# Patient Record
Sex: MALE | Race: OTHER | URBAN - METROPOLITAN AREA
[De-identification: names, ages, dates, MRNs, and addresses within clinical notes are randomized per-mention and may not be internally consistent; named-entity substitution may affect disease eponyms.]

---

## 2017-04-21 VITALS
OXYGEN SATURATION: 97 % | RESPIRATION RATE: 18 BRPM | HEIGHT: 74 IN | TEMPERATURE: 98 F | WEIGHT: 178.35 LBS | HEART RATE: 84 BPM | DIASTOLIC BLOOD PRESSURE: 101 MMHG | SYSTOLIC BLOOD PRESSURE: 165 MMHG

## 2017-04-21 RX ORDER — CHLORHEXIDINE GLUCONATE 213 G/1000ML
1 SOLUTION TOPICAL ONCE
Qty: 0 | Refills: 0 | Status: DISCONTINUED | OUTPATIENT
Start: 2017-04-24 | End: 2017-04-25

## 2017-04-21 NOTE — H&P ADULT - ASSESSMENT
61 yr old M current smoker with PMHx of HTN, hyperlipidemia, Stage III CKD, T cell lymphoma (diagnosed 10 yrs ago, on chemotherapy q Wednesday), chronic Afib (on Coumadin, last dose 4/18/17) initially presented today to [facility] for right and left heart cath with possible intervention in light of pt's risk factors, CCS Angina Class III equivalent symptoms and abnormal results of ECHO.    ASA III and Mallampati III    OF NOTE:    Risks & benefits of procedure and alternative therapy have been explained to the patient including but not limited to: allergic reaction, bleeding w/possible need for blood transfusion, infection, renal and vascular compromise, limb damage, arrhythmia, stroke, vessel dissection/perforation, Myocardial infarction, emergent CABG. Informed consent obtained and in chart. 61 yr old M current smoker with PMHx of HTN, hyperlipidemia, Stage III CKD, T cell lymphoma (diagnosed 10 yrs ago, on chemotherapy q Wednesday), chronic Afib (on Coumadin, last dose 4/18/17) initially presented today to [facility] for right and left heart cath with possible intervention in light of pt's risk factors, CCS Angina Class III equivalent symptoms and abnormal results of ECHO.    ASA III and Mallampati III    OF NOTE:     Risks & benefits of procedure and alternative therapy have been explained to the patient including but not limited to: allergic reaction, bleeding w/possible need for blood transfusion, infection, renal and vascular compromise, limb damage, arrhythmia, stroke, vessel dissection/perforation, Myocardial infarction, emergent CABG. Informed consent obtained and in chart. 61 yr old M current smoker with PMHx of HTN, hyperlipidemia, Stage III CKD, T cell lymphoma (diagnosed 10 yrs ago, on chemotherapy q Wednesday), chronic Afib (on Coumadin, last dose 4/18/17) initially presented today to [facility] for right and left heart cath with possible intervention in light of pt's risk factors, CCS Angina Class III equivalent symptoms and abnormal results of ECHO.    ASA III and Mallampati III    OF NOTE: Pt was admitted to the [facility]    Risks & benefits of procedure and alternative therapy have been explained to the patient including but not limited to: allergic reaction, bleeding w/possible need for blood transfusion, infection, renal and vascular compromise, limb damage, arrhythmia, stroke, vessel dissection/perforation, Myocardial infarction, emergent CABG. Informed consent obtained and in chart. 61 yr old M current smoker with PMHx of HTN, hyperlipidemia, Stage III CKD, T cell lymphoma (diagnosed 10 yrs ago, on chemotherapy q Wednesday), chronic Afib (on Coumadin, last dose 4/18/17) initially presented today to [facility] for elective right and left heart cath with possible intervention now postponed due to acute on chronic systolic CHF exacerbation.    ASA III and Mallampati III         Risks & benefits of procedure and alternative therapy have been explained to the patient including but not limited to: allergic reaction, bleeding w/possible need for blood transfusion, infection, renal and vascular compromise, limb damage, arrhythmia, stroke, vessel dissection/perforation, Myocardial infarction, emergent CABG. Informed consent obtained and in chart.

## 2017-04-21 NOTE — H&P ADULT - PROBLEM SELECTOR PLAN 5
diagnosed 10 y ago, on chemo every Wednesday, will follow-up outpatient chemo regimen with pt's oncologist

## 2017-04-21 NOTE — H&P ADULT - PROBLEM SELECTOR PLAN 4
continue amlodipine, torpol, valsartan for now. Monitor BP closely while diuresing may need additional agents if BP does not improve.

## 2017-04-21 NOTE — H&P ADULT - PROBLEM SELECTOR PLAN 1
-Lasix IV 40 mg x1 given, reassess for further diuresis pending urine output  -ECHO ordered, reassess EF and PA pressure  -Monitor strict I/Os, daily weight, fluid restriction to 1L per day  -Continue telemetry monitor  -continue toprol XL and valsartan  - plan for cath this admit pending clinical improvement

## 2017-04-21 NOTE — H&P ADULT - PROBLEM SELECTOR PLAN 2
Cr 3.1, Na 150, Cr baseline per Dr Rowell   avoid nephrotoxic agents  renal Dr. Guadalupe consulted (pt's nephrologist)-follow-up recs

## 2017-04-21 NOTE — H&P ADULT - HISTORY OF PRESENT ILLNESS
**SKELETON    61 yr old M current smoker with PMHx of HTN, hyperlipidemia, Stage III CKD, T cell lymphoma , chronic Afib (on Coumadin, last dose 4/19/17)    initially presented to cardiologist c/o NIELSON x several months.    Patient states he cannot walk >1 block prior to becoming SOB and fatigued.       Echocardiogram on 3/30/17 revealed moderate global hypokinesis with an estimated EF 40%. Mildly dilated with mild concentric LVH. Moderate MR/TR. Severe pulmonary HTN.     In light of patients risk factors, CCS Angina Class III equivalent symptoms, patient now presents for recommended right and left heart catheterization with possible intervention. 61 yr old M current smoker with PMHx of HTN, hyperlipidemia, Stage III CKD, T cell lymphoma (diagnosed 10 yrs ago, on chemotherapy q Wednesday), chronic Afib (on Coumadin, last dose 4/18/17) initially presented to cardiologist c/o NIELSON x several weeks. Patient states he can barely walk >1 block prior to becoming SOB and developing palpitations. Patient further admits to occasional dizziness and lightheadedness. Patient also reports bilateral LE edema from feet to his waist, 2 pillow orthopnea and PND.  Echocardiogram on 3/30/17 revealed moderate global hypokinesis with an estimated EF 40%. Mildly dilated with mild concentric LVH. Moderate MR/TR. Severe pulmonary HTN.     In light of patients risk factors, CCS Angina Class III equivalent symptoms, patient now presents for recommended right and left heart catheterization with possible intervention. 61 yr old M current smoker with PMHx of HTN, hyperlipidemia, Stage III CKD, T cell lymphoma (diagnosed 10 yrs ago, on chemotherapy q Wednesday), chronic Afib (on Coumadin, last dose 4/18/17) initially presented to cardiologist c/o NIELSON x several weeks. Patient states he can barely walk >1 block prior to becoming SOB and developing palpitations. Patient further admits to occasional dizziness and lightheadedness. Patient also reports bilateral LE edema from feet to his waist, 2 pillow orthopnea and PND.  Echocardiogram on 3/30/17 revealed moderate global hypokinesis with an estimated EF 40%. Mildly dilated with mild concentric LVH. Moderate MR/TR. Severe pulmonary HTN.     In light of patients risk factors, CCS Angina Class III equivalent symptoms, patient now presents for recommended right and left heart catheterization with possible intervention.     On arrival pt was found to be volume overloaded. Cath cancelled due to acute on chronic systolic HF exacerbation per Dr Rowell and Dr. Echeverria. Pt is given lasix 40 IV X 1 and started on Heparin Drip for A-fib. Pt will admitted for further cardiac management to [facility].

## 2017-04-21 NOTE — H&P ADULT - NSHPSOCIALHISTORY_GEN_ALL_CORE
Tobacco: <1/4 ppd x 3 months, previously 1/2 ppd x 40rs  ETOH: social drinker  Illicit drugs: denies

## 2017-04-24 ENCOUNTER — INPATIENT (INPATIENT)
Facility: HOSPITAL | Age: 62
LOS: 3 days | Discharge: HOME | DRG: 291 | End: 2017-04-28
Attending: INTERNAL MEDICINE | Admitting: INTERNAL MEDICINE
Payer: MEDICARE

## 2017-04-24 DIAGNOSIS — Z90.49 ACQUIRED ABSENCE OF OTHER SPECIFIED PARTS OF DIGESTIVE TRACT: Chronic | ICD-10-CM

## 2017-04-24 DIAGNOSIS — C85.90 NON-HODGKIN LYMPHOMA, UNSPECIFIED, UNSPECIFIED SITE: ICD-10-CM

## 2017-04-24 DIAGNOSIS — I50.43 ACUTE ON CHRONIC COMBINED SYSTOLIC (CONGESTIVE) AND DIASTOLIC (CONGESTIVE) HEART FAILURE: ICD-10-CM

## 2017-04-24 DIAGNOSIS — N18.4 CHRONIC KIDNEY DISEASE, STAGE 4 (SEVERE): ICD-10-CM

## 2017-04-24 DIAGNOSIS — N18.9 CHRONIC KIDNEY DISEASE, UNSPECIFIED: ICD-10-CM

## 2017-04-24 DIAGNOSIS — I48.2 CHRONIC ATRIAL FIBRILLATION: ICD-10-CM

## 2017-04-24 DIAGNOSIS — I10 ESSENTIAL (PRIMARY) HYPERTENSION: ICD-10-CM

## 2017-04-24 DIAGNOSIS — Z02.9 ENCOUNTER FOR ADMINISTRATIVE EXAMINATIONS, UNSPECIFIED: ICD-10-CM
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
## 2017-04-24 DIAGNOSIS — I48.91 UNSPECIFIED ATRIAL FIBRILLATION: ICD-10-CM

## 2017-04-24 LAB
ANION GAP SERPL CALC-SCNC: 7 MMOL/L — LOW (ref 9–16)
APPEARANCE UR: CLEAR — SIGNIFICANT CHANGE UP
APTT BLD: 42.1 SEC — HIGH (ref 27.5–37.4)
APTT BLD: >200 SEC — CRITICAL HIGH (ref 27.5–37.4)
BASOPHILS NFR BLD AUTO: 0.6 % — SIGNIFICANT CHANGE UP (ref 0–2)
BILIRUB UR-MCNC: NEGATIVE — SIGNIFICANT CHANGE UP
BUN SERPL-MCNC: 31 MG/DL — HIGH (ref 7–23)
CALCIUM SERPL-MCNC: 8.8 MG/DL — SIGNIFICANT CHANGE UP (ref 8.5–10.5)
CHLORIDE SERPL-SCNC: 118 MMOL/L — HIGH (ref 96–108)
CHLORIDE UR-SCNC: 152 MMOL/L — SIGNIFICANT CHANGE UP
CHOLEST SERPL-MCNC: 107 MG/DL — SIGNIFICANT CHANGE UP
CK MB CFR SERPL CALC: 1.4 NG/ML — SIGNIFICANT CHANGE UP (ref 0.5–3.6)
CK SERPL-CCNC: 48 U/L — SIGNIFICANT CHANGE UP (ref 39–308)
CO2 SERPL-SCNC: 25 MMOL/L — SIGNIFICANT CHANGE UP (ref 22–31)
COLOR SPEC: YELLOW — SIGNIFICANT CHANGE UP
CREAT ?TM UR-MCNC: 17 MG/DL — SIGNIFICANT CHANGE UP
CREAT ?TM UR-MCNC: 17.7 MG/DL — SIGNIFICANT CHANGE UP
CREAT SERPL-MCNC: 3.17 MG/DL — HIGH (ref 0.5–1.3)
CRP SERPL-MCNC: 1.13 MG/DL — HIGH
DIFF PNL FLD: (no result)
EOSINOPHIL NFR BLD AUTO: 2.1 % — SIGNIFICANT CHANGE UP (ref 0–6)
GLUCOSE SERPL-MCNC: 98 MG/DL — SIGNIFICANT CHANGE UP (ref 70–99)
GLUCOSE UR QL: NEGATIVE — SIGNIFICANT CHANGE UP
HBA1C BLD-MCNC: 4.9 % — SIGNIFICANT CHANGE UP (ref 4.8–5.6)
HCT VFR BLD CALC: 31.4 % — LOW (ref 39–50)
HCT VFR BLD CALC: 32.5 % — LOW (ref 39–50)
HDLC SERPL-MCNC: 51 MG/DL — SIGNIFICANT CHANGE UP
HGB BLD-MCNC: 10.5 G/DL — LOW (ref 13–17)
HGB BLD-MCNC: 10.8 G/DL — LOW (ref 13–17)
INR BLD: 1.42 — HIGH (ref 0.88–1.16)
KETONES UR-MCNC: NEGATIVE — SIGNIFICANT CHANGE UP
LEUKOCYTE ESTERASE UR-ACNC: NEGATIVE — SIGNIFICANT CHANGE UP
LIPID PNL WITH DIRECT LDL SERPL: 44 MG/DL — SIGNIFICANT CHANGE UP
LYMPHOCYTES # BLD AUTO: 22.2 % — SIGNIFICANT CHANGE UP (ref 13–44)
MCHC RBC-ENTMCNC: 32.5 PG — SIGNIFICANT CHANGE UP (ref 27–34)
MCHC RBC-ENTMCNC: 32.6 PG — SIGNIFICANT CHANGE UP (ref 27–34)
MCHC RBC-ENTMCNC: 33.2 G/DL — SIGNIFICANT CHANGE UP (ref 32–36)
MCHC RBC-ENTMCNC: 33.4 G/DL — SIGNIFICANT CHANGE UP (ref 32–36)
MCV RBC AUTO: 97.2 FL — SIGNIFICANT CHANGE UP (ref 80–100)
MCV RBC AUTO: 98.2 FL — SIGNIFICANT CHANGE UP (ref 80–100)
MONOCYTES NFR BLD AUTO: 14.9 % — HIGH (ref 2–14)
NEUTROPHILS NFR BLD AUTO: 60.2 % — SIGNIFICANT CHANGE UP (ref 43–77)
NITRITE UR-MCNC: NEGATIVE — SIGNIFICANT CHANGE UP
OSMOLALITY UR: 324 MOSMOL/KG — SIGNIFICANT CHANGE UP (ref 100–650)
PH UR: 5.5 — SIGNIFICANT CHANGE UP (ref 5–8)
PLATELET # BLD AUTO: 199 K/UL — SIGNIFICANT CHANGE UP (ref 150–400)
PLATELET # BLD AUTO: 203 K/UL — SIGNIFICANT CHANGE UP (ref 150–400)
POTASSIUM SERPL-MCNC: 4.1 MMOL/L — SIGNIFICANT CHANGE UP (ref 3.5–5.3)
POTASSIUM SERPL-SCNC: 4.1 MMOL/L — SIGNIFICANT CHANGE UP (ref 3.5–5.3)
PROT ?TM UR-MCNC: 41 MG/DL — HIGH
PROT UR-MCNC: (no result) MG/DL
PROT/CREAT UR-RTO: 2.4 RATIO — SIGNIFICANT CHANGE UP
PROTHROM AB SERPL-ACNC: 15.9 SEC — HIGH (ref 9.8–12.7)
RBC # BLD: 3.23 M/UL — LOW (ref 4.2–5.8)
RBC # BLD: 3.31 M/UL — LOW (ref 4.2–5.8)
RBC # FLD: 17.6 % — HIGH (ref 10.3–16.9)
RBC # FLD: 17.7 % — HIGH (ref 10.3–16.9)
SODIUM SERPL-SCNC: 150 MMOL/L — HIGH (ref 135–145)
SODIUM UR-SCNC: 134 MMOL/L — SIGNIFICANT CHANGE UP
SP GR SPEC: 1.01 — SIGNIFICANT CHANGE UP (ref 1–1.03)
TOTAL CHOLESTEROL/HDL RATIO MEASUREMENT: 2.1 RATIO — SIGNIFICANT CHANGE UP
TRIGL SERPL-MCNC: 62 MG/DL — SIGNIFICANT CHANGE UP
UROBILINOGEN FLD QL: 0.2 E.U./DL — SIGNIFICANT CHANGE UP
WBC # BLD: 3.3 K/UL — LOW (ref 3.8–10.5)
WBC # BLD: 4 K/UL — SIGNIFICANT CHANGE UP (ref 3.8–10.5)
WBC # FLD AUTO: 3.3 K/UL — LOW (ref 3.8–10.5)
WBC # FLD AUTO: 4 K/UL — SIGNIFICANT CHANGE UP (ref 3.8–10.5)

## 2017-04-24 PROCEDURE — 71010: CPT | Mod: 26

## 2017-04-24 PROCEDURE — 93306 TTE W/DOPPLER COMPLETE: CPT | Mod: 26

## 2017-04-24 PROCEDURE — 93010 ELECTROCARDIOGRAM REPORT: CPT

## 2017-04-24 PROCEDURE — 99223 1ST HOSP IP/OBS HIGH 75: CPT | Mod: GC

## 2017-04-24 PROCEDURE — 99223 1ST HOSP IP/OBS HIGH 75: CPT

## 2017-04-24 RX ORDER — AMLODIPINE BESYLATE 2.5 MG/1
5 TABLET ORAL ONCE
Qty: 0 | Refills: 0 | Status: COMPLETED | OUTPATIENT
Start: 2017-04-24 | End: 2017-04-24

## 2017-04-24 RX ORDER — NITROGLYCERIN 6.5 MG
1 CAPSULE, EXTENDED RELEASE ORAL ONCE
Qty: 0 | Refills: 0 | Status: COMPLETED | OUTPATIENT
Start: 2017-04-24 | End: 2017-04-24

## 2017-04-24 RX ORDER — HEPARIN SODIUM 5000 [USP'U]/ML
1300 INJECTION INTRAVENOUS; SUBCUTANEOUS
Qty: 25000 | Refills: 0 | Status: DISCONTINUED | OUTPATIENT
Start: 2017-04-24 | End: 2017-04-28

## 2017-04-24 RX ORDER — HEPARIN SODIUM 5000 [USP'U]/ML
6500 INJECTION INTRAVENOUS; SUBCUTANEOUS ONCE
Qty: 0 | Refills: 0 | Status: DISCONTINUED | OUTPATIENT
Start: 2017-04-24 | End: 2017-04-28

## 2017-04-24 RX ORDER — VALSARTAN 80 MG/1
160 TABLET ORAL DAILY
Qty: 0 | Refills: 0 | Status: DISCONTINUED | OUTPATIENT
Start: 2017-04-24 | End: 2017-04-24

## 2017-04-24 RX ORDER — HEPARIN SODIUM 5000 [USP'U]/ML
6500 INJECTION INTRAVENOUS; SUBCUTANEOUS ONCE
Qty: 0 | Refills: 0 | Status: COMPLETED | OUTPATIENT
Start: 2017-04-24 | End: 2017-04-24

## 2017-04-24 RX ORDER — VALSARTAN 80 MG/1
160 TABLET ORAL DAILY
Qty: 0 | Refills: 0 | Status: DISCONTINUED | OUTPATIENT
Start: 2017-04-24 | End: 2017-04-28

## 2017-04-24 RX ORDER — HEPARIN SODIUM 5000 [USP'U]/ML
INJECTION INTRAVENOUS; SUBCUTANEOUS
Qty: 25000 | Refills: 0 | Status: DISCONTINUED | OUTPATIENT
Start: 2017-04-24 | End: 2017-04-24

## 2017-04-24 RX ORDER — FUROSEMIDE 40 MG
40 TABLET ORAL ONCE
Qty: 0 | Refills: 0 | Status: COMPLETED | OUTPATIENT
Start: 2017-04-24 | End: 2017-04-24

## 2017-04-24 RX ORDER — HEPARIN SODIUM 5000 [USP'U]/ML
INJECTION INTRAVENOUS; SUBCUTANEOUS ONCE
Qty: 25000 | Refills: 0 | Status: COMPLETED | OUTPATIENT
Start: 2017-04-24 | End: 2017-04-24

## 2017-04-24 RX ORDER — HEPARIN SODIUM 5000 [USP'U]/ML
3000 INJECTION INTRAVENOUS; SUBCUTANEOUS ONCE
Qty: 0 | Refills: 0 | Status: DISCONTINUED | OUTPATIENT
Start: 2017-04-24 | End: 2017-04-28

## 2017-04-24 RX ORDER — FUROSEMIDE 40 MG
40 TABLET ORAL
Qty: 0 | Refills: 0 | Status: DISCONTINUED | OUTPATIENT
Start: 2017-04-24 | End: 2017-04-26

## 2017-04-24 RX ADMIN — AMLODIPINE BESYLATE 5 MILLIGRAM(S): 2.5 TABLET ORAL at 21:22

## 2017-04-24 RX ADMIN — HEPARIN SODIUM 1500 UNIT(S)/HR: 5000 INJECTION INTRAVENOUS; SUBCUTANEOUS at 12:05

## 2017-04-24 RX ADMIN — Medication 40 MILLIGRAM(S): at 18:12

## 2017-04-24 RX ADMIN — Medication 40 MILLIGRAM(S): at 11:48

## 2017-04-24 RX ADMIN — HEPARIN SODIUM 1300 UNIT(S)/HR: 5000 INJECTION INTRAVENOUS; SUBCUTANEOUS at 20:58

## 2017-04-24 RX ADMIN — Medication 1 INCH(S): at 22:19

## 2017-04-24 RX ADMIN — HEPARIN SODIUM 6500 UNIT(S): 5000 INJECTION INTRAVENOUS; SUBCUTANEOUS at 12:04

## 2017-04-24 NOTE — CONSULT NOTE ADULT - ATTENDING COMMENTS
Clinical CHF and total body fluid overload with LE edema-   Known underlying CKD-  Needs aggressive diuresis- as above IV lasix BID bolus dose.  Stict I/O  Repeat UA.

## 2017-04-24 NOTE — CONSULT NOTE ADULT - PROBLEM SELECTOR RECOMMENDATION 2
- no urgent indication to resume all anti-hypertensive medications  - can hold amlodipine (edema) and losartan for now  - can resume beta-blocker and doxazosin

## 2017-04-24 NOTE — CONSULT NOTE ADULT - PROBLEM SELECTOR RECOMMENDATION 9
- patient with TBW overload, however, does not appear to have overt pulmonary congestion on exam.  - please check CXR  - can give lasix 60 mg IV BID for now to help with diuresis  - keep strict I/O and check daily standing weights  - check serum albumin level  - check UA, urine electrolytes, and urine protein to creatinine ratio  - low salt diet  - renally dose all medications for GFR of 20 ml/min  - avoid nephrotoxic agents such as NSAIDs and IV contrast

## 2017-04-24 NOTE — CONSULT NOTE ADULT - SUBJECTIVE AND OBJECTIVE BOX
HPI:  61 yr old M current smoker with PMHx of HTN, hyperlipidemia, Stage III CKD, T cell lymphoma (diagnosed 10 yrs ago, on chemotherapy q Wednesday), chronic Afib (on Coumadin, last dose 4/18/17) initially presented to cardiologist c/o NIELSON x several weeks. Patient states he can barely walk >1 block prior to becoming SOB and developing palpitations. Patient further admits to occasional dizziness and lightheadedness. Patient also reports bilateral LE edema from feet to his waist, 2 pillow orthopnea and PND.  Echocardiogram on 3/30/17 revealed moderate global hypokinesis with an estimated EF 40%. Mildly dilated with mild concentric LVH. Moderate MR/TR. Severe pulmonary HTN.     In light of patients risk factors, CCS Angina Class III equivalent symptoms, patient now presents for recommended right and left heart catheterization with possible intervention.     On arrival pt was found to be volume overloaded. Cath cancelled due to acute on chronic systolic HF exacerbation per Dr Rowell and Dr. Echeverria. Pt is given lasix 40 IV X 1 and started on Heparin Drip for A-fib. Pt will admitted for further cardiac management to [facility]. (21 Apr 2017 07:20)    Renal consulted on 4/24/17 for management of presumed CKD.  Patient stated he was notified a few weeks ago that he has some "kidney problem" but prior to that denied knowledge of renal insufficiency.    Patient developed edema 3 weeks ago and states it was associated with SOB.  Denied n/v/d.  No chest pain.  No dysuria, no hematuria.    Reports being treated for approximately 10 years with interferon for T-cell lymphoma.  Changed to romidespin approximately 3-4 months ago.      PAST MEDICAL & SURGICAL HISTORY:  CKD (chronic kidney disease)  Atrial fibrillation  HTN (hypertension)  History of cholecystectomy      MEDICATIONS:  chlorhexidine 4% Liquid 1Application(s) Topical once  heparin  Injectable 6500Unit(s) IV Push Once PRN  heparin  Injectable 3000Unit(s) IV Push Once PRN      No pertinent family history in first degree relatives      SOCIAL HISTORY:    REVIEW OF SYSTEMS:  Constitutional: No fevers.   Card: No chest pain.   Resp: + SOB.  GI: No nausea or vomiting. No abdominal pain.  : No dysuria. Neuro: No focal weakness or sensory loss.  Eyes: No visual symptoms. MS: No joint swelling.  Skin: No rashes. Psych: No psychosis.    PHYSICAL EXAM:    Constitutional: T(C): --  HR: 84 (84 - 84)  BP: 169/99 (169/99 - 169/99)  RR: 15 (15 - 15)  SpO2: 98% (98% - 98%)  Wt(kg): --  General - AAO x 3. NAD  HEENT - MMM. No cyanosis.  CV - s1, s2. IRRR  Lungs - CTA b/l  Abd - soft NT, ND. +BS  Ext - 2+ b/l LE edema      DATA:  150<H>  |  118<H>  |  31<H>  ----------------------------<  98     Ca:8.8   (24 Apr 2017 10:04)  4.1     |  25     |  3.17<H>      eGFR if Non : 20 <L>  eGFR if : 23 <L>                            10.8<L>  3.3<L> )-----------( 199      ( 24 Apr 2017 09:36 )             32.5<L> HPI:  61 yr old M current smoker with PMHx of HTN, hyperlipidemia, Stage III CKD, T cell lymphoma (diagnosed 10 yrs ago, on chemotherapy q Wednesday), chronic Afib (on Coumadin, last dose 4/18/17) initially presented to cardiologist c/o NIELSON x several weeks. Patient states he can barely walk >1 block prior to becoming SOB and developing palpitations. Patient further admits to occasional dizziness and lightheadedness. Patient also reports bilateral LE edema from feet to his waist, 2 pillow orthopnea and PND.  Echocardiogram on 3/30/17 revealed moderate global hypokinesis with an estimated EF 40%. Mildly dilated with mild concentric LVH. Moderate MR/TR. Severe pulmonary HTN.     In light of patients risk factors, CCS Angina Class III equivalent symptoms, patient now presents for recommended right and left heart catheterization with possible intervention.     On arrival pt was found to be volume overloaded. Cath cancelled due to acute on chronic systolic HF exacerbation per Dr Rowell and Dr. Echeverria. Pt is given lasix 40 IV X 1 and started on Heparin Drip for A-fib. Pt will admitted for further cardiac management to [facility]. (21 Apr 2017 07:20)    Renal consulted on 4/24/17 for management of presumed CKD.  Patient stated he was notified a few weeks ago that he has some "kidney problem" but prior to that denied knowledge of renal insufficiency.    Patient developed edema 3 weeks ago and states it was associated with SOB.  Denied n/v/d.  No chest pain.  No dysuria, no hematuria.    Reports being treated for approximately 10 years with interferon for T-cell lymphoma.  Changed to romidespin approximately 3-4 months ago.    Denied any recent NSAID use or change in medications (except above).      PAST MEDICAL & SURGICAL HISTORY:  CKD (chronic kidney disease)  Atrial fibrillation  HTN (hypertension)  History of cholecystectomy      MEDICATIONS:  chlorhexidine 4% Liquid 1Application(s) Topical once  heparin  Injectable 6500Unit(s) IV Push Once PRN  heparin  Injectable 3000Unit(s) IV Push Once PRN      No pertinent family history in first degree relatives      SOCIAL HISTORY:    REVIEW OF SYSTEMS:  Constitutional: No fevers.   Card: No chest pain.   Resp: + SOB.  GI: No nausea or vomiting. No abdominal pain.  : No dysuria. Neuro: No focal weakness or sensory loss.  Eyes: No visual symptoms. MS: No joint swelling.  Skin: No rashes. Psych: No psychosis.    PHYSICAL EXAM:    Constitutional: T(C): --  HR: 84 (84 - 84)  BP: 169/99 (169/99 - 169/99)  RR: 15 (15 - 15)  SpO2: 98% (98% - 98%)  Wt(kg): --  General - AAO x 3. NAD  HEENT - MMM. No cyanosis.  CV - s1, s2. IRRR  Lungs - CTA b/l  Abd - soft NT, ND. +BS  Ext - 2+ b/l LE edema      DATA:  150<H>  |  118<H>  |  31<H>  ----------------------------<  98     Ca:8.8   (24 Apr 2017 10:04)  4.1     |  25     |  3.17<H>      eGFR if Non : 20 <L>  eGFR if : 23 <L>                            10.8<L>  3.3<L> )-----------( 199      ( 24 Apr 2017 09:36 )             32.5<L>

## 2017-04-25 LAB
ANION GAP SERPL CALC-SCNC: 9 MMOL/L — SIGNIFICANT CHANGE UP (ref 9–16)
APTT BLD: 105.1 SEC — HIGH (ref 27.5–37.4)
APTT BLD: 120.8 SEC — CRITICAL HIGH (ref 27.5–37.4)
APTT BLD: 53.7 SEC — HIGH (ref 27.5–37.4)
BUN SERPL-MCNC: 29 MG/DL — HIGH (ref 7–23)
CALCIUM SERPL-MCNC: 8.9 MG/DL — SIGNIFICANT CHANGE UP (ref 8.5–10.5)
CHLORIDE SERPL-SCNC: 114 MMOL/L — HIGH (ref 96–108)
CO2 SERPL-SCNC: 22 MMOL/L — SIGNIFICANT CHANGE UP (ref 22–31)
CREAT SERPL-MCNC: 3.23 MG/DL — HIGH (ref 0.5–1.3)
GLUCOSE SERPL-MCNC: 94 MG/DL — SIGNIFICANT CHANGE UP (ref 70–99)
HCT VFR BLD CALC: 29.5 % — LOW (ref 39–50)
HCT VFR BLD CALC: 30.1 % — LOW (ref 39–50)
HCT VFR BLD CALC: 32.2 % — LOW (ref 39–50)
HGB BLD-MCNC: 10.7 G/DL — LOW (ref 13–17)
HGB BLD-MCNC: 9.8 G/DL — LOW (ref 13–17)
HGB BLD-MCNC: 9.8 G/DL — LOW (ref 13–17)
INR BLD: 1.27 — HIGH (ref 0.88–1.16)
MCHC RBC-ENTMCNC: 31.7 PG — SIGNIFICANT CHANGE UP (ref 27–34)
MCHC RBC-ENTMCNC: 32.4 PG — SIGNIFICANT CHANGE UP (ref 27–34)
MCHC RBC-ENTMCNC: 32.5 PG — SIGNIFICANT CHANGE UP (ref 27–34)
MCHC RBC-ENTMCNC: 32.6 G/DL — SIGNIFICANT CHANGE UP (ref 32–36)
MCHC RBC-ENTMCNC: 33.2 G/DL — SIGNIFICANT CHANGE UP (ref 32–36)
MCHC RBC-ENTMCNC: 33.2 G/DL — SIGNIFICANT CHANGE UP (ref 32–36)
MCV RBC AUTO: 97.4 FL — SIGNIFICANT CHANGE UP (ref 80–100)
MCV RBC AUTO: 97.6 FL — SIGNIFICANT CHANGE UP (ref 80–100)
MCV RBC AUTO: 97.7 FL — SIGNIFICANT CHANGE UP (ref 80–100)
PLATELET # BLD AUTO: 185 K/UL — SIGNIFICANT CHANGE UP (ref 150–400)
PLATELET # BLD AUTO: 187 K/UL — SIGNIFICANT CHANGE UP (ref 150–400)
PLATELET # BLD AUTO: 215 K/UL — SIGNIFICANT CHANGE UP (ref 150–400)
POTASSIUM SERPL-MCNC: 3.5 MMOL/L — SIGNIFICANT CHANGE UP (ref 3.5–5.3)
POTASSIUM SERPL-SCNC: 3.5 MMOL/L — SIGNIFICANT CHANGE UP (ref 3.5–5.3)
PROTHROM AB SERPL-ACNC: 14.2 SEC — HIGH (ref 9.8–12.7)
RBC # BLD: 3.02 M/UL — LOW (ref 4.2–5.8)
RBC # BLD: 3.09 M/UL — LOW (ref 4.2–5.8)
RBC # BLD: 3.3 M/UL — LOW (ref 4.2–5.8)
RBC # FLD: 17.3 % — HIGH (ref 10.3–16.9)
RBC # FLD: 17.4 % — HIGH (ref 10.3–16.9)
RBC # FLD: 17.9 % — HIGH (ref 10.3–16.9)
SODIUM SERPL-SCNC: 145 MMOL/L — SIGNIFICANT CHANGE UP (ref 135–145)
WBC # BLD: 3.2 K/UL — LOW (ref 3.8–10.5)
WBC # BLD: 3.3 K/UL — LOW (ref 3.8–10.5)
WBC # BLD: 3.6 K/UL — LOW (ref 3.8–10.5)
WBC # FLD AUTO: 3.2 K/UL — LOW (ref 3.8–10.5)
WBC # FLD AUTO: 3.3 K/UL — LOW (ref 3.8–10.5)
WBC # FLD AUTO: 3.6 K/UL — LOW (ref 3.8–10.5)

## 2017-04-25 PROCEDURE — 99233 SBSQ HOSP IP/OBS HIGH 50: CPT | Mod: GC

## 2017-04-25 PROCEDURE — 99233 SBSQ HOSP IP/OBS HIGH 50: CPT

## 2017-04-25 RX ORDER — METOPROLOL TARTRATE 50 MG
200 TABLET ORAL DAILY
Qty: 0 | Refills: 0 | Status: DISCONTINUED | OUTPATIENT
Start: 2017-04-25 | End: 2017-04-28

## 2017-04-25 RX ORDER — ACETAMINOPHEN 500 MG
650 TABLET ORAL ONCE
Qty: 0 | Refills: 0 | Status: COMPLETED | OUTPATIENT
Start: 2017-04-25 | End: 2017-04-25

## 2017-04-25 RX ORDER — CALCITRIOL 0.5 UG/1
0.25 CAPSULE ORAL DAILY
Qty: 0 | Refills: 0 | Status: DISCONTINUED | OUTPATIENT
Start: 2017-04-25 | End: 2017-04-28

## 2017-04-25 RX ORDER — AMLODIPINE BESYLATE 2.5 MG/1
10 TABLET ORAL DAILY
Qty: 0 | Refills: 0 | Status: DISCONTINUED | OUTPATIENT
Start: 2017-04-25 | End: 2017-04-28

## 2017-04-25 RX ORDER — POTASSIUM CHLORIDE 20 MEQ
40 PACKET (EA) ORAL ONCE
Qty: 0 | Refills: 0 | Status: COMPLETED | OUTPATIENT
Start: 2017-04-25 | End: 2017-04-25

## 2017-04-25 RX ORDER — DOXAZOSIN MESYLATE 4 MG
2 TABLET ORAL EVERY 12 HOURS
Qty: 0 | Refills: 0 | Status: DISCONTINUED | OUTPATIENT
Start: 2017-04-25 | End: 2017-04-28

## 2017-04-25 RX ADMIN — AMLODIPINE BESYLATE 10 MILLIGRAM(S): 2.5 TABLET ORAL at 09:16

## 2017-04-25 RX ADMIN — HEPARIN SODIUM 900 UNIT(S)/HR: 5000 INJECTION INTRAVENOUS; SUBCUTANEOUS at 10:20

## 2017-04-25 RX ADMIN — CALCITRIOL 0.25 MICROGRAM(S): 0.5 CAPSULE ORAL at 09:15

## 2017-04-25 RX ADMIN — Medication 1 INCH(S): at 10:21

## 2017-04-25 RX ADMIN — Medication 40 MILLIGRAM(S): at 05:45

## 2017-04-25 RX ADMIN — HEPARIN SODIUM 1100 UNIT(S)/HR: 5000 INJECTION INTRAVENOUS; SUBCUTANEOUS at 18:03

## 2017-04-25 RX ADMIN — Medication 650 MILLIGRAM(S): at 01:15

## 2017-04-25 RX ADMIN — Medication 2 MILLIGRAM(S): at 18:03

## 2017-04-25 RX ADMIN — HEPARIN SODIUM 1100 UNIT(S)/HR: 5000 INJECTION INTRAVENOUS; SUBCUTANEOUS at 03:53

## 2017-04-25 RX ADMIN — Medication 40 MILLIGRAM(S): at 18:03

## 2017-04-25 RX ADMIN — VALSARTAN 160 MILLIGRAM(S): 80 TABLET ORAL at 05:45

## 2017-04-25 RX ADMIN — Medication 200 MILLIGRAM(S): at 09:15

## 2017-04-25 RX ADMIN — Medication 650 MILLIGRAM(S): at 00:22

## 2017-04-25 RX ADMIN — Medication 40 MILLIEQUIVALENT(S): at 12:39

## 2017-04-25 NOTE — PROGRESS NOTE ADULT - PROBLEM SELECTOR PLAN 3
Currently in NSR, rate controlled on Toprol   continue to hold Coumadin, INR 1.4 today. Heparin drip started, follow-up PTT in 6 h. Blood pressure elevated, Continue to monitor w/ home meds  - Continue Norvasc 10mg QD, Toprol XL 200mg QD, Vaslartan 160mg PO QD.

## 2017-04-25 NOTE — PROGRESS NOTE ADULT - PROBLEM SELECTOR PLAN 4
continue amlodipine, torpol, valsartan for now. Monitor BP closely while diuresing may need additional agents if BP does not improve. Remains rate controlled in NSR  - Continue holding Coumadin w/ Plan for Cath, Continue Heparin gtt (nomogram)   - Continue Toprol XL 200mg daily.

## 2017-04-25 NOTE — PROGRESS NOTE ADULT - SUBJECTIVE AND OBJECTIVE BOX
Patient seen and examined at bedside.     Resting comfortably this morning  Does not report having any dyspnea right now  Still has leg edema    Diuresis is proceeding well.   Total IN: 307.5 ml  ---------------------------------------------  OUT:    Voided: 1830 ml    Total OUT: 1830 ml  ---------------------------------------------  Total NET: -1522.5 ml    Obtained his outpatient labs from 2016: creatinine was 2.6 at that time      chlorhexidine 4% Liquid 1Application(s) once  heparin  Injectable 6500Unit(s) Once PRN  heparin  Injectable 3000Unit(s) Once PRN  furosemide   Injectable 40milliGRAM(s) two times a day  valsartan 160milliGRAM(s) daily  heparin  Infusion. 1300Unit(s)/Hr <Continuous>  metoprolol succinate ER 200milliGRAM(s) daily  amLODIPine   Tablet 10milliGRAM(s) daily  doxazosin 2milliGRAM(s) every 12 hours  calcitriol   Capsule 0.25MICROGram(s) daily      Allergies    No Known Allergies    Intolerances        T(C): , Max: 37.4 ( @ 01:00)  T(F): , Max: 99.3 ( @ 01:00)  HR: 85  BP: 182/93  BP(mean): --  RR: 16  SpO2: 97%  Wt(kg): --  I & Os for 24h ending  @ 07:00  =============================================  IN:    Oral Fluid: 240 ml    heparin  Infusion.: 67.5 ml    Total IN: 307.5 ml  ---------------------------------------------  OUT:    Voided: 1830 ml    Total OUT: 1830 ml  ---------------------------------------------  Total NET: -1522.5 ml    I & Os for current day (as of  @ 10:10)  =============================================  IN:    Total IN: 0 ml  ---------------------------------------------  OUT:    Voided: 700 ml    Total OUT: 700 ml  ---------------------------------------------  Total NET: -700 ml    Height (cm): 182.9 ( @ 13:32)  Weight (kg): 90 ( @ 13:32)  BMI (kg/m2): 26.9 ( @ 13:32)  BSA (m2): 2.12 ( @ 13:32)      LABS:                        10.7   3.3   )-----------( 215      ( 2017 09:49 )             32.2         150<H>  |  118<H>  |  31<H>  ----------------------------<  98  4.1   |  25  |  3.17<H>    Ca    8.8      2017 10:04        PT/INR - ( 2017 09:43 )   PT: 14.2 sec;   INR: 1.27          PTT - ( 2017 09:43 )  PTT:105.1 sec  Urinalysis Basic - ( 2017 21:30 )    Color: Yellow / Appearance: Clear / S.010 / pH: x  Gluc: x / Ketone: NEGATIVE  / Bili: NEGATIVE / Urobili: 0.2 E.U./dL   Blood: x / Protein: Trace mg/dL / Nitrite: NEGATIVE   Leuk Esterase: NEGATIVE / RBC: < 5 /HPF / WBC < 5 /HPF   Sq Epi: x / Non Sq Epi: x / Bacteria: Present /HPF      Sodium, Random Urine: 134 mmoL/L ( @ 21:30)  Chloride, Random Urine: 152 mmoL/L ( @ 21:30)        RADIOLOGY & ADDITIONAL STUDIES:    : CXR: has bilateral Left greater than right pleural effusion

## 2017-04-25 NOTE — PROGRESS NOTE ADULT - PROBLEM SELECTOR PLAN 5
diagnosed 10 y ago, on chemo every Wednesday, will follow-up outpatient chemo regimen with pt's oncologist Controlled, Receiving Chemo (Isodex) every Wednesday as outpatient @ [facility] (Oncologist Dr. Chacon # [phone])  - As per Dr. chacon patient can miss one session  - For further Questions, please call Dr. Valerio (hemo/onc @ [facility])

## 2017-04-25 NOTE — PROGRESS NOTE ADULT - PROBLEM SELECTOR PLAN 2
Cr 3.1, Na 150, Cr baseline per Dr Rowell   avoid nephrotoxic agents  renal Dr. Guadalupe consulted (pt's nephrologist)-follow-up recs CKD, BUN/Crt stable 29/3.23, (baseline as per Dr. Rowell, Follow up Labs from office)  - Dr. Guadalupe following  - Will continue to monitor w/ diuresis   - Consider holding Arb if Crt worsening

## 2017-04-25 NOTE — PROGRESS NOTE ADULT - ASSESSMENT
60 y/o male with CKD4 (previous Creatinine in 11/2016 is 2.6) who presents with MAIRA on CKD likely due to cardiorenal syndrome 62 y/o male with CKD4 (previous Creatinine in 11/2016 is 2.6) who presents with MAIRA on CKD likely due to cardiorenal syndrome     Addendum: 4/25 325PM: contacted by primary team; updated that his recent Creatinine with Dr. Rowell in 2/2017 was about 3.35. Moreover, the team is tentatively planning for a cardiac catheterization tomorrow morning on 4/26. I have informed the team to hold his AM lasix dose in that event. No need to start IV fluids as patient remains hypervolemic on examination.

## 2017-04-25 NOTE — PROGRESS NOTE ADULT - PROBLEM SELECTOR PLAN 1
improved SOB, 2+ edema and decrease BS  - Echo 4/24: severe concentric LVH, EF 40-45%, LA severely dilated, RA dilated, moderate aortic valve thickening, Moderate to severe eccentric MR, mild to moderate TR, severe pulmonary hypertension (PASP 64 mmHg), mild to moderate IA, no pericardial effusion, Left pleural effusion noted.  - Continue Lasix 40mg IV BID   -ECHO ordered, reassess EF and PA pressure  -Monitor strict I/Os, daily weight, fluid restriction to 1L per day  -Continue telemetry monitor  -continue toprol XL and valsartan  - plan for cath this admit pending clinical improvement improved SOB, 2+ edema and decrease BS  - Echo 4/24: severe concentric LVH, EF 40-45%, LA severely dilated, RA dilated, moderate aortic valve thickening, Moderate to severe eccentric MR, mild to moderate TR, severe pulmonary hypertension (PASP 64 mmHg), mild to moderate [place], no pericardial effusion, Left pleural effusion noted.  - Continue Lasix 40mg IV BID   - Monitor strict I/Os, daily weight, fluid restriction to 1L per day  - Possible plan for Cath Wed/Thurs if stable, Consent in chart, Will need to be added to schedule.

## 2017-04-25 NOTE — PROGRESS NOTE ADULT - PROBLEM SELECTOR PLAN 1
MAIRA on CKD  Agree with diuresis IV Lasix 40mg BID  Thus far, he has reached a net negative of 1.5L since yesterday evening    Continue with current diuresis. Aim is to keep net negative at least 2L    Daily weights  I&Os

## 2017-04-25 NOTE — PROGRESS NOTE ADULT - SUBJECTIVE AND OBJECTIVE BOX
Interventional Cardiology PA Adult Progress Note    Subjective Assessment: Patient seen and examined at bedside, feeling well.  Continues to have LE swelling and some shortness of breath.   	  MEDICATIONS:  furosemide   Injectable 40milliGRAM(s) IV Push two times a day  valsartan 160milliGRAM(s) Oral daily  metoprolol succinate ER 200milliGRAM(s) Oral daily  amLODIPine   Tablet 10milliGRAM(s) Oral daily  doxazosin 2milliGRAM(s) Oral every 12 hours  heparin  Injectable 6500Unit(s) IV Push Once PRN  heparin  Injectable 3000Unit(s) IV Push Once PRN  heparin  Infusion. 1300Unit(s)/Hr IV Continuous <Continuous>  calcitriol   Capsule 0.25MICROGram(s) Oral daily    [PHYSICAL EXAM:  TELEMETRY:  T(C): 37, Max: 37.4 (04-25 @ 01:00)  HR: 86 (84 - 91)  BP: 157/92 (157/92 - 182/93)  RR: 16 (15 - 169)  SpO2: 98% (95% - 100%)    I&O's Summary  I & Os for 24h ending 25 Apr 2017 07:00  =============================================  IN: 307.5 ml / OUT: 1830 ml / NET: -1522.5 ml    I & Os for current day (as of 25 Apr 2017 12:42)  =============================================  IN: 0 ml / OUT: 700 ml / NET: -700 ml    Height (cm): 182.9 (04-24 @ 13:32)  Weight (kg): 90 (04-24 @ 13:32)  BMI (kg/m2): 26.9 (04-24 @ 13:32)  BSA (m2): 2.12 (04-24 @ 13:32)    Ramirez: No  Central/PICC/Mid Line: No                                         Appearance: Normal, NAD		  Neck: Supple, + JVD  Cardiovascular: Normal S1 S2, No JVD, No murmurs,   Respiratory: Decrease breath sounds b/l /No Rales, Rhonchi, Wheezing	  Gastrointestinal:  Soft, Non-tender, + BS	  Skin: No rashes, No ecchymoses, No cyanosis  Extremities: Normal range of motion, No clubbing, cyanosis, 2+ pitting edema b/l  Vascular: Peripheral pulses palpable 2+ bilaterally  Neurologic: Non-focal  Psychiatry: A & O x 3, Mood & affect appropriate  	    LABS:	 	  CARDIAC MARKERS:                        10.7   3.3   )-----------( 215      ( 25 Apr 2017 09:49 )             32.2     04-25    145  |  114<H>  |  29<H>  ----------------------------<  94  3.5   |  22  |  3.23<H>    Ca    8.9      25 Apr 2017 09:48    PT/INR - ( 25 Apr 2017 09:43 )   PT: 14.2 sec;   INR: 1.27     PTT - ( 25 Apr 2017 09:43 )  PTT:105.1 sec    ASSESSMENT/PLAN: 	    DVT ppx: Heparin gtt

## 2017-04-25 NOTE — PROGRESS NOTE ADULT - ASSESSMENT
61 yr old M current smoker with PMHx of HTN, hyperlipidemia, Stage III CKD, T cell lymphoma (diagnosed 10 yrs ago, on chemotherapy q Wednesday), chronic Afib (on Coumadin) who recently underwent Echo revealing reduced EF and recommended for elective right and left heart cath with possible intervention now postponed due to acute on chronic systolic CHF exacerbation.

## 2017-04-25 NOTE — PROGRESS NOTE ADULT - PROBLEM SELECTOR PLAN 2
Currently on  valsartan 160milliGRAM(s) daily  metoprolol succinate ER 200milliGRAM(s) daily  amLODIPine   Tablet 10milliGRAM(s) daily  doxazosin 2milliGRAM(s) every 12 hours    Okay to continue this right now  Diuresis as above  Will watch creatinine trend

## 2017-04-26 LAB
ANION GAP SERPL CALC-SCNC: 10 MMOL/L — SIGNIFICANT CHANGE UP (ref 9–16)
APTT BLD: 72.3 SEC — HIGH (ref 27.5–37.4)
APTT BLD: 79.7 SEC — HIGH (ref 27.5–37.4)
BUN SERPL-MCNC: 28 MG/DL — HIGH (ref 7–23)
CALCIUM SERPL-MCNC: 8.7 MG/DL — SIGNIFICANT CHANGE UP (ref 8.5–10.5)
CHLORIDE SERPL-SCNC: 110 MMOL/L — HIGH (ref 96–108)
CO2 SERPL-SCNC: 25 MMOL/L — SIGNIFICANT CHANGE UP (ref 22–31)
CREAT SERPL-MCNC: 3.28 MG/DL — HIGH (ref 0.5–1.3)
GLUCOSE SERPL-MCNC: 79 MG/DL — SIGNIFICANT CHANGE UP (ref 70–99)
HCT VFR BLD CALC: 32.6 % — LOW (ref 39–50)
HGB BLD-MCNC: 10.5 G/DL — LOW (ref 13–17)
INR BLD: 1.26 — HIGH (ref 0.88–1.16)
MAGNESIUM SERPL-MCNC: 1.8 MG/DL — SIGNIFICANT CHANGE UP (ref 1.6–2.4)
MCHC RBC-ENTMCNC: 31 PG — SIGNIFICANT CHANGE UP (ref 27–34)
MCHC RBC-ENTMCNC: 32.2 G/DL — SIGNIFICANT CHANGE UP (ref 32–36)
MCV RBC AUTO: 96.2 FL — SIGNIFICANT CHANGE UP (ref 80–100)
PLATELET # BLD AUTO: 187 K/UL — SIGNIFICANT CHANGE UP (ref 150–400)
POTASSIUM SERPL-MCNC: 3.3 MMOL/L — LOW (ref 3.5–5.3)
POTASSIUM SERPL-SCNC: 3.3 MMOL/L — LOW (ref 3.5–5.3)
PROTHROM AB SERPL-ACNC: 14.1 SEC — HIGH (ref 9.8–12.7)
RBC # BLD: 3.39 M/UL — LOW (ref 4.2–5.8)
RBC # FLD: 17.6 % — HIGH (ref 10.3–16.9)
SODIUM SERPL-SCNC: 145 MMOL/L — SIGNIFICANT CHANGE UP (ref 135–145)
WBC # BLD: 3.2 K/UL — LOW (ref 3.8–10.5)
WBC # FLD AUTO: 3.2 K/UL — LOW (ref 3.8–10.5)

## 2017-04-26 PROCEDURE — 99232 SBSQ HOSP IP/OBS MODERATE 35: CPT

## 2017-04-26 PROCEDURE — 99233 SBSQ HOSP IP/OBS HIGH 50: CPT

## 2017-04-26 RX ORDER — ACETAMINOPHEN 500 MG
650 TABLET ORAL ONCE
Qty: 0 | Refills: 0 | Status: COMPLETED | OUTPATIENT
Start: 2017-04-26 | End: 2017-04-26

## 2017-04-26 RX ORDER — MAGNESIUM OXIDE 400 MG ORAL TABLET 241.3 MG
400 TABLET ORAL ONCE
Qty: 0 | Refills: 0 | Status: COMPLETED | OUTPATIENT
Start: 2017-04-26 | End: 2017-04-26

## 2017-04-26 RX ORDER — POTASSIUM CHLORIDE 20 MEQ
20 PACKET (EA) ORAL ONCE
Qty: 0 | Refills: 0 | Status: COMPLETED | OUTPATIENT
Start: 2017-04-26 | End: 2017-04-26

## 2017-04-26 RX ORDER — POTASSIUM CHLORIDE 20 MEQ
10 PACKET (EA) ORAL ONCE
Qty: 0 | Refills: 0 | Status: COMPLETED | OUTPATIENT
Start: 2017-04-26 | End: 2017-04-26

## 2017-04-26 RX ORDER — FUROSEMIDE 40 MG
40 TABLET ORAL EVERY 12 HOURS
Qty: 0 | Refills: 0 | Status: DISCONTINUED | OUTPATIENT
Start: 2017-04-27 | End: 2017-04-28

## 2017-04-26 RX ADMIN — VALSARTAN 160 MILLIGRAM(S): 80 TABLET ORAL at 05:53

## 2017-04-26 RX ADMIN — HEPARIN SODIUM 1100 UNIT(S)/HR: 5000 INJECTION INTRAVENOUS; SUBCUTANEOUS at 01:54

## 2017-04-26 RX ADMIN — Medication 2 MILLIGRAM(S): at 18:29

## 2017-04-26 RX ADMIN — Medication 40 MILLIGRAM(S): at 18:29

## 2017-04-26 RX ADMIN — Medication 100 MILLIEQUIVALENT(S): at 11:51

## 2017-04-26 RX ADMIN — Medication 650 MILLIGRAM(S): at 23:02

## 2017-04-26 RX ADMIN — CALCITRIOL 0.25 MICROGRAM(S): 0.5 CAPSULE ORAL at 11:51

## 2017-04-26 RX ADMIN — Medication 20 MILLIEQUIVALENT(S): at 11:50

## 2017-04-26 RX ADMIN — Medication 40 MILLIGRAM(S): at 05:51

## 2017-04-26 RX ADMIN — MAGNESIUM OXIDE 400 MG ORAL TABLET 400 MILLIGRAM(S): 241.3 TABLET ORAL at 11:51

## 2017-04-26 RX ADMIN — Medication 2 MILLIGRAM(S): at 05:52

## 2017-04-26 RX ADMIN — AMLODIPINE BESYLATE 10 MILLIGRAM(S): 2.5 TABLET ORAL at 05:52

## 2017-04-26 RX ADMIN — Medication 200 MILLIGRAM(S): at 05:52

## 2017-04-26 RX ADMIN — HEPARIN SODIUM 1100 UNIT(S)/HR: 5000 INJECTION INTRAVENOUS; SUBCUTANEOUS at 08:31

## 2017-04-26 NOTE — PROGRESS NOTE ADULT - SUBJECTIVE AND OBJECTIVE BOX
Patient seen and examined at bedside.     No dyspnea, orthopnea, new leg swelling  Tolerating diuresis well    WEight noted to have gone from 87.8kg standing to 84.2kg standing   Creatinine not significantly changed today     heparin  Injectable 6500Unit(s) Once PRN  heparin  Injectable 3000Unit(s) Once PRN  furosemide   Injectable 40milliGRAM(s) two times a day  valsartan 160milliGRAM(s) daily  heparin  Infusion. 1300Unit(s)/Hr <Continuous>  metoprolol succinate ER 200milliGRAM(s) daily  amLODIPine   Tablet 10milliGRAM(s) daily  doxazosin 2milliGRAM(s) every 12 hours  calcitriol   Capsule 0.25MICROGram(s) daily  potassium chloride    Tablet ER 20milliEquivalent(s) once  potassium chloride  10 mEq/100 mL IVPB 10milliEquivalent(s) once  magnesium oxide 400milliGRAM(s) once      Allergies    No Known Allergies    Intolerances        T(C): , Max: 37 ( @ 22:08)  T(F): , Max: 98.6 ( @ 22:08)  HR: 84  BP: 170/107  BP(mean): --  RR: 16  SpO2: 97%  Wt(kg): --  I & Os for 24h ending  @ 07:00  =============================================  IN:    Total IN: 0 ml  ---------------------------------------------  OUT:    Voided: 1800 ml    Total OUT: 1800 ml  ---------------------------------------------  Total NET: -1800 ml    I & Os for current day (as of  @ 10:19)  =============================================  IN:    Total IN: 0 ml  ---------------------------------------------  OUT:    Voided: 675 ml    Total OUT: 675 ml  ---------------------------------------------  Total NET: -675 ml          LABS:                        10.5   3.2   )-----------( 187      ( 2017 06:51 )             32.6         145  |  110<H>  |  28<H>  ----------------------------<  79  3.3<L>   |  25  |  3.28<H>    Ca    8.7      2017 06:50  Mg     1.8             PT/INR - ( 2017 06:51 )   PT: 14.1 sec;   INR: 1.26          PTT - ( 2017 06:51 )  PTT:79.7 sec  Urinalysis Basic - ( 2017 21:30 )    Color: Yellow / Appearance: Clear / S.010 / pH: x  Gluc: x / Ketone: NEGATIVE  / Bili: NEGATIVE / Urobili: 0.2 E.U./dL   Blood: x / Protein: Trace mg/dL / Nitrite: NEGATIVE   Leuk Esterase: NEGATIVE / RBC: < 5 /HPF / WBC < 5 /HPF   Sq Epi: x / Non Sq Epi: x / Bacteria: Present /HPF      Sodium, Random Urine: 134 mmoL/L ( @ 21:30)  Chloride, Random Urine: 152 mmoL/L ( @ 21:30)        RADIOLOGY & ADDITIONAL STUDIES:

## 2017-04-26 NOTE — PROGRESS NOTE ADULT - ASSESSMENT
60 y/o male with CKD4 (previous Creatinine in 11/2016 is 2.6) who presents with MAIRA on CKD likely due to cardiorenal syndrome

## 2017-04-26 NOTE — PROGRESS NOTE ADULT - PROBLEM SELECTOR PLAN 3
Blood pressure stable, Continue to monitor w/ home meds  - Continue Norvasc 10mg QD, Toprol XL 200mg QD, Vaslartan 160mg PO QD.

## 2017-04-26 NOTE — PROGRESS NOTE ADULT - PROBLEM SELECTOR PLAN 1
improved SOB, 2+ edema and decrease Breath sounds  - CXR 4/24: B/l pleural effusions with compressive atelectasis L>R, Right basilar density which may represent compressive atelectasis or pneumonia.  - Echo 4/24: severe concentric LVH, EF 40-45%, LA severely dilated, RA dilated, moderate aortic valve thickening, Moderate to severe eccentric MR, mild to moderate TR, severe pulmonary hypertension (PASP 64 mmHg), mild to moderate TN, no pericardial effusion, Left pleural effusion noted.  Will obtain repeat echo prior to D/C   - Continue Lasix 40mg IV BID   - Monitor strict I/Os, daily weight, fluid restriction to 1L per day  - Plan for Cardiac Cath w/ Dr. Echeverria on Thurs if stable, Consent in chart, on schedule, will hold Lasix in AM prior to procedure.

## 2017-04-26 NOTE — PROGRESS NOTE ADULT - SUBJECTIVE AND OBJECTIVE BOX
Interventional Cardiology PA Adult Progress Note    Subjective Assessment: Patient seen and examined this morning, feeling well with improve shortness of breath.    	  MEDICATIONS:  furosemide   Injectable 40milliGRAM(s) IV Push two times a day  valsartan 160milliGRAM(s) Oral daily  metoprolol succinate ER 200milliGRAM(s) Oral daily  amLODIPine   Tablet 10milliGRAM(s) Oral daily  doxazosin 2milliGRAM(s) Oral every 12 hours  heparin  Injectable 6500Unit(s) IV Push Once PRN  heparin  Injectable 3000Unit(s) IV Push Once PRN  heparin  Infusion. 1300Unit(s)/Hr IV Continuous <Continuous>  calcitriol   Capsule 0.25MICROGram(s) Oral daily  potassium chloride    Tablet ER 20milliEquivalent(s) Oral once  potassium chloride  10 mEq/100 mL IVPB 10milliEquivalent(s) IV Intermittent once  magnesium oxide 400milliGRAM(s) Oral once	    [PHYSICAL EXAM:  TELEMETRY:  T(C): 36.4, Max: 37 (04-25 @ 22:08)  HR: 78 (78 - 86)  BP: 146/70 (146/70 - 170/107)  RR: 16 (16 - 16)  SpO2: 99% (95% - 99%)    Wt(kg): -- 84.2kg  I&O's Summary  I & Os for 24h ending 26 Apr 2017 07:00  =============================================  IN: 0 ml / OUT: 1800 ml / NET: -1800 ml    I & Os for current day (as of 26 Apr 2017 11:13)  =============================================  IN: 0 ml / OUT: 675 ml / NET: -675 ml    Ramirez: No  Central/PICC/Mid Line: No                                         Appearance: Normal, NAD  HEENT:   Normal oral mucosa, PERRL, EOMI	  Neck: Supple, + JVD, no Carotid Bruit   Cardiovascular: Normal S1 S2, No JVD, No murmurs,   Respiratory: Decreased Breath Sounds b/l /No Rales, Rhonchi, Wheezing	  Gastrointestinal:  Soft, Non-tender, + BS	  Skin: No rashes, No ecchymoses, No cyanosis  Extremities: Normal range of motion, No clubbing, cyanosis, 2+ pitting edema b/l  Vascular: Peripheral pulses palpable 2+ bilaterally  Neurologic: Non-focal  Psychiatry: A & O x 3, Mood & affect appropriate	    LABS:	 	  CARDIAC MARKERS:                      10.5   3.2   )-----------( 187      ( 26 Apr 2017 06:51 )             32.6     04-26    145  |  110<H>  |  28<H>  ----------------------------<  79  3.3<L>   |  25  |  3.28<H>    Ca    8.7      26 Apr 2017 06:50  Mg     1.8     04-26    PT/INR - ( 26 Apr 2017 06:51 )   PT: 14.1 sec;   INR: 1.26     PTT - ( 26 Apr 2017 06:51 )  PTT:79.7 sec    ASSESSMENT/PLAN: 	  DVT ppx: Heparin gtt

## 2017-04-26 NOTE — PROGRESS NOTE ADULT - PROBLEM SELECTOR PLAN 2
CKD, BUN/Crt stable 28/3.28, (Baseline BUN/Crt 31/3.35 from outpatient labs 2/2017)  - Dr. Guadaulpe following  - Will continue to monitor w/ diuresis   - Consider holding Arb if Crt worsening  - Cleared for Cath, Will hold AM dose of Lasix prior to cath

## 2017-04-26 NOTE — PROGRESS NOTE ADULT - PROBLEM SELECTOR PLAN 1
MAIRA on CKD  Agree with diuresis IV Lasix 40mg BID  Weight is falling appropriately  Continue with current diuresis    If cardiac catheterization is planned again, would hold AM Lasix dose  PeriPCI IV fluid recommendation to be made once date of cardiac cath is determined as IV fluid requirements will depend on his volume status near time of the procedure     Daily weights  I&Os

## 2017-04-26 NOTE — PROGRESS NOTE ADULT - PROBLEM SELECTOR PLAN 5
Controlled, Receiving Chemo (Isodex) every Wednesday as outpatient @ [facility] (Oncologist Dr. Chacon # [phone])  - As per Dr. chacon patient can miss one session  - For further Questions, please call Dr. Valerio (hemo/onc @ [facility])

## 2017-04-26 NOTE — PROGRESS NOTE ADULT - PROBLEM SELECTOR PLAN 4
Remains rate controlled in NSR  - Continue holding Coumadin w/ Plan for Cath, Continue Heparin gtt (nomogram)   - Continue Toprol XL 200mg daily.

## 2017-04-27 LAB
ANION GAP SERPL CALC-SCNC: 10 MMOL/L — SIGNIFICANT CHANGE UP (ref 9–16)
APTT BLD: 57.3 SEC — HIGH (ref 27.5–37.4)
BASOPHILS NFR BLD AUTO: 0.4 % — SIGNIFICANT CHANGE UP (ref 0–2)
BUN SERPL-MCNC: 27 MG/DL — HIGH (ref 7–23)
CALCIUM SERPL-MCNC: 8.5 MG/DL — SIGNIFICANT CHANGE UP (ref 8.5–10.5)
CHLORIDE SERPL-SCNC: 107 MMOL/L — SIGNIFICANT CHANGE UP (ref 96–108)
CO2 SERPL-SCNC: 27 MMOL/L — SIGNIFICANT CHANGE UP (ref 22–31)
CREAT SERPL-MCNC: 3.19 MG/DL — HIGH (ref 0.5–1.3)
EOSINOPHIL NFR BLD AUTO: 1.6 % — SIGNIFICANT CHANGE UP (ref 0–6)
GLUCOSE SERPL-MCNC: 76 MG/DL — SIGNIFICANT CHANGE UP (ref 70–99)
HCT VFR BLD CALC: 31.1 % — LOW (ref 39–50)
HGB BLD-MCNC: 10.4 G/DL — LOW (ref 13–17)
INR BLD: 1.23 — HIGH (ref 0.88–1.16)
LYMPHOCYTES # BLD AUTO: 22.8 % — SIGNIFICANT CHANGE UP (ref 13–44)
MAGNESIUM SERPL-MCNC: 2 MG/DL — SIGNIFICANT CHANGE UP (ref 1.6–2.4)
MCHC RBC-ENTMCNC: 32.4 PG — SIGNIFICANT CHANGE UP (ref 27–34)
MCHC RBC-ENTMCNC: 33.4 G/DL — SIGNIFICANT CHANGE UP (ref 32–36)
MCV RBC AUTO: 96.9 FL — SIGNIFICANT CHANGE UP (ref 80–100)
MONOCYTES NFR BLD AUTO: 13.5 % — SIGNIFICANT CHANGE UP (ref 2–14)
NEUTROPHILS NFR BLD AUTO: 61.7 % — SIGNIFICANT CHANGE UP (ref 43–77)
PLATELET # BLD AUTO: 164 K/UL — SIGNIFICANT CHANGE UP (ref 150–400)
POTASSIUM SERPL-MCNC: 3.3 MMOL/L — LOW (ref 3.5–5.3)
POTASSIUM SERPL-SCNC: 3.3 MMOL/L — LOW (ref 3.5–5.3)
PROTHROM AB SERPL-ACNC: 13.7 SEC — HIGH (ref 9.8–12.7)
RBC # BLD: 3.21 M/UL — LOW (ref 4.2–5.8)
RBC # FLD: 17.2 % — HIGH (ref 10.3–16.9)
SODIUM SERPL-SCNC: 144 MMOL/L — SIGNIFICANT CHANGE UP (ref 135–145)
WBC # BLD: 4.4 K/UL — SIGNIFICANT CHANGE UP (ref 3.8–10.5)
WBC # FLD AUTO: 4.4 K/UL — SIGNIFICANT CHANGE UP (ref 3.8–10.5)

## 2017-04-27 PROCEDURE — 99233 SBSQ HOSP IP/OBS HIGH 50: CPT

## 2017-04-27 RX ORDER — POTASSIUM CHLORIDE 20 MEQ
40 PACKET (EA) ORAL ONCE
Qty: 0 | Refills: 0 | Status: COMPLETED | OUTPATIENT
Start: 2017-04-27 | End: 2017-04-27

## 2017-04-27 RX ORDER — POTASSIUM CHLORIDE 20 MEQ
10 PACKET (EA) ORAL ONCE
Qty: 0 | Refills: 0 | Status: COMPLETED | OUTPATIENT
Start: 2017-04-27 | End: 2017-04-27

## 2017-04-27 RX ADMIN — CALCITRIOL 0.25 MICROGRAM(S): 0.5 CAPSULE ORAL at 14:00

## 2017-04-27 RX ADMIN — HEPARIN SODIUM 1300 UNIT(S)/HR: 5000 INJECTION INTRAVENOUS; SUBCUTANEOUS at 18:22

## 2017-04-27 RX ADMIN — Medication 100 MILLIEQUIVALENT(S): at 12:35

## 2017-04-27 RX ADMIN — Medication 2 MILLIGRAM(S): at 18:12

## 2017-04-27 RX ADMIN — VALSARTAN 160 MILLIGRAM(S): 80 TABLET ORAL at 06:24

## 2017-04-27 RX ADMIN — Medication 650 MILLIGRAM(S): at 00:01

## 2017-04-27 RX ADMIN — Medication 40 MILLIGRAM(S): at 18:13

## 2017-04-27 RX ADMIN — Medication 2 MILLIGRAM(S): at 06:25

## 2017-04-27 RX ADMIN — AMLODIPINE BESYLATE 10 MILLIGRAM(S): 2.5 TABLET ORAL at 06:24

## 2017-04-27 RX ADMIN — Medication 40 MILLIEQUIVALENT(S): at 12:36

## 2017-04-27 RX ADMIN — Medication 200 MILLIGRAM(S): at 06:24

## 2017-04-27 NOTE — DIETITIAN INITIAL EVALUATION ADULT. - NS AS NUTRI INTERV MEALS SNACK
declined  , po is good PTA  , to re - assess on f/u  surgery  scheduled today Po is good PTA  , to re - assess needs on f/u  surgery  scheduled today

## 2017-04-27 NOTE — PROGRESS NOTE ADULT - SUBJECTIVE AND OBJECTIVE BOX
Interventional Cardiology PA Adult Progress Note    Subjective Assessment: Patient seen and examined at bedside, feeling well and breathing improved.  Awaiting cardiac cath today.   	  MEDICATIONS:  valsartan 160milliGRAM(s) Oral daily  metoprolol succinate ER 200milliGRAM(s) Oral daily  amLODIPine   Tablet 10milliGRAM(s) Oral daily  doxazosin 2milliGRAM(s) Oral every 12 hours  furosemide   Injectable 40milliGRAM(s) IV Push every 12 hours  heparin  Injectable 6500Unit(s) IV Push Once PRN  heparin  Injectable 3000Unit(s) IV Push Once PRN  heparin  Infusion. 1300Unit(s)/Hr IV Continuous <Continuous>  calcitriol   Capsule 0.25MICROGram(s) Oral daily  potassium chloride    Tablet ER 40milliEquivalent(s) Oral once  potassium chloride  10 mEq/100 mL IVPB 10milliEquivalent(s) IV Intermittent once    [PHYSICAL EXAM:  TELEMETRY:  T(C): 36.1, Max: 36.8 (04-27 @ 01:00)  HR: 71 (71 - 92)  BP: 157/83 (148/85 - 166/97)  RR: 16 (16 - 17)  SpO2: 97% (96% - 98%)    Wt(kg): -- 81.4kg  I&O's Summary    I & Os for current day (as of 27 Apr 2017 12:18)  =============================================  IN: 252 ml / OUT: 1725 ml / NET: -1473 ml    Ramirez: No  Central/PICC/Mid Line: No                                         Appearance: Normal, NAD	  Neck: Supple, - JVD; no Carotid Bruit   Cardiovascular: Normal S1 S2, No JVD, No murmurs,   Respiratory: Lungs clear to auscultation w/ slightly decreased breath sounds a b/l bases/No Rales, Rhonchi, Wheezing	  Gastrointestinal:  Soft, Non-tender, + BS	  Skin: No rashes, No ecchymoses, No cyanosis  Extremities: Normal range of motion, No clubbing, cyanosis, 2+ pitting edema b/l  Vascular: Peripheral pulses palpable 2+ bilaterally  Neurologic: Non-focal  Psychiatry: A & O x 3, Mood & affect appropriate	    LABS:	 	  CARDIAC MARKERS:                    10.4   4.4   )-----------( 164      ( 27 Apr 2017 08:01 )             31.1     04-27    144  |  107  |  27<H>  ----------------------------<  76  3.3<L>   |  27  |  3.19<H>    Ca    8.5      27 Apr 2017 08:01  Mg     2.0     04-27    PT/INR - ( 27 Apr 2017 08:01 )   PT: 13.7 sec;   INR: 1.23      PTT - ( 27 Apr 2017 08:01 )  PTT:57.3 sec    ASSESSMENT/PLAN: 	  DVT ppx: Heparin gtt.

## 2017-04-27 NOTE — DIETITIAN INITIAL EVALUATION ADULT. - OTHER INFO
risk - high , skin - 3+ edema b/l legs and feet , no food allergys , no pain , no n/v/d/c ,  pta - wife prepares meals low in sodium  , diet recall demonstrates " foods " high in sodium are  being eaten , pt and family  reviewed on high sodium foods to avoid

## 2017-04-27 NOTE — DIETITIAN INITIAL EVALUATION ADULT. - NS AS NUTRI INTERV ED CONTENT2
receptive to nutrition education/Nutrition relationship to health/disease/Survival information/Purpose of the nutrition education

## 2017-04-27 NOTE — PROGRESS NOTE ADULT - PROBLEM SELECTOR PLAN 1
improved SOB, 2+ edema  - CXR 4/24: B/l pleural effusions with compressive atelectasis L>R, Right basilar density which may represent compressive atelectasis or pneumonia.  - Echo 4/24: severe concentric LVH, EF 40-45%, LA severely dilated, RA dilated, moderate aortic valve thickening, Moderate to severe eccentric MR, mild to moderate TR, severe pulmonary hypertension (PASP 64 mmHg), mild to moderate [place], no pericardial effusion, Left pleural effusion noted.  Will obtain repeat echo prior to D/C   - Continue Lasix 40mg IV BID.  (Held AM dose prior to Cath)   - Monitor strict I/Os, daily weight, fluid restriction to 1L per day  - NPO for Cardiac Cath w/ Dr. Echeverria today, Consent in chart

## 2017-04-27 NOTE — DIETITIAN INITIAL EVALUATION ADULT. - ADHERENCE
good/Follows a diet with no added salt as per diet recall Diet recall  demonstrates   salt is avoided , but nutrition education is needed on the dashtlc diet  as well as " high sodium foods "/good

## 2017-04-27 NOTE — PROGRESS NOTE ADULT - PROBLEM SELECTOR PLAN 5
Controlled, Receiving Chemo (Isodex) every Wednesday as outpatient @ [facility] (Oncologist Dr. Chacon # [phone])  - As per Dr. chacon patient can miss one session  - For further Questions, please call Dr. Valerio (hemo/onc @ [facility])

## 2017-04-27 NOTE — PROGRESS NOTE ADULT - ATTENDING COMMENTS
Agree with Progress Note, Subjective and Objective, Assessment and Plan
Agree with Progress Note, Subjective and Objective, Assessment and Plan
responding to diuretics.  Still clinically overloaded.  Agree with ongoing use of IV lasix 40 mg BID.  Creatinine in acceptable range- compared to baseline and present situation  should be stable for cath next 24-48 hours
Agree with Progress Note, Subjective and Objective, Assessment and Plan

## 2017-04-27 NOTE — DIETITIAN INITIAL EVALUATION ADULT. - ENERGY NEEDS
weight 198 90 kg , Usual weight 178 #  80.9 kg , BMI 26.9 .Current weight used for calculations weight 198 # 90 kg , Usual weight 178 #  80.9 kg , BMI 26.9 .Current weight used for calculations

## 2017-04-28 VITALS
DIASTOLIC BLOOD PRESSURE: 78 MMHG | RESPIRATION RATE: 16 BRPM | OXYGEN SATURATION: 99 % | HEART RATE: 81 BPM | SYSTOLIC BLOOD PRESSURE: 130 MMHG

## 2017-04-28 LAB
ANION GAP SERPL CALC-SCNC: 11 MMOL/L — SIGNIFICANT CHANGE UP (ref 9–16)
APTT BLD: 107.9 SEC — HIGH (ref 27.5–37.4)
APTT BLD: 74.7 SEC — HIGH (ref 27.5–37.4)
APTT BLD: 78.1 SEC — HIGH (ref 27.5–37.4)
BUN SERPL-MCNC: 25 MG/DL — HIGH (ref 7–23)
CALCIUM SERPL-MCNC: 8.9 MG/DL — SIGNIFICANT CHANGE UP (ref 8.5–10.5)
CHLORIDE SERPL-SCNC: 108 MMOL/L — SIGNIFICANT CHANGE UP (ref 96–108)
CO2 SERPL-SCNC: 27 MMOL/L — SIGNIFICANT CHANGE UP (ref 22–31)
CREAT SERPL-MCNC: 3.25 MG/DL — HIGH (ref 0.5–1.3)
GLUCOSE SERPL-MCNC: 74 MG/DL — SIGNIFICANT CHANGE UP (ref 70–99)
HCT VFR BLD CALC: 30.4 % — LOW (ref 39–50)
HGB BLD-MCNC: 10.2 G/DL — LOW (ref 13–17)
INR BLD: 1.19 — HIGH (ref 0.88–1.16)
MAGNESIUM SERPL-MCNC: 2.2 MG/DL — SIGNIFICANT CHANGE UP (ref 1.6–2.4)
MCHC RBC-ENTMCNC: 32.3 PG — SIGNIFICANT CHANGE UP (ref 27–34)
MCHC RBC-ENTMCNC: 33.6 G/DL — SIGNIFICANT CHANGE UP (ref 32–36)
MCV RBC AUTO: 96.2 FL — SIGNIFICANT CHANGE UP (ref 80–100)
PLATELET # BLD AUTO: 183 K/UL — SIGNIFICANT CHANGE UP (ref 150–400)
POTASSIUM SERPL-MCNC: 3.5 MMOL/L — SIGNIFICANT CHANGE UP (ref 3.5–5.3)
POTASSIUM SERPL-SCNC: 3.5 MMOL/L — SIGNIFICANT CHANGE UP (ref 3.5–5.3)
PROTHROM AB SERPL-ACNC: 13.2 SEC — HIGH (ref 9.8–12.7)
RBC # BLD: 3.16 M/UL — LOW (ref 4.2–5.8)
RBC # FLD: 16.7 % — SIGNIFICANT CHANGE UP (ref 10.3–16.9)
SODIUM SERPL-SCNC: 146 MMOL/L — HIGH (ref 135–145)
WBC # BLD: 3.3 K/UL — LOW (ref 3.8–10.5)
WBC # FLD AUTO: 3.3 K/UL — LOW (ref 3.8–10.5)

## 2017-04-28 PROCEDURE — 83036 HEMOGLOBIN GLYCOSYLATED A1C: CPT

## 2017-04-28 PROCEDURE — 82570 ASSAY OF URINE CREATININE: CPT

## 2017-04-28 PROCEDURE — 82550 ASSAY OF CK (CPK): CPT

## 2017-04-28 PROCEDURE — 85610 PROTHROMBIN TIME: CPT

## 2017-04-28 PROCEDURE — 36415 COLL VENOUS BLD VENIPUNCTURE: CPT

## 2017-04-28 PROCEDURE — 93306 TTE W/DOPPLER COMPLETE: CPT

## 2017-04-28 PROCEDURE — A9505: CPT

## 2017-04-28 PROCEDURE — 99233 SBSQ HOSP IP/OBS HIGH 50: CPT

## 2017-04-28 PROCEDURE — 78452 HT MUSCLE IMAGE SPECT MULT: CPT

## 2017-04-28 PROCEDURE — 81001 URINALYSIS AUTO W/SCOPE: CPT

## 2017-04-28 PROCEDURE — A9500: CPT

## 2017-04-28 PROCEDURE — 84156 ASSAY OF PROTEIN URINE: CPT

## 2017-04-28 PROCEDURE — 85025 COMPLETE CBC W/AUTO DIFF WBC: CPT

## 2017-04-28 PROCEDURE — 93016 CV STRESS TEST SUPVJ ONLY: CPT

## 2017-04-28 PROCEDURE — 84300 ASSAY OF URINE SODIUM: CPT

## 2017-04-28 PROCEDURE — 71010: CPT | Mod: 26

## 2017-04-28 PROCEDURE — 99232 SBSQ HOSP IP/OBS MODERATE 35: CPT

## 2017-04-28 PROCEDURE — 93018 CV STRESS TEST I&R ONLY: CPT

## 2017-04-28 PROCEDURE — 83935 ASSAY OF URINE OSMOLALITY: CPT

## 2017-04-28 PROCEDURE — 78452 HT MUSCLE IMAGE SPECT MULT: CPT | Mod: 26

## 2017-04-28 PROCEDURE — 93017 CV STRESS TEST TRACING ONLY: CPT

## 2017-04-28 PROCEDURE — 71045 X-RAY EXAM CHEST 1 VIEW: CPT

## 2017-04-28 PROCEDURE — 85027 COMPLETE CBC AUTOMATED: CPT

## 2017-04-28 PROCEDURE — 82553 CREATINE MB FRACTION: CPT

## 2017-04-28 PROCEDURE — 80048 BASIC METABOLIC PNL TOTAL CA: CPT

## 2017-04-28 PROCEDURE — 83735 ASSAY OF MAGNESIUM: CPT

## 2017-04-28 PROCEDURE — 85730 THROMBOPLASTIN TIME PARTIAL: CPT

## 2017-04-28 PROCEDURE — 93005 ELECTROCARDIOGRAM TRACING: CPT

## 2017-04-28 PROCEDURE — 82436 ASSAY OF URINE CHLORIDE: CPT

## 2017-04-28 PROCEDURE — 80061 LIPID PANEL: CPT

## 2017-04-28 PROCEDURE — 86140 C-REACTIVE PROTEIN: CPT

## 2017-04-28 RX ORDER — VALSARTAN 80 MG/1
1 TABLET ORAL
Qty: 30 | Refills: 3
Start: 2017-04-28 | End: 2017-08-25

## 2017-04-28 RX ORDER — POTASSIUM CHLORIDE 20 MEQ
40 PACKET (EA) ORAL ONCE
Qty: 0 | Refills: 0 | Status: COMPLETED | OUTPATIENT
Start: 2017-04-28 | End: 2017-04-28

## 2017-04-28 RX ORDER — VALSARTAN 80 MG/1
1 TABLET ORAL
Qty: 0 | Refills: 0 | COMMUNITY

## 2017-04-28 RX ORDER — FUROSEMIDE 40 MG
1 TABLET ORAL
Qty: 60 | Refills: 3
Start: 2017-04-28 | End: 2017-08-25

## 2017-04-28 RX ADMIN — HEPARIN SODIUM 1100 UNIT(S)/HR: 5000 INJECTION INTRAVENOUS; SUBCUTANEOUS at 07:20

## 2017-04-28 RX ADMIN — AMLODIPINE BESYLATE 10 MILLIGRAM(S): 2.5 TABLET ORAL at 06:46

## 2017-04-28 RX ADMIN — VALSARTAN 160 MILLIGRAM(S): 80 TABLET ORAL at 14:02

## 2017-04-28 RX ADMIN — Medication 40 MILLIGRAM(S): at 17:24

## 2017-04-28 RX ADMIN — HEPARIN SODIUM 1100 UNIT(S)/HR: 5000 INJECTION INTRAVENOUS; SUBCUTANEOUS at 01:03

## 2017-04-28 RX ADMIN — Medication 2 MILLIGRAM(S): at 07:20

## 2017-04-28 RX ADMIN — CALCITRIOL 0.25 MICROGRAM(S): 0.5 CAPSULE ORAL at 14:05

## 2017-04-28 RX ADMIN — Medication 2 MILLIGRAM(S): at 17:24

## 2017-04-28 RX ADMIN — Medication 200 MILLIGRAM(S): at 06:47

## 2017-04-28 RX ADMIN — Medication 40 MILLIEQUIVALENT(S): at 10:19

## 2017-04-28 NOTE — PROGRESS NOTE ADULT - PROBLEM SELECTOR PLAN 1
MAIRA on CKD  Agree with diuresis IV Lasix 40mg BID  Weight is falling appropriately  Continue with current diuresise    Daily weights  I&Os

## 2017-04-28 NOTE — PROGRESS NOTE ADULT - PROBLEM SELECTOR PROBLEM 1
Acute on chronic systolic and diastolic heart failure, NYHA class 3
CKD (chronic kidney disease)

## 2017-04-28 NOTE — DISCHARGE NOTE ADULT - PATIENT PORTAL LINK FT
“You can access the [facility] Patient Portal, offered by [facility], by registering with the following website: [email or web address]”

## 2017-04-28 NOTE — DISCHARGE NOTE ADULT - ADDITIONAL INSTRUCTIONS
- Follow up with dr. Rowell at the [facility] office, please call to make an appointment for next week. - Follow up with dr. Rowell at the [facility] office, please call to make an appointment for next week.  - Follow up with Dr. Guadalupe (kidney doctor)

## 2017-04-28 NOTE — DISCHARGE NOTE ADULT - CARE PROVIDERS DIRECT ADDRESSES
,[email or web address]

## 2017-04-28 NOTE — DISCHARGE NOTE ADULT - HOSPITAL COURSE
61 yr old M current smoker with PMHx of HTN, hyperlipidemia, Stage III CKD, T cell lymphoma (diagnosed 10 yrs ago, on chemotherapy q Wednesday), chronic Afib (on Coumadin) who initially presented to cardiologist c/o NIELSON x several weeks. Patient states he can barely walk >1 block prior to becoming SOB and developing palpitations. Patient further admits to occasional dizziness and lightheadedness. Patient also reports bilateral LE edema from feet to his waist, 2 pillow orthopnea and PND.  Echocardiogram on 3/30/17 revealed moderate global hypokinesis with an estimated EF 40%. Mildly dilated with mild concentric LVH. Moderate MR/TR. Severe pulmonary HTN.  In light of patients risk factors, CCS Angina Class III equivalent symptoms, patient now presented as an ambulatory patient for recommended right and left heart catheterization with possible intervention.  Upon arrival 4/24 Acute systolic CHF exacerbation and cardiac catheterization was cancelled and admitted for diuresis and placed on a heparin gtt for atrial fibrillation while holding Coumadin.  As patient currently receiving chemo for T cell lymphoma, discussed with outpatient oncologist Dr. Mcgrath who states patient can miss a week of chemo.  CXR 4/24: B/l pleural effusions with compressive atelectasis L>R, Right basilar density which may represent compressive atelectasis or pneumonia.  Echo 4/24 revealed severe concentric LVH, EF 40-45%, LA severely dilated, RA dilated, moderate aortic valve thickening, Moderate to severe eccentric MR, mild to moderate TR, severe pulmonary hypertension (PASP 64 mmHg), mild to moderate NC, no pericardial effusion, Left pleural effusion noted.  Patient diuresed well with IV lasix.  Originally planned to proceed with cath but again deferred secondary to patient's chronic CKD stage IV which has remained stable through hospital course w/ diuresis (baseline  BUN/Crt 31/3.35 from outpatient labs 2/2017).  He subsequently underwent a nuclear stress test 4/28 revealing Myocardial perfusion imaging is normal. Overall left ventricular systolic function is abnormal with global hypokinesis. The EKG stress test is normal.  Reduced EF possibly secondary to Chemo.  Patient now feeling well with improved SOB and lower extremity edema.  INR subtherapeutic but Dr. Rowell aware and okay for patient to be discharge home and resuming Coumadin.  He will continue Norvasc, Toprol XL, Valsartan, and PO Lasix.  Labs, Vitals, Meds reviewed with Dr. Rowell and patient deemed stable for discharge home.  New prescriptions E prescribed to pharmacy of choice.  He will follow up with Dr. Rowell in 1 week for further management and INR Check.

## 2017-04-28 NOTE — DISCHARGE NOTE ADULT - CARE PROVIDER_API CALL
Anthony Rowell), Internal Medicine  [address] [place] [address]  Phone: [phone]  Fax: [phone] Anthony Rowell), Internal Medicine  [address] [place] [address]  Phone: [phone]  Fax: [phone]    Ann-Marie Denton), Internal Medicine; Nephrology  [address] [place] [address]  Phone: [phone]  Fax: [phone]

## 2017-04-28 NOTE — DISCHARGE NOTE ADULT - CARE PLAN
Principal Discharge DX:	Acute systolic congestive heart failure  Goal:	Please follow up with Dr. Rowell Next week at the [facility] office.  Instructions for follow-up, activity and diet:	You initially presented for a cardiac catheterization for recent Echo revealing reduced heart function.  On arrival your were found to be in an Acute heart failure Exacerbation and you cardiac catheterization  Secondary Diagnosis:	CKD (chronic kidney disease)  Secondary Diagnosis:	Essential hypertension  Secondary Diagnosis:	Chronic atrial fibrillation  Secondary Diagnosis:	Lymphoma, T-cell Principal Discharge DX:	Acute systolic congestive heart failure  Goal:	Please follow up with Dr. Rowell Next week at the [facility] office.  Instructions for follow-up, activity and diet:	You initially presented for a cardiac catheterization for recent Echo revealing reduced heart function.  On arrival your were found to be in an Acute heart failure Exacerbation w/ swelling in the legs and water in the lugs and your cardiac catheterization was cancelled.  You were treated with intravenous lasix.  - When your heart function is reduced, EF 40-45% (normal 55-60%), it can lead to water build up in your lungs and legs.  Please weight your self everyday and if you notice an increase in your weight by 5 pounds in one day then call your cardiologist. maintain a low salt diet.    - please continue to take Lasix 40mg twice daily (water pill).  A new prescription was sent.  Secondary Diagnosis:	CKD (chronic kidney disease)  Goal:	Your kidney function has remained stable.  Please follow up with your kidney doctor to ensure your labs are stable.  Secondary Diagnosis:	Essential hypertension  Goal:	Your blood pressure is stable. Continue to take Norvasc 10mg daily, Metoprolol XL 200mg daily.  START taking Valsartan 160mg daily (new prescription)  Secondary Diagnosis:	Chronic atrial fibrillation  Secondary Diagnosis:	Lymphoma, T-cell Principal Discharge DX:	Acute systolic congestive heart failure  Goal:	Please follow up with Dr. Rowell Next week at the [facility] office.  Instructions for follow-up, activity and diet:	You initially presented for a cardiac catheterization for recent Echo revealing reduced heart function.  On arrival your were found to be in an Acute heart failure Exacerbation w/ swelling in the legs and water in the lugs and your cardiac catheterization was cancelled.  You were treated with intravenous lasix.  - When your heart function is reduced, EF 40-45% (normal 55-60%), it can lead to water build up in your lungs and legs.  Please weight your self everyday and if you notice an increase in your weight by 5 pounds in one day then call your cardiologist. maintain a low salt diet.    - please continue to take Lasix 40mg twice daily (water pill).  A new prescription was sent.  Secondary Diagnosis:	CKD (chronic kidney disease)  Goal:	Your kidney function has remained stable.  Please follow up with your kidney doctor to ensure your labs are stable.  Secondary Diagnosis:	Essential hypertension  Goal:	Your blood pressure is stable. Continue to take Norvasc 10mg daily, Metoprolol XL 200mg daily.  START taking Valsartan 160mg daily (new prescription)  Secondary Diagnosis:	Chronic atrial fibrillation  Goal:	Your Atrial fibrillation is controlled.  Continue to take Toprol XL 200mg daily and Coumadin 4mg daily at bedtime as usual.  You need to follow up with Dr. Rowell next week to check you INR level.  Secondary Diagnosis:	Lymphoma, T-cell  Goal:	Please follow up with your oncologist Dr. Mcgrath to discuss resuming Chemo sessions. Principal Discharge DX:	Acute systolic congestive heart failure  Goal:	Please follow up with Dr. Rowell Next week at the [facility] office.  Instructions for follow-up, activity and diet:	You initially presented for a cardiac catheterization for recent Echo revealing reduced heart function.  On arrival your were found to be in an Acute heart failure Exacerbation w/ swelling in the legs and water in the lugs and your cardiac catheterization was cancelled.  You were treated with intravenous lasix.  - When your heart function is reduced, EF 40-45% (normal 55-60%), it can lead to water build up in your lungs and legs.  Please weight your self everyday and if you notice an increase in your weight by 5 pounds in one day then call your cardiologist. maintain a low salt diet.    - please continue to take Lasix 40mg twice daily (water pill).  A new prescription was sent.  Secondary Diagnosis:	CKD (chronic kidney disease)  Goal:	Your kidney function has remained stable.  Please follow up with your kidney doctor to ensure your labs are stable.  Secondary Diagnosis:	Essential hypertension  Goal:	Your blood pressure is stable. Continue to take Norvasc 10mg daily, Metoprolol XL 200mg daily.  START taking Valsartan 160mg daily (new prescription)  Secondary Diagnosis:	Chronic atrial fibrillation  Goal:	Your Atrial fibrillation is controlled.  Continue to take Toprol XL 200mg daily and Coumadin 4mg daily at bedtime as usual.  You need to follow up with Dr. Rowell next week to check you INR level.  Secondary Diagnosis:	Lymphoma, T-cell  Goal:	Please follow up with your oncologist Dr. Mcgrath to discuss resuming Chemo sessions. Principal Discharge DX:	Acute systolic congestive heart failure  Goal:	Please follow up with Dr. Rowell Next week at the [facility] office.  Instructions for follow-up, activity and diet:	You initially presented for a cardiac catheterization for recent Echo revealing reduced heart function.  On arrival your were found to be in an Acute heart failure Exacerbation w/ swelling in the legs and water in the lugs and your cardiac catheterization was cancelled.  You were treated with intravenous lasix.  - When your heart function is reduced, EF 40-45% (normal 55-60%), it can lead to water build up in your lungs and legs.  Please weight your self everyday and if you notice an increase in your weight by 5 pounds in one day then call your cardiologist. maintain a low salt diet.    - please continue to take Lasix 40mg twice daily (water pill).  A new prescription was sent.  Secondary Diagnosis:	CKD (chronic kidney disease)  Goal:	Your kidney function has remained stable.  Please follow up with Dr. Guadalupe (kidney) to ensure your labs are stable.  Secondary Diagnosis:	Essential hypertension  Goal:	Your blood pressure is stable. Continue to take Norvasc 10mg daily, Metoprolol XL 200mg daily.  START taking Valsartan 160mg daily (new prescription)  Secondary Diagnosis:	Chronic atrial fibrillation  Goal:	Your Atrial fibrillation is controlled.  Continue to take Toprol XL 200mg daily and Coumadin 4mg daily at bedtime as usual.  You need to follow up with Dr. Rowell next week to check you INR level.  Secondary Diagnosis:	Lymphoma, T-cell  Goal:	Please follow up with your oncologist Dr. Mcgrath to discuss resuming Chemo sessions. Principal Discharge DX:	Acute systolic congestive heart failure  Goal:	Please follow up with Dr. Rowell Next week at the [facility] office.  Instructions for follow-up, activity and diet:	You initially presented for a cardiac catheterization for recent Echo revealing reduced heart function.  On arrival your were found to be in an Acute heart failure Exacerbation w/ swelling in the legs and water in the lugs and your cardiac catheterization was cancelled.  You were treated with intravenous lasix.  - When your heart function is reduced, EF 40-45% (normal 55-60%), it can lead to water build up in your lungs and legs.  Please weight your self everyday and if you notice an increase in your weight by 5 pounds in one day then call your cardiologist. maintain a low salt diet.    - please continue to take Lasix 40mg twice daily (water pill).  A new prescription was sent.  Secondary Diagnosis:	CKD (chronic kidney disease)  Goal:	Your kidney function has remained stable.  Please follow up with Dr. Guadalupe (kidney) to ensure your labs are stable.  Secondary Diagnosis:	Essential hypertension  Goal:	Your blood pressure is stable. Continue to take Norvasc 10mg daily, Metoprolol XL 200mg daily.  START taking Valsartan 160mg daily (new prescription)  Secondary Diagnosis:	Chronic atrial fibrillation  Goal:	Your Atrial fibrillation is controlled.  Continue to take Toprol XL 200mg daily and Coumadin 4mg daily at bedtime as usual.  You need to follow up with Dr. Rowell next week to check you INR level.  Secondary Diagnosis:	Lymphoma, T-cell  Goal:	Please follow up with your oncologist Dr. Mcgrath to discuss resuming Chemo sessions.

## 2017-04-28 NOTE — DISCHARGE NOTE ADULT - PLAN OF CARE
Please follow up with Dr. Rowell Next week at the [facility] office. You initially presented for a cardiac catheterization for recent Echo revealing reduced heart function.  On arrival your were found to be in an Acute heart failure Exacerbation and you cardiac catheterization Your kidney function has remained stable.  Please follow up with your kidney doctor to ensure your labs are stable. Your blood pressure is stable. Continue to take Norvasc 10mg daily, Metoprolol XL 200mg daily.  START taking Valsartan 160mg daily (new prescription) You initially presented for a cardiac catheterization for recent Echo revealing reduced heart function.  On arrival your were found to be in an Acute heart failure Exacerbation w/ swelling in the legs and water in the lugs and your cardiac catheterization was cancelled.  You were treated with intravenous lasix.  - When your heart function is reduced, EF 40-45% (normal 55-60%), it can lead to water build up in your lungs and legs.  Please weight your self everyday and if you notice an increase in your weight by 5 pounds in one day then call your cardiologist. maintain a low salt diet.    - please continue to take Lasix 40mg twice daily (water pill).  A new prescription was sent. Your Atrial fibrillation is controlled.  Continue to take Toprol XL 200mg daily and Coumadin 4mg daily at bedtime as usual.  You need to follow up with Dr. Rowell next week to check you INR level. Please follow up with your oncologist Dr. Mcgrath to discuss resuming Chemo sessions. Your kidney function has remained stable.  Please follow up with Dr. Guadalupe (kidney) to ensure your labs are stable.

## 2017-04-28 NOTE — PROGRESS NOTE ADULT - PROBLEM SELECTOR PROBLEM 2
Stage 4 chronic kidney disease
Hypertension

## 2017-04-28 NOTE — DISCHARGE NOTE ADULT - MEDICATION SUMMARY - MEDICATIONS TO TAKE
I will START or STAY ON the medications listed below when I get home from the hospital:    valsartan 160 mg oral tablet  -- 1 tab(s) by mouth once a day  -- Indication: For High blood pressure and heart failure    doxazosin 2 mg oral tablet  -- 2 tab(s) by mouth 2 times a day  -- Indication: For BPH    Coumadin 4 mg oral tablet  -- 1 tab(s) by mouth once a day  -- Indication: For Atrial fibrillation    Toprol- mg oral tablet, extended release  -- 1 tab(s) by mouth once a day  -- Indication: For Heart rate for atrial fibrillation and blood pressure    amLODIPine 10 mg oral tablet  -- 1 tab(s) by mouth once a day  -- Indication: For High blood pressure    furosemide 40 mg oral tablet  -- 1 tab(s) by mouth 2 times a day  -- Indication: For water pill for congestive heart failure    calcitriol 0.25 mcg oral capsule  -- 1 cap(s) by mouth once a day  -- Indication: For As prescribed

## 2017-05-01 PROBLEM — I48.91 UNSPECIFIED ATRIAL FIBRILLATION: Chronic | Status: ACTIVE | Noted: 2017-04-21

## 2017-05-01 PROBLEM — N18.9 CHRONIC KIDNEY DISEASE, UNSPECIFIED: Chronic | Status: ACTIVE | Noted: 2017-04-21

## 2017-05-01 PROBLEM — I10 ESSENTIAL (PRIMARY) HYPERTENSION: Chronic | Status: ACTIVE | Noted: 2017-04-21

## 2017-05-02 DIAGNOSIS — Z79.01 LONG TERM (CURRENT) USE OF ANTICOAGULANTS: ICD-10-CM

## 2017-05-02 DIAGNOSIS — I20.9 ANGINA PECTORIS, UNSPECIFIED: ICD-10-CM

## 2017-05-02 DIAGNOSIS — I48.2 CHRONIC ATRIAL FIBRILLATION: ICD-10-CM

## 2017-05-02 DIAGNOSIS — Z87.891 PERSONAL HISTORY OF NICOTINE DEPENDENCE: ICD-10-CM

## 2017-05-02 DIAGNOSIS — E78.5 HYPERLIPIDEMIA, UNSPECIFIED: ICD-10-CM

## 2017-05-02 DIAGNOSIS — I13.0 HYPERTENSIVE HEART AND CHRONIC KIDNEY DISEASE WITH HEART FAILURE AND STAGE 1 THROUGH STAGE 4 CHRONIC KIDNEY DISEASE, OR UNSPECIFIED CHRONIC KIDNEY DISEASE: ICD-10-CM

## 2017-05-02 DIAGNOSIS — I50.43 ACUTE ON CHRONIC COMBINED SYSTOLIC (CONGESTIVE) AND DIASTOLIC (CONGESTIVE) HEART FAILURE: ICD-10-CM

## 2017-05-02 DIAGNOSIS — I37.1 NONRHEUMATIC PULMONARY VALVE INSUFFICIENCY: ICD-10-CM

## 2017-05-02 DIAGNOSIS — N18.4 CHRONIC KIDNEY DISEASE, STAGE 4 (SEVERE): ICD-10-CM

## 2017-05-02 DIAGNOSIS — T45.515A ADVERSE EFFECT OF ANTICOAGULANTS, INITIAL ENCOUNTER: ICD-10-CM

## 2017-05-02 DIAGNOSIS — J98.11 ATELECTASIS: ICD-10-CM

## 2017-05-02 DIAGNOSIS — I07.1 RHEUMATIC TRICUSPID INSUFFICIENCY: ICD-10-CM

## 2017-05-02 DIAGNOSIS — I34.0 NONRHEUMATIC MITRAL (VALVE) INSUFFICIENCY: ICD-10-CM

## 2017-05-02 DIAGNOSIS — C85.80 OTHER SPECIFIED TYPES OF NON-HODGKIN LYMPHOMA, UNSPECIFIED SITE: ICD-10-CM

## 2017-05-02 DIAGNOSIS — I27.2 OTHER SECONDARY PULMONARY HYPERTENSION: ICD-10-CM

## 2017-05-08 ENCOUNTER — APPOINTMENT (OUTPATIENT)
Dept: HEART AND VASCULAR | Facility: CLINIC | Age: 62
End: 2017-05-08

## 2017-05-08 ENCOUNTER — APPOINTMENT (OUTPATIENT)
Dept: NEPHROLOGY | Facility: CLINIC | Age: 62
End: 2017-05-08

## 2017-05-08 VITALS — HEART RATE: 86 BPM | SYSTOLIC BLOOD PRESSURE: 150 MMHG | OXYGEN SATURATION: 100 % | DIASTOLIC BLOOD PRESSURE: 96 MMHG

## 2017-05-08 VITALS — HEART RATE: 76 BPM | SYSTOLIC BLOOD PRESSURE: 122 MMHG | DIASTOLIC BLOOD PRESSURE: 72 MMHG

## 2017-05-08 VITALS — WEIGHT: 172 LBS

## 2017-05-08 DIAGNOSIS — N18.9 CHRONIC KIDNEY DISEASE, UNSPECIFIED: ICD-10-CM

## 2017-06-25 ENCOUNTER — FORM ENCOUNTER (OUTPATIENT)
Age: 62
End: 2017-06-25

## 2017-06-26 ENCOUNTER — OUTPATIENT (OUTPATIENT)
Dept: OUTPATIENT SERVICES | Facility: HOSPITAL | Age: 62
LOS: 1 days | End: 2017-06-26
Payer: MEDICARE

## 2017-06-26 ENCOUNTER — APPOINTMENT (OUTPATIENT)
Dept: NEPHROLOGY | Facility: CLINIC | Age: 62
End: 2017-06-26

## 2017-06-26 VITALS — WEIGHT: 168 LBS

## 2017-06-26 VITALS — HEART RATE: 74 BPM | DIASTOLIC BLOOD PRESSURE: 80 MMHG | SYSTOLIC BLOOD PRESSURE: 148 MMHG

## 2017-06-26 DIAGNOSIS — Z90.49 ACQUIRED ABSENCE OF OTHER SPECIFIED PARTS OF DIGESTIVE TRACT: Chronic | ICD-10-CM

## 2017-06-26 PROCEDURE — 71020: CPT | Mod: 26

## 2017-06-26 PROCEDURE — 71046 X-RAY EXAM CHEST 2 VIEWS: CPT

## 2017-06-26 RX ORDER — ZOLPIDEM TARTRATE 10 MG/1
10 TABLET ORAL
Qty: 30 | Refills: 0 | Status: DISCONTINUED | COMMUNITY
Start: 2017-01-25

## 2017-06-26 RX ORDER — HYDRALAZINE HYDROCHLORIDE 10 MG/1
10 TABLET ORAL
Qty: 60 | Refills: 0 | Status: DISCONTINUED | COMMUNITY
Start: 2017-04-03

## 2017-06-26 RX ORDER — DOXAZOSIN 1 MG/1
1 TABLET ORAL
Qty: 180 | Refills: 0 | Status: DISCONTINUED | COMMUNITY
Start: 2016-11-15

## 2017-08-09 ENCOUNTER — APPOINTMENT (OUTPATIENT)
Dept: NEPHROLOGY | Facility: CLINIC | Age: 62
End: 2017-08-09
Payer: MEDICARE

## 2017-08-09 VITALS — DIASTOLIC BLOOD PRESSURE: 78 MMHG | HEART RATE: 80 BPM | SYSTOLIC BLOOD PRESSURE: 130 MMHG

## 2017-08-09 PROCEDURE — 99215 OFFICE O/P EST HI 40 MIN: CPT

## 2017-08-09 RX ORDER — FOLIC ACID 1 MG/1
1 TABLET ORAL
Refills: 0 | Status: DISCONTINUED | COMMUNITY
Start: 2017-05-08 | End: 2017-08-09

## 2017-08-14 ENCOUNTER — APPOINTMENT (OUTPATIENT)
Dept: HEART AND VASCULAR | Facility: CLINIC | Age: 62
End: 2017-08-14
Payer: MEDICARE

## 2017-08-14 VITALS — DIASTOLIC BLOOD PRESSURE: 95 MMHG | SYSTOLIC BLOOD PRESSURE: 142 MMHG | HEART RATE: 75 BPM | OXYGEN SATURATION: 100 %

## 2017-08-14 PROCEDURE — 99214 OFFICE O/P EST MOD 30 MIN: CPT

## 2017-09-05 ENCOUNTER — RX RENEWAL (OUTPATIENT)
Age: 62
End: 2017-09-05

## 2017-11-13 ENCOUNTER — RESULT CHARGE (OUTPATIENT)
Age: 62
End: 2017-11-13

## 2017-11-14 ENCOUNTER — APPOINTMENT (OUTPATIENT)
Dept: HEART AND VASCULAR | Facility: CLINIC | Age: 62
End: 2017-11-14
Payer: MEDICARE

## 2017-11-14 ENCOUNTER — APPOINTMENT (OUTPATIENT)
Dept: NEPHROLOGY | Facility: CLINIC | Age: 62
End: 2017-11-14
Payer: MEDICARE

## 2017-11-14 VITALS — HEART RATE: 78 BPM | SYSTOLIC BLOOD PRESSURE: 150 MMHG | DIASTOLIC BLOOD PRESSURE: 100 MMHG

## 2017-11-14 VITALS
WEIGHT: 184.6 LBS | BODY MASS INDEX: 26.13 KG/M2 | OXYGEN SATURATION: 97 % | HEIGHT: 70.47 IN | TEMPERATURE: 97.9 F | DIASTOLIC BLOOD PRESSURE: 102 MMHG | HEART RATE: 70 BPM | SYSTOLIC BLOOD PRESSURE: 158 MMHG

## 2017-11-14 PROCEDURE — 93000 ELECTROCARDIOGRAM COMPLETE: CPT

## 2017-11-14 PROCEDURE — 99214 OFFICE O/P EST MOD 30 MIN: CPT

## 2017-11-14 PROCEDURE — 99214 OFFICE O/P EST MOD 30 MIN: CPT | Mod: 25

## 2017-11-14 RX ORDER — POTASSIUM CHLORIDE 1500 MG/1
20 TABLET, EXTENDED RELEASE ORAL
Refills: 0 | Status: DISCONTINUED | COMMUNITY
Start: 2017-05-08 | End: 2017-11-14

## 2017-12-07 ENCOUNTER — APPOINTMENT (OUTPATIENT)
Dept: HEART AND VASCULAR | Facility: CLINIC | Age: 62
End: 2017-12-07
Payer: MEDICARE

## 2017-12-07 PROCEDURE — 93306 TTE W/DOPPLER COMPLETE: CPT

## 2018-02-26 ENCOUNTER — APPOINTMENT (OUTPATIENT)
Dept: NEPHROLOGY | Facility: CLINIC | Age: 63
End: 2018-02-26
Payer: MEDICARE

## 2018-02-26 VITALS — SYSTOLIC BLOOD PRESSURE: 122 MMHG | HEART RATE: 78 BPM | DIASTOLIC BLOOD PRESSURE: 78 MMHG

## 2018-02-26 PROCEDURE — 99215 OFFICE O/P EST HI 40 MIN: CPT

## 2018-02-27 ENCOUNTER — APPOINTMENT (OUTPATIENT)
Dept: HEART AND VASCULAR | Facility: CLINIC | Age: 63
End: 2018-02-27
Payer: MEDICARE

## 2018-02-27 VITALS
WEIGHT: 187.99 LBS | DIASTOLIC BLOOD PRESSURE: 88 MMHG | HEART RATE: 80 BPM | HEIGHT: 69.69 IN | OXYGEN SATURATION: 95 % | BODY MASS INDEX: 27.22 KG/M2 | TEMPERATURE: 98 F | SYSTOLIC BLOOD PRESSURE: 150 MMHG

## 2018-02-27 PROCEDURE — 99214 OFFICE O/P EST MOD 30 MIN: CPT | Mod: 25

## 2018-02-27 PROCEDURE — 93000 ELECTROCARDIOGRAM COMPLETE: CPT

## 2018-02-28 LAB
APPEARANCE: ABNORMAL
BACTERIA: ABNORMAL
BILIRUBIN URINE: NEGATIVE
BLOOD URINE: ABNORMAL
COLOR: YELLOW
CORE LAB FLUID CYTOLOGY: NORMAL
CREAT SPEC-SCNC: 103 MG/DL
CREAT/PROT UR: 1.5 RATIO
GLUCOSE QUALITATIVE U: NEGATIVE MG/DL
HYALINE CASTS: 2 /LPF
KETONES URINE: NEGATIVE
LEUKOCYTE ESTERASE URINE: NEGATIVE
MICROSCOPIC-UA: NORMAL
NITRITE URINE: NEGATIVE
PH URINE: 5
PROT UR-MCNC: 150 MG/DL
PROTEIN URINE: 100 MG/DL
RED BLOOD CELLS URINE: 5 /HPF
SPECIFIC GRAVITY URINE: 1.01
SQUAMOUS EPITHELIAL CELLS: 2 /HPF
UROBILINOGEN URINE: NEGATIVE MG/DL
WHITE BLOOD CELLS URINE: 33 /HPF

## 2018-03-27 ENCOUNTER — APPOINTMENT (OUTPATIENT)
Dept: HEART AND VASCULAR | Facility: CLINIC | Age: 63
End: 2018-03-27
Payer: MEDICARE

## 2018-03-27 VITALS
WEIGHT: 191 LBS | HEART RATE: 99 BPM | OXYGEN SATURATION: 99 % | TEMPERATURE: 97.5 F | BODY MASS INDEX: 27.65 KG/M2 | DIASTOLIC BLOOD PRESSURE: 78 MMHG | SYSTOLIC BLOOD PRESSURE: 146 MMHG | HEIGHT: 69.69 IN

## 2018-03-27 PROCEDURE — 99214 OFFICE O/P EST MOD 30 MIN: CPT | Mod: 25

## 2018-03-27 PROCEDURE — 93000 ELECTROCARDIOGRAM COMPLETE: CPT

## 2018-05-22 ENCOUNTER — APPOINTMENT (OUTPATIENT)
Dept: NEPHROLOGY | Facility: CLINIC | Age: 63
End: 2018-05-22
Payer: MEDICARE

## 2018-05-22 VITALS — SYSTOLIC BLOOD PRESSURE: 146 MMHG | DIASTOLIC BLOOD PRESSURE: 80 MMHG | HEART RATE: 80 BPM

## 2018-05-22 PROCEDURE — 99214 OFFICE O/P EST MOD 30 MIN: CPT

## 2018-05-23 LAB
APPEARANCE: CLEAR
BACTERIA: NEGATIVE
BILIRUBIN URINE: NEGATIVE
BLOOD URINE: ABNORMAL
COLOR: YELLOW
CREAT SPEC-SCNC: 76 MG/DL
CREAT/PROT UR: 2.1 RATIO
GLUCOSE QUALITATIVE U: NEGATIVE MG/DL
HYALINE CASTS: 1 /LPF
KETONES URINE: NEGATIVE
LEUKOCYTE ESTERASE URINE: NEGATIVE
MICROSCOPIC-UA: NORMAL
NITRITE URINE: NEGATIVE
PH URINE: 5.5
PROT UR-MCNC: 160 MG/DL
PROTEIN URINE: 300 MG/DL
RED BLOOD CELLS URINE: 4 /HPF
SPECIFIC GRAVITY URINE: 1.01
SQUAMOUS EPITHELIAL CELLS: 2 /HPF
UROBILINOGEN URINE: NEGATIVE MG/DL
WHITE BLOOD CELLS URINE: 12 /HPF

## 2018-07-03 ENCOUNTER — APPOINTMENT (OUTPATIENT)
Dept: HEART AND VASCULAR | Facility: CLINIC | Age: 63
End: 2018-07-03
Payer: MEDICARE

## 2018-07-03 VITALS
HEART RATE: 89 BPM | DIASTOLIC BLOOD PRESSURE: 80 MMHG | BODY MASS INDEX: 26.78 KG/M2 | OXYGEN SATURATION: 98 % | TEMPERATURE: 98.6 F | WEIGHT: 185 LBS | SYSTOLIC BLOOD PRESSURE: 150 MMHG | HEIGHT: 69.69 IN

## 2018-07-03 PROCEDURE — 93000 ELECTROCARDIOGRAM COMPLETE: CPT

## 2018-07-03 PROCEDURE — 99406 BEHAV CHNG SMOKING 3-10 MIN: CPT

## 2018-07-03 PROCEDURE — 99214 OFFICE O/P EST MOD 30 MIN: CPT

## 2018-09-09 NOTE — PROVIDER CONTACT NOTE (CRITICAL VALUE NOTIFICATION) - BACKGROUND
Scottie Sylvester  [address] [place] [address]    September 9, 2018      Dear Scottie Sylvester,    [facility] wants to ensure your discharge home is safe and you or your loved ones have had all of your questions answered regarding your care after you leave the hospital.    Our discharge team was unsuccessful in our attempts to contact you telephonically and we wanted to be sure that you had a list of resources and contact information should you have any questions regarding your hospital stay, follow-up instructions, or active medical symptoms.    Questions or Concerns Regarding… Contact   Medical Questions Related to Your Discharge  (7 days a week, 8am-5pm) Contact a Nurse Care Coordinator   [phone]   Medical Questions Not Related to Your Discharge  (24 hours a day / 7 days a week)  Contact the Nurse Health Line   [phone]    Medications or Discharge Instructions Refer to your discharge packet   or contact your -[phone]   Follow-up Appointment(s) Schedule your appointment via [employer]   or contact Scheduling [phone]   Billing Review your statement via [employer]  or contact Billing [phone]   Medical Records Review your records via [employer]   or contact Medical Records [phone]     You can also easily access your medical information, test results and upcoming appointments via the [employer] free online health management tool. You can learn more and sign up at [email or web address]. For assistance setting up your [employer] account, please call [phone].    Once again, we want to ensure your discharge home is safe and that you have a clear understanding of any next steps in your care. If you have any questions or concerns, please do not hesitate to contact us, we are here for you. Thank you for choosing [facility] for your healthcare needs.    Sincerely,      Your [facility] Healthcare Team     
Pt admitted for chest pain and chf management.

## 2018-09-24 ENCOUNTER — FORM ENCOUNTER (OUTPATIENT)
Age: 63
End: 2018-09-24

## 2018-09-25 ENCOUNTER — OUTPATIENT (OUTPATIENT)
Dept: OUTPATIENT SERVICES | Facility: HOSPITAL | Age: 63
LOS: 1 days | End: 2018-09-25
Payer: MEDICARE

## 2018-09-25 ENCOUNTER — APPOINTMENT (OUTPATIENT)
Dept: NEPHROLOGY | Facility: CLINIC | Age: 63
End: 2018-09-25
Payer: MEDICARE

## 2018-09-25 VITALS — SYSTOLIC BLOOD PRESSURE: 136 MMHG | HEART RATE: 84 BPM | DIASTOLIC BLOOD PRESSURE: 84 MMHG

## 2018-09-25 DIAGNOSIS — Z90.49 ACQUIRED ABSENCE OF OTHER SPECIFIED PARTS OF DIGESTIVE TRACT: Chronic | ICD-10-CM

## 2018-09-25 PROCEDURE — 71046 X-RAY EXAM CHEST 2 VIEWS: CPT | Mod: 26

## 2018-09-25 PROCEDURE — 71046 X-RAY EXAM CHEST 2 VIEWS: CPT

## 2018-09-25 PROCEDURE — 99214 OFFICE O/P EST MOD 30 MIN: CPT

## 2018-09-25 RX ORDER — FILGRASTIM-SNDZ 480 UG/.8ML
480 INJECTION, SOLUTION INTRAVENOUS; SUBCUTANEOUS
Qty: 8 | Refills: 0 | Status: DISCONTINUED | COMMUNITY
Start: 2018-03-19 | End: 2018-09-25

## 2018-09-26 LAB
APPEARANCE: CLEAR
BACTERIA: NEGATIVE
BILIRUBIN URINE: NEGATIVE
BLOOD URINE: NEGATIVE
COLOR: YELLOW
CREAT SPEC-SCNC: 162 MG/DL
CREAT/PROT UR: 1.4 RATIO
GLUCOSE QUALITATIVE U: NEGATIVE MG/DL
HYALINE CASTS: 4 /LPF
KETONES URINE: NEGATIVE
LEUKOCYTE ESTERASE URINE: NEGATIVE
MICROSCOPIC-UA: NORMAL
NITRITE URINE: NEGATIVE
PH URINE: 5.5
PROT UR-MCNC: 220 MG/DL
PROTEIN URINE: 300 MG/DL
RED BLOOD CELLS URINE: 3 /HPF
SPECIFIC GRAVITY URINE: 1.01
SQUAMOUS EPITHELIAL CELLS: 2 /HPF
UROBILINOGEN URINE: NEGATIVE MG/DL
WHITE BLOOD CELLS URINE: 8 /HPF

## 2019-01-08 ENCOUNTER — APPOINTMENT (OUTPATIENT)
Dept: HEART AND VASCULAR | Facility: CLINIC | Age: 64
End: 2019-01-08
Payer: MEDICARE

## 2019-01-08 VITALS
HEIGHT: 70.47 IN | BODY MASS INDEX: 26.47 KG/M2 | WEIGHT: 187 LBS | HEART RATE: 79 BPM | SYSTOLIC BLOOD PRESSURE: 142 MMHG | TEMPERATURE: 97.9 F | DIASTOLIC BLOOD PRESSURE: 80 MMHG | OXYGEN SATURATION: 99 %

## 2019-01-08 PROCEDURE — 93000 ELECTROCARDIOGRAM COMPLETE: CPT

## 2019-01-08 PROCEDURE — 99214 OFFICE O/P EST MOD 30 MIN: CPT | Mod: 25

## 2019-01-08 PROCEDURE — 99367 TEAM CONF W/O PAT BY PHYS: CPT

## 2019-01-08 NOTE — HISTORY OF PRESENT ILLNESS
[FreeTextEntry1] : 61 yr old M current smoker with PMHx of HTN, hyperlipidemia, Stage III CKD, T cell lymphoma (diagnosed 10 yrs ago, on chemotherapy q Wednesday), chronic Afib (on Coumadin) who initially presented to cardiologist c/o LOCKETT x several weeks. Patient states he can barely walk >1 block prior to becoming SOB and developing palpitations. Patient further admits to occasional dizziness and lightheadedness. Patient also reports bilateral LE edema from feet to his waist, 2 pillow orthopnea and PND. Echocardiogram on 3/30/17 revealed moderate global hypokinesis with an estimated EF 40%. Mildly dilated with mild concentric LVH. Moderate MR/TR. Severe pulmonary HTN. In light of patients risk factors, CCS Angina Class III equivalent symptoms, patient now presented as an ambulatory patient for recommended right and left heart catheterization with possible intervention. Upon arrival 4/24 Acute systolic CHF exacerbation and cardiac catheterization was cancelled and admitted for diuresis and placed on a heparin gtt for atrial fibrillation while holding Coumadin. As patient currently receiving chemo for T cell lymphoma, discussed with outpatient oncologist Dr. Mcgrath who states patient can miss a week of chemo. CXR 4/24: B/l pleural effusions with compressive atelectasis L>R, Right basilar density which may represent compressive atelectasis or pneumonia. Echo 4/24 revealed severe concentric LVH, EF 40-45%, LA severely dilated, RA dilated, moderate aortic valve thickening, Moderate to severe eccentric MR, mild to moderate TR, severe pulmonary hypertension (PASP 64 mmHg), mild to moderate MD, no pericardial effusion, Left pleural effusion noted. Patient diuresed well with IV lasix. Originally planned to proceed with cath but again deferred secondary to patient's chronic CKD stage IV which has remained stable through hospital course w/ diuresis (baseline BUN/Crt 31/3.35 from outpatient labs 2/2017). He subsequently underwent a nuclear stress test 4/28 revealing Myocardial perfusion imaging is normal. Overall left ventricular systolic function is abnormal with global hypokinesis. The EKG stress test is normal. Reduced EF possibly secondary to Chemo. Patient now feeling well with improved SOB and lower extremity edema. INR subtherapeutic but Dr. Rowell aware and okay for patient to be discharge home and resuming Coumadin. He will continue Norvasc, Toprol XL, Valsartan, and PO Lasix. \par \par \par NO cp sob enoch - states he can walsk furhter and sleep better no pnd othropnae occasional trance LE edema. overall maked improvementi n fxn capacioty since discharge.\par \par 2/27/18 EF normalized, on lasix but held 2/2 gout flare no sob lockett le ema improved\par \par 1/8/19 USOH, 100% compliant with meds\par location: chest\par duration: na\par  modifying factors: na\par timing: na\par severity: 0/10\par EKG unchanged from prior (in chart)\par consultation notes reviewed where appropriate\par  \par \par 3/27/18 feels great, resumed lasix two weeks ago gout flare resolved, minor inc in weight but no s/s decompensation

## 2019-01-08 NOTE — DISCUSSION/SUMMARY
[FreeTextEntry1] : The number of diagnostic and/or management options \par \par SOB -resolved, repeat Echo, lasix 20mg \par \par HTN - ambi sbp 130s, controlled\par \par Afib - rate controlled on BB, coumadin dose per Oncologist INR checks\par \par \par \par Labs, radiology: ekg echo\par \par Aspirin therapy: no\par \par LDL: n/a\par \par Medical team conference with interdisciplinary team of health care professionals, patient and/or family not present, discussion of the case with another healthcare provider: Anayeli\par \par \par Overall Risk: High Complexity\par \par \par Additional detailed discussion regarding prevention including but not limiting to goal SBP<130mmHg, Mediterranean diet discussion, No salt diet, diabetes screening, and goal LDL<100. Smoking cessation, EtOH use and depression screen where applicable\par Exercise counseling and plan discussed with patient\par will readdress and reinforce prevention in subsequent visits

## 2019-01-08 NOTE — PHYSICAL EXAM
[Normal Oral Mucosa] : normal oral mucosa [No Oral Pallor] : no oral pallor [No Oral Cyanosis] : no oral cyanosis [Normal Jugular Venous A Waves Present] : normal jugular venous A waves present [Normal Jugular Venous V Waves Present] : normal jugular venous V waves present [No Jugular Venous Loyola A Waves] : no jugular venous loyola A waves [Respiration, Rhythm And Depth] : normal respiratory rhythm and effort [Exaggerated Use Of Accessory Muscles For Inspiration] : no accessory muscle use [Auscultation Breath Sounds / Voice Sounds] : lungs were clear to auscultation bilaterally [Heart Rate And Rhythm] : heart rate and rhythm were normal [Heart Sounds] : normal S1 and S2 [Murmurs] : no murmurs present [Abdomen Soft] : soft [Abdomen Tenderness] : non-tender [Abdomen Mass (___ Cm)] : no abdominal mass palpated [Abnormal Walk] : normal gait [Gait - Sufficient For Exercise Testing] : the gait was sufficient for exercise testing [Nail Clubbing] : no clubbing of the fingernails [Cyanosis, Localized] : no localized cyanosis [Petechial Hemorrhages (___cm)] : no petechial hemorrhages [Skin Color & Pigmentation] : normal skin color and pigmentation [] : no rash [No Venous Stasis] : no venous stasis [Skin Lesions] : no skin lesions [No Skin Ulcers] : no skin ulcer [No Xanthoma] : no  xanthoma was observed

## 2019-05-09 ENCOUNTER — APPOINTMENT (OUTPATIENT)
Dept: HEART AND VASCULAR | Facility: CLINIC | Age: 64
End: 2019-05-09

## 2019-05-09 ENCOUNTER — APPOINTMENT (OUTPATIENT)
Dept: HEART AND VASCULAR | Facility: CLINIC | Age: 64
End: 2019-05-09
Payer: MEDICARE

## 2019-05-09 VITALS
BODY MASS INDEX: 25.91 KG/M2 | DIASTOLIC BLOOD PRESSURE: 70 MMHG | HEART RATE: 73 BPM | TEMPERATURE: 98.1 F | SYSTOLIC BLOOD PRESSURE: 100 MMHG | WEIGHT: 182.98 LBS | OXYGEN SATURATION: 97 %

## 2019-05-09 PROCEDURE — 99214 OFFICE O/P EST MOD 30 MIN: CPT

## 2019-05-09 PROCEDURE — 93000 ELECTROCARDIOGRAM COMPLETE: CPT

## 2019-05-09 NOTE — DISCUSSION/SUMMARY
[FreeTextEntry1] : The number of diagnostic and/or management options include:\par CAD, HTN, HPL, CV Prevention\par \par \par SOB -resolved, repeat Echo, lasix 20mg \par \par HTN - ambi sbp 130s, controlled\par \par Afib - rate controlled on BB, coumadin dose per Oncologist INR checks\par \par \par High Complexity Medical Decision Making\par

## 2019-05-09 NOTE — HISTORY OF PRESENT ILLNESS
[FreeTextEntry1] : 61 yr old M current smoker with PMHx of HTN, hyperlipidemia, Stage III CKD, T cell lymphoma (diagnosed 10 yrs ago, on chemotherapy q Wednesday), chronic Afib (on Coumadin) who initially presented to cardiologist c/o LOCKETT x several weeks. Patient states he can barely walk >1 block prior to becoming SOB and developing palpitations. Patient further admits to occasional dizziness and lightheadedness. Patient also reports bilateral LE edema from feet to his waist, 2 pillow orthopnea and PND. Echocardiogram on 3/30/17 revealed moderate global hypokinesis with an estimated EF 40%. Mildly dilated with mild concentric LVH. Moderate MR/TR. Severe pulmonary HTN. In light of patients risk factors, CCS Angina Class III equivalent symptoms, patient now presented as an ambulatory patient for recommended right and left heart catheterization with possible intervention. Upon arrival 4/24 Acute systolic CHF exacerbation and cardiac catheterization was cancelled and admitted for diuresis and placed on a heparin gtt for atrial fibrillation while holding Coumadin. As patient currently receiving chemo for T cell lymphoma, discussed with outpatient oncologist Dr. Mcgrath who states patient can miss a week of chemo. CXR 4/24: B/l pleural effusions with compressive atelectasis L>R, Right basilar density which may represent compressive atelectasis or pneumonia. Echo 4/24 revealed severe concentric LVH, EF 40-45%, LA severely dilated, RA dilated, moderate aortic valve thickening, Moderate to severe eccentric MR, mild to moderate TR, severe pulmonary hypertension (PASP 64 mmHg), mild to moderate CA, no pericardial effusion, Left pleural effusion noted. Patient diuresed well with IV lasix. Originally planned to proceed with cath but again deferred secondary to patient's chronic CKD stage IV which has remained stable through hospital course w/ diuresis (baseline BUN/Crt 31/3.35 from outpatient labs 2/2017). He subsequently underwent a nuclear stress test 4/28 revealing Myocardial perfusion imaging is normal. Overall left ventricular systolic function is abnormal with global hypokinesis. The EKG stress test is normal. Reduced EF possibly secondary to Chemo. Patient now feeling well with improved SOB and lower extremity edema. INR subtherapeutic but Dr. Rowell aware and okay for patient to be discharge home and resuming Coumadin. He will continue Norvasc, Toprol XL, Valsartan, and PO Lasix. \par \par \par NO cp sob enoch - states he can walsk furhter and sleep better no pnd othropnae occasional trance LE edema. overall maked improvementi n fxn capacioty since discharge.\par \par 2/27/18 EF normalized, on lasix but held 2/2 gout flare no sob lockett le ema improved\par \par 1/8/19 USOH, 100% compliant with meds\par location: chest\par duration: na\par  modifying factors: na\par timing: na\par severity: 0/10\par EKG unchanged from prior (in chart)\par consultation notes reviewed where appropriate\par  \par \par 3/27/18 feels great, resumed lasix two weeks ago gout flare resolved, minor inc in weight but no s/s decompensation \par \par 5/9/19 USOH, 100% compliant with meds\par location: chest\par duration: na\par  modifying factors: na\par timing: na\par severity: 0/10\par EKG unchanged from prior (in chart)\par consultation notes reviewed where appropriate\par

## 2019-05-09 NOTE — PHYSICAL EXAM
[Normal Oral Mucosa] : normal oral mucosa [No Oral Pallor] : no oral pallor [No Oral Cyanosis] : no oral cyanosis [Normal Jugular Venous A Waves Present] : normal jugular venous A waves present [No Jugular Venous Loyola A Waves] : no jugular venous loyola A waves [Normal Jugular Venous V Waves Present] : normal jugular venous V waves present [Respiration, Rhythm And Depth] : normal respiratory rhythm and effort [Exaggerated Use Of Accessory Muscles For Inspiration] : no accessory muscle use [Auscultation Breath Sounds / Voice Sounds] : lungs were clear to auscultation bilaterally [Heart Rate And Rhythm] : heart rate and rhythm were normal [Heart Sounds] : normal S1 and S2 [Murmurs] : no murmurs present [Abdomen Soft] : soft [Abdomen Tenderness] : non-tender [Abdomen Mass (___ Cm)] : no abdominal mass palpated [Abnormal Walk] : normal gait [Gait - Sufficient For Exercise Testing] : the gait was sufficient for exercise testing [Nail Clubbing] : no clubbing of the fingernails [Cyanosis, Localized] : no localized cyanosis [Petechial Hemorrhages (___cm)] : no petechial hemorrhages [Skin Color & Pigmentation] : normal skin color and pigmentation [] : no rash [Skin Lesions] : no skin lesions [No Venous Stasis] : no venous stasis [No Skin Ulcers] : no skin ulcer [No Xanthoma] : no  xanthoma was observed

## 2019-05-30 ENCOUNTER — APPOINTMENT (OUTPATIENT)
Dept: NEPHROLOGY | Facility: CLINIC | Age: 64
End: 2019-05-30
Payer: MEDICARE

## 2019-05-30 VITALS — HEART RATE: 74 BPM | DIASTOLIC BLOOD PRESSURE: 82 MMHG | SYSTOLIC BLOOD PRESSURE: 144 MMHG

## 2019-05-30 PROCEDURE — 99214 OFFICE O/P EST MOD 30 MIN: CPT

## 2019-06-02 NOTE — REVIEW OF SYSTEMS
[Lower Ext Edema] : lower extremity edema [Orthopnea] : orthopnea [Negative] : Neurological [Nosebleeds] : no nosebleeds [Sore Throat] : no sore throat [Palpitations] : no palpitations [Shortness Of Breath] : no shortness of breath [Wheezing] : no wheezing [Cough] : no cough [SOB on Exertion] : no shortness of breath during exertion [PND] : no PND [Abdominal Pain] : no abdominal pain [Vomiting] : no vomiting [Diarrhea] : no diarrhea [Constipation] : no constipation [Heartburn] : no heartburn [Arthralgias] : no arthralgias [Joint Pain] : no joint pain [Muscle Weakness] : no muscle weakness [Easy Bleeding] : no tendency for easy bleeding [Easy Bruising] : no tendency for easy bruising [FreeTextEntry5] : Improved LE edema since hospitalization

## 2019-06-02 NOTE — ASSESSMENT
[FreeTextEntry1] : Reviewed all labs in detail with patient and his wife from 5/29/2019\par 64 yo man with CKD 4, Hyperuremia, HTN. \par - CKD 4: -  creat up a bit at 3.5- BP, volume status okay\par not uremic\par aware of possible need for RRT in future\par -hyperkalemia- prior K  had been low- up, probably as a result of recurrent lymphoma and chemo-  limit K-rich foods-\par consider veltassa if finds it difficult to limit food choices\par - gout-  no attacks- continues to have hyperuricemia + lymphoma- c/w high dose allopurinol and use of colchicine daily\par - HTN -BP  above goal- resume home BP monitoring- may needs meds adjusted-target still 130/80\par -- LE edema-controlled- c/w lasix\par -metabolic acidosis- CO2- 22-  no need for NaHCO3 tabs at this time\par - hyperuremia -as above- gout-   and consider use of IV agents if develops another episode of gout\par -anemia- hgb stable\par proteinuria- needs repeat urine prot/creat ratio-\par hematuria-  repeat u/a- urine cytology 2018 was negative\par  on coumadin- \par \par f/u OV 3 months

## 2019-06-02 NOTE — HISTORY OF PRESENT ILLNESS
[FreeTextEntry1] : 64 yo man here for f/u evaluation of  CKD 4, hypertension, hyperuricemia.\par Had to restart chemotherapy in April- tolerating it well- IV treatments every Wed.\par  for T-cell lymphoma.\par Had been in remission for 8 months\par Energy and appetite good\par No flank pain, dysuria, hematuria or frothy urine\par No gout attack in some time-- remains on high dose allopurinol and colchicine\par No SOB, CP\par \par \par \par PMH: w/ PMHx of gout, atrial fibrillation on coumadin, T cell lymphoma on chemo, sCHF w/ EF of 40-45%, HTN, BPH, CKD presents for follow up.  Still receiving weekly chemo w/ romidepsin, aloxi, palonosetron x 15+ yrs for T cell lymphoma.

## 2019-06-02 NOTE — PHYSICAL EXAM
[General Appearance - Alert] : alert [General Appearance - In No Acute Distress] : in no acute distress [General Appearance - Well Nourished] : well nourished [General Appearance - Well Developed] : well developed [Sclera] : the sclera and conjunctiva were normal [Extraocular Movements] : extraocular movements were intact [Outer Ear] : the ears and nose were normal in appearance [Examination Of The Oral Cavity] : the lips and gums were normal [Neck Appearance] : the appearance of the neck was normal [Jugular Venous Distention Increased] : there was no jugular-venous distention [Heart Sounds Gallop] : no gallops [Murmurs] : no murmurs [Heart Sounds Pericardial Friction Rub] : no pericardial rub [Cervical Lymph Nodes Enlarged Anterior Bilaterally] : anterior cervical [Supraclavicular Lymph Nodes Enlarged Bilaterally] : supraclavicular [No CVA Tenderness] : no ~M costovertebral angle tenderness [No Spinal Tenderness] : no spinal tenderness [Abnormal Walk] : normal gait [Skin Turgor] : normal skin turgor [] : no rash [Oriented To Time, Place, And Person] : oriented to person, place, and time [Impaired Insight] : insight and judgment were intact [Affect] : the affect was normal [Memory Recent] : recent memory was not impaired [FreeTextEntry1] : tr LE edema

## 2019-06-06 ENCOUNTER — APPOINTMENT (OUTPATIENT)
Dept: HEART AND VASCULAR | Facility: CLINIC | Age: 64
End: 2019-06-06
Payer: MEDICARE

## 2019-06-06 VITALS
HEIGHT: 70.47 IN | WEIGHT: 189.99 LBS | DIASTOLIC BLOOD PRESSURE: 80 MMHG | SYSTOLIC BLOOD PRESSURE: 150 MMHG | TEMPERATURE: 98 F | HEART RATE: 87 BPM | OXYGEN SATURATION: 98 % | BODY MASS INDEX: 26.9 KG/M2

## 2019-06-06 PROCEDURE — 99214 OFFICE O/P EST MOD 30 MIN: CPT

## 2019-06-06 PROCEDURE — 93000 ELECTROCARDIOGRAM COMPLETE: CPT

## 2019-06-06 PROCEDURE — 93306 TTE W/DOPPLER COMPLETE: CPT

## 2019-06-10 NOTE — DISCUSSION/SUMMARY
[FreeTextEntry1] : The number of diagnostic and/or management options include:\par CAD, HTN, HPL, CV Prevention\par \par \par SOB -resolved, repeat Echo, EF now normalized HFrecEF continue current regimen\par \par HTN - ambi sbp 130s, controlled\par \par Afib - rate controlled on BB, coumadin dose per Oncologist INR checks\par \par \par High Complexity Medical Decision Making

## 2019-06-10 NOTE — PHYSICAL EXAM
[Normal Oral Mucosa] : normal oral mucosa [No Oral Pallor] : no oral pallor [No Oral Cyanosis] : no oral cyanosis [Normal Jugular Venous A Waves Present] : normal jugular venous A waves present [Normal Jugular Venous V Waves Present] : normal jugular venous V waves present [Respiration, Rhythm And Depth] : normal respiratory rhythm and effort [No Jugular Venous Loyola A Waves] : no jugular venous loyola A waves [Exaggerated Use Of Accessory Muscles For Inspiration] : no accessory muscle use [Auscultation Breath Sounds / Voice Sounds] : lungs were clear to auscultation bilaterally [Murmurs] : no murmurs present [Heart Rate And Rhythm] : heart rate and rhythm were normal [Heart Sounds] : normal S1 and S2 [Abdomen Tenderness] : non-tender [Abdomen Soft] : soft [Abnormal Walk] : normal gait [Abdomen Mass (___ Cm)] : no abdominal mass palpated [Gait - Sufficient For Exercise Testing] : the gait was sufficient for exercise testing [Nail Clubbing] : no clubbing of the fingernails [Cyanosis, Localized] : no localized cyanosis [Petechial Hemorrhages (___cm)] : no petechial hemorrhages [No Venous Stasis] : no venous stasis [] : no rash [Skin Color & Pigmentation] : normal skin color and pigmentation [No Xanthoma] : no  xanthoma was observed [No Skin Ulcers] : no skin ulcer [Skin Lesions] : no skin lesions

## 2019-06-10 NOTE — HISTORY OF PRESENT ILLNESS
[FreeTextEntry1] : 61 yr old M current smoker with PMHx of HTN, hyperlipidemia, Stage III CKD, T cell lymphoma (diagnosed 10 yrs ago, on chemotherapy q Wednesday), chronic Afib (on Coumadin) who initially presented to cardiologist c/o LOCKETT x several weeks. Patient states he can barely walk >1 block prior to becoming SOB and developing palpitations. Patient further admits to occasional dizziness and lightheadedness. Patient also reports bilateral LE edema from feet to his waist, 2 pillow orthopnea and PND. Echocardiogram on 3/30/17 revealed moderate global hypokinesis with an estimated EF 40%. Mildly dilated with mild concentric LVH. Moderate MR/TR. Severe pulmonary HTN. In light of patients risk factors, CCS Angina Class III equivalent symptoms, patient now presented as an ambulatory patient for recommended right and left heart catheterization with possible intervention. Upon arrival 4/24 Acute systolic CHF exacerbation and cardiac catheterization was cancelled and admitted for diuresis and placed on a heparin gtt for atrial fibrillation while holding Coumadin. As patient currently receiving chemo for T cell lymphoma, discussed with outpatient oncologist Dr. Mcgrath who states patient can miss a week of chemo. CXR 4/24: B/l pleural effusions with compressive atelectasis L>R, Right basilar density which may represent compressive atelectasis or pneumonia. Echo 4/24 revealed severe concentric LVH, EF 40-45%, LA severely dilated, RA dilated, moderate aortic valve thickening, Moderate to severe eccentric MR, mild to moderate TR, severe pulmonary hypertension (PASP 64 mmHg), mild to moderate OH, no pericardial effusion, Left pleural effusion noted. Patient diuresed well with IV lasix. Originally planned to proceed with cath but again deferred secondary to patient's chronic CKD stage IV which has remained stable through hospital course w/ diuresis (baseline BUN/Crt 31/3.35 from outpatient labs 2/2017). He subsequently underwent a nuclear stress test 4/28 revealing Myocardial perfusion imaging is normal. Overall left ventricular systolic function is abnormal with global hypokinesis. The EKG stress test is normal. Reduced EF possibly secondary to Chemo. Patient now feeling well with improved SOB and lower extremity edema. INR subtherapeutic but Dr. Rowell aware and okay for patient to be discharge home and resuming Coumadin. He will continue Norvasc, Toprol XL, Valsartan, and PO Lasix. \par \par \par NO cp sob enoch - states he can walsk furhter and sleep better no pnd othropnae occasional trance LE edema. overall maked improvementi n fxn capacioty since discharge.\par \par 2/27/18 EF normalized, on lasix but held 2/2 gout flare no sob lockett le ema improved\par \par 1/8/19 USOH, 100% compliant with meds\par location: chest\par duration: na\par  modifying factors: na\par timing: na\par severity: 0/10\par EKG unchanged from prior (in chart)\par consultation notes reviewed where appropriate\par  \par \par 3/27/18 feels great, resumed lasix two weeks ago gout flare resolved, minor inc in weight but no s/s decompensation \par \par 5/9/19 USOH, 100% compliant with meds\par location: chest\par duration: na\par  modifying factors: na\par timing: na\par severity: 0/10\par EKG unchanged from prior (in chart)\par consultation notes reviewed where appropriate\par \par 6/6/19 EF nor normalized\par USOH, 100% compliant with meds\par location: chest\par duration: na\par  modifying factors: na\par timing: na\par severity: 0/10\par EKG unchanged from prior (in chart)\par consultation notes reviewed where appropriate\par  \par

## 2019-10-31 ENCOUNTER — APPOINTMENT (OUTPATIENT)
Dept: NEPHROLOGY | Facility: CLINIC | Age: 64
End: 2019-10-31
Payer: MEDICARE

## 2019-10-31 VITALS — SYSTOLIC BLOOD PRESSURE: 126 MMHG | HEART RATE: 72 BPM | DIASTOLIC BLOOD PRESSURE: 82 MMHG

## 2019-10-31 PROCEDURE — 99214 OFFICE O/P EST MOD 30 MIN: CPT

## 2019-11-03 NOTE — REVIEW OF SYSTEMS
[Lower Ext Edema] : lower extremity edema [Orthopnea] : orthopnea [Negative] : Neurological [Nosebleeds] : no nosebleeds [Sore Throat] : no sore throat [Palpitations] : no palpitations [Shortness Of Breath] : no shortness of breath [Wheezing] : no wheezing [Cough] : no cough [SOB on Exertion] : no shortness of breath during exertion [PND] : no PND [Abdominal Pain] : no abdominal pain [Vomiting] : no vomiting [Constipation] : no constipation [Diarrhea] : no diarrhea [Heartburn] : no heartburn [Arthralgias] : no arthralgias [Joint Pain] : no joint pain [Muscle Weakness] : no muscle weakness [Easy Bleeding] : no tendency for easy bleeding [Easy Bruising] : no tendency for easy bruising [FreeTextEntry5] : Improved LE edema since hospitalization

## 2019-11-03 NOTE — ASSESSMENT
[FreeTextEntry1] : Reviewed all labs in detail with patient and his wife from 10/30/2019\par 62 yo man with CKD 4, Hyperuremia, HTN. \par - CKD 4: -  creat  4.0 -increase again since last visit- (was 3.5) up a bit at 3.5- \par aware of possible need for RRT in future\par -hyperkalemia-  resolved K= 4.3\par - gout-  no attacks- continues to have hyperuricemia + lymphoma- c/w high dose allopurinol and use of colchicine daily\par - HTN -BP controlled\par - LE edema-controlled- c/w lasix\par -metabolic acidosis- CO2- 19- start NaHCO3- 1 BID\par  hyperuremia -can consider use of IV agents if develops another episode of gout\par -proteinuria- needs repeat urine prot/creat ratio-\par hematuria-  repeat u/a- urine cytology 2018 was negative\par  on coumadin- \par \par f/u OV 3 months

## 2019-11-03 NOTE — REASON FOR VISIT
[Follow-Up] : a follow-up visit [Spouse] : spouse [FreeTextEntry1] : for CKD 4, edema, SOB and hyperuricemia

## 2019-11-11 ENCOUNTER — INPATIENT (INPATIENT)
Facility: HOSPITAL | Age: 64
LOS: 2 days | Discharge: HOME | DRG: 683 | End: 2019-11-14
Attending: INTERNAL MEDICINE | Admitting: INTERNAL MEDICINE
Payer: MEDICARE

## 2019-11-11 VITALS
HEART RATE: 130 BPM | TEMPERATURE: 98 F | OXYGEN SATURATION: 98 % | SYSTOLIC BLOOD PRESSURE: 84 MMHG | WEIGHT: 179.9 LBS | DIASTOLIC BLOOD PRESSURE: 64 MMHG | RESPIRATION RATE: 18 BRPM

## 2019-11-11 DIAGNOSIS — Z90.49 ACQUIRED ABSENCE OF OTHER SPECIFIED PARTS OF DIGESTIVE TRACT: Chronic | ICD-10-CM

## 2019-11-11 LAB
ALBUMIN SERPL ELPH-MCNC: 3.8 G/DL — SIGNIFICANT CHANGE UP (ref 3.3–5)
ALP SERPL-CCNC: 79 U/L — SIGNIFICANT CHANGE UP (ref 40–120)
ALT FLD-CCNC: 9 U/L — LOW (ref 10–45)
ANION GAP SERPL CALC-SCNC: 16 MMOL/L — SIGNIFICANT CHANGE UP (ref 5–17)
APTT BLD: 44.9 SEC — HIGH (ref 27.5–36.3)
AST SERPL-CCNC: 13 U/L — SIGNIFICANT CHANGE UP (ref 10–40)
BILIRUB SERPL-MCNC: 0.3 MG/DL — SIGNIFICANT CHANGE UP (ref 0.2–1.2)
BUN SERPL-MCNC: 45 MG/DL — HIGH (ref 7–23)
CALCIUM SERPL-MCNC: 9.6 MG/DL — SIGNIFICANT CHANGE UP (ref 8.4–10.5)
CHLORIDE SERPL-SCNC: 107 MMOL/L — SIGNIFICANT CHANGE UP (ref 96–108)
CO2 SERPL-SCNC: 20 MMOL/L — LOW (ref 22–31)
CREAT SERPL-MCNC: 6.74 MG/DL — HIGH (ref 0.5–1.3)
GLUCOSE SERPL-MCNC: 109 MG/DL — HIGH (ref 70–99)
HCT VFR BLD CALC: 30.4 % — LOW (ref 39–50)
HGB BLD-MCNC: 9.6 G/DL — LOW (ref 13–17)
INR BLD: 1.64 — HIGH (ref 0.88–1.16)
LACTATE SERPL-SCNC: 2.9 MMOL/L — HIGH (ref 0.5–2)
MAGNESIUM SERPL-MCNC: 2.5 MG/DL — SIGNIFICANT CHANGE UP (ref 1.6–2.6)
MCHC RBC-ENTMCNC: 31.6 GM/DL — LOW (ref 32–36)
MCHC RBC-ENTMCNC: 32.1 PG — SIGNIFICANT CHANGE UP (ref 27–34)
MCV RBC AUTO: 101.7 FL — HIGH (ref 80–100)
NRBC # BLD: 0 /100 WBCS — SIGNIFICANT CHANGE UP (ref 0–0)
NT-PROBNP SERPL-SCNC: HIGH PG/ML (ref 0–300)
PLATELET # BLD AUTO: 520 K/UL — HIGH (ref 150–400)
POTASSIUM SERPL-MCNC: 3.4 MMOL/L — LOW (ref 3.5–5.3)
POTASSIUM SERPL-SCNC: 3.4 MMOL/L — LOW (ref 3.5–5.3)
PROT SERPL-MCNC: 7.7 G/DL — SIGNIFICANT CHANGE UP (ref 6–8.3)
PROTHROM AB SERPL-ACNC: 18.8 SEC — HIGH (ref 10–12.9)
RBC # BLD: 2.99 M/UL — LOW (ref 4.2–5.8)
RBC # FLD: 17.8 % — HIGH (ref 10.3–14.5)
SODIUM SERPL-SCNC: 143 MMOL/L — SIGNIFICANT CHANGE UP (ref 135–145)
TROPONIN T SERPL-MCNC: 0.03 NG/ML — HIGH (ref 0–0.01)
WBC # BLD: 8.62 K/UL — SIGNIFICANT CHANGE UP (ref 3.8–10.5)
WBC # FLD AUTO: 8.62 K/UL — SIGNIFICANT CHANGE UP (ref 3.8–10.5)

## 2019-11-11 PROCEDURE — 71045 X-RAY EXAM CHEST 1 VIEW: CPT | Mod: 26

## 2019-11-11 PROCEDURE — 99291 CRITICAL CARE FIRST HOUR: CPT

## 2019-11-11 PROCEDURE — 99292 CRITICAL CARE ADDL 30 MIN: CPT

## 2019-11-11 PROCEDURE — 99232 SBSQ HOSP IP/OBS MODERATE 35: CPT | Mod: GC

## 2019-11-11 PROCEDURE — 93010 ELECTROCARDIOGRAM REPORT: CPT

## 2019-11-11 RX ORDER — SODIUM CHLORIDE 9 MG/ML
500 INJECTION INTRAMUSCULAR; INTRAVENOUS; SUBCUTANEOUS ONCE
Refills: 0 | Status: COMPLETED | OUTPATIENT
Start: 2019-11-11 | End: 2019-11-11

## 2019-11-11 RX ORDER — AZITHROMYCIN 500 MG/1
500 TABLET, FILM COATED ORAL ONCE
Refills: 0 | Status: COMPLETED | OUTPATIENT
Start: 2019-11-11 | End: 2019-11-11

## 2019-11-11 RX ORDER — POTASSIUM CHLORIDE 20 MEQ
40 PACKET (EA) ORAL ONCE
Refills: 0 | Status: COMPLETED | OUTPATIENT
Start: 2019-11-11 | End: 2019-11-11

## 2019-11-11 RX ORDER — CEFTRIAXONE 500 MG/1
1000 INJECTION, POWDER, FOR SOLUTION INTRAMUSCULAR; INTRAVENOUS ONCE
Refills: 0 | Status: COMPLETED | OUTPATIENT
Start: 2019-11-11 | End: 2019-11-11

## 2019-11-11 RX ADMIN — SODIUM CHLORIDE 1000 MILLILITER(S): 9 INJECTION INTRAMUSCULAR; INTRAVENOUS; SUBCUTANEOUS at 19:45

## 2019-11-11 RX ADMIN — SODIUM CHLORIDE 1000 MILLILITER(S): 9 INJECTION INTRAMUSCULAR; INTRAVENOUS; SUBCUTANEOUS at 21:31

## 2019-11-11 RX ADMIN — CEFTRIAXONE 100 MILLIGRAM(S): 500 INJECTION, POWDER, FOR SOLUTION INTRAMUSCULAR; INTRAVENOUS at 19:34

## 2019-11-11 RX ADMIN — AZITHROMYCIN 500 MILLIGRAM(S): 500 TABLET, FILM COATED ORAL at 19:33

## 2019-11-11 RX ADMIN — SODIUM CHLORIDE 1000 MILLILITER(S): 9 INJECTION INTRAMUSCULAR; INTRAVENOUS; SUBCUTANEOUS at 18:34

## 2019-11-11 RX ADMIN — SODIUM CHLORIDE 500 MILLILITER(S): 9 INJECTION INTRAMUSCULAR; INTRAVENOUS; SUBCUTANEOUS at 22:05

## 2019-11-11 RX ADMIN — SODIUM CHLORIDE 1000 MILLILITER(S): 9 INJECTION INTRAMUSCULAR; INTRAVENOUS; SUBCUTANEOUS at 19:29

## 2019-11-11 RX ADMIN — Medication 40 MILLIEQUIVALENT(S): at 20:38

## 2019-11-11 NOTE — ED ADULT NURSE NOTE - PMH
Atrial fibrillation    CKD (chronic kidney disease)    HTN (hypertension) Atrial fibrillation    CKD (chronic kidney disease)    HTN (hypertension)    Lymphoma  t cell

## 2019-11-11 NOTE — ED PROVIDER NOTE - OBJECTIVE STATEMENT
PMHx of HTN, hyperlipidemia, Stage III CKD, T cell lymphoma (diagnosed 10 yrs ago, on chemotherapy q Wednesday), chronic Afib (on Coumadin) w cc of dizziness, lightheadedness, cough, weakness. sudhir, recent dx of bronchitis where placed on Z lindsey, wife describes poor po intake. PMHx of HTN, hyperlipidemia, Stage III CKD, T cell lymphoma (diagnosed 10 yrs ago, on chemotherapy q Wednesday), chronic Afib (on Coumadin) w cc of dizziness, lightheadedness, cough, weakness. sudhir, recent dx of bronchitis where placed on Z lindsey, wife describes poor po intake over the last few days due to not feeling well. denies chest pain, leg swelling, sob or other complaints.

## 2019-11-11 NOTE — CONSULT NOTE ADULT - SUBJECTIVE AND OBJECTIVE BOX
[facility] SERVICE CONSULTATION NOTE    CC: dizziness    HPI:    Pt is a 63 yo male with a history of atrial fibrillation, HTN, CKD who presents to the ER for evaluation of light headedness and dizziness which he has been experiencing since last Friday. His symptoms began abruptly and was associated with symptomatic palpitations. He had a visit to an urgent care last week for concerns of dry cough and was given azithromycin for bronchitis. He has been experiencing orthostatic dizziness, lockett, and hypotension at home with multiple blood pressure readings in the 90s systolic. He did not record his heart rates during those blood pressures. He has not experienced fever, chills, nausea, dysuria, urinary urgency, frequency, or diarrhea. No significant change in his bowel movement. He is actively being treated for T cell lymphoma with his last infusion last Wednesday. Patient has had a chronic left sided pleural effusion which has not been drained. Since that visit patient states that he has had mild decrease in his PO intake but has been compliant with his medications apart from today. He was instructed to discontinue his warfarin last wednesday due to a supratherapeutic INR of 4.5. He follows as an outpatient with Dr. Guadalupe, Dr. Rowell. ICU consulted in ED for atrial fibrillation with RVR.     ROS:  Otherwise negative, except as specified in HPI.    PAST MEDICAL & SURGICAL HISTORY:  CKD (chronic kidney disease)  Atrial fibrillation  HTN (hypertension)  History of cholecystectomy    FH: non contributory     SH: non contributory     ALLERGIES:    MEDICATIONS:  furosemide 40 mg 1 tab TID  valsartan 160 mg QD   Amlodipine 10 mg QD  Calcitriol .25 mcg QD  Warfarin 4 mg QD  Allopurinal 100 mg BID  Doxazosin 2mg 2 tabs BID  Toprol  mg QD    VITAL SIGNS:  ICU Vital Signs Last 24 Hrs  T(C): 36.7 (11 Nov 2019 18:07), Max: 36.7 (11 Nov 2019 18:07)  T(F): 98.1 (11 Nov 2019 18:07), Max: 98.1 (11 Nov 2019 18:07)  HR: 123 (11 Nov 2019 19:17) (120 - 130)  BP: 107/76 (11 Nov 2019 19:17) (84/64 - 107/76)  BP(mean): --  ABP: --  ABP(mean): --  RR: 18 (11 Nov 2019 19:17) (18 - 20)  SpO2: 99% (11 Nov 2019 19:17) (98% - 99%)    CAPILLARY BLOOD GLUCOSE          PHYSICAL EXAM:  Constitutional: resting comfortably in bed, NAD  HEENT: NC/AT; PERRL, anicteric sclera; no oropharyngeal erythema or exudates; MMM  Neck: supple, no appreciable JVD  Respiratory: CTA B/L, no W/R/R; respirations appear non-labored, conversive in full sentences  Cardiovascular: +S1/S2, RRR  Gastrointestinal: abdomen soft, NT/ND  Extremities: WWP; no clubbing, cyanosis or edema  Vascular: 2+ radial, femoral, and DP/PT pulses B/L  Dermatologic: skin normal color and turgor; no visible rashes  Neurological:     LABS:                        9.6    8.62  )-----------( 520      ( 11 Nov 2019 18:45 )             30.4     11-11    143  |  107  |  45<H>  ----------------------------<  109<H>  3.4<L>   |  20<L>  |  6.74<H>    Ca    9.6      11 Nov 2019 18:45  Mg     2.5     11-11    TPro  7.7  /  Alb  3.8  /  TBili  0.3  /  DBili  x   /  AST  13  /  ALT  9<L>  /  AlkPhos  79  11-11    PT/INR - ( 11 Nov 2019 18:45 )   PT: 18.8 sec;   INR: 1.64          PTT - ( 11 Nov 2019 18:45 )  PTT:44.9 sec  Lactate, Blood: 2.9 mmoL/L (11-11-19 @ 18:43)    CARDIAC MARKERS ( 11 Nov 2019 18:45 )  x     / 0.03 ng/mL / x     / x     / x                EKG: Reviewed.    RADIOLOGY & ADDITIONAL TESTS: Reviewed. [facility] SERVICE CONSULTATION NOTE    CC: dizziness    HPI:    Pt is a 63 yo male with a history of atrial fibrillation, HTN, CKD who presents to the ER for evaluation of light headedness and dizziness which he has been experiencing since last Friday. His symptoms began abruptly and was associated with symptomatic palpitations. He had a visit to an urgent care last week for concerns of dry cough and was given azithromycin for bronchitis. He has been experiencing orthostatic dizziness, lockett, and hypotension at home with multiple blood pressure readings in the 90s systolic. He did not record his heart rates during those blood pressures. He has not experienced fever, chills, nausea, dysuria, urinary urgency, frequency, or diarrhea. No significant change in his bowel movement. He is actively being treated for T cell lymphoma with his last infusion last Wednesday. Patient has had a chronic left sided pleural effusion which has not been drained. Since that visit patient states that he has had mild decrease in his PO intake but has been compliant with his medications apart from today. He was instructed to discontinue his warfarin last wednesday due to a supratherapeutic INR of 4.5. He follows as an outpatient with Dr. Guadalupe, Dr. Rowell. ICU consulted in ED for atrial fibrillation with RVR.     ROS:  Otherwise negative, except as specified in HPI.    PAST MEDICAL & SURGICAL HISTORY:  CKD (chronic kidney disease)  Atrial fibrillation  HTN (hypertension)  History of cholecystectomy    FH: non contributory     SH: non contributory     ALLERGIES:    MEDICATIONS:  furosemide 40 mg 1 tab TID  valsartan 160 mg QD   Amlodipine 10 mg QD  Calcitriol .25 mcg QD  Warfarin 4 mg QD  Allopurinal 100 mg BID  Doxazosin 2mg 2 tabs BID  Toprol  mg QD    VITAL SIGNS:  ICU Vital Signs Last 24 Hrs  T(C): 36.7 (11 Nov 2019 18:07), Max: 36.7 (11 Nov 2019 18:07)  T(F): 98.1 (11 Nov 2019 18:07), Max: 98.1 (11 Nov 2019 18:07)  HR: 123 (11 Nov 2019 19:17) (120 - 130)  BP: 107/76 (11 Nov 2019 19:17) (84/64 - 107/76)  BP(mean): --  ABP: --  ABP(mean): --  RR: 18 (11 Nov 2019 19:17) (18 - 20)  SpO2: 99% (11 Nov 2019 19:17) (98% - 99%)    CAPILLARY BLOOD GLUCOSE          PHYSICAL EXAM:  Constitutional: resting comfortably in bed, NAD  HEENT: NC/AT; PERRL, anicteric sclera; no oropharyngeal erythema or exudates; MMM  Neck: supple, no appreciable JVD  Respiratory: decreased breath sounds left lower lung base with dullness to percussion. no egophony. otherwise no adventitious sounds. breathing comfortably w/o accessory muscle use.   Cardiovascular: +S1/S2, irregularly irregular   Gastrointestinal: abdomen soft, NT/ND  Extremities: WWP; no clubbing, cyanosis or edema  Vascular: 2+ radial, femoral, and DP/PT pulses B/L  Dermatologic: skin normal color and turgor; no visible rashes  Neurological: a&ox3. no focal deficits.     LABS:                        9.6    8.62  )-----------( 520      ( 11 Nov 2019 18:45 )             30.4     11-11    143  |  107  |  45<H>  ----------------------------<  109<H>  3.4<L>   |  20<L>  |  6.74<H>    Ca    9.6      11 Nov 2019 18:45  Mg     2.5     11-11    TPro  7.7  /  Alb  3.8  /  TBili  0.3  /  DBili  x   /  AST  13  /  ALT  9<L>  /  AlkPhos  79  11-11    PT/INR - ( 11 Nov 2019 18:45 )   PT: 18.8 sec;   INR: 1.64          PTT - ( 11 Nov 2019 18:45 )  PTT:44.9 sec  Lactate, Blood: 2.9 mmoL/L (11-11-19 @ 18:43)    CARDIAC MARKERS ( 11 Nov 2019 18:45 )  x     / 0.03 ng/mL / x     / x     / x                EKG: Reviewed.    RADIOLOGY & ADDITIONAL TESTS: Reviewed.

## 2019-11-11 NOTE — ED ADULT NURSE NOTE - OBJECTIVE STATEMENT
63 y/o male referred to ED by pcp, as per pt and family " he is been feeling generalized weakness, cough,   sob and intermittent cp when walking for the past 2 weeks, was diagnosed with bronchitis, given z pack for 1 week, but symptoms didn't improve" today was at the doctor's office was found to have 's  and low BP.

## 2019-11-11 NOTE — CONSULT NOTE ADULT - ASSESSMENT
Pt is a 65 yo male with a history of atrial fibrillation, HTN, CKD who presents to the ER for evaluation of light headedness and dizziness likely due to symptomatic atrial fibrillation with RVR.     Atrial fibrillation w/ rvr  - Patient's overall symptoms appear to be related to atrial fibrillation with RVR. At this time there are no infectious symptoms to suggest that he has comorbid sepsis driving his atrial fibrillation w/ RVR. He denies any fever, chills, dysuria, GI symptoms. He has been afebrile here in the ED w/o leukocytosis. He has a chronic stable cough related to his persistent tobacco use. He gives a reliable history supported by his wife that his blood pressure, dizziness, and orthostatics all began Friday when he developed symptomatic palpitations.   - No indication for medical telemetry at this time, patient requires optimization and rate control of his atrial fibrillation w/ cardiology service.

## 2019-11-11 NOTE — ED PROVIDER NOTE - PHYSICAL EXAMINATION
CONSTITUTIONAL: Well appearing, well nourished, awake, alert and in no apparent distress.  HEENT: Head is atraumatic. Eyes clear bilaterally, normal EOMI. Airway patent.  CARDIAC: Normal rate, regular rhythm.  Heart sounds S1, S2.   RESPIRATORY: Breath sounds clear and equal bilaterally. no tachypnea, respiratory distress.   GASTROINTESTINAL: Abdomen soft, non-tender, no guarding, distension.  MUSCULOSKELETAL: Spine appears normal, no midline spinal tenderness, range of motion is not limited, no muscle or joint tenderness. no bony tenderness. no JVD, peripheral edema.    NEUROLOGICAL: Alert and oriented, no focal deficits, no motor or sensory deficits.  SKIN: Skin normal color for race, warm, dry and intact. No evidence of rash.  PSYCHIATRIC: Alert and oriented to person, place, time/situation. normal mood and affect. no apparent risk to self or others. CONSTITUTIONAL: Well appearing, well nourished, awake, alert and in no apparent distress.  HEENT: Head is atraumatic. Eyes clear bilaterally, normal EOMI. Airway patent.  CARDIAC: afib w RVR   RESPIRATORY: Breath sounds clear and equal bilaterally. no tachypnea, respiratory distress.   GASTROINTESTINAL: Abdomen soft, non-tender, no guarding, distension.  MUSCULOSKELETAL: Spine appears normal, no midline spinal tenderness, range of motion is not limited, no muscle or joint tenderness. no bony tenderness. no JVD, peripheral edema.    NEUROLOGICAL: Alert and oriented, no focal deficits, no motor or sensory deficits.  SKIN: Skin normal color for race, warm, dry and intact. No evidence of rash.  PSYCHIATRIC: Alert and oriented to person, place, time/situation. normal mood and affect. no apparent risk to self or others.

## 2019-11-11 NOTE — ED PROVIDER NOTE - CLINICAL SUMMARY MEDICAL DECISION MAKING FREE TEXT BOX
hypotension, afib w RVR in setting of recent viral illness/cough/bronchitis sx, decreased oral intake  noted elev lactate, ?sepsis from pna vs other- although no other localizing source- empiric abx, ivf  noted MAIRA on CKD, likely prerenal  ICU consult given concern for severe sepsis w end organ injury, tele monitoring for afib w rvr

## 2019-11-11 NOTE — ED PROVIDER NOTE - SEVERE SEPSIS ALERT DETAILS
no focal localizing source, small boluses saline given - held off on bolusing 30ml/kg given hx of CHF.

## 2019-11-12 DIAGNOSIS — A41.9 SEPSIS, UNSPECIFIED ORGANISM: ICD-10-CM

## 2019-11-12 DIAGNOSIS — I95.9 HYPOTENSION, UNSPECIFIED: ICD-10-CM

## 2019-11-12 DIAGNOSIS — N17.9 ACUTE KIDNEY FAILURE, UNSPECIFIED: ICD-10-CM

## 2019-11-12 DIAGNOSIS — Z29.9 ENCOUNTER FOR PROPHYLACTIC MEASURES, UNSPECIFIED: ICD-10-CM

## 2019-11-12 DIAGNOSIS — D64.9 ANEMIA, UNSPECIFIED: ICD-10-CM

## 2019-11-12 DIAGNOSIS — D47.3 ESSENTIAL (HEMORRHAGIC) THROMBOCYTHEMIA: ICD-10-CM

## 2019-11-12 DIAGNOSIS — I50.22 CHRONIC SYSTOLIC (CONGESTIVE) HEART FAILURE: ICD-10-CM

## 2019-11-12 DIAGNOSIS — C85.90 NON-HODGKIN LYMPHOMA, UNSPECIFIED, UNSPECIFIED SITE: ICD-10-CM

## 2019-11-12 DIAGNOSIS — N28.9 DISORDER OF KIDNEY AND URETER, UNSPECIFIED: ICD-10-CM

## 2019-11-12 DIAGNOSIS — I48.91 UNSPECIFIED ATRIAL FIBRILLATION: ICD-10-CM

## 2019-11-12 DIAGNOSIS — I27.20 PULMONARY HYPERTENSION, UNSPECIFIED: ICD-10-CM

## 2019-11-12 DIAGNOSIS — E87.0 HYPEROSMOLALITY AND HYPERNATREMIA: ICD-10-CM

## 2019-11-12 LAB
ANION GAP SERPL CALC-SCNC: 15 MMOL/L — SIGNIFICANT CHANGE UP (ref 5–17)
ANION GAP SERPL CALC-SCNC: 16 MMOL/L — SIGNIFICANT CHANGE UP (ref 5–17)
APPEARANCE UR: CLEAR — SIGNIFICANT CHANGE UP
APTT BLD: 91.5 SEC — HIGH (ref 27.5–36.3)
APTT BLD: 99.8 SEC — HIGH (ref 27.5–36.3)
BACTERIA # UR AUTO: PRESENT /HPF
BILIRUB UR-MCNC: NEGATIVE — SIGNIFICANT CHANGE UP
BUN SERPL-MCNC: 45 MG/DL — HIGH (ref 7–23)
BUN SERPL-MCNC: 45 MG/DL — HIGH (ref 7–23)
CALCIUM SERPL-MCNC: 8.5 MG/DL — SIGNIFICANT CHANGE UP (ref 8.4–10.5)
CALCIUM SERPL-MCNC: 8.9 MG/DL — SIGNIFICANT CHANGE UP (ref 8.4–10.5)
CHLORIDE SERPL-SCNC: 110 MMOL/L — HIGH (ref 96–108)
CHLORIDE SERPL-SCNC: 114 MMOL/L — HIGH (ref 96–108)
CHLORIDE UR-SCNC: 33 MMOL/L — SIGNIFICANT CHANGE UP
CO2 SERPL-SCNC: 17 MMOL/L — LOW (ref 22–31)
CO2 SERPL-SCNC: 19 MMOL/L — LOW (ref 22–31)
COLOR SPEC: YELLOW — SIGNIFICANT CHANGE UP
COMMENT - URINE: SIGNIFICANT CHANGE UP
CREAT ?TM UR-MCNC: 184 MG/DL — SIGNIFICANT CHANGE UP
CREAT SERPL-MCNC: 6.15 MG/DL — HIGH (ref 0.5–1.3)
CREAT SERPL-MCNC: 6.29 MG/DL — HIGH (ref 0.5–1.3)
DIFF PNL FLD: ABNORMAL
EPI CELLS # UR: SIGNIFICANT CHANGE UP /HPF (ref 0–5)
GLUCOSE SERPL-MCNC: 89 MG/DL — SIGNIFICANT CHANGE UP (ref 70–99)
GLUCOSE SERPL-MCNC: 98 MG/DL — SIGNIFICANT CHANGE UP (ref 70–99)
GLUCOSE UR QL: NEGATIVE — SIGNIFICANT CHANGE UP
GRAN CASTS # UR COMP ASSIST: ABNORMAL /LPF
HCT VFR BLD CALC: 26.8 % — LOW (ref 39–50)
HCT VFR BLD CALC: 27.6 % — LOW (ref 39–50)
HCV AB S/CO SERPL IA: 0.15 S/CO — SIGNIFICANT CHANGE UP
HCV AB SERPL-IMP: SIGNIFICANT CHANGE UP
HGB BLD-MCNC: 8.2 G/DL — LOW (ref 13–17)
HGB BLD-MCNC: 8.4 G/DL — LOW (ref 13–17)
HYALINE CASTS # UR AUTO: SIGNIFICANT CHANGE UP /LPF (ref 0–2)
INR BLD: 1.87 — HIGH (ref 0.88–1.16)
KETONES UR-MCNC: NEGATIVE — SIGNIFICANT CHANGE UP
LACTATE SERPL-SCNC: 1.5 MMOL/L — SIGNIFICANT CHANGE UP (ref 0.5–2)
LEUKOCYTE ESTERASE UR-ACNC: NEGATIVE — SIGNIFICANT CHANGE UP
MAGNESIUM SERPL-MCNC: 2.5 MG/DL — SIGNIFICANT CHANGE UP (ref 1.6–2.6)
MCHC RBC-ENTMCNC: 30.4 GM/DL — LOW (ref 32–36)
MCHC RBC-ENTMCNC: 30.6 GM/DL — LOW (ref 32–36)
MCHC RBC-ENTMCNC: 31.4 PG — SIGNIFICANT CHANGE UP (ref 27–34)
MCHC RBC-ENTMCNC: 31.5 PG — SIGNIFICANT CHANGE UP (ref 27–34)
MCV RBC AUTO: 102.7 FL — HIGH (ref 80–100)
MCV RBC AUTO: 103.4 FL — HIGH (ref 80–100)
NITRITE UR-MCNC: NEGATIVE — SIGNIFICANT CHANGE UP
NRBC # BLD: 0 /100 WBCS — SIGNIFICANT CHANGE UP (ref 0–0)
NRBC # BLD: 0 /100 WBCS — SIGNIFICANT CHANGE UP (ref 0–0)
NT-PROBNP SERPL-SCNC: HIGH PG/ML (ref 0–300)
OSMOLALITY UR: 365 MOSMOL/KG — SIGNIFICANT CHANGE UP (ref 100–650)
PH UR: 6 — SIGNIFICANT CHANGE UP (ref 5–8)
PHOSPHATE SERPL-MCNC: 4.1 MG/DL — SIGNIFICANT CHANGE UP (ref 2.5–4.5)
PLATELET # BLD AUTO: 421 K/UL — HIGH (ref 150–400)
PLATELET # BLD AUTO: 450 K/UL — HIGH (ref 150–400)
POTASSIUM SERPL-MCNC: 3.9 MMOL/L — SIGNIFICANT CHANGE UP (ref 3.5–5.3)
POTASSIUM SERPL-MCNC: 4 MMOL/L — SIGNIFICANT CHANGE UP (ref 3.5–5.3)
POTASSIUM SERPL-SCNC: 3.9 MMOL/L — SIGNIFICANT CHANGE UP (ref 3.5–5.3)
POTASSIUM SERPL-SCNC: 4 MMOL/L — SIGNIFICANT CHANGE UP (ref 3.5–5.3)
POTASSIUM UR-SCNC: 43 MMOL/L — SIGNIFICANT CHANGE UP
PROT UR-MCNC: 100 MG/DL
PROTHROM AB SERPL-ACNC: 21.5 SEC — HIGH (ref 10–12.9)
RAPID RVP RESULT: SIGNIFICANT CHANGE UP
RBC # BLD: 2.61 M/UL — LOW (ref 4.2–5.8)
RBC # BLD: 2.67 M/UL — LOW (ref 4.2–5.8)
RBC # FLD: 17.9 % — HIGH (ref 10.3–14.5)
RBC # FLD: 17.9 % — HIGH (ref 10.3–14.5)
RBC CASTS # UR COMP ASSIST: < 5 /HPF — SIGNIFICANT CHANGE UP
SODIUM SERPL-SCNC: 143 MMOL/L — SIGNIFICANT CHANGE UP (ref 135–145)
SODIUM SERPL-SCNC: 148 MMOL/L — HIGH (ref 135–145)
SODIUM UR-SCNC: 35 MMOL/L — SIGNIFICANT CHANGE UP
SP GR SPEC: 1.02 — SIGNIFICANT CHANGE UP (ref 1–1.03)
TROPONIN T SERPL-MCNC: 0.02 NG/ML — HIGH (ref 0–0.01)
TROPONIN T SERPL-MCNC: 0.02 NG/ML — HIGH (ref 0–0.01)
UROBILINOGEN FLD QL: 0.2 E.U./DL — SIGNIFICANT CHANGE UP
WBC # BLD: 6.64 K/UL — SIGNIFICANT CHANGE UP (ref 3.8–10.5)
WBC # BLD: 7.21 K/UL — SIGNIFICANT CHANGE UP (ref 3.8–10.5)
WBC # FLD AUTO: 6.64 K/UL — SIGNIFICANT CHANGE UP (ref 3.8–10.5)
WBC # FLD AUTO: 7.21 K/UL — SIGNIFICANT CHANGE UP (ref 3.8–10.5)
WBC UR QL: >20 /HPF — SIGNIFICANT CHANGE UP

## 2019-11-12 PROCEDURE — 99223 1ST HOSP IP/OBS HIGH 75: CPT | Mod: GC

## 2019-11-12 PROCEDURE — 99223 1ST HOSP IP/OBS HIGH 75: CPT

## 2019-11-12 RX ORDER — HEPARIN SODIUM 5000 [USP'U]/ML
1300 INJECTION INTRAVENOUS; SUBCUTANEOUS
Qty: 25000 | Refills: 0 | Status: DISCONTINUED | OUTPATIENT
Start: 2019-11-12 | End: 2019-11-12

## 2019-11-12 RX ORDER — HEPARIN SODIUM 5000 [USP'U]/ML
6500 INJECTION INTRAVENOUS; SUBCUTANEOUS EVERY 6 HOURS
Refills: 0 | Status: DISCONTINUED | OUTPATIENT
Start: 2019-11-12 | End: 2019-11-12

## 2019-11-12 RX ORDER — IPRATROPIUM BROMIDE 0.2 MG/ML
500 SOLUTION, NON-ORAL INHALATION EVERY 6 HOURS
Refills: 0 | Status: DISCONTINUED | OUTPATIENT
Start: 2019-11-12 | End: 2019-11-14

## 2019-11-12 RX ORDER — METOPROLOL TARTRATE 50 MG
25 TABLET ORAL
Refills: 0 | Status: DISCONTINUED | OUTPATIENT
Start: 2019-11-12 | End: 2019-11-13

## 2019-11-12 RX ORDER — CALCITRIOL 0.5 UG/1
1 CAPSULE ORAL
Qty: 0 | Refills: 0 | DISCHARGE

## 2019-11-12 RX ORDER — WARFARIN SODIUM 2.5 MG/1
1 TABLET ORAL
Qty: 0 | Refills: 0 | DISCHARGE

## 2019-11-12 RX ORDER — METOPROLOL TARTRATE 50 MG
1 TABLET ORAL
Qty: 0 | Refills: 0 | DISCHARGE

## 2019-11-12 RX ORDER — DOXAZOSIN MESYLATE 4 MG
1 TABLET ORAL
Qty: 0 | Refills: 0 | DISCHARGE

## 2019-11-12 RX ORDER — AMLODIPINE BESYLATE 2.5 MG/1
1 TABLET ORAL
Qty: 0 | Refills: 0 | DISCHARGE

## 2019-11-12 RX ORDER — APIXABAN 2.5 MG/1
5 TABLET, FILM COATED ORAL EVERY 12 HOURS
Refills: 0 | Status: DISCONTINUED | OUTPATIENT
Start: 2019-11-12 | End: 2019-11-14

## 2019-11-12 RX ORDER — SODIUM CHLORIDE 9 MG/ML
1000 INJECTION, SOLUTION INTRAVENOUS
Refills: 0 | Status: DISCONTINUED | OUTPATIENT
Start: 2019-11-12 | End: 2019-11-13

## 2019-11-12 RX ORDER — HEPARIN SODIUM 5000 [USP'U]/ML
3000 INJECTION INTRAVENOUS; SUBCUTANEOUS EVERY 6 HOURS
Refills: 0 | Status: DISCONTINUED | OUTPATIENT
Start: 2019-11-12 | End: 2019-11-12

## 2019-11-12 RX ADMIN — HEPARIN SODIUM 1100 UNIT(S)/HR: 5000 INJECTION INTRAVENOUS; SUBCUTANEOUS at 08:09

## 2019-11-12 RX ADMIN — Medication 500 MICROGRAM(S): at 21:41

## 2019-11-12 RX ADMIN — HEPARIN SODIUM 1300 UNIT(S)/HR: 5000 INJECTION INTRAVENOUS; SUBCUTANEOUS at 00:53

## 2019-11-12 RX ADMIN — Medication 25 MILLIGRAM(S): at 09:47

## 2019-11-12 RX ADMIN — APIXABAN 5 MILLIGRAM(S): 2.5 TABLET, FILM COATED ORAL at 19:25

## 2019-11-12 RX ADMIN — Medication 500 MICROGRAM(S): at 17:45

## 2019-11-12 RX ADMIN — HEPARIN SODIUM 1100 UNIT(S)/HR: 5000 INJECTION INTRAVENOUS; SUBCUTANEOUS at 14:18

## 2019-11-12 RX ADMIN — Medication 25 MILLIGRAM(S): at 17:45

## 2019-11-12 RX ADMIN — SODIUM CHLORIDE 100 MILLILITER(S): 9 INJECTION, SOLUTION INTRAVENOUS at 16:08

## 2019-11-12 RX ADMIN — Medication 500 MICROGRAM(S): at 11:28

## 2019-11-12 NOTE — H&P ADULT - ATTENDING COMMENTS
Assessment: Patient personally seen and examined myself during rounds with the   Physician Assistant  ON DATE 11/12/19    Note read, including vitals, physical findings, laboratory data, and radiological reports.   Agree with above and revisions, if any, included below.  - New hypotension  - Severe dizziness  - Unstable hemodynamics  - Plan of Care: continuous telemetry monitoring for arrhythmias, echocardiogram to evaluate ejection fraction and central pressures, daily weights and I/Os, serial EKGs and lab work to evaluate and replete potassium, magnesium. Troponin added to labs and will cycle. Will need to be seen by EP or Interv Cardio if intervention needed.

## 2019-11-12 NOTE — H&P ADULT - PROBLEM SELECTOR PLAN 5
BNP 86738, EF 40-45% in 2017  On Lasix 40mg tod at home which will hold in the setting of creat 6.7  Will repeat CXR in AM  Monitor Bubba closely  K 3.4, repleted, monitor geovanna closely

## 2019-11-12 NOTE — H&P ADULT - PROBLEM SELECTOR PLAN 2
Pt did not take his toprol or coumadin  Start heparin gtt for now  Holding toprol in the setting of hypotension  Telemetry

## 2019-11-12 NOTE — CONSULT NOTE ADULT - SUBJECTIVE AND OBJECTIVE BOX
HPI: Patient is a 64 year old male with a history of atrial fibrillation, HTN, CKD who presented to the ER for evaluation of light headedness and dizziness which he has been experiencing since last Friday. His symptoms began abruptly and was associated with symptomatic palpitations. He had a visit to an urgent care last week for concerns of dry cough and was given azithromycin for bronchitis. He has been experiencing orthostatic dizziness, dyspnea on exertion, and hypotension at home with multiple blood pressure readings in the 90s systolic. He did not record his heart rates during those blood pressures. He has not experienced fever, chills, nausea, dysuria, urinary urgency, frequency, or diarrhea. No significant change in his bowel movement. He is actively being treated for T cell lymphoma with his last infusion last Wednesday. Patient has had a chronic left sided pleural effusion which has not been drained. Since that visit patient states that he has had mild decrease in his PO intake but has been compliant with his medications apart from today. He was instructed to discontinue his warfarin last Wednesday due to a supratherapeutic INR of 4.5. He follows as an outpatient with Dr. Guadalupe, Dr. Rowell. medicine.  ICU initially was consulted for symptomatic atrial fibrillation with RVR but because there was no underlying infectious etiology for the afib, he was admitted to the cardiology service.  ICU was reconsulted today for acute kidney injury on chronic kidney disease as patient with creatinine persistently in the 6s (baseline 3-4) and atrial fibrillation with RVR.  He went into sinus rhythm and therefore was rejected by medical telemetry.  Medicine was consulted for management of acute kidney injury.     Review of systems: 12 point review of systems negative    VITALS  Vital Signs Last 24 Hrs  T(C): 36.7 (12 Nov 2019 13:14), Max: 36.9 (11 Nov 2019 23:32)  T(F): 98.1 (12 Nov 2019 13:14), Max: 98.4 (11 Nov 2019 23:32)  HR: 96 (12 Nov 2019 13:00) (96 - 130)  BP: 135/80 (12 Nov 2019 13:00) (84/64 - 135/80)  BP(mean): --  RR: 19 (12 Nov 2019 13:00) (18 - 20)  SpO2: 96% (12 Nov 2019 13:00) (96% - 99%)    I&O's Summary    11 Nov 2019 07:01  -  12 Nov 2019 07:00  --------------------------------------------------------  IN: 91 mL / OUT: 0 mL / NET: 91 mL    12 Nov 2019 07:01  -  12 Nov 2019 15:50  --------------------------------------------------------  IN: 360 mL / OUT: 0 mL / NET: 360 mL        CAPILLARY BLOOD GLUCOSE          PHYSICAL EXAM  General: A&Ox 3; NAD  Head: NC/AT;   Eyes: PERRL; EOMI; anicteric sclera  Neck: Supple; no JVD  Respiratory: CTA B/L; no wheezes/crackles/rales auscultated w/ good air movement  Cardiovascular: Regular rhythm/rate; S1/S2; no gallops or murmurs auscultated  Back: No CVA tenderness  Genitourinary: No suprapubic tenderness  Gastrointestinal: Soft; NTND w/out rebound tenderness or guarding; bowel sounds normal  Extremities: WWP; no edema or cyanosis; radial/pedal pulses palpable  Neurological:  CNII-XII grossly intact; no obvious focal deficits  Psych: AAOX3    MEDICATIONS  (STANDING):  dextrose 5% + sodium chloride 0.45%. 1000 milliLiter(s) (100 mL/Hr) IV Continuous <Continuous>  heparin  Infusion. 1300 Unit(s)/Hr (13 mL/Hr) IV Continuous <Continuous>  ipratropium    for Nebulization 500 MICROGram(s) Nebulizer every 6 hours  metoprolol tartrate 25 milliGRAM(s) Oral two times a day    MEDICATIONS  (PRN):  heparin  Injectable 6500 Unit(s) IV Push every 6 hours PRN For aPTT less than 40  heparin  Injectable 3000 Unit(s) IV Push every 6 hours PRN For aPTT between 40 - 57      LABS                        8.4    7.21  )-----------( 450      ( 12 Nov 2019 06:49 )             27.6     11-12    148<H>  |  114<H>  |  45<H>  ----------------------------<  89  3.9   |  19<L>  |  6.29<H>    Ca    8.9      12 Nov 2019 06:49  Phos  4.1     11-12  Mg     2.5     11-12    TPro  7.7  /  Alb  3.8  /  TBili  0.3  /  DBili  x   /  AST  13  /  ALT  9<L>  /  AlkPhos  79  11-11    LIVER FUNCTIONS - ( 11 Nov 2019 18:45 )  Alb: 3.8 g/dL / Pro: 7.7 g/dL / ALK PHOS: 79 U/L / ALT: 9 U/L / AST: 13 U/L / GGT: x           PT/INR - ( 12 Nov 2019 06:49 )   PT: 21.5 sec;   INR: 1.87          PTT - ( 12 Nov 2019 13:49 )  PTT:91.5 sec    CARDIAC MARKERS ( 12 Nov 2019 06:49 )  x     / 0.02 ng/mL / x     / x     / x      CARDIAC MARKERS ( 12 Nov 2019 01:18 )  x     / 0.02 ng/mL / x     / x     / x      CARDIAC MARKERS ( 11 Nov 2019 18:45 )  x     / 0.03 ng/mL / x     / x     / x HPI: Patient is a 64 year old male with a history of atrial fibrillation, HTN, CKD who presented to the ER for evaluation of light headedness and dizziness which he has been experiencing since last Friday. His symptoms began abruptly and was associated with symptomatic palpitations. He had a visit to an urgent care last week for concerns of dry cough and was given azithromycin for bronchitis. He has been experiencing orthostatic dizziness, dyspnea on exertion, and hypotension at home with multiple blood pressure readings in the 90s systolic. He did not record his heart rates during those blood pressures. He is actively being treated for T cell lymphoma with his last infusion last Wednesday which causes him to have diarrhea. Patient has had a chronic left sided pleural effusion which has not been drained. Since that visit patient states that he has had mild decrease in his PO intake but has been compliant with his medications apart from today. He was instructed to discontinue his warfarin last Wednesday due to a supratherapeutic INR of 4.5. He follows as an outpatient with Dr. Guadalupe, Dr. Rowell. medicine.  ICU initially was consulted for symptomatic atrial fibrillation with RVR but because there was no underlying infectious etiology for the afib, he was admitted to the cardiology service.  ICU was reconsulted today for acute kidney injury on chronic kidney disease as patient with creatinine persistently in the 6s (baseline 3-4) and atrial fibrillation with RVR.  He went into sinus rhythm and therefore was rejected by medical telemetry.  Medicine was consulted for management of acute kidney injury.     Review of systems: 12 point review of systems negative    Allergies: NKDA    Home medications: Furosemide 40 mg 1 tab TID  Valsartan 160 mg QD   Amlodipine 10 mg QD  Calcitriol .25 mcg QD  Warfarin 4 mg QD  Allopurinol 100 mg BID  Doxazosin 2mg 2 tabs BID  Toprol  mg QD    PMH: CKD (chronic kidney disease)  Atrial fibrillation  HTN (hypertension)  T cell lymphoma    PSH: Cholecystectomy    Social: Denies drug use, smokes 5-6 cigarettes daily since age 14, Has multiple alcoholic beverages on the weekends    Family: Non contributory    VITALS  Vital Signs Last 24 Hrs  T(C): 36.7 (12 Nov 2019 13:14), Max: 36.9 (11 Nov 2019 23:32)  T(F): 98.1 (12 Nov 2019 13:14), Max: 98.4 (11 Nov 2019 23:32)  HR: 96 (12 Nov 2019 13:00) (96 - 130)  BP: 135/80 (12 Nov 2019 13:00) (84/64 - 135/80)  BP(mean): --  RR: 19 (12 Nov 2019 13:00) (18 - 20)  SpO2: 96% (12 Nov 2019 13:00) (96% - 99%)    I&O's Summary    11 Nov 2019 07:01  -  12 Nov 2019 07:00  --------------------------------------------------------  IN: 91 mL / OUT: 0 mL / NET: 91 mL    12 Nov 2019 07:01  -  12 Nov 2019 15:50  --------------------------------------------------------  IN: 360 mL / OUT: 0 mL / NET: 360 mL        CAPILLARY BLOOD GLUCOSE          PHYSICAL EXAM  General: A&Ox 3; NAD  Head: NC/AT;   Eyes: PERRL; EOMI; anicteric sclera  Neck: Supple; no JVD  Respiratory: Decreased breath sounds at bases bilaterally  Cardiovascular: Regular rhythm/rate; S1/S2; no gallops or murmurs auscultated  Back: No CVA tenderness  Genitourinary: No suprapubic tenderness  Gastrointestinal: Soft; NTND w/out rebound tenderness or guarding; bowel sounds normal  Extremities: WWP; no edema or cyanosis; radial/pedal pulses palpable  Neurological:  CNII-XII grossly intact; no obvious focal deficits  Psych: AAOX3    MEDICATIONS  (STANDING):  dextrose 5% + sodium chloride 0.45%. 1000 milliLiter(s) (100 mL/Hr) IV Continuous <Continuous>  heparin  Infusion. 1300 Unit(s)/Hr (13 mL/Hr) IV Continuous <Continuous>  ipratropium    for Nebulization 500 MICROGram(s) Nebulizer every 6 hours  metoprolol tartrate 25 milliGRAM(s) Oral two times a day    MEDICATIONS  (PRN):  heparin  Injectable 6500 Unit(s) IV Push every 6 hours PRN For aPTT less than 40  heparin  Injectable 3000 Unit(s) IV Push every 6 hours PRN For aPTT between 40 - 57      LABS                        8.4    7.21  )-----------( 450      ( 12 Nov 2019 06:49 )             27.6     11-12    148<H>  |  114<H>  |  45<H>  ----------------------------<  89  3.9   |  19<L>  |  6.29<H>    Ca    8.9      12 Nov 2019 06:49  Phos  4.1     11-12  Mg     2.5     11-12    TPro  7.7  /  Alb  3.8  /  TBili  0.3  /  DBili  x   /  AST  13  /  ALT  9<L>  /  AlkPhos  79  11-11    LIVER FUNCTIONS - ( 11 Nov 2019 18:45 )  Alb: 3.8 g/dL / Pro: 7.7 g/dL / ALK PHOS: 79 U/L / ALT: 9 U/L / AST: 13 U/L / GGT: x           PT/INR - ( 12 Nov 2019 06:49 )   PT: 21.5 sec;   INR: 1.87          PTT - ( 12 Nov 2019 13:49 )  PTT:91.5 sec    CARDIAC MARKERS ( 12 Nov 2019 06:49 )  x     / 0.02 ng/mL / x     / x     / x      CARDIAC MARKERS ( 12 Nov 2019 01:18 )  x     / 0.02 ng/mL / x     / x     / x      CARDIAC MARKERS ( 11 Nov 2019 18:45 )  x     / 0.03 ng/mL / x     / x     / x

## 2019-11-12 NOTE — PROGRESS NOTE ADULT - PROBLEM SELECTOR PLAN 4
Resolved with fluids, monitor bp  - Holding home antihypertensives, resuming home metoprolol for rate control in setting of AFib RVR

## 2019-11-12 NOTE — CONSULT NOTE ADULT - PROBLEM SELECTOR RECOMMENDATION 6
-Patient with history of CHF with echo in 2017 with EF 40-45%, currently patient is euvolemic and urinating well, holding home lasix in the setting of acute kidney injury, restart as tolerated. -Patient with history of CHF with echo in 2017 with EF 40-45% but outpatient echo this year with normal EF, currently patient is euvolemic and urinating well, holding home lasix in the setting of acute kidney injury, restart as tolerated.

## 2019-11-12 NOTE — PROGRESS NOTE ADULT - ASSESSMENT
Patient is a 64 year old M with PMH HTN, CHF, pulmonary HTN, CKD III, AFib on coumadin, recently treated for bronchitis (completed 1 week of Azithromycin for strep throat), presented with AFib/RVR, dehydration with creat 6.7, mild troponin leak of 0.03, lactate 2.9, hypotension with BP 84/64. CXR with ? PNA vs dehydration. He received 2L NS, Azithro and Ceftriaxone Patient is a 64 year old M with PMH HTN, CHF, pulmonary HTN, CKD3 (outpatient baseline creatinine reported 3.2), AFib on coumadin, recently treated for bronchitis (s/p 1 wk azithromycin), p/w AFib/RVR, MAIRA Patient is a 64 year old M with PMH HTN, CHF, pulmonary HTN, CKD3 (outpatient baseline creatinine reported 3.2), AFib on coumadin, recently treated for bronchitis (s/p 1 wk azithromycin), p/w AFib/RVR, MAIRA. Medicine consult service activated in setting of conversion to sinus rhythm and significant MAIRA

## 2019-11-12 NOTE — CONSULT NOTE ADULT - PROBLEM SELECTOR RECOMMENDATION 9
-Patient with history of CKD with creatinine of 3-4 per Dr. Guadalupe records.  Likely prerenal in the setting of hypotension from atrial fibrillation with RVR.  Please obtain UA and urine lytes.  Continue with D5 1/2NS at 100cc/hour per renal recs.  Trend BMP daily and monitor strict ins and outs.  Renally dose medications.  Continue to follow up renal recommendations. -Patient with history of CKD with creatinine of 3-4 per Dr. Guadalupe records.  Likely prerenal in the setting of hypotension from atrial fibrillation with RVR.  Please obtain UA and urine lytes.  Continue with D5 1/2NS at 100cc/hour per renal recs.  Trend BMP daily and monitor strict ins and outs.  Renally dose medications.  Continue to follow up renal recommendations.  Can transfer to medicine for further workup of MAIRA as he does not warrant telemetry at this time.

## 2019-11-12 NOTE — CONSULT NOTE ADULT - ASSESSMENT
65 yo M with a PMH HTN, stage IV CKD (baseline Cr 3.2), T Cell lymphoma (diagnosed 17 years ago, on chemo every Wednesday, last infusion 11/6) chronic AFib (on coumadin), systolic CHF (EF 40-45% in 2017), severe pulmonary HTN, recently treated for bronchitis s/p 7 days Azithromycin, who presented to the ED with dizziness, lightheadedness, and palpitations, admitted to cardiac telemetry for symptomatic Afib. However, patient with Cr persistently 6, ICU is reconsulted for MAIRA on CKD    #MAIRA on CKD  Patient with CKD 4 (baseline Cr 3.2), presenting to the ED with BUN/Cr 45/6.74, improved to 45/6.15 s/p 2L NS in the ED. Unclear etiology, but likely prerenal given patient reported poor PO intake since his last chemo infusion vs diuretic induced as pt on Lasix 40mg TID vs intrinsic given chemo meds  - f/u urine lytes  - consider renal U/S  - bladder scan to r/o retention as pt with BPH on Cardura  - obtain renal consult  - c/w IVF maintenance  - trend Cr  - strict I/Os  - avoid nephrotoxic meds    #Hypotension  Patient hypotensive to 84/64 upon ED arrival, improved to 100-110s s/p IVF. Likely in the setting of poor PO intake compounded on by BP meds (pt on Lasix, valsartan and Norvasc at home)  - currently BP stable  - restart home meds as tolerated    #Afib w/ RVR  Currently NSR, rate controlled  - management as per primary team 65 yo M with a PMH HTN, stage IV CKD (baseline Cr 3.2), T Cell lymphoma (diagnosed 17 years ago, on chemo every Wednesday, last infusion 11/6) chronic AFib (on coumadin), systolic CHF (EF 40-45% in 2017), severe pulmonary HTN, recently treated for bronchitis s/p 7 days Azithromycin, who presented to the ED with dizziness, lightheadedness, and palpitations, admitted to cardiac telemetry for symptomatic Afib. However, patient with Cr persistently 6, ICU is reconsulted for MAIRA on CKD    #MAIRA on CKD  Patient with CKD 4 (baseline Cr 3.2), presenting to the ED with BUN/Cr 45/6.74, improved to 45/6.15 s/p 2L NS in the ED. Unclear etiology, but likely prerenal given patient reported poor PO intake since his last chemo infusion vs diuretic induced as pt on Lasix 40mg TID vs intrinsic given chemo meds  - f/u urine lytes  - consider renal U/S  - bladder scan to r/o retention as pt with BPH on Cardura  - obtain renal consult  - c/w IVF maintenance  - trend Cr  - strict I/Os  - avoid nephrotoxic meds    #Hypotension  Patient hypotensive to 84/64 upon ED arrival, improved to 100-110s s/p IVF. Likely in the setting of poor PO intake compounded on by BP meds (pt on Lasix, valsartan and Norvasc at home)  - currently BP stable  - restart home meds as tolerated    #Afib w/ RVR  Currently NSR, rate controlled  - management as per primary team    Dispo: as per primary team, patient doesn't need medicine telemetry at this time    Discussed with fellow and attending

## 2019-11-12 NOTE — CONSULT NOTE ADULT - ATTENDING COMMENTS
HTN CKD PAF, appears to have symptomatic AF. no non cardiac indication for tele icu at this time
known to me from OVs, h/o CKD 4, HTN, lymphoma  last creat in office at 4- which was higher than prior baseline, as above.  MAIRA, AFib, Hypernatremia, CKD 4, underlying lymphoma  Clinically acute rise in creat most c/w prerenal given low BP, RVR AFib and diarrhea.  needs u/a and Lawrence  Give IVF as above  no indication for dialysis at this time, though patient and wife aware of possible future need for RRT

## 2019-11-12 NOTE — PROGRESS NOTE ADULT - PROBLEM SELECTOR PLAN 2
Patient with reported baseline creatinine 3.2, currently 6s  - May be due to poor renal perfusion in setting of hypotension due to AFib RVR. Patient making urine, however given severity of MAIRA will consult nephrology for renal optimization  - F/u renal US  - F/u UA  - Strict I/Os, f/u UOP  - F/u urine lytes  - Holding antihypertensives, resuming metoprolol  - Hold home allopurinol and valsartan  - Medicine consult team requested given severity of MAIRA and propensity toward AFib RVR for medical telemetry evaluation while uptitrating beta blockade

## 2019-11-12 NOTE — PROGRESS NOTE ADULT - SUBJECTIVE AND OBJECTIVE BOX
OVERNIGHT EVENTS: No acute events overnight. Conversion in AM to sinus rhythm, HR downtrended    SUBJECTIVE / INTERVAL HPI: Patient seen and examined at bedside. Patient with no acute complaints. Denies nausea, emesis, chest pain, palpitations, shortness of breath, abdominal pain. He states that he is urinating a "normal" amount, and it is his regular yellow color.    Review of systems negative except as noted above.     VITAL SIGNS:  Vital Signs Last 24 Hrs  T(C): 36.7 (12 Nov 2019 08:29), Max: 36.9 (11 Nov 2019 23:32)  T(F): 98.1 (12 Nov 2019 08:29), Max: 98.4 (11 Nov 2019 23:32)  HR: 120 (12 Nov 2019 06:20) (119 - 130)  BP: 115/79 (12 Nov 2019 06:20) (84/64 - 123/82)  BP(mean): --  RR: 18 (12 Nov 2019 06:20) (18 - 20)  SpO2: 96% (12 Nov 2019 06:20) (96% - 99%)      11-11-19 @ 07:01  -  11-12-19 @ 07:00  --------------------------------------------------------  IN: 91 mL / OUT: 0 mL / NET: 91 mL        PHYSICAL EXAM:    General: WDWN M, sitting comfortably on bed at time of exam in NAD  HEENT: NC/AT; anicteric sclera  Neck: supple  Cardiovascular: +S1/S2; regular rate, irregular rhythm  Respiratory: CTA B/L; no W/R/R  Gastrointestinal: soft, NT/ND; +BS  Extremities: WWP; no edema  Neurological: AAOx3; no focal deficits  Psych: Normal affect and behavior     MEDICATIONS:  MEDICATIONS  (STANDING):  heparin  Infusion. 1300 Unit(s)/Hr (13 mL/Hr) IV Continuous <Continuous>  ipratropium    for Nebulization 500 MICROGram(s) Nebulizer every 6 hours  metoprolol tartrate 25 milliGRAM(s) Oral two times a day    MEDICATIONS  (PRN):  heparin  Injectable 6500 Unit(s) IV Push every 6 hours PRN For aPTT less than 40  heparin  Injectable 3000 Unit(s) IV Push every 6 hours PRN For aPTT between 40 - 57      ALLERGIES:  Allergies    No Known Allergies    Intolerances        LABS:                        8.4    7.21  )-----------( 450      ( 12 Nov 2019 06:49 )             27.6     11-12    148<H>  |  114<H>  |  45<H>  ----------------------------<  89  3.9   |  19<L>  |  6.29<H>    Ca    8.9      12 Nov 2019 06:49  Phos  4.1     11-12  Mg     2.5     11-12    TPro  7.7  /  Alb  3.8  /  TBili  0.3  /  DBili  x   /  AST  13  /  ALT  9<L>  /  AlkPhos  79  11-11    PT/INR - ( 12 Nov 2019 06:49 )   PT: 21.5 sec;   INR: 1.87          PTT - ( 12 Nov 2019 06:49 )  PTT:99.8 sec    CAPILLARY BLOOD GLUCOSE        CARDIAC MARKERS ( 12 Nov 2019 06:49 )  x     / 0.02 ng/mL / x     / x     / x      CARDIAC MARKERS ( 12 Nov 2019 01:18 )  x     / 0.02 ng/mL / x     / x     / x      CARDIAC MARKERS ( 11 Nov 2019 18:45 )  x     / 0.03 ng/mL / x     / x     / x            RADIOLOGY & ADDITIONAL TESTS: Reviewed. Transfer note 5 [facility] to [facility]    Hospital course: Patient is a 64 year old male, with PMH significant for AFib, HTN, CKD, who presented to [facility] ED with lightheadedness and dizziness for several days' duration. Of note, he is actively being treated for T cell lymphoma, most recent infusion was last Wednesday. At [facility] ED, he was hypotensive to 80s/60s, AFib RVR to 130s, afebrile, sat 98% on RA. He was found to have lactate 2.9, now normalized. Of note, labs significant for creatinine 6.8. ICU was consulted in the ED for Afib w/RVR, patient dispositioned to cardiac telemetry. Patient converted to sinus rhythm in AM with resumption of metoprolol (at decreased dose due to soft sbp, 25mg tartrate). Medical consult service and nephrology service consulted for significant MAIRA. Urine electrolytes sent and UA pending (was not clean catch specimen). Patient started D5 1/2 NS for hydration as per nephrology recommendations. Patient now stable for step down to regional medical floor. Of note, relevant cardiac history includes echo Jun 2019 with EF 60%, mod/sev LVH, grade 1 diastolic dysfunction. CBC in October outpatient demonstrating Hb 10.8.      OVERNIGHT EVENTS: No acute events overnight. Conversion in AM to sinus rhythm, HR downtrended    SUBJECTIVE / INTERVAL HPI: Patient seen and examined at bedside. Patient with no acute complaints. Denies nausea, emesis, chest pain, palpitations, shortness of breath, abdominal pain. He states that he is urinating a "normal" amount, and it is his regular yellow color.    Review of systems negative except as noted above.     VITAL SIGNS:  Vital Signs Last 24 Hrs  T(C): 36.7 (12 Nov 2019 08:29), Max: 36.9 (11 Nov 2019 23:32)  T(F): 98.1 (12 Nov 2019 08:29), Max: 98.4 (11 Nov 2019 23:32)  HR: 120 (12 Nov 2019 06:20) (119 - 130)  BP: 115/79 (12 Nov 2019 06:20) (84/64 - 123/82)  BP(mean): --  RR: 18 (12 Nov 2019 06:20) (18 - 20)  SpO2: 96% (12 Nov 2019 06:20) (96% - 99%)      11-11-19 @ 07:01  -  11-12-19 @ 07:00  --------------------------------------------------------  IN: 91 mL / OUT: 0 mL / NET: 91 mL        PHYSICAL EXAM:    General: WDWN M, sitting comfortably on bed at time of exam in NAD  HEENT: NC/AT; anicteric sclera  Neck: supple  Cardiovascular: +S1/S2; regular rate, irregular rhythm  Respiratory: CTA B/L; no W/R/R  Gastrointestinal: soft, NT/ND; +BS  Extremities: WWP; no edema  Neurological: AAOx3; no focal deficits  Psych: Normal affect and behavior     MEDICATIONS:  MEDICATIONS  (STANDING):  heparin  Infusion. 1300 Unit(s)/Hr (13 mL/Hr) IV Continuous <Continuous>  ipratropium    for Nebulization 500 MICROGram(s) Nebulizer every 6 hours  metoprolol tartrate 25 milliGRAM(s) Oral two times a day    MEDICATIONS  (PRN):  heparin  Injectable 6500 Unit(s) IV Push every 6 hours PRN For aPTT less than 40  heparin  Injectable 3000 Unit(s) IV Push every 6 hours PRN For aPTT between 40 - 57      ALLERGIES:  Allergies    No Known Allergies    Intolerances        LABS:                        8.4    7.21  )-----------( 450      ( 12 Nov 2019 06:49 )             27.6     11-12    148<H>  |  114<H>  |  45<H>  ----------------------------<  89  3.9   |  19<L>  |  6.29<H>    Ca    8.9      12 Nov 2019 06:49  Phos  4.1     11-12  Mg     2.5     11-12    TPro  7.7  /  Alb  3.8  /  TBili  0.3  /  DBili  x   /  AST  13  /  ALT  9<L>  /  AlkPhos  79  11-11    PT/INR - ( 12 Nov 2019 06:49 )   PT: 21.5 sec;   INR: 1.87          PTT - ( 12 Nov 2019 06:49 )  PTT:99.8 sec    CAPILLARY BLOOD GLUCOSE        CARDIAC MARKERS ( 12 Nov 2019 06:49 )  x     / 0.02 ng/mL / x     / x     / x      CARDIAC MARKERS ( 12 Nov 2019 01:18 )  x     / 0.02 ng/mL / x     / x     / x      CARDIAC MARKERS ( 11 Nov 2019 18:45 )  x     / 0.03 ng/mL / x     / x     / x            RADIOLOGY & ADDITIONAL TESTS: Reviewed.

## 2019-11-12 NOTE — H&P ADULT - ASSESSMENT
63y/o male wit HTN, CHF, pulmonary HTN, CKD III, AFib on coumadin, recently treated for bronchitis (completed 1 week of Azithromycin for strep throat), presented with AFib/RVR, dehydration with creat 6.7, mild troponin leak of 0.03, lactate 2.9, hypotension with BP 84/64. CXR with ? PNA vs dehydration. He received 2L NS, Azithro and Ceftriaxone

## 2019-11-12 NOTE — CONSULT NOTE ADULT - ASSESSMENT
Patient is a 64 year old male with a history of atrial fibrillation, HTN, CKD who presented to the ER for evaluation of light headedness and dizziness which he has been experiencing since last Friday found to have atrial fibrillation with RVR and hypotension with medicine consulted for MAIRA on CKD.

## 2019-11-12 NOTE — PROGRESS NOTE ADULT - PROBLEM SELECTOR PLAN 3
Holding home Lasix, patient urinating and breathing comfortably on room air. Appears euvolemic on exam, with lungs CTAB, no JAVIER  - Monitor fluid status, I/Os, UOP  - Caution with diuresis given patient with significant MAIRA  - As BP tolerates, consider adding Lasix after beta blockade uptitrated and MAIRA improves

## 2019-11-12 NOTE — CONSULT NOTE ADULT - PROBLEM SELECTOR RECOMMENDATION 5
-Patient presenting in atrial fibrillation with RVR leading to hypotension to 84/64 upon arrival.  Patient received IV fluids and now is in normal sinus rhythm with improvement in systolic blood pressures to 120s-130s.  Continue metoprolol for rate control but hold other antihypertensives for now and restart as tolerated.

## 2019-11-12 NOTE — PROGRESS NOTE ADULT - PROBLEM SELECTOR PLAN 5
Ppx: heparin drip, Coumadin  F: none  E: caution given MAIRA  N: DASH diet with renal restrictions Ppx: heparin drip, Coumadin  F: none  E: caution given MAIRA  N: DASH diet with renal restrictions    **addendum - disposition: to [facility]**

## 2019-11-12 NOTE — H&P ADULT - NSICDXPASTMEDICALHX_GEN_ALL_CORE_FT
PAST MEDICAL HISTORY:  Atrial fibrillation     CHF, chronic     CKD (chronic kidney disease)     Gout     H/O pulmonary hypertension     HTN (hypertension)     Lymphoma t cell

## 2019-11-12 NOTE — CONSULT NOTE ADULT - PROBLEM SELECTOR RECOMMENDATION 2
-Patient with history of atrial fibrillation and presented with atrial fibrillation with RVR.  Currently in NSR.  Continue with lopressor 25mg BID.  Was on coumadin at home however, INR was found to be over 4 as outpaitnet and it was held for 4 days.  Patient currently subtherapeutic so continue on heparin drip for now. -Patient with history of atrial fibrillation and presented with atrial fibrillation with RVR.  Currently in NSR.  Continue with lopressor 25mg BID.  Was on coumadin at home however, INR was found to be over 4 as outpatient and it was held for 4 days.  Patient currently subtherapeutic so started on heparin drip.  Discussed with Dr. Rowell, lazaro started patient on eliquis now.

## 2019-11-12 NOTE — CONSULT NOTE ADULT - PROBLEM SELECTOR RECOMMENDATION 8
-Patient with history of pulmonary hypertension with pulmonary systolic pressure of 64 on echocardiogram in 2017, will need outpatient follow up with pulmonology.

## 2019-11-12 NOTE — CONSULT NOTE ADULT - PROBLEM SELECTOR RECOMMENDATION 3
-Patient with anemia with hemoglobin of 9.6 which downtrended to 8.4.  Patient currently hemodynamically stable with no signs/symptoms of active bleed.  Anemia likely due to CKD.  Maintain active type and screen and continue to trend CBC. -Patient with anemia with hemoglobin of 9.6 which downtrended to 8.4.  Patient currently hemodynamically stable with no signs/symptoms of active bleed.  Anemia likely due to CKD.  Maintain active type and screen and continue to trend CBC-repeat another one tonight to make sure there is no further downtrend.

## 2019-11-12 NOTE — PROGRESS NOTE ADULT - PROBLEM SELECTOR PLAN 1
Patient with AFib RVR, hypotension upon arrival to [facility] ED.   - Hypotension resolving, HR 120s, downtrending this AM to 110s  - Start metoprolol tartrate 25mg PO BID, will uptitrate as tolerated to home dose. Caution given recent hypotension  - Currently on heparin drip for anticoagulation, can resume as coumadin home dose 4mg qd, f/u coags Patient with AFib RVR, hypotension upon arrival to [facility] ED.   - Hypotension resolving, HR 120s, downtrending this AM to 110s  - Start metoprolol tartrate 25mg PO BID, will uptitrate as tolerated to home dose. Caution given recent hypotension  - Currently on heparin drip for anticoagulation, can resume as coumadin home dose 4mg qd, f/u coags  - RVR currently resolved, patient in sinus rhythm with HR in 90s. C/w tele monitoring Patient with AFib RVR, hypotension upon arrival to [facility] ED.   - Hypotension resolved, HR 120s, downtrending this AM to 110s  - Start metoprolol tartrate 25mg PO BID, will uptitrate as tolerated to home dose. Caution given recent hypotension  - Currently on heparin drip for anticoagulation, can resume as coumadin home dose 4mg qd, f/u coags  **addendum, 5pm - coumadin changed to Eliquis, heparin drip held**  - RVR currently resolved, patient in sinus rhythm with HR in 90s. C/w tele monitoring

## 2019-11-12 NOTE — H&P ADULT - NSHPREVIEWOFSYSTEMS_GEN_ALL_CORE
GENERAL, CONSTITUTIONAL : denies recent weight loss, fever, chills  EYES, VISION: denies changes in vision   EARS, NOSE, THROAT: denies hearing loss  HEART, CARDIOVASCULAR: + arrhythmia, palpitations, SOB. Denies chest pain,  LE edema, claudication  RESPIRATORY: + cough, SOB, orthopnea. Denies wheezing, PND  GASTROINTESTINAL: Denies abdominal pain, heartburn, bloody stool, dark tarry stool  GENITOURINARY: Denies frequent urination, urgency  MUSCULOSKELETAL denies joint pain or swelling, restricted motion, musculoskeletal pain.   SKIN & INTEGUMENTARY Denies rashes, sores, blisters, blisters, growths.  NEUROLOGICAL: Denies numbness or tingling sensations, sensation loss, burning.   PSYCHIATRIC: Denies nervousness, anxiety, depression  ENDOCRINE Denies heat or cold intolerance, excessive thirst  HEMATOLOGIC/LYMPHATIC: Denies abnormal bleeding, bleeding of any kind

## 2019-11-12 NOTE — H&P ADULT - NSHPSOCIALHISTORY_GEN_ALL_CORE
Smokes 5 cigarettes a day on Friday and Saturday for 50 years  Drinks 3-4 shots, 6 packs beer on weekends  Denies illicit drug use  Retired [employer] worker, lives with wife

## 2019-11-12 NOTE — CONSULT NOTE ADULT - PROBLEM SELECTOR RECOMMENDATION 4
-Patient with Na of 148 with 2.9L free water deficit, continue with D5 1/2NS at 100cc/hour per renal recs and continue to follow up renal recommendations. -Patient with Na of 148 with 2.9L free water deficit, continue with D5 1/2NS at 100cc/hour per renal recs and continue to follow up renal recommendations.  Continue to trend BMP.

## 2019-11-12 NOTE — CONSULT NOTE ADULT - ASSESSMENT
65yo M PMH HTN, stage 4 CKD (baseline Cr 3.2), T Cell lymphoma (chemo weekly), chronic AFib (on Coumadin), systolic CHF (EF 40-45% 2017), severe pulmonary HTN, recent azithromycin use admitted for AF with RVR with hypotension. Renal consulted for MAIRA on CKD (Cr 6 from baseline 3.2). Likely prerenal in setting of hypotension and home diuretics vs. intrinsic 2/2 chemotherapy.    #MAIRA on CKD  As per outpatient notes, pt's last Cr was 4.0 on 10/2019 and baseline -150.  Pt was hypotensive to 80s on admission in setting of AF with RVR. s/p 2L NS in ED, pt with mild creatinine improvement to 6.29 from 6.74. Most likely prerenal vs. mixed with intrinsic. May take time to return to baseline creatinine.  - plan to continue monitoring creatinine daily  - obtain UA and urine lytes  - monitor strict I&O  - continue to monitor BPs and maintain SBP >120; hold antihypertensives and diuretics for now  - bladder scan revealed about 20 cc of fluid in bladder  - IVF D5 1/2NS @ 100cc/hr  - avoid nephrotoxic agents      #hypernatremia  Na 148 suggestive of a ~2.9L free water deficit  - encourage PO fluid intake  - D5 1/2NS @ 100cc/hr 63yo M PMH HTN, stage 4 CKD (baseline Cr 3.2), T Cell lymphoma (chemo Romidepsin weekly), chronic AFib (on Coumadin), systolic CHF (EF 40-45% 2017), severe pulmonary HTN, recent azithromycin use admitted for AF with RVR with hypotension. Renal consulted for MAIRA on CKD (Cr 6 from baseline 3.2). Likely prerenal in setting of hypotension and home diuretics vs. intrinsic 2/2 chemotherapy.    #MAIRA on CKD  As per outpatient notes, pt's last Cr was 4.0 on 10/2019 and baseline -150.  Pt was hypotensive to 80s on admission in setting of AF with RVR. s/p 2L NS in ED, pt with mild creatinine improvement to 6.29 from 6.74. Most likely prerenal vs. mixed with intrinsic. May take time to return to baseline creatinine.  - plan to continue monitoring creatinine daily  - obtain UA and urine lytes  - monitor strict I&O  - continue to monitor BPs and maintain SBP >120; hold antihypertensives and diuretics for now  - bladder scan revealed about 20 cc of fluid in bladder  - IVF D5 1/2NS @ 100cc/hr  - avoid nephrotoxic agents      #hypernatremia  Na 148 suggestive of a ~2.9L free water deficit  - encourage PO fluid intake  - D5 1/2NS @ 100cc/hr

## 2019-11-12 NOTE — H&P ADULT - PROBLEM SELECTOR PLAN 4
Suspected sepsis, received antibiotics in the ER  Will repeat CXR in AM to re-evaluate need to continue ABx  Repeat Lactate

## 2019-11-12 NOTE — CONSULT NOTE ADULT - SUBJECTIVE AND OBJECTIVE BOX
NEPHROLOGY      INITIAL CONSULT NOTE  --------------------------------------------------------------------------------  HPI:   63yo M PMH HTN, stage 4 CKD (baseline Cr ~3.5; last 4.0 on 10/30/2019 outpatient), T Cell lymphoma (diagnosed 17 years ago, on chemo every Wednesday, last infusion 11/6), chronic AFib (on Coumadin), systolic CHF (EF 40-45% in 2017), severe pulmonary HTN, recent tx for bronchitis s/p 7 days Azithromycin (completed 11/10), who presented to the ED with dizziness, lightheadedness, and palpitations x 3 days. ED vitals significant for hypotension (BP 84/64), w/ wife noting BP at home to be in 70s from a baseline of -150, in AF with RVR (HR 130s). Pt admitted to cardiac telemetry for symptomatic AF with RVR. Labs s/f Cr 6.76, troponin 0.03, BNP 26K, lactate 2.9.  Pt continues to have dry cough and loose BMs x2 for 1 day.    Renal consulted for MAIRA on CKD.    SUBJECTIVE: Denies f/c, cp, sob, abdominal pain, n/v/c, dysuria, edema, rash. Dizziness resolved.    PAST HISTORY  --------------------------------------------------------------------------------  PAST MEDICAL & SURGICAL HISTORY:  Gout  H/O pulmonary hypertension  CHF, chronic  Lymphoma: t cell  CKD (chronic kidney disease)  Atrial fibrillation  HTN (hypertension)  History of cholecystectomy    FAMILY HISTORY:  No pertinent family history in first degree relatives    PAST SOCIAL HISTORY:  Current smoker  Social alcohol use  Denies recreational drug use    ALLERGIES & MEDICATIONS  --------------------------------------------------------------------------------  Allergies    No Known Allergies    Intolerances      Standing Inpatient Medications  heparin  Infusion. 1300 Unit(s)/Hr IV Continuous <Continuous>  ipratropium    for Nebulization 500 MICROGram(s) Nebulizer every 6 hours  metoprolol tartrate 25 milliGRAM(s) Oral two times a day    PRN Inpatient Medications  heparin  Injectable 6500 Unit(s) IV Push every 6 hours PRN  heparin  Injectable 3000 Unit(s) IV Push every 6 hours PRN    VITALS/PHYSICAL EXAM  --------------------------------------------------------------------------------  T(C): 36.7 (11-12-19 @ 13:14), Max: 36.9 (11-11-19 @ 23:32)  HR: 96 (11-12-19 @ 13:00) (96 - 130)  BP: 135/80 (11-12-19 @ 13:00) (84/64 - 135/80)  RR: 19 (11-12-19 @ 13:00) (18 - 20)  SpO2: 96% (11-12-19 @ 13:00) (96% - 99%)  Wt(kg): --  Height (cm): 180.3 (11-11-19 @ 23:32)  Weight (kg): 83.3 (11-11-19 @ 23:32)  BMI (kg/m2): 25.6 (11-11-19 @ 23:32)  BSA (m2): 2.03 (11-11-19 @ 23:32)      11-11-19 @ 07:01  -  11-12-19 @ 07:00  --------------------------------------------------------  IN: 91 mL / OUT: 0 mL / NET: 91 mL    11-12-19 @ 07:01  -  11-12-19 @ 13:39  --------------------------------------------------------  IN: 360 mL / OUT: 0 mL / NET: 360 mL      Physical Exam:  	Gen: NAD, well-appearing  	HEENT: clear oropharynx  	Pulm: CTA B/L  	CV: RRR, S1S2; no rub  	Back: No tenderness  	Abd: +BS, soft, nontender/nondistended  	: No suprapubic tenderness  	UE: Warm, FROM, no clubbing, intact strength; no edema; no asterixis  	LE: Warm, FROM, no clubbing, intact strength; no edema  	Neuro: No focal deficits  	Psych: Normal affect and mood  	Skin: Warm, without rashes  	  LABS/STUDIES  --------------------------------------------------------------------------------              8.4    7.21  >-----------<  450      [11-12-19 @ 06:49]              27.6     148  |  114  |  45  ----------------------------<  89      [11-12-19 @ 06:49]  3.9   |  19  |  6.29        Ca     8.9     [11-12-19 @ 06:49]      Mg     2.5     [11-12-19 @ 06:49]      Phos  4.1     [11-12-19 @ 06:49]    TPro  7.7  /  Alb  3.8  /  TBili  0.3  /  DBili  x   /  AST  13  /  ALT  9   /  AlkPhos  79  [11-11-19 @ 18:45]    PT/INR: PT 21.5 , INR 1.87       [11-12-19 @ 06:49]  PTT: 99.8       [11-12-19 @ 06:49]    Troponin 0.02      [11-12-19 @ 06:49]    Creatinine Trend:  SCr 6.29 [11-12 @ 06:49]  SCr 6.15 [11-12 @ 01:18]  SCr 6.74 [11-11 @ 18:45]    Urinalysis - [04-24-17 @ 21:30]      Color Yellow / Appearance Clear / SG 1.010 / pH 5.5      Gluc NEGATIVE / Ketone NEGATIVE  / Bili NEGATIVE / Urobili 0.2       Blood Trace / Protein Trace / Leuk Est NEGATIVE / Nitrite NEGATIVE      RBC < 5 / WBC < 5 / Hyaline  / Gran  / Sq Epi  / Non Sq Epi  / Bacteria Present      HbA1c 4.9      [04-24-17 @ 09:36]    HCV 0.15, Nonreact      [11-12-19 @ 06:49] NEPHROLOGY      INITIAL CONSULT NOTE  --------------------------------------------------------------------------------  HPI:   63yo M PMH HTN, stage 4 CKD (baseline Cr ~3.5; last 4.0 on 10/30/2019 outpatient), T Cell lymphoma (diagnosed 17 years ago, on chemo Romidepsin every Wednesday, last infusion 11/6), chronic AFib (on Coumadin), systolic CHF (EF 40-45% in 2017), severe pulmonary HTN, recent tx for bronchitis s/p 7 days Azithromycin (completed 11/10), who presented to the ED with dizziness, lightheadedness, and palpitations x 3 days. ED vitals significant for hypotension (BP 84/64), w/ wife noting BP at home to be in 70s from a baseline of -150, in AF with RVR (HR 130s). Pt admitted to cardiac telemetry for symptomatic AF with RVR. Labs s/f Cr 6.76, troponin 0.03, BNP 26K, lactate 2.9.  Pt continues to have dry cough and loose BMs x2 for 1 day.    Renal consulted for MAIRA on CKD.    SUBJECTIVE: Denies f/c, cp, sob, abdominal pain, n/v/c, dysuria, edema, rash. Dizziness resolved.    PAST HISTORY  --------------------------------------------------------------------------------  PAST MEDICAL & SURGICAL HISTORY:  Gout  H/O pulmonary hypertension  CHF, chronic  Lymphoma: t cell  CKD (chronic kidney disease)  Atrial fibrillation  HTN (hypertension)  History of cholecystectomy    FAMILY HISTORY:  No pertinent family history in first degree relatives    PAST SOCIAL HISTORY:  Current smoker  Social alcohol use  Denies recreational drug use    ALLERGIES & MEDICATIONS  --------------------------------------------------------------------------------  Allergies    No Known Allergies    Intolerances      Standing Inpatient Medications  heparin  Infusion. 1300 Unit(s)/Hr IV Continuous <Continuous>  ipratropium    for Nebulization 500 MICROGram(s) Nebulizer every 6 hours  metoprolol tartrate 25 milliGRAM(s) Oral two times a day    PRN Inpatient Medications  heparin  Injectable 6500 Unit(s) IV Push every 6 hours PRN  heparin  Injectable 3000 Unit(s) IV Push every 6 hours PRN    VITALS/PHYSICAL EXAM  --------------------------------------------------------------------------------  T(C): 36.7 (11-12-19 @ 13:14), Max: 36.9 (11-11-19 @ 23:32)  HR: 96 (11-12-19 @ 13:00) (96 - 130)  BP: 135/80 (11-12-19 @ 13:00) (84/64 - 135/80)  RR: 19 (11-12-19 @ 13:00) (18 - 20)  SpO2: 96% (11-12-19 @ 13:00) (96% - 99%)  Wt(kg): --  Height (cm): 180.3 (11-11-19 @ 23:32)  Weight (kg): 83.3 (11-11-19 @ 23:32)  BMI (kg/m2): 25.6 (11-11-19 @ 23:32)  BSA (m2): 2.03 (11-11-19 @ 23:32)      11-11-19 @ 07:01  -  11-12-19 @ 07:00  --------------------------------------------------------  IN: 91 mL / OUT: 0 mL / NET: 91 mL    11-12-19 @ 07:01  -  11-12-19 @ 13:39  --------------------------------------------------------  IN: 360 mL / OUT: 0 mL / NET: 360 mL      Physical Exam:  	Gen: NAD, well-appearing  	HEENT: clear oropharynx  	Pulm: CTA B/L  	CV: RRR, S1S2; no rub  	Back: No tenderness  	Abd: +BS, soft, nontender/nondistended  	: No suprapubic tenderness  	UE: Warm, FROM, no clubbing, intact strength; no edema; no asterixis  	LE: Warm, FROM, no clubbing, intact strength; no edema  	Neuro: No focal deficits  	Psych: Normal affect and mood  	Skin: Warm, without rashes  	  LABS/STUDIES  --------------------------------------------------------------------------------              8.4    7.21  >-----------<  450      [11-12-19 @ 06:49]              27.6     148  |  114  |  45  ----------------------------<  89      [11-12-19 @ 06:49]  3.9   |  19  |  6.29        Ca     8.9     [11-12-19 @ 06:49]      Mg     2.5     [11-12-19 @ 06:49]      Phos  4.1     [11-12-19 @ 06:49]    TPro  7.7  /  Alb  3.8  /  TBili  0.3  /  DBili  x   /  AST  13  /  ALT  9   /  AlkPhos  79  [11-11-19 @ 18:45]    PT/INR: PT 21.5 , INR 1.87       [11-12-19 @ 06:49]  PTT: 99.8       [11-12-19 @ 06:49]    Troponin 0.02      [11-12-19 @ 06:49]    Creatinine Trend:  SCr 6.29 [11-12 @ 06:49]  SCr 6.15 [11-12 @ 01:18]  SCr 6.74 [11-11 @ 18:45]    Urinalysis - [04-24-17 @ 21:30]      Color Yellow / Appearance Clear / SG 1.010 / pH 5.5      Gluc NEGATIVE / Ketone NEGATIVE  / Bili NEGATIVE / Urobili 0.2       Blood Trace / Protein Trace / Leuk Est NEGATIVE / Nitrite NEGATIVE      RBC < 5 / WBC < 5 / Hyaline  / Gran  / Sq Epi  / Non Sq Epi  / Bacteria Present      HbA1c 4.9      [04-24-17 @ 09:36]    HCV 0.15, Nonreact      [11-12-19 @ 06:49]

## 2019-11-12 NOTE — H&P ADULT - NSHPPHYSICALEXAM_GEN_ALL_CORE
T(C): 36.7 (11-12-19 @ 00:29), Max: 36.9 (11-11-19 @ 23:32)  HR: 124 (11-11-19 @ 23:32) (119 - 130)  BP: 103/67 (11-11-19 @ 23:32) (84/64 - 123/82)  RR: 18 (11-11-19 @ 23:32) (18 - 20)  SpO2: 99% (11-11-19 @ 23:32) (98% - 99%)  Wt(kg): --    Appearance: Normal	  HEENT:   Normal oral mucosa, PERRL, EOMI	  Neck: Supple, - JVD; No Carotid Bruit and 2+ pulses B/L  Cardiovascular: Normal S1 S2, No JVD, No murmurs  Respiratory: Decreased BS at bases, no Rales, Rhonchi, Wheezing	  Gastrointestinal:  Soft, Non-tender, + BS	  Skin: No rashes, No ecchymoses, No cyanosis  Extremities: Normal range of motion, No clubbing, cyanosis or edema  Vascular: Femoral pulses 2+ b/l without bruit, DP 1+ b/l, PT 1+ b/l  Neurologic: Non-focal  Psychiatry: A & O x 3, Mood & affect appropriate T(C): 36.7 (11-12-19 @ 00:29), Max: 36.9 (11-11-19 @ 23:32)  HR: 124 (11-11-19 @ 23:32) (119 - 130)  BP: 103/67 (11-11-19 @ 23:32) (84/64 - 123/82)  RR: 18 (11-11-19 @ 23:32) (18 - 20)  SpO2: 99% (11-11-19 @ 23:32) (98% - 99%)  Wt(kg): --    Appearance: Normal	  HEENT:   Normal oral mucosa, PERRL, EOMI	  Neck: Supple, - JVD; No Carotid Bruit and 2+ pulses B/L  Cardiovascular: Normal S1 S2, No JVD, No murmurs  Respiratory: Decreased BS at bases, no Rales, Rhonchi, Wheezing	  Gastrointestinal:  Soft, Non-tender, + BS	  Skin: No rashes, No ecchymoses, No cyanosis  Extremities: Normal range of motion, No clubbing, cyanosis or edema  Vascular: Femoral pulses 2+ b/l without bruit, DP 1+ b/l, PT 1+ b/l  Neurologic: Non-focal  Psychiatry: A & O x 3, Mood & affect appropriate  msk +from

## 2019-11-12 NOTE — H&P ADULT - HISTORY OF PRESENT ILLNESS
This is a 65 y/o male with HTN, stage II CKD (baseline creat 3.2), T Cell lymphoma diagnosed 17 years ago, on chemo every Wednesday, chronic AFib on coumadin (last dose Friday 11/8), systolic CHF (EF % in 2017), severe pulmonary HTN, presented to the ER with c/o dizziness, lightheadedness, cough, weakness. He was recently diagnosed with bronchitis/strep throat on Ananth 11/3 and completed 7 days of Azithromycin. He reports that he is still coughing, SOB, palpitations, weakness, poor appetite. He denies chest pain, LOC. Also reports not taking coumadin since Friday as he was told of side effect while on Azithromycin.   In the ER, initial BP 84/64, , T 98.1, O2 sat 98% RA, lab significant for lactate 2.9, creat 6.76, troponin 0.03, BNP 71076, K 3.4, H/H 9.6/30.4. CXR as per ER attending PNA Vs dehydration. He received Azithro/Ceftriaxone, 2L NS and admitted for further management

## 2019-11-12 NOTE — CONSULT NOTE ADULT - SUBJECTIVE AND OBJECTIVE BOX
HPI  Patient is a 65 yo M with a PMH HTN, stage IV CKD (baseline Cr 3.2), T Cell lymphoma (diagnosed 17 years ago, on chemo every Wednesday, last infusion 11/6) chronic AFib (on coumadin), systolic CHF (EF 40-45% in 2017), severe pulmonary HTN, recently treated for bronchitis s/p 7 days Azithromycin, who presented to the ED with dizziness, lightheadedness, and palpitations. Patient hypotensive on ED arrival (BP 84/64), in Afib w/ RVR (), however Afebrile and SpO2 98% on RA. She was found to have lactate 2.9, Cr 6.76, troponin 0.03, BNP 07351. ICU was consulted in the ED for Afib w/RVR, and patient was deemed stable for cardiac telemetry for symptomatic Afib. However, patient with Cr persistently 6, ICU is reconsulted for MAIRA on CKD.     65 yo male with a history of atrial fibrillation, HTN, CKD who presents to the ER for evaluation of light headedness and dizziness which he has been experiencing since last Friday. His symptoms began abruptly and was associated with symptomatic palpitations. He had a visit to an urgent care last week for concerns of dry cough and was given azithromycin for bronchitis. He has been experiencing orthostatic dizziness, lockett, and hypotension at home with multiple blood pressure readings in the 90s systolic. He did not record his heart rates during those blood pressures. Patient has had a chronic left sided pleural effusion which has not been drained. Since that visit patient states that he has had mild decrease in his PO intake but has been compliant with his medications apart from today. He was instructed to discontinue his warfarin last wednesday due to a supratherapeutic INR of 4.5. He follows as an outpatient with Dr. Guadalupe, Dr. Rowell. ICU consulted in ED for atrial fibrillation with RVR.     Subjective: patient seen at bedside, reports his dizziness has resolved. He has been ambulating to the bathroom without assistance. He reports he has been urinating a lot, also with episodes of watery diarrhea with urination. Denies chest pain, palpitations, headache, dizziness, fever, chills, abdominal pain, nausea/vomiting.     VITAL SIGNS:  Vital Signs Last 24 Hrs  T(C): 36.7 (12 Nov 2019 08:29), Max: 36.9 (11 Nov 2019 23:32)  T(F): 98.1 (12 Nov 2019 08:29), Max: 98.4 (11 Nov 2019 23:32)  HR: 120 (12 Nov 2019 06:20) (119 - 130)  BP: 115/79 (12 Nov 2019 06:20) (84/64 - 123/82)  BP(mean): --  RR: 18 (12 Nov 2019 06:20) (18 - 20)  SpO2: 96% (12 Nov 2019 06:20) (96% - 99%)    PHYSICAL EXAM:  General: in NAD, lying comfortably in bed  HEENT: normocephalic, atraumatic; PERRL, anicteric sclera; MMM  Neck: supple, no JVD, no thyromegaly, no lymphadenopathy  Cardiovascular: +S1/S2, RRR, no M/G/R  Respiratory: decreased breath sounds on L base; no wheezing, no rales, no rhonchi  Gastrointestinal: soft, NT/ND; +BSx4, no organomegaly  Extremities: WWP; no edema, clubbing or cyanosis  Vascular: 2+ radial, DP/PT pulses B/L  Neurological: AAOx3; no focal deficits    MEDICATIONS:  MEDICATIONS  (STANDING):  heparin  Infusion. 1300 Unit(s)/Hr (13 mL/Hr) IV Continuous <Continuous>  ipratropium    for Nebulization 500 MICROGram(s) Nebulizer every 6 hours  metoprolol tartrate 25 milliGRAM(s) Oral two times a day    MEDICATIONS  (PRN):  heparin  Injectable 6500 Unit(s) IV Push every 6 hours PRN For aPTT less than 40  heparin  Injectable 3000 Unit(s) IV Push every 6 hours PRN For aPTT between 40 - 57      ALLERGIES:  Allergies    No Known Allergies    Intolerances        LABS:                        8.4    7.21  )-----------( 450      ( 12 Nov 2019 06:49 )             27.6     11-12    148<H>  |  114<H>  |  45<H>  ----------------------------<  89  3.9   |  19<L>  |  6.29<H>    Ca    8.9      12 Nov 2019 06:49  Phos  4.1     11-12  Mg     2.5     11-12    TPro  7.7  /  Alb  3.8  /  TBili  0.3  /  DBili  x   /  AST  13  /  ALT  9<L>  /  AlkPhos  79  11-11    PT/INR - ( 12 Nov 2019 06:49 )   PT: 21.5 sec;   INR: 1.87          PTT - ( 12 Nov 2019 06:49 )  PTT:99.8 sec    CAPILLARY BLOOD GLUCOSE          RADIOLOGY & ADDITIONAL TESTS: Reviewed. HPI  Patient is a 63 yo M with a PMH HTN, stage IV CKD (baseline Cr 3.2), T Cell lymphoma (diagnosed 17 years ago, on chemo every Wednesday, last infusion 11/6) chronic AFib (on coumadin), systolic CHF (EF 40-45% in 2017), severe pulmonary HTN, recently treated for bronchitis s/p 7 days Azithromycin, who presented to the ED with dizziness, lightheadedness, and palpitations. Patient hypotensive on ED arrival (BP 84/64), in Afib w/ RVR (), however Afebrile and SpO2 98% on RA. She was found to have lactate 2.9, Cr 6.76, troponin 0.03, BNP 57671. ICU was consulted in the ED for Afib w/RVR, and patient was deemed stable for cardiac telemetry for symptomatic Afib. However, patient with Cr persistently 6, ICU is reconsulted for MAIRA on CKD.     Subjective: patient seen at bedside, reports his dizziness has resolved. He has been ambulating to the bathroom without assistance. He reports he has been urinating a lot, also with episodes of watery diarrhea with urination. Denies chest pain, palpitations, headache, dizziness, fever, chills, abdominal pain, nausea/vomiting.     VITAL SIGNS:  Vital Signs Last 24 Hrs  T(C): 36.7 (12 Nov 2019 08:29), Max: 36.9 (11 Nov 2019 23:32)  T(F): 98.1 (12 Nov 2019 08:29), Max: 98.4 (11 Nov 2019 23:32)  HR: 120 (12 Nov 2019 06:20) (119 - 130)  BP: 115/79 (12 Nov 2019 06:20) (84/64 - 123/82)  BP(mean): --  RR: 18 (12 Nov 2019 06:20) (18 - 20)  SpO2: 96% (12 Nov 2019 06:20) (96% - 99%)    PHYSICAL EXAM:  General: in NAD, lying comfortably in bed  HEENT: normocephalic, atraumatic; PERRL, anicteric sclera; MMM  Neck: supple, no JVD, no thyromegaly, no lymphadenopathy  Cardiovascular: +S1/S2, RRR, no M/G/R  Respiratory: decreased breath sounds on L base; no wheezing, no rales, no rhonchi  Gastrointestinal: soft, NT/ND; +BSx4, no organomegaly  Extremities: WWP; no edema, clubbing or cyanosis  Vascular: 2+ radial, DP/PT pulses B/L  Neurological: AAOx3; no focal deficits    MEDICATIONS:  MEDICATIONS  (STANDING):  heparin  Infusion. 1300 Unit(s)/Hr (13 mL/Hr) IV Continuous <Continuous>  ipratropium    for Nebulization 500 MICROGram(s) Nebulizer every 6 hours  metoprolol tartrate 25 milliGRAM(s) Oral two times a day    MEDICATIONS  (PRN):  heparin  Injectable 6500 Unit(s) IV Push every 6 hours PRN For aPTT less than 40  heparin  Injectable 3000 Unit(s) IV Push every 6 hours PRN For aPTT between 40 - 57      ALLERGIES:  Allergies    No Known Allergies    Intolerances        LABS:                        8.4    7.21  )-----------( 450      ( 12 Nov 2019 06:49 )             27.6     11-12    148<H>  |  114<H>  |  45<H>  ----------------------------<  89  3.9   |  19<L>  |  6.29<H>    Ca    8.9      12 Nov 2019 06:49  Phos  4.1     11-12  Mg     2.5     11-12    TPro  7.7  /  Alb  3.8  /  TBili  0.3  /  DBili  x   /  AST  13  /  ALT  9<L>  /  AlkPhos  79  11-11    PT/INR - ( 12 Nov 2019 06:49 )   PT: 21.5 sec;   INR: 1.87          PTT - ( 12 Nov 2019 06:49 )  PTT:99.8 sec    CAPILLARY BLOOD GLUCOSE          RADIOLOGY & ADDITIONAL TESTS: Reviewed.

## 2019-11-12 NOTE — H&P ADULT - NSHPLABSRESULTS_GEN_ALL_CORE
9.6    8.62  )-----------( 520      ( 11 Nov 2019 18:45 )             30.4       11-11    143  |  107  |  45<H>  ----------------------------<  109<H>  3.4<L>   |  20<L>  |  6.74<H>    Ca    9.6      11 Nov 2019 18:45  Mg     2.5     11-11    TPro  7.7  /  Alb  3.8  /  TBili  0.3  /  DBili  x   /  AST  13  /  ALT  9<L>  /  AlkPhos  79  11-11      PT/INR - ( 11 Nov 2019 18:45 )   PT: 18.8 sec;   INR: 1.64          PTT - ( 11 Nov 2019 18:45 )  PTT:44.9 sec    CARDIAC MARKERS ( 11 Nov 2019 18:45 )  x     / 0.03 ng/mL / x     / x     / x

## 2019-11-13 DIAGNOSIS — R05 COUGH: ICD-10-CM

## 2019-11-13 LAB
ALBUMIN SERPL ELPH-MCNC: 3.4 G/DL — SIGNIFICANT CHANGE UP (ref 3.3–5)
ALP SERPL-CCNC: 75 U/L — SIGNIFICANT CHANGE UP (ref 40–120)
ALT FLD-CCNC: 9 U/L — LOW (ref 10–45)
ANION GAP SERPL CALC-SCNC: 10 MMOL/L — SIGNIFICANT CHANGE UP (ref 5–17)
ANION GAP SERPL CALC-SCNC: 12 MMOL/L — SIGNIFICANT CHANGE UP (ref 5–17)
AST SERPL-CCNC: 14 U/L — SIGNIFICANT CHANGE UP (ref 10–40)
BASOPHILS # BLD AUTO: 0.05 K/UL — SIGNIFICANT CHANGE UP (ref 0–0.2)
BASOPHILS NFR BLD AUTO: 0.7 % — SIGNIFICANT CHANGE UP (ref 0–2)
BILIRUB SERPL-MCNC: 0.3 MG/DL — SIGNIFICANT CHANGE UP (ref 0.2–1.2)
BLD GP AB SCN SERPL QL: NEGATIVE — SIGNIFICANT CHANGE UP
BLD GP AB SCN SERPL QL: NEGATIVE — SIGNIFICANT CHANGE UP
BUN SERPL-MCNC: 35 MG/DL — HIGH (ref 7–23)
BUN SERPL-MCNC: 36 MG/DL — HIGH (ref 7–23)
CALCIUM SERPL-MCNC: 9.1 MG/DL — SIGNIFICANT CHANGE UP (ref 8.4–10.5)
CALCIUM SERPL-MCNC: 9.4 MG/DL — SIGNIFICANT CHANGE UP (ref 8.4–10.5)
CHLORIDE SERPL-SCNC: 113 MMOL/L — HIGH (ref 96–108)
CHLORIDE SERPL-SCNC: 114 MMOL/L — HIGH (ref 96–108)
CO2 SERPL-SCNC: 20 MMOL/L — LOW (ref 22–31)
CO2 SERPL-SCNC: 21 MMOL/L — LOW (ref 22–31)
CREAT SERPL-MCNC: 5.68 MG/DL — HIGH (ref 0.5–1.3)
CREAT SERPL-MCNC: 5.76 MG/DL — HIGH (ref 0.5–1.3)
EOSINOPHIL # BLD AUTO: 0.11 K/UL — SIGNIFICANT CHANGE UP (ref 0–0.5)
EOSINOPHIL NFR BLD AUTO: 1.5 % — SIGNIFICANT CHANGE UP (ref 0–6)
GLUCOSE SERPL-MCNC: 92 MG/DL — SIGNIFICANT CHANGE UP (ref 70–99)
GLUCOSE SERPL-MCNC: 94 MG/DL — SIGNIFICANT CHANGE UP (ref 70–99)
HCT VFR BLD CALC: 27.5 % — LOW (ref 39–50)
HGB BLD-MCNC: 8.3 G/DL — LOW (ref 13–17)
IMM GRANULOCYTES NFR BLD AUTO: 0.7 % — SIGNIFICANT CHANGE UP (ref 0–1.5)
LYMPHOCYTES # BLD AUTO: 1.14 K/UL — SIGNIFICANT CHANGE UP (ref 1–3.3)
LYMPHOCYTES # BLD AUTO: 15.7 % — SIGNIFICANT CHANGE UP (ref 13–44)
MAGNESIUM SERPL-MCNC: 2.4 MG/DL — SIGNIFICANT CHANGE UP (ref 1.6–2.6)
MCHC RBC-ENTMCNC: 30.2 GM/DL — LOW (ref 32–36)
MCHC RBC-ENTMCNC: 31.2 PG — SIGNIFICANT CHANGE UP (ref 27–34)
MCV RBC AUTO: 103.4 FL — HIGH (ref 80–100)
MONOCYTES # BLD AUTO: 0.64 K/UL — SIGNIFICANT CHANGE UP (ref 0–0.9)
MONOCYTES NFR BLD AUTO: 8.8 % — SIGNIFICANT CHANGE UP (ref 2–14)
NEUTROPHILS # BLD AUTO: 5.25 K/UL — SIGNIFICANT CHANGE UP (ref 1.8–7.4)
NEUTROPHILS NFR BLD AUTO: 72.6 % — SIGNIFICANT CHANGE UP (ref 43–77)
NRBC # BLD: 0 /100 WBCS — SIGNIFICANT CHANGE UP (ref 0–0)
PLATELET # BLD AUTO: 453 K/UL — HIGH (ref 150–400)
POTASSIUM SERPL-MCNC: 3.5 MMOL/L — SIGNIFICANT CHANGE UP (ref 3.5–5.3)
POTASSIUM SERPL-MCNC: 4.2 MMOL/L — SIGNIFICANT CHANGE UP (ref 3.5–5.3)
POTASSIUM SERPL-SCNC: 3.5 MMOL/L — SIGNIFICANT CHANGE UP (ref 3.5–5.3)
POTASSIUM SERPL-SCNC: 4.2 MMOL/L — SIGNIFICANT CHANGE UP (ref 3.5–5.3)
PROT SERPL-MCNC: 7.3 G/DL — SIGNIFICANT CHANGE UP (ref 6–8.3)
RBC # BLD: 2.66 M/UL — LOW (ref 4.2–5.8)
RBC # FLD: 18 % — HIGH (ref 10.3–14.5)
RH IG SCN BLD-IMP: POSITIVE — SIGNIFICANT CHANGE UP
RH IG SCN BLD-IMP: POSITIVE — SIGNIFICANT CHANGE UP
SODIUM SERPL-SCNC: 143 MMOL/L — SIGNIFICANT CHANGE UP (ref 135–145)
SODIUM SERPL-SCNC: 147 MMOL/L — HIGH (ref 135–145)
UUN UR-MCNC: 491 MG/DL — SIGNIFICANT CHANGE UP
WBC # BLD: 7.24 K/UL — SIGNIFICANT CHANGE UP (ref 3.8–10.5)
WBC # FLD AUTO: 7.24 K/UL — SIGNIFICANT CHANGE UP (ref 3.8–10.5)

## 2019-11-13 PROCEDURE — 99232 SBSQ HOSP IP/OBS MODERATE 35: CPT | Mod: GC

## 2019-11-13 PROCEDURE — 76770 US EXAM ABDO BACK WALL COMP: CPT | Mod: 26

## 2019-11-13 PROCEDURE — 99233 SBSQ HOSP IP/OBS HIGH 50: CPT | Mod: GC

## 2019-11-13 PROCEDURE — 99232 SBSQ HOSP IP/OBS MODERATE 35: CPT

## 2019-11-13 RX ORDER — METOPROLOL TARTRATE 50 MG
50 TABLET ORAL
Refills: 0 | Status: DISCONTINUED | OUTPATIENT
Start: 2019-11-14 | End: 2019-11-14

## 2019-11-13 RX ORDER — SODIUM CHLORIDE 9 MG/ML
1000 INJECTION, SOLUTION INTRAVENOUS
Refills: 0 | Status: DISCONTINUED | OUTPATIENT
Start: 2019-11-13 | End: 2019-11-13

## 2019-11-13 RX ORDER — METOPROLOL TARTRATE 50 MG
50 TABLET ORAL ONCE
Refills: 0 | Status: COMPLETED | OUTPATIENT
Start: 2019-11-13 | End: 2019-11-13

## 2019-11-13 RX ADMIN — Medication 500 MICROGRAM(S): at 11:20

## 2019-11-13 RX ADMIN — APIXABAN 5 MILLIGRAM(S): 2.5 TABLET, FILM COATED ORAL at 08:22

## 2019-11-13 RX ADMIN — Medication 500 MICROGRAM(S): at 06:29

## 2019-11-13 RX ADMIN — SODIUM CHLORIDE 100 MILLILITER(S): 9 INJECTION, SOLUTION INTRAVENOUS at 09:50

## 2019-11-13 RX ADMIN — APIXABAN 5 MILLIGRAM(S): 2.5 TABLET, FILM COATED ORAL at 19:23

## 2019-11-13 RX ADMIN — Medication 25 MILLIGRAM(S): at 06:29

## 2019-11-13 RX ADMIN — Medication 500 MICROGRAM(S): at 22:31

## 2019-11-13 RX ADMIN — Medication 50 MILLIGRAM(S): at 18:39

## 2019-11-13 NOTE — PROGRESS NOTE ADULT - SUBJECTIVE AND OBJECTIVE BOX
Nephrology Consult Progress Note    OVERNIGHT EVENTS: NAEO.     SUBJECTIVE: Pt reports 1 loose BM. Reports good appetite but dislikes the food and has mostly been drinking fluids. Persistent dry cough. Otherwise denies f/c, cp, sob, abd pain, dysuria, n/v.     VITAL SIGNS:  Vital Signs Last 24 Hrs  T(C): 37 (2019 06:40), Max: 37 (2019 18:40)  T(F): 98.6 (2019 06:40), Max: 98.6 (2019 18:40)  HR: 86 (2019 05:26) (86 - 98)  BP: 136/82 (2019 05:26) (122/70 - 141/81)  BP(mean): --  RR: 16 (2019 05:26) (16 - 19)  SpO2: 96% (2019 05:26) (94% - 97%)    PHYSICAL EXAM:    General: WDWN middle aged male lying in bed in NAD  HEENT: NC/AT; anicteric sclera; MMM  Neck: supple  Cardiovascular: +S1/S2; RRR; no rubs  Respiratory: CTA B/L; no W/R/R  Gastrointestinal: soft, NT/ND; +BSx4  Extremities: WWP; no edema, clubbing or cyanosis  Vascular: 2+ radial, DP/PT pulses B/L  Neurological: alert awake oriented; no focal deficits    MEDICATIONS:  MEDICATIONS  (STANDING):  apixaban 5 milliGRAM(s) Oral every 12 hours  dextrose 5%. 1000 milliLiter(s) (100 mL/Hr) IV Continuous <Continuous>  ipratropium    for Nebulization 500 MICROGram(s) Nebulizer every 6 hours  metoprolol tartrate 25 milliGRAM(s) Oral two times a day    MEDICATIONS  (PRN):      ALLERGIES:  Allergies    No Known Allergies    Intolerances        LABS:                        8.3    7.24  )-----------( 453      ( 2019 07:41 )             27.5     11-13    147<H>  |  114<H>  |  36<H>  ----------------------------<  94  3.5   |  21<L>  |  5.76<H>    Ca    9.4      2019 07:41  Phos  4.1       Mg     2.4         TPro  7.3  /  Alb  3.4  /  TBili  0.3  /  DBili  x   /  AST  14  /  ALT  9<L>  /  AlkPhos  75      PT/INR - ( 2019 06:49 )   PT: 21.5 sec;   INR: 1.87          PTT - ( 2019 13:49 )  PTT:91.5 sec  Urinalysis Basic - ( 2019 18:19 )    Color: Yellow / Appearance: Clear / S.020 / pH: x  Gluc: x / Ketone: NEGATIVE  / Bili: Negative / Urobili: 0.2 E.U./dL   Blood: x / Protein: 100 mg/dL / Nitrite: NEGATIVE   Leuk Esterase: NEGATIVE / RBC: < 5 /HPF / WBC >20 /HPF   Sq Epi: x / Non Sq Epi: 0-5 /HPF / Bacteria: Present /HPF      CAPILLARY BLOOD GLUCOSE          RADIOLOGY & ADDITIONAL TESTS: Reviewed.

## 2019-11-13 NOTE — PROGRESS NOTE ADULT - PROBLEM SELECTOR PLAN 4
-Patient with Na of 148 with 2.9L free water deficit on 11/12, and started with D5 1/2NS at 100cc/hour per renal recs but on 11/13, Na only down to 147 so switched to D5 at 100cc/hour.  Continue to follow up renal recommendations.  Continue to trend BMP.

## 2019-11-13 NOTE — PROGRESS NOTE ADULT - PROBLEM SELECTOR PLAN 1
-Patient with history of CKD with creatinine of 3-4 per Dr. Guadalupe records.  Was as high as 45/6.74 but today down to 36/5.76.  Likely prerenal in the setting of hypotension from atrial fibrillation with RVR along with intrinsic renal disease.  FeUrea indicating intrinsic renal disease.  Continue with D5 at 100cc/hour per renal recs.  Trend BMP daily and monitor strict ins and outs.  Renally dose medications.  Continue to follow up renal recommendations.  Can transfer to medicine for further workup of MAIRA as he does not warrant telemetry at this time.

## 2019-11-13 NOTE — PROGRESS NOTE ADULT - PROBLEM SELECTOR PLAN 2
-Patient with history of atrial fibrillation and presented with atrial fibrillation with RVR.  Currently in NSR.  Continue with lopressor 25mg BID.  Was on coumadin at home however, INR was found to be over 4 as outpatient and it was held for 4 days.  Patient was subtherapeutic so started on heparin drip but switched to eliquis 5mg BID on 11/12. -Patient with history of atrial fibrillation and presented with atrial fibrillation with RVR.  Currently in NSR.  Continue with lopressor 25mg BID.  Was on coumadin at home however, INR was found to be over 4 as outpatient and it was held for 4 days.  Patient was subtherapeutic so started on heparin drip but switched to Eliquis 5mg BID on 11/12.

## 2019-11-13 NOTE — PROGRESS NOTE ADULT - PROBLEM SELECTOR PLAN 1
Patient with AFib RVR, hypotension upon arrival to [facility] ED.   - Hypotension resolved, HR 120s, downtrending this AM to 80s-90s  - up-titrated metoprolol tartrate to 50mg PO BID, continue to up-titrate as tolerated to home dose of toprol 200mg. Caution given recent hypotension  - Currently on apixaban 5mg  transitioned from heparin drip on 11/12  - RVR currently resolved, patient in sinus rhythm with HR in 90s, transfer to [facility]

## 2019-11-13 NOTE — PROGRESS NOTE ADULT - SUBJECTIVE AND OBJECTIVE BOX
Overnight: No acute events overnight    Review of systems: 12 point review of systems negative    VITALS  Vital Signs Last 24 Hrs  T(C): 37 (2019 06:40), Max: 37 (2019 18:40)  T(F): 98.6 (2019 06:40), Max: 98.6 (2019 18:40)  HR: 86 (2019 05:26) (86 - 98)  BP: 136/82 (2019 05:26) (122/70 - 141/81)  BP(mean): --  RR: 16 (2019 05:26) (16 - 19)  SpO2: 96% (2019 05:26) (94% - 97%)    I&O's Summary    2019 07:01  -  2019 07:00  --------------------------------------------------------  IN: 1560 mL / OUT: 450 mL / NET: 1110 mL        CAPILLARY BLOOD GLUCOSE          PHYSICAL EXAM  General: A&Ox 3; NAD  Head: NC/AT;   Eyes: PERRL; EOMI; anicteric sclera  Neck: Supple; no JVD  Respiratory: Decreased breath sounds at bases bilaterally  Cardiovascular: Regular rhythm/rate; S1/S2; no gallops or murmurs auscultated  Back: No CVA tenderness  Genitourinary: No suprapubic tenderness  Gastrointestinal: Soft; NTND w/out rebound tenderness or guarding; bowel sounds normal  Extremities: WWP; no edema or cyanosis; radial/pedal pulses palpable  Neurological:  CNII-XII grossly intact; no obvious focal deficits  Psych: Normal affect    MEDICATIONS  (STANDING):  apixaban 5 milliGRAM(s) Oral every 12 hours  dextrose 5%. 1000 milliLiter(s) (100 mL/Hr) IV Continuous <Continuous>  ipratropium    for Nebulization 500 MICROGram(s) Nebulizer every 6 hours  metoprolol tartrate 25 milliGRAM(s) Oral two times a day    MEDICATIONS  (PRN):      LABS                        8.3    7.24  )-----------( 453      ( 2019 07:41 )             27.5     11-13    147<H>  |  114<H>  |  36<H>  ----------------------------<  94  3.5   |  21<L>  |  5.76<H>    Ca    9.4      2019 07:41  Phos  4.1     11  Mg     2.4         TPro  7.3  /  Alb  3.4  /  TBili  0.3  /  DBili  x   /  AST  14  /  ALT  9<L>  /  AlkPhos  75  1113    LIVER FUNCTIONS - ( 2019 07:41 )  Alb: 3.4 g/dL / Pro: 7.3 g/dL / ALK PHOS: 75 U/L / ALT: 9 U/L / AST: 14 U/L / GGT: x           PT/INR - ( 2019 06:49 )   PT: 21.5 sec;   INR: 1.87          PTT - ( 2019 13:49 )  PTT:91.5 sec  Urinalysis Basic - ( 2019 18:19 )    Color: Yellow / Appearance: Clear / S.020 / pH: x  Gluc: x / Ketone: NEGATIVE  / Bili: Negative / Urobili: 0.2 E.U./dL   Blood: x / Protein: 100 mg/dL / Nitrite: NEGATIVE   Leuk Esterase: NEGATIVE / RBC: < 5 /HPF / WBC >20 /HPF   Sq Epi: x / Non Sq Epi: 0-5 /HPF / Bacteria: Present /HPF      CARDIAC MARKERS ( 2019 06:49 )  x     / 0.02 ng/mL / x     / x     / x      CARDIAC MARKERS ( 2019 01:18 )  x     / 0.02 ng/mL / x     / x     / x      CARDIAC MARKERS ( 2019 18:45 )  x     / 0.03 ng/mL / x     / x     / x

## 2019-11-13 NOTE — PROGRESS NOTE ADULT - PROBLEM SELECTOR PLAN 5
Ppx: eliquis  F: none  E: caution given MAIRA  N: DASH diet with renal restrictions    **addendum - disposition: to [facility]**

## 2019-11-13 NOTE — PROGRESS NOTE ADULT - ASSESSMENT
63yo M PMH HTN, stage 4 CKD (baseline Cr 3-4), T Cell lymphoma (Romidepsin weekly), chronic AFib (on Coumadin), systolic CHF (EF 40-45% 2017), severe pulmonary HTN, recent azithromycin use admitted for AF with RVR with hypotension, now pending transfer to medicine. Renal consulted for MAIRA on CKD (Cr 6 from Cr 4 on 10/30). Likely prerenal in setting of hypotension and home diuretics. Improving.    #MAIRA on CKD  As per outpatient notes, pt's last Cr was 4.0 on 10/2019 and baseline -150.  Pt was hypotensive to 80s on admission in setting of AF with RVR. s/p 2L NS in ED, pt with mild creatinine improvement to 6.29 from 6.74. Now creatinine 5.76 today. Consistent with likely prerenal.  - plan to continue monitoring creatinine daily  - UA reviewed showing WBC >20 and bacteria but pt denies dysuria  - Lawrence>35 reviewed, after initiation of fluids  - monitor strict I&O  - continue to monitor BPs and maintain SBP >120; continue to hold antihypertensives and diuretics for now  - bladder scan (11/12) revealed about 20 cc of fluid in bladder  - can continue with D5   - avoid nephrotoxic agents      #hypernatremia  Na 148 suggestive of a ~2.9L free water deficit  - encourage PO fluid intake  - D5 1/2NS @ 100cc/hr 63yo M PMH HTN, stage 4 CKD (baseline Cr 3-4), T Cell lymphoma (Romidepsin weekly), chronic AFib (on Coumadin), systolic CHF (EF 40-45% 2017), severe pulmonary HTN, recent azithromycin use admitted for AF with RVR with hypotension, now pending transfer to medicine. Renal consulted for MAIRA on CKD (Cr 6 from Cr 4 on 10/30). Likely prerenal in setting of hypotension and home diuretics. Improving.    #MAIRA on CKD  As per outpatient notes, pt's last Cr was 4.0 on 10/2019 and baseline -150.  Pt was hypotensive to 80s on admission in setting of AF with RVR. s/p 2L NS in ED, pt with mild creatinine improvement to 6.29 from 6.74. Now creatinine 5.76 today. Consistent with likely prerenal.  - plan to continue monitoring creatinine daily  - UA reviewed showing WBC >20 and bacteria but pt denies dysuria  - Lawrence>35 reviewed, after initiation of fluids  - monitor strict I&O  - continue to monitor BPs and maintain SBP >120; continue to hold antihypertensives and diuretics for now  - bladder scan (11/12) revealed about 20 cc of fluid in bladder  - can continue with D5 @ 100cc/hr; f/u 12pm BMP  - avoid nephrotoxic agents      #hypernatremia  Na 148 suggestive of a ~2.9L free water deficit; 147 today  - encourage PO fluid intake  - c/w D5 @ 100cc/hr

## 2019-11-13 NOTE — PROGRESS NOTE ADULT - PROBLEM SELECTOR PLAN 3
-Patient with anemia with hemoglobin of 9.6 which downtrended to 8.4 on 11/12 and repeats 8.2 and 8.3 indicating stability.  Patient currently hemodynamically stable with no signs/symptoms of active bleed.  Anemia likely due to CKD.  Maintain active type and screen and continue to trend CBC.

## 2019-11-13 NOTE — PROGRESS NOTE ADULT - ASSESSMENT
Patient is a 64 year old M with PMH HTN, CHF, pulmonary HTN, CKD3 (outpatient baseline creatinine reported 3.2), AFib on coumadin, recently treated for bronchitis (s/p 1 wk azithromycin), p/w AFib/RVR, MAIRA. Medicine consult service activated in setting of conversion to sinus rhythm and significant MAIRA

## 2019-11-13 NOTE — PROGRESS NOTE ADULT - PROBLEM SELECTOR PLAN 2
Patient with reported baseline creatinine 3.2, currently 6s  - May be due to poor renal perfusion in setting of hypotension due to AFib RVR. Patient making urine, however given severity of MAIRA will consult nephrology for renal optimization  - appreciate nephrology recs  - f/u renal US  - F/u UA  - Strict I/Os, f/u UOP  - F/u urine lytes  - Holding home antihypertensives (valsartan 160mg qd and norvasc 10mg), consider restarting when patient becomes hypertensive and renal function returns to baseline  - Hold home allopurinol and valsartan in setting of MAIRA on CKD, consider restarting when renal function returns to baseline.   - continue to appreciate medicine recommendations with severity of MAIRA and propensity toward AFib RVR for medical telemetry evaluation while uptitrating beta blockade

## 2019-11-13 NOTE — PROGRESS NOTE ADULT - SUBJECTIVE AND OBJECTIVE BOX
MARCI VEGA 64y Male    Hospital course: Patient is a 64 year old male, with PMH significant for AFib, HTN, CKD, who presented to [facility] ED with lightheadedness and dizziness for several days' duration. Of note, he is actively being treated for T cell lymphoma, most recent infusion was last Wednesday. At [facility] ED, he was hypotensive to 80s/60s, AFib RVR to 130s, afebrile, sat 98% on RA. He was found to have lactate 2.9, now normalized. Of note, labs significant for creatinine 6.8. ICU was consulted in the ED for Afib w/RVR, patient dispositioned to cardiac telemetry. Patient converted to sinus rhythm in AM with resumption of metoprolol (at decreased dose due to soft sbp, 25mg tartrate). Medical consult service and nephrology service consulted for significant MAIRA. Urine electrolytes sent and UA pending (was not clean catch specimen). Patient started D5 1/2 NS for hydration as per nephrology recommendations. Patient now stable for step down to regional medical floor. Of note, relevant cardiac history includes echo 2019 with EF 60%, mod/sev LVH, grade 1 diastolic dysfunction. CBC in October outpatient demonstrating Hb 10.8.    Interval HPI:  No acute events overnight. Patient denying dizziness, lightheadedness, cp, palpitations. Understands that he will be transferring to medical service.     Patient seen and examined at bedside:    T(C): 37.1 (19 @ 11:00), Max: 37.1 (19 @ 11:00)  HR: 86 (19 @ 05:26) (86 - 98)  BP: 136/82 (19 @ 05:26) (122/70 - 141/81)  RR: 16 (19 @ 05:26) (16 - 19)  SpO2: 96% (19 @ 05:26) (94% - 97%)    Review of systems negative except as mentioned above    apixaban 5 milliGRAM(s) Oral every 12 hours  dextrose 5%. 1000 milliLiter(s) IV Continuous <Continuous>  ipratropium    for Nebulization 500 MICROGram(s) Nebulizer every 6 hours  metoprolol tartrate 25 milliGRAM(s) Oral two times a day      Physical Exam:    GENERAL: Elderly  male, WDWN, NAD  HEENT: NC/AT, MMM, PERRL, EOMI  NECK: No JVD, no carotid bruits, supple, no thyromegaly  RESPIRATORY: CTAB, good effort and depth, equal and symmetric chest expansion.   CV: S1S2 appreciated, RRR, no m/r/g   GI: Soft, NT/ND, normal active BS, no guarding or rigidigy  EXT: No swelling, cyanosis, edema  Vasc: 2+ pulses in PT/DP b/l  MSK: Appropriate bulk and tone, NROM    Labs:                        8.3    7.24  )-----------( 453      ( 2019 07:41 )             27.5     11-    147<H>  |  114<H>  |  36<H>  ----------------------------<  94  3.5   |  21<L>  |  5.76<H>    Ca    9.4      2019 07:41  Phos  4.1     11-  Mg     2.4     -    TPro  7.3  /  Alb  3.4  /  TBili  0.3  /  DBili  x   /  AST  14  /  ALT  9<L>  /  AlkPhos  75  11-    PT/INR - ( 2019 06:49 )   PT: 21.5 sec;   INR: 1.87          PTT - ( 2019 13:49 )  PTT:91.5 sec  Urinalysis Basic - ( 2019 18:19 )    Color: Yellow / Appearance: Clear / S.020 / pH: x  Gluc: x / Ketone: NEGATIVE  / Bili: Negative / Urobili: 0.2 E.U./dL   Blood: x / Protein: 100 mg/dL / Nitrite: NEGATIVE   Leuk Esterase: NEGATIVE / RBC: < 5 /HPF / WBC >20 /HPF   Sq Epi: x / Non Sq Epi: 0-5 /HPF / Bacteria: Present /HPF        CAPILLARY BLOOD GLUCOSE                Imaging:

## 2019-11-14 ENCOUNTER — INBOUND DOCUMENT (OUTPATIENT)
Age: 64
End: 2019-11-14

## 2019-11-14 ENCOUNTER — TRANSCRIPTION ENCOUNTER (OUTPATIENT)
Age: 64
End: 2019-11-14

## 2019-11-14 VITALS — TEMPERATURE: 99 F

## 2019-11-14 PROBLEM — M10.9 GOUT, UNSPECIFIED: Chronic | Status: ACTIVE | Noted: 2019-11-12

## 2019-11-14 PROBLEM — Z86.79 PERSONAL HISTORY OF OTHER DISEASES OF THE CIRCULATORY SYSTEM: Chronic | Status: ACTIVE | Noted: 2019-11-12

## 2019-11-14 LAB
ANION GAP SERPL CALC-SCNC: 12 MMOL/L — SIGNIFICANT CHANGE UP (ref 5–17)
BASOPHILS # BLD AUTO: 0.04 K/UL — SIGNIFICANT CHANGE UP (ref 0–0.2)
BASOPHILS NFR BLD AUTO: 0.6 % — SIGNIFICANT CHANGE UP (ref 0–2)
BUN SERPL-MCNC: 33 MG/DL — HIGH (ref 7–23)
CALCIUM SERPL-MCNC: 8.9 MG/DL — SIGNIFICANT CHANGE UP (ref 8.4–10.5)
CHLORIDE SERPL-SCNC: 115 MMOL/L — HIGH (ref 96–108)
CO2 SERPL-SCNC: 19 MMOL/L — LOW (ref 22–31)
CREAT SERPL-MCNC: 5.35 MG/DL — HIGH (ref 0.5–1.3)
EOSINOPHIL # BLD AUTO: 0.13 K/UL — SIGNIFICANT CHANGE UP (ref 0–0.5)
EOSINOPHIL NFR BLD AUTO: 2.1 % — SIGNIFICANT CHANGE UP (ref 0–6)
FERRITIN SERPL-MCNC: 260 NG/ML — SIGNIFICANT CHANGE UP (ref 30–400)
GLUCOSE SERPL-MCNC: 75 MG/DL — SIGNIFICANT CHANGE UP (ref 70–99)
HCT VFR BLD CALC: 24.1 % — LOW (ref 39–50)
HGB BLD-MCNC: 7.8 G/DL — LOW (ref 13–17)
IMM GRANULOCYTES NFR BLD AUTO: 0.5 % — SIGNIFICANT CHANGE UP (ref 0–1.5)
IRON SATN MFR SERPL: 16 % — SIGNIFICANT CHANGE UP (ref 16–55)
IRON SATN MFR SERPL: 32 UG/DL — LOW (ref 45–165)
LYMPHOCYTES # BLD AUTO: 1.05 K/UL — SIGNIFICANT CHANGE UP (ref 1–3.3)
LYMPHOCYTES # BLD AUTO: 17 % — SIGNIFICANT CHANGE UP (ref 13–44)
MAGNESIUM SERPL-MCNC: 2.3 MG/DL — SIGNIFICANT CHANGE UP (ref 1.6–2.6)
MCHC RBC-ENTMCNC: 32.2 PG — SIGNIFICANT CHANGE UP (ref 27–34)
MCHC RBC-ENTMCNC: 32.4 GM/DL — SIGNIFICANT CHANGE UP (ref 32–36)
MCV RBC AUTO: 99.6 FL — SIGNIFICANT CHANGE UP (ref 80–100)
MONOCYTES # BLD AUTO: 0.58 K/UL — SIGNIFICANT CHANGE UP (ref 0–0.9)
MONOCYTES NFR BLD AUTO: 9.4 % — SIGNIFICANT CHANGE UP (ref 2–14)
NEUTROPHILS # BLD AUTO: 4.36 K/UL — SIGNIFICANT CHANGE UP (ref 1.8–7.4)
NEUTROPHILS NFR BLD AUTO: 70.4 % — SIGNIFICANT CHANGE UP (ref 43–77)
NRBC # BLD: 0 /100 WBCS — SIGNIFICANT CHANGE UP (ref 0–0)
PLATELET # BLD AUTO: 378 K/UL — SIGNIFICANT CHANGE UP (ref 150–400)
POTASSIUM SERPL-MCNC: 3.8 MMOL/L — SIGNIFICANT CHANGE UP (ref 3.5–5.3)
POTASSIUM SERPL-SCNC: 3.8 MMOL/L — SIGNIFICANT CHANGE UP (ref 3.5–5.3)
RBC # BLD: 2.42 M/UL — LOW (ref 4.2–5.8)
RBC # FLD: 17.5 % — HIGH (ref 10.3–14.5)
SODIUM SERPL-SCNC: 146 MMOL/L — HIGH (ref 135–145)
TIBC SERPL-MCNC: 198 UG/DL — LOW (ref 220–430)
UIBC SERPL-MCNC: 166 UG/DL — SIGNIFICANT CHANGE UP (ref 110–370)
WBC # BLD: 6.19 K/UL — SIGNIFICANT CHANGE UP (ref 3.8–10.5)
WBC # FLD AUTO: 6.19 K/UL — SIGNIFICANT CHANGE UP (ref 3.8–10.5)

## 2019-11-14 PROCEDURE — 85610 PROTHROMBIN TIME: CPT

## 2019-11-14 PROCEDURE — 83935 ASSAY OF URINE OSMOLALITY: CPT

## 2019-11-14 PROCEDURE — 84300 ASSAY OF URINE SODIUM: CPT

## 2019-11-14 PROCEDURE — 83605 ASSAY OF LACTIC ACID: CPT

## 2019-11-14 PROCEDURE — 96374 THER/PROPH/DIAG INJ IV PUSH: CPT

## 2019-11-14 PROCEDURE — 84133 ASSAY OF URINE POTASSIUM: CPT

## 2019-11-14 PROCEDURE — 99233 SBSQ HOSP IP/OBS HIGH 50: CPT | Mod: GC

## 2019-11-14 PROCEDURE — 85730 THROMBOPLASTIN TIME PARTIAL: CPT

## 2019-11-14 PROCEDURE — 99232 SBSQ HOSP IP/OBS MODERATE 35: CPT

## 2019-11-14 PROCEDURE — 86900 BLOOD TYPING SEROLOGIC ABO: CPT

## 2019-11-14 PROCEDURE — 83540 ASSAY OF IRON: CPT

## 2019-11-14 PROCEDURE — 96361 HYDRATE IV INFUSION ADD-ON: CPT

## 2019-11-14 PROCEDURE — 82570 ASSAY OF URINE CREATININE: CPT

## 2019-11-14 PROCEDURE — 82436 ASSAY OF URINE CHLORIDE: CPT

## 2019-11-14 PROCEDURE — ZZZZZ: CPT

## 2019-11-14 PROCEDURE — 76770 US EXAM ABDO BACK WALL COMP: CPT

## 2019-11-14 PROCEDURE — 85027 COMPLETE CBC AUTOMATED: CPT

## 2019-11-14 PROCEDURE — 86850 RBC ANTIBODY SCREEN: CPT

## 2019-11-14 PROCEDURE — 93005 ELECTROCARDIOGRAM TRACING: CPT

## 2019-11-14 PROCEDURE — 36415 COLL VENOUS BLD VENIPUNCTURE: CPT

## 2019-11-14 PROCEDURE — 87486 CHLMYD PNEUM DNA AMP PROBE: CPT

## 2019-11-14 PROCEDURE — 84540 ASSAY OF URINE/UREA-N: CPT

## 2019-11-14 PROCEDURE — 86803 HEPATITIS C AB TEST: CPT

## 2019-11-14 PROCEDURE — 80048 BASIC METABOLIC PNL TOTAL CA: CPT

## 2019-11-14 PROCEDURE — 87633 RESP VIRUS 12-25 TARGETS: CPT

## 2019-11-14 PROCEDURE — 83735 ASSAY OF MAGNESIUM: CPT

## 2019-11-14 PROCEDURE — 84484 ASSAY OF TROPONIN QUANT: CPT

## 2019-11-14 PROCEDURE — 85025 COMPLETE CBC W/AUTO DIFF WBC: CPT

## 2019-11-14 PROCEDURE — 94640 AIRWAY INHALATION TREATMENT: CPT

## 2019-11-14 PROCEDURE — 86901 BLOOD TYPING SEROLOGIC RH(D): CPT

## 2019-11-14 PROCEDURE — 81001 URINALYSIS AUTO W/SCOPE: CPT

## 2019-11-14 PROCEDURE — 83550 IRON BINDING TEST: CPT

## 2019-11-14 PROCEDURE — 87798 DETECT AGENT NOS DNA AMP: CPT

## 2019-11-14 PROCEDURE — 80053 COMPREHEN METABOLIC PANEL: CPT

## 2019-11-14 PROCEDURE — 99285 EMERGENCY DEPT VISIT HI MDM: CPT | Mod: 25

## 2019-11-14 PROCEDURE — 71045 X-RAY EXAM CHEST 1 VIEW: CPT

## 2019-11-14 PROCEDURE — 87040 BLOOD CULTURE FOR BACTERIA: CPT

## 2019-11-14 PROCEDURE — 84100 ASSAY OF PHOSPHORUS: CPT

## 2019-11-14 PROCEDURE — 82728 ASSAY OF FERRITIN: CPT

## 2019-11-14 PROCEDURE — 83880 ASSAY OF NATRIURETIC PEPTIDE: CPT

## 2019-11-14 PROCEDURE — 87581 M.PNEUMON DNA AMP PROBE: CPT

## 2019-11-14 RX ORDER — FUROSEMIDE 40 MG
1 TABLET ORAL
Qty: 0 | Refills: 0 | DISCHARGE

## 2019-11-14 RX ORDER — POTASSIUM CHLORIDE 20 MEQ
20 PACKET (EA) ORAL ONCE
Refills: 0 | Status: COMPLETED | OUTPATIENT
Start: 2019-11-14 | End: 2019-11-14

## 2019-11-14 RX ORDER — AMLODIPINE BESYLATE 2.5 MG/1
1 TABLET ORAL
Qty: 0 | Refills: 0 | DISCHARGE

## 2019-11-14 RX ORDER — WARFARIN SODIUM 2.5 MG/1
1 TABLET ORAL
Qty: 0 | Refills: 0 | DISCHARGE

## 2019-11-14 RX ORDER — VALSARTAN 80 MG/1
1 TABLET ORAL
Qty: 0 | Refills: 0 | DISCHARGE

## 2019-11-14 RX ORDER — APIXABAN 2.5 MG/1
1 TABLET, FILM COATED ORAL
Qty: 60 | Refills: 0
Start: 2019-11-14 | End: 2019-12-13

## 2019-11-14 RX ADMIN — Medication 500 MICROGRAM(S): at 10:06

## 2019-11-14 RX ADMIN — Medication 20 MILLIEQUIVALENT(S): at 08:21

## 2019-11-14 RX ADMIN — APIXABAN 5 MILLIGRAM(S): 2.5 TABLET, FILM COATED ORAL at 08:21

## 2019-11-14 RX ADMIN — Medication 500 MICROGRAM(S): at 06:41

## 2019-11-14 NOTE — PROGRESS NOTE ADULT - PROBLEM SELECTOR PLAN 8
-Patient with history of pulmonary hypertension with pulmonary systolic pressure of 64 on echocardiogram in 2017, will need outpatient follow up with pulmonology.
-Patient with history of pulmonary hypertension with pulmonary systolic pressure of 64 on echocardiogram in 2017, will need outpatient follow up with pulmonology.

## 2019-11-14 NOTE — PROGRESS NOTE ADULT - PROBLEM SELECTOR PLAN 7
-Platelets 453 today, likely reactive in the setting of anemia or lymphoma, continue to trend CBC. -Platelets 378 but have been over 400 previously, likely reactive in the setting of anemia or lymphoma, continue to trend CBC.

## 2019-11-14 NOTE — PROGRESS NOTE ADULT - PROBLEM SELECTOR PROBLEM 6
Chronic systolic congestive heart failure
Prophylactic measure
Chronic systolic congestive heart failure

## 2019-11-14 NOTE — PROGRESS NOTE ADULT - PROBLEM SELECTOR PLAN 2
-Patient with history of atrial fibrillation and presented with atrial fibrillation with RVR.  Currently in NSR.  Continue with lopressor 25mg BID.  Was on coumadin at home however, INR was found to be over 4 as outpatient and it was held for 4 days.  Patient was subtherapeutic so started on heparin drip but switched to Eliquis 5mg BID on 11/12. -Patient with history of atrial fibrillation and presented with atrial fibrillation with RVR.  Currently in NSR.  Continue with lopressor 50mg BID.  Was on coumadin at home however, INR was found to be over 4 as outpatient and it was held for 4 days.  Patient was subtherapeutic so started on heparin drip but switched to Eliquis 5mg BID on 11/12.

## 2019-11-14 NOTE — DISCHARGE NOTE PROVIDER - CARE PROVIDER_API CALL
Ann-Marie Denton)  Internal Medicine; Nephrology  [address], [address] [place] [address]  Phone: [phone]  Fax: [phone]  Follow Up Time:     Anthony Rowell)  Cardiovascular Disease; Internal Medicine  [address] [id number]  Phone: [phone]  Fax: [phone]  Follow Up Time:

## 2019-11-14 NOTE — PROGRESS NOTE ADULT - PROBLEM/PLAN-3
April 30, 2019         [address] [place] [address] [place] [address]  Phone: [phone]  Fax: [phone]       Patient: Skip Carmona   YOB: 1985  Date of Visit: 04/30/2019    To Whom It May Concern:    Ester Carmona  was at [facility] on 04/30/2019. She may return to work on 5/1 without any restrictions. If you have any questions or concerns, or if I can be of further assistance, please do not hesitate to contact me.    Sincerely,        Carolyn Ellis RN     
DISPLAY PLAN FREE TEXT

## 2019-11-14 NOTE — DISCHARGE NOTE PROVIDER - NSDCCPCAREPLAN_GEN_ALL_CORE_FT
PRINCIPAL DISCHARGE DIAGNOSIS  Diagnosis: Atrial fibrillation  Assessment and Plan of Treatment: You were admitted for a rapid heart rate causing low blood pressures. Your heart rate returned to normal, but due to your risk for getting strokes with this type of rhythm, we started you on a new medication called Eliquis. It is a blood thinner to prevent strokes. Please continue to take once every day.      SECONDARY DISCHARGE DIAGNOSES  Diagnosis: MAIRA (acute kidney injury)  Assessment and Plan of Treatment: You were found to have an elevated creatinine from your baseline numbers. This was likely due to the low blood pressures that you had on admission. You were given fluids with some improvement. Please continue to take in oral fluids (water) to help with your kidney function and your high sodium.   Please follow up with Dr. Denton outpatient to ensure that you kidney function improves.    Diagnosis: Cough  Assessment and Plan of Treatment: You have had a cough for weeks now. You told us you were initially taking azithromycin (antibiotic). You have a pleural effusion which appears to be chronic on your chest xray. Please follow up with your primary care doctor to monitor thie left sided pleural effusion and to monitor your cough. PRINCIPAL DISCHARGE DIAGNOSIS  Diagnosis: Atrial fibrillation  Assessment and Plan of Treatment: You were admitted for a rapid heart rate causing low blood pressures. Your heart rate returned to normal, but due to your risk for getting strokes with this type of rhythm, we started you on a new medication called Eliquis. It is a blood thinner to prevent strokes. Please continue to take once every day.      SECONDARY DISCHARGE DIAGNOSES  Diagnosis: MAIRA (acute kidney injury)  Assessment and Plan of Treatment: You were found to have an elevated creatinine from your baseline numbers. This was likely due to the low blood pressures that you had on admission. You were given fluids with some improvement. Please continue to take in oral fluids (water) to help with your kidney function and your high sodium.   Please follow up with Dr. Denton outpatient to ensure that you kidney function improves (her office will help set up a follow up appointment).  We have held your Lasix, valsartan, and amlodipine to ensure that your blood pressures do not become very low again. Continue to measure your blood pressures at home and if systolics are above 140 for more than two measures, you can restart your amlodipine.    Diagnosis: Cough  Assessment and Plan of Treatment: You have had a cough for weeks now. You told us you were initially taking azithromycin (antibiotic). You have a pleural effusion which appears to be chronic on your chest xray. Please follow up with your primary care doctor to monitor thie left sided pleural effusion and to monitor your cough.

## 2019-11-14 NOTE — PROGRESS NOTE ADULT - PROBLEM SELECTOR PLAN 1
#non oliguric MAIRA on CKD stage IV ( baseline Cr ~ 4)  impression consistent with pre-renal etiologies in the setting of A-fib w/RVR associated with significant hypotension from his baseline blood pressure  accompanied by GI looses and diarrhea   intermittently hypernatremic  MAIRA is improving, Na down to 146, with adequate UOP, has hyperchloremic MA stable since admission, likely in the setting of superimposed MAIRA on CKD not on NaBicarb  - continue to monitor BPs and maintain SBP >120; continue to hold antihypertensives and diuretics for now  - for hypernatremia, encourage patient to drink more water and keep PO hydration  - if planning to discharge would need to have close follow up with Dr. Denton     Will follow

## 2019-11-14 NOTE — PROGRESS NOTE ADULT - PROBLEM SELECTOR PLAN 3
-Patient with anemia with hemoglobin of 9.6 which downtrended to 8.4 on 11/12 and repeats 8.2 and 8.3 indicating stability.  Patient currently hemodynamically stable with no signs/symptoms of active bleed.  Anemia likely due to CKD.  Maintain active type and screen and continue to trend CBC. -Patient with anemia with hemoglobin of 9.6 which downtrended to 8.4 on 11/12 and repeats 8.2 and 8.3 indicating stability.  However, today had slight downtrend to 7.8 today.  Patient currently hemodynamically stable with no signs/symptoms of active bleed.  Anemia likely due to CKD. Maintain active type and screen and continue to trend CBC. -Patient with anemia with hemoglobin of 9.6 which downtrended to 8.4 on 11/12 and repeats 8.2 and 8.3 indicating stability.  However, today had slight downtrend to 7.8 today.  Patient currently hemodynamically stable with no signs/symptoms of active bleed.  Anemia likely due to CKD. Maintain active type and screen and continue to trend CBC.  Can add on iron studies to previous labs.

## 2019-11-14 NOTE — PROGRESS NOTE ADULT - ATTENDING COMMENTS
CKD 4 with non oliguric MAIRA, with prerenal component and hypernatremia  some improvement with IVF  as above change to D5W to improve Na concentration.  monitor I/O- GI lossses to determine duration of IVF
volume status improving- still needs to become more expanded, given relatively lower than typical BP on less meds  encouraged to drink more fluids- If unable to sustain, may need to resume IVF  Prerenal component resolving so delay in normalization of creat to his prior baseline suggests a degree of ATN or other parenchymal process   (relative ischemia when BP low)
Assessment: Patient personally seen and examined myself during rounds with the   Resident  ON DATE 11/13/19  Note read, including vitals, physical findings, laboratory data, and radiological reports.   Agree with above with revisions, if any included below.   - As Above  - Plan of care: continuous telemetry monitoring, frequent hemodynamic checks, daily weights, I/Os, daily 12 Lead ECG, add K and Mg to labs, cycle troponin to peak, consider Adv HF / EP / CTS / Interv. Cardio consultation if intervention is needed.

## 2019-11-14 NOTE — DISCHARGE NOTE PROVIDER - HOSPITAL COURSE
63yo M PMH HTN, stage 4 CKD (baseline Cr ~3.5; last 4.0 on 10/30/2019 outpatient), T Cell lymphoma (diagnosed 17 years ago, on chemo Romidepsin every Wednesday, last infusion 11/6), chronic AFib (on Coumadin), systolic CHF (EF 40-45% in 2017), severe pulmonary HTN, recent tx for bronchitis s/p 7 days Azithromycin (completed 11/10), who presented to the ED with dizziness, lightheadedness, and palpitations x 3 days. ED vitals significant for hypotension (BP 84/64), w/ wife noting BP at home to be in 70s from a baseline of -150, in AF with RVR (HR 130s). Pt admitted to cardiac telemetry for symptomatic AF with RVR. Pt converted to NSR. Treated with IV hydration for MAIRA which was deemed likely prerenal in setting of profound relative hypotension from baseline -150s with improvement in creatinine during admission.

## 2019-11-14 NOTE — PROGRESS NOTE ADULT - PROBLEM SELECTOR PLAN 4
-Patient with Na of 148 with 2.9L free water deficit on 11/12, and started with D5 1/2NS at 100cc/hour per renal recs but on 11/13, Na only down to 147 so switched to D5 at 100cc/hour.  Continue to follow up renal recommendations.  Continue to trend BMP. -Patient with multiple Na levels above of 145 and started with D5 1/2NS and then switched to D5.  No longer on IVF with Na today 146.  Continue to follow up renal recommendations.  Continue to trend BMP.

## 2019-11-14 NOTE — DISCHARGE NOTE NURSING/CASE MANAGEMENT/SOCIAL WORK - PATIENT PORTAL LINK FT
You can access the FollowMyHealth Patient Portal offered by [facility] by registering at the following website: [email or web address]. By joining [employer]’s FollowMyHealth portal, you will also be able to view your health information using other applications (apps) compatible with our system.

## 2019-11-14 NOTE — DISCHARGE NOTE PROVIDER - NSDCMRMEDTOKEN_GEN_ALL_CORE_FT
allopurinol 100 mg oral tablet: 1 tab(s) orally 2 times a day  amLODIPine 10 mg oral tablet: 1 tab(s) orally once a day  calcitriol 0.25 mcg oral capsule: 1 cap(s) orally once a day  Coumadin 4 mg oral tablet: 1 tab(s) orally once a day  doxazosin 2 mg oral tablet: 2 tab(s) orally 2 times a day  Lasix 40 mg oral tablet: 1 tab(s) orally 3 times a day  Toprol- mg oral tablet, extended release: 1 tab(s) orally once a day  valsartan 160 mg oral tablet: 1 tab(s) orally once a day allopurinol 100 mg oral tablet: 1 tab(s) orally 2 times a day  apixaban 5 mg oral tablet: 1 tab(s) orally every 12 hours  calcitriol 0.25 mcg oral capsule: 1 cap(s) orally once a day  doxazosin 2 mg oral tablet: 2 tab(s) orally 2 times a day  Toprol- mg oral tablet, extended release: 1 tab(s) orally once a day

## 2019-11-14 NOTE — PROGRESS NOTE ADULT - PROBLEM SELECTOR PLAN 9
-Patient complaining of cough for 2 weeks, did finish azithromycin prior to presentation for pneumonia and cough has since improved.  Consider other etiologies such as virus, GERD, or post nasal drip.
-Patient complaining of cough for 2 weeks, did finish azithromycin prior to presentation for pneumonia and cough has since improved.  Consider other etiologies such as virus, GERD, or post nasal drip.

## 2019-11-14 NOTE — PROGRESS NOTE ADULT - PROBLEM SELECTOR PLAN 6
-Patient with history of CHF with echo in 2017 with EF 40-45% but outpatient echo this year with normal EF, currently patient is euvolemic and urinating well, holding home lasix in the setting of acute kidney injury, restart as tolerated.
-Patient with history of CHF with echo in 2017 with EF 40-45% but outpatient echo this year with normal EF, currently patient is euvolemic and urinating well, holding home lasix in the setting of acute kidney injury, restart as tolerated.

## 2019-11-14 NOTE — PROGRESS NOTE ADULT - SUBJECTIVE AND OBJECTIVE BOX
O/N Events: KALA  Subjective:  feels good, denies any complaints, no urinary symptoms/ Cr keeps trending down, reports decent PO intake, still has loose BMs but overall improved   Cr 5.3/ Na 146 slightly elevated, IVF stopped yesterday/ Bicarb 19, otherwise lytes WNL  HR controlled in 70s-80s, appears euvolemic and non uremic     VITALS  Vital Signs Last 24 Hrs  T(C): 36.7 (2019 08:51), Max: 37.1 (2019 21:52)  T(F): 98.1 (2019 08:51), Max: 98.7 (2019 21:52)  HR: 88 (2019 08:46) (73 - 109)  BP: 147/84 (2019 08:46) (131/75 - 159/88)  RR: 16 (2019 08:46) (14 - 16)  SpO2: 97% (2019 08:46) (95% - 98%)    PHYSICAL EXAM  General: NAD  HEENT: MMM  Cardiovascular: +S1/S2; RRR; no rubs  Respiratory: CTA B/L  Gastrointestinal: soft, NT/ND  Extremities: WWP; no edema  Neurological: alert awake oriented; no focal deficits, DTRs normal, no asterixis     MEDICATIONS  (STANDING):  apixaban 5 milliGRAM(s) Oral every 12 hours  ipratropium    for Nebulization 500 MICROGram(s) Nebulizer every 6 hours  metoprolol tartrate 50 milliGRAM(s) Oral two times a day    MEDICATIONS  (PRN):      LABS                        7.8    6.19  )-----------( 378      ( 2019 05:45 )             24.1     -    146<H>  |  115<H>  |  33<H>  ----------------------------<  75  3.8   |  19<L>  |  5.35<H>    Ca    8.9      2019 05:45  Mg     2.3         TPro  7.3  /  Alb  3.4  /  TBili  0.3  /  DBili  x   /  AST  14  /  ALT  9<L>  /  AlkPhos  75  11-13    LIVER FUNCTIONS - ( 2019 07:41 )  Alb: 3.4 g/dL / Pro: 7.3 g/dL / ALK PHOS: 75 U/L / ALT: 9 U/L / AST: 14 U/L / GGT: x           PTT - ( 2019 13:49 )  PTT:91.5 sec  Urinalysis Basic - ( 2019 18:19 )    Color: Yellow / Appearance: Clear / S.020 / pH: x  Gluc: x / Ketone: NEGATIVE  / Bili: Negative / Urobili: 0.2 E.U./dL   Blood: x / Protein: 100 mg/dL / Nitrite: NEGATIVE   Leuk Esterase: NEGATIVE / RBC: < 5 /HPF / WBC >20 /HPF   Sq Epi: x / Non Sq Epi: 0-5 /HPF / Bacteria: Present /HPF

## 2019-11-14 NOTE — DISCHARGE NOTE NURSING/CASE MANAGEMENT/SOCIAL WORK - NSDCPEELIQUIS_GEN_ALL_CORE
Apixaban/Eliquis - Dietary Advice/Apixaban/Eliquis - Compliance/Apixaban/Eliquis - Follow up monitoring/Apixaban/Eliquis - Potential for adverse drug reactions and interactions

## 2019-11-14 NOTE — PROGRESS NOTE ADULT - REASON FOR ADMISSION
Dizziness, cough

## 2019-11-14 NOTE — PROGRESS NOTE ADULT - PROBLEM SELECTOR PLAN 1
-Patient with history of CKD with creatinine of 3-4 per Dr. Guadalupe records.  Was as high as 45/6.74 but today down to 36/5.76.  Likely prerenal in the setting of hypotension from atrial fibrillation with RVR along with intrinsic renal disease.  FeUrea indicating intrinsic renal disease.  Continue with D5 at 100cc/hour per renal recs.  Trend BMP daily and monitor strict ins and outs.  Renally dose medications.  Continue to follow up renal recommendations.  Can transfer to medicine for further workup of MAIRA as he does not warrant telemetry at this time. -Patient with history of CKD with creatinine of 3-4 per Dr. Guadalupe records.  Was as high as 45/6.74 but today down to 33/5.35.  Likely prerenal in the setting of hypotension from atrial fibrillation with RVR along with intrinsic renal disease.  FeUrea indicating intrinsic renal disease.  Trend BMP daily and monitor strict ins and outs.  Renally dose medications.  Continue to follow up renal recommendations.  Patient to be discharged today per primary team, no need to transfer to medicine.
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
## 2019-11-14 NOTE — PROGRESS NOTE ADULT - SUBJECTIVE AND OBJECTIVE BOX
O/N Events: No acute events overnight    Review of systems: 12 point review of systems negative    VITALS  Vital Signs Last 24 Hrs  T(C): 36.7 (2019 08:51), Max: 37.1 (2019 11:00)  T(F): 98.1 (2019 08:51), Max: 98.7 (2019 11:00)  HR: 88 (2019 08:46) (73 - 109)  BP: 147/84 (2019 08:46) (131/75 - 159/88)  BP(mean): --  RR: 16 (2019 08:46) (14 - 16)  SpO2: 97% (2019 08:46) (95% - 98%)    I&O's Summary    2019 07:01  -  2019 07:00  --------------------------------------------------------  IN: 1080 mL / OUT: 350 mL / NET: 730 mL    2019 07:01  -  2019 09:17  --------------------------------------------------------  IN: 240 mL / OUT: 0 mL / NET: 240 mL        CAPILLARY BLOOD GLUCOSE      PHYSICAL EXAM  General: A&Ox 3; NAD  Head: NC/AT;   Eyes: PERRL; EOMI; anicteric sclera  Neck: Supple; no JVD  Respiratory: Decreased breath sounds at bases bilaterally  Cardiovascular: Regular rhythm/rate; S1/S2; no gallops or murmurs auscultated  Back: No CVA tenderness  Genitourinary: No suprapubic tenderness  Gastrointestinal: Soft; NTND w/out rebound tenderness or guarding; bowel sounds normal  Extremities: WWP; no edema or cyanosis; radial/pedal pulses palpable  Neurological:  CNII-XII grossly intact; no obvious focal deficits  Psych: Normal affect      MEDICATIONS  (STANDING):  apixaban 5 milliGRAM(s) Oral every 12 hours  ipratropium    for Nebulization 500 MICROGram(s) Nebulizer every 6 hours  metoprolol tartrate 50 milliGRAM(s) Oral two times a day    MEDICATIONS  (PRN):      LABS                        7.8    6.19  )-----------( 378      ( 2019 05:45 )             24.1     11-    146<H>  |  115<H>  |  33<H>  ----------------------------<  75  3.8   |  19<L>  |  5.35<H>    Ca    8.9      2019 05:45  Mg     2.3         TPro  7.3  /  Alb  3.4  /  TBili  0.3  /  DBili  x   /  AST  14  /  ALT  9<L>  /  AlkPhos  75  11    LIVER FUNCTIONS - ( 2019 07:41 )  Alb: 3.4 g/dL / Pro: 7.3 g/dL / ALK PHOS: 75 U/L / ALT: 9 U/L / AST: 14 U/L / GGT: x           PTT - ( 2019 13:49 )  PTT:91.5 sec  Urinalysis Basic - ( 2019 18:19 )    Color: Yellow / Appearance: Clear / S.020 / pH: x  Gluc: x / Ketone: NEGATIVE  / Bili: Negative / Urobili: 0.2 E.U./dL   Blood: x / Protein: 100 mg/dL / Nitrite: NEGATIVE   Leuk Esterase: NEGATIVE / RBC: < 5 /HPF / WBC >20 /HPF   Sq Epi: x / Non Sq Epi: 0-5 /HPF / Bacteria: Present /HPF

## 2019-11-14 NOTE — PROGRESS NOTE ADULT - ASSESSMENT
A/p  63yo M PMH HTN, stage 4 CKD (baseline Cr 3-4), T Cell lymphoma (Romidepsin weekly), chronic AFib (on Coumadin), systolic CHF (EF 40-45% 2017), severe pulmonary HTN, recent azithromycin use admitted for AF with RVR with hypotension, now pending transfer to medicine. Renal consulted for MAIRA on CKD (Cr 6 from Cr 4 on 10/30). Likely prerenal in setting of hypotension and home diuretics. Improving.

## 2019-11-14 NOTE — DISCHARGE NOTE PROVIDER - NSDCCPTREATMENT_GEN_ALL_CORE_FT
PRINCIPAL PROCEDURE  Procedure: US retroperitoneum B-scan complete  Findings and Treatment: 11/13/19: Findings:  RIGHT KIDNEY:  Length: 10.2 cm  Lesions: 2.2 x 2.6 x 3.0 cm upper pole simple cyst.  Hydronephrosis: None  Stones: 0.5 cm lower pole nonobstructing renal calculus  Echogenicity: Hyperechoic  LEFT KIDNEY:  Length: 10.8 cm  Lesions: 0.7 x 0.6 x 0.7 cm interpolar simple cyst and 0.8 x 0.5 x 0.7 cm   lower pole simple cyst.  Hydronephrosis: None  Stones: None  Echogenicity: Hyperechoic  Perinephric fluid is seen around the kidneys bilaterally.  URINARY BLADDER:  Ureteral jets: Both visible.  Filling defects: None  Bladder volume: Pre-void: 88 ml; Post-void: 22 ml.  PROSTATE:  Normal in size measuring 3.8 x 4.4 x 3.4 cm, 30 cc in volume.  IMPRESSION:  Nonobstructing right renal calculus. No hydronephrosis.  Mild Bilateral perinephric fluid.  Hyperechoic kidneys bilaterally consistent with medical renal disease.

## 2019-11-14 NOTE — PROGRESS NOTE ADULT - PROBLEM SELECTOR PROBLEM 1
Atrial fibrillation
MAIRA (acute kidney injury)
Atrial fibrillation

## 2019-11-14 NOTE — DISCHARGE NOTE PROVIDER - CARE PROVIDERS DIRECT ADDRESSES
[email or web address]

## 2019-11-16 LAB
CULTURE RESULTS: SIGNIFICANT CHANGE UP
CULTURE RESULTS: SIGNIFICANT CHANGE UP
SPECIMEN SOURCE: SIGNIFICANT CHANGE UP
SPECIMEN SOURCE: SIGNIFICANT CHANGE UP

## 2019-11-18 DIAGNOSIS — Z90.49 ACQUIRED ABSENCE OF OTHER SPECIFIED PARTS OF DIGESTIVE TRACT: ICD-10-CM

## 2019-11-18 DIAGNOSIS — R19.7 DIARRHEA, UNSPECIFIED: ICD-10-CM

## 2019-11-18 DIAGNOSIS — F17.210 NICOTINE DEPENDENCE, CIGARETTES, UNCOMPLICATED: ICD-10-CM

## 2019-11-18 DIAGNOSIS — Z79.899 OTHER LONG TERM (CURRENT) DRUG THERAPY: ICD-10-CM

## 2019-11-18 DIAGNOSIS — C85.80 OTHER SPECIFIED TYPES OF NON-HODGKIN LYMPHOMA, UNSPECIFIED SITE: ICD-10-CM

## 2019-11-18 DIAGNOSIS — I27.20 PULMONARY HYPERTENSION, UNSPECIFIED: ICD-10-CM

## 2019-11-18 DIAGNOSIS — I95.9 HYPOTENSION, UNSPECIFIED: ICD-10-CM

## 2019-11-18 DIAGNOSIS — I48.20 CHRONIC ATRIAL FIBRILLATION, UNSPECIFIED: ICD-10-CM

## 2019-11-18 DIAGNOSIS — Z79.01 LONG TERM (CURRENT) USE OF ANTICOAGULANTS: ICD-10-CM

## 2019-11-18 DIAGNOSIS — N18.4 CHRONIC KIDNEY DISEASE, STAGE 4 (SEVERE): ICD-10-CM

## 2019-11-18 DIAGNOSIS — I50.22 CHRONIC SYSTOLIC (CONGESTIVE) HEART FAILURE: ICD-10-CM

## 2019-11-18 DIAGNOSIS — E86.0 DEHYDRATION: ICD-10-CM

## 2019-11-18 DIAGNOSIS — D47.3 ESSENTIAL (HEMORRHAGIC) THROMBOCYTHEMIA: ICD-10-CM

## 2019-11-18 DIAGNOSIS — E87.0 HYPEROSMOLALITY AND HYPERNATREMIA: ICD-10-CM

## 2019-11-18 DIAGNOSIS — D63.1 ANEMIA IN CHRONIC KIDNEY DISEASE: ICD-10-CM

## 2019-11-18 DIAGNOSIS — I13.0 HYPERTENSIVE HEART AND CHRONIC KIDNEY DISEASE WITH HEART FAILURE AND STAGE 1 THROUGH STAGE 4 CHRONIC KIDNEY DISEASE, OR UNSPECIFIED CHRONIC KIDNEY DISEASE: ICD-10-CM

## 2019-11-18 DIAGNOSIS — N17.9 ACUTE KIDNEY FAILURE, UNSPECIFIED: ICD-10-CM

## 2019-11-26 ENCOUNTER — APPOINTMENT (OUTPATIENT)
Dept: HEART AND VASCULAR | Facility: CLINIC | Age: 64
End: 2019-11-26
Payer: MEDICARE

## 2019-11-26 VITALS
BODY MASS INDEX: 26.9 KG/M2 | SYSTOLIC BLOOD PRESSURE: 180 MMHG | HEART RATE: 86 BPM | OXYGEN SATURATION: 98 % | WEIGHT: 190 LBS | DIASTOLIC BLOOD PRESSURE: 103 MMHG | TEMPERATURE: 98 F

## 2019-11-26 PROCEDURE — 99213 OFFICE O/P EST LOW 20 MIN: CPT | Mod: 25

## 2019-11-26 PROCEDURE — 93000 ELECTROCARDIOGRAM COMPLETE: CPT

## 2019-11-26 PROCEDURE — ZZZZZ: CPT

## 2019-11-26 NOTE — REASON FOR VISIT
[Follow-Up - From Hospitalization] : follow-up of a recent hospitalization for [Atrial Fibrillation] : atrial fibrillation [Discharge Date: ___] : Discharge Date: [unfilled]

## 2019-11-26 NOTE — DISCUSSION/SUMMARY
[FreeTextEntry1] : The number of diagnostic and/or management options include:\par \par SOB -resolved, repeat Echo, EF now normalized HFrecEF continue current regimen\par \par Afib - rate controlled on BB, coumadin dose per Oncologist INR checks\par \par CAD - the patient is not in active chest pain, no new sob or lockett, ekg is unchanged and stable. on guideline directed medical therapy.\par \par HTN - the patient BP is at goal, ambi bp monitor at goal, no jvd/rale/edema on exam, continue current medicines ambi sbp 130s, controlled\par \par CV Prevention - \par ·	Advised SBP guidelines based on ACC/AHA and SPRINT trial increased CAD PAD and mortality \par ·	Assessed willingness to attempt - contemplation stage\par ·	Agree to no added salt and added sugar diet  - prevention medicine referral, nutritionist\par ·	Assist with resources provided - prevention medicine referral, nutritionist, individual counseling\par ·	Arrange ·	Follow-up arranged - next scheduled visit\par \par Prescription drug management: no change\par Review of medicine test: EKG\par Independent review of images: CXR, CT Scan, Stress tests, as applicable\par Review of clinical lab tests: lipid profile, hgA1c, cbc, bnp, metabolic panels, as applicable\par \par 34 min of non-face-to-face prolonged service provided in relation to the complexity of the patient's AFIB RVR MAIRA that relates to (face-to-face) care that has or will occur and ongoing patient management, including review of clinical lab tests, review of radiology tests, review of medication, and review and summarization of recent hospital inpatient admission, progress and discharge records to assist in this exam. \par  \par \par

## 2019-11-26 NOTE — PHYSICAL EXAM
[Normal Oral Mucosa] : normal oral mucosa [No Oral Pallor] : no oral pallor [No Oral Cyanosis] : no oral cyanosis [Normal Jugular Venous A Waves Present] : normal jugular venous A waves present [Normal Jugular Venous V Waves Present] : normal jugular venous V waves present [No Jugular Venous Loyola A Waves] : no jugular venous loyola A waves [Respiration, Rhythm And Depth] : normal respiratory rhythm and effort [Auscultation Breath Sounds / Voice Sounds] : lungs were clear to auscultation bilaterally [Exaggerated Use Of Accessory Muscles For Inspiration] : no accessory muscle use [Heart Rate And Rhythm] : heart rate and rhythm were normal [Heart Sounds] : normal S1 and S2 [Abdomen Soft] : soft [Murmurs] : no murmurs present [Abdomen Tenderness] : non-tender [Abdomen Mass (___ Cm)] : no abdominal mass palpated [Abnormal Walk] : normal gait [Gait - Sufficient For Exercise Testing] : the gait was sufficient for exercise testing [Nail Clubbing] : no clubbing of the fingernails [Cyanosis, Localized] : no localized cyanosis [Petechial Hemorrhages (___cm)] : no petechial hemorrhages [No Venous Stasis] : no venous stasis [] : no rash [Skin Color & Pigmentation] : normal skin color and pigmentation [No Skin Ulcers] : no skin ulcer [Skin Lesions] : no skin lesions [No Xanthoma] : no  xanthoma was observed

## 2019-11-26 NOTE — HISTORY OF PRESENT ILLNESS
[FreeTextEntry1] : 65yo M PMH HTN, stage 4 CKD (baseline Cr ; last 4.0 on 10/30/2019 \par outpatient), T Cell lymphoma (diagnosed 17 years ago, on chemo Romidepsin every \par Wednesday, last infusion 11/6), chronic AFib (on Coumadin), systolic CHF (EF \par 40-45% in 2017), severe pulmonary HTN, recent tx for bronchitis s/p 7 days \par Azithromycin (completed 11/10), who presented to the ED with dizziness, \par lightheadedness, and palpitations x 3 days. ED vitals significant for \par hypotension (BP 84/64), w/ wife noting BP at home to be in 70s from a baseline \par of -150, in AF with RVR (HR 130s). Pt admitted to cardiac telemetry for \par symptomatic AF with RVR. Pt converted to NSR. Treated with IV hydration for MAIRA \par which was deemed likely prerenal in setting of profound relative hypotension \par from baseline -150s with improvement in creatinine during admission. \par \par Interval Symptoms: usual state of health, JUST TOOK BP MEDS 15 min ago \par pnd - no\par orthopnea - no\par leg edema - no\par syncope - no\par dizziness - no\par palpitations - no\par leg pain - no\par SOB - no\par chest pain - no\par \par location: chest\par duration: none\par  modifying factors: none\par timing: none\par severity: 0/10\par \par EKG unchanged from prior (in chart)\par consultation notes reviewed where appropriate\par \par Medications: the patient is adherent with his medication regimen. denies medication side effects. \par Disease Management: the patient is doing well with goals. \par \par

## 2020-01-21 ENCOUNTER — NON-APPOINTMENT (OUTPATIENT)
Age: 65
End: 2020-01-21

## 2020-01-21 ENCOUNTER — APPOINTMENT (OUTPATIENT)
Dept: HEART AND VASCULAR | Facility: CLINIC | Age: 65
End: 2020-01-21
Payer: MEDICARE

## 2020-01-21 VITALS
BODY MASS INDEX: 26.34 KG/M2 | HEART RATE: 91 BPM | OXYGEN SATURATION: 98 % | HEIGHT: 70 IN | WEIGHT: 184 LBS | DIASTOLIC BLOOD PRESSURE: 90 MMHG | SYSTOLIC BLOOD PRESSURE: 138 MMHG

## 2020-01-21 PROCEDURE — 93000 ELECTROCARDIOGRAM COMPLETE: CPT | Mod: NC

## 2020-01-21 PROCEDURE — 99213 OFFICE O/P EST LOW 20 MIN: CPT

## 2020-01-21 NOTE — HISTORY OF PRESENT ILLNESS
[Preoperative Visit] : for a medical evaluation prior to surgery [Date of Surgery ___] : on [unfilled] [Surgeon Name ___] : surgeon: [unfilled] [Scheduled Procedure ___] : a [unfilled] [Cardiovascular Disease] : cardiovascular disease [Cardiac Failure] : cardiac failure [Renal Disease] : renal disease [Thromboembolic Problems] : thromboembolic problems [Prior Anesthesia] : Prior anesthesia [Electrocardiogram] : ~T an ECG ~C was performed [Echocardiogram] : ~T an echocardiogram ~C was performed [Metabolic Capacity ___Mets%] : The patient has a metabolic capacity of [unfilled] Mets%  [Cardiovascular Stress Test] : a cardiac stress test ~T ~C was performed [Good] : Good [Fever] : no fever [Fatigue] : no fatigue [Chest Pain] : no chest pain [Chills] : no chills [Dyspnea] : no dyspnea [Cough] : no cough [Dysuria] : no dysuria [Nausea] : no nausea [Urinary Frequency] : no urinary frequency [Diarrhea] : no diarrhea [Vomiting] : no vomiting [Easy Bruising] : no easy bruising [Lower Extremity Swelling] : no lower extremity swelling [Abdominal Pain] : no abdominal pain [Diabetes] : no diabetes [Pulmonary Disease] : no pulmonary disease [Nicotine Dependence] : no nicotine dependence [Anti-Platelet Agents] : no anti-platelet agents [GI Disease] : no gastrointestinal disease [Alcohol Use] : no  alcohol use [Sleep Apnea] : no sleep apnea [Frequent use of NSAIDs] : no use of NSAIDs [Transfusion Reaction] : no transfusion reaction [Steroid Use in Last 6 Months] : no steroid use in the last six months [Impaired Immunity] : no impaired immunity [Frequent Aspirin Use] : no frequent aspirin use [Prev Anesthesia Reaction] : no previous anesthesia reaction [Clotting Disorder] : no clotting disorder [Anesthesia Reaction] : no anesthesia reaction [Sudden Death] : no sudden death [Bleeding Disorder] : no bleeding disorder [de-identified] : Dental Extraction x 9, Bone Graft [FreeTextEntry1] : 63yo M PMH HTN, stage 4 CKD (baseline Cr ; last 4.0 on 10/30/2019 \par outpatient), T Cell lymphoma (diagnosed 17 years ago, on chemo Romidepsin every \par Wednesday, last infusion 11/6), chronic AFib (on Coumadin), systolic CHF (EF \par 60-65% in 2019) pre op for above surgery\par Interval Symptoms: EKG today unchanged from prior (in chart)\par usual state of health, no new complaints\par SOB - no\par chest pain - no\par location: chest\par duration: none\par modifying factors: none\par timing: none\par severity: 0/10\par Medications: the patient is adherent with his medication regimen. denies medication side effects. \par Disease Management: the patient is doing well with goals. \par \par

## 2020-01-21 NOTE — DISCUSSION/SUMMARY
[Procedure Intermediate Risk] : the procedure risk is intermediate [Patient Intermediate Risk] : the patient is an intermediate risk [As per surgery] : as per surgery [Continue] : Continue medications as currently directed [Optimized for Surgery Pending Laboratory Results] : the patient is optimized for surgery pending laboratory results

## 2020-01-21 NOTE — PHYSICAL EXAM
[Normal Oral Mucosa] : normal oral mucosa [No Oral Pallor] : no oral pallor [No Oral Cyanosis] : no oral cyanosis [Normal Jugular Venous A Waves Present] : normal jugular venous A waves present [Normal Jugular Venous V Waves Present] : normal jugular venous V waves present [No Jugular Venous Looyla A Waves] : no jugular venous loyola A waves [Respiration, Rhythm And Depth] : normal respiratory rhythm and effort [Exaggerated Use Of Accessory Muscles For Inspiration] : no accessory muscle use [Heart Rate And Rhythm] : heart rate and rhythm were normal [Auscultation Breath Sounds / Voice Sounds] : lungs were clear to auscultation bilaterally [Murmurs] : no murmurs present [Heart Sounds] : normal S1 and S2 [Abdomen Soft] : soft [Abdomen Tenderness] : non-tender [Abdomen Mass (___ Cm)] : no abdominal mass palpated [Abnormal Walk] : normal gait [Gait - Sufficient For Exercise Testing] : the gait was sufficient for exercise testing [Nail Clubbing] : no clubbing of the fingernails [Cyanosis, Localized] : no localized cyanosis [Petechial Hemorrhages (___cm)] : no petechial hemorrhages [Skin Color & Pigmentation] : normal skin color and pigmentation [] : no rash [Skin Lesions] : no skin lesions [No Venous Stasis] : no venous stasis [No Xanthoma] : no  xanthoma was observed [No Skin Ulcers] : no skin ulcer

## 2020-01-22 LAB
ANION GAP SERPL CALC-SCNC: 14 MMOL/L
APTT BLD: 43.6 SEC
BASOPHILS # BLD AUTO: 0.03 K/UL
BASOPHILS NFR BLD AUTO: 0.5 %
BUN SERPL-MCNC: 36 MG/DL
CALCIUM SERPL-MCNC: 9.3 MG/DL
CHLORIDE SERPL-SCNC: 113 MMOL/L
CO2 SERPL-SCNC: 20 MMOL/L
CREAT SERPL-MCNC: 3.98 MG/DL
EOSINOPHIL # BLD AUTO: 0.15 K/UL
EOSINOPHIL NFR BLD AUTO: 2.6 %
GLUCOSE SERPL-MCNC: 92 MG/DL
HCT VFR BLD CALC: 36.2 %
HGB BLD-MCNC: 10.7 G/DL
IMM GRANULOCYTES NFR BLD AUTO: 0.4 %
INR PPP: 1.32 RATIO
LYMPHOCYTES # BLD AUTO: 0.83 K/UL
LYMPHOCYTES NFR BLD AUTO: 14.6 %
MAN DIFF?: NORMAL
MCHC RBC-ENTMCNC: 29.6 GM/DL
MCHC RBC-ENTMCNC: 30.3 PG
MCV RBC AUTO: 102.5 FL
MONOCYTES # BLD AUTO: 0.74 K/UL
MONOCYTES NFR BLD AUTO: 13.1 %
NEUTROPHILS # BLD AUTO: 3.9 K/UL
NEUTROPHILS NFR BLD AUTO: 68.8 %
PLATELET # BLD AUTO: 137 K/UL
POTASSIUM SERPL-SCNC: 4 MMOL/L
PT BLD: 15 SEC
RBC # BLD: 3.53 M/UL
RBC # FLD: 16.9 %
SODIUM SERPL-SCNC: 148 MMOL/L
WBC # FLD AUTO: 5.67 K/UL

## 2020-01-23 LAB — ABO + RH PNL BLD: NORMAL

## 2020-01-28 NOTE — ASU PATIENT PROFILE, ADULT - PMH
Atrial fibrillation    BPH (benign prostatic hyperplasia)    CHF, chronic    CKD (chronic kidney disease)    Gout    H/O pulmonary hypertension    HTN (hypertension)    Lymphoma  t cell; Chemotherapy weekly- wednesday

## 2020-01-29 ENCOUNTER — OUTPATIENT (OUTPATIENT)
Dept: OUTPATIENT SERVICES | Facility: HOSPITAL | Age: 65
LOS: 1 days | Discharge: HOME | End: 2020-01-29
Payer: COMMERCIAL

## 2020-01-29 ENCOUNTER — RESULT REVIEW (OUTPATIENT)
Age: 65
End: 2020-01-29

## 2020-01-29 VITALS
RESPIRATION RATE: 16 BRPM | HEART RATE: 83 BPM | DIASTOLIC BLOOD PRESSURE: 87 MMHG | SYSTOLIC BLOOD PRESSURE: 167 MMHG | OXYGEN SATURATION: 98 %

## 2020-01-29 VITALS
HEIGHT: 71 IN | OXYGEN SATURATION: 98 % | TEMPERATURE: 98 F | DIASTOLIC BLOOD PRESSURE: 94 MMHG | RESPIRATION RATE: 16 BRPM | WEIGHT: 184.75 LBS | HEART RATE: 83 BPM | SYSTOLIC BLOOD PRESSURE: 165 MMHG

## 2020-01-29 DIAGNOSIS — Z90.49 ACQUIRED ABSENCE OF OTHER SPECIFIED PARTS OF DIGESTIVE TRACT: Chronic | ICD-10-CM

## 2020-01-29 PROCEDURE — D7311: CPT

## 2020-01-29 PROCEDURE — 88300 SURGICAL PATH GROSS: CPT | Mod: 26

## 2020-01-29 PROCEDURE — D7140: CPT

## 2020-01-29 PROCEDURE — 88300 SURGICAL PATH GROSS: CPT

## 2020-01-29 RX ORDER — ONDANSETRON 8 MG/1
4 TABLET, FILM COATED ORAL EVERY 6 HOURS
Refills: 0 | Status: DISCONTINUED | OUTPATIENT
Start: 2020-01-29 | End: 2020-01-29

## 2020-01-29 RX ORDER — SODIUM CHLORIDE 9 MG/ML
1000 INJECTION, SOLUTION INTRAVENOUS
Refills: 0 | Status: DISCONTINUED | OUTPATIENT
Start: 2020-01-29 | End: 2020-01-29

## 2020-01-29 RX ORDER — OXYCODONE AND ACETAMINOPHEN 5; 325 MG/1; MG/1
1 TABLET ORAL EVERY 6 HOURS
Refills: 0 | Status: DISCONTINUED | OUTPATIENT
Start: 2020-01-29 | End: 2020-01-29

## 2020-01-29 RX ORDER — ACETAMINOPHEN WITH CODEINE 300MG-30MG
1 TABLET ORAL
Qty: 15 | Refills: 0
Start: 2020-01-29

## 2020-01-29 RX ORDER — AMPICILLIN SODIUM AND SULBACTAM SODIUM 250; 125 MG/ML; MG/ML
3 INJECTION, POWDER, FOR SUSPENSION INTRAMUSCULAR; INTRAVENOUS ONCE
Refills: 0 | Status: DISCONTINUED | OUTPATIENT
Start: 2020-01-29 | End: 2020-01-29

## 2020-01-29 RX ORDER — AMOXICILLIN 250 MG/5ML
1 SUSPENSION, RECONSTITUTED, ORAL (ML) ORAL
Qty: 14 | Refills: 0
Start: 2020-01-29 | End: 2020-02-04

## 2020-01-29 RX ORDER — HYDROMORPHONE HYDROCHLORIDE 2 MG/ML
0.5 INJECTION INTRAMUSCULAR; INTRAVENOUS; SUBCUTANEOUS
Refills: 0 | Status: DISCONTINUED | OUTPATIENT
Start: 2020-01-29 | End: 2020-01-29

## 2020-02-04 LAB — SURGICAL PATHOLOGY STUDY: SIGNIFICANT CHANGE UP

## 2020-02-05 ENCOUNTER — APPOINTMENT (OUTPATIENT)
Dept: HEART AND VASCULAR | Facility: CLINIC | Age: 65
End: 2020-02-05

## 2020-02-13 ENCOUNTER — APPOINTMENT (OUTPATIENT)
Dept: NEPHROLOGY | Facility: CLINIC | Age: 65
End: 2020-02-13
Payer: MEDICARE

## 2020-02-13 VITALS
SYSTOLIC BLOOD PRESSURE: 125 MMHG | BODY MASS INDEX: 26.34 KG/M2 | DIASTOLIC BLOOD PRESSURE: 80 MMHG | HEIGHT: 70 IN | HEART RATE: 80 BPM | WEIGHT: 184 LBS

## 2020-02-13 PROCEDURE — 99214 OFFICE O/P EST MOD 30 MIN: CPT

## 2020-02-13 RX ORDER — WARFARIN SODIUM 4 MG/1
4 TABLET ORAL
Refills: 0 | Status: DISCONTINUED | COMMUNITY
End: 2020-02-13

## 2020-02-13 RX ORDER — FUROSEMIDE 40 MG/1
40 TABLET ORAL DAILY
Qty: 270 | Refills: 3 | Status: DISCONTINUED | COMMUNITY
End: 2020-02-13

## 2020-02-13 RX ORDER — VALSARTAN 160 MG/1
160 TABLET, COATED ORAL
Qty: 90 | Refills: 3 | Status: DISCONTINUED | COMMUNITY
End: 2020-02-13

## 2020-02-14 NOTE — REASON FOR VISIT
[Follow-Up] : a follow-up visit [Spouse] : spouse [FreeTextEntry1] : for CKD 4, HTN,  and hyperuricemia

## 2020-02-14 NOTE — HISTORY OF PRESENT ILLNESS
[FreeTextEntry1] : 63 yo man with CKD 4, hypertension, hyperuricemia, here for f/u evaluation.\par continues on weekly chemotherapy. (T-cell lymphoma)\par Energy and appetite okay.\par Of note was admitted on 11/11/19 - for a fib. was switched from coumadin to Eliquis at 5 mg BID\par No flank pain, dysuria, hematuria or frothy urine\par No recurrent gout attacks -- remains on high dose allopurinol and colchicine\par No SOB, CP\par \par \par PMH: w/ PMHx of gout, atrial fibrillation on coumadin, sCHF w/ EF of 40-45%, HTN, BPH, CKD\par  T cell lymphoma  since early 2000's

## 2020-02-14 NOTE — PHYSICAL EXAM
[General Appearance - In No Acute Distress] : in no acute distress [General Appearance - Well Nourished] : well nourished [General Appearance - Alert] : alert [Extraocular Movements] : extraocular movements were intact [Sclera] : the sclera and conjunctiva were normal [General Appearance - Well Developed] : well developed [Neck Appearance] : the appearance of the neck was normal [Examination Of The Oral Cavity] : the lips and gums were normal [Outer Ear] : the ears and nose were normal in appearance [Jugular Venous Distention Increased] : there was no jugular-venous distention [Murmurs] : no murmurs [Heart Sounds Gallop] : no gallops [Heart Sounds Pericardial Friction Rub] : no pericardial rub [Supraclavicular Lymph Nodes Enlarged Bilaterally] : supraclavicular [Cervical Lymph Nodes Enlarged Anterior Bilaterally] : anterior cervical [No CVA Tenderness] : no ~M costovertebral angle tenderness [Skin Turgor] : normal skin turgor [No Spinal Tenderness] : no spinal tenderness [Abnormal Walk] : normal gait [] : no rash [Impaired Insight] : insight and judgment were intact [Affect] : the affect was normal [Oriented To Time, Place, And Person] : oriented to person, place, and time [Memory Recent] : recent memory was not impaired [FreeTextEntry1] : tr LE edema

## 2020-02-14 NOTE — ASSESSMENT
[FreeTextEntry1] : Reviewed all labs in detail with patient and his wife from  2/12/20 \par 63 yo man with CKD 4, Hyperuremia, HTN. \par \par - CKD 4: -  creat  4.0 - stable for the past 6 months \par - gfr 15\par - pt aware of possible need for RRT in future\par - will consider home hemo - need for AVF vs THC\par - hyperkalemia -  resolved K= 4.3\par \par - Metabolic bone disease\par -  > 500 \par - On Calcitriol 0.25 mg po daily will increase to 0.5 \par \par - gout -  no attacks- continues to have hyperuricemia + lymphoma- c/w high dose allopurinol and use of colchicine daily \par - hyperuremia - on allopurinol 100 mg po bid - uric acid 6.1\par \par - HTN -BP controlled\par \par - LE edema-controlled\par - has not been on lasix\par \par - metabolic acidosis- CO2- 20 - never started the bicarb suggested last OV- okaty to defr until CO2 drops below 20\par - will monitor\par \par -proteinuria - needs repeat urine prot/creat ratio-\par - hematuria-  repeat u/a- urine cytology 2018 was negative\par - coumadin was switched to Eliquis 5 mg po bid - will discuss with Dr Rowell to decrease to 2.5 mg po bid\par \par f/u in 4 months

## 2020-02-25 ENCOUNTER — APPOINTMENT (OUTPATIENT)
Dept: HEART AND VASCULAR | Facility: CLINIC | Age: 65
End: 2020-02-25
Payer: MEDICARE

## 2020-02-25 VITALS
WEIGHT: 177 LBS | HEART RATE: 82 BPM | OXYGEN SATURATION: 98 % | DIASTOLIC BLOOD PRESSURE: 88 MMHG | SYSTOLIC BLOOD PRESSURE: 138 MMHG | HEIGHT: 70 IN | BODY MASS INDEX: 25.34 KG/M2

## 2020-02-25 PROBLEM — N40.0 BENIGN PROSTATIC HYPERPLASIA WITHOUT LOWER URINARY TRACT SYMPTOMS: Chronic | Status: ACTIVE | Noted: 2020-01-28

## 2020-02-25 PROCEDURE — 99214 OFFICE O/P EST MOD 30 MIN: CPT

## 2020-02-25 NOTE — PHYSICAL EXAM
[No Oral Pallor] : no oral pallor [Normal Oral Mucosa] : normal oral mucosa [No Oral Cyanosis] : no oral cyanosis [Normal Jugular Venous V Waves Present] : normal jugular venous V waves present [Normal Jugular Venous A Waves Present] : normal jugular venous A waves present [No Jugular Venous Loyola A Waves] : no jugular venous loyola A waves [Respiration, Rhythm And Depth] : normal respiratory rhythm and effort [Exaggerated Use Of Accessory Muscles For Inspiration] : no accessory muscle use [Heart Rate And Rhythm] : heart rate and rhythm were normal [Auscultation Breath Sounds / Voice Sounds] : lungs were clear to auscultation bilaterally [Heart Sounds] : normal S1 and S2 [Murmurs] : no murmurs present [Abdomen Tenderness] : non-tender [Abdomen Soft] : soft [Abnormal Walk] : normal gait [Abdomen Mass (___ Cm)] : no abdominal mass palpated [Gait - Sufficient For Exercise Testing] : the gait was sufficient for exercise testing [Nail Clubbing] : no clubbing of the fingernails [Cyanosis, Localized] : no localized cyanosis [Skin Color & Pigmentation] : normal skin color and pigmentation [Petechial Hemorrhages (___cm)] : no petechial hemorrhages [] : no rash [No Venous Stasis] : no venous stasis [No Skin Ulcers] : no skin ulcer [Skin Lesions] : no skin lesions [No Xanthoma] : no  xanthoma was observed

## 2020-02-25 NOTE — DISCUSSION/SUMMARY
[Atrial Fibrillation] : atrial fibrillation [Rate Control] : rate control [Anticoagulation] : anticoagulation [Systolic Heart Failure] : systolic heart failure [Responding to Treatment] : responding to treatment [Outpatient Evaluation] : outpatient evaluation [ECG] : ECG [Echocardiogram] : echocardiogram [Hypertension] : hypertension [Stable] : stable [Medication Changes Per Orders] : as documented in orders

## 2020-02-25 NOTE — HISTORY OF PRESENT ILLNESS
[FreeTextEntry1] : 63yo M PMH HTN, stage 4 CKD (baseline Cr ; last 4.0 on 10/30/2019 \par outpatient), T Cell lymphoma (diagnosed 17 years ago, on chemo Romidepsin every \par Wednesday, last infusion 11/6), chronic AFib (on Coumadin), systolic CHF (EF \par 60-65% in 2019) \par Interval Symptoms: EKG today unchanged from prior (in chart)\par usual state of health, no new complaints\par SOB - no\par chest pain - no\par location: chest\par duration: none\par modifying factors: none\par timing: none\par severity: 0/10\par Medications: the patient is adherent with his medication regimen. denies medication side effects. \par Disease Management: the patient is doing well with goals. \par case d/w dr saez lowered dose of eliquis to 2.5bid

## 2020-04-27 ENCOUNTER — RESULT CHARGE (OUTPATIENT)
Age: 65
End: 2020-04-27

## 2020-04-27 ENCOUNTER — APPOINTMENT (OUTPATIENT)
Dept: HEART AND VASCULAR | Facility: CLINIC | Age: 65
End: 2020-04-27
Payer: MEDICARE

## 2020-04-27 PROCEDURE — 99441: CPT

## 2020-05-08 ENCOUNTER — APPOINTMENT (OUTPATIENT)
Dept: HEART AND VASCULAR | Facility: CLINIC | Age: 65
End: 2020-05-08
Payer: MEDICARE

## 2020-05-08 PROCEDURE — 99214 OFFICE O/P EST MOD 30 MIN: CPT | Mod: 95

## 2020-05-08 RX ORDER — APIXABAN 5 MG/1
5 TABLET, FILM COATED ORAL
Qty: 180 | Refills: 0 | Status: DISCONTINUED | COMMUNITY
Start: 2020-05-05 | End: 2020-05-08

## 2020-05-08 RX ORDER — AMLODIPINE BESYLATE 10 MG/1
10 TABLET ORAL DAILY
Qty: 90 | Refills: 2 | Status: DISCONTINUED | COMMUNITY
End: 2020-05-08

## 2020-05-21 ENCOUNTER — APPOINTMENT (OUTPATIENT)
Dept: NEPHROLOGY | Facility: CLINIC | Age: 65
End: 2020-05-21
Payer: MEDICARE

## 2020-05-21 VITALS — HEART RATE: 74 BPM | DIASTOLIC BLOOD PRESSURE: 76 MMHG | SYSTOLIC BLOOD PRESSURE: 119 MMHG

## 2020-05-21 PROCEDURE — 99215 OFFICE O/P EST HI 40 MIN: CPT

## 2020-05-21 NOTE — REVIEW OF SYSTEMS
[Lower Ext Edema] : lower extremity edema [Orthopnea] : orthopnea [Negative] : Integumentary [Nosebleeds] : no nosebleeds [Sore Throat] : no sore throat [Shortness Of Breath] : no shortness of breath [Palpitations] : no palpitations [Cough] : no cough [Wheezing] : no wheezing [PND] : no PND [SOB on Exertion] : no shortness of breath during exertion [Abdominal Pain] : no abdominal pain [Vomiting] : no vomiting [Diarrhea] : no diarrhea [Constipation] : no constipation [Arthralgias] : no arthralgias [Heartburn] : no heartburn [Joint Pain] : no joint pain [Muscle Weakness] : no muscle weakness [Easy Bleeding] : no tendency for easy bleeding [Easy Bruising] : no tendency for easy bruising [FreeTextEntry5] : Improved LE edema since hospitalization

## 2020-05-21 NOTE — PHYSICAL EXAM
[General Appearance - Alert] : alert [General Appearance - Well Developed] : well developed [General Appearance - Well Nourished] : well nourished [General Appearance - In No Acute Distress] : in no acute distress [Sclera] : the sclera and conjunctiva were normal [Extraocular Movements] : extraocular movements were intact [Outer Ear] : the ears and nose were normal in appearance [Examination Of The Oral Cavity] : the lips and gums were normal [Neck Appearance] : the appearance of the neck was normal [Heart Sounds Gallop] : no gallops [Murmurs] : no murmurs [Heart Sounds Pericardial Friction Rub] : no pericardial rub [Supraclavicular Lymph Nodes Enlarged Bilaterally] : supraclavicular [Cervical Lymph Nodes Enlarged Anterior Bilaterally] : anterior cervical [] : no rash [Abnormal Walk] : normal gait [Skin Turgor] : normal skin turgor [Oriented To Time, Place, And Person] : oriented to person, place, and time [Impaired Insight] : insight and judgment were intact [Memory Recent] : recent memory was not impaired [Affect] : the affect was normal [FreeTextEntry1] : tr LE edema

## 2020-05-21 NOTE — HISTORY OF PRESENT ILLNESS
[FreeTextEntry1] : 63 yo man here for f/u evaluation of CKD 4, hypertension, hyperuricemia.\par spoke with pt and wife in April- creat jumped to 6 from 3.9- and spoke with them again on 5/11-  some CHF per Dr. Marsh- started torsemide (had been diuretics in past)\par today feels much better says his breathing is just about back to normal and ankle edema resolved\par continues on weekly chemotherapy. (T-cell lymphoma)\par Energy and appetite down a bit over past 2 weeks-  starting to improve\par No recurrent gout attacks -- remains on high dose allopurinol and colchicine\par No SOB or CP today\par \par \par PMH: w/ PMHx of gout, atrial fibrillation on coumadin, sCHF w/ EF of 40-45%, HTN, BPH, CKD\par  T cell lymphoma  since early 2000's

## 2020-05-21 NOTE — ASSESSMENT
[FreeTextEntry1] : Reviewed all labs in detail with patient and his wife from  5/20/2020\par 65 yo man with MAIRA and CKD 4, Hyperuremia, HTN\par -MAIRA-  creat down to 5.4; K and CO2- WNL\par -dCHF- now clinically improved and BP on low side- change torsemide t every other day\par - CKD 4: -  presently with MAIRA-  eGFR < 13\par  prior-  egfr 15\par - pt ans wife aware future need for RRT \par we have discussed PD and HD (home and in center) today they eport that they prefer to go with in center HD\par advised to proceed with creation of AVF for next month\par also consider getting local nephrologist  if they decide that they do not want to travel to HD at [address]\par will consider but for now will focus on AVF \par - hyperkalemia -  resolved K 4.0\par - Metabolic bone disease\par will need repeat labs- for now c/w Calcitriol 0.5 mcg po daily \par - gout -  no attacks- continues to have hyperuricemia + lymphoma- c/w high dose allopurinol and use of colchicine daily \par - hyperuremia - c/w  on allopurinol 100 mg po bid\par - HTN -BP controlled\par - LE edema improved adjusting diuretic as above\par - metabolic acidosis- CO2 > 20- okay to defer NaHCO3 tabs \par -proteinuria - needs repeat urine prot/creat ratio-\par -Afib  c/w eliquis\par \par f/u in 2 months

## 2020-06-04 ENCOUNTER — APPOINTMENT (OUTPATIENT)
Dept: VASCULAR SURGERY | Facility: CLINIC | Age: 65
End: 2020-06-04
Payer: MEDICARE

## 2020-06-04 PROCEDURE — 99204 OFFICE O/P NEW MOD 45 MIN: CPT

## 2020-06-04 PROCEDURE — 93986 DUP-SCAN HEMO COMPL UNI STD: CPT

## 2020-06-05 NOTE — PHYSICAL EXAM
[Normal Heart Sounds] : normal heart sounds [Respiratory Effort] : normal respiratory effort [Ankle Swelling (On Exam)] : not present [2+] : right 2+ [Varicose Veins Of Lower Extremities] : not present [] : not present [Alert] : alert [Oriented to Person] : oriented to person [Oriented to Place] : oriented to place [Oriented to Time] : oriented to time [Calm] : calm [de-identified] : well appearing [de-identified] : palpable veins in BUE arms

## 2020-06-05 NOTE — ASSESSMENT
[FreeTextEntry1] : 63 yo male with PMH of CKD 4, HTN, gout, a fib (Eliquis), BPH, lymphoma, on weekly chemo here for evaluation for dialysis access. He has good veins in the forearm for dialysis access, will attempt radiocephalic AVF in the left arm, had a long discussion with him about no longer using the left arm for chemo, patient understands. Will schedule for next week under regional anesthesia. Patient will get pre op at the patient care center on Wednesday.

## 2020-06-05 NOTE — HISTORY OF PRESENT ILLNESS
[FreeTextEntry1] : 63 yo male with PMH of CKD 4, HTN, gout, a fib (Eliquis), BPH, lymphoma, on weekly chemo x 19 years through IV in alternating arms. He is here to discuss permanent dialysis access, he is followed by Dr. Guadalupe. GFR down from 15 to 13, will need access within the next month. \par

## 2020-06-05 NOTE — PROCEDURE
[FreeTextEntry1] : US done showing cephalic and basilic vein adequate for dialysis access in thelower arm.

## 2020-06-11 ENCOUNTER — TRANSCRIPTION ENCOUNTER (OUTPATIENT)
Age: 65
End: 2020-06-11

## 2020-06-11 VITALS
WEIGHT: 158.95 LBS | TEMPERATURE: 98 F | OXYGEN SATURATION: 96 % | SYSTOLIC BLOOD PRESSURE: 121 MMHG | RESPIRATION RATE: 18 BRPM | HEART RATE: 71 BPM | DIASTOLIC BLOOD PRESSURE: 75 MMHG | HEIGHT: 71 IN

## 2020-06-12 ENCOUNTER — APPOINTMENT (OUTPATIENT)
Dept: VASCULAR SURGERY | Facility: HOSPITAL | Age: 65
End: 2020-06-12

## 2020-06-12 ENCOUNTER — OUTPATIENT (OUTPATIENT)
Dept: INPATIENT UNIT | Facility: HOSPITAL | Age: 65
LOS: 1 days | Discharge: HOME | End: 2020-06-12
Payer: MEDICARE

## 2020-06-12 VITALS
DIASTOLIC BLOOD PRESSURE: 84 MMHG | RESPIRATION RATE: 13 BRPM | SYSTOLIC BLOOD PRESSURE: 148 MMHG | HEART RATE: 70 BPM | OXYGEN SATURATION: 98 %

## 2020-06-12 DIAGNOSIS — Z41.9 ENCOUNTER FOR PROCEDURE FOR PURPOSES OTHER THAN REMEDYING HEALTH STATE, UNSPECIFIED: Chronic | ICD-10-CM

## 2020-06-12 DIAGNOSIS — Z90.49 ACQUIRED ABSENCE OF OTHER SPECIFIED PARTS OF DIGESTIVE TRACT: Chronic | ICD-10-CM

## 2020-06-12 PROCEDURE — 36821 AV FUSION DIRECT ANY SITE: CPT | Mod: GC

## 2020-06-12 PROCEDURE — 36818 AV FUSE UPPR ARM CEPHALIC: CPT

## 2020-06-12 RX ORDER — ACETAMINOPHEN 500 MG
650 TABLET ORAL EVERY 6 HOURS
Refills: 0 | Status: DISCONTINUED | OUTPATIENT
Start: 2020-06-12 | End: 2020-06-12

## 2020-06-12 NOTE — PACU DISCHARGE NOTE - MENTAL STATUS: PATIENT PARTICIPATION
Please notify the patient of lab results showing congestion 1 week ago  We have decided that he would take a week of enhanced diuresis  How is he doing now? Did he lose any weight?  Is his BP ok?  .  Follow-up as planned. Thanks Awake

## 2020-06-12 NOTE — BRIEF OPERATIVE NOTE - OPERATION/FINDINGS
Creation of left upper extremity radiocephalic arteriovenous fistula at the wrist. 3 mm vein, 4 mm artery. Good thrill and bruit upon completion. Hemostasis achieved, wound closed primarily. Good thrill and bruit confirmed once again.

## 2020-06-12 NOTE — PACU DISCHARGE NOTE - COMMENTS
Discharge criteria met. Patient hemodynamically stable. Left arm AV fistula site dry, clean and intact. Patient aware RE: LLE limb precautions  .Discharge instructions given to patient. Patient verbalized and demonstrated understanding.   Patient escorted to lobby via w/c by CNA. Spouse was waiting in lobby to escort patient home.

## 2020-06-25 ENCOUNTER — APPOINTMENT (OUTPATIENT)
Dept: VASCULAR SURGERY | Facility: CLINIC | Age: 65
End: 2020-06-25
Payer: MEDICARE

## 2020-06-25 PROCEDURE — 99024 POSTOP FOLLOW-UP VISIT: CPT

## 2020-06-29 NOTE — REASON FOR VISIT
[de-identified] : left arm radiocephalic AVF [de-identified] : 6/12/2020 [de-identified] : 65 year old male s/p left arm radiocepahlic AVF, he is almost 2 weeks post op, doing well, no issues. Please with procedure.

## 2020-06-29 NOTE — PHYSICAL EXAM
[Normal Heart Sounds] : normal heart sounds [Respiratory Effort] : normal respiratory effort [2+] : right 2+ [Calm] : calm [de-identified] : well appearing [de-identified] : left wrist incision healing well, no infection

## 2020-07-23 ENCOUNTER — APPOINTMENT (OUTPATIENT)
Dept: VASCULAR SURGERY | Facility: CLINIC | Age: 65
End: 2020-07-23
Payer: MEDICARE

## 2020-07-23 PROCEDURE — 93990 DOPPLER FLOW TESTING: CPT

## 2020-07-23 PROCEDURE — 99024 POSTOP FOLLOW-UP VISIT: CPT

## 2020-07-24 NOTE — PROCEDURE
[FreeTextEntry1] : Left radiocephalic AVF with volume flow of 2442, small branch coming off fistula in the mid forearm running laterally.

## 2020-07-24 NOTE — REASON FOR VISIT
[de-identified] : left arm radiocephalic AVF [de-identified] : 6/12/2020 [de-identified] : 65 year old male s/p left arm radiocepahlic AVF, he is now 6 weeks post op, doing well, no issues, not currently on dialysis, has an appointment to see Dr. Guadalupe in September to recheck labs.

## 2020-07-24 NOTE — DISCUSSION/SUMMARY
[FreeTextEntry1] : 65 year old male s/p left arm radiocepahlic AVF 6/12/2020. Incision healed. He is cleared to use the fistula when he starts dialysis.

## 2020-07-24 NOTE — PHYSICAL EXAM
[Normal Heart Sounds] : normal heart sounds [Respiratory Effort] : normal respiratory effort [2+] : left 2+ [Calm] : calm [de-identified] : well appearing [de-identified] : left wrist incision healing well, no infection

## 2020-09-04 ENCOUNTER — NON-APPOINTMENT (OUTPATIENT)
Age: 65
End: 2020-09-04

## 2020-09-04 ENCOUNTER — APPOINTMENT (OUTPATIENT)
Dept: HEART AND VASCULAR | Facility: CLINIC | Age: 65
End: 2020-09-04
Payer: MEDICARE

## 2020-09-04 VITALS
BODY MASS INDEX: 24.34 KG/M2 | HEART RATE: 74 BPM | HEIGHT: 70 IN | DIASTOLIC BLOOD PRESSURE: 90 MMHG | SYSTOLIC BLOOD PRESSURE: 170 MMHG | WEIGHT: 170 LBS | TEMPERATURE: 97.6 F | OXYGEN SATURATION: 98 %

## 2020-09-04 PROCEDURE — 93000 ELECTROCARDIOGRAM COMPLETE: CPT

## 2020-09-04 PROCEDURE — 99214 OFFICE O/P EST MOD 30 MIN: CPT

## 2020-09-04 NOTE — HISTORY OF PRESENT ILLNESS
[FreeTextEntry1] : 63yo M PMH HTN, stage 4 CKD (baseline Cr ; last 4.0 on 10/30/2019 outpatient), T Cell lymphoma dx 2002 on chemo Romidepsin every Wednesday, chronic AFib on eliquis 2.5mg BID, mod/severe LVH (June 2019 ECHO). \par \par Feeling good.  Nochest pain or SOB.  \par Takes BP at home 130s/80s average.  \par Had Left arm AVF placed June 2020.  Has not initated dialysis yet (followed by Dr. Guadalupe). \par \par EKG: NSR, lateral STD\par ECHO June 2019: Normal LVEF, mod/severe LVH\par Pharm Nuc Stress 2017:  no ischemia, LVEF 35%

## 2020-09-04 NOTE — ASSESSMENT
[FreeTextEntry1] : 65 M\par \par Systolic CHF - EF recovered, normal on June 2019 echo.  Secondary to chemo?\par Afib - in NSR today\par HTN\par HLD\par T Cell Lymphoma - on chemo \par CKD\par \par EKG: NSR, lateral STD\par ECHO June 2019: Normal LVEF, mod/severe LVH\par Pharm Nuc Stress 2017:  no ischemia, LVEF 35%\par \par - no signs of ischemia/heart failure\par - cont eliquis 2.5mg BID for afib (renally dosed)\par - advised to log his BPs\par - cont toprol and torsemide\par

## 2020-10-13 ENCOUNTER — APPOINTMENT (OUTPATIENT)
Dept: NEPHROLOGY | Facility: CLINIC | Age: 65
End: 2020-10-13
Payer: MEDICARE

## 2020-10-13 VITALS — DIASTOLIC BLOOD PRESSURE: 80 MMHG | HEART RATE: 66 BPM | SYSTOLIC BLOOD PRESSURE: 150 MMHG

## 2020-10-13 DIAGNOSIS — N18.4 CHRONIC KIDNEY DISEASE, STAGE 4 (SEVERE): ICD-10-CM

## 2020-10-13 PROCEDURE — 99214 OFFICE O/P EST MOD 30 MIN: CPT

## 2020-10-13 NOTE — ASSESSMENT
[FreeTextEntry1] : Reviewed all labs in detail with patient and his wife from 10/7/2020\par 66 yo man with MAIRA and CKD 4, Hyperuremia, HTN\par -CKD 5 creat 4.5- okay eGFR  about 15\par AVF mature\par not uremic\par will go to in-center HD when needed at [facility] dialysis [address]\par -metabolic acidosis- CO2 22- defer NaHCO3 until CO2 < 20\par -dCHF- compensated using torsemide\par - HTN- BP elevated today- change torsemide from QOD to QD x 4 days-\par should help bring BP back down- \par also to consider changed hydralazine\par will forward home BP readings for the next few weeks\par -Metabolic bone disease- c/w Calcitriol 0.5 mcg po daily; Ca/P okay- needs repeat PTH\par - gout -  no attacks- continues to have hyperuricemia + lymphoma- c/w high dose allopurinol and use of colchicine daily \par - hyperuremia - c/w  on allopurinol 200 mg po bid\par - LE edema  trace now, adjusting diuretics as needed\par -proteinuria - needs repeat urine prot/creat ratio-\par -Afib  c/w eliquis\par \par f/u in 2 months

## 2020-10-13 NOTE — PHYSICAL EXAM
[General Appearance - Alert] : alert [General Appearance - In No Acute Distress] : in no acute distress [General Appearance - Well Nourished] : well nourished [General Appearance - Well Developed] : well developed [Sclera] : the sclera and conjunctiva were normal [Extraocular Movements] : extraocular movements were intact [Outer Ear] : the ears and nose were normal in appearance [Examination Of The Oral Cavity] : the lips and gums were normal [Neck Appearance] : the appearance of the neck was normal [Heart Sounds Gallop] : no gallops [Murmurs] : no murmurs [Heart Sounds Pericardial Friction Rub] : no pericardial rub [Cervical Lymph Nodes Enlarged Anterior Bilaterally] : anterior cervical [Supraclavicular Lymph Nodes Enlarged Bilaterally] : supraclavicular [Abnormal Walk] : normal gait [Skin Turgor] : normal skin turgor [] : no rash [Oriented To Time, Place, And Person] : oriented to person, place, and time [Impaired Insight] : insight and judgment were intact [Affect] : the affect was normal [Memory Recent] : recent memory was not impaired [___ (cm) Fistula] : [unfilled] (cm) fistula [Bruit] : a bruit was present [Thrill] : a thrill was present [FreeTextEntry1] : left forearm

## 2020-10-13 NOTE — HISTORY OF PRESENT ILLNESS
[FreeTextEntry1] : 66 yo man with CKD 4, hypertension, hyperuricemia, here for f/u evaluation.\par continues on weekly chemotherapy. (T-cell lymphoma)- every Wednesday\par feels washed out for few days afterwards- \par but able to eat after that; no metallic taste; maintaining weight\par No SOB- occ NIELSON- using torsemide every other day\par No recurrent gout attacks -- remains on high dose allopurinol and colchicine\par No CP today\par \par \par PMH: w/ PMHx of gout, atrial fibrillation on coumadin, sCHF w/ EF of 40-45%, HTN, BPH, CKD\par  T cell lymphoma  since early 2000's

## 2020-11-05 ENCOUNTER — APPOINTMENT (OUTPATIENT)
Dept: VASCULAR SURGERY | Facility: CLINIC | Age: 65
End: 2020-11-05
Payer: MEDICARE

## 2020-11-05 PROCEDURE — 93990 DOPPLER FLOW TESTING: CPT

## 2020-11-05 PROCEDURE — 99214 OFFICE O/P EST MOD 30 MIN: CPT

## 2020-11-06 NOTE — PHYSICAL EXAM
[Respiratory Effort] : normal respiratory effort [Normal Heart Sounds] : normal heart sounds [2+] : left 2+ [Calm] : calm [de-identified] : well appearing [de-identified] : left wrist incision healing well, no infection, large palpable vein with good thrill in the forearm.

## 2020-11-06 NOTE — HISTORY OF PRESENT ILLNESS
[FreeTextEntry1] : 65 year old male s/p left arm radiocepahlic AVF 6/12/2020. Followed by Dr. Guadalupe, not currently on dialysis. Getting chemo weekly, does have some dizziness.

## 2020-11-06 NOTE — ASSESSMENT
[FreeTextEntry1] : 65 year old male s/p left arm radiocepahlic AVF 6/12/2020. Followed by Dr. Guadalupe, not currently on dialysis. AVF is patent and ready to use when he starts dialysis. Since he is currently not on dialysis recommend FU every 3 months to check AVF, will return sooner with any issues.

## 2020-11-19 ENCOUNTER — TRANSCRIPTION ENCOUNTER (OUTPATIENT)
Age: 65
End: 2020-11-19

## 2020-11-28 NOTE — REASON FOR VISIT
troponin .167/yes [Follow-Up] : a follow-up visit [Spouse] : spouse [FreeTextEntry1] : for CKD 4, edema, SOB and hyperuricemia

## 2020-12-03 ENCOUNTER — APPOINTMENT (OUTPATIENT)
Dept: NEPHROLOGY | Facility: CLINIC | Age: 65
End: 2020-12-03
Payer: MEDICARE

## 2020-12-03 ENCOUNTER — APPOINTMENT (OUTPATIENT)
Dept: HEART AND VASCULAR | Facility: CLINIC | Age: 65
End: 2020-12-03

## 2020-12-03 PROCEDURE — 99214 OFFICE O/P EST MOD 30 MIN: CPT

## 2020-12-03 PROCEDURE — 99072 ADDL SUPL MATRL&STAF TM PHE: CPT

## 2020-12-05 NOTE — PHYSICAL EXAM
[General Appearance - Alert] : alert [General Appearance - In No Acute Distress] : in no acute distress [General Appearance - Well Nourished] : well nourished [General Appearance - Well Developed] : well developed [Sclera] : the sclera and conjunctiva were normal [Extraocular Movements] : extraocular movements were intact [Outer Ear] : the ears and nose were normal in appearance [Examination Of The Oral Cavity] : the lips and gums were normal [Neck Appearance] : the appearance of the neck was normal [Heart Sounds Gallop] : no gallops [Murmurs] : no murmurs [Heart Sounds Pericardial Friction Rub] : no pericardial rub [Cervical Lymph Nodes Enlarged Anterior Bilaterally] : anterior cervical [Supraclavicular Lymph Nodes Enlarged Bilaterally] : supraclavicular [Abnormal Walk] : normal gait [___ (cm) Fistula] : [unfilled] (cm) fistula [Bruit] : a bruit was present [Thrill] : a thrill was present [Skin Turgor] : normal skin turgor [] : no rash [Oriented To Time, Place, And Person] : oriented to person, place, and time [Impaired Insight] : insight and judgment were intact [Affect] : the affect was normal [Memory Recent] : recent memory was not impaired [FreeTextEntry1] : left forearm

## 2020-12-05 NOTE — HISTORY OF PRESENT ILLNESS
[FreeTextEntry1] : 66 yo man with CKD 5, hypertension, hyperuricemia, here for f/u evaluation.\par continues on weekly chemotherapy. (T-cell lymphoma)-\par has an appetite; no N or metallic taste.\par No SOB-  using torsemide every other day\par No recent gout attacks -- remains on high dose allopurinol and colchicine\par No CP \par Denies flank pain, dysuria, hematuria or frothy urine \par \par \par PMH: w/ PMHx of gout, atrial fibrillation on coumadin, sCHF w/ EF of 40-45%, HTN, BPH, CKD\par  T cell lymphoma  since early 2000's

## 2020-12-05 NOTE — QUALITY MEASURES
[PTH level measured within last] : patient's PTH level measured within the last 12 months [No] : patient is not an appropriate candidate for kidney transplantation evaluation [Patient has proteinuria greater than] : patient has proteinuria greater than 1 gm (spot urine/protein creatinine ratio or a 24hr collection)

## 2020-12-05 NOTE — ASSESSMENT
[FreeTextEntry1] : Reviewed all labs in detail with patient and his wife from 11/4/2020\par 64 yo man with CKD 5, hyperuricemia, CHF, T cell lymphoma, metabolic acidosis\par \par -CKD 5 B/creat  44/4.71 eGFR  < 15; K 3.7- okay\par AVF mature\par not uremic\par will go to in-center HD when needed at [facility] dialysis [address]\par -metabolic acidosis- CO2 22-  stable- defer NaHCO3 until CO2 < 20\par -dCHF- compensated using torsemide\par - HTN- BP above goal- will forward home readings-  consider increasing doxazosin if home BPs up\par also to consider changed hydralazine\par -Metabolic bone disease- c/w Calcitriol 0.5 mcg po daily; Ca/P okay- needs repeat PTH\par - hyperuricemia/gout -  no attacks- continues to have hyperuricemia due to lymphoma- c/w high dose allopurinol and use of colchicine daily \par - LE edema controlled, c/w torsemide\par -proteinuria -low grade- defer RAAS blockade as advanced CKD- \par -Afib  c/w eliquis\par \par f/u in 2 months

## 2020-12-17 ENCOUNTER — TRANSCRIPTION ENCOUNTER (OUTPATIENT)
Age: 65
End: 2020-12-17

## 2020-12-17 ENCOUNTER — INPATIENT (INPATIENT)
Facility: HOSPITAL | Age: 65
LOS: 5 days | Discharge: HOME | DRG: 604 | End: 2020-12-23
Attending: STUDENT IN AN ORGANIZED HEALTH CARE EDUCATION/TRAINING PROGRAM | Admitting: STUDENT IN AN ORGANIZED HEALTH CARE EDUCATION/TRAINING PROGRAM
Payer: MEDICARE

## 2020-12-17 VITALS
WEIGHT: 149.91 LBS | DIASTOLIC BLOOD PRESSURE: 94 MMHG | RESPIRATION RATE: 20 BRPM | SYSTOLIC BLOOD PRESSURE: 168 MMHG | TEMPERATURE: 99 F | HEART RATE: 86 BPM | OXYGEN SATURATION: 100 % | HEIGHT: 71 IN

## 2020-12-17 DIAGNOSIS — Z90.49 ACQUIRED ABSENCE OF OTHER SPECIFIED PARTS OF DIGESTIVE TRACT: Chronic | ICD-10-CM

## 2020-12-17 DIAGNOSIS — Z41.9 ENCOUNTER FOR PROCEDURE FOR PURPOSES OTHER THAN REMEDYING HEALTH STATE, UNSPECIFIED: Chronic | ICD-10-CM

## 2020-12-17 LAB
ALBUMIN SERPL ELPH-MCNC: 2.9 G/DL — LOW (ref 3.3–5)
ALP SERPL-CCNC: 96 U/L — SIGNIFICANT CHANGE UP (ref 40–120)
ALT FLD-CCNC: <5 U/L — LOW (ref 10–45)
ANION GAP SERPL CALC-SCNC: 13 MMOL/L — SIGNIFICANT CHANGE UP (ref 5–17)
ANISOCYTOSIS BLD QL: SIGNIFICANT CHANGE UP
APTT BLD: 39 SEC — HIGH (ref 27.5–35.5)
AST SERPL-CCNC: 7 U/L — LOW (ref 10–40)
BASOPHILS # BLD AUTO: 0 K/UL — SIGNIFICANT CHANGE UP (ref 0–0.2)
BASOPHILS NFR BLD AUTO: 0 % — SIGNIFICANT CHANGE UP (ref 0–2)
BILIRUB SERPL-MCNC: 0.5 MG/DL — SIGNIFICANT CHANGE UP (ref 0.2–1.2)
BLD GP AB SCN SERPL QL: NEGATIVE — SIGNIFICANT CHANGE UP
BUN SERPL-MCNC: 32 MG/DL — HIGH (ref 7–23)
BURR CELLS BLD QL SMEAR: SLIGHT — SIGNIFICANT CHANGE UP
CALCIUM SERPL-MCNC: 8.8 MG/DL — SIGNIFICANT CHANGE UP (ref 8.4–10.5)
CHLORIDE SERPL-SCNC: 113 MMOL/L — HIGH (ref 96–108)
CO2 SERPL-SCNC: 22 MMOL/L — SIGNIFICANT CHANGE UP (ref 22–31)
CREAT SERPL-MCNC: 3.66 MG/DL — HIGH (ref 0.5–1.3)
ELLIPTOCYTES BLD QL SMEAR: SLIGHT — SIGNIFICANT CHANGE UP
EOSINOPHIL # BLD AUTO: 0 K/UL — SIGNIFICANT CHANGE UP (ref 0–0.5)
EOSINOPHIL NFR BLD AUTO: 0 % — SIGNIFICANT CHANGE UP (ref 0–6)
GLUCOSE SERPL-MCNC: 109 MG/DL — HIGH (ref 70–99)
HCT VFR BLD CALC: 30.6 % — LOW (ref 39–50)
HGB BLD-MCNC: 8.9 G/DL — LOW (ref 13–17)
INR BLD: 1.46 — HIGH (ref 0.88–1.16)
LG PLATELETS BLD QL AUTO: SLIGHT — SIGNIFICANT CHANGE UP
LYMPHOCYTES # BLD AUTO: 0.46 K/UL — LOW (ref 1–3.3)
LYMPHOCYTES # BLD AUTO: 4.4 % — LOW (ref 13–44)
MACROCYTES BLD QL: SIGNIFICANT CHANGE UP
MANUAL SMEAR VERIFICATION: SIGNIFICANT CHANGE UP
MCHC RBC-ENTMCNC: 27.1 PG — SIGNIFICANT CHANGE UP (ref 27–34)
MCHC RBC-ENTMCNC: 29.1 GM/DL — LOW (ref 32–36)
MCV RBC AUTO: 93 FL — SIGNIFICANT CHANGE UP (ref 80–100)
MONOCYTES # BLD AUTO: 0.46 K/UL — SIGNIFICANT CHANGE UP (ref 0–0.9)
MONOCYTES NFR BLD AUTO: 4.4 % — SIGNIFICANT CHANGE UP (ref 2–14)
NEUTROPHILS # BLD AUTO: 9.22 K/UL — HIGH (ref 1.8–7.4)
NEUTROPHILS NFR BLD AUTO: 88.6 % — HIGH (ref 43–77)
OVALOCYTES BLD QL SMEAR: SLIGHT — SIGNIFICANT CHANGE UP
PLAT MORPH BLD: ABNORMAL
PLATELET # BLD AUTO: 165 K/UL — SIGNIFICANT CHANGE UP (ref 150–400)
POIKILOCYTOSIS BLD QL AUTO: SIGNIFICANT CHANGE UP
POLYCHROMASIA BLD QL SMEAR: SIGNIFICANT CHANGE UP
POTASSIUM SERPL-MCNC: 3.9 MMOL/L — SIGNIFICANT CHANGE UP (ref 3.5–5.3)
POTASSIUM SERPL-SCNC: 3.9 MMOL/L — SIGNIFICANT CHANGE UP (ref 3.5–5.3)
PROT SERPL-MCNC: 6.4 G/DL — SIGNIFICANT CHANGE UP (ref 6–8.3)
PROTHROM AB SERPL-ACNC: 17.2 SEC — HIGH (ref 10.6–13.6)
RBC # BLD: 3.29 M/UL — LOW (ref 4.2–5.8)
RBC # FLD: 23.2 % — HIGH (ref 10.3–14.5)
RBC BLD AUTO: ABNORMAL
RH IG SCN BLD-IMP: POSITIVE — SIGNIFICANT CHANGE UP
SARS-COV-2 RNA SPEC QL NAA+PROBE: SIGNIFICANT CHANGE UP
SCHISTOCYTES BLD QL AUTO: SLIGHT — SIGNIFICANT CHANGE UP
SODIUM SERPL-SCNC: 148 MMOL/L — HIGH (ref 135–145)
SPHEROCYTES BLD QL SMEAR: SLIGHT — SIGNIFICANT CHANGE UP
TARGETS BLD QL SMEAR: SLIGHT — SIGNIFICANT CHANGE UP
VARIANT LYMPHS # BLD: 2.6 % — SIGNIFICANT CHANGE UP (ref 0–6)
WBC # BLD: 10.41 K/UL — SIGNIFICANT CHANGE UP (ref 3.8–10.5)
WBC # FLD AUTO: 10.41 K/UL — SIGNIFICANT CHANGE UP (ref 3.8–10.5)

## 2020-12-17 PROCEDURE — 99223 1ST HOSP IP/OBS HIGH 75: CPT

## 2020-12-17 PROCEDURE — 71275 CT ANGIOGRAPHY CHEST: CPT | Mod: 26

## 2020-12-17 PROCEDURE — 99285 EMERGENCY DEPT VISIT HI MDM: CPT

## 2020-12-17 RX ORDER — CALCITRIOL 0.5 UG/1
0.25 CAPSULE ORAL DAILY
Refills: 0 | Status: DISCONTINUED | OUTPATIENT
Start: 2020-12-17 | End: 2020-12-18

## 2020-12-17 RX ORDER — MORPHINE SULFATE 50 MG/1
4 CAPSULE, EXTENDED RELEASE ORAL ONCE
Refills: 0 | Status: DISCONTINUED | OUTPATIENT
Start: 2020-12-17 | End: 2020-12-17

## 2020-12-17 RX ORDER — DOXAZOSIN MESYLATE 4 MG
4 TABLET ORAL AT BEDTIME
Refills: 0 | Status: DISCONTINUED | OUTPATIENT
Start: 2020-12-17 | End: 2020-12-18

## 2020-12-17 RX ORDER — ALLOPURINOL 300 MG
100 TABLET ORAL
Refills: 0 | Status: DISCONTINUED | OUTPATIENT
Start: 2020-12-17 | End: 2020-12-18

## 2020-12-17 RX ORDER — METOPROLOL TARTRATE 50 MG
200 TABLET ORAL DAILY
Refills: 0 | Status: DISCONTINUED | OUTPATIENT
Start: 2020-12-17 | End: 2020-12-18

## 2020-12-17 RX ORDER — SODIUM CHLORIDE 9 MG/ML
1000 INJECTION INTRAMUSCULAR; INTRAVENOUS; SUBCUTANEOUS
Refills: 0 | Status: DISCONTINUED | OUTPATIENT
Start: 2020-12-17 | End: 2020-12-18

## 2020-12-17 RX ORDER — ACETAMINOPHEN 500 MG
1000 TABLET ORAL ONCE
Refills: 0 | Status: COMPLETED | OUTPATIENT
Start: 2020-12-17 | End: 2020-12-17

## 2020-12-17 RX ORDER — OXYCODONE AND ACETAMINOPHEN 5; 325 MG/1; MG/1
1 TABLET ORAL EVERY 4 HOURS
Refills: 0 | Status: DISCONTINUED | OUTPATIENT
Start: 2020-12-17 | End: 2020-12-18

## 2020-12-17 RX ORDER — DOXAZOSIN MESYLATE 4 MG
4 TABLET ORAL
Refills: 0 | Status: DISCONTINUED | OUTPATIENT
Start: 2020-12-17 | End: 2020-12-17

## 2020-12-17 RX ORDER — PROTHROMBIN COMPLEX CONCENTRATE (HUMAN) 25.5; 16.5; 24; 22; 22; 26 [IU]/ML; [IU]/ML; [IU]/ML; [IU]/ML; [IU]/ML; [IU]/ML
2000 POWDER, FOR SOLUTION INTRAVENOUS ONCE
Refills: 0 | Status: COMPLETED | OUTPATIENT
Start: 2020-12-17 | End: 2020-12-17

## 2020-12-17 RX ADMIN — PROTHROMBIN COMPLEX CONCENTRATE (HUMAN) 400 INTERNATIONAL UNIT(S): 25.5; 16.5; 24; 22; 22; 26 POWDER, FOR SOLUTION INTRAVENOUS at 21:01

## 2020-12-17 RX ADMIN — Medication 400 MILLIGRAM(S): at 23:38

## 2020-12-17 RX ADMIN — MORPHINE SULFATE 4 MILLIGRAM(S): 50 CAPSULE, EXTENDED RELEASE ORAL at 19:55

## 2020-12-17 RX ADMIN — MORPHINE SULFATE 4 MILLIGRAM(S): 50 CAPSULE, EXTENDED RELEASE ORAL at 20:25

## 2020-12-17 NOTE — CONSULT NOTE ADULT - ASSESSMENT
65y Male with HTN, ESRD (has LUE AVF, not on HD yet), T Cell lymphoma (dx ~18 yrs ago), AFib (eliquis), systolic CHF (EF 40-45% in 2017), severe pHTN, p/w CP. Patient just discharged from [facility] yesterday after admission at outside hospital for asymptomatic anemia. Patient receives chemotherapy there.  He received PRBC. Patient woke this morning with R chest pain/swelling, slowly worsening so came to ED. Denies trauma to that area or needlesticks. Denies lightheaded, SOB/CP, f/c, NVD, abd pain, urinary complaints, focal weakness/numbness.  CT chest performed shows large right chest wall intramuscular hematoma measuring up to 13 cm with area of arterial extravasation and large left pleural effusion.  Thoracic surgery consulted for chest wall hematoma w/ arterial extravasation.      Plan:  Problem 1: Right chest hematoma w/ arterial extravasation  No acute thoracic surgical intervention at this time  Vascular consult for active bleeding  Possible IR consult for intervention  Reconsult for right chest hematoma for evacuation as needed.    Problem 2: Left pleural effusion  Recommend IR for drainage of loculated effusion    Problem 3: Atrial fibrillation  Hold all anticoagulation in setting of arterial bleeding    Problem 4: Hypertension  Hold all hypertensive meds in setting of arterial bleeding.  Resume as tolerated    I have reviewed clinical labs tests and reports, radiology tests and reports, as well as old patient medical records, and discussed with the referring physician.

## 2020-12-17 NOTE — CONSULT NOTE ADULT - SUBJECTIVE AND OBJECTIVE BOX
Surgeon: Dr. Jones    Requesting Physician: Dr. Mosley    HISTORY OF PRESENT ILLNESS (Need 4):  65y Male with HTN, ESRD (has LUE AVF, not on HD yet), T Cell lymphoma (dx ~18 yrs ago), AFib (eliquis), systolic CHF (EF 40-45% in 2017), severe pHTN, p/w CP. Patient just discharged from [facility] yesterday after admission at outside hospital for asymptomatic anemia. Patient receives chemotherapy there.  He received PRBC. Patient woke this morning with R chest pain/swelling, slowly worsening so came to ED. Denies trauma to that area or needlesticks. Denies lightheaded, SOB/CP, f/c, NVD, abd pain, urinary complaints, focal weakness/numbness.  CT chest performed shows large right chest wall intramuscular hematoma measuring up to 13 cm with area of arterial extravasation and large left pleural effusion.  Thoracic surgery consulted for chest wall hematoma w/ arterial extravasation.        PAST MEDICAL & SURGICAL HISTORY:  BPH (benign prostatic hyperplasia)    Gout    H/O pulmonary hypertension    CHF, chronic    Lymphoma  t cell; Chemotherapy weekly- wednesday    CKD (chronic kidney disease)    Atrial fibrillation    HTN (hypertension)    Elective surgery  rectal surgery    History of cholecystectomy        MEDICATIONS  (STANDING):  acetaminophen  IVPB .. 1000 milliGRAM(s) IV Intermittent once    MEDICATIONS  (PRN):      Allergies    No Known Allergies    Intolerances        SOCIAL HISTORY:  Smoker:   NO       ETOH use:  NO               Ilicit Drug use:   NO      FAMILY HISTORY:  No pertinent family history in first degree relatives        Review of Systems:  CONSTITUTIONAL: Denies fevers / chills, sweats, fatigue, weight loss, weight gain                                       NEURO:  Denies parathesias, seizures, syncope, confusion                                                                                  EYES:  Denies blurry vision, discharge, pain, loss of vision                                                                                    ENMT:  Denies difficulty hearing, vertigo, dysphagia, epistaxis, recent dental work                                       CV:  Denies chest pain, palpitations, NIELSON, orthopnea                                                                                           RESPIRATORY:  Denies wWheezing, SOB, cough / sputum, hemoptysis                                                               GI:  Denies nausea, vomiting, diarrhea, constipation, melena                                                                          : Denies hematuria, dysuria, urgency, incontinence                                                                                          MUSKULOSKELETAL:  Denies arthritis, joint swelling, muscle weakness                                                             SKIN/BREAST:  Denies rash, itching, hair loss, masses                                                                                              PSYCH:  Denies depression, anxiety, suicidal ideation                                                                                                HEME/LYMPH:  Denies bruises easily, enlarged lymph nodes, tender lymph nodes                                          ENDOCRINE:  Denies cold intolerance, heat intolerance, polydipsia                                                                      Vital Signs Last 24 Hrs  T(C): 36.6 (17 Dec 2020 21:11), Max: 37.4 (17 Dec 2020 18:46)  T(F): 97.9 (17 Dec 2020 21:11), Max: 99.4 (17 Dec 2020 18:46)  HR: 77 (17 Dec 2020 21:11) (77 - 86)  BP: 160/78 (17 Dec 2020 21:11) (160/78 - 168/94)  BP(mean): --  RR: 18 (17 Dec 2020 21:11) (18 - 20)  SpO2: 99% (17 Dec 2020 21:11) (99% - 100%)    Physical Exam (Need 8)  GEN: NAD, looks comfortable  Psych: Mood appropriate  Neuro: A&Ox3.  No focal deficits.  Moving all extremities.   HEENT: No obvious abnormalities  CV: S1S2, regular, no murmurs appreciated.  No carotid bruits.  No JVD  Lungs: Dimished lung bases.  No wheezing, rales or rhonchi  ABD: Soft, non-tender, non-distended.  +Bowel sounds  EXT: Warm and well perfused.  No peripheral edema noted, LUE AVF  Musculoskeletal: Moving all extremities with normal ROM, no joint swelling, R chest: very large swelling and very asymmetric compared to L, does not cross midline. firm., ttp, no overlying skin changes.  PV: Pedal pulses palpable                                                           LABS:                        8.9    10.41 )-----------( 165      ( 17 Dec 2020 19:34 )             30.6     12-17    148<H>  |  113<H>  |  32<H>  ----------------------------<  109<H>  3.9   |  22  |  3.66<H>    Ca    8.8      17 Dec 2020 19:34    TPro  6.4  /  Alb  2.9<L>  /  TBili  0.5  /  DBili  x   /  AST  7<L>  /  ALT  <5<L>  /  AlkPhos  96  12-17    PT/INR - ( 17 Dec 2020 19:34 )   PT: 17.2 sec;   INR: 1.46          PTT - ( 17 Dec 2020 19:34 )  PTT:39.0 sec            RADIOLOGY & ADDITIONAL STUDIES:  < from: CT Angio Chest w/ IV Cont (12.17.20 @ 19:55) >  1.  Large right chest wall intramuscular hematoma measuring up to 13 cm with area of arterial extravasation, as described above.  2.  Large loculated left pleural effusion with near complete compressive atelectasis of the left lower lobe; superimposed infection within the atelectatic lung is not excluded.  3.  4 cm nonenhancing heterogeneous hyperdense structure in the anterior left lower lobe is suggestive of a pulmonary hematoma; superimposed infection is not excluded.  4.  Cystic lesions within the pancreatic head measuring up to 1.3 cm, as described above. Follow-up pancreas MRI or CT in one year is suggested to assess stability.  5.  4 cm indeterminate density lesion within the spleen, not appreciated on previous exams. This could represent a hemangioma, although other etiologies are not excluded. Nonemergent follow-up with ultrasound or MRI is suggested.  6.  New upper abdominal ascites.  7.  Aneurysmal dilation of the aortic root and ascending aorta, not substantially changed since 4/30/2018.  8.  Additional findings as above.      < end of copied text >

## 2020-12-17 NOTE — ED ADULT TRIAGE NOTE - CHIEF COMPLAINT QUOTE
Pt CO CP this morning, worsening through out today.  Pt states "I have T4 cancer and I had chemo last week, on Tuesday I was at [facility] ER because I needed a transfusion."  Pt noted with Left AV fistula, pt states "I haven't started dialysis yet."  Pt describes pain as "It started like a small pump and I feel like it's growing and gotten worse."  Pt denies SOB, Fevers, dizziness, N/V/D

## 2020-12-17 NOTE — ED PROVIDER NOTE - PROGRESS NOTE DETAILS
Klepfish: hgb 8.9, na 148, CKD. d/w radiology, +hematoma, ?extrav at axillary/subclavian artery. Consulted both vascular and CT surgery. Updated pt. remains w/ no other systemic symptoms and well appearing. Klepfish: pt remains well appearing and hemodynamically stable. d/w CT surg, likely vascular domain. vascular at bedside, will reassess. eliquis last taken this morning, given kcentra.

## 2020-12-17 NOTE — ED ADULT TRIAGE NOTE - BP NONINVASIVE DIASTOLIC (MM HG)
I called the patient and discussed his chronic hypokalemia. He is on a large dose of furosemide for his heart failure and is taking 100 mEq of potassium a day. I discussed this with Dr. Potter we are going to start him on spironolactone 25 mg daily. Within 4-5 days of starting this medication he needs to get a repeat potassium. Hopefully we can start decreasing his potassium dosage. Patient understands the risk of hyperkalemia and is willing to give this a try.  
94

## 2020-12-17 NOTE — H&P ADULT - HISTORY OF PRESENT ILLNESS
65M PMH HTN, ESRD (has LUE AVF, not on HD yet), T Cell lymphoma (dx ~19yrs ago), AFib on (eliquis), systolic CHF (EF 40-45% in 2017), severe pHTN, p/w pain and swellling to R chest wall. Pt just dc'd yesterday after admission at outside hospital for asymptomatic anemia. received 2U PRBC. Pt woke this morning w/ R CP/swelling, slowly worsening. Denies trauma to that area or needle sticks. Denies lightheaded, SOB/CP, f/c, NVD, abd pain, urinary complaints, focal weakness/numbness..

## 2020-12-17 NOTE — ED ADULT NURSE NOTE - OBJECTIVE STATEMENT
Patient A&Ox4, respirations even and unlabored, received to bed 17, enlarged right pectoral region observed and tender to touch, AV Fistula to left arm, wife at bedside. Patient reports blood transfusion 2 days ago, was discharged yesterday, hx "T4 Cancer" chemo last week. Patient states saw a "a little bump and then it just grew", prompting ED visit. Patient denies fevers, chest pain, SOB, recent falls or injuries. Patient assessed by Dr. Mosley. Right forearm 18g IV placed, labs drawn and sent, Patient taken to CT.

## 2020-12-17 NOTE — ED PROVIDER NOTE - CLINICAL SUMMARY MEDICAL DECISION MAKING FREE TEXT BOX
65M PMH HTN, ESRD (has LUE AVF, not on HD yet), T Cell lymphoma (dx ~18 yrs ago), AFib (eliquis), systolic CHF (EF 40-45% in 2017), severe pHTN, p/w CP. Pt just dc'd yesterday after admission at outside hospital for asymptomatic anemia. received PRBC. Pt woke this morning w/ R CP/swelling, slowly worsening so came to ED. No other systemic symptoms. Mild HTN, other vitals wnl. Exam as above. Very well appearing.  ddx: Likely large hematoma, ?active bleeding.  Labs, CTA (benefits likely outweigh risks), symptom control, reassess.

## 2020-12-17 NOTE — H&P ADULT - NSHPPHYSICALEXAM_GEN_ALL_CORE
PHYSICAL EXAM:    Constitutional: AXOX3 IN NAD  Respiratory: Unlabored  Cardiovascular: S1S2 Chest: Large Hematoma to R chest painful ROM of RUE to above head small ecchymosis to edge armpit (manual pressure applied for 45min in ED  Gastrointestinal: soft nontender no edema  Extremities: UE LE wnl  Vascular: 2+ radial b/l AVF LUE good thrill and pulse   Neurological: intact

## 2020-12-17 NOTE — H&P ADULT - ASSESSMENT
65M PMH HTN, ESRD (has LUE AVF, not on HD yet), T Cell lymphoma (dx ~19yrs ago), AFib on (eliquis), systolic CHF (EF 40-45% in 2017), severe pHTN, admitted with R chest wall hematoma.    -Admit to [facility]-> Dr Cisneros  -Keep NPO  -Added on to OR for Hematoma Evacuation  -preopped and consented  -Trend cbc  -Home Meds  -compression dressing to chest

## 2020-12-17 NOTE — ED PROVIDER NOTE - PHYSICAL EXAMINATION
R chest: very large swelling and very asymmetric compared to L, does not cross midline. firm., ttp, no overlying skin changes.  LUE AVF +thrill, c/d/i  minimal scattered ecchymosis LUE at prior IV sites, no edema

## 2020-12-17 NOTE — ED PROVIDER NOTE - RESPIRATORY, MLM
Breath sounds clear and equal bilaterally. decreased breath sounds L base, otherwise clear and equal bilaterally.

## 2020-12-17 NOTE — ED PROVIDER NOTE - OBJECTIVE STATEMENT
65M PMH HTN, ESRD (has LUE AVF, not on HD yet), T Cell lymphoma (dx ~18 yrs ago), AFib (eliquis), systolic CHF (EF 40-45% in 2017), severe pHTN, p/w CP. Pt just dc'd yesterday after admission at outside hospital for asymptomatic anemia. received PRBC. Pt woke this morning w/ R CP/swelling, slowly worsening so came to ED. Denies trauma to that area or needlesticks. Denies lightheaded, SOB/CP, f/c, NVD, abd pain, urinary complaints, focal weakness/numbness.

## 2020-12-17 NOTE — H&P ADULT - NSICDXPASTMEDICALHX_GEN_ALL_CORE_FT
PAST MEDICAL HISTORY:  Atrial fibrillation     BPH (benign prostatic hyperplasia)     CHF, chronic     CKD (chronic kidney disease)     Gout     H/O pulmonary hypertension     HTN (hypertension)     Lymphoma t cell; Chemotherapy weekly- wednesday

## 2020-12-18 ENCOUNTER — RESULT REVIEW (OUTPATIENT)
Age: 65
End: 2020-12-18

## 2020-12-18 DIAGNOSIS — T14.8XXA OTHER INJURY OF UNSPECIFIED BODY REGION, INITIAL ENCOUNTER: ICD-10-CM

## 2020-12-18 LAB
ANION GAP SERPL CALC-SCNC: 11 MMOL/L — SIGNIFICANT CHANGE UP (ref 5–17)
BLD GP AB SCN SERPL QL: NEGATIVE — SIGNIFICANT CHANGE UP
BUN SERPL-MCNC: 33 MG/DL — HIGH (ref 7–23)
CALCIUM SERPL-MCNC: 9 MG/DL — SIGNIFICANT CHANGE UP (ref 8.4–10.5)
CHLORIDE SERPL-SCNC: 114 MMOL/L — HIGH (ref 96–108)
CO2 SERPL-SCNC: 22 MMOL/L — SIGNIFICANT CHANGE UP (ref 22–31)
CREAT SERPL-MCNC: 3.75 MG/DL — HIGH (ref 0.5–1.3)
GLUCOSE SERPL-MCNC: 83 MG/DL — SIGNIFICANT CHANGE UP (ref 70–99)
HCT VFR BLD CALC: 26.7 % — LOW (ref 39–50)
HCT VFR BLD CALC: 27.4 % — LOW (ref 39–50)
HCT VFR BLD CALC: 28 % — LOW (ref 39–50)
HCT VFR BLD CALC: 28.3 % — LOW (ref 39–50)
HGB BLD-MCNC: 7.7 G/DL — LOW (ref 13–17)
HGB BLD-MCNC: 8 G/DL — LOW (ref 13–17)
HGB BLD-MCNC: 8.4 G/DL — LOW (ref 13–17)
HGB BLD-MCNC: 8.4 G/DL — LOW (ref 13–17)
MAGNESIUM SERPL-MCNC: 2.1 MG/DL — SIGNIFICANT CHANGE UP (ref 1.6–2.6)
MCHC RBC-ENTMCNC: 27 PG — SIGNIFICANT CHANGE UP (ref 27–34)
MCHC RBC-ENTMCNC: 28.1 GM/DL — LOW (ref 32–36)
MCHC RBC-ENTMCNC: 28.1 PG — SIGNIFICANT CHANGE UP (ref 27–34)
MCHC RBC-ENTMCNC: 28.1 PG — SIGNIFICANT CHANGE UP (ref 27–34)
MCHC RBC-ENTMCNC: 28.2 PG — SIGNIFICANT CHANGE UP (ref 27–34)
MCHC RBC-ENTMCNC: 29.7 GM/DL — LOW (ref 32–36)
MCHC RBC-ENTMCNC: 30 GM/DL — LOW (ref 32–36)
MCHC RBC-ENTMCNC: 30 GM/DL — LOW (ref 32–36)
MCV RBC AUTO: 93.7 FL — SIGNIFICANT CHANGE UP (ref 80–100)
MCV RBC AUTO: 94 FL — SIGNIFICANT CHANGE UP (ref 80–100)
MCV RBC AUTO: 94.6 FL — SIGNIFICANT CHANGE UP (ref 80–100)
MCV RBC AUTO: 96.1 FL — SIGNIFICANT CHANGE UP (ref 80–100)
NRBC # BLD: 0 /100 WBCS — SIGNIFICANT CHANGE UP (ref 0–0)
PHOSPHATE SERPL-MCNC: 4.4 MG/DL — SIGNIFICANT CHANGE UP (ref 2.5–4.5)
PLATELET # BLD AUTO: 155 K/UL — SIGNIFICANT CHANGE UP (ref 150–400)
PLATELET # BLD AUTO: 157 K/UL — SIGNIFICANT CHANGE UP (ref 150–400)
PLATELET # BLD AUTO: 159 K/UL — SIGNIFICANT CHANGE UP (ref 150–400)
PLATELET # BLD AUTO: 176 K/UL — SIGNIFICANT CHANGE UP (ref 150–400)
POTASSIUM SERPL-MCNC: 3.7 MMOL/L — SIGNIFICANT CHANGE UP (ref 3.5–5.3)
POTASSIUM SERPL-SCNC: 3.7 MMOL/L — SIGNIFICANT CHANGE UP (ref 3.5–5.3)
RBC # BLD: 2.85 M/UL — LOW (ref 4.2–5.8)
RBC # BLD: 2.85 M/UL — LOW (ref 4.2–5.8)
RBC # BLD: 2.98 M/UL — LOW (ref 4.2–5.8)
RBC # BLD: 2.99 M/UL — LOW (ref 4.2–5.8)
RBC # FLD: 20.4 % — HIGH (ref 10.3–14.5)
RBC # FLD: 20.7 % — HIGH (ref 10.3–14.5)
RBC # FLD: 21.9 % — HIGH (ref 10.3–14.5)
RBC # FLD: 23.6 % — HIGH (ref 10.3–14.5)
RH IG SCN BLD-IMP: POSITIVE — SIGNIFICANT CHANGE UP
SARS-COV-2 IGG SERPL QL IA: NEGATIVE — SIGNIFICANT CHANGE UP
SARS-COV-2 IGM SERPL IA-ACNC: <0.1 INDEX — SIGNIFICANT CHANGE UP
SODIUM SERPL-SCNC: 147 MMOL/L — HIGH (ref 135–145)
WBC # BLD: 10.07 K/UL — SIGNIFICANT CHANGE UP (ref 3.8–10.5)
WBC # BLD: 10.36 K/UL — SIGNIFICANT CHANGE UP (ref 3.8–10.5)
WBC # BLD: 15.01 K/UL — HIGH (ref 3.8–10.5)
WBC # BLD: 15.17 K/UL — HIGH (ref 3.8–10.5)
WBC # FLD AUTO: 10.07 K/UL — SIGNIFICANT CHANGE UP (ref 3.8–10.5)
WBC # FLD AUTO: 10.36 K/UL — SIGNIFICANT CHANGE UP (ref 3.8–10.5)
WBC # FLD AUTO: 15.01 K/UL — HIGH (ref 3.8–10.5)
WBC # FLD AUTO: 15.17 K/UL — HIGH (ref 3.8–10.5)

## 2020-12-18 PROCEDURE — 88300 SURGICAL PATH GROSS: CPT | Mod: 26

## 2020-12-18 PROCEDURE — 99223 1ST HOSP IP/OBS HIGH 75: CPT

## 2020-12-18 PROCEDURE — 10140 I&D HMTMA SEROMA/FLUID COLLJ: CPT | Mod: GC

## 2020-12-18 RX ORDER — OXYCODONE AND ACETAMINOPHEN 5; 325 MG/1; MG/1
2 TABLET ORAL EVERY 6 HOURS
Refills: 0 | Status: DISCONTINUED | OUTPATIENT
Start: 2020-12-18 | End: 2020-12-21

## 2020-12-18 RX ORDER — ONDANSETRON 8 MG/1
4 TABLET, FILM COATED ORAL ONCE
Refills: 0 | Status: COMPLETED | OUTPATIENT
Start: 2020-12-18 | End: 2020-12-18

## 2020-12-18 RX ORDER — METOPROLOL TARTRATE 50 MG
200 TABLET ORAL DAILY
Refills: 0 | Status: DISCONTINUED | OUTPATIENT
Start: 2020-12-18 | End: 2020-12-23

## 2020-12-18 RX ORDER — HYDROMORPHONE HYDROCHLORIDE 2 MG/ML
0.5 INJECTION INTRAMUSCULAR; INTRAVENOUS; SUBCUTANEOUS EVERY 4 HOURS
Refills: 0 | Status: DISCONTINUED | OUTPATIENT
Start: 2020-12-18 | End: 2020-12-21

## 2020-12-18 RX ORDER — HYDROMORPHONE HYDROCHLORIDE 2 MG/ML
0.5 INJECTION INTRAMUSCULAR; INTRAVENOUS; SUBCUTANEOUS ONCE
Refills: 0 | Status: DISCONTINUED | OUTPATIENT
Start: 2020-12-18 | End: 2020-12-18

## 2020-12-18 RX ORDER — ALLOPURINOL 300 MG
50 TABLET ORAL
Refills: 0 | Status: DISCONTINUED | OUTPATIENT
Start: 2020-12-18 | End: 2020-12-23

## 2020-12-18 RX ORDER — ALLOPURINOL 300 MG
1 TABLET ORAL
Qty: 0 | Refills: 0 | DISCHARGE

## 2020-12-18 RX ORDER — DIPHENHYDRAMINE HCL 50 MG
25 CAPSULE ORAL ONCE
Refills: 0 | Status: COMPLETED | OUTPATIENT
Start: 2020-12-18 | End: 2020-12-18

## 2020-12-18 RX ORDER — ALLOPURINOL 300 MG
200 TABLET ORAL
Refills: 0 | Status: DISCONTINUED | OUTPATIENT
Start: 2020-12-18 | End: 2020-12-18

## 2020-12-18 RX ORDER — CEFAZOLIN SODIUM 1 G
1000 VIAL (EA) INJECTION ONCE
Refills: 0 | Status: COMPLETED | OUTPATIENT
Start: 2020-12-18 | End: 2020-12-18

## 2020-12-18 RX ORDER — OXYCODONE AND ACETAMINOPHEN 5; 325 MG/1; MG/1
1 TABLET ORAL EVERY 6 HOURS
Refills: 0 | Status: DISCONTINUED | OUTPATIENT
Start: 2020-12-18 | End: 2020-12-21

## 2020-12-18 RX ORDER — CALCITRIOL 0.5 UG/1
0.25 CAPSULE ORAL DAILY
Refills: 0 | Status: DISCONTINUED | OUTPATIENT
Start: 2020-12-18 | End: 2020-12-23

## 2020-12-18 RX ORDER — DOXAZOSIN MESYLATE 4 MG
4 TABLET ORAL AT BEDTIME
Refills: 0 | Status: DISCONTINUED | OUTPATIENT
Start: 2020-12-18 | End: 2020-12-23

## 2020-12-18 RX ORDER — HYDRALAZINE HCL 50 MG
10 TABLET ORAL ONCE
Refills: 0 | Status: COMPLETED | OUTPATIENT
Start: 2020-12-18 | End: 2020-12-18

## 2020-12-18 RX ADMIN — Medication 100 MILLIGRAM(S): at 22:25

## 2020-12-18 RX ADMIN — OXYCODONE AND ACETAMINOPHEN 1 TABLET(S): 5; 325 TABLET ORAL at 02:11

## 2020-12-18 RX ADMIN — OXYCODONE AND ACETAMINOPHEN 1 TABLET(S): 5; 325 TABLET ORAL at 06:45

## 2020-12-18 RX ADMIN — Medication 25 MILLIGRAM(S): at 23:15

## 2020-12-18 RX ADMIN — OXYCODONE AND ACETAMINOPHEN 1 TABLET(S): 5; 325 TABLET ORAL at 07:45

## 2020-12-18 RX ADMIN — Medication 100 MILLIGRAM(S): at 06:45

## 2020-12-18 RX ADMIN — Medication 4 MILLIGRAM(S): at 21:19

## 2020-12-18 RX ADMIN — Medication 1000 MILLIGRAM(S): at 00:25

## 2020-12-18 RX ADMIN — Medication 20 MILLIGRAM(S): at 02:55

## 2020-12-18 RX ADMIN — Medication 10 MILLIGRAM(S): at 09:19

## 2020-12-18 RX ADMIN — HYDROMORPHONE HYDROCHLORIDE 0.5 MILLIGRAM(S): 2 INJECTION INTRAMUSCULAR; INTRAVENOUS; SUBCUTANEOUS at 09:19

## 2020-12-18 RX ADMIN — CALCITRIOL 0.25 MICROGRAM(S): 0.5 CAPSULE ORAL at 14:18

## 2020-12-18 RX ADMIN — ONDANSETRON 4 MILLIGRAM(S): 8 TABLET, FILM COATED ORAL at 23:47

## 2020-12-18 RX ADMIN — OXYCODONE AND ACETAMINOPHEN 1 TABLET(S): 5; 325 TABLET ORAL at 01:11

## 2020-12-18 RX ADMIN — Medication 200 MILLIGRAM(S): at 06:45

## 2020-12-18 RX ADMIN — HYDROMORPHONE HYDROCHLORIDE 0.5 MILLIGRAM(S): 2 INJECTION INTRAMUSCULAR; INTRAVENOUS; SUBCUTANEOUS at 09:40

## 2020-12-18 RX ADMIN — SODIUM CHLORIDE 50 MILLILITER(S): 9 INJECTION INTRAMUSCULAR; INTRAVENOUS; SUBCUTANEOUS at 00:22

## 2020-12-18 NOTE — PROGRESS NOTE ADULT - ASSESSMENT
Assessment: 65y Male s/p Open Chest Hematoma Evacuation    Pain/nausea control PRN  Home med, holding Eliquis  Incentive spirometer/OOB/Ambulate  IVF  Diet advance as tolerated  AM labs

## 2020-12-18 NOTE — BRIEF OPERATIVE NOTE - NSICDXBRIEFOPLAUNCH_GEN_ALL_CORE
November 4, 2019     Patient: Christian Millan   YOB: 2011   Date of Visit: 11/4/2019       To Whom it May Concern:    Christian Millan was seen in my clinic on 11/4/2019 at 12:15 pm.     Please excuse Christian for his absence from school on the date listed above to be able to make his appointment.    PLEASE EXCUSE HIM FROM SCHOOL 11/5/2019 DUE TO ILLNESS.    HE MAY RETURN TO [facility] 11/6/2019.      Sincerely,         Gloria Stephens CNP    Medical information is confidential and cannot be disclosed without the written consent of the patient or his representative.       <--- Click to Launch ICDx for PreOp, PostOp and Procedure

## 2020-12-18 NOTE — CONSULT NOTE ADULT - ASSESSMENT
In summary, this is a 66yo gentleman with a PMH of HTN, ESRD (not on HD, LUE AVF), T Cell lymphoma (dx ~19yrs ago, actively getting chemo), AFib (on Eliquis), chronic systolic CHF and severe pHTN who p/w pain and swelling to R chest wall, found to have a large R-chest wall expanding hematoma, along w/a loculated effusion.  Notably, anemia has been stable.     #R Chest Wall Hematoma  -- s/p evacuation, washout and MILAN drain  -- monitor MILAN drain output  -- trending CBC closely for Hb goal >7  -- pain control  -- wound care per primary team     #Large L Pleural Effusion  -- plan had been for IR to drain as loculated  -- will re-address after hematoma stabilizes    #Afib  -- on Eliquis as home; on hold given bleed  -- c/w Metop for rate control     #Chronic systolic CHF  -- not presently decompensated  -- c/w home meds     #T Cell Lymphoma   -- on outpatient chemo with Dr. 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  Notably, anemia has been stable.     #R Chest Wall Hematoma  -- s/p evacuation, washout and MILAN drain  -- monitor MILAN drain output  -- trending CBC closely for Hb goal >7  -- pain control  -- wound care per primary team     #Large L Pleural Effusion  -- plan had been for IR to drain as loculated  -- will re-address after hematoma stabilizes    #Afib  -- on Eliquis as home; on hold given bleed  -- c/w Metop for rate control     #Chronic systolic CHF  -- not presently decompensated  -- c/w home meds     #T Cell Lymphoma   -- on outpatient chemo with Dr. Vel Dimas ([phone])  -- c/w allopurinol     #Hepatic Lesion on CT  -- will need outpt eval if new    #Pancreatic Cystic Lesion  -- will need outpt MRI or CT in 1 year for surveillance     #Stage 4-5 CKD  -- not on HD  -- daily BMP     #Diet - Renal Restrictions   #DVT PPx - SCD given active bleed  #Dispo - TBD     Thalia Siddiqi  Attending Hospitalist  [phone]

## 2020-12-18 NOTE — PACU DISCHARGE NOTE - COMMENTS
d/c to monitored care in stable condition.no c/o op pain d/c to monitored care in stable condition.no c/o op pain.. Dr Larsen aware of Hgb 8.4 and Hct 8.0 post lab no further order for blood Tx.. VSS

## 2020-12-18 NOTE — BRIEF OPERATIVE NOTE - OPERATION/FINDINGS
Findings: right chest wall hematoma, one small vessel arterial bleed identified and cauterized    Procedure: right chest wall hematoma evacuation (250cc of clot removed), washout and closure with staples. MILAN drain placement.

## 2020-12-18 NOTE — CONSULT NOTE ADULT - ASSESSMENT
65M PMH HTN, ESRD (has LUE AVF, not on HD yet), T Cell lymphoma (dx ~19yrs ago), AFib on (eliquis), systolic CHF (EF 40-45% in 2017), severe pHTN, p/w pain and swellling to R chest wall. He is s/p right chest wall hematoma evacuation. Cardiology consulted for anticoagulation management in setting of bleed.     #Atrial Fibrillation: Rate controlled, in NSR. Previously on Eliquis 2.5 BID. Hg ranging 7.7 - 8.9.   - Given chest wall hematoma, can discontinue and continue to hold Eliquis   - Continue Toprol 200 mg once daily for rate control   - monitor CBC daily   - Can consider re-starting AC once appropriate and cleared from primary team     discussed and seen w/ cardiology attending

## 2020-12-18 NOTE — CHART NOTE - NSCHARTNOTEFT_GEN_A_CORE
Patient discussed with Dr. Jones.  Patient now s/p right chest wall hematoma evacuation and cauterization of bleeding vessel.  Patient also with Left pleural effusion.    Plan:  No thoracic surgical intervention required.  Recommend IR drainage of pleural effusion.  Recommend pleural fluid sent for cytology, culture, gram stain, LDH, Total protein, Glucose, Amylase, pH, specific gravity, and cell count.  Recommend pulmonary consult for management of etiology of pleural effusion.  Reconsult thoracic surgery if needed.

## 2020-12-18 NOTE — CONSULT NOTE ADULT - SUBJECTIVE AND OBJECTIVE BOX
HPI:  65M PMH HTN, ESRD (has LUE AVF, not on HD yet), T Cell lymphoma (dx ~19yrs ago), AFib on (eliquis), systolic CHF (EF 40-45% in 2017), severe pHTN, p/w pain and swellling to R chest wall. Pt just dc'd yesterday after admission at outside hospital for asymptomatic anemia. received 2U PRBC. Pt woke this morning w/ R CP/swelling, slowly worsening. Denies trauma to that area or needle sticks. Denies lightheaded, SOB/CP, f/c, NVD, abd pain, urinary complaints, focal weakness/numbness.. (17 Dec 2020 22:33)  Cardiology consulted about anticoagulaiton management in the setting of chest wall hematoma. Patient seen and examined. He is in no acute distress. He is s/p right chest wall hematoma evacuation (250cc of clot removed) w/ washout.       CARDIAC DIAGNOSTIC TESTING:      TELEMETRY: NSR    ECG: NSR      PAST MEDICAL & SURGICAL HISTORY:  BPH (benign prostatic hyperplasia)    Gout    H/O pulmonary hypertension    CHF, chronic    Lymphoma  t cell; Chemotherapy weekly- wednesday    CKD (chronic kidney disease)    Atrial fibrillation    HTN (hypertension)    Elective surgery  rectal surgery    History of cholecystectomy        HOME MEDICATIONS  T(C): 36.8 (12-18-20 @ 13:30), Max: 37.4 (12-17-20 @ 18:46)  HR: 79 (12-18-20 @ 14:11) (74 - 94)  BP: 125/71 (12-18-20 @ 14:11) (114/63 - 193/97)  RR: 16 (12-18-20 @ 14:11) (12 - 24)  SpO2: 100% (12-18-20 @ 14:11) (96% - 100%)    MEDICATIONS  (STANDING):  allopurinol 50 milliGRAM(s) Oral <User Schedule>  calcitriol   Capsule 0.25 MICROGram(s) Oral daily  ceFAZolin   IVPB 1000 milliGRAM(s) IV Intermittent once  doxazosin 4 milliGRAM(s) Oral at bedtime  metoprolol succinate  milliGRAM(s) Oral daily  torsemide 20 milliGRAM(s) Oral every 24 hours    MEDICATIONS  (PRN):  HYDROmorphone  Injectable 0.5 milliGRAM(s) IV Push every 4 hours PRN Breakthough pain  oxycodone    5 mG/acetaminophen 325 mG 1 Tablet(s) Oral every 6 hours PRN Moderate Pain (4 - 6)  oxycodone    5 mG/acetaminophen 325 mG 2 Tablet(s) Oral every 6 hours PRN Severe Pain (7 - 10)      FAMILY HISTORY:  No pertinent family history in first degree relatives      REVIEW OF SYSTEMS:  CONSTITUTIONAL: No fever, weight loss, or fatigue  EYES: No eye pain, visual disturbances, or discharge  ENMT:  No difficulty hearing, tinnitus, vertigo; No sinus or throat pain  NECK: No pain or stiffness  RESPIRATORY: No cough, wheezing, chills or hemoptysis; No Shortness of Breath  CARDIOVASCULAR: No chest pain, palpitations, passing out, dizziness, or leg swelling  GASTROINTESTINAL: No abdominal or epigastric pain. No nausea, vomiting, or hematemesis; No diarrhea or constipation. No melena or hematochezia.  GENITOURINARY: No dysuria, frequency, hematuria, or incontinence  NEUROLOGICAL: No headaches, memory loss, loss of strength, numbness, or tremors  SKIN: No itching, burning, rashes, or lesions   LYMPH Nodes: No enlarged glands  ENDOCRINE: No heat or cold intolerance; No hair loss  MUSCULOSKELETAL: No joint pain or swelling; No muscle, back, or extremity pain  PSYCHIATRIC: No depression, anxiety, mood swings, or difficulty sleeping  HEME/LYMPH: No easy bruising, or bleeding gums  ALLERY AND IMMUNOLOGIC: No hives or eczema    Vitals past 24 Hours: T(C): 36.8 (12-18-20 @ 13:30), Max: 37.4 (12-17-20 @ 18:46)  HR: 79 (12-18-20 @ 14:11) (74 - 94)  BP: 125/71 (12-18-20 @ 14:11) (114/63 - 193/97)  RR: 16 (12-18-20 @ 14:11) (12 - 24)  SpO2: 100% (12-18-20 @ 14:11) (96% - 100%)	    PHYSICAL EXAM:  GEN: No acute respiratory distress, appears stated age	  HEENT: Anicteric sclera, PERRL, EOMI, no oropharyngeal erythema or discharge, trachea midline  Lymphatic: No cervical lymphadenopathy  Cardiovascular: S1 S2, No JVD, No murmurs, PMI  Respiratory: Lungs clear to auscultation, no rrw  Gastrointestinal:  BS+, soft, non distended, non tender, no HSM  Skin: No rashes, No ecchymoses, No cyanosis  Neurologic: Non-focal, AAO x 3, strength grossly normal in all extremeties  Extremities: Normal range of motion, No clubbing, cyanosis or edema  Vascular: Radial 2+, DP 2+      I&O's Detail    17 Dec 2020 07:01  -  18 Dec 2020 07:00  --------------------------------------------------------  IN:    sodium chloride 0.9%: 400 mL  Total IN: 400 mL    OUT:    Voided (mL): 450 mL  Total OUT: 450 mL    Total NET: -50 mL      18 Dec 2020 07:01  -  18 Dec 2020 15:37  --------------------------------------------------------  IN:  Total IN: 0 mL    OUT:    Bulb (mL): 75 mL    Oral Fluid: 0 mL    Voided (mL): 200 mL  Total OUT: 275 mL    Total NET: -275 mL          LABS:                        8.4    15.17 )-----------( 159      ( 18 Dec 2020 12:38 )             28.0     12-18    147<H>  |  114<H>  |  33<H>  ----------------------------<  83  3.7   |  22  |  3.75<H>    Ca    9.0      18 Dec 2020 08:02  Phos  4.4     12-18  Mg     2.1     12-18    TPro  6.4  /  Alb  2.9<L>  /  TBili  0.5  /  DBili  x   /  AST  7<L>  /  ALT  <5<L>  /  AlkPhos  96  12-17        PT/INR - ( 17 Dec 2020 19:34 )   PT: 17.2 sec;   INR: 1.46          PTT - ( 17 Dec 2020 19:34 )  PTT:39.0 sec    I&O's Summary    17 Dec 2020 07:01  -  18 Dec 2020 07:00  --------------------------------------------------------  IN: 400 mL / OUT: 450 mL / NET: -50 mL    18 Dec 2020 07:01  -  18 Dec 2020 15:37  --------------------------------------------------------  IN: 0 mL / OUT: 275 mL / NET: -275 mL        RADIOLOGY & ADDITIONAL STUDIES:

## 2020-12-18 NOTE — BRIEF OPERATIVE NOTE - NSICDXBRIEFPROCEDURE_GEN_ALL_CORE_FT
PROCEDURES:  Evacuation of hematoma of chest 18-Dec-2020 12:03:55 Right chest wall hematoma evacuation Aneta Webber

## 2020-12-18 NOTE — PROGRESS NOTE ADULT - SUBJECTIVE AND OBJECTIVE BOX
[facility].  She will call back    Patient is a 65y Male admitted for right chest pain, found to have spontaneous hematoma- will need surgical evaluation today  Known to me from prior encounters for CKD 5  has mature left arm AVF.    PAST MEDICAL & SURGICAL HISTORY:  BPH (benign prostatic hyperplasia)    Gout    H/O pulmonary hypertension    CHF, chronic    Lymphoma  t cell; Chemotherapy weekly- wednesday    CKD (chronic kidney disease)    Atrial fibrillation    HTN (hypertension)    Elective surgery  rectal surgery    History of cholecystectomy        MEDICATIONS  (STANDING):  allopurinol 50 milliGRAM(s) Oral <User Schedule>  calcitriol   Capsule 0.25 MICROGram(s) Oral daily  ceFAZolin   IVPB 1000 milliGRAM(s) IV Intermittent once  doxazosin 4 milliGRAM(s) Oral at bedtime  metoprolol succinate  milliGRAM(s) Oral daily  torsemide 20 milliGRAM(s) Oral every 24 hours    MEDICATIONS  (PRN):  HYDROmorphone  Injectable 0.5 milliGRAM(s) IV Push every 4 hours PRN Breakthough pain  oxycodone    5 mG/acetaminophen 325 mG 1 Tablet(s) Oral every 6 hours PRN Moderate Pain (4 - 6)  oxycodone    5 mG/acetaminophen 325 mG 2 Tablet(s) Oral every 6 hours PRN Severe Pain (7 - 10)      Allergies    No Known Allergies    SOCIAL HISTORY: No smoke    FAMILY HISTORY:  No pertinent family history in first degree relatives    ROS: neg for cardio, neuro, ENT, psych, pulm, endo, GI    T(C): , Max: 37.4 (12-17-20 @ 18:46)  T(F): , Max: 99.4 (12-17-20 @ 18:46)  HR: 79 (12-18-20 @ 14:11)  BP: 125/71 (12-18-20 @ 14:11)  BP(mean): 89 (12-18-20 @ 14:00)  RR: 16 (12-18-20 @ 14:11)  SpO2: 100% (12-18-20 @ 14:11)  Wt(kg): --    12-17 @ 07:01  -  12-18 @ 07:00  --------------------------------------------------------  IN:    sodium chloride 0.9%: 400 mL  Total IN: 400 mL    OUT:    Voided (mL): 450 mL  Total OUT: 450 mL    Total NET: -50 mL      12-18 @ 07:01  -  12-18 @ 16:29  --------------------------------------------------------  IN:  Total IN: 0 mL    OUT:    Bulb (mL): 75 mL    Oral Fluid: 0 mL    Voided (mL): 200 mL  Total OUT: 275 mL    Total NET: -275 mL        Height (cm): 180.3 (12-17 @ 18:46)  Weight (kg): 68 (12-18 @ 09:27)  BMI (kg/m2): 20.9 (12-18 @ 09:27)  BSA (m2): 1.87 (12-18 @ 09:27)    PHYSICAL EXAM:  Constitutional: Well appearing.  No acute distress  ENMT: Moist mucous membrane.  No cyanosis.  Neck: Supple. No JVD.  Back: No CVA tenderness  Respiratory: Clear to auscultation   chest- large hematoma in right pectoral muscle very tender with light palpation  Cardiovascular: S1, S2.  Regular rate and rhythm.    Gastrointestinal: soft, non-tender  Extremities: Warm.  No lower extremity edema.    Neurological: No focal deficits.  Psychiatric: Normal affect.    ACCESS:  left arm AVF    LABS:                        8.4    15.17 )-----------( 159      ( 18 Dec 2020 12:38 )             28.0     12-18    147<H>  |  114<H>  |  33<H>  ----------------------------<  83  3.7   |  22  |  3.75<H>    Ca    9.0      18 Dec 2020 08:02  Phos  4.4     12-18  Mg     2.1     12-18    TPro  6.4  /  Alb  2.9<L>  /  TBili  0.5  /  DBili  x   /  AST  7<L>  /  ALT  <5<L>  /  AlkPhos  96  12-17      PT/INR - ( 17 Dec 2020 19:34 )   PT: 17.2 sec;   INR: 1.46          PTT - ( 17 Dec 2020 19:34 )  PTT:39.0 sec          RADIOLOGY & ADDITIONAL STUDIES:

## 2020-12-18 NOTE — CONSULT NOTE ADULT - SUBJECTIVE AND OBJECTIVE BOX
Vascular Co-Management Initial Consult Note    HPI:  This is a 64yo gentleman with a PMH of HTN, ESRD (not on HD, LUE AVF), T Cell lymphoma (dx ~19yrs ago, actively getting chemo), AFib (on Eliquis), chronic systolic CHF (EF 40-45%, 2017) and severe pHTN who p/w pain and swelling to R chest wall.  Notably he was recently admitted to [facility] after his affiliated oncologist found him to be anemia.  He was initially admitted and transfused with 4U of PRBC and then discharged with a subsequent admission w/in 24 hours for repeat anemia.  During his second admission he required 2U of PRBC.  During his admissions, there was no conclusion regarding the etiology of his anemia per collateral.  He was noted to have a L-sided pleural effusion, and a thoracentesis was also inconclusive.  He was discharged the day prior to this presentation, and on the morning of presentation he was awoken by R CP/swelling, slowly worsening.  On admission he denied trauma to that area or needle sticks.; also denied lightheaded, SOB/CP, f/c, NVD, abd pain, urinary complaints, focal weakness/numbness.    His Hb was stable overnight, but this morning was noted to have expansion of the hematoma.  He was take this afternoon to the OR for an evacuation of his chest hematoma (250cc clot removed) w/washout and closure.  A MILAN drain was placed.    PAST MEDICAL & SURGICAL HISTORY:  BPH (benign prostatic hyperplasia)  Gout  H/O pulmonary hypertension  CHF, chronic  Lymphoma T cell; Chemotherapy weekly- Wednesday  CKD (chronic kidney disease)  Atrial fibrillation  HTN (hypertension)  Elective surgery  rectal surgery  History of cholecystectomy    Home Medications:  allopurinol 100 mg oral tablet: 1 tab(s) orally 2 times a day (18 Dec 2020 01:34)  apixaban 2.5 mg oral tablet: 1 tab(s) orally 2 times a day (18 Dec 2020 01:34)  calcitriol 0.25 mcg oral capsule: 1 cap(s) orally once a day (18 Dec 2020 01:34)  doxazosin 2 mg oral tablet: 2 tab(s) orally 2 times a day (18 Dec 2020 01:34)  Toprol- mg oral tablet, extended release: 1 tab(s) orally once a day (18 Dec 2020 01:34)  torsemide 20 mg oral tablet: 1  orally every other day (at bedtime) (18 Dec 2020 01:34)    Allergies: No Known Allergies    FAMILY HISTORY:  No pertinent family history in first degree relatives    Social History:      CURRENT MEDICATIONS:   allopurinol 50 milliGRAM(s) Oral <User Schedule>  calcitriol   Capsule 0.25 MICROGram(s) Oral daily  ceFAZolin   IVPB 1000 milliGRAM(s) IV Intermittent once  doxazosin 4 milliGRAM(s) Oral at bedtime  HYDROmorphone  Injectable 0.5 milliGRAM(s) IV Push every 4 hours PRN  metoprolol succinate  milliGRAM(s) Oral daily  oxycodone    5 mG/acetaminophen 325 mG 1 Tablet(s) Oral every 6 hours PRN  oxycodone    5 mG/acetaminophen 325 mG 2 Tablet(s) Oral every 6 hours PRN  torsemide 20 milliGRAM(s) Oral every 24 hours      VITAL SIGNS, INS/OUTS (last 24 hours):  Vital Signs Last 24 Hrs  T(C): 36.8 (18 Dec 2020 13:30), Max: 37.4 (17 Dec 2020 18:46)  T(F): 98.2 (18 Dec 2020 13:30), Max: 99.4 (17 Dec 2020 18:46)  HR: 79 (18 Dec 2020 14:11) (74 - 94)  BP: 125/71 (18 Dec 2020 14:11) (114/63 - 193/97)  BP(mean): 89 (18 Dec 2020 14:00) (84 - 104)  RR: 16 (18 Dec 2020 14:11) (12 - 24)  SpO2: 100% (18 Dec 2020 14:11) (96% - 100%)  I&O's Summary    17 Dec 2020 07:01  -  18 Dec 2020 07:00  --------------------------------------------------------  IN: 400 mL / OUT: 450 mL / NET: -50 mL    18 Dec 2020 07:01  -  18 Dec 2020 14:57  --------------------------------------------------------  IN: 0 mL / OUT: 275 mL / NET: -275 mL        EXAM:    BASIC LABS:                        8.4    15.17 )-----------( 159      ( 18 Dec 2020 12:38 )             28.0     12-18    147<H>  |  114<H>  |  33<H>  ----------------------------<  83  3.7   |  22  |  3.75<H>    Ca    9.0      18 Dec 2020 08:02  Phos  4.4     12-18  Mg     2.1     12-18    TPro  6.4  /  Alb  2.9<L>  /  TBili  0.5  /  DBili  x   /  AST  7<L>  /  ALT  <5<L>  /  AlkPhos  96  12-17    PT/INR - ( 17 Dec 2020 19:34 )   PT: 17.2 sec;   INR: 1.46          PTT - ( 17 Dec 2020 19:34 )  PTT:39.0 sec    CAPILLARY BLOOD GLUCOSE          OTHER LABS:        MICRODATA:      IMAGING:    EKG:    #Diet - Diet, Renal Restrictions:   For patients receiving Renal Replacement - No Protein Restr, No Conc K, No Conc Phos, Low Sodium (12-18-20 @ 14:51) [Active]        #DVT PPx -  #Dispo -     Thalia Siddiqi  Attending Hospitalist  [phone] Vascular Co-Management Initial Consult Note    HPI:  This is a 64yo gentleman with a PMH of HTN, ESRD (not on HD, LUE AVF), T Cell lymphoma (dx ~19yrs ago, actively getting chemo), AFib (on Eliquis), chronic systolic CHF (EF 40-45%, 2017) and severe pHTN who p/w pain and swelling to R chest wall.  Notably he was recently admitted to [facility] after his affiliated oncologist found him to be anemia.  He was initially admitted and transfused with 4U of PRBC and then discharged with a subsequent admission w/in 24 hours for repeat anemia.  During his second admission he required 2U of PRBC.  During his admissions, there was no conclusion regarding the etiology of his anemia per collateral.  He was noted to have a L-sided pleural effusion, and a thoracentesis was also inconclusive.  He was discharged the day prior to this presentation, and on the morning of presentation he was awoken by R CP/swelling, slowly worsening.  On admission he denied trauma to that area or needle sticks.; also denied lightheaded, SOB/CP, f/c, NVD, abd pain, urinary complaints, focal weakness/numbness.    His Hb was stable overnight, but this morning was noted to have expansion of the hematoma.  He was take this afternoon to the OR for an evacuation of his chest hematoma (250cc clot removed) w/washout and closure.  A MILAN drain was placed.    PAST MEDICAL & SURGICAL HISTORY:  BPH (benign prostatic hyperplasia)  Gout  H/O pulmonary hypertension  CHF, chronic  Lymphoma T cell; Chemotherapy weekly- Wednesday  CKD (chronic kidney disease)  Atrial fibrillation  HTN (hypertension)  Elective surgery  rectal surgery  History of cholecystectomy    Home Medications:  allopurinol 100 mg oral tablet: 1 tab(s) orally 2 times a day (18 Dec 2020 01:34)  apixaban 2.5 mg oral tablet: 1 tab(s) orally 2 times a day (18 Dec 2020 01:34)  calcitriol 0.25 mcg oral capsule: 1 cap(s) orally once a day (18 Dec 2020 01:34)  doxazosin 2 mg oral tablet: 2 tab(s) orally 2 times a day (18 Dec 2020 01:34)  Toprol- mg oral tablet, extended release: 1 tab(s) orally once a day (18 Dec 2020 01:34)  torsemide 20 mg oral tablet: 1  orally every other day (at bedtime) (18 Dec 2020 01:34)    Allergies: No Known Allergies    FAMILY HISTORY:  No pertinent family history in first degree relatives    Social History:  Lives with wife  No illicit drug use  Occasional EtOH  +Tobacco history    CURRENT MEDICATIONS:   allopurinol 50 milliGRAM(s) Oral <User Schedule>  calcitriol   Capsule 0.25 MICROGram(s) Oral daily  ceFAZolin   IVPB 1000 milliGRAM(s) IV Intermittent once  doxazosin 4 milliGRAM(s) Oral at bedtime  HYDROmorphone  Injectable 0.5 milliGRAM(s) IV Push every 4 hours PRN  metoprolol succinate  milliGRAM(s) Oral daily  oxycodone    5 mG/acetaminophen 325 mG 1 Tablet(s) Oral every 6 hours PRN  oxycodone    5 mG/acetaminophen 325 mG 2 Tablet(s) Oral every 6 hours PRN  torsemide 20 milliGRAM(s) Oral every 24 hours    VITAL SIGNS, INS/OUTS (last 24 hours):  Vital Signs Last 24 Hrs  T(C): 36.8 (18 Dec 2020 13:30), Max: 37.4 (17 Dec 2020 18:46)  T(F): 98.2 (18 Dec 2020 13:30), Max: 99.4 (17 Dec 2020 18:46)  HR: 79 (18 Dec 2020 14:11) (74 - 94)  BP: 125/71 (18 Dec 2020 14:11) (114/63 - 193/97)  BP(mean): 89 (18 Dec 2020 14:00) (84 - 104)  RR: 16 (18 Dec 2020 14:11) (12 - 24)  SpO2: 100% (18 Dec 2020 14:11) (96% - 100%)  I&O's Summary    17 Dec 2020 07:01  -  18 Dec 2020 07:00  --------------------------------------------------------  IN: 400 mL / OUT: 450 mL / NET: -50 mL    18 Dec 2020 07:01  -  18 Dec 2020 14:57  --------------------------------------------------------  IN: 0 mL / OUT: 275 mL / NET: -275 mL    EXAM:  (***of note, patient was seen before OR today)  Gen: in some visible pain, sitting upright in bed  HEENT: NCAT, MMM, clear OP  CV: RRR, no m/g/r appreciated  Pulm: absent BS a the left base, no increase in WOB  Chest wall: large R-sided compression dressing; surrounding ecchymosis  Abd: normoactive BS, soft, NTND  Ext: WWP, 2+ pulses x4; no c/c/e  Neuro: AOx3, nonfocal  Psych: pleasant, conversant and appropriate    BASIC LABS:                        8.4    15.17 )-----------( 159      ( 18 Dec 2020 12:38 )             28.0     12-18    147<H>  |  114<H>  |  33<H>  ----------------------------<  83  3.7   |  22  |  3.75<H>    Ca    9.0      18 Dec 2020 08:02  Phos  4.4     12-18  Mg     2.1     12-18    TPro  6.4  /  Alb  2.9<L>  /  TBili  0.5  /  DBili  x   /  AST  7<L>  /  ALT  <5<L>  /  AlkPhos  96  12-17    PT/INR - ( 17 Dec 2020 19:34 )   PT: 17.2 sec;   INR: 1.46     PTT - ( 17 Dec 2020 19:34 )  PTT:39.0 sec    MICRODATA:  No new microdata.    IMAGING:  CT Chest (12/17, per Radiology)  1. Large right chest wall intramuscular hematoma measuring up to 13 cm with area of arterial extravasation, as described above.  2. Large loculated left pleural effusion with near complete compressive atelectasis of the left lower lobe; superimposed infection within the atelectatic lung is not excluded.  3. 4 cm nonenhancing heterogeneous hyperdense structure in the anterior left lower lobe is suggestive of a pulmonary hematoma; superimposed infection is not excluded.  4. Cystic lesions within the pancreatic head measuring up to 1.3 cm, as described above. Follow-up pancreas MRI or CT in one year is suggested to assess stability.  5. 4 cm indeterminate density lesion within the spleen, not appreciated on previous exams. This could represent a hemangioma, although other etiologies are not excluded. Nonemergent follow-up with ultrasound or MRI is suggested.  6. New upper abdominal ascites.  7. Aneurysmal dilation of the aortic root and ascending aorta, not substantially changed since 4/30/2018.  8. Additional findings as above.   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  Notably he was recently admitted to [facility] after his affiliated oncologist found him to be anemia.  He was initially admitted and transfused with 4U of PRBC and then discharged with a subsequent admission w/in 24 hours for repeat anemia.  During his second admission he required 2U of PRBC.  During his admissions, there was no conclusion regarding the etiology of his anemia per collateral.  He was noted to have a L-sided pleural effusion, and a thoracentesis was also inconclusive.  He was discharged the day prior to this presentation, and on the morning of presentation he was awoken by R CP/swelling, slowly worsening.  On admission he denied trauma to that area or needle sticks.; also denied lightheaded, SOB/CP, f/c, NVD, abd pain, urinary complaints, focal weakness/numbness.    His Hb was stable overnight, but this morning was noted to have expansion of the hematoma.  He was take this afternoon to the OR for an evacuation of his chest hematoma (250cc clot removed) w/washout and closure.  A MILAN drain was placed.    PAST MEDICAL & SURGICAL HISTORY:  BPH (benign prostatic hyperplasia)  Gout  H/O pulmonary hypertension  CHF, chronic  Lymphoma T cell; Chemotherapy weekly- Wednesday  CKD (chronic kidney disease)  Atrial fibrillation  HTN (hypertension)  Elective surgery  rectal surgery  History of cholecystectomy    Home Medications:  allopurinol 100 mg oral tablet: 1 tab(s) orally 2 times a day (18 Dec 2020 01:34)  apixaban 2.5 mg oral tablet: 1 tab(s) orally 2 times a day (18 Dec 2020 01:34)  calcitriol 0.25 mcg oral capsule: 1 cap(s) orally once a day (18 Dec 2020 01:34)  doxazosin 2 mg oral tablet: 2 tab(s) orally 2 times a day (18 Dec 2020 01:34)  Toprol- mg oral tablet, extended release: 1 tab(s) orally once a day (18 Dec 2020 01:34)  torsemide 20 mg oral tablet: 1  orally every other day (at bedtime) (18 Dec 2020 01:34)    Allergies: No Known Allergies    FAMILY HISTORY:  No pertinent family history in first degree relatives    Social History:  Lives with wife  No illicit drug use  Occasional EtOH  +Tobacco history    CURRENT MEDICATIONS:   allopurinol 50 milliGRAM(s) Oral <User Schedule>  calcitriol   Capsule 0.25 MICROGram(s) Oral daily  ceFAZolin   IVPB 1000 milliGRAM(s) IV Intermittent once  doxazosin 4 milliGRAM(s) Oral at bedtime  HYDROmorphone  Injectable 0.5 milliGRAM(s) IV Push every 4 hours PRN  metoprolol succinate  milliGRAM(s) Oral daily  oxycodone    5 mG/acetaminophen 325 mG 1 Tablet(s) Oral every 6 hours PRN  oxycodone    5 mG/acetaminophen 325 mG 2 Tablet(s) Oral every 6 hours PRN  torsemide 20 milliGRAM(s) Oral every 24 hours    VITAL SIGNS, INS/OUTS (last 24 hours):  Vital Signs Last 24 Hrs  T(C): 36.8 (18 Dec 2020 13:30), Max: 37.4 (17 Dec 2020 18:46)  T(F): 98.2 (18 Dec 2020 13:30), Max: 99.4 (17 Dec 2020 18:46)  HR: 79 (18 Dec 2020 14:11) (74 - 94)  BP: 125/71 (18 Dec 2020 14:11) (114/63 - 193/97)  BP(mean): 89 (18 Dec 2020 14:00) (84 - 104)  RR: 16 (18 Dec 2020 14:11) (12 - 24)  SpO2: 100% (18 Dec 2020 14:11) (96% - 100%)  I&O's Summary    17 Dec 2020 07:01  -  18 Dec 2020 07:00  --------------------------------------------------------  IN: 400 mL / OUT: 450 mL / NET: -50 mL    18 Dec 2020 07:01  -  18 Dec 2020 14:57  --------------------------------------------------------  IN: 0 mL / OUT: 275 mL / NET: -275 mL    EXAM:  (***of note, patient was seen before OR today)  Gen: in some visible pain, sitting upright in bed  HEENT: NCAT, MMM, clear OP  CV: RRR, no m/g/r appreciated  Pulm: absent BS a the left base, no increase in WOB  Chest wall: large R-sided compression dressing; surrounding ecchymosis  Abd: normoactive BS, soft, NTND  Ext: WWP, 2+ pulses x4; no c/c/e  Neuro: AOx3, nonfocal  Psych: pleasant, conversant and appropriate    BASIC LABS:                        8.4    15.17 )-----------( 159      ( 18 Dec 2020 12:38 )             28.0     12-18    147<H>  |  114<H>  |  33<H>  ----------------------------<  83  3.7   |  22  |  3.75<H>    Ca    9.0      18 Dec 2020 08:02  Phos  4.4     12-18  Mg     2.1     12-18    TPro  6.4  /  Alb  2.9<L>  /  TBili  0.5  /  DBili  x   /  AST  7<L>  /  ALT  <5<L>  /  AlkPhos  96  12-17    PT/INR - ( 17 Dec 2020 19:34 )   PT: 17.2 sec;   INR: 1.46     PTT - ( 17 Dec 2020 19:34 )  PTT:39.0 sec    MICRODATA:  No new microdata.    IMAGING:  CT Chest (12/17, per Radiology)  1. Large right chest wall intramuscular hematoma measuring up to 13 cm with area of arterial extravasation, as described above.  2. Large loculated left pleural effusion with near complete compressive atelectasis of the left lower lobe; superimposed infection within the atelectatic lung is not excluded.  3. 4 cm nonenhancing heterogeneous hyperdense structure in the anterior left lower lobe is suggestive of a pulmonary hematoma; superimposed infection is not excluded.  4. Cystic lesions within the pancreatic head measuring up to 1.3 cm, as described above. Follow-up pancreas MRI or CT in one year is suggested to assess stability.  5. 4 cm indeterminate density lesion within the spleen, not appreciated on previous exams. This could represent a hemangioma, although other etiologies are not excluded. Nonemergent follow-up with ultrasound or MRI is suggested.  6. New upper abdominal ascites.  7. Aneurysmal dilation of the aortic root and ascending aorta, not substantially changed since 4/30/2018.  8. Additional findings as above.

## 2020-12-18 NOTE — PROGRESS NOTE ADULT - ASSESSMENT
CKD 5, HTN lymphoma, AFib on apixiban as outpatient, new right pectoral muscle hematoma-   apixiban held  renal function stable.   BP controlled  pt not uremic  no indication for dialysis at this time  reviewed case with Dr. Cisneros

## 2020-12-18 NOTE — PROGRESS NOTE ADULT - SUBJECTIVE AND OBJECTIVE BOX
O/N: Adm w large chest wall hematoma, preopped and consented, repeat cbc 7.7 from 8.9 1U prbc started at 3:30a                            65M PMH HTN, ESRD (has LUE AVF, not on HD yet), T Cell lymphoma (dx ~19yrs ago), AFib on (eliquis), systolic CHF (EF 40-45% in 2017), severe pHTN, admitted with R chest wall hematoma.      -NPO  -Added on to OR for Hematoma Evacuation  -preopped and consented  -post txn cbc  -Home Meds  -compression dressing to chest    O/N: Adm w large chest wall hematoma, preopped and consented, repeat cbc 7.7 from 8.9 1U prbc started at 3:30a      S. Pain over right chest wall. No SOB    doxazosin 4  metoprolol succinate   torsemide 20      Allergies    No Known Allergies    Intolerances        Vital Signs Last 24 Hrs  T(C): 36.6 (18 Dec 2020 07:00), Max: 37.4 (17 Dec 2020 18:46)  T(F): 97.9 (18 Dec 2020 07:00), Max: 99.4 (17 Dec 2020 18:46)  HR: 75 (18 Dec 2020 07:00) (75 - 94)  BP: 151/87 (18 Dec 2020 07:00) (134/79 - 169/97)  BP(mean): --  RR: 16 (18 Dec 2020 07:00) (16 - 20)  SpO2: 99% (18 Dec 2020 07:00) (96% - 100%)    Physical Exam:  General: Awake, alert, NAD O2 sat 100% RA  Chest: Right chest wall hematoma firm and tender but improved since admission with pressure dressing.  Pulmonary:  Cardiovascular:  Abdominal:  Extremities:  Pulses: Right radial pulse 2+  Left AVF +thrill  Right:                                                                           Left:  FEM [ ]2+ [ ]1+ [ ] doppler                                            FEM [ ]2+ [ ]1+ [ ] doppler    POP [ ]2+ [ ]1+ [ ] doppler                                            POP [ ]2+ [ ]1+ [ ] doppler    DP [ ]2+ [ ]1+ [ ] doppler                                               DP [ ]2+ [ ]1+ [ ] doppler  PT[ ]2+ [ ]1+ [ ] doppler                                                 PT [ ]2+ [ ]1+ [ ] doppler      LABS:                        7.7    10.07 )-----------( 155      ( 18 Dec 2020 00:12 )             27.4     12-17    148<H>  |  113<H>  |  32<H>  ----------------------------<  109<H>  3.9   |  22  |  3.66<H>    Ca    8.8      17 Dec 2020 19:34    TPro  6.4  /  Alb  2.9<L>  /  TBili  0.5  /  DBili  x   /  AST  7<L>  /  ALT  <5<L>  /  AlkPhos  96  12-17    PT/INR - ( 17 Dec 2020 19:34 )   PT: 17.2 sec;   INR: 1.46          PTT - ( 17 Dec 2020 19:34 )  PTT:39.0 sec      RADIOLOGY & ADDITIONAL TESTS:                        65M PMH HTN, ESRD (has LUE AVF, not on HD yet), T Cell lymphoma (dx ~19yrs ago), AFib on (eliquis), systolic CHF (EF 40-45% in 2017), severe pHTN, admitted with R chest wall hematoma.      -NPO  -Added on to OR for Hematoma Evacuation  -preopped and consented  -post txn cbc  Pleural efffusion - left  - CTS consult - possible VATS. Recommend IR drain first.   - IR consult  Afib  - holding eliquis. Last dose 12/17 AM.  Anemia 2/2 acute blood loss - admit H/H 8.9/30 with repeat 5hrs later 7.7/27.  - transfuse 1 unit PRBC  - f/u AM CBC  -Home Meds  -compression dressing to chest - removed on rounds.

## 2020-12-18 NOTE — PROGRESS NOTE ADULT - SUBJECTIVE AND OBJECTIVE BOX
Pre-Op Dx: Chest Wall Hematoma  Procedure: Evacuation of hematoma of chest      Surgeon: Dr Amadou Cisneros    Subjective: Patient seen and examined bedside, feels well, no acute complaints. Is very glad he had the operation because he feels much better. Tolerates clears now, pain and nausea under control.       Vital Signs Last 24 Hrs  T(C): 36.8 (18 Dec 2020 13:30), Max: 37.4 (17 Dec 2020 18:46)  T(F): 98.2 (18 Dec 2020 13:30), Max: 99.4 (17 Dec 2020 18:46)  HR: 77 (18 Dec 2020 14:00) (74 - 94)  BP: 119/72 (18 Dec 2020 14:00) (114/63 - 193/97)  BP(mean): 89 (18 Dec 2020 14:00) (84 - 104)  RR: 14 (18 Dec 2020 14:00) (12 - 24)  SpO2: 99% (18 Dec 2020 14:00) (96% - 100%)    Physical Exam:  General: NAD, resting comfortably in bed  CHEST: Right Chest wall transverse incision dressing clean, dry and intact. MILAN drain draining serosanguinous output (25ml)  Pulmonary: Nonlabored breathing, no respiratory distress  Cardiovascular: NSR  Abdominal: soft, NT/ND  Extremities: WWP, normal strength  Neuro: A/O x 3, CNs II-XII grossly intact, no focal deficits, normal sensation  Pulses: palpable distal pulses      LABS:                        8.4    15.17 )-----------( 159      ( 18 Dec 2020 12:38 )             28.0     12-18    147<H>  |  114<H>  |  33<H>  ----------------------------<  83  3.7   |  22  |  3.75<H>    Ca    9.0      18 Dec 2020 08:02  Phos  4.4     12-18  Mg     2.1     12-18    TPro  6.4  /  Alb  2.9<L>  /  TBili  0.5  /  DBili  x   /  AST  7<L>  /  ALT  <5<L>  /  AlkPhos  96  12-17    PT/INR - ( 17 Dec 2020 19:34 )   PT: 17.2 sec;   INR: 1.46          PTT - ( 17 Dec 2020 19:34 )  PTT:39.0 sec  CAPILLARY BLOOD GLUCOSE          LIVER FUNCTIONS - ( 17 Dec 2020 19:34 )  Alb: 2.9 g/dL / Pro: 6.4 g/dL / ALK PHOS: 96 U/L / ALT: <5 U/L / AST: 7 U/L / GGT: x           ABO Interpretation: A (12-18 @ 02:26)        Radiology and Additional Studies:

## 2020-12-19 DIAGNOSIS — N18.9 CHRONIC KIDNEY DISEASE, UNSPECIFIED: ICD-10-CM

## 2020-12-19 DIAGNOSIS — J90 PLEURAL EFFUSION, NOT ELSEWHERE CLASSIFIED: ICD-10-CM

## 2020-12-19 DIAGNOSIS — J43.2 CENTRILOBULAR EMPHYSEMA: ICD-10-CM

## 2020-12-19 DIAGNOSIS — J98.11 ATELECTASIS: ICD-10-CM

## 2020-12-19 LAB
ANION GAP SERPL CALC-SCNC: 12 MMOL/L — SIGNIFICANT CHANGE UP (ref 5–17)
APTT BLD: 36.3 SEC — HIGH (ref 27.5–35.5)
BUN SERPL-MCNC: 39 MG/DL — HIGH (ref 7–23)
CALCIUM SERPL-MCNC: 8.7 MG/DL — SIGNIFICANT CHANGE UP (ref 8.4–10.5)
CHLORIDE SERPL-SCNC: 116 MMOL/L — HIGH (ref 96–108)
CO2 SERPL-SCNC: 21 MMOL/L — LOW (ref 22–31)
CREAT SERPL-MCNC: 4.05 MG/DL — HIGH (ref 0.5–1.3)
GLUCOSE SERPL-MCNC: 100 MG/DL — HIGH (ref 70–99)
HCT VFR BLD CALC: 22.4 % — LOW (ref 39–50)
HCT VFR BLD CALC: 22.8 % — LOW (ref 39–50)
HCT VFR BLD CALC: 25.1 % — LOW (ref 39–50)
HGB BLD-MCNC: 6.9 G/DL — CRITICAL LOW (ref 13–17)
HGB BLD-MCNC: 6.9 G/DL — CRITICAL LOW (ref 13–17)
HGB BLD-MCNC: 7.7 G/DL — LOW (ref 13–17)
INR BLD: 1.5 — HIGH (ref 0.88–1.16)
MAGNESIUM SERPL-MCNC: 2 MG/DL — SIGNIFICANT CHANGE UP (ref 1.6–2.6)
MCHC RBC-ENTMCNC: 27.6 PG — SIGNIFICANT CHANGE UP (ref 27–34)
MCHC RBC-ENTMCNC: 28.8 PG — SIGNIFICANT CHANGE UP (ref 27–34)
MCHC RBC-ENTMCNC: 28.9 PG — SIGNIFICANT CHANGE UP (ref 27–34)
MCHC RBC-ENTMCNC: 30.3 GM/DL — LOW (ref 32–36)
MCHC RBC-ENTMCNC: 30.7 GM/DL — LOW (ref 32–36)
MCHC RBC-ENTMCNC: 30.8 GM/DL — LOW (ref 32–36)
MCV RBC AUTO: 91.2 FL — SIGNIFICANT CHANGE UP (ref 80–100)
MCV RBC AUTO: 93.3 FL — SIGNIFICANT CHANGE UP (ref 80–100)
MCV RBC AUTO: 94.4 FL — SIGNIFICANT CHANGE UP (ref 80–100)
NRBC # BLD: 0 /100 WBCS — SIGNIFICANT CHANGE UP (ref 0–0)
PHOSPHATE SERPL-MCNC: 4.6 MG/DL — HIGH (ref 2.5–4.5)
PLATELET # BLD AUTO: 186 K/UL — SIGNIFICANT CHANGE UP (ref 150–400)
PLATELET # BLD AUTO: 189 K/UL — SIGNIFICANT CHANGE UP (ref 150–400)
PLATELET # BLD AUTO: 199 K/UL — SIGNIFICANT CHANGE UP (ref 150–400)
POTASSIUM SERPL-MCNC: 4.4 MMOL/L — SIGNIFICANT CHANGE UP (ref 3.5–5.3)
POTASSIUM SERPL-SCNC: 4.4 MMOL/L — SIGNIFICANT CHANGE UP (ref 3.5–5.3)
PROTHROM AB SERPL-ACNC: 17.7 SEC — HIGH (ref 10.6–13.6)
RBC # BLD: 2.4 M/UL — LOW (ref 4.2–5.8)
RBC # BLD: 2.5 M/UL — LOW (ref 4.2–5.8)
RBC # BLD: 2.66 M/UL — LOW (ref 4.2–5.8)
RBC # FLD: 18.9 % — HIGH (ref 10.3–14.5)
RBC # FLD: 19.2 % — HIGH (ref 10.3–14.5)
RBC # FLD: 20.5 % — HIGH (ref 10.3–14.5)
SODIUM SERPL-SCNC: 149 MMOL/L — HIGH (ref 135–145)
WBC # BLD: 11.11 K/UL — HIGH (ref 3.8–10.5)
WBC # BLD: 11.28 K/UL — HIGH (ref 3.8–10.5)
WBC # BLD: 9.68 K/UL — SIGNIFICANT CHANGE UP (ref 3.8–10.5)
WBC # FLD AUTO: 11.11 K/UL — HIGH (ref 3.8–10.5)
WBC # FLD AUTO: 11.28 K/UL — HIGH (ref 3.8–10.5)
WBC # FLD AUTO: 9.68 K/UL — SIGNIFICANT CHANGE UP (ref 3.8–10.5)

## 2020-12-19 PROCEDURE — 99233 SBSQ HOSP IP/OBS HIGH 50: CPT

## 2020-12-19 PROCEDURE — 99222 1ST HOSP IP/OBS MODERATE 55: CPT | Mod: GC

## 2020-12-19 RX ADMIN — Medication 200 MILLIGRAM(S): at 06:33

## 2020-12-19 RX ADMIN — OXYCODONE AND ACETAMINOPHEN 2 TABLET(S): 5; 325 TABLET ORAL at 06:33

## 2020-12-19 RX ADMIN — Medication 4 MILLIGRAM(S): at 21:25

## 2020-12-19 RX ADMIN — Medication 20 MILLIGRAM(S): at 06:33

## 2020-12-19 RX ADMIN — CALCITRIOL 0.25 MICROGRAM(S): 0.5 CAPSULE ORAL at 12:02

## 2020-12-19 NOTE — CONSULT NOTE ADULT - PROBLEM SELECTOR RECOMMENDATION 9
-Based on hx this effusion is recurrent on the left side. Effusions on the left can be seen as far back as 2017 on imaging and have been bilateral and small and per the pt had been attributed to his cardiac and renal disease. Current imaging is clearly different as is recent weight loss.   -CT scan reviewed which shows b/l pleural effusions L>R with evidence of loculation on the left side and an anterior pocket of fluid. There is also pleural thickening on the left sided including the on the mediastinal side (more concerning for malignancy). There is dense atelectasis on the left and a density in the lung parenchyma on the left concerning for an underlying mass.  -POCUS done at bedside shows a moderate sized loculated pleural effusion with an anterior apical pocket as well, there is also assocaited dense atelectasis. There is a hyperechoic lesion within the lung parenchyma seen which is concerning for underlying mass. Small simple effusion on the right with adjacent atelectasis.   -The pt is HD stable, afebrile, no respiratory symptoms or signs of active infection  -The differential diagnosis for this effusion would be firstly malignancy, the pt is high risk for lung CA and there is concern that there could be an underlying mass, this could also be potentially related to hx of T-Cell lymphoma. Also possible would be a lung abcess or parapneumonic effusion, this seems much less likely given that he is asymptomatic, the effusions is recurrent since november, and that he is completely non-toxic. The mediastinal pleura being thickened also lends to this being more likely malignant as does the weight loss.    Plan:  -Please obtain collateral from [facility], if the cytology from the thoracentesis yielded a diagnosis then the pt would be staged by that and would already have a diagnosis. If that thora was unyielding then we would proceed with a  bedside diagnostic  thoracentesis.   -No urgent need for a thora as this does not appear infectious.

## 2020-12-19 NOTE — DIETITIAN INITIAL EVALUATION ADULT. - OTHER CALCULATIONS
%IBW=86.6; current body wt used for energy calculations as pt falls within % IBW, adjusted for age based on ESRD not yet on HD, Skin S/p Sx; fluids per team

## 2020-12-19 NOTE — CONSULT NOTE ADULT - PROBLEM SELECTOR RECOMMENDATION 3
-Centrilobular emphysema noted on CT  -No formal diagnosis of COPD, no respiratory symptoms   -No role for inhalers at this time, once effusions evaluated the pt may have PFTs done as an outpatient.

## 2020-12-19 NOTE — CONSULT NOTE ADULT - SUBJECTIVE AND OBJECTIVE BOX
PULMONARY SERVICE INITIAL CONSULT NOTE    HPI:  65M PMH HTN, ESRD (has LUE AVF, not on HD yet), T Cell lymphoma (dx ~19yrs ago), AFib on (eliquis), systolic CHF (EF 40-45% in 2017), severe pHTN. He is a current smoker (1/2 PPD since age 14, approx 25 PYH), however he does not carry a formal diagnosis of COPD and is not on any maintenance inhalers. He presented to the hospital after he developed sudden onset right sided chest wall pain and a rapidly developing hematoma. He was taken to the OR on 12/18 for a washout of the hematoma and a bleeding artery was found and cauterized. As a part of his workup the pt had a CT chest which showed b/l pleural effusions L>R, with evidence of pleural thickening and loculation on the left side with significant atelectasis and a possible underlying mass vs hematoma. The pulmonary service was asked to further evaluate this patients effusion.     The pt notes that he has a hx of T-Cell lymphoma for which he gets chemotherapy infusions weekly, he was admitted to [facility] in November after his outpatient labs showed pre-chemo infusion showed a Hgb of 4. During that admission he had imaging of the chest which showed a left sided effusion (small left effusion noted on prior imaging as far back as 2017 in PACS). That effusion was drained and per the pt over 1 liter removed. The pt claims that "no one ever told me anything about the fluid", and thus does not know what the results were. The pt notes 20lb weight loss in the past 3 weeks that he says was unintentional. He denies any symptoms of SOB, NIELSON, cough, sputum production, fever, or wheezing. He reports of a hx of non-oliguric CKD and had a fistula placed but did not yet start HD.        REVIEW OF SYSTEMS:  A 12 point ROS was reviewed with the patient and was negative except for what is mentioned above.     Allergy and Immunologic: No hives or eczema    PAST MEDICAL & SURGICAL HISTORY:  BPH (benign prostatic hyperplasia)    Gout    H/O pulmonary hypertension    CHF, chronic    Lymphoma  t cell; Chemotherapy weekly- Wednesday    CKD (chronic kidney disease)    Atrial fibrillation    HTN (hypertension)    Elective surgery  Rectal surgery    History of cholecystectomy        FAMILY HISTORY:  No pertinent family history in first degree relatives        SOCIAL HISTORY:  Smoking Status: [X] Current, [ ] Former, [ ] Never  Pack Years: 1/2 pack per day for 50 years     MEDICATIONS:  Pulmonary:    Antimicrobials:    Anticoagulants:    Onc:    GI/:    Endocrine:  allopurinol 50 milliGRAM(s) Oral <User Schedule>    Cardiac:  doxazosin 4 milliGRAM(s) Oral at bedtime  metoprolol succinate  milliGRAM(s) Oral daily  torsemide 20 milliGRAM(s) Oral every 24 hours    Other Medications:  calcitriol   Capsule 0.25 MICROGram(s) Oral daily  HYDROmorphone  Injectable 0.5 milliGRAM(s) IV Push every 4 hours PRN  oxycodone    5 mG/acetaminophen 325 mG 1 Tablet(s) Oral every 6 hours PRN  oxycodone    5 mG/acetaminophen 325 mG 2 Tablet(s) Oral every 6 hours PRN      Allergies    No Known Allergies    Intolerances        Vital Signs Last 24 Hrs  T(C): 36.9 (19 Dec 2020 13:58), Max: 37.4 (18 Dec 2020 22:16)  T(F): 98.5 (19 Dec 2020 13:58), Max: 99.3 (18 Dec 2020 22:16)  HR: 81 (19 Dec 2020 12:15) (81 - 102)  BP: 164/77 (19 Dec 2020 12:15) (117/70 - 164/77)  BP(mean): --  RR: 17 (19 Dec 2020 12:15) (16 - 17)  SpO2: 99% (19 Dec 2020 12:15) (98% - 100%)    12-18 @ 07:01 - 12-19 @ 07:00  --------------------------------------------------------  IN: 180 mL / OUT: 335 mL / NET: -155 mL    12-19 @ 07:01 - 12-19 @ 14:47  --------------------------------------------------------  IN: 420 mL / OUT: 0 mL / NET: 420 mL          PHYSICAL EXAM:  Constitutional: WDWN  Head: NC/AT  EENT: PERRL, anicteric sclera; oropharynx clear, MMM  Neck: supple, no appreciable JVD  Respiratory: Decreased breath sounds on the left side in the lower lung zones, + egophony in the mid lung zone on the left. Diminished right base. Otherwise clear, no wheezes or rhonchi. Good respiratory effort. Breathing comfortably on room air.   Cardiovascular: +S1/S2, RRR  Gastrointestinal: soft, NT/ND; +BSx4  Extremities: WWP; no edema, clubbing or cyanosis  Vascular: 2+ radial, DP/PT pulses B/L  Neurological: AAOx3; no focal deficits    LABS:      CBC Full  -  ( 19 Dec 2020 07:35 )  WBC Count : 11.28 K/uL  RBC Count : 2.50 M/uL  Hemoglobin : 6.9 g/dL  Hematocrit : 22.8 %  Platelet Count - Automated : 186 K/uL  Mean Cell Volume : 91.2 fl  Mean Cell Hemoglobin : 27.6 pg  Mean Cell Hemoglobin Concentration : 30.3 gm/dL  Auto Neutrophil # : x  Auto Lymphocyte # : x  Auto Monocyte # : x  Auto Eosinophil # : x  Auto Basophil # : x  Auto Neutrophil % : x  Auto Lymphocyte % : x  Auto Monocyte % : x  Auto Eosinophil % : x  Auto Basophil % : x    12-19    149<H>  |  116<H>  |  39<H>  ----------------------------<  100<H>  4.4   |  21<L>  |  4.05<H>    Ca    8.7      19 Dec 2020 07:35  Phos  4.6     12-19  Mg     2.0     12-19    TPro  6.4  /  Alb  2.9<L>  /  TBili  0.5  /  DBili  x   /  AST  7<L>  /  ALT  <5<L>  /  AlkPhos  96  12-17    PT/INR - ( 19 Dec 2020 07:35 )   PT: 17.7 sec;   INR: 1.50          PTT - ( 19 Dec 2020 07:35 )  PTT:36.3 sec                  RADIOLOGY & ADDITIONAL STUDIES:  Chest X-Ray   CTA chest 12/17/2020

## 2020-12-19 NOTE — PROGRESS NOTE ADULT - PROBLEM SELECTOR PLAN 1
stable non-oliguric CKD  ~2L free water deficit manifesting as hypernatremia  encourage PO water intake

## 2020-12-19 NOTE — DIETITIAN INITIAL EVALUATION ADULT. - PERSON TAUGHT/METHOD
Review of Renal diet (pre/post HD). Understadning stated, provide additional motivation as needed./verbal instruction/patient instructed/spouse instructed/teach back - (Patient repeats in own words)

## 2020-12-19 NOTE — PROGRESS NOTE ADULT - ASSESSMENT
65M PMH HTN, ESRD (has LUE AVF, not on HD yet), T Cell lymphoma (dx ~19yrs ago), AFib on (eliquis), systolic CHF (EF 40-45% in 2017), severe pHTN, p/w pain and swellling to R chest wall  POD#1 for right chest wall hematoma evacuation (250cc of clot removed), washout and closure with staples. MILAN drain placement

## 2020-12-19 NOTE — PROGRESS NOTE ADULT - SUBJECTIVE AND OBJECTIVE BOX
O/N:  VSS, emesis and throat tingling after Cefazolin given; meds stopped Benadryl & Zofran given                            Assessment: 65y Male s/p  Chest wall Hematoma Evacuation    Pain/nausea control PRN  Home med, holding Eliquis  Incentive spirometer/OOB/Ambulate  IVF  Diet advance as tolerated  AM labs         O/N:  VSS, emesis and throat tingling after Cefazolin given; meds stopped Benadryl & Zofran given    SUBJECTIVE: Patient seen and examined bedside by chief resident, feels well, no acute complaints. No SOB/increase in pain or swelling at chest incision site.     doxazosin 4 milliGRAM(s) Oral at bedtime  metoprolol succinate  milliGRAM(s) Oral daily  torsemide 20 milliGRAM(s) Oral every 24 hours    MEDICATIONS  (PRN):  HYDROmorphone  Injectable 0.5 milliGRAM(s) IV Push every 4 hours PRN Breakthough pain  oxycodone    5 mG/acetaminophen 325 mG 1 Tablet(s) Oral every 6 hours PRN Moderate Pain (4 - 6)  oxycodone    5 mG/acetaminophen 325 mG 2 Tablet(s) Oral every 6 hours PRN Severe Pain (7 - 10)      I&O's Detail    18 Dec 2020 07:01  -  19 Dec 2020 07:00  --------------------------------------------------------  IN:    Oral Fluid: 180 mL  Total IN: 180 mL    OUT:    Bulb (mL): 135 mL    Voided (mL): 200 mL  Total OUT: 335 mL    Total NET: -155 mL      19 Dec 2020 07:01  -  20 Dec 2020 01:32  --------------------------------------------------------  IN:    Oral Fluid: 780 mL  Total IN: 780 mL    OUT:    Bulb (mL): 5 mL    Voided (mL): 350 mL  Total OUT: 355 mL    Total NET: 425 mL          Vital Signs Last 24 Hrs  T(C): 36.8 (20 Dec 2020 00:01), Max: 37.3 (19 Dec 2020 22:16)  T(F): 98.2 (20 Dec 2020 00:01), Max: 99.2 (19 Dec 2020 22:16)  HR: 69 (20 Dec 2020 00:01) (69 - 102)  BP: 126/76 (20 Dec 2020 00:01) (117/70 - 164/77)  BP(mean): 96 (20 Dec 2020 00:01) (95 - 96)  RR: 16 (20 Dec 2020 00:01) (16 - 17)  SpO2: 95% (20 Dec 2020 00:01) (95% - 99%)    Physical Exam:  General: NAD, resting comfortably in bed  CHEST: Right Chest wall transverse incision dressing clean, dry and intact. MILAN drain draining serosanguinous output   Pulmonary: Nonlabored breathing, no respiratory distress  Cardiovascular: NSR  Abdominal: soft, NT/ND  Extremities: WWP, normal strength  Neuro: A/O x 3, CNs II-XII grossly intact, no focal deficits, normal sensation  Pulses: palpable distal pulses    LABS:                        6.9    9.68  )-----------( 199      ( 19 Dec 2020 23:26 )             22.4     12-19    149<H>  |  116<H>  |  39<H>  ----------------------------<  100<H>  4.4   |  21<L>  |  4.05<H>    Ca    8.7      19 Dec 2020 07:35  Phos  4.6     12-19  Mg     2.0     12-19      PT/INR - ( 20 Dec 2020 00:52 )   PT: 16.8 sec;   INR: 1.42          PTT - ( 19 Dec 2020 07:35 )  PTT:36.3 sec      RADIOLOGY & ADDITIONAL STUDIES:

## 2020-12-19 NOTE — PROGRESS NOTE ADULT - SUBJECTIVE AND OBJECTIVE BOX
INTERVAL EVENTS:  -- s/p hematoma evacuation yesterday; getting PRBC's this AM    SUBJECTIVE:  -- reports feeling much better since returning from the OR  -- denies any CP, SOB, palpitations or lightheadedness  -- has noted from output from his drain  -- Review of Systems: 12 point review of systems otherwise negative    MEDICATIONS:  MEDICATIONS  (STANDING):  allopurinol 50 milliGRAM(s) Oral <User Schedule>  calcitriol   Capsule 0.25 MICROGram(s) Oral daily  doxazosin 4 milliGRAM(s) Oral at bedtime  metoprolol succinate  milliGRAM(s) Oral daily  torsemide 20 milliGRAM(s) Oral every 24 hours    MEDICATIONS  (PRN):  HYDROmorphone  Injectable 0.5 milliGRAM(s) IV Push every 4 hours PRN Breakthough pain  oxycodone    5 mG/acetaminophen 325 mG 1 Tablet(s) Oral every 6 hours PRN Moderate Pain (4 - 6)  oxycodone    5 mG/acetaminophen 325 mG 2 Tablet(s) Oral every 6 hours PRN Severe Pain (7 - 10)    Allergies: No Known Allergies    OBJECTIVE:  Vital Signs Last 24 Hrs  T(C): 36.9 (19 Dec 2020 13:58), Max: 37.4 (18 Dec 2020 22:16)  T(F): 98.5 (19 Dec 2020 13:58), Max: 99.3 (18 Dec 2020 22:16)  HR: 81 (19 Dec 2020 12:15) (81 - 102)  BP: 164/77 (19 Dec 2020 12:15) (117/70 - 164/77)  BP(mean): --  RR: 17 (19 Dec 2020 12:15) (16 - 17)  SpO2: 99% (19 Dec 2020 12:15) (98% - 100%)  I&O's Summary    18 Dec 2020 07:01  -  19 Dec 2020 07:00  --------------------------------------------------------  IN: 180 mL / OUT: 335 mL / NET: -155 mL    19 Dec 2020 07:01  -  19 Dec 2020 15:50  --------------------------------------------------------  IN: 420 mL / OUT: 0 mL / NET: 420 mL    PHYSICAL EXAM:  Gen: NAD, sitting upright in bed  HEENT: NCAT, MMM, clear OP  CV: RRR, no m/g/r appreciated  Pulm: absent BS a the left base, no increase in WOB  Chest wall: R-sided surgical incision dressed with surrounding erythema/ecchymosis; +MILAN drain with sanguinous output  Abd: normoactive BS, soft, NTND  Ext: WWP, 2+ pulses x4; no c/c/e  Neuro: AOx3, nonfocal  Psych: pleasant, conversant and appropriate    LABS:                        6.9    11.28 )-----------( 186      ( 19 Dec 2020 07:35 )             22.8     12-19    149<H>  |  116<H>  |  39<H>  ----------------------------<  100<H>  4.4   |  21<L>  |  4.05<H>    Ca    8.7      19 Dec 2020 07:35  Phos  4.6     12-19  Mg     2.0     12-19    TPro  6.4  /  Alb  2.9<L>  /  TBili  0.5  /  DBili  x   /  AST  7<L>  /  ALT  <5<L>  /  AlkPhos  96  12-17    PT/INR - ( 19 Dec 2020 07:35 )   PT: 17.7 sec;   INR: 1.50     PTT - ( 19 Dec 2020 07:35 )  PTT:36.3 sec    MICRODATA:  No new microdata.    RADIOLOGY/OTHER STUDIES:  No new imaging.

## 2020-12-19 NOTE — CHART NOTE - NSCHARTNOTEFT_GEN_A_CORE
Angled CT removed as per Dr. Harmon.  Minimal drainage (100cc over last 24 hours).  Pt tolerated well, tie down and occlusive dressing left in place.  CXR ordered.  Likely will D/C posterior CT tomorrow, will decide in the am.  Keep the remaining tube to waterseal, thank you. .

## 2020-12-19 NOTE — DIETITIAN INITIAL EVALUATION ADULT. - ADD RECOMMEND
1. Monitor PO intake/appetite, GI distress, diet tolerance, labs, weights.  2. Honor pt food preferences as able.  3. Neprovite Daily. 4. RD to remain available for additional nutrition interventions as needed.

## 2020-12-19 NOTE — PROGRESS NOTE ADULT - ASSESSMENT
Assessment: 65y Male POD 1 s/p Open Chest Hematoma Evacuation    Pain/nausea control PRN  Home med, holding Eliquis  Monitor MILAN output and chest site for signs of rebleeding  Neurovascular checks on RUE  IR/CTS/Pulm consult for Pleural Effusion  Incentive spirometer/OOB/Ambulate  IVF  Diet advance as tolerated  AM labs

## 2020-12-19 NOTE — CONSULT NOTE ADULT - PROBLEM SELECTOR RECOMMENDATION 2
-Atelectasis may be due to an underlying lung mass causing obstruction versus mass effect from fluid. The pleural thickening may indicated that the pt has some degree of lung entrapment which may also explain the atelectasis.     Plan as above.

## 2020-12-19 NOTE — PROGRESS NOTE ADULT - ASSESSMENT
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  Now s/p evacuation w/MILAN drain on 12/19 with post-op course c/b acute blood loss anemia.     #R Chest Wall Hematoma  -- s/p evacuation, washout and MILAN drain on 12/18  -- monitor MILAN drain output  -- trending CBC closely for Hb goal >7  -- pain control  -- wound care per primary team     #Acute Blood Loss (Post-Op) Anemia  -- 1U of PRBC this AM  -- f/u post-transfusion CBC  -- monitor drain output     #Large L Pleural Effusion  -- plan had been for IR to drain as loculated; Pulm now consulted  -- Pulm requesting collateral regarding [facility] thoracentesis  -- of note, per our discussion with his Oncology, his thoracentesis was inconclusive     #Afib  -- on Eliquis as home; on hold given bleed  -- c/w Metop for rate control     #Chronic systolic CHF  -- not presently decompensated  -- c/w home meds     #T Cell Lymphoma   -- on outpatient chemo with Dr. Vel Dimas ([phone])  -- c/w allopurinol     #Hepatic Lesion on CT  -- will need outpt eval if new    #Pancreatic Cystic Lesion  -- will need outpt MRI or CT in 1 year for surveillance     #Stage 4-5 CKD  -- not on HD  -- daily BMP     #Diet - Renal Restrictions   #DVT PPx - SCD given active bleed  #Dispo - TBD     Thalia Siddiqi  Attending Hospitalist  [phone]

## 2020-12-19 NOTE — PROGRESS NOTE ADULT - SUBJECTIVE AND OBJECTIVE BOX
CC: HEMATOMA        INTERVAL HISTORY:  eating breakfast  no complaints      ROS: No chest pain, no sob, no abd pain. No n/v/d    PAST MEDICAL & SURGICAL HISTORY:  BPH (benign prostatic hyperplasia)    Gout    H/O pulmonary hypertension    CHF, chronic    Lymphoma  t cell; Chemotherapy weekly- wednesday    CKD (chronic kidney disease)    Atrial fibrillation    HTN (hypertension)    Elective surgery  rectal surgery    History of cholecystectomy        PHYSICAL EXAM:  T(C): 36.6 (12-19-20 @ 05:52), Max: 37.4 (12-18-20 @ 22:16)  HR: 87 (12-19-20 @ 09:01)  BP: 117/70 (12-19-20 @ 09:01) (114/63 - 169/79)  RR: 17 (12-19-20 @ 09:01)  SpO2: 98% (12-19-20 @ 09:01)  Wt(kg): --  I&O's Summary    18 Dec 2020 07:01  -  19 Dec 2020 07:00  --------------------------------------------------------  IN: 180 mL / OUT: 335 mL / NET: -155 mL      Weight 68 (12-18 @ 09:27)  General: AAO x 3,  NAD.  MILAN drain  HEENT: moist mucous membranes, no pallor/cyanosis.  Neck: no JVD visible.  Cardiac: S1, S2. RRR. No murmurs   Respratory: CTA b/l, no access muscle use.   Abdomen: soft. nontender. nondistended  Skin: no rashes.  Extremities: no LE edema b/l  Access:       DATA:                        6.9<LL>  11.28<H> )-----------( 186      ( 19 Dec 2020 07:35 )             22.8<L>        149<H>  |  116<H>  |  39<H>  ----------------------------<  100<H>  Ca:8.7   (19 Dec 2020 07:35)  4.4     |  21<L>  |  4.05<H>      eGFR if Non : 15 <L>  eGFR if : 17 <L>    TPro  6.4    /  Alb  2.9<L>  /  TBili  0.5    /  DBili  x      /  AST  7<L>   /  ALT  <5<L>  /  AlkPhos  96     17 Dec 2020 19:34                    MEDICATIONS  (STANDING):  allopurinol 50 milliGRAM(s) Oral <User Schedule>  calcitriol   Capsule 0.25 MICROGram(s) Oral daily  doxazosin 4 milliGRAM(s) Oral at bedtime  metoprolol succinate  milliGRAM(s) Oral daily  torsemide 20 milliGRAM(s) Oral every 24 hours    MEDICATIONS  (PRN):  HYDROmorphone  Injectable 0.5 milliGRAM(s) IV Push every 4 hours PRN Breakthough pain  oxycodone    5 mG/acetaminophen 325 mG 1 Tablet(s) Oral every 6 hours PRN Moderate Pain (4 - 6)  oxycodone    5 mG/acetaminophen 325 mG 2 Tablet(s) Oral every 6 hours PRN Severe Pain (7 - 10)

## 2020-12-19 NOTE — CONSULT NOTE ADULT - ASSESSMENT
This is a 64 y/o man with a hx of ESRD (non oliguric, not yet on HD), Afib (on Eliquis at home), centrilobular emphysema, T-Cell lymphoma, who is an active smoker who presented with an acute chest wall hematoma from a bleeding artery on the right sided. He was found to have a loculated left pleural effusion and possible underlying mass vs hematoma, the effusion is recurrent and in the setting of 20lb weight loss and is thus concerning for malignancy.

## 2020-12-19 NOTE — DIETITIAN INITIAL EVALUATION ADULT. - OTHER INFO
65yoM PMH HTN, ESRD (has LUE AVF, not on HD yet), T Cell lymphoma (dx ~19yrs ago), AFib on (eliquis), systolic CHF (EF 40-45% in 2017), severe pHTN, p/w pain and swellling to R chest wall. Pt just dc'd after admission at outside hospital for asymptomatic anemia- received 2U PRBC. Pt admitted with R chest wall hematoma, now s/p Evacuation of hematoma of chest, MILAN drain placement 12/18. No pain @ present. Dandre 20. 2+R Arm edema. No pressure ulcers; SX site noted. BM+12/17. 65yoM PMH HTN, ESRD (has LUE AVF, not on HD yet), T Cell lymphoma (dx ~19yrs ago), AFib on (eliquis), systolic CHF (EF 40-45% in 2017), severe pHTN, p/w pain and swellling to R chest wall. Pt just dc'd after admission at outside hospital for asymptomatic anemia- received 2U PRBC. Pt admitted with R chest wall hematoma, now s/p Evacuation of hematoma of chest, MILAN drain placement 12/18. No pain @ present. Dandre 20. 2+R Arm edema. No pressure ulcers; SX site noted. BM+12/17.  Spoke with pt/wife at bedside + RN. Pt/wife report pt has not yet started HD, per MD unsure when plan for HD is. Good PO intake PTA. Wife does cooking, seems to be on regular diet. Does not use salt however eats High Salt items such as deli meats and sardines. Ordered for Renal diet, decreased PO intake at this time compared to PTA. Per RN pt having issues with roommate and has been upset and not wanting to eat. Pt without teeth however ordering soft foods. No issues chewing/swallowing at present. NKFA.  pounds, wt stable. Denies GI distress.   Please see Below for RD Recs.

## 2020-12-20 ENCOUNTER — RESULT REVIEW (OUTPATIENT)
Age: 65
End: 2020-12-20

## 2020-12-20 DIAGNOSIS — N18.6 END STAGE RENAL DISEASE: ICD-10-CM

## 2020-12-20 LAB
ALBUMIN FLD-MCNC: 1.3 G/DL — SIGNIFICANT CHANGE UP
ANION GAP SERPL CALC-SCNC: 14 MMOL/L — SIGNIFICANT CHANGE UP (ref 5–17)
APTT BLD: 42.1 SEC — HIGH (ref 27.5–35.5)
BUN SERPL-MCNC: 45 MG/DL — HIGH (ref 7–23)
CALCIUM SERPL-MCNC: 9 MG/DL — SIGNIFICANT CHANGE UP (ref 8.4–10.5)
CHLORIDE SERPL-SCNC: 108 MMOL/L — SIGNIFICANT CHANGE UP (ref 96–108)
CO2 SERPL-SCNC: 20 MMOL/L — LOW (ref 22–31)
CREAT FLD-MCNC: 4.64 MG/DL — SIGNIFICANT CHANGE UP
CREAT SERPL-MCNC: 4.44 MG/DL — HIGH (ref 0.5–1.3)
GLUCOSE FLD-MCNC: 38 MG/DL — SIGNIFICANT CHANGE UP
GLUCOSE SERPL-MCNC: 56 MG/DL — LOW (ref 70–99)
HCT VFR BLD CALC: 24 % — LOW (ref 39–50)
HCT VFR BLD CALC: 24.3 % — LOW (ref 39–50)
HCT VFR BLD CALC: 26.8 % — LOW (ref 39–50)
HGB BLD-MCNC: 7.4 G/DL — LOW (ref 13–17)
HGB BLD-MCNC: 7.5 G/DL — LOW (ref 13–17)
HGB BLD-MCNC: 8.1 G/DL — LOW (ref 13–17)
INR BLD: 1.42 — HIGH (ref 0.88–1.16)
LDH SERPL L TO P-CCNC: 154 U/L — SIGNIFICANT CHANGE UP
LDH SERPL L TO P-CCNC: 355 U/L — HIGH (ref 50–242)
MAGNESIUM SERPL-MCNC: 2.1 MG/DL — SIGNIFICANT CHANGE UP (ref 1.6–2.6)
MCHC RBC-ENTMCNC: 28.5 PG — SIGNIFICANT CHANGE UP (ref 27–34)
MCHC RBC-ENTMCNC: 28.8 PG — SIGNIFICANT CHANGE UP (ref 27–34)
MCHC RBC-ENTMCNC: 29.5 PG — SIGNIFICANT CHANGE UP (ref 27–34)
MCHC RBC-ENTMCNC: 30.2 GM/DL — LOW (ref 32–36)
MCHC RBC-ENTMCNC: 30.8 GM/DL — LOW (ref 32–36)
MCHC RBC-ENTMCNC: 30.9 GM/DL — LOW (ref 32–36)
MCV RBC AUTO: 92.3 FL — SIGNIFICANT CHANGE UP (ref 80–100)
MCV RBC AUTO: 95.4 FL — SIGNIFICANT CHANGE UP (ref 80–100)
MCV RBC AUTO: 95.7 FL — SIGNIFICANT CHANGE UP (ref 80–100)
NRBC # BLD: 0 /100 WBCS — SIGNIFICANT CHANGE UP (ref 0–0)
OB PNL STL: NEGATIVE — SIGNIFICANT CHANGE UP
PH, PLEURAL FLUID: 7.52 — SIGNIFICANT CHANGE UP
PHOSPHATE SERPL-MCNC: 4.9 MG/DL — HIGH (ref 2.5–4.5)
PLATELET # BLD AUTO: 223 K/UL — SIGNIFICANT CHANGE UP (ref 150–400)
PLATELET # BLD AUTO: 234 K/UL — SIGNIFICANT CHANGE UP (ref 150–400)
PLATELET # BLD AUTO: 244 K/UL — SIGNIFICANT CHANGE UP (ref 150–400)
POTASSIUM SERPL-MCNC: 4.4 MMOL/L — SIGNIFICANT CHANGE UP (ref 3.5–5.3)
POTASSIUM SERPL-SCNC: 4.4 MMOL/L — SIGNIFICANT CHANGE UP (ref 3.5–5.3)
PROT FLD-MCNC: 2.4 G/DL — SIGNIFICANT CHANGE UP
PROT SERPL-MCNC: 5.9 G/DL — LOW (ref 6–8.3)
PROTHROM AB SERPL-ACNC: 16.8 SEC — HIGH (ref 10.6–13.6)
RBC # BLD: 2.54 M/UL — LOW (ref 4.2–5.8)
RBC # BLD: 2.6 M/UL — LOW (ref 4.2–5.8)
RBC # BLD: 2.81 M/UL — LOW (ref 4.2–5.8)
RBC # FLD: 18.1 % — HIGH (ref 10.3–14.5)
RBC # FLD: 18.4 % — HIGH (ref 10.3–14.5)
RBC # FLD: 18.6 % — HIGH (ref 10.3–14.5)
SODIUM SERPL-SCNC: 142 MMOL/L — SIGNIFICANT CHANGE UP (ref 135–145)
SPECIMEN SOURCE FLD: SIGNIFICANT CHANGE UP
WBC # BLD: 11.2 K/UL — HIGH (ref 3.8–10.5)
WBC # BLD: 11.29 K/UL — HIGH (ref 3.8–10.5)
WBC # BLD: 11.66 K/UL — HIGH (ref 3.8–10.5)
WBC # FLD AUTO: 11.2 K/UL — HIGH (ref 3.8–10.5)
WBC # FLD AUTO: 11.29 K/UL — HIGH (ref 3.8–10.5)
WBC # FLD AUTO: 11.66 K/UL — HIGH (ref 3.8–10.5)

## 2020-12-20 PROCEDURE — 71250 CT THORAX DX C-: CPT | Mod: 26,77

## 2020-12-20 PROCEDURE — 71045 X-RAY EXAM CHEST 1 VIEW: CPT | Mod: 26

## 2020-12-20 PROCEDURE — 32555 ASPIRATE PLEURA W/ IMAGING: CPT | Mod: GC

## 2020-12-20 PROCEDURE — 99233 SBSQ HOSP IP/OBS HIGH 50: CPT

## 2020-12-20 PROCEDURE — 88305 TISSUE EXAM BY PATHOLOGIST: CPT | Mod: 26

## 2020-12-20 PROCEDURE — 88112 CYTOPATH CELL ENHANCE TECH: CPT | Mod: 26

## 2020-12-20 PROCEDURE — 71250 CT THORAX DX C-: CPT | Mod: 26

## 2020-12-20 RX ADMIN — Medication 20 MILLIGRAM(S): at 05:44

## 2020-12-20 RX ADMIN — OXYCODONE AND ACETAMINOPHEN 2 TABLET(S): 5; 325 TABLET ORAL at 09:50

## 2020-12-20 RX ADMIN — CALCITRIOL 0.25 MICROGRAM(S): 0.5 CAPSULE ORAL at 12:53

## 2020-12-20 RX ADMIN — Medication 200 MILLIGRAM(S): at 05:44

## 2020-12-20 RX ADMIN — Medication 50 MILLIGRAM(S): at 15:27

## 2020-12-20 RX ADMIN — Medication 4 MILLIGRAM(S): at 22:32

## 2020-12-20 RX ADMIN — OXYCODONE AND ACETAMINOPHEN 2 TABLET(S): 5; 325 TABLET ORAL at 10:50

## 2020-12-20 RX ADMIN — OXYCODONE AND ACETAMINOPHEN 2 TABLET(S): 5; 325 TABLET ORAL at 16:53

## 2020-12-20 RX ADMIN — OXYCODONE AND ACETAMINOPHEN 2 TABLET(S): 5; 325 TABLET ORAL at 17:53

## 2020-12-20 NOTE — PROGRESS NOTE ADULT - SUBJECTIVE AND OBJECTIVE BOX
CC: HEMATOMA        INTERVAL HISTORY:  feels well  still with pain at chest wall site      ROS: No chest pain, no sob, no abd pain. No n/v/d    PAST MEDICAL & SURGICAL HISTORY:  BPH (benign prostatic hyperplasia)    Gout    H/O pulmonary hypertension    CHF, chronic    Lymphoma  t cell; Chemotherapy weekly- wednesday    CKD (chronic kidney disease)    Atrial fibrillation    HTN (hypertension)    Elective surgery  rectal surgery    History of cholecystectomy        PHYSICAL EXAM:  T(C): 37.6 (12-20-20 @ 06:10), Max: 37.6 (12-20-20 @ 06:10)  HR: 87 (12-20-20 @ 05:29)  BP: 135/69 (12-20-20 @ 05:29) (117/70 - 164/77)  RR: 17 (12-20-20 @ 05:29)  SpO2: 96% (12-20-20 @ 05:29)  Wt(kg): --  I&O's Summary    19 Dec 2020 07:01  -  20 Dec 2020 07:00  --------------------------------------------------------  IN: 1200 mL / OUT: 662.5 mL / NET: 537.5 mL      Weight 68 (12-18 @ 09:27)  General: AAO x 3,  NAD.  HEENT: moist mucous membranes, no pallor/cyanosis.  Neck: no JVD visible.  Cardiac: S1, S2. RRR. No murmurs   Respratory: CTA b/l, no access muscle use.   Abdomen: soft. nontender. nondistended  Skin: no rashes.  Extremities: no LE edema b/l  Access: + bruit in L AVF      DATA:                        8.1<L>  11.29<H> )-----------( 234      ( 20 Dec 2020 06:54 )             26.8<L>        142    |  108    |  45<H>  ----------------------------<  56<L>  Ca:9.0   (20 Dec 2020 06:51)  4.4     |  20<L>  |  4.44<H>      eGFR if Non : 13 <L>  eGFR if : 15 <L>    TPro  6.4    /  Alb  2.9<L>  /  TBili  0.5    /  DBili  x      /  AST  7<L>   /  ALT  <5<L>  /  AlkPhos  96     17 Dec 2020 19:34                    MEDICATIONS  (STANDING):  allopurinol 50 milliGRAM(s) Oral <User Schedule>  calcitriol   Capsule 0.25 MICROGram(s) Oral daily  doxazosin 4 milliGRAM(s) Oral at bedtime  metoprolol succinate  milliGRAM(s) Oral daily  torsemide 20 milliGRAM(s) Oral every 24 hours    MEDICATIONS  (PRN):  HYDROmorphone  Injectable 0.5 milliGRAM(s) IV Push every 4 hours PRN Breakthough pain  oxycodone    5 mG/acetaminophen 325 mG 1 Tablet(s) Oral every 6 hours PRN Moderate Pain (4 - 6)  oxycodone    5 mG/acetaminophen 325 mG 2 Tablet(s) Oral every 6 hours PRN Severe Pain (7 - 10)

## 2020-12-20 NOTE — PROVIDER CONTACT NOTE (CRITICAL VALUE NOTIFICATION) - ASSESSMENT
Pt is A&Ox4. VSS.
A+OX4, denies pain/ discomfort at this time; VSS /76, HR 88. noted to have right sided ecchymosis and right arm swelling post IV infiltration.

## 2020-12-20 NOTE — PROVIDER CONTACT NOTE (CRITICAL VALUE NOTIFICATION) - BACKGROUND
Pt is a 65 year ols male who is s/p hematoma evacuation. MILAN drain in place
64 yo male with hx ESRD LUE AVF not yet started HD, CHF, Afib on eliquis; admitted for right chest pain and swelling. post right chest wall hematoma evacuation and MILAN drain placement.

## 2020-12-20 NOTE — PROGRESS NOTE ADULT - SUBJECTIVE AND OBJECTIVE BOX
doxazosin 4  metoprolol succinate   torsemide 20        Vital Signs Last 24 Hrs  T(C): 36.8 (20 Dec 2020 00:01), Max: 37.3 (19 Dec 2020 22:16)  T(F): 98.2 (20 Dec 2020 00:01), Max: 99.2 (19 Dec 2020 22:16)  HR: 81 (20 Dec 2020 01:25) (69 - 102)  BP: 126/66 (20 Dec 2020 01:25) (117/70 - 164/77)  BP(mean): 96 (20 Dec 2020 00:01) (95 - 96)  RR: 16 (20 Dec 2020 00:01) (16 - 17)  SpO2: 95% (20 Dec 2020 00:01) (95% - 99%)  I&O's Summary    18 Dec 2020 07:01  -  19 Dec 2020 07:00  --------------------------------------------------------  IN: 180 mL / OUT: 335 mL / NET: -155 mL    19 Dec 2020 07:01  -  20 Dec 2020 01:38  --------------------------------------------------------  IN: 780 mL / OUT: 355 mL / NET: 425 mL        Physical Exam:  General: NAD, resting comfortably in bed  Pulmonary: Nonlabored breathing, no respiratory distress  Cardiovascular:  Abdominal:  Extremities:  Vascular:      LABS:                        6.9    9.68  )-----------( 199      ( 19 Dec 2020 23:26 )             22.4     12-19    149<H>  |  116<H>  |  39<H>  ----------------------------<  100<H>  4.4   |  21<L>  |  4.05<H>    Ca    8.7      19 Dec 2020 07:35  Phos  4.6     12-19  Mg     2.0     12-19      PT/INR - ( 20 Dec 2020 00:52 )   PT: 16.8 sec;   INR: 1.42          PTT - ( 19 Dec 2020 07:35 )  PTT:36.3 sec   subjective:  seen and examined bedside with chief  pain controlled , no shortness of breath, no abdomen pain. no lightheadedness      doxazosin 4  metoprolol succinate   torsemide 20        Vital Signs Last 24 Hrs  T(C): 36.8 (20 Dec 2020 00:01), Max: 37.3 (19 Dec 2020 22:16)  T(F): 98.2 (20 Dec 2020 00:01), Max: 99.2 (19 Dec 2020 22:16)  HR: 81 (20 Dec 2020 01:25) (69 - 102)  BP: 126/66 (20 Dec 2020 01:25) (117/70 - 164/77)  BP(mean): 96 (20 Dec 2020 00:01) (95 - 96)  RR: 16 (20 Dec 2020 00:01) (16 - 17)  SpO2: 95% (20 Dec 2020 00:01) (95% - 99%)  I&O's Summary    18 Dec 2020 07:01  -  19 Dec 2020 07:00  --------------------------------------------------------  IN: 180 mL / OUT: 335 mL / NET: -155 mL    19 Dec 2020 07:01  -  20 Dec 2020 01:38  --------------------------------------------------------  IN: 780 mL / OUT: 355 mL / NET: 425 mL        Physical Exam:  General: NAD, resting comfortably in bed  Pulmonary: Nonlabored breathing, no respiratory distress, right chest wall hematoma, with ecchymosis, TTP, katja intact. wound clean and intact  Cardiovascular: S1, S2  Abdominal: soft NT, ND, positive BS  Extremities: AROM, right arm swelling is stable, palpable right radial pulse  Vascular: well perfused distally      LABS:                        6.9    9.68  )-----------( 199      ( 19 Dec 2020 23:26 )             22.4     12-19    149<H>  |  116<H>  |  39<H>  ----------------------------<  100<H>  4.4   |  21<L>  |  4.05<H>    Ca    8.7      19 Dec 2020 07:35  Phos  4.6     12-19  Mg     2.0     12-19      PT/INR - ( 20 Dec 2020 00:52 )   PT: 16.8 sec;   INR: 1.42          PTT - ( 19 Dec 2020 07:35 )  PTT:36.3 sec

## 2020-12-20 NOTE — CHART NOTE - NSCHARTNOTEFT_GEN_A_CORE
65y Male with HTN, ESRD (has LUE AVF, not on HD yet), T Cell lymphoma (dx ~18 yrs ago), AFib (eliquis), systolic CHF (EF 40-45% in 2017), severe pHTN, p/w CP. Patient just discharged from [facility] yesterday after admission at outside hospital for asymptomatic anemia. Patient receives chemotherapy there.  He received PRBC. Patient woke this morning with R chest pain/swelling, slowly worsening so came to ED. Denies trauma to that area or needlesticks. Denies lightheaded, SOB/CP, f/c, NVD, abd pain, urinary complaints, focal weakness/numbness.  CT chest performed shows large right chest wall intramuscular hematoma measuring up to 13 cm with area of arterial extravasation and large left pleural effusion.     Patient is now s/p chest wall hematoma evacuation on 12/18/2020. He still currently has large loculated left pleural effusion. Had CT chest done today which demonstrates stable size of left loculated effusion, read as possible hemothorax.     After discussion with Dr. Harmon and pulmonology team, it is unlikely patient is bleeding into left chest. There is a high likelihood of malignancy and patient may have underlying mass. At this time, there is no need for thoracic surgery intervention. Would recommend diagnostic thoracentesis to have fluid sent for analysis. This plan was discussed with pulmonology team today.     Thoracic surgery will follow peripherally, please call back with any questions.

## 2020-12-20 NOTE — PROGRESS NOTE ADULT - SUBJECTIVE AND OBJECTIVE BOX
INTERVAL EVENTS:  -- Hb downtrending again; s/p 2U of PRBC's  -- MILAN drain output downtrending     SUBJECTIVE:  -- notes some pain after his dressing change this AM but otherwise feels well  -- does not appreciate change in degree of chest wall and abdominal wall redness/ecchymosis  -- denies any SOB, orthopnea or cough; no fevers   -- tolerating PO  -- Review of Systems: 12 point review of systems otherwise negative    MEDICATIONS:  MEDICATIONS  (STANDING):  allopurinol 50 milliGRAM(s) Oral <User Schedule>  calcitriol   Capsule 0.25 MICROGram(s) Oral daily  doxazosin 4 milliGRAM(s) Oral at bedtime  metoprolol succinate  milliGRAM(s) Oral daily  torsemide 20 milliGRAM(s) Oral every 24 hours    MEDICATIONS  (PRN):  HYDROmorphone  Injectable 0.5 milliGRAM(s) IV Push every 4 hours PRN Breakthough pain  oxycodone    5 mG/acetaminophen 325 mG 1 Tablet(s) Oral every 6 hours PRN Moderate Pain (4 - 6)  oxycodone    5 mG/acetaminophen 325 mG 2 Tablet(s) Oral every 6 hours PRN Severe Pain (7 - 10)    Allergies: No Known Allergies    OBJECTIVE:  Vital Signs Last 24 Hrs  T(C): 36.9 (20 Dec 2020 13:31), Max: 37.7 (20 Dec 2020 09:15)  T(F): 98.5 (20 Dec 2020 13:31), Max: 99.9 (20 Dec 2020 09:15)  HR: 74 (20 Dec 2020 13:11) (69 - 87)  BP: 123/71 (20 Dec 2020 13:11) (123/71 - 178/89)  BP(mean): 92 (20 Dec 2020 13:11) (92 - 124)  RR: 18 (20 Dec 2020 13:11) (16 - 18)  SpO2: 94% (20 Dec 2020 13:11) (94% - 99%)  I&O's Summary    19 Dec 2020 07:01  -  20 Dec 2020 07:00  --------------------------------------------------------  IN: 1200 mL / OUT: 662.5 mL / NET: 537.5 mL    20 Dec 2020 07:01  -  20 Dec 2020 16:16  --------------------------------------------------------  IN: 180 mL / OUT: 0 mL / NET: 180 mL    PHYSICAL EXAM:  Gen: NAD, sitting upright in bed on RA  HEENT: NCAT, MMM, clear OP  CV: RRR, no m/g/r appreciated  Pulm: decreased BS a the left base, no increase in WOB, speaking in full sentences  Chest wall: R-sided surgical incision dressed with surrounding erythema/ecchymosis extending to R-flank; +MILAN drain with scant sanguinous output  Abd: normoactive BS, soft, NTND  Ext: WWP, 2+ pulses x4; no c/c/e  Neuro: AOx3, nonfocal  Psych: pleasant, conversant and appropriate    LABS:                        7.4    11.66 )-----------( 223      ( 20 Dec 2020 14:24 )             24.0     12-20    142  |  108  |  45<H>  ----------------------------<  56<L>  4.4   |  20<L>  |  4.44<H>    Ca    9.0      20 Dec 2020 06:51  Phos  4.9     12-20  Mg     2.1     12-20    PT/INR - ( 20 Dec 2020 00:52 )   PT: 16.8 sec;   INR: 1.42     PTT - ( 20 Dec 2020 10:10 )  PTT:42.1 sec    MICRODATA:  -- No new microdata.     RADIOLOGY/OTHER STUDIES:  -- Chest CT (12/20, per Radiology)  Questionable layering of hyperdensity in the left hemithorax (3:72), which may reflect blood product.   1.  Interval decrease in size of the right anterior chest wall hematoma with interval placement of drainage.  2.  Probable loculated left hemothorax and small layering hemothorax. No significant interval change in bilateral pleural effusion and atelectases, left greater than the right.  3.  Interval resolution of upper abdominal ascites.

## 2020-12-20 NOTE — PROGRESS NOTE ADULT - ASSESSMENT
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  Now s/p evacuation w/MILAN drain on 12/19 with post-op course c/b acute blood loss anemia s/p 2U of PRBC as of 12/20.  Etiology of anemia presumed to be his hematoma; CT and Pulm do not suspect L-pleural effusion to be a hemothorax.  Despite ongoing anemia he is clinically doing well.     #R Chest Wall Hematoma  -- s/p evacuation, washout and MILAN drain on 12/18  -- monitor MILAN drain output  -- c/w BID CBC for Hb trend, goal >7  -- pain control  -- wound care per primary team     #Acute Blood Loss (Post-Op) Anemia  -- s/p 2U of PRBC  -- c/w BID CBC for Hb trend, goal >7  -- monitor drain output     #Large L Pleural Effusion  -- plan had been for IR to drain as loculated; Pulm now consulted  -- Pulm requesting collateral regarding [facility] thoracentesis  -- of note, per our discussion with his Oncology, his thoracentesis was inconclusive   -- would recommend re-addressing thora w/Pulm in light of aforementioned collateral     #Afib  -- on Eliquis as home; on hold given bleed  -- c/w Metop for rate control     #Chronic systolic CHF  -- not presently decompensated  -- c/w home meds   -- unclear why Renal recommending holding Torsemide; would address w/them    #T Cell Lymphoma   -- on outpatient chemo with Dr. Vel Dimas ([phone])  -- c/w allopurinol     #Hepatic Lesion on CT  -- will need outpt eval if new    #Pancreatic Cystic Lesion  -- will need outpt MRI or CT in 1 year for surveillance     #Stage 4-5 CKD  -- not on HD  -- daily BMP   -- Renal following, appreciate assistance    #Diet - Renal Restrictions   #DVT PPx - SCD given active bleed  #Dispo - TBD, lives at home w/wife and generally very functional     Thalia Siddiqi  Attending Hospitalist  [phone]

## 2020-12-20 NOTE — PROCEDURE NOTE - NSPROCDETAILS_GEN_ALL_CORE
location identified, draped/prepped, sterile technique used, needle inserted/introduced/Seldinger technique/catheter inserted over needle/connection to syringe/ultrasound assessment of effusion (localization)

## 2020-12-20 NOTE — PROVIDER CONTACT NOTE (CRITICAL VALUE NOTIFICATION) - RECOMMENDATIONS
Blood transfusion
MD to see patient, LAb : PT, INR, PTT. blood transfusion, keep arm elevated, monitor right chest wall and MILAN drain.

## 2020-12-20 NOTE — PROGRESS NOTE ADULT - PROBLEM SELECTOR PLAN 1
patient approaching ESRD with continued rise in Scret  no signs of uremia but will need to decide in near future regarding initiation of HD  consider holding Torsemide for  1-2 days (no signs of fluid overload on exam)

## 2020-12-21 ENCOUNTER — TRANSCRIPTION ENCOUNTER (OUTPATIENT)
Age: 65
End: 2020-12-21

## 2020-12-21 DIAGNOSIS — J93.9 PNEUMOTHORAX, UNSPECIFIED: ICD-10-CM

## 2020-12-21 LAB
ANION GAP SERPL CALC-SCNC: 15 MMOL/L — SIGNIFICANT CHANGE UP (ref 5–17)
APTT BLD: 39.7 SEC — HIGH (ref 27.5–35.5)
B PERT IGG+IGM PNL SER: SIGNIFICANT CHANGE UP
BLD GP AB SCN SERPL QL: NEGATIVE — SIGNIFICANT CHANGE UP
BUN SERPL-MCNC: 49 MG/DL — HIGH (ref 7–23)
CALCIUM SERPL-MCNC: 8.6 MG/DL — SIGNIFICANT CHANGE UP (ref 8.4–10.5)
CHLORIDE SERPL-SCNC: 111 MMOL/L — HIGH (ref 96–108)
CHOLEST FLD-MCNC: 26 MG/DL — SIGNIFICANT CHANGE UP
CO2 SERPL-SCNC: 17 MMOL/L — LOW (ref 22–31)
COLOR FLD: SIGNIFICANT CHANGE UP
CREAT SERPL-MCNC: 4.66 MG/DL — HIGH (ref 0.5–1.3)
EOSINOPHIL # FLD: 2 % — SIGNIFICANT CHANGE UP
FLUID INTAKE SUBSTANCE CLASS: SIGNIFICANT CHANGE UP
FLUID SEGMENTED GRANULOCYTES: 36 % — SIGNIFICANT CHANGE UP
GLUCOSE BLDC GLUCOMTR-MCNC: 74 MG/DL — SIGNIFICANT CHANGE UP (ref 70–99)
GLUCOSE BLDC GLUCOMTR-MCNC: 86 MG/DL — SIGNIFICANT CHANGE UP (ref 70–99)
GLUCOSE SERPL-MCNC: 53 MG/DL — CRITICAL LOW (ref 70–99)
HCT VFR BLD CALC: 24 % — LOW (ref 39–50)
HGB BLD-MCNC: 7.4 G/DL — LOW (ref 13–17)
INR BLD: 1.44 — HIGH (ref 0.88–1.16)
LYMPHOCYTES # FLD: 53 % — SIGNIFICANT CHANGE UP
MAGNESIUM SERPL-MCNC: 2.1 MG/DL — SIGNIFICANT CHANGE UP (ref 1.6–2.6)
MCHC RBC-ENTMCNC: 28.7 PG — SIGNIFICANT CHANGE UP (ref 27–34)
MCHC RBC-ENTMCNC: 30.8 GM/DL — LOW (ref 32–36)
MCV RBC AUTO: 93 FL — SIGNIFICANT CHANGE UP (ref 80–100)
MONOS+MACROS # FLD: 9 % — SIGNIFICANT CHANGE UP
NRBC # BLD: 0 /100 WBCS — SIGNIFICANT CHANGE UP (ref 0–0)
PHOSPHATE SERPL-MCNC: 5.4 MG/DL — HIGH (ref 2.5–4.5)
PLATELET # BLD AUTO: 253 K/UL — SIGNIFICANT CHANGE UP (ref 150–400)
POTASSIUM SERPL-MCNC: 4.2 MMOL/L — SIGNIFICANT CHANGE UP (ref 3.5–5.3)
POTASSIUM SERPL-SCNC: 4.2 MMOL/L — SIGNIFICANT CHANGE UP (ref 3.5–5.3)
PROTHROM AB SERPL-ACNC: 17 SEC — HIGH (ref 10.6–13.6)
RBC # BLD: 2.58 M/UL — LOW (ref 4.2–5.8)
RBC # FLD: 18.2 % — HIGH (ref 10.3–14.5)
RCV VOL RI: HIGH /UL (ref 0–0)
RH IG SCN BLD-IMP: POSITIVE — SIGNIFICANT CHANGE UP
SODIUM SERPL-SCNC: 143 MMOL/L — SIGNIFICANT CHANGE UP (ref 135–145)
SPECIMEN SOURCE FLD: SIGNIFICANT CHANGE UP
TOTAL NUCLEATED CELL COUNT, BODY FLUID: 855 /UL — SIGNIFICANT CHANGE UP
TRIGL FLD-MCNC: 19 MG/DL — SIGNIFICANT CHANGE UP
TUBE TYPE: SIGNIFICANT CHANGE UP
WBC # BLD: 10.34 K/UL — SIGNIFICANT CHANGE UP (ref 3.8–10.5)
WBC # FLD AUTO: 10.34 K/UL — SIGNIFICANT CHANGE UP (ref 3.8–10.5)

## 2020-12-21 PROCEDURE — 76604 US EXAM CHEST: CPT | Mod: 26,GC

## 2020-12-21 PROCEDURE — 99233 SBSQ HOSP IP/OBS HIGH 50: CPT | Mod: GC

## 2020-12-21 PROCEDURE — 71045 X-RAY EXAM CHEST 1 VIEW: CPT | Mod: 26

## 2020-12-21 RX ORDER — OXYCODONE AND ACETAMINOPHEN 5; 325 MG/1; MG/1
1 TABLET ORAL EVERY 6 HOURS
Refills: 0 | Status: DISCONTINUED | OUTPATIENT
Start: 2020-12-21 | End: 2020-12-23

## 2020-12-21 RX ORDER — ACETAMINOPHEN 500 MG
325 TABLET ORAL EVERY 4 HOURS
Refills: 0 | Status: DISCONTINUED | OUTPATIENT
Start: 2020-12-21 | End: 2020-12-21

## 2020-12-21 RX ORDER — ACETAMINOPHEN 500 MG
650 TABLET ORAL EVERY 6 HOURS
Refills: 0 | Status: DISCONTINUED | OUTPATIENT
Start: 2020-12-21 | End: 2020-12-23

## 2020-12-21 RX ORDER — SENNA PLUS 8.6 MG/1
2 TABLET ORAL AT BEDTIME
Refills: 0 | Status: DISCONTINUED | OUTPATIENT
Start: 2020-12-21 | End: 2020-12-23

## 2020-12-21 RX ADMIN — CALCITRIOL 0.25 MICROGRAM(S): 0.5 CAPSULE ORAL at 11:04

## 2020-12-21 RX ADMIN — Medication 200 MILLIGRAM(S): at 06:37

## 2020-12-21 RX ADMIN — Medication 4 MILLIGRAM(S): at 21:28

## 2020-12-21 RX ADMIN — SENNA PLUS 2 TABLET(S): 8.6 TABLET ORAL at 21:28

## 2020-12-21 NOTE — PROGRESS NOTE ADULT - SUBJECTIVE AND OBJECTIVE BOX
24hr Event:  O/N: CT chest performed with small left apical pneumo, right chest MILAN site dressing changed, VSS  12/20 AM Hb 8.1, Pulm do bedside thoracentesis and send for cytology, poss. IR drainage in AM, depending what Pulm finds.        Assessment/Plan;  65y Male POD 2 s/p Open Chest Hematoma Evacuation    Pain/nausea control PRN  Home med, holding Eliquis  Monitor MILAN output and chest site for signs of rebleeding  Neurovascular checks on RUE  F/u CTS/Pulm consult for Pleural Effusion  Incentive spirometer/OOB/Ambulate  Renal diet  AM labs  Pulmonary will do bedside thoracentesis and send for cytology.      24hr Event:  O/N: CT chest performed with small left apical pneumo, right chest KATJA site dressing changed, VSS   AM Hb 8.1, Pulm do bedside thoracentesis and send for cytology, poss. IR drainage in AM, depending what Pulm finds.      Patient is a 65y old  Male who presents with a chief complaint of Chestwall hematoma (21 Dec 2020 05:38)          MEDICATIONS  (STANDING):  allopurinol 50 milliGRAM(s) Oral <User Schedule>  calcitriol   Capsule 0.25 MICROGram(s) Oral daily  doxazosin 4 milliGRAM(s) Oral at bedtime  metoprolol succinate  milliGRAM(s) Oral daily    MEDICATIONS  (PRN):  HYDROmorphone  Injectable 0.5 milliGRAM(s) IV Push every 4 hours PRN Breakthough pain  oxycodone    5 mG/acetaminophen 325 mG 1 Tablet(s) Oral every 6 hours PRN Moderate Pain (4 - 6)  oxycodone    5 mG/acetaminophen 325 mG 2 Tablet(s) Oral every 6 hours PRN Severe Pain (7 - 10)      Allergies    No Known Allergies    Intolerances          Vital Signs Last 24 Hrs  T(C): 36.6 (21 Dec 2020 06:07), Max: 37.7 (20 Dec 2020 09:15)  T(F): 97.9 (21 Dec 2020 06:07), Max: 99.9 (20 Dec 2020 09:15)  HR: 76 (21 Dec 2020 06:33) (73 - 82)  BP: 167/77 (21 Dec 2020 06:33) (119/71 - 178/89)  BP(mean): 95 (20 Dec 2020 16:51) (92 - 124)  RR: 18 (21 Dec 2020 06:33) (17 - 19)  SpO2: 100% (21 Dec 2020 06:33) (94% - 100%)  CAPILLARY BLOOD GLUCOSE          12-20 @ 07:01  -  12- @ 07:00  --------------------------------------------------------  IN: 180 mL / OUT: 170 mL / NET: 10 mL        Physical Exam:    Daily     Daily Weight in k.9 (21 Dec 2020 06:07)  General:  Well appearing, NAD  CV:  RRR  Pulmonary: Nonlabored breathing, no respiratory distress, right chest wall hematoma, with ecchymosis, TTP, katja intact. wound clean and intact  Cardiovascular: S1, S2  Abdominal: soft NT, ND, positive BS  Extremities: AROM, right arm swelling is stable, palpable right radial pulse  Neuro:  AAOx3    LABS:                        7.4    10.34 )-----------( 253      ( 21 Dec 2020 07:01 )             24.0     12-21    143  |  111<H>  |  49<H>  ----------------------------<  53<LL>  4.2   |  17<L>  |  4.66<H>    Ca    8.6      21 Dec 2020 07:01  Phos  5.4     12-  Mg     2.1     12-    TPro  5.9<L>  /  Alb  x   /  TBili  x   /  DBili  x   /  AST  x   /  ALT  x   /  AlkPhos  x   12-20    PT/INR - ( 21 Dec 2020 07:01 )   PT: 17.0 sec;   INR: 1.44          PTT - ( 21 Dec 2020 07:01 )  PTT:39.7 sec        ---------------------------------------------------------------------------  PLEASE CHECK WHEN PRESENT:     [  ] Heart Failure     [  ] Acute     [  ] Acute on Chronic     [X  ] Chronic  -------------------------------------------------------------------     [  ]Diastolic [HFpEF]     [X  ]Systolic [HFrEF]     [  ]Combined [HFpEF & HFrEF]     [X  ] afib     [ X ] hypertensive heart disease     [  ]Other:  -------------------------------------------------------------------  [ ] Respiratory failure  [ ] Acute cor pulmonale  [ ] Asthma/COPD Exacerbation  [ ] Pleural effusion  [ ] Aspiration pneumonia  -------------------------------------------------------------------  [  ]MAIRA     [  ]ATN     [  ]Reneal Medullary Necrosis     [  ]Renal Cortical Necrosis     [  ]Other Pathological Lesions:    [  ]CKD 1  [  ]CKD 2  [  ]CKD 3  [  ]CKD 4  [X  ]CKD 5  [  ]Other  -------------------------------------------------------------------  [  ]Diabetes  [  ] Diabetic PVD Ulcer  [  ] Neuropathic ulcer to DM  [  ] Diabetes with Nephropathy  [  ] Osteomyelitis due to diabetes  [ ] Hyperglycemia  [X ] Hypoglycemia  --------------------------------------------------------------------  [  ]Malnutrition: See Nutrition Note  [  ]Cachexia  [  ]Other:   [  ]Supplement Ordered:  [  ]Morbid Obesity (BMI >=40]  ---------------------------------------------------------------------  [ ] Sepsis/severe sepsis/septic shock  [ ] UTI  [ ] Pneumonia  -----------------------------------------------------------------------  [ ] Acidosis/alkalosis  [ ] Fluid overload  [ ] Hypokalemia  [ ] Hyperkalemia  [ ] Hypomagnesemia  [ ] Hypophosphatemia  [ ] Hyperphosphatemia  [ ] Hyponatremia  [ ] Hypernatremia  ------------------------------------------------------------------------  [ ] Acute blood loss anemia  [ ] Post op blood loss anemia  [ ] Iron deficiency anemia  [X ] Anemia due to chronic disease  [ ] Hypercoagulable state  [X ] Leukocytosis  ----------------------------------------------------------------------  [ ] Cerebral infarction  [ ] Transient ischemia attack  [ ] Encephalopathy    Assessment/Plan;  65y Male POD 2 s/p Open Chest Hematoma Evacuation    Neuro: dialudid, percocet  Cardio: cardura, metoprolol, holding Eliquis  Pulm: Pulmonary s/p bedside thoracentesis 1L removed  f/u cytology., F/u CTS/Pulm consult for Pleural Effusion  GI: Renal diet  Nephro: Has LUE AVF not on HD yet  Vascular: s/p R chest wall hematoma evac   Monitor KATJA output and chest site for signs of rebleeding  Neurovascular checks on RUE  Incentive spirometer/OOB/Ambulate  AM labs

## 2020-12-21 NOTE — DISCHARGE NOTE PROVIDER - NSDCCPTREATMENT_GEN_ALL_CORE_FT
PRINCIPAL PROCEDURE  Procedure: Evacuation of hematoma of chest  Findings and Treatment: Right chest wall hematoma evacuation      SECONDARY PROCEDURE  Procedure: Thoracentesis, left  Findings and Treatment:

## 2020-12-21 NOTE — PROGRESS NOTE ADULT - ASSESSMENT
65y Male with HTN, ESRD (has LUE AVF, not on HD yet), T Cell lymphoma (dx ~18 yrs ago), AFib (eliquis), systolic CHF (EF 40-45% in 2017), severe pHTN, p/w CP. Patient just discharged from [facility] yesterday after admission at outside hospital for asymptomatic anemia. Patient receives chemotherapy there.  He received PRBC. Patient woke this morning with R chest pain/swelling, slowly worsening so came to ED. Denies trauma to that area or needlesticks. Denies lightheaded, SOB/CP, f/c, NVD, abd pain, urinary complaints, focal weakness/numbness.  CT chest performed shows large right chest wall intramuscular hematoma measuring up to 13 cm with area of arterial extravasation and large left pleural effusion.     Patient is now s/p chest wall hematoma evacuation on 12/18/2020. He underwent diagnotisc left thoracentesis on 12/20 with Pulmonary. Studies show it is transudative. Cytology pending. Chest CT s/p thoracentsis showed slight decrease in left, loculated moderate pleural effusion.     There is still a high likelihood of malignancy and patient may have underlying mass. At this time, there is no need for thoracic surgery intervention.     Thoracic surgery will follow peripherally, please call back with any questions.    If patient is discharged tomorrow, he can follow up with Dr. Harmon on 12/30/20 at 3:30pm. Office located at [address] [place] [address], 4th floor. Office number [phone].

## 2020-12-21 NOTE — DISCHARGE NOTE PROVIDER - NSDCMRMEDTOKEN_GEN_ALL_CORE_FT
allopurinol: 200 milligram(s) orally 2 times a day  apixaban 2.5 mg oral tablet: 1 tab(s) orally 2 times a day  calcitriol 0.25 mcg oral capsule: 1 cap(s) orally once a day  doxazosin 2 mg oral tablet: 2 tab(s) orally 2 times a day  oxycodone-acetaminophen 5 mg-325 mg oral tablet: 1 tab(s) orally every 6 hours, As Needed MDD:4 tabs for severe pain  Toprol- mg oral tablet, extended release: 1 tab(s) orally once a day  torsemide 20 mg oral tablet: 1  orally every other day (at bedtime)   acetaminophen 325 mg oral tablet: 2 tab(s) orally every 6 hours, As needed, Moderate Pain (4 - 6)  allopurinol: 200 milligram(s) orally 2 times a day  apixaban 2.5 mg oral tablet: 1 tab(s) orally 2 times a day  calcitriol 0.25 mcg oral capsule: 1 cap(s) orally once a day  doxazosin 2 mg oral tablet: 2 tab(s) orally 2 times a day  oxycodone-acetaminophen 5 mg-325 mg oral tablet: 1 tab(s) orally 2 times a day, As Needed  for severe pain MDD:4 tabs   Pacerone 200 mg oral tablet: 1 tab(s) orally once a day   Pacerone 400 mg oral tablet: 1 tab(s) orally every 8 hours   Toprol- mg oral tablet, extended release: 1 tab(s) orally once a day  torsemide 20 mg oral tablet: 1  orally every other day (at bedtime)   acetaminophen 325 mg oral tablet: 2 tab(s) orally every 6 hours, As needed, Moderate Pain (4 - 6)  allopurinol: 200 milligram(s) orally 2 times a day  apixaban 2.5 mg oral tablet: 1 tab(s) orally 2 times a day  calcitriol 0.25 mcg oral capsule: 1 cap(s) orally once a day  doxazosin 2 mg oral tablet: 2 tab(s) orally 2 times a day  oxycodone-acetaminophen 5 mg-325 mg oral tablet: 1 tab(s) orally 2 times a day, As Needed  for severe pain MDD:4 tabs   Pacerone 200 mg oral tablet: 1 tab(s) orally once a day   Pacerone 400 mg oral tablet: 1 tab(s) orally every 8 hours   sodium bicarbonate 650 mg oral tablet: 1 tab(s) orally 2 times a day  Toprol- mg oral tablet, extended release: 1 tab(s) orally once a day  torsemide 20 mg oral tablet: 1  orally every other day (at bedtime)

## 2020-12-21 NOTE — DISCHARGE NOTE PROVIDER - CARE PROVIDER_API CALL
Amadou Cisneros)  [facility] Surgery  Vascular  [address]  [place], [place] [address]  Phone: [phone]  Fax: [phone]  Follow Up Time:    Amadou Cisneros)  [facility] Surgery  Vascular  [address] [place] [address]  Phone: [phone]  Fax: [phone]  Scheduled Appointment: 12/31/2020 10:00 AM    Nelia Herndon  CARDIAC ELECTROPHYSIOLOGY  [address] [place] [address]  Phone: [phone]  Fax: [phone]  Follow Up Time: 1 month    Branden Jones)  Surgery; Thoracic Surgery  [address] [place] [address]  Phone: [phone]  Fax: [phone]  Follow Up Time: 2 weeks    Howard Cui)  Cardiovascular Disease; Internal Medicine; Interventional Cardiology  [address] [place] [address]  Phone: [phone]  Fax: [phone]  Follow Up Time: 2 weeks    Misti Guidry)  Internal Medicine; Pulmonary Disease  [address] [address]  Phone: [phone]  Fax: [phone]  Follow Up Time: 2 weeks    Ann-Marie Denton  INTERNAL MEDICINE  [address] [place] [address]  Phone: [phone]  Fax: [phone]  Follow Up Time: 1 week

## 2020-12-21 NOTE — DISCHARGE NOTE PROVIDER - NSDCCPCAREPLAN_GEN_ALL_CORE_FT
PRINCIPAL DISCHARGE DIAGNOSIS  Diagnosis: Hematoma  Assessment and Plan of Treatment:       SECONDARY DISCHARGE DIAGNOSES  Diagnosis: Hyperphosphatemia  Assessment and Plan of Treatment:     Diagnosis: Hyperglycemia  Assessment and Plan of Treatment:     Diagnosis: Acute blood loss anemia  Assessment and Plan of Treatment:     Diagnosis: Pancreatic lesion  Assessment and Plan of Treatment:     Diagnosis: Chronic kidney disease (CKD)  Assessment and Plan of Treatment:     Diagnosis: Gout  Assessment and Plan of Treatment:     Diagnosis: Pulmonary hypertension  Assessment and Plan of Treatment:     Diagnosis: Systolic CHF, chronic  Assessment and Plan of Treatment:     Diagnosis: Atrial fibrillation, permanent  Assessment and Plan of Treatment:     Diagnosis: Transitional cell carcinoma of lymph node  Assessment and Plan of Treatment:     Diagnosis: BPH without obstruction/lower urinary tract symptoms  Assessment and Plan of Treatment:     Diagnosis: Loculated pleural effusion  Assessment and Plan of Treatment: Loculated pleural effusion    Diagnosis: Centrilobular emphysema  Assessment and Plan of Treatment: Centrilobular emphysema    Diagnosis: ESRD (end stage renal disease)  Assessment and Plan of Treatment: ESRD (end stage renal disease)    Diagnosis: Pneumothorax on left  Assessment and Plan of Treatment: Pneumothorax on left

## 2020-12-21 NOTE — PROGRESS NOTE ADULT - PROBLEM SELECTOR PLAN 2
Very small apical ptx on the left seen on CT scan. This is likely an ex-vacuo/ trapped lung given the appearance of the pleura on CT and likely chronicity of the effusion. He was placed on 6L NC this AM to help resorption. POCUS this AM showed lung sliding apically on left. Would f/u CXR q6h for now for monitoring Very small apical ptx on the left seen on CT scan. This is likely an ex-vacuo/ trapped lung given the appearance of the pleura on CT and likely chronicity of the effusion. He was placed on 6L NC this AM to help resorption. POCUS this AM showed lung sliding apically on left. Would f/u CXR today Very small apical ptx on the left seen on CT scan. This is likely an ex-vacuo/ trapped lung given the appearance of the pleura on CT and likely chronicity of the effusion. He was placed on 6L NC this AM to help resorption. POCUS this AM showed lung sliding apically on left. Would f/u CXR today and tomorrow AM to ensure no expansion of PTX. If he develops increasing pleuritic chest pain or worsening dyspnea, repeat CXR stat

## 2020-12-21 NOTE — DISCHARGE NOTE PROVIDER - PROVIDER TOKENS
PROVIDER:[TOKEN:[[id number]]] PROVIDER:[TOKEN:[[id number]],SCHEDULEDAPPT:[12/31/2020],SCHEDULEDAPPTTIME:[10:00 AM]],PROVIDER:[TOKEN:[[id number]],FOLLOWUP:[1 month]],PROVIDER:[TOKEN:[[id number]],FOLLOWUP:[2 weeks]],PROVIDER:[TOKEN:[[id number]],FOLLOWUP:[2 weeks]],PROVIDER:[TOKEN:[[id number]],FOLLOWUP:[2 weeks]],PROVIDER:[TOKEN:[[id number]],FOLLOWUP:[1 week]] PROVIDER:[TOKEN:[[id number]],SCHEDULEDAPPT:[12/31/2020],SCHEDULEDAPPTTIME:[10:00 AM]],PROVIDER:[TOKEN:[[id number]],FOLLOWUP:[1 month]],PROVIDER:[TOKEN:[[id number]],FOLLOWUP:[2 weeks]],PROVIDER:[TOKEN:[[id number]],FOLLOWUP:[2 weeks]],PROVIDER:[TOKEN:[[id number]],FOLLOWUP:[2 weeks]],PROVIDER:[TOKEN:[[id number]],FOLLOWUP:[1 week]],PROVIDER:[TOKEN:[[id number]],FOLLOWUP:[2 weeks]]

## 2020-12-21 NOTE — PROGRESS NOTE ADULT - SUBJECTIVE AND OBJECTIVE BOX
Patient discussed on morning rounds with Dr. Jones    SUBJECTIVE ASSESSMENT:  65y Male seen and examined sitting in bed. Frustrated by hospital admission and confused about plan. Otherwise, no SOB.        Vital Signs Last 24 Hrs  T(C): 36.2 (21 Dec 2020 15:00), Max: 36.7 (20 Dec 2020 18:21)  T(F): 97.1 (21 Dec 2020 15:00), Max: 98 (20 Dec 2020 18:21)  HR: 79 (21 Dec 2020 11:04) (73 - 79)  BP: 145/71 (21 Dec 2020 11:04) (119/71 - 167/77)  BP(mean): 95 (20 Dec 2020 16:51) (95 - 112)  RR: 18 (21 Dec 2020 11:04) (17 - 19)  SpO2: 100% (21 Dec 2020 11:04) (97% - 100%)  I&O's Detail    20 Dec 2020 07:01  -  21 Dec 2020 07:00  --------------------------------------------------------  IN:    Oral Fluid: 180 mL  Total IN: 180 mL    OUT:    Bulb (mL): 20 mL    Voided (mL): 150 mL  Total OUT: 170 mL    Total NET: 10 mL      PHYSICAL EXAM:  Appearance: No acute distress.  Neurologic: AAOx3, no AMS or focal deficits.  Responds appropriately to verbal and physical stimuli; exhibits purposeful movement in all extremities.  Cardiovascular: RRR  Respiratory: No acute respiratory distress, non- labored breathing. CTA on right, breath sounds diminished on left.  Gastrointestinal:  Soft, non-tender, non-distended.  Extremities: warm, well perfused. No edema.      LABS:                        7.4    10.34 )-----------( 253      ( 21 Dec 2020 07:01 )             24.0       PT/INR - ( 21 Dec 2020 07:01 )   PT: 17.0 sec;   INR: 1.44          PTT - ( 21 Dec 2020 07:01 )  PTT:39.7 sec    12-21    143  |  111<H>  |  49<H>  ----------------------------<  53<LL>  4.2   |  17<L>  |  4.66<H>    Ca    8.6      21 Dec 2020 07:01  Phos  5.4     12-21  Mg     2.1     12-21    TPro  5.9<L>  /  Alb  x   /  TBili  x   /  DBili  x   /  AST  x   /  ALT  x   /  AlkPhos  x   12-20          MEDICATIONS  (STANDING):  allopurinol 50 milliGRAM(s) Oral <User Schedule>  calcitriol   Capsule 0.25 MICROGram(s) Oral daily  doxazosin 4 milliGRAM(s) Oral at bedtime  metoprolol succinate  milliGRAM(s) Oral daily  senna 2 Tablet(s) Oral at bedtime    MEDICATIONS  (PRN):  acetaminophen   Tablet .. 325 milliGRAM(s) Oral every 4 hours PRN Moderate Pain (4 - 6)  oxycodone    5 mG/acetaminophen 325 mG 1 Tablet(s) Oral every 6 hours PRN Severe Pain (7 - 10)        RADIOLOGY & ADDITIONAL TESTS:

## 2020-12-21 NOTE — DISCHARGE NOTE PROVIDER - NSDCFUSCHEDAPPT_GEN_ALL_CORE_FT
MARCI VEGA ; 12/28/2020 ; NPP [facility] [address]   MARCI VEGA ; 01/15/2021 ; NPP [address] [address] [place] [address]  MARCI VEGA ; 01/20/2021 ; NPP [address]  MARCI VEGA ; 03/04/2021 ; [facility] [address] [facility] [address]  MARCI VEGA ; 12/28/2020 ; NPP [facility] [address]   MARCI VEGA ; 12/30/2020 ; NPP Thorsurg [address] [place] [address]  MARCI VEGA ; 01/15/2021 ; NPP [address]  MARCI VEGA ; 03/04/2021 ; [facility] [address] [facility] [address]  MARCI VEGA ; 12/28/2020 ; NPP [facility] [address]   MARCI VEGA ; 12/30/2020 ; NPP [address]urg [address] [place] [address]  MARCI VEGA ; 12/31/2020 ; [facility] [address] [facility] [address]   MARCI VEGA ; 01/15/2021 ; NPP Ne[address]ro [address] [place] [address]  MARCI VEGA ; 02/01/2021 ; NPP Cardio [facility] [address] [facility]  MARCI VEGA ; 03/04/2021 ; NPP Surg [facility] [address]

## 2020-12-21 NOTE — CONSULT NOTE ADULT - ASSESSMENT
Assessment: case reviewed with Dr. Reardon. Pt scheduled for right chest wall hematoma evacuation on 12/18/20. Pt received eliquis the morning of 12/17/20, to be held for 48 hours prior to procedure. Left thoracentesis planned for Monday 12/21/20, was performed on floor 12/20/20.    Communicated with: vascular surgery

## 2020-12-21 NOTE — PROGRESS NOTE ADULT - SUBJECTIVE AND OBJECTIVE BOX
Hospitalist Progress Note:     SUBJECTIVE / OVERNIGHT EVENTS:  -s/p 2 units RBC over the wknd   -S/p L sided thora yest by pulm- no blood/pus noted, high ph, transudative, post procedure small PTX noted   -S/p removal of R sided drain by CT surg today   -Overall patient without any chest pain/sob, stable cardiopulm status     MEDICATIONS  (STANDING):  allopurinol 50 milliGRAM(s) Oral <User Schedule>  calcitriol   Capsule 0.25 MICROGram(s) Oral daily  doxazosin 4 milliGRAM(s) Oral at bedtime  metoprolol succinate  milliGRAM(s) Oral daily  senna 2 Tablet(s) Oral at bedtime    MEDICATIONS  (PRN):  acetaminophen   Tablet .. 650 milliGRAM(s) Oral every 6 hours PRN Moderate Pain (4 - 6)  oxycodone    5 mG/acetaminophen 325 mG 1 Tablet(s) Oral every 6 hours PRN Severe Pain (7 - 10)    CAPILLARY BLOOD GLUCOSE      POCT Blood Glucose.: 86 mg/dL (21 Dec 2020 10:24)  POCT Blood Glucose.: 74 mg/dL (21 Dec 2020 08:26)    I&O's Summary    20 Dec 2020 07:01  -  21 Dec 2020 07:00  --------------------------------------------------------  IN: 180 mL / OUT: 170 mL / NET: 10 mL        PHYSICAL EXAM:  Vital Signs Last 24 Hrs  T(C): 36.2 (21 Dec 2020 15:00), Max: 36.7 (20 Dec 2020 18:21)  T(F): 97.1 (21 Dec 2020 15:00), Max: 98 (20 Dec 2020 18:21)  HR: 79 (21 Dec 2020 11:04) (73 - 79)  BP: 145/71 (21 Dec 2020 11:04) (119/71 - 167/77)  BP(mean): 95 (20 Dec 2020 16:51) (95 - 95)  RR: 18 (21 Dec 2020 11:04) (17 - 19)  SpO2: 100% (21 Dec 2020 11:04) (97% - 100%)  Gen: Middle-aged M, NAD  Lungs: anteriorly CTAB on R, decreased BS on L. R sided surgical drainage site c/d/i no bleeidng. L thora site with bandage c/d/i  CV: RRR, no m/r/g  Abd: Soft,NT  Ext: WWP no sig le edema     LABS:                        7.4    10.34 )-----------( 253      ( 21 Dec 2020 07:01 )             24.0     12-21    143  |  111<H>  |  49<H>  ----------------------------<  53<LL>  4.2   |  17<L>  |  4.66<H>    Ca    8.6      21 Dec 2020 07:01  Phos  5.4     12-21  Mg     2.1     12-21    TPro  5.9<L>  /  Alb  x   /  TBili  x   /  DBili  x   /  AST  x   /  ALT  x   /  AlkPhos  x   12-20    PT/INR - ( 21 Dec 2020 07:01 )   PT: 17.0 sec;   INR: 1.44          PTT - ( 21 Dec 2020 07:01 )  PTT:39.7 sec    Fluid studies from thora 12/21 reviewed     Culture - Body Fluid with Gram Stain (collected 20 Dec 2020 18:36)  Source: .Body Fluid None  Preliminary Report (21 Dec 2020 08:25):    No growth to date    ASSESSMENT AND PLAN: 64yo gentleman with a PMH of HTN, stage 4 CKD (not on HD, LUE AVF), T Cell lymphoma (actively getting chemo), AFib (on Eliquis), chronic systolic CHF and severe pHTN who p/w pain and swelling to R chest wall, found to have a large R-chest wall  hematoma, along w L loculated effusion.  Now s/p hematoma evacuation w MILAN drain removed today, needing 2 units RBC post op now Hg stable.  s/p L sided thora, while exudative and low c/f infection and bleeding, concern remains for malignancy    #L Loculated effusion, transudative, low c/f bleed/infection. postp small pnuemothorax   -repeat CXR in am to ensure stable pneumothorax, continue on 02 today for resorption   -F/u cytology  -F/u daily pulm recs who will monitor site with point of care bedside US    #R Chest Wall Hematoma, MILAN drain out   -- c/w BID CBC for Hb trend, goal >7  -- pain control  -- wound care per primary team     #Afib  -- on Eliquis as home; on hold given bleed. Restart as safe per surgery team and Hg stable  -- c/w Metop for rate control     #Chronic systolic CHF  -- not presently decompensated  -- c/w home meds   -- Restart torsemide if no further bleeding/hypovolemia concern     #Stage 4-5 CKD  -- not on HD  -- daily BMP   -- Renal following, appreciate assistance    #T Cell Lymphoma   -- on outpatient chemo with Dr. Vel Dimas ([phone])  -- c/w allopurinol     #Hepatic Lesion and Pancreatic Cyst lesion on CT  -- will need outpt eval      #Diet - Renal Restrictions   #DVT PPx - SCD given active bleed- holding eliquis s/p active bleeding  #Dispo - Pending PT eval and stability of issues above      Greater than 35 minutes spent on total encounter; more than 50% of the visit was spent counseling and/or coordinating care by the attending physician.    Robin Guadarrama MD [phone]

## 2020-12-21 NOTE — CONSULT NOTE ADULT - SUBJECTIVE AND OBJECTIVE BOX
This is a 64yo gentleman with a PMH of HTN, stage 4/5 CKD (not on HD, LUE AVF), T Cell lymphoma (dx ~19yrs ago, actively getting chemo), AFib (on Eliquis), chronic systolic CHF (EF 40-45%, 2017) and severe pHTN who p/w pain and swelling to R chest wall.        Clinical History: HEMATOMA    No pertinent family history in first degree relatives    Handoff    MEWS Score    BPH (benign prostatic hyperplasia)    Gout    H/O pulmonary hypertension    CHF, chronic    Lymphoma    CKD (chronic kidney disease)    Atrial fibrillation    HTN (hypertension)    Hematoma    Hematoma    Hematoma    Hematoma    ESRD (end stage renal disease)    Centrilobular emphysema    Atelectasis of left lung    Loculated pleural effusion    CKD (chronic kidney disease)    Hematoma    Thoracentesis, left    Evacuation of hematoma of chest    Elective surgery    History of cholecystectomy    CHEST PAIN    90+    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        Meds:allopurinol 50 milliGRAM(s) Oral <User Schedule>  calcitriol   Capsule 0.25 MICROGram(s) Oral daily  doxazosin 4 milliGRAM(s) Oral at bedtime  HYDROmorphone  Injectable 0.5 milliGRAM(s) IV Push every 4 hours PRN  metoprolol succinate  milliGRAM(s) Oral daily  oxycodone    5 mG/acetaminophen 325 mG 1 Tablet(s) Oral every 6 hours PRN  oxycodone    5 mG/acetaminophen 325 mG 2 Tablet(s) Oral every 6 hours PRN      Allergies:No Known Allergies        Labs:                           7.4    10.34 )-----------( 253      ( 21 Dec 2020 07:01 )             24.0     PT/INR - ( 21 Dec 2020 07:01 )   PT: 17.0 sec;   INR: 1.44          PTT - ( 21 Dec 2020 07:01 )  PTT:39.7 sec  12-21    143  |  111<H>  |  49<H>  ----------------------------<  53<LL>  4.2   |  17<L>  |  4.66<H>    Ca    8.6      21 Dec 2020 07:01  Phos  5.4     12-21  Mg     2.1     12-21    TPro  5.9<L>  /  Alb  x   /  TBili  x   /  DBili  x   /  AST  x   /  ALT  x   /  AlkPhos  x   12-20          Imaging Findings: large left pleural effusion

## 2020-12-21 NOTE — PROGRESS NOTE ADULT - SUBJECTIVE AND OBJECTIVE BOX
INTERVAL HISTORY:  Patient seen and examined at bedside. Still continues to not have any respiratory symptoms; just with mild chest wall pain 2/2 hematoma.  HPI:    All other components of the 10 point review of systems is negative aside from those mentioned above.    VITAL SIGNS:  ICU Vital Signs Last 24 Hrs  T(C): 36.6 (21 Dec 2020 06:07), Max: 37.5 (20 Dec 2020 09:55)  T(F): 97.9 (21 Dec 2020 06:07), Max: 99.5 (20 Dec 2020 09:55)  HR: 77 (21 Dec 2020 08:45) (73 - 80)  BP: 164/71 (21 Dec 2020 08:45) (119/71 - 167/77)  BP(mean): 95 (20 Dec 2020 16:51) (92 - 112)  ABP: --  ABP(mean): --  RR: 18 (21 Dec 2020 08:45) (17 - 19)  SpO2: 100% (21 Dec 2020 08:45) (94% - 100%)        12-20 @ 07:01  -  12-21 @ 07:00  --------------------------------------------------------  IN: 180 mL / OUT: 170 mL / NET: 10 mL      CAPILLARY BLOOD GLUCOSE      POCT Blood Glucose.: 74 mg/dL (21 Dec 2020 08:26)      PHYSICAL EXAM:  Constitutional: resting comfortably in bed, NAD  HEENT: NC/AT; PERRL, anicteric sclera; no oropharyngeal erythema or exudates; MMM  Neck: supple, no JVD  Respiratory: CTA B/L, no W/R/R; respirations appear non-labored, speaking full sentences  Cardiovascular: +S1/S2, RRR  Gastrointestinal: abdomen soft, NT/ND; +BS x4  Extremities: WWP; no clubbing, cyanosis or edema  Vascular: 2+ radial, DP/PT and femoral pulses B/L      MEDICATIONS:  MEDICATIONS  (STANDING):  allopurinol 50 milliGRAM(s) Oral <User Schedule>  calcitriol   Capsule 0.25 MICROGram(s) Oral daily  doxazosin 4 milliGRAM(s) Oral at bedtime  metoprolol succinate  milliGRAM(s) Oral daily    MEDICATIONS  (PRN):  HYDROmorphone  Injectable 0.5 milliGRAM(s) IV Push every 4 hours PRN Breakthough pain  oxycodone    5 mG/acetaminophen 325 mG 1 Tablet(s) Oral every 6 hours PRN Moderate Pain (4 - 6)  oxycodone    5 mG/acetaminophen 325 mG 2 Tablet(s) Oral every 6 hours PRN Severe Pain (7 - 10)      ALLERGIES:  Allergies    No Known Allergies    Intolerances        LABS:                        7.4    10.34 )-----------( 253      ( 21 Dec 2020 07:01 )             24.0     12-21    143  |  111<H>  |  49<H>  ----------------------------<  53<LL>  4.2   |  17<L>  |  4.66<H>    Ca    8.6      21 Dec 2020 07:01  Phos  5.4     12-21  Mg     2.1     12-21    TPro  5.9<L>  /  Alb  x   /  TBili  x   /  DBili  x   /  AST  x   /  ALT  x   /  AlkPhos  x   12-20    PT/INR - ( 21 Dec 2020 07:01 )   PT: 17.0 sec;   INR: 1.44          PTT - ( 21 Dec 2020 07:01 )  PTT:39.7 sec      RADIOLOGY & ADDITIONAL TESTS:    INTERVAL HISTORY:  Patient seen and examined at bedside. Still continues to not have any respiratory symptoms; just with mild chest wall pain 2/2 hematoma.  HPI:    All other components of the 10 point review of systems is negative aside from those mentioned above.    VITAL SIGNS:  ICU Vital Signs Last 24 Hrs  T(C): 36.6 (21 Dec 2020 06:07), Max: 37.5 (20 Dec 2020 09:55)  T(F): 97.9 (21 Dec 2020 06:07), Max: 99.5 (20 Dec 2020 09:55)  HR: 77 (21 Dec 2020 08:45) (73 - 80)  BP: 164/71 (21 Dec 2020 08:45) (119/71 - 167/77)  BP(mean): 95 (20 Dec 2020 16:51) (92 - 112)  ABP: --  ABP(mean): --  RR: 18 (21 Dec 2020 08:45) (17 - 19)  SpO2: 100% (21 Dec 2020 08:45) (94% - 100%)        12-20 @ 07:01  -  12-21 @ 07:00  --------------------------------------------------------  IN: 180 mL / OUT: 170 mL / NET: 10 mL      CAPILLARY BLOOD GLUCOSE      POCT Blood Glucose.: 74 mg/dL (21 Dec 2020 08:26)      PHYSICAL EXAM:  Constitutional: resting comfortably in bed, NAD  HEENT: NC/AT; PERRL, anicteric sclera; no oropharyngeal erythema or exudates; MMM  Neck: supple, no JVD  Respiratory: CTA B/L, no W/R/R; respirations appear non-labored, speaking full sentences  Cardiovascular: +S1/S2, RRR  Gastrointestinal: abdomen soft, NT/ND; +BS x4  Extremities: right upper extremity with areas of swelling and overlying erythema extending from the right chest   Left upper extremity - +palpable thrill over the AV fistula   Vascular: 2+ radial, DP/PT and femoral pulses B/L      MEDICATIONS:  MEDICATIONS  (STANDING):  allopurinol 50 milliGRAM(s) Oral <User Schedule>  calcitriol   Capsule 0.25 MICROGram(s) Oral daily  doxazosin 4 milliGRAM(s) Oral at bedtime  metoprolol succinate  milliGRAM(s) Oral daily    MEDICATIONS  (PRN):  HYDROmorphone  Injectable 0.5 milliGRAM(s) IV Push every 4 hours PRN Breakthough pain  oxycodone    5 mG/acetaminophen 325 mG 1 Tablet(s) Oral every 6 hours PRN Moderate Pain (4 - 6)  oxycodone    5 mG/acetaminophen 325 mG 2 Tablet(s) Oral every 6 hours PRN Severe Pain (7 - 10)      ALLERGIES:  Allergies    No Known Allergies    Intolerances        LABS:                        7.4    10.34 )-----------( 253      ( 21 Dec 2020 07:01 )             24.0     12-21    143  |  111<H>  |  49<H>  ----------------------------<  53<LL>  4.2   |  17<L>  |  4.66<H>    Ca    8.6      21 Dec 2020 07:01  Phos  5.4     12-21  Mg     2.1     12-21    TPro  5.9<L>  /  Alb  x   /  TBili  x   /  DBili  x   /  AST  x   /  ALT  x   /  AlkPhos  x   12-20    PT/INR - ( 21 Dec 2020 07:01 )   PT: 17.0 sec;   INR: 1.44          PTT - ( 21 Dec 2020 07:01 )  PTT:39.7 sec      RADIOLOGY & ADDITIONAL TESTS:    INTERVAL HISTORY:  Patient seen and examined at bedside. Still continues to not have any respiratory symptoms; just with mild chest wall pain 2/2 hematoma.    All other components of the 10 point review of systems is negative aside from those mentioned above.    VITAL SIGNS:  ICU Vital Signs Last 24 Hrs  T(C): 36.6 (21 Dec 2020 06:07), Max: 37.5 (20 Dec 2020 09:55)  T(F): 97.9 (21 Dec 2020 06:07), Max: 99.5 (20 Dec 2020 09:55)  HR: 77 (21 Dec 2020 08:45) (73 - 80)  BP: 164/71 (21 Dec 2020 08:45) (119/71 - 167/77)  BP(mean): 95 (20 Dec 2020 16:51) (92 - 112)  ABP: --  ABP(mean): --  RR: 18 (21 Dec 2020 08:45) (17 - 19)  SpO2: 100% (21 Dec 2020 08:45) (94% - 100%)        12-20 @ 07:01  -  12-21 @ 07:00  --------------------------------------------------------  IN: 180 mL / OUT: 170 mL / NET: 10 mL      CAPILLARY BLOOD GLUCOSE      POCT Blood Glucose.: 74 mg/dL (21 Dec 2020 08:26)      PHYSICAL EXAM:  Constitutional: resting comfortably in bed, NAD  HEENT: NC/AT; PERRL, anicteric sclera; no oropharyngeal erythema or exudates; MMM  Neck: supple, no JVD  Respiratory: CTA B/L, no W/R/R; respirations appear non-labored, speaking full sentences  Cardiovascular: +S1/S2, RRR  Gastrointestinal: abdomen soft, NT/ND; +BS x4  Extremities: right upper extremity with areas of swelling and overlying erythema extending from the right chest   Left upper extremity - +palpable thrill over the AV fistula   Vascular: 2+ radial, DP/PT and femoral pulses B/L      MEDICATIONS:  MEDICATIONS  (STANDING):  allopurinol 50 milliGRAM(s) Oral <User Schedule>  calcitriol   Capsule 0.25 MICROGram(s) Oral daily  doxazosin 4 milliGRAM(s) Oral at bedtime  metoprolol succinate  milliGRAM(s) Oral daily    MEDICATIONS  (PRN):  HYDROmorphone  Injectable 0.5 milliGRAM(s) IV Push every 4 hours PRN Breakthough pain  oxycodone    5 mG/acetaminophen 325 mG 1 Tablet(s) Oral every 6 hours PRN Moderate Pain (4 - 6)  oxycodone    5 mG/acetaminophen 325 mG 2 Tablet(s) Oral every 6 hours PRN Severe Pain (7 - 10)      ALLERGIES:  Allergies    No Known Allergies    Intolerances        LABS:                        7.4    10.34 )-----------( 253      ( 21 Dec 2020 07:01 )             24.0     12-21    143  |  111<H>  |  49<H>  ----------------------------<  53<LL>  4.2   |  17<L>  |  4.66<H>    Ca    8.6      21 Dec 2020 07:01  Phos  5.4     12-21  Mg     2.1     12-21    TPro  5.9<L>  /  Alb  x   /  TBili  x   /  DBili  x   /  AST  x   /  ALT  x   /  AlkPhos  x   12-20    PT/INR - ( 21 Dec 2020 07:01 )   PT: 17.0 sec;   INR: 1.44          PTT - ( 21 Dec 2020 07:01 )  PTT:39.7 sec      RADIOLOGY & ADDITIONAL TESTS:

## 2020-12-21 NOTE — PROGRESS NOTE ADULT - ATTENDING COMMENTS
65M PMH HTN, ESRD (has LUE AVF, not on HD yet), T Cell lymphoma (dx ~19yrs ago), AFib on (eliquis), systolic CHF (EF 40-45% in 2017), severe pHTN, p/w pain and swelling to R chest wall. He is POD#2 fro right chest wall hematoma evacuation (250cc of clot removed), washout and closure with staples. Pulmonary was consulted for loculated left pleural effusion. He underwent left thoracentesis on 12-20 with removal of 1L of fluid which was found to be a transudate by Light's criteria with >50% lymphocytes. 65M PMH HTN, ESRD (has LUE AVF, not on HD yet), T Cell lymphoma (dx ~19yrs ago), AFib on (eliquis), systolic CHF (EF 40-45% in 2017), severe pHTN, p/w pain and swelling to R chest wall. He is POD#2 fro right chest wall hematoma evacuation (250cc of clot removed), washout and closure with staples. Pulmonary was consulted for loculated left pleural effusion. He underwent left thoracentesis on 12-20 with removal of 1L of fluid which was found to be a transudate by Light's criteria with >50% lymphocytes. Post procedure CXR showed a trace left apircal PTX. Cytology of the pleural fluid is pending.   - The CT scan also showed a heterogeneously hyperdense round intraparenchymal lesion measuring up to 3.1 cm ?loculated hemothorax vs ?mass which will need to be followed up.   - will follow pleural fluid cytology. will also see if we can add on flow cytometry analysis to the pleural fluid studies given his history of t cell lymphoma

## 2020-12-21 NOTE — DISCHARGE NOTE PROVIDER - NSDCFUADDINST_GEN_ALL_CORE_FT
FOLLOW UP:  ___ in 1 week. Call the office at  with any questions.    WOUND CARE: You may shower; soap and water over incision sites. Do not scrub. Pat dry when done.     ACTIVITY:Ambulate as tolerated, but no heavy lifting (>10lbs) or strenuous exercise.    DIET:   You may resume regular diet. Call the office if you experience increasing pain, redness, swelling or drainage from incision sites/wounds, or temperature >101.4F.     NEW MEDICATIONS:    ADDITIONAL FOLLOW UP AFTER DISCHARGE:    DISCHARGE DESTINATION:  FOLLOW UP: Dr. Cisneros on 12/31/20 at 10am.  Call the office at  with any questions.    WOUND CARE: You may shower; soap and water over incision sites. Do not scrub. Pat dry when done.     ACTIVITY: Ambulate as tolerated, but no heavy lifting (>10lbs) or strenuous exercise.    DIET:   You may resume renal  diet.     Call the office if you experience increasing pain, redness, swelling or drainage from incision sites/wounds, or temperature >101.4F.     NEW MEDICATIONS:  Amiodarone 400mg oral every 8 hrs for 12 doses   Then Amiodarone 200mg oral daily     ADDITIONAL FOLLOW UP AFTER DISCHARGE:  EP: Dr. Herndon in 2 weeks   Cardiology: Please follow up with your Cardiologist in one week   Cardiac Surgery: Dr. Cui in 2 weeks   Thoracic Surgery: Dr. Jones in 2 weeks   Pulmonary: Dr. Guidry in 2 weeks   Nephrology: Dr. Guadalupe in 2 weeks     DISCHARGE DESTINATION: Home  FOLLOW UP: Dr. Cisneros on 12/31/20 at 10am.  Call the office at  with any questions.    WOUND CARE: You may shower; soap and water over incision sites. Do not scrub. Pat dry when done.     ACTIVITY: Ambulate as tolerated, but no heavy lifting (>10lbs) or strenuous exercise.    DIET:   You may resume renal  diet.     Call the office if you experience increasing pain, redness, swelling or drainage from incision sites/wounds, or temperature >101.4F.     NEW MEDICATIONS:  Amiodarone 400mg oral every 8 hrs for 12 doses   Then Amiodarone 200mg oral daily     ADDITIONAL FOLLOW UP AFTER DISCHARGE:  EP: Dr. Herndon in 2 weeks   Cardiology: Dr. Gomez [phone]  Cardiac Surgery: Dr. Cui in 2 weeks   Thoracic Surgery: Dr. Jones in 2 weeks   Pulmonary: Dr. Guidry in 2 weeks   Nephrology: Dr. Guadalupe in 2 weeks     DISCHARGE DESTINATION: Home

## 2020-12-21 NOTE — PROGRESS NOTE ADULT - PROBLEM SELECTOR PLAN 1
Recurrent effusion of the left side that had been seen on imaging as far back as 2017. Originally this was attributed to hypervolemia 2/2 CKD and/or CHF. Now is reporting unintentional weightloss of 20lbs or so in 2-3w with increased effusions.  Initial CT scan showed b/l effusions L>R which the L has since been drained. Pleural thickening was noted on the mediastinal side, which is also seen on the lateral side after thoracentesis. There is also a mass like consolidation in the LLL as well. Not much hilar and mediastinal LAD, possibly slightly enlarged 4R.  -POCUS today shows continued evidence of small but highly loculated effusion with dense atelectasis. Right side small effusion unchanged.   Top differential still remains malignancy. Effusion was transudative by all measurements, but cytology is still pending. He is a current smoker who possibly also had exposure to Asbestos, thus lung CA and mesothelioma are possible (though no calcified pleural plaques). He also has hx of T-Cell lymphoma, which can also be related. He continues to not have any symptoms suggestive of sepsis, and infectious etiology is unlikely at this point.    Plan:  - f/u cytology and rest of studies  - Daily POCUS for f/u while admitted · Continue metoprolol tartrate 25 mg twice daily  · Restart oral Coumadin  · Check INR daily  · Patient never restart on oral coumadin yesterday     · Will restart coumadin today 3 mg daily   · Start lovenox for bridging until coumadin therapeutic Recurrent effusion of the left side that had been seen on imaging as far back as 2017. Originally this was attributed to hypervolemia 2/2 CKD and/or CHF. Now is reporting unintentional weight loss of 20lbs or so in 2-3w with increased effusions.  Initial CT scan showed b/l effusions L>R which the L has since been drained. Pleural thickening was noted on the mediastinal side, which is also seen on the lateral side after thoracentesis. There is also a mass like consolidation in the LLL as well. Not much hilar and mediastinal LAD, possibly slightly enlarged 4R.  -POCUS today shows continued evidence of small but highly loculated effusion with dense atelectasis. Right side small effusion unchanged.   Top differential still remains malignancy. Effusion was transudative by all measurements, but cytology is still pending. He is a current smoker who possibly also had exposure to Asbestos, thus lung CA and mesothelioma are possible (though no calcified pleural plaques). He also has hx of T-Cell lymphoma, which can also be related. He continues to not have any symptoms suggestive of sepsis, and infectious etiology is unlikely at this point.    Plan:  - f/u cytology and rest of studies  - Daily POCUS for f/u while admitted

## 2020-12-21 NOTE — DISCHARGE NOTE PROVIDER - HOSPITAL COURSE
65M PMH HTN, ESRD (has LUE AVF, not on HD yet), T Cell lymphoma (dx ~19yrs ago), AFib on (eliquis), systolic CHF (EF 40-45% in 2017), severe pHTN, p/w pain and swellling to R chest wall. Pt just dc'd yesterday after admission at outside hospital for asymptomatic anemia. received 2U PRBC. Pt woke this morning w/ R CP/swelling, slowly worsening. Denies trauma to that area or needle sticks. Denies lightheaded, SOB/CP, f/c, NVD, abd pain, urinary complaints, focal weakness/numbness    Patient was admitted to the Vascular Service and pre-oped for OR for hematoma evacuation. Patient was taken to OR for Right chest wall hematoma evacuation on 12/18/20. Post-op he continued to have Hgb drop and was given antoehr unit PRBC.    CT surgery and Pulmonary were consulted for hx of left Effusion. Patient did have a Thoracentesis at [facility] and awaiting results of this tap.     on 12/20 Patient was seen by Pulmonary and underwent bedside thoracentesis. Fluid was sent for cytology and culture. Patient underwent Chest x-ray which showed a small Left Pneumothorax. Patient remained stable off oxygen. 65M PMH HTN, ESRD (has LUE AVF, not on HD yet), T Cell lymphoma (dx ~19yrs ago), AFib on (eliquis), systolic CHF (EF 40-45% in 2017), severe pHTN, p/w pain and swellling to R chest wall. Pt just dc'd yesterday after admission at outside hospital for asymptomatic anemia. received 2U PRBC. Pt woke this morning w/ R CP/swelling, slowly worsening. Denies trauma to that area or needle sticks. Denies lightheaded, SOB/CP, f/c, NVD, abd pain, urinary complaints, focal weakness/numbness    Patient was admitted to the Vascular Service and pre-oped for OR for hematoma evacuation. Patient was taken to OR for Right chest wall hematoma evacuation on 12/18/20. Post-op he continued to have Hgb drop and was given antoehr unit PRBC.    CT surgery and Pulmonary were consulted for hx of left Effusion. Patient did have a Thoracentesis at [facility] and awaiting results of this tap.     on 12/20 Patient was seen by Pulmonary and underwent bedside thoracentesis. Fluid was sent for cytology and culture. Patient underwent Chest x-ray which showed a small Left Pneumothorax. Patient remained stable off oxygen.     On 12/22 am he was noted to be in Rapid A-fib with Heart rate in 140's and symptomatic( SOB, Palpitations, dizziness). He was treated with IV metoprolol 5mg x2 with HR decreased to 120's. He was restarted on his anticoagulation Eliquis 2.5 BID and started on IV amiodarone. He was transferred to SICU for closer monitoring. He remained hemodynamically stable. Echo was done that showed unchanged EF but Severe MR/TR for which cardiac surgery was consulted     EP was consulted and gave recommendations for PO Amiodarone.     Patients Hgb remained stable on Anticoagulation and patient was discharged Home with plan to f/u as outpatient with Pulmonary, EP, Cardiology and Cardiac surgery

## 2020-12-21 NOTE — DISCHARGE NOTE PROVIDER - CARE PROVIDERS DIRECT ADDRESSES
,[email or web address],DirectAddress_Unknown,[email or web address],[email or web address],DirectAddress_Unknown,[email or web address],DirectAddress_Unknown

## 2020-12-22 LAB
ALBUMIN SERPL ELPH-MCNC: 2.4 G/DL — LOW (ref 3.3–5)
ALP SERPL-CCNC: 95 U/L — SIGNIFICANT CHANGE UP (ref 40–120)
ALT FLD-CCNC: <5 U/L — LOW (ref 10–45)
ANION GAP SERPL CALC-SCNC: 17 MMOL/L — SIGNIFICANT CHANGE UP (ref 5–17)
AST SERPL-CCNC: 16 U/L — SIGNIFICANT CHANGE UP (ref 10–40)
BILIRUB DIRECT SERPL-MCNC: 0.3 MG/DL — HIGH (ref 0–0.2)
BILIRUB INDIRECT FLD-MCNC: 0.6 MG/DL — SIGNIFICANT CHANGE UP (ref 0.2–1)
BILIRUB SERPL-MCNC: 1 MG/DL — SIGNIFICANT CHANGE UP (ref 0.2–1.2)
BUN SERPL-MCNC: 49 MG/DL — HIGH (ref 7–23)
CALCIUM SERPL-MCNC: 8.4 MG/DL — SIGNIFICANT CHANGE UP (ref 8.4–10.5)
CHLORIDE SERPL-SCNC: 108 MMOL/L — SIGNIFICANT CHANGE UP (ref 96–108)
CO2 SERPL-SCNC: 17 MMOL/L — LOW (ref 22–31)
CREAT SERPL-MCNC: 4.81 MG/DL — HIGH (ref 0.5–1.3)
GLUCOSE BLDC GLUCOMTR-MCNC: 102 MG/DL — HIGH (ref 70–99)
GLUCOSE BLDC GLUCOMTR-MCNC: 104 MG/DL — HIGH (ref 70–99)
GLUCOSE BLDC GLUCOMTR-MCNC: 104 MG/DL — HIGH (ref 70–99)
GLUCOSE BLDC GLUCOMTR-MCNC: 178 MG/DL — HIGH (ref 70–99)
GLUCOSE BLDC GLUCOMTR-MCNC: 72 MG/DL — SIGNIFICANT CHANGE UP (ref 70–99)
GLUCOSE BLDC GLUCOMTR-MCNC: 77 MG/DL — SIGNIFICANT CHANGE UP (ref 70–99)
GLUCOSE BLDC GLUCOMTR-MCNC: 90 MG/DL — SIGNIFICANT CHANGE UP (ref 70–99)
GLUCOSE SERPL-MCNC: 66 MG/DL — LOW (ref 70–99)
HCT VFR BLD CALC: 26.9 % — LOW (ref 39–50)
HGB BLD-MCNC: 8 G/DL — LOW (ref 13–17)
MAGNESIUM SERPL-MCNC: 2.1 MG/DL — SIGNIFICANT CHANGE UP (ref 1.6–2.6)
MCHC RBC-ENTMCNC: 28.3 PG — SIGNIFICANT CHANGE UP (ref 27–34)
MCHC RBC-ENTMCNC: 29.7 GM/DL — LOW (ref 32–36)
MCV RBC AUTO: 95.1 FL — SIGNIFICANT CHANGE UP (ref 80–100)
NON-GYNECOLOGICAL CYTOLOGY STUDY: SIGNIFICANT CHANGE UP
NRBC # BLD: 0 /100 WBCS — SIGNIFICANT CHANGE UP (ref 0–0)
PHOSPHATE SERPL-MCNC: 4.4 MG/DL — SIGNIFICANT CHANGE UP (ref 2.5–4.5)
PLATELET # BLD AUTO: 297 K/UL — SIGNIFICANT CHANGE UP (ref 150–400)
POTASSIUM SERPL-MCNC: 3.9 MMOL/L — SIGNIFICANT CHANGE UP (ref 3.5–5.3)
POTASSIUM SERPL-SCNC: 3.9 MMOL/L — SIGNIFICANT CHANGE UP (ref 3.5–5.3)
PROT SERPL-MCNC: 5.9 G/DL — LOW (ref 6–8.3)
RBC # BLD: 2.83 M/UL — LOW (ref 4.2–5.8)
RBC # FLD: 18.2 % — HIGH (ref 10.3–14.5)
SODIUM SERPL-SCNC: 142 MMOL/L — SIGNIFICANT CHANGE UP (ref 135–145)
T4 AB SER-ACNC: 5.89 UG/DL — SIGNIFICANT CHANGE UP (ref 3.17–11.72)
TSH SERPL-MCNC: 1.97 UIU/ML — SIGNIFICANT CHANGE UP (ref 0.35–4.94)
WBC # BLD: 10.37 K/UL — SIGNIFICANT CHANGE UP (ref 3.8–10.5)
WBC # FLD AUTO: 10.37 K/UL — SIGNIFICANT CHANGE UP (ref 3.8–10.5)

## 2020-12-22 PROCEDURE — 99233 SBSQ HOSP IP/OBS HIGH 50: CPT | Mod: GC

## 2020-12-22 PROCEDURE — 93306 TTE W/DOPPLER COMPLETE: CPT | Mod: 26

## 2020-12-22 PROCEDURE — 99233 SBSQ HOSP IP/OBS HIGH 50: CPT

## 2020-12-22 PROCEDURE — 71045 X-RAY EXAM CHEST 1 VIEW: CPT | Mod: 26

## 2020-12-22 PROCEDURE — 99222 1ST HOSP IP/OBS MODERATE 55: CPT | Mod: GC

## 2020-12-22 PROCEDURE — 99232 SBSQ HOSP IP/OBS MODERATE 35: CPT

## 2020-12-22 RX ORDER — POTASSIUM CHLORIDE 20 MEQ
20 PACKET (EA) ORAL ONCE
Refills: 0 | Status: COMPLETED | OUTPATIENT
Start: 2020-12-22 | End: 2020-12-22

## 2020-12-22 RX ORDER — AMIODARONE HYDROCHLORIDE 400 MG/1
1 TABLET ORAL
Qty: 900 | Refills: 0 | Status: DISCONTINUED | OUTPATIENT
Start: 2020-12-22 | End: 2020-12-23

## 2020-12-22 RX ORDER — AMIODARONE HYDROCHLORIDE 400 MG/1
400 TABLET ORAL EVERY 8 HOURS
Refills: 0 | Status: DISCONTINUED | OUTPATIENT
Start: 2020-12-22 | End: 2020-12-23

## 2020-12-22 RX ORDER — AMIODARONE HYDROCHLORIDE 400 MG/1
150 TABLET ORAL ONCE
Refills: 0 | Status: COMPLETED | OUTPATIENT
Start: 2020-12-22 | End: 2020-12-22

## 2020-12-22 RX ORDER — METOPROLOL TARTRATE 50 MG
5 TABLET ORAL ONCE
Refills: 0 | Status: COMPLETED | OUTPATIENT
Start: 2020-12-22 | End: 2020-12-22

## 2020-12-22 RX ORDER — AMIODARONE HYDROCHLORIDE 400 MG/1
0.5 TABLET ORAL
Qty: 900 | Refills: 0 | Status: DISCONTINUED | OUTPATIENT
Start: 2020-12-22 | End: 2020-12-23

## 2020-12-22 RX ORDER — AMIODARONE HYDROCHLORIDE 400 MG/1
200 TABLET ORAL DAILY
Refills: 0 | Status: CANCELLED | OUTPATIENT
Start: 2020-12-26 | End: 2020-12-23

## 2020-12-22 RX ORDER — DEXTROSE 50 % IN WATER 50 %
25 SYRINGE (ML) INTRAVENOUS ONCE
Refills: 0 | Status: DISCONTINUED | OUTPATIENT
Start: 2020-12-22 | End: 2020-12-23

## 2020-12-22 RX ORDER — GLUCAGON INJECTION, SOLUTION 0.5 MG/.1ML
1 INJECTION, SOLUTION SUBCUTANEOUS ONCE
Refills: 0 | Status: DISCONTINUED | OUTPATIENT
Start: 2020-12-22 | End: 2020-12-23

## 2020-12-22 RX ORDER — APIXABAN 2.5 MG/1
2.5 TABLET, FILM COATED ORAL
Refills: 0 | Status: DISCONTINUED | OUTPATIENT
Start: 2020-12-22 | End: 2020-12-23

## 2020-12-22 RX ORDER — DEXTROSE 50 % IN WATER 50 %
15 SYRINGE (ML) INTRAVENOUS ONCE
Refills: 0 | Status: COMPLETED | OUTPATIENT
Start: 2020-12-22 | End: 2020-12-22

## 2020-12-22 RX ORDER — INSULIN LISPRO 100/ML
VIAL (ML) SUBCUTANEOUS
Refills: 0 | Status: DISCONTINUED | OUTPATIENT
Start: 2020-12-22 | End: 2020-12-23

## 2020-12-22 RX ORDER — DEXTROSE 50 % IN WATER 50 %
12.5 SYRINGE (ML) INTRAVENOUS ONCE
Refills: 0 | Status: DISCONTINUED | OUTPATIENT
Start: 2020-12-22 | End: 2020-12-23

## 2020-12-22 RX ORDER — DEXTROSE 50 % IN WATER 50 %
15 SYRINGE (ML) INTRAVENOUS ONCE
Refills: 0 | Status: DISCONTINUED | OUTPATIENT
Start: 2020-12-22 | End: 2020-12-23

## 2020-12-22 RX ORDER — AMIODARONE HYDROCHLORIDE 400 MG/1
0.5 TABLET ORAL
Qty: 900 | Refills: 0 | Status: DISCONTINUED | OUTPATIENT
Start: 2020-12-22 | End: 2020-12-22

## 2020-12-22 RX ORDER — AMIODARONE HYDROCHLORIDE 400 MG/1
TABLET ORAL
Refills: 0 | Status: DISCONTINUED | OUTPATIENT
Start: 2020-12-22 | End: 2020-12-23

## 2020-12-22 RX ORDER — SODIUM CHLORIDE 9 MG/ML
1000 INJECTION, SOLUTION INTRAVENOUS
Refills: 0 | Status: DISCONTINUED | OUTPATIENT
Start: 2020-12-22 | End: 2020-12-23

## 2020-12-22 RX ADMIN — APIXABAN 2.5 MILLIGRAM(S): 2.5 TABLET, FILM COATED ORAL at 20:19

## 2020-12-22 RX ADMIN — APIXABAN 2.5 MILLIGRAM(S): 2.5 TABLET, FILM COATED ORAL at 07:57

## 2020-12-22 RX ADMIN — Medication 200 MILLIGRAM(S): at 05:34

## 2020-12-22 RX ADMIN — Medication 5 MILLIGRAM(S): at 07:33

## 2020-12-22 RX ADMIN — AMIODARONE HYDROCHLORIDE 33.3 MG/MIN: 400 TABLET ORAL at 10:18

## 2020-12-22 RX ADMIN — Medication 15 GRAM(S): at 07:57

## 2020-12-22 RX ADMIN — AMIODARONE HYDROCHLORIDE 400 MILLIGRAM(S): 400 TABLET ORAL at 20:22

## 2020-12-22 RX ADMIN — Medication 20 MILLIEQUIVALENT(S): at 07:57

## 2020-12-22 RX ADMIN — CALCITRIOL 0.25 MICROGRAM(S): 0.5 CAPSULE ORAL at 15:05

## 2020-12-22 RX ADMIN — Medication 50 MILLIGRAM(S): at 15:06

## 2020-12-22 RX ADMIN — AMIODARONE HYDROCHLORIDE 600 MILLIGRAM(S): 400 TABLET ORAL at 09:23

## 2020-12-22 RX ADMIN — Medication 5 MILLIGRAM(S): at 06:50

## 2020-12-22 RX ADMIN — Medication 4 MILLIGRAM(S): at 22:59

## 2020-12-22 NOTE — PROGRESS NOTE ADULT - SUBJECTIVE AND OBJECTIVE BOX
INTERVAL HISTORY:  Patient was seen and examined at bedside. This AM had an episode of rapid A-fib for which cardiology was consulted on. Aside from palpitations that started this morning, he has no other symptoms and is breathing well. Received dose of Eliquis this morning.     VITAL SIGNS:  ICU Vital Signs Last 24 Hrs  T(C): 36.7 (22 Dec 2020 04:46), Max: 37.3 (21 Dec 2020 22:12)  T(F): 98.1 (22 Dec 2020 04:46), Max: 99.2 (21 Dec 2020 22:12)  HR: 128 (22 Dec 2020 08:30) (75 - 128)  BP: 125/59 (22 Dec 2020 08:30) (125/59 - 158/72)  BP(mean): --  ABP: --  ABP(mean): --  RR: 18 (22 Dec 2020 08:30) (17 - 18)  SpO2: 98% (22 Dec 2020 08:30) (98% - 100%)        12-21 @ 07:01  -  12-22 @ 07:00  --------------------------------------------------------  IN: 340 mL / OUT: 250 mL / NET: 90 mL      CAPILLARY BLOOD GLUCOSE      POCT Blood Glucose.: 90 mg/dL (22 Dec 2020 08:13)      PHYSICAL EXAM:  Constitutional: resting comfortably in bed, NAD  HEENT: NC/AT; PERRL, anicteric sclera; no oropharyngeal erythema or exudates; MMM  Neck: supple, no JVD  Respiratory: CTA B/L, no W/R/R; respirations appear non-labored, speaking full sentences; hematoma drainage site is tender to palpation but appearance is similar to yesterday.   Cardiovascular: +S1/S2, RRR  Gastrointestinal: abdomen soft, NT/ND; +BS x4  Extremities: WWP; no clubbing, cyanosis or edema; Improved erythema and swelling of the right upper extremity  Vascular: 2+ radial, DP/PT and femoral pulses B/L  Skin: New bruises are seen on right arm, lower chest/ upper abdomen bilaterally      MEDICATIONS:  MEDICATIONS  (STANDING):  allopurinol 50 milliGRAM(s) Oral <User Schedule>  aMIOdarone Infusion 1 mG/Min (33.3 mL/Hr) IV Continuous <Continuous>  aMIOdarone IVPB 150 milliGRAM(s) IV Intermittent once  apixaban 2.5 milliGRAM(s) Oral two times a day  calcitriol   Capsule 0.25 MICROGram(s) Oral daily  doxazosin 4 milliGRAM(s) Oral at bedtime  metoprolol succinate  milliGRAM(s) Oral daily  senna 2 Tablet(s) Oral at bedtime    MEDICATIONS  (PRN):  acetaminophen   Tablet .. 650 milliGRAM(s) Oral every 6 hours PRN Moderate Pain (4 - 6)  oxycodone    5 mG/acetaminophen 325 mG 1 Tablet(s) Oral every 6 hours PRN Severe Pain (7 - 10)      ALLERGIES:  Allergies    No Known Allergies    Intolerances        LABS:                        8.0    10.37 )-----------( 297      ( 22 Dec 2020 07:01 )             26.9     12-22    142  |  108  |  49<H>  ----------------------------<  66<L>  3.9   |  17<L>  |  4.81<H>    Ca    8.4      22 Dec 2020 07:01  Phos  4.4     12-22  Mg     2.1     12-22      PT/INR - ( 21 Dec 2020 07:01 )   PT: 17.0 sec;   INR: 1.44          PTT - ( 21 Dec 2020 07:01 )  PTT:39.7 sec      RADIOLOGY & ADDITIONAL TESTS:   CXR reviewed - unchanged appearance of the left small apical PTX  POCUS - unchanged appearance of complex loculated appearing effusion; Scant subpleural b-lines anteriorly on left with irregular appearing pleura

## 2020-12-22 NOTE — CONSULT NOTE ADULT - SUBJECTIVE AND OBJECTIVE BOX
Electrophysiology Consult Note:     CHIEF COMPLAINT:  Patient is a 65y old  Male who presents with a chief complaint of Chestwall hematoma (22 Dec 2020 12:52)        HISTORY OF PRESENT ILLNESS:   65M active smoker, PMH HTN, stage 4 CKD (has LUE AVF but not on HD yet), T Cell lymphoma (dx 2002 on chemo), paroxysmal AFib on (eliquis), systolic CHF (EF 40-45% in 2017), severe pHTN, presented on 12/17/20 with an acute chest wall hematoma from bleeding artery on the right side. S/P right chest wall hematoma evacuation (250cc of clot removed), washout and closure with staples.  He was found to have a loculated left pleural effusion and possible underlying mass vs hematoma, the effusion is recurrent and in the setting of 20lb weight loss and is thus concerning for malignancy.  Today, he went into AFIB RVR 120s bpm.  He states that he felt the palpitations this morning when he was in AFIB RVR.  However, he doesn't feel palpitations in the past. No syncope.  Not on antiarrhythmic at home. He is on Toprol 200 mg daily at home.  Team started him on amio gtt here when he went into AFIB RVR, and transferred him to CCU.    At the time of interview, telemetry shows that he self converted to NSR w/o conversion pause. Sinus rate 70s currently. He feels well currently.        TTE Echo Complete w/o Contrast w/ Doppler (12.22.20 @ 11:38)    1. Patient was tachycardic during the study.   2. Mild symmetric left ventricular hypertrophy.   3. Mildly reduced left ventricular systolic function. LVEF 40-45%.   4. Normal right ventricular size and systolic function.   5. Biatrial enlargement.   6. Aortic sclerosis without significant stenosis.   7. Mild aortic regurgitation.   8. Severe mitral regurgitation.   9. Severe tricuspid regurgitation.  10. Pulmonary hypertension present, pulmonary artery systolic pressure is 43 mmHg.  11. Trivial pericardial effusion.  12. Left pleural effusion.        PAST MEDICAL & SURGICAL HISTORY:  BPH (benign prostatic hyperplasia)  Gout  H/O pulmonary hypertension  CHF, chronic  Lymphomat cell; Chemotherapy weekly- wednesday  CKD (chronic kidney disease)  Atrial fibrillation  HTN (hypertension)  Elective surgery  rectal surgery    History of cholecystectomy        FAMILY HISTORY:  No pertinent family history in first degree relatives        SOCIAL HISTORY:    no etoh / tob / illicit drugs    Allergies  No Known Allergies      MEDICATIONS  (STANDING):  allopurinol 50 milliGRAM(s) Oral <User Schedule>  aMIOdarone    Tablet   Oral   aMIOdarone    Tablet 400 milliGRAM(s) Oral every 8 hours  aMIOdarone Infusion 1 mG/Min (33.3 mL/Hr) IV Continuous <Continuous>  aMIOdarone Infusion 0.501 mG/Min (16.7 mL/Hr) IV Continuous <Continuous>  apixaban 2.5 milliGRAM(s) Oral two times a day  calcitriol   Capsule 0.25 MICROGram(s) Oral daily  glucagon  Injectable 1 milliGRAM(s) IntraMuscular once  insulin lispro (ADMELOG) corrective regimen sliding scale   SubCutaneous Before meals and at bedtime  metoprolol succinate  milliGRAM(s) Oral daily  senna 2 Tablet(s) Oral at bedtime    MEDICATIONS  (PRN):  acetaminophen   Tablet .. 650 milliGRAM(s) Oral every 6 hours PRN Moderate Pain (4 - 6)  oxycodone    5 mG/acetaminophen 325 mG 1 Tablet(s) Oral every 6 hours PRN Severe Pain (7 - 10)        REVIEW OF SYSTEMS:  CONSTITUTIONAL: No fever, weight loss, or fatigue  EYES: No eye pain, visual disturbances, or discharge  ENMT:  No difficulty hearing, tinnitus, vertigo; No sinus or throat pain  NECK: No pain or stiffness  BREASTS: No pain, masses, or nipple discharge  RESPIRATORY: No cough, wheezing, chills or hemoptysis; No Shortness of Breath  CARDIOVASCULAR: No chest pain, palpitations, dizziness, or leg swelling  GASTROINTESTINAL: No abdominal or epigastric pain. No nausea, vomiting, or hematemesis; No diarrhea or constipation. No melena or hematochezia.  GENITOURINARY: No dysuria, frequency, hematuria, or incontinence  NEUROLOGICAL: No headaches, memory loss, loss of strength, numbness, or tremors  SKIN: No itching, burning, rashes, or lesions   LYMPH Nodes: No enlarged glands  ENDOCRINE: No heat or cold intolerance; No hair loss  MUSCULOSKELETAL: No joint pain or swelling; No muscle, back, or extremity pain  PSYCHIATRIC: No depression, anxiety, mood swings, or difficulty sleeping  HEME/LYMPH: No easy bruising, or bleeding gums  ALLERY AND IMMUNOLOGIC: No hives or eczema	      PHYSICAL EXAM:  Vital Signs Last 24 Hrs  T(C): 36.8 (22 Dec 2020 12:00), Max: 37.3 (21 Dec 2020 22:12)  T(F): 98.2 (22 Dec 2020 12:00), Max: 99.2 (21 Dec 2020 22:12)  HR: 104 (22 Dec 2020 14:00) (75 - 128)  BP: 147/75 (22 Dec 2020 14:00) (125/59 - 175/110)  BP(mean): 101 (22 Dec 2020 14:00) (98 - 134)  RR: 18 (22 Dec 2020 14:00) (16 - 20)  SpO2: 98% (22 Dec 2020 14:00) (98% - 100%)  Daily     Daily     Constitutional: NAD	  HEENT:   Normal oral mucosa, PERRL, EOMI	  Neck: No JVD  CVS: Normal S1 / S2, RRR, No murmurs  Pulm: CTA. No wheeze or rale  GI:  + BS, soft, NT / ND   Ext: No LE edema  Vascular: Peripheral pulses palpable 2+ bilaterally  MS: full range of motion in all joints  Neurologic: A&O x 3, Non-focal  Psych: Pleasant, has good insight  Skin: No rash or lesion       	  LABS:	                         8.0    10.37 )-----------( 297      ( 22 Dec 2020 07:01 )             26.9     12-22    142  |  108  |  49<H>  ----------------------------<  66<L>  3.9   |  17<L>  |  4.81<H>    Ca    8.4      22 Dec 2020 07:01  Phos  4.4     12-22  Mg     2.1     12-22      proBNP:   Lipid Profile:   HgA1c:   TSH: 	      EKG:   Telemetry:     Echo:    Electrophysiology Consult Note:     CHIEF COMPLAINT:  Patient is a 65y old  Male who presents with a chief complaint of Chestwall hematoma (22 Dec 2020 12:52)        HISTORY OF PRESENT ILLNESS:   65M active smoker, PMH HTN, stage 4 CKD (has LUE AVF but not on HD yet), T Cell lymphoma (dx 2002 on chemo), paroxysmal AFib on (eliquis), systolic CHF (EF 40-45% in 2017), pulm HTN, presented on 12/17/20 with an acute chest wall hematoma from bleeding artery on the right side. S/P right chest wall hematoma evacuation (250cc of clot removed), washout and closure with staples.  He was found to have a loculated left pleural effusion and possible underlying mass vs hematoma, the effusion is recurrent and in the setting of 20lb weight loss and is thus concerning for malignancy. s/p thoracocentesis for fluid analysis 12/20/20.  Today, he went into AFIB RVR 120s bpm.  He states that he felt the palpitations this morning when he was in AFIB RVR.  However, he doesn't feel palpitations in the past. No syncope.  Not on antiarrhythmic at home. He is on Toprol 200 mg daily at home.  Team started him on amio gtt here when he went into AFIB RVR, and transferred him to CCU.    At the time of interview, telemetry shows that he self converted to NSR w/o conversion pause. Sinus rate 70s currently. He feels well currently.        TTE Echo Complete w/o Contrast w/ Doppler (12.22.20 @ 11:38)    1. Patient was tachycardic during the study.   2. Mild symmetric left ventricular hypertrophy.   3. Mildly reduced left ventricular systolic function. LVEF 40-45%.   4. Normal right ventricular size and systolic function.   5. Biatrial enlargement.   6. Aortic sclerosis without significant stenosis.   7. Mild aortic regurgitation.   8. Severe mitral regurgitation.   9. Severe tricuspid regurgitation.  10. Pulmonary hypertension present, pulmonary artery systolic pressure is 43 mmHg.  11. Trivial pericardial effusion.  12. Left pleural effusion.        PAST MEDICAL & SURGICAL HISTORY:  BPH (benign prostatic hyperplasia)  Gout  H/O pulmonary hypertension  CHF, chronic  Lymphomat cell; Chemotherapy weekly- wednesday  CKD (chronic kidney disease)  Atrial fibrillation  HTN (hypertension)  Elective surgery  rectal surgery    History of cholecystectomy        FAMILY HISTORY:  No pertinent family history in first degree relatives        SOCIAL HISTORY:    no etoh / tob / illicit drugs    Allergies  No Known Allergies      MEDICATIONS  (STANDING):  allopurinol 50 milliGRAM(s) Oral <User Schedule>  aMIOdarone    Tablet   Oral   aMIOdarone    Tablet 400 milliGRAM(s) Oral every 8 hours  aMIOdarone Infusion 1 mG/Min (33.3 mL/Hr) IV Continuous <Continuous>  aMIOdarone Infusion 0.501 mG/Min (16.7 mL/Hr) IV Continuous <Continuous>  apixaban 2.5 milliGRAM(s) Oral two times a day  calcitriol   Capsule 0.25 MICROGram(s) Oral daily  glucagon  Injectable 1 milliGRAM(s) IntraMuscular once  insulin lispro (ADMELOG) corrective regimen sliding scale   SubCutaneous Before meals and at bedtime  metoprolol succinate  milliGRAM(s) Oral daily  senna 2 Tablet(s) Oral at bedtime    MEDICATIONS  (PRN):  acetaminophen   Tablet .. 650 milliGRAM(s) Oral every 6 hours PRN Moderate Pain (4 - 6)  oxycodone    5 mG/acetaminophen 325 mG 1 Tablet(s) Oral every 6 hours PRN Severe Pain (7 - 10)        REVIEW OF SYSTEMS:  CONSTITUTIONAL: No fever, weight loss, or fatigue  EYES: No eye pain, visual disturbances, or discharge  ENMT:  No difficulty hearing, tinnitus, vertigo; No sinus or throat pain  NECK: No pain or stiffness  BREASTS: No pain, masses, or nipple discharge  RESPIRATORY: No cough, wheezing, chills or hemoptysis; No Shortness of Breath  CARDIOVASCULAR: No chest pain, palpitations, dizziness, or leg swelling  GASTROINTESTINAL: No abdominal or epigastric pain. No nausea, vomiting, or hematemesis; No diarrhea or constipation. No melena or hematochezia.  GENITOURINARY: No dysuria, frequency, hematuria, or incontinence  NEUROLOGICAL: No headaches, memory loss, loss of strength, numbness, or tremors  SKIN: No itching, burning, rashes, or lesions   LYMPH Nodes: No enlarged glands  ENDOCRINE: No heat or cold intolerance; No hair loss  MUSCULOSKELETAL: No joint pain or swelling; No muscle, back, or extremity pain  PSYCHIATRIC: No depression, anxiety, mood swings, or difficulty sleeping  HEME/LYMPH: No easy bruising, or bleeding gums  ALLERY AND IMMUNOLOGIC: No hives or eczema	      PHYSICAL EXAM:  Vital Signs Last 24 Hrs  T(C): 36.8 (22 Dec 2020 12:00), Max: 37.3 (21 Dec 2020 22:12)  T(F): 98.2 (22 Dec 2020 12:00), Max: 99.2 (21 Dec 2020 22:12)  HR: 104 (22 Dec 2020 14:00) (75 - 128)  BP: 147/75 (22 Dec 2020 14:00) (125/59 - 175/110)  BP(mean): 101 (22 Dec 2020 14:00) (98 - 134)  RR: 18 (22 Dec 2020 14:00) (16 - 20)  SpO2: 98% (22 Dec 2020 14:00) (98% - 100%)  Daily     Daily     Constitutional: NAD	  HEENT:   Normal oral mucosa, PERRL, EOMI	  Neck: No JVD  CVS: Normal S1 / S2, RRR, No murmurs  Pulm: CTA. No wheeze or rale  GI:  + BS, soft, NT / ND   Ext: No LE edema  Vascular: Peripheral pulses palpable 2+ bilaterally  MS: full range of motion in all joints  Neurologic: A&O x 3, Non-focal  Psych: Pleasant, has good insight  Skin: No rash or lesion       	  LABS:	                         8.0    10.37 )-----------( 297      ( 22 Dec 2020 07:01 )             26.9     12-22    142  |  108  |  49<H>  ----------------------------<  66<L>  3.9   |  17<L>  |  4.81<H>    Ca    8.4      22 Dec 2020 07:01  Phos  4.4     12-22  Mg     2.1     12-22      proBNP:   Lipid Profile:   HgA1c:   TSH: 	      EKG:   Telemetry:     Echo:

## 2020-12-22 NOTE — CONSULT NOTE ADULT - CONSULT REASON
Hematoma
Post-Op Exam / Co-Mgmt of Co-Morbidities
Symptomatic AFib w/ RVR
right chest wall hematoma
AFIB RVR
AF RVR
request for left thoracentesis for left pleural effusion
Loculated pleural effusion

## 2020-12-22 NOTE — CONSULT NOTE ADULT - REASON FOR ADMISSION
Chestwall hematoma
Chest wall hematoma
Chestwall hematoma
Chest wall hematoma
Chest wall hematoma
Chestwall hematoma
Chest wall hematoma

## 2020-12-22 NOTE — CHART NOTE - NSCHARTNOTEFT_GEN_A_CORE
Cardiology Fellow Event Note    Was called to assess Mr Gamez by the vascular service team as the patient was found to be in Atrial fibrillation with RVR in the 130s to 150s. not responding to initial IV pushes of metoprolol. Patient was c/o of dizziness during the event, His SBPs were in the 110s-120s/60-70. His Finger Stick was 70. He was given Juice, banana and an Amp of D50. To which the patient responded. His symptoms resolved completely after the amp of D50 and eating. His HRs improved to low 100s. SBPs were in the 130s-140s/60s    His Labs were otherwise unremarkable and his electrolytes were repleted with caution due to his ESRD.    Physical Exam:  Gen: NAD, AOX3, speaking in full sentences, making sensible conversation and jokes  HEENT: No JVD  Chest: Right pectoral incision site looks clean, dry, intact  CV: Irregular, tachycardic, No MGR  Lungs: CTA  Ext: Warm, No pedal edema      A/P: 65M PMH HTN, CKD4, T cell lymphoma, AF on eliquis, HFmEF (40-45 in 2017), severe pHTN, p/w spontaneous chest wall hematoma sp uncomplicated drainage on 12/18, thoracentesis 12/20, now with AF RVR in the setting of hypoglycemia.   Patient has a know history of atrial fibrillation and is in and out off NSR and Atrial fibrillation    - Recommend Starting Amiodarone gtt (150 mg IV push over 10 mins, followed by 1.0 mg/min for 6 hours followed by 0.5 mg/min for the next 18hrs)  - Once done with IV load, recommend starting Amiodarone 400 mg PO BID for 12 days, which complete his 10 gram Amiodarone load  - Will require 200 mg po qd after the 10 gram PO load for maintenance dosing  - Patient should FU with EP in 2 weeks, for discussion about Ablation options  - c/w Toprol  mg po qd  - c/w Eliquis 2.5 mg po q12 (Not a candidate for higher dosing). Close monitoring of his pectoral incision site and Hb to make sure he does not have recurrent bleeding    Plan discussed with Cardiology attending, and vascular team and attending  Will continue to follow Cardiology Fellow Event Note    Was called to assess Mr Gamez by the vascular service team as the patient was found to be in Atrial fibrillation with RVR in the 130s to 150s. not responding to initial IV pushes of metoprolol. Patient was c/o of dizziness during the event, His SBPs were in the 110s-120s/60-70. His Finger Stick was 70, Blood glucose this AM was 66 on CMP and 53 on CMP on 12/21 and mid 60s on 12/120. He was given Juice, banana and an Amp of D50. To which the patient responded. His symptoms resolved completely after the amp of D50 and eating. His HRs improved to low 100s. SBPs were in the 130s-140s/60s    His Labs were otherwise unremarkable and his electrolytes were repleted with caution due to his ESRD.    Physical Exam:  Gen: NAD, AOX3, speaking in full sentences, making sensible conversation and jokes  HEENT: No JVD  Chest: Right pectoral incision site looks clean, dry, intact  CV: Irregular, tachycardic, No MGR  Lungs: CTA  Ext: Warm, No pedal edema      A/P: 65M PMH HTN, CKD4, T cell lymphoma, AF on eliquis, HFmEF (40-45 in 2017), severe pHTN, p/w spontaneous chest wall hematoma sp uncomplicated drainage on 12/18, thoracentesis 12/20, now with AF RVR in the setting of hypoglycemia.   Patient has a know history of atrial fibrillation and is in and out off NSR and Atrial fibrillation    - Recommend Starting Amiodarone gtt (150 mg IV push over 10 mins, followed by 1.0 mg/min for 6 hours followed by 0.5 mg/min for the next 18hrs)  - Once done with IV load, recommend starting Amiodarone 400 mg PO BID for 12 days, which complete his 10 gram Amiodarone load  - Will require 200 mg po qd after the 10 gram PO load for maintenance dosing  - Patient should FU with EP in 2 weeks, for discussion about Ablation options  - c/w Toprol  mg po qd  - c/w Eliquis 2.5 mg po q12 (Not a candidate for higher dosing). Close monitoring of his pectoral incision site and Hb to make sure he does not have recurrent bleeding    Plan discussed with Cardiology attending, and vascular team and attending  Will continue to follow Cardiology Fellow Event Note    Was called to assess Mr Gamez by the vascular service team as the patient was found to be in Atrial fibrillation with RVR in the 130s to 150s. not responding to initial IV pushes of metoprolol. Patient was c/o of dizziness during the event, His SBPs were in the 110s-120s/60-70. His Finger Stick was 70, Blood glucose this AM was 66 on CMP and 53 on CMP on 12/21 and mid 60s on 12/120. He was given Juice, banana and an Amp of D50. To which the patient responded. His symptoms resolved completely after the amp of D50 and eating. His HRs improved to low 100s. SBPs were in the 130s-140s/60s    His Labs were otherwise unremarkable except for the significant hypoglycemia and his electrolytes were repleted with caution due to his ESRD.    Physical Exam:  Gen: NAD, AOX3, speaking in full sentences, making sensible conversation and jokes  HEENT: No JVD  Chest: Right pectoral incision site looks clean, dry, intact  CV: Irregular, tachycardic, No MGR  Lungs: CTA  Ext: Warm, No pedal edema      A/P: 65M PMH HTN, CKD4, T cell lymphoma, AF on eliquis, HFmEF (40-45 in 2017), severe pHTN, p/w spontaneous chest wall hematoma sp uncomplicated drainage on 12/18, thoracentesis 12/20, now with AF RVR in the setting of hypoglycemia.   Patient has a know history of atrial fibrillation and is in and out off NSR and Atrial fibrillation    - Recommend Starting Amiodarone gtt (150 mg IV push over 10 mins, followed by 1.0 mg/min for 6 hours followed by 0.5 mg/min for the next 18hrs)  - Once done with IV load, recommend starting Amiodarone 400 mg PO BID for 12 days, which complete his 10 gram Amiodarone load  - Will require 200 mg po qd after the 10 gram PO load for maintenance dosing  - Patient should FU with EP in 2 weeks, for discussion about Ablation options  - c/w Toprol  mg po qd  - c/w Eliquis 2.5 mg po q12 (Not a candidate for higher dosing). Close monitoring of his pectoral incision site and Hb to make sure he does not have recurrent bleeding    Plan discussed with Cardiology attending, and vascular team and attending  Will continue to follow

## 2020-12-22 NOTE — PROGRESS NOTE ADULT - SUBJECTIVE AND OBJECTIVE BOX
24hr Events:  O/N: RUE swelling still present, wrapped arm with ace bandage, tachycardia HR 130s, asymptomatic, normalize without any intervention  12/21: MILAN drain removed. cxr showing Left tiny pneumothorax.            ---------------------------------------------------------------------------  PLEASE CHECK WHEN PRESENT:     [  ] Heart Failure     [  ] Acute     [  ] Acute on Chronic     [X  ] Chronic  -------------------------------------------------------------------     [  ]Diastolic [HFpEF]     [X  ]Systolic [HFrEF]     [  ]Combined [HFpEF & HFrEF]     [X  ] afib     [ X ] hypertensive heart disease     [  ]Other:  -------------------------------------------------------------------  [ ] Respiratory failure  [ ] Acute cor pulmonale  [ ] Asthma/COPD Exacerbation  [ ] Pleural effusion  [ ] Aspiration pneumonia  -------------------------------------------------------------------  [  ]MAIRA     [  ]ATN     [  ]Reneal Medullary Necrosis     [  ]Renal Cortical Necrosis     [  ]Other Pathological Lesions:    [  ]CKD 1  [  ]CKD 2  [  ]CKD 3  [  ]CKD 4  [X  ]CKD 5  [  ]Other  -------------------------------------------------------------------  [  ]Diabetes  [  ] Diabetic PVD Ulcer  [  ] Neuropathic ulcer to DM  [  ] Diabetes with Nephropathy  [  ] Osteomyelitis due to diabetes  [ ] Hyperglycemia  [X ] Hypoglycemia  --------------------------------------------------------------------  [  ]Malnutrition: See Nutrition Note  [  ]Cachexia  [  ]Other:   [  ]Supplement Ordered:  [  ]Morbid Obesity (BMI >=40]  ---------------------------------------------------------------------  [ ] Sepsis/severe sepsis/septic shock  [ ] UTI  [ ] Pneumonia  -----------------------------------------------------------------------  [ ] Acidosis/alkalosis  [ ] Fluid overload  [ ] Hypokalemia  [ ] Hyperkalemia  [ ] Hypomagnesemia  [ ] Hypophosphatemia  [ ] Hyperphosphatemia  [ ] Hyponatremia  [ ] Hypernatremia  ------------------------------------------------------------------------  [ ] Acute blood loss anemia  [ ] Post op blood loss anemia  [ ] Iron deficiency anemia  [X ] Anemia due to chronic disease  [ ] Hypercoagulable state  [X ] Leukocytosis  ----------------------------------------------------------------------  [ ] Cerebral infarction  [ ] Transient ischemia attack  [ ] Encephalopathy    Assessment/Plan;  65y Male POD 2 s/p Open Chest Hematoma Evacuation    Neuro: percocet  Cardio: cardura, metoprolol, holding Eliquis  Pulm: Pulmonary s/p bedside thoracentesis 1L removed 12/20 f/u cytology., F/u CTS/Pulm consult for Pleural Effusion  GI: Renal diet  Nephro: Has LUE AVF not on HD yet  Vascular: s/p R chest wall hematoma evac 12/18  Neurovascular checks on RUE  Incentive spirometer/OOB/Ambulate  AM labs       24hr Events:  O/N: RUE swelling still present, wrapped arm with ace bandage, tachycardia HR 130s, asymptomatic, normalize without any intervention  12/21: MILAN drain removed. cxr showing Left tiny pneumothorax.        Subjective:  This morning admits to  SOB and Dizziness with palpitation. Denies CP, Dizziness no abdominal pain, N,V     ROS:   Denies Headache, blurred vision, Chest Pain, SOB, Abdominal pain, nausea or vomiting     Social   apixaban 2.5  doxazosin 4  metoprolol succinate       Allergies    No Known Allergies    Intolerances        Vital Signs Last 24 Hrs  T(C): 36.7 (22 Dec 2020 04:46), Max: 37.3 (21 Dec 2020 22:12)  T(F): 98.1 (22 Dec 2020 04:46), Max: 99.2 (21 Dec 2020 22:12)  HR: 79 (22 Dec 2020 04:46) (75 - 79)  BP: 146/70 (22 Dec 2020 04:46) (145/68 - 164/71)  BP(mean): --  RR: 18 (22 Dec 2020 04:46) (17 - 18)  SpO2: 99% (22 Dec 2020 04:46) (98% - 100%)  I&O's Summary    21 Dec 2020 07:01  -  22 Dec 2020 07:00  --------------------------------------------------------  IN: 340 mL / OUT: 250 mL / NET: 90 mL        Physical Exam:  General:NAD  Pulmonary: b/l Breath sounds   Cardiovascular: Rapid A-fib,   Abdominal: Soft NT/ND   Extremities:RUE swelling noted   Right chest wall incision clean and dry.  site clean       LABS:                        8.0    10.37 )-----------( 297      ( 22 Dec 2020 07:01 )             26.9     12-22    142  |  108  |  49<H>  ----------------------------<  66<L>  3.9   |  17<L>  |  4.81<H>    Ca    8.4      22 Dec 2020 07:01  Phos  4.4     12-22  Mg     2.1     12-22      PT/INR - ( 21 Dec 2020 07:01 )   PT: 17.0 sec;   INR: 1.44          PTT - ( 21 Dec 2020 07:01 )  PTT:39.7 sec    Radiology and Additional Studies:      ---------------------------------------------------------------------------  PLEASE CHECK WHEN PRESENT:     [  ] Heart Failure     [  ] Acute     [  ] Acute on Chronic     [X  ] Chronic  -------------------------------------------------------------------     [  ]Diastolic [HFpEF]     [X  ]Systolic [HFrEF]     [  ]Combined [HFpEF & HFrEF]     [X  ] afib     [ X ] hypertensive heart disease     [  ]Other:  -------------------------------------------------------------------  [ ] Respiratory failure  [ ] Acute cor pulmonale  [ ] Asthma/COPD Exacerbation  [ ] Pleural effusion  [ ] Aspiration pneumonia  -------------------------------------------------------------------  [  ]MAIRA     [  ]ATN     [  ]Reneal Medullary Necrosis     [  ]Renal Cortical Necrosis     [  ]Other Pathological Lesions:    [  ]CKD 1  [  ]CKD 2  [  ]CKD 3  [  ]CKD 4  [X  ]CKD 5  [  ]Other  -------------------------------------------------------------------  [  ]Diabetes  [  ] Diabetic PVD Ulcer  [  ] Neuropathic ulcer to DM  [  ] Diabetes with Nephropathy  [  ] Osteomyelitis due to diabetes  [ ] Hyperglycemia  [X ] Hypoglycemia  --------------------------------------------------------------------  [  ]Malnutrition: See Nutrition Note  [  ]Cachexia  [  ]Other:   [  ]Supplement Ordered:  [  ]Morbid Obesity (BMI >=40]  ---------------------------------------------------------------------  [ ] Sepsis/severe sepsis/septic shock  [ ] UTI  [ ] Pneumonia  -----------------------------------------------------------------------  [ ] Acidosis/alkalosis  [ ] Fluid overload  [ ] Hypokalemia  [ ] Hyperkalemia  [ ] Hypomagnesemia  [ ] Hypophosphatemia  [ ] Hyperphosphatemia  [ ] Hyponatremia  [ ] Hypernatremia  ------------------------------------------------------------------------  [ ] Acute blood loss anemia  [ ] Post op blood loss anemia  [ ] Iron deficiency anemia  [X ] Anemia due to chronic disease  [ ] Hypercoagulable state  [X ] Leukocytosis  ----------------------------------------------------------------------  [ ] Cerebral infarction  [ ] Transient ischemia attack  [ ] Encephalopathy    Assessment/Plan;  65y Male POD 2 s/p Open Chest Hematoma Evacuation    Neuro: percocet, tylenol for pain   Cardio: cardura, metoprolol, holding Eliquis  -rapid A-fib  -Eliquis 2.5mg BID started   -Cardiology consutled   -Amiodarone 150mg now and then will start amiodarone gtt  -K repleted to 4, Mag 2   Pulm: Pulmonary s/p bedside thoracentesis 1L removed 12/20 f/u cytology.,   -CXR to Reassess PTX   -appreciate pulm recs   -CT Surgery to f/u as outpatient for VATS   GI: Renal diet  Endo:   -D50 for Blood sugar in 70's per cards   -Monitor FS   Heme:   -restarted on Eliquis   -Will monitor for bleeding   Nephro: Has LUE AVF not on HD yet  Vascular: s/p R chest wall hematoma evac 12/18  Neurovascular checks on RUMILA

## 2020-12-22 NOTE — CONSULT NOTE ADULT - SUBJECTIVE AND OBJECTIVE BOX
HPI:  65M PMH HTN, ESRD (has LUE AVF, not on HD yet), T Cell lymphoma (dx ~19yrs ago), AFib on (eliquis), systolic CHF (EF 40-45% in 2017), severe pHTN, p/w pain and swellling to R chest wall. Pt just dc'd yesterday after admission at outside hospital for asymptomatic anemia. received 2U PRBC. Pt woke this morning w/ R CP/swelling, slowly worsening. Denies trauma to that area or needle sticks. Denies lightheaded, SOB/CP, f/c, NVD, abd pain, urinary complaints, focal weakness/numbness.. (17 Dec 2020 22:33)    Now POD#4 with AF RVR. Feels slightly lightheaded which resolves when laying flat. Denies CP/SOB/pain.    PAST MEDICAL & SURGICAL HISTORY:  BPH (benign prostatic hyperplasia)    Gout    H/O pulmonary hypertension    CHF, chronic    Lymphoma  t cell; Chemotherapy weekly- wednesday    CKD (chronic kidney disease)    Atrial fibrillation    HTN (hypertension)    Elective surgery  rectal surgery    History of cholecystectomy        ALLERGIES/INTOLERANCES:  No Known Allergies    HOME MEDICATIONS:    INPATIENT MEDICATIONS:  doxazosin 4 milliGRAM(s) Oral at bedtime  metoprolol succinate  milliGRAM(s) Oral daily      acetaminophen   Tablet .. 650 milliGRAM(s) Oral every 6 hours PRN  allopurinol 50 milliGRAM(s) Oral <User Schedule>  calcitriol   Capsule 0.25 MICROGram(s) Oral daily  oxycodone    5 mG/acetaminophen 325 mG 1 Tablet(s) Oral every 6 hours PRN  senna 2 Tablet(s) Oral at bedtime      REVIEW OF SYSTEMS:    CONSTITUTIONAL: No weakness, F/C, wt loss/gain  EYES: No visual changes/disturbances  ENMT: No dry mouth, no vertigo  NECK: No pain or stiffness  RESPIRATORY: No cough, wheezing, hemoptysis; No shortness of breath  CARDIOVASCULAR: No chest pain, palpitations, lightheadedness/dizziness, LOC, or leg swelling  GASTROINTESTINAL: No abdominal or epigastric pain. No N/V/D/C. No melena, hematochezia, or hematemesis.  GENITOURINARY: No dysuria, increased frequency, hematuria, or incontinence  NEUROLOGICAL: No lightheadedness/dizziness, LOC, headaches, numbness or weakness  MUSCULOSKELETAL: No joint pain or swelling; No muscle, back, or extremity pain  SKIN: No itching, burning, rashes, or lesions   ENDOCRINE: No heat or cold intolerance; No hair loss  HEME/LYMPH: No easy bruising, or bleeding gums  PSYCHIATRIC: No depression, anxiety, mood swings, or difficulty sleeping    [ ] All other review of systems are negative unless indicated above.  [ ] Unable to obtain due to:    PHYSICAL EXAM:    T(C): 36.7 (12-22-20 @ 04:46), Max: 37.3 (12-21-20 @ 22:12)  HR: 79 (12-22-20 @ 04:46) (75 - 79)  BP: 146/70 (12-22-20 @ 04:46) (145/68 - 164/71)  RR: 18 (12-22-20 @ 04:46) (17 - 18)  SpO2: 99% (12-22-20 @ 04:46) (98% - 100%)  Wt(kg): --    I&O's Summary    21 Dec 2020 07:01  -  22 Dec 2020 07:00  --------------------------------------------------------  IN: 340 mL / OUT: 250 mL / NET: 90 mL    NAD  Irregular, tachy  CTAB  No edema  No JVD    TELEMETRY: 	      ECG:  	  	  LABS:                        8.0    10.37 )-----------( 297      ( 22 Dec 2020 07:01 )             26.9     12-21    143  |  111<H>  |  49<H>  ----------------------------<  53<LL>  4.2   |  17<L>  |  4.66<H>    Ca    8.6      21 Dec 2020 07:01  Phos  5.4     12-21  Mg     2.1     12-21        ASSESSMENT/PLAN: 65M PMH HTN, CKD4, T cell lymphoma, AF on eliquis, HFmEF (40-45 in 2017), severe pHTN, p/w chest wall hematoma sp drainage 12/18, thoracentesis 12/20, now with AF RVR.     #AF RVR: EF40 in 2017. On metoprolol . Has been off AC d/t chest wall hematoma. Euvolemic on exam. Beta blockers reasonable mgmt option, CCB contraindicated in reduced EF, amiodarone also reasonable but has risk of pharmacologic cardioversion (has been off AC).  - c/w metoprolol 200 daily  - metoprolol 5 IV prn x2. If inadequate rate control (goal <110), would start esmolol gtt.   - given high doses of beta blockers, if pt converts to sinus rhythm, there is a risk of sinus bradycardia. If the pt is symptomatic, atropine can be given. Informed team/nurse to have atropine at bedside. Alternatively, glucagon is the reversal agent for beta blockade.    **Preliminary evaluation by cardiology fellow on call  Carlos Stone MD PGY4     HPI:  65M PMH HTN, ESRD (has LUE AVF, not on HD yet), T Cell lymphoma (dx ~19yrs ago), AFib on (eliquis), systolic CHF (EF 40-45% in 2017), severe pHTN, p/w pain and swellling to R chest wall. Pt just dc'd yesterday after admission at outside hospital for asymptomatic anemia. received 2U PRBC. Pt woke this morning w/ R CP/swelling, slowly worsening. Denies trauma to that area or needle sticks. Denies lightheaded, SOB/CP, f/c, NVD, abd pain, urinary complaints, focal weakness/numbness.. (17 Dec 2020 22:33)    Now POD#4 with AF RVR. Feels slightly lightheaded which resolves when laying flat. Denies CP/SOB/pain.    PAST MEDICAL & SURGICAL HISTORY:  BPH (benign prostatic hyperplasia)    Gout    H/O pulmonary hypertension    CHF, chronic    Lymphoma  t cell; Chemotherapy weekly- wednesday    CKD (chronic kidney disease)    Atrial fibrillation    HTN (hypertension)    Elective surgery  rectal surgery    History of cholecystectomy        ALLERGIES/INTOLERANCES:  No Known Allergies    HOME MEDICATIONS:    INPATIENT MEDICATIONS:  doxazosin 4 milliGRAM(s) Oral at bedtime  metoprolol succinate  milliGRAM(s) Oral daily      acetaminophen   Tablet .. 650 milliGRAM(s) Oral every 6 hours PRN  allopurinol 50 milliGRAM(s) Oral <User Schedule>  calcitriol   Capsule 0.25 MICROGram(s) Oral daily  oxycodone    5 mG/acetaminophen 325 mG 1 Tablet(s) Oral every 6 hours PRN  senna 2 Tablet(s) Oral at bedtime      REVIEW OF SYSTEMS:    CONSTITUTIONAL: No weakness, F/C, wt loss/gain  EYES: No visual changes/disturbances  ENMT: No dry mouth, no vertigo  NECK: No pain or stiffness  RESPIRATORY: No cough, wheezing, hemoptysis; No shortness of breath  CARDIOVASCULAR: No chest pain, palpitations, lightheadedness/dizziness, LOC, or leg swelling  GASTROINTESTINAL: No abdominal or epigastric pain. No N/V/D/C. No melena, hematochezia, or hematemesis.  GENITOURINARY: No dysuria, increased frequency, hematuria, or incontinence  NEUROLOGICAL: No lightheadedness/dizziness, LOC, headaches, numbness or weakness  MUSCULOSKELETAL: No joint pain or swelling; No muscle, back, or extremity pain  SKIN: No itching, burning, rashes, or lesions   ENDOCRINE: No heat or cold intolerance; No hair loss  HEME/LYMPH: No easy bruising, or bleeding gums  PSYCHIATRIC: No depression, anxiety, mood swings, or difficulty sleeping    [ ] All other review of systems are negative unless indicated above.  [ ] Unable to obtain due to:    PHYSICAL EXAM:    T(C): 36.7 (12-22-20 @ 04:46), Max: 37.3 (12-21-20 @ 22:12)  HR: 79 (12-22-20 @ 04:46) (75 - 79)  BP: 146/70 (12-22-20 @ 04:46) (145/68 - 164/71)  RR: 18 (12-22-20 @ 04:46) (17 - 18)  SpO2: 99% (12-22-20 @ 04:46) (98% - 100%)  Wt(kg): --    I&O's Summary    21 Dec 2020 07:01  -  22 Dec 2020 07:00  --------------------------------------------------------  IN: 340 mL / OUT: 250 mL / NET: 90 mL    NAD  Irregular, tachy  CTAB  No edema  No JVD    TELEMETRY: 	      ECG:  	  	  LABS:                        8.0    10.37 )-----------( 297      ( 22 Dec 2020 07:01 )             26.9     12-21    143  |  111<H>  |  49<H>  ----------------------------<  53<LL>  4.2   |  17<L>  |  4.66<H>    Ca    8.6      21 Dec 2020 07:01  Phos  5.4     12-21  Mg     2.1     12-21        ASSESSMENT/PLAN: 65M PMH HTN, CKD4, T cell lymphoma, AF on eliquis, HFmEF (40-45 in 2017), severe pHTN, p/w chest wall hematoma sp drainage 12/18, thoracentesis 12/20, now with AF RVR.     #AF RVR: EF40 in 2017. On metoprolol . Has been off AC d/t chest wall hematoma. Euvolemic on exam. Beta blockers reasonable mgmt option, CCB contraindicated in reduced EF, amiodarone also reasonable but has risk of pharmacologic cardioversion (has been off AC).  - c/w metoprolol 200 daily  - metoprolol 5 IV prn x2. If inadequate rate control (goal <110), would start esmolol gtt.   - given high doses of beta blockers, if pt converts to sinus rhythm, there is a risk of sinus bradycardia. If the pt is symptomatic, atropine can be given. Informed team/nurse to have atropine at bedside. Alternatively, glucagon is the reversal agent for beta blockade.  - K>4, Mg>2  - TTE    **Preliminary evaluation by cardiology fellow on call  Carlos Stone MD PGY4

## 2020-12-22 NOTE — PROGRESS NOTE ADULT - SUBJECTIVE AND OBJECTIVE BOX
Patient seen and examined at bedside.   Events noted  hematoma evacuation, thoracentesis  rapid Afib this morning- now seen in CCU- on Amio drip HR 65  No SOB,   good urine output    T(C): , Max: 37.3 (12-21-20 @ 22:12)  T(F): , Max: 99.2 (12-21-20 @ 22:12)  HR: 65 (12-22-20 @ 16:00)  BP: 131/69 (12-22-20 @ 16:00)  BP(mean): 94 (12-22-20 @ 16:00)  RR: 18 (12-22-20 @ 14:00)  SpO2: 98% (12-22-20 @ 16:00)  Wt(kg): --    12-21 @ 07:01  -  12-22 @ 07:00  --------------------------------------------------------  IN:    Oral Fluid: 340 mL  Total IN: 340 mL    OUT:    Voided (mL): 250 mL  Total OUT: 250 mL    Total NET: 90 mL      12-22 @ 07:01  -  12-22 @ 16:57  --------------------------------------------------------  IN:    Amiodarone: 199.8 mL    Amiodarone: 16.7 mL    Oral Fluid: 300 mL  Total IN: 516.5 mL    OUT:    Voided (mL): 50 mL  Total OUT: 50 mL    Total NET: 466.5 mL            allopurinol 50 <User Schedule>  aMIOdarone    Tablet    aMIOdarone    Tablet 400 every 8 hours  aMIOdarone Infusion 1 <Continuous>  aMIOdarone Infusion 0.501 <Continuous>  apixaban 2.5 two times a day  calcitriol   Capsule 0.25 daily  dextrose 40% Gel 15 once  dextrose 5%. 1000 <Continuous>  dextrose 5%. 1000 <Continuous>  dextrose 50% Injectable 25 once  dextrose 50% Injectable 12.5 once  dextrose 50% Injectable 25 once  doxazosin 4 at bedtime  glucagon  Injectable 1 once  insulin lispro (ADMELOG) corrective regimen sliding scale  Before meals and at bedtime  metoprolol succinate  daily  senna 2 at bedtime    Allergies    No Known Allergies      PHYSICAL EXAM:  Constitutional:  No acute distress  chest wall- right less tender s/p evacuation of hematoma  Back: No CVA tenderness  Respiratory: reduced BS bases  Cardiovascular: S1, S2.  Regular rate and rhythm.    Gastrointestinal: soft, non-tender  Vasc/Extremities:  No lower extremity edema.    Neurological: No focal deficits.  Skin: Warm. Dry.    Psychiatric: Normal affect.    ACCESS: L arm AVF    LABS:                        8.0    10.37 )-----------( 297      ( 22 Dec 2020 07:01 )             26.9     12-22    142  |  108  |  49<H>  ----------------------------<  66<L>  3.9   |  17<L>  |  4.81<H>    Ca    8.4      22 Dec 2020 07:01  Phos  4.4     12-22  Mg     2.1     12-22    TPro  5.9<L>  /  Alb  2.4<L>  /  TBili  1.0  /  DBili  0.3<H>  /  AST  16  /  ALT  <5<L>  /  AlkPhos  95  12-22      PT/INR - ( 21 Dec 2020 07:01 )   PT: 17.0 sec;   INR: 1.44          PTT - ( 21 Dec 2020 07:01 )  PTT:39.7 sec          RADIOLOGY & ADDITIONAL STUDIES:

## 2020-12-22 NOTE — PROGRESS NOTE ADULT - ATTENDING COMMENTS
65M PMH HTN, ESRD (has LUE AVF, not on HD yet), T Cell lymphoma (dx ~19yrs ago), AFib on (eliquis), systolic CHF (EF 40-45% in 2017), severe pHTN, p/w pain and swelling to R chest wall. He is POD#2 fro right chest wall hematoma evacuation (250cc of clot removed), washout and closure with staples. Pulmonary was consulted for loculated left pleural effusion. He underwent left thoracentesis on 12-20 with removal of 1L of fluid which was found to be a transudate by Light's criteria with >50% lymphocytes. Post procedure CXR showed a trace left apircal PTX which has since been stable on CXR from 12-21 and today likely trapped lung.   The CT scan also showed a heterogeneously hyperdense round intraparenchymal lesion measuring up to 3.1 cm which is likely a mass and less likely a hematoma.     Impression:  left loculated pleural effusion s/p thoracentesis likely malignant in etiology  Left PTX stable   Left lung  heterogeneously hyperdense round intraparenchymal lesion likely mass vs less likely hematoma or loculated hemothorax  Emphysema noted on CT  Elevated PA pressures in a patient with HFrEF likely group 2 in etiology    - does not need further CXR to eval PTX unless patient has new respiratory complains  - follow pleural fluid cytology and flow cytometry  - will need outpatient follow up for emphysema, PH and further work up of his ?lung mass  - would consider holding off on anticoagulation given recent bleed

## 2020-12-22 NOTE — CONSULT NOTE ADULT - ASSESSMENT
65M active smoker, PMH HTN, stage 4 CKD (has LUE AVF but not on HD yet), T Cell lymphoma (dx 2002 on chemo), paroxysmal AFib on (eliquis), systolic CHF (EF 40-45% in 2017), severe pHTN, presented on 12/17/20 with an acute chest wall hematoma from bleeding artery on the right side. S/P right chest wall hematoma evacuation, washout and closure with staples on 12/18/20.  Also noted to have loculated left pleural effusion and possible underlying mass vs hematoma, the effusion is recurrent and in the setting of 20lb weight loss and is thus concerning for malignancy.  EPS is consulting for AFIB RVR this morning.  VR 120s bpm.  He was restated on Eliquis this morning and was started on Amio gtt.  He self converted around 1:30 PM.    - Continue Amio gtt. Switch it to Amio 400 mg BID for 5 days. Then decrease it to 200 mg daily thereafter. Explain to him that this is to be used short term to help him maintain sinus rhythm.    - Please have baseline hepatic function and thyroid function.   - A/C if no contraindication from primary team.   - Continue Toprol 200 mg daily.    - Will have f/u appt with Dr. Herndon in 4-5 weeks.    - Case d/w Dr. Herndon and Vascular team.  65M active smoker, PMH HTN, stage 4 CKD (has LUE AVF but not on HD yet), T Cell lymphoma (dx 2002 on chemo), paroxysmal AFib on (eliquis), systolic CHF (EF 40-45% in 2017), pulm htn, presented on 12/17/20 with an acute chest wall hematoma from bleeding artery on the right side. S/P right chest wall hematoma evacuation, washout and closure with staples on 12/18/20.  Also noted to have loculated left pleural effusion and possible underlying mass vs hematoma, the effusion is recurrent and in the setting of 20lb weight loss and is thus concerning for malignancy. s/p thoracocentesis for fluid analysis 12/20/20.  EPS is consulting for AFIB RVR this morning.  VR 120s bpm.  He was restated on Eliquis this morning and was started on Amio gtt.  He self converted around 1:30 PM.    - Continue Amio gtt. Switch it to Amio 400 mg BID for 5 days. Then decrease it to 200 mg daily thereafter. Explain to him that this is to be used short term to help him maintain sinus rhythm.    - Please have baseline hepatic function and thyroid function.   - A/C if no contraindication from primary team.   - Continue Toprol 200 mg daily.    - Will have f/u appt with Dr. Herndon in 4-5 weeks.    - Case d/w Dr. Herndon and Vascular team.  65M active smoker, PMH HTN, stage 4 CKD (has LUE AVF but not on HD yet), T Cell lymphoma (dx 2002 on chemo), paroxysmal AFib on (eliquis), systolic CHF (EF 40-45% in 2017), pulm htn, presented on 12/17/20 with an acute chest wall hematoma from bleeding artery on the right side. S/P right chest wall hematoma evacuation, washout and closure with staples on 12/18/20.  Also noted to have loculated left pleural effusion and possible underlying mass vs hematoma, the effusion is recurrent and in the setting of 20lb weight loss and is thus concerning for malignancy. s/p thoracocentesis for fluid analysis 12/20/20.  EPS is consulting for AFIB RVR this morning.  VR 120s bpm.  He was restated on Eliquis this morning and was started on Amio gtt.  He self converted around 1:30 PM.    - Continue Amio gtt. Switch it to Amio 400 mg BID for total 5 days. Please start AMIO PO at 6 PM tonight.  Then decrease it to 200 mg daily thereafter. Explain to him that this is to be used short term to help him maintain sinus rhythm.    - Please have baseline hepatic function and thyroid function.   - A/C if no contraindication from primary team.   - Continue Toprol 200 mg daily.    - Will have f/u appt with Dr. Herndon in 4-5 weeks.    - Case d/w Dr. Herndon and Vascular team.

## 2020-12-22 NOTE — CONSULT NOTE ADULT - ASSESSMENT
Assessment: 64 y/o M with past medical history significant for HTN, CKD (nearing HD, has patent LUE AVF), T-cell lymphoma (diagnosed about 19 years ago), AFib (on Eliquis), systolic CHF (LVEF 40-45% in 2017), severe pulmonary HTN. He presented to [facility] ED on 12/17/2020 with R chest wall hematoma. He is now POD#4 from R chest wall evacuation with vascular surgery, with 250mL clot evacuation and 1u RBC transfused. He was fund to have a L pleural effusion, with concerning mass in LLL, pulmonology team consulted, with bedside L pleural effusion thoracentesis performed, revealing transudative fluid, with cytology pending. This AM, found to develop symptomatic AFib with RVR, unresponsive to Metoprolol pushes x2. Cardiology consulted, recommending starting amiodarone infusion load, with resolution of symptoms. He is transferred to SICU for closer hemodynamic hemodynamic monitoring:       Plan:   Neuro: Continue PRN Percocet, Tylenol for pain control    CV: MAP >65; Continue Amiodarone infusion for AFib with RVR: Appreciate cardiology recommendations; Continue Doxzosin 4mg qHS, Toprol XL 200mg, Eliquis   Pulm: Oxygen saturation >94% on RA: Continue to monitor respiratory status; Pulmonology following for LLL lesion, L pleural effusion drained on 12/19/20   GI/FEN: Renal diet; Continue Senna qHS   : Voids independently; CKD nearing HD, continue to monitor daily weights, strict I&O's; Continue calcitriol, Allopurinol   Endo: POCT glucose, SSI   Heme: Continue to monitor daily CBC, transfuse to keep hemoglobin > 8 (hx systolic CHF)  ID: No active issues   PPX: SCDs, no SQH; Started Eliquis 2.5mg BID this AM  L/D/T: PIVs  Wounds: R chest incision closed with staples   PT/OT: Ordered today   Dispo: SICU

## 2020-12-22 NOTE — PROGRESS NOTE ADULT - PROBLEM SELECTOR PLAN 1
Recurrent effusion of the left side that had been seen on imaging as far back as 2017. Originally this was attributed to hypervolemia 2/2 CKD and/or CHF but now suspicious for malignant process given recent weight loss and mass-like consolidation seen in the LLL on CT after thoracentesis. He had a lymphocyte predominant transudative effusion which further increases suspicion. Cytology is still pending. Radiologic differential includes hemothorax, but in conversation with CT surgery as well, low suspicion.   POCUS today shows unchanged small but highly loculated effusion with atelectasis. Right side small effusion unchanged as well and appears simple. Given unchanged examination and lack of respiratory symptoms, low suspicion of this contributing to his AF w/RVR episode this AM.    Plan:  - f/u cytology  - Daily POCUS for f/u while admitted

## 2020-12-22 NOTE — CONSULT NOTE ADULT - SUBJECTIVE AND OBJECTIVE BOX
HPI:  ***  Subjective: Hernan Gamez is a very pleasant 64 y/o M with past medical history significant for HTN, CKD (nearing HD, has patent LUE AVF), T-cell lymphoma (diagnosed about 19 years ago), AFib (on Eliquis), systolic CHF (LVEF 40-45% in 2017), severe pulmonary HTN. He presented to OSH with anemia of unknown etiology, transfused with 2u RBC and discharged home. He presented to [facility] ED on 12/17/2020 with R chest wall hematoma. He had R chest wall evacuation with vascular surgery on 12/18/2020, with 250mL clot evacuation and 1u RB transfused. He was also found to have a L pleural effusion, with concerning mass in LLL, pulmonology team consulted, with bedside L pleural effusion thoracentesis performed, revealing transudative fluid, with cytology pending.     SICU was consulted, this AM, as the patient developed AFib with RVR, with HR in the 140s, along with hypotension with SBP in the 100s. At this time, the patient was also symptomatic, with dizziness and feeling lightheaded. He was given 5mg IV Metoprolol x2, and HR had decreased to 120s-130s. His blood pressure improved without intervention, with SBP 120s. C His symptoms improved.      HEALTH ISSUES - PROBLEM Dx:  Pneumothorax on left   ESRD (end stage renal disease)  Centrilobular emphysema  Atelectasis of left lung  Loculated pleural effusion  CKD (chronic kidney disease)  Hematoma- Right chest wall hematoma      PAST MEDICAL & SURGICAL HISTORY:  BPH (benign prostatic hyperplasia)  Gout  H/O pulmonary hypertension  CHF, chronic  Lymphoma t cell; Chemotherapy weekly- wednesday  CKD (chronic kidney disease)  Atrial fibrillation  HTN (hypertension)  Elective surgery- rectal surgery  History of cholecystectomy      Allergies  No Known Allergies      Home Medications:  allopurinol: 200 milligram(s) orally 2 times a day (18 Dec 2020 16:29)  apixaban 2.5 mg oral tablet: 1 tab(s) orally 2 times a day (18 Dec 2020 01:34)  calcitriol 0.25 mcg oral capsule: 1 cap(s) orally once a day (18 Dec 2020 01:34)  doxazosin 2 mg oral tablet: 2 tab(s) orally 2 times a day (18 Dec 2020 01:34)  Toprol- mg oral tablet, extended release: 1 tab(s) orally once a day (18 Dec 2020 01:34)  torsemide 20 mg oral tablet: 1  orally every other day (at bedtime) (18 Dec 2020 01:34)    MEDICATIONS  (STANDING):  allopurinol 50 milliGRAM(s) Oral <User Schedule>  aMIOdarone Infusion 1 mG/Min (33.3 mL/Hr) IV Continuous <Continuous>  apixaban 2.5 milliGRAM(s) Oral two times a day  calcitriol   Capsule 0.25 MICROGram(s) Oral daily  doxazosin 4 milliGRAM(s) Oral at bedtime  metoprolol succinate  milliGRAM(s) Oral daily  senna 2 Tablet(s) Oral at bedtime    MEDICATIONS  (PRN):  acetaminophen   Tablet .. 650 milliGRAM(s) Oral every 6 hours PRN Moderate Pain (4 - 6)  oxycodone    5 mG/acetaminophen 325 mG 1 Tablet(s) Oral every 6 hours PRN Severe Pain (7 - 10)      Physical Exam:   General: Well appearing male, appears stated age, comfortably, answering all questions appropriately   Neuro: CN II-XII grossly intact bilaterally   HEENT: Normocephalic, atraumatic, no JVD bilaterally   Chest: Well healing R chest wall hematoma, with varying stages of ecchymosis, incision closed with staples is clean/dry/intact  Heart: Tachycardic, irregularly irregular S1/S2, no murmurs rubs or gallops   Lungs: Unlabored breathing on room air; Decreased breath sounds at L lower and L mid lung; Otherwise clear to auscultation bilaterally, no adventitious sounds; L posterior pleural effusion site without ecchymosis   Abdomen: Soft, non-distended, normoactive bowel sounds throughout, no tenderness to palpation in all 4 quadrants; ecchymosis at L midaxillary line just above iliac crest   Upper Extremities: No edema, LUE AVF +thrill/bruit, ecchymosis in L axilla, well healed L wrist incision   Lower Extremities: No edema, feet warm bilaterally   Skin: Warm, non-diaphoretic throughout       Labs:              8.0    10.37 )-----------( 297      ( 22 Dec 2020 07:01 )             26.9     12-22    142  |  108  |  49<H>  ----------------------------<  66<L>  3.9   |  17<L>  |  4.81<H>    Ca    8.4      22 Dec 2020 07:01  Phos  4.4     12-22  Mg     2.1     12-22      CAPILLARY BLOOD GLUCOSE  POCT Blood Glucose.: 178 mg/dL (22 Dec 2020 09:42)  POCT Blood Glucose.: 90 mg/dL (22 Dec 2020 08:13)  POCT Blood Glucose.: 77 mg/dL (22 Dec 2020 07:35)  POCT Blood Glucose.: 72 mg/dL (22 Dec 2020 06:44)  POCT Blood Glucose.: 86 mg/dL (21 Dec 2020 10:24)      PT/INR - ( 21 Dec 2020 07:01 )   PT: 17.0 sec;   INR: 1.44     PTT - ( 21 Dec 2020 07:01 )  PTT:39.7 sec    COVID-19 PCR: NotDetec (17 Dec 2020 19:31)        Culture - Body Fluid with Gram Stain (collected 20 Dec 2020 18:36)  Source: .Body Fluid None  Preliminary Report (21 Dec 2020 08:25):    No growth to date        Vital Signs:   Vital Signs Last 24 Hrs  T(C): 36.7 (22 Dec 2020 09:32), Max: 37.3 (21 Dec 2020 22:12)  T(F): 98 (22 Dec 2020 09:32), Max: 99.2 (21 Dec 2020 22:12)  HR: 104 (22 Dec 2020 09:48) (75 - 128)  BP: 151/67 (22 Dec 2020 09:48) (125/59 - 158/72)  BP(mean): --  RR: 19 (22 Dec 2020 09:48) (17 - 19)  SpO2: 99% (22 Dec 2020 09:48) (98% - 100%)      Input/Output:   I&O's Detail    21 Dec 2020 07:01  -  22 Dec 2020 07:00  --------------------------------------------------------  IN:    Oral Fluid: 340 mL  Total IN: 340 mL    OUT:    Voided (mL): 250 mL  Total OUT: 250 mL    Total NET: 90 mL        Daily     Daily  HPI: Hernan Gamez is a very pleasant 66 y/o M with past medical history significant for HTN, CKD (nearing HD, has patent LUE AVF), T-cell lymphoma (diagnosed about 19 years ago), AFib (on Eliquis), systolic CHF (LVEF 40-45% in 2017), severe pulmonary HTN. He presented to OSH with anemia of unknown etiology, transfused with 2u RBC and discharged home. He presented to [facility] ED on 12/17/2020 with R chest wall hematoma. He had R chest wall evacuation with vascular surgery on 12/18/2020, with 250mL clot evacuation and 1u RB transfused. He was also found to have a L pleural effusion, with concerning mass in LLL, pulmonology team consulted, with bedside L pleural effusion thoracentesis performed, revealing transudative fluid, with cytology pending.     SICU was consulted, this AM, as the patient developed AFib with RVR, with HR in the 140s, along with hypotension with SBP in the 100s. At this time, the patient was also symptomatic, with dizziness and feeling lightheaded. He was given 5mg IV Metoprolol x2, and HR had decreased to 120s-130s. His blood pressure improved without intervention, with SBP 120s. Cardiology was consulted, recommending Amiodarone load with infusion. He was given his first dose of Eliquis 2.5mg (renally dosed) since his surgery this AM. Upon presentation, the patient reports "I have seen every doctor in this hospital", and "I feel so much better, thank you". He reports his dizziness has resolved, denies chest pain, palpitations.       HEALTH ISSUES - PROBLEM Dx:  Pneumothorax on left   ESRD (end stage renal disease)  Centrilobular emphysema  Atelectasis of left lung  Loculated pleural effusion  CKD (chronic kidney disease)  Hematoma- Right chest wall hematoma      PAST MEDICAL & SURGICAL HISTORY:  BPH (benign prostatic hyperplasia)  Gout  H/O pulmonary hypertension  CHF, chronic  Lymphoma t cell; Chemotherapy weekly- wednesday  CKD (chronic kidney disease)  Atrial fibrillation  HTN (hypertension)  Elective surgery- rectal surgery  History of cholecystectomy      Allergies  No Known Allergies      Home Medications:  allopurinol: 200 milligram(s) orally 2 times a day (18 Dec 2020 16:29)  apixaban 2.5 mg oral tablet: 1 tab(s) orally 2 times a day (18 Dec 2020 01:34)  calcitriol 0.25 mcg oral capsule: 1 cap(s) orally once a day (18 Dec 2020 01:34)  doxazosin 2 mg oral tablet: 2 tab(s) orally 2 times a day (18 Dec 2020 01:34)  Toprol- mg oral tablet, extended release: 1 tab(s) orally once a day (18 Dec 2020 01:34)  torsemide 20 mg oral tablet: 1  orally every other day (at bedtime) (18 Dec 2020 01:34)    MEDICATIONS  (STANDING):  allopurinol 50 milliGRAM(s) Oral <User Schedule>  aMIOdarone Infusion 1 mG/Min (33.3 mL/Hr) IV Continuous <Continuous>  apixaban 2.5 milliGRAM(s) Oral two times a day  calcitriol   Capsule 0.25 MICROGram(s) Oral daily  doxazosin 4 milliGRAM(s) Oral at bedtime  metoprolol succinate  milliGRAM(s) Oral daily  senna 2 Tablet(s) Oral at bedtime    MEDICATIONS  (PRN):  acetaminophen   Tablet .. 650 milliGRAM(s) Oral every 6 hours PRN Moderate Pain (4 - 6)  oxycodone    5 mG/acetaminophen 325 mG 1 Tablet(s) Oral every 6 hours PRN Severe Pain (7 - 10)      Physical Exam:   General: Well appearing male, appears stated age, comfortably, answering all questions appropriately   Neuro: CN II-XII grossly intact bilaterally   HEENT: Normocephalic, atraumatic, no JVD bilaterally   Chest: Well healing R chest wall hematoma, with varying stages of ecchymosis, incision closed with staples is clean/dry/intact  Heart: Tachycardic, irregularly irregular S1/S2, no murmurs rubs or gallops   Lungs: Unlabored breathing on room air; Decreased breath sounds at L lower and L mid lung; Otherwise clear to auscultation bilaterally, no adventitious sounds; L posterior pleural effusion site without ecchymosis   Abdomen: Soft, non-distended, normoactive bowel sounds throughout, no tenderness to palpation in all 4 quadrants; ecchymosis at L midaxillary line just above iliac crest   Upper Extremities: No edema, LUE AVF +thrill/bruit, ecchymosis in L axilla, well healed L wrist incision   Lower Extremities: No edema, feet warm bilaterally   Skin: Warm, non-diaphoretic throughout       Labs:              8.0    10.37 )-----------( 297      ( 22 Dec 2020 07:01 )             26.9     12-22    142  |  108  |  49<H>  ----------------------------<  66<L>  3.9   |  17<L>  |  4.81<H>    Ca    8.4      22 Dec 2020 07:01  Phos  4.4     12-22  Mg     2.1     12-22      CAPILLARY BLOOD GLUCOSE  POCT Blood Glucose.: 178 mg/dL (22 Dec 2020 09:42)  POCT Blood Glucose.: 90 mg/dL (22 Dec 2020 08:13)  POCT Blood Glucose.: 77 mg/dL (22 Dec 2020 07:35)  POCT Blood Glucose.: 72 mg/dL (22 Dec 2020 06:44)  POCT Blood Glucose.: 86 mg/dL (21 Dec 2020 10:24)      PT/INR - ( 21 Dec 2020 07:01 )   PT: 17.0 sec;   INR: 1.44     PTT - ( 21 Dec 2020 07:01 )  PTT:39.7 sec    COVID-19 PCR: NotDetec (17 Dec 2020 19:31)        Culture - Body Fluid with Gram Stain (collected 20 Dec 2020 18:36)  Source: .Body Fluid None  Preliminary Report (21 Dec 2020 08:25):    No growth to date        Vital Signs:   Vital Signs Last 24 Hrs  T(C): 36.7 (22 Dec 2020 09:32), Max: 37.3 (21 Dec 2020 22:12)  T(F): 98 (22 Dec 2020 09:32), Max: 99.2 (21 Dec 2020 22:12)  HR: 104 (22 Dec 2020 09:48) (75 - 128)  BP: 151/67 (22 Dec 2020 09:48) (125/59 - 158/72)  BP(mean): --  RR: 19 (22 Dec 2020 09:48) (17 - 19)  SpO2: 99% (22 Dec 2020 09:48) (98% - 100%)      Input/Output:   I&O's Detail    21 Dec 2020 07:01  -  22 Dec 2020 07:00  --------------------------------------------------------  IN:    Oral Fluid: 340 mL  Total IN: 340 mL    OUT:    Voided (mL): 250 mL  Total OUT: 250 mL    Total NET: 90 mL        Daily     Daily

## 2020-12-22 NOTE — PROGRESS NOTE ADULT - ASSESSMENT
65M PMH HTN, ESRD (has LUE AVF, not on HD yet), T Cell lymphoma (dx ~19yrs ago), AFib on (eliquis), systolic CHF (EF 40-45% in 2017), severe pHTN,   s/p evacuation of right chest wall hematoma last week  today with Afib with RVR- now rate controlled  BUN/creat have remained in stable range  K good  CO2 dropping- add NaHCO3 tabs 650 mg BID with target CO2 > 20

## 2020-12-22 NOTE — PROGRESS NOTE ADULT - PROBLEM SELECTOR PLAN 2
Very small apical ptx on the left seen on post-thoracentesis CT scan. Again given likely malignant effusion, this is likely an ex-vacuo/ trapped lung. CXR yesterday showed the same small apical ptx <2cm away from chest wall. Repeat CXR this AM appears unchanged. If he develops increasing pleuritic chest pain or worsening dyspnea, repeat CXR stat but otherwise can repeat tomorrow AM.

## 2020-12-22 NOTE — CONSULT NOTE ADULT - CONSULT REQUESTED BY NAME
Dr. Cisneros
[facility]
vascular surgery
Dr Cisneros
Dr. Cisneros
Dr. Cisneros
Vascular Surgery/Dr. Cisneros
Dr. Cisneros

## 2020-12-22 NOTE — CONSULT NOTE ADULT - ATTENDING COMMENTS
I have personally seen, examined, and participated in the care of this patient.  I have reviewed all pertinent clinical information, including history, physical exam, plan and the Fellow’s note and agree except as noted.  - pt w/ sx'tic hypoglycemia  - on amio gtt, converted to NSR  - switch to PO amio per EP instructions  - AC per primary team  - consider Watchman as outpt  I was physically present for the key portions of the evaluation and management (E/M) service provided.  I agree with the above history, physical, and plan which I have reviewed and edited where appropriate.     35 minutes spent on total encounter; more than 50% of the visit was spent counseling and/or coordinating care by the attending physician.     Plan discussed with primary team.
he has a spontaneous hematoma can hold it  will defer to outpatient cardiologist   patient is a candidate for watchman device given his high risk of bleeding
Patient seen and examined with house-staff during bedside rounds  Resident note read, including vitals, physical findings, laboratory data, and radiological reports.   Revisions included below.  Case discussed with House staff  Direct personal management at bedside  and extensive interpretation of data. Decision making of high complexity.

## 2020-12-23 ENCOUNTER — TRANSCRIPTION ENCOUNTER (OUTPATIENT)
Age: 65
End: 2020-12-23

## 2020-12-23 VITALS
HEART RATE: 66 BPM | SYSTOLIC BLOOD PRESSURE: 162 MMHG | DIASTOLIC BLOOD PRESSURE: 111 MMHG | OXYGEN SATURATION: 100 % | RESPIRATION RATE: 18 BRPM

## 2020-12-23 LAB
A1C WITH ESTIMATED AVERAGE GLUCOSE RESULT: 5.1 % — SIGNIFICANT CHANGE UP (ref 4–5.6)
ANION GAP SERPL CALC-SCNC: 15 MMOL/L — SIGNIFICANT CHANGE UP (ref 5–17)
APTT BLD: 37.9 SEC — HIGH (ref 27.5–35.5)
BUN SERPL-MCNC: 52 MG/DL — HIGH (ref 7–23)
CALCIUM SERPL-MCNC: 8.4 MG/DL — SIGNIFICANT CHANGE UP (ref 8.4–10.5)
CHLORIDE SERPL-SCNC: 110 MMOL/L — HIGH (ref 96–108)
CO2 SERPL-SCNC: 18 MMOL/L — LOW (ref 22–31)
CREAT SERPL-MCNC: 4.66 MG/DL — HIGH (ref 0.5–1.3)
ESTIMATED AVERAGE GLUCOSE: 100 MG/DL — SIGNIFICANT CHANGE UP (ref 68–114)
GLUCOSE BLDC GLUCOMTR-MCNC: 86 MG/DL — SIGNIFICANT CHANGE UP (ref 70–99)
GLUCOSE BLDC GLUCOMTR-MCNC: 87 MG/DL — SIGNIFICANT CHANGE UP (ref 70–99)
GLUCOSE SERPL-MCNC: 79 MG/DL — SIGNIFICANT CHANGE UP (ref 70–99)
HCT VFR BLD CALC: 26.7 % — LOW (ref 39–50)
HGB BLD-MCNC: 8 G/DL — LOW (ref 13–17)
INR BLD: 1.55 — HIGH (ref 0.88–1.16)
MAGNESIUM SERPL-MCNC: 2.1 MG/DL — SIGNIFICANT CHANGE UP (ref 1.6–2.6)
MCHC RBC-ENTMCNC: 28.2 PG — SIGNIFICANT CHANGE UP (ref 27–34)
MCHC RBC-ENTMCNC: 30 GM/DL — LOW (ref 32–36)
MCV RBC AUTO: 94 FL — SIGNIFICANT CHANGE UP (ref 80–100)
NRBC # BLD: 0 /100 WBCS — SIGNIFICANT CHANGE UP (ref 0–0)
PHOSPHATE SERPL-MCNC: 4.2 MG/DL — SIGNIFICANT CHANGE UP (ref 2.5–4.5)
PLATELET # BLD AUTO: 308 K/UL — SIGNIFICANT CHANGE UP (ref 150–400)
POTASSIUM SERPL-MCNC: 4 MMOL/L — SIGNIFICANT CHANGE UP (ref 3.5–5.3)
POTASSIUM SERPL-SCNC: 4 MMOL/L — SIGNIFICANT CHANGE UP (ref 3.5–5.3)
PROTHROM AB SERPL-ACNC: 18.2 SEC — HIGH (ref 10.6–13.6)
RBC # BLD: 2.84 M/UL — LOW (ref 4.2–5.8)
RBC # FLD: 18.1 % — HIGH (ref 10.3–14.5)
SODIUM SERPL-SCNC: 143 MMOL/L — SIGNIFICANT CHANGE UP (ref 135–145)
WBC # BLD: 8.86 K/UL — SIGNIFICANT CHANGE UP (ref 3.8–10.5)
WBC # FLD AUTO: 8.86 K/UL — SIGNIFICANT CHANGE UP (ref 3.8–10.5)

## 2020-12-23 PROCEDURE — 99233 SBSQ HOSP IP/OBS HIGH 50: CPT | Mod: GC

## 2020-12-23 PROCEDURE — 71045 X-RAY EXAM CHEST 1 VIEW: CPT | Mod: 26

## 2020-12-23 PROCEDURE — 99233 SBSQ HOSP IP/OBS HIGH 50: CPT

## 2020-12-23 RX ORDER — AMIODARONE HYDROCHLORIDE 400 MG/1
1 TABLET ORAL
Qty: 9 | Refills: 0
Start: 2020-12-23 | End: 2020-12-25

## 2020-12-23 RX ORDER — SODIUM BICARBONATE 1 MEQ/ML
1 SYRINGE (ML) INTRAVENOUS
Qty: 28 | Refills: 0
Start: 2020-12-23 | End: 2021-01-05

## 2020-12-23 RX ORDER — SODIUM BICARBONATE 1 MEQ/ML
650 SYRINGE (ML) INTRAVENOUS
Refills: 0 | Status: DISCONTINUED | OUTPATIENT
Start: 2020-12-23 | End: 2020-12-23

## 2020-12-23 RX ORDER — ACETAMINOPHEN 500 MG
2 TABLET ORAL
Qty: 0 | Refills: 0 | DISCHARGE
Start: 2020-12-23

## 2020-12-23 RX ADMIN — APIXABAN 2.5 MILLIGRAM(S): 2.5 TABLET, FILM COATED ORAL at 05:52

## 2020-12-23 RX ADMIN — AMIODARONE HYDROCHLORIDE 400 MILLIGRAM(S): 400 TABLET ORAL at 03:10

## 2020-12-23 RX ADMIN — Medication 200 MILLIGRAM(S): at 05:52

## 2020-12-23 RX ADMIN — AMIODARONE HYDROCHLORIDE 400 MILLIGRAM(S): 400 TABLET ORAL at 11:15

## 2020-12-23 RX ADMIN — CALCITRIOL 0.25 MICROGRAM(S): 0.5 CAPSULE ORAL at 11:15

## 2020-12-23 NOTE — PROGRESS NOTE ADULT - ATTENDING COMMENTS
I have personally seen, examined, and participated in the care of this patient.  I have reviewed all pertinent clinical information, including history, physical exam, plan and the Fellow’s note and agree except as noted.    I was physically present for the key portions of the evaluation and management (E/M) service provided.  I agree with the above history, physical, and plan which I have reviewed and edited where appropriate.     35 minutes spent on total encounter; more than 50% of the visit was spent counseling and/or coordinating care by the attending physician.     Plan discussed with primary team. I have personally seen, examined, and participated in the care of this patient.  I have reviewed all pertinent clinical information, including history, physical exam, plan and the Fellow’s note and agree except as noted.  - OP ischemic w/u for residual severe MR w/ structural  - OP EP f/u    I was physically present for the key portions of the evaluation and management (E/M) service provided.  I agree with the above history, physical, and plan which I have reviewed and edited where appropriate.     35 minutes spent on total encounter; more than 50% of the visit was spent counseling and/or coordinating care by the attending physician.     Plan discussed with primary team.

## 2020-12-23 NOTE — PROGRESS NOTE ADULT - PROBLEM SELECTOR PROBLEM 1
ESRD (end stage renal disease)
Loculated pleural effusion
Loculated pleural effusion
CKD (chronic kidney disease)
Loculated pleural effusion
CKD (chronic kidney disease)

## 2020-12-23 NOTE — PROGRESS NOTE ADULT - PROBLEM SELECTOR PLAN 2
Very small apical ptx unchanged in serial X-rays after thoracentesis. 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

## 2020-12-23 NOTE — DISCHARGE NOTE NURSING/CASE MANAGEMENT/SOCIAL WORK - PATIENT PORTAL LINK FT
You can access the FollowMyHealth Patient Portal offered by [facility] by registering at the following website: [email or web address]. By joining [facility]’s FollowMyHealth portal, you will also be able to view your health information using other applications (apps) compatible with our system.

## 2020-12-23 NOTE — PROGRESS NOTE ADULT - PROBLEM SELECTOR PLAN 1
Cytology is negative for malignant cells but given his weightloss and smoking history, still is concerning for malignancy especially with a lymphocyte predominant effusion. Radiologic differential includes hemothorax, but in conversation with CT surgery as well, low suspicion.   POCUS today shows unchanged loculated effusion with atelectasis. Right side small effusion unchanged as well and appears simple. Plan is to follow up with a repeat CT and ultrasound to see if effusion recurs/ if loculated areas change over time.    Plan:  - repeat CT in outpatient setting  - f/u with Dr. Misti Guidry in 2 weeks

## 2020-12-23 NOTE — PROGRESS NOTE ADULT - PROBLEM SELECTOR PLAN 3
seen on CT but no formal diagnosis of COPD. Continues to have no respiratory symptoms and no additional intervention needed at this point.
seen on CT but no formal diagnosis of COPD. Continues to have no respiratory symptoms and no additional intervention needed at this point.
seen on CT but no formal diagnosis of COPD. Continues to have no respiratory symptoms.

## 2020-12-23 NOTE — PROGRESS NOTE ADULT - PROVIDER SPECIALTY LIST ADULT
Hospitalist
Vascular Surgery
Cardiology
Hospitalist
Internal Medicine
Nephrology
SICU
Vascular Surgery
Nephrology
Thoracic Surgery
Internal Medicine
Nephrology
Nephrology
Pulmonology

## 2020-12-23 NOTE — PHYSICAL THERAPY INITIAL EVALUATION ADULT - PERTINENT HX OF CURRENT PROBLEM, REHAB EVAL
65M PMH HTN, CKD4, T cell lymphoma, AF on eliquis, HFmEF (40-45 in 2017), severe pHTN, p/w spontaneous chest wall hematoma sp uncomplicated drainage on 12/18, thoracentesis 12/20, being followed for his chronic atrial fibrillation, Severe MR/TR, HFrEF

## 2020-12-23 NOTE — PHYSICAL THERAPY INITIAL EVALUATION ADULT - ADDITIONAL COMMENTS
Pt lives in apt with wife, 3+2+4 VERONICA. At baseline amb tolerance ~1/2 block, driving. Indpt with ADLs/iADLs. No device use. R handed, wears glasses for reading.

## 2020-12-23 NOTE — PROGRESS NOTE ADULT - REASON FOR ADMISSION
Chestwall hematoma
Chest wall hematoma
Chestwall hematoma

## 2020-12-23 NOTE — PROGRESS NOTE ADULT - ASSESSMENT
65M PMH HTN, CKD4, T cell lymphoma, AF on eliquis, HFmEF (40-45 in 2017), severe pHTN, p/w spontaneous chest wall hematoma sp uncomplicated drainage on 12/18, thoracentesis 12/20, being followed for his chronic atrial fibrillation, Severe MR/TR, HFrEF    #Atrial fibrillation: Currently in NSR, Patient in and out of Atrial fibrillation  - c/w Toprol  qd for rate control  - c/w Amiodarone Taper per our EP colleagues (recs appreciated)  - Replete electrolytes to maintain K>4, Mg>2  - Incentive Spirometry. Encourage patient to get out of bed.    #Chronic compensated HFrEF (ACC/AHA stage B, NYHA class II-III): Euvolemic, Normotensive   - No indication for Diuretics at this time  - c/w Toprol as above  - Would recommend discussing with his OP nephrologist about risk and benefits of starting and ARB due to his CKD, as it benefit his HFrEF and and Regurgitant lesions.   - Patient will require an OP Ischemic evaluation    Degenerative Severe MR and Probably Left sided TR: Euvolemic, Normotensive  - Recommend plugging the patient with Structural heart / CTS team for OP MIXON and management  - ARB and BB recs as above     Follow Ups:   General Cardiology: Dr Mihcell Gomez in 2 weeks  EP: Dr Herndon Would confirm with our EP colleagues for appointment date  SHD/CTS: OP FU Dr Mosqueda / ROYA Calle instruction per their    Prelim recs  Please refer to the cardiology attending addendum section for final recommendations  Thank you for allowing to participate in the care of this patient    KEENAN Juarez  Cardiology Fellow, PGY 7 65M PMH HTN, CKD4, T cell lymphoma, AF on eliquis, HFmEF (40-45 in 2017), severe pHTN, p/w spontaneous chest wall hematoma sp uncomplicated drainage on 12/18, thoracentesis 12/20, being followed for his chronic atrial fibrillation, Severe MR/TR, HFrEF    #Atrial fibrillation: Currently in NSR, Patient in and out of Atrial fibrillation  - c/w Toprol  qd for rate control  - c/w Amiodarone Taper per our EP colleagues (recs appreciated)  - Replete electrolytes to maintain K>4, Mg>2  - Incentive Spirometry. Encourage patient to get out of bed.    #Chronic compensated HFrEF (ACC/AHA stage B, NYHA class II-III): Euvolemic, Normotensive   - No indication for Diuretics at this time  - c/w Toprol as above  - Would recommend discussing with his OP nephrologist about risk and benefits of starting and ARB due to his CKD, as it benefit his HFrEF and and Regurgitant lesions.   - Patient will require an OP Ischemic evaluation    Degenerative Severe MR and Probably Left sided TR: Euvolemic, Normotensive  - Recommend plugging the patient with Structural heart / CTS team for OP MIXON and management  - ARB and BB recs as above     Follow Ups:   General Cardiology: Dr Michell Gomez in 2 weeks  EP: Dr Herndon Would confirm with our EP colleagues for appointment date  SHD/CTS: OP FU Dr Mosqueda / Dr Curiel, ROYA instruction per their    Please refer to the cardiology attending addendum section for final recommendations  Thank you for allowing to participate in the care of this patient    KEENAN Juarez  Cardiology Fellow, PGY 7

## 2020-12-23 NOTE — PROGRESS NOTE ADULT - SUBJECTIVE AND OBJECTIVE BOX
INTERVAL HISTORY:  Patient seen and examined at bedside. No complaints aside from tenderness at hematoma evacuation site    VITAL SIGNS:  ICU Vital Signs Last 24 Hrs  T(C): 36.8 (23 Dec 2020 09:00), Max: 37 (23 Dec 2020 00:46)  T(F): 98.2 (23 Dec 2020 09:00), Max: 98.6 (23 Dec 2020 00:46)  HR: 66 (23 Dec 2020 12:05) (61 - 81)  BP: 162/111 (23 Dec 2020 12:05) (107/67 - 162/111)  BP(mean): 131 (23 Dec 2020 12:05) (78 - 131)  ABP: --  ABP(mean): --  RR: 18 (23 Dec 2020 12:05) (16 - 18)  SpO2: 100% (23 Dec 2020 12:05) (91% - 100%)        12-22 @ 07:01  -  12-23 @ 07:00  --------------------------------------------------------  IN: 843.3 mL / OUT: 300 mL / NET: 543.3 mL      CAPILLARY BLOOD GLUCOSE      POCT Blood Glucose.: 86 mg/dL (23 Dec 2020 10:49)      PHYSICAL EXAM:  Constitutional: resting comfortably in bed, NAD  HEENT: NC/AT; PERRL, anicteric sclera; no oropharyngeal erythema or exudates; MMM  Neck: supple, no JVD  Respiratory: CTA B/L, no W/R/R; respirations appear non-labored, speaking full sentences  Cardiovascular: +S1/S2, RRR  Gastrointestinal: abdomen soft, NT/ND; +BS x4  Extremities: WWP; no clubbing, cyanosis or edema  Vascular: 2+ radial, DP/PT and femoral pulses B/L      MEDICATIONS:  MEDICATIONS  (STANDING):  allopurinol 50 milliGRAM(s) Oral <User Schedule>  aMIOdarone    Tablet   Oral   aMIOdarone    Tablet 400 milliGRAM(s) Oral every 8 hours  apixaban 2.5 milliGRAM(s) Oral two times a day  calcitriol   Capsule 0.25 MICROGram(s) Oral daily  dextrose 40% Gel 15 Gram(s) Oral once  dextrose 5%. 1000 milliLiter(s) (50 mL/Hr) IV Continuous <Continuous>  dextrose 5%. 1000 milliLiter(s) (100 mL/Hr) IV Continuous <Continuous>  dextrose 50% Injectable 25 Gram(s) IV Push once  dextrose 50% Injectable 12.5 Gram(s) IV Push once  dextrose 50% Injectable 25 Gram(s) IV Push once  doxazosin 4 milliGRAM(s) Oral at bedtime  glucagon  Injectable 1 milliGRAM(s) IntraMuscular once  insulin lispro (ADMELOG) corrective regimen sliding scale   SubCutaneous Before meals and at bedtime  metoprolol succinate  milliGRAM(s) Oral daily  senna 2 Tablet(s) Oral at bedtime  sodium bicarbonate 650 milliGRAM(s) Oral two times a day    MEDICATIONS  (PRN):  acetaminophen   Tablet .. 650 milliGRAM(s) Oral every 6 hours PRN Moderate Pain (4 - 6)  oxycodone    5 mG/acetaminophen 325 mG 1 Tablet(s) Oral every 6 hours PRN Severe Pain (7 - 10)      ALLERGIES:  Allergies    No Known Allergies    Intolerances        LABS:                        8.0    8.86  )-----------( 308      ( 23 Dec 2020 05:52 )             26.7     12-23    143  |  110<H>  |  52<H>  ----------------------------<  79  4.0   |  18<L>  |  4.66<H>    Ca    8.4      23 Dec 2020 05:52  Phos  4.2     12-23  Mg     2.1     12-23    TPro  5.9<L>  /  Alb  2.4<L>  /  TBili  1.0  /  DBili  0.3<H>  /  AST  16  /  ALT  <5<L>  /  AlkPhos  95  12-22    PT/INR - ( 23 Dec 2020 05:52 )   PT: 18.2 sec;   INR: 1.55          PTT - ( 23 Dec 2020 05:52 )  PTT:37.9 sec      RADIOLOGY & ADDITIONAL TESTS:   CXR reviewed

## 2020-12-23 NOTE — PROGRESS NOTE ADULT - SUBJECTIVE AND OBJECTIVE BOX
Subjective: Feeling well this morning. Denies dizziness or lightheadedness. Denies CP/Palpitations   Admits to right chest wall incision site pain with dressing change     Hgb stable at 8 this morning after startign AC   ROS:   Denies Headache, blurred vision, Chest Pain, SOB, Abdominal pain, nausea or vomiting     Social   aMIOdarone    Tablet   aMIOdarone    Tablet 400  apixaban 2.5  doxazosin 4  metoprolol succinate       Allergies    No Known Allergies    Intolerances        Vital Signs Last 24 Hrs  T(C): 36.8 (23 Dec 2020 06:19), Max: 37 (23 Dec 2020 00:46)  T(F): 98.2 (23 Dec 2020 06:19), Max: 98.6 (23 Dec 2020 00:46)  HR: 63 (23 Dec 2020 07:00) (61 - 128)  BP: 137/69 (23 Dec 2020 07:00) (121/58 - 175/110)  BP(mean): 95 (23 Dec 2020 07:00) (82 - 134)  RR: 16 (23 Dec 2020 07:00) (16 - 20)  SpO2: 98% (23 Dec 2020 07:00) (98% - 100%)  I&O's Summary    22 Dec 2020 07:01  -  23 Dec 2020 07:00  --------------------------------------------------------  IN: 843.3 mL / OUT: 300 mL / NET: 543.3 mL        Physical Exam:  General: NAD  Pulmonary: decreased breath sounds on left chest   Right chest incision clean and dry   Cardiovascular: s1s2 Rate Irregular   Abdominal: soft NT/MD   Extremities: RUE slightly swollen. Right radial pulse palpable         LABS:                        8.0    8.86  )-----------( 308      ( 23 Dec 2020 05:52 )             26.7     12-23    143  |  110<H>  |  52<H>  ----------------------------<  79  4.0   |  18<L>  |  4.66<H>    Ca    8.4      23 Dec 2020 05:52  Phos  4.2     12-23  Mg     2.1     12-23    TPro  5.9<L>  /  Alb  2.4<L>  /  TBili  1.0  /  DBili  0.3<H>  /  AST  16  /  ALT  <5<L>  /  AlkPhos  95  12-22    PT/INR - ( 23 Dec 2020 05:52 )   PT: 18.2 sec;   INR: 1.55          PTT - ( 23 Dec 2020 05:52 )  PTT:37.9 sec    Radiology and Additional Studies:  65y Male POD 2 s/p Open Chest Hematoma Evacuation complicated by Rapid A-fib     Neuro: percocet, tylenol for pain   Cardio: cardura, metoprolol,   -rapid A-fib  -Eliquis 2.5mg BID started   -Cardiology and EP consulted   -starting Amiodarone PO today   -LFT/HFT obtained   -echo with EF 40-45%  -K repleted to 4, Mag 2   Pulm: Pulmonary s/p bedside thoracentesis 1L removed 12/20   -CXR with Minimal PTX but saturating well on RA   -f/u Pulm as outpatient   -Cytology negative for malignancy   -CT Surgery to f/u as outpatient for VATS   GI: Renal diet  Endo:   -monitor FS   Heme:   -restarted on Eliquis   -Will monitor for bleeding   Nephro: Has LUE AVF not on HD yet  Vascular: s/p R chest wall hematoma evac 12/18  Neurovascular checks on RUE  Dispo: d/c home once cleared by Roland and EP    Subjective: Feeling well this morning. Denies dizziness or lightheadedness. Denies CP/Palpitations   Admits to right chest wall incision site pain with dressing change     Hgb stable at 8 this morning after startign AC   ROS:   Denies Headache, blurred vision, Chest Pain, SOB, Abdominal pain, nausea or vomiting     Social   aMIOdarone    Tablet   aMIOdarone    Tablet 400  apixaban 2.5  doxazosin 4  metoprolol succinate       Allergies    No Known Allergies    Intolerances        Vital Signs Last 24 Hrs  T(C): 36.8 (23 Dec 2020 06:19), Max: 37 (23 Dec 2020 00:46)  T(F): 98.2 (23 Dec 2020 06:19), Max: 98.6 (23 Dec 2020 00:46)  HR: 63 (23 Dec 2020 07:00) (61 - 128)  BP: 137/69 (23 Dec 2020 07:00) (121/58 - 175/110)  BP(mean): 95 (23 Dec 2020 07:00) (82 - 134)  RR: 16 (23 Dec 2020 07:00) (16 - 20)  SpO2: 98% (23 Dec 2020 07:00) (98% - 100%)  I&O's Summary    22 Dec 2020 07:01  -  23 Dec 2020 07:00  --------------------------------------------------------  IN: 843.3 mL / OUT: 300 mL / NET: 543.3 mL        Physical Exam:  General: NAD  Pulmonary: decreased breath sounds on left chest   Right chest incision clean and dry   Cardiovascular: s1s2 Rate Irregular   Abdominal: soft NT/MD   Extremities: RUE slightly swollen. Right radial pulse palpable         LABS:                        8.0    8.86  )-----------( 308      ( 23 Dec 2020 05:52 )             26.7     12-23    143  |  110<H>  |  52<H>  ----------------------------<  79  4.0   |  18<L>  |  4.66<H>    Ca    8.4      23 Dec 2020 05:52  Phos  4.2     12-23  Mg     2.1     12-23    TPro  5.9<L>  /  Alb  2.4<L>  /  TBili  1.0  /  DBili  0.3<H>  /  AST  16  /  ALT  <5<L>  /  AlkPhos  95  12-22    PT/INR - ( 23 Dec 2020 05:52 )   PT: 18.2 sec;   INR: 1.55          PTT - ( 23 Dec 2020 05:52 )  PTT:37.9 sec    Radiology and Additional Studies:  65y Male POD 2 s/p Open Chest Hematoma Evacuation complicated by Rapid A-fib     Neuro: percocet, tylenol for pain   Cardio: cardura, metoprolol,   -rapid A-fib  -Eliquis 2.5mg BID started   -Cardiology and EP consulted   -starting Amiodarone PO today   -LFT/HFT obtained   -echo with EF 40-45%  -K repleted to 4, Mag 2   -f/u Cards/CT surgery about severe MR/TR   Pulm: Pulmonary s/p bedside thoracentesis 1L removed 12/20   -CXR with Minimal PTX but saturating well on RA   -f/u Pulm as outpatient   -Cytology negative for malignancy   -CT Surgery to f/u as outpatient for VATS   GI: Renal diet  Endo:   -monitor FS   Heme:   -restarted on Eliquis   -Will monitor for bleeding   Nephro: Has LUE AVF not on HD yet  Vascular: s/p R chest wall hematoma evac 12/18  Neurovascular checks on RUE  Dispo: d/c home once cleared by Cards and EP

## 2020-12-23 NOTE — PHYSICAL THERAPY INITIAL EVALUATION ADULT - GENERAL OBSERVATIONS, REHAB EVAL
FIM gait=7, FIM stairs=5. Spoke to RN Juany, pt cleared for PT. S/p, chest wall hematoma evacuation 12/19. Pt rcvd OOB to chair, +tele, +heplock, +incision MINERVA. Pt agreeable to PT, reports incisional tenderness. Tolerated session very well, indpt amb 300ft, 10 steps supervision.

## 2020-12-23 NOTE — PROGRESS NOTE ADULT - SUBJECTIVE AND OBJECTIVE BOX
Cardiology fellow Progress note    Subjective/Interval events: Overnight events reviewed. Patient denies chest pain, shortness of breath, orthopnea, paroxysmal nocturnal dyspnea, ankle swelling, lightheadedness, dizziness. Been in NSR since yesterday afternoon. Pt transitioned to PO amio today. HB stable, anticipated DC today per primary team.     ROS: A 10-point review of systems was otherwise negative.    PHYSICAL EXAM:  ICU Vital Signs Last 24 Hrs  T(C): 36.8 (23 Dec 2020 09:00), Max: 37 (23 Dec 2020 00:46)  T(F): 98.2 (23 Dec 2020 09:00), Max: 98.6 (23 Dec 2020 00:46)  HR: 65 (23 Dec 2020 11:05) (61 - 105)  BP: 107/67 (23 Dec 2020 11:05) (107/67 - 175/110)  BP(mean): 83 (23 Dec 2020 11:05) (82 - 134)  RR: 18 (23 Dec 2020 11:05) (16 - 18)  SpO2: 100% (23 Dec 2020 11:05) (98% - 100%)    GEN: Awake, Breathing comfortably on. NAD.   HEENT: Mucosa moist. No JVD.   RESP: CTA b/l  CV: Irregular, No m/r/g.  ABD: Soft, NTND. BS+  EXT: Warm. No edema, PP 2+  NEURO: AAOx3. No focal deficits.      I&O's Summary    22 Dec 2020 07:01  -  23 Dec 2020 07:00  --------------------------------------------------------  IN: 843.3 mL / OUT: 300 mL / NET: 543.3 mL    Cardiac Diagnostics  TELEMETRY: NSR, no acute tele events	    ECG: NSR, non-specific ST-T wave changes. 	  TTE (12/22/20): EF 40-45% (Known), Mild LVH with global hypokinesis, RV normal Size and function, BiAE, Severe Degenerative MR. Severe TR, PASP 43. Mild AI, trivial pericardial effusion.   TTE (04/24/2017): EF 40-45%, global, BiAE, Mod-Severe MR, Mod TR, Severe PHTN, PASP 64 mmHg,

## 2020-12-23 NOTE — PROGRESS NOTE ADULT - NUTRITIONAL ASSESSMENT
Diet, Renal Restrictions:   For patients receiving Renal Replacement - No Protein Restr, No Conc K, No Conc Phos, Low Sodium (12-20-20 @ 09:33) [Active]

## 2020-12-23 NOTE — PROGRESS NOTE ADULT - ASSESSMENT
Assessment: 66 y/o M with past medical history significant for HTN, CKD (nearing HD, has patent LUE AVF), T-cell lymphoma (diagnosed about 19 years ago), AFib (on Eliquis), systolic CHF (LVEF 40-45% in 2017), severe pulmonary HTN. He presented to [facility] ED on 12/17/2020 with R chest wall hematoma. He is now POD#5 from R chest wall evacuation with vascular surgery. Transferred to SICU yesterday for symptomatic AFib with RVR, now resolved with Amiodarone bolus. Pulmonology following due to concern for LLL consolidation, pleural fluid sent from thoracentesis with negative cytology, flow cytology pending. No reaccumulation of R chest wall hematoma after restarting Eliquis yesterday AM. Plan for transfer to telemetry today:       Plan:   Neuro: Continue PRN Percocet, Tylenol for pain control    CV: MAP >65; Continue Amiodarone for AFib with RVR: Appreciate cardiology recommendations; Continue Doxzosin 4mg qHS, Toprol XL 200mg, Eliquis   Pulm: Oxygen saturation >94% on RA: Continue to monitor respiratory status; Pulmonology following for LLL lesion, L pleural effusion drained on 12/19/20- follow up flow cytology results   GI/FEN: Renal diet; Continue Senna qHS   : Voids independently; CKD nearing HD- Nephrology following; Continue to monitor daily weights, strict I&O's; Continue calcitriol, Allopurinol   Endo: POCT glucose, SSI   Heme: Continue to monitor daily CBC, transfuse to keep hemoglobin > 8 (hx systolic CHF)  ID: No active issues   PPX: SCDs, no SQH; Started Eliquis 2.5mg BID this AM  L/D/T: PIVs  Wounds: R chest incision closed with staples   PT/OT: Ordered 12/22  Dispo: SICU         Assessment: 66 y/o M with past medical history significant for HTN, CKD (nearing HD, has patent LUE AVF), T-cell lymphoma (diagnosed about 19 years ago), AFib (on Eliquis), systolic CHF (LVEF 40-45% in 2017), severe pulmonary HTN. He presented to [facility] ED on 12/17/2020 with R chest wall hematoma. He is now POD#5 from R chest wall evacuation with vascular surgery. Transferred to SICU yesterday for symptomatic AFib with RVR, now resolved with Amiodarone bolus. Pulmonology following due to concern for LLL consolidation, pleural fluid sent from thoracentesis with negative cytology, flow cytology pending. No reaccumulation of R chest wall hematoma after restarting Eliquis yesterday AM. Plan for transfer to telemetry today:       Plan:   Neuro: Continue PRN Percocet, Tylenol for pain control    CV: MAP >65; Continue Amiodarone for AFib with RVR: Appreciate cardiology recommendations; Continue Doxzosin 4mg qHS, Toprol XL 200mg, Eliquis   Pulm: Oxygen saturation >94% on RA: Continue to monitor respiratory status; Pulmonology following for LLL lesion, L pleural effusion drained on 12/19/20- follow up flow cytology results   GI/FEN: Renal diet; Continue Senna qHS   : Voids independently; CKD nearing HD- Nephrology following; Continue to monitor daily weights, strict I&O's; Start Sodium Bicarb tabs; Continue calcitriol, Allopurinol   Endo: POCT glucose, SSI   Heme: Continue to monitor daily CBC, transfuse to keep hemoglobin > 8 (hx systolic CHF)  ID: No active issues   PPX: SCDs, no SQH; Started Eliquis 2.5mg BID this AM  L/D/T: PIVs  Wounds: R chest incision closed with staples   PT/OT: Ordered 12/22  Dispo: SICU

## 2020-12-23 NOTE — PROGRESS NOTE ADULT - SUBJECTIVE AND OBJECTIVE BOX
24 Hour Events: POD#5 R chest wall hematoma evacuation, transferred to SICU yesterday due to symptomatic AFib with RVR yesterday AM. Cardiology consulted, recommending Amiodarone load, which is in progress. Heart rate now controlled to 60s, symptoms of dizziness resolved once heart rate controlled. Restarted home Eliquis yesterday AM, hemoglobin stable, no evidence of reaccumulation of R chest wall hematoma.       PAST MEDICAL & SURGICAL HISTORY:  BPH (benign prostatic hyperplasia)  Gout  H/O pulmonary hypertension  CHF, chronic  Lymphoma t cell; Chemotherapy weekly- wednesday  CKD (chronic kidney disease)  Atrial fibrillation  HTN (hypertension)  Elective surgery- rectal surgery  History of cholecystectomy        Allergies  No Known Allergies      MEDICATIONS  (STANDING):  allopurinol 50 milliGRAM(s) Oral <User Schedule>  aMIOdarone    Tablet   Oral   aMIOdarone    Tablet 400 milliGRAM(s) Oral every 8 hours  apixaban 2.5 milliGRAM(s) Oral two times a day  calcitriol   Capsule 0.25 MICROGram(s) Oral daily  dextrose 40% Gel 15 Gram(s) Oral once  dextrose 5%. 1000 milliLiter(s) (50 mL/Hr) IV Continuous <Continuous>  dextrose 5%. 1000 milliLiter(s) (100 mL/Hr) IV Continuous <Continuous>  dextrose 50% Injectable 25 Gram(s) IV Push once  dextrose 50% Injectable 12.5 Gram(s) IV Push once  dextrose 50% Injectable 25 Gram(s) IV Push once  doxazosin 4 milliGRAM(s) Oral at bedtime  glucagon  Injectable 1 milliGRAM(s) IntraMuscular once  insulin lispro (ADMELOG) corrective regimen sliding scale   SubCutaneous Before meals and at bedtime  metoprolol succinate  milliGRAM(s) Oral daily  senna 2 Tablet(s) Oral at bedtime  sodium bicarbonate 650 milliGRAM(s) Oral two times a day    MEDICATIONS  (PRN):  acetaminophen   Tablet .. 650 milliGRAM(s) Oral every 6 hours PRN Moderate Pain (4 - 6)  oxycodone    5 mG/acetaminophen 325 mG 1 Tablet(s) Oral every 6 hours PRN Severe Pain (7 - 10)        Physical Exam:   General: Well appearing male, appears stated age, comfortably, answering all questions appropriately   Neuro: CN II-XII grossly intact bilaterally   HEENT: Normocephalic, atraumatic, no JVD bilaterally   Chest: Well healing R chest wall hematoma, with varying stages of ecchymosis, incision closed with staples is clean/dry/intact  Heart: Tachycardic, irregularly irregular S1/S2, no murmurs rubs or gallops   Lungs: Unlabored breathing on room air; Decreased breath sounds at L lower and L mid lung; Otherwise clear to auscultation bilaterally, no adventitious sounds; L posterior pleural effusion site without ecchymosis   Abdomen: Soft, non-distended, normoactive bowel sounds throughout, no tenderness to palpation in all 4 quadrants; ecchymosis at L midaxillary line just above iliac crest   Upper Extremities: No edema, LUE AVF +thrill/bruit, ecchymosis in L axilla, well healed L wrist incision   Lower Extremities: No edema, feet warm bilaterally   Skin: Warm, non-diaphoretic throughout     Labs:              8.0    8.86  )-----------( 308      ( 23 Dec 2020 05:52 )             26.7     12-22    142  |  108  |  49<H>  ----------------------------<  66<L>  3.9   |  17<L>  |  4.81<H>    Ca    8.4      22 Dec 2020 07:01  Phos  4.4     12-22  Mg     2.1     12-22    TPro  5.9<L>  /  Alb  2.4<L>  /  TBili  1.0  /  DBili  0.3<H>  /  AST  16  /  ALT  <5<L>  /  AlkPhos  95  12-22    CAPILLARY BLOOD GLUCOSE  POCT Blood Glucose.: 87 mg/dL (23 Dec 2020 05:34)  POCT Blood Glucose.: 102 mg/dL (22 Dec 2020 21:28)  POCT Blood Glucose.: 104 mg/dL (22 Dec 2020 18:17)  POCT Blood Glucose.: 104 mg/dL (22 Dec 2020 13:37)  POCT Blood Glucose.: 178 mg/dL (22 Dec 2020 09:42)  POCT Blood Glucose.: 90 mg/dL (22 Dec 2020 08:13)  POCT Blood Glucose.: 77 mg/dL (22 Dec 2020 07:35)  POCT Blood Glucose.: 72 mg/dL (22 Dec 2020 06:44)        PT/INR - ( 23 Dec 2020 05:52 )   PT: 18.2 sec;   INR: 1.55     PTT - ( 23 Dec 2020 05:52 )  PTT:37.9 sec    COVID-19 PCR: NotDetec (17 Dec 2020 19:31)      Culture - Body Fluid with Gram Stain (collected 20 Dec 2020 18:36)  Source: .Body Fluid None  Preliminary Report (21 Dec 2020 08:25):    No growth to date        Vital Signs:   Vital Signs Last 24 Hrs  T(C): 36.8 (23 Dec 2020 06:19), Max: 37 (23 Dec 2020 00:46)  T(F): 98.2 (23 Dec 2020 06:19), Max: 98.6 (23 Dec 2020 00:46)  HR: 66 (23 Dec 2020 06:00) (61 - 128)  BP: 137/69 (23 Dec 2020 05:00) (121/58 - 175/110)  BP(mean): 94 (23 Dec 2020 05:00) (82 - 134)  RR: 16 (23 Dec 2020 06:00) (16 - 20)  SpO2: 98% (23 Dec 2020 06:00) (98% - 100%)      Input/Output:   I&O's Detail    21 Dec 2020 07:01  -  22 Dec 2020 07:00  --------------------------------------------------------  IN:    Oral Fluid: 340 mL  Total IN: 340 mL    OUT:    Voided (mL): 250 mL  Total OUT: 250 mL    Total NET: 90 mL      22 Dec 2020 07:01  -  23 Dec 2020 06:23  --------------------------------------------------------  IN:    Amiodarone: 199.8 mL    Amiodarone: 83.5 mL    Oral Fluid: 560 mL  Total IN: 843.3 mL    OUT:    Voided (mL): 200 mL  Total OUT: 200 mL    Total NET: 643.3 mL        Daily     Daily

## 2020-12-23 NOTE — PROGRESS NOTE ADULT - ATTENDING COMMENTS
64 y/o man with a hx of ESRD (non oliguric, not yet on HD), Afib (on Eliquis at home), centrilobular emphysema, T-Cell lymphoma, who is an active smoker who presented with an acute chest wall hematoma from a bleeding artery on the right sided. He was found to have a loculated left pleural effusion and possible underlying mass vs hematoma. He is s/p left thoracentesis complicated by small PTX.   The pleural fluid was transudative, lymphocyte predominant, with cytology negative for malignant cells, flow cytometry pending.   There is also a ?left lung mass vs hematoma (density on CT appears similar to that of the loculated pleural fluid). This will need to be followed up as an outpatient with repeat imaging.   He will see me in clinic in 1 month.

## 2020-12-23 NOTE — PHYSICAL THERAPY INITIAL EVALUATION ADULT - ACTIVE RANGE OF MOTION EXAMINATION, REHAB EVAL
except R shoulder flexion 0-125/tressa. upper extremity Active ROM was WNL (within normal limits)/bilateral lower extremity Active ROM was WNL (within normal limits)

## 2020-12-26 LAB
CULTURE RESULTS: NO GROWTH — SIGNIFICANT CHANGE UP
SPECIMEN SOURCE: SIGNIFICANT CHANGE UP

## 2020-12-26 RX ORDER — AMIODARONE HYDROCHLORIDE 400 MG/1
1 TABLET ORAL
Qty: 30 | Refills: 3
Start: 2020-12-26 | End: 2021-04-24

## 2020-12-28 ENCOUNTER — APPOINTMENT (OUTPATIENT)
Dept: HEART AND VASCULAR | Facility: CLINIC | Age: 65
End: 2020-12-28
Payer: MEDICARE

## 2020-12-28 ENCOUNTER — NON-APPOINTMENT (OUTPATIENT)
Age: 65
End: 2020-12-28

## 2020-12-28 VITALS
HEIGHT: 70 IN | TEMPERATURE: 97.6 F | OXYGEN SATURATION: 100 % | SYSTOLIC BLOOD PRESSURE: 120 MMHG | BODY MASS INDEX: 21.62 KG/M2 | WEIGHT: 150.99 LBS | HEART RATE: 63 BPM | DIASTOLIC BLOOD PRESSURE: 66 MMHG

## 2020-12-28 DIAGNOSIS — E87.0 HYPEROSMOLALITY AND HYPERNATREMIA: ICD-10-CM

## 2020-12-28 DIAGNOSIS — Z79.899 OTHER LONG TERM (CURRENT) DRUG THERAPY: ICD-10-CM

## 2020-12-28 DIAGNOSIS — N18.6 END STAGE RENAL DISEASE: ICD-10-CM

## 2020-12-28 DIAGNOSIS — I13.2 HYPERTENSIVE HEART AND CHRONIC KIDNEY DISEASE WITH HEART FAILURE AND WITH STAGE 5 CHRONIC KIDNEY DISEASE, OR END STAGE RENAL DISEASE: ICD-10-CM

## 2020-12-28 DIAGNOSIS — I50.22 CHRONIC SYSTOLIC (CONGESTIVE) HEART FAILURE: ICD-10-CM
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
## 2020-12-28 DIAGNOSIS — I48.91 UNSPECIFIED ATRIAL FIBRILLATION: ICD-10-CM

## 2020-12-28 DIAGNOSIS — S20.211A CONTUSION OF RIGHT FRONT WALL OF THORAX, INITIAL ENCOUNTER: ICD-10-CM

## 2020-12-28 DIAGNOSIS — K86.2 CYST OF PANCREAS: ICD-10-CM

## 2020-12-28 DIAGNOSIS — I27.20 PULMONARY HYPERTENSION, UNSPECIFIED: ICD-10-CM

## 2020-12-28 DIAGNOSIS — D62 ACUTE POSTHEMORRHAGIC ANEMIA: ICD-10-CM

## 2020-12-28 DIAGNOSIS — C85.90 NON-HODGKIN LYMPHOMA, UNSPECIFIED, UNSPECIFIED SITE: ICD-10-CM

## 2020-12-28 DIAGNOSIS — J90 PLEURAL EFFUSION, NOT ELSEWHERE CLASSIFIED: ICD-10-CM

## 2020-12-28 PROCEDURE — 99214 OFFICE O/P EST MOD 30 MIN: CPT

## 2020-12-28 PROCEDURE — 93000 ELECTROCARDIOGRAM COMPLETE: CPT

## 2020-12-28 PROCEDURE — 99072 ADDL SUPL MATRL&STAF TM PHE: CPT

## 2020-12-28 RX ORDER — OXYCODONE AND ACETAMINOPHEN 5; 325 MG/1; MG/1
5-325 TABLET ORAL
Qty: 14 | Refills: 0 | Status: DISCONTINUED | COMMUNITY
Start: 2020-12-23 | End: 2020-12-28

## 2020-12-28 NOTE — REVIEW OF SYSTEMS
[Negative] : Heme/Lymph [Recent Weight Gain (___ Lbs)] : recent [unfilled] ~Ulb weight gain [Lower Ext Edema] : lower extremity edema

## 2020-12-30 ENCOUNTER — APPOINTMENT (OUTPATIENT)
Dept: THORACIC SURGERY | Facility: CLINIC | Age: 65
End: 2020-12-30
Payer: MEDICARE

## 2020-12-30 PROCEDURE — 99214 OFFICE O/P EST MOD 30 MIN: CPT

## 2020-12-30 NOTE — ASSESSMENT
[FreeTextEntry1] : 65M PMH HTN, ESRD (has LUE AVF, not on HD yet), T Cell lymphoma (dx yrs ago), AFib on (eliquis), systolic CHF (EF 40-45% in 2017), CKD, severe pHTN, p/w pain and swellling to R chest wall. He is /p Right chest wall hematoma evacuation on 12/18/20. CTSx and Pulmonary were consulted for hx of recurrent left effusion. Patient underwent thoracentesis x2 once at [facility] and again on admission 12/20 by Pulmonary, negative for malignancy.\par \par Overall, he feels well and denies any complaints. Denies SOB, leg swelling, chest pain. He had a f/u visit with Dr Ventura 2 days ago and will see Dr Csineros for a f/u visit tomorrow. Staples to be removed tomorrow. \par \par CT Chest 12/20/20 reviewed with patient and his wife, slight decrease in moderate residual loculated left pleural effusion compared to prior scan 12/7/20. Will plan to repeat CT Chest to see if pleural effusion improved and if surgery is warranted. \par \par Discussed possible left decortication to prevent recurrent pleural effusion. Surgery would be done minimally invasive/robotically to remove lining of the lung, chest wall to allow the lung to re-expand and a drainage tube would be placed to drain the excess fluid and air. The surgical approach, risks, benefits, and alternatives were explained to the patient, who understood and agreed to the above. \par \par I have reviewed the patient's medical records and diagnostic images at the time of this office consultation and have made the following recommendation.\par Plan:\par 1. CT Chest\par

## 2020-12-30 NOTE — HISTORY OF PRESENT ILLNESS
[FreeTextEntry1] : 65M PMH HTN, ESRD (has LUE AVF, not on HD yet), T Cell lymphoma (dx yrs ago), AFib on (eliquis), systolic CHF (EF 40-45% in 2017), CKD, severe pHTN, p/w pain and swellling to R chest wall. Pt just dc'd yesterday after admission at outside hospital for asymptomatic anemia. received 2U PRBC. Pt woke this morning w/ R CP/swelling, slowly worsening. Denies trauma to that area or needle sticks. Denies lightheaded, SOB/CP, f/c, NVD, abd pain, urinary complaints, focal weakness/numbness.  Patient was admitted to the Vascular Service and pre-oped for OR for hematoma evacuation. Patient was taken to OR for Right chest wall hematoma evacuation on 12/18/20. Post-op he continued to have Hgb drop and was given another unit PRBC. \par \par CT surgery and Pulmonary were consulted for hx of recurrent left effusion. Patient underwent thoracentesis at [facility] and awaiting results of this tap. On 12/20 Patient was seen by Pulmonary and underwent bedside thoracentesis on 12/20/20, negative for malignancy. He developed rapid afib, restarted on Eliquis and, EP consulted and pt d/c PO amiodarone. Echo was done that showed unchanged EF but Severe MR/TR for which cardiac surgery was consulted. EP was consulted and gave recommendations for PO Amiodarone. \par \par Patients Hgb remained stable on Anticoagulation and patient was discharged Home with plan to f/u as outpatient with Pulmonary, EP, Cardiology and Cardiac surgery \par \par CTA Chest 12/17/20\par 1. Large right chest wall intramuscular hematoma measuring up to 13 cm with area of arterial extravasation, as described above.\par 2. Large loculated left pleural effusion with near complete compressive atelectasis of the left lower lobe; superimposed infection within the atelectatic lung is not excluded.\par 3. 4 cm nonenhancing heterogeneous hyperdense structure in the anterior left lower lobe is suggestive of a pulmonary hematoma; superimposed infection is not excluded.\par 4. Cystic lesions within the pancreatic head measuring up to 1.3 cm, as described above. Follow-up pancreas MRI or CT in one year is suggested to assess stability.\par 5. 4 cm indeterminate density lesion within the spleen, not appreciated on previous exams. This could represent a hemangioma, although other etiologies are not excluded. Nonemergent follow-up with ultrasound or MRI is suggested.\par 6. New upper abdominal ascites.\par 7. Aneurysmal dilation of the aortic root and ascending aorta, not substantially changed since 4/30/2018.\par 8. Additional findings as above.\par \par \par CT Chest 12/20/20\par 1. Since earlier same day, new tiny left apical pneumothorax.\par 2. Slight decreased moderate residual left pleural effusion with anterior loculated component and probable trace layering hemothorax.\par 3. Unchanged large right chest wall hematoma.\par 4. Unchanged rounded hyperdense structure in anterior left lower lobe probably loculated hemothorax.\par 5. Unchanged small right effusion with small adjacent atelectasis..\par 6. Unchanged dilated aortic root and ascending aorta.\par 7. Unchanged additional findings in this short interval follow-up CT.

## 2020-12-30 NOTE — PHYSICAL EXAM
[Neck Appearance] : the appearance of the neck was normal [] : no respiratory distress [Respiration, Rhythm And Depth] : normal respiratory rhythm and effort [Apical Impulse] : the apical impulse was normal [Heart Rate And Rhythm] : heart rate was normal and rhythm regular [2+] : left 2+ [Cervical Lymph Nodes Enlarged Posterior Bilaterally] : posterior cervical [Cervical Lymph Nodes Enlarged Anterior Bilaterally] : anterior cervical [No CVA Tenderness] : no ~M costovertebral angle tenderness [Abnormal Walk] : normal gait [Musculoskeletal - Swelling] : no joint swelling seen [No Focal Deficits] : no focal deficits [Oriented To Time, Place, And Person] : oriented to person, place, and time

## 2020-12-30 NOTE — END OF VISIT
[FreeTextEntry3] : I, STEPHEN HOOVER , am scribing for and in the presence of DARLENE MOYER the following sections: history of present illness, past medical/family/surgical/family/social history, review of systems, vital signs, physical exam, and disposition.\par

## 2020-12-31 ENCOUNTER — APPOINTMENT (OUTPATIENT)
Dept: VASCULAR SURGERY | Facility: CLINIC | Age: 65
End: 2020-12-31
Payer: MEDICARE

## 2020-12-31 PROCEDURE — 99072 ADDL SUPL MATRL&STAF TM PHE: CPT

## 2020-12-31 PROCEDURE — 99213 OFFICE O/P EST LOW 20 MIN: CPT

## 2021-01-04 NOTE — REASON FOR VISIT
[de-identified] : right chest wall hematoma evacuation [de-identified] : 12/18/2020 [de-identified] : 66 yo male came to the hospital with spontaneous intramuscular hematoma on the right chest wall and was taken to the OR for hematoma evacuation. he states he is doing well since his procedure, no issues. Staples in place. Able to move his arm, still very bruised on the abdomen and back.

## 2021-01-04 NOTE — PHYSICAL EXAM
[Respiratory Effort] : normal respiratory effort [Normal Heart Sounds] : normal heart sounds [2+] : left 2+ [Ankle Swelling (On Exam)] : not present [Varicose Veins Of Lower Extremities] : not present [] : not present [Calm] : calm [de-identified] : well appearing [de-identified] : significant bruising on the abdomen and back.  [de-identified] : right chest wall incision healed with staples in place, no infection or fluid collection

## 2021-01-05 ENCOUNTER — OUTPATIENT (OUTPATIENT)
Dept: OUTPATIENT SERVICES | Facility: HOSPITAL | Age: 66
LOS: 1 days | End: 2021-01-05
Payer: MEDICARE

## 2021-01-05 ENCOUNTER — APPOINTMENT (OUTPATIENT)
Dept: CT IMAGING | Facility: HOSPITAL | Age: 66
End: 2021-01-05

## 2021-01-05 ENCOUNTER — RESULT REVIEW (OUTPATIENT)
Age: 66
End: 2021-01-05

## 2021-01-05 DIAGNOSIS — Z90.49 ACQUIRED ABSENCE OF OTHER SPECIFIED PARTS OF DIGESTIVE TRACT: Chronic | ICD-10-CM

## 2021-01-05 DIAGNOSIS — Z41.9 ENCOUNTER FOR PROCEDURE FOR PURPOSES OTHER THAN REMEDYING HEALTH STATE, UNSPECIFIED: Chronic | ICD-10-CM

## 2021-01-05 PROCEDURE — 71250 CT THORAX DX C-: CPT | Mod: 26

## 2021-01-05 PROCEDURE — 71250 CT THORAX DX C-: CPT

## 2021-01-07 ENCOUNTER — OUTPATIENT (OUTPATIENT)
Dept: OUTPATIENT SERVICES | Facility: HOSPITAL | Age: 66
LOS: 1 days | End: 2021-01-07
Payer: MEDICARE

## 2021-01-07 ENCOUNTER — NON-APPOINTMENT (OUTPATIENT)
Age: 66
End: 2021-01-07

## 2021-01-07 ENCOUNTER — TRANSCRIPTION ENCOUNTER (OUTPATIENT)
Age: 66
End: 2021-01-07

## 2021-01-07 ENCOUNTER — APPOINTMENT (OUTPATIENT)
Dept: THORACIC SURGERY | Facility: CLINIC | Age: 66
End: 2021-01-07
Payer: MEDICARE

## 2021-01-07 VITALS
RESPIRATION RATE: 17 BRPM | SYSTOLIC BLOOD PRESSURE: 187 MMHG | OXYGEN SATURATION: 94 % | HEIGHT: 70 IN | BODY MASS INDEX: 22.62 KG/M2 | WEIGHT: 158 LBS | DIASTOLIC BLOOD PRESSURE: 94 MMHG | HEART RATE: 78 BPM | TEMPERATURE: 97.1 F

## 2021-01-07 DIAGNOSIS — Z41.9 ENCOUNTER FOR PROCEDURE FOR PURPOSES OTHER THAN REMEDYING HEALTH STATE, UNSPECIFIED: Chronic | ICD-10-CM

## 2021-01-07 DIAGNOSIS — Z01.818 ENCOUNTER FOR OTHER PREPROCEDURAL EXAMINATION: ICD-10-CM

## 2021-01-07 DIAGNOSIS — J90 PLEURAL EFFUSION, NOT ELSEWHERE CLASSIFIED: ICD-10-CM

## 2021-01-07 DIAGNOSIS — Z90.49 ACQUIRED ABSENCE OF OTHER SPECIFIED PARTS OF DIGESTIVE TRACT: Chronic | ICD-10-CM

## 2021-01-07 LAB
ALBUMIN SERPL ELPH-MCNC: 3 G/DL — LOW (ref 3.3–5)
ALP SERPL-CCNC: 83 U/L — SIGNIFICANT CHANGE UP (ref 40–120)
ALT FLD-CCNC: <5 U/L — LOW (ref 10–45)
ANION GAP SERPL CALC-SCNC: 11 MMOL/L — SIGNIFICANT CHANGE UP (ref 5–17)
APPEARANCE UR: CLEAR — SIGNIFICANT CHANGE UP
APTT BLD: 44.1 SEC — HIGH (ref 27.5–35.5)
AST SERPL-CCNC: 9 U/L — LOW (ref 10–40)
BACTERIA # UR AUTO: PRESENT /HPF
BASOPHILS # BLD AUTO: 0.05 K/UL — SIGNIFICANT CHANGE UP (ref 0–0.2)
BASOPHILS NFR BLD AUTO: 0.7 % — SIGNIFICANT CHANGE UP (ref 0–2)
BILIRUB SERPL-MCNC: 0.4 MG/DL — SIGNIFICANT CHANGE UP (ref 0.2–1.2)
BILIRUB UR-MCNC: NEGATIVE — SIGNIFICANT CHANGE UP
BLD GP AB SCN SERPL QL: NEGATIVE — SIGNIFICANT CHANGE UP
BUN SERPL-MCNC: 40 MG/DL — HIGH (ref 7–23)
CALCIUM SERPL-MCNC: 9.5 MG/DL — SIGNIFICANT CHANGE UP (ref 8.4–10.5)
CHLORIDE SERPL-SCNC: 110 MMOL/L — HIGH (ref 96–108)
CHOLEST SERPL-MCNC: 114 MG/DL — SIGNIFICANT CHANGE UP
CO2 SERPL-SCNC: 27 MMOL/L — SIGNIFICANT CHANGE UP (ref 22–31)
COLOR SPEC: YELLOW — SIGNIFICANT CHANGE UP
COMMENT - URINE: SIGNIFICANT CHANGE UP
COMMENT - URINE: SIGNIFICANT CHANGE UP
CREAT SERPL-MCNC: 3.83 MG/DL — HIGH (ref 0.5–1.3)
DIFF PNL FLD: ABNORMAL
EOSINOPHIL # BLD AUTO: 0.2 K/UL — SIGNIFICANT CHANGE UP (ref 0–0.5)
EOSINOPHIL NFR BLD AUTO: 2.6 % — SIGNIFICANT CHANGE UP (ref 0–6)
EPI CELLS # UR: SIGNIFICANT CHANGE UP /HPF (ref 0–5)
GLUCOSE SERPL-MCNC: 77 MG/DL — SIGNIFICANT CHANGE UP (ref 70–99)
GLUCOSE UR QL: NEGATIVE — SIGNIFICANT CHANGE UP
HCT VFR BLD CALC: 28.9 % — LOW (ref 39–50)
HDLC SERPL-MCNC: 59 MG/DL — SIGNIFICANT CHANGE UP
HGB BLD-MCNC: 8.4 G/DL — LOW (ref 13–17)
IMM GRANULOCYTES NFR BLD AUTO: 0.5 % — SIGNIFICANT CHANGE UP (ref 0–1.5)
INR BLD: 1.58 — HIGH (ref 0.88–1.16)
KETONES UR-MCNC: NEGATIVE — SIGNIFICANT CHANGE UP
LEUKOCYTE ESTERASE UR-ACNC: NEGATIVE — SIGNIFICANT CHANGE UP
LIPID PNL WITH DIRECT LDL SERPL: 44 MG/DL — SIGNIFICANT CHANGE UP
LYMPHOCYTES # BLD AUTO: 1.12 K/UL — SIGNIFICANT CHANGE UP (ref 1–3.3)
LYMPHOCYTES # BLD AUTO: 14.8 % — SIGNIFICANT CHANGE UP (ref 13–44)
MCHC RBC-ENTMCNC: 28.9 PG — SIGNIFICANT CHANGE UP (ref 27–34)
MCHC RBC-ENTMCNC: 29.1 GM/DL — LOW (ref 32–36)
MCV RBC AUTO: 99.3 FL — SIGNIFICANT CHANGE UP (ref 80–100)
MONOCYTES # BLD AUTO: 0.67 K/UL — SIGNIFICANT CHANGE UP (ref 0–0.9)
MONOCYTES NFR BLD AUTO: 8.8 % — SIGNIFICANT CHANGE UP (ref 2–14)
NEUTROPHILS # BLD AUTO: 5.51 K/UL — SIGNIFICANT CHANGE UP (ref 1.8–7.4)
NEUTROPHILS NFR BLD AUTO: 72.6 % — SIGNIFICANT CHANGE UP (ref 43–77)
NITRITE UR-MCNC: NEGATIVE — SIGNIFICANT CHANGE UP
NON HDL CHOLESTEROL: 55 MG/DL — SIGNIFICANT CHANGE UP
NRBC # BLD: 0 /100 WBCS — SIGNIFICANT CHANGE UP (ref 0–0)
PH UR: 6.5 — SIGNIFICANT CHANGE UP (ref 5–8)
PLATELET # BLD AUTO: 229 K/UL — SIGNIFICANT CHANGE UP (ref 150–400)
POTASSIUM SERPL-MCNC: 3.8 MMOL/L — SIGNIFICANT CHANGE UP (ref 3.5–5.3)
POTASSIUM SERPL-SCNC: 3.8 MMOL/L — SIGNIFICANT CHANGE UP (ref 3.5–5.3)
PROT SERPL-MCNC: 6.9 G/DL — SIGNIFICANT CHANGE UP (ref 6–8.3)
PROT UR-MCNC: 100 MG/DL
PROTHROM AB SERPL-ACNC: 18.5 SEC — HIGH (ref 10.6–13.6)
RBC # BLD: 2.91 M/UL — LOW (ref 4.2–5.8)
RBC # FLD: 18.8 % — HIGH (ref 10.3–14.5)
RBC CASTS # UR COMP ASSIST: ABNORMAL /HPF
RH IG SCN BLD-IMP: POSITIVE — SIGNIFICANT CHANGE UP
SODIUM SERPL-SCNC: 148 MMOL/L — HIGH (ref 135–145)
SP GR SPEC: 1.02 — SIGNIFICANT CHANGE UP (ref 1–1.03)
TRIGL SERPL-MCNC: 57 MG/DL — SIGNIFICANT CHANGE UP
UROBILINOGEN FLD QL: 0.2 E.U./DL — SIGNIFICANT CHANGE UP
WBC # BLD: 7.59 K/UL — SIGNIFICANT CHANGE UP (ref 3.8–10.5)
WBC # FLD AUTO: 7.59 K/UL — SIGNIFICANT CHANGE UP (ref 3.8–10.5)
WBC UR QL: ABNORMAL /HPF

## 2021-01-07 PROCEDURE — 99214 OFFICE O/P EST MOD 30 MIN: CPT

## 2021-01-07 NOTE — ASSESSMENT
[FreeTextEntry1] : PreOp- Pt for decortication.  Cleared to proceed.  Would stop Eliquis 3 days prior to surgery with surgery on the 4th day.\par \par Systolic CHF - EF recovered, normal on June 2019 echo.  Secondary to chemo?  Now 40-45% on last admission this month.  Doxazosin not ideal.\par \par Afib - in NSR today, on Eliquis and Amio\par \par HTN- Stable\par \par HLD- not on statin\par \par T Cell Lymphoma - on chemo \par \par CKD- Dr Denton, close to starting HD\par  \par \par \par \par

## 2021-01-07 NOTE — REASON FOR VISIT
[Follow-Up - Clinic] : a clinic follow-up of [Cardiomyopathy] : cardiomyopathy [FreeTextEntry1] : Onc- Dr Vel Dimas  [phone] Renal- Dr Denton

## 2021-01-07 NOTE — ASSESSMENT
[FreeTextEntry1] : 65M PMH HTN, ESRD (has LUE AVF, not on HD yet), T Cell lymphoma (dx yrs ago), AFib on (eliquis), systolic CHF (EF 40-45% in 2017), CKD, severe pHTN, p/w pain and swellling to R chest wall. He is /p Right chest wall hematoma evacuation on 12/18/20. CTSx and Pulmonary were consulted for hx of recurrent left effusion. Patient underwent thoracentesis x2 once at [facility] and again on admission 12/20 by Pulmonary, negative for malignancy.\par \par Overall, he feels well and denies any complaints. Denies SOB, leg swelling, chest pain.\par \par CT Chest 1/5/21 reviewed with patient and his wife, noted with persistent left pleural effusion. Discussed left decortication to prevent recurrent pleural effusion. Surgery would be done minimally invasive/robotically to remove lining of the lung, chest wall to allow the lung to re-expand and a drainage tube would be placed to drain the excess fluid and air. The surgical approach, risks, benefits, and alternatives were explained to the patient, who understood and agreed to the above. \par \par He has an appt with Dr Cui next Tuesday and his PCP next Thursday. \par \par He was cleared by Dr Ventura to proceed with decortication. He was instructed to home Eliquis for 3 days. Will plan to admit him day before surgery for heparin drip. \par \par I have reviewed the patient's medical records and diagnostic images at the time of this office consultation and have made the following recommendation.\par Plan:\par 1. Bronchoscopy, LVATS, robotic assisted, drainage of pleural effusion, decortication, pleurodesis\par 2. Hold Eliquis for 3 days. Admission day before surgery for heparin drip

## 2021-01-07 NOTE — PHYSICAL EXAM
[Normal Oral Mucosa] : normal oral mucosa [No Oral Pallor] : no oral pallor [No Oral Cyanosis] : no oral cyanosis [Normal Jugular Venous A Waves Present] : normal jugular venous A waves present [Normal Jugular Venous V Waves Present] : normal jugular venous V waves present [No Jugular Venous Loyola A Waves] : no jugular venous loyola A waves [Respiration, Rhythm And Depth] : normal respiratory rhythm and effort [Exaggerated Use Of Accessory Muscles For Inspiration] : no accessory muscle use [Auscultation Breath Sounds / Voice Sounds] : lungs were clear to auscultation bilaterally [Heart Rate And Rhythm] : heart rate and rhythm were normal [Heart Sounds] : normal S1 and S2 [Abdomen Soft] : soft [Abdomen Tenderness] : non-tender [Abdomen Mass (___ Cm)] : no abdominal mass palpated [Abnormal Walk] : normal gait [Gait - Sufficient For Exercise Testing] : the gait was sufficient for exercise testing [Nail Clubbing] : no clubbing of the fingernails [Cyanosis, Localized] : no localized cyanosis [Petechial Hemorrhages (___cm)] : no petechial hemorrhages [Skin Color & Pigmentation] : normal skin color and pigmentation [] : no rash [No Venous Stasis] : no venous stasis [Skin Lesions] : no skin lesions [No Skin Ulcers] : no skin ulcer [No Xanthoma] : no  xanthoma was observed [FreeTextEntry1] : 1-2 + edema, AVF left arm

## 2021-01-07 NOTE — HISTORY OF PRESENT ILLNESS
[FreeTextEntry1] : 65M PMH HTN, ESRD (has LUE AVF, not on HD yet), T Cell lymphoma (dx yrs ago), AFib on (eliquis), systolic CHF (EF 40-45% in 2017), CKD, severe pHTN, p/w pain and swellling to R chest wall. Pt just dc'd yesterday after admission at outside hospital for asymptomatic anemia. received 2U PRBC. Pt woke this morning w/ R CP/swelling, slowly worsening. Denies trauma to that area or needle sticks. Denies lightheaded, SOB/CP, f/c, NVD, abd pain, urinary complaints, focal weakness/numbness. Patient was admitted to the Vascular Service and pre-oped for OR for hematoma evacuation. Patient was taken to OR for Right chest wall hematoma evacuation on 12/18/20. Post-op he continued to have Hgb drop and was given another unit PRBC. \par \par CT surgery and Pulmonary were consulted for hx of recurrent left effusion. Patient underwent thoracentesis at [facility] and awaiting results of this tap. On 12/20 Patient was seen by Pulmonary and underwent bedside thoracentesis on 12/20/20, negative for malignancy. He developed rapid afib, restarted on Eliquis and, EP consulted and pt d/c PO amiodarone. Echo was done that showed unchanged EF but Severe MR/TR for which cardiac surgery was consulted. EP was consulted and gave recommendations for PO Amiodarone. \par \par Patients Hgb remained stable on Anticoagulation and patient was discharged Home with plan to f/u as outpatient with Pulmonary, EP, Cardiology and Cardiac surgery \par \par CTA Chest 12/17/20\par 1. Large right chest wall intramuscular hematoma measuring up to 13 cm with area of arterial extravasation, as described above.\par 2. Large loculated left pleural effusion with near complete compressive atelectasis of the left lower lobe; superimposed infection within the atelectatic lung is not excluded.\par 3. 4 cm nonenhancing heterogeneous hyperdense structure in the anterior left lower lobe is suggestive of a pulmonary hematoma; superimposed infection is not excluded.\par 4. Cystic lesions within the pancreatic head measuring up to 1.3 cm, as described above. Follow-up pancreas MRI or CT in one year is suggested to assess stability.\par 5. 4 cm indeterminate density lesion within the spleen, not appreciated on previous exams. This could represent a hemangioma, although other etiologies are not excluded. Nonemergent follow-up with ultrasound or MRI is suggested.\par 6. New upper abdominal ascites.\par 7. Aneurysmal dilation of the aortic root and ascending aorta, not substantially changed since 4/30/2018.\par 8. Additional findings as above.\par \par CT Chest 12/20/20\par 1. Since earlier same day, new tiny left apical pneumothorax.\par 2. Slight decreased moderate residual left pleural effusion with anterior loculated component and probable trace layering hemothorax.\par 3. Unchanged large right chest wall hematoma.\par 4. Unchanged rounded hyperdense structure in anterior left lower lobe probably loculated hemothorax.\par 5. Unchanged small right effusion with small adjacent atelectasis..\par 6. Unchanged dilated aortic root and ascending aorta.\par 7. Unchanged additional findings in this short interval follow-up CT. \par \par CT Chest 1/5/21\par 1. Interval resolution of small left apical pneumothorax.\par 2. No change in the size of a small to moderate-sized left pleural effusion which may represent a persistent small hematocrit effect along its posterior dependent aspect consistent with a hemothorax.\par 3. Persistent 3.3 cm rounded heterogeneous attenuation masslike structure within the anterior aspect of the left lower lobe. This may represent a persistent intraparenchymal hematoma. Continued short interval surveillance to confirm stability.\par 4. There may be encasement and mild narrowing of the superior segment to the left lower lobe. The remainder of the central bronchi are patent. An infiltrative perihilar process cannot be excluded in this examination. There is probable left lower lobe compressive atelectasis. Whole-body PET/CT may be of value to exclude a central neoplasm.\par 5. Persistent small right-sided pleural effusion.\par 6. Interval decrease in the size of a right anterior chest wall hematoma with removal of an anterior chest wall drainage catheter and near complete resolution of anterior chest wall soft tissue gas.\par 7. Additional chronic findings as above.

## 2021-01-07 NOTE — HISTORY OF PRESENT ILLNESS
[FreeTextEntry1] : 66yo M PMH HTN, stage 4 CKD (baseline Cr ; last 4.0 on 10/30/2019 \par outpatient), T Cell lymphoma (diagnosed 19 years ago, on chemo Romidepsin every \par Wednesday, last infusion 11/6), chronic AFib (on Coumadin), systolic CHF (EF \par 60-65% in 2019). Now 40-45% \par \par case d/w dr saez lowered dose of eliquis to 2.5bid\par \par 12/28/20n  In [facility] 12/17- 12/23 with a chest wall hematoma, Rapid AF.  EF good but severe MR and TR.  In NSR by exam and EKG today.  I was later informed he might need a decortification of his pleura(left).\par \par EKG: NSR with 1st degree AVB. No ST-Tw abnormalities. 12/28/20\par \par

## 2021-01-12 ENCOUNTER — APPOINTMENT (OUTPATIENT)
Dept: HEART AND VASCULAR | Facility: CLINIC | Age: 66
End: 2021-01-12
Payer: MEDICARE

## 2021-01-12 VITALS
DIASTOLIC BLOOD PRESSURE: 98 MMHG | BODY MASS INDEX: 22.67 KG/M2 | HEART RATE: 60 BPM | SYSTOLIC BLOOD PRESSURE: 175 MMHG | RESPIRATION RATE: 16 BRPM | WEIGHT: 158 LBS

## 2021-01-12 PROCEDURE — 99214 OFFICE O/P EST MOD 30 MIN: CPT

## 2021-01-12 PROCEDURE — 99072 ADDL SUPL MATRL&STAF TM PHE: CPT

## 2021-01-12 NOTE — REVIEW OF SYSTEMS
[Feeling Fatigued] : feeling fatigued [Lower Ext Edema] : lower extremity edema [see HPI] : see HPI [Negative] : Psychiatric

## 2021-01-12 NOTE — HISTORY OF PRESENT ILLNESS
[FreeTextEntry1] : 65 year old male with a past medical history of HTN, ESRD (has LUE AVF, not on HD yet), stage 4 CKD, T Cell lymphoma (diagnosed about 19 years ago, on chemo Romidepsin on Wednesdays), AFib on (eliquis), systolic CHF (EF 40-45% in 2017), severe pHTN, who underwent a right chest wall hematoma evacuation on 12/18/20, and serve mitral regurgitation who presents for follow up after discharge.  \par \par On 12/16/2020, the patient was discharged from the hospital with anemia and received 2 units of PRBCs.  On 12/17/2020, the patient presented to [facility] emergency room with right side chest pain and swelling in the chest.  The patient underwent a right chest wall hematoma evacuation on 12/18/2020.  On 12/22 am he was noted to be in Rapid A-fib with Heart rate in 140's and symptomatic( SOB, Palpitations, dizziness). He was treated with IV metoprolol 5mg x2 with HR decreased to 120's. He was restarted on his anticoagulation Eliquis 2.5 BID and started on IV amiodarone. He was transferred to SICU for closer monitoring. He remained hemodynamically stable. Echo was done that showed unchanged EF but Severe MR/TR for which cardiac surgery was consulted.  The patient is now scheduled for a bronchoscopy, LVATS, robotic assisted drainage of pleural effusion, decortication, and pleurodesis with Dr. Harmon on Tuesday 1/19/2021 due to persistent left pleural effusions.\par \par Since discharge, the patient has been feeling well. He complains of LE edema and some fatigue.  He denies chest pain, shortness of breath at rest or with exertion, palpitations, dizziness, syncope, orthopnea, or PND.\par \par The patient lives at home with his wife and walks with a cane due to some left leg weakness.  He remains independent with his ADLs.

## 2021-01-12 NOTE — PHYSICAL EXAM
[Normal Appearance] : normal appearance [General Appearance - In No Acute Distress] : no acute distress [Normal Conjunctiva] : the conjunctiva exhibited no abnormalities [Normal Jugular Venous A Waves Present] : normal jugular venous A waves present [Normal Jugular Venous V Waves Present] : normal jugular venous V waves present [Heart Rate And Rhythm] : heart rate and rhythm were normal [Respiration, Rhythm And Depth] : normal respiratory rhythm and effort [Exaggerated Use Of Accessory Muscles For Inspiration] : no accessory muscle use [Auscultation Breath Sounds / Voice Sounds] : lungs were clear to auscultation bilaterally [Abdomen Soft] : soft [Abdomen Tenderness] : non-tender [Nail Clubbing] : no clubbing of the fingernails [Cyanosis, Localized] : no localized cyanosis [] : no rash [Oriented To Time, Place, And Person] : oriented to person, place, and time [FreeTextEntry1] : 1-2+ LE edema

## 2021-01-15 ENCOUNTER — NON-APPOINTMENT (OUTPATIENT)
Age: 66
End: 2021-01-15

## 2021-01-16 ENCOUNTER — LABORATORY RESULT (OUTPATIENT)
Age: 66
End: 2021-01-16

## 2021-01-18 ENCOUNTER — INPATIENT (INPATIENT)
Facility: HOSPITAL | Age: 66
LOS: 11 days | Discharge: HOME HEALTH | DRG: 163 | End: 2021-01-30
Attending: THORACIC SURGERY (CARDIOTHORACIC VASCULAR SURGERY) | Admitting: THORACIC SURGERY (CARDIOTHORACIC VASCULAR SURGERY)
Payer: MEDICARE

## 2021-01-18 VITALS — HEIGHT: 71 IN | TEMPERATURE: 98 F | WEIGHT: 159.17 LBS

## 2021-01-18 DIAGNOSIS — Z90.49 ACQUIRED ABSENCE OF OTHER SPECIFIED PARTS OF DIGESTIVE TRACT: Chronic | ICD-10-CM

## 2021-01-18 DIAGNOSIS — Z41.9 ENCOUNTER FOR PROCEDURE FOR PURPOSES OTHER THAN REMEDYING HEALTH STATE, UNSPECIFIED: Chronic | ICD-10-CM

## 2021-01-18 LAB
A1C WITH ESTIMATED AVERAGE GLUCOSE RESULT: 4.4 % — SIGNIFICANT CHANGE UP (ref 4–5.6)
ACANTHOCYTES BLD QL SMEAR: SLIGHT — SIGNIFICANT CHANGE UP
ALBUMIN SERPL ELPH-MCNC: 3.5 G/DL — SIGNIFICANT CHANGE UP (ref 3.3–5)
ALP SERPL-CCNC: 97 U/L — SIGNIFICANT CHANGE UP (ref 40–120)
ALT FLD-CCNC: <5 U/L — LOW (ref 10–45)
ANION GAP SERPL CALC-SCNC: 14 MMOL/L — SIGNIFICANT CHANGE UP (ref 5–17)
ANISOCYTOSIS BLD QL: SIGNIFICANT CHANGE UP
APPEARANCE UR: CLEAR — SIGNIFICANT CHANGE UP
APTT BLD: 40.4 SEC — HIGH (ref 27.5–35.5)
APTT BLD: 47.4 SEC — HIGH (ref 27.5–35.5)
AST SERPL-CCNC: 9 U/L — LOW (ref 10–40)
BACTERIA # UR AUTO: PRESENT /HPF
BASOPHILS # BLD AUTO: 0 K/UL — SIGNIFICANT CHANGE UP (ref 0–0.2)
BASOPHILS NFR BLD AUTO: 0 % — SIGNIFICANT CHANGE UP (ref 0–2)
BILIRUB SERPL-MCNC: 0.6 MG/DL — SIGNIFICANT CHANGE UP (ref 0.2–1.2)
BILIRUB UR-MCNC: NEGATIVE — SIGNIFICANT CHANGE UP
BLD GP AB SCN SERPL QL: NEGATIVE — SIGNIFICANT CHANGE UP
BUN SERPL-MCNC: 35 MG/DL — HIGH (ref 7–23)
BURR CELLS BLD QL SMEAR: PRESENT — SIGNIFICANT CHANGE UP
BURR CELLS BLD QL SMEAR: SLIGHT — SIGNIFICANT CHANGE UP
CALCIUM SERPL-MCNC: 8.8 MG/DL — SIGNIFICANT CHANGE UP (ref 8.4–10.5)
CHLORIDE SERPL-SCNC: 106 MMOL/L — SIGNIFICANT CHANGE UP (ref 96–108)
CO2 SERPL-SCNC: 24 MMOL/L — SIGNIFICANT CHANGE UP (ref 22–31)
COLOR SPEC: YELLOW — SIGNIFICANT CHANGE UP
CREAT SERPL-MCNC: 4.51 MG/DL — HIGH (ref 0.5–1.3)
DACRYOCYTES BLD QL SMEAR: SLIGHT — SIGNIFICANT CHANGE UP
DIFF PNL FLD: ABNORMAL
EOSINOPHIL # BLD AUTO: 0.37 K/UL — SIGNIFICANT CHANGE UP (ref 0–0.5)
EOSINOPHIL NFR BLD AUTO: 5.3 % — SIGNIFICANT CHANGE UP (ref 0–6)
EPI CELLS # UR: SIGNIFICANT CHANGE UP /HPF (ref 0–5)
ESTIMATED AVERAGE GLUCOSE: 80 MG/DL — SIGNIFICANT CHANGE UP (ref 68–114)
GIANT PLATELETS BLD QL SMEAR: PRESENT — SIGNIFICANT CHANGE UP
GLUCOSE SERPL-MCNC: 80 MG/DL — SIGNIFICANT CHANGE UP (ref 70–99)
GLUCOSE UR QL: NEGATIVE — SIGNIFICANT CHANGE UP
HCT VFR BLD CALC: 32.8 % — LOW (ref 39–50)
HGB BLD-MCNC: 9.4 G/DL — LOW (ref 13–17)
INR BLD: 1.26 — HIGH (ref 0.88–1.16)
KETONES UR-MCNC: NEGATIVE — SIGNIFICANT CHANGE UP
LEUKOCYTE ESTERASE UR-ACNC: ABNORMAL
LYMPHOCYTES # BLD AUTO: 0.74 K/UL — LOW (ref 1–3.3)
LYMPHOCYTES # BLD AUTO: 10.6 % — LOW (ref 13–44)
MACROCYTES BLD QL: SIGNIFICANT CHANGE UP
MANUAL SMEAR VERIFICATION: SIGNIFICANT CHANGE UP
MCHC RBC-ENTMCNC: 28.5 PG — SIGNIFICANT CHANGE UP (ref 27–34)
MCHC RBC-ENTMCNC: 28.7 GM/DL — LOW (ref 32–36)
MCV RBC AUTO: 99.4 FL — SIGNIFICANT CHANGE UP (ref 80–100)
MONOCYTES # BLD AUTO: 0.31 K/UL — SIGNIFICANT CHANGE UP (ref 0–0.9)
MONOCYTES NFR BLD AUTO: 4.4 % — SIGNIFICANT CHANGE UP (ref 2–14)
NEUTROPHILS # BLD AUTO: 5.48 K/UL — SIGNIFICANT CHANGE UP (ref 1.8–7.4)
NEUTROPHILS NFR BLD AUTO: 78.8 % — HIGH (ref 43–77)
NITRITE UR-MCNC: NEGATIVE — SIGNIFICANT CHANGE UP
NT-PROBNP SERPL-SCNC: HIGH PG/ML (ref 0–300)
OVALOCYTES BLD QL SMEAR: SIGNIFICANT CHANGE UP
PH UR: 6.5 — SIGNIFICANT CHANGE UP (ref 5–8)
PLAT MORPH BLD: ABNORMAL
PLATELET # BLD AUTO: 210 K/UL — SIGNIFICANT CHANGE UP (ref 150–400)
POIKILOCYTOSIS BLD QL AUTO: SIGNIFICANT CHANGE UP
POLYCHROMASIA BLD QL SMEAR: SLIGHT — SIGNIFICANT CHANGE UP
POTASSIUM SERPL-MCNC: 3.8 MMOL/L — SIGNIFICANT CHANGE UP (ref 3.5–5.3)
POTASSIUM SERPL-SCNC: 3.8 MMOL/L — SIGNIFICANT CHANGE UP (ref 3.5–5.3)
PROT SERPL-MCNC: 7.2 G/DL — SIGNIFICANT CHANGE UP (ref 6–8.3)
PROT UR-MCNC: 100 MG/DL
PROTHROM AB SERPL-ACNC: 14.9 SEC — HIGH (ref 10.6–13.6)
RBC # BLD: 3.3 M/UL — LOW (ref 4.2–5.8)
RBC # FLD: 20.1 % — HIGH (ref 10.3–14.5)
RBC BLD AUTO: ABNORMAL
RBC CASTS # UR COMP ASSIST: < 5 /HPF — SIGNIFICANT CHANGE UP
RH IG SCN BLD-IMP: POSITIVE — SIGNIFICANT CHANGE UP
SCHISTOCYTES BLD QL AUTO: SIGNIFICANT CHANGE UP
SODIUM SERPL-SCNC: 144 MMOL/L — SIGNIFICANT CHANGE UP (ref 135–145)
SP GR SPEC: 1.02 — SIGNIFICANT CHANGE UP (ref 1–1.03)
SPHEROCYTES BLD QL SMEAR: SLIGHT — SIGNIFICANT CHANGE UP
STOMATOCYTES BLD QL SMEAR: SLIGHT — SIGNIFICANT CHANGE UP
TARGETS BLD QL SMEAR: SLIGHT — SIGNIFICANT CHANGE UP
TSH SERPL-MCNC: 4.43 UIU/ML — SIGNIFICANT CHANGE UP (ref 0.35–4.94)
UROBILINOGEN FLD QL: 0.2 E.U./DL — SIGNIFICANT CHANGE UP
VARIANT LYMPHS # BLD: 0.9 % — SIGNIFICANT CHANGE UP (ref 0–6)
WBC # BLD: 6.95 K/UL — SIGNIFICANT CHANGE UP (ref 3.8–10.5)
WBC # FLD AUTO: 6.95 K/UL — SIGNIFICANT CHANGE UP (ref 3.8–10.5)
WBC UR QL: ABNORMAL /HPF

## 2021-01-18 PROCEDURE — 73700 CT LOWER EXTREMITY W/O DYE: CPT | Mod: 26,LT

## 2021-01-18 PROCEDURE — 93010 ELECTROCARDIOGRAM REPORT: CPT

## 2021-01-18 PROCEDURE — 99223 1ST HOSP IP/OBS HIGH 75: CPT | Mod: 57

## 2021-01-18 PROCEDURE — 71045 X-RAY EXAM CHEST 1 VIEW: CPT | Mod: 26

## 2021-01-18 RX ORDER — PIPERACILLIN AND TAZOBACTAM 4; .5 G/20ML; G/20ML
3.38 INJECTION, POWDER, LYOPHILIZED, FOR SOLUTION INTRAVENOUS EVERY 6 HOURS
Refills: 0 | Status: DISCONTINUED | OUTPATIENT
Start: 2021-01-18 | End: 2021-01-19

## 2021-01-18 RX ORDER — CHLORHEXIDINE GLUCONATE 213 G/1000ML
10 SOLUTION TOPICAL ONCE
Refills: 0 | Status: COMPLETED | OUTPATIENT
Start: 2021-01-18 | End: 2021-01-19

## 2021-01-18 RX ORDER — VANCOMYCIN HCL 1 G
VIAL (EA) INTRAVENOUS
Refills: 0 | Status: DISCONTINUED | OUTPATIENT
Start: 2021-01-18 | End: 2021-01-18

## 2021-01-18 RX ORDER — ALLOPURINOL 300 MG
200 TABLET ORAL EVERY 12 HOURS
Refills: 0 | Status: DISCONTINUED | OUTPATIENT
Start: 2021-01-18 | End: 2021-01-22

## 2021-01-18 RX ORDER — HEPARIN SODIUM 5000 [USP'U]/ML
800 INJECTION INTRAVENOUS; SUBCUTANEOUS
Qty: 25000 | Refills: 0 | Status: DISCONTINUED | OUTPATIENT
Start: 2021-01-18 | End: 2021-01-22

## 2021-01-18 RX ORDER — DOXAZOSIN MESYLATE 4 MG
2 TABLET ORAL AT BEDTIME
Refills: 0 | Status: DISCONTINUED | OUTPATIENT
Start: 2021-01-18 | End: 2021-01-22

## 2021-01-18 RX ORDER — HYDRALAZINE HCL 50 MG
5 TABLET ORAL ONCE
Refills: 0 | Status: COMPLETED | OUTPATIENT
Start: 2021-01-18 | End: 2021-01-18

## 2021-01-18 RX ORDER — SODIUM CHLORIDE 9 MG/ML
3 INJECTION INTRAMUSCULAR; INTRAVENOUS; SUBCUTANEOUS EVERY 8 HOURS
Refills: 0 | Status: DISCONTINUED | OUTPATIENT
Start: 2021-01-18 | End: 2021-01-22

## 2021-01-18 RX ORDER — SENNA PLUS 8.6 MG/1
2 TABLET ORAL AT BEDTIME
Refills: 0 | Status: DISCONTINUED | OUTPATIENT
Start: 2021-01-18 | End: 2021-01-22

## 2021-01-18 RX ORDER — FAMOTIDINE 10 MG/ML
10 INJECTION INTRAVENOUS DAILY
Refills: 0 | Status: DISCONTINUED | OUTPATIENT
Start: 2021-01-18 | End: 2021-01-22

## 2021-01-18 RX ORDER — ACETAMINOPHEN 500 MG
650 TABLET ORAL EVERY 6 HOURS
Refills: 0 | Status: DISCONTINUED | OUTPATIENT
Start: 2021-01-18 | End: 2021-01-22

## 2021-01-18 RX ORDER — VANCOMYCIN HCL 1 G
1000 VIAL (EA) INTRAVENOUS ONCE
Refills: 0 | Status: COMPLETED | OUTPATIENT
Start: 2021-01-18 | End: 2021-01-18

## 2021-01-18 RX ORDER — HYDRALAZINE HCL 50 MG
10 TABLET ORAL ONCE
Refills: 0 | Status: COMPLETED | OUTPATIENT
Start: 2021-01-18 | End: 2021-01-18

## 2021-01-18 RX ORDER — AMIODARONE HYDROCHLORIDE 400 MG/1
200 TABLET ORAL DAILY
Refills: 0 | Status: DISCONTINUED | OUTPATIENT
Start: 2021-01-18 | End: 2021-01-22

## 2021-01-18 RX ORDER — CHLORHEXIDINE GLUCONATE 213 G/1000ML
1 SOLUTION TOPICAL ONCE
Refills: 0 | Status: COMPLETED | OUTPATIENT
Start: 2021-01-19 | End: 2021-01-19

## 2021-01-18 RX ORDER — FUROSEMIDE 40 MG
80 TABLET ORAL ONCE
Refills: 0 | Status: COMPLETED | OUTPATIENT
Start: 2021-01-18 | End: 2021-01-18

## 2021-01-18 RX ORDER — CHLORHEXIDINE GLUCONATE 213 G/1000ML
1 SOLUTION TOPICAL ONCE
Refills: 0 | Status: COMPLETED | OUTPATIENT
Start: 2021-01-18 | End: 2021-01-18

## 2021-01-18 RX ORDER — CALCITRIOL 0.5 UG/1
0.25 CAPSULE ORAL DAILY
Refills: 0 | Status: DISCONTINUED | OUTPATIENT
Start: 2021-01-18 | End: 2021-01-22

## 2021-01-18 RX ORDER — METOPROLOL TARTRATE 50 MG
200 TABLET ORAL DAILY
Refills: 0 | Status: DISCONTINUED | OUTPATIENT
Start: 2021-01-19 | End: 2021-01-22

## 2021-01-18 RX ORDER — SODIUM BICARBONATE 1 MEQ/ML
650 SYRINGE (ML) INTRAVENOUS DAILY
Refills: 0 | Status: DISCONTINUED | OUTPATIENT
Start: 2021-01-18 | End: 2021-01-22

## 2021-01-18 RX ORDER — PIPERACILLIN AND TAZOBACTAM 4; .5 G/20ML; G/20ML
3.38 INJECTION, POWDER, LYOPHILIZED, FOR SOLUTION INTRAVENOUS ONCE
Refills: 0 | Status: COMPLETED | OUTPATIENT
Start: 2021-01-18 | End: 2021-01-18

## 2021-01-18 RX ADMIN — Medication 80 MILLIGRAM(S): at 17:07

## 2021-01-18 RX ADMIN — PIPERACILLIN AND TAZOBACTAM 200 GRAM(S): 4; .5 INJECTION, POWDER, LYOPHILIZED, FOR SOLUTION INTRAVENOUS at 21:45

## 2021-01-18 RX ADMIN — HEPARIN SODIUM 8 UNIT(S)/HR: 5000 INJECTION INTRAVENOUS; SUBCUTANEOUS at 15:40

## 2021-01-18 RX ADMIN — Medication 5 MILLIGRAM(S): at 15:18

## 2021-01-18 RX ADMIN — Medication 2 MILLIGRAM(S): at 21:45

## 2021-01-18 RX ADMIN — CHLORHEXIDINE GLUCONATE 1 APPLICATION(S): 213 SOLUTION TOPICAL at 20:45

## 2021-01-18 RX ADMIN — Medication 200 MILLIGRAM(S): at 21:45

## 2021-01-18 RX ADMIN — PIPERACILLIN AND TAZOBACTAM 200 GRAM(S): 4; .5 INJECTION, POWDER, LYOPHILIZED, FOR SOLUTION INTRAVENOUS at 16:12

## 2021-01-18 RX ADMIN — Medication 5 MILLIGRAM(S): at 16:12

## 2021-01-18 RX ADMIN — Medication 10 MILLIGRAM(S): at 17:08

## 2021-01-18 RX ADMIN — SODIUM CHLORIDE 3 MILLILITER(S): 9 INJECTION INTRAMUSCULAR; INTRAVENOUS; SUBCUTANEOUS at 21:45

## 2021-01-18 RX ADMIN — Medication 1000 MILLIGRAM(S): at 17:08

## 2021-01-18 RX ADMIN — HEPARIN SODIUM 10 UNIT(S)/HR: 5000 INJECTION INTRAVENOUS; SUBCUTANEOUS at 21:53

## 2021-01-18 NOTE — H&P ADULT - NSHPREVIEWOFSYSTEMS_GEN_ALL_CORE
- presenting with worsening SOB, could be 2/2 infection. physical exam: + mild b/l expiratory wheezing    - currently afebrile, hemodynamically stable, no leukocytosis   - CXR clear, UA negative for infection  - CT angio negative for PE, shows new RLL opacity could be 2/2 PNA vs atelectasias   - check RVP   - s/p Solumedrol 125mg x1 in ED, c/w prednisone 60mg qD  - s/p DuoNeb x3 in ED, c/w DuoNeb q6     - c/w supplement O2, currently at her baseline 2L  - pulm consult in AM Review of Systems  CONSTITUTIONAL:  Denies Fevers / chills, sweats, fatigue, weight loss, weight gain                                      NEURO:  Denies parathesias, seizures, syncope, confusion                                                                                EYES:  Denies Blurry vision, discharge, pain, loss of vision                                                                                    ENMT:  Denies Difficulty hearing, vertigo, dysphagia, epistaxis, recent dental work                                       CV:  Denies Chest pain, palpitations, NIELSON, orthopnea                                                                                          RESPIRATORY:  Denies Wheezing, SOB, cough / sputum, hemoptysis                                                                GI:  Denies Nausea, vomiting, diarrhea, constipation, melena, difficulty swallowing                                               : Denies Hematuria, dysuria, urgency, incontinence                                                                                         MUSKULOSKELETAL: Denies arthritis, joint swelling, muscle weakness                                                             SKIN/BREAST:  Denies rash, itching, hair loss, masses                                                                                            PSYCH:  Denies depression, anxiety, suicidal ideation                                                                                               HEME/LYMPH:  Denies bruises easily, enlarged lymph nodes, tender lymph nodes                                        ENDOCRINE:  Denies cold intolerance, heat intolerance, polydipsia - presenting with worsening SOB, could be 2/2 infection. physical exam: + mild b/l expiratory wheezing    - currently afebrile, hemodynamically stable, no leukocytosis   - CXR clear, UA negative for infection  - CT angio negative for PE, shows new RLL opacity could be 2/2 PNA vs atelectasias   - check RVP   - s/p Solumedrol 125mg x1 in ED, c/w solumedrol 40 mg q8h  - s/p DuoNeb x3 in ED, c/w DuoNeb q6 standing  - c/w supplement O2, currently at her baseline 2L  - c/w azithromax daily x 5 days Acute on chronic respiratory failure 2/2 COPD exacerbation  - presenting with worsening SOB, could be 2/2 infection.  - currently afebrile, hemodynamically stable, no leukocytosis   - CXR clear, UA negative for infection; RVP (-)  - CT angio negative for PE, shows new RLL opacity could be 2/2 PNA vs atelectasias   - s/p Solumedrol 125mg x1 in ED, c/w solumedrol 40 mg q8h  - s/p DuoNeb x3 in ED, c/w DuoNeb q6 standing  - c/w supplement O2, currently at her baseline 2L, goal SaO2 89-92%  - c/w azithromycin daily x 5 days

## 2021-01-18 NOTE — H&P ADULT - ASSESSMENT
This is a 66 y/o M with a PMHx of HTN, COPD, CKD (stage 4 CKD, has LUE AVF, not on HD yet), T-cell lymphoma (diagnosed 19 years ago, on chemo romidepsin every Wednesday), AF (on Eliquis at home, last dose Saturday), systolic CHF (EF 40-45% 2017), severe pHTN who recently underwent a left chest wall hematoma evacuation on 12/18/2020 secondary to loculated effusion. Hospital course at that time was complicated by AF with RVR and thoracic surgery consulted at that time for possible future intervention. Of note, recent ECHO during previous admission revealed severe mitral regurgitation. Thoracic surgery followed up with patient as an outpatient once discharged from the hospital and deemed patient a good surgical candidate. Today, on 1/18/21 patient presented to [facility] for pre-operative optimization with heparin gtt prior to OR tomorrow. Of note: Patient with red swelling on anterior aspect of left knee 2/2 to fall on concrete 1 week ago.     Plan:    Neurovascular:   -Pain well controlled with current regimen. PRN's: tylenol    Cardiovascular:   -hx of AF (on eliquis at home, last dose on saturday 1/16)- admitted for hep gtt, starting at 3pm today, f/u 9pm PTT   -hx of severe MR   -hx of HFrEF (EF 40-45%)  -hx of HTN: metoprolol succinate 200mg daily   -On admission: HTN of 180s SBP despite multiple attempts to take BP, gave x1 dose IV hydral   -Hemodynamically stable.   -Monitor: BP, HR, tele    Respiratory:   -hx of severe pHTN, COPD   -Pre-op for L VATS, decort, pleurodesis, RA for tomorrow 1/19/21   -Preop testing pending: UA, T&S   -Oxygenating well on room air  -Encourage continued use of IS 10x/hr and frequent ambulation  -CXR: pending     GI:  -GI PPX: famotidine 10mg daily   -PO Diet: Regular   -Bowel Regimen: senna     Renal / :  -hx of CKD stage 4 at baseline (not on HD but does have AVF placed)   -Continue to monitor renal function: BUN/Cr pending   -Monitor I/O's daily     Endocrine:    -No hx of DM or thyroid dx    Hematologic:  -hx of T cell lymphoma: on chemo Romidepsin every Wednesday  -CBC: H/H- pending   -Coagulation Panel.    ID:  -left anterior tibial cyst 2/2 to mechanical fall at home 1 week ago, pending plan with thoracic surgery team   -Temperature: afebrile   -CBC: WBC- pending   -Continue to observe for SIRS/Sepsis Syndrome.    Prophylaxis:  -DVT prophylaxis with hep gtt  -Continue with SCD's b/l while patient is at rest     Disposition:  -OR tomorrow with Dr. Harmon    This is a 66 y/o M with a PMHx of HTN, COPD, CKD (stage 4 CKD, has LUE AVF, not on HD yet), T-cell lymphoma (diagnosed 19 years ago, on chemo romidepsin every Wednesday), AF (on Eliquis at home, last dose Saturday), systolic CHF (EF 40-45% 2017), severe pHTN who recently underwent a left chest wall hematoma evacuation on 12/18/2020 secondary to loculated effusion. Hospital course at that time was complicated by AF with RVR and thoracic surgery consulted at that time for possible future intervention. Of note, recent ECHO during previous admission revealed severe mitral regurgitation. Thoracic surgery followed up with patient as an outpatient once discharged from the hospital and deemed patient a good surgical candidate. Today, on 1/18/21 patient presented to [facility] for pre-operative optimization with heparin gtt prior to OR tomorrow. Of note: Patient with red swelling on anterior aspect of left knee 2/2 to fall on concrete 1 week ago.     Plan:  Neurovascular:   -Pain well controlled with current regimen. PRN's: tylenol    Cardiovascular:   -hx of AF (on eliquis at home, last dose on saturday 1/16)- admitted for hep gtt, starting at 3pm today, f/u 9pm PTT   -hx of severe MR   -hx of HFrEF (EF 40-45%)  -hx of HTN: metoprolol succinate 200mg daily   -On admission: HTN of 180s SBP despite multiple attempts to take BP, gave x1 dose IV hydral   -Hemodynamically stable.   -Monitor: BP, HR, tele    Respiratory:   -hx of severe pHTN, COPD   -Pre-op for L VATS, decort, pleurodesis, RA for tomorrow 1/19/21   -Preop testing pending: UA, T&S   -Oxygenating well on room air  -Encourage continued use of IS 10x/hr and frequent ambulation  -CXR: pending     GI:  -GI PPX: famotidine 10mg daily   -PO Diet: Regular   -Bowel Regimen: senna     Renal / :  -hx of CKD stage 4 at baseline (not on HD but does have AVF placed)   -Continue to monitor renal function: BUN/Cr pending   -Monitor I/O's daily     Endocrine:    -No hx of DM or thyroid dx    Hematologic:  -hx of T cell lymphoma: on chemo Romidepsin every Wednesday  -CBC: H/H- pending   -Coagulation Panel.    ID:  -left anterior tibial cyst 2/2 to mechanical fall at home 1 week ago, pending plan with thoracic surgery team   -Temperature: afebrile   -CBC: WBC- pending   -Continue to observe for SIRS/Sepsis Syndrome.    Prophylaxis:  -DVT prophylaxis with hep gtt  -Continue with SCD's b/l while patient is at rest     Disposition:  -OR tomorrow with Dr. Harmon    This is a 66 y/o M with a PMHx of HTN, COPD, CKD (stage 4 CKD, has LUE AVF, not on HD yet), T-cell lymphoma (diagnosed 19 years ago, on chemo romidepsin every Wednesday), AF (on Eliquis at home, last dose Saturday), systolic CHF (EF 40-45% 2017), severe pHTN who recently underwent a left chest wall hematoma evacuation on 12/18/2020 secondary to loculated effusion. Hospital course at that time was complicated by AF with RVR and thoracic surgery consulted at that time for possible future intervention. Of note, recent ECHO during previous admission revealed severe mitral regurgitation. Thoracic surgery followed up with patient as an outpatient once discharged from the hospital and deemed patient a good surgical candidate. Today, on 1/18/21 patient presented to [facility] for pre-operative optimization with heparin gtt prior to OR tomorrow. Of note: Patient with red swelling on anterior aspect of left knee 2/2 to fall on concrete 1 week ago.     Plan:  Neurovascular:   -Pain well controlled with current regimen. PRN's: tylenol    Cardiovascular:   -hx of AF (on eliquis at home, last dose on saturday 1/16)- admitted for hep gtt, starting at 3pm today, f/u 9pm PTT   -hx of severe MR   -hx of HFrEF (EF 40-45%)  -hx of HTN: metoprolol succinate 200mg daily   -On admission: HTN of 180s SBP despite multiple attempts to take BP, gave x1 dose IV hydral   -Hemodynamically stable.   -Monitor: BP, HR, tele    Respiratory:   -hx of severe pHTN, COPD   -Pre-op for L VATS, decort, pleurodesis, RA for tomorrow 1/19/21   -Preop testing pending: UA, T&S   -Oxygenating well on room air  -Encourage continued use of IS 10x/hr and frequent ambulation  -CXR: pending     GI:  -GI PPX: famotidine 10mg daily   -PO Diet: Regular   -Bowel Regimen: senna     Renal / :  -hx of CKD stage 4 at baseline (not on HD but does have AVF placed)   -Continue to monitor renal function: BUN/Cr pending   -Monitor I/O's daily     Endocrine:    -No hx of DM or thyroid dx    Hematologic:  -hx of T cell lymphoma: on chemo Romidepsin every Wednesday  -CBC: H/H- pending   -Coagulation Panel.    ID:  -left anterior tibial abscess 2/2 to mechanical fall at home 1 week ago, pending plan with thoracic surgery team   -Temperature: afebrile   -CBC: WBC- pending   -Continue to observe for SIRS/Sepsis Syndrome.    Prophylaxis:  -DVT prophylaxis with hep gtt  -Continue with SCD's b/l while patient is at rest     Disposition:  -OR tomorrow with Dr. Harmon

## 2021-01-18 NOTE — CONSULT NOTE ADULT - SUBJECTIVE AND OBJECTIVE BOX
SURGERY CONSULT  ==============================================================================================================  HPI: 65y Male  HPI:  This is a 66 y/o M with a PMHx of HTN, COPD, CKD (stage 4 CKD, has LUE AVF, not on HD yet), T-cell lymphoma (diagnosed 19 years ago, on chemo romidepsin every Wednesday), AF (on Eliquis at home, last dose Saturday), systolic CHF (EF 40-45% ), severe pHTN who recently underwent a left chest wall hematoma evacuation on 2020 secondary to loculated effusion. Hospital course at that time was complicated by AF with RVR and thoracic surgery consulted at that time for possible future intervention. Of note, recent ECHO during previous admission revealed severe mitral regurgitation. Thoracic surgery followed up with patient as an outpatient once discharged from the hospital and deemed patient a good surgical candidate. Today, on 21 patient presented to [facility] for pre-operative optimization with heparin gtt prior to OR tomorrow. Currently, patient feels well with no complaints. Denies any CP, palpitations SOB, wheezing, abd pain, n/v/d/c, fevers or chills.     Of note: patient with red cyst noted on left anterior tibia and state he was breaking up his dogs from fighting when he fell on the concert on his knee 1.5 weeks ago. About 1 week ago it started to get a little swollen and now it has turned into a larger cyst with tenderness to palpitation.     GENERAL SURGERY ADDENDUM  Patient is a 66 y/o M with PMH of HTN, COPD, CKD (with AVF but not on HD yet), T-cell lymphoma on chemo every Wednesday, AF on Eliquis at home, CHF (EF 40-45%), Pulm HTN, recent L chest wall hematoma evacuation, severe MR, who was admitted today for scheduled VATS tomorrow for recurrent pleural effusion, and was started on heparin drip. Patient had a recent fall on concrete last week when he tried to stop his dogs fighting, and since then, he noticed a small mass in the L anterior leg. Patient denies pain, fevers, chills, joint pain, or other symptoms. GS was consulted for evaluation and possible intervention.    ICU Vital Signs Last 24 Hrs  T(C): 36.6 (2021 14:26), Max: 36.6 (2021 14:26)  T(F): 97.9 (2021 14:26), Max: 97.9 (2021 14:26)  HR: --  BP: --  BP(mean): --  ABP: --  ABP(mean): --  RR: --  SpO2: --   (2021 14:33)      PAST MEDICAL & SURGICAL HISTORY:  BPH (benign prostatic hyperplasia)    Gout    H/O pulmonary hypertension    CHF, chronic    Lymphoma  t cell; Chemotherapy weekly- wednesday    CKD (chronic kidney disease)    Atrial fibrillation    HTN (hypertension)    Elective surgery  rectal surgery    History of cholecystectomy      Home Meds: Home Medications:  allopurinol: 200 milligram(s) orally 2 times a day (2021 14:43)  apixaban 2.5 mg oral tablet: 1 tab(s) orally 2 times a day (2021 14:43)  calcitriol 0.25 mcg oral capsule: 1 cap(s) orally once a day (2021 14:43)  doxazosin 2 mg oral tablet: 2 tab(s) orally 2 times a day (2021 14:43)  Toprol- mg oral tablet, extended release: 1 tab(s) orally once a day (2021 14:43)  torsemide 20 mg oral tablet: 1  orally every other day (at bedtime) (2021 14:43)    Allergies: Allergies    No Known Allergies    Intolerances      Soc:   Advanced Directives: Presumed Full Code     CURRENT MEDICATIONS:   --------------------------------------------------------------------------------------  Neurologic Medications  acetaminophen   Tablet .. 650 milliGRAM(s) Oral every 6 hours PRN Temp greater or equal to 38C (100.4F), Mild Pain (1 - 3)    Respiratory Medications    Cardiovascular Medications  aMIOdarone    Tablet 200 milliGRAM(s) Oral daily  doxazosin 2 milliGRAM(s) Oral at bedtime  torsemide 20 milliGRAM(s) Oral daily    Gastrointestinal Medications  calcitriol   Capsule 0.25 MICROGram(s) Oral daily  famotidine    Tablet 10 milliGRAM(s) Oral daily  senna 2 Tablet(s) Oral at bedtime  sodium bicarbonate 650 milliGRAM(s) Oral daily  sodium chloride 0.9% lock flush 3 milliLiter(s) IV Push every 8 hours    Genitourinary Medications    Hematologic/Oncologic Medications  heparin  Infusion 800 Unit(s)/Hr IV Continuous <Continuous>    Antimicrobial/Immunologic Medications  piperacillin/tazobactam IVPB.. 3.375 Gram(s) IV Intermittent every 6 hours    Endocrine/Metabolic Medications  allopurinol 200 milliGRAM(s) Oral every 12 hours    Topical/Other Medications  chlorhexidine 0.12% Liquid 10 milliLiter(s) Swish and Spit once  chlorhexidine 4% Liquid 1 Application(s) Topical once    --------------------------------------------------------------------------------------    VITAL SIGNS, INS/OUTS (last 24 hours):  --------------------------------------------------------------------------------------  ICU Vital Signs Last 24 Hrs  T(C): 36.6 (2021 14:26), Max: 36.6 (2021 14:26)  T(F): 97.9 (2021 14:26), Max: 97.9 (2021 14:26)  HR: 65 (2021 21:00) (61 - 66)  BP: 154/79 (2021 21:00) (154/79 - 188/100)  BP(mean): 111 (2021 21:00) (111 - 137)  ABP: --  ABP(mean): --  RR: 18 (2021 21:00) (18 - 18)  SpO2: 95% (2021 21:00) (95% - 100%)    I&O's Summary    2021 07:01  -  2021 21:56  --------------------------------------------------------  IN: 390 mL / OUT: 700 mL / NET: -310 mL      --------------------------------------------------------------------------------------    EXAM:  General: NAD, lying in bed   Neuro: A&Ox3, no focal deficits noted   HEENT: NC/AT, EOMI, MMM   Resp: Non-labored breathing on RA, decreased breathing on L lower lung fields   Abdomen: soft, non-distended, non-tender   Extremities: warm, non-edematous, cyst-like structure on L shin with mild underlying erythema and fluctuance, no tenderness on palpation      LABS  --------------------------------------------------------------------------------------  Labs:  CAPILLARY BLOOD GLUCOSE                              9.4    6.95  )-----------( 210      ( 2021 15:21 )             32.8       Auto Neutrophil %: 78.8 % (21 @ 15:21)        144  |  106  |  35<H>  ----------------------------<  80  3.8   |  24  |  4.51<H>      Calcium, Total Serum: 8.8 mg/dL (21 @ 15:21)      LFTs:             7.2  | 0.6  | 9        ------------------[97      ( 2021 15:21 )  3.5  | x    | <5          Lipase:x      Amylase:x             Coags:     x      ----< x       ( 2021 20:54 )     47.4            Serum Pro-Brain Natriuretic Peptide: 89390 pg/mL (21 @ 15:21)      Urinalysis Basic - ( 2021 15:19 )    Color: Yellow / Appearance: Clear / S.025 / pH: x  Gluc: x / Ketone: NEGATIVE  / Bili: Negative / Urobili: 0.2 E.U./dL   Blood: x / Protein: 100 mg/dL / Nitrite: NEGATIVE   Leuk Esterase: Trace / RBC: < 5 /HPF / WBC 5-10 /HPF   Sq Epi: x / Non Sq Epi: 0-5 /HPF / Bacteria: Present /HPF          --------------------------------------------------------------------------------------    OTHER LABS    IMAGING RESULTS  CT pending final read- on communication with Radiology, no abscess cavity seen      ASSESSMENT/ PLAN:  Patient is a 66 y/o M with PMH of HTN, COPD, CKD (with AVF but not on HD yet), T-cell lymphoma on chemo every Wednesday, AF on Eliquis at home, CHF (EF 40-45%), Pulm HTN, recent L chest wall hematoma evacuation, severe MR, who was admitted today for scheduled VATS tomorrow for recurrent pleural effusion, and was started on heparin drip. Patient had a recent fall on concrete last week when he tried to stop his dogs fighting, and since then, he noticed a small mass in the L anterior leg. Patient denies pain, fevers, chills, joint pain, or other symptoms. GS was consulted for evaluation and possible intervention. Patient is afebrile, has no leucocytosis, and CT scan (non-con 2/2 CKD) showed no drainable collections    - No surgical intervention at this point as patient does not have a drainable collection  - ACE wrap around area  - Team 4C will continue to follow      Attending aware and agrees with plan   SURGERY CONSULT  ==============================================================================================================  HPI: 65y Male  HPI:  This is a 66 y/o M with a PMHx of HTN, COPD, CKD (stage 4 CKD, has LUE AVF, not on HD yet), T-cell lymphoma (diagnosed 19 years ago, on chemo romidepsin every Wednesday), AF (on Eliquis at home, last dose Saturday), systolic CHF (EF 40-45% ), severe pHTN who recently underwent a left chest wall hematoma evacuation on 2020 secondary to loculated effusion. Hospital course at that time was complicated by AF with RVR and thoracic surgery consulted at that time for possible future intervention. Of note, recent ECHO during previous admission revealed severe mitral regurgitation. Thoracic surgery followed up with patient as an outpatient once discharged from the hospital and deemed patient a good surgical candidate. Today, on 21 patient presented to [facility] for pre-operative optimization with heparin gtt prior to OR tomorrow. Currently, patient feels well with no complaints. Denies any CP, palpitations SOB, wheezing, abd pain, n/v/d/c, fevers or chills.     Of note: patient with red cyst noted on left anterior tibia and state he was breaking up his dogs from fighting when he fell on the concert on his knee 1.5 weeks ago. About 1 week ago it started to get a little swollen and now it has turned into a larger cyst with tenderness to palpitation.     GENERAL SURGERY ADDENDUM  Patient is a 66 y/o M with PMH of HTN, COPD, CKD (with AVF but not on HD yet), T-cell lymphoma on chemo every Wednesday, AF on Eliquis at home, CHF (EF 40-45%), Pulm HTN, recent L chest wall hematoma evacuation, severe MR, who was admitted today for scheduled VATS tomorrow for recurrent pleural effusion, and was started on heparin drip. Patient had a recent fall on concrete last week when he tried to stop his dogs fighting, and since then, he noticed a small mass in the L anterior leg. Patient denies pain, fevers, chills, joint pain, or other symptoms. GS was consulted for evaluation and possible intervention.    ICU Vital Signs Last 24 Hrs  T(C): 36.6 (2021 14:26), Max: 36.6 (2021 14:26)  T(F): 97.9 (2021 14:26), Max: 97.9 (2021 14:26)  HR: --  BP: --  BP(mean): --  ABP: --  ABP(mean): --  RR: --  SpO2: --   (2021 14:33)      PAST MEDICAL & SURGICAL HISTORY:  BPH (benign prostatic hyperplasia)    Gout    H/O pulmonary hypertension    CHF, chronic    Lymphoma  t cell; Chemotherapy weekly- wednesday    CKD (chronic kidney disease)    Atrial fibrillation    HTN (hypertension)    Elective surgery  rectal surgery    History of cholecystectomy      Home Meds: Home Medications:  allopurinol: 200 milligram(s) orally 2 times a day (2021 14:43)  apixaban 2.5 mg oral tablet: 1 tab(s) orally 2 times a day (2021 14:43)  calcitriol 0.25 mcg oral capsule: 1 cap(s) orally once a day (2021 14:43)  doxazosin 2 mg oral tablet: 2 tab(s) orally 2 times a day (2021 14:43)  Toprol- mg oral tablet, extended release: 1 tab(s) orally once a day (2021 14:43)  torsemide 20 mg oral tablet: 1  orally every other day (at bedtime) (2021 14:43)    Allergies: Allergies    No Known Allergies    Intolerances      Soc:   Advanced Directives: Presumed Full Code     CURRENT MEDICATIONS:   --------------------------------------------------------------------------------------  Neurologic Medications  acetaminophen   Tablet .. 650 milliGRAM(s) Oral every 6 hours PRN Temp greater or equal to 38C (100.4F), Mild Pain (1 - 3)    Respiratory Medications    Cardiovascular Medications  aMIOdarone    Tablet 200 milliGRAM(s) Oral daily  doxazosin 2 milliGRAM(s) Oral at bedtime  torsemide 20 milliGRAM(s) Oral daily    Gastrointestinal Medications  calcitriol   Capsule 0.25 MICROGram(s) Oral daily  famotidine    Tablet 10 milliGRAM(s) Oral daily  senna 2 Tablet(s) Oral at bedtime  sodium bicarbonate 650 milliGRAM(s) Oral daily  sodium chloride 0.9% lock flush 3 milliLiter(s) IV Push every 8 hours    Genitourinary Medications    Hematologic/Oncologic Medications  heparin  Infusion 800 Unit(s)/Hr IV Continuous <Continuous>    Antimicrobial/Immunologic Medications  piperacillin/tazobactam IVPB.. 3.375 Gram(s) IV Intermittent every 6 hours    Endocrine/Metabolic Medications  allopurinol 200 milliGRAM(s) Oral every 12 hours    Topical/Other Medications  chlorhexidine 0.12% Liquid 10 milliLiter(s) Swish and Spit once  chlorhexidine 4% Liquid 1 Application(s) Topical once    --------------------------------------------------------------------------------------    VITAL SIGNS, INS/OUTS (last 24 hours):  --------------------------------------------------------------------------------------  ICU Vital Signs Last 24 Hrs  T(C): 36.6 (2021 14:26), Max: 36.6 (2021 14:26)  T(F): 97.9 (2021 14:26), Max: 97.9 (2021 14:26)  HR: 65 (2021 21:00) (61 - 66)  BP: 154/79 (2021 21:00) (154/79 - 188/100)  BP(mean): 111 (2021 21:00) (111 - 137)  ABP: --  ABP(mean): --  RR: 18 (2021 21:00) (18 - 18)  SpO2: 95% (2021 21:00) (95% - 100%)    I&O's Summary    2021 07:01  -  2021 21:56  --------------------------------------------------------  IN: 390 mL / OUT: 700 mL / NET: -310 mL      --------------------------------------------------------------------------------------    EXAM:  General: NAD, lying in bed   Neuro: A&Ox3, no focal deficits noted   HEENT: NC/AT, EOMI, MMM   Resp: Non-labored breathing on RA, decreased breathing on L lower lung fields   Abdomen: soft, non-distended, non-tender   Extremities: warm, non-edematous, cyst-like structure on L shin with mild underlying erythema and fluctuance, no tenderness on palpation      LABS  --------------------------------------------------------------------------------------  Labs:  CAPILLARY BLOOD GLUCOSE                              9.4    6.95  )-----------( 210      ( 2021 15:21 )             32.8       Auto Neutrophil %: 78.8 % (21 @ 15:21)        144  |  106  |  35<H>  ----------------------------<  80  3.8   |  24  |  4.51<H>      Calcium, Total Serum: 8.8 mg/dL (21 @ 15:21)      LFTs:             7.2  | 0.6  | 9        ------------------[97      ( 2021 15:21 )  3.5  | x    | <5          Lipase:x      Amylase:x             Coags:     x      ----< x       ( 2021 20:54 )     47.4            Serum Pro-Brain Natriuretic Peptide: 62726 pg/mL (21 @ 15:21)      Urinalysis Basic - ( 2021 15:19 )    Color: Yellow / Appearance: Clear / S.025 / pH: x  Gluc: x / Ketone: NEGATIVE  / Bili: Negative / Urobili: 0.2 E.U./dL   Blood: x / Protein: 100 mg/dL / Nitrite: NEGATIVE   Leuk Esterase: Trace / RBC: < 5 /HPF / WBC 5-10 /HPF   Sq Epi: x / Non Sq Epi: 0-5 /HPF / Bacteria: Present /HPF          --------------------------------------------------------------------------------------    OTHER LABS    IMAGING RESULTS  CT pending final read- on communication with Radiology, no abscess cavity seen      ASSESSMENT/ PLAN:  Patient is a 66 y/o M with PMH of HTN, COPD, CKD (with AVF but not on HD yet), T-cell lymphoma on chemo every Wednesday, AF on Eliquis at home, CHF (EF 40-45%), Pulm HTN, recent L chest wall hematoma evacuation, severe MR, who was admitted today for scheduled VATS tomorrow for recurrent pleural effusion, and was started on heparin drip. Patient had a recent fall on concrete last week when he tried to stop his dogs fighting, and since then, he noticed a small mass in the L anterior leg. Patient denies pain, fevers, chills, joint pain, or other symptoms. GS was consulted for evaluation and possible intervention. Patient is afebrile, has no leucocytosis, and CT scan (non-con 2/2 CKD) showed no drainable collections    - No surgical intervention at this point as patient does not have a drainable collection  - ACE wrap around area  - Team 4C will continue to follow      Attending aware and agrees with plan    SENIOR RESIDENT ON CALL NOTE:  Agree with above. Lesion not consistent with abscess. Likely small hematoma; no drainable collection seen on CT. Recommend ACE around area.

## 2021-01-18 NOTE — H&P ADULT - HISTORY OF PRESENT ILLNESS
This is a 66 y/o M with a PMHx of HTN, COPD, CKD (stage 4 CKD, has LUE AVF, not on HD yet), T-cell lymphoma (diagnosed 19 years ago, on chemo romidepsin every Wednesday), AF (on Eliquis at home, last dose Saturday), systolic CHF (EF 40-45% 2017), severe pHTN who recently underwent a left chest wall hematoma evacuation on 12/18/2020 secondary to loculated effusion. Hospital course at that time was complicated by AF with RVR and thoracic surgery consulted at that time for possible future intervention. Of note, recent ECHO during previous admission revealed severe mitral regurgitation. Thoracic surgery followed up with patient as an outpatient once discharged from the hospital and deemed patient a good surgical candidate. Today, on 1/18/21 patient presented to [facility] for pre-operative optimization with heparin gtt prior to OR tomorrow. Currently, patient feels well with no complaints. Denies any CP, palpitations SOB, wheezing, abd pain, n/v/d/c, fevers or chills.     Of note: patient with red cyst noted on left anterior tibia and state he was breaking up his dogs from fighting when he fell on the concert on his knee 1.5 weeks ago. About 1 week ago it started to get a little swollen and now it has turned into a larger cyst with tenderness to palpitation.     ICU Vital Signs Last 24 Hrs  T(C): 36.6 (18 Jan 2021 14:26), Max: 36.6 (18 Jan 2021 14:26)  T(F): 97.9 (18 Jan 2021 14:26), Max: 97.9 (18 Jan 2021 14:26)  HR: --  BP: --  BP(mean): --  ABP: --  ABP(mean): --  RR: --  SpO2: --

## 2021-01-18 NOTE — H&P ADULT - NSHPSOCIALHISTORY_GEN_ALL_CORE
ETOH: former smoker (quit 5 months ago, smoked <1ppd since age 14)    Smoking: yes   Illicit drugs: none  Lives with: significant other   Assistive device: Cane

## 2021-01-18 NOTE — H&P ADULT - NSHPPHYSICALEXAM_GEN_ALL_CORE
PHYSICAL EXAM:  General:  well appearing sitting in chair in NAD   HEENT: normocephalic, atraumatic, PERRL   Neurological: AOx3. Motor skills grossly intact  Cardiovascular: Normal S1/S2. Regular rate/rhythm. No murmurs  Respiratory: Lungs CTA bilaterally. No wheezing or rales  Gastrointestinal: +BS in all 4 quadrants. Non-distended. Soft. Non-tender  Extremities: Strength 5/5 b/l upper/lower extremities. Sensation grossly intact upper/lower extremities. No edema. No calf tenderness.  Vascular: Radial 2+bilaterally, DP 2+ b/l  Incision Sites: none

## 2021-01-19 LAB
ANION GAP SERPL CALC-SCNC: 15 MMOL/L — SIGNIFICANT CHANGE UP (ref 5–17)
APTT BLD: 43.1 SEC — HIGH (ref 27.5–35.5)
APTT BLD: 52.3 SEC — HIGH (ref 27.5–35.5)
APTT BLD: 62 SEC — HIGH (ref 27.5–35.5)
APTT BLD: 70.3 SEC — HIGH (ref 27.5–35.5)
BLD GP AB SCN SERPL QL: NEGATIVE — SIGNIFICANT CHANGE UP
BUN SERPL-MCNC: 37 MG/DL — HIGH (ref 7–23)
CALCIUM SERPL-MCNC: 8.7 MG/DL — SIGNIFICANT CHANGE UP (ref 8.4–10.5)
CHLORIDE SERPL-SCNC: 105 MMOL/L — SIGNIFICANT CHANGE UP (ref 96–108)
CO2 SERPL-SCNC: 21 MMOL/L — LOW (ref 22–31)
CREAT SERPL-MCNC: 4.56 MG/DL — HIGH (ref 0.5–1.3)
GLUCOSE SERPL-MCNC: 78 MG/DL — SIGNIFICANT CHANGE UP (ref 70–99)
HCT VFR BLD CALC: 31.8 % — LOW (ref 39–50)
HGB BLD-MCNC: 9.4 G/DL — LOW (ref 13–17)
INR BLD: 1.29 — HIGH (ref 0.88–1.16)
MAGNESIUM SERPL-MCNC: 2.1 MG/DL — SIGNIFICANT CHANGE UP (ref 1.6–2.6)
MCHC RBC-ENTMCNC: 29.5 PG — SIGNIFICANT CHANGE UP (ref 27–34)
MCHC RBC-ENTMCNC: 29.6 GM/DL — LOW (ref 32–36)
MCV RBC AUTO: 99.7 FL — SIGNIFICANT CHANGE UP (ref 80–100)
NRBC # BLD: 0 /100 WBCS — SIGNIFICANT CHANGE UP (ref 0–0)
PLATELET # BLD AUTO: 209 K/UL — SIGNIFICANT CHANGE UP (ref 150–400)
POTASSIUM SERPL-MCNC: 3.6 MMOL/L — SIGNIFICANT CHANGE UP (ref 3.5–5.3)
POTASSIUM SERPL-SCNC: 3.6 MMOL/L — SIGNIFICANT CHANGE UP (ref 3.5–5.3)
PROTHROM AB SERPL-ACNC: 15.3 SEC — HIGH (ref 10.6–13.6)
RBC # BLD: 3.19 M/UL — LOW (ref 4.2–5.8)
RBC # FLD: 20.3 % — HIGH (ref 10.3–14.5)
RH IG SCN BLD-IMP: POSITIVE — SIGNIFICANT CHANGE UP
SARS-COV-2 IGG SERPL QL IA: NEGATIVE — SIGNIFICANT CHANGE UP
SARS-COV-2 IGM SERPL IA-ACNC: 0.37 INDEX — SIGNIFICANT CHANGE UP
SODIUM SERPL-SCNC: 141 MMOL/L — SIGNIFICANT CHANGE UP (ref 135–145)
VANCOMYCIN TROUGH SERPL-MCNC: 10.2 UG/ML — SIGNIFICANT CHANGE UP (ref 10–20)
WBC # BLD: 6.09 K/UL — SIGNIFICANT CHANGE UP (ref 3.8–10.5)
WBC # FLD AUTO: 6.09 K/UL — SIGNIFICANT CHANGE UP (ref 3.8–10.5)

## 2021-01-19 PROCEDURE — 99232 SBSQ HOSP IP/OBS MODERATE 35: CPT

## 2021-01-19 PROCEDURE — 93970 EXTREMITY STUDY: CPT | Mod: 26

## 2021-01-19 PROCEDURE — 99231 SBSQ HOSP IP/OBS SF/LOW 25: CPT

## 2021-01-19 PROCEDURE — 71045 X-RAY EXAM CHEST 1 VIEW: CPT | Mod: 26

## 2021-01-19 PROCEDURE — 99222 1ST HOSP IP/OBS MODERATE 55: CPT | Mod: GC

## 2021-01-19 RX ORDER — VANCOMYCIN HCL 1 G
1000 VIAL (EA) INTRAVENOUS ONCE
Refills: 0 | Status: COMPLETED | OUTPATIENT
Start: 2021-01-19 | End: 2021-01-19

## 2021-01-19 RX ORDER — CHLORHEXIDINE GLUCONATE 213 G/1000ML
1 SOLUTION TOPICAL ONCE
Refills: 0 | Status: COMPLETED | OUTPATIENT
Start: 2021-01-20 | End: 2021-01-20

## 2021-01-19 RX ORDER — PIPERACILLIN AND TAZOBACTAM 4; .5 G/20ML; G/20ML
2.25 INJECTION, POWDER, LYOPHILIZED, FOR SOLUTION INTRAVENOUS EVERY 8 HOURS
Refills: 0 | Status: DISCONTINUED | OUTPATIENT
Start: 2021-01-19 | End: 2021-01-19

## 2021-01-19 RX ORDER — CHLORHEXIDINE GLUCONATE 213 G/1000ML
1 SOLUTION TOPICAL ONCE
Refills: 0 | Status: COMPLETED | OUTPATIENT
Start: 2021-01-19 | End: 2021-01-19

## 2021-01-19 RX ORDER — VANCOMYCIN HCL 1 G
VIAL (EA) INTRAVENOUS
Refills: 0 | Status: DISCONTINUED | OUTPATIENT
Start: 2021-01-19 | End: 2021-01-19

## 2021-01-19 RX ORDER — CHLORHEXIDINE GLUCONATE 213 G/1000ML
10 SOLUTION TOPICAL ONCE
Refills: 0 | Status: COMPLETED | OUTPATIENT
Start: 2021-01-19 | End: 2021-01-19

## 2021-01-19 RX ORDER — PIPERACILLIN AND TAZOBACTAM 4; .5 G/20ML; G/20ML
3.38 INJECTION, POWDER, LYOPHILIZED, FOR SOLUTION INTRAVENOUS EVERY 6 HOURS
Refills: 0 | Status: DISCONTINUED | OUTPATIENT
Start: 2021-01-19 | End: 2021-01-19

## 2021-01-19 RX ADMIN — CHLORHEXIDINE GLUCONATE 1 APPLICATION(S): 213 SOLUTION TOPICAL at 05:16

## 2021-01-19 RX ADMIN — CHLORHEXIDINE GLUCONATE 10 MILLILITER(S): 213 SOLUTION TOPICAL at 05:18

## 2021-01-19 RX ADMIN — CHLORHEXIDINE GLUCONATE 1 APPLICATION(S): 213 SOLUTION TOPICAL at 22:18

## 2021-01-19 RX ADMIN — AMIODARONE HYDROCHLORIDE 200 MILLIGRAM(S): 400 TABLET ORAL at 05:17

## 2021-01-19 RX ADMIN — Medication 2 MILLIGRAM(S): at 22:17

## 2021-01-19 RX ADMIN — SODIUM CHLORIDE 3 MILLILITER(S): 9 INJECTION INTRAMUSCULAR; INTRAVENOUS; SUBCUTANEOUS at 13:35

## 2021-01-19 RX ADMIN — FAMOTIDINE 10 MILLIGRAM(S): 10 INJECTION INTRAVENOUS at 10:53

## 2021-01-19 RX ADMIN — Medication 200 MILLIGRAM(S): at 10:53

## 2021-01-19 RX ADMIN — SENNA PLUS 2 TABLET(S): 8.6 TABLET ORAL at 22:17

## 2021-01-19 RX ADMIN — CHLORHEXIDINE GLUCONATE 1 APPLICATION(S): 213 SOLUTION TOPICAL at 21:19

## 2021-01-19 RX ADMIN — Medication 200 MILLIGRAM(S): at 05:17

## 2021-01-19 RX ADMIN — SODIUM CHLORIDE 3 MILLILITER(S): 9 INJECTION INTRAMUSCULAR; INTRAVENOUS; SUBCUTANEOUS at 06:00

## 2021-01-19 RX ADMIN — PIPERACILLIN AND TAZOBACTAM 200 GRAM(S): 4; .5 INJECTION, POWDER, LYOPHILIZED, FOR SOLUTION INTRAVENOUS at 13:53

## 2021-01-19 RX ADMIN — PIPERACILLIN AND TAZOBACTAM 200 GRAM(S): 4; .5 INJECTION, POWDER, LYOPHILIZED, FOR SOLUTION INTRAVENOUS at 05:17

## 2021-01-19 RX ADMIN — CALCITRIOL 0.25 MICROGRAM(S): 0.5 CAPSULE ORAL at 10:54

## 2021-01-19 RX ADMIN — Medication 250 MILLIGRAM(S): at 18:33

## 2021-01-19 RX ADMIN — SODIUM CHLORIDE 3 MILLILITER(S): 9 INJECTION INTRAMUSCULAR; INTRAVENOUS; SUBCUTANEOUS at 22:18

## 2021-01-19 RX ADMIN — Medication 650 MILLIGRAM(S): at 17:38

## 2021-01-19 RX ADMIN — Medication 200 MILLIGRAM(S): at 22:17

## 2021-01-19 RX ADMIN — CHLORHEXIDINE GLUCONATE 10 MILLILITER(S): 213 SOLUTION TOPICAL at 22:18

## 2021-01-19 RX ADMIN — Medication 20 MILLIGRAM(S): at 05:17

## 2021-01-19 RX ADMIN — HEPARIN SODIUM 11 UNIT(S)/HR: 5000 INJECTION INTRAVENOUS; SUBCUTANEOUS at 15:37

## 2021-01-19 NOTE — PROGRESS NOTE ADULT - SUBJECTIVE AND OBJECTIVE BOX
Patient discussed on morning rounds with Dr. Harmon     Operation / Date: preop for OR today VATS decortication, pleurodesis    SUBJECTIVE ASSESSMENT:  65y Male. Left upper shin/below the knee red swelling protrusion; CT negative for abscess. Orthopedics and general surgery consulted to weigh in - NTD per both teams. Patient going for bilateral LE u/s. Heparin drip held for OR. Patient NPO.      Vital Signs Last 24 Hrs  T(C): 37.1 (2021 09:08), Max: 37.1 (2021 09:08)  T(F): 98.7 (2021 09:08), Max: 98.7 (2021 09:08)  HR: 65 (2021 09:08) (60 - 67)  BP: 169/96 (2021 09:08) (154/79 - 188/100)  BP(mean): 128 (2021 09:08) (111 - 137)  RR: 17 (2021 09:08) (17 - 18)  SpO2: 95% (2021 09:08) (94% - 100%)  I&O's Detail    2021 07:01  -  2021 07:00  --------------------------------------------------------  IN:    Heparin: 140 mL    IV PiggyBack: 550 mL  Total IN: 690 mL    OUT:    Voided (mL): 1600 mL  Total OUT: 1600 mL    Total NET: -910 mL      2021 07:01  -  2021 10:41  --------------------------------------------------------  IN:    Heparin: 22 mL  Total IN: 22 mL    OUT:    Voided (mL): 200 mL  Total OUT: 200 mL    Total NET: -178 mL    CHEST TUBE:  No.   ROMARIO DRAIN:  No.  EPICARDIAL WIRES: No.  TIE DOWNS: No.  BACK: No.    PHYSICAL EXAM:    General: NAD, lying in bed, comfortable.    Neurological: AOx3, no focal neuro deficits; motor skills intact.    Cardiovascular: regular rate and rhythm, 2/6 systolic murmur appreciated, no gallops, rubs.    Respiratory: Right side CTA; left side absent basilar breath sounds; no wheezing, rales, rhonchi.    Gastrointestinal: soft, NTND; +BS throughout.    Extremities: WWP; no LE edema right side, left leg with erythematous protrusion and LE edema 1+.    Vascular: distal pulses intact b/l.    LABS:                        9.4    6.09  )-----------( 209      ( 2021 07:11 )             31.8       COUMADIN:  No. REASON: Heparin gtt    PT/INR - ( 2021 07:11 )   PT: 15.3 sec;   INR: 1.29          PTT - ( 2021 07:11 )  PTT:52.3 sec        141  |  105  |  37<H>  ----------------------------<  78  3.6   |  21<L>  |  4.56<H>    Ca    8.7      2021 07:11  Mg     2.1         TPro  7.2  /  Alb  3.5  /  TBili  0.6  /  DBili  x   /  AST  9<L>  /  ALT  <5<L>  /  AlkPhos  97        Urinalysis Basic - ( 2021 15:19 )    Color: Yellow / Appearance: Clear / S.025 / pH: x  Gluc: x / Ketone: NEGATIVE  / Bili: Negative / Urobili: 0.2 E.U./dL   Blood: x / Protein: 100 mg/dL / Nitrite: NEGATIVE   Leuk Esterase: Trace / RBC: < 5 /HPF / WBC 5-10 /HPF   Sq Epi: x / Non Sq Epi: 0-5 /HPF / Bacteria: Present /HPF      MEDICATIONS  (STANDING):  allopurinol 200 milliGRAM(s) Oral every 12 hours  aMIOdarone    Tablet 200 milliGRAM(s) Oral daily  calcitriol   Capsule 0.25 MICROGram(s) Oral daily  doxazosin 2 milliGRAM(s) Oral at bedtime  famotidine    Tablet 10 milliGRAM(s) Oral daily  heparin  Infusion 800 Unit(s)/Hr (11 mL/Hr) IV Continuous <Continuous>  metoprolol succinate  milliGRAM(s) Oral daily  piperacillin/tazobactam IVPB.. 2.25 Gram(s) IV Intermittent every 8 hours  senna 2 Tablet(s) Oral at bedtime  sodium bicarbonate 650 milliGRAM(s) Oral daily  sodium chloride 0.9% lock flush 3 milliLiter(s) IV Push every 8 hours  torsemide 20 milliGRAM(s) Oral daily    MEDICATIONS  (PRN):  acetaminophen   Tablet .. 650 milliGRAM(s) Oral every 6 hours PRN Temp greater or equal to 38C (100.4F), Mild Pain (1 - 3)      RADIOLOGY & ADDITIONAL TESTS:  < from: CT Lower Extremity No Cont, Left (21 @ 18:59) >  FINDINGS: CT of the left knee was performed in the axial plane. Reconstructions were performed in the sagittal and coronal planes. No prior study available for comparison.    Evaluation of the knee demonstrate severe edematous infiltration of the subcutaneous tissues of the distal thigh, calf and surrounding the knee joint; there is small focal fluid collection spanning 1.4 cm located superficial to the distal quadriceps tendon. There is severe arterial vascular calcification. There is varicoid venous dilatation of the popliteal vein. There is no evidence of focal osteopenia or cortical destruction to suggest osteomyelitis. There is bipartite patella with degenerative change at the pseudoarthrosis. There is mild narrowing of the medial compartment. Fatty atrophy of the musculature.    IMPRESSION:    Severe cellulitis with small seroma superficial to the distal aspect of the patellar tendon.    < end of copied text >    < from: Xray Chest 1 View-PORTABLE IMMEDIATE (Xray Chest 1 View-PORTABLE IMMEDIATE .) (21 @ 15:04) >  IMPRESSION:    Persistent large opacity in the mid and lower left lung field consistent with pleural effusion and underlying pulmonary consolidation.    Development of small new right pleural effusion and right basilar atelectasis.    < end of copied text >

## 2021-01-19 NOTE — CONSULT NOTE ADULT - SUBJECTIVE AND OBJECTIVE BOX
Orthopaedic Surgery Consult Note    CC: Patient is a 65y old  Male who presents with a chief complaint of left leg "bump"      HPI:  66yo Male PMH AFib on Eliquis, CKD (not yet on HD), COPD, HTN, T-cell lymphoma admitted yesterday to CT surgery service due to need for possible surgical treatment of a hemothorax. On admission, he complained of a "bump" on his left leg. He states that 2 weeks ago, he was taking his dog out when the dog pulled him and he fell forward onto his knees. For a week or so later, he had some swelling and ecchymoses, but states that this has improved. He has been ambulating with a cane which is his baseline. He denies fevers/chills, numbness/tingling, knee pain, inability to ambulate. He denies previous orthopedic medical history.    ROS: Positive for left leg "bump"  Denies fevers, chills, headache, dizziness, chest pain, shortness of breath, nausea, vomiting.   All other systems negative, except for above.     Allergies  No Known Allergies    PAST MEDICAL & SURGICAL HISTORY:  BPH (benign prostatic hyperplasia)  Gout  H/O pulmonary hypertension  CHF, chronic  Lymphoma  t cell; Chemotherapy weekly- wednesday  CKD (chronic kidney disease)  Atrial fibrillation  HTN (hypertension)  rectal surgery  History of cholecystectomy    Social History:  ETOH: former smoker (quit 5 months ago, smoked <1ppd since age 14)    Illicit drugs: denies  Denies EtOH use    FAMILY HISTORY:  No pertinent family history in first degree relatives of bleeding disorders    Vital Signs Last 24 Hrs  T(C): 36.9 (19 Jan 2021 05:01), Max: 36.9 (19 Jan 2021 05:01)  T(F): 98.4 (19 Jan 2021 05:01), Max: 98.4 (19 Jan 2021 05:01)  HR: 60 (19 Jan 2021 07:40) (60 - 67)  BP: 168/87 (19 Jan 2021 07:40) (154/79 - 188/100)  BP(mean): 122 (19 Jan 2021 07:40) (111 - 137)  RR: 18 (19 Jan 2021 07:40) (18 - 18)  SpO2: 98% (19 Jan 2021 07:40) (94% - 100%)    PE:  General: Well developed, well nourished, in no acute distress, comfortable  Neuro: Alert, oriented x 3  Psych: Normal mood & affect   Skin: Warm, dry, extremities well perfused, no obvious rash  MSK:   LLE - 4cm x 4cm fluctuant mass on the anterior superior aspect of the left tibia without tenderness, erythema or warmth, sensation intact to light touch, 5/5 EHL/FHL/TA/GS, 2+ DP pulse, no open wounds/ecchymoses, no tenderness throughout lower extremity, FROM of knee without pain, no instability or laxity of joints, no pitting edema, no palpable or visible effusion  RLE - sensation intact to light touch, 5/5 EHL/FHL/TA/GS, 2+ DP pulse, no open wounds/erythema/ecchymoses, no tenderness throughout lower extremity, FROM without pain, no instability or laxity of joints, no pitting edema                          9.4    6.09  )-----------( 209      ( 19 Jan 2021 07:11 )             31.8     01-19    141  |  105  |  37<H>  ----------------------------<  78  3.6   |  21<L>  |  4.56<H>    Ca    8.7      19 Jan 2021 07:11  Mg     2.1     01-19    TPro  7.2  /  Alb  3.5  /  TBili  0.6  /  DBili  x   /  AST  9<L>  /  ALT  <5<L>  /  AlkPhos  97  01-18    PT/INR - ( 19 Jan 2021 07:11 )   PT: 15.3 sec;   INR: 1.29          PTT - ( 19 Jan 2021 07:11 )  PTT:52.3 sec    Imaging: CT LLE demonstrates pretibial skin thickening without evidence of fluid, no communication with the joint, no effusion    A/P: 66yo Male with left lower extremity mass, likely a hematoma  - No acute orthopedic surgical intervention at this time.   - No evidence of septic joint, knee effusion, or other knee pathology   - WBAT LLE   - No orthopedic reason why patient cannot proceed to the OR today with CT surgery  - Reviewed imaging and lab data   - Above plan discussed with CT surgery PA and Ortho attending Dr. Santos     Ortho Pager [phone]

## 2021-01-19 NOTE — CONSULT NOTE ADULT - ASSESSMENT
Note in progress 65M with a PMHx of HTN, COPD, CKD (stage 4 CKD, has LUE AVF, not on HD yet), T-cell lymphoma (diagnosed 19 years ago, on chemo romidepsin every Wednesday), AFib (on Eliquis at home, last dose Saturday), systolic CHF (EF 40-45% 2017), severe pHTN presented for scheduled VATS procedure for pleuracentesis, found to have cellulitis of the left lower extremity. Given 1 dose of zosyn and ID consulted for further evaluation and treatment.    PLAN  Note in progress 65M with a PMHx of HTN, COPD, CKD (stage 4 CKD, has LUE AVF, not on HD yet), T-cell lymphoma (diagnosed 19 years ago, on chemo romidepsin every Wednesday), AFib (on Eliquis at home, last dose Saturday), systolic CHF (EF 40-45% 2017), severe pHTN presented for scheduled VATS procedure for pleuracentesis, found to have cellulitis of the left lower extremity. Given 1 dose of zosyn and ID consulted for further evaluation and treatment.    PLAN  - Please I/D fluctuant area and send sample for culture  - D/c zosyn, abx to follow sensitivities of culture results 65M with a PMHx of HTN, COPD, CKD (stage 4 CKD, has LUE AVF, not on HD yet), T-cell lymphoma (diagnosed 19 years ago, on chemo romidepsin every Wednesday), AFib (on Eliquis at home, last dose Saturday), systolic CHF (EF 40-45% 2017), severe pHTN presented for scheduled VATS procedure for pleuracentesis, found to have cellulitis of the left lower extremity. Given 1 dose of zosyn and ID consulted for further evaluation and treatment.    PLAN  - Please I/D fluctuant area and send sample for culture  - D/c zosyn  - Please give vancomycin 15 mg/kg 1x and obtain vanc trough in 24 hrs  - Obtain blood cultures

## 2021-01-19 NOTE — PROGRESS NOTE ADULT - SUBJECTIVE AND OBJECTIVE BOX
INTERVAL HPI/OVERNIGHT EVENTS: Pt seen and examined at bedside this AM. He reports he is feeling well, does not have any pain near the left knee swelling. States it is unchanged for the past several days. Has not been experiencing fevers or chills. Will go to OR today with Thoracic surgery.       MEDICATIONS  (STANDING):  allopurinol 200 milliGRAM(s) Oral every 12 hours  aMIOdarone    Tablet 200 milliGRAM(s) Oral daily  calcitriol   Capsule 0.25 MICROGram(s) Oral daily  doxazosin 2 milliGRAM(s) Oral at bedtime  famotidine    Tablet 10 milliGRAM(s) Oral daily  heparin  Infusion 800 Unit(s)/Hr (11 mL/Hr) IV Continuous <Continuous>  metoprolol succinate  milliGRAM(s) Oral daily  piperacillin/tazobactam IVPB.. 2.25 Gram(s) IV Intermittent every 8 hours  senna 2 Tablet(s) Oral at bedtime  sodium bicarbonate 650 milliGRAM(s) Oral daily  sodium chloride 0.9% lock flush 3 milliLiter(s) IV Push every 8 hours  torsemide 20 milliGRAM(s) Oral daily    MEDICATIONS  (PRN):  acetaminophen   Tablet .. 650 milliGRAM(s) Oral every 6 hours PRN Temp greater or equal to 38C (100.4F), Mild Pain (1 - 3)      Vital Signs Last 24 Hrs  T(C): 37.1 (2021 09:08), Max: 37.1 (2021 09:08)  T(F): 98.7 (2021 09:08), Max: 98.7 (2021 09:08)  HR: 65 (2021 09:08) (60 - 67)  BP: 169/96 (2021 09:08) (154/79 - 188/100)  BP(mean): 128 (2021 09:08) (111 - 137)  RR: 17 (2021 09:08) (17 - 18)  SpO2: 95% (2021 09:08) (94% - 100%)    PHYSICAL EXAM:      Constitutional: A&Ox3, pleasant    Respiratory: non labored breathing, no respiratory distress    Cardiovascular: NSR, RRR    Gastrointestinal: soft, non-tender, non-distended                 Incision:    Extremities: 2x2cm soft area of swelling on patella, non-tender, minimal erythema but not extending past the lesion itself. Small edema on left extremity.                   I&O's Detail    2021 07:01  -  2021 07:00  --------------------------------------------------------  IN:    Heparin: 140 mL    IV PiggyBack: 550 mL  Total IN: 690 mL    OUT:    Voided (mL): 1600 mL  Total OUT: 1600 mL    Total NET: -910 mL      2021 07:01  -  2021 09:48  --------------------------------------------------------  IN:    Heparin: 22 mL  Total IN: 22 mL    OUT:    Voided (mL): 200 mL  Total OUT: 200 mL    Total NET: -178 mL          LABS:                        9.4    6.09  )-----------( 209      ( 2021 07:11 )             31.8         141  |  105  |  37<H>  ----------------------------<  78  3.6   |  21<L>  |  4.56<H>    Ca    8.7      2021 07:11  Mg     2.1         TPro  7.2  /  Alb  3.5  /  TBili  0.6  /  DBili  x   /  AST  9<L>  /  ALT  <5<L>  /  AlkPhos  97  -18    PT/INR - ( 2021 07:11 )   PT: 15.3 sec;   INR: 1.29          PTT - ( 2021 07:11 )  PTT:52.3 sec  Urinalysis Basic - ( 2021 15:19 )    Color: Yellow / Appearance: Clear / S.025 / pH: x  Gluc: x / Ketone: NEGATIVE  / Bili: Negative / Urobili: 0.2 E.U./dL   Blood: x / Protein: 100 mg/dL / Nitrite: NEGATIVE   Leuk Esterase: Trace / RBC: < 5 /HPF / WBC 5-10 /HPF   Sq Epi: x / Non Sq Epi: 0-5 /HPF / Bacteria: Present /HPF        RADIOLOGY & ADDITIONAL STUDIES:

## 2021-01-19 NOTE — PROGRESS NOTE ADULT - ASSESSMENT
Shahid Guzman PA Tele PA Patient is a 66 y/o M with PMH of HTN, COPD, CKD (with AVF but not on HD yet), T-cell lymphoma on chemo every Wednesday, AF on Eliquis at home, CHF (EF 40-45%), Pulm HTN, recent L chest wall hematoma evacuation, severe MR, who was admitted today for scheduled VATS tomorrow for recurrent pleural effusion, and was started on heparin drip. Patient had a recent fall on concrete last week when he tried to stop his dogs fighting, and since then, he noticed a small mass in the L anterior leg. Patient denies pain, fevers, chills, joint pain, or other symptoms. GS was consulted for evaluation and possible intervention. Patient is afebrile, has no leucocytosis, and CT scan (non-con 2/2 CKD) showed no drainable collections    - No surgical intervention at this point as patient does not have a drainable collection  - ACE wrap around area  - Team 4C will continue to follow  - Plan discussed with chief resident and attending

## 2021-01-19 NOTE — CONSULT NOTE ADULT - SUBJECTIVE AND OBJECTIVE BOX
Patient is a 65y old  Male who presents with a chief complaint of VATS, decortication, pleurodesis (2021 08:26)    HPI:  This is a 66 y/o M with a PMHx of HTN, COPD, CKD (stage 4 CKD, has LUE AVF, not on HD yet), T-cell lymphoma (diagnosed 19 years ago, on chemo romidepsin every Wednesday), AF (on Eliquis at home, last dose Saturday), systolic CHF (EF 40-45% ), severe pHTN who recently underwent a left chest wall hematoma evacuation on 2020 secondary to loculated effusion. Hospital course at that time was complicated by AF with RVR and thoracic surgery consulted at that time for possible future intervention. Of note, recent ECHO during previous admission revealed severe mitral regurgitation. Thoracic surgery followed up with patient as an outpatient once discharged from the hospital and deemed patient a good surgical candidate. Today, on 21 patient presented to [facility] for pre-operative optimization with heparin gtt prior to OR tomorrow. Currently, patient feels well with no complaints. Denies any CP, palpitations SOB, wheezing, abd pain, n/v/d/c, fevers or chills.     Renal consulted for CKD management- preop optimisation.    ICU Vital Signs Last 24 Hrs  T(C): 36.6 (2021 14:26), Max: 36.6 (2021 14:26)  T(F): 97.9 (2021 14:26), Max: 97.9 (2021 14:26)  HR: --  BP: --  BP(mean): --  ABP: --  ABP(mean): --  RR: --  SpO2: --   (2021 14:33)      PAST MEDICAL & SURGICAL HISTORY:  BPH (benign prostatic hyperplasia)    Gout    H/O pulmonary hypertension    CHF, chronic    Lymphoma  t cell; Chemotherapy weekly- wednesday    CKD (chronic kidney disease)    Atrial fibrillation    HTN (hypertension)    Elective surgery  rectal surgery    History of cholecystectomy        Allergies:  No Known Allergies      Home Medications:   acetaminophen   Tablet .. 650 milliGRAM(s) Oral every 6 hours PRN  allopurinol 200 milliGRAM(s) Oral every 12 hours  aMIOdarone    Tablet 200 milliGRAM(s) Oral daily  calcitriol   Capsule 0.25 MICROGram(s) Oral daily  doxazosin 2 milliGRAM(s) Oral at bedtime  famotidine    Tablet 10 milliGRAM(s) Oral daily  heparin  Infusion 800 Unit(s)/Hr IV Continuous <Continuous>  metoprolol succinate  milliGRAM(s) Oral daily  piperacillin/tazobactam IVPB.. 2.25 Gram(s) IV Intermittent every 8 hours  senna 2 Tablet(s) Oral at bedtime  sodium bicarbonate 650 milliGRAM(s) Oral daily  sodium chloride 0.9% lock flush 3 milliLiter(s) IV Push every 8 hours  torsemide 20 milliGRAM(s) Oral daily      Hospital Medications:   MEDICATIONS  (STANDING):  allopurinol 200 milliGRAM(s) Oral every 12 hours  aMIOdarone    Tablet 200 milliGRAM(s) Oral daily  calcitriol   Capsule 0.25 MICROGram(s) Oral daily  doxazosin 2 milliGRAM(s) Oral at bedtime  famotidine    Tablet 10 milliGRAM(s) Oral daily  heparin  Infusion 800 Unit(s)/Hr (11 mL/Hr) IV Continuous <Continuous>  metoprolol succinate  milliGRAM(s) Oral daily  piperacillin/tazobactam IVPB.. 2.25 Gram(s) IV Intermittent every 8 hours  senna 2 Tablet(s) Oral at bedtime  sodium bicarbonate 650 milliGRAM(s) Oral daily  sodium chloride 0.9% lock flush 3 milliLiter(s) IV Push every 8 hours  torsemide 20 milliGRAM(s) Oral daily      SOCIAL HISTORY:  Denies ETOh, Smoking,     Family History:  FAMILY HISTORY:  No pertinent family history in first degree relatives        ROS:  RESPIRATORY: No shortness of breath, cough  CARDIOVASCULAR: No Chest pain  MUSCULOSKELETAL: +Left knee pain, No leg swelling    VITALS:  T(F): 98.7 (21 @ 09:08), Max: 98.7 (21 @ 09:08)  HR: 65 (21 @ 09:08)  BP: 169/96 (21 @ 09:08)  RR: 17 (21 @ 09:08)  SpO2: 95% (21 @ 09:08)  Wt(kg): --     @ 07:01  -   @ 07:00  --------------------------------------------------------  IN: 690 mL / OUT: 1600 mL / NET: -910 mL     @ 07:01  -   @ 09:34  --------------------------------------------------------  IN: 22 mL / OUT: 200 mL / NET: -178 mL      Height (cm): 180.3 ( @ 14:26)  Weight (kg): 72.2 (:)  BMI (kg/m2): 22.2 ( @ :)  BSA (m2): 1.91 (:)  CAPILLARY BLOOD GLUCOSE        PHYSICAL EXAM:  GENERAL: Alert, awake, oriented x3 on RA  CHEST/LUNG: Distant breath sounds, no rales/rhonci  HEART: Regular rate and rhythm, no murmur  ABDOMEN: Soft, nontender, non distended  : No flank or supra pubic tenderness.  EXTREMITIES: +pedal oedema  ACCESS: LUE AVF w/bruit and thrill     LABS:      141  |  105  |  37<H>  ----------------------------<  78  3.6   |  21<L>  |  4.56<H>    Ca    8.7      2021 07:11  Mg     2.1         TPro  7.2  /  Alb  3.5  /  TBili  0.6  /  DBili      /  AST  9<L>  /  ALT  <5<L>  /  AlkPhos  97      Creatinine Trend: 4.56 <--, 4.51 <--                        9.4    6.09  )-----------( 209      ( 2021 07:11 )             31.8       Urine Studies:  Urinalysis Basic - ( 2021 15:19 )    Color: Yellow / Appearance: Clear / S.025 / pH:   Gluc:  / Ketone: NEGATIVE  / Bili: Negative / Urobili: 0.2 E.U./dL   Blood:  / Protein: 100 mg/dL / Nitrite: NEGATIVE   Leuk Esterase: Trace / RBC: < 5 /HPF / WBC 5-10 /HPF   Sq Epi:  / Non Sq Epi: 0-5 /HPF / Bacteria: Present /HPF            21 @ 07:01  -  21 @ 07:00  --------------------------------------------------------  IN: 690 mL / OUT: 1600 mL / NET: -910 mL    21 @ 07:21 @ 09:34  --------------------------------------------------------  IN: 22 mL / OUT: 200 mL / NET: -178 mL        RADIOLOGY & ADDITIONAL STUDIES:    EKG findings reviewed.    Imaging Personally Reviewed:  [x] YES  [ ] NO    Consultant(s) and primary physician Notes Reviewed:  [x] YES  [ ] NO    Care Discussed with Primary team/ Consultants/Other Providers [x] YES  [ ] NO    Assessment/Plan:   66 y/o M with a PMHx of HTN, COPD, CKD (stage 4 CKD, has LUE AVF, not on HD yet), T-cell lymphoma (diagnosed 19 years ago, on chemo romidepsin every Wednesday), AF (on Eliquis at home, last dose Saturday), systolic CHF (EF 40-45% 2017), severe pHTN who is admitted wiht plan for VATS for recurrent pleural effusion. Renal consulted for CKD management- preop optimisation.    INCOMPLETE    Thank you for the opportunity to participate in the care of your patient. The nephrology service remains available to assist with any questions or concerns. Please feel free to reach us by paging the on-call nephrology fellow for urgent issues or as below.     Christine Braxton M.D.   PGY-4  Pager: [phone] Patient is a 65y old  Male who presents with a chief complaint of VATS, decortication, pleurodesis (2021 08:26)    HPI:  This is a 64 y/o M with a PMHx of HTN, COPD, CKD (stage 4 CKD, has LUE AVF, not on HD yet), T-cell lymphoma (diagnosed 19 years ago, on chemo romidepsin every Wednesday), AF (on Eliquis at home, last dose Saturday), systolic CHF (EF 40-45% ), severe pHTN who recently underwent a left chest wall hematoma evacuation on 2020 secondary to loculated effusion. Hospital course at that time was complicated by AF with RVR and thoracic surgery consulted at that time for possible future intervention. Of note, recent ECHO during previous admission revealed severe mitral regurgitation. Thoracic surgery followed up with patient as an outpatient once discharged from the hospital and deemed patient a good surgical candidate. Today, on 21 patient presented to [facility] for pre-operative optimization with heparin gtt prior to OR tomorrow. Currently, patient feels well with no complaints. Denies any CP, palpitations SOB, wheezing, abd pain, n/v/d/c, fevers or chills.     Renal consulted for CKD management- preop optimisation.    ICU Vital Signs Last 24 Hrs  T(C): 36.6 (2021 14:26), Max: 36.6 (2021 14:26)  T(F): 97.9 (2021 14:26), Max: 97.9 (2021 14:26)  HR: --  BP: --  BP(mean): --  ABP: --  ABP(mean): --  RR: --  SpO2: --   (2021 14:33)      PAST MEDICAL & SURGICAL HISTORY:  BPH (benign prostatic hyperplasia)    Gout    H/O pulmonary hypertension    CHF, chronic    Lymphoma  t cell; Chemotherapy weekly- wednesday    CKD (chronic kidney disease)    Atrial fibrillation    HTN (hypertension)    Elective surgery  rectal surgery    History of cholecystectomy        Allergies:  No Known Allergies      Home Medications:   acetaminophen   Tablet .. 650 milliGRAM(s) Oral every 6 hours PRN  allopurinol 200 milliGRAM(s) Oral every 12 hours  aMIOdarone    Tablet 200 milliGRAM(s) Oral daily  calcitriol   Capsule 0.25 MICROGram(s) Oral daily  doxazosin 2 milliGRAM(s) Oral at bedtime  famotidine    Tablet 10 milliGRAM(s) Oral daily  heparin  Infusion 800 Unit(s)/Hr IV Continuous <Continuous>  metoprolol succinate  milliGRAM(s) Oral daily  piperacillin/tazobactam IVPB.. 2.25 Gram(s) IV Intermittent every 8 hours  senna 2 Tablet(s) Oral at bedtime  sodium bicarbonate 650 milliGRAM(s) Oral daily  sodium chloride 0.9% lock flush 3 milliLiter(s) IV Push every 8 hours  torsemide 20 milliGRAM(s) Oral daily      Hospital Medications:   MEDICATIONS  (STANDING):  allopurinol 200 milliGRAM(s) Oral every 12 hours  aMIOdarone    Tablet 200 milliGRAM(s) Oral daily  calcitriol   Capsule 0.25 MICROGram(s) Oral daily  doxazosin 2 milliGRAM(s) Oral at bedtime  famotidine    Tablet 10 milliGRAM(s) Oral daily  heparin  Infusion 800 Unit(s)/Hr (11 mL/Hr) IV Continuous <Continuous>  metoprolol succinate  milliGRAM(s) Oral daily  piperacillin/tazobactam IVPB.. 2.25 Gram(s) IV Intermittent every 8 hours  senna 2 Tablet(s) Oral at bedtime  sodium bicarbonate 650 milliGRAM(s) Oral daily  sodium chloride 0.9% lock flush 3 milliLiter(s) IV Push every 8 hours  torsemide 20 milliGRAM(s) Oral daily      SOCIAL HISTORY:  Denies ETOh, Smoking,     Family History:  FAMILY HISTORY:  No pertinent family history in first degree relatives        ROS:  RESPIRATORY: No shortness of breath, cough  CARDIOVASCULAR: No Chest pain  MUSCULOSKELETAL: +Left knee pain, No leg swelling    VITALS:  T(F): 98.7 (21 @ 09:08), Max: 98.7 (21 @ 09:08)  HR: 65 (21 @ 09:08)  BP: 169/96 (21 @ 09:08)  RR: 17 (21 @ 09:08)  SpO2: 95% (21 @ 09:08)  Wt(kg): --     @ 07:01  -   @ 07:00  --------------------------------------------------------  IN: 690 mL / OUT: 1600 mL / NET: -910 mL     @ 07:01  -   @ 09:34  --------------------------------------------------------  IN: 22 mL / OUT: 200 mL / NET: -178 mL      Height (cm): 180.3 ( @ 14:26)  Weight (kg): 72.2 (:)  BMI (kg/m2): 22.2 ( @ :)  BSA (m2): 1.91 (:)  CAPILLARY BLOOD GLUCOSE        PHYSICAL EXAM:  GENERAL: Alert, awake, oriented x3 on RA  CHEST/LUNG: Distant breath sounds, no rales/rhonci  HEART: Regular rate and rhythm, no murmur  ABDOMEN: Soft, nontender, non distended  : No flank or supra pubic tenderness.  EXTREMITIES: +pedal oedema  ACCESS: LUE AVF w/bruit and thrill     LABS:      141  |  105  |  37<H>  ----------------------------<  78  3.6   |  21<L>  |  4.56<H>    Ca    8.7      2021 07:11  Mg     2.1         TPro  7.2  /  Alb  3.5  /  TBili  0.6  /  DBili      /  AST  9<L>  /  ALT  <5<L>  /  AlkPhos  97      Creatinine Trend: 4.56 <--, 4.51 <--                        9.4    6.09  )-----------( 209      ( 2021 07:11 )             31.8       Urine Studies:  Urinalysis Basic - ( 2021 15:19 )    Color: Yellow / Appearance: Clear / S.025 / pH:   Gluc:  / Ketone: NEGATIVE  / Bili: Negative / Urobili: 0.2 E.U./dL   Blood:  / Protein: 100 mg/dL / Nitrite: NEGATIVE   Leuk Esterase: Trace / RBC: < 5 /HPF / WBC 5-10 /HPF   Sq Epi:  / Non Sq Epi: 0-5 /HPF / Bacteria: Present /HPF            21 @ 07:01  -  21 @ 07:00  --------------------------------------------------------  IN: 690 mL / OUT: 1600 mL / NET: -910 mL    21 @ 07:21 @ 09:34  --------------------------------------------------------  IN: 22 mL / OUT: 200 mL / NET: -178 mL        RADIOLOGY & ADDITIONAL STUDIES:    EKG findings reviewed.    Imaging Personally Reviewed:  [x] YES  [ ] NO    Consultant(s) and primary physician Notes Reviewed:  [x] YES  [ ] NO    Care Discussed with Primary team/ Consultants/Other Providers [x] YES  [ ] NO    Assessment/Plan:   64 y/o M with a PMHx of HTN, COPD, CKD (stage 4 CKD, has LUE AVF, not on HD yet), T-cell lymphoma (diagnosed 19 years ago, on chemo romidepsin every Wednesday), AF (on Eliquis at home, last dose Saturday), systolic CHF (EF 40-45% ), severe pHTN who is admitted wiht plan for VATS for recurrent pleural effusion. Renal consulted for CKD management- preop optimisation.    Stable CKD4, creat 4.5  S/p LUE AVF creation in 2020  No urgent indication for HD at this time  Euvolaemic on home torsemide 20mg  Electrolytes acceptable  Bicarb at goal on 650mg daily  BP: hypertensive- possibly pain related (2/2 knee pain), on toprol 200  Anaemia- Hgb 9; check iron panel and ferritin. may benefit from epo  BMD: calcitriol 0.25 daily    Daily weights, strict I&Os, renally dose meds (check zosyn dosing with pharmacy)  Plan for VATS tomorrow    Thank you for the opportunity to participate in the care of your patient. The nephrology service remains available to assist with any questions or concerns. Please feel free to reach us by paging the on-call nephrology fellow for urgent issues or as below.     Christine Braxton M.D.   PGY-4  Pager: [phone]

## 2021-01-19 NOTE — CONSULT NOTE ADULT - SUBJECTIVE AND OBJECTIVE BOX
HPI  65M with a PMHx of HTN, COPD, CKD (stage 4 CKD, has LUE AVF, not on HD yet), T-cell lymphoma (diagnosed 19 years ago, on chemo romidepsin every Wednesday), AFib (on Eliquis at home, last dose Saturday), systolic CHF (EF 40-45% ), severe pHTN who recently underwent a left chest wall hematoma evacuation on 2020 secondary to loculated effusion. On 21 patient presented to [facility] for pre-operative optimization with heparin gtt prior to OR  for VATS. Surgery noted red area on left anterior tibia, patient reported falling on concrete 1 1/2 wks ago, followed by swelling and tenderness. CT revealed cellulitis and patient started on vancomycin and zosyn with ID consulted for further management recommendations     .  VITAL SIGNS:  T(C): 37.1 (21 @ 09:08), Max: 37.1 (21 @ 09:08)  T(F): 98.7 (21 @ 09:08), Max: 98.7 (21 @ 09:08)  HR: 65 (21 @ 09:08) (60 - 67)  BP: 169/96 (21 @ 09:08) (154/79 - 188/100)  BP(mean): 128 (21 @ 09:08) (111 - 137)  RR: 17 (21 @ 09:08) (17 - 18)  SpO2: 95% (21 @ 09:08) (94% - 100%)  Wt(kg): --    PHYSICAL EXAM:    Constitutional: WDWN resting comfortably in bed; NAD  Head: NC/AT  Eyes: PERRL, EOMI, anicteric sclera  ENT: no nasal discharge; uvula midline, no oropharyngeal erythema or exudates; MMM  Neck: supple; no JVD or thyromegaly  Respiratory: CTA B/L; no W/R/R, no retractions  Cardiac: +S1/S2; RRR; no M/R/G; PMI non-displaced  Gastrointestinal: abdomen soft, NT/ND; no rebound or guarding; +BSx4  Back: spine midline, no bony tenderness or step-offs; no CVAT B/L  Extremities: WWP, no clubbing or cyanosis; no peripheral edema  Musculoskeletal: NROM x4; no joint swelling, tenderness or erythema  Vascular: 2+ radial, femoral, DP/PT pulses B/L  Dermatologic: skin warm, dry and intact; no rashes, wounds, or scars  Lymphatic: no submandibular or cervical LAD  Neurologic: AAOx3; CNII-XII grossly intact; no focal deficits  Psychiatric: affect and characteristics of appearance, verbalizations, behaviors are appropriate    Antimicrobials:  piperacillin/tazobactam IVPB.. 2.25 Gram(s) IV Intermittent every 8 hours    Other Medications:  acetaminophen   Tablet .. 650 milliGRAM(s) Oral every 6 hours PRN  allopurinol 200 milliGRAM(s) Oral every 12 hours  aMIOdarone    Tablet 200 milliGRAM(s) Oral daily  calcitriol   Capsule 0.25 MICROGram(s) Oral daily  doxazosin 2 milliGRAM(s) Oral at bedtime  famotidine    Tablet 10 milliGRAM(s) Oral daily  heparin  Infusion 800 Unit(s)/Hr IV Continuous <Continuous>  metoprolol succinate  milliGRAM(s) Oral daily  senna 2 Tablet(s) Oral at bedtime  sodium bicarbonate 650 milliGRAM(s) Oral daily  sodium chloride 0.9% lock flush 3 milliLiter(s) IV Push every 8 hours  torsemide 20 milliGRAM(s) Oral daily      FAMILY HISTORY:  No pertinent family history in first degree relatives      PAST MEDICAL & SURGICAL HISTORY:  BPH (benign prostatic hyperplasia)    Gout    H/O pulmonary hypertension    CHF, chronic    Lymphoma  t cell; Chemotherapy weekly- wednesday    CKD (chronic kidney disease)    Atrial fibrillation    HTN (hypertension)    Elective surgery  rectal surgery    History of cholecystectomy      SOCIAL HISTORY:    IMMUNIZATIONS  [] Up to Date		[] Not Up to Date:  Recent Immunizations:	[] No	[] Yes:    Daily Height in cm: 180.34 (2021 14:26)    Daily Weight in k.2 (2021 14:26)  Head Circumference:  Vital Signs Last 24 Hrs  T(C): 37.1 (2021 09:08), Max: 37.1 (2021 09:08)  T(F): 98.7 (2021 09:08), Max: 98.7 (2021 09:08)  HR: 65 (2021 09:08) (60 - 67)  BP: 169/96 (2021 09:08) (154/79 - 188/100)  BP(mean): 128 (2021 09:08) (111 - 137)  RR: 17 (2021 09:08) (17 - 18)  SpO2: 95% (2021 09:08) (94% - 100%)    PHYSICAL EXAM    Respiratory Support:		[] No	[] Yes:  Vasoactive medication infusion:	[] No	[] Yes:  Venous catheters:		[] No	[] Yes:  Bladder catheter:		        [] No	[] Yes:  Other catheters or tubes:	[] No	[] Yes:    Lab Results:                        9.4    6.09  )-----------( 209      ( 2021 07:11 )             31.8     -    141  |  105  |  37<H>  ----------------------------<  78  3.6   |  21<L>  |  4.56<H>    Ca    8.7      2021 07:11  Mg     2.1     -    TPro  7.2  /  Alb  3.5  /  TBili  0.6  /  DBili  x   /  AST  9<L>  /  ALT  <5<L>  /  AlkPhos  97  -18    LIVER FUNCTIONS - ( 2021 15:21 )  Alb: 3.5 g/dL / Pro: 7.2 g/dL / ALK PHOS: 97 U/L / ALT: <5 U/L / AST: 9 U/L / GGT: x           PT/INR - ( 2021 07:11 )   PT: 15.3 sec;   INR: 1.29          PTT - ( 2021 07:11 )  PTT:52.3 sec  Urinalysis Basic - ( 2021 15:19 )    Color: Yellow / Appearance: Clear / S.025 / pH: x  Gluc: x / Ketone: NEGATIVE  / Bili: Negative / Urobili: 0.2 E.U./dL   Blood: x / Protein: 100 mg/dL / Nitrite: NEGATIVE   Leuk Esterase: Trace / RBC: < 5 /HPF / WBC 5-10 /HPF   Sq Epi: x / Non Sq Epi: 0-5 /HPF / Bacteria: Present /HPF        MICROBIOLOGY  [] Pathology slides reviewed and/or discussed with pathologist  [] Microbiology findings discussed with microbiologist or slides reviewed  [] Images erviewed with radiologist  [] Case discussed with an attending physician in addition to the patient's primary physician  [] Records[place] [facility] [place]    [] Patient requires continued monitoring for:  [] Total time spent by attending physician: __ minutes, excluding procedure time. HPI  65M with a PMHx of HTN, COPD, CKD (stage 4 CKD, has LUE AVF, not on HD yet), T-cell lymphoma (diagnosed 19 years ago, on chemo romidepsin every Wednesday), AFib (on Eliquis at home, last dose Saturday), systolic CHF (EF 40-45% ), severe pHTN who recently underwent a left chest wall hematoma evacuation on 2020 secondary to loculated effusion. On 21 patient presented to [facility] for pre-operative optimization with heparin gtt prior to OR  for VATS. Surgery noted red area on left anterior tibia, patient reported falling on concrete 1 1/2 wks ago, followed by swelling and tenderness. CT revealed cellulitis and patient started on vancomycin and zosyn with ID consulted for further management recommendations     .  VITAL SIGNS:  T(C): 37.1 (21 @ 09:08), Max: 37.1 (21 @ 09:08)  T(F): 98.7 (21 @ 09:08), Max: 98.7 (21 @ 09:08)  HR: 65 (21 @ 09:08) (60 - 67)  BP: 169/96 (21 @ 09:08) (154/79 - 188/100)  BP(mean): 128 (21 @ 09:08) (111 - 137)  RR: 17 (21 @ 09:08) (17 - 18)  SpO2: 95% (21 @ 09:08) (94% - 100%)    PHYSICAL EXAM:  Constitutional: WDWN resting comfortably in bed; NAD  Head: NC/AT  Eyes: PERRL, EOMI, anicteric sclera  ENT: no nasal discharge; uvula midline, no oropharyngeal erythema or exudates; MMM  Neck: supple; no JVD or thyromegaly  Respiratory: CTA B/L; no W/R/R, no retractions  Cardiac: +S1/S2; RRR; no M/R/G; PMI non-displaced  Gastrointestinal: abdomen soft, NT/ND; no rebound or guarding; +BSx4  Back: spine midline, no bony tenderness or step-offs; no CVAT B/L  Extremities: WWP, no clubbing or cyanosis; no peripheral edema, fluctuant mass over left superior tibia, erythema noted  Musculoskeletal: NROM x4; no joint swelling, tenderness or erythema, full ROM of left knee  Vascular: 2+ radial, femoral, DP/PT pulses B/L  Dermatologic: skin warm, dry and intact; no rashes, wounds, or scars  Lymphatic: no submandibular or cervical LAD  Neurologic: AAOx3; CNII-XII grossly intact; no focal deficits  Psychiatric: affect and characteristics of appearance, verbalizations, behaviors are appropriate    Antimicrobials:  piperacillin/tazobactam IVPB.. 2.25 Gram(s) IV Intermittent every 8 hours    Other Medications:  acetaminophen   Tablet .. 650 milliGRAM(s) Oral every 6 hours PRN  allopurinol 200 milliGRAM(s) Oral every 12 hours  aMIOdarone    Tablet 200 milliGRAM(s) Oral daily  calcitriol   Capsule 0.25 MICROGram(s) Oral daily  doxazosin 2 milliGRAM(s) Oral at bedtime  famotidine    Tablet 10 milliGRAM(s) Oral daily  heparin  Infusion 800 Unit(s)/Hr IV Continuous <Continuous>  metoprolol succinate  milliGRAM(s) Oral daily  senna 2 Tablet(s) Oral at bedtime  sodium bicarbonate 650 milliGRAM(s) Oral daily  sodium chloride 0.9% lock flush 3 milliLiter(s) IV Push every 8 hours  torsemide 20 milliGRAM(s) Oral daily      FAMILY HISTORY:  No pertinent family history in first degree relatives      PAST MEDICAL & SURGICAL HISTORY:  BPH (benign prostatic hyperplasia)    Gout    H/O pulmonary hypertension    CHF, chronic    Lymphoma  t cell; Chemotherapy weekly- wednesday    CKD (chronic kidney disease)    Atrial fibrillation    HTN (hypertension)    Elective surgery  rectal surgery    History of cholecystectomy      SOCIAL HISTORY:    IMMUNIZATIONS  [] Up to Date		[] Not Up to Date:  Recent Immunizations:	[] No	[] Yes:    Daily Height in cm: 180.34 (2021 14:26)    Daily Weight in k.2 (2021 14:26)  Head Circumference:  Vital Signs Last 24 Hrs  T(C): 37.1 (2021 09:08), Max: 37.1 (2021 09:08)  T(F): 98.7 (2021 09:08), Max: 98.7 (2021 09:08)  HR: 65 (2021 09:08) (60 - 67)  BP: 169/96 (2021 09:08) (154/79 - 188/100)  BP(mean): 128 (2021 09:08) (111 - 137)  RR: 17 (2021 09:08) (17 - 18)  SpO2: 95% (2021 09:08) (94% - 100%)    PHYSICAL EXAM    Respiratory Support:		[] No	[] Yes:  Vasoactive medication infusion:	[] No	[] Yes:  Venous catheters:		[] No	[] Yes:  Bladder catheter:		        [] No	[] Yes:  Other catheters or tubes:	[] No	[] Yes:    Lab Results:                        9.4    6.09  )-----------( 209      ( 2021 07:11 )             31.8     -    141  |  105  |  37<H>  ----------------------------<  78  3.6   |  21<L>  |  4.56<H>    Ca    8.7      2021 07:11  Mg     2.1     -    TPro  7.2  /  Alb  3.5  /  TBili  0.6  /  DBili  x   /  AST  9<L>  /  ALT  <5<L>  /  AlkPhos  97  -18    LIVER FUNCTIONS - ( 2021 15:21 )  Alb: 3.5 g/dL / Pro: 7.2 g/dL / ALK PHOS: 97 U/L / ALT: <5 U/L / AST: 9 U/L / GGT: x           PT/INR - ( 2021 07:11 )   PT: 15.3 sec;   INR: 1.29          PTT - ( 2021 07:11 )  PTT:52.3 sec  Urinalysis Basic - ( 2021 15:19 )    Color: Yellow / Appearance: Clear / S.025 / pH: x  Gluc: x / Ketone: NEGATIVE  / Bili: Negative / Urobili: 0.2 E.U./dL   Blood: x / Protein: 100 mg/dL / Nitrite: NEGATIVE   Leuk Esterase: Trace / RBC: < 5 /HPF / WBC 5-10 /HPF   Sq Epi: x / Non Sq Epi: 0-5 /HPF / Bacteria: Present /HPF        MICROBIOLOGY  [] Pathology slides reviewed and/or discussed with pathologist  [] Microbiology findings discussed with microbiologist or slides reviewed  [] Images erviewed with radiologist  [] Case discussed with an attending physician in addition to the patient's primary physician  [] Records, reports from outside [facility] [place] reviewed    [] Patient requires continued monitoring for:  [] Total time spent by attending physician: __ minutes, excluding procedure time.

## 2021-01-19 NOTE — PROGRESS NOTE ADULT - ASSESSMENT
64 y/o M with a PMHx of HTN, COPD, CKD (stage 4 CKD, has LUE AVF, not on HD yet), T-cell lymphoma (diagnosed 19 years ago, on chemo romidepsin every Wednesday), AF (on Eliquis at home, last dose Saturday), systolic CHF (EF 40-45% 2017), severe pHTN who recently underwent a left chest wall hematoma evacuation on 12/18/2020 secondary to loculated effusion. Hospital course at that time was complicated by AF with RVR and thoracic surgery consulted at that time for possible future intervention. Of note, recent ECHO during previous admission revealed severe mitral regurgitation. Thoracic surgery followed up with patient as an outpatient once discharged from the hospital and deemed patient a good surgical candidate. Today, on 1/18/21 patient presented to [facility] for pre-operative optimization with heparin gtt prior to OR tomorrow. Of note: Patient with red swelling on anterior aspect of left knee 2/2 to fall on concrete 1 week ago. CT scan of LLE demonstrated severe cellulitis with small seroma superficial to the distal aspect of the patellar tendon. Patient preop for VATS procedure today with Dr. Harmon.    ==== Neurovascular ====  -No delirium, pain well managed on current regimen  -C/w PRNs for Pain control  -Monitor neuro status    ==== Respiratory ====  -Saturates well on RA    -pHTN, COPD  -left chest wall hematoma evacuation on 12/18/2020 secondary to loculated effusion  -pre-op for L VATS, decort, pleurodesis, RA with Dr. Harmon 1/19/21   -AM CXR stable, repeat in AM  -Encourage IS 10x/hour while awake, Cough and deep breathing exercises  -Monitor respiratory status via SpO2    ==== Cardiovascular ====  -Monitor HR/BP/Tele  - Hx Afib on eliquis at home (last dose 1/6) - admitted with hep gtt; PTT goal 60-80; hold heparin gtt for OR this am  -hx of severe MR   -hx of HFrEF (EF 40-45%)  -hx of HTN: metoprolol succinate 200mg daily   -On admission: HTN of 180s SBP despite multiple attempts to take BP, gave x1 dose IV hydral   -Hemodynamically stable    ==== GI ====   -Tolerating PO  -Prophylaxis: Famotidine   -C/w bowel regimen: senna    ==== /Renal ====  -BUN/Cr: 37/4.56  -hx CKD stage 4 baseline (not on HD but does have AVF placed Left arm)  -Trend Cr on AM labs  -Replete electrolytes as needed  -Monitor I/O's daily     ==== ID ====   Afebrile, asymptomatic  -left anterior tibial abscess 2/2 to mechanical fall at home 1 week ago, pending plan with thoracic surgery team -ortho and gen surg consulted - NTD per both teams  -CT scan LLE: severe cellulitis with small seroma superficial to the distal aspect of the patellar tendon -WBC: 6.09  -Continue to monitor for SIRS/Sepsis syndrome while inpatient    ==== Endocrine ====   -A1C: 4.4  -TSH: 4.428  -no active issues    ==== Hematologic ====   -H/H: 9.4/31.8  -CBC, chem in AM  -Hx of T cell lymphoma on chemo Romidepsin qWednesday  -DVT ppx: Heparin gtt and SCDs    ==== Disposition Planning ====  Pending OR today; dispo ongoing.   64 y/o M with a PMHx of HTN, COPD, CKD (stage 4 CKD, has LUE AVF, not on HD yet), T-cell lymphoma (diagnosed 19 years ago, on chemo romidepsin every Wednesday), AF (on Eliquis at home, last dose Saturday), systolic CHF (EF 40-45% 2017), severe pHTN who recently underwent a left chest wall hematoma evacuation on 12/18/2020 secondary to loculated effusion. Hospital course at that time was complicated by AF with RVR and thoracic surgery consulted at that time for possible future intervention. Of note, recent ECHO during previous admission revealed severe mitral regurgitation. Thoracic surgery followed up with patient as an outpatient once discharged from the hospital and deemed patient a good surgical candidate. Today, on 1/18/21 patient presented to [facility] for pre-operative optimization with heparin gtt prior to OR tomorrow. Of note: Patient with red swelling on anterior aspect of left knee 2/2 to fall on concrete 1 week ago. CT scan of LLE demonstrated severe cellulitis with small seroma superficial to the distal aspect of the patellar tendon. Patient preop for VATS procedure today with Dr. Harmon.    ==== Neurovascular ====  -No delirium, pain well managed on current regimen  -C/w PRNs for Pain control  -Monitor neuro status    ==== Respiratory ====  -Saturates well on RA    -pHTN, COPD  -left chest wall hematoma evacuation on 12/18/2020 secondary to loculated effusion  -pre-op for L VATS, decort, pleurodesis, RA with Dr. Harmon 1/19/21   -AM CXR stable, repeat in AM  -Encourage IS 10x/hour while awake, Cough and deep breathing exercises  -Monitor respiratory status via SpO2    ==== Cardiovascular ====  -Monitor HR/BP/Tele  - Hx Afib on eliquis at home (last dose 1/6) - admitted with hep gtt; PTT goal 60-80; hold heparin gtt for OR this am  -hx of severe MR   -hx of HFrEF (EF 40-45%)  -hx of HTN: metoprolol succinate 200mg daily   -On admission: HTN of 180s SBP despite multiple attempts to take BP, gave x1 dose IV hydral   -Hemodynamically stable    ==== GI ====   -Tolerating PO  -Prophylaxis: Famotidine   -C/w bowel regimen: senna    ==== /Renal ====  -BUN/Cr: 37/4.56  -hx CKD stage 4 baseline (not on HD but does have AVF placed Left arm)  -Trend Cr on AM labs  -Replete electrolytes as needed  -Monitor I/O's daily     ==== ID ====   Afebrile, asymptomatic  -left anterior tibial abscess 2/2 to mechanical fall at home 1 week ago, pending plan with thoracic surgery team -ortho and gen surg consulted - NTD per both teams  -CT scan LLE: severe cellulitis with small seroma superficial to the distal aspect of the patellar tendon   -WBC: 6.09  -tx cellulitis: renally dosed zosyn; vanc by level  -Continue to monitor for SIRS/Sepsis syndrome while inpatient    ==== Endocrine ====   -A1C: 4.4  -TSH: 4.428  -no active issues    ==== Hematologic ====   -H/H: 9.4/31.8  -CBC, chem in AM  -Hx of T cell lymphoma on chemo Romidepsin qWednesday  -DVT ppx: Heparin gtt and SCDs    ==== Disposition Planning ====  Pending OR today; dispo ongoing.

## 2021-01-20 ENCOUNTER — TRANSCRIPTION ENCOUNTER (OUTPATIENT)
Age: 66
End: 2021-01-20

## 2021-01-20 ENCOUNTER — APPOINTMENT (OUTPATIENT)
Dept: NEPHROLOGY | Facility: CLINIC | Age: 66
End: 2021-01-20

## 2021-01-20 LAB
ALBUMIN SERPL ELPH-MCNC: 2.9 G/DL — LOW (ref 3.3–5)
ALP SERPL-CCNC: 77 U/L — SIGNIFICANT CHANGE UP (ref 40–120)
ALT FLD-CCNC: <5 U/L — LOW (ref 10–45)
ANION GAP SERPL CALC-SCNC: 17 MMOL/L — SIGNIFICANT CHANGE UP (ref 5–17)
APTT BLD: 72.5 SEC — HIGH (ref 27.5–35.5)
APTT BLD: 73.3 SEC — HIGH (ref 27.5–35.5)
AST SERPL-CCNC: 6 U/L — LOW (ref 10–40)
BILIRUB SERPL-MCNC: 0.5 MG/DL — SIGNIFICANT CHANGE UP (ref 0.2–1.2)
BUN SERPL-MCNC: 37 MG/DL — HIGH (ref 7–23)
CALCIUM SERPL-MCNC: 8.5 MG/DL — SIGNIFICANT CHANGE UP (ref 8.4–10.5)
CHLORIDE SERPL-SCNC: 106 MMOL/L — SIGNIFICANT CHANGE UP (ref 96–108)
CO2 SERPL-SCNC: 21 MMOL/L — LOW (ref 22–31)
CREAT SERPL-MCNC: 4.59 MG/DL — HIGH (ref 0.5–1.3)
GLUCOSE BLDC GLUCOMTR-MCNC: 99 MG/DL — SIGNIFICANT CHANGE UP (ref 70–99)
GLUCOSE SERPL-MCNC: 84 MG/DL — SIGNIFICANT CHANGE UP (ref 70–99)
GRAM STN FLD: SIGNIFICANT CHANGE UP
HCT VFR BLD CALC: 29.7 % — LOW (ref 39–50)
HGB BLD-MCNC: 8.7 G/DL — LOW (ref 13–17)
INR BLD: 1.32 — HIGH (ref 0.88–1.16)
MAGNESIUM SERPL-MCNC: 2 MG/DL — SIGNIFICANT CHANGE UP (ref 1.6–2.6)
MCHC RBC-ENTMCNC: 28.8 PG — SIGNIFICANT CHANGE UP (ref 27–34)
MCHC RBC-ENTMCNC: 29.3 GM/DL — LOW (ref 32–36)
MCV RBC AUTO: 98.3 FL — SIGNIFICANT CHANGE UP (ref 80–100)
NRBC # BLD: 0 /100 WBCS — SIGNIFICANT CHANGE UP (ref 0–0)
PLATELET # BLD AUTO: 204 K/UL — SIGNIFICANT CHANGE UP (ref 150–400)
POTASSIUM SERPL-MCNC: 3.6 MMOL/L — SIGNIFICANT CHANGE UP (ref 3.5–5.3)
POTASSIUM SERPL-SCNC: 3.6 MMOL/L — SIGNIFICANT CHANGE UP (ref 3.5–5.3)
PROT SERPL-MCNC: 6.5 G/DL — SIGNIFICANT CHANGE UP (ref 6–8.3)
PROTHROM AB SERPL-ACNC: 15.6 SEC — HIGH (ref 10.6–13.6)
RBC # BLD: 3.02 M/UL — LOW (ref 4.2–5.8)
RBC # FLD: 20 % — HIGH (ref 10.3–14.5)
SODIUM SERPL-SCNC: 144 MMOL/L — SIGNIFICANT CHANGE UP (ref 135–145)
SPECIMEN SOURCE: SIGNIFICANT CHANGE UP
VANCOMYCIN TROUGH SERPL-MCNC: 15.1 UG/ML — SIGNIFICANT CHANGE UP (ref 10–20)
WBC # BLD: 5.99 K/UL — SIGNIFICANT CHANGE UP (ref 3.8–10.5)
WBC # FLD AUTO: 5.99 K/UL — SIGNIFICANT CHANGE UP (ref 3.8–10.5)

## 2021-01-20 PROCEDURE — 99232 SBSQ HOSP IP/OBS MODERATE 35: CPT

## 2021-01-20 PROCEDURE — 71045 X-RAY EXAM CHEST 1 VIEW: CPT | Mod: 26

## 2021-01-20 PROCEDURE — 99231 SBSQ HOSP IP/OBS SF/LOW 25: CPT | Mod: GC

## 2021-01-20 PROCEDURE — ZZZZZ: CPT

## 2021-01-20 RX ORDER — POTASSIUM CHLORIDE 20 MEQ
20 PACKET (EA) ORAL
Refills: 0 | Status: DISCONTINUED | OUTPATIENT
Start: 2021-01-20 | End: 2021-01-20

## 2021-01-20 RX ORDER — VANCOMYCIN HCL 1 G
1000 VIAL (EA) INTRAVENOUS ONCE
Refills: 0 | Status: COMPLETED | OUTPATIENT
Start: 2021-01-20 | End: 2021-01-20

## 2021-01-20 RX ADMIN — SODIUM CHLORIDE 3 MILLILITER(S): 9 INJECTION INTRAMUSCULAR; INTRAVENOUS; SUBCUTANEOUS at 13:50

## 2021-01-20 RX ADMIN — Medication 200 MILLIGRAM(S): at 22:22

## 2021-01-20 RX ADMIN — Medication 250 MILLIGRAM(S): at 22:26

## 2021-01-20 RX ADMIN — Medication 200 MILLIGRAM(S): at 11:23

## 2021-01-20 RX ADMIN — AMIODARONE HYDROCHLORIDE 200 MILLIGRAM(S): 400 TABLET ORAL at 06:48

## 2021-01-20 RX ADMIN — SODIUM CHLORIDE 3 MILLILITER(S): 9 INJECTION INTRAMUSCULAR; INTRAVENOUS; SUBCUTANEOUS at 22:21

## 2021-01-20 RX ADMIN — CALCITRIOL 0.25 MICROGRAM(S): 0.5 CAPSULE ORAL at 11:23

## 2021-01-20 RX ADMIN — Medication 20 MILLIGRAM(S): at 06:48

## 2021-01-20 RX ADMIN — FAMOTIDINE 10 MILLIGRAM(S): 10 INJECTION INTRAVENOUS at 11:23

## 2021-01-20 RX ADMIN — SODIUM CHLORIDE 3 MILLILITER(S): 9 INJECTION INTRAMUSCULAR; INTRAVENOUS; SUBCUTANEOUS at 06:43

## 2021-01-20 RX ADMIN — Medication 200 MILLIGRAM(S): at 06:49

## 2021-01-20 RX ADMIN — CHLORHEXIDINE GLUCONATE 1 APPLICATION(S): 213 SOLUTION TOPICAL at 06:42

## 2021-01-20 RX ADMIN — Medication 650 MILLIGRAM(S): at 11:23

## 2021-01-20 NOTE — PROGRESS NOTE ADULT - SUBJECTIVE AND OBJECTIVE BOX
Ortho Procedure Note     Left anterior/pre tibial mass aspirated under aseptic technique.   Approximately 2 cc's of vivienne blood aspirated  Pt tolerated procedure well, can remove compressive dressing after 2h  Sent for culture/gram stain   Will f/u results  WBAT  Discussed with Dr. Santos   pending result of culture if there is continued concern for infection aspiration can be performed by IR

## 2021-01-20 NOTE — PROGRESS NOTE ADULT - ASSESSMENT
66 y/o M with a PMHx of HTN, COPD, CKD (stage 4 CKD, has LUE AVF, not on HD yet), T-cell lymphoma (diagnosed 19 years ago, on chemo romidepsin every Wednesday), AF (on Eliquis at home, last dose Saturday), systolic CHF (EF 40-45% 2017), severe pHTN who recently underwent a left chest wall hematoma evacuation on 12/18/2020 secondary to loculated effusion. Hospital course at that time was complicated by AF with RVR and thoracic surgery consulted at that time for possible future intervention. Of note, recent ECHO during previous admission revealed severe mitral regurgitation. Thoracic surgery followed up with patient as an outpatient once discharged from the hospital and deemed patient a good surgical candidate. Today, on 1/18/21 patient presented to [facility] for pre-operative optimization with heparin gtt prior to OR tomorrow. Of note: Patient with red swelling on anterior aspect of left knee 2/2 to fall on concrete 1 week ago. CT scan of LLE demonstrated severe cellulitis with small seroma superficial to the distal aspect of the patellar tendon. Patient preop for VATS procedure with Dr. Harmon, pending the resolution of this LLE protrusion. Plan for ortho to needle aspirate today.     ==== Neurovascular ====  -No delirium, pain well managed on current regimen  -C/w PRNs for Pain control  -Monitor neuro status    ==== Respiratory ====  -Saturates well on RA    -pHTN, COPD  -left chest wall hematoma evacuation on 12/18/2020 secondary to loculated effusion  -pre-op for L VATS, decort, pleurodesis, RA with Dr. Harmon (pending)   -AM CXR stable, repeat in AM  -Encourage IS 10x/hour while awake, Cough and deep breathing exercises  -Monitor respiratory status via SpO2    ==== Cardiovascular ====  -Monitor HR/BP/Tele  -hx Afib on eliquis at home (last dose 1/6) - admitted with hep gtt; PTT goal 60-80; therapeutic x3, will check daily PTTs now  -hx of severe MR   -hx of HFrEF (EF 40-45%)  -hx of HTN: metoprolol succinate 200mg daily   -hemodynamically stable    ==== GI ====   -Tolerating PO  -Prophylaxis: Famotidine   -C/w bowel regimen: senna    ==== /Renal ====  -BUN/Cr: 37/4.59  -hx CKD stage 4 baseline (not on HD but does have AVF placed Left arm)  -Trend Cr on AM labs  -Replete electrolytes as needed  -Monitor I/O's daily     ==== ID ====   Afebrile, asymptomatic  -left anterior tibial abscess 2/2 to mechanical fall at home 1 week ago, pending plan with thoracic surgery team -ortho and gen surg consulted - NTD per both teams  -CT scan LLE: severe cellulitis with small seroma superficial to the distal aspect of the patellar tendon   -WBC: 5.99  -tx cellulitis: d/c'ed zosyn; vanc by level daily  -ID on board - appreciate recs; want I&D for cuultures  -Ortho on board - plan to needle aspirate today  -F/u cultures from LLE needle aspirate  -Continue to monitor for SIRS/Sepsis syndrome while inpatient    ==== Endocrine ====   -A1C: 4.4  -TSH: 4.428  -no active issues    ==== Hematologic ====   -H/H: 8.7/29.7  -CBC, chem in AM  -Hx of T cell lymphoma on chemo Romidepsin qWednesday  -DVT ppx: Heparin gtt and SCDs    ==== Disposition Planning ====  Pending OR for VATS decort, pleurodesis; dispo ongoing.

## 2021-01-20 NOTE — PROGRESS NOTE ADULT - SUBJECTIVE AND OBJECTIVE BOX
Patient is a 65y Male seen and evaluated at bedside. CKD5 stable renal function. No indication for HD at this time.    Meds:    acetaminophen   Tablet .. 650 every 6 hours PRN  allopurinol 200 every 12 hours  aMIOdarone    Tablet 200 daily  calcitriol   Capsule 0.25 daily  doxazosin 2 at bedtime  famotidine    Tablet 10 daily  heparin  Infusion 800 <Continuous>  metoprolol succinate  daily  senna 2 at bedtime  sodium bicarbonate 650 daily  sodium chloride 0.9% lock flush 3 every 8 hours  torsemide 20 daily      T(C): , Max: 36.9 (21 @ 14:02)  T(F): , Max: 98.5 (21 @ 14:02)  HR: 59 (21 @ 08:40)  BP: 173/91 (21 @ 08:40)  BP(mean): 123 (21 @ 08:40)  RR: 20 (21 @ 08:40)  SpO2: 99% (21 @ 08:40)  Wt(kg): --     07:  -   @ 07:00  --------------------------------------------------------  IN: 1086 mL / OUT: 800 mL / NET: 286 mL     07: @ 10:34  --------------------------------------------------------  IN: 12 mL / OUT: 0 mL / NET: 12 mL      Height (cm): 180.3 ( 04:53)  Weight (kg): 72.2 ( 04:53)  BMI (kg/m2): 22.2 ( 04:53)  BSA (m2): 1.91 ( 04:53)    Review of Systems:  RESPIRATORY: No shortness of breath, cough  CARDIOVASCULAR: No Chest pain  MUSCULOSKELETAL: +Left knee pain, No leg swelling    PHYSICAL EXAM:  GENERAL: Alert, awake, oriented x3 on RA  CHEST/LUNG: Distant breath sounds, no rales/rhonchi  HEART: Regular rate and rhythm, no murmur  ABDOMEN: Soft, nontender, non distended  EXTREMITIES: no pedal oedema, Left lesion under knee- haematoma?  ACCESS: LUE AVF w/bruit and thrill       LABS:                        8.7    5.99  )-----------( 204      ( 2021 05:35 )             29.7     01-    144  |  106  |  37<H>  ----------------------------<  84  3.6   |  21<L>  |  4.59<H>    Ca    8.5      2021 05:35  Mg     2.0         TPro  6.5  /  Alb  2.9<L>  /  TBili  0.5  /  DBili  x   /  AST  6<L>  /  ALT  <5<L>  /  AlkPhos  77  01-20      PT/INR - ( 2021 05:35 )   PT: 15.6 sec;   INR: 1.32          PTT - ( 2021 05:35 )  PTT:73.3 sec  Urinalysis Basic - ( 2021 15:19 )    Color: Yellow / Appearance: Clear / S.025 / pH: x  Gluc: x / Ketone: NEGATIVE  / Bili: Negative / Urobili: 0.2 E.U./dL   Blood: x / Protein: 100 mg/dL / Nitrite: NEGATIVE   Leuk Esterase: Trace / RBC: < 5 /HPF / WBC 5-10 /HPF   Sq Epi: x / Non Sq Epi: 0-5 /HPF / Bacteria: Present /HPF            RADIOLOGY & ADDITIONAL STUDIES:

## 2021-01-20 NOTE — PROGRESS NOTE ADULT - ASSESSMENT
NOTE IN PROGRESS 65M with a PMHx of HTN, COPD, CKD (stage 4 CKD, has LUE AVF, not on HD yet), T-cell lymphoma (diagnosed 19 years ago, on chemo romidepsin every Wednesday), AFib (on Eliquis at home, last dose Saturday), systolic CHF (EF 40-45% 2017), severe pHTN presented for scheduled VATS procedure for pleuracentesis, found to have cellulitis of the left lower extremity. Started on vancomycin for 1 dose with trough scheduled for today at 4 PM.    PLAN  Note in progress    65M with a PMHx of HTN, COPD, CKD (stage 4 CKD, has LUE AVF, not on HD yet), T-cell lymphoma (diagnosed 19 years ago, on chemo romidepsin every Wednesday), AFib (on Eliquis at home, last dose Saturday), systolic CHF (EF 40-45% 2017), severe pHTN presented for scheduled VATS procedure for pleuracentesis, found to have cellulitis of the left lower extremity. Started on vancomycin for 1 dose with trough scheduled for today at 4 PM.    PLAN  -Patient is now s/p 1 dose of vancomycin, f/u trough at 4 PM and redose if trough is < 20   ID team 1 will continue to follow

## 2021-01-20 NOTE — PROGRESS NOTE ADULT - SUBJECTIVE AND OBJECTIVE BOX
INTERVAL HPI/OVERNIGHT EVENTS: Pt seen and examined at bedside this AM. He reports he is feeling well, does not have any pain near the left knee swelling. States it is unchanged for the past several days. Has not been experiencing fevers or chills.     MEDICATIONS  (STANDING):  allopurinol 200 milliGRAM(s) Oral every 12 hours  aMIOdarone    Tablet 200 milliGRAM(s) Oral daily  calcitriol   Capsule 0.25 MICROGram(s) Oral daily  doxazosin 2 milliGRAM(s) Oral at bedtime  famotidine    Tablet 10 milliGRAM(s) Oral daily  heparin  Infusion 800 Unit(s)/Hr (11 mL/Hr) IV Continuous <Continuous>  metoprolol succinate  milliGRAM(s) Oral daily  piperacillin/tazobactam IVPB.. 2.25 Gram(s) IV Intermittent every 8 hours  senna 2 Tablet(s) Oral at bedtime  sodium bicarbonate 650 milliGRAM(s) Oral daily  sodium chloride 0.9% lock flush 3 milliLiter(s) IV Push every 8 hours  torsemide 20 milliGRAM(s) Oral daily    MEDICATIONS  (PRN):  acetaminophen   Tablet .. 650 milliGRAM(s) Oral every 6 hours PRN Temp greater or equal to 38C (100.4F), Mild Pain (1 - 3)      Vital Signs Last 24 Hrs  T(C): 36.9 (2021 14:08), Max: 36.9 (2021 14:08)  T(F): 98.4 (2021 14:08), Max: 98.4 (2021 14:08)  HR: 65 (2021 11:30) (59 - 65)  BP: 182/95 (2021 11:30) (160/87 - 182/95)  BP(mean): 129 (2021 11:30) (118 - 131)  RR: 16 (2021 11:30) (16 - 24)  SpO2: 98% (2021 11:30) (93% - 99%)    PHYSICAL EXAM:      Constitutional: A&Ox3, pleasant    Respiratory: non labored breathing, no respiratory distress    Cardiovascular: NSR, RRR    Gastrointestinal: soft, non-tender, non-distended                 Incision:    Extremities: 2x2cm soft area of swelling on patella, non-tender, minimal erythema but not extending past the lesion itself. Small edema on left extremity.                   I&O's Detail    2021 07:01  -  2021 07:00  --------------------------------------------------------  IN:    Heparin: 140 mL    IV PiggyBack: 550 mL  Total IN: 690 mL    OUT:    Voided (mL): 1600 mL  Total OUT: 1600 mL    Total NET: -910 mL      2021 07:01  -  2021 09:48  --------------------------------------------------------  IN:    Heparin: 22 mL  Total IN: 22 mL    OUT:    Voided (mL): 200 mL  Total OUT: 200 mL    Total NET: -178 mL          LABS:                         8.7    5.99  )-----------( 204      ( 2021 05:35 )             29.7   01-20    144  |  106  |  37<H>  ----------------------------<  84  3.6   |  21<L>  |  4.59<H>    Ca    8.5      2021 05:35  Mg     2.0         TPro  6.5  /  Alb  2.9<L>  /  TBili  0.5  /  DBili  x   /  AST  6<L>  /  ALT  <5<L>  /  AlkPhos  77                TPro  7.2  /  Alb  3.5  /  TBili  0.6  /  DBili  x   /  AST  9<L>  /  ALT  <5<L>  /  AlkPhos  97  01-18    PT/INR - ( 2021 07:11 )   PT: 15.3 sec;   INR: 1.29          PTT - ( 2021 07:11 )  PTT:52.3 sec  Urinalysis Basic - ( 2021 15:19 )    Color: Yellow / Appearance: Clear / S.025 / pH: x  Gluc: x / Ketone: NEGATIVE  / Bili: Negative / Urobili: 0.2 E.U./dL   Blood: x / Protein: 100 mg/dL / Nitrite: NEGATIVE   Leuk Esterase: Trace / RBC: < 5 /HPF / WBC 5-10 /HPF   Sq Epi: x / Non Sq Epi: 0-5 /HPF / Bacteria: Present /HPF        RADIOLOGY & ADDITIONAL STUDIES:

## 2021-01-20 NOTE — PROGRESS NOTE ADULT - ASSESSMENT
Patient is a 66 y/o M with PMH of HTN, COPD, CKD (with AVF but not on HD yet), T-cell lymphoma on chemo every Wednesday, AF on Eliquis at home, CHF (EF 40-45%), Pulm HTN, recent L chest wall hematoma evacuation, severe MR, who was admitted today for scheduled VATS tomorrow for recurrent pleural effusion, and was started on heparin drip. Patient had a recent fall on concrete last week when he tried to stop his dogs fighting, and since then, he noticed a small mass in the L anterior leg. Patient denies pain, fevers, chills, joint pain, or other symptoms. GS was consulted for evaluation and possible intervention. Patient is afebrile, has no leucocytosis, and CT scan (non-con 2/2 CKD) showed no drainable collections    - No surgical intervention at this point as patient does not have a drainable collection  - Team 4C will sign off  - Plan discussed with chief resident and attending

## 2021-01-20 NOTE — PROGRESS NOTE ADULT - SUBJECTIVE AND OBJECTIVE BOX
Orthopaedic Surgery Progress Note      Pt comfortable without complaints, no pain  Denies CP, SOB, N/V, numbness/tingling     Vital Signs Last 24 Hrs  T(C): 36.1 (20 Jan 2021 05:01), Max: 36.9 (19 Jan 2021 14:02)  T(F): 97 (20 Jan 2021 05:01), Max: 98.5 (19 Jan 2021 14:02)  HR: 59 (20 Jan 2021 08:40) (59 - 65)  BP: 173/91 (20 Jan 2021 08:40) (160/76 - 178/97)  BP(mean): 123 (20 Jan 2021 08:40) (107 - 131)  RR: 20 (20 Jan 2021 08:40) (16 - 24)  SpO2: 99% (20 Jan 2021 08:40) (93% - 99%)    PE:  General: NAD  MSK:   LLE - no open wounds/ecchymoses; Fluctuant mass anterior superior aspect of the left tibia without tenderness, erythema or warmth; no tenderness throughout leg;; 5/5 hip flexion/extension, knee flexion/extension EHL/FHL/TA/GS; SILT; FROM of knee without pain, no effusion                          8.7    5.99  )-----------( 204      ( 20 Jan 2021 05:35 )             29.7     01-20    144  |  106  |  37<H>  ----------------------------<  84  3.6   |  21<L>  |  4.59<H>    Ca    8.5      20 Jan 2021 05:35  Mg     2.0     01-20    TPro  6.5  /  Alb  2.9<L>  /  TBili  0.5  /  DBili  x   /  AST  6<L>  /  ALT  <5<L>  /  AlkPhos  77  01-20            A/P: 66yo Male with left lower extremity fluctuant collection, likely a hematoma vs. seroma. Low suspicion for infection given lack of infectious signs  - No acute orthopedic surgical intervention at this time   - WBAT LLE   - Reviewed imaging and lab data   - Above plan discussed with CT surgery PA and Ortho attending Dr. Santos     Ortho Pager [phone]

## 2021-01-20 NOTE — PROGRESS NOTE ADULT - SUBJECTIVE AND OBJECTIVE BOX
OVERNIGHT EVENTS:    SUBJECTIVE / INTERVAL HPI: Patient seen and examined at bedside.     VITAL SIGNS:  Vital Signs Last 24 Hrs  T(C): 36.1 (2021 05:01), Max: 37.1 (2021 09:08)  T(F): 97 (2021 05:01), Max: 98.7 (2021 09:08)  HR: 62 (2021 05:07) (59 - 65)  BP: 172/90 (2021 05:07) (160/76 - 178/97)  BP(mean): 124 (2021 05:07) (107 - 131)  RR: 24 (2021 05:07) (16 - 24)  SpO2: 93% (2021 05:07) (93% - 98%)    PHYSICAL EXAM:    General: WDWN  HEENT: NC/AT; PERRL, anicteric sclera; MMM  Neck: supple  Cardiovascular: +S1/S2; RRR  Respiratory: CTA B/L; no W/R/R  Gastrointestinal: soft, NT/ND; +BSx4  Extremities: WWP; no edema, clubbing or cyanosis  Vascular: 2+ radial, DP/PT pulses B/L  Neurological: AAOx3; no focal deficits    MEDICATIONS:  MEDICATIONS  (STANDING):  allopurinol 200 milliGRAM(s) Oral every 12 hours  aMIOdarone    Tablet 200 milliGRAM(s) Oral daily  calcitriol   Capsule 0.25 MICROGram(s) Oral daily  doxazosin 2 milliGRAM(s) Oral at bedtime  famotidine    Tablet 10 milliGRAM(s) Oral daily  heparin  Infusion 800 Unit(s)/Hr (12 mL/Hr) IV Continuous <Continuous>  metoprolol succinate  milliGRAM(s) Oral daily  senna 2 Tablet(s) Oral at bedtime  sodium bicarbonate 650 milliGRAM(s) Oral daily  sodium chloride 0.9% lock flush 3 milliLiter(s) IV Push every 8 hours  torsemide 20 milliGRAM(s) Oral daily    MEDICATIONS  (PRN):  acetaminophen   Tablet .. 650 milliGRAM(s) Oral every 6 hours PRN Temp greater or equal to 38C (100.4F), Mild Pain (1 - 3)      ALLERGIES:  Allergies    No Known Allergies    Intolerances        LABS:                        8.7    5.99  )-----------( 204      ( 2021 05:35 )             29.7     01-    144  |  106  |  37<H>  ----------------------------<  84  3.6   |  21<L>  |  4.59<H>    Ca    8.5      2021 05:35  Mg     2.0         TPro  6.5  /  Alb  2.9<L>  /  TBili  0.5  /  DBili  x   /  AST  6<L>  /  ALT  <5<L>  /  AlkPhos  77  01-20    PT/INR - ( 2021 05:35 )   PT: 15.6 sec;   INR: 1.32          PTT - ( 2021 05:35 )  PTT:73.3 sec  Urinalysis Basic - ( 2021 15:19 )    Color: Yellow / Appearance: Clear / S.025 / pH: x  Gluc: x / Ketone: NEGATIVE  / Bili: Negative / Urobili: 0.2 E.U./dL   Blood: x / Protein: 100 mg/dL / Nitrite: NEGATIVE   Leuk Esterase: Trace / RBC: < 5 /HPF / WBC 5-10 /HPF   Sq Epi: x / Non Sq Epi: 0-5 /HPF / Bacteria: Present /HPF      CAPILLARY BLOOD GLUCOSE      POCT Blood Glucose.: 99 mg/dL (2021 06:54)      RADIOLOGY & ADDITIONAL TESTS: Reviewed.   OVERNIGHT EVENTS: No acute events reported overnight    SUBJECTIVE / INTERVAL HPI: Patient seen and examined at bedside. Abscess still present on leg and not drained. Patient received 1000 mg vancomycin. Patient reports no complaints today. Full ROM in lower extremity bilaterally. Denies CP, SOB. Denies abdominal pain, nausea or vomiting.     VITAL SIGNS:  Vital Signs Last 24 Hrs  T(C): 36.1 (2021 05:01), Max: 37.1 (2021 09:08)  T(F): 97 (2021 05:01), Max: 98.7 (2021 09:08)  HR: 62 (2021 05:07) (59 - 65)  BP: 172/90 (2021 05:07) (160/76 - 178/97)  BP(mean): 124 (2021 05:07) (107 - 131)  RR: 24 (2021 05:07) (16 - 24)  SpO2: 93% (2021 05:07) (93% - 98%)    PHYSICAL EXAM:  General: WDWN  HEENT: NC/AT; PERRL, anicteric sclera; MMM  Neck: supple  Cardiovascular: +S1/S2; RRR  Respiratory: CTA B/L; no W/R/R  Gastrointestinal: soft, NT/ND; +BSx4  Extremities: WWP; abscess noted over left lower extremity  Vascular: 2+ radial, DP/PT pulses B/L  Neurological: AAOx3; no focal deficits    MEDICATIONS:  MEDICATIONS  (STANDING):  allopurinol 200 milliGRAM(s) Oral every 12 hours  aMIOdarone    Tablet 200 milliGRAM(s) Oral daily  calcitriol   Capsule 0.25 MICROGram(s) Oral daily  doxazosin 2 milliGRAM(s) Oral at bedtime  famotidine    Tablet 10 milliGRAM(s) Oral daily  heparin  Infusion 800 Unit(s)/Hr (12 mL/Hr) IV Continuous <Continuous>  metoprolol succinate  milliGRAM(s) Oral daily  senna 2 Tablet(s) Oral at bedtime  sodium bicarbonate 650 milliGRAM(s) Oral daily  sodium chloride 0.9% lock flush 3 milliLiter(s) IV Push every 8 hours  torsemide 20 milliGRAM(s) Oral daily    MEDICATIONS  (PRN):  acetaminophen   Tablet .. 650 milliGRAM(s) Oral every 6 hours PRN Temp greater or equal to 38C (100.4F), Mild Pain (1 - 3)      ALLERGIES:  Allergies    No Known Allergies    Intolerances        LABS:                        8.7    5.99  )-----------( 204      ( 2021 05:35 )             29.7     -    144  |  106  |  37<H>  ----------------------------<  84  3.6   |  21<L>  |  4.59<H>    Ca    8.5      2021 05:35  Mg     2.0         TPro  6.5  /  Alb  2.9<L>  /  TBili  0.5  /  DBili  x   /  AST  6<L>  /  ALT  <5<L>  /  AlkPhos  77  01-20    PT/INR - ( 2021 05:35 )   PT: 15.6 sec;   INR: 1.32          PTT - ( 2021 05:35 )  PTT:73.3 sec  Urinalysis Basic - ( 2021 15:19 )    Color: Yellow / Appearance: Clear / S.025 / pH: x  Gluc: x / Ketone: NEGATIVE  / Bili: Negative / Urobili: 0.2 E.U./dL   Blood: x / Protein: 100 mg/dL / Nitrite: NEGATIVE   Leuk Esterase: Trace / RBC: < 5 /HPF / WBC 5-10 /HPF   Sq Epi: x / Non Sq Epi: 0-5 /HPF / Bacteria: Present /HPF      CAPILLARY BLOOD GLUCOSE      POCT Blood Glucose.: 99 mg/dL (2021 06:54)      RADIOLOGY & ADDITIONAL TESTS: Reviewed.

## 2021-01-20 NOTE — DISCHARGE NOTE NURSING/CASE MANAGEMENT/SOCIAL WORK - PATIENT PORTAL LINK FT
You can access the FollowMyHealth Patient Portal offered by [facility] by registering at the following website: [email or web address]. By joining [employer]’s FollowMyHealth portal, you will also be able to view your health information using other applications (apps) compatible with our system.

## 2021-01-20 NOTE — PROGRESS NOTE ADULT - SUBJECTIVE AND OBJECTIVE BOX
Patient discussed on morning rounds with Dr. Harmon    Operation / Date: patient pending a VATS     SUBJECTIVE ASSESSMENT:  65y Male. Left upper shin/below the knee red swelling protrusion; CT negative for abscess. Orthopedics and general surgery consulted to weigh in - NTD per both teams. Dr. Harmon requesting Ortho intervention, Ortho amenable to needle aspiration of left lower extremity.     Vital Signs Last 24 Hrs  T(C): 36.6 (2021 09:03), Max: 36.9 (2021 14:02)  T(F): 97.8 (2021 09:03), Max: 98.5 (2021 14:02)  HR: 65 (2021 11:30) (59 - 65)  BP: 182/95 (2021 11:30) (160/87 - 182/95)  BP(mean): 129 (2021 11:30) (118 - 131)  RR: 16 (2021 11:30) (16 - 24)  SpO2: 98% (2021 11:30) (93% - 99%)  I&O's Detail    2021 07:01  -  2021 07:00  --------------------------------------------------------  IN:    Heparin: 256 mL    IV PiggyBack: 350 mL    Oral Fluid: 480 mL  Total IN: 1086 mL    OUT:    Voided (mL): 800 mL  Total OUT: 800 mL    Total NET: 286 mL      2021 07:01  -  2021 13:39  --------------------------------------------------------  IN:    Heparin: 60 mL  Total IN: 60 mL    OUT:    Voided (mL): 200 mL  Total OUT: 200 mL    Total NET: -140 mL    CHEST TUBE:  No.   ROMARIO DRAIN:  No.  EPICARDIAL WIRES: No.  TIE DOWNS: No.  BACK: No.    PHYSICAL EXAM:    General: NAD, lying in bed, comfortable.    Neurological: AOx3, cranial nerves intact, motor skills intact.    Cardiovascular: Regular rate and rhythm, 1/6 systolic murmur appreciated, no gallops, rubs.    Respiratory: Right side CTA; left side absent basilar breath sounds; no wheezing, rales, rhonchi.    Gastrointestinal: soft, NTND; +BS throughout.    Extremities: WWP; no LE edema right side, left leg with erythematous protrusion and LE edema 1+.    Vascular: distal pulses intact b/l.    LABS:                        8.7    5.99  )-----------( 204      ( 2021 05:35 )             29.7       COUMADIN:  No. REASON: Heparin gtt.    PT/INR - ( 2021 05:35 )   PT: 15.6 sec;   INR: 1.32          PTT - ( 2021 12:15 )  PTT:72.5 sec        144  |  106  |  37<H>  ----------------------------<  84  3.6   |  21<L>  |  4.59<H>    Ca    8.5      2021 05:35  Mg     2.0         TPro  6.5  /  Alb  2.9<L>  /  TBili  0.5  /  DBili  x   /  AST  6<L>  /  ALT  <5<L>  /  AlkPhos  77  -      Urinalysis Basic - ( 2021 15:19 )    Color: Yellow / Appearance: Clear / S.025 / pH: x  Gluc: x / Ketone: NEGATIVE  / Bili: Negative / Urobili: 0.2 E.U./dL   Blood: x / Protein: 100 mg/dL / Nitrite: NEGATIVE   Leuk Esterase: Trace / RBC: < 5 /HPF / WBC 5-10 /HPF   Sq Epi: x / Non Sq Epi: 0-5 /HPF / Bacteria: Present /HPF        MEDICATIONS  (STANDING):  allopurinol 200 milliGRAM(s) Oral every 12 hours  aMIOdarone    Tablet 200 milliGRAM(s) Oral daily  calcitriol   Capsule 0.25 MICROGram(s) Oral daily  doxazosin 2 milliGRAM(s) Oral at bedtime  famotidine    Tablet 10 milliGRAM(s) Oral daily  heparin  Infusion 800 Unit(s)/Hr (12 mL/Hr) IV Continuous <Continuous>  metoprolol succinate  milliGRAM(s) Oral daily  senna 2 Tablet(s) Oral at bedtime  sodium bicarbonate 650 milliGRAM(s) Oral daily  sodium chloride 0.9% lock flush 3 milliLiter(s) IV Push every 8 hours  torsemide 20 milliGRAM(s) Oral daily    MEDICATIONS  (PRN):  acetaminophen   Tablet .. 650 milliGRAM(s) Oral every 6 hours PRN Temp greater or equal to 38C (100.4F), Mild Pain (1 - 3)        RADIOLOGY & ADDITIONAL TESTS:  < from: Xray Chest 1 View- PORTABLE-Routine (Xray Chest 1 View- PORTABLE-Routine in AM.) (21 @ 04:36) >  Frontal chest.    COMPARISON: 2021.    Findings/  impression: Stable cardiomegaly, thoracic aortic calcification. Stable bilateral opacities/pleural effusions and bony structures.          < end of copied text >

## 2021-01-20 NOTE — PROGRESS NOTE ADULT - ASSESSMENT
66 y/o M with a PMHx of HTN, COPD, CKD (stage 4 CKD, has LUE AVF, not on HD yet), T-cell lymphoma (diagnosed 19 years ago, on chemo romidepsin every Wednesday), AF (on Eliquis at home, last dose Saturday), systolic CHF (EF 40-45% 2017), severe pHTN who is admitted [facility] plan for VATS for recurrent pleural effusion. Renal consulted for CKD management- preop optimisation.    Stable CKD5, creat 4.5  S/p LUE AVF creation in June 2020  No urgent indication for HD at this time    Euvolaemic on home torsemide 20mg  Electrolytes acceptable  Bicarb at goal on 650mg daily  BP: hypertensive on toprol 200, consider adding CCB  Anaemia- Hgb 9; check iron panel and ferritin. may benefit from epo  BMD: calcitriol 0.25 daily    Daily weights, strict I&Os, renally dose meds (on Vancomycin, trough 10.2)  Plan for VATS deferred d/t knee lesion

## 2021-01-21 ENCOUNTER — TRANSCRIPTION ENCOUNTER (OUTPATIENT)
Age: 66
End: 2021-01-21

## 2021-01-21 LAB
ALBUMIN SERPL ELPH-MCNC: 3.2 G/DL — LOW (ref 3.3–5)
ALP SERPL-CCNC: 83 U/L — SIGNIFICANT CHANGE UP (ref 40–120)
ALT FLD-CCNC: <5 U/L — LOW (ref 10–45)
ANION GAP SERPL CALC-SCNC: 20 MMOL/L — HIGH (ref 5–17)
APTT BLD: 62.2 SEC — HIGH (ref 27.5–35.5)
AST SERPL-CCNC: 8 U/L — LOW (ref 10–40)
BILIRUB SERPL-MCNC: 0.5 MG/DL — SIGNIFICANT CHANGE UP (ref 0.2–1.2)
BLD GP AB SCN SERPL QL: NEGATIVE — SIGNIFICANT CHANGE UP
BUN SERPL-MCNC: 35 MG/DL — HIGH (ref 7–23)
CALCIUM SERPL-MCNC: 9 MG/DL — SIGNIFICANT CHANGE UP (ref 8.4–10.5)
CHLORIDE SERPL-SCNC: 108 MMOL/L — SIGNIFICANT CHANGE UP (ref 96–108)
CK MB CFR SERPL CALC: <1 NG/ML — SIGNIFICANT CHANGE UP (ref 0–6.7)
CK SERPL-CCNC: 30 U/L — SIGNIFICANT CHANGE UP (ref 30–200)
CO2 SERPL-SCNC: 17 MMOL/L — LOW (ref 22–31)
CREAT SERPL-MCNC: 4.83 MG/DL — HIGH (ref 0.5–1.3)
GLUCOSE SERPL-MCNC: 82 MG/DL — SIGNIFICANT CHANGE UP (ref 70–99)
HCT VFR BLD CALC: 32.7 % — LOW (ref 39–50)
HGB BLD-MCNC: 9.4 G/DL — LOW (ref 13–17)
INR BLD: 1.22 — HIGH (ref 0.88–1.16)
MAGNESIUM SERPL-MCNC: 2.3 MG/DL — SIGNIFICANT CHANGE UP (ref 1.6–2.6)
MCHC RBC-ENTMCNC: 28.4 PG — SIGNIFICANT CHANGE UP (ref 27–34)
MCHC RBC-ENTMCNC: 28.7 GM/DL — LOW (ref 32–36)
MCV RBC AUTO: 98.8 FL — SIGNIFICANT CHANGE UP (ref 80–100)
NRBC # BLD: 0 /100 WBCS — SIGNIFICANT CHANGE UP (ref 0–0)
PLATELET # BLD AUTO: 210 K/UL — SIGNIFICANT CHANGE UP (ref 150–400)
POTASSIUM SERPL-MCNC: 3.6 MMOL/L — SIGNIFICANT CHANGE UP (ref 3.5–5.3)
POTASSIUM SERPL-SCNC: 3.6 MMOL/L — SIGNIFICANT CHANGE UP (ref 3.5–5.3)
PROT SERPL-MCNC: 6.9 G/DL — SIGNIFICANT CHANGE UP (ref 6–8.3)
PROTHROM AB SERPL-ACNC: 14.5 SEC — HIGH (ref 10.6–13.6)
RBC # BLD: 3.31 M/UL — LOW (ref 4.2–5.8)
RBC # FLD: 19.8 % — HIGH (ref 10.3–14.5)
RH IG SCN BLD-IMP: POSITIVE — SIGNIFICANT CHANGE UP
SODIUM SERPL-SCNC: 145 MMOL/L — SIGNIFICANT CHANGE UP (ref 135–145)
TROPONIN T SERPL-MCNC: 0.03 NG/ML — HIGH (ref 0–0.01)
VANCOMYCIN TROUGH SERPL-MCNC: 27 UG/ML — CRITICAL HIGH (ref 10–20)
WBC # BLD: 5.88 K/UL — SIGNIFICANT CHANGE UP (ref 3.8–10.5)
WBC # FLD AUTO: 5.88 K/UL — SIGNIFICANT CHANGE UP (ref 3.8–10.5)

## 2021-01-21 PROCEDURE — 84100 ASSAY OF PHOSPHORUS: CPT

## 2021-01-21 PROCEDURE — 93306 TTE W/DOPPLER COMPLETE: CPT

## 2021-01-21 PROCEDURE — 85027 COMPLETE CBC AUTOMATED: CPT

## 2021-01-21 PROCEDURE — 83986 ASSAY PH BODY FLUID NOS: CPT

## 2021-01-21 PROCEDURE — 99285 EMERGENCY DEPT VISIT HI MDM: CPT | Mod: 25

## 2021-01-21 PROCEDURE — 84478 ASSAY OF TRIGLYCERIDES: CPT

## 2021-01-21 PROCEDURE — 88112 CYTOPATH CELL ENHANCE TECH: CPT

## 2021-01-21 PROCEDURE — 88305 TISSUE EXAM BY PATHOLOGIST: CPT

## 2021-01-21 PROCEDURE — 87205 SMEAR GRAM STAIN: CPT

## 2021-01-21 PROCEDURE — 82962 GLUCOSE BLOOD TEST: CPT

## 2021-01-21 PROCEDURE — P9016: CPT

## 2021-01-21 PROCEDURE — 87070 CULTURE OTHR SPECIMN AEROBIC: CPT

## 2021-01-21 PROCEDURE — 94010 BREATHING CAPACITY TEST: CPT | Mod: 26

## 2021-01-21 PROCEDURE — 84443 ASSAY THYROID STIM HORMONE: CPT

## 2021-01-21 PROCEDURE — 83615 LACTATE (LD) (LDH) ENZYME: CPT

## 2021-01-21 PROCEDURE — 87075 CULTR BACTERIA EXCEPT BLOOD: CPT

## 2021-01-21 PROCEDURE — 84311 SPECTROPHOTOMETRY: CPT

## 2021-01-21 PROCEDURE — 71045 X-RAY EXAM CHEST 1 VIEW: CPT

## 2021-01-21 PROCEDURE — 71045 X-RAY EXAM CHEST 1 VIEW: CPT | Mod: 26

## 2021-01-21 PROCEDURE — 71275 CT ANGIOGRAPHY CHEST: CPT

## 2021-01-21 PROCEDURE — 89051 BODY FLUID CELL COUNT: CPT

## 2021-01-21 PROCEDURE — 83036 HEMOGLOBIN GLYCOSYLATED A1C: CPT

## 2021-01-21 PROCEDURE — 99232 SBSQ HOSP IP/OBS MODERATE 35: CPT

## 2021-01-21 PROCEDURE — 86901 BLOOD TYPING SEROLOGIC RH(D): CPT

## 2021-01-21 PROCEDURE — 85610 PROTHROMBIN TIME: CPT

## 2021-01-21 PROCEDURE — 83735 ASSAY OF MAGNESIUM: CPT

## 2021-01-21 PROCEDURE — 86900 BLOOD TYPING SEROLOGIC ABO: CPT

## 2021-01-21 PROCEDURE — 99233 SBSQ HOSP IP/OBS HIGH 50: CPT

## 2021-01-21 PROCEDURE — 86923 COMPATIBILITY TEST ELECTRIC: CPT

## 2021-01-21 PROCEDURE — 88300 SURGICAL PATH GROSS: CPT

## 2021-01-21 PROCEDURE — 84157 ASSAY OF PROTEIN OTHER: CPT

## 2021-01-21 PROCEDURE — 82570 ASSAY OF URINE CREATININE: CPT

## 2021-01-21 PROCEDURE — 96374 THER/PROPH/DIAG INJ IV PUSH: CPT | Mod: XU

## 2021-01-21 PROCEDURE — 96375 TX/PRO/DX INJ NEW DRUG ADDON: CPT | Mod: XU

## 2021-01-21 PROCEDURE — U0003: CPT

## 2021-01-21 PROCEDURE — 86850 RBC ANTIBODY SCREEN: CPT

## 2021-01-21 PROCEDURE — 84436 ASSAY OF TOTAL THYROXINE: CPT

## 2021-01-21 PROCEDURE — 36430 TRANSFUSION BLD/BLD COMPNT: CPT

## 2021-01-21 PROCEDURE — 85730 THROMBOPLASTIN TIME PARTIAL: CPT

## 2021-01-21 PROCEDURE — 82945 GLUCOSE OTHER FLUID: CPT

## 2021-01-21 PROCEDURE — 84155 ASSAY OF PROTEIN SERUM: CPT

## 2021-01-21 PROCEDURE — 85025 COMPLETE CBC W/AUTO DIFF WBC: CPT

## 2021-01-21 PROCEDURE — 71250 CT THORAX DX C-: CPT

## 2021-01-21 PROCEDURE — 80076 HEPATIC FUNCTION PANEL: CPT

## 2021-01-21 PROCEDURE — 82272 OCCULT BLD FECES 1-3 TESTS: CPT

## 2021-01-21 PROCEDURE — 82042 OTHER SOURCE ALBUMIN QUAN EA: CPT

## 2021-01-21 PROCEDURE — 80048 BASIC METABOLIC PNL TOTAL CA: CPT

## 2021-01-21 PROCEDURE — 86769 SARS-COV-2 COVID-19 ANTIBODY: CPT

## 2021-01-21 PROCEDURE — 36415 COLL VENOUS BLD VENIPUNCTURE: CPT

## 2021-01-21 PROCEDURE — 80053 COMPREHEN METABOLIC PANEL: CPT

## 2021-01-21 RX ORDER — NIFEDIPINE 30 MG
60 TABLET, EXTENDED RELEASE 24 HR ORAL DAILY
Refills: 0 | Status: DISCONTINUED | OUTPATIENT
Start: 2021-01-21 | End: 2021-01-22

## 2021-01-21 RX ORDER — CHLORHEXIDINE GLUCONATE 213 G/1000ML
1 SOLUTION TOPICAL ONCE
Refills: 0 | Status: COMPLETED | OUTPATIENT
Start: 2021-01-21 | End: 2021-01-21

## 2021-01-21 RX ORDER — CHLORHEXIDINE GLUCONATE 213 G/1000ML
1 SOLUTION TOPICAL ONCE
Refills: 0 | Status: COMPLETED | OUTPATIENT
Start: 2021-01-22 | End: 2021-01-22

## 2021-01-21 RX ORDER — CHLORHEXIDINE GLUCONATE 213 G/1000ML
10 SOLUTION TOPICAL ONCE
Refills: 0 | Status: DISCONTINUED | OUTPATIENT
Start: 2021-01-21 | End: 2021-01-22

## 2021-01-21 RX ADMIN — SODIUM CHLORIDE 3 MILLILITER(S): 9 INJECTION INTRAMUSCULAR; INTRAVENOUS; SUBCUTANEOUS at 05:40

## 2021-01-21 RX ADMIN — Medication 2 MILLIGRAM(S): at 00:07

## 2021-01-21 RX ADMIN — Medication 2 MILLIGRAM(S): at 22:03

## 2021-01-21 RX ADMIN — Medication 20 MILLIGRAM(S): at 05:43

## 2021-01-21 RX ADMIN — Medication 60 MILLIGRAM(S): at 14:37

## 2021-01-21 RX ADMIN — CHLORHEXIDINE GLUCONATE 1 APPLICATION(S): 213 SOLUTION TOPICAL at 21:55

## 2021-01-21 RX ADMIN — Medication 200 MILLIGRAM(S): at 22:01

## 2021-01-21 RX ADMIN — Medication 650 MILLIGRAM(S): at 11:11

## 2021-01-21 RX ADMIN — SODIUM CHLORIDE 3 MILLILITER(S): 9 INJECTION INTRAMUSCULAR; INTRAVENOUS; SUBCUTANEOUS at 22:20

## 2021-01-21 RX ADMIN — AMIODARONE HYDROCHLORIDE 200 MILLIGRAM(S): 400 TABLET ORAL at 05:43

## 2021-01-21 RX ADMIN — CALCITRIOL 0.25 MICROGRAM(S): 0.5 CAPSULE ORAL at 11:11

## 2021-01-21 RX ADMIN — Medication 200 MILLIGRAM(S): at 09:50

## 2021-01-21 RX ADMIN — SODIUM CHLORIDE 3 MILLILITER(S): 9 INJECTION INTRAMUSCULAR; INTRAVENOUS; SUBCUTANEOUS at 13:02

## 2021-01-21 RX ADMIN — FAMOTIDINE 10 MILLIGRAM(S): 10 INJECTION INTRAVENOUS at 11:10

## 2021-01-21 RX ADMIN — Medication 200 MILLIGRAM(S): at 05:42

## 2021-01-21 NOTE — DIETITIAN INITIAL EVALUATION ADULT. - ADD RECOMMEND
1. Encourage intake through day 2. Trend wts 3. Monitor and replete lytes prn 4. Reinforce ed 5. Manage pain prn

## 2021-01-21 NOTE — PROGRESS NOTE ADULT - ASSESSMENT
64 y/o M with a PMHx of HTN, COPD, CKD (stage 4 CKD, has LUE AVF, not on HD yet), T-cell lymphoma (diagnosed 19 years ago, on chemo romidepsin every Wednesday), AF (on Eliquis at home, last dose Saturday), systolic CHF (EF 40-45% 2017), severe pHTN who is admitted wiht plan for VATS for recurrent pleural effusion. Renal consulted for CKD management- preop optimisation.    Stable CKD5  S/p LUE AVF creation in June 2020  No urgent indication for HD at this time    Euvolaemic, electrolytes acceptable  Increase bicarb to 650mg BID  BP: hypertensive on toprol 200 and torsemide 20, start nifedipine 60 daily  Anaemia- Hgb 9; check iron panel and ferritin; may benefit from epo  BMD: calcitriol 0.25 daily    Daily weights, strict I&Os, renally dose meds

## 2021-01-21 NOTE — DIETITIAN INITIAL EVALUATION ADULT. - PERSON TAUGHT/METHOD
discussed DASH diet with pt/ renal needs, encouraged intake through day/verbal instruction/patient instructed

## 2021-01-21 NOTE — PROGRESS NOTE ADULT - ASSESSMENT
66 y/o M with a PMHx of HTN, COPD, CKD (stage 4 CKD, has LUE AVF, not on HD yet), T-cell lymphoma (diagnosed 19 years ago, on chemo romidepsin every Wednesday), AF (on Eliquis at home, last dose Saturday), systolic CHF (EF 40-45% 2017), severe pHTN who recently underwent a left chest wall hematoma evacuation on 12/18/2020 secondary to loculated effusion. Hospital course at that time was complicated by AF with RVR and thoracic surgery consulted at that time for possible future intervention. Of note, recent ECHO during previous admission revealed severe mitral regurgitation. Thoracic surgery followed up with patient as an outpatient once discharged from the hospital and deemed patient a good surgical candidate. Today, on 1/18/21 patient presented to [facility] for pre-operative optimization with heparin gtt prior to OR. Of note: Patient with red swelling on anterior aspect of left knee 2/2 to fall on concrete 1 week ago. CT scan of LLE demonstrated severe cellulitis with small seroma superficial to the distal aspect of the patellar tendon, needle aspirate performed by ortho. No growth per date. Protrusion is smaller today and less red, ID following alone. Patient getting vancomycin by level. Patient preop for VATS procedure decortication and pleurodesis with Dr. Harmon tomorrow.    ==== Neurovascular ====  -No delirium, pain well managed on current regimen  -C/w PRNs for Pain control  -Monitor neuro status    ==== Respiratory ====  -Saturates well on RA    -pHTN, COPD  -left chest wall hematoma evacuation on 12/18/2020 secondary to loculated effusion  -pre-op for L VATS, decort, pleurodesis, RA with Dr. Harmon (pending)   -AM CXR stable, repeat in AM  -Encourage IS 10x/hour while awake, Cough and deep breathing exercises  -Monitor respiratory status via SpO2    ==== Cardiovascular ====  -Monitor HR/BP/Tele  -hx Afib on eliquis at home (last dose 1/6) - admitted with hep gtt; PTT goal 60-80; therapeutic x3, will check daily PTTs now  -hx of severe MR   -hx of HFrEF (EF 40-45%)  -hx of HTN: metoprolol succinate 200mg daily   -hemodynamically stable    ==== GI ====   -Tolerating PO  -Prophylaxis: Famotidine   -C/w bowel regimen: senna    ==== /Renal ====  -BUN/Cr: 35/4.83  -hx CKD stage 4 baseline (not on HD but does have AVF placed Left arm)  -Trend Cr on AM labs  -Replete electrolytes as needed  -Monitor I/O's daily     ==== ID ====   Afebrile, asymptomatic  -left anterior tibial abscess 2/2 to mechanical fall at home 1 week ago - needle aspirated by ortho - NGTD; gen surg consulted - NTD   -CT scan LLE: severe cellulitis with small seroma superficial to the distal aspect of the patellar tendon   -WBC: 5.88  -tx cellulitis: d/c'ed zosyn; vanc by level daily (pending trough)  -ID on board - appreciate recs; want I&D for cultures (c/w vanc by level until cultures grow)  -F/u cultures from LLE needle aspirate- NGTD  -Continue to monitor for SIRS/Sepsis syndrome while inpatient    ==== Endocrine ====   -A1C: 4.4  -TSH: 4.428  -no active issues    ==== Hematologic ====   -H/H: 9.4/32.7  -CBC, chem in AM  -Hx of T cell lymphoma on chemo Romidepsin qWednesday  -DVT ppx: Heparin gtt and SCDs    ==== Disposition Planning ====  Pending OR for VATS decort, pleurodesis; dispo ongoing: plan for tomorrow.

## 2021-01-21 NOTE — DIETITIAN INITIAL EVALUATION ADULT. - OTHER CALCULATIONS
ABW used for calculations as pt between % of IBW. ABW 72.2kg, IBW 78kg, 92% IBW, ht 71", BMI 22.2. Nutrient needs based on [facility] standards of care for maintenance in adults, adjusted for CHF, age, pre-op needs, fluid per team

## 2021-01-21 NOTE — PROGRESS NOTE ADULT - SUBJECTIVE AND OBJECTIVE BOX
Patient is a 65y Male seen and evaluated at bedside. Creat stable CKD5. No indication for HD. Plan for VATS tomorrow.    Meds:    acetaminophen   Tablet .. 650 every 6 hours PRN  allopurinol 200 every 12 hours  aMIOdarone    Tablet 200 daily  calcitriol   Capsule 0.25 daily  chlorhexidine 0.12% Liquid 10 once  chlorhexidine 4% Liquid 1 once  chlorhexidine 4% Liquid 1 once  doxazosin 2 at bedtime  famotidine    Tablet 10 daily  heparin  Infusion 800 <Continuous>  metoprolol succinate  daily  senna 2 at bedtime  sodium bicarbonate 650 daily  sodium chloride 0.9% lock flush 3 every 8 hours  torsemide 20 daily      T(C): , Max: 36.9 (01-20-21 @ 14:08)  T(F): , Max: 98.4 (01-20-21 @ 14:08)  HR: 58 (01-21-21 @ 11:15)  BP: 182/91 (01-21-21 @ 11:15)  BP(mean): 124 (01-21-21 @ 11:15)  RR: 25 (01-21-21 @ 11:15)  SpO2: 99% (01-21-21 @ 11:15)  Wt(kg): --    01-20 @ 07:01  -  01-21 @ 07:00  --------------------------------------------------------  IN: 1090 mL / OUT: 401 mL / NET: 689 mL    01-21 @ 07:01 - 01-21 @ 13:36  --------------------------------------------------------  IN: 72 mL / OUT: 225 mL / NET: -153 mL    Review of Systems:  RESPIRATORY: No shortness of breath, cough  CARDIOVASCULAR: No Chest pain  MUSCULOSKELETAL: +Left knee pain, No leg swelling    PHYSICAL EXAM:  GENERAL: Alert, awake, oriented x3 on RA  CHEST/LUNG: Distant breath sounds, no rales/rhonchi  HEART: Regular rate and rhythm, no murmur  ABDOMEN: Soft, nontender, non distended  EXTREMITIES: no pedal oedema, Left lesion under knee- haematoma?  ACCESS: LUE AVF w/bruit and thrill       LABS:                        9.4    5.88  )-----------( 210      ( 21 Jan 2021 06:35 )             32.7     01-21    145  |  108  |  35<H>  ----------------------------<  82  3.6   |  17<L>  |  4.83<H>    Ca    9.0      21 Jan 2021 06:35  Mg     2.3     01-21    TPro  6.9  /  Alb  3.2<L>  /  TBili  0.5  /  DBili  x   /  AST  8<L>  /  ALT  <5<L>  /  AlkPhos  83  01-21      PT/INR - ( 21 Jan 2021 06:35 )   PT: 14.5 sec;   INR: 1.22          PTT - ( 21 Jan 2021 06:35 )  PTT:62.2 sec          RADIOLOGY & ADDITIONAL STUDIES:

## 2021-01-21 NOTE — PROGRESS NOTE ADULT - SUBJECTIVE AND OBJECTIVE BOX
Planned Date of Surgery:  1/22/21                                                                                                                Surgeon: Dr. Harmon    Procedure: VATS decortication, pleurodesis    HPI:  64 y/o M with a PMHx of HTN, COPD, CKD (stage 4 CKD, has LUE AVF, not on HD yet), T-cell lymphoma (diagnosed 19 years ago, on chemo romidepsin every Wednesday), AF (on Eliquis at home, last dose Saturday), systolic CHF (EF 40-45% 2017), severe pHTN who recently underwent a left chest wall hematoma evacuation on 12/18/2020 secondary to loculated effusion. Hospital course at that time was complicated by AF with RVR and thoracic surgery consulted at that time for possible future intervention. Of note, recent ECHO during previous admission revealed severe mitral regurgitation. Thoracic surgery followed up with patient as an outpatient once discharged from the hospital and deemed patient a good surgical candidate. Today, on 1/18/21 patient presented to [facility] for pre-operative optimization with heparin gtt prior to OR. Of note: Patient with red swelling on anterior aspect of left knee 2/2 to fall on concrete 1 week ago. CT scan of LLE demonstrated severe cellulitis with small seroma superficial to the distal aspect of the patellar tendon, needle aspirate performed by ortho. No growth per date. Protrusion is smaller today and less red, ID following alone. Patient getting vancomycin by level. Patient preop for VATS procedure decortication and pleurodesis with Dr. Harmon tomorrow.    PAST MEDICAL & SURGICAL HISTORY:  BPH (benign prostatic hyperplasia)    Gout    H/O pulmonary hypertension    CHF, chronic    Lymphoma  t cell; Chemotherapy weekly- wednesday    CKD (chronic kidney disease)    Atrial fibrillation    HTN (hypertension)    Elective surgery  rectal surgery    History of cholecystectomy    No Known Allergies      Physical Exam  T(C): 36.4 (01-21-21 @ 09:40), Max: 36.9 (01-20-21 @ 14:08)  HR: 58 (01-21-21 @ 11:15) (57 - 65)  BP: 182/91 (01-21-21 @ 11:15) (166/84 - 194/100)  RR: 25 (01-21-21 @ 11:15) (16 - 28)  SpO2: 99% (01-21-21 @ 11:15) (93% - 99%)  Constitutional: NAD, sitting up in chair, anxious for surgery  Neuro: AOx3, no new focal neuro deficits, motor skills  CV: regular rate and rhythm, 1/6 systolic murmur, no rubs, gallops  Pulmonary: CTA b/l, shallow left side breath soun  GI:  Extremeties:    MEDICATIONS  (STANDING):  allopurinol 200 milliGRAM(s) Oral every 12 hours  aMIOdarone    Tablet 200 milliGRAM(s) Oral daily  calcitriol   Capsule 0.25 MICROGram(s) Oral daily  chlorhexidine 0.12% Liquid 10 milliLiter(s) Swish and Spit once  chlorhexidine 4% Liquid 1 Application(s) Topical once  chlorhexidine 4% Liquid 1 Application(s) Topical once  doxazosin 2 milliGRAM(s) Oral at bedtime  famotidine    Tablet 10 milliGRAM(s) Oral daily  heparin  Infusion 800 Unit(s)/Hr (12 mL/Hr) IV Continuous <Continuous>  metoprolol succinate  milliGRAM(s) Oral daily  senna 2 Tablet(s) Oral at bedtime  sodium bicarbonate 650 milliGRAM(s) Oral daily  sodium chloride 0.9% lock flush 3 milliLiter(s) IV Push every 8 hours  torsemide 20 milliGRAM(s) Oral daily    MEDICATIONS  (PRN):  acetaminophen   Tablet .. 650 milliGRAM(s) Oral every 6 hours PRN Temp greater or equal to 38C (100.4F), Mild Pain (1 - 3)      On Beta Blocker? YES / NO / CONTRAINDICATED Reason_______________    Labs:                        9.4    5.88  )-----------( 210      ( 21 Jan 2021 06:35 )             32.7     01-21    145  |  108  |  35<H>  ----------------------------<  82  3.6   |  17<L>  |  4.83<H>    Ca    9.0      21 Jan 2021 06:35  Mg     2.3     01-21    TPro  6.9  /  Alb  3.2<L>  /  TBili  0.5  /  DBili  x   /  AST  8<L>  /  ALT  <5<L>  /  AlkPhos  83  01-21    PT/INR - ( 21 Jan 2021 06:35 )   PT: 14.5 sec;   INR: 1.22          PTT - ( 21 Jan 2021 06:35 )  PTT:62.2 sec    ABO Interpretation: A (01-19-21 @ 22:51)              Hgb A1C:    EKG:    CXR:    CT Scans:    Cath Report:    Echo:    PFT's:    Carotid Duplex:    Consult in Chart?  YES / NO  Consent in Chart? YES / NO  Pre-op Orders Placed? YES / NO  Blood Prodeucts Ordered? YES / NO  NPO ordered? YES / NO Planned Date of Surgery:  1/22/21                                                                                                                Surgeon: Dr. Harmon    Procedure: VATS decortication, pleurodesis    HPI:  64 y/o M with a PMHx of HTN, COPD, CKD (stage 4 CKD, has LUE AVF, not on HD yet), T-cell lymphoma (diagnosed 19 years ago, on chemo romidepsin every Wednesday), AF (on Eliquis at home, last dose Saturday), systolic CHF (EF 40-45% 2017), severe pHTN who recently underwent a left chest wall hematoma evacuation on 12/18/2020 secondary to loculated effusion. Hospital course at that time was complicated by AF with RVR and thoracic surgery consulted at that time for possible future intervention. Of note, recent ECHO during previous admission revealed severe mitral regurgitation. Thoracic surgery followed up with patient as an outpatient once discharged from the hospital and deemed patient a good surgical candidate. Today, on 1/18/21 patient presented to [facility] for pre-operative optimization with heparin gtt prior to OR. Of note: Patient with red swelling on anterior aspect of left knee 2/2 to fall on concrete 1 week ago. CT scan of LLE demonstrated severe cellulitis with small seroma superficial to the distal aspect of the patellar tendon, needle aspirate performed by ortho. No growth per date. Protrusion is smaller today and less red, ID following alone. Patient getting vancomycin by level. Patient preop for VATS procedure decortication and pleurodesis with Dr. Harmon tomorrow.    PAST MEDICAL & SURGICAL HISTORY:  BPH (benign prostatic hyperplasia)    Gout    H/O pulmonary hypertension    CHF, chronic    Lymphoma  t cell; Chemotherapy weekly- wednesday    CKD (chronic kidney disease)    Atrial fibrillation    HTN (hypertension)    Elective surgery  rectal surgery    History of cholecystectomy    No Known Allergies      Physical Exam  T(C): 36.4 (01-21-21 @ 09:40), Max: 36.9 (01-20-21 @ 14:08)  HR: 58 (01-21-21 @ 11:15) (57 - 65)  BP: 182/91 (01-21-21 @ 11:15) (166/84 - 194/100)  RR: 25 (01-21-21 @ 11:15) (16 - 28)  SpO2: 99% (01-21-21 @ 11:15) (93% - 99%)  Constitutional: NAD, sitting up in chair, anxious for surgery  Neuro: AOx3, no new focal neuro deficits, motor skills  CV: regular rate and rhythm, 1/6 systolic murmur, no rubs, gallops  Pulmonary: CTA b/l, shallow left side basilar BS absent; no wheezing, rhonchi, rales  GI: soft, NTND, +BS  Extremeties: WWP, no LE edema    MEDICATIONS  (STANDING):  allopurinol 200 milliGRAM(s) Oral every 12 hours  aMIOdarone    Tablet 200 milliGRAM(s) Oral daily  calcitriol   Capsule 0.25 MICROGram(s) Oral daily  chlorhexidine 0.12% Liquid 10 milliLiter(s) Swish and Spit once  chlorhexidine 4% Liquid 1 Application(s) Topical once  chlorhexidine 4% Liquid 1 Application(s) Topical once  doxazosin 2 milliGRAM(s) Oral at bedtime  famotidine    Tablet 10 milliGRAM(s) Oral daily  heparin  Infusion 800 Unit(s)/Hr (12 mL/Hr) IV Continuous <Continuous>  metoprolol succinate  milliGRAM(s) Oral daily  senna 2 Tablet(s) Oral at bedtime  sodium bicarbonate 650 milliGRAM(s) Oral daily  sodium chloride 0.9% lock flush 3 milliLiter(s) IV Push every 8 hours  torsemide 20 milliGRAM(s) Oral daily    MEDICATIONS  (PRN):  acetaminophen   Tablet .. 650 milliGRAM(s) Oral every 6 hours PRN Temp greater or equal to 38C (100.4F), Mild Pain (1 - 3)    On Beta Blocker? YES     Labs:                        9.4    5.88  )-----------( 210      ( 21 Jan 2021 06:35 )             32.7     01-21    145  |  108  |  35<H>  ----------------------------<  82  3.6   |  17<L>  |  4.83<H>    Ca    9.0      21 Jan 2021 06:35  Mg     2.3     01-21    TPro  6.9  /  Alb  3.2<L>  /  TBili  0.5  /  DBili  x   /  AST  8<L>  /  ALT  <5<L>  /  AlkPhos  83  01-21    PT/INR - ( 21 Jan 2021 06:35 )   PT: 14.5 sec;   INR: 1.22          PTT - ( 21 Jan 2021 06:35 )  PTT:62.2 sec    ABO Interpretation: A (01-19-21 @ 22:51)      Hgb A1C: 4.4    EKG: NSR    CXR: < from: Xray Chest 1 View- PORTABLE-Routine (Xray Chest 1 View- PORTABLE-Routine in AM.) (01.21.21 @ 02:30) >  Indication: Preoperative study    2 AP views of the chest are compared to the prior study dated 1/20/2021. Increased right pleural effusion and diffuse pulmonary congestion. Persistent loculated left pleural effusion with underlying pulmonary consolidation. No pneumothorax.      IMPRESSION: Worsening pulmonary congestion and enlarged right pleural effusion. Persistent loculated left pleural effusion.    < end of copied text >    CT Scans:  < from: CT Chest No Cont (01.05.21 @ 18:50) >  Impression:    1. Interval resolution of small left apical pneumothorax.  2. No change in the size of a small to moderate-sized left pleural effusion which may represent a persistent small hematocrit effect along its posterior dependent aspect consistent with a hemothorax.  3. Persistent 3.3 cm rounded heterogeneous attenuation masslike structure within the anterior aspect of the left lower lobe. This may represent a persistent intraparenchymal hematoma. Continued short interval surveillance to confirm stability.  4. There may be encasement and mild narrowing of the superior segment to the left lower lobe. The remainder of the central bronchi are patent. An infiltrative perihilar process cannot be excluded in this examination. There is probable left lower lobe compressive atelectasis. Whole-body PET/CT may be of value to exclude a central neoplasm.  5. Persistent small right-sided pleural effusion.  6. Interval decrease in the size of a right anterior chest wall hematoma with removal of an anterior chest wall drainage catheter and near complete resolution of anterior chest wall soft tissue gas.  7. Additional chronic findings as above.    < end of copied text >    PFT's: pending    Consult in Chart?  YES  Consent in Chart? YES   Pre-op Orders Placed? YES  Blood Prodeucts Ordered? YES  NPO ordered? YES

## 2021-01-21 NOTE — PROGRESS NOTE ADULT - ASSESSMENT
65M with a PMHx of HTN, COPD, CKD (stage 4 CKD, has LUE AVF, not on HD yet), T-cell lymphoma (diagnosed 19 years ago, on chemo romidepsin every Wednesday), AFib (on Eliquis at home, last dose Saturday), systolic CHF (EF 40-45% 2017), severe pHTN presented for scheduled VATS procedure for pleuracentesis, found to have cellulitis of the left lower extremity. Given vancomycin for 1 dose with trough at goal.    PLAN  NOTE IN PROGRESS 65M with a PMHx of HTN, COPD, CKD (stage 4 CKD, has LUE AVF, not on HD yet), T-cell lymphoma (diagnosed 19 years ago, on chemo romidepsin every Wednesday), AFib (on Eliquis at home, last dose Saturday), systolic CHF (EF 40-45% 2017), severe pHTN presented for scheduled VATS procedure for pleuracentesis, found to have cellulitis of the left lower extremity. Given vancomycin for 1 dose with trough at goal. No organisms seen in culture.    PLAN  Significant cellulitis seen on CT, treated with 1 g vancomycin.   -Please give additional 1g vancomycin today for residual cellulitis infection.   ID team 1 to sign off, please reconsult with any further questions

## 2021-01-21 NOTE — DIETITIAN INITIAL EVALUATION ADULT. - OTHER INFO
65M with PMH of HTN, COPD, CKD (with AVF but not on HD yet), T-cell lymphoma on chemo every Wednesday, AF on Eliquis at home, CHF (EF 40-45%), Pulm HTN, recent L chest wall hematoma evacuation, severe MR, who was admitted for scheduled VATS for recurrent pleural effusion, and was started on heparin drip. Patient had a recent fall on concrete last week when he tried to stop his dogs fighting, and since then, he noticed a small mass in the L anterior leg. General surgery was consulted for evaluation and possible intervention, no surgical intervention to take place. CT scan of LLE demonstrated severe cellulitis with small seroma superficial to the distal aspect of the patellar tendon. Patient preop for VATS procedure 1/20 with Dr. Harmon, pending the resolution of LLE protrusion, 2 ml of blood aspirated, pending results of culture as c/f infection.   Pt observed in bed noting NAD, reports he enjoyed breakfast this morning, endorsing good appetite recently. Noted wt loss in past before prior sx? unknown time frame however reports wt loss of 20# but has gained back.. Per admission in Dec 2020 wt noted at 67.6kg, indicating +5kg wt gain ~1 month, suspect in setting of fluid shifts/ CHF/ renal function. Denies n/v/d/c, chewing/swallowing - has top dentures which he often forgets to put in so will choose softer foods, skin with knee bruise, otherwise intact, niels score 20. Encouraged intake through day to best meet needs. Will continue to follow per protocol and follow for education post-op.

## 2021-01-22 ENCOUNTER — APPOINTMENT (OUTPATIENT)
Dept: THORACIC SURGERY | Facility: HOSPITAL | Age: 66
End: 2021-01-22

## 2021-01-22 ENCOUNTER — RESULT REVIEW (OUTPATIENT)
Age: 66
End: 2021-01-22

## 2021-01-22 LAB
ALBUMIN SERPL ELPH-MCNC: 3 G/DL — LOW (ref 3.3–5)
ALP SERPL-CCNC: 67 U/L — SIGNIFICANT CHANGE UP (ref 40–120)
ALT FLD-CCNC: <5 U/L — LOW (ref 10–45)
ANION GAP SERPL CALC-SCNC: 14 MMOL/L — SIGNIFICANT CHANGE UP (ref 5–17)
ANION GAP SERPL CALC-SCNC: 15 MMOL/L — SIGNIFICANT CHANGE UP (ref 5–17)
APTT BLD: 38.2 SEC — HIGH (ref 27.5–35.5)
APTT BLD: 75 SEC — HIGH (ref 27.5–35.5)
AST SERPL-CCNC: 6 U/L — LOW (ref 10–40)
BASE EXCESS BLDA CALC-SCNC: -2.4 MMOL/L — LOW (ref -2–3)
BASE EXCESS BLDA CALC-SCNC: -3.6 MMOL/L — LOW (ref -2–3)
BASE EXCESS BLDA CALC-SCNC: -3.7 MMOL/L — LOW (ref -2–3)
BASE EXCESS BLDA CALC-SCNC: -4.7 MMOL/L — LOW (ref -2–3)
BASOPHILS # BLD AUTO: 0.01 K/UL — SIGNIFICANT CHANGE UP (ref 0–0.2)
BASOPHILS NFR BLD AUTO: 0.1 % — SIGNIFICANT CHANGE UP (ref 0–2)
BILIRUB SERPL-MCNC: 0.4 MG/DL — SIGNIFICANT CHANGE UP (ref 0.2–1.2)
BUN SERPL-MCNC: 34 MG/DL — HIGH (ref 7–23)
BUN SERPL-MCNC: 37 MG/DL — HIGH (ref 7–23)
CA-I BLDA-SCNC: 0.98 MMOL/L — LOW (ref 1.12–1.3)
CA-I BLDA-SCNC: 1.14 MMOL/L — SIGNIFICANT CHANGE UP (ref 1.12–1.3)
CA-I BLDA-SCNC: 1.32 MMOL/L — HIGH (ref 1.12–1.3)
CALCIUM SERPL-MCNC: 8.6 MG/DL — SIGNIFICANT CHANGE UP (ref 8.4–10.5)
CALCIUM SERPL-MCNC: 9.3 MG/DL — SIGNIFICANT CHANGE UP (ref 8.4–10.5)
CHLORIDE SERPL-SCNC: 109 MMOL/L — HIGH (ref 96–108)
CHLORIDE SERPL-SCNC: 109 MMOL/L — HIGH (ref 96–108)
CO2 SERPL-SCNC: 22 MMOL/L — SIGNIFICANT CHANGE UP (ref 22–31)
CO2 SERPL-SCNC: 23 MMOL/L — SIGNIFICANT CHANGE UP (ref 22–31)
COHGB MFR BLDA: 0.8 % — SIGNIFICANT CHANGE UP
COHGB MFR BLDA: 0.9 % — SIGNIFICANT CHANGE UP
COHGB MFR BLDA: 1.1 % — SIGNIFICANT CHANGE UP
CREAT SERPL-MCNC: 5 MG/DL — HIGH (ref 0.5–1.3)
CREAT SERPL-MCNC: 5.24 MG/DL — HIGH (ref 0.5–1.3)
EOSINOPHIL # BLD AUTO: 0.06 K/UL — SIGNIFICANT CHANGE UP (ref 0–0.5)
EOSINOPHIL NFR BLD AUTO: 0.7 % — SIGNIFICANT CHANGE UP (ref 0–6)
FERRITIN SERPL-MCNC: 580 NG/ML — HIGH (ref 30–400)
GAS PNL BLDA: SIGNIFICANT CHANGE UP
GLUCOSE BLDC GLUCOMTR-MCNC: 96 MG/DL — SIGNIFICANT CHANGE UP (ref 70–99)
GLUCOSE BLDC GLUCOMTR-MCNC: 96 MG/DL — SIGNIFICANT CHANGE UP (ref 70–99)
GLUCOSE BLDC GLUCOMTR-MCNC: 98 MG/DL — SIGNIFICANT CHANGE UP (ref 70–99)
GLUCOSE SERPL-MCNC: 88 MG/DL — SIGNIFICANT CHANGE UP (ref 70–99)
GLUCOSE SERPL-MCNC: 88 MG/DL — SIGNIFICANT CHANGE UP (ref 70–99)
GRAM STN FLD: SIGNIFICANT CHANGE UP
HCO3 BLDA-SCNC: 21 MMOL/L — SIGNIFICANT CHANGE UP (ref 21–28)
HCO3 BLDA-SCNC: 21 MMOL/L — SIGNIFICANT CHANGE UP (ref 21–28)
HCO3 BLDA-SCNC: 23 MMOL/L — SIGNIFICANT CHANGE UP (ref 21–28)
HCO3 BLDA-SCNC: 23 MMOL/L — SIGNIFICANT CHANGE UP (ref 21–28)
HCT VFR BLD CALC: 28.6 % — LOW (ref 39–50)
HCT VFR BLD CALC: 31.2 % — LOW (ref 39–50)
HGB BLD-MCNC: 8.5 G/DL — LOW (ref 13–17)
HGB BLD-MCNC: 9.4 G/DL — LOW (ref 13–17)
HGB BLDA-MCNC: 8.5 G/DL — LOW (ref 13–17)
HGB BLDA-MCNC: 8.9 G/DL — LOW (ref 13–17)
HGB BLDA-MCNC: 9.8 G/DL — LOW (ref 13–17)
IMM GRANULOCYTES NFR BLD AUTO: 0.4 % — SIGNIFICANT CHANGE UP (ref 0–1.5)
INR BLD: 1.2 — HIGH (ref 0.88–1.16)
INR BLD: 1.26 — HIGH (ref 0.88–1.16)
IRON SATN MFR SERPL: 42 UG/DL — LOW (ref 45–165)
LYMPHOCYTES # BLD AUTO: 0.62 K/UL — LOW (ref 1–3.3)
LYMPHOCYTES # BLD AUTO: 7.4 % — LOW (ref 13–44)
MAGNESIUM SERPL-MCNC: 2.4 MG/DL — SIGNIFICANT CHANGE UP (ref 1.6–2.6)
MCHC RBC-ENTMCNC: 28.6 PG — SIGNIFICANT CHANGE UP (ref 27–34)
MCHC RBC-ENTMCNC: 28.8 PG — SIGNIFICANT CHANGE UP (ref 27–34)
MCHC RBC-ENTMCNC: 29.7 GM/DL — LOW (ref 32–36)
MCHC RBC-ENTMCNC: 30.1 GM/DL — LOW (ref 32–36)
MCV RBC AUTO: 95.7 FL — SIGNIFICANT CHANGE UP (ref 80–100)
MCV RBC AUTO: 96.3 FL — SIGNIFICANT CHANGE UP (ref 80–100)
METHGB MFR BLDA: 0.2 % — SIGNIFICANT CHANGE UP
METHGB MFR BLDA: 0.2 % — SIGNIFICANT CHANGE UP
METHGB MFR BLDA: 0.3 % — SIGNIFICANT CHANGE UP
MONOCYTES # BLD AUTO: 0.48 K/UL — SIGNIFICANT CHANGE UP (ref 0–0.9)
MONOCYTES NFR BLD AUTO: 5.7 % — SIGNIFICANT CHANGE UP (ref 2–14)
NEUTROPHILS # BLD AUTO: 7.16 K/UL — SIGNIFICANT CHANGE UP (ref 1.8–7.4)
NEUTROPHILS NFR BLD AUTO: 85.7 % — HIGH (ref 43–77)
NRBC # BLD: 0 /100 WBCS — SIGNIFICANT CHANGE UP (ref 0–0)
NRBC # BLD: 0 /100 WBCS — SIGNIFICANT CHANGE UP (ref 0–0)
O2 CT VFR BLDA CALC: 11.2 ML/DL — SIGNIFICANT CHANGE UP (ref 15–23)
O2 CT VFR BLDA CALC: 12.3 ML/DL — SIGNIFICANT CHANGE UP (ref 15–23)
O2 CT VFR BLDA CALC: 12.7 ML/DL — SIGNIFICANT CHANGE UP (ref 15–23)
OXYHGB MFR BLDA: 92 % — LOW (ref 94–100)
OXYHGB MFR BLDA: 93 % — LOW (ref 94–100)
OXYHGB MFR BLDA: 97 % — SIGNIFICANT CHANGE UP (ref 94–100)
PCO2 BLDA: 37 MMHG — SIGNIFICANT CHANGE UP (ref 35–48)
PCO2 BLDA: 40 MMHG — SIGNIFICANT CHANGE UP (ref 35–48)
PCO2 BLDA: 42 MMHG — SIGNIFICANT CHANGE UP (ref 35–48)
PCO2 BLDA: 48 MMHG — SIGNIFICANT CHANGE UP (ref 35–48)
PH BLDA: 7.3 — LOW (ref 7.35–7.45)
PH BLDA: 7.33 — LOW (ref 7.35–7.45)
PH BLDA: 7.35 — SIGNIFICANT CHANGE UP (ref 7.35–7.45)
PH BLDA: 7.37 — SIGNIFICANT CHANGE UP (ref 7.35–7.45)
PLATELET # BLD AUTO: 186 K/UL — SIGNIFICANT CHANGE UP (ref 150–400)
PLATELET # BLD AUTO: 230 K/UL — SIGNIFICANT CHANGE UP (ref 150–400)
PO2 BLDA: 101 MMHG — SIGNIFICANT CHANGE UP (ref 83–108)
PO2 BLDA: 138 MMHG — HIGH (ref 83–108)
PO2 BLDA: 70 MMHG — LOW (ref 83–108)
PO2 BLDA: 74 MMHG — LOW (ref 83–108)
POTASSIUM BLDA-SCNC: 2.9 MMOL/L — LOW (ref 3.5–4.9)
POTASSIUM BLDA-SCNC: 3.2 MMOL/L — LOW (ref 3.5–4.9)
POTASSIUM BLDA-SCNC: 3.5 MMOL/L — SIGNIFICANT CHANGE UP (ref 3.5–4.9)
POTASSIUM SERPL-MCNC: 3.6 MMOL/L — SIGNIFICANT CHANGE UP (ref 3.5–5.3)
POTASSIUM SERPL-MCNC: 3.7 MMOL/L — SIGNIFICANT CHANGE UP (ref 3.5–5.3)
POTASSIUM SERPL-SCNC: 3.6 MMOL/L — SIGNIFICANT CHANGE UP (ref 3.5–5.3)
POTASSIUM SERPL-SCNC: 3.7 MMOL/L — SIGNIFICANT CHANGE UP (ref 3.5–5.3)
PROT SERPL-MCNC: 6.4 G/DL — SIGNIFICANT CHANGE UP (ref 6–8.3)
PROTHROM AB SERPL-ACNC: 14.3 SEC — HIGH (ref 10.6–13.6)
PROTHROM AB SERPL-ACNC: 15 SEC — HIGH (ref 10.6–13.6)
RBC # BLD: 2.97 M/UL — LOW (ref 4.2–5.8)
RBC # BLD: 3.26 M/UL — LOW (ref 4.2–5.8)
RBC # FLD: 19.7 % — HIGH (ref 10.3–14.5)
RBC # FLD: 21.1 % — HIGH (ref 10.3–14.5)
SAO2 % BLDA: 92 % — LOW (ref 95–100)
SAO2 % BLDA: 94 % — LOW (ref 95–100)
SAO2 % BLDA: 98 % — SIGNIFICANT CHANGE UP (ref 95–100)
SAO2 % BLDA: 99 % — SIGNIFICANT CHANGE UP (ref 95–100)
SODIUM BLDA-SCNC: 142 MMOL/L — SIGNIFICANT CHANGE UP (ref 138–146)
SODIUM BLDA-SCNC: 145 MMOL/L — SIGNIFICANT CHANGE UP (ref 138–146)
SODIUM BLDA-SCNC: 146 MMOL/L — SIGNIFICANT CHANGE UP (ref 138–146)
SODIUM SERPL-SCNC: 146 MMOL/L — HIGH (ref 135–145)
SODIUM SERPL-SCNC: 146 MMOL/L — HIGH (ref 135–145)
SPECIMEN SOURCE: SIGNIFICANT CHANGE UP
WBC # BLD: 5.3 K/UL — SIGNIFICANT CHANGE UP (ref 3.8–10.5)
WBC # BLD: 8.36 K/UL — SIGNIFICANT CHANGE UP (ref 3.8–10.5)
WBC # FLD AUTO: 5.3 K/UL — SIGNIFICANT CHANGE UP (ref 3.8–10.5)
WBC # FLD AUTO: 8.36 K/UL — SIGNIFICANT CHANGE UP (ref 3.8–10.5)

## 2021-01-22 PROCEDURE — 85730 THROMBOPLASTIN TIME PARTIAL: CPT

## 2021-01-22 PROCEDURE — 88305 TISSUE EXAM BY PATHOLOGIST: CPT | Mod: 26

## 2021-01-22 PROCEDURE — 31622 DX BRONCHOSCOPE/WASH: CPT

## 2021-01-22 PROCEDURE — 81001 URINALYSIS AUTO W/SCOPE: CPT

## 2021-01-22 PROCEDURE — 80053 COMPREHEN METABOLIC PANEL: CPT

## 2021-01-22 PROCEDURE — 80061 LIPID PANEL: CPT

## 2021-01-22 PROCEDURE — 99232 SBSQ HOSP IP/OBS MODERATE 35: CPT

## 2021-01-22 PROCEDURE — 99291 CRITICAL CARE FIRST HOUR: CPT

## 2021-01-22 PROCEDURE — 86850 RBC ANTIBODY SCREEN: CPT

## 2021-01-22 PROCEDURE — 71045 X-RAY EXAM CHEST 1 VIEW: CPT | Mod: 26,77

## 2021-01-22 PROCEDURE — 85610 PROTHROMBIN TIME: CPT

## 2021-01-22 PROCEDURE — 86900 BLOOD TYPING SEROLOGIC ABO: CPT

## 2021-01-22 PROCEDURE — 71045 X-RAY EXAM CHEST 1 VIEW: CPT | Mod: 26

## 2021-01-22 PROCEDURE — 86901 BLOOD TYPING SEROLOGIC RH(D): CPT

## 2021-01-22 PROCEDURE — S2900 ROBOTIC SURGICAL SYSTEM: CPT | Mod: NC

## 2021-01-22 PROCEDURE — 85025 COMPLETE CBC W/AUTO DIFF WBC: CPT

## 2021-01-22 PROCEDURE — 32652 THORACOSCOPY REM TOTL CORTEX: CPT

## 2021-01-22 PROCEDURE — 88112 CYTOPATH CELL ENHANCE TECH: CPT | Mod: 26

## 2021-01-22 RX ORDER — PIPERACILLIN AND TAZOBACTAM 4; .5 G/20ML; G/20ML
2.25 INJECTION, POWDER, LYOPHILIZED, FOR SOLUTION INTRAVENOUS EVERY 8 HOURS
Refills: 0 | Status: DISCONTINUED | OUTPATIENT
Start: 2021-01-23 | End: 2021-01-25

## 2021-01-22 RX ORDER — BUPIVACAINE 13.3 MG/ML
20 INJECTION, SUSPENSION, LIPOSOMAL INFILTRATION ONCE
Refills: 0 | Status: DISCONTINUED | OUTPATIENT
Start: 2021-01-22 | End: 2021-01-22

## 2021-01-22 RX ORDER — FAMOTIDINE 10 MG/ML
10 INJECTION INTRAVENOUS DAILY
Refills: 0 | Status: DISCONTINUED | OUTPATIENT
Start: 2021-01-22 | End: 2021-01-22

## 2021-01-22 RX ORDER — DEXTROSE 50 % IN WATER 50 %
50 SYRINGE (ML) INTRAVENOUS
Refills: 0 | Status: DISCONTINUED | OUTPATIENT
Start: 2021-01-22 | End: 2021-01-24

## 2021-01-22 RX ORDER — VANCOMYCIN HCL 1 G
1000 VIAL (EA) INTRAVENOUS EVERY 12 HOURS
Refills: 0 | Status: DISCONTINUED | OUTPATIENT
Start: 2021-01-22 | End: 2021-01-23

## 2021-01-22 RX ORDER — INSULIN HUMAN 100 [IU]/ML
1 INJECTION, SOLUTION SUBCUTANEOUS
Qty: 100 | Refills: 0 | Status: DISCONTINUED | OUTPATIENT
Start: 2021-01-22 | End: 2021-01-24

## 2021-01-22 RX ORDER — SODIUM CHLORIDE 9 MG/ML
1000 INJECTION INTRAMUSCULAR; INTRAVENOUS; SUBCUTANEOUS
Refills: 0 | Status: DISCONTINUED | OUTPATIENT
Start: 2021-01-22 | End: 2021-01-26

## 2021-01-22 RX ORDER — CHLORHEXIDINE GLUCONATE 213 G/1000ML
15 SOLUTION TOPICAL EVERY 12 HOURS
Refills: 0 | Status: DISCONTINUED | OUTPATIENT
Start: 2021-01-22 | End: 2021-01-23

## 2021-01-22 RX ORDER — ALBUMIN HUMAN 25 %
250 VIAL (ML) INTRAVENOUS ONCE
Refills: 0 | Status: COMPLETED | OUTPATIENT
Start: 2021-01-22 | End: 2021-01-22

## 2021-01-22 RX ORDER — SODIUM CHLORIDE 9 MG/ML
1000 INJECTION, SOLUTION INTRAVENOUS
Refills: 0 | Status: DISCONTINUED | OUTPATIENT
Start: 2021-01-22 | End: 2021-01-24

## 2021-01-22 RX ORDER — CALCITRIOL 0.5 UG/1
0.25 CAPSULE ORAL DAILY
Refills: 0 | Status: DISCONTINUED | OUTPATIENT
Start: 2021-01-22 | End: 2021-01-22

## 2021-01-22 RX ORDER — ALLOPURINOL 300 MG
200 TABLET ORAL EVERY 12 HOURS
Refills: 0 | Status: DISCONTINUED | OUTPATIENT
Start: 2021-01-22 | End: 2021-01-22

## 2021-01-22 RX ORDER — PIPERACILLIN AND TAZOBACTAM 4; .5 G/20ML; G/20ML
3.38 INJECTION, POWDER, LYOPHILIZED, FOR SOLUTION INTRAVENOUS ONCE
Refills: 0 | Status: DISCONTINUED | OUTPATIENT
Start: 2021-01-22 | End: 2021-01-22

## 2021-01-22 RX ORDER — SODIUM CHLORIDE 9 MG/ML
1000 INJECTION, SOLUTION INTRAVENOUS
Refills: 0 | Status: DISCONTINUED | OUTPATIENT
Start: 2021-01-22 | End: 2021-01-22

## 2021-01-22 RX ORDER — ACETAMINOPHEN 500 MG
650 TABLET ORAL EVERY 6 HOURS
Refills: 0 | Status: DISCONTINUED | OUTPATIENT
Start: 2021-01-22 | End: 2021-01-22

## 2021-01-22 RX ORDER — DOXAZOSIN MESYLATE 4 MG
2 TABLET ORAL AT BEDTIME
Refills: 0 | Status: DISCONTINUED | OUTPATIENT
Start: 2021-01-22 | End: 2021-01-22

## 2021-01-22 RX ORDER — DESMOPRESSIN ACETATE 0.1 MG/1
21 TABLET ORAL ONCE
Refills: 0 | Status: DISCONTINUED | OUTPATIENT
Start: 2021-01-22 | End: 2021-01-25

## 2021-01-22 RX ORDER — AMIODARONE HYDROCHLORIDE 400 MG/1
200 TABLET ORAL DAILY
Refills: 0 | Status: DISCONTINUED | OUTPATIENT
Start: 2021-01-22 | End: 2021-01-22

## 2021-01-22 RX ORDER — FAMOTIDINE 10 MG/ML
20 INJECTION INTRAVENOUS EVERY 12 HOURS
Refills: 0 | Status: DISCONTINUED | OUTPATIENT
Start: 2021-01-22 | End: 2021-01-23

## 2021-01-22 RX ORDER — SODIUM CHLORIDE 9 MG/ML
3 INJECTION INTRAMUSCULAR; INTRAVENOUS; SUBCUTANEOUS EVERY 8 HOURS
Refills: 0 | Status: DISCONTINUED | OUTPATIENT
Start: 2021-01-22 | End: 2021-01-22

## 2021-01-22 RX ORDER — SENNA PLUS 8.6 MG/1
2 TABLET ORAL AT BEDTIME
Refills: 0 | Status: DISCONTINUED | OUTPATIENT
Start: 2021-01-22 | End: 2021-01-22

## 2021-01-22 RX ORDER — VANCOMYCIN HCL 1 G
1000 VIAL (EA) INTRAVENOUS EVERY 12 HOURS
Refills: 0 | Status: DISCONTINUED | OUTPATIENT
Start: 2021-01-22 | End: 2021-01-22

## 2021-01-22 RX ORDER — SODIUM BICARBONATE 1 MEQ/ML
650 SYRINGE (ML) INTRAVENOUS DAILY
Refills: 0 | Status: DISCONTINUED | OUTPATIENT
Start: 2021-01-22 | End: 2021-01-22

## 2021-01-22 RX ORDER — PROPOFOL 10 MG/ML
5 INJECTION, EMULSION INTRAVENOUS
Qty: 1000 | Refills: 0 | Status: DISCONTINUED | OUTPATIENT
Start: 2021-01-22 | End: 2021-01-23

## 2021-01-22 RX ADMIN — SODIUM CHLORIDE 3 MILLILITER(S): 9 INJECTION INTRAMUSCULAR; INTRAVENOUS; SUBCUTANEOUS at 06:52

## 2021-01-22 RX ADMIN — CHLORHEXIDINE GLUCONATE 1 APPLICATION(S): 213 SOLUTION TOPICAL at 04:30

## 2021-01-22 RX ADMIN — Medication 200 MILLIGRAM(S): at 09:10

## 2021-01-22 RX ADMIN — AMIODARONE HYDROCHLORIDE 200 MILLIGRAM(S): 400 TABLET ORAL at 06:58

## 2021-01-22 RX ADMIN — Medication 60 MILLIGRAM(S): at 07:59

## 2021-01-22 RX ADMIN — SODIUM CHLORIDE 3 MILLILITER(S): 9 INJECTION INTRAMUSCULAR; INTRAVENOUS; SUBCUTANEOUS at 14:49

## 2021-01-22 RX ADMIN — CALCITRIOL 0.25 MICROGRAM(S): 0.5 CAPSULE ORAL at 11:06

## 2021-01-22 RX ADMIN — Medication 125 MILLILITER(S): at 12:25

## 2021-01-22 RX ADMIN — Medication 20 MILLIGRAM(S): at 06:51

## 2021-01-22 RX ADMIN — FAMOTIDINE 10 MILLIGRAM(S): 10 INJECTION INTRAVENOUS at 11:05

## 2021-01-22 RX ADMIN — Medication 650 MILLIGRAM(S): at 11:06

## 2021-01-22 NOTE — PROGRESS NOTE ADULT - ASSESSMENT
64 y/o M with a PMHx of HTN, COPD, CKD (stage 4 CKD, has LUE AVF, not on HD yet), T-cell lymphoma (diagnosed 19 years ago, on chemo romidepsin every Wednesday), AF (on Eliquis at home, last dose Saturday), systolic CHF (EF 40-45% 2017), severe pHTN who is admitted wiht plan for VATS for recurrent pleural effusion. Renal consulted for CKD management- preop optimisation.    Stable CKD5  S/p LUE AVF creation in June 2020  No urgent indication for HD at this time    Euvolaemic, electrolytes acceptable  On bicarb 650mg daily  BP: holding AM meds, target BP range 140-150s given he was trending in 170s; will reassess tomorrow whether to resume toprol 200, torsemide 20 and lower dose of nifedipine, possible 30  Anaemia- KENNY, give PO iron 325mg daily  BMD: calcitriol 0.25 daily    Daily weights, strict I&Os, renally dose meds

## 2021-01-22 NOTE — PROGRESS NOTE ADULT - SUBJECTIVE AND OBJECTIVE BOX
Patient is a 65y Male seen and evaluated at bedside. Plan for VATS today. Exaggerated response to nifedipine 60. Holding AM BP meds. Renal function fluctuating, no indication for HD at this time.    Meds:    acetaminophen   Tablet .. 650 every 6 hours PRN  allopurinol 200 every 12 hours  aMIOdarone    Tablet 200 daily  calcitriol   Capsule 0.25 daily  chlorhexidine 0.12% Liquid 10 once  doxazosin 2 at bedtime  famotidine    Tablet 10 daily  lactated ringers. 1000 <Continuous>  lactated ringers. 1000 <Continuous>  metoprolol succinate  daily  senna 2 at bedtime  sodium bicarbonate 650 daily  sodium chloride 0.9% lock flush 3 every 8 hours  torsemide 20 daily      T(C): , Max: 36.6 (01-22-21 @ 09:00)  T(F): , Max: 97.8 (01-22-21 @ 09:00)  HR: 53 (01-22-21 @ 12:53)  BP: 99/58 (01-22-21 @ 12:53)  BP(mean): 75 (01-22-21 @ 12:08)  RR: 18 (01-22-21 @ 12:53)  SpO2: 93% (01-22-21 @ 12:53)  Wt(kg): --    01-21 @ 07:01  -  01-22 @ 07:00  --------------------------------------------------------  IN: 376 mL / OUT: 486 mL / NET: -110 mL    01-22 @ 07:01  -  01-22 @ 13:03  --------------------------------------------------------  IN: 540 mL / OUT: 0 mL / NET: 540 mL      Height (cm): 180.3 (01-22 @ 12:53)  Weight (kg): 72.2 (01-22 @ 12:53)  BMI (kg/m2): 22.2 (01-22 @ 12:53)  BSA (m2): 1.91 (01-22 @ 12:53)    Review of Systems:  RESPIRATORY: No shortness of breath, cough  CARDIOVASCULAR: No Chest pain  MUSCULOSKELETAL: No leg swelling    PHYSICAL EXAM:  GENERAL: Alert, awake, oriented x3 on RA  CHEST/LUNG: Distant breath sounds, no rales/rhonchi  HEART: Regular rate and rhythm, no murmur  ABDOMEN: Soft, nontender, non distended  EXTREMITIES: No pedal oedema  ACCESS: LUE AVF w/bruit and thrill       LABS:                        8.5    5.30  )-----------( 230      ( 22 Jan 2021 07:16 )             28.6     01-22    146<H>  |  109<H>  |  37<H>  ----------------------------<  88  3.6   |  22  |  5.24<H>    Ca    9.3      22 Jan 2021 07:16  Mg     2.4     01-22    TPro  6.4  /  Alb  3.0<L>  /  TBili  0.4  /  DBili  x   /  AST  6<L>  /  ALT  <5<L>  /  AlkPhos  67  01-22      PT/INR - ( 22 Jan 2021 07:16 )   PT: 14.3 sec;   INR: 1.20          PTT - ( 22 Jan 2021 07:16 )  PTT:75.0 sec          RADIOLOGY & ADDITIONAL STUDIES:

## 2021-01-22 NOTE — PROGRESS NOTE ADULT - SUBJECTIVE AND OBJECTIVE BOX
Patient discussed on morning rounds with Dr. Harmon      Operation / Date: Pending OR today VATS decortication for loculated effusion     SUBJECTIVE ASSESSMENT:  Patient seen this morning at bedside, denies any complaints     Vital Signs Last 24 Hrs  T(C): 36.6 (22 Jan 2021 12:53), Max: 36.6 (22 Jan 2021 09:00)  T(F): 97.8 (22 Jan 2021 12:53), Max: 97.8 (22 Jan 2021 09:00)  HR: 53 (22 Jan 2021 13:05) (53 - 76)  BP: 103/59 (22 Jan 2021 13:05) (99/58 - 171/92)  BP(mean): 77 (22 Jan 2021 13:05) (75 - 125)  RR: 26 (22 Jan 2021 13:05) (12 - 26)  SpO2: 93% (22 Jan 2021 12:53) (93% - 98%)  I&O's Detail    21 Jan 2021 07:01  -  22 Jan 2021 07:00  --------------------------------------------------------  IN:    Heparin: 276 mL    Oral Fluid: 100 mL  Total IN: 376 mL    OUT:    Stool (mL): 1 mL    Voided (mL): 485 mL  Total OUT: 486 mL    Total NET: -110 mL      22 Jan 2021 07:01  -  22 Jan 2021 16:11  --------------------------------------------------------  IN:    Albumin 5%  - 250 mL: 240 mL    Lactated Ringers: 300 mL  Total IN: 540 mL    OUT:  Total OUT: 0 mL    Total NET: 540 mL    CHEST TUBE:  No  ROMARIO DRAIN: No  EPICARDIAL WIRES: No  TIE DOWNS: No  BACK: No    PHYSICAL EXAM:    General: Patient lying comfortably in bed, no acute distress     Neurological: Alert and oriented. No focal neurological deficits     Cardiovascular: S1S2, RRR, no murmurs appreciated on exam     Respiratory: Decreased breath sounds on left side     Gastrointestinal: Abdomen soft, non tender, non distended     Extremities: Warm and well perfused. No edema or calf tenderness   + 1.5 cm fluctuant mass noted on L knee, mild surrounding erythema. No purulence or tenderness     Vascular: Peripheral pulses 2+ bilaterally     Incision Sites: n/a     LABS:                        8.5    5.30  )-----------( 230      ( 22 Jan 2021 07:16 )             28.6       COUMADIN:  No    PT/INR - ( 22 Jan 2021 07:16 )   PT: 14.3 sec;   INR: 1.20          PTT - ( 22 Jan 2021 07:16 )  PTT:75.0 sec    01-22    146<H>  |  109<H>  |  37<H>  ----------------------------<  88  3.6   |  22  |  5.24<H>    Ca    9.3      22 Jan 2021 07:16  Mg     2.4     01-22    TPro  6.4  /  Alb  3.0<L>  /  TBili  0.4  /  DBili  x   /  AST  6<L>  /  ALT  <5<L>  /  AlkPhos  67  01-22- HTN          MEDICATIONS  (STANDING):    MEDICATIONS  (PRN):        RADIOLOGY & ADDITIONAL TESTS:

## 2021-01-22 NOTE — PROGRESS NOTE ADULT - SUBJECTIVE AND OBJECTIVE BOX
CTICU  CRITICAL  CARE  attending     Hand off received 					   Pertinent clinical, laboratory, radiographic, hemodynamic, echocardiographic, respiratory data, microbiologic data and chart were reviewed and analyzed frequently throughout the course of the day and night    This is a 64 y/o M with a PMHx of HTN, COPD, CKD (stage 4 CKD, has LUE AVF, not on HD yet), T-cell lymphoma (diagnosed 19 years ago, on chemo romidepsin every Wednesday), AF (on Eliquis at home, last dose Saturday), systolic CHF (EF 40-45% 2017), severe pHTN who recently underwent a left chest wall hematoma evacuation on 12/18/2020 secondary to loculated effusion. Hospital course at that time was complicated by AF with RVR and thoracic surgery consulted at that time for possible future intervention. Of note, recent ECHO during previous admission revealed severe mitral regurgitation. Thoracic surgery followed up with patient as an outpatient once discharged from the hospital and deemed patient a good surgical candidate. Today, on 1/18/21 patient presented to [facility] for pre-operative optimization with heparin gtt prior to OR tomorrow. Currently, patient feels well with no complaints. Denies any CP, palpitations SOB, wheezing, abd pain, n/v/d/c, fevers or chills.     Of note: patient with red cyst noted on left anterior tibia and state he was breaking up his dogs from fighting when he fell on the concert on his knee 1.5 weeks ago. About 1 week ago it started to get a little swollen and now it has turned into a larger cyst with tenderness to palpitation.     S/P Left VATS  S/P Decortication  S/P drainage of empyema.    FAMILY HISTORY:  No pertinent family history in first degree relatives    PAST MEDICAL & SURGICAL HISTORY:  BPH (benign prostatic hyperplasia)  Gout  H/O pulmonary hypertension  CHF, chronic  Lymphoma  t cell; Chemotherapy weekly- wednesday  CKD (chronic kidney disease)  Atrial fibrillation  HTN (hypertension)  Elective surgery  rectal surgery  History of cholecystectomy          14 system review was unremarkable    Vital signs, hemodynamic and respiratory parameters were reviewed from the bedside nursing flow sheet.  ICU Vital Signs Last 24 Hrs  T(C): 36.6 (22 Jan 2021 23:15), Max: 36.6 (22 Jan 2021 09:00)  T(F): 97.8 (22 Jan 2021 23:15), Max: 97.8 (22 Jan 2021 09:00)  HR: 53 (22 Jan 2021 23:15) (52 - 56)  BP: 103/59 (22 Jan 2021 23:00) (97/55 - 119/62)  BP(mean): 77 (22 Jan 2021 23:00) (71 - 85)  ABP: 118/47 (22 Jan 2021 23:15) (105/38 - 120/49)  ABP(mean): 70 (22 Jan 2021 23:15) (58 - 72)  RR: 16 (22 Jan 2021 23:15) (12 - 26)  SpO2: 100% (22 Jan 2021 23:15) (93% - 100%)    Adult Advanced Hemodynamics Last 24 Hrs  CVP(mm Hg): --  CVP(cm H2O): --  CO: --  CI: --  PA: --  PA(mean): --  PCWP: --  SVR: --  SVRI: --  PVR: --  PVRI: --, ABG - ( 22 Jan 2021 22:46 )  pH, Arterial: 7.35  pH, Blood: x     /  pCO2: 42    /  pO2: 138   / HCO3: 23    / Base Excess: -2.4  /  SaO2: 99                Mode: AC/ CMV (Assist Control/ Continuous Mandatory Ventilation)  RR (machine): 16  TV (machine): 450  FiO2: 100  PEEP: 5  ITime: 1  MAP: 8  PIP: 18    Intake and output was reviewed and the fluid balance was calculated  Daily Height in cm: 180.3 (22 Jan 2021 12:53)    Daily   I&O's Summary    21 Jan 2021 07:01  -  22 Jan 2021 07:00  --------------------------------------------------------  IN: 376 mL / OUT: 486 mL / NET: -110 mL    22 Jan 2021 07:01  -  22 Jan 2021 23:33  --------------------------------------------------------  IN: 540 mL / OUT: 95 mL / NET: 445 mL        All lines and drain sites were assessed      Neuro: Not awake yet.  Neck: No JVD.  CVS: S1, S2, No S3.  Lungs: Diminished breath sounds on the left side.  Abd: Soft. No tenderness. + Bowel sounds.  Vascular: + DP/PT.  Extremities: No edema.  Lymphatic: Normal.  Skin: No abnormalities.      labs  CBC Full  -  ( 22 Jan 2021 22:46 )  WBC Count : 8.36 K/uL  RBC Count : 3.26 M/uL  Hemoglobin : 9.4 g/dL  Hematocrit : 31.2 %  Platelet Count - Automated : 186 K/uL  Mean Cell Volume : 95.7 fl  Mean Cell Hemoglobin : 28.8 pg  Mean Cell Hemoglobin Concentration : 30.1 gm/dL  Auto Neutrophil # : 7.16 K/uL  Auto Lymphocyte # : 0.62 K/uL  Auto Monocyte # : 0.48 K/uL  Auto Eosinophil # : 0.06 K/uL  Auto Basophil # : 0.01 K/uL  Auto Neutrophil % : 85.7 %  Auto Lymphocyte % : 7.4 %  Auto Monocyte % : 5.7 %  Auto Eosinophil % : 0.7 %  Auto Basophil % : 0.1 %    01-22    146<H>  |  109<H>  |  34<H>  ----------------------------<  88  3.7   |  23  |  5.00<H>    Ca    8.6      22 Jan 2021 22:46  Mg     2.4     01-22    TPro  6.4  /  Alb  3.0<L>  /  TBili  0.4  /  DBili  x   /  AST  6<L>  /  ALT  <5<L>  /  AlkPhos  67  01-22    PT/INR - ( 22 Jan 2021 22:46 )   PT: 15.0 sec;   INR: 1.26          PTT - ( 22 Jan 2021 22:46 )  PTT:38.2 sec  The current medications were reviewed   MEDICATIONS  (STANDING):  chlorhexidine 0.12% Liquid 15 milliLiter(s) Oral Mucosa every 12 hours  desmopressin IVPB 21 MICROGram(s) IV Intermittent once  dextrose 50% Injectable 50 milliLiter(s) IV Push every 15 minutes  famotidine Injectable 20 milliGRAM(s) IV Push every 12 hours  insulin regular Infusion 1 Unit(s)/Hr (1 mL/Hr) IV Continuous <Continuous>  lactated ringers. 1000 milliLiter(s) (50 mL/Hr) IV Continuous <Continuous>  piperacillin/tazobactam IVPB.. 3.375 Gram(s) IV Intermittent every 12 hours  propofol Infusion 5 MICROgram(s)/kG/Min (2.17 mL/Hr) IV Continuous <Continuous>  sodium chloride 0.9%. 1000 milliLiter(s) (10 mL/Hr) IV Continuous <Continuous>  vancomycin  IVPB 1000 milliGRAM(s) IV Intermittent every 12 hours  vancomycin  IVPB 1000 milliGRAM(s) IV Intermittent every 12 hours    65y old  Male with empyema.  S/P left VATS, decortication, pleurodesis.  Hemodynamically stable.  Good oxygenation.  CKD        My plan includes :  WEAN vent as tolerated.  IV Zosyn and vancomycin.  Statin and Betablocker.  Close hemodynamic, ventilatory and drain monitoring and management  Monitor for arrhythmias and monitor parameters for organ perfusion  Monitor neurologic status  Monitor renal function.  Head of the bed should remain elevated to 45 deg .   Chest PT and IS will be encouraged  Monitor adequacy of oxygenation and ventilation and attempt to wean oxygen  Nutritional goals will be met using po eventually , ensure adequate caloric intake and monitor the same  Stress ulcer and VTE prophylaxis will be achieved    Glycemic control is satisfactory  Electrolytes have been repleted as necessary and wound care has been carried out. Pain control has been achieved.   Aggressive physical therapy and early mobility and ambulation goals will be met   The family was updated about the course and plan  CRITICAL CARE TIME SPENT in evaluation and management, reassessments, review and interpretation of labs and x-rays, ventilator and hemodynamic management, formulating a plan and coordinating care: ___90____ MIN.  Time does not include procedural time.  CTICU ATTENDING     					    Humberto Saleh MD

## 2021-01-22 NOTE — PROGRESS NOTE ADULT - ASSESSMENT
A/P: 65 year old male, with PMHx of HTN, COPD, CKD Stage 5 (has LUE AVF, not on HD yet), T-cell lymphoma (diagnosed 19 year ago on chemo every Wednesday), Atrial fibrillation (on eliquis), systolic CHF EF 40%, severe pulm HTN with recent findings of spontaneous left sided hemothorax s/p left chest wall hematoma evacuation on 12/18/2020 2/2 loculated effusion. Hospital course at that time complicated by Afib with RVR. He was seen by Dr. Harmon as an outpatient for persistent loculated effusion and presented to [facility] on 1/18 for preoperative optimization and heparin bridge for OR. On admission patient found to have L shin cellulitis with possible abscess, ortho and general surgery consulted and patient was started on IV abx and underwent needle aspiration on 1/20, cultures NGTD. Cellulitis improved and he was cleared by ID for OR and is planned fo VATS decortication with Dr. Harmon today.     Neurovascular: Stable. Pain well controlled with current regimen.  - No active issues     Cardiovascular: Hemodynamically stable. HR controlled.  - HTN, continue toprol XL 200mg. Nifedipine 60mg started yesterday, soft BP this AM, nifedipine discontinued.  Possibly restart at 30mg tomorrow   - Atrial fibrillation, continue amiodarone 200mg   - Systolic CHF, continue diuresis with torsemide 20mg     Respiratory: 02 Sat = 98% on RA.  - Left loculated effusion plan for VATS decortication today   - Encourage C+DB and Use of IS 10x / hr while awake.    GI: Stable.  - NPO for OR   - Continue protonix 40mg for GI ppx     Renal / : CKD stage 5   - Renal following appreciate recs   - Rise in BUN/Cr this AM, likely 2/2 vancomycin and soft BP. f/u in AM, gentle hydration this AM   - LR @ 50/hr   - Monitor renal function.  - Monitor I/O's.    Endocrine: hgba1c 5.1, TSH wnl     - no active issues     Hematologic:  - Hep gtt on hold for OR     ID:  LLE cellulitis, ID signed off  - vancomycin trough 27 yesterday, dose held. F/u level today   - Per ID no need for further abx   - s/p aspiration with ortho, cultures NGTD    Prophylaxis: Heparin gtt and SCDs     Disposition: Plan for OR today

## 2021-01-22 NOTE — PROGRESS NOTE ADULT - SUBJECTIVE AND OBJECTIVE BOX
Orthopaedic Surgery Progress Note      Pt comfortable without complaints, no pain. OR today for VATS and pleurodesis with CT surgery.  Denies CP, SOB, N/V, numbness/tingling     Vital Signs Last 24 Hrs  T(C): 36.2 (22 Jan 2021 05:01), Max: 36.4 (21 Jan 2021 09:40)  T(F): 97.2 (22 Jan 2021 05:01), Max: 97.6 (21 Jan 2021 09:40)  HR: 54 (22 Jan 2021 04:15) (54 - 76)  BP: 102/59 (22 Jan 2021 04:15) (102/59 - 182/91)  BP(mean): 76 (22 Jan 2021 04:15) (76 - 128)  RR: 20 (22 Jan 2021 04:15) (12 - 25)  SpO2: 96% (22 Jan 2021 04:15) (94% - 99%)    PE:  General: NAD  MSK:   LLE - no open wounds/ecchymoses; Fluctuant mass anterior superior aspect of the left tibia without tenderness, erythema or warmth; no tenderness throughout leg;; 5/5 hip flexion/extension, knee flexion/extension EHL/FHL/TA/GS; SILT; FROM of knee without pain, no effusion                         A/P: 66yo Male with left lower extremity fluctuant collection, likely a hematoma vs. seroma. Low suspicion for infection given lack of infectious signs.  - WBAT LLE   - Reviewed imaging and lab data   - Ortho will continue to follow    Ortho Pager [phone]

## 2021-01-22 NOTE — BRIEF OPERATIVE NOTE - COMMENTS
Dr. sr first assisted for the entirety of the case, including but not limited to opening, port placement/docking, robotic instrumentation, chest tube placement, and closure.

## 2021-01-23 LAB
ALBUMIN SERPL ELPH-MCNC: 3.2 G/DL — LOW (ref 3.3–5)
ALBUMIN SERPL ELPH-MCNC: 3.5 G/DL — SIGNIFICANT CHANGE UP (ref 3.3–5)
ALP SERPL-CCNC: 56 U/L — SIGNIFICANT CHANGE UP (ref 40–120)
ALP SERPL-CCNC: 57 U/L — SIGNIFICANT CHANGE UP (ref 40–120)
ALT FLD-CCNC: <5 U/L — LOW (ref 10–45)
ALT FLD-CCNC: <5 U/L — LOW (ref 10–45)
ANION GAP SERPL CALC-SCNC: 16 MMOL/L — SIGNIFICANT CHANGE UP (ref 5–17)
ANION GAP SERPL CALC-SCNC: 17 MMOL/L — SIGNIFICANT CHANGE UP (ref 5–17)
APTT BLD: 36.8 SEC — HIGH (ref 27.5–35.5)
AST SERPL-CCNC: 10 U/L — SIGNIFICANT CHANGE UP (ref 10–40)
AST SERPL-CCNC: 7 U/L — LOW (ref 10–40)
BILIRUB SERPL-MCNC: 0.7 MG/DL — SIGNIFICANT CHANGE UP (ref 0.2–1.2)
BILIRUB SERPL-MCNC: 0.8 MG/DL — SIGNIFICANT CHANGE UP (ref 0.2–1.2)
BUN SERPL-MCNC: 35 MG/DL — HIGH (ref 7–23)
BUN SERPL-MCNC: 36 MG/DL — HIGH (ref 7–23)
CA-I SERPL-SCNC: 1.13 MMOL/L — SIGNIFICANT CHANGE UP (ref 1.12–1.3)
CALCIUM SERPL-MCNC: 8.9 MG/DL — SIGNIFICANT CHANGE UP (ref 8.4–10.5)
CALCIUM SERPL-MCNC: 9.3 MG/DL — SIGNIFICANT CHANGE UP (ref 8.4–10.5)
CHLORIDE SERPL-SCNC: 103 MMOL/L — SIGNIFICANT CHANGE UP (ref 96–108)
CHLORIDE SERPL-SCNC: 107 MMOL/L — SIGNIFICANT CHANGE UP (ref 96–108)
CO2 SERPL-SCNC: 20 MMOL/L — LOW (ref 22–31)
CO2 SERPL-SCNC: 22 MMOL/L — SIGNIFICANT CHANGE UP (ref 22–31)
CREAT SERPL-MCNC: 5.05 MG/DL — HIGH (ref 0.5–1.3)
CREAT SERPL-MCNC: 5.06 MG/DL — HIGH (ref 0.5–1.3)
GAS PNL BLDA: SIGNIFICANT CHANGE UP
GAS PNL BLDV: 140 MMOL/L — SIGNIFICANT CHANGE UP (ref 138–146)
GAS PNL BLDV: SIGNIFICANT CHANGE UP
GLUCOSE BLDC GLUCOMTR-MCNC: 109 MG/DL — HIGH (ref 70–99)
GLUCOSE SERPL-MCNC: 115 MG/DL — HIGH (ref 70–99)
GLUCOSE SERPL-MCNC: 124 MG/DL — HIGH (ref 70–99)
HCT VFR BLD CALC: 32.3 % — LOW (ref 39–50)
HCT VFR BLD CALC: 32.9 % — LOW (ref 39–50)
HGB BLD-MCNC: 9.7 G/DL — LOW (ref 13–17)
HGB BLD-MCNC: 9.8 G/DL — LOW (ref 13–17)
INR BLD: 1.23 — HIGH (ref 0.88–1.16)
LACTATE SERPL-SCNC: 1.6 MMOL/L — SIGNIFICANT CHANGE UP (ref 0.5–2)
MAGNESIUM SERPL-MCNC: 2.2 MG/DL — SIGNIFICANT CHANGE UP (ref 1.6–2.6)
MCHC RBC-ENTMCNC: 28 PG — SIGNIFICANT CHANGE UP (ref 27–34)
MCHC RBC-ENTMCNC: 28 PG — SIGNIFICANT CHANGE UP (ref 27–34)
MCHC RBC-ENTMCNC: 29.5 GM/DL — LOW (ref 32–36)
MCHC RBC-ENTMCNC: 30.3 GM/DL — LOW (ref 32–36)
MCV RBC AUTO: 92.3 FL — SIGNIFICANT CHANGE UP (ref 80–100)
MCV RBC AUTO: 95.1 FL — SIGNIFICANT CHANGE UP (ref 80–100)
NRBC # BLD: 0 /100 WBCS — SIGNIFICANT CHANGE UP (ref 0–0)
NRBC # BLD: 0 /100 WBCS — SIGNIFICANT CHANGE UP (ref 0–0)
PH BLDV: 7.4 — SIGNIFICANT CHANGE UP (ref 7.32–7.43)
PHOSPHATE SERPL-MCNC: 7.5 MG/DL — HIGH (ref 2.5–4.5)
PLATELET # BLD AUTO: 180 K/UL — SIGNIFICANT CHANGE UP (ref 150–400)
PLATELET # BLD AUTO: 216 K/UL — SIGNIFICANT CHANGE UP (ref 150–400)
PO2 BLDV: 33 MMHG — SIGNIFICANT CHANGE UP
POTASSIUM BLDV-SCNC: 4.1 MMOL/L — SIGNIFICANT CHANGE UP (ref 3.5–4.9)
POTASSIUM SERPL-MCNC: 3.5 MMOL/L — SIGNIFICANT CHANGE UP (ref 3.5–5.3)
POTASSIUM SERPL-MCNC: 4.2 MMOL/L — SIGNIFICANT CHANGE UP (ref 3.5–5.3)
POTASSIUM SERPL-SCNC: 3.5 MMOL/L — SIGNIFICANT CHANGE UP (ref 3.5–5.3)
POTASSIUM SERPL-SCNC: 4.2 MMOL/L — SIGNIFICANT CHANGE UP (ref 3.5–5.3)
PROT SERPL-MCNC: 6.2 G/DL — SIGNIFICANT CHANGE UP (ref 6–8.3)
PROT SERPL-MCNC: 6.6 G/DL — SIGNIFICANT CHANGE UP (ref 6–8.3)
PROTHROM AB SERPL-ACNC: 14.6 SEC — HIGH (ref 10.6–13.6)
RBC # BLD: 3.46 M/UL — LOW (ref 4.2–5.8)
RBC # BLD: 3.5 M/UL — LOW (ref 4.2–5.8)
RBC # FLD: 21.3 % — HIGH (ref 10.3–14.5)
RBC # FLD: 21.4 % — HIGH (ref 10.3–14.5)
SAO2 % BLDV: 61 % — SIGNIFICANT CHANGE UP
SODIUM SERPL-SCNC: 140 MMOL/L — SIGNIFICANT CHANGE UP (ref 135–145)
SODIUM SERPL-SCNC: 145 MMOL/L — SIGNIFICANT CHANGE UP (ref 135–145)
VANCOMYCIN FLD-MCNC: 20.7 UG/ML — SIGNIFICANT CHANGE UP
WBC # BLD: 10.41 K/UL — SIGNIFICANT CHANGE UP (ref 3.8–10.5)
WBC # BLD: 11.36 K/UL — HIGH (ref 3.8–10.5)
WBC # FLD AUTO: 10.41 K/UL — SIGNIFICANT CHANGE UP (ref 3.8–10.5)
WBC # FLD AUTO: 11.36 K/UL — HIGH (ref 3.8–10.5)

## 2021-01-23 PROCEDURE — 99232 SBSQ HOSP IP/OBS MODERATE 35: CPT

## 2021-01-23 PROCEDURE — 93306 TTE W/DOPPLER COMPLETE: CPT | Mod: 26

## 2021-01-23 PROCEDURE — 99292 CRITICAL CARE ADDL 30 MIN: CPT

## 2021-01-23 PROCEDURE — 71045 X-RAY EXAM CHEST 1 VIEW: CPT | Mod: 26,76

## 2021-01-23 PROCEDURE — 99291 CRITICAL CARE FIRST HOUR: CPT

## 2021-01-23 RX ORDER — POLYETHYLENE GLYCOL 3350 17 G/17G
17 POWDER, FOR SOLUTION ORAL DAILY
Refills: 0 | Status: DISCONTINUED | OUTPATIENT
Start: 2021-01-23 | End: 2021-01-27

## 2021-01-23 RX ORDER — VASOPRESSIN 20 [USP'U]/ML
0.03 INJECTION INTRAVENOUS
Qty: 50 | Refills: 0 | Status: DISCONTINUED | OUTPATIENT
Start: 2021-01-23 | End: 2021-01-23

## 2021-01-23 RX ORDER — ALLOPURINOL 300 MG
200 TABLET ORAL
Refills: 0 | Status: DISCONTINUED | OUTPATIENT
Start: 2021-01-23 | End: 2021-01-30

## 2021-01-23 RX ORDER — ACETAMINOPHEN 500 MG
650 TABLET ORAL EVERY 6 HOURS
Refills: 0 | Status: DISCONTINUED | OUTPATIENT
Start: 2021-01-23 | End: 2021-01-30

## 2021-01-23 RX ORDER — OXYCODONE AND ACETAMINOPHEN 5; 325 MG/1; MG/1
1 TABLET ORAL EVERY 6 HOURS
Refills: 0 | Status: DISCONTINUED | OUTPATIENT
Start: 2021-01-23 | End: 2021-01-29

## 2021-01-23 RX ORDER — AMIODARONE HYDROCHLORIDE 400 MG/1
200 TABLET ORAL DAILY
Refills: 0 | Status: DISCONTINUED | OUTPATIENT
Start: 2021-01-23 | End: 2021-01-30

## 2021-01-23 RX ORDER — PANTOPRAZOLE SODIUM 20 MG/1
40 TABLET, DELAYED RELEASE ORAL
Refills: 0 | Status: DISCONTINUED | OUTPATIENT
Start: 2021-01-23 | End: 2021-01-30

## 2021-01-23 RX ORDER — FUROSEMIDE 40 MG
60 TABLET ORAL ONCE
Refills: 0 | Status: COMPLETED | OUTPATIENT
Start: 2021-01-23 | End: 2021-01-23

## 2021-01-23 RX ORDER — MILRINONE LACTATE 1 MG/ML
0.25 INJECTION, SOLUTION INTRAVENOUS
Qty: 20 | Refills: 0 | Status: DISCONTINUED | OUTPATIENT
Start: 2021-01-23 | End: 2021-01-24

## 2021-01-23 RX ORDER — DEXMEDETOMIDINE HYDROCHLORIDE IN 0.9% SODIUM CHLORIDE 4 UG/ML
0.3 INJECTION INTRAVENOUS
Qty: 200 | Refills: 0 | Status: DISCONTINUED | OUTPATIENT
Start: 2021-01-23 | End: 2021-01-23

## 2021-01-23 RX ORDER — ACETAMINOPHEN 500 MG
1000 TABLET ORAL ONCE
Refills: 0 | Status: COMPLETED | OUTPATIENT
Start: 2021-01-23 | End: 2021-01-23

## 2021-01-23 RX ORDER — FENTANYL CITRATE 50 UG/ML
25 INJECTION INTRAVENOUS
Refills: 0 | Status: DISCONTINUED | OUTPATIENT
Start: 2021-01-23 | End: 2021-01-23

## 2021-01-23 RX ORDER — NOREPINEPHRINE BITARTRATE/D5W 8 MG/250ML
0.05 PLASTIC BAG, INJECTION (ML) INTRAVENOUS
Qty: 16 | Refills: 0 | Status: DISCONTINUED | OUTPATIENT
Start: 2021-01-23 | End: 2021-01-24

## 2021-01-23 RX ORDER — ALBUMIN HUMAN 25 %
50 VIAL (ML) INTRAVENOUS
Refills: 0 | Status: COMPLETED | OUTPATIENT
Start: 2021-01-23 | End: 2021-01-23

## 2021-01-23 RX ORDER — CALCIUM GLUCONATE 100 MG/ML
2 VIAL (ML) INTRAVENOUS ONCE
Refills: 0 | Status: COMPLETED | OUTPATIENT
Start: 2021-01-23 | End: 2021-01-23

## 2021-01-23 RX ADMIN — Medication 60 MILLIGRAM(S): at 10:43

## 2021-01-23 RX ADMIN — Medication 200 MILLIGRAM(S): at 09:03

## 2021-01-23 RX ADMIN — Medication 50 MILLILITER(S): at 19:40

## 2021-01-23 RX ADMIN — Medication 200 MILLIGRAM(S): at 17:01

## 2021-01-23 RX ADMIN — Medication 50 MILLILITER(S): at 18:21

## 2021-01-23 RX ADMIN — Medication 50 MILLILITER(S): at 03:42

## 2021-01-23 RX ADMIN — Medication 400 MILLIGRAM(S): at 07:31

## 2021-01-23 RX ADMIN — MILRINONE LACTATE 5.42 MICROGRAM(S)/KG/MIN: 1 INJECTION, SOLUTION INTRAVENOUS at 13:44

## 2021-01-23 RX ADMIN — Medication 200 GRAM(S): at 03:20

## 2021-01-23 RX ADMIN — PROPOFOL 2.17 MICROGRAM(S)/KG/MIN: 10 INJECTION, EMULSION INTRAVENOUS at 02:37

## 2021-01-23 RX ADMIN — FAMOTIDINE 20 MILLIGRAM(S): 10 INJECTION INTRAVENOUS at 02:24

## 2021-01-23 RX ADMIN — PIPERACILLIN AND TAZOBACTAM 200 GRAM(S): 4; .5 INJECTION, POWDER, LYOPHILIZED, FOR SOLUTION INTRAVENOUS at 17:01

## 2021-01-23 RX ADMIN — POLYETHYLENE GLYCOL 3350 17 GRAM(S): 17 POWDER, FOR SOLUTION ORAL at 12:08

## 2021-01-23 RX ADMIN — CHLORHEXIDINE GLUCONATE 15 MILLILITER(S): 213 SOLUTION TOPICAL at 02:12

## 2021-01-23 RX ADMIN — PIPERACILLIN AND TAZOBACTAM 200 GRAM(S): 4; .5 INJECTION, POWDER, LYOPHILIZED, FOR SOLUTION INTRAVENOUS at 01:00

## 2021-01-23 RX ADMIN — Medication 50 MILLILITER(S): at 02:36

## 2021-01-23 RX ADMIN — AMIODARONE HYDROCHLORIDE 200 MILLIGRAM(S): 400 TABLET ORAL at 09:03

## 2021-01-23 RX ADMIN — FENTANYL CITRATE 25 MICROGRAM(S): 50 INJECTION INTRAVENOUS at 00:30

## 2021-01-23 RX ADMIN — PIPERACILLIN AND TAZOBACTAM 200 GRAM(S): 4; .5 INJECTION, POWDER, LYOPHILIZED, FOR SOLUTION INTRAVENOUS at 09:03

## 2021-01-23 RX ADMIN — Medication 3.38 MICROGRAM(S)/KG/MIN: at 02:24

## 2021-01-23 NOTE — PROGRESS NOTE ADULT - ASSESSMENT
64 y/o M with a PMHx of HTN, COPD, CKD (stage 4 CKD, has LUE AVF, not on HD yet), T-cell lymphoma (diagnosed 19 years ago, on chemo romidepsin every Wednesday), AF (on Eliquis at home, last dose Saturday), systolic CHF (EF 40-45% 2017), severe pHTN who is admitted wiht plan for VATS for recurrent pleural effusion. Renal consulted for CKD management- preop optimisation.    Stable CKD5  S/p LUE AVF creation in June 2020  No urgent indication for HD at this time  Acceptable electrolytes, Nabicarb 650 mg po qd   No IVF, somewhat wet on PE - diuretics prn, furosemide 60 mg IVP    Anemia - KENNY, give PO iron 325mg daily  BMD - calcitriol 0.25 daily

## 2021-01-23 NOTE — PHYSICAL THERAPY INITIAL EVALUATION ADULT - PERTINENT HX OF CURRENT PROBLEM, REHAB EVAL
This is a 64 y/o M Today, on 1/18/21 patient presented to [facility] for pre-operative optimization for VATS with heparin gtt prior to OR tomorrow. Of note: Patient with red swelling on anterior aspect of left knee 2/2 to fall on concrete 1 week ago.

## 2021-01-23 NOTE — PROGRESS NOTE ADULT - SUBJECTIVE AND OBJECTIVE BOX
CTICU  CRITICAL  CARE  attending     Hand off received 					   Pertinent clinical, laboratory, radiographic, hemodynamic, echocardiographic, respiratory data, microbiologic data and chart were reviewed and analyzed frequently throughout the course of the day and night  Patient seen and examined with CTS/ SH attending at bedside  Pt is a 65y , Male, HEALTH ISSUES - PROBLEM Dx:      , FAMILY HISTORY:  No pertinent family history in first degree relatives    PAST MEDICAL & SURGICAL HISTORY:  BPH (benign prostatic hyperplasia)    Gout    H/O pulmonary hypertension    CHF, chronic    Lymphoma  t cell; Chemotherapy weekly- wednesday    CKD (chronic kidney disease)    Atrial fibrillation    HTN (hypertension)    Elective surgery  rectal surgery    History of cholecystectomy      Patient is a 65y old  Male who presents with a chief complaint of VATS, decortication, pleurodesis (23 Jan 2021 13:21)      14 system review was unremarkable    Vital signs, hemodynamic and respiratory parameters were reviewed from the bedside nursing flowsheet.  ICU Vital Signs Last 24 Hrs  T(C): 36.4 (23 Jan 2021 17:11), Max: 36.8 (23 Jan 2021 14:00)  T(F): 97.6 (23 Jan 2021 17:11), Max: 98.2 (23 Jan 2021 14:00)  HR: 71 (23 Jan 2021 20:00) (49 - 72)  BP: 103/59 (22 Jan 2021 23:00) (97/55 - 103/59)  BP(mean): 77 (22 Jan 2021 23:00) (71 - 77)  ABP: 146/59 (23 Jan 2021 20:00) (102/43 - 146/59)  ABP(mean): 86 (23 Jan 2021 20:00) (58 - 88)  RR: 18 (23 Jan 2021 13:00) (16 - 21)  SpO2: 100% (23 Jan 2021 20:00) (90% - 100%)    Adult Advanced Hemodynamics Last 24 Hrs  CVP(mm Hg): 10 (23 Jan 2021 20:00) (4 - 12)  CVP(cm H2O): --  CO: --  CI: --  PA: --  PA(mean): --  PCWP: --  SVR: --  SVRI: --  PVR: --  PVRI: --, ABG - ( 23 Jan 2021 11:47 )  pH, Arterial: 7.42  pH, Blood: x     /  pCO2: 31    /  pO2: 77    / HCO3: 20    / Base Excess: -3.9  /  SaO2: 96                Mode: AC/ CMV (Assist Control/ Continuous Mandatory Ventilation)  RR (machine): 16  TV (machine): 450  FiO2: 100  PEEP: 5  ITime: 1  MAP: 8  PIP: 18    Intake and output was reviewed and the fluid balance was calculated  Daily     Daily   I&O's Summary    22 Jan 2021 07:01  -  23 Jan 2021 07:00  --------------------------------------------------------  IN: 1222.8 mL / OUT: 874 mL / NET: 348.8 mL    23 Jan 2021 07:01  -  23 Jan 2021 20:42  --------------------------------------------------------  IN: 310 mL / OUT: 1039 mL / NET: -729 mL        All lines and drain sites were assessed  Glycemic trend was reviewedCAPILLARY BLOOD GLUCOSE      POCT Blood Glucose.: 109 mg/dL (23 Jan 2021 11:46)    No acute change in mental status  Auscultation of the chest reveals equal bs  Abdomen is soft  Extremities are warm and well perfused  Wounds appear clean and unremarkable  Antibiotics are periop    labs  CBC Full  -  ( 23 Jan 2021 11:43 )  WBC Count : 10.41 K/uL  RBC Count : 3.50 M/uL  Hemoglobin : 9.8 g/dL  Hematocrit : 32.3 %  Platelet Count - Automated : 180 K/uL  Mean Cell Volume : 92.3 fl  Mean Cell Hemoglobin : 28.0 pg  Mean Cell Hemoglobin Concentration : 30.3 gm/dL  Auto Neutrophil # : x  Auto Lymphocyte # : x  Auto Monocyte # : x  Auto Eosinophil # : x  Auto Basophil # : x  Auto Neutrophil % : x  Auto Lymphocyte % : x  Auto Monocyte % : x  Auto Eosinophil % : x  Auto Basophil % : x    01-23    145  |  107  |  36<H>  ----------------------------<  115<H>  4.2   |  22  |  5.06<H>    Ca    9.3      23 Jan 2021 11:43  Phos  7.5     01-23  Mg     2.2     01-23    TPro  6.6  /  Alb  3.5  /  TBili  0.7  /  DBili  x   /  AST  10  /  ALT  <5<L>  /  AlkPhos  57  01-23    PT/INR - ( 23 Jan 2021 02:28 )   PT: 14.6 sec;   INR: 1.23          PTT - ( 23 Jan 2021 02:28 )  PTT:36.8 sec  The current medications were reviewed   MEDICATIONS  (STANDING):  allopurinol 200 milliGRAM(s) Oral two times a day  aMIOdarone    Tablet 200 milliGRAM(s) Oral daily  desmopressin IVPB 21 MICROGram(s) IV Intermittent once  dextrose 50% Injectable 50 milliLiter(s) IV Push every 15 minutes  insulin regular Infusion 1 Unit(s)/Hr (1 mL/Hr) IV Continuous <Continuous>  lactated ringers. 1000 milliLiter(s) (50 mL/Hr) IV Continuous <Continuous>  milrinone Infusion 0.25 MICROgram(s)/kG/Min (5.42 mL/Hr) IV Continuous <Continuous>  norepinephrine Infusion 0.05 MICROgram(s)/kG/Min (3.38 mL/Hr) IV Continuous <Continuous>  pantoprazole    Tablet 40 milliGRAM(s) Oral before breakfast  piperacillin/tazobactam IVPB.. 2.25 Gram(s) IV Intermittent every 8 hours  polyethylene glycol 3350 17 Gram(s) Oral daily  sodium chloride 0.9%. 1000 milliLiter(s) (10 mL/Hr) IV Continuous <Continuous>    MEDICATIONS  (PRN):  acetaminophen   Tablet .. 650 milliGRAM(s) Oral every 6 hours PRN Mild Pain (1 - 3)  oxycodone    5 mG/acetaminophen 325 mG 1 Tablet(s) Oral every 6 hours PRN Moderate Pain (4 - 6)       PROBLEM LIST/ ASSESSMENT:  HEALTH ISSUES - PROBLEM Dx:      ,   Patient is a 65y old  Male who presents with a chief complaint of VATS, decortication, pleurodesis (23 Jan 2021 13:21)     s/p  surgery                My plan includes :  close hemodynamic, ventilatory and drain monitoring and management per post op routine    Monitor for arrhythmias and monitor parameters for organ perfusion  beta blockade not administered due to hemodynamic instability and bradycardia  monitor neurologic status  Head of the bed should remain elevated to 45 deg .   chest PT and IS will be encouraged  monitor adequacy of oxygenation and ventilation and attempt to wean oxygen  antibiotic regimen will be tailored to the clinical, laboratory and microbiologic data  Nutritional goals will be met using po eventually , ensure adequate caloric intake and montior the same  Stress ulcer and VTE prophylaxis will be achieved    Glycemic control is satisfactory  Electrolytes have been repleted as necessary and wound care has been carried out. Pain control has been achieved.   agressive physical therapy and early mobility and ambulation goals will be met   The family was updated about the course and plan  CRITICAL CARE TIME personally provided by me  in evaluation and management, reassessments, review and interpretation of labs and x-rays, ventilator and hemodynamic management, formulating a plan and coordinating care: ___90____ MIN.  Time does not include procedural time. Time spent was non routine post-operarive caRE and included multiple and repeated evaluations at the bedside  CTICU ATTENDING     					    Jan Rice MD

## 2021-01-23 NOTE — PROGRESS NOTE ADULT - SUBJECTIVE AND OBJECTIVE BOX
CTICU  CRITICAL  CARE  attending     Hand off received 					   Pertinent clinical, laboratory, radiographic, hemodynamic, echocardiographic, respiratory data, microbiologic data and chart were reviewed and analyzed frequently throughout the course of the day and night    65 years old Male with HTN, COPD, CKD (stage 4 CKD, has LUE AVF, not on HD yet), T-cell lymphoma (diagnosed 19 years ago, on chemo romidepsin every Wednesday).  He has Atrial Fibrillation  (on Eliquis at home, last dose Saturday), systolic CHF (EF 40-45% 2017), severe pulmonary HTN who recently underwent a left chest wall hematoma evacuation on 12/18/2020 secondary to loculated effusion. Hospital course at that time was complicated by AF with RVR and thoracic surgery consulted at that time for possible future intervention.   ECHO during previous admission revealed severe mitral and tricuspid regurgitation.   Thoracic surgery followed up with patient as an outpatient once discharged from the hospital and deemed patient a good surgical candidate.      S/P Left VATS  S/P Decortication  S/P drainage of empyema.      FAMILY HISTORY:  No pertinent family history in first degree relatives    PAST MEDICAL & SURGICAL HISTORY:  BPH (benign prostatic hyperplasia)  Gout  H/O pulmonary hypertension  CHF, chronic  Lymphoma  t cell; Chemotherapy weekly- wednesday  CKD (chronic kidney disease)  Atrial fibrillation  HTN (hypertension)  Elective surgery  rectal surgery  History of cholecystectomy        14 system review was unremarkable      Vital signs, hemodynamic and respiratory parameters were reviewed from the bedside nursing flow sheet.  ICU Vital Signs Last 24 Hrs  T(C): 36.7 (23 Jan 2021 21:03), Max: 36.8 (23 Jan 2021 14:00)  T(F): 98.1 (23 Jan 2021 21:03), Max: 98.2 (23 Jan 2021 14:00)  HR: 73 (23 Jan 2021 20:42) (49 - 73)  BP: 103/59 (22 Jan 2021 23:00) (97/55 - 103/59)  BP(mean): 77 (22 Jan 2021 23:00) (71 - 77)  ABP: 146/59 (23 Jan 2021 20:00) (102/43 - 146/59)  ABP(mean): 86 (23 Jan 2021 20:00) (58 - 88)  RR: 20 (23 Jan 2021 20:42) (16 - 21)  SpO2: 98% (23 Jan 2021 20:42) (90% - 100%)    Adult Advanced Hemodynamics Last 24 Hrs  CVP(mm Hg): 10 (23 Jan 2021 20:00) (4 - 12)  CVP(cm H2O): --  CO: --  CI: --  PA: --  PA(mean): --  PCWP: --  SVR: --  SVRI: --  PVR: --  PVRI: --, ABG - ( 23 Jan 2021 11:47 )  pH, Arterial: 7.42  pH, Blood: x     /  pCO2: 31    /  pO2: 77    / HCO3: 20    / Base Excess: -3.9  /  SaO2: 96                Mode: AC/ CMV (Assist Control/ Continuous Mandatory Ventilation)  RR (machine): 16  TV (machine): 450  FiO2: 100  PEEP: 5  ITime: 1  MAP: 8  PIP: 18    Intake and output was reviewed and the fluid balance was calculated  Daily     Daily   I&O's Summary    22 Jan 2021 07:01  -  23 Jan 2021 07:00  --------------------------------------------------------  IN: 1222.8 mL / OUT: 874 mL / NET: 348.8 mL    23 Jan 2021 07:01  -  23 Jan 2021 22:00  --------------------------------------------------------  IN: 310 mL / OUT: 1039 mL / NET: -729 mL        All lines and drain sites were assessed      Neuro: Follows commands. Moves all 4 extremities.  Neck: No JVD.  CVS: S1, S2, No S3.  Lungs: Good air entry bilaterally.  Abd: Soft. No tenderness. + Bowel sounds.  Vascular: + DP/PT.  Extremities: No edema.  Lymphatic: Normal.  Skin: No abnormalities.      labs  CBC Full  -  ( 23 Jan 2021 11:43 )  WBC Count : 10.41 K/uL  RBC Count : 3.50 M/uL  Hemoglobin : 9.8 g/dL  Hematocrit : 32.3 %  Platelet Count - Automated : 180 K/uL  Mean Cell Volume : 92.3 fl  Mean Cell Hemoglobin : 28.0 pg  Mean Cell Hemoglobin Concentration : 30.3 gm/dL  Auto Neutrophil # : x  Auto Lymphocyte # : x  Auto Monocyte # : x  Auto Eosinophil # : x  Auto Basophil # : x  Auto Neutrophil % : x  Auto Lymphocyte % : x  Auto Monocyte % : x  Auto Eosinophil % : x  Auto Basophil % : x    01-23    145  |  107  |  36<H>  ----------------------------<  115<H>  4.2   |  22  |  5.06<H>    Ca    9.3      23 Jan 2021 11:43  Phos  7.5     01-23  Mg     2.2     01-23    TPro  6.6  /  Alb  3.5  /  TBili  0.7  /  DBili  x   /  AST  10  /  ALT  <5<L>  /  AlkPhos  57  01-23    PT/INR - ( 23 Jan 2021 02:28 )   PT: 14.6 sec;   INR: 1.23          PTT - ( 23 Jan 2021 02:28 )  PTT:36.8 sec  The current medications were reviewed   MEDICATIONS  (STANDING):  allopurinol 200 milliGRAM(s) Oral two times a day  aMIOdarone    Tablet 200 milliGRAM(s) Oral daily  desmopressin IVPB 21 MICROGram(s) IV Intermittent once  dextrose 50% Injectable 50 milliLiter(s) IV Push every 15 minutes  insulin regular Infusion 1 Unit(s)/Hr (1 mL/Hr) IV Continuous <Continuous>  lactated ringers. 1000 milliLiter(s) (50 mL/Hr) IV Continuous <Continuous>  milrinone Infusion 0.25 MICROgram(s)/kG/Min (5.42 mL/Hr) IV Continuous <Continuous>  norepinephrine Infusion 0.05 MICROgram(s)/kG/Min (3.38 mL/Hr) IV Continuous <Continuous>  pantoprazole    Tablet 40 milliGRAM(s) Oral before breakfast  piperacillin/tazobactam IVPB.. 2.25 Gram(s) IV Intermittent every 8 hours  polyethylene glycol 3350 17 Gram(s) Oral daily  sodium chloride 0.9%. 1000 milliLiter(s) (10 mL/Hr) IV Continuous <Continuous>    MEDICATIONS  (PRN):  acetaminophen   Tablet .. 650 milliGRAM(s) Oral every 6 hours PRN Mild Pain (1 - 3)  oxycodone    5 mG/acetaminophen 325 mG 1 Tablet(s) Oral every 6 hours PRN Moderate Pain (4 - 6)          65y old  Male with empyema.  S/P left VATS, decortication, pleurodesis.  Hemodynamically stable.  Good oxygenation.  CKD  Borderline urine output.      My plan includes :  IV antibiotic Rx.  Statin and Betablocker.  Close hemodynamic, ventilatory and drain monitoring and management  Monitor for arrhythmias and monitor parameters for organ perfusion  Monitor neurologic status  Monitor renal function.  Head of the bed should remain elevated to 45 deg .   Chest PT and IS will be encouraged  Monitor adequacy of oxygenation and ventilation and attempt to wean oxygen  Nutritional goals will be met using po eventually , ensure adequate caloric intake and monitor the same  Stress ulcer and VTE prophylaxis will be achieved    Glycemic control is satisfactory  Electrolytes have been repleted as necessary and wound care has been carried out. Pain control has been achieved.   Aggressive physical therapy and early mobility and ambulation goals will be met   The family was updated about the course and plan  CRITICAL CARE TIME SPENT in evaluation and management, reassessments, review and interpretation of labs and x-rays, ventilator and hemodynamic management, formulating a plan and coordinating care: ___30____ MIN.  Time does not include procedural time.  CTICU ATTENDING     					    Humberto Saleh MD

## 2021-01-23 NOTE — PHYSICAL THERAPY INITIAL EVALUATION ADULT - IMPAIRMENTS FOUND, PT EVAL
aerobic capacity/endurance/gait, locomotion, and balance/gross motor/joint integrity and mobility/muscle strength/posture/ventilation and respiration/gas exchange

## 2021-01-23 NOTE — PHYSICAL THERAPY INITIAL EVALUATION ADULT - GENERAL OBSERVATIONS, REHAB EVAL
pt received semi-supine in bed +TLC, +A line, +3 chest tubes, +del angel, +tele, +B/L SCDs, +6LPM O2 via NC, in NAD, ALEE Lopez present

## 2021-01-23 NOTE — PHYSICAL THERAPY INITIAL EVALUATION ADULT - GAIT DEVIATIONS NOTED, PT EVAL
decreased chanel/increased time in double stance/decreased step length/decreased weight-shifting ability

## 2021-01-24 DIAGNOSIS — N18.9 CHRONIC KIDNEY DISEASE, UNSPECIFIED: ICD-10-CM

## 2021-01-24 LAB
ANION GAP SERPL CALC-SCNC: 16 MMOL/L — SIGNIFICANT CHANGE UP (ref 5–17)
ANION GAP SERPL CALC-SCNC: 16 MMOL/L — SIGNIFICANT CHANGE UP (ref 5–17)
APTT BLD: 40.1 SEC — HIGH (ref 27.5–35.5)
BUN SERPL-MCNC: 37 MG/DL — HIGH (ref 7–23)
BUN SERPL-MCNC: 38 MG/DL — HIGH (ref 7–23)
CALCIUM SERPL-MCNC: 8.9 MG/DL — SIGNIFICANT CHANGE UP (ref 8.4–10.5)
CALCIUM SERPL-MCNC: 9 MG/DL — SIGNIFICANT CHANGE UP (ref 8.4–10.5)
CHLORIDE SERPL-SCNC: 105 MMOL/L — SIGNIFICANT CHANGE UP (ref 96–108)
CHLORIDE SERPL-SCNC: 107 MMOL/L — SIGNIFICANT CHANGE UP (ref 96–108)
CO2 SERPL-SCNC: 23 MMOL/L — SIGNIFICANT CHANGE UP (ref 22–31)
CO2 SERPL-SCNC: 23 MMOL/L — SIGNIFICANT CHANGE UP (ref 22–31)
CREAT SERPL-MCNC: 5.25 MG/DL — HIGH (ref 0.5–1.3)
CREAT SERPL-MCNC: 5.46 MG/DL — HIGH (ref 0.5–1.3)
GAS PNL BLDA: SIGNIFICANT CHANGE UP
GLUCOSE BLDC GLUCOMTR-MCNC: 94 MG/DL — SIGNIFICANT CHANGE UP (ref 70–99)
GLUCOSE BLDC GLUCOMTR-MCNC: 98 MG/DL — SIGNIFICANT CHANGE UP (ref 70–99)
GLUCOSE SERPL-MCNC: 83 MG/DL — SIGNIFICANT CHANGE UP (ref 70–99)
GLUCOSE SERPL-MCNC: 84 MG/DL — SIGNIFICANT CHANGE UP (ref 70–99)
HCT VFR BLD CALC: 28.5 % — LOW (ref 39–50)
HCT VFR BLD CALC: 31.5 % — LOW (ref 39–50)
HGB BLD-MCNC: 8.8 G/DL — LOW (ref 13–17)
HGB BLD-MCNC: 9.5 G/DL — LOW (ref 13–17)
INR BLD: 1.36 — HIGH (ref 0.88–1.16)
MAGNESIUM SERPL-MCNC: 2.2 MG/DL — SIGNIFICANT CHANGE UP (ref 1.6–2.6)
MCHC RBC-ENTMCNC: 28.9 PG — SIGNIFICANT CHANGE UP (ref 27–34)
MCHC RBC-ENTMCNC: 29.1 PG — SIGNIFICANT CHANGE UP (ref 27–34)
MCHC RBC-ENTMCNC: 30.2 GM/DL — LOW (ref 32–36)
MCHC RBC-ENTMCNC: 30.9 GM/DL — LOW (ref 32–36)
MCV RBC AUTO: 94.4 FL — SIGNIFICANT CHANGE UP (ref 80–100)
MCV RBC AUTO: 95.7 FL — SIGNIFICANT CHANGE UP (ref 80–100)
NRBC # BLD: 0 /100 WBCS — SIGNIFICANT CHANGE UP (ref 0–0)
NRBC # BLD: 0 /100 WBCS — SIGNIFICANT CHANGE UP (ref 0–0)
PLATELET # BLD AUTO: 175 K/UL — SIGNIFICANT CHANGE UP (ref 150–400)
PLATELET # BLD AUTO: 186 K/UL — SIGNIFICANT CHANGE UP (ref 150–400)
POTASSIUM SERPL-MCNC: 3.8 MMOL/L — SIGNIFICANT CHANGE UP (ref 3.5–5.3)
POTASSIUM SERPL-MCNC: 4.1 MMOL/L — SIGNIFICANT CHANGE UP (ref 3.5–5.3)
POTASSIUM SERPL-SCNC: 3.8 MMOL/L — SIGNIFICANT CHANGE UP (ref 3.5–5.3)
POTASSIUM SERPL-SCNC: 4.1 MMOL/L — SIGNIFICANT CHANGE UP (ref 3.5–5.3)
PROTHROM AB SERPL-ACNC: 16.1 SEC — HIGH (ref 10.6–13.6)
RBC # BLD: 3.02 M/UL — LOW (ref 4.2–5.8)
RBC # BLD: 3.29 M/UL — LOW (ref 4.2–5.8)
RBC # FLD: 20.5 % — HIGH (ref 10.3–14.5)
RBC # FLD: 20.7 % — HIGH (ref 10.3–14.5)
SODIUM SERPL-SCNC: 144 MMOL/L — SIGNIFICANT CHANGE UP (ref 135–145)
SODIUM SERPL-SCNC: 146 MMOL/L — HIGH (ref 135–145)
WBC # BLD: 8.63 K/UL — SIGNIFICANT CHANGE UP (ref 3.8–10.5)
WBC # BLD: 9.47 K/UL — SIGNIFICANT CHANGE UP (ref 3.8–10.5)
WBC # FLD AUTO: 8.63 K/UL — SIGNIFICANT CHANGE UP (ref 3.8–10.5)
WBC # FLD AUTO: 9.47 K/UL — SIGNIFICANT CHANGE UP (ref 3.8–10.5)

## 2021-01-24 PROCEDURE — 99291 CRITICAL CARE FIRST HOUR: CPT

## 2021-01-24 PROCEDURE — 71045 X-RAY EXAM CHEST 1 VIEW: CPT | Mod: 26

## 2021-01-24 PROCEDURE — 32557 INSERT CATH PLEURA W/ IMAGE: CPT

## 2021-01-24 PROCEDURE — 99232 SBSQ HOSP IP/OBS MODERATE 35: CPT

## 2021-01-24 RX ORDER — DEXTROSE 50 % IN WATER 50 %
25 SYRINGE (ML) INTRAVENOUS ONCE
Refills: 0 | Status: DISCONTINUED | OUTPATIENT
Start: 2021-01-24 | End: 2021-01-26

## 2021-01-24 RX ORDER — METOPROLOL TARTRATE 50 MG
25 TABLET ORAL EVERY 12 HOURS
Refills: 0 | Status: DISCONTINUED | OUTPATIENT
Start: 2021-01-24 | End: 2021-01-25

## 2021-01-24 RX ORDER — HYDRALAZINE HCL 50 MG
10 TABLET ORAL ONCE
Refills: 0 | Status: COMPLETED | OUTPATIENT
Start: 2021-01-24 | End: 2021-01-24

## 2021-01-24 RX ORDER — FUROSEMIDE 40 MG
60 TABLET ORAL ONCE
Refills: 0 | Status: COMPLETED | OUTPATIENT
Start: 2021-01-24 | End: 2021-01-24

## 2021-01-24 RX ORDER — SODIUM CHLORIDE 9 MG/ML
1000 INJECTION, SOLUTION INTRAVENOUS
Refills: 0 | Status: DISCONTINUED | OUTPATIENT
Start: 2021-01-24 | End: 2021-01-26

## 2021-01-24 RX ORDER — FUROSEMIDE 40 MG
40 TABLET ORAL ONCE
Refills: 0 | Status: COMPLETED | OUTPATIENT
Start: 2021-01-24 | End: 2021-01-24

## 2021-01-24 RX ORDER — HEPARIN SODIUM 5000 [USP'U]/ML
5000 INJECTION INTRAVENOUS; SUBCUTANEOUS EVERY 8 HOURS
Refills: 0 | Status: DISCONTINUED | OUTPATIENT
Start: 2021-01-24 | End: 2021-01-25

## 2021-01-24 RX ORDER — LANOLIN ALCOHOL/MO/W.PET/CERES
5 CREAM (GRAM) TOPICAL ONCE
Refills: 0 | Status: COMPLETED | OUTPATIENT
Start: 2021-01-24 | End: 2021-01-24

## 2021-01-24 RX ORDER — DEXTROSE 50 % IN WATER 50 %
15 SYRINGE (ML) INTRAVENOUS ONCE
Refills: 0 | Status: DISCONTINUED | OUTPATIENT
Start: 2021-01-24 | End: 2021-01-26

## 2021-01-24 RX ORDER — GLUCAGON INJECTION, SOLUTION 0.5 MG/.1ML
1 INJECTION, SOLUTION SUBCUTANEOUS ONCE
Refills: 0 | Status: DISCONTINUED | OUTPATIENT
Start: 2021-01-24 | End: 2021-01-26

## 2021-01-24 RX ORDER — DEXTROSE 50 % IN WATER 50 %
12.5 SYRINGE (ML) INTRAVENOUS ONCE
Refills: 0 | Status: DISCONTINUED | OUTPATIENT
Start: 2021-01-24 | End: 2021-01-26

## 2021-01-24 RX ORDER — POTASSIUM CHLORIDE 20 MEQ
20 PACKET (EA) ORAL ONCE
Refills: 0 | Status: COMPLETED | OUTPATIENT
Start: 2021-01-24 | End: 2021-01-24

## 2021-01-24 RX ORDER — INSULIN LISPRO 100/ML
VIAL (ML) SUBCUTANEOUS
Refills: 0 | Status: DISCONTINUED | OUTPATIENT
Start: 2021-01-24 | End: 2021-01-26

## 2021-01-24 RX ORDER — HYDRALAZINE HCL 50 MG
10 TABLET ORAL EVERY 4 HOURS
Refills: 0 | Status: DISCONTINUED | OUTPATIENT
Start: 2021-01-24 | End: 2021-01-25

## 2021-01-24 RX ADMIN — PANTOPRAZOLE SODIUM 40 MILLIGRAM(S): 20 TABLET, DELAYED RELEASE ORAL at 07:01

## 2021-01-24 RX ADMIN — Medication 10 MILLIGRAM(S): at 19:19

## 2021-01-24 RX ADMIN — PIPERACILLIN AND TAZOBACTAM 200 GRAM(S): 4; .5 INJECTION, POWDER, LYOPHILIZED, FOR SOLUTION INTRAVENOUS at 18:02

## 2021-01-24 RX ADMIN — HEPARIN SODIUM 5000 UNIT(S): 5000 INJECTION INTRAVENOUS; SUBCUTANEOUS at 15:00

## 2021-01-24 RX ADMIN — Medication 200 MILLIGRAM(S): at 07:01

## 2021-01-24 RX ADMIN — PIPERACILLIN AND TAZOBACTAM 200 GRAM(S): 4; .5 INJECTION, POWDER, LYOPHILIZED, FOR SOLUTION INTRAVENOUS at 01:39

## 2021-01-24 RX ADMIN — Medication 10 MILLIGRAM(S): at 23:00

## 2021-01-24 RX ADMIN — HEPARIN SODIUM 5000 UNIT(S): 5000 INJECTION INTRAVENOUS; SUBCUTANEOUS at 21:36

## 2021-01-24 RX ADMIN — Medication 60 MILLIGRAM(S): at 11:41

## 2021-01-24 RX ADMIN — POLYETHYLENE GLYCOL 3350 17 GRAM(S): 17 POWDER, FOR SOLUTION ORAL at 11:40

## 2021-01-24 RX ADMIN — Medication 100 MILLIEQUIVALENT(S): at 11:41

## 2021-01-24 RX ADMIN — Medication 40 MILLIGRAM(S): at 19:19

## 2021-01-24 RX ADMIN — PIPERACILLIN AND TAZOBACTAM 200 GRAM(S): 4; .5 INJECTION, POWDER, LYOPHILIZED, FOR SOLUTION INTRAVENOUS at 09:56

## 2021-01-24 RX ADMIN — Medication 200 MILLIGRAM(S): at 19:19

## 2021-01-24 RX ADMIN — Medication 25 MILLIGRAM(S): at 21:35

## 2021-01-24 RX ADMIN — Medication 40 MILLIGRAM(S): at 15:56

## 2021-01-24 RX ADMIN — AMIODARONE HYDROCHLORIDE 200 MILLIGRAM(S): 400 TABLET ORAL at 07:01

## 2021-01-24 RX ADMIN — MILRINONE LACTATE 5.42 MICROGRAM(S)/KG/MIN: 1 INJECTION, SOLUTION INTRAVENOUS at 05:30

## 2021-01-24 RX ADMIN — Medication 5 MILLIGRAM(S): at 23:33

## 2021-01-24 NOTE — PROGRESS NOTE ADULT - SUBJECTIVE AND OBJECTIVE BOX
CC: [id number]        INTERVAL HISTORY:sitting in chair eating breakfast      ROS: No chest pain, no sob, no abd pain. No n/v/d    PAST MEDICAL & SURGICAL HISTORY:  BPH (benign prostatic hyperplasia)    Gout    H/O pulmonary hypertension    CHF, chronic    Lymphoma  t cell; Chemotherapy weekly- wednesday    CKD (chronic kidney disease)    Atrial fibrillation    HTN (hypertension)    Elective surgery  rectal surgery    History of cholecystectomy        PHYSICAL EXAM:  T(C): 36.5 (01-24-21 @ 05:01), Max: 36.8 (01-23-21 @ 14:00)  HR: 83 (01-24-21 @ 05:18)  BP: 133/66 (01-23-21 @ 21:00) (133/66 - 133/66)  RR: 18 (01-24-21 @ 05:18)  SpO2: 96% (01-24-21 @ 05:18)  Wt(kg): --  I&O's Summary    23 Jan 2021 07:01  -  24 Jan 2021 07:00  --------------------------------------------------------  IN: 519.4 mL / OUT: 1504 mL / NET: -984.6 mL      Weight 72.2 (01-22 @ 12:53)  General: AAO x 3,  NAD.  HEENT: moist mucous membranes, no pallor/cyanosis.  Neck: no JVD visible.  Cardiac: S1, S2. RRR. No murmurs   Respratory:decreased BS; L chest tube  Abdomen: soft. nontender. nondistended  Skin: no rashes.  Extremities: no LE edema b/l  Access: + bruit in L AVF      DATA:                        8.8<L>  8.63  )-----------( 175      ( 24 Jan 2021 03:18 )             28.5<L>    Ferritin, Serum: 580 ng/mL <H> (01-22 @ 07:16)      146<H>  |  107    |  38<H>  ----------------------------<  83     Ca:9.0   (24 Jan 2021 03:18)  3.8     |  23     |  5.25<H>      eGFR if Non : 11 <L>  eGFR if : 12 <L>    TPro  6.6    /  Alb  3.5    /  TBili  0.7    /  DBili  x      /  AST  10     /  ALT  <5<L>  /  AlkPhos  57     23 Jan 2021 11:43                    MEDICATIONS  (STANDING):  allopurinol 200 milliGRAM(s) Oral two times a day  aMIOdarone    Tablet 200 milliGRAM(s) Oral daily  desmopressin IVPB 21 MICROGram(s) IV Intermittent once  dextrose 40% Gel 15 Gram(s) Oral once  dextrose 5%. 1000 milliLiter(s) (50 mL/Hr) IV Continuous <Continuous>  dextrose 5%. 1000 milliLiter(s) (100 mL/Hr) IV Continuous <Continuous>  dextrose 50% Injectable 25 Gram(s) IV Push once  dextrose 50% Injectable 12.5 Gram(s) IV Push once  dextrose 50% Injectable 25 Gram(s) IV Push once  glucagon  Injectable 1 milliGRAM(s) IntraMuscular once  heparin   Injectable 5000 Unit(s) SubCutaneous every 8 hours  insulin lispro (ADMELOG) corrective regimen sliding scale   SubCutaneous three times a day before meals  milrinone Infusion 0.25 MICROgram(s)/kG/Min (5.42 mL/Hr) IV Continuous <Continuous>  pantoprazole    Tablet 40 milliGRAM(s) Oral before breakfast  piperacillin/tazobactam IVPB.. 2.25 Gram(s) IV Intermittent every 8 hours  polyethylene glycol 3350 17 Gram(s) Oral daily  sodium chloride 0.9%. 1000 milliLiter(s) (10 mL/Hr) IV Continuous <Continuous>    MEDICATIONS  (PRN):  acetaminophen   Tablet .. 650 milliGRAM(s) Oral every 6 hours PRN Mild Pain (1 - 3)  oxycodone    5 mG/acetaminophen 325 mG 1 Tablet(s) Oral every 6 hours PRN Moderate Pain (4 - 6)

## 2021-01-24 NOTE — PROGRESS NOTE ADULT - SUBJECTIVE AND OBJECTIVE BOX
CTICU  CRITICAL  CARE  attending     Hand off received 					   Pertinent clinical, laboratory, radiographic, hemodynamic, echocardiographic, respiratory data, microbiologic data and chart were reviewed and analyzed frequently throughout the course of the day and night  Patient seen and examined with CTS/ SH attending at bedside    Pt is a 65y , Male, post op day # 2 s/p left VATS; decortication; drainage of empyema;  Hx significant for chronic systolic congestive HF; with pulm HTN      today:    right pigtail thoracentesis  continued inotrope support with milrinone  Bipap for positive pressure  diuresed    CKD (chronic kidney disease)  CKD (chronic kidney disease)        , FAMILY HISTORY:  No pertinent family history in first degree relatives    PAST MEDICAL & SURGICAL HISTORY:  BPH (benign prostatic hyperplasia)    Gout    H/O pulmonary hypertension    CHF, chronic    Lymphoma  t cell; Chemotherapy weekly- wednesday    CKD (chronic kidney disease)    Atrial fibrillation    HTN (hypertension)    Elective surgery  rectal surgery    History of cholecystectomy      Patient is a 65y old  Male who presents with a chief complaint of VATS, decortication, pleurodesis (24 Jan 2021 07:52)      14 system review was unremarkable    Vital signs, hemodynamic and respiratory parameters were reviewed from the bedside nursing flowsheet.  ICU Vital Signs Last 24 Hrs  T(C): 36.9 (24 Jan 2021 09:00), Max: 36.9 (24 Jan 2021 09:00)  T(F): 98.4 (24 Jan 2021 09:00), Max: 98.4 (24 Jan 2021 09:00)  HR: 80 (24 Jan 2021 12:00) (56 - 87)  BP: 133/66 (23 Jan 2021 21:00) (133/66 - 133/66)  BP(mean): 94 (23 Jan 2021 21:00) (94 - 94)  ABP: 157/57 (24 Jan 2021 12:00) (135/47 - 160/60)  ABP(mean): 88 (24 Jan 2021 12:00) (73 - 93)  RR: 20 (24 Jan 2021 12:00) (16 - 20)  SpO2: 97% (24 Jan 2021 12:00) (93% - 100%)    Adult Advanced Hemodynamics Last 24 Hrs  CVP(mm Hg): -1 (24 Jan 2021 12:00) (-1 - 31)  CVP(cm H2O): --  CO: --  CI: --  PA: --  PA(mean): --  PCWP: --  SVR: --  SVRI: --  PVR: --  PVRI: --, ABG - ( 24 Jan 2021 03:31 )  pH, Arterial: 7.38  pH, Blood: x     /  pCO2: 40    /  pO2: 108   / HCO3: 23    / Base Excess: -2.1  /  SaO2: 98                  Intake and output was reviewed and the fluid balance was calculated  Daily     Daily   I&O's Summary    23 Jan 2021 07:01  -  24 Jan 2021 07:00  --------------------------------------------------------  IN: 699.4 mL / OUT: 1504 mL / NET: -804.6 mL    24 Jan 2021 07:01  -  24 Jan 2021 12:55  --------------------------------------------------------  IN: 352.4 mL / OUT: 805 mL / NET: -452.6 mL        All lines and drain sites were assessed  Glycemic trend [facility]      POCT Blood Glucose.: 98 mg/dL (24 Jan 2021 12:13)    No acute change in mental status  Auscultation of the chest reveals equal bs  Abdomen is soft  Extremities are warm and well perfused  Wounds appear clean and unremarkable  Antibiotics are periop    labs  CBC Full  -  ( 24 Jan 2021 03:18 )  WBC Count : 8.63 K/uL  RBC Count : 3.02 M/uL  Hemoglobin : 8.8 g/dL  Hematocrit : 28.5 %  Platelet Count - Automated : 175 K/uL  Mean Cell Volume : 94.4 fl  Mean Cell Hemoglobin : 29.1 pg  Mean Cell Hemoglobin Concentration : 30.9 gm/dL  Auto Neutrophil # : x  Auto Lymphocyte # : x  Auto Monocyte # : x  Auto Eosinophil # : x  Auto Basophil # : x  Auto Neutrophil % : x  Auto Lymphocyte % : x  Auto Monocyte % : x  Auto Eosinophil % : x  Auto Basophil % : x    01-24    146<H>  |  107  |  38<H>  ----------------------------<  83  3.8   |  23  |  5.25<H>    Ca    9.0      24 Jan 2021 03:18  Phos  7.5     01-23  Mg     2.2     01-24    TPro  6.6  /  Alb  3.5  /  TBili  0.7  /  DBili  x   /  AST  10  /  ALT  <5<L>  /  AlkPhos  57  01-23    PT/INR - ( 24 Jan 2021 03:18 )   PT: 16.1 sec;   INR: 1.36          PTT - ( 24 Jan 2021 03:18 )  PTT:40.1 sec  The current medications were reviewed   MEDICATIONS  (STANDING):  allopurinol 200 milliGRAM(s) Oral two times a day  aMIOdarone    Tablet 200 milliGRAM(s) Oral daily  desmopressin IVPB 21 MICROGram(s) IV Intermittent once  dextrose 40% Gel 15 Gram(s) Oral once  dextrose 5%. 1000 milliLiter(s) (50 mL/Hr) IV Continuous <Continuous>  dextrose 5%. 1000 milliLiter(s) (100 mL/Hr) IV Continuous <Continuous>  dextrose 50% Injectable 25 Gram(s) IV Push once  dextrose 50% Injectable 12.5 Gram(s) IV Push once  dextrose 50% Injectable 25 Gram(s) IV Push once  glucagon  Injectable 1 milliGRAM(s) IntraMuscular once  heparin   Injectable 5000 Unit(s) SubCutaneous every 8 hours  insulin lispro (ADMELOG) corrective regimen sliding scale   SubCutaneous three times a day before meals  milrinone Infusion 0.25 MICROgram(s)/kG/Min (5.42 mL/Hr) IV Continuous <Continuous>  pantoprazole    Tablet 40 milliGRAM(s) Oral before breakfast  piperacillin/tazobactam IVPB.. 2.25 Gram(s) IV Intermittent every 8 hours  polyethylene glycol 3350 17 Gram(s) Oral daily  sodium chloride 0.9%. 1000 milliLiter(s) (10 mL/Hr) IV Continuous <Continuous>    MEDICATIONS  (PRN):  acetaminophen   Tablet .. 650 milliGRAM(s) Oral every 6 hours PRN Mild Pain (1 - 3)  oxycodone    5 mG/acetaminophen 325 mG 1 Tablet(s) Oral every 6 hours PRN Moderate Pain (4 - 6)       PROBLEM LIST/ ASSESSMENT:  HEALTH ISSUES - PROBLEM Dx:  CKD (chronic kidney disease)  CKD (chronic kidney disease)        ,   Patient is a 65y old  Male who presents with a chief complaint of VATS, decortication, pleurodesis (24 Jan 2021 07:52)     s/p Left VATS; decortication; drainage of empyema      My plan includes :  close hemodynamic, ventilatory and drain monitoring and management per post op routine    Monitor for arrhythmias and monitor parameters for organ perfusion  monitor neurologic status  Head of the bed should remain elevated to 45 deg .   chest PT and IS will be encouraged  monitor adequacy of oxygenation and ventilation and attempt to wean oxygen  Nutritional goals will be met using po eventually , ensure adequate caloric intake and montior the same  Stress ulcer and VTE prophylaxis will be achieved    Glycemic control is satisfactory  Electrolytes have been repleted as necessary and wound care has been carried out. Pain control has been achieved.   agressive physical therapy and early mobility and ambulation goals will be met   The family was updated about the course and plan  CRITICAL CARE TIME SPENT in evaluation and management, reassessments, review and interpretation of labs and x-rays, ventilator and hemodynamic management, formulating a plan and coordinating care: ___55___ MIN.  Time does not include procedural time.  CTICU ATTENDING     					    Mo Martin MD

## 2021-01-24 NOTE — PROCEDURE NOTE - NSPROCDETAILS_GEN_ALL_CORE
Seldinger technique/dressing applied/secured in place/sterile dressing applied/percutaneous/thoracostomy tube placed percutaneously/ultrasound assessment of fluid (location)

## 2021-01-24 NOTE — PROGRESS NOTE ADULT - ASSESSMENT
66 y/o M with a PMHx of HTN, COPD, CKD (stage 4 CKD, has LUE AVF, not on HD yet), T-cell lymphoma (diagnosed 19 years ago, on chemo romidepsin every Wednesday), AF (on Eliquis at home, last dose Saturday), systolic CHF (EF 40-45% 2017), severe pHTN who is admitted wiht plan for VATS for recurrent pleural effusion.  POD #2  for left lung decortication

## 2021-01-24 NOTE — PROGRESS NOTE ADULT - SUBJECTIVE AND OBJECTIVE BOX
CTICU  CRITICAL  CARE  attending     Hand off received 					   Pertinent clinical, laboratory, radiographic, hemodynamic, echocardiographic, respiratory data, microbiologic data and chart were reviewed and analyzed frequently throughout the course of the day and night    65 years old Male with HTN, COPD, CKD (stage 4 CKD, has LUE AVF, not on HD yet), T-cell lymphoma (diagnosed 19 years ago, on chemo romidepsin every Wednesday).  He has Atrial Fibrillation  (on Eliquis at home, last dose Saturday), systolic CHF (EF 40-45% 2017), severe pulmonary HTN who recently underwent a left chest wall hematoma evacuation on 12/18/2020 secondary to loculated effusion. Hospital course at that time was complicated by AF with RVR and thoracic surgery consulted at that time for possible future intervention.   ECHO during previous admission revealed severe mitral and tricuspid regurgitation.   Thoracic surgery followed up with patient as an outpatient once discharged from the hospital and deemed patient a good surgical candidate.  S/P Drainage of Empyema.  S/P Decortication.  S/P drainage of right pleural effusion.        FAMILY HISTORY:  No pertinent family history in first degree relatives    PAST MEDICAL & SURGICAL HISTORY:  BPH (benign prostatic hyperplasia)  Gout  H/O pulmonary hypertension  CHF, chronic  Lymphoma  t cell; Chemotherapy weekly- Wednesday    CKD (chronic kidney disease)  Atrial fibrillation  HTN (hypertension)  Elective surgery  rectal surgery  History of cholecystectomy          14 system review was unremarkable      Vital signs, hemodynamic and respiratory parameters were reviewed from the bedside nursing flow sheet.  ICU Vital Signs Last 24 Hrs  T(C): 36.7 (24 Jan 2021 21:30), Max: 37.1 (24 Jan 2021 14:00)  T(F): 98 (24 Jan 2021 21:30), Max: 98.8 (24 Jan 2021 14:00)  HR: 87 (24 Jan 2021 21:00) (73 - 87)  BP: 159/73 (24 Jan 2021 21:00) (159/73 - 159/73)  BP(mean): 105 (24 Jan 2021 21:00) (105 - 105)  ABP: 164/58 (24 Jan 2021 21:00) (135/47 - 168/65)  ABP(mean): 92 (24 Jan 2021 21:00) (73 - 100)  RR: 20 (24 Jan 2021 21:00) (16 - 28)  SpO2: 96% (24 Jan 2021 21:00) (93% - 100%)    Adult Advanced Hemodynamics Last 24 Hrs  CVP(mm Hg): 3 (24 Jan 2021 21:00) (-1 - 31)  CVP(cm H2O): --  CO: --  CI: --  PA: --  PA(mean): --  PCWP: --  SVR: --  SVRI: --  PVR: --  PVRI: --, ABG - ( 24 Jan 2021 03:31 )  pH, Arterial: 7.38  pH, Blood: x     /  pCO2: 40    /  pO2: 108   / HCO3: 23    / Base Excess: -2.1  /  SaO2: 98                  Intake and output was reviewed and the fluid balance was calculated  Daily     Daily   I&O's Summary    23 Jan 2021 07:01  -  24 Jan 2021 07:00  --------------------------------------------------------  IN: 699.4 mL / OUT: 1504 mL / NET: -804.6 mL    24 Jan 2021 07:01  -  24 Jan 2021 21:47  --------------------------------------------------------  IN: 719.4 mL / OUT: 1087 mL / NET: -367.6 mL        All lines and drain sites were assessed      Neuro: Follows commands. Moves all 4 extremities.  Neck: No JVD.  CVS: S1, S2, No S3.  Lungs: Good air entry bilaterally.  Abd: Soft. No tenderness. + Bowel sounds.  Vascular: + DP/PT.  Extremities: No edema.  Lymphatic: Normal.  Skin: No abnormalities.      labs  CBC Full  -  ( 24 Jan 2021 12:49 )  WBC Count : 9.47 K/uL  RBC Count : 3.29 M/uL  Hemoglobin : 9.5 g/dL  Hematocrit : 31.5 %  Platelet Count - Automated : 186 K/uL  Mean Cell Volume : 95.7 fl  Mean Cell Hemoglobin : 28.9 pg  Mean Cell Hemoglobin Concentration : 30.2 gm/dL  Auto Neutrophil # : x  Auto Lymphocyte # : x  Auto Monocyte # : x  Auto Eosinophil # : x  Auto Basophil # : x  Auto Neutrophil % : x  Auto Lymphocyte % : x  Auto Monocyte % : x  Auto Eosinophil % : x  Auto Basophil % : x    01-24    144  |  105  |  37<H>  ----------------------------<  84  4.1   |  23  |  5.46<H>    Ca    8.9      24 Jan 2021 12:49  Phos  7.5     01-23  Mg     2.2     01-24    TPro  6.6  /  Alb  3.5  /  TBili  0.7  /  DBili  x   /  AST  10  /  ALT  <5<L>  /  AlkPhos  57  01-23    PT/INR - ( 24 Jan 2021 03:18 )   PT: 16.1 sec;   INR: 1.36          PTT - ( 24 Jan 2021 03:18 )  PTT:40.1 sec  The current medications were reviewed   MEDICATIONS  (STANDING):  allopurinol 200 milliGRAM(s) Oral two times a day  aMIOdarone    Tablet 200 milliGRAM(s) Oral daily  desmopressin IVPB 21 MICROGram(s) IV Intermittent once  dextrose 40% Gel 15 Gram(s) Oral once  dextrose 5%. 1000 milliLiter(s) (50 mL/Hr) IV Continuous <Continuous>  dextrose 5%. 1000 milliLiter(s) (100 mL/Hr) IV Continuous <Continuous>  dextrose 50% Injectable 25 Gram(s) IV Push once  dextrose 50% Injectable 12.5 Gram(s) IV Push once  dextrose 50% Injectable 25 Gram(s) IV Push once  glucagon  Injectable 1 milliGRAM(s) IntraMuscular once  heparin   Injectable 5000 Unit(s) SubCutaneous every 8 hours  insulin lispro (ADMELOG) corrective regimen sliding scale   SubCutaneous three times a day before meals  metoprolol tartrate 25 milliGRAM(s) Oral every 12 hours  pantoprazole    Tablet 40 milliGRAM(s) Oral before breakfast  piperacillin/tazobactam IVPB.. 2.25 Gram(s) IV Intermittent every 8 hours  polyethylene glycol 3350 17 Gram(s) Oral daily  sodium chloride 0.9%. 1000 milliLiter(s) (10 mL/Hr) IV Continuous <Continuous>    MEDICATIONS  (PRN):  acetaminophen   Tablet .. 650 milliGRAM(s) Oral every 6 hours PRN Mild Pain (1 - 3)  hydrALAZINE Injectable 10 milliGRAM(s) IV Push every 4 hours PRN SBP > 160  oxycodone    5 mG/acetaminophen 325 mG 1 Tablet(s) Oral every 6 hours PRN Moderate Pain (4 - 6)        65y old  Male with empyema.  S/P left VATS, decortication, pleurodesis.  S/P Drainage of Right Pleural Effusion  Hemodynamically stable.  Good oxygenation.  Low urine out put.  Overall doing well.      My plan includes :  IV antibiotic Rx.  Statin and Betablocker.  Close hemodynamic, ventilatory and drain monitoring and management  Monitor for arrhythmias and monitor parameters for organ perfusion  Monitor neurologic status  Monitor renal function.  Head of the bed should remain elevated to 45 deg .   Chest PT and IS will be encouraged  Monitor adequacy of oxygenation and ventilation and attempt to wean oxygen  Nutritional goals will be met using po eventually , ensure adequate caloric intake and monitor the same  Stress ulcer and VTE prophylaxis will be achieved    Glycemic control is satisfactory  Electrolytes have been repleted as necessary and wound care has been carried out. Pain control has been achieved.   Aggressive physical therapy and early mobility and ambulation goals will be met   The family was updated about the course and plan  CRITICAL CARE TIME SPENT in evaluation and management, reassessments, review and interpretation of labs and x-rays, ventilator and hemodynamic management, formulating a plan and coordinating care: ___30____ MIN.  Time does not include procedural time.  CTICU ATTENDING     					    Humberto Saleh MD

## 2021-01-25 LAB
ANION GAP SERPL CALC-SCNC: 14 MMOL/L — SIGNIFICANT CHANGE UP (ref 5–17)
ANION GAP SERPL CALC-SCNC: 17 MMOL/L — SIGNIFICANT CHANGE UP (ref 5–17)
APTT BLD: 40.2 SEC — HIGH (ref 27.5–35.5)
APTT BLD: 61.8 SEC — HIGH (ref 27.5–35.5)
BLD GP AB SCN SERPL QL: NEGATIVE — SIGNIFICANT CHANGE UP
BUN SERPL-MCNC: 40 MG/DL — HIGH (ref 7–23)
BUN SERPL-MCNC: 42 MG/DL — HIGH (ref 7–23)
CALCIUM SERPL-MCNC: 8.3 MG/DL — LOW (ref 8.4–10.5)
CALCIUM SERPL-MCNC: 8.9 MG/DL — SIGNIFICANT CHANGE UP (ref 8.4–10.5)
CHLORIDE SERPL-SCNC: 103 MMOL/L — SIGNIFICANT CHANGE UP (ref 96–108)
CHLORIDE SERPL-SCNC: 104 MMOL/L — SIGNIFICANT CHANGE UP (ref 96–108)
CO2 SERPL-SCNC: 21 MMOL/L — LOW (ref 22–31)
CO2 SERPL-SCNC: 22 MMOL/L — SIGNIFICANT CHANGE UP (ref 22–31)
CREAT SERPL-MCNC: 5.57 MG/DL — HIGH (ref 0.5–1.3)
CREAT SERPL-MCNC: 5.61 MG/DL — HIGH (ref 0.5–1.3)
GAS PNL BLDA: SIGNIFICANT CHANGE UP
GAS PNL BLDA: SIGNIFICANT CHANGE UP
GLUCOSE BLDC GLUCOMTR-MCNC: 107 MG/DL — HIGH (ref 70–99)
GLUCOSE BLDC GLUCOMTR-MCNC: 76 MG/DL — SIGNIFICANT CHANGE UP (ref 70–99)
GLUCOSE BLDC GLUCOMTR-MCNC: 80 MG/DL — SIGNIFICANT CHANGE UP (ref 70–99)
GLUCOSE BLDC GLUCOMTR-MCNC: 91 MG/DL — SIGNIFICANT CHANGE UP (ref 70–99)
GLUCOSE SERPL-MCNC: 81 MG/DL — SIGNIFICANT CHANGE UP (ref 70–99)
GLUCOSE SERPL-MCNC: 91 MG/DL — SIGNIFICANT CHANGE UP (ref 70–99)
HCT VFR BLD CALC: 28.2 % — LOW (ref 39–50)
HCT VFR BLD CALC: 31.7 % — LOW (ref 39–50)
HGB BLD-MCNC: 8.6 G/DL — LOW (ref 13–17)
HGB BLD-MCNC: 9.7 G/DL — LOW (ref 13–17)
INR BLD: 1.27 — HIGH (ref 0.88–1.16)
INR BLD: 1.31 — HIGH (ref 0.88–1.16)
LACTATE SERPL-SCNC: 0.6 MMOL/L — SIGNIFICANT CHANGE UP (ref 0.5–2)
MAGNESIUM SERPL-MCNC: 2 MG/DL — SIGNIFICANT CHANGE UP (ref 1.6–2.6)
MAGNESIUM SERPL-MCNC: 2 MG/DL — SIGNIFICANT CHANGE UP (ref 1.6–2.6)
MCHC RBC-ENTMCNC: 28.4 PG — SIGNIFICANT CHANGE UP (ref 27–34)
MCHC RBC-ENTMCNC: 28.4 PG — SIGNIFICANT CHANGE UP (ref 27–34)
MCHC RBC-ENTMCNC: 30.5 GM/DL — LOW (ref 32–36)
MCHC RBC-ENTMCNC: 30.6 GM/DL — LOW (ref 32–36)
MCV RBC AUTO: 93 FL — SIGNIFICANT CHANGE UP (ref 80–100)
MCV RBC AUTO: 93.1 FL — SIGNIFICANT CHANGE UP (ref 80–100)
NRBC # BLD: 0 /100 WBCS — SIGNIFICANT CHANGE UP (ref 0–0)
NRBC # BLD: 0 /100 WBCS — SIGNIFICANT CHANGE UP (ref 0–0)
PHOSPHATE SERPL-MCNC: 6.4 MG/DL — HIGH (ref 2.5–4.5)
PLATELET # BLD AUTO: 168 K/UL — SIGNIFICANT CHANGE UP (ref 150–400)
PLATELET # BLD AUTO: 185 K/UL — SIGNIFICANT CHANGE UP (ref 150–400)
POTASSIUM SERPL-MCNC: 3.8 MMOL/L — SIGNIFICANT CHANGE UP (ref 3.5–5.3)
POTASSIUM SERPL-MCNC: 4.2 MMOL/L — SIGNIFICANT CHANGE UP (ref 3.5–5.3)
POTASSIUM SERPL-SCNC: 3.8 MMOL/L — SIGNIFICANT CHANGE UP (ref 3.5–5.3)
POTASSIUM SERPL-SCNC: 4.2 MMOL/L — SIGNIFICANT CHANGE UP (ref 3.5–5.3)
PROTHROM AB SERPL-ACNC: 15.1 SEC — HIGH (ref 10.6–13.6)
PROTHROM AB SERPL-ACNC: 15.5 SEC — HIGH (ref 10.6–13.6)
RBC # BLD: 3.03 M/UL — LOW (ref 4.2–5.8)
RBC # BLD: 3.41 M/UL — LOW (ref 4.2–5.8)
RBC # FLD: 19.4 % — HIGH (ref 10.3–14.5)
RBC # FLD: 19.8 % — HIGH (ref 10.3–14.5)
RH IG SCN BLD-IMP: POSITIVE — SIGNIFICANT CHANGE UP
SODIUM SERPL-SCNC: 140 MMOL/L — SIGNIFICANT CHANGE UP (ref 135–145)
SODIUM SERPL-SCNC: 141 MMOL/L — SIGNIFICANT CHANGE UP (ref 135–145)
WBC # BLD: 8.58 K/UL — SIGNIFICANT CHANGE UP (ref 3.8–10.5)
WBC # BLD: 8.97 K/UL — SIGNIFICANT CHANGE UP (ref 3.8–10.5)
WBC # FLD AUTO: 8.58 K/UL — SIGNIFICANT CHANGE UP (ref 3.8–10.5)
WBC # FLD AUTO: 8.97 K/UL — SIGNIFICANT CHANGE UP (ref 3.8–10.5)

## 2021-01-25 PROCEDURE — 99232 SBSQ HOSP IP/OBS MODERATE 35: CPT

## 2021-01-25 PROCEDURE — 71045 X-RAY EXAM CHEST 1 VIEW: CPT | Mod: 26

## 2021-01-25 PROCEDURE — 99291 CRITICAL CARE FIRST HOUR: CPT

## 2021-01-25 PROCEDURE — 99292 CRITICAL CARE ADDL 30 MIN: CPT

## 2021-01-25 PROCEDURE — 99222 1ST HOSP IP/OBS MODERATE 55: CPT

## 2021-01-25 RX ORDER — MILRINONE LACTATE 1 MG/ML
0.25 INJECTION, SOLUTION INTRAVENOUS
Qty: 20 | Refills: 0 | Status: DISCONTINUED | OUTPATIENT
Start: 2021-01-25 | End: 2021-01-25

## 2021-01-25 RX ORDER — BUMETANIDE 0.25 MG/ML
2 INJECTION INTRAMUSCULAR; INTRAVENOUS ONCE
Refills: 0 | Status: COMPLETED | OUTPATIENT
Start: 2021-01-25 | End: 2021-01-25

## 2021-01-25 RX ORDER — HEPARIN SODIUM 5000 [USP'U]/ML
1000 INJECTION INTRAVENOUS; SUBCUTANEOUS
Qty: 25000 | Refills: 0 | Status: DISCONTINUED | OUTPATIENT
Start: 2021-01-25 | End: 2021-01-26

## 2021-01-25 RX ORDER — ALBUMIN HUMAN 25 %
50 VIAL (ML) INTRAVENOUS ONCE
Refills: 0 | Status: COMPLETED | OUTPATIENT
Start: 2021-01-25 | End: 2021-01-25

## 2021-01-25 RX ORDER — FUROSEMIDE 40 MG
60 TABLET ORAL ONCE
Refills: 0 | Status: COMPLETED | OUTPATIENT
Start: 2021-01-25 | End: 2021-01-25

## 2021-01-25 RX ORDER — SODIUM BICARBONATE 1 MEQ/ML
650 SYRINGE (ML) INTRAVENOUS
Refills: 0 | Status: DISCONTINUED | OUTPATIENT
Start: 2021-01-25 | End: 2021-01-30

## 2021-01-25 RX ORDER — MILRINONE LACTATE 1 MG/ML
0.12 INJECTION, SOLUTION INTRAVENOUS
Qty: 20 | Refills: 0 | Status: DISCONTINUED | OUTPATIENT
Start: 2021-01-25 | End: 2021-01-26

## 2021-01-25 RX ORDER — DOXAZOSIN MESYLATE 4 MG
2 TABLET ORAL AT BEDTIME
Refills: 0 | Status: DISCONTINUED | OUTPATIENT
Start: 2021-01-25 | End: 2021-01-30

## 2021-01-25 RX ORDER — METOPROLOL TARTRATE 50 MG
25 TABLET ORAL EVERY 6 HOURS
Refills: 0 | Status: DISCONTINUED | OUTPATIENT
Start: 2021-01-25 | End: 2021-01-30

## 2021-01-25 RX ADMIN — HEPARIN SODIUM 10 UNIT(S)/HR: 5000 INJECTION INTRAVENOUS; SUBCUTANEOUS at 11:27

## 2021-01-25 RX ADMIN — Medication 25 MILLIGRAM(S): at 11:47

## 2021-01-25 RX ADMIN — Medication 200 MILLIGRAM(S): at 06:12

## 2021-01-25 RX ADMIN — Medication 50 MILLILITER(S): at 08:30

## 2021-01-25 RX ADMIN — Medication 60 MILLIGRAM(S): at 14:40

## 2021-01-25 RX ADMIN — Medication 50 MILLILITER(S): at 07:09

## 2021-01-25 RX ADMIN — MILRINONE LACTATE 5.42 MICROGRAM(S)/KG/MIN: 1 INJECTION, SOLUTION INTRAVENOUS at 07:20

## 2021-01-25 RX ADMIN — PIPERACILLIN AND TAZOBACTAM 200 GRAM(S): 4; .5 INJECTION, POWDER, LYOPHILIZED, FOR SOLUTION INTRAVENOUS at 01:34

## 2021-01-25 RX ADMIN — Medication 200 MILLIGRAM(S): at 18:12

## 2021-01-25 RX ADMIN — BUMETANIDE 2 MILLIGRAM(S): 0.25 INJECTION INTRAMUSCULAR; INTRAVENOUS at 22:52

## 2021-01-25 RX ADMIN — Medication 2 MILLIGRAM(S): at 22:43

## 2021-01-25 RX ADMIN — AMIODARONE HYDROCHLORIDE 200 MILLIGRAM(S): 400 TABLET ORAL at 06:13

## 2021-01-25 RX ADMIN — Medication 650 MILLIGRAM(S): at 18:12

## 2021-01-25 RX ADMIN — Medication 25 MILLIGRAM(S): at 22:43

## 2021-01-25 RX ADMIN — PANTOPRAZOLE SODIUM 40 MILLIGRAM(S): 20 TABLET, DELAYED RELEASE ORAL at 06:13

## 2021-01-25 RX ADMIN — Medication 25 MILLIGRAM(S): at 06:12

## 2021-01-25 RX ADMIN — Medication 25 MILLIGRAM(S): at 18:12

## 2021-01-25 RX ADMIN — HEPARIN SODIUM 5000 UNIT(S): 5000 INJECTION INTRAVENOUS; SUBCUTANEOUS at 06:12

## 2021-01-25 NOTE — CONSULT NOTE ADULT - REASON FOR ADMISSION
VATS, decortication, pleurodesis

## 2021-01-25 NOTE — CONSULT NOTE ADULT - CONSULT REASON
CKD
AVF evaluation
Fluid overload
L leg "mass" vs abscess. Rule out septic joint
L shin cyst
Cellulitis

## 2021-01-25 NOTE — CONSULT NOTE ADULT - SUBJECTIVE AND OBJECTIVE BOX
Heart failure consult note:    This is a 64 y/o M with a PMHx of HTN, COPD, CKD (stage 4 CKD, has LUE AVF, not on HD yet), T-cell lymphoma (diagnosed 19 years ago, on chemo romidepsin every Wednesday), AF (on Eliquis at home, last dose Saturday), systolic CHF (EF 40-45% ), severe pHTN who recently underwent a left chest wall hematoma evacuation on 2020 secondary to loculated effusion. Hospital course at that time was complicated by AF with RVR and thoracic surgery consulted at that time for possible future intervention. Of note, recent ECHO during previous admission revealed severe mitral regurgitation. Thoracic surgery followed up with patient as an outpatient once discharged from the hospital and deemed patient a good surgical candidate. Today, on 21 patient presented to [facility] for pre-operative optimization with heparin gtt for OR for decortication/VATs (). Currently, patient feels well with no complaints. Denies any CP, palpitations SOB, wheezing, abd pain, n/v/d/c, fevers or chills.     Of note: patient with red cyst noted on left anterior tibia and state he was breaking up his dogs from fighting when he fell on the concert on his knee 1.5 weeks ago. About 1 week ago it started to get a little swollen and now it has turned into a larger cyst with tenderness to palpitation.     Upon speaking to patient, he notes that over the past year he has slowed down. He can only walk about 2 blocks before having to stop and intermittently stops when climbing a flight of stairs. He noted on this admission that his lower extremities are very swollen. He denied any chest pain or orthopnea.     Post operatively he had been diuresed and was started on milrinone for dropped UOP.     PAST MEDICAL & SURGICAL HISTORY:  BPH (benign prostatic hyperplasia)    Gout    H/O pulmonary hypertension    CHF, chronic    Lymphoma  t cell; Chemotherapy weekly- wednesday    CKD (chronic kidney disease)    Atrial fibrillation    HTN (hypertension)    Elective surgery  rectal surgery    History of cholecystectomy      MEDICATIONS  (STANDING):  albumin human 25% IVPB 50 milliLiter(s) IV Intermittent once  allopurinol 200 milliGRAM(s) Oral two times a day  aMIOdarone    Tablet 200 milliGRAM(s) Oral daily  dextrose 40% Gel 15 Gram(s) Oral once  dextrose 5%. 1000 milliLiter(s) (50 mL/Hr) IV Continuous <Continuous>  dextrose 5%. 1000 milliLiter(s) (100 mL/Hr) IV Continuous <Continuous>  dextrose 50% Injectable 25 Gram(s) IV Push once  dextrose 50% Injectable 12.5 Gram(s) IV Push once  dextrose 50% Injectable 25 Gram(s) IV Push once  doxazosin 2 milliGRAM(s) Oral at bedtime  glucagon  Injectable 1 milliGRAM(s) IntraMuscular once  heparin  Infusion 1000 Unit(s)/Hr (10 mL/Hr) IV Continuous <Continuous>  insulin lispro (ADMELOG) corrective regimen sliding scale   SubCutaneous Before meals and at bedtime  metoprolol tartrate 25 milliGRAM(s) Oral every 6 hours  milrinone Infusion 0.25 MICROgram(s)/kG/Min (5.42 mL/Hr) IV Continuous <Continuous>  pantoprazole    Tablet 40 milliGRAM(s) Oral before breakfast  polyethylene glycol 3350 17 Gram(s) Oral daily  sodium bicarbonate 650 milliGRAM(s) Oral two times a day  sodium chloride 0.9%. 1000 milliLiter(s) (10 mL/Hr) IV Continuous <Continuous>    MEDICATIONS  (PRN):  acetaminophen   Tablet .. 650 milliGRAM(s) Oral every 6 hours PRN Mild Pain (1 - 3)  oxycodone    5 mG/acetaminophen 325 mG 1 Tablet(s) Oral every 6 hours PRN Moderate Pain (4 - 6)      REVIEW OF SYSTEMS:  12 point ROS negative except for that stated in the HPI    PHYSICAL EXAM:  Vitals past 24 Hours: T(C): 36.7 (21 @ 05:01), Max: 37.1 (21 @ 14:00)  HR: 67 (21 @ 10:00) (67 - 90)  BP: 159/73 (21 @ 21:00) (159/73 - 159/73)  RR: 18 (21 @ 08:28) (16 - 28)  SpO2: 98% (21 @ 10:00) (91% - 100%)	    Daily     Daily     GEN: Alert and awake, no acute distress  HEENT: Moist mucous membranes  Neck: No JVD  Cardiovascular: Regular rate and rhythm, +S1 S2. No murmurs, rubs, or gallops appreciated  Respiratory: Lungs clear to auscultation bilaterally  Gastrointestinal:  Soft, non-tender, non-distended, normoactive bowel sounds  Skin: No rashes, No ecchymoses, No cyanosis  Neurologic: Non-focal, alert and oriented x3.   Extremities: No clubbing, cyanosis or edema. Warm, well-perfused extremities. +3 lower extremity edema   Vascular: Radial and dorsalis pedis pulses 2+ bilaterally    I&O's Detail    2021 07:01  -  2021 07:00  --------------------------------------------------------  IN:    Albumin 5% - 50 mL: 50 mL    IV PiggyBack: 300 mL    Milrinone: 59.4 mL    Oral Fluid: 360 mL  Total IN: 769.4 mL    OUT:    Chest Tube (mL): 75 mL    Chest Tube (mL): 125 mL    Chest Tube (mL): 20 mL    Chest Tube (mL): 850 mL    Stool (mL): 2 mL    Voided (mL): 490 mL  Total OUT: 1562 mL    Total NET: -792.6 mL      2021 07:01  -  2021 10:43  --------------------------------------------------------  IN:  Total IN: 0 mL    OUT:    Chest Tube (mL): 0 mL    Chest Tube (mL): 5 mL    Chest Tube (mL): 15 mL    Chest Tube (mL): 20 mL  Total OUT: 40 mL    Total NET: -40 mL            LABS:                        8.6    8.58  )-----------( 185      ( 2021 02:41 )             28.2         140  |  104  |  40<H>  ----------------------------<  81  3.8   |  22  |  5.61<H>    Ca    8.3<L>      2021 03:11  Mg     2.0         TPro  6.6  /  Alb  3.5  /  TBili  0.7  /  DBili  x   /  AST  10  /  ALT  <5<L>  /  AlkPhos  57          PT/INR - ( 2021 02:41 )   PT: 15.5 sec;   INR: 1.31          PTT - ( 2021 02:41 )  PTT:40.2 sec    I&O's Summary    2021 07:01  -  2021 07:00  --------------------------------------------------------  IN: 769.4 mL / OUT: 1562 mL / NET: -792.6 mL    2021 07:01  -  2021 10:43  --------------------------------------------------------  IN: 0 mL / OUT: 40 mL / NET: -40 mL        CARDIAC DIAGNOSTIC TESTING:    ECst degree, PRWP, sinus rhythm     ECHO:  1. Biatrial enlargement.   2. Apical windows were limited with imaging obtained predominantly from subcostal and parasternal windows. The anterior and inferior walls are not well visualized, but appear normal in parasternal short axis view.   3. There is moderate-to-severe concentric left ventricular hypertrophy. The left ventricle is normal in size and systolic function with a calculated ejection fraction of 65-70%.   4. The right ventricle is dilated. Right ventricular systolic function is normal.   5. Structurally normal mitral valve with normal leaflet excursion. There is mild-to-moderate mitral regurgitation.   6. There is moderate-to-severe tricuspid regurgitation. There is severe pulmonary hypertension, pulmonary artery systolic pressure is 67 mmHg. The inferior vena cava is dilated (>2.1cm) with abnormal inspiratory collapse (<50%) consistent with elevated right atrial pressure (  15, range 10-20mmHg).   7. The proximal ascending aorta is mildly dilated. Aortic root calcification noted.   8. Trivial pericardial effusion.   9. Compared to the previous TTE performed on 2020, mitral regurgitation and ejection fraction have improved.    ASSESSMENT/PLAN:   64 y/o M with a PMHx of HTN, COPD, CKD (stage 4 CKD, has LUE AVF, not on HD yet), T-cell lymphoma (diagnosed 19 years ago, on chemo romidepsin every Wednesday), AF (on Eliquis at home, last dose Saturday), systolic CHF (EF 40-45% ), severe pHTN who recently underwent a left chest wall hematoma evacuation on 2020 secondary to loculated effusion.     CHF - EF is now improved (from 45% on last admission Dec 2020 to now 65%)   - appears fluid overloaded on exam, recommend IV diuresis for lower extremity edema   - recommend pulm consult for pulmonary hypertension likely 2/2 to pulmonary etiology such as COPD   - may benefit from right heart cath to stratify pHTN etiology   - may benefit from sildenafil rather than milrinone     Will continue to follow   Discussed with Dr. Emile Dykes MD   [employer] fellow

## 2021-01-25 NOTE — PROGRESS NOTE ADULT - SUBJECTIVE AND OBJECTIVE BOX
Patient is a 65y Male seen and evaluated at bedside. NAD, denies any pain. Increased swelling in b/l LE. Would benefit from resuming diuresis.     Meds:    acetaminophen   Tablet .. 650 every 6 hours PRN  albumin human 25% IVPB 50 once  allopurinol 200 two times a day  aMIOdarone    Tablet 200 daily  dextrose 40% Gel 15 once  dextrose 5%. 1000 <Continuous>  dextrose 5%. 1000 <Continuous>  dextrose 50% Injectable 25 once  dextrose 50% Injectable 12.5 once  dextrose 50% Injectable 25 once  doxazosin 2 at bedtime  glucagon  Injectable 1 once  heparin   Injectable 5000 every 8 hours  insulin lispro (ADMELOG) corrective regimen sliding scale  Before meals and at bedtime  metoprolol tartrate 25 every 6 hours  milrinone Infusion 0.25 <Continuous>  oxycodone    5 mG/acetaminophen 325 mG 1 every 6 hours PRN  pantoprazole    Tablet 40 before breakfast  polyethylene glycol 3350 17 daily  sodium bicarbonate 650 two times a day  sodium chloride 0.9%. 1000 <Continuous>      T(C): , Max: 37.1 (01-24-21 @ 14:00)  T(F): , Max: 98.8 (01-24-21 @ 14:00)  HR: 67 (01-25-21 @ 10:00)  BP: 159/73 (01-24-21 @ 21:00)  BP(mean): 105 (01-24-21 @ 21:00)  RR: 18 (01-25-21 @ 08:28)  SpO2: 98% (01-25-21 @ 10:00)  Wt(kg): --    01-24 @ 07:01 - 01-25 @ 07:00  --------------------------------------------------------  IN: 769.4 mL / OUT: 1562 mL / NET: -792.6 mL    01-25 @ 07:01 - 01-25 @ 10:14  --------------------------------------------------------  IN: 0 mL / OUT: 40 mL / NET: -40 mL      Review of Systems:  RESPIRATORY: No shortness of breath, cough  CARDIOVASCULAR: No Chest pain  MUSCULOSKELETAL: +leg swelling    PHYSICAL EXAM:  GENERAL: Alert, awake, oriented x3 on RA  CHEST/LUNG: Distant breath sounds, no rales/rhonchi, multiple chest tubes  HEART: Regular rate and rhythm, no murmur  ABDOMEN: Soft, nontender, non distended  EXTREMITIES: 2+ pitting pedal oedema  ACCESS: LUE AVF w/bruit and thrill       LABS:                        8.6    8.58  )-----------( 185      ( 25 Jan 2021 02:41 )             28.2     01-25    140  |  104  |  40<H>  ----------------------------<  81  3.8   |  22  |  5.61<H>    Ca    8.3<L>      25 Jan 2021 03:11  Mg     2.0     01-25    TPro  6.6  /  Alb  3.5  /  TBili  0.7  /  DBili  x   /  AST  10  /  ALT  <5<L>  /  AlkPhos  57  01-23      PT/INR - ( 25 Jan 2021 02:41 )   PT: 15.5 sec;   INR: 1.31          PTT - ( 25 Jan 2021 02:41 )  PTT:40.2 sec          RADIOLOGY & ADDITIONAL STUDIES:

## 2021-01-25 NOTE — PROGRESS NOTE ADULT - ASSESSMENT
66 y/o M with a PMHx of HTN, COPD, CKD (stage 4 CKD, has LUE AVF, not on HD yet), T-cell lymphoma (diagnosed 19 years ago, on chemo romidepsin every Wednesday), AF (on Eliquis at home, last dose Saturday), systolic CHF (EF 40-45% 2017), severe pHTN who is S/p  VATS for recurrent pleural effusion 1/22. Renal consulted for CKD management.    Stable CKD5  S/p LUE AVF creation in June 2020  No indication for HD at this time    Hypervolaemic- resume home torsemide 20 PO or choose alternative loop diuretic; target net negative 1-2L  Electrolytes, bicarb acceptable- on 650mg daily  BP: Management per [facility]ICU  Anaemia- repeat iron panel  BMD: calcitriol 0.25 daily    Daily weights, strict I&Os, renally dose meds

## 2021-01-25 NOTE — PROGRESS NOTE ADULT - SUBJECTIVE AND OBJECTIVE BOX
CTICU  CRITICAL  CARE  attending     Hand off received 					   Pertinent clinical, laboratory, radiographic, hemodynamic, echocardiographic, respiratory data, microbiologic data and chart were reviewed and analyzed frequently throughout the course of the day and night  Patient seen and examined with CTS/ SH attending at bedside  Pt is a 65y , Male, HEALTH ISSUES - PROBLEM Dx:  CKD (chronic kidney disease)  CKD (chronic kidney disease)        , FAMILY HISTORY:  No pertinent family history in first degree relatives    PAST MEDICAL & SURGICAL HISTORY:  BPH (benign prostatic hyperplasia)    Gout    H/O pulmonary hypertension    CHF, chronic    Lymphoma  t cell; Chemotherapy weekly- wednesday    CKD (chronic kidney disease)    Atrial fibrillation    HTN (hypertension)    Elective surgery  rectal surgery    History of cholecystectomy      Patient is a 65y old  Male who presents with a chief complaint of VATS, decortication, pleurodesis (24 Jan 2021 21:47)      14 system review was unremarkable    Vital signs, hemodynamic and respiratory parameters were reviewed from the bedside nursing flowsheet.  ICU Vital Signs Last 24 Hrs  T(C): 36.7 (25 Jan 2021 05:01), Max: 37.1 (24 Jan 2021 14:00)  T(F): 98.1 (25 Jan 2021 05:01), Max: 98.8 (24 Jan 2021 14:00)  HR: 79 (25 Jan 2021 06:00) (73 - 90)  BP: 159/73 (24 Jan 2021 21:00) (159/73 - 159/73)  BP(mean): 105 (24 Jan 2021 21:00) (105 - 105)  ABP: 160/64 (25 Jan 2021 06:00) (135/47 - 206/72)  ABP(mean): 93 (25 Jan 2021 06:00) (71 - 112)  RR: 20 (25 Jan 2021 06:00) (16 - 28)  SpO2: 96% (25 Jan 2021 06:00) (92% - 100%)    Adult Advanced Hemodynamics Last 24 Hrs  CVP(mm Hg): 7 (25 Jan 2021 06:00) (-1 - 31)  CVP(cm H2O): --  CO: --  CI: --  PA: --  PA(mean): --  PCWP: --  SVR: --  SVRI: --  PVR: --  PVRI: --, ABG - ( 25 Jan 2021 05:47 )  pH, Arterial: 7.40  pH, Blood: x     /  pCO2: 33    /  pO2: 136   / HCO3: 20    / Base Excess: -4.1  /  SaO2: 99                  Intake and output was reviewed and the fluid balance was calculated  Daily     Daily   I&O's Summary    24 Jan 2021 07:01  -  25 Jan 2021 07:00  --------------------------------------------------------  IN: 719.4 mL / OUT: 1397 mL / NET: -677.6 mL        All lines and drain sites were assessed  [facility]      POCT Blood Glucose.: 94 mg/dL (24 Jan 2021 17:08)    No acute change in mental status  Auscultation of the chest reveals equal bs  Abdomen is soft  Extremities are warm and well perfused  Wounds appear clean and unremarkable  Antibiotics are periop    labs  CBC Full  -  ( 25 Jan 2021 02:41 )  WBC Count : 8.58 K/uL  RBC Count : 3.03 M/uL  Hemoglobin : 8.6 g/dL  Hematocrit : 28.2 %  Platelet Count - Automated : 185 K/uL  Mean Cell Volume : 93.1 fl  Mean Cell Hemoglobin : 28.4 pg  Mean Cell Hemoglobin Concentration : 30.5 gm/dL  Auto Neutrophil # : x  Auto Lymphocyte # : x  Auto Monocyte # : x  Auto Eosinophil # : x  Auto Basophil # : x  Auto Neutrophil % : x  Auto Lymphocyte % : x  Auto Monocyte % : x  Auto Eosinophil % : x  Auto Basophil % : x    01-25    140  |  104  |  40<H>  ----------------------------<  81  3.8   |  22  |  5.61<H>    Ca    8.3<L>      25 Jan 2021 03:11  Mg     2.0     01-25    TPro  6.6  /  Alb  3.5  /  TBili  0.7  /  DBili  x   /  AST  10  /  ALT  <5<L>  /  AlkPhos  57  01-23    PT/INR - ( 25 Jan 2021 02:41 )   PT: 15.5 sec;   INR: 1.31          PTT - ( 25 Jan 2021 02:41 )  PTT:40.2 sec  The current medications were reviewed   MEDICATIONS  (STANDING):  albumin human 25% IVPB 50 milliLiter(s) IV Intermittent once  allopurinol 200 milliGRAM(s) Oral two times a day  aMIOdarone    Tablet 200 milliGRAM(s) Oral daily  desmopressin IVPB 21 MICROGram(s) IV Intermittent once  dextrose 40% Gel 15 Gram(s) Oral once  dextrose 5%. 1000 milliLiter(s) (50 mL/Hr) IV Continuous <Continuous>  dextrose 5%. 1000 milliLiter(s) (100 mL/Hr) IV Continuous <Continuous>  dextrose 50% Injectable 25 Gram(s) IV Push once  dextrose 50% Injectable 12.5 Gram(s) IV Push once  dextrose 50% Injectable 25 Gram(s) IV Push once  glucagon  Injectable 1 milliGRAM(s) IntraMuscular once  heparin   Injectable 5000 Unit(s) SubCutaneous every 8 hours  insulin lispro (ADMELOG) corrective regimen sliding scale   SubCutaneous three times a day before meals  metoprolol tartrate 25 milliGRAM(s) Oral every 12 hours  milrinone Infusion 0.25 MICROgram(s)/kG/Min (5.42 mL/Hr) IV Continuous <Continuous>  pantoprazole    Tablet 40 milliGRAM(s) Oral before breakfast  polyethylene glycol 3350 17 Gram(s) Oral daily  sodium chloride 0.9%. 1000 milliLiter(s) (10 mL/Hr) IV Continuous <Continuous>    MEDICATIONS  (PRN):  acetaminophen   Tablet .. 650 milliGRAM(s) Oral every 6 hours PRN Mild Pain (1 - 3)  hydrALAZINE Injectable 10 milliGRAM(s) IV Push every 4 hours PRN SBP > 160  oxycodone    5 mG/acetaminophen 325 mG 1 Tablet(s) Oral every 6 hours PRN Moderate Pain (4 - 6)       PROBLEM LIST/ ASSESSMENT:  HEALTH ISSUES - PROBLEM Dx:  CKD (chronic kidney disease)  CKD (chronic kidney disease)        ,   Patient is a 65y old  Male who presents with a chief complaint of VATS, decortication, pleurodesis (24 Jan 2021 21:47)     s/p surgery                My plan includes :  close hemodynamic, ventilatory and drain monitoring and management per post op routine    Monitor for arrhythmias and monitor parameters for organ perfusion  beta blockade not administered due to hemodynamic instability and bradycardia  monitor neurologic status  Head of the bed should remain elevated to 45 deg .   chest PT and IS will be encouraged  monitor adequacy of oxygenation and ventilation and attempt to wean oxygen  antibiotic regimen will be tailored to the clinical, laboratory and microbiologic data  Nutritional goals will be met using po eventually , ensure adequate caloric intake and montior the same  Stress ulcer and VTE prophylaxis will be achieved    Glycemic control is satisfactory  Electrolytes have been repleted as necessary and wound care has been carried out. Pain control has been achieved.   agressive physical therapy and early mobility and ambulation goals will be met   The family was updated about the course and plan  CRITICAL CARE TIME personally provided by me  in evaluation and management, reassessments, review and interpretation of labs and x-rays, ventilator and hemodynamic management, formulating a plan and coordinating care: ___90____ MIN.  Time does not include procedural time. Time spent was non routine post-operarive caRE and included multiple and repeated evaluations at the bedside  CTICU ATTENDING     					    Jan Rice MD

## 2021-01-25 NOTE — CONSULT NOTE ADULT - ATTENDING COMMENTS
This is a 66 y/o M with a PMHx of HTN, COPD, CKD (stage 4 CKD, has LUE AVF, not on HD yet), T-cell lymphoma (diagnosed 19 years ago, on chemo romidepsin every Wednesday), AF (on Eliquis at home, last dose Saturday), systolic CHF (EF 40-45% 2017), severe pHTN who recently underwent a left chest wall hematoma evacuation on 12/18/2020 secondary to loculated effusion. Hospital course at that time was complicated by AF with RVR and thoracic surgery consulted at that time for possible future intervention. Of note, recent ECHO during previous admission revealed severe mitral regurgitation. Thoracic surgery followed up with patient as an outpatient once discharged from the hospital and deemed patient a good surgical candidate. Today, on 1/18/21 patient presented to [facility] for pre-operative optimization with heparin gtt for OR for decortication/VATs (1/22). Currently, patient feels well with no complaints. Denies any CP, palpitations SOB, wheezing, abd pain, n/v/d/c, fevers or chills.     Of note: patient with red cyst noted on left anterior tibia and state he was breaking up his dogs from fighting when he fell on the concrete on his knee 1.5 weeks ago. About 1 week ago it started to get a little swollen and now it has turned into a larger cyst with tenderness to palpitation.     He notes that over the past year he has slowed down. He can only walk about 2 blocks before having to stop and intermittently stops when climbing a flight of stairs. He noted on this admission that his lower extremities are very swollen. He denied any chest pain or orthopnea.     Post operatively he had been diuresed and was started on milrinone for dropped UOP.     ECHO:  1. Biatrial enlargement.   2. Apical windows were limited with imaging obtained predominantly from subcostal and parasternal windows. The anterior and inferior walls are not well visualized, but appear normal in parasternal short axis view.   3. There is moderate-to-severe concentric left ventricular hypertrophy. The left ventricle is normal in size and systolic function with a calculated ejection fraction of 65-70%.   4. The right ventricle is dilated. Right ventricular systolic function is normal.   5. Structurally normal mitral valve with normal leaflet excursion. There is mild-to-moderate mitral regurgitation.   6. There is moderate-to-severe tricuspid regurgitation. There is severe pulmonary hypertension, pulmonary artery systolic pressure is 67 mmHg. The inferior vena cava is dilated (>2.1cm) with abnormal inspiratory collapse (<50%) consistent with elevated right atrial pressure (  15, range 10-20mmHg).   7. The proximal ascending aorta is mildly dilated. Aortic root calcification noted.   8. Trivial pericardial effusion.   9. Compared to the previous TTE performed on 12/22/2020, mitral regurgitation and ejection fraction have improved.    ASSESSMENT/PLAN:    CHF - EF is now improved (from 45% on last admission Dec 2020 to now 65%)   - appears fluid overloaded on exam, recommend IV diuresis for lower extremity edema   - recommend pulm consult for pulmonary hypertension likely 2/2 to pulmonary etiology such as COPD   - may benefit from right heart cath to stratify pHTN etiology   - may benefit from sildenafil rather than milrinone     I have personally provided 40 minutes of critical care time concurrently with the fellow.  This time excludes time spent on separate procedures and time spent teaching. I have reviewed the fellow’s documentation and I agree with the fellow’s assessment and plan of care.  WILLIAM Baptiste
pt known to me from outpt evaluations  CKD 5- AVF mature  admitted for VATS for spontaneous thoracic hematoma  fell just prior admission and injured left knee- under evaluation now  no indication for dialysis at this time
I saw and evaluated the patient on the above date of service and discussed the care with the resident. I agree with the findings and plan as documented in the note above except for the following:      Briefly 65M h/o T cell lymphoma (on chemo romidepsin qWed), stage 4 CKD, severe pHTN, recent L chest wall hematoma evacuation 12/18/20 due to loculated effusion p/w elective VATS. Incidentally he was found to have L tibial skin lump which he developed after falling on concrete 10 days ago.  ID was consulted re this swelling.  On exam, he had 2cm fluctuance on anterior tibia on L leg, mildly ttp.  WBC wnl.   CT leg showed cellulitis and 1.4cm fluid collection anterior to tibia.   Suspect subcutaneous abscess from skin brenton (e.g. staph aureus).  Recommend bedside I&D and send for culture.  Switch zosyn to vancomycin 1g dose by level.  Give one time dose now and check level in 24h.  Re-dose if <20.           Team 1 will continue to follow you.    Xochilt Delgado MD, MS  Infectious disease attending  Work cell [phone]

## 2021-01-25 NOTE — CONSULT NOTE ADULT - CONSULT REQUESTED DATE/TIME
18-Jan-2021 18:00
19-Jan-2021 07:40
25-Jan-2021 10:43
25-Jan-2021 13:49
19-Jan-2021 09:33
19-Jan-2021 12:36

## 2021-01-26 LAB
ANION GAP SERPL CALC-SCNC: 15 MMOL/L — SIGNIFICANT CHANGE UP (ref 5–17)
ANION GAP SERPL CALC-SCNC: 17 MMOL/L — SIGNIFICANT CHANGE UP (ref 5–17)
APTT BLD: 42.7 SEC — HIGH (ref 27.5–35.5)
APTT BLD: 57.8 SEC — HIGH (ref 27.5–35.5)
APTT BLD: 64.2 SEC — HIGH (ref 27.5–35.5)
BUN SERPL-MCNC: 43 MG/DL — HIGH (ref 7–23)
BUN SERPL-MCNC: 45 MG/DL — HIGH (ref 7–23)
CALCIUM SERPL-MCNC: 8.8 MG/DL — SIGNIFICANT CHANGE UP (ref 8.4–10.5)
CALCIUM SERPL-MCNC: 9.2 MG/DL — SIGNIFICANT CHANGE UP (ref 8.4–10.5)
CHLORIDE SERPL-SCNC: 103 MMOL/L — SIGNIFICANT CHANGE UP (ref 96–108)
CHLORIDE SERPL-SCNC: 104 MMOL/L — SIGNIFICANT CHANGE UP (ref 96–108)
CO2 SERPL-SCNC: 22 MMOL/L — SIGNIFICANT CHANGE UP (ref 22–31)
CO2 SERPL-SCNC: 22 MMOL/L — SIGNIFICANT CHANGE UP (ref 22–31)
CREAT SERPL-MCNC: 5.72 MG/DL — HIGH (ref 0.5–1.3)
CREAT SERPL-MCNC: 5.73 MG/DL — HIGH (ref 0.5–1.3)
GLUCOSE BLDC GLUCOMTR-MCNC: 79 MG/DL — SIGNIFICANT CHANGE UP (ref 70–99)
GLUCOSE BLDC GLUCOMTR-MCNC: 87 MG/DL — SIGNIFICANT CHANGE UP (ref 70–99)
GLUCOSE BLDC GLUCOMTR-MCNC: 89 MG/DL — SIGNIFICANT CHANGE UP (ref 70–99)
GLUCOSE BLDC GLUCOMTR-MCNC: 91 MG/DL — SIGNIFICANT CHANGE UP (ref 70–99)
GLUCOSE SERPL-MCNC: 82 MG/DL — SIGNIFICANT CHANGE UP (ref 70–99)
GLUCOSE SERPL-MCNC: 98 MG/DL — SIGNIFICANT CHANGE UP (ref 70–99)
HCT VFR BLD CALC: 30.3 % — LOW (ref 39–50)
HCT VFR BLD CALC: 34.3 % — LOW (ref 39–50)
HGB BLD-MCNC: 10.3 G/DL — LOW (ref 13–17)
HGB BLD-MCNC: 9 G/DL — LOW (ref 13–17)
INR BLD: 1.21 — HIGH (ref 0.88–1.16)
INR BLD: 1.27 — HIGH (ref 0.88–1.16)
MAGNESIUM SERPL-MCNC: 2 MG/DL — SIGNIFICANT CHANGE UP (ref 1.6–2.6)
MAGNESIUM SERPL-MCNC: 2.1 MG/DL — SIGNIFICANT CHANGE UP (ref 1.6–2.6)
MCHC RBC-ENTMCNC: 28.3 PG — SIGNIFICANT CHANGE UP (ref 27–34)
MCHC RBC-ENTMCNC: 28.6 PG — SIGNIFICANT CHANGE UP (ref 27–34)
MCHC RBC-ENTMCNC: 29.7 GM/DL — LOW (ref 32–36)
MCHC RBC-ENTMCNC: 30 GM/DL — LOW (ref 32–36)
MCV RBC AUTO: 95.3 FL — SIGNIFICANT CHANGE UP (ref 80–100)
MCV RBC AUTO: 95.3 FL — SIGNIFICANT CHANGE UP (ref 80–100)
NON-GYNECOLOGICAL CYTOLOGY STUDY: SIGNIFICANT CHANGE UP
NRBC # BLD: 0 /100 WBCS — SIGNIFICANT CHANGE UP (ref 0–0)
NRBC # BLD: 0 /100 WBCS — SIGNIFICANT CHANGE UP (ref 0–0)
PHOSPHATE SERPL-MCNC: 6.3 MG/DL — HIGH (ref 2.5–4.5)
PLATELET # BLD AUTO: 163 K/UL — SIGNIFICANT CHANGE UP (ref 150–400)
PLATELET # BLD AUTO: 180 K/UL — SIGNIFICANT CHANGE UP (ref 150–400)
POTASSIUM SERPL-MCNC: 3.9 MMOL/L — SIGNIFICANT CHANGE UP (ref 3.5–5.3)
POTASSIUM SERPL-MCNC: 4.1 MMOL/L — SIGNIFICANT CHANGE UP (ref 3.5–5.3)
POTASSIUM SERPL-SCNC: 3.9 MMOL/L — SIGNIFICANT CHANGE UP (ref 3.5–5.3)
POTASSIUM SERPL-SCNC: 4.1 MMOL/L — SIGNIFICANT CHANGE UP (ref 3.5–5.3)
PROTHROM AB SERPL-ACNC: 14.4 SEC — HIGH (ref 10.6–13.6)
PROTHROM AB SERPL-ACNC: 15.1 SEC — HIGH (ref 10.6–13.6)
RBC # BLD: 3.18 M/UL — LOW (ref 4.2–5.8)
RBC # BLD: 3.6 M/UL — LOW (ref 4.2–5.8)
RBC # FLD: 19.1 % — HIGH (ref 10.3–14.5)
RBC # FLD: 19.3 % — HIGH (ref 10.3–14.5)
SODIUM SERPL-SCNC: 141 MMOL/L — SIGNIFICANT CHANGE UP (ref 135–145)
SODIUM SERPL-SCNC: 142 MMOL/L — SIGNIFICANT CHANGE UP (ref 135–145)
SURGICAL PATHOLOGY STUDY: SIGNIFICANT CHANGE UP
WBC # BLD: 8.39 K/UL — SIGNIFICANT CHANGE UP (ref 3.8–10.5)
WBC # BLD: 8.87 K/UL — SIGNIFICANT CHANGE UP (ref 3.8–10.5)
WBC # FLD AUTO: 8.39 K/UL — SIGNIFICANT CHANGE UP (ref 3.8–10.5)
WBC # FLD AUTO: 8.87 K/UL — SIGNIFICANT CHANGE UP (ref 3.8–10.5)

## 2021-01-26 PROCEDURE — 99233 SBSQ HOSP IP/OBS HIGH 50: CPT | Mod: GC

## 2021-01-26 PROCEDURE — 71045 X-RAY EXAM CHEST 1 VIEW: CPT | Mod: 26,77

## 2021-01-26 PROCEDURE — 99291 CRITICAL CARE FIRST HOUR: CPT

## 2021-01-26 PROCEDURE — 99292 CRITICAL CARE ADDL 30 MIN: CPT

## 2021-01-26 PROCEDURE — 71045 X-RAY EXAM CHEST 1 VIEW: CPT | Mod: 26,76

## 2021-01-26 PROCEDURE — 99232 SBSQ HOSP IP/OBS MODERATE 35: CPT

## 2021-01-26 RX ORDER — FUROSEMIDE 40 MG
20 TABLET ORAL ONCE
Refills: 0 | Status: COMPLETED | OUTPATIENT
Start: 2021-01-26 | End: 2021-01-26

## 2021-01-26 RX ORDER — POTASSIUM CHLORIDE 20 MEQ
20 PACKET (EA) ORAL DAILY
Refills: 0 | Status: DISCONTINUED | OUTPATIENT
Start: 2021-01-27 | End: 2021-01-27

## 2021-01-26 RX ORDER — SEVELAMER CARBONATE 2400 MG/1
800 POWDER, FOR SUSPENSION ORAL EVERY 8 HOURS
Refills: 0 | Status: DISCONTINUED | OUTPATIENT
Start: 2021-01-26 | End: 2021-01-30

## 2021-01-26 RX ORDER — POTASSIUM CHLORIDE 20 MEQ
20 PACKET (EA) ORAL ONCE
Refills: 0 | Status: COMPLETED | OUTPATIENT
Start: 2021-01-26 | End: 2021-01-26

## 2021-01-26 RX ORDER — FENOLDOPAM MESYLATE 10 MG/ML
0.03 AMPUL (ML) INTRAVENOUS
Qty: 10 | Refills: 0 | Status: DISCONTINUED | OUTPATIENT
Start: 2021-01-26 | End: 2021-01-26

## 2021-01-26 RX ORDER — FUROSEMIDE 40 MG
40 TABLET ORAL DAILY
Refills: 0 | Status: DISCONTINUED | OUTPATIENT
Start: 2021-01-27 | End: 2021-01-27

## 2021-01-26 RX ADMIN — Medication 25 MILLIGRAM(S): at 07:03

## 2021-01-26 RX ADMIN — PANTOPRAZOLE SODIUM 40 MILLIGRAM(S): 20 TABLET, DELAYED RELEASE ORAL at 07:03

## 2021-01-26 RX ADMIN — Medication 200 MILLIGRAM(S): at 18:21

## 2021-01-26 RX ADMIN — Medication 25 MILLIGRAM(S): at 18:21

## 2021-01-26 RX ADMIN — Medication 200 MILLIGRAM(S): at 07:03

## 2021-01-26 RX ADMIN — Medication 20 MILLIGRAM(S): at 16:45

## 2021-01-26 RX ADMIN — SEVELAMER CARBONATE 800 MILLIGRAM(S): 2400 POWDER, FOR SUSPENSION ORAL at 13:36

## 2021-01-26 RX ADMIN — Medication 650 MILLIGRAM(S): at 18:21

## 2021-01-26 RX ADMIN — HEPARIN SODIUM 10 UNIT(S)/HR: 5000 INJECTION INTRAVENOUS; SUBCUTANEOUS at 10:33

## 2021-01-26 RX ADMIN — POLYETHYLENE GLYCOL 3350 17 GRAM(S): 17 POWDER, FOR SOLUTION ORAL at 11:25

## 2021-01-26 RX ADMIN — Medication 20 MILLIEQUIVALENT(S): at 16:45

## 2021-01-26 RX ADMIN — SEVELAMER CARBONATE 800 MILLIGRAM(S): 2400 POWDER, FOR SUSPENSION ORAL at 22:21

## 2021-01-26 RX ADMIN — Medication 2 MILLIGRAM(S): at 22:21

## 2021-01-26 RX ADMIN — Medication 650 MILLIGRAM(S): at 07:04

## 2021-01-26 RX ADMIN — Medication 25 MILLIGRAM(S): at 11:25

## 2021-01-26 RX ADMIN — AMIODARONE HYDROCHLORIDE 200 MILLIGRAM(S): 400 TABLET ORAL at 07:03

## 2021-01-26 NOTE — PROGRESS NOTE ADULT - SUBJECTIVE AND OBJECTIVE BOX
Patient is a 65y Male seen and evaluated at bedside. S/p R chest tube removal. Stable CKD5, no indication for HD at this time. Will continue to monitor.    Meds:    acetaminophen   Tablet .. 650 every 6 hours PRN  allopurinol 200 two times a day  aMIOdarone    Tablet 200 daily  dextrose 40% Gel 15 once  dextrose 5%. 1000 <Continuous>  dextrose 5%. 1000 <Continuous>  dextrose 50% Injectable 25 once  dextrose 50% Injectable 12.5 once  dextrose 50% Injectable 25 once  doxazosin 2 at bedtime  glucagon  Injectable 1 once  heparin  Infusion 1000 <Continuous>  insulin lispro (ADMELOG) corrective regimen sliding scale  Before meals and at bedtime  metoprolol tartrate 25 every 6 hours  milrinone Infusion 0.125 <Continuous>  oxycodone    5 mG/acetaminophen 325 mG 1 every 6 hours PRN  pantoprazole    Tablet 40 before breakfast  polyethylene glycol 3350 17 daily  sodium bicarbonate 650 two times a day  sodium chloride 0.9%. 1000 <Continuous>      T(C): , Max: 36.6 (01-25-21 @ 14:01)  T(F): , Max: 97.8 (01-25-21 @ 14:01)  HR: 65 (01-26-21 @ 09:00)  BP: 133/95 (01-26-21 @ 09:00)  BP(mean): 110 (01-26-21 @ 09:00)  RR: 18 (01-26-21 @ 09:00)  SpO2: 97% (01-26-21 @ 09:00)  Wt(kg): --    01-25 @ 07:01 - 01-26 @ 07:00  --------------------------------------------------------  IN: 1044.8 mL / OUT: 1261 mL / NET: -216.2 mL    01-26 @ 07:01 - 01-26 @ 10:42  --------------------------------------------------------  IN: 12.7 mL / OUT: 0 mL / NET: 12.7 mL    Review of Systems:  RESPIRATORY: No shortness of breath, cough  CARDIOVASCULAR: No Chest pain  MUSCULOSKELETAL: +leg swelling    PHYSICAL EXAM:  GENERAL: Alert, awake, oriented x3 on HFNC 40%  CHEST/LUNG: CTA sounds, no rales/rhonchi, 3 chest tubes Left  HEART: Regular rate and rhythm, no murmur  ABDOMEN: Soft, nontender, non distended  EXTREMITIES: 2+ pitting pedal oedema  ACCESS: LUE AVF w/bruit and thrill     LABS:                        9.0    8.87  )-----------( 163      ( 26 Jan 2021 02:22 )             30.3     01-26    141  |  104  |  43<H>  ----------------------------<  82  3.9   |  22  |  5.73<H>    Ca    9.2      26 Jan 2021 02:22  Phos  6.3     01-26  Mg     2.1     01-26        PT/INR - ( 26 Jan 2021 02:22 )   PT: 15.1 sec;   INR: 1.27          PTT - ( 26 Jan 2021 10:27 )  PTT:57.8 sec          RADIOLOGY & ADDITIONAL STUDIES:

## 2021-01-26 NOTE — PROGRESS NOTE ADULT - SUBJECTIVE AND OBJECTIVE BOX
INTERVAL HPI/OVERNIGHT EVENTS:    POD#4 Left VATS - s/p decortication with expansion of MELISSA and LLL    66yo Male Hx HTN, COPD, CKD (stage IV - L AVF in place - no yet used), T-cell lymphoma (on Chemo  romidepsin q week (Wed), Afib (eliquis), CHF (chronic systolic - EF 40%), severe pHTN, loculated pleural effusion with complicating left chest wall hematoma evacuation 12/18/2020 secondary to loculated effusion. Hospital course at that time was complicated by AF with RVR and thoracic surgery consulted at that time for possible future intervention. Of note, recent ECHO during previous admission revealed severe mitral regurgitation. Thoracic surgery followed up with patient as an outpatient once discharged from the hospital and deemed patient a good surgical candidate. Today, on 1/18/21 patient presented to [facility] for pre-operative optimization with heparin gtt prior to OR tomorrow. Currently, patient feels well with no complaints. Denies any CP, palpitations SOB, wheezing, abd pain, n/v/d/c, fevers or chills.     Of note: patient with red cyst noted on left anterior tibia and state he was breaking up his dogs from fighting when he fell on the concert on his knee 1.5 weeks ago. About 1 week ago it started to get a little swollen and now it has turned into a larger cyst with tenderness to palpitation.     Intraop:   500cc of LR  750cc albumin  3u PRBC      ICU Vital Signs Last 24 Hrs  T(C): 36.6 (18 Jan 2021 14:26), Max: 36.6 (18 Jan 2021 14:26)  T(F): 97.9 (18 Jan 2021 14:26), Max: 97.9 (18 Jan 2021 14:26)  HR: --  BP: --  BP(mean): --  ABP: --  ABP(mean): --  RR: --  SpO2: --   (18 Jan 2021 14:33)        PAST MEDICAL & SURGICAL HISTORY:  BPH (benign prostatic hyperplasia)    Gout    H/O pulmonary hypertension    CHF, chronic    Lymphoma  t cell; Chemotherapy weekly- wednesday    CKD (chronic kidney disease)    Atrial fibrillation    HTN (hypertension)    Elective surgery  rectal surgery    History of cholecystectomy          ICU Vital Signs Last 24 Hrs  T(C): 36.6 (26 Jan 2021 09:00), Max: 36.6 (25 Jan 2021 14:01)  T(F): 97.8 (26 Jan 2021 09:00), Max: 97.8 (25 Jan 2021 14:01)  HR: 65 (26 Jan 2021 09:00) (62 - 75)  BP: 133/95 (26 Jan 2021 09:00) (129/64 - 169/78)  BP(mean): 110 (26 Jan 2021 09:00) (91 - 132)  ABP: 173/63 (25 Jan 2021 17:00) (150/55 - 173/63)  ABP(mean): 101 (25 Jan 2021 17:00) (85 - 107)  RR: 18 (26 Jan 2021 09:00) (16 - 25)  SpO2: 97% (26 Jan 2021 09:00) (89% - 100%)    Qtts:     I&O's Summary    25 Jan 2021 07:01  -  26 Jan 2021 07:00  --------------------------------------------------------  IN: 1044.8 mL / OUT: 1261 mL / NET: -216.2 mL    26 Jan 2021 07:01  -  26 Jan 2021 11:06  --------------------------------------------------------  IN: 12.7 mL / OUT: 0 mL / NET: 12.7 mL            Physical Exam    Heart  Lungs  Abd  Ext  Chest  Neuro  Skin    LABS:                        9.0    8.87  )-----------( 163      ( 26 Jan 2021 02:22 )             30.3     01-26    141  |  104  |  43<H>  ----------------------------<  82  3.9   |  22  |  5.73<H>    Ca    9.2      26 Jan 2021 02:22  Phos  6.3     01-26  Mg     2.1     01-26      PT/INR - ( 26 Jan 2021 02:22 )   PT: 15.1 sec;   INR: 1.27          PTT - ( 26 Jan 2021 10:27 )  PTT:57.8 sec    ABG - ( 25 Jan 2021 16:15 )  pH, Arterial: 7.36  pH, Blood: x     /  pCO2: 35    /  pO2: 148   / HCO3: 19    / Base Excess: -5.6  /  SaO2: 99                  RADIOLOGY & ADDITIONAL STUDIES:    I have spent/provided stated minutes of critical care time to this patient:  INTERVAL HPI/OVERNIGHT EVENTS:    POD#4 Left VATS - s/p decortication with expansion of MELISSA and LLL    66yo Male Hx HTN, COPD, CKD (stage IV - L AVF in place - no yet used), T-cell lymphoma (on Chemo  romidepsin q week (Wed), Afib (eliquis), CHF (chronic systolic - EF 40%), severe MR, severe pHTN, loculated pleural effusion with complicating left chest wall hematoma evacuation 12/18/2020 seen in outpatient followup with thoracic surgery    To OR 1/22:  Intraop:   500cc Crystalloid/750cc albumin/3 U PRBC  operative findings - loculated hemothorax    Extubated 1/23 - primacor added for pulm HTN/renal perfusion  1/24: right pigtail placement (800cc out)  Bipap on and off alternating HFNC     remains on HFNC this am   OOB in chair at this time     PMHx includes but is not limited to:   BPH (benign prostatic hyperplasia)  Gout  Hx pulmonary hypertension  CHF, chronic  Lymphoma - t cell; Chemotherapy weekly- wednesday  CKD (chronic kidney disease)  Atrial fibrillation  HTN (hypertension)  Hx  cholecystectomy    ICU Vital Signs Last 24 Hrs  T(C): 36.6 (26 Jan 2021 09:00), Max: 36.6 (25 Jan 2021 14:01)  T(F): 97.8 (26 Jan 2021 09:00), Max: 97.8 (25 Jan 2021 14:01)  HR: 65 (26 Jan 2021 09:00) (62 - 75) sinus  BP: 133/95 (26 Jan 2021 09:00) (129/64 - 169/78)  BP(mean): 110 (26 Jan 2021 09:00) (91 - 132)  ABP: 173/63 (25 Jan 2021 17:00) (150/55 - 173/63)  ABP(mean): 101 (25 Jan 2021 17:00) (85 - 107)  RR: 18 (26 Jan 2021 09:00) (16 - 25)  SpO2: 97% (26 Jan 2021 09:00) (89% - 100%) HFNC 40/40    Qtts:   Primacor 0.125 - shut off this am  Heparin 1700 Unit/hour    I&O's Summary    25 Jan 2021 07:01  -  26 Jan 2021 07:00  --------------------------------------------------------  IN: 1044.8 mL / OUT: 1261 mL / NET: -216.2 mL    26 Jan 2021 07:01  -  26 Jan 2021 11:06  --------------------------------------------------------  IN: 12.7 mL / OUT: 0 mL / NET: 12.7 mL    Physical Exam    Heart - regular (-)rub/gallop  Lungs - dec BS left base; no rhonchi/wheeze   Abd - (+)BS soft NTND (-)r/r/g  Ext - warm to touch; no cyanosis/clubbing  Chest - chest tubes in place - to waterseal   Neuro - alert/oriented and interactive, non-focal   Skin - no rash     LABS:                        9.0    8.87  )-----------( 163      ( 26 Jan 2021 02:22 )             30.3     01-26    141  |  104  |  43<H>  ----------------------------<  82  3.9   |  22  |  5.73<H>    Ca    9.2      26 Jan 2021 02:22  Phos  6.3     01-26  Mg     2.1     01-26    PT/INR - ( 26 Jan 2021 02:22 )   PT: 15.1 sec;   INR: 1.27     PTT - ( 26 Jan 2021 10:27 )  PTT:57.8 sec    ABG - ( 25 Jan 2021 16:15 )  7.36/35/148/99        RADIOLOGY & ADDITIONAL STUDIES: reviewed   COVID IgG Ab 1/18 negative  Pleural Fluid 1/22: no growth to date   Path 1/20: no evidence of a B-cell or T-cell lymphoma  CT left LE 1/18:  Severe cellulitis with small seroma superficial to the distal aspect of the patellar tendon.  CT Chest 1/5: Interval resolution of small left apical PTX; Small-mod left pleural effusion which may represent a persistent small hematocrit effect along its posterior dependent aspect consistent with a hemothorax. Persistent 3.3 cm rounded heterogeneous attenuation masslike structure within the anterior aspect of the left lower lobe. This may represent a persistent intraparenchymal hematoma. There may be encasement and mild narrowing of the superior segment to the left lower lobe. Persistent small right-sided pleural effusion. Interval decrease in the size of a right anterior chest wall hematoma with removal of an anterior chest wall drainage catheter and near complete resolution of anterior chest wall soft tissue gas.    CTa Chest 1/17: Large right chest wall intramuscular hematoma measuring up to 13 cm with area of arterial extravasation. Large loculated left pleural effusion with near complete compressive atelectasis of the left lower lobe; superimposed infection within the atelectatic lung is not excluded. 4 cm nonenhancing heterogeneous hyperdense structure in the anterior left lower lobe is suggestive of a pulmonary hematoma . Cystic lesions within the pancreatic head measuring up to 1.3 cm. 4 cm indeterminate density lesion within the spleen. New upper abdominal ascites. Aneurysmal dilation of the aortic root and ascending aorta    Patient with Hx long term lymphoma on weekly chemo - thoracentesis and complicating chest wall hematoma and hemothorax (loculated) now POD#4 doing well    1. CV  hemodynamically stable  sinus rhythm   titrate primacor off  noted mild-mod MR; TR and pulm HTN - known to structural heart team - possible intervention in the future  repeat ECHO 1/23 Biatrial enlargement. Mod-severe concentric LVH. EF 65%  RV dilated/fxn normal. mild-mod MR; mod to severe TR; severe pulm HTN (67). The proximal ascending aorta is mildly dilated.  Hx Fib - currently in sinus - Heparin infusion ongoing - anticipate chest tube removal prior to transition to NOAC - titrate per pTT; amiodarone   Metoprolol 25 q 6 hours with hold parameters    2. Pulm  transition off HFNC - titrate supplemental oxygen down   chest tubes to water seal per thoracic this am - serial xrays  ambulation/OOB/incentive spirometry  cytopath - negative for lymphoma  remove right pigtail today    3. Renal   CKD and AVF in place - has not been used to date  Cr 5.73  renal following  no refractory hyperkalemia or acidosis at this time   monitor UO/lytes and Cr     maintain glycemic control  Bowel regimen - last BM 1/25  DVT and GI prophylaxis     d/w patient/staff/renal and thoracic surgery    I have spent/provided stated minutes of critical care time to this patient: 90

## 2021-01-26 NOTE — PROGRESS NOTE ADULT - ASSESSMENT
64 y/o M with a PMHx of HTN, COPD, CKD (stage 4 CKD, has LUE AVF, not on HD yet), T-cell lymphoma (diagnosed 19 years ago, on chemo romidepsin every Wednesday), AF (on Eliquis at home, last dose Saturday), systolic CHF (EF 40-45% 2017), severe pHTN who is S/p  VATS for recurrent pleural effusion 1/22. Renal consulted for CKD management.    Stable CKD5  S/p LUE AVF creation in June 2020  No indication for HD at this time    Hypervolaemic- continue PRN diureses with lasix IV; target net negative 1L  Electrolytes, bicarb acceptable- on 650mg BID  BP: Management per CTICU  Anaemia- repeat iron panel  BMD: holding calcitriol 0.25 daily; start phos binder renvela 800TID    Daily weights, strict I&Os, renally dose meds

## 2021-01-26 NOTE — PROGRESS NOTE ADULT - ASSESSMENT
A/P:    64 y/o M with a PMHx of HTN, COPD, CKD (stage 4 CKD, has LUE AVF, not on HD yet), T-cell lymphoma (diagnosed 19 years ago, on chemo romidepsin every Wednesday), AF (on Eliquis), systolic CHF (EF 40-45%), severe pHTN who recently underwent a left chest wall hematoma evacuation on 12/18/2020 secondary to loculated effusion. Hospital course at that time was complicated by AF with RVR and thoracic surgery consulted at that time for possible future intervention. Of note, recent ECHO during previous admission revealed severe mitral regurgitation. Thoracic surgery followed up with patient as an outpatient once discharged from the hospital and he was deemed a good surgical candidate. On 1/18/21 patient presented to [facility] for pre-operative optimization with heparin gtt prior to OR. On 1/22 he underwent an uncomplicated L VATS, RA decortication. Intraop findings significant for loculated hemothorax. He received 3UPRBC in the OR and post operatively was brought to the CTICU stable and intubated. He was extubated POD1 and required primacor for pulmonary HTN and renal perfusion. Cardiology and nephrology were consulted. POD2 a right sided pigtail was placed for pleural effusion which drained 800 cc. He required alternating BiPAP and HFNC. POD4 patient was weaned to NC, primacor was weaned, and right pigtail was removed. Chest tubes were placed to water seal with stable follow up CXR. He was transferred to [facility] floor care.     Neurovascular:   - No delirium. Pain well controlled with current regimen.  -Continue tylenol, percocet PRN    Cardiovascular:   -Hemodynamically stable. HR controlled (62-75)  - Hx AF on eliquis at home, on heparin gtt here, continue amiodarone 200 mg daily   - Hx of HTN, BP controlled (130//121), continue doxazosin 2 mg, metoprolol 25 mg q6 hours   - systolic CHF, HF team following, continue to appreciate recs     Respiratory:   - POD4 s/p L VATS, RA decortication for loculated hemothorax  - 3 CTs in place, no air leak, on water seal  - 02 Sat = 98% on NC, post op hypoxia requiring BiPAP and HFNC, resolved   - Hx pulmonary HTN, required primacor post op, now discontinued   -If on oxygen wean to RA from for O2 Sat > 93%.  -Encourage C+DB and Use of IS 10x / hr while awake.  -CXR.    GI:   - Stable.  -Continue GI PPX with protonix  -PO Diet.    Renal / :  - BUN/Cr 45/5.72, hx CKD, has AV fistula but never had HD, renal following   - Continue lasix 40 daily and PRN IV pushes, renvela 800 q8 hours, NaBicarb 650 BID  -Continue to monitor renal function.  -Monitor I/O's.  - Replete electrolytes PRN    Endocrine:    - No known hx diabetes or thyroid disease  - continue allopurinol     Hematologic:  -H/H stable at 9.0/30.3 with no obvious signs of bleeding  - heparin gtt for AF, therapuetic  -Coagulation Panel.    ID:  -Pt remains afebrile with no elevation in WBC or signs of infection  -Continue to monitor CBC  -Observe for SIRS/Sepsis Syndrome.    Prophylaxis:  -DVT prophylaxis with heparin gtt  -SCD's    Disposition:  -Home when medically appropriate pending CT management

## 2021-01-26 NOTE — PROGRESS NOTE ADULT - SUBJECTIVE AND OBJECTIVE BOX
Patient discussed on morning rounds with Dr. Harmon    Operation / Date: L VATS RA decortication for loculated hemothorax     SUBJECTIVE ASSESSMENT:  65y Male     Vital Signs Last 24 Hrs  T(C): 36.7 (26 Jan 2021 14:15), Max: 36.7 (26 Jan 2021 14:15)  T(F): 98 (26 Jan 2021 14:15), Max: 98 (26 Jan 2021 14:15)  HR: 72 (26 Jan 2021 16:40) (63 - 75)  BP: 152/80 (26 Jan 2021 16:40) (129/64 - 169/78)  BP(mean): 111 (26 Jan 2021 16:40) (91 - 132)  RR: 18 (26 Jan 2021 16:40) (17 - 25)  SpO2: 99% (26 Jan 2021 16:40) (89% - 100%)  I&O's Detail    25 Jan 2021 07:01  -  26 Jan 2021 07:00  --------------------------------------------------------  IN:    Albumin 5% - 50 mL: 50 mL    Heparin: 190 mL    IV PiggyBack: 25 mL    Milrinone: 37.8 mL    Milrinone: 27 mL    Oral Fluid: 375 mL    PRBCs (Packed Red Blood Cells): 300 mL    sodium chloride 0.9%: 40 mL  Total IN: 1044.8 mL    OUT:    Chest Tube (mL): 80 mL    Chest Tube (mL): 140 mL    Chest Tube (mL): 30 mL    Chest Tube (mL): 60 mL    Indwelling Catheter - Urethral (mL): 900 mL    Stool (mL): 1 mL    Voided (mL): 50 mL  Total OUT: 1261 mL    Total NET: -216.2 mL      26 Jan 2021 07:01  -  26 Jan 2021 17:03  --------------------------------------------------------  IN:    Heparin: 70 mL    Milrinone: 10.8 mL    Oral Fluid: 200 mL  Total IN: 280.8 mL    OUT:    Voided (mL): 100 mL  Total OUT: 100 mL    Total NET: 180.8 mL    CHEST TUBE:  YesX3. AIR LEAKS: No.  H2O SEAL.   ROMARIO DRAIN:  No.  EPICARDIAL WIRES: No.  TIE DOWNS: Yes  BACK: No.    Physical Exam  CONSTITUTIONAL: Well appearing in NAD assessed sitting comfortably at bedside   NEURO: A&OX3. No focal deficits noted, moving bilateral upper and lower extremities                    CV: RRR, no murmurs, rubs, gallops  RESPIRATORY: Clear to auscultation bilateral posterior lung fields, no wheezes, rales, rhonchi, 3 CTs on water seal, no leak   GI: +BS, NT/ND  MUSCULOSKELETAL: No peripheral edema or calf tenderness. Full strength and ROM bilateral upper and lower extremities   VASCULAR: Bilateral distal pulses 2+  INCISIONS: L VATS incisions clean, dry, intact     LABS:                        9.0    8.87  )-----------( 163      ( 26 Jan 2021 02:22 )             30.3       COUMADIN: No    PT/INR - ( 26 Jan 2021 02:22 )   PT: 15.1 sec;   INR: 1.27          PTT - ( 26 Jan 2021 10:27 )  PTT:57.8 sec    01-26    142  |  103  |  45<H>  ----------------------------<  98  4.1   |  22  |  5.72<H>    Ca    8.8      26 Jan 2021 14:06  Phos  6.3     01-26  Mg     2.0     01-26    MEDICATIONS  (STANDING):  allopurinol 200 milliGRAM(s) Oral two times a day  aMIOdarone    Tablet 200 milliGRAM(s) Oral daily  dextrose 40% Gel 15 Gram(s) Oral once  dextrose 5%. 1000 milliLiter(s) (50 mL/Hr) IV Continuous <Continuous>  dextrose 5%. 1000 milliLiter(s) (100 mL/Hr) IV Continuous <Continuous>  dextrose 50% Injectable 25 Gram(s) IV Push once  dextrose 50% Injectable 12.5 Gram(s) IV Push once  dextrose 50% Injectable 25 Gram(s) IV Push once  doxazosin 2 milliGRAM(s) Oral at bedtime  glucagon  Injectable 1 milliGRAM(s) IntraMuscular once  heparin  Infusion 1000 Unit(s)/Hr (10 mL/Hr) IV Continuous <Continuous>  insulin lispro (ADMELOG) corrective regimen sliding scale   SubCutaneous Before meals and at bedtime  metoprolol tartrate 25 milliGRAM(s) Oral every 6 hours  pantoprazole    Tablet 40 milliGRAM(s) Oral before breakfast  polyethylene glycol 3350 17 Gram(s) Oral daily  sevelamer carbonate 800 milliGRAM(s) Oral every 8 hours  sodium bicarbonate 650 milliGRAM(s) Oral two times a day    MEDICATIONS  (PRN):  acetaminophen   Tablet .. 650 milliGRAM(s) Oral every 6 hours PRN Mild Pain (1 - 3)  oxycodone    5 mG/acetaminophen 325 mG 1 Tablet(s) Oral every 6 hours PRN Moderate Pain (4 - 6)    RADIOLOGY & ADDITIONAL TESTS:  < from: Xray Chest 1 View-PORTABLE IMMEDIATE (Xray Chest 1 View-PORTABLE IMMEDIATE .) (01.26.21 @ 09:14) >  IMPRESSION: Removal of right chest tube. Persistent residual left pleural effusion and underlying left basilar consolidation. No pneumothorax.    < end of copied text >     Patient discussed on morning rounds with Dr. Harmon    Operation / Date: L VATS RA decortication for loculated hemothorax     SUBJECTIVE ASSESSMENT:  65y Male assessed at bedside after transfer from CTICU. Patient has no acute complaints, states he feels well. Would like to get his CTs out so he can ambulate more easily but states he feels good when he is able to walk. Denies chest pain or SOB. Is using IS (1000 cc). No fever, chills, nausea, vomiting. Had a BM today.     Vital Signs Last 24 Hrs  T(C): 36.7 (26 Jan 2021 14:15), Max: 36.7 (26 Jan 2021 14:15)  T(F): 98 (26 Jan 2021 14:15), Max: 98 (26 Jan 2021 14:15)  HR: 72 (26 Jan 2021 16:40) (63 - 75)  BP: 152/80 (26 Jan 2021 16:40) (129/64 - 169/78)  BP(mean): 111 (26 Jan 2021 16:40) (91 - 132)  RR: 18 (26 Jan 2021 16:40) (17 - 25)  SpO2: 99% (26 Jan 2021 16:40) (89% - 100%)  I&O's Detail    25 Jan 2021 07:01  -  26 Jan 2021 07:00  --------------------------------------------------------  IN:    Albumin 5% - 50 mL: 50 mL    Heparin: 190 mL    IV PiggyBack: 25 mL    Milrinone: 37.8 mL    Milrinone: 27 mL    Oral Fluid: 375 mL    PRBCs (Packed Red Blood Cells): 300 mL    sodium chloride 0.9%: 40 mL  Total IN: 1044.8 mL    OUT:    Chest Tube (mL): 80 mL    Chest Tube (mL): 140 mL    Chest Tube (mL): 30 mL    Chest Tube (mL): 60 mL    Indwelling Catheter - Urethral (mL): 900 mL    Stool (mL): 1 mL    Voided (mL): 50 mL  Total OUT: 1261 mL    Total NET: -216.2 mL      26 Jan 2021 07:01 - 26 Jan 2021 17:03  --------------------------------------------------------  IN:    Heparin: 70 mL    Milrinone: 10.8 mL    Oral Fluid: 200 mL  Total IN: 280.8 mL    OUT:    Voided (mL): 100 mL  Total OUT: 100 mL    Total NET: 180.8 mL    CHEST TUBE:  YesX3. AIR LEAKS: No.  H2O SEAL.   ROMARIO DRAIN:  No.  EPICARDIAL WIRES: No.  TIE DOWNS: Yes  BACK: No.    Physical Exam  CONSTITUTIONAL: Well appearing in NAD assessed sitting comfortably at bedside   NEURO: A&OX3. No focal deficits noted, moving bilateral upper and lower extremities                    CV: RRR, no murmurs, rubs, gallops  RESPIRATORY: Clear to auscultation bilateral posterior lung fields, no wheezes, rales, rhonchi, 3 CTs on water seal, no leak   GI: +BS, NT/ND  MUSCULOSKELETAL: Bilateral LE edema, no calf tenderness. Full strength and ROM bilateral upper and lower extremities   VASCULAR: Bilateral distal pulses 2+  INCISIONS: L VATS incisions clean, dry, intact     LABS:                        9.0    8.87  )-----------( 163      ( 26 Jan 2021 02:22 )             30.3       COUMADIN: No    PT/INR - ( 26 Jan 2021 02:22 )   PT: 15.1 sec;   INR: 1.27          PTT - ( 26 Jan 2021 10:27 )  PTT:57.8 sec    01-26    142  |  103  |  45<H>  ----------------------------<  98  4.1   |  22  |  5.72<H>    Ca    8.8      26 Jan 2021 14:06  Phos  6.3     01-26  Mg     2.0     01-26    MEDICATIONS  (STANDING):  allopurinol 200 milliGRAM(s) Oral two times a day  aMIOdarone    Tablet 200 milliGRAM(s) Oral daily  dextrose 40% Gel 15 Gram(s) Oral once  dextrose 5%. 1000 milliLiter(s) (50 mL/Hr) IV Continuous <Continuous>  dextrose 5%. 1000 milliLiter(s) (100 mL/Hr) IV Continuous <Continuous>  dextrose 50% Injectable 25 Gram(s) IV Push once  dextrose 50% Injectable 12.5 Gram(s) IV Push once  dextrose 50% Injectable 25 Gram(s) IV Push once  doxazosin 2 milliGRAM(s) Oral at bedtime  glucagon  Injectable 1 milliGRAM(s) IntraMuscular once  heparin  Infusion 1000 Unit(s)/Hr (10 mL/Hr) IV Continuous <Continuous>  insulin lispro (ADMELOG) corrective regimen sliding scale   SubCutaneous Before meals and at bedtime  metoprolol tartrate 25 milliGRAM(s) Oral every 6 hours  pantoprazole    Tablet 40 milliGRAM(s) Oral before breakfast  polyethylene glycol 3350 17 Gram(s) Oral daily  sevelamer carbonate 800 milliGRAM(s) Oral every 8 hours  sodium bicarbonate 650 milliGRAM(s) Oral two times a day    MEDICATIONS  (PRN):  acetaminophen   Tablet .. 650 milliGRAM(s) Oral every 6 hours PRN Mild Pain (1 - 3)  oxycodone    5 mG/acetaminophen 325 mG 1 Tablet(s) Oral every 6 hours PRN Moderate Pain (4 - 6)    RADIOLOGY & ADDITIONAL TESTS:  < from: Xray Chest 1 View-PORTABLE IMMEDIATE (Xray Chest 1 View-PORTABLE IMMEDIATE .) (01.26.21 @ 09:14) >  IMPRESSION: Removal of right chest tube. Persistent residual left pleural effusion and underlying left basilar consolidation. No pneumothorax.    < end of copied text >

## 2021-01-27 ENCOUNTER — TRANSCRIPTION ENCOUNTER (OUTPATIENT)
Age: 66
End: 2021-01-27

## 2021-01-27 DIAGNOSIS — Z86.79 PERSONAL HISTORY OF OTHER DISEASES OF THE CIRCULATORY SYSTEM: ICD-10-CM

## 2021-01-27 LAB
ANION GAP SERPL CALC-SCNC: 17 MMOL/L — SIGNIFICANT CHANGE UP (ref 5–17)
APTT BLD: 38.5 SEC — HIGH (ref 27.5–35.5)
APTT BLD: 49.9 SEC — HIGH (ref 27.5–35.5)
BUN SERPL-MCNC: 45 MG/DL — HIGH (ref 7–23)
CALCIUM SERPL-MCNC: 8.6 MG/DL — SIGNIFICANT CHANGE UP (ref 8.4–10.5)
CHLORIDE SERPL-SCNC: 102 MMOL/L — SIGNIFICANT CHANGE UP (ref 96–108)
CO2 SERPL-SCNC: 22 MMOL/L — SIGNIFICANT CHANGE UP (ref 22–31)
CREAT SERPL-MCNC: 5.94 MG/DL — HIGH (ref 0.5–1.3)
CULTURE RESULTS: NO GROWTH — SIGNIFICANT CHANGE UP
GLUCOSE SERPL-MCNC: 66 MG/DL — LOW (ref 70–99)
HCT VFR BLD CALC: 30.2 % — LOW (ref 39–50)
HCT VFR BLD CALC: 31.1 % — LOW (ref 39–50)
HGB BLD-MCNC: 9.1 G/DL — LOW (ref 13–17)
HGB BLD-MCNC: 9.5 G/DL — LOW (ref 13–17)
INR BLD: 1.25 — HIGH (ref 0.88–1.16)
MAGNESIUM SERPL-MCNC: 1.9 MG/DL — SIGNIFICANT CHANGE UP (ref 1.6–2.6)
MCHC RBC-ENTMCNC: 28.4 PG — SIGNIFICANT CHANGE UP (ref 27–34)
MCHC RBC-ENTMCNC: 28.9 PG — SIGNIFICANT CHANGE UP (ref 27–34)
MCHC RBC-ENTMCNC: 30.1 GM/DL — LOW (ref 32–36)
MCHC RBC-ENTMCNC: 30.5 GM/DL — LOW (ref 32–36)
MCV RBC AUTO: 94.4 FL — SIGNIFICANT CHANGE UP (ref 80–100)
MCV RBC AUTO: 94.5 FL — SIGNIFICANT CHANGE UP (ref 80–100)
NRBC # BLD: 0 /100 WBCS — SIGNIFICANT CHANGE UP (ref 0–0)
NRBC # BLD: 0 /100 WBCS — SIGNIFICANT CHANGE UP (ref 0–0)
PLATELET # BLD AUTO: 179 K/UL — SIGNIFICANT CHANGE UP (ref 150–400)
PLATELET # BLD AUTO: 184 K/UL — SIGNIFICANT CHANGE UP (ref 150–400)
POTASSIUM SERPL-MCNC: 3.8 MMOL/L — SIGNIFICANT CHANGE UP (ref 3.5–5.3)
POTASSIUM SERPL-SCNC: 3.8 MMOL/L — SIGNIFICANT CHANGE UP (ref 3.5–5.3)
PROTHROM AB SERPL-ACNC: 14.8 SEC — HIGH (ref 10.6–13.6)
RBC # BLD: 3.2 M/UL — LOW (ref 4.2–5.8)
RBC # BLD: 3.29 M/UL — LOW (ref 4.2–5.8)
RBC # FLD: 18.6 % — HIGH (ref 10.3–14.5)
RBC # FLD: 18.6 % — HIGH (ref 10.3–14.5)
SODIUM SERPL-SCNC: 141 MMOL/L — SIGNIFICANT CHANGE UP (ref 135–145)
SPECIMEN SOURCE: SIGNIFICANT CHANGE UP
WBC # BLD: 7.2 K/UL — SIGNIFICANT CHANGE UP (ref 3.8–10.5)
WBC # BLD: 7.46 K/UL — SIGNIFICANT CHANGE UP (ref 3.8–10.5)
WBC # FLD AUTO: 7.2 K/UL — SIGNIFICANT CHANGE UP (ref 3.8–10.5)
WBC # FLD AUTO: 7.46 K/UL — SIGNIFICANT CHANGE UP (ref 3.8–10.5)

## 2021-01-27 PROCEDURE — 99233 SBSQ HOSP IP/OBS HIGH 50: CPT

## 2021-01-27 PROCEDURE — 71045 X-RAY EXAM CHEST 1 VIEW: CPT | Mod: 26,77

## 2021-01-27 PROCEDURE — 71045 X-RAY EXAM CHEST 1 VIEW: CPT | Mod: 26

## 2021-01-27 PROCEDURE — 99232 SBSQ HOSP IP/OBS MODERATE 35: CPT

## 2021-01-27 PROCEDURE — 71250 CT THORAX DX C-: CPT | Mod: 26

## 2021-01-27 PROCEDURE — 99232 SBSQ HOSP IP/OBS MODERATE 35: CPT | Mod: GC

## 2021-01-27 RX ORDER — HEPARIN SODIUM 5000 [USP'U]/ML
1000 INJECTION INTRAVENOUS; SUBCUTANEOUS
Qty: 25000 | Refills: 0 | Status: DISCONTINUED | OUTPATIENT
Start: 2021-01-27 | End: 2021-01-27

## 2021-01-27 RX ORDER — HEPARIN SODIUM 5000 [USP'U]/ML
1000 INJECTION INTRAVENOUS; SUBCUTANEOUS
Qty: 25000 | Refills: 0 | Status: DISCONTINUED | OUTPATIENT
Start: 2021-01-27 | End: 2021-01-28

## 2021-01-27 RX ORDER — FUROSEMIDE 40 MG
20 TABLET ORAL ONCE
Refills: 0 | Status: COMPLETED | OUTPATIENT
Start: 2021-01-27 | End: 2021-01-27

## 2021-01-27 RX ORDER — HEPARIN SODIUM 5000 [USP'U]/ML
5000 INJECTION INTRAVENOUS; SUBCUTANEOUS EVERY 8 HOURS
Refills: 0 | Status: DISCONTINUED | OUTPATIENT
Start: 2021-01-27 | End: 2021-01-27

## 2021-01-27 RX ORDER — FUROSEMIDE 40 MG
60 TABLET ORAL ONCE
Refills: 0 | Status: COMPLETED | OUTPATIENT
Start: 2021-01-27 | End: 2021-01-27

## 2021-01-27 RX ORDER — FUROSEMIDE 40 MG
60 TABLET ORAL DAILY
Refills: 0 | Status: DISCONTINUED | OUTPATIENT
Start: 2021-01-28 | End: 2021-01-28

## 2021-01-27 RX ADMIN — Medication 25 MILLIGRAM(S): at 11:44

## 2021-01-27 RX ADMIN — Medication 60 MILLIGRAM(S): at 11:43

## 2021-01-27 RX ADMIN — Medication 20 MILLIGRAM(S): at 08:49

## 2021-01-27 RX ADMIN — Medication 25 MILLIGRAM(S): at 05:49

## 2021-01-27 RX ADMIN — Medication 25 MILLIGRAM(S): at 23:20

## 2021-01-27 RX ADMIN — SEVELAMER CARBONATE 800 MILLIGRAM(S): 2400 POWDER, FOR SUSPENSION ORAL at 21:46

## 2021-01-27 RX ADMIN — Medication 2 MILLIGRAM(S): at 23:27

## 2021-01-27 RX ADMIN — Medication 200 MILLIGRAM(S): at 17:13

## 2021-01-27 RX ADMIN — AMIODARONE HYDROCHLORIDE 200 MILLIGRAM(S): 400 TABLET ORAL at 05:49

## 2021-01-27 RX ADMIN — Medication 25 MILLIGRAM(S): at 00:26

## 2021-01-27 RX ADMIN — Medication 25 MILLIGRAM(S): at 17:13

## 2021-01-27 RX ADMIN — Medication 40 MILLIGRAM(S): at 05:50

## 2021-01-27 RX ADMIN — Medication 650 MILLIGRAM(S): at 05:49

## 2021-01-27 RX ADMIN — Medication 200 MILLIGRAM(S): at 05:49

## 2021-01-27 RX ADMIN — SEVELAMER CARBONATE 800 MILLIGRAM(S): 2400 POWDER, FOR SUSPENSION ORAL at 12:30

## 2021-01-27 RX ADMIN — SEVELAMER CARBONATE 800 MILLIGRAM(S): 2400 POWDER, FOR SUSPENSION ORAL at 05:49

## 2021-01-27 RX ADMIN — Medication 650 MILLIGRAM(S): at 17:12

## 2021-01-27 RX ADMIN — HEPARIN SODIUM 1000 UNIT(S)/HR: 5000 INJECTION INTRAVENOUS; SUBCUTANEOUS at 16:08

## 2021-01-27 RX ADMIN — PANTOPRAZOLE SODIUM 40 MILLIGRAM(S): 20 TABLET, DELAYED RELEASE ORAL at 05:49

## 2021-01-27 NOTE — PROGRESS NOTE ADULT - SUBJECTIVE AND OBJECTIVE BOX
Interval Events: Reviewed  Patient seen and examined at bedside.    Patient is a 65y old  Male who presents with a chief complaint of VATS, decortication, pleurodesis (27 Jan 2021 11:10)      PAST MEDICAL & SURGICAL HISTORY:  BPH (benign prostatic hyperplasia)    Gout    H/O pulmonary hypertension    CHF, chronic    Lymphoma  t cell; Chemotherapy weekly- wednesday    CKD (chronic kidney disease)    Atrial fibrillation    HTN (hypertension)    Elective surgery  rectal surgery    History of cholecystectomy        MEDICATIONS:  Pulmonary:    Antimicrobials:    Anticoagulants:  heparin  Infusion 1000 Unit(s)/Hr IV Continuous <Continuous>    Cardiac:  aMIOdarone    Tablet 200 milliGRAM(s) Oral daily  doxazosin 2 milliGRAM(s) Oral at bedtime  metoprolol tartrate 25 milliGRAM(s) Oral every 6 hours      Allergies    No Known Allergies    Intolerances        Vital Signs Last 24 Hrs  T(C): 36.4 (27 Jan 2021 17:28), Max: 36.7 (27 Jan 2021 01:01)  T(F): 97.6 (27 Jan 2021 17:28), Max: 98.1 (27 Jan 2021 01:01)  HR: 68 (27 Jan 2021 20:40) (62 - 92)  BP: 160/84 (27 Jan 2021 20:40) (138/73 - 167/79)  BP(mean): 114 (27 Jan 2021 20:40) (99 - 115)  RR: 16 (27 Jan 2021 20:40) (16 - 20)  SpO2: 94% (27 Jan 2021 20:40) (92% - 100%)    01-26 @ 07:01 - 01-27 @ 07:00  --------------------------------------------------------  IN: 490.8 mL / OUT: 330 mL / NET: 160.8 mL    01-27 @ 07:01 - 01-27 @ 21:35  --------------------------------------------------------  IN: 520 mL / OUT: 1290 mL / NET: -770 mL          Review of Systems:   •	General: negative  •	Skin/Breast: negative  •	Ophthalmologic: negative  •	ENMT: negative  •	Respiratory and Thorax: negative  •	Cardiovascular: negative  •	Gastrointestinal: negative  •	Genitourinary: negative  •	Musculoskeletal: negative  •	Neurological: negative  •	Psychiatric: negative  •	Hematology/Lymphatics: negative  •	Endocrine: negative  •	Allergic/Immunologic: negative    Physical Exam:   • Constitutional:	Well-developed, well nourished  • Eyes:	EOMI; PERRL; no drainage or redness  • ENMT:	No oral lesions; no gross abnormalities  • Neck	No bruits; no thyromegaly or nodules  • Breasts:	not examined  • Back:	No deformity or limitation of movement  • Respiratory:	Breath Sounds equal & clear to percussion & auscultation, no accessory muscle use  • Cardiovascular:	Regular rate & rhythm, normal S1, S2; no murmurs, gallops or rubs; no S3, S4  • Gastrointestinal:	Soft, non-tender, no hepatosplenomegaly, normal bowel sounds  • Genitourinary:	not examined  • Rectal: not examined  • Extremities:	No cyanosis, clubbing or edema  • Vascular:	Equal and normal pulses (carotid, femoral, dorsalis pedis)  • Neurologica:l	not examined  • Skin:	No lesions; no rash  • Lymph Nodes:	No lymphadedenopathy  • Musculoskeletal:	No joint pain, swelling or deformity; no limitation of movement        LABS:      CBC Full  -  ( 27 Jan 2021 07:06 )  WBC Count : 7.20 K/uL  RBC Count : 3.20 M/uL  Hemoglobin : 9.1 g/dL  Hematocrit : 30.2 %  Platelet Count - Automated : 184 K/uL  Mean Cell Volume : 94.4 fl  Mean Cell Hemoglobin : 28.4 pg  Mean Cell Hemoglobin Concentration : 30.1 gm/dL  Auto Neutrophil # : x  Auto Lymphocyte # : x  Auto Monocyte # : x  Auto Eosinophil # : x  Auto Basophil # : x  Auto Neutrophil % : x  Auto Lymphocyte % : x  Auto Monocyte % : x  Auto Eosinophil % : x  Auto Basophil % : x    01-27    141  |  102  |  45<H>  ----------------------------<  66<L>  3.8   |  22  |  5.94<H>    Ca    8.6      27 Jan 2021 07:04  Phos  6.3     01-26  Mg     1.9     01-27      PT/INR - ( 27 Jan 2021 07:04 )   PT: 14.8 sec;   INR: 1.25          PTT - ( 27 Jan 2021 07:04 )  PTT:38.5 sec    < from: CT Chest No Cont (01.27.21 @ 09:35) >  EXAM:  CT CHEST                          PROCEDURE DATE:  01/27/2021          INTERPRETATION:  CT SCAN OF CHEST    History: Post VATS, decortication, assessment of pleural effusion/hemothorax.    Technique: CT scan of chest performed from lung apices through lung bases. Axial, coronal, and sagittal multiplanar reformatted images were produced. Thin section axial images and axial MIPS were also produced. Intravenous contrast material was not administered, as ordered.    Comparison: 1/5/2021    Findings:    Lungs and large airways and pleura: Interval placement of 3 left-sided chest tubes one traveling posteriorly and superiorly and medially the second traveling anteriorly and third traveling inferiorly and medially. The side ports project within the hemithorax. There has been interval decrease in the left-sided pleural effusion which is now mild in extent and may be partially loculated along the anterior lateral superior aspect of the left hemithorax the largest amount of fluid remains in the posterior recess. There are multiple new foci of gas within the pleural space which may be secondary to the sequela chest tube placement however an empyema cannot be excluded. The pleural fluid demonstrates simple fluid attenuation. No definite pneumothorax is identified. There is left hemithorax volume loss as well as probable passive atelectasis predominantly of the basilar segments of the left lower lobe. Additionally there are groundglass and consolidative opacities with air bronchogramsnoted most prominently within the superior segments of the left lower lobe as well as the lingula. Additionally patchy groundglass opacities are identified within the right upper and right middle lobes. There is a small right-sided pleural effusion with minimal adjacent passive atelectasis. Central airways are patent. May be some minimal residual rounded atelectasis/resolving parenchymal hematoma within the posterior basal segment of the left lower lobe.    Mediastinum and hilar regions: No bulkylymphadenopathy. Mild persistent infiltration of the mediastinal fat planes.    Heart and pericardium:  Persistent cardiomegaly. There is moderate calcification of the aortic leaflets and mitral annulus. There is moderate calcification of the left coronary artery. Trace pericardial effusion.    Vessels:  Persistent dilatation of the main pulmonary artery measuring 4 cm. Pulmonary hypertension cannot be excluded. There is moderate calcified mural plaque formation involving the thoracic and visualized proximal abdominal aorta and major side branches. There is diffuse aneurysmal dilatation of the thoracic aorta measuring 4.2 cm at the aortic root, 4.3 cm and the ascending segment, 3.7 cm in the aortic arch, 3.3 cm the proximal descending, 3 in mid descending and 3 cm at the aortic hiatus. There is moderate coronary artery calcification.    Chest wall and lower neck:  Marked interval improvement in a right anterior chest wall hematoma. Persistent anasarca.    Upper abdomen: No significant interval change with limited evaluation due to the limited field-of-view.    Bones: Unchanged with diffuse increase in attenuation of the axial skeleton and sternum as well as a mild dextroscoliosis of the thoracolumbar junction and moderate degenerativedisc disease of the lower thoracic spine.      Impression:    1. Interval placement of 3 left-sided chest tubes with marked interval decrease in the size of a left-sided pleural effusion which remains small and partially loculated. No hemothorax is identified. New small foci of gas within the pleural space the sequela of chest tube placement.  2. Interval development of groundglass opacities throughout the lingula and left lower lobe as well as patchy opacities within the right upper and right middle lobes. Interval development of a superimposed pneumonia such as Covid 19 cannot be excluded.  3. No hemothorax. Probable progressive resolution of an intraparenchymal hematoma/rounded atelectasis within the left lower lobe. Improvement in aeration of the left lower lobe bronchus.  4. Near complete resolution of a right anterior chest wall hematoma.  5. Additional chronic findings as above.      < end of copied text >                  RADIOLOGY & ADDITIONAL STUDIES (The following images were personally reviewed):  Ramirez:                                     No  Urine output:                       adequate  DVT prophylaxis:                 Yes  Flattus:                                  Yes  Bowel movement:              No

## 2021-01-27 NOTE — CHART NOTE - NSCHARTNOTEFT_GEN_A_CORE
64 y/o male s/p L VATS, RA decortication. Posterior CT removed bedside without complication. CXR pending

## 2021-01-27 NOTE — PROGRESS NOTE ADULT - SUBJECTIVE AND OBJECTIVE BOX
Subjective: KALA   Stepped down to 9La     Allergies    No Known Allergies    Intolerances        MEDICATIONS  (STANDING):  allopurinol 200 milliGRAM(s) Oral two times a day  aMIOdarone    Tablet 200 milliGRAM(s) Oral daily  doxazosin 2 milliGRAM(s) Oral at bedtime  furosemide    Tablet 40 milliGRAM(s) Oral daily  metoprolol tartrate 25 milliGRAM(s) Oral every 6 hours  pantoprazole    Tablet 40 milliGRAM(s) Oral before breakfast  polyethylene glycol 3350 17 Gram(s) Oral daily  potassium chloride    Tablet ER 20 milliEquivalent(s) Oral daily  sevelamer carbonate 800 milliGRAM(s) Oral every 8 hours  sodium bicarbonate 650 milliGRAM(s) Oral two times a day    MEDICATIONS  (PRN):  acetaminophen   Tablet .. 650 milliGRAM(s) Oral every 6 hours PRN Mild Pain (1 - 3)  oxycodone    5 mG/acetaminophen 325 mG 1 Tablet(s) Oral every 6 hours PRN Moderate Pain (4 - 6)      REVIEW OF SYSTEMS:  12 point ROS negative except for that stated in the HPI    PHYSICAL EXAM:  Vitals past 24 Hours: T(C): 36.6 (01-27-21 @ 05:01), Max: 36.7 (01-26-21 @ 14:15)  HR: 66 (01-27-21 @ 08:45) (64 - 75)  BP: 146/75 (01-27-21 @ 08:45) (130/64 - 160/75)  RR: 16 (01-27-21 @ 08:45) (16 - 22)  SpO2: 98% (01-27-21 @ 08:45) (92% - 100%)	    Daily     Daily     GEN: Alert and awake, no acute distress  HEENT: Moist mucous membranes  Neck: No JVD  Cardiovascular: Regular rate and rhythm, +S1 S2. No murmurs, rubs, or gallops appreciated  Respiratory: Lungs clear to auscultation bilaterally  Gastrointestinal:  Soft, non-tender, non-distended, normoactive bowel sounds  Skin: No rashes, No ecchymoses, No cyanosis  Neurologic: Non-focal, alert and oriented x3.   Extremities: No clubbing, cyanosis or edema. Warm, well-perfused extremities.   Vascular: Radial and dorsalis pedis pulses 2+ bilaterally    I&O's Detail    26 Jan 2021 07:01  -  27 Jan 2021 07:00  --------------------------------------------------------  IN:    Heparin: 100 mL    Milrinone: 10.8 mL    Oral Fluid: 380 mL  Total IN: 490.8 mL    OUT:    Chest Tube (mL): 60 mL    Chest Tube (mL): 0 mL    Chest Tube (mL): 20 mL    Voided (mL): 250 mL  Total OUT: 330 mL    Total NET: 160.8 mL            LABS:                        9.1    7.20  )-----------( 184      ( 27 Jan 2021 07:06 )             30.2     01-27    141  |  102  |  45<H>  ----------------------------<  66<L>  3.8   |  22  |  5.94<H>    Ca    8.6      27 Jan 2021 07:04  Phos  6.3     01-26  Mg     1.9     01-27          PT/INR - ( 27 Jan 2021 07:04 )   PT: 14.8 sec;   INR: 1.25          PTT - ( 27 Jan 2021 07:04 )  PTT:38.5 sec    I&O's Summary    26 Jan 2021 07:01  -  27 Jan 2021 07:00  --------------------------------------------------------  IN: 490.8 mL / OUT: 330 mL / NET: 160.8 mL      ASSESSMENT/PLAN:  64 y/o M with a PMHx of HTN, COPD, CKD (stage 4 CKD, has LUE AVF, not on HD yet), T-cell lymphoma (diagnosed 19 years ago, on chemo romidepsin every Wednesday), AF (on Eliquis at home, last dose Saturday), systolic CHF (EF 40-45% 2017), severe pHTN who recently underwent a left chest wall hematoma evacuation on 12/18/2020 secondary to loculated effusion. Hospital course at that time was complicated by AF with RVR and thoracic surgery consulted at that time for possible future intervention. Of note, recent ECHO during previous admission revealed severe mitral regurgitation. Thoracic surgery followed up with patient as an outpatient once discharged from the hospital and deemed patient a good surgical candidate. Feeling better,Sitting in chair, Denies any CP, palpitations SOB, wheezing, abd pain, n/v/d/c, fevers or chills.     ejection fraction of 65-70%.    ASSESSMENT/PLAN:    CHF - EF is now improved (from 45% on last admission Dec 2020 to now 65%)   - fluid overloaded on exam. recommend IV diuresis on fixed schedule (either IV pushes, or continuous infusion) for lower extremity edema   - recommend pulm consult for pulmonary hypertension likely 2/2 to pulmonary etiology such as COPD. This value may improve after diuresis   - if PASP on repeat ECHO still elevated after diuresis consider pulm consult for other etiologies of pHTN  -renal following: would recommend starting diuresis for aggressive volume removal since he has the fistula     Belén Dykes MD   Cardiology fellow  Subjective: KALA   Stepped down to 9La   Appears well   Denies any SOB  Lower extremity still swollen and edematous     Allergies    No Known Allergies    Intolerances        MEDICATIONS  (STANDING):  allopurinol 200 milliGRAM(s) Oral two times a day  aMIOdarone    Tablet 200 milliGRAM(s) Oral daily  doxazosin 2 milliGRAM(s) Oral at bedtime  furosemide    Tablet 40 milliGRAM(s) Oral daily  metoprolol tartrate 25 milliGRAM(s) Oral every 6 hours  pantoprazole    Tablet 40 milliGRAM(s) Oral before breakfast  polyethylene glycol 3350 17 Gram(s) Oral daily  potassium chloride    Tablet ER 20 milliEquivalent(s) Oral daily  sevelamer carbonate 800 milliGRAM(s) Oral every 8 hours  sodium bicarbonate 650 milliGRAM(s) Oral two times a day    MEDICATIONS  (PRN):  acetaminophen   Tablet .. 650 milliGRAM(s) Oral every 6 hours PRN Mild Pain (1 - 3)  oxycodone    5 mG/acetaminophen 325 mG 1 Tablet(s) Oral every 6 hours PRN Moderate Pain (4 - 6)      REVIEW OF SYSTEMS:  12 point ROS negative except for that stated in the HPI    PHYSICAL EXAM:  Vitals past 24 Hours: T(C): 36.6 (01-27-21 @ 05:01), Max: 36.7 (01-26-21 @ 14:15)  HR: 66 (01-27-21 @ 08:45) (64 - 75)  BP: 146/75 (01-27-21 @ 08:45) (130/64 - 160/75)  RR: 16 (01-27-21 @ 08:45) (16 - 22)  SpO2: 98% (01-27-21 @ 08:45) (92% - 100%)	    Daily     Daily     GEN: Alert and awake, no acute distress  HEENT: Moist mucous membranes  Neck: No JVD  Cardiovascular: Regular rate and rhythm, +S1 S2. No murmurs, rubs, or gallops appreciated  Respiratory: Lungs clear to auscultation bilaterally  Gastrointestinal:  Soft, non-tender, non-distended, normoactive bowel sounds  Skin: No rashes, No ecchymoses, No cyanosis  Neurologic: Non-focal, alert and oriented x3.   Extremities: No clubbing, cyanosis or edema. Warm, well-perfused extremities.   Vascular: Radial and dorsalis pedis pulses 2+ bilaterally    I&O's Detail    26 Jan 2021 07:01  -  27 Jan 2021 07:00  --------------------------------------------------------  IN:    Heparin: 100 mL    Milrinone: 10.8 mL    Oral Fluid: 380 mL  Total IN: 490.8 mL    OUT:    Chest Tube (mL): 60 mL    Chest Tube (mL): 0 mL    Chest Tube (mL): 20 mL    Voided (mL): 250 mL  Total OUT: 330 mL    Total NET: 160.8 mL            LABS:                        9.1    7.20  )-----------( 184      ( 27 Jan 2021 07:06 )             30.2     01-27    141  |  102  |  45<H>  ----------------------------<  66<L>  3.8   |  22  |  5.94<H>    Ca    8.6      27 Jan 2021 07:04  Phos  6.3     01-26  Mg     1.9     01-27          PT/INR - ( 27 Jan 2021 07:04 )   PT: 14.8 sec;   INR: 1.25          PTT - ( 27 Jan 2021 07:04 )  PTT:38.5 sec    I&O's Summary    26 Jan 2021 07:01  -  27 Jan 2021 07:00  --------------------------------------------------------  IN: 490.8 mL / OUT: 330 mL / NET: 160.8 mL      ASSESSMENT/PLAN:  66 y/o M with a PMHx of HTN, COPD, CKD (stage 4 CKD, has LUE AVF, not on HD yet), T-cell lymphoma (diagnosed 19 years ago, on chemo romidepsin every Wednesday), AF (on Eliquis at home, last dose Saturday), systolic CHF (EF 40-45% 2017), severe pHTN who recently underwent a left chest wall hematoma evacuation on 12/18/2020 secondary to loculated effusion. Hospital course at that time was complicated by AF with RVR and thoracic surgery consulted at that time for possible future intervention. Of note, recent ECHO during previous admission revealed severe mitral regurgitation. Thoracic surgery followed up with patient as an outpatient once discharged from the hospital and deemed patient a good surgical candidate. Feeling better,Sitting in chair, Denies any CP, palpitations SOB, wheezing, abd pain, n/v/d/c, fevers or chills.     ejection fraction of 65-70%.    ASSESSMENT/PLAN:    CHF - EF is now improved (from 45% on last admission Dec 2020 to now 65%)   - fluid overloaded on exam. recommend IV diuresis on fixed schedule (either IV pushes, or continuous infusion) for lower extremity edema   - if PASP on repeat ECHO still elevated after diuresis consider pulm consult for other etiologies of pHTN  -renal following: would recommend starting diuresis for aggressive volume removal since he has the fistula     Discussed with primary team     Belén Dykes MD   Cardiology fellow

## 2021-01-27 NOTE — CHART NOTE - NSCHARTNOTEFT_GEN_A_CORE
Admitting Diagnosis:   Patient is a 65y old  Male who presents with a chief complaint of VATS, decortication, pleurodesis (27 Jan 2021 11:10)      PAST MEDICAL & SURGICAL HISTORY:  BPH (benign prostatic hyperplasia)    Gout    H/O pulmonary hypertension    CHF, chronic    Lymphoma  t cell; Chemotherapy weekly- wednesday    CKD (chronic kidney disease)    Atrial fibrillation    HTN (hypertension)    Elective surgery  rectal surgery    History of cholecystectomy        Current Nutrition Order: DASH + Renal + Nepro with Carb Steady TID (1275 kcal, 57.3g protein, 516 mL free H2O)       PO Intake: Good (%) [ x  ]  Fair (50-75%) [  x ] Poor (<25%) [  ]    GI Issues: + BM 1/26, no n/v    Pain: denies     Skin Integrity:    niels score 20  3+ edema to B/L feet   L VATS site / chest wall     Labs:   01-27    141  |  102  |  45<H>  ----------------------------<  66<L>  3.8   |  22  |  5.94<H>    Ca    8.6      27 Jan 2021 07:04  Phos  6.3     01-26  Mg     1.9     01-27      CAPILLARY BLOOD GLUCOSE      POCT Blood Glucose.: 89 mg/dL (26 Jan 2021 21:24)  POCT Blood Glucose.: 87 mg/dL (26 Jan 2021 16:41)      Medications:  MEDICATIONS  (STANDING):  allopurinol 200 milliGRAM(s) Oral two times a day  aMIOdarone    Tablet 200 milliGRAM(s) Oral daily  doxazosin 2 milliGRAM(s) Oral at bedtime  heparin  Infusion. 1000 Unit(s)/Hr (10 mL/Hr) IV Continuous <Continuous>  metoprolol tartrate 25 milliGRAM(s) Oral every 6 hours  pantoprazole    Tablet 40 milliGRAM(s) Oral before breakfast  sevelamer carbonate 800 milliGRAM(s) Oral every 8 hours  sodium bicarbonate 650 milliGRAM(s) Oral two times a day    MEDICATIONS  (PRN):  acetaminophen   Tablet .. 650 milliGRAM(s) Oral every 6 hours PRN Mild Pain (1 - 3)  oxycodone    5 mG/acetaminophen 325 mG 1 Tablet(s) Oral every 6 hours PRN Moderate Pain (4 - 6)    Admitting Anthropometrics:  Ht: 5'11" ABW 72.2kg, IBW 78kg, 92% IBW, ht 71", BMI 22.2    Weight: 72.2kg     Weight Change: no new wts     Nutrition Focused Physical Exam: Completed [   ]  Not Pertinent [ x  ]    Estimated energy needs:   ABW used for calculations as pt between % of IBW.   Nutrient needs based on [facility] standards of care for maintenance in adults, adjusted for CHF, age, pre-op needs.  1805-2166kcals(25-30kcal/kg)  86-101gm or protein(1.2-1.4 g/kg)  Fluids per team.    Subjective:   65M with PMH of HTN, COPD, CKD (with AVF but not on HD yet), T-cell lymphoma on chemo every Wednesday, AF on Eliquis at home, CHF (EF 40-45%), Pulm HTN, recent L chest wall hematoma evacuation, severe MR, who was admitted for scheduled VATS on 1/19 for recurrent pleural effusion, and was started on heparin drip. Pt had a recent fall on concrete last week when he tried to stop his dogs fighting, and since then, he noticed a small mass in the L anterior leg. General surgery was consulted for evaluation and possible intervention, no surgical intervention to take place. CT scan of LLE demonstrated severe cellulitis with small seroma superficial to the distal aspect of the patellar tendon.  Pt is now POD3 s/p VATS decortication of loculated pleural effusion, pleurodesis on 1/22. Postoperatively with fluid overload. Vascular surgery consulted for evaluation of AVF for possible HD. Pt has a radiocephalic fistula that was placed on 6/12/20, however has not needed to initiate HD yet. Per nephrology no indication for HD at this time.     Pt moved to 9L from 9E for further management/ monitoring and is currently ordered for DASH/TLC w renal restriction diet, just added for Nepro with Carb Steady TID (1275 kcal, 57.3g protein, 516 mL free H2O). Pt reports good appetite and tolerating diet well. Pt denies difficulty chewing/swallowing at this time. Education provided on initial and follow up visit, appeared very receptive. Notable Labs: BUN:45 (H), Cr: 5.94 (H, K, Phos, Mg, Na all WNL. Please see below for full nutritional recommendations. Again would remove renal restriction as pt is not on HD to optimize nutrition. RD to monitor and f/u per protocol.     Previous Nutrition Diagnosis: Increased Nutrient Needs kcal/pro r/t hypermetabolic state AEB pre/post-op demand, CHF.     Active [ x  ]  Resolved [   ]    If resolved, new PES:     Goal: meet >/= 75% EER via medically tolerated route.    Recommendations:  1. Encourage PO intake through day, liberalize diet to DASH as pt is not on HD does not need renal restriction   2. Trend wts biweekly.  3. Monitor lytes and replete prn. Monitor POC q6hrs, renal indices  4. Reinforce ed   5. Pain and bowel regimen per team.     Education: no new needs     Risk Level: High [   ] Moderate [ x  ] Low [   ]

## 2021-01-27 NOTE — PROGRESS NOTE ADULT - SUBJECTIVE AND OBJECTIVE BOX
Patient discussed on morning rounds with Dr. Harmon    Operation / Date: L VATS RA decortication for loculated hemothorax 1/22     SUBJECTIVE ASSESSMENT:  65y Male seen at bedside. Patient states that he had 4 BM last night which were soft but not diarrhea. Patient found on HFNC this AM sating 100% but breathing comfortably and switched over to 3L NC. Patient using his IS and states that he is comfortable with no acute complaints.     Vital Signs Last 24 Hrs  T(C): 36.2 (27 Jan 2021 10:26), Max: 36.7 (26 Jan 2021 14:15)  T(F): 97.1 (27 Jan 2021 10:26), Max: 98.1 (27 Jan 2021 01:01)  HR: 76 (27 Jan 2021 10:49) (66 - 76)  BP: 146/75 (27 Jan 2021 08:45) (134/77 - 160/75)  BP(mean): 101 (27 Jan 2021 08:45) (97 - 111)  RR: 16 (27 Jan 2021 10:49) (16 - 21)  SpO2: 95% (27 Jan 2021 10:49) (92% - 100%)  I&O's Detail    26 Jan 2021 07:01  -  27 Jan 2021 07:00  --------------------------------------------------------  IN:    Heparin: 100 mL    Milrinone: 10.8 mL    Oral Fluid: 380 mL  Total IN: 490.8 mL    OUT:    Chest Tube (mL): 60 mL    Chest Tube (mL): 0 mL    Chest Tube (mL): 20 mL    Voided (mL): 250 mL  Total OUT: 330 mL    Total NET: 160.8 mL      27 Jan 2021 07:01  -  27 Jan 2021 11:10  --------------------------------------------------------  IN:  Total IN: 0 mL    OUT:    Chest Tube (mL): 0 mL    Chest Tube (mL): 0 mL    Chest Tube (mL): 0 mL    Voided (mL): 200 mL  Total OUT: 200 mL    Total NET: -200 m    CHEST TUBE:  Yes AIR LEAKS: No. Suction x3   ROMARIO DRAIN:  No  EPICARDIAL WIRES: No  TIE DOWNS: Yes  BACK: No    Physical Exam  CONSTITUTIONAL: Well appearing in NAD assessed sitting comfortably at bedside   NEURO: A&OX3. No focal deficits noted, moving bilateral upper and lower extremities                    CV: RRR, no murmurs, rubs, gallops  RESPIRATORY: Clear to auscultation bilateral posterior lung fields, no wheezes, rales, rhonchi, 3 CTs on suction, no leak   GI: +BS, NT/ND  MUSCULOSKELETAL: Bilateral LE edema, no calf tenderness. Full strength and ROM bilateral upper and lower extremities   VASCULAR: Bilateral distal pulses 2+  INCISIONS: L VATS incisions clean, dry, intact, CT dressing  not saturated, blow hole covered with guaze    LABS:                        9.1    7.20  )-----------( 184      ( 27 Jan 2021 07:06 )             30.2       COUMADIN:  No    PT/INR - ( 27 Jan 2021 07:04 )   PT: 14.8 sec;   INR: 1.25          PTT - ( 27 Jan 2021 07:04 )  PTT:38.5 sec    01-27    141  |  102  |  45<H>  ----------------------------<  66<L>  3.8   |  22  |  5.94<H>    Ca    8.6      27 Jan 2021 07:04  Phos  6.3     01-26  Mg     1.9     01-27    MEDICATIONS  (STANDING):  allopurinol 200 milliGRAM(s) Oral two times a day  aMIOdarone    Tablet 200 milliGRAM(s) Oral daily  doxazosin 2 milliGRAM(s) Oral at bedtime  furosemide    Tablet 40 milliGRAM(s) Oral daily  metoprolol tartrate 25 milliGRAM(s) Oral every 6 hours  pantoprazole    Tablet 40 milliGRAM(s) Oral before breakfast  polyethylene glycol 3350 17 Gram(s) Oral daily  potassium chloride    Tablet ER 20 milliEquivalent(s) Oral daily  sevelamer carbonate 800 milliGRAM(s) Oral every 8 hours  sodium bicarbonate 650 milliGRAM(s) Oral two times a day    MEDICATIONS  (PRN):  acetaminophen   Tablet .. 650 milliGRAM(s) Oral every 6 hours PRN Mild Pain (1 - 3)  oxycodone    5 mG/acetaminophen 325 mG 1 Tablet(s) Oral every 6 hours PRN Moderate Pain (4 - 6)      RADIOLOGY & ADDITIONAL TESTS:  CT Chest 1/27  Impression:  1. Interval placement of 3 left-sided chest tubes with marked interval decrease in the size of a left-sided pleural effusion which remains small and partially loculated. No hemothorax is identified. New small foci of gas within the pleural space the sequela of chest tube placement.  2. Interval development of groundglass opacities throughout the lingula and left lower lobe as well as patchy opacities within the right upper and right middle lobes. Interval development of a superimposed pneumonia such as Covid 19 cannot be excluded.  3. No hemothorax. Probable progressive resolution of an intraparenchymal hematoma/rounded atelectasis within the left lower lobe. Improvement in aeration of the left lower lobe bronchus.  4. Near complete resolution of a right anterior chest wall hematoma.  5. Additional chronic findings as above.      A/P:    66 y/o M with a PMHx of HTN, COPD, CKD (stage 4 CKD, has LUE AVF, not on HD yet), T-cell lymphoma (diagnosed 19 years ago, on chemo romidepsin every Wednesday), AF (on Eliquis), systolic CHF (EF 40-45%), severe pHTN who recently underwent a left chest wall hematoma evacuation on 12/18/2020 secondary to loculated effusion. Hospital course at that time was complicated by AF with RVR and thoracic surgery consulted at that time for possible future intervention. Of note, recent ECHO during previous admission revealed severe mitral regurgitation. Thoracic surgery followed up with patient as an outpatient once discharged from the hospital and he was deemed a good surgical candidate. On 1/18/21 patient [facility] for pre-operative optimization with heparin gtt prior to OR. On 1/22 he underwent an uncomplicated L VATS, RA decortication. Intraop findings significant for loculated hemothorax. He received 3UPRBC in the OR and post operatively was brought to the CTICU stable and intubated. He was extubated POD1 and required primacor for pulmonary HTN and renal perfusion. Cardiology and nephrology were consulted. POD2 a right sided pigtail was placed for pleural effusion which drained 800 cc. He required alternating BiPAP and HFNC. POD4 patient was weaned to NC, primacor was weaned, and right pigtail was removed. Patient was transferred to the floor on POD4. Heparin gtt continued overnight with therapeutic PTT and patient developed bleeding around one of the CT with heparin gtt turned off. CT x3 to wall suction this AM and CT chest non contrast performed. Diuresis changed to IV lasix and Pulmonary consult pending for pulmonary HTN and COPD.     Neurovascular:   - No delirium. Pain well controlled with current regimen.  -Continue tylenol, percocet PRN    Cardiovascular:   -Hemodynamically stable. HR controlled (62-75)  - Hx AF on eliquis at home, heparin currently on hold for bleeding this AM. continue amiodarone 200 mg daily   - Hx of HTN, BP controlled (130//121), continue doxazosin 2 mg, metoprolol 25 mg q6 hours   - systolic CHF, HF team following, continue to appreciate recs     Respiratory:   - POD5 s/p L VATS, RA decortication for loculated hemothorax  - 3 CTs in place, no air leak, on wall suction  - CT chest non contrast performed this AM with improved left sided effusion  - 02 Sat = 98% on NC, post op hypoxia requiring BiPAP and HFNC, resolved. patient taken off HFNC this AM  - Hx pulmonary HTN, required primacor post op, now discontinued - pending pulmonary consult  -If on oxygen wean to RA from for O2 Sat > 93%.  -Encourage C+DB and Use of IS 10x / hr while awake.      GI:   - Stable.  -Continue GI PPX with protonix  -PO Diet  - 4 BM overnight, mirilax stopped. will continue to monitor     Renal / :  - BUN/Cr 45/5.94 slightly up, hx CKD, has AV fistula but never had HD, renal following   - Continue lasix 40 daily given this AM with 20 IV lasix, 60 IV lasix to give at 1PM renvela 800 q8 hours, NaBicarb 650 BID  -Continue to monitor renal function.  -Monitor I/O's.  - Replete electrolytes PRN    Endocrine:    - No known hx diabetes or thyroid disease  - continue allopurinol     Hematologic:  -H/H stable at 9.1/30.3 with no obvious signs of bleeding  - heparin gtt for AF when ok to restart per Dr. Harmon  -Coagulation Panel.    ID:  -Pt remains afebrile with no elevation in WBC or signs of infection  -Continue to monitor CBC  -Observe for SIRS/Sepsis Syndrome.    Prophylaxis:  -DVT prophylaxis with heparin gtt  -SCD's    Disposition:  -Home when medically appropriate pending CT management

## 2021-01-27 NOTE — PROGRESS NOTE ADULT - ASSESSMENT
66 y/o M with a PMHx of HTN, COPD, CKD (stage 4 CKD, has LUE AVF, not on HD yet), T-cell lymphoma (diagnosed 19 years ago, on chemo romidepsin every Wednesday), AF (on Eliquis at home, last dose Saturday), systolic CHF (EF 40-45% 2017), severe pHTN who is S/p  VATS for recurrent pleural effusion 1/22. Renal consulted for CKD management.    Stable CKD5  S/p LUE AVF creation in June 2020  No indication for HD at this time    Hypervolaemic- can resume PO home med torsemide 20mg daily  Electrolytes, bicarb acceptable- on 650mg BID  BP: controlled on lopressor 25q6  Anaemia- repeat iron panel  BMD: check phos daily with labs; on phos binder renvela 800TID    Daily weights, strict I&Os, renally dose meds

## 2021-01-27 NOTE — PROGRESS NOTE ADULT - SUBJECTIVE AND OBJECTIVE BOX
Patient is a 65y Male seen and evaluated at bedside. NAD, CKD5 with mature fistula. no indication for HD at this time.    Meds:    acetaminophen   Tablet .. 650 every 6 hours PRN  allopurinol 200 two times a day  aMIOdarone    Tablet 200 daily  doxazosin 2 at bedtime  furosemide    Tablet 40 daily  metoprolol tartrate 25 every 6 hours  oxycodone    5 mG/acetaminophen 325 mG 1 every 6 hours PRN  pantoprazole    Tablet 40 before breakfast  polyethylene glycol 3350 17 daily  potassium chloride    Tablet ER 20 daily  sevelamer carbonate 800 every 8 hours  sodium bicarbonate 650 two times a day      T(C): , Max: 36.7 (01-26-21 @ 14:15)  T(F): , Max: 98.1 (01-27-21 @ 01:01)  HR: 66 (01-27-21 @ 08:45)  BP: 146/75 (01-27-21 @ 08:45)  BP(mean): 101 (01-27-21 @ 08:45)  RR: 16 (01-27-21 @ 08:45)  SpO2: 98% (01-27-21 @ 08:45)  Wt(kg): --    01-26 @ 07:01  -  01-27 @ 07:00  --------------------------------------------------------  IN: 490.8 mL / OUT: 330 mL / NET: 160.8 mL      Review of Systems:  RESPIRATORY: No shortness of breath, cough  CARDIOVASCULAR: No Chest pain  MUSCULOSKELETAL: +leg swelling    PHYSICAL EXAM:  GENERAL: Alert, awake, oriented x3 on RA  CHEST/LUNG: CTA sounds, no rales/rhonchi, 3 chest tubes Left  HEART: Regular rate and rhythm, no murmur  ABDOMEN: Soft, nontender, non distended  EXTREMITIES: pitting pedal oedema  ACCESS: LUE AVF w/bruit and thrill       LABS:                        9.1    7.20  )-----------( 184      ( 27 Jan 2021 07:06 )             30.2     01-27    141  |  102  |  45<H>  ----------------------------<  66<L>  3.8   |  22  |  5.94<H>    Ca    8.6      27 Jan 2021 07:04  Phos  6.3     01-26  Mg     1.9     01-27        PT/INR - ( 27 Jan 2021 07:04 )   PT: 14.8 sec;   INR: 1.25          PTT - ( 27 Jan 2021 07:04 )  PTT:38.5 sec          RADIOLOGY & ADDITIONAL STUDIES:

## 2021-01-28 ENCOUNTER — APPOINTMENT (OUTPATIENT)
Dept: THORACIC SURGERY | Facility: HOSPITAL | Age: 66
End: 2021-01-28

## 2021-01-28 ENCOUNTER — TRANSCRIPTION ENCOUNTER (OUTPATIENT)
Age: 66
End: 2021-01-28

## 2021-01-28 LAB
ANION GAP SERPL CALC-SCNC: 18 MMOL/L — HIGH (ref 5–17)
APTT BLD: 37.6 SEC — HIGH (ref 27.5–35.5)
APTT BLD: 46.6 SEC — HIGH (ref 27.5–35.5)
BUN SERPL-MCNC: 46 MG/DL — HIGH (ref 7–23)
CALCIUM SERPL-MCNC: 9.4 MG/DL — SIGNIFICANT CHANGE UP (ref 8.4–10.5)
CHLORIDE SERPL-SCNC: 102 MMOL/L — SIGNIFICANT CHANGE UP (ref 96–108)
CO2 SERPL-SCNC: 21 MMOL/L — LOW (ref 22–31)
CREAT SERPL-MCNC: 5.7 MG/DL — HIGH (ref 0.5–1.3)
GLUCOSE BLDC GLUCOMTR-MCNC: 84 MG/DL — SIGNIFICANT CHANGE UP (ref 70–99)
GLUCOSE SERPL-MCNC: 69 MG/DL — LOW (ref 70–99)
HCT VFR BLD CALC: 32 % — LOW (ref 39–50)
HGB BLD-MCNC: 9.8 G/DL — LOW (ref 13–17)
INR BLD: 1.2 — HIGH (ref 0.88–1.16)
MAGNESIUM SERPL-MCNC: 2.2 MG/DL — SIGNIFICANT CHANGE UP (ref 1.6–2.6)
MCHC RBC-ENTMCNC: 28.7 PG — SIGNIFICANT CHANGE UP (ref 27–34)
MCHC RBC-ENTMCNC: 30.6 GM/DL — LOW (ref 32–36)
MCV RBC AUTO: 93.6 FL — SIGNIFICANT CHANGE UP (ref 80–100)
NRBC # BLD: 0 /100 WBCS — SIGNIFICANT CHANGE UP (ref 0–0)
PLATELET # BLD AUTO: 212 K/UL — SIGNIFICANT CHANGE UP (ref 150–400)
POTASSIUM SERPL-MCNC: SIGNIFICANT CHANGE UP (ref 3.5–5.3)
POTASSIUM SERPL-SCNC: SIGNIFICANT CHANGE UP (ref 3.5–5.3)
PROTHROM AB SERPL-ACNC: 14.3 SEC — HIGH (ref 10.6–13.6)
RBC # BLD: 3.42 M/UL — LOW (ref 4.2–5.8)
RBC # FLD: 18.3 % — HIGH (ref 10.3–14.5)
SODIUM SERPL-SCNC: 141 MMOL/L — SIGNIFICANT CHANGE UP (ref 135–145)
WBC # BLD: 7.34 K/UL — SIGNIFICANT CHANGE UP (ref 3.8–10.5)
WBC # FLD AUTO: 7.34 K/UL — SIGNIFICANT CHANGE UP (ref 3.8–10.5)

## 2021-01-28 PROCEDURE — 99232 SBSQ HOSP IP/OBS MODERATE 35: CPT | Mod: GC

## 2021-01-28 PROCEDURE — 71045 X-RAY EXAM CHEST 1 VIEW: CPT | Mod: 26

## 2021-01-28 PROCEDURE — 31622 DX BRONCHOSCOPE/WASH: CPT | Mod: 78

## 2021-01-28 PROCEDURE — 99232 SBSQ HOSP IP/OBS MODERATE 35: CPT

## 2021-01-28 RX ORDER — HEPARIN SODIUM 5000 [USP'U]/ML
1100 INJECTION INTRAVENOUS; SUBCUTANEOUS
Qty: 25000 | Refills: 0 | Status: DISCONTINUED | OUTPATIENT
Start: 2021-01-28 | End: 2021-01-29

## 2021-01-28 RX ORDER — LIDOCAINE 4 G/100G
10 CREAM TOPICAL ONCE
Refills: 0 | Status: COMPLETED | OUTPATIENT
Start: 2021-01-28 | End: 2021-01-28

## 2021-01-28 RX ORDER — SENNA PLUS 8.6 MG/1
2 TABLET ORAL AT BEDTIME
Refills: 0 | Status: DISCONTINUED | OUTPATIENT
Start: 2021-01-28 | End: 2021-01-30

## 2021-01-28 RX ORDER — POLYETHYLENE GLYCOL 3350 17 G/17G
17 POWDER, FOR SOLUTION ORAL DAILY
Refills: 0 | Status: DISCONTINUED | OUTPATIENT
Start: 2021-01-28 | End: 2021-01-30

## 2021-01-28 RX ORDER — NIFEDIPINE 30 MG
30 TABLET, EXTENDED RELEASE 24 HR ORAL DAILY
Refills: 0 | Status: DISCONTINUED | OUTPATIENT
Start: 2021-01-28 | End: 2021-01-30

## 2021-01-28 RX ORDER — FUROSEMIDE 40 MG
60 TABLET ORAL EVERY 12 HOURS
Refills: 0 | Status: DISCONTINUED | OUTPATIENT
Start: 2021-01-28 | End: 2021-01-29

## 2021-01-28 RX ADMIN — Medication 25 MILLIGRAM(S): at 12:00

## 2021-01-28 RX ADMIN — Medication 2 MILLIGRAM(S): at 23:06

## 2021-01-28 RX ADMIN — Medication 25 MILLIGRAM(S): at 05:41

## 2021-01-28 RX ADMIN — SEVELAMER CARBONATE 800 MILLIGRAM(S): 2400 POWDER, FOR SUSPENSION ORAL at 23:06

## 2021-01-28 RX ADMIN — Medication 30 MILLIGRAM(S): at 12:00

## 2021-01-28 RX ADMIN — HEPARIN SODIUM 10 UNIT(S)/HR: 5000 INJECTION INTRAVENOUS; SUBCUTANEOUS at 15:39

## 2021-01-28 RX ADMIN — Medication 650 MILLIGRAM(S): at 05:42

## 2021-01-28 RX ADMIN — AMIODARONE HYDROCHLORIDE 200 MILLIGRAM(S): 400 TABLET ORAL at 05:42

## 2021-01-28 RX ADMIN — LIDOCAINE 10 MILLILITER(S): 4 CREAM TOPICAL at 11:00

## 2021-01-28 RX ADMIN — Medication 25 MILLIGRAM(S): at 23:06

## 2021-01-28 RX ADMIN — Medication 25 MILLIGRAM(S): at 17:34

## 2021-01-28 RX ADMIN — Medication 200 MILLIGRAM(S): at 17:35

## 2021-01-28 RX ADMIN — Medication 650 MILLIGRAM(S): at 17:44

## 2021-01-28 RX ADMIN — PANTOPRAZOLE SODIUM 40 MILLIGRAM(S): 20 TABLET, DELAYED RELEASE ORAL at 05:42

## 2021-01-28 RX ADMIN — SEVELAMER CARBONATE 800 MILLIGRAM(S): 2400 POWDER, FOR SUSPENSION ORAL at 05:42

## 2021-01-28 RX ADMIN — Medication 60 MILLIGRAM(S): at 17:53

## 2021-01-28 RX ADMIN — SEVELAMER CARBONATE 800 MILLIGRAM(S): 2400 POWDER, FOR SUSPENSION ORAL at 12:00

## 2021-01-28 RX ADMIN — Medication 200 MILLIGRAM(S): at 05:42

## 2021-01-28 NOTE — PROGRESS NOTE ADULT - SUBJECTIVE AND OBJECTIVE BOX
Patient is a 65y Male seen and evaluated at bedside. Symptomatically improved, LE oedema reduced. Plan for bronch today. Can resume PO torsemide 40mg.    Meds:    acetaminophen   Tablet .. 650 every 6 hours PRN  allopurinol 200 two times a day  aMIOdarone    Tablet 200 daily  doxazosin 2 at bedtime  furosemide   Injectable 60 every 12 hours  heparin  Infusion 1000 <Continuous>  lidocaine 2% Viscous 10 once  metoprolol tartrate 25 every 6 hours  NIFEdipine XL 30 daily  oxycodone    5 mG/acetaminophen 325 mG 1 every 6 hours PRN  pantoprazole    Tablet 40 before breakfast  sevelamer carbonate 800 every 8 hours  sodium bicarbonate 650 two times a day      T(C): , Max: 36.6 (01-28-21 @ 01:01)  T(F): , Max: 97.9 (01-28-21 @ 05:01)  HR: 71 (01-28-21 @ 05:45)  BP: 158/87 (01-28-21 @ 05:45)  BP(mean): 116 (01-28-21 @ 05:45)  RR: 18 (01-28-21 @ 05:45)  SpO2: 95% (01-28-21 @ 05:45)  Wt(kg): --    01-27 @ 07:01  -  01-28 @ 07:00  --------------------------------------------------------  IN: 840 mL / OUT: 1925 mL / NET: -1085 mL          Review of Systems:  CONSTITUTIONAL: No fever or chills, No fatigue or tiredness  RESPIRATORY: No shortness of breath, cough, hemoptysis  CARDIOVASCULAR: No chest pain or shortness of breath  GASTROINTESTINAL: No abdominal or flank pain, No nausea or vomiting, No diarrhea  GENITOURINARY: No dysuria or urinary burning, No difficulty passing urine, No hematuria  NEUROLOGICAL: No headaches or blurred vision  SKIN: No skin rashes   MUSCULOSKELETAL: No arthralgia, No leg edema, No muscle pain      PHYSICAL EXAM:  GENERAL: well-developed, well nourished, alert, no acute distress at present  NECK: supple, No JVD  CHEST/LUNG: Clear to auscultation bilaterally  HEART: normal S1S2, RRR  ABDOMEN: Soft, Nontender, +BS, No flank tenderness bilateral  EXTREMITIES: No clubbing, cyanosis, or edema   NEUROLOGY: AAO x3, no focal neurological deficit  ACCESS: good thrill and bruit appreciated      LABS:                        9.8    7.34  )-----------( 212      ( 28 Jan 2021 07:24 )             32.0     01-28    141  |  102  |  46<H>  ----------------------------<  69<L>  See Note   |  21<L>  |  5.70<H>    Ca    9.4      28 Jan 2021 07:24  Mg     2.2     01-28        PT/INR - ( 28 Jan 2021 07:25 )   PT: 14.3 sec;   INR: 1.20          PTT - ( 28 Jan 2021 07:25 )  PTT:37.6 sec          RADIOLOGY & ADDITIONAL STUDIES:           Patient is a 65y Male seen and evaluated at bedside. Symptomatically improved, LE oedema reduced. Plan for bronch today. Continue maintenance diuretic. Can resume PO torsemide 20mg.    Meds:    acetaminophen   Tablet .. 650 every 6 hours PRN  allopurinol 200 two times a day  aMIOdarone    Tablet 200 daily  doxazosin 2 at bedtime  furosemide   Injectable 60 every 12 hours  heparin  Infusion 1000 <Continuous>  lidocaine 2% Viscous 10 once  metoprolol tartrate 25 every 6 hours  NIFEdipine XL 30 daily  oxycodone    5 mG/acetaminophen 325 mG 1 every 6 hours PRN  pantoprazole    Tablet 40 before breakfast  sevelamer carbonate 800 every 8 hours  sodium bicarbonate 650 two times a day      T(C): , Max: 36.6 (01-28-21 @ 01:01)  T(F): , Max: 97.9 (01-28-21 @ 05:01)  HR: 71 (01-28-21 @ 05:45)  BP: 158/87 (01-28-21 @ 05:45)  BP(mean): 116 (01-28-21 @ 05:45)  RR: 18 (01-28-21 @ 05:45)  SpO2: 95% (01-28-21 @ 05:45)  Wt(kg): --    01-27 @ 07:01  -  01-28 @ 07:00  --------------------------------------------------------  IN: 840 mL / OUT: 1925 mL / NET: -1085 mL      Review of Systems:  RESPIRATORY: No shortness of breath, cough  CARDIOVASCULAR: No Chest pain  MUSCULOSKELETAL: +leg swelling- less tense per pt    PHYSICAL EXAM:  GENERAL: Alert, awake, oriented x3 on RA  CHEST/LUNG: CTA sounds, no rales/rhonchi  HEART: Regular rate and rhythm, no murmur  ABDOMEN: Soft, nontender, non distended  EXTREMITIES: pitting pedal oedema 2+  ACCESS: LUE AVF w/bruit and thrill       LABS:                        9.8    7.34  )-----------( 212      ( 28 Jan 2021 07:24 )             32.0     01-28    141  |  102  |  46<H>  ----------------------------<  69<L>  See Note   |  21<L>  |  5.70<H>    Ca    9.4      28 Jan 2021 07:24  Mg     2.2     01-28        PT/INR - ( 28 Jan 2021 07:25 )   PT: 14.3 sec;   INR: 1.20          PTT - ( 28 Jan 2021 07:25 )  PTT:37.6 sec          RADIOLOGY & ADDITIONAL STUDIES:

## 2021-01-28 NOTE — DISCHARGE NOTE PROVIDER - NSDCCPTREATMENT_GEN_ALL_CORE_FT
PRINCIPAL PROCEDURE  Procedure: Lung decortication  Findings and Treatment: L VATS, RA decortication

## 2021-01-28 NOTE — DISCHARGE NOTE PROVIDER - CARE PROVIDER_API CALL
Orlando Harmon)  Surgery  [address] [address]  Phone: [phone]  Fax: [phone]  Follow Up Time:    Orlando Harmon)  Surgery  [address] [address]  Phone: [phone]  Fax: [phone]  Scheduled Appointment: 02/08/2021 09:45 AM   Orlando Harmon)  Surgery  [address] [address]  Phone: [phone]  Fax: [phone]  Scheduled Appointment: 02/08/2021 09:45 AM    RICK CATALAN  Cardiology  Phone: [phone]  Fax: [phone]  Scheduled Appointment: 02/04/2021 02:00 PM    Ann-Marie Denton)  Internal Medicine; Nephrology  [address] [place] [address]  Phone: [phone]  Fax: [phone]  Follow Up Time: 2 weeks

## 2021-01-28 NOTE — CHART NOTE - NSCHARTNOTEFT_GEN_A_CORE
Patient seen and examined at bedside.  Case discussed with Dr. Harmon. Minimal output from anterior CT.  No air leak appreciated.  Per Dr. Harmon's request, CT removed and tie down secured without incident.  Occlusive DSD placed.  Patient tolerated procedure well.  Chest Xray pending.

## 2021-01-28 NOTE — DISCHARGE NOTE PROVIDER - NSDCMRMEDTOKEN_GEN_ALL_CORE_FT
allopurinol: 200 milligram(s) orally 2 times a day  apixaban 2.5 mg oral tablet: 1 tab(s) orally 2 times a day  calcitriol 0.25 mcg oral capsule: 1 cap(s) orally once a day  doxazosin 2 mg oral tablet: 2 tab(s) orally 2 times a day  Pacerone 200 mg oral tablet: 1 tab(s) orally once a day   sodium bicarbonate 650 mg oral tablet: 1 tab(s) orally 2 times a day  Toprol- mg oral tablet, extended release: 1 tab(s) orally once a day  torsemide 20 mg oral tablet: 1  orally every other day (at bedtime)   allopurinol: 200 milligram(s) orally 2 times a day  apixaban 2.5 mg oral tablet: 1 tab(s) orally 2 times a day  calcitriol 0.25 mcg oral capsule: 1 cap(s) orally once a day  doxazosin 2 mg oral tablet: 2 tab(s) orally 2 times a day  oxycodone-acetaminophen 5 mg-325 mg oral tablet: 1 tab(s) orally every 6 hours, As needed, Moderate Pain (4 - 6) MDD:4  Pacerone 200 mg oral tablet: 1 tab(s) orally once a day   sodium bicarbonate 650 mg oral tablet: 1 tab(s) orally 2 times a day  Toprol- mg oral tablet, extended release: 1 tab(s) orally once a day  torsemide 20 mg oral tablet: 1  orally every other day (at bedtime)   calcitriol 0.25 mcg oral capsule: 1 cap(s) orally once a day  oxycodone-acetaminophen 5 mg-325 mg oral tablet: 1 tab(s) orally every 6 hours, As needed, Moderate Pain (4 - 6) MDD:4  Toprol- mg oral tablet, extended release: 1 tab(s) orally once a day   acetaminophen 325 mg oral tablet: 2 tab(s) orally every 6 hours, As needed, Mild Pain (1 - 3)  allopurinol 100 mg oral tablet: 2 tab(s) orally 2 times a day  amiodarone 200 mg oral tablet: 1 tab(s) orally once a day  apixaban 2.5 mg oral tablet: 1 tab(s) orally 2 times a day  bisacodyl 5 mg oral delayed release tablet: 1 tab(s) orally every 12 hours, As needed, Constipation  calcitriol 0.25 mcg oral capsule: 1 cap(s) orally once a day  doxazosin 2 mg oral tablet: 1 tab(s) orally once a day (at bedtime)  metoprolol tartrate 50 mg oral tablet: 1 tab(s) orally every 12 hours   NIFEdipine 30 mg oral tablet, extended release: 1 tab(s) orally once a day  oxycodone-acetaminophen 5 mg-325 mg oral tablet: 1 tab(s) orally every 6 hours, As needed, Moderate Pain (4 - 6) MDD:4  pantoprazole 40 mg oral delayed release tablet: 1 tab(s) orally once a day (before a meal)  polyethylene glycol 3350 oral powder for reconstitution: 17 gram(s) orally once a day  senna oral tablet: 2 tab(s) orally once a day (at bedtime)  sevelamer carbonate 800 mg oral tablet: 1 tab(s) orally every 8 hours  sodium bicarbonate 650 mg oral tablet: 1 tab(s) orally 2 times a day  torsemide 20 mg oral tablet: 1 tab(s) orally once a day

## 2021-01-28 NOTE — PROGRESS NOTE ADULT - SUBJECTIVE AND OBJECTIVE BOX
Interval Events: Reviewed  Patient seen and examined at bedside.    Patient is a 65y old  Male who presents with a chief complaint of VATS, decortication, pleurodesis (28 Jan 2021 13:24)    he is doing well  PAST MEDICAL & SURGICAL HISTORY:  BPH (benign prostatic hyperplasia)    Gout    H/O pulmonary hypertension    CHF, chronic    Lymphoma  t cell; Chemotherapy weekly- wednesday    CKD (chronic kidney disease)    Atrial fibrillation    HTN (hypertension)    Elective surgery  rectal surgery    History of cholecystectomy        MEDICATIONS:  Pulmonary:    Antimicrobials:    Anticoagulants:  heparin  Infusion 1000 Unit(s)/Hr IV Continuous <Continuous>    Cardiac:  aMIOdarone    Tablet 200 milliGRAM(s) Oral daily  doxazosin 2 milliGRAM(s) Oral at bedtime  furosemide   Injectable 60 milliGRAM(s) IV Push every 12 hours  metoprolol tartrate 25 milliGRAM(s) Oral every 6 hours  NIFEdipine XL 30 milliGRAM(s) Oral daily      Allergies    No Known Allergies    Intolerances        Vital Signs Last 24 Hrs  T(C): 36.9 (28 Jan 2021 21:07), Max: 36.9 (28 Jan 2021 21:07)  T(F): 98.5 (28 Jan 2021 21:07), Max: 98.5 (28 Jan 2021 21:07)  HR: 72 (28 Jan 2021 17:36) (66 - 78)  BP: 147/77 (28 Jan 2021 17:36) (147/77 - 179/92)  BP(mean): 104 (28 Jan 2021 17:36) (103 - 126)  RR: 18 (28 Jan 2021 17:36) (16 - 18)  SpO2: 93% (28 Jan 2021 17:36) (93% - 98%)    01-27 @ 07:01 - 01-28 @ 07:00  --------------------------------------------------------  IN: 840 mL / OUT: 1925 mL / NET: -1085 mL    01-28 @ 07:01 - 01-28 @ 21:11  --------------------------------------------------------  IN: 0 mL / OUT: 200 mL / NET: -200 mL          Review of Systems:   •	General: negative  •	Skin/Breast: negative  •	Ophthalmologic: negative  •	ENMT: negative  •	Respiratory and Thorax: negative  •	Cardiovascular: negative  •	Gastrointestinal: negative  •	Genitourinary: negative  •	Musculoskeletal: negative  •	Neurological: negative  •	Psychiatric: negative  •	Hematology/Lymphatics: negative  •	Endocrine: negative  •	Allergic/Immunologic: negative    Physical Exam:   • Constitutional:	Well-developed, well nourished  • Eyes:	EOMI; PERRL; no drainage or redness  • ENMT:	No oral lesions; no gross abnormalities  • Neck	No bruits; no thyromegaly or nodules  • Breasts:	not examined  • Back:	No deformity or limitation of movement  • Respiratory:	Breath Sounds equal & clear to percussion & auscultation, no accessory muscle use  • Cardiovascular:	Regular rate & rhythm, normal S1, S2; no murmurs, gallops or rubs; no S3, S4  • Gastrointestinal:	Soft, non-tender, no hepatosplenomegaly, normal bowel sounds  • Genitourinary:	not examined  • Rectal: not examined  • Extremities:	No cyanosis, clubbing or edema  • Vascular:	Equal and normal pulses (carotid, femoral, dorsalis pedis)  • Neurologica:l	not examined  • Skin:	No lesions; no rash  • Lymph Nodes:	No lymphadedenopathy  • Musculoskeletal:	No joint pain, swelling or deformity; no limitation of movement        LABS:      CBC Full  -  ( 28 Jan 2021 07:24 )  WBC Count : 7.34 K/uL  RBC Count : 3.42 M/uL  Hemoglobin : 9.8 g/dL  Hematocrit : 32.0 %  Platelet Count - Automated : 212 K/uL  Mean Cell Volume : 93.6 fl  Mean Cell Hemoglobin : 28.7 pg  Mean Cell Hemoglobin Concentration : 30.6 gm/dL  Auto Neutrophil # : x  Auto Lymphocyte # : x  Auto Monocyte # : x  Auto Eosinophil # : x  Auto Basophil # : x  Auto Neutrophil % : x  Auto Lymphocyte % : x  Auto Monocyte % : x  Auto Eosinophil % : x  Auto Basophil % : x    01-28    141  |  102  |  46<H>  ----------------------------<  69<L>  See Note   |  21<L>  |  5.70<H>    Ca    9.4      28 Jan 2021 07:24  Mg     2.2     01-28      PT/INR - ( 28 Jan 2021 07:25 )   PT: 14.3 sec;   INR: 1.20          PTT - ( 28 Jan 2021 20:30 )  PTT:46.6 sec          < from: Xray Chest 1 View- PORTABLE-Urgent (Xray Chest 1 View- PORTABLE-Urgent .) (01.28.21 @ 12:37) >    EXAM:  XR CHEST PORTABLE URGENT 1V                          PROCEDURE DATE:  01/28/2021          INTERPRETATION:  Chest x-ray    Indication: Chest tube removal    2 AP views of the chest are compared to the prior study of the same day. Previously seen left chest tube with the tip overlying the left lung apex has been removed. Unchanged tube overlying the left lung base. Unchanged residual left pleural effusion and underlying left basilar consolidation. No pneumothorax.    IMPRESSION: Removal ofone left chest tube. No pneumothorax.    < end of copied text >            RADIOLOGY & ADDITIONAL STUDIES (The following images were personally reviewed):  Ramirez:                                     No  Urine output:                       adequate  DVT prophylaxis:                 Yes  Flattus:                                  Yes  Bowel movement:              No

## 2021-01-28 NOTE — DISCHARGE NOTE PROVIDER - NSDCFUSCHEDAPPT_GEN_ALL_CORE_FT
MARCI VEGA ; 02/01/2021 ; NPP Cardio Vasc [address]  MARCI VEGA ; 03/01/2021 ; NPP HeartVasc [address]   MARCI VEGA ; 03/01/2021 ; NPP Nephro [address]   MARCI VEGA ; 03/04/2021 ; NPP Surg Vasc [address]  MARCI VEGA ; 02/01/2021 ; NPP Cardio Vasc [address] [facility]  MARCI VEGA ; 02/08/2021 ; NPP Thorsurg [address]  MARCI VEGA ; 03/01/2021 ; NPP HeartVasc [address]   MARCI VEGA ; 03/01/2021 ; NPP Nephro [address]  MARCI VEGA ; 03/04/2021 ; NPP Surg Vasc [address] [place] [address] MARCI VEGA ; 02/01/2021 ; NPP Cardio Vasc [address] [facility]  MARCI VEGA ; 02/04/2021 ; NPP Cardio Vasc [address]  MARCI VGEA ; 02/08/2021 ; NPP Thorsurg [address]  MARCI VEGA ; 03/01/2021 ; NPP HeartVasc [address]   MARCI VEGA ; 03/01/2021 ; NPP Nephro [address]  MARCI VEGA ; 03/04/2021 ; NPP Surg Vasc [address]

## 2021-01-28 NOTE — PROGRESS NOTE ADULT - ASSESSMENT
64 y/o M with a PMHx of HTN, COPD, CKD (stage 4 CKD, has LUE AVF, not on HD yet), T-cell lymphoma (diagnosed 19 years ago, on chemo romidepsin every Wednesday), AF (on Eliquis), systolic CHF (EF 40-45%), severe pHTN who recently underwent a left chest wall hematoma evacuation on 12/18/2020 secondary to loculated effusion. Hospital course at that time was complicated by AF with RVR and thoracic surgery consulted at that time for possible future intervention. Of note, recent ECHO during previous admission revealed severe mitral regurgitation. Thoracic surgery followed up with patient as an outpatient once discharged from the hospital and he was deemed a good surgical candidate. On 1/18/21 patient presented to [facility] for pre-operative optimization with heparin gtt prior to OR. On 1/22 he underwent an uncomplicated L VATS, RA decortication. Intraop findings significant for loculated hemothorax. He received 3UPRBC in the OR and post operatively was brought to the CTICU stable and intubated. He was extubated POD1 and required primacor for pulmonary HTN and renal perfusion. Cardiology and nephrology were consulted. POD2 a right sided pigtail was placed for pleural effusion which drained 800 cc. He required alternating BiPAP and HFNC. POD4 patient was weaned to NC, primacor was weaned, and right pigtail was removed. Patient was transferred to the floor on POD4. Heparin gtt continued overnight with therapeutic PTT and patient developed bleeding around one of the CT with heparin gtt turned off. CT x3 to wall suction this AM and CT chest non contrast performed. Diuresis changed to IV lasix and Pulmonary consult pending for pulmonary HTN and COPD.     Neurovascular:   - No delirium. Pain well controlled with current regimen.  -Continue tylenol, percocet PRN    Cardiovascular:   -Hemodynamically stable. HR controlled (62-75)  - Hx AF on eliquis at home, heparin currently on hold for CT removal, will restart 4hrs post removal  continue amiodarone 200 mg daily   - Hx of HTN, BP), continue doxazosin 2 mg, metoprolol 25 mg q6 hours, add nifedipine 30mg XL  - systolic CHF, HF team following, continue to appreciate recs     Respiratory:   - POD5 s/p L VATS, RA decortication for loculated hemothorax  - 1 CTs in place, no air leak, on water seal  - CT chest non contrast performed this AM with improved left sided effusion  - 02 Sat = 98% on NC, post op hypoxia requiring BiPAP and HFNC, resolved. patient taken off HFNC yesterday  - Hx pulmonary HTN, required primacor post op, now discontinued   -If on oxygen wean to RA from for O2 Sat > 93%.  -Encourage C+DB and Use of IS 10x / hr while awake.      GI:   - Stable.  -Continue GI PPX with protonix  -PO Diet  - 4 BM overnight, mirilax stopped. will continue to monitor     Renal / :  - BUN/Cr 46/5.1slightly up, hx CKD, has AV fistula but never had HD, renal following   - Continue increase lasix to 60mg BID, renvela 800 q8 hours, NaBicarb 650 BID  -Continue to monitor renal function.  -Monitor I/O's.  - Replete electrolytes PRN    Endocrine:    - No known hx diabetes or thyroid disease  - continue allopurinol     Hematologic:  -H/H stable at 9.8/32 with no obvious signs of bleeding  - heparin gtt for AF   -Coagulation Panel.    ID:  -Pt remains afebrile with no elevation in WBC or signs of infection  -Continue to monitor CBC  -Observe for SIRS/Sepsis Syndrome.    Prophylaxis:  -DVT prophylaxis with heparin gtt  -SCD's    Disposition:  -Home when medically appropriate pending CT management

## 2021-01-28 NOTE — DISCHARGE NOTE PROVIDER - NSDCFUADDAPPT_GEN_ALL_CORE_FT
-Please follow up with Dr. Harmon on ___.  The office is located at [facility], [facility], 4th floor. Call us with any questions  #[phone].    -Walk daily as tolerated and use your incentive spirometer every hour.    -No driving or strenuous activity/exercise until cleared by your surgeon.    -Gently clean your incisions with anti-bacterial soap and water, pat  dry.  You may leave them open to air.    -Call your doctor if you have shortness of breath, chest pain not  relieved by pain medication, dizziness, fever >101.5, or increased  redness or drainage from incisions.  -Please follow up with Dr. Harmon on 2/8/21 @9:45am.  The office is located at [facility], [facility], 4th floor. Call us with any questions  #[phone].     -Please follow up with Dr. Harmon on 2/8/21 @9:45am.  The office is located at [facility], [facility], 4th floor. Call us with any questions  #[phone].    -Please follow up with NP Hilaria Martines (heart failure) on 2/4/21 @2pm.  Her office is located at [facility], 9th floor.    -Please schedule an appointment with Dr. Guadalupe (nephrologist) within 1-2 weeks.

## 2021-01-28 NOTE — PROGRESS NOTE ADULT - ASSESSMENT
66 y/o M with a PMHx of HTN, COPD, CKD (stage 4 CKD, has LUE AVF, not on HD yet), T-cell lymphoma (diagnosed 19 years ago, on chemo romidepsin every Wednesday), AF (on Eliquis at home, last dose Saturday), systolic CHF (EF 40-45% 2017), severe pHTN who is S/p  VATS for recurrent pleural effusion 1/22. Renal consulted for CKD management.    Stable CKD5  S/p LUE AVF creation in June 2020  No indication for HD at this time    Hypervolaemic- clinical improvement with lasix 60 IVP yesterday; can resume PO home med torsemide 20mg daily  Electrolytes, bicarb acceptable- on 650mg BID  BP: lopressor 25q6; added nifedipine 30 daily; diuretic should help with BP management  BMD: on phos binder renvela 800TID    Daily weights, strict I&Os, renally dose meds

## 2021-01-28 NOTE — PROGRESS NOTE ADULT - SUBJECTIVE AND OBJECTIVE BOX
Subjective:   1/28 two remaining chest tubes/awake bronchoscopy this am (heparin held)  - CT chest new patchy infiltrates R middle and upper lobes/ground glass appearing infiltrates L. lung cannot exclude Covid pneumonia    Improved diuresis with IV lasix 60.     MEDICATIONS  (STANDING):  allopurinol 200 milliGRAM(s) Oral two times a day  aMIOdarone    Tablet 200 milliGRAM(s) Oral daily  doxazosin 2 milliGRAM(s) Oral at bedtime  furosemide   Injectable 60 milliGRAM(s) IV Push daily  heparin  Infusion 1000 Unit(s)/Hr (10 mL/Hr) IV Continuous <Continuous>  lidocaine 2% Viscous 10 milliLiter(s) Swish and Spit once  metoprolol tartrate 25 milliGRAM(s) Oral every 6 hours  pantoprazole    Tablet 40 milliGRAM(s) Oral before breakfast  sevelamer carbonate 800 milliGRAM(s) Oral every 8 hours  sodium bicarbonate 650 milliGRAM(s) Oral two times a day    MEDICATIONS  (PRN):  acetaminophen   Tablet .. 650 milliGRAM(s) Oral every 6 hours PRN Mild Pain (1 - 3)  oxycodone    5 mG/acetaminophen 325 mG 1 Tablet(s) Oral every 6 hours PRN Moderate Pain (4 - 6)      REVIEW OF SYSTEMS:  12 point ROS negative except for that stated in the HPI    PHYSICAL EXAM:  Vitals past 24 Hours: T(C): 36.6 (01-28-21 @ 05:01), Max: 36.6 (01-28-21 @ 01:01)  HR: 71 (01-28-21 @ 05:45) (62 - 92)  BP: 158/87 (01-28-21 @ 05:45) (138/73 - 174/81)  RR: 18 (01-28-21 @ 05:45) (16 - 18)  SpO2: 95% (01-28-21 @ 05:45) (93% - 100%)	    Daily     Daily     GEN: Alert and awake, no acute distress  HEENT: Moist mucous membranes  Neck: No JVD  Cardiovascular: Regular rate and rhythm, +S1 S2. No murmurs, rubs, or gallops appreciated  Respiratory: Lungs clear to auscultation bilaterally  Gastrointestinal:  Soft, non-tender, non-distended, normoactive bowel sounds  Skin: No rashes, No ecchymoses, No cyanosis  Neurologic: Non-focal, alert and oriented x3.   Extremities: No clubbing, cyanosis or edema. Warm, well-perfused extremities.   Vascular: Radial and dorsalis pedis pulses 2+ bilaterally    I&O's Detail    27 Jan 2021 07:01  -  28 Jan 2021 07:00  --------------------------------------------------------  IN:    Heparin: 50 mL    Heparin Infusion: 10 mL    Oral Fluid: 780 mL  Total IN: 840 mL    OUT:    Chest Tube (mL): 20 mL    Chest Tube (mL): 65 mL    Chest Tube (mL): 0 mL    Voided (mL): 1840 mL  Total OUT: 1925 mL    Total NET: -1085 mL            LABS:                        9.8    7.34  )-----------( 212      ( 28 Jan 2021 07:24 )             32.0     01-28    141  |  102  |  46<H>  ----------------------------<  69<L>  See Note   |  21<L>  |  5.70<H>    Ca    9.4      28 Jan 2021 07:24  Mg     2.2     01-28          PT/INR - ( 28 Jan 2021 07:25 )   PT: 14.3 sec;   INR: 1.20          PTT - ( 28 Jan 2021 07:25 )  PTT:37.6 sec    I&O's Summary    27 Jan 2021 07:01  -  28 Jan 2021 07:00  --------------------------------------------------------  IN: 840 mL / OUT: 1925 mL / NET: -1085 mL    ASSESSMENT/PLAN:  64 y/o M with a PMHx of HTN, COPD, CKD (stage 4 CKD, has LUE AVF, not on HD yet), T-cell lymphoma (diagnosed 19 years ago, on chemo romidepsin every Wednesday), AF (on Eliquis at home, last dose Saturday), systolic CHF (EF 40-45% 2017), severe pHTN who recently underwent a left chest wall hematoma evacuation on 12/18/2020 secondary to loculated effusion. Hospital course at that time was complicated by AF with RVR and thoracic surgery consulted at that time for possible future intervention. Of note, recent ECHO during previous admission revealed severe mitral regurgitation. Thoracic surgery followed up with patient as an outpatient once discharged from the hospital and deemed patient a good surgical candidate. Feeling better,Sitting in chair, Denies any CP, palpitations SOB, wheezing, abd pain, n/v/d/c, fevers or chills.     ejection fraction of 65-70%.    ASSESSMENT/PLAN:    CHF - EF is now improved (from 45% on last admission Dec 2020 to now 65%)   - fluid overloaded on exam.   - Continue IV diuresis: recommend 80 IV BID   - if PASP on repeat ECHO still elevated after diuresis consider pulm consult for other etiologies of pHTN  -renal following: would recommend starting diuresis for aggressive volume removal since he has the fistula       HTN: recommend adding Coreg 6.25 BID     Discussed with primary team     Belén Dykes MD   Cardiology fellow  Subjective:   1/28 two remaining chest tubes/awake bronchoscopy this am (heparin held)  - CT chest new patchy infiltrates R middle and upper lobes/ground glass appearing infiltrates L. lung cannot exclude Covid pneumonia    Improved diuresis with IV lasix 60.     MEDICATIONS  (STANDING):  allopurinol 200 milliGRAM(s) Oral two times a day  aMIOdarone    Tablet 200 milliGRAM(s) Oral daily  doxazosin 2 milliGRAM(s) Oral at bedtime  furosemide   Injectable 60 milliGRAM(s) IV Push daily  heparin  Infusion 1000 Unit(s)/Hr (10 mL/Hr) IV Continuous <Continuous>  lidocaine 2% Viscous 10 milliLiter(s) Swish and Spit once  metoprolol tartrate 25 milliGRAM(s) Oral every 6 hours  pantoprazole    Tablet 40 milliGRAM(s) Oral before breakfast  sevelamer carbonate 800 milliGRAM(s) Oral every 8 hours  sodium bicarbonate 650 milliGRAM(s) Oral two times a day    MEDICATIONS  (PRN):  acetaminophen   Tablet .. 650 milliGRAM(s) Oral every 6 hours PRN Mild Pain (1 - 3)  oxycodone    5 mG/acetaminophen 325 mG 1 Tablet(s) Oral every 6 hours PRN Moderate Pain (4 - 6)      REVIEW OF SYSTEMS:  12 point ROS negative except for that stated in the HPI    PHYSICAL EXAM:  Vitals past 24 Hours: T(C): 36.6 (01-28-21 @ 05:01), Max: 36.6 (01-28-21 @ 01:01)  HR: 71 (01-28-21 @ 05:45) (62 - 92)  BP: 158/87 (01-28-21 @ 05:45) (138/73 - 174/81)  RR: 18 (01-28-21 @ 05:45) (16 - 18)  SpO2: 95% (01-28-21 @ 05:45) (93% - 100%)	    Daily     Daily     GEN: Alert and awake, no acute distress  HEENT: Moist mucous membranes  Neck: No JVD  Cardiovascular: Regular rate and rhythm, +S1 S2. No murmurs, rubs, or gallops appreciated  Respiratory: Lungs clear to auscultation bilaterally  Gastrointestinal:  Soft, non-tender, non-distended, normoactive bowel sounds  Skin: No rashes, No ecchymoses, No cyanosis  Neurologic: Non-focal, alert and oriented x3.   Extremities: No clubbing, cyanosis or edema. Warm, well-perfused extremities.   Vascular: Radial and dorsalis pedis pulses 2+ bilaterally    I&O's Detail    27 Jan 2021 07:01  -  28 Jan 2021 07:00  --------------------------------------------------------  IN:    Heparin: 50 mL    Heparin Infusion: 10 mL    Oral Fluid: 780 mL  Total IN: 840 mL    OUT:    Chest Tube (mL): 20 mL    Chest Tube (mL): 65 mL    Chest Tube (mL): 0 mL    Voided (mL): 1840 mL  Total OUT: 1925 mL    Total NET: -1085 mL            LABS:                        9.8    7.34  )-----------( 212      ( 28 Jan 2021 07:24 )             32.0     01-28    141  |  102  |  46<H>  ----------------------------<  69<L>  See Note   |  21<L>  |  5.70<H>    Ca    9.4      28 Jan 2021 07:24  Mg     2.2     01-28          PT/INR - ( 28 Jan 2021 07:25 )   PT: 14.3 sec;   INR: 1.20          PTT - ( 28 Jan 2021 07:25 )  PTT:37.6 sec    I&O's Summary    27 Jan 2021 07:01  -  28 Jan 2021 07:00  --------------------------------------------------------  IN: 840 mL / OUT: 1925 mL / NET: -1085 mL    ASSESSMENT/PLAN:  66 y/o M with a PMHx of HTN, COPD, CKD (stage 4 CKD, has LUE AVF, not on HD yet), T-cell lymphoma (diagnosed 19 years ago, on chemo romidepsin every Wednesday), AF (on Eliquis at home, last dose Saturday), systolic CHF (EF 40-45% 2017), severe pHTN who recently underwent a left chest wall hematoma evacuation on 12/18/2020 secondary to loculated effusion. Hospital course at that time was complicated by AF with RVR and thoracic surgery consulted at that time for possible future intervention. Of note, recent ECHO during previous admission revealed severe mitral regurgitation. Thoracic surgery followed up with patient as an outpatient once discharged from the hospital and deemed patient a good surgical candidate. Feeling better,Sitting in chair, Denies any CP, palpitations SOB, wheezing, abd pain, n/v/d/c, fevers or chills.     ejection fraction of 65-70%.    ASSESSMENT/PLAN:    CHF - EF is now improved (from 45% on last admission Dec 2020 to now 65%)   - fluid overloaded on exam.   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

## 2021-01-28 NOTE — DISCHARGE NOTE PROVIDER - CARE PROVIDERS DIRECT ADDRESSES
,DirectAddress_Unknown ,DirectAddress_Unknown,DirectAddress_Unknown,[email or web address]

## 2021-01-28 NOTE — DISCHARGE NOTE PROVIDER - HOSPITAL COURSE
66 y/o M with a PMHx of HTN, COPD, CKD (stage 4 CKD, has LUE AVF, not on HD yet), T-cell lymphoma (diagnosed 19 years ago, on chemo romidepsin every Wednesday), AF (on Eliquis), systolic CHF (EF 40-45%), severe pHTN, severe MR, who recently underwent a left chest wall hematoma evacuation on 12/18/2020 secondary to loculated effusion. Hospital course at that time was complicated by AF with RVR and thoracic surgery consulted at that time for surgical intervention of trapped lung. He followed up as an outpatient after discharge and deemed a good surgical candidate for lung decortication. On 1/18/21 patient presented to [facility] for pre-operative optimization with heparin gtt prior to OR. Upon admission pt noted to have erythema and swelling to his L knee, CT scan demonstrated cellulitis, operation was deferred. Ortho, Gen Surg and ID were consulted, pt with no drainable collection and was medically managed with resolution of infection. Nephrology was consulted for assistance in managing CKD stage 4. On 1/22 he underwent an uncomplicated L VATS, RA decortication with blow hole placement for subcutaneous emphysema. Intraop findings significant for loculated hemothorax. He received 3UPRBC in the OR and post operatively was brought to the CTICU stable and intubated with 3 chest tubes in place. He was extubated POD1 and required primacor for pulmonary HTN and renal perfusion. On POD 2 a right sided pigtail was placed for pleural effusion which drained 800cc and ultimately removed on POD 4. Cardiology was consulted for management of pulmonary hypertension and CHF, recommended IV diuresis. He required alternating BiPAP and HFNC which was weaned to NC. Primacor was weaned off on POD 4 and she was transferred to the stepdown unit. Heparin gtt was started for AC but was held for bleeding around the CT site. A CT chest noncon was preformed showing post surgical changes, no concern for hemothorax and heparin gtt resumed. On POD 5 first CT was removed. Second chest tube subsequently removed POD 6. POD 7.....................  35 minutes was spent with the patient reviewing the discharge material including medications, follow up appointments, recovery, concerning symptoms, and how to contact their health care providers if they have questions 64 y/o M with a PMHx of HTN, COPD, CKD (stage 4 CKD, has LUE AVF, not on HD yet), T-cell lymphoma (diagnosed 19 years ago, on chemo romidepsin every Wednesday), AF (on Eliquis), systolic CHF (EF 40-45%), severe pHTN, severe MR, who recently underwent a left chest wall hematoma evacuation on 12/18/2020 secondary to loculated effusion. Hospital course at that time was complicated by AF with RVR and thoracic surgery consulted at that time for surgical intervention of trapped lung. He followed up as an outpatient after discharge and deemed a good surgical candidate for lung decortication. On 1/18/21 patient presented to [facility] for pre-operative optimization with heparin gtt prior to OR. Upon admission pt noted to have erythema and swelling to his L knee, CT scan demonstrated cellulitis, operation was deferred. Ortho, Gen Surg and ID were consulted, pt with no drainable collection and was medically managed with resolution of infection. Nephrology was consulted for assistance in managing CKD stage 4. On 1/22 he underwent an uncomplicated L VATS, RA decortication with blow hole placement for subcutaneous emphysema. Intraop findings significant for loculated hemothorax. He received 3UPRBC in the OR and post operatively was brought to the CTICU stable and intubated with 3 chest tubes in place. He was extubated POD1 and required primacor for pulmonary HTN and renal perfusion. On POD 2 a right sided pigtail was placed for pleural effusion which drained 800cc and ultimately removed on POD 4. Cardiology was consulted for management of pulmonary hypertension and CHF, recommended IV diuresis. He required alternating BiPAP and HFNC which was weaned to NC. Primacor was weaned off on POD 4 and she was transferred to the stepdown unit. Heparin gtt was started for AC but was held for bleeding around the CT site. A CT chest noncon was preformed showing post surgical changes, no concern for hemothorax and heparin gtt resumed. On POD 5 first CT was removed. Bronch with no abnormal findings, Second chest tube subsequently removed and added nifedipine Xl 30mg for BP control on POD 6. POD 7 third chest tube removed, post chest tube removal xray showed no pneumothorax.      35 minutes was spent with the patient reviewing the discharge material including medications, follow up appointments, recovery, concerning symptoms, and how to contact their health care providers if they have questions 64 y/o M with a PMHx of HTN, COPD, CKD (stage 4 CKD, has LUE AVF, not on HD yet), T-cell lymphoma (diagnosed 19 years ago, on chemo romidepsin every Wednesday), AF (on Eliquis), systolic CHF (EF 40-45%), severe pHTN, severe MR, who recently underwent a left chest wall hematoma evacuation on 12/18/2020 secondary to loculated effusion. Hospital course at that time was complicated by AF with RVR and thoracic surgery consulted at that time for surgical intervention of trapped lung. He followed up as an outpatient after discharge and deemed a good surgical candidate for lung decortication. On 1/18/21 patient presented to [facility] for pre-operative optimization with heparin gtt prior to OR. Upon admission pt noted to have erythema and swelling to his L knee, CT scan demonstrated cellulitis, operation was deferred. Ortho, Gen Surg and ID were consulted, pt with no drainable collection and was medically managed with resolution of infection. Nephrology was consulted for assistance in managing CKD stage 4. On 1/22 he underwent an uncomplicated L VATS, RA decortication with blow hole placement for subcutaneous emphysema. Intraop findings significant for loculated hemothorax. He received 3UPRBC in the OR and post operatively was brought to the CTICU stable and intubated with 3 chest tubes in place. He was extubated POD1 and required primacor for pulmonary HTN and renal perfusion. On POD 2 a right sided pigtail was placed for pleural effusion which drained 800cc and ultimately removed on POD 4. Cardiology was consulted for management of pulmonary hypertension and CHF, recommended IV diuresis. He required alternating BiPAP and HFNC which was weaned to NC. Primacor was weaned off on POD 4 and she was transferred to the stepdown unit. Heparin gtt was started for AC but was held for bleeding around the CT site. A CT chest noncon was preformed showing post surgical changes, no concern for hemothorax and heparin gtt resumed. On POD 5 first CT was removed. Bronch with no abnormal findings, Second chest tube subsequently removed and added nifedipine Xl 30mg for BP control on POD 6. POD 7 third chest tube removed, post chest tube removal xray showed no pneumothorax. Patient doing well today, POD8 and medically appropriate for discharge. Patient ambulating on his own without supplemental oxygen, tolerating PO diet and moving his bowels appropriately. Patient medically stable for discharge.      35 minutes was spent with the patient reviewing the discharge material including medications, follow up appointments, recovery, concerning symptoms, and how to contact their health care providers if they have questions

## 2021-01-28 NOTE — DISCHARGE NOTE PROVIDER - PROVIDER TOKENS
PROVIDER:[TOKEN:[[id number]]] PROVIDER:[TOKEN:[[id number]],SCHEDULEDAPPT:[02/08/2021],SCHEDULEDAPPTTIME:[09:45 AM]] PROVIDER:[TOKEN:[[id number]],SCHEDULEDAPPT:[02/08/2021],SCHEDULEDAPPTTIME:[09:45 AM]],PROVIDER:[TOKEN:[[id number]],SCHEDULEDAPPT:[02/04/2021],SCHEDULEDAPPTTIME:[02:00 PM]],PROVIDER:[TOKEN:[[id number]],FOLLOWUP:[2 weeks]]

## 2021-01-28 NOTE — PROGRESS NOTE ADULT - SUBJECTIVE AND OBJECTIVE BOX
Patient discussed on morning rounds with Dr. Harmon    Operation / Date: 1/22/21 Bronchoscopy, L VATs, RA Decortication    SUBJECTIVE ASSESSMENT:  65y Male seen and examined at bedside.  Patient with no complaints today.  Bronch with morning with no suspicious findings.  Has not had a BM since surgery.  Able to pull 750cc on IS.  Denies chest pain, shortness of breath, nausea, vomiting.        Vital Signs Last 24 Hrs  T(C): 36.1 (28 Jan 2021 09:00), Max: 36.6 (28 Jan 2021 01:01)  T(F): 96.9 (28 Jan 2021 09:00), Max: 97.9 (28 Jan 2021 05:01)  HR: 68 (28 Jan 2021 12:00) (62 - 92)  BP: 179/92 (28 Jan 2021 12:00) (153/73 - 179/92)  BP(mean): 126 (28 Jan 2021 12:00) (102 - 126)  RR: 18 (28 Jan 2021 12:00) (16 - 18)  SpO2: 98% (28 Jan 2021 12:00) (93% - 100%)  I&O's Detail    27 Jan 2021 07:01  -  28 Jan 2021 07:00  --------------------------------------------------------  IN:    Heparin: 50 mL    Heparin Infusion: 10 mL    Oral Fluid: 780 mL  Total IN: 840 mL    OUT:    Chest Tube (mL): 20 mL    Chest Tube (mL): 65 mL    Chest Tube (mL): 0 mL    Voided (mL): 1840 mL  Total OUT: 1925 mL    Total NET: -1085 mL          CHEST TUBE:  Yes AIR LEAKS: No. H2O SEAL.   ROMARIO DRAIN:  No.  EPICARDIAL WIRES: No.  TIE DOWNS: Yes.  BACK: No.    PHYSICAL EXAM:    CONSTITUTIONAL: Well appearing in NAD assessed sitting comfortably at bedside   NEURO: A&OX3. No focal deficits noted, moving bilateral upper and lower extremities                    CV: RRR, no murmurs, rubs, gallops  RESPIRATORY: Clear to auscultation bilateral posterior lung fields, no wheezes, rales, rhonchi, 3 CTs on suction, no leak   GI: +BS, NT/ND  MUSCULOSKELETAL: Bilateral LE edema, no calf tenderness. Full strength and ROM bilateral upper and lower extremities   VASCULAR: Bilateral distal pulses 2+  INCISIONS: L VATS incisions clean, dry, intact, CT dressing  not saturated, blow hole covered with guaze      LABS:                        9.8    7.34  )-----------( 212      ( 28 Jan 2021 07:24 )             32.0       COUMADIN:  No. REASON: .    PT/INR - ( 28 Jan 2021 07:25 )   PT: 14.3 sec;   INR: 1.20          PTT - ( 28 Jan 2021 07:25 )  PTT:37.6 sec    01-28    141  |  102  |  46<H>  ----------------------------<  69<L>  See Note   |  21<L>  |  5.70<H>    Ca    9.4      28 Jan 2021 07:24  Mg     2.2     01-28            MEDICATIONS  (STANDING):  allopurinol 200 milliGRAM(s) Oral two times a day  aMIOdarone    Tablet 200 milliGRAM(s) Oral daily  doxazosin 2 milliGRAM(s) Oral at bedtime  furosemide   Injectable 60 milliGRAM(s) IV Push every 12 hours  heparin  Infusion 1000 Unit(s)/Hr (10 mL/Hr) IV Continuous <Continuous>  metoprolol tartrate 25 milliGRAM(s) Oral every 6 hours  NIFEdipine XL 30 milliGRAM(s) Oral daily  pantoprazole    Tablet 40 milliGRAM(s) Oral before breakfast  sevelamer carbonate 800 milliGRAM(s) Oral every 8 hours  sodium bicarbonate 650 milliGRAM(s) Oral two times a day    MEDICATIONS  (PRN):  acetaminophen   Tablet .. 650 milliGRAM(s) Oral every 6 hours PRN Mild Pain (1 - 3)  oxycodone    5 mG/acetaminophen 325 mG 1 Tablet(s) Oral every 6 hours PRN Moderate Pain (4 - 6)        RADIOLOGY & ADDITIONAL TESTS:  < from: Xray Chest 1 View- PORTABLE-Urgent (Xray Chest 1 View- PORTABLE-Urgent .) (01.28.21 @ 10:33) >  An AP view of the chest is compared to the prior study of the same day. 2 left chest tubes are again noted. Heart size remains enlarged. No significant change in residual left pleural effusion and underlying left basilar consolidation. Diffusely prominent vascular markings bilaterally. No pneumothorax.    < end of copied text >  Post pull chest xray: No acute pathology noted.

## 2021-01-28 NOTE — DISCHARGE NOTE PROVIDER - NSDCFUADDINST_GEN_ALL_CORE_FT
-Walk daily as tolerated and use your incentive spirometer every hour.    -No driving or strenuous activity/exercise until cleared by your surgeon.    -Gently clean your incisions with anti-bacterial soap and water, pat  dry.  You may leave them open to air.    -Call your doctor if you have shortness of breath, chest pain not  relieved by pain medication, dizziness, fever >101.5, or increased  redness or drainage from incisions.     -You are being discharged with 3 stitches on your left chest, they are covered with a dressing.  You can shower with the dressing in place.  The stitches will be removed at your follow up visit with Dr. Harmon.

## 2021-01-29 LAB
ANION GAP SERPL CALC-SCNC: 13 MMOL/L — SIGNIFICANT CHANGE UP (ref 5–17)
ANION GAP SERPL CALC-SCNC: 18 MMOL/L — HIGH (ref 5–17)
APTT BLD: 39.3 SEC — HIGH (ref 27.5–35.5)
BUN SERPL-MCNC: 44 MG/DL — HIGH (ref 7–23)
BUN SERPL-MCNC: 47 MG/DL — HIGH (ref 7–23)
CALCIUM SERPL-MCNC: 8.6 MG/DL — SIGNIFICANT CHANGE UP (ref 8.4–10.5)
CALCIUM SERPL-MCNC: 8.8 MG/DL — SIGNIFICANT CHANGE UP (ref 8.4–10.5)
CHLORIDE SERPL-SCNC: 100 MMOL/L — SIGNIFICANT CHANGE UP (ref 96–108)
CHLORIDE SERPL-SCNC: 103 MMOL/L — SIGNIFICANT CHANGE UP (ref 96–108)
CO2 SERPL-SCNC: 22 MMOL/L — SIGNIFICANT CHANGE UP (ref 22–31)
CO2 SERPL-SCNC: 26 MMOL/L — SIGNIFICANT CHANGE UP (ref 22–31)
CREAT SERPL-MCNC: 5.61 MG/DL — HIGH (ref 0.5–1.3)
CREAT SERPL-MCNC: 5.77 MG/DL — HIGH (ref 0.5–1.3)
GLUCOSE SERPL-MCNC: 72 MG/DL — SIGNIFICANT CHANGE UP (ref 70–99)
GLUCOSE SERPL-MCNC: 87 MG/DL — SIGNIFICANT CHANGE UP (ref 70–99)
HCT VFR BLD CALC: 29.1 % — LOW (ref 39–50)
HCT VFR BLD CALC: 29.4 % — LOW (ref 39–50)
HGB BLD-MCNC: 8.8 G/DL — LOW (ref 13–17)
HGB BLD-MCNC: 9 G/DL — LOW (ref 13–17)
MAGNESIUM SERPL-MCNC: 1.8 MG/DL — SIGNIFICANT CHANGE UP (ref 1.6–2.6)
MAGNESIUM SERPL-MCNC: 2 MG/DL — SIGNIFICANT CHANGE UP (ref 1.6–2.6)
MCHC RBC-ENTMCNC: 28.7 PG — SIGNIFICANT CHANGE UP (ref 27–34)
MCHC RBC-ENTMCNC: 29 PG — SIGNIFICANT CHANGE UP (ref 27–34)
MCHC RBC-ENTMCNC: 30.2 GM/DL — LOW (ref 32–36)
MCHC RBC-ENTMCNC: 30.6 GM/DL — LOW (ref 32–36)
MCV RBC AUTO: 94.8 FL — SIGNIFICANT CHANGE UP (ref 80–100)
MCV RBC AUTO: 94.8 FL — SIGNIFICANT CHANGE UP (ref 80–100)
NRBC # BLD: 0 /100 WBCS — SIGNIFICANT CHANGE UP (ref 0–0)
NRBC # BLD: 0 /100 WBCS — SIGNIFICANT CHANGE UP (ref 0–0)
PLATELET # BLD AUTO: 189 K/UL — SIGNIFICANT CHANGE UP (ref 150–400)
PLATELET # BLD AUTO: 210 K/UL — SIGNIFICANT CHANGE UP (ref 150–400)
POTASSIUM SERPL-MCNC: 3.6 MMOL/L — SIGNIFICANT CHANGE UP (ref 3.5–5.3)
POTASSIUM SERPL-MCNC: 3.7 MMOL/L — SIGNIFICANT CHANGE UP (ref 3.5–5.3)
POTASSIUM SERPL-SCNC: 3.6 MMOL/L — SIGNIFICANT CHANGE UP (ref 3.5–5.3)
POTASSIUM SERPL-SCNC: 3.7 MMOL/L — SIGNIFICANT CHANGE UP (ref 3.5–5.3)
RBC # BLD: 3.07 M/UL — LOW (ref 4.2–5.8)
RBC # BLD: 3.1 M/UL — LOW (ref 4.2–5.8)
RBC # FLD: 18.2 % — HIGH (ref 10.3–14.5)
RBC # FLD: 18.3 % — HIGH (ref 10.3–14.5)
SODIUM SERPL-SCNC: 139 MMOL/L — SIGNIFICANT CHANGE UP (ref 135–145)
SODIUM SERPL-SCNC: 143 MMOL/L — SIGNIFICANT CHANGE UP (ref 135–145)
WBC # BLD: 7.46 K/UL — SIGNIFICANT CHANGE UP (ref 3.8–10.5)
WBC # BLD: 7.85 K/UL — SIGNIFICANT CHANGE UP (ref 3.8–10.5)
WBC # FLD AUTO: 7.46 K/UL — SIGNIFICANT CHANGE UP (ref 3.8–10.5)
WBC # FLD AUTO: 7.85 K/UL — SIGNIFICANT CHANGE UP (ref 3.8–10.5)

## 2021-01-29 PROCEDURE — 99232 SBSQ HOSP IP/OBS MODERATE 35: CPT | Mod: GC

## 2021-01-29 PROCEDURE — 71045 X-RAY EXAM CHEST 1 VIEW: CPT | Mod: 26

## 2021-01-29 PROCEDURE — 99231 SBSQ HOSP IP/OBS SF/LOW 25: CPT

## 2021-01-29 RX ORDER — HEPARIN SODIUM 5000 [USP'U]/ML
5000 INJECTION INTRAVENOUS; SUBCUTANEOUS EVERY 8 HOURS
Refills: 0 | Status: DISCONTINUED | OUTPATIENT
Start: 2021-01-29 | End: 2021-01-30

## 2021-01-29 RX ORDER — POTASSIUM CHLORIDE 20 MEQ
40 PACKET (EA) ORAL ONCE
Refills: 0 | Status: COMPLETED | OUTPATIENT
Start: 2021-01-29 | End: 2021-01-29

## 2021-01-29 RX ORDER — OXYCODONE AND ACETAMINOPHEN 5; 325 MG/1; MG/1
1 TABLET ORAL EVERY 6 HOURS
Refills: 0 | Status: DISCONTINUED | OUTPATIENT
Start: 2021-01-29 | End: 2021-01-30

## 2021-01-29 RX ADMIN — Medication 30 MILLIGRAM(S): at 07:11

## 2021-01-29 RX ADMIN — Medication 2 MILLIGRAM(S): at 22:30

## 2021-01-29 RX ADMIN — Medication 200 MILLIGRAM(S): at 07:11

## 2021-01-29 RX ADMIN — Medication 40 MILLIEQUIVALENT(S): at 11:18

## 2021-01-29 RX ADMIN — Medication 25 MILLIGRAM(S): at 07:11

## 2021-01-29 RX ADMIN — Medication 200 MILLIGRAM(S): at 16:29

## 2021-01-29 RX ADMIN — Medication 40 MILLIGRAM(S): at 07:21

## 2021-01-29 RX ADMIN — Medication 25 MILLIGRAM(S): at 11:17

## 2021-01-29 RX ADMIN — Medication 650 MILLIGRAM(S): at 07:22

## 2021-01-29 RX ADMIN — SEVELAMER CARBONATE 800 MILLIGRAM(S): 2400 POWDER, FOR SUSPENSION ORAL at 07:11

## 2021-01-29 RX ADMIN — HEPARIN SODIUM 5000 UNIT(S): 5000 INJECTION INTRAVENOUS; SUBCUTANEOUS at 16:26

## 2021-01-29 RX ADMIN — PANTOPRAZOLE SODIUM 40 MILLIGRAM(S): 20 TABLET, DELAYED RELEASE ORAL at 07:11

## 2021-01-29 RX ADMIN — SEVELAMER CARBONATE 800 MILLIGRAM(S): 2400 POWDER, FOR SUSPENSION ORAL at 16:27

## 2021-01-29 RX ADMIN — AMIODARONE HYDROCHLORIDE 200 MILLIGRAM(S): 400 TABLET ORAL at 07:12

## 2021-01-29 RX ADMIN — Medication 650 MILLIGRAM(S): at 16:29

## 2021-01-29 RX ADMIN — SEVELAMER CARBONATE 800 MILLIGRAM(S): 2400 POWDER, FOR SUSPENSION ORAL at 22:27

## 2021-01-29 RX ADMIN — SENNA PLUS 2 TABLET(S): 8.6 TABLET ORAL at 22:26

## 2021-01-29 RX ADMIN — HEPARIN SODIUM 5000 UNIT(S): 5000 INJECTION INTRAVENOUS; SUBCUTANEOUS at 22:26

## 2021-01-29 RX ADMIN — Medication 25 MILLIGRAM(S): at 16:29

## 2021-01-29 NOTE — PROGRESS NOTE ADULT - NSHPATTENDINGPLANDISCUSS_GEN_ALL_CORE
patient, spouse, CHF consult team and CTICU team
patient and CHF consult team
patient, CHF consult team, and [facility] team
primary team
patient, CHF consult team and [facility] team

## 2021-01-29 NOTE — PROGRESS NOTE ADULT - ATTENDING COMMENTS
Patient seen and examined with house-staff during bedside rounds.  Resident note read, including vitals, physical findings, laboratory data, and radiological reports.   Revisions included below.  Direct personal management at bed side and extensive interpretation of the data.  Plan was outlined and discussed in details with the housestaff.  Decision making of high complexity  Action taken for acute disease activity to reflect the level of care provided:  - medication reconciliation  - review laboratory data
Patient seen and examined with house-staff during bedside rounds.  Resident note read, including vitals, physical findings, laboratory data, and radiological reports.   Revisions included below.  Direct personal management at bed side and extensive interpretation of the data.  Plan was outlined and discussed in details with the housestaff.  Decision making of high complexity  Action taken for acute disease activity to reflect the level of care provided:  - medication reconciliation  - review laboratory data
This is a 66 y/o M with a PMHx of HTN, COPD, CKD (stage 4 CKD, has LUE AVF, not on HD yet), T-cell lymphoma (diagnosed 19 years ago, on chemo romidepsin every Wednesday), AF (on Eliquis at home, last dose Saturday), systolic CHF (EF 40-45% 2017), severe pHTN who recently underwent a left chest wall hematoma evacuation on 12/18/2020 secondary to loculated effusion. Hospital course at that time was complicated by AF with RVR and thoracic surgery consulted at that time for possible future intervention. Of note, recent ECHO during previous admission revealed severe mitral regurgitation. Thoracic surgery followed up with patient as an outpatient once discharged from the hospital and deemed patient a good surgical candidate. Feeling better,  Sitting in chair   Denies any CP, palpitations SOB, wheezing, abd pain, n/v/d/c, fevers or chills.     ejection fraction of 65-70%.    ASSESSMENT/PLAN:    CHF - EF is now improved (from 45% on last admission Dec 2020 to now 65%)   - fluid overloaded on exam. recommend IV diuresis on fixed schedule (either IV pushes, or continuous infusion) for lower extremity edema   - recommend pulm consult for pulmonary hypertension likely 2/2 to pulmonary etiology such as COPD. This value may improve after diuresis   - consider  sildenafil after diuresis   -renal following  I have personally provided 30 minutes of critical care time concurrently with the fellow.  This time excludes time spent on separate procedures and time spent teaching. I have reviewed the fellow’s documentation and I agree with the fellow’s assessment and plan of care.  WILLIAM Baptiste
Patient seen and examined with house-staff during bedside rounds.  Resident note read, including vitals, physical findings, laboratory data, and radiological reports.   Revisions included below.  Direct personal management at bed side and extensive interpretation of the data.  Plan was outlined and discussed in details with the housestaff.  Decision making of high complexity  Action taken for acute disease activity to reflect the level of care provided:  - medication reconciliation  - review laboratory data
feels well, all Chest Tubes are out  Edema is improved   Pt will be discharged home tomorrow     64 y/o M with a PMHx of HTN, COPD, CKD (stage 4 CKD, has LUE AVF, not on HD yet), T-cell lymphoma (diagnosed 19 years ago, on chemo romidepsin every Wednesday), AF (on Eliquis at home, last dose Saturday), systolic CHF (EF 40-45% 2017), severe pHTN who recently underwent a left chest wall hematoma evacuation on 12/18/2020 secondary to loculated effusion. Hospital course at that time was complicated by AF with RVR and thoracic surgery consulted at that time for possible future intervention. Of note, recent ECHO during previous admission revealed severe mitral regurgitation. Thoracic surgery followed up with patient as an outpatient once discharged from the hospital and deemed patient a good surgical candidate. Feeling better.  Denies any CP, palpitations SOB, wheezing, abd pain, n/v/d/c, fevers or chills.     ejection fraction of 65-70%.    ASSESSMENT/PLAN:    CHF - EF is now improved (from 45% on last admission Dec 2020 to now 65%)   - improved edema on exam   - Continue IV diuresis: recommend 40 IV lasix today then discharge tomorrow on PO torsemide 20 mg daily   - should follow up with Hilaria Martines HF NP (dial extension [phone] for appointment)   -f/u Dr. Jauregui    HTN: recommend continuing nifedipine 30mg     I have personally provided 30 minutes of clinical care time concurrently with the fellow.  This time excludes time spent on separate procedures and time spent teaching. I have reviewed the fellow’s documentation and I agree with the fellow’s assessment and plan of care.  WILLIAM Baptiste
1/28 two remaining chest tubes/awake bronchoscopy this am (heparin held)  - CT chest new patchy infiltrates R middle and upper lobes/ground glass appearing infiltrates L. lung cannot exclude Covid pneumonia    Improved diuresis with IV lasix 60.   64 y/o M with a PMHx of HTN, COPD, CKD (stage 4 CKD, has LUE AVF, not on HD yet), T-cell lymphoma (diagnosed 19 years ago, on chemo romidepsin every Wednesday), AF (on Eliquis at home, last dose Saturday), systolic CHF (EF 40-45% 2017), severe pHTN who recently underwent a left chest wall hematoma evacuation on 12/18/2020 secondary to loculated effusion. Hospital course at that time was complicated by AF with RVR and thoracic surgery consulted at that time for possible future intervention. Of note, recent ECHO during previous admission revealed severe mitral regurgitation. Thoracic surgery followed up with patient as an outpatient once discharged from the hospital and deemed patient a good surgical candidate.   Denies any CP, palpitations SOB, wheezing, abd pain, n/v/d/c, fevers or chills.     ejection fraction of 65-70%.    ASSESSMENT/PLAN:    CHF - EF is now improved (from 45% on last admission Dec 2020 to now 65%)   edema less but still present  - Continue IV diuresis: recommend 60 IV BID       HTN: recommend adding nifedipine 30mg for better BP control if he's not on any other BP medications at home     I have personally provided 30 minutes of clinical care time concurrently with the fellow.  This time excludes time spent on separate procedures and time spent teaching. I have reviewed the fellow’s documentation and I agree with the fellow’s assessment and plan of care.  WILLIAM Baptiste
CKD stable  no uremic sx  agree with start PO diuretics as hypervolemic
Denies any SOB  Lower extremity still swollen and edematous     64 y/o M with a PMHx of HTN, COPD, CKD (stage 4 CKD, has LUE AVF, not on HD yet), T-cell lymphoma (diagnosed 19 years ago, on chemo romidepsin every Wednesday), AF (on Eliquis at home, last dose Saturday), systolic CHF (EF 40-45% 2017), severe pHTN who recently underwent a left chest wall hematoma evacuation on 12/18/2020 secondary to loculated effusion. Hospital course at that time was complicated by AF with RVR and thoracic surgery consulted at that time for possible future intervention. Of note, recent ECHO during previous admission revealed severe mitral regurgitation. Thoracic surgery followed up with patient as an outpatient once discharged from the hospital and deemed patient a good surgical candidate. Feeling better,Sitting in chair, Denies any CP, palpitations SOB, wheezing, abd pain, n/v/d/c, fevers or chills.     ejection fraction of 65-70%.  2+ pitting pretibial edema persists  Creat 5.94    ASSESSMENT/PLAN:    CHF - EF is now improved (from 45% on last admission Dec 2020 to now 65%)   - fluid overloaded on exam. recommend IV diuresis on fixed schedule (either IV pushes, or continuous infusion) for lower extremity edema   - if PASP on repeat ECHO still elevated after diuresis consider pulm consult for other etiologies of pHTN  -evaluation of MR after diuresis and attaining euvolemia  -renal following: would consider hemofiltration for aggressive volume removal since he has the fistula   I have personally provided 30 minutes of clinical care time concurrently with the fellow.  This time excludes time spent on separate procedures and time spent teaching. I have reviewed the fellow’s documentation and I agree with the fellow’s assessment and plan of care.  WILLIAM Baptiste
OOB in chair  chest tubes in place   range- acceptable  CKD 5-  creat stable 5.6  not uremic  AVF mature  no dialysis needed at this time
for thoracic procedure once cleared  left knee ? hematoma versus infection- being evaluated  CKD 5- chem panel stable  AVF mature  no indication for dialysis at this time
for thoracic procedure today- reviewed with thoracic team   BP improved- dip lower- BP meds held  CKD 5- no signs of uremia  AVF mature  no dialysis needed at this time
right chest tube out  3 on left remain  creat with slow rise- but electrolytes and volume status are good   AVF with good thrill and bruit left forearm  defer HD for now  reviewed with CTS team
CKD 5-  last chest tube to be pulled today  no leg edema  potassium, bicarb and volume acceptable  feels a bit stronger today  no uremic symptoms  no indication for dialysis at this time  AVF mature with good thrill and bruit left forearm
feeling more comfortable  less leg edema s/p lasix  not uremic  no indication for dialysis at this time  AVF with good thrill and bruit left forearm
for thoracic procedure tomorrow  left knee hematoma aspirated yesterday  CKD 5- B/creat stable  for elevated BP can add nifedipine  AVF mature  no dialysis needed at this time
had CT today  no uremic symptoms  but some increase LE edema  reviewed with 9L team- okay to give diuretics  creat still upward trend- no dialysis yet  AVF with good thrill and bruit left forearm
I saw and evaluated the patient on the above date of service and discussed the care with the resident. I agree with the findings and plan as documented in the note above except for the following:      Patient underwent aspiration of the fluid collection and 2cc of bloody fluid came out and sent for culture.  Gram stain neg.  Cont vancomycin dose by level while awaiting for culture result to treat LLE cellulitis and possible infected fluid collection.       Team 1 will continue to follow you.  Dr. Mcgraw will take over the care tomorrow.    Xochilt Delgado MD, MS  Infectious disease attending  Work cell [phone]
Detail Level: Zone
Render In Strict Bullet Format?: No
Continue Regimen: R. Essentials Retinol 3X

## 2021-01-29 NOTE — PROGRESS NOTE ADULT - SUBJECTIVE AND OBJECTIVE BOX
Subjective: feels well, all CTs are out  Edema is improved   Pt will be discharged home tomorrow     MEDICATIONS  (STANDING):  allopurinol 200 milliGRAM(s) Oral two times a day  aMIOdarone    Tablet 200 milliGRAM(s) Oral daily  doxazosin 2 milliGRAM(s) Oral at bedtime  furosemide   Injectable 60 milliGRAM(s) IV Push every 12 hours  metoprolol tartrate 25 milliGRAM(s) Oral every 6 hours  NIFEdipine XL 30 milliGRAM(s) Oral daily  pantoprazole    Tablet 40 milliGRAM(s) Oral before breakfast  polyethylene glycol 3350 17 Gram(s) Oral daily  potassium chloride   Powder 40 milliEquivalent(s) Oral once  senna 2 Tablet(s) Oral at bedtime  sevelamer carbonate 800 milliGRAM(s) Oral every 8 hours  sodium bicarbonate 650 milliGRAM(s) Oral two times a day    MEDICATIONS  (PRN):  acetaminophen   Tablet .. 650 milliGRAM(s) Oral every 6 hours PRN Mild Pain (1 - 3)  bisacodyl 5 milliGRAM(s) Oral every 12 hours PRN Constipation  oxycodone    5 mG/acetaminophen 325 mG 1 Tablet(s) Oral every 6 hours PRN Moderate Pain (4 - 6)      REVIEW OF SYSTEMS:  12 point ROS negative except for that stated in the HPI    PHYSICAL EXAM:  Vitals past 24 Hours: T(C): 36.7 (01-29-21 @ 09:39), Max: 37.6 (01-29-21 @ 00:56)  HR: 66 (01-29-21 @ 09:39) (66 - 78)  BP: 115/67 (01-29-21 @ 09:39) (115/67 - 179/92)  RR: 12 (01-29-21 @ 09:39) (12 - 18)  SpO2: 93% (01-29-21 @ 09:39) (91% - 98%)	    Daily     Daily     GEN: Alert and awake, no acute distress  HEENT: Moist mucous membranes  Neck: No JVD  Cardiovascular: Regular rate and rhythm, +S1 S2. No murmurs, rubs, or gallops appreciated  Respiratory: Lungs clear to auscultation bilaterally  Gastrointestinal:  Soft, non-tender, non-distended, normoactive bowel sounds  Skin: No rashes, No ecchymoses, No cyanosis  Neurologic: Non-focal, alert and oriented x3.   Extremities: No clubbing, cyanosis or edema. Warm, well-perfused extremities.   Vascular: Radial and dorsalis pedis pulses 2+ bilaterally    I&O's Detail    28 Jan 2021 07:01  -  29 Jan 2021 07:00  --------------------------------------------------------  IN:  Total IN: 0 mL    OUT:    Voided (mL): 1425 mL  Total OUT: 1425 mL    Total NET: -1425 mL      29 Jan 2021 07:01  -  29 Jan 2021 11:06  --------------------------------------------------------  IN:  Total IN: 0 mL    OUT:    Voided (mL): 500 mL  Total OUT: 500 mL    Total NET: -500 mL            LABS:                        9.0    7.46  )-----------( 189      ( 29 Jan 2021 09:07 )             29.4     01-29    139  |  100  |  44<H>  ----------------------------<  87  3.6   |  26  |  5.61<H>    Ca    8.8      29 Jan 2021 09:07  Mg     2.0     01-29          PT/INR - ( 28 Jan 2021 07:25 )   PT: 14.3 sec;   INR: 1.20          PTT - ( 29 Jan 2021 09:07 )  PTT:39.3 sec    I&O's Summary    28 Jan 2021 07:01  -  29 Jan 2021 07:00  --------------------------------------------------------  IN: 0 mL / OUT: 1425 mL / NET: -1425 mL    29 Jan 2021 07:01  -  29 Jan 2021 11:06  --------------------------------------------------------  IN: 0 mL / OUT: 500 mL / NET: -500 mL        ASSESSMENT/PLAN:  66 y/o M with a PMHx of HTN, COPD, CKD (stage 4 CKD, has LUE AVF, not on HD yet), T-cell lymphoma (diagnosed 19 years ago, on chemo romidepsin every Wednesday), AF (on Eliquis at home, last dose Saturday), systolic CHF (EF 40-45% 2017), severe pHTN who recently underwent a left chest wall hematoma evacuation on 12/18/2020 secondary to loculated effusion. Hospital course at that time was complicated by AF with RVR and thoracic surgery consulted at that time for possible future intervention. Of note, recent ECHO during previous admission revealed severe mitral regurgitation. Thoracic surgery followed up with patient as an outpatient once discharged from the hospital and deemed patient a good surgical candidate. Feeling better,Sitting in chair, Denies any CP, palpitations SOB, wheezing, abd pain, n/v/d/c, fevers or chills.     ejection fraction of 65-70%.    ASSESSMENT/PLAN:    CHF - EF is now improved (from 45% on last admission Dec 2020 to now 65%)   - improved edema on exam   - Continue IV diuresis: recommend 40 IV lasix today then discharge tomorrow on PO torsemide 20 mg daily   - should follow up with Hilaria HF NP (dial extension [phone] for appointment)     HTN: recommend continuing nifedipine 30mg     Please reconsult with any further questions     Belén Dykes MD   Cardiology fellow

## 2021-01-29 NOTE — PROGRESS NOTE ADULT - ASSESSMENT
This is a 66 y/o man with a hx of ESRD (non oliguric, not yet on HD), Afib (on Eliquis at home), centrilobular emphysema, T-Cell lymphoma, who is an active smoker who presented with an acute chest wall hematoma from a bleeding artery on the right sided. He was found to have a loculated left pleural effusion and possible underlying mass vs hematoma, the effusion is recurrent and in the setting of 20lb weight loss and is thus concerning for malignancy.

## 2021-01-29 NOTE — PROGRESS NOTE ADULT - PROBLEM SELECTOR PLAN 2
Resolved and the patient underwent vats

## 2021-01-29 NOTE — PROGRESS NOTE ADULT - SUBJECTIVE AND OBJECTIVE BOX
Interval Events: Reviewed  Patient seen and examined at bedside.    Patient is a 65y old  Male who presents with a chief complaint of VATS, decortication, pleurodesis (29 Jan 2021 12:08)    he is better and walking with pt  PAST MEDICAL & SURGICAL HISTORY:  BPH (benign prostatic hyperplasia)    Gout    H/O pulmonary hypertension    CHF, chronic    Lymphoma  t cell; Chemotherapy weekly- wednesday    CKD (chronic kidney disease)    Atrial fibrillation    HTN (hypertension)    Elective surgery  rectal surgery    History of cholecystectomy        MEDICATIONS:  Pulmonary:    Antimicrobials:    Anticoagulants:  heparin   Injectable 5000 Unit(s) SubCutaneous every 8 hours    Cardiac:  aMIOdarone    Tablet 200 milliGRAM(s) Oral daily  doxazosin 2 milliGRAM(s) Oral at bedtime  metoprolol tartrate 25 milliGRAM(s) Oral every 6 hours  NIFEdipine XL 30 milliGRAM(s) Oral daily      Allergies    No Known Allergies    Intolerances        Vital Signs Last 24 Hrs  T(C): 36.8 (29 Jan 2021 21:39), Max: 37.6 (29 Jan 2021 00:56)  T(F): 98.3 (29 Jan 2021 21:39), Max: 99.7 (29 Jan 2021 00:56)  HR: 69 (29 Jan 2021 20:45) (62 - 78)  BP: 119/62 (29 Jan 2021 20:45) (111/60 - 137/70)  BP(mean): 84 (29 Jan 2021 17:15) (80 - 96)  RR: 12 (29 Jan 2021 16:32) (12 - 20)  SpO2: 92% (29 Jan 2021 20:45) (91% - 98%)    01-28 @ 07:01 - 01-29 @ 07:00  --------------------------------------------------------  IN: 0 mL / OUT: 1425 mL / NET: -1425 mL    01-29 @ 07:01 - 01-29 @ 22:04  --------------------------------------------------------  IN: 0 mL / OUT: 1100 mL / NET: -1100 mL          Review of Systems:   •	General: negative  •	Skin/Breast: negative  •	Ophthalmologic: negative  •	ENMT: negative  •	Respiratory and Thorax: negative  •	Cardiovascular: negative  •	Gastrointestinal: negative  •	Genitourinary: negative  •	Musculoskeletal: negative  •	Neurological: negative  •	Psychiatric: negative  •	Hematology/Lymphatics: negative  •	Endocrine: negative  •	Allergic/Immunologic: negative    Physical Exam:   • Constitutional:	Well-developed, well nourished  • Eyes:	EOMI; PERRL; no drainage or redness  • ENMT:	No oral lesions; no gross abnormalities  • Neck	No bruits; no thyromegaly or nodules  • Breasts:	not examined  • Back:	No deformity or limitation of movement  • Respiratory:	Breath Sounds equal & clear to percussion & auscultation, no accessory muscle use  • Cardiovascular:	Regular rate & rhythm, normal S1, S2; no murmurs, gallops or rubs; no S3, S4  • Gastrointestinal:	Soft, non-tender, no hepatosplenomegaly, normal bowel sounds  • Genitourinary:	not examined  • Rectal: not examined  • Extremities:	No cyanosis, clubbing or edema  • Vascular:	Equal and normal pulses (carotid, femoral, dorsalis pedis)  • Neurologica:l	not examined  • Skin:	No lesions; no rash  • Lymph Nodes:	No lymphadedenopathy  • Musculoskeletal:	No joint pain, swelling or deformity; no limitation of movement        LABS:      CBC Full  -  ( 29 Jan 2021 09:07 )  WBC Count : 7.46 K/uL  RBC Count : 3.10 M/uL  Hemoglobin : 9.0 g/dL  Hematocrit : 29.4 %  Platelet Count - Automated : 189 K/uL  Mean Cell Volume : 94.8 fl  Mean Cell Hemoglobin : 29.0 pg  Mean Cell Hemoglobin Concentration : 30.6 gm/dL  Auto Neutrophil # : x  Auto Lymphocyte # : x  Auto Monocyte # : x  Auto Eosinophil # : x  Auto Basophil # : x  Auto Neutrophil % : x  Auto Lymphocyte % : x  Auto Monocyte % : x  Auto Eosinophil % : x  Auto Basophil % : x    01-29    139  |  100  |  44<H>  ----------------------------<  87  3.6   |  26  |  5.61<H>    Ca    8.8      29 Jan 2021 09:07  Mg     2.0     01-29      PT/INR - ( 28 Jan 2021 07:25 )   PT: 14.3 sec;   INR: 1.20          PTT - ( 29 Jan 2021 09:07 )  PTT:39.3 sec    Surgical Pathology Report:   ACCESSION No:  [id number]    MARCI VEGA                         1        Surgical Final Report          Final Diagnosis    1. Left pleural rind,:  - Fibrotic tissue with mild chronic inflammation, old  hemorrhage, and fibrinous adhesions.    Verified by: Roxana Robison MD  (Electronic Signature)  Reported on: 01/26/21 13:56 [facility], [facility], [address] [place] [address]  Phone: [phone]   Fax: [phone]  _________________________________________________________________    Clinical History  Left pleura effusion, trapped lung.    Specimen(s) Submitted  1     Left pleural rind    Gross Description  The specimen is received in formalin, labeled with the patient's  identification and "left pleura rind."  It consists of one  fragment of purple-tan tissue measuring 3.5 x 0.8 x 0.6 cm.  The  entire specimen is submitted in one cassette: 1A.  Yazmin Head MD 01/25/2021 15:43    Disclaimer  Histological Processing Performed at [facility],  Department of Pathology, [address] [place] [address].      Surgical Consult Report          Disclaimer  Histological Processing Performed at [facility],  Department of Pathology, [address] [place] [address]. (01.22.21 @ 21:30)                    RADIOLOGY & ADDITIONAL STUDIES (The following images were personally reviewed):  Ramirez:                                     No  Urine output:                       adequate  DVT prophylaxis:                 Yes  Flattus:                                  Yes  Bowel movement:              No

## 2021-01-29 NOTE — PROGRESS NOTE ADULT - PROBLEM SELECTOR PLAN 1
CKD 5  stable  electolytes within normal limits  encourage PO intake  consider resuming torsemide 20mg PO qd
Cytology is negative for malignant cells but given his weight loss and smoking history, still is concerning for malignancy especially with a lymphocyte predominant effusion    The patient underwent VATS.  Follow-up on the cytology and pathological biopsies.  Patient is a chest tube with no air leak one tube removed
Cytology is negative for malignant cells but given his weight loss and smoking history, still is concerning for malignancy especially with a lymphocyte predominant effusion    The patient underwent VATS.  Bx consistent with chronic inflammatory process Patient is a chest tube with no air leak one tube removed
Cytology is negative for malignant cells but given his weightloss and smoking history, still is concerning for malignancy especially with a lymphocyte predominant effusion    The patient underwent VATS.  Follow-up on the cytology and pathological biopsies.  Patient is a chest tube with no air leak

## 2021-01-29 NOTE — PROGRESS NOTE ADULT - SUBJECTIVE AND OBJECTIVE BOX
Patient is a 65y Male seen and evaluated at bedside. Plan for dc tomorrow. No indication for HD at this time, will f/u with OP Nephrolog. Euvolaemic- transition to PO torsemide 20mg daily.    Meds:    acetaminophen   Tablet .. 650 every 6 hours PRN  allopurinol 200 two times a day  aMIOdarone    Tablet 200 daily  bisacodyl 5 every 12 hours PRN  doxazosin 2 at bedtime  heparin   Injectable 5000 every 8 hours  metoprolol tartrate 25 every 6 hours  NIFEdipine XL 30 daily  oxycodone    5 mG/acetaminophen 325 mG 1 every 6 hours PRN  pantoprazole    Tablet 40 before breakfast  polyethylene glycol 3350 17 daily  senna 2 at bedtime  sevelamer carbonate 800 every 8 hours  sodium bicarbonate 650 two times a day      T(C): , Max: 37.6 (01-29-21 @ 00:56)  T(F): , Max: 99.7 (01-29-21 @ 00:56)  HR: 66 (01-29-21 @ 09:39)  BP: 115/67 (01-29-21 @ 09:39)  BP(mean): 86 (01-29-21 @ 09:39)  RR: 12 (01-29-21 @ 09:39)  SpO2: 93% (01-29-21 @ 09:39)  Wt(kg): --    01-28 @ 07:01 - 01-29 @ 07:00  --------------------------------------------------------  IN: 0 mL / OUT: 1425 mL / NET: -1425 mL    01-29 @ 07:01 - 01-29 @ 12:09  --------------------------------------------------------  IN: 0 mL / OUT: 500 mL / NET: -500 mL          Review of Systems:  CONSTITUTIONAL: No fever or chills, No fatigue or tiredness  RESPIRATORY: No shortness of breath, cough, hemoptysis  CARDIOVASCULAR: No chest pain or shortness of breath  GASTROINTESTINAL: No abdominal or flank pain, No nausea or vomiting, No diarrhea  GENITOURINARY: No dysuria or urinary burning, No difficulty passing urine, No hematuria  NEUROLOGICAL: No headaches or blurred vision  SKIN: No skin rashes   MUSCULOSKELETAL: No arthralgia, No leg edema, No muscle pain      PHYSICAL EXAM:  GENERAL: well-developed, well nourished, alert, no acute distress at present  NECK: supple, No JVD  CHEST/LUNG: Clear to auscultation bilaterally  HEART: normal S1S2, RRR  ABDOMEN: Soft, Nontender, +BS, No flank tenderness bilateral  EXTREMITIES: No clubbing, cyanosis, or edema   NEUROLOGY: AAO x3, no focal neurological deficit  ACCESS: good thrill and bruit appreciated      LABS:                        9.0    7.46  )-----------( 189      ( 29 Jan 2021 09:07 )             29.4     01-29    139  |  100  |  44<H>  ----------------------------<  87  3.6   |  26  |  5.61<H>    Ca    8.8      29 Jan 2021 09:07  Mg     2.0     01-29        PT/INR - ( 28 Jan 2021 07:25 )   PT: 14.3 sec;   INR: 1.20          PTT - ( 29 Jan 2021 09:07 )  PTT:39.3 sec          RADIOLOGY & ADDITIONAL STUDIES:           Patient is a 65y Male seen and evaluated at bedside. Plan for dc tomorrow. No indication for HD at this time, will f/u with OP Nephrolog. Euvolaemic- transition to PO torsemide 20mg daily.    Meds:    acetaminophen   Tablet .. 650 every 6 hours PRN  allopurinol 200 two times a day  aMIOdarone    Tablet 200 daily  bisacodyl 5 every 12 hours PRN  doxazosin 2 at bedtime  heparin   Injectable 5000 every 8 hours  metoprolol tartrate 25 every 6 hours  NIFEdipine XL 30 daily  oxycodone    5 mG/acetaminophen 325 mG 1 every 6 hours PRN  pantoprazole    Tablet 40 before breakfast  polyethylene glycol 3350 17 daily  senna 2 at bedtime  sevelamer carbonate 800 every 8 hours  sodium bicarbonate 650 two times a day      T(C): , Max: 37.6 (01-29-21 @ 00:56)  T(F): , Max: 99.7 (01-29-21 @ 00:56)  HR: 66 (01-29-21 @ 09:39)  BP: 115/67 (01-29-21 @ 09:39)  BP(mean): 86 (01-29-21 @ 09:39)  RR: 12 (01-29-21 @ 09:39)  SpO2: 93% (01-29-21 @ 09:39)  Wt(kg): --    01-28 @ 07:01 - 01-29 @ 07:00  --------------------------------------------------------  IN: 0 mL / OUT: 1425 mL / NET: -1425 mL    01-29 @ 07:01 - 01-29 @ 12:09  --------------------------------------------------------  IN: 0 mL / OUT: 500 mL / NET: -500 mL    Review of Systems:  RESPIRATORY: No shortness of breath, cough  CARDIOVASCULAR: No Chest pain  MUSCULOSKELETAL: leg swelling resolved    PHYSICAL EXAM:  GENERAL: Alert, awake, oriented x3 on RA  CHEST/LUNG: CTA sounds, no rales/rhonchi  HEART: Regular rate and rhythm, no murmur  ABDOMEN: Soft, nontender, non distended  EXTREMITIES: no pedal oedema  ACCESS: LUE AVF w/bruit and thrill     LABS:                        9.0    7.46  )-----------( 189      ( 29 Jan 2021 09:07 )             29.4     01-29    139  |  100  |  44<H>  ----------------------------<  87  3.6   |  26  |  5.61<H>    Ca    8.8      29 Jan 2021 09:07  Mg     2.0     01-29        PT/INR - ( 28 Jan 2021 07:25 )   PT: 14.3 sec;   INR: 1.20          PTT - ( 29 Jan 2021 09:07 )  PTT:39.3 sec          RADIOLOGY & ADDITIONAL STUDIES:

## 2021-01-29 NOTE — PROGRESS NOTE ADULT - ASSESSMENT
64 y/o M with a PMHx of HTN, COPD, CKD (stage 4 CKD, has LUE AVF, not on HD yet), T-cell lymphoma (diagnosed 19 years ago, on chemo romidepsin every Wednesday), AF (on Eliquis), systolic CHF (EF 40-45%), severe pHTN who recently underwent a left chest wall hematoma evacuation on 12/18/2020 secondary to loculated effusion. Hospital course at that time was complicated by AF with RVR and thoracic surgery consulted at that time for possible future intervention. Of note, recent ECHO during previous admission revealed severe mitral regurgitation. Thoracic surgery followed up with patient as an outpatient once discharged from the hospital and he was deemed a good surgical candidate. On 1/18/21 patient presented to [facility] for pre-operative optimization with heparin gtt prior to OR. On 1/22 he underwent an uncomplicated L VATS, RA decortication. Intraop findings significant for loculated hemothorax. He received 3UPRBC in the OR and post operatively was brought to the CTICU stable and intubated. He was extubated POD1 and required primacor for pulmonary HTN and renal perfusion. Cardiology and nephrology were consulted. POD2 a right sided pigtail was placed for pleural effusion which drained 800 cc. He required alternating BiPAP and HFNC. POD4 patient was weaned to NC, primacor was weaned, and right pigtail was removed. Patient was transferred to the floor on POD4. Heparin gtt continued overnight with therapeutic PTT and patient developed bleeding around one of the CT with heparin gtt turned off. CT x3 to wall suction this AM and CT chest non contrast performed. Diuresis changed to IV lasix and Pulmonary consult pending for pulmonary HTN and COPD.  POD 6 bronch performed with no abnormal findings, anterior chest tube removed, post removal chest xray with no pneumothorax. POD 7 angled chest tube removed.  Heparin drip stopped.    Neurovascular:   - No delirium. Pain well controlled with current regimen.  -Continue tylenol, percocet PRN    Cardiovascular:   -Hemodynamically stable. HR controlled (62-75)  - Hx AF on eliquis at home, heparin currently on hold for CT removal, will restart 4hrs post removal  continue amiodarone 200 mg daily   - Hx of HTN, BP), continue doxazosin 2 mg, metoprolol 25 mg q6 hours, add nifedipine 30mg XL  - systolic CHF, HF team following, continue to appreciate recs     Respiratory:   - POD7 s/p L VATS, RA decortication for loculated hemothorax  - CT chest non contrast performed this AM with improved left sided effusion  - 02 Sat = 98% on NC, post op hypoxia requiring BiPAP and HFNC, resolved.  - Hx pulmonary HTN, required primacor post op, now discontinued   -Follow up heart failure recs for discharge  -If on oxygen wean to RA from for O2 Sat > 93%.  -Encourage C+DB and Use of IS 10x / hr while awake.      GI:   - Stable.  -Continue GI PPX with protonix  -PO Diet    Renal / :  - BUN/Cr 44/5.61 improving hx CKD, has AV fistula but never had HD, renal following   - Continue increase lasix to 60mg BID, renvela 800 q8 hours, NaBicarb 650 BID  -Continue to monitor renal function.  -Follow up renal recs for discharge  -Monitor I/O's.  - Replete electrolytes PRN    Endocrine:    - No known hx diabetes or thyroid disease  - continue allopurinol     Hematologic:  -H/H stable at 8.8/29.1 with no obvious signs of bleeding  - heparin gtt for AF, now stopped for chest tube removal, plan to resume angelito eliquis on 1/30  -Coagulation Panel.    ID:  -Pt remains afebrile with no elevation in WBC or signs of infection  -Continue to monitor CBC  -Observe for SIRS/Sepsis Syndrome.    Prophylaxis:  -DVT prophylaxis with SHQ  -SCD's    Disposition:  -Home when medically ready, likely tomorrow

## 2021-01-29 NOTE — CHART NOTE - NSCHARTNOTESELECT_GEN_ALL_CORE
CT removal/Event Note
Nutrition Follow-up/Nutrition Services
Nutrition Services Follow-up/Nutrition Services

## 2021-01-29 NOTE — PROGRESS NOTE ADULT - SUBJECTIVE AND OBJECTIVE BOX
Patient discussed on morning rounds with Dr. Harmon    Operation / Date: 1/22/21 Bronch, L VATs, RA Decortication    SUBJECTIVE ASSESSMENT:  65y Male seen and examined at bedside.  Patient denies any complaints this AM.  He ambulated the unit 4 times yesterday.  Reports BM yesterday.  Denies any chest pain, shortness of breath, nausea, vomiting.  Able to pull 750cc on IS.        Vital Signs Last 24 Hrs  T(C): 36.7 (29 Jan 2021 09:39), Max: 37.6 (29 Jan 2021 00:56)  T(F): 98 (29 Jan 2021 09:39), Max: 99.7 (29 Jan 2021 00:56)  HR: 66 (29 Jan 2021 09:39) (66 - 78)  BP: 115/67 (29 Jan 2021 09:39) (115/67 - 179/92)  BP(mean): 86 (29 Jan 2021 09:39) (86 - 126)  RR: 12 (29 Jan 2021 09:39) (12 - 18)  SpO2: 93% (29 Jan 2021 09:39) (91% - 98%)  I&O's Detail    28 Jan 2021 07:01  -  29 Jan 2021 07:00  --------------------------------------------------------  IN:  Total IN: 0 mL    OUT:    Voided (mL): 1425 mL  Total OUT: 1425 mL    Total NET: -1425 mL      29 Jan 2021 07:01  -  29 Jan 2021 10:50  --------------------------------------------------------  IN:  Total IN: 0 mL    OUT:    Voided (mL): 500 mL  Total OUT: 500 mL    Total NET: -500 mL          CHEST TUBE:  No.   ROMARIO DRAIN:  No.  EPICARDIAL WIRES: No.  TIE DOWNS: Yes.  BACK: No.    PHYSICAL EXAM:    CONSTITUTIONAL: Well appearing in NAD assessed sitting comfortably at bedside   NEURO: A&OX3. No focal deficits noted, moving bilateral upper and lower extremities                    CV: RRR, no murmurs, rubs, gallops  RESPIRATORY: Clear to auscultation bilateral posterior lung fields, no wheezes, rales, rhonchi, 3 CTs on suction, no leak   GI: +BS, NT/ND  MUSCULOSKELETAL: Bilateral LE edema, no calf tenderness. Full strength and ROM bilateral upper and lower extremities   VASCULAR: Bilateral distal pulses 2+  INCISIONS: L VATS incisions clean, dry, intact,      LABS:                        9.0    7.46  )-----------( 189      ( 29 Jan 2021 09:07 )             29.4       COUMADIN:  No. REASON: .    PT/INR - ( 28 Jan 2021 07:25 )   PT: 14.3 sec;   INR: 1.20          PTT - ( 29 Jan 2021 09:07 )  PTT:39.3 sec    01-29    139  |  100  |  44<H>  ----------------------------<  87  3.6   |  26  |  5.61<H>    Ca    8.8      29 Jan 2021 09:07  Mg     2.0     01-29            MEDICATIONS  (STANDING):  allopurinol 200 milliGRAM(s) Oral two times a day  aMIOdarone    Tablet 200 milliGRAM(s) Oral daily  doxazosin 2 milliGRAM(s) Oral at bedtime  furosemide   Injectable 60 milliGRAM(s) IV Push every 12 hours  metoprolol tartrate 25 milliGRAM(s) Oral every 6 hours  NIFEdipine XL 30 milliGRAM(s) Oral daily  pantoprazole    Tablet 40 milliGRAM(s) Oral before breakfast  polyethylene glycol 3350 17 Gram(s) Oral daily  potassium chloride   Powder 40 milliEquivalent(s) Oral once  senna 2 Tablet(s) Oral at bedtime  sevelamer carbonate 800 milliGRAM(s) Oral every 8 hours  sodium bicarbonate 650 milliGRAM(s) Oral two times a day    MEDICATIONS  (PRN):  acetaminophen   Tablet .. 650 milliGRAM(s) Oral every 6 hours PRN Mild Pain (1 - 3)  bisacodyl 5 milliGRAM(s) Oral every 12 hours PRN Constipation  oxycodone    5 mG/acetaminophen 325 mG 1 Tablet(s) Oral every 6 hours PRN Moderate Pain (4 - 6)        RADIOLOGY & ADDITIONAL TESTS:  < from: Xray Chest 1 View- PORTABLE-Urgent (Xray Chest 1 View- PORTABLE-Urgent .) (01.29.21 @ 08:18) >  An AP view of the chest is compared to the prior study dated 1/28/2021. Heart size is again enlarged. Unchanged residual left pleural effusion and left basilar consolidation. Mild pulmonary congestion. A trace right pleural effusion. No pneumothorax.    < end of copied text >

## 2021-01-29 NOTE — CHART NOTE - NSCHARTNOTEFT_GEN_A_CORE
Patient seen and examined at bedside.  Case discussed with Dr. Harmon. Minimal output from angled CT.  No air leak appreciated.  Per Dr. Harmon's request, CT removed and tie down secured without incident.  Occlusive DSD placed.  Patient tolerated procedure well.  Chest Xray pending.

## 2021-01-29 NOTE — PROGRESS NOTE ADULT - PROBLEM SELECTOR PROBLEM 1
CKD (chronic kidney disease)
Loculated pleural effusion

## 2021-01-29 NOTE — PROGRESS NOTE ADULT - ASSESSMENT
66 y/o M with a PMHx of HTN, COPD, CKD (stage 4 CKD, has LUE AVF, not on HD yet), T-cell lymphoma (diagnosed 19 years ago, on chemo romidepsin every Wednesday), AF (on Eliquis at home, last dose Saturday), systolic CHF (EF 40-45% 2017), severe pHTN who is S/p  VATS for recurrent pleural effusion 1/22. Renal consulted for CKD management.    Stable CKD5  S/p LUE AVF creation in June 2020  No indication for HD at this time    Euvolaemic- resume PO home med torsemide 20mg daily  Electrolytes, bicarb acceptable- on 650mg BID  BP: continue lopressor 25q6; added nifedipine 30 daily  BMD: continue phos binder renvela 800TID    Daily weights, strict I&Os, renally dose meds

## 2021-01-29 NOTE — PROGRESS NOTE ADULT - PROBLEM SELECTOR PLAN 4
Is likely related to his COPD.  No evidence of cor pulmonale

## 2021-01-29 NOTE — PROGRESS NOTE ADULT - PROBLEM SELECTOR PLAN 3
seen on CT but no formal diagnosis of COPD. Continues to have no respiratory symptoms and no additional intervention needed at this point.

## 2021-01-30 VITALS
OXYGEN SATURATION: 96 % | TEMPERATURE: 97 F | SYSTOLIC BLOOD PRESSURE: 101 MMHG | RESPIRATION RATE: 17 BRPM | HEART RATE: 62 BPM | DIASTOLIC BLOOD PRESSURE: 71 MMHG

## 2021-01-30 LAB
ANION GAP SERPL CALC-SCNC: 15 MMOL/L — SIGNIFICANT CHANGE UP (ref 5–17)
APTT BLD: 38 SEC — HIGH (ref 27.5–35.5)
BUN SERPL-MCNC: 45 MG/DL — HIGH (ref 7–23)
CALCIUM SERPL-MCNC: 9.2 MG/DL — SIGNIFICANT CHANGE UP (ref 8.4–10.5)
CHLORIDE SERPL-SCNC: 101 MMOL/L — SIGNIFICANT CHANGE UP (ref 96–108)
CO2 SERPL-SCNC: 25 MMOL/L — SIGNIFICANT CHANGE UP (ref 22–31)
CREAT SERPL-MCNC: 5.81 MG/DL — HIGH (ref 0.5–1.3)
GLUCOSE SERPL-MCNC: 66 MG/DL — LOW (ref 70–99)
HCT VFR BLD CALC: 29.2 % — LOW (ref 39–50)
HGB BLD-MCNC: 8.8 G/DL — LOW (ref 13–17)
INR BLD: 1.27 — HIGH (ref 0.88–1.16)
MAGNESIUM SERPL-MCNC: 2.1 MG/DL — SIGNIFICANT CHANGE UP (ref 1.6–2.6)
MCHC RBC-ENTMCNC: 27.9 PG — SIGNIFICANT CHANGE UP (ref 27–34)
MCHC RBC-ENTMCNC: 30.1 GM/DL — LOW (ref 32–36)
MCV RBC AUTO: 92.7 FL — SIGNIFICANT CHANGE UP (ref 80–100)
NRBC # BLD: 0 /100 WBCS — SIGNIFICANT CHANGE UP (ref 0–0)
PLATELET # BLD AUTO: 222 K/UL — SIGNIFICANT CHANGE UP (ref 150–400)
POTASSIUM SERPL-MCNC: 3.9 MMOL/L — SIGNIFICANT CHANGE UP (ref 3.5–5.3)
POTASSIUM SERPL-SCNC: 3.9 MMOL/L — SIGNIFICANT CHANGE UP (ref 3.5–5.3)
PROTHROM AB SERPL-ACNC: 15.1 SEC — HIGH (ref 10.6–13.6)
RBC # BLD: 3.15 M/UL — LOW (ref 4.2–5.8)
RBC # FLD: 17.9 % — HIGH (ref 10.3–14.5)
SODIUM SERPL-SCNC: 141 MMOL/L — SIGNIFICANT CHANGE UP (ref 135–145)
WBC # BLD: 7.55 K/UL — SIGNIFICANT CHANGE UP (ref 3.8–10.5)
WBC # FLD AUTO: 7.55 K/UL — SIGNIFICANT CHANGE UP (ref 3.8–10.5)

## 2021-01-30 PROCEDURE — 88305 TISSUE EXAM BY PATHOLOGIST: CPT

## 2021-01-30 PROCEDURE — P9047: CPT

## 2021-01-30 PROCEDURE — P9016: CPT

## 2021-01-30 PROCEDURE — 86769 SARS-COV-2 COVID-19 ANTIBODY: CPT

## 2021-01-30 PROCEDURE — 81001 URINALYSIS AUTO W/SCOPE: CPT

## 2021-01-30 PROCEDURE — 84132 ASSAY OF SERUM POTASSIUM: CPT

## 2021-01-30 PROCEDURE — 87070 CULTURE OTHR SPECIMN AEROBIC: CPT

## 2021-01-30 PROCEDURE — 83605 ASSAY OF LACTIC ACID: CPT

## 2021-01-30 PROCEDURE — 36415 COLL VENOUS BLD VENIPUNCTURE: CPT

## 2021-01-30 PROCEDURE — 71045 X-RAY EXAM CHEST 1 VIEW: CPT | Mod: 26

## 2021-01-30 PROCEDURE — 84443 ASSAY THYROID STIM HORMONE: CPT

## 2021-01-30 PROCEDURE — 87075 CULTR BACTERIA EXCEPT BLOOD: CPT

## 2021-01-30 PROCEDURE — 71045 X-RAY EXAM CHEST 1 VIEW: CPT

## 2021-01-30 PROCEDURE — 80048 BASIC METABOLIC PNL TOTAL CA: CPT

## 2021-01-30 PROCEDURE — 87205 SMEAR GRAM STAIN: CPT

## 2021-01-30 PROCEDURE — 73700 CT LOWER EXTREMITY W/O DYE: CPT

## 2021-01-30 PROCEDURE — 82330 ASSAY OF CALCIUM: CPT

## 2021-01-30 PROCEDURE — 85730 THROMBOPLASTIN TIME PARTIAL: CPT

## 2021-01-30 PROCEDURE — 83036 HEMOGLOBIN GLYCOSYLATED A1C: CPT

## 2021-01-30 PROCEDURE — 94150 VITAL CAPACITY TEST: CPT

## 2021-01-30 PROCEDURE — 83540 ASSAY OF IRON: CPT

## 2021-01-30 PROCEDURE — 85018 HEMOGLOBIN: CPT

## 2021-01-30 PROCEDURE — 82553 CREATINE MB FRACTION: CPT

## 2021-01-30 PROCEDURE — 97116 GAIT TRAINING THERAPY: CPT

## 2021-01-30 PROCEDURE — 88112 CYTOPATH CELL ENHANCE TECH: CPT

## 2021-01-30 PROCEDURE — 86900 BLOOD TYPING SEROLOGIC ABO: CPT

## 2021-01-30 PROCEDURE — 93970 EXTREMITY STUDY: CPT

## 2021-01-30 PROCEDURE — S2900: CPT

## 2021-01-30 PROCEDURE — P9045: CPT

## 2021-01-30 PROCEDURE — 82803 BLOOD GASES ANY COMBINATION: CPT

## 2021-01-30 PROCEDURE — 85610 PROTHROMBIN TIME: CPT

## 2021-01-30 PROCEDURE — 86850 RBC ANTIBODY SCREEN: CPT

## 2021-01-30 PROCEDURE — 94660 CPAP INITIATION&MGMT: CPT

## 2021-01-30 PROCEDURE — 85025 COMPLETE CBC W/AUTO DIFF WBC: CPT

## 2021-01-30 PROCEDURE — 80202 ASSAY OF VANCOMYCIN: CPT

## 2021-01-30 PROCEDURE — 80053 COMPREHEN METABOLIC PANEL: CPT

## 2021-01-30 PROCEDURE — 84295 ASSAY OF SERUM SODIUM: CPT

## 2021-01-30 PROCEDURE — 86901 BLOOD TYPING SEROLOGIC RH(D): CPT

## 2021-01-30 PROCEDURE — 84484 ASSAY OF TROPONIN QUANT: CPT

## 2021-01-30 PROCEDURE — 97162 PT EVAL MOD COMPLEX 30 MIN: CPT

## 2021-01-30 PROCEDURE — 83880 ASSAY OF NATRIURETIC PEPTIDE: CPT

## 2021-01-30 PROCEDURE — 93306 TTE W/DOPPLER COMPLETE: CPT

## 2021-01-30 PROCEDURE — 82550 ASSAY OF CK (CPK): CPT

## 2021-01-30 PROCEDURE — 93005 ELECTROCARDIOGRAM TRACING: CPT

## 2021-01-30 PROCEDURE — 36430 TRANSFUSION BLD/BLD COMPNT: CPT

## 2021-01-30 PROCEDURE — 82728 ASSAY OF FERRITIN: CPT

## 2021-01-30 PROCEDURE — C1729: CPT

## 2021-01-30 PROCEDURE — 86923 COMPATIBILITY TEST ELECTRIC: CPT

## 2021-01-30 PROCEDURE — 71250 CT THORAX DX C-: CPT

## 2021-01-30 PROCEDURE — 83735 ASSAY OF MAGNESIUM: CPT

## 2021-01-30 PROCEDURE — 85027 COMPLETE CBC AUTOMATED: CPT

## 2021-01-30 PROCEDURE — 84100 ASSAY OF PHOSPHORUS: CPT

## 2021-01-30 PROCEDURE — 82962 GLUCOSE BLOOD TEST: CPT

## 2021-01-30 RX ORDER — POLYETHYLENE GLYCOL 3350 17 G/17G
17 POWDER, FOR SOLUTION ORAL
Qty: 119 | Refills: 0
Start: 2021-01-30 | End: 2021-02-05

## 2021-01-30 RX ORDER — APIXABAN 2.5 MG/1
1 TABLET, FILM COATED ORAL
Qty: 60 | Refills: 0
Start: 2021-01-30 | End: 2021-02-28

## 2021-01-30 RX ORDER — ALLOPURINOL 300 MG
2 TABLET ORAL
Qty: 120 | Refills: 0
Start: 2021-01-30 | End: 2021-02-28

## 2021-01-30 RX ORDER — NIFEDIPINE 30 MG
1 TABLET, EXTENDED RELEASE 24 HR ORAL
Qty: 30 | Refills: 0
Start: 2021-01-30 | End: 2021-02-28

## 2021-01-30 RX ORDER — APIXABAN 2.5 MG/1
2.5 TABLET, FILM COATED ORAL
Refills: 0 | Status: DISCONTINUED | OUTPATIENT
Start: 2021-01-30 | End: 2021-01-30

## 2021-01-30 RX ORDER — SEVELAMER CARBONATE 2400 MG/1
1 POWDER, FOR SUSPENSION ORAL
Qty: 90 | Refills: 0
Start: 2021-01-30 | End: 2021-02-28

## 2021-01-30 RX ORDER — DOXAZOSIN MESYLATE 4 MG
2 TABLET ORAL
Qty: 0 | Refills: 0 | DISCHARGE

## 2021-01-30 RX ORDER — DOXAZOSIN MESYLATE 4 MG
2 TABLET ORAL
Qty: 0 | Refills: 0 | DISCHARGE
Start: 2021-01-30 | End: 2021-02-28

## 2021-01-30 RX ORDER — ACETAMINOPHEN 500 MG
2 TABLET ORAL
Qty: 240 | Refills: 0
Start: 2021-01-30 | End: 2021-02-28

## 2021-01-30 RX ORDER — ALLOPURINOL 300 MG
1 TABLET ORAL
Qty: 0 | Refills: 0 | DISCHARGE
Start: 2021-01-30 | End: 2021-02-28

## 2021-01-30 RX ORDER — SODIUM BICARBONATE 1 MEQ/ML
1 SYRINGE (ML) INTRAVENOUS
Qty: 60 | Refills: 0
Start: 2021-01-30 | End: 2021-02-28

## 2021-01-30 RX ORDER — SENNA PLUS 8.6 MG/1
2 TABLET ORAL
Qty: 14 | Refills: 0
Start: 2021-01-30 | End: 2021-02-05

## 2021-01-30 RX ORDER — AMIODARONE HYDROCHLORIDE 400 MG/1
1 TABLET ORAL
Qty: 30 | Refills: 0
Start: 2021-01-30 | End: 2021-02-28

## 2021-01-30 RX ORDER — ALLOPURINOL 300 MG
200 TABLET ORAL
Qty: 0 | Refills: 0 | DISCHARGE

## 2021-01-30 RX ORDER — METOPROLOL TARTRATE 50 MG
1 TABLET ORAL
Qty: 0 | Refills: 0 | DISCHARGE

## 2021-01-30 RX ORDER — PANTOPRAZOLE SODIUM 20 MG/1
1 TABLET, DELAYED RELEASE ORAL
Qty: 30 | Refills: 0
Start: 2021-01-30 | End: 2021-02-28

## 2021-01-30 RX ORDER — APIXABAN 2.5 MG/1
1 TABLET, FILM COATED ORAL
Qty: 0 | Refills: 0 | DISCHARGE

## 2021-01-30 RX ORDER — DOXAZOSIN MESYLATE 4 MG
1 TABLET ORAL
Qty: 30 | Refills: 0
Start: 2021-01-30 | End: 2021-02-28

## 2021-01-30 RX ORDER — METOPROLOL TARTRATE 50 MG
1 TABLET ORAL
Qty: 60 | Refills: 0
Start: 2021-01-30 | End: 2021-02-28

## 2021-01-30 RX ADMIN — HEPARIN SODIUM 5000 UNIT(S): 5000 INJECTION INTRAVENOUS; SUBCUTANEOUS at 05:15

## 2021-01-30 RX ADMIN — Medication 25 MILLIGRAM(S): at 00:05

## 2021-01-30 RX ADMIN — Medication 650 MILLIGRAM(S): at 05:16

## 2021-01-30 RX ADMIN — Medication 20 MILLIGRAM(S): at 05:20

## 2021-01-30 RX ADMIN — Medication 200 MILLIGRAM(S): at 05:16

## 2021-01-30 RX ADMIN — Medication 30 MILLIGRAM(S): at 05:16

## 2021-01-30 RX ADMIN — SEVELAMER CARBONATE 800 MILLIGRAM(S): 2400 POWDER, FOR SUSPENSION ORAL at 05:16

## 2021-01-30 RX ADMIN — Medication 25 MILLIGRAM(S): at 05:16

## 2021-01-30 RX ADMIN — PANTOPRAZOLE SODIUM 40 MILLIGRAM(S): 20 TABLET, DELAYED RELEASE ORAL at 05:17

## 2021-01-30 RX ADMIN — APIXABAN 2.5 MILLIGRAM(S): 2.5 TABLET, FILM COATED ORAL at 10:47

## 2021-01-30 RX ADMIN — AMIODARONE HYDROCHLORIDE 200 MILLIGRAM(S): 400 TABLET ORAL at 05:16

## 2021-01-30 NOTE — PROGRESS NOTE ADULT - REASON FOR ADMISSION
VATS, decortication, pleurodesis

## 2021-01-30 NOTE — PROGRESS NOTE ADULT - SUBJECTIVE AND OBJECTIVE BOX
Patient discussed on morning rounds with       Operation / Date:     Surgeon:    Referring Physician:    SUBJECTIVE ASSESSMENT AND HOSPITAL COURSE:  65y Male       Vital Signs Last 24 Hrs  T(C): 36.1 (30 Jan 2021 09:08), Max: 36.8 (29 Jan 2021 21:39)  T(F): 96.9 (30 Jan 2021 09:08), Max: 98.3 (29 Jan 2021 21:39)  HR: 62 (30 Jan 2021 09:08) (62 - 78)  BP: 101/71 (30 Jan 2021 09:08) (101/71 - 125/67)  BP(mean): 82 (30 Jan 2021 09:08) (80 - 84)  RR: 17 (30 Jan 2021 09:08) (12 - 20)  SpO2: 96% (30 Jan 2021 09:08) (91% - 98%)    EPICARDIAL WIRES REMOVED: Yes/No.  TIE DOWNS REMOVED: Yes/No.    PHYSICAL EXAM:    General:     Neurological:    Cardiovascular:    Respiratory:    Gastrointestinal:    Extremities:    Vascular:    Incision Sites:    LABS:                        8.8    7.55  )-----------( 222      ( 30 Jan 2021 06:14 )             29.2       COUMADIN:  Yes/No.        DOSE:                  INDICATION:                GOAL INR:    PT/INR - ( 30 Jan 2021 06:14 )   PT: 15.1 sec;   INR: 1.27          PTT - ( 30 Jan 2021 06:14 )  PTT:38.0 sec    01-30    141  |  101  |  45<H>  ----------------------------<  66<L>  3.9   |  25  |  5.81<H>    Ca    9.2      30 Jan 2021 06:14  Mg     2.1     01-30            Discharge CXR:    Discharge ECHO:     Patient discussed on morning rounds with Dr. Harmon     Operation / Date: 1/22/21 Bronch, L VATs, RA Decortication    Surgeon: Dr. Harmon    Referring Physician: Dr. Guadalupe    SUBJECTIVE ASSESSMENT AND HOSPITAL COURSE:  66 y/o M with a PMHx of HTN, COPD, CKD (stage 4 CKD, has LUE AVF, not on HD yet), T-cell lymphoma (diagnosed 19 years ago, on chemo romidepsin every Wednesday), AF (on Eliquis), systolic CHF (EF 40-45%), severe pHTN, severe MR, who recently underwent a left chest wall hematoma evacuation on 12/18/2020 secondary to loculated effusion. Hospital course at that time was complicated by AF with RVR and thoracic surgery consulted at that time for surgical intervention of trapped lung. He followed up as an outpatient after discharge and deemed a good surgical candidate for lung decortication. On 1/18/21 patient presented to [facility] for pre-operative optimization with heparin gtt prior to OR. Upon admission pt noted to have erythema and swelling to his L knee, CT scan demonstrated cellulitis, operation was deferred. Ortho, Gen Surg and ID were consulted, pt with no drainable collection and was medically managed with resolution of infection. Nephrology was consulted for assistance in managing CKD stage 4. On 1/22 he underwent an uncomplicated L VATS, RA decortication with blow hole placement for subcutaneous emphysema. Intraop findings significant for loculated hemothorax. He received 3UPRBC in the OR and post operatively was brought to the CTICU stable and intubated with 3 chest tubes in place. He was extubated POD1 and required primacor for pulmonary HTN and renal perfusion. On POD 2 a right sided pigtail was placed for pleural effusion which drained 800cc and ultimately removed on POD 4. Cardiology was consulted for management of pulmonary hypertension and CHF, recommended IV diuresis. He required alternating BiPAP and HFNC which was weaned to NC. Primacor was weaned off on POD 4 and she was transferred to the stepdown unit. Heparin gtt was started for AC but was held for bleeding around the CT site. A CT chest noncon was preformed showing post surgical changes, no concern for hemothorax and heparin gtt resumed. On POD 5 first CT was removed. Bronch with no abnormal findings, Second chest tube subsequently removed and added nifedipine Xl 30mg for BP control on POD 6. POD 7 third chest tube removed, post chest tube removal xray showed no pneumothorax. Patient doing well today, POD8 and medically appropriate for discharge. Patient ambulating on his own without supplemental oxygen, tolerating PO diet and moving his bowels appropriately. Patient medically stable for discharge.        Vital Signs Last 24 Hrs  T(C): 36.1 (30 Jan 2021 09:08), Max: 36.8 (29 Jan 2021 21:39)  T(F): 96.9 (30 Jan 2021 09:08), Max: 98.3 (29 Jan 2021 21:39)  HR: 62 (30 Jan 2021 09:08) (62 - 78)  BP: 101/71 (30 Jan 2021 09:08) (101/71 - 125/67)  BP(mean): 82 (30 Jan 2021 09:08) (80 - 84)  RR: 17 (30 Jan 2021 09:08) (12 - 20)  SpO2: 96% (30 Jan 2021 09:08) (91% - 98%)    EPICARDIAL WIRES REMOVED: Yes.  TIE DOWNS REMOVED: Yes.    PHYSICAL EXAM:    General: NAD, sitting up in bed, comfortable    Neurological: AOx3, cranial nerves grossly intact; motor skills grossly intact    Cardiovascular: regular rate and rhythm, no murmurs, rubs, gallops    Respiratory: CTABL, no wheezing, rales, rhonchi    Gastrointestinal: soft, NTND, +BS    Extremities: WWP, no calf tenderness, no LE edema    Vascular: bilateral distal pulses intact    Incision Sites: L VATS incisions c/d/i    LABS:                        8.8    7.55  )-----------( 222      ( 30 Jan 2021 06:14 )             29.2       COUMADIN:  Yes/No.        DOSE:                  INDICATION:                GOAL INR:    PT/INR - ( 30 Jan 2021 06:14 )   PT: 15.1 sec;   INR: 1.27          PTT - ( 30 Jan 2021 06:14 )  PTT:38.0 sec    01-30    141  |  101  |  45<H>  ----------------------------<  66<L>  3.9   |  25  |  5.81<H>    Ca    9.2      30 Jan 2021 06:14  Mg     2.1     01-30      Discharge CXR:  < from: Xray Chest 1 View- PORTABLE-Routine (Xray Chest 1 View- PORTABLE-Routine in AM.) (01.30.21 @ 04:23) >    INTERPRETATION:  Clinical History: Postop    Frontal examination of the chest demonstrates no interval change lung pathology in comparison to prior examination of the chest 1/29/2021. The heart is within normal limits in transverse diameter.    IMPRESSION: No interval change lung pathology    < end of copied text >

## 2021-01-30 NOTE — PROGRESS NOTE ADULT - PROVIDER SPECIALTY LIST ADULT
Heart Failure
Nephrology
Orthopedics
Thoracic Surgery
Cardiology
Critical Care
Heart Failure
Infectious Disease
Nephrology
Nephrology
Orthopedics
Orthopedics
Thoracic Surgery
CT Surgery
Critical Care
Infectious Disease
Nephrology
Surgery
Thoracic Surgery
Nephrology
Nephrology
Surgery
Thoracic Surgery
Heart Failure
Pulmonology
Pulmonology
Internal Medicine

## 2021-01-30 NOTE — PROGRESS NOTE ADULT - ASSESSMENT
Follow Up:  Care Providers for Follow up (PCP/Outpatient Provider)	Orlando Harmon)  Surgery  [address] [place] [address]  Phone: [phone]  Fax: [phone]  Scheduled Appointment: 02/08/2021 09:45 AM    RICK MARTINES  Cardiology  Phone: [phone]  Fax: [phone]  Scheduled Appointment: 02/04/2021 02:00 PM    Ann-Marie Denton)  Internal Medicine; Nephrology  [address], [address]  [place] [address]  Phone: [phone]  Fax: [phone]  Follow Up Time: 2 weeks  Patient's Scheduled Appointments	MARCI VEGA ; 02/01/2021 ; [facility] [address] [place] [address] ; 02/04/2021 ; [facility] Cardio [facility] [address] ; 02/08/2021 ; [facility] [place] [address] ; 03/01/2021 ; [facility] Heart[facility] [address]  [facility] ; 03/01/2021 ; NPP Nephro [address] [place] [address] ; 03/04/2021 ; [facility] Surg [facility] [address]  Additional Scheduled Appointments	-Please follow up with Dr. Harmon on 2/8/21 @9:45am.  The office is located at [facility], 4th floor. Call us with any questions  #[phone].    -Please follow up with NP Rick Martines (heart failure) on 2/4/21 @2pm.  Her office is located at [facility], 9th floor.    -Please schedule an appointment with Dr. Denton (nephrologist) within 1-2 weeks.  Activity	Do not drive or operate machinery, No heavy lifting/straining, Showering allowed, Stairs allowed, Walking - Indoors allowed, Walking - Outdoors allowed

## 2021-02-03 DIAGNOSIS — I31.0 CHRONIC ADHESIVE PERICARDITIS: ICD-10-CM

## 2021-02-03 DIAGNOSIS — D63.1 ANEMIA IN CHRONIC KIDNEY DISEASE: ICD-10-CM

## 2021-02-03 DIAGNOSIS — Z90.49 ACQUIRED ABSENCE OF OTHER SPECIFIED PARTS OF DIGESTIVE TRACT: ICD-10-CM

## 2021-02-03 DIAGNOSIS — C91.50 ADULT T-CELL LYMPHOMA/LEUKEMIA (HTLV-1-ASSOCIATED) NOT HAVING ACHIEVED REMISSION: ICD-10-CM

## 2021-02-03 DIAGNOSIS — Z98.890 OTHER SPECIFIED POSTPROCEDURAL STATES: ICD-10-CM

## 2021-02-03 DIAGNOSIS — I48.91 UNSPECIFIED ATRIAL FIBRILLATION: ICD-10-CM

## 2021-02-03 DIAGNOSIS — I13.2 HYPERTENSIVE HEART AND CHRONIC KIDNEY DISEASE WITH HEART FAILURE AND WITH STAGE 5 CHRONIC KIDNEY DISEASE, OR END STAGE RENAL DISEASE: ICD-10-CM
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
## 2021-02-03 DIAGNOSIS — J98.59 OTHER DISEASES OF MEDIASTINUM, NOT ELSEWHERE CLASSIFIED: ICD-10-CM

## 2021-02-03 DIAGNOSIS — S80.02XA CONTUSION OF LEFT KNEE, INITIAL ENCOUNTER: ICD-10-CM

## 2021-02-03 DIAGNOSIS — J90 PLEURAL EFFUSION, NOT ELSEWHERE CLASSIFIED: ICD-10-CM
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
## 2021-02-03 DIAGNOSIS — L03.116 CELLULITIS OF LEFT LOWER LIMB: ICD-10-CM

## 2021-02-03 DIAGNOSIS — I27.20 PULMONARY HYPERTENSION, UNSPECIFIED: ICD-10-CM

## 2021-02-03 DIAGNOSIS — N40.0 BENIGN PROSTATIC HYPERPLASIA WITHOUT LOWER URINARY TRACT SYMPTOMS: ICD-10-CM

## 2021-02-03 DIAGNOSIS — J94.2 HEMOTHORAX: ICD-10-CM

## 2021-02-03 DIAGNOSIS — Z87.891 PERSONAL HISTORY OF NICOTINE DEPENDENCE: ICD-10-CM

## 2021-02-03 DIAGNOSIS — N18.5 CHRONIC KIDNEY DISEASE, STAGE 5: ICD-10-CM

## 2021-02-03 DIAGNOSIS — I50.22 CHRONIC SYSTOLIC (CONGESTIVE) HEART FAILURE: ICD-10-CM

## 2021-02-03 DIAGNOSIS — Z79.01 LONG TERM (CURRENT) USE OF ANTICOAGULANTS: ICD-10-CM

## 2021-02-03 DIAGNOSIS — I34.0 NONRHEUMATIC MITRAL (VALVE) INSUFFICIENCY: ICD-10-CM

## 2021-02-03 DIAGNOSIS — J43.2 CENTRILOBULAR EMPHYSEMA: ICD-10-CM

## 2021-02-08 ENCOUNTER — OUTPATIENT (OUTPATIENT)
Dept: OUTPATIENT SERVICES | Facility: HOSPITAL | Age: 66
LOS: 1 days | End: 2021-02-08
Payer: MEDICARE

## 2021-02-08 ENCOUNTER — APPOINTMENT (OUTPATIENT)
Dept: THORACIC SURGERY | Facility: CLINIC | Age: 66
End: 2021-02-08
Payer: MEDICARE

## 2021-02-08 VITALS
RESPIRATION RATE: 17 BRPM | OXYGEN SATURATION: 95 % | DIASTOLIC BLOOD PRESSURE: 60 MMHG | BODY MASS INDEX: 21.33 KG/M2 | WEIGHT: 149 LBS | HEIGHT: 70 IN | HEART RATE: 56 BPM | TEMPERATURE: 96.9 F | SYSTOLIC BLOOD PRESSURE: 112 MMHG

## 2021-02-08 DIAGNOSIS — Z90.49 ACQUIRED ABSENCE OF OTHER SPECIFIED PARTS OF DIGESTIVE TRACT: Chronic | ICD-10-CM

## 2021-02-08 DIAGNOSIS — Z41.9 ENCOUNTER FOR PROCEDURE FOR PURPOSES OTHER THAN REMEDYING HEALTH STATE, UNSPECIFIED: Chronic | ICD-10-CM

## 2021-02-08 PROCEDURE — 71046 X-RAY EXAM CHEST 2 VIEWS: CPT

## 2021-02-08 PROCEDURE — 99024 POSTOP FOLLOW-UP VISIT: CPT

## 2021-02-08 PROCEDURE — 71046 X-RAY EXAM CHEST 2 VIEWS: CPT | Mod: 26

## 2021-02-09 ENCOUNTER — APPOINTMENT (OUTPATIENT)
Dept: NEPHROLOGY | Facility: CLINIC | Age: 66
End: 2021-02-09
Payer: MEDICARE

## 2021-02-09 VITALS — DIASTOLIC BLOOD PRESSURE: 68 MMHG | SYSTOLIC BLOOD PRESSURE: 137 MMHG | HEART RATE: 64 BPM

## 2021-02-09 PROCEDURE — 99214 OFFICE O/P EST MOD 30 MIN: CPT

## 2021-02-09 PROCEDURE — 99072 ADDL SUPL MATRL&STAF TM PHE: CPT

## 2021-02-10 LAB
CULTURE RESULTS: NO GROWTH — SIGNIFICANT CHANGE UP
SPECIMEN SOURCE: SIGNIFICANT CHANGE UP

## 2021-02-10 NOTE — PHYSICAL EXAM
[General Appearance - Alert] : alert [General Appearance - In No Acute Distress] : in no acute distress [General Appearance - Well Nourished] : well nourished [General Appearance - Well Developed] : well developed [Extraocular Movements] : extraocular movements were intact [Heart Sounds Gallop] : no gallops [Murmurs] : no murmurs [Heart Sounds Pericardial Friction Rub] : no pericardial rub [Cervical Lymph Nodes Enlarged Anterior Bilaterally] : anterior cervical [Supraclavicular Lymph Nodes Enlarged Bilaterally] : supraclavicular [Abnormal Walk] : normal gait [___ (cm) Fistula] : [unfilled] (cm) fistula [Bruit] : a bruit was present [Thrill] : a thrill was present [Skin Turgor] : normal skin turgor [] : no rash [Oriented To Time, Place, And Person] : oriented to person, place, and time [Impaired Insight] : insight and judgment were intact [Affect] : the affect was normal [Memory Recent] : recent memory was not impaired [FreeTextEntry1] : left forearm

## 2021-02-10 NOTE — ASSESSMENT
[FreeTextEntry1] : Reviewed all labs in detail with patient and his wife from 2/4/2021\par 66 yo man with CKD 5, hyperuricemia, CHF, T cell lymphoma, metabolic acidosis\par \par -CKD 5- not overtly uremia- left arm AVF mature and functional- okay to hold off on starting HD for now B/creat  60/6.61; K 4.5; CO2- 26\par will go to in-center HD when needed at [facility] dialysis 6th [place]\par aware that he will need to begin HD when symptoms develop\par -metabolic acidosis- CO2  stable- defer NaHCO3 until CO2 < 20\par -dCHF- compensated using torsemide\par - HTN- BP at goal today- c/w present regimen; will forward home readings-  consider increasing doxazosin if home BPs up\par also to consider changed hydralazine\par -Metabolic bone disease- c/w Calcitriol 0.5 mcg po daily;  repeat PTH for next set of labs; Ca/P okay-\par - hyperuricemia/gout -  no attacks- continues to have hyperuricemia due to lymphoma- c/w high dose allopurinol and use of colchicine daily \par - LE edema controlled, c/w torsemide\par -proteinuria -low grade- defer RAAS blockade as advanced CKD- \par -Afib  c/w eliquis\par \par f/u in 2 months

## 2021-02-10 NOTE — REASON FOR VISIT
[Follow-Up] : a follow-up visit [Spouse] : spouse [FreeTextEntry1] : for CKD 5, HTN,  and hyperuricemia

## 2021-02-15 LAB — SURGICAL PATHOLOGY STUDY: SIGNIFICANT CHANGE UP

## 2021-02-16 ENCOUNTER — NON-APPOINTMENT (OUTPATIENT)
Age: 66
End: 2021-02-16

## 2021-02-16 ENCOUNTER — APPOINTMENT (OUTPATIENT)
Dept: HEART AND VASCULAR | Facility: CLINIC | Age: 66
End: 2021-02-16
Payer: MEDICARE

## 2021-02-16 ENCOUNTER — LABORATORY RESULT (OUTPATIENT)
Age: 66
End: 2021-02-16

## 2021-02-16 VITALS
HEIGHT: 71 IN | BODY MASS INDEX: 21.28 KG/M2 | OXYGEN SATURATION: 96 % | TEMPERATURE: 97.5 F | HEART RATE: 62 BPM | WEIGHT: 152 LBS

## 2021-02-16 VITALS — BODY MASS INDEX: 20.64 KG/M2 | WEIGHT: 148 LBS

## 2021-02-16 VITALS — DIASTOLIC BLOOD PRESSURE: 65 MMHG | SYSTOLIC BLOOD PRESSURE: 125 MMHG

## 2021-02-16 PROCEDURE — 99072 ADDL SUPL MATRL&STAF TM PHE: CPT

## 2021-02-16 PROCEDURE — 93000 ELECTROCARDIOGRAM COMPLETE: CPT

## 2021-02-16 PROCEDURE — 99214 OFFICE O/P EST MOD 30 MIN: CPT | Mod: 25

## 2021-02-16 NOTE — PHYSICAL EXAM
[General Appearance - Well Developed] : well developed [Normal Appearance] : normal appearance [Well Groomed] : well groomed [General Appearance - Well Nourished] : well nourished [General Appearance - In No Acute Distress] : no acute distress [Eyelids - No Xanthelasma] : the eyelids demonstrated no xanthelasmas [] : no respiratory distress [Respiration, Rhythm And Depth] : normal respiratory rhythm and effort [Auscultation Breath Sounds / Voice Sounds] : lungs were clear to auscultation bilaterally [Bowel Sounds] : normal bowel sounds [Abdomen Tenderness] : non-tender [Abdomen Soft] : soft [Abnormal Walk] : normal gait [FreeTextEntry1] : L arm AV fistula, trace JAVIER L>R [Skin Color & Pigmentation] : normal skin color and pigmentation [Skin Turgor] : normal skin turgor [Oriented To Time, Place, And Person] : oriented to person, place, and time [Impaired Insight] : insight and judgment were intact [Affect] : the affect was normal [No Anxiety] : not feeling anxious

## 2021-02-16 NOTE — REASON FOR VISIT
[Follow-Up - From Hospitalization] : follow-up of a recent hospitalization for [Heart Failure] : congestive heart failure [FreeTextEntry1] : PATIENT INSTRUCTIONS:\par \par 1. Labs today.\par \par 2. Keep all of your medications the same. \par \par 3. Please wear compression stockings. \par \par 4. Follow up in 1 month.

## 2021-02-16 NOTE — DISCUSSION/SUMMARY
[FreeTextEntry1] : 66 yo M with HFpEF (EF 65%) , Afib, CKD V who is s/o LVATS, RA decortication with blow hole placement for SQ emphysema (1/21) who presents for hospital follow up. He is ACC/AHA Stage C, NYHA Class II-III, euvolemic on exam. I have recommended the following:\par \par 1. HFpEF- Stable and compensated. Continue Toprol XL & Torsemide 20mg daily. Check labs today. Educated on daily weights, diet & exercise. \par \par 2. Afib- Rate controlled on Eliquis and Amiodarone. \par \par 3. CKD V- Followed by Dr. Guadalupe. Has L arm AV fistula but not yet on HD. \par \par 4. LVH- Patient has thick walls (SW 1.5cm, PW 1.30cm), DYANA and borderline low voltage. Will send serum light chains to check for Amyloid. \par \par 5. Follow up in 1 month.

## 2021-02-16 NOTE — HISTORY OF PRESENT ILLNESS
[FreeTextEntry1] : 66 y/o M with HFpEF initially met but HF team on recent admission at [facility] and now presents for follow up. Other PMH is notable for HTN, COPD, CKD (stage 5 CKD, has LUE AVF, not on HD yet), T-cell lymphoma (diagnosed 19 years ago, on chemo romidepsin every Wednesday (not since Nov)), AF (on Eliquis), systolic CHF (EF 40-45%), severe pHTN, severe MR, who recently underwent a left chest wall hematoma evacuation on 12/18/2020 secondary to loculated effusion. Hospital course at that time was complicated by AF with RVR and thoracic surgery consulted at that time for surgical intervention of trapped lung. He followed up as an outpatient after discharge and deemed a good surgical candidate for lung decortication. On 1/18/21 patient presented to [facility] for pre-operative optimization with heparin gtt prior to OR. Upon admission pt noted to have erythema and swelling to his L knee, CT scan demonstrated cellulitis, operation was deferred. Ortho, Gen Surg and ID were consulted, pt with no drainable collection and was medically managed with resolution of infection. Nephrology was consulted for assistance in managing CKD stage 4. On 1/22 he underwent an uncomplicated L VATS, RA decortication with blow hole placement for subcutaneous emphysema. Intraop findings significant for loculated hemothorax. He received 3UPRBC in the OR and post operatively was brought to the CTICU stable and intubated with 3 chest tubes in place. He was extubated POD1 and required primacor for pulmonary HTN and renal perfusion. On POD 2 a right sided pigtail was placed for pleural effusion which drained 800cc and ultimately removed on POD 4. Cardiology was consulted for management of pulmonary hypertension and CHF, recommended IV diuresis. He required alternating BiPAP and HFNC which was weaned to NC. Primacor was weaned off on POD 4 and she was transferred to the stepdown unit. Heparin gtt was started for AC but was held for bleeding around the CT site. A CT chest noncon was preformed showing post surgical changes, no concern for hemothorax and heparin gtt resumed. On POD 5 first CT was removed. Bronch with no abnormal findings, Second chest tube subsequently removed and added nifedipine Xl 30mg for BP control on POD 6. POD 7 third chest tube removed, post chest tube removal xray showed no pneumothorax. Patient doing well today, POD8 and medically appropriate for discharge.\par \par Since Discharge, he feels okay overall. He has been getting home PT 2x a week and feels he is getting stronger. His breathing is good and has been using his incentive Spirometer. He can walk one [place] before having to stop because of fatigue, and not his breathing. He can walk up one flight of stairs, slowly. He denies CP,SOB at rest, orthopnea, PND, LH/dizziness, abdominal discomfort, palpitations and syncope. His appetite is normal and bowel and bladder habits are unchanged. He does not use excess salt but has not been limiting his fluid. \par \par

## 2021-02-17 LAB
ANION GAP SERPL CALC-SCNC: 16 MMOL/L
BUN SERPL-MCNC: 48 MG/DL
CALCIUM SERPL-MCNC: 9.4 MG/DL
CHLORIDE SERPL-SCNC: 107 MMOL/L
CO2 SERPL-SCNC: 21 MMOL/L
CREAT SERPL-MCNC: 5.51 MG/DL
DEPRECATED KAPPA LC FREE/LAMBDA SER: 1.27 RATIO
GLUCOSE SERPL-MCNC: 74 MG/DL
KAPPA LC CSF-MCNC: 20.9 MG/DL
KAPPA LC SERPL-MCNC: 26.55 MG/DL
M PROTEIN SPEC IFE-MCNC: NORMAL
MAGNESIUM SERPL-MCNC: 2.2 MG/DL
NT-PROBNP SERPL-MCNC: ABNORMAL PG/ML
POTASSIUM SERPL-SCNC: 4.6 MMOL/L
SODIUM SERPL-SCNC: 143 MMOL/L

## 2021-02-22 ENCOUNTER — APPOINTMENT (OUTPATIENT)
Dept: THORACIC SURGERY | Facility: CLINIC | Age: 66
End: 2021-02-22
Payer: MEDICARE

## 2021-02-22 ENCOUNTER — OUTPATIENT (OUTPATIENT)
Dept: OUTPATIENT SERVICES | Facility: HOSPITAL | Age: 66
LOS: 1 days | End: 2021-02-22
Payer: MEDICARE

## 2021-02-22 DIAGNOSIS — Z90.49 ACQUIRED ABSENCE OF OTHER SPECIFIED PARTS OF DIGESTIVE TRACT: Chronic | ICD-10-CM

## 2021-02-22 DIAGNOSIS — Z41.9 ENCOUNTER FOR PROCEDURE FOR PURPOSES OTHER THAN REMEDYING HEALTH STATE, UNSPECIFIED: Chronic | ICD-10-CM

## 2021-02-22 LAB — IGA 24H UR QL IFE: NORMAL

## 2021-02-22 PROCEDURE — 99024 POSTOP FOLLOW-UP VISIT: CPT

## 2021-02-22 PROCEDURE — 71046 X-RAY EXAM CHEST 2 VIEWS: CPT | Mod: 26

## 2021-02-22 PROCEDURE — 71046 X-RAY EXAM CHEST 2 VIEWS: CPT

## 2021-03-01 ENCOUNTER — APPOINTMENT (OUTPATIENT)
Dept: HEART AND VASCULAR | Facility: CLINIC | Age: 66
End: 2021-03-01
Payer: MEDICARE

## 2021-03-01 ENCOUNTER — APPOINTMENT (OUTPATIENT)
Dept: NEPHROLOGY | Facility: CLINIC | Age: 66
End: 2021-03-01

## 2021-03-01 ENCOUNTER — APPOINTMENT (OUTPATIENT)
Dept: PULMONOLOGY | Facility: CLINIC | Age: 66
End: 2021-03-01

## 2021-03-01 ENCOUNTER — NON-APPOINTMENT (OUTPATIENT)
Age: 66
End: 2021-03-01

## 2021-03-01 VITALS
WEIGHT: 140.99 LBS | HEIGHT: 71 IN | BODY MASS INDEX: 19.74 KG/M2 | TEMPERATURE: 97.6 F | HEART RATE: 69 BPM | DIASTOLIC BLOOD PRESSURE: 68 MMHG | SYSTOLIC BLOOD PRESSURE: 130 MMHG | OXYGEN SATURATION: 90 %

## 2021-03-01 PROCEDURE — 99214 OFFICE O/P EST MOD 30 MIN: CPT

## 2021-03-01 PROCEDURE — 93000 ELECTROCARDIOGRAM COMPLETE: CPT

## 2021-03-01 PROCEDURE — 99072 ADDL SUPL MATRL&STAF TM PHE: CPT

## 2021-03-01 NOTE — PHYSICAL EXAM
[Respiration, Rhythm And Depth] : normal respiratory rhythm and effort [Exaggerated Use Of Accessory Muscles For Inspiration] : no accessory muscle use [Auscultation Breath Sounds / Voice Sounds] : lungs were clear to auscultation bilaterally [Heart Rate And Rhythm] : heart rate and rhythm were normal [Heart Sounds] : normal S1 and S2 [Abdomen Soft] : soft [Abdomen Tenderness] : non-tender [Abdomen Mass (___ Cm)] : no abdominal mass palpated [Abnormal Walk] : normal gait [Gait - Sufficient For Exercise Testing] : the gait was sufficient for exercise testing [Nail Clubbing] : no clubbing of the fingernails [Cyanosis, Localized] : no localized cyanosis [Petechial Hemorrhages (___cm)] : no petechial hemorrhages [Skin Color & Pigmentation] : normal skin color and pigmentation [] : no rash [No Venous Stasis] : no venous stasis [Skin Lesions] : no skin lesions [No Skin Ulcers] : no skin ulcer [No Xanthoma] : no  xanthoma was observed [General Appearance - Well Developed] : well developed [Normal Appearance] : normal appearance [Well Groomed] : well groomed [No Deformities] : no deformities [General Appearance - In No Acute Distress] : no acute distress [Normal Conjunctiva] : the conjunctiva exhibited no abnormalities [Eyelids - No Xanthelasma] : the eyelids demonstrated no xanthelasmas [FreeTextEntry1] : no edema, AVF left arm

## 2021-03-01 NOTE — ASSESSMENT
[FreeTextEntry1] : PreOp- Pt for decortication.  Cleared to proceed.  Would stop Eliquis 3 days prior to surgery with surgery on the 4th day. See beginning of this note for entire hospital stay.\par \par Systolic CHF - EF recovered, normal on June 2019 echo.  Secondary to chemo?  Then 40-45% on  admission this in Dec, then improved to 60-65 on last admission in January..  Doxazosin not ideal.\par \par MR- Was severe, now mild to moderate and EF improved.  Followed by Dr Cui\par \par Afib - in NSR today, on Eliquis and Amio, will need to see Pulm to be monitored for Amio Pulm toxicity\par \par HTN- Stable\par \par HLD- not on statin\par \par T Cell Lymphoma - Was on chemo, finished Nov 2020 \par \par CKD- Dr Denton, close to starting HD\par  \par \par \par \par

## 2021-03-01 NOTE — REVIEW OF SYSTEMS
[Recent Weight Gain (___ Lbs)] : recent [unfilled] ~Ulb weight gain [Lower Ext Edema] : lower extremity edema [Negative] : Heme/Lymph [Feeling Fatigued] : feeling fatigued

## 2021-03-01 NOTE — HISTORY OF PRESENT ILLNESS
[FreeTextEntry1] : 64 y/o M with HFpEF initially met but HF team on recent admission at [facility] and now presents for follow up. Other PMH is notable for HTN, COPD, CKD (stage 5 CKD, has LUE AVF, not on HD yet), T-cell lymphoma (diagnosed 19 years ago, on chemo romidepsin every Wednesday (not since Nov)), AF (on Eliquis), systolic CHF (EF 40-45%), EF was 60-65 in 2019,severe pHTN, severe MR, who recently underwent a left chest wall hematoma evacuation on 12/18/2020 secondary to loculated effusion. Hospital course at that time was complicated by AF with RVR and thoracic surgery consulted at that time for surgical intervention of trapped lung. He followed up as an outpatient after discharge and deemed a good surgical candidate for lung decortication. On 1/18/21 patient presented to [facility] for pre-operative optimization with heparin gtt prior to OR. Upon admission pt noted to have erythema and swelling to his L knee, CT scan demonstrated cellulitis, operation was deferred. Ortho, Gen Surg and ID were consulted, pt with no drainable collection and was medically managed with resolution of infection. Nephrology was consulted for assistance in managing CKD stage 4. On 1/22 he underwent an uncomplicated L VATS, RA decortication with blow hole placement for subcutaneous emphysema. Intraop findings significant for loculated hemothorax. He received 3UPRBC in the OR and post operatively was brought to the CTICU stable and intubated with 3 chest tubes in place. He was extubated POD1 and required primacor for pulmonary HTN and renal perfusion. On POD 2 a right sided pigtail was placed for pleural effusion which drained 800cc and ultimately removed on POD 4. Cardiology was consulted for management of pulmonary hypertension and CHF, recommended IV diuresis. He required alternating BiPAP and HFNC which was weaned to NC. Primacor was weaned off on POD 4 and she was transferred to the stepdown unit. Heparin gtt was started for AC but was held for bleeding around the CT site. A CT chest noncon was preformed showing post surgical changes, no concern for hemothorax and heparin gtt resumed. On POD 5 first CT was removed. Bronch with no abnormal findings, Second chest tube subsequently removed and added nifedipine Xl 30mg for BP control on POD 6. POD 7 third chest tube removed, post chest tube removal xray showed no pneumothorax. Patient doing well today, POD8 and medically appropriate for discharge.\par \par \par 12/28/20  In [facility] 12/17- 12/23 with a chest wall hematoma, Rapid AF.  EF good but severe MR and TR.  In NSR by exam and EKG .  I was later informed he might need a decortification of his pleura(left). \par 3/1/21 Hospitalized for VATS 1/2021 as above. BNP 15,500, Cr 5.5,  Echo was done 1/2021, EF back to NL and MR mild to moderate, mod to severe TR and PAP 67.   Got the Covid Vaccine.  Here he is in NSR and generally doing well considering how complex he is.\par \par EKG: NSR with 1st degree AVB. No ST-Tw abnormalities. 12/28/20\par EKG: NSR, IVCD, PRWP, 3/1/21\par \par

## 2021-03-01 NOTE — REASON FOR VISIT
[Follow-Up - Clinic] : a clinic follow-up of [Cardiomyopathy] : cardiomyopathy [FreeTextEntry1] : Onc- Dr Vel Dimas  [phone]\par  Renal- Dr Denton\par Struc Hrt- Dr Cui\par HF- Hilaria Nunez

## 2021-03-04 ENCOUNTER — APPOINTMENT (OUTPATIENT)
Dept: VASCULAR SURGERY | Facility: CLINIC | Age: 66
End: 2021-03-04
Payer: MEDICARE

## 2021-03-04 PROCEDURE — 99213 OFFICE O/P EST LOW 20 MIN: CPT

## 2021-03-04 PROCEDURE — 93990 DOPPLER FLOW TESTING: CPT

## 2021-03-04 PROCEDURE — 99072 ADDL SUPL MATRL&STAF TM PHE: CPT

## 2021-03-06 NOTE — HISTORY OF PRESENT ILLNESS
[FreeTextEntry1] : 65 year old male s/p left arm radiocepahlic AVF 6/12/2020. Followed by Dr. Guadalupe, not currently on dialysis. Getting chemo weekly, does have some dizziness. Recently saw him for spontaneous chest wall hematoma and underwent hematoma evac on 12/18/20, completely healed. \par \par He is here for AVF check, saw Dr. Guadalupe Feb 9 who discussed dialysis soon.

## 2021-03-06 NOTE — PHYSICAL EXAM
[Respiratory Effort] : normal respiratory effort [Normal Heart Sounds] : normal heart sounds [2+] : left 2+ [Calm] : calm [de-identified] : well appearing [de-identified] : Left arm AVF is well healing, palpable veil, good thrill.

## 2021-03-08 ENCOUNTER — NON-APPOINTMENT (OUTPATIENT)
Age: 66
End: 2021-03-08

## 2021-03-08 ENCOUNTER — APPOINTMENT (OUTPATIENT)
Dept: HEART AND VASCULAR | Facility: CLINIC | Age: 66
End: 2021-03-08
Payer: MEDICARE

## 2021-03-08 VITALS
HEART RATE: 48 BPM | HEIGHT: 71 IN | BODY MASS INDEX: 20.3 KG/M2 | TEMPERATURE: 96.8 F | WEIGHT: 145 LBS | SYSTOLIC BLOOD PRESSURE: 106 MMHG | DIASTOLIC BLOOD PRESSURE: 64 MMHG

## 2021-03-08 PROCEDURE — 99072 ADDL SUPL MATRL&STAF TM PHE: CPT

## 2021-03-08 PROCEDURE — 93000 ELECTROCARDIOGRAM COMPLETE: CPT

## 2021-03-08 PROCEDURE — 99214 OFFICE O/P EST MOD 30 MIN: CPT | Mod: 25

## 2021-03-09 ENCOUNTER — APPOINTMENT (OUTPATIENT)
Dept: NEPHROLOGY | Facility: CLINIC | Age: 66
End: 2021-03-09
Payer: MEDICARE

## 2021-03-09 VITALS — SYSTOLIC BLOOD PRESSURE: 96 MMHG | DIASTOLIC BLOOD PRESSURE: 62 MMHG | HEART RATE: 54 BPM

## 2021-03-09 PROCEDURE — 99214 OFFICE O/P EST MOD 30 MIN: CPT

## 2021-03-09 PROCEDURE — 99072 ADDL SUPL MATRL&STAF TM PHE: CPT

## 2021-03-09 RX ORDER — NIFEDIPINE 30 MG/1
30 TABLET, FILM COATED, EXTENDED RELEASE ORAL
Qty: 30 | Refills: 0 | Status: DISCONTINUED | COMMUNITY
Start: 2021-01-30

## 2021-03-09 RX ORDER — PANTOPRAZOLE 40 MG/1
40 TABLET, DELAYED RELEASE ORAL
Qty: 30 | Refills: 0 | Status: DISCONTINUED | COMMUNITY
Start: 2021-01-30

## 2021-03-09 RX ORDER — DOXAZOSIN 4 MG/1
4 TABLET ORAL
Qty: 180 | Refills: 0 | Status: DISCONTINUED | COMMUNITY
Start: 2021-01-25

## 2021-03-09 RX ORDER — METOPROLOL TARTRATE 50 MG/1
50 TABLET, FILM COATED ORAL
Qty: 60 | Refills: 0 | Status: DISCONTINUED | COMMUNITY
Start: 2021-01-30

## 2021-03-09 RX ORDER — AMIODARONE HYDROCHLORIDE 400 MG/1
400 TABLET ORAL
Qty: 9 | Refills: 0 | Status: DISCONTINUED | COMMUNITY
Start: 2020-12-23

## 2021-03-09 RX ORDER — AMIODARONE HYDROCHLORIDE 200 MG/1
200 TABLET ORAL DAILY
Qty: 90 | Refills: 3 | Status: DISCONTINUED | COMMUNITY
End: 2021-03-09

## 2021-03-09 NOTE — DISCUSSION/SUMMARY
[FreeTextEntry1] : Mr. Gamez is a 65 year-old gentleman with paroxysmal atrial fibrillation.  He is maintaining sinus rhythm on ECG today and has been unaware of any palpitations or symptoms of AFib.  Advised to discontinue Amiodarone.  We will consider a long-term monitor at his next visit.  Advised to continue anticoagulation for TE/CVA prophylaxis.  He was asked to return for follow-up in six months or sooner as needed.

## 2021-03-09 NOTE — HISTORY OF PRESENT ILLNESS
[FreeTextEntry1] : Mr. Gamez is a pleasant 65 year-old gentleman with a past medical history significant for HTN, COPD, CKD (stage 4 CKD, has LUE AVF, not on HD yet), T-cell lymphoma (diagnosed 19 years ago, on chemo romidepsin every \par Wednesday), systolic CHF (EF 40-45%), severe pHTN, severe MR, atrial fibrillation, who underwent a left chest wall hematoma evacuation on 12/18/2020 secondary to loculated effusion. Hospital course at that time was complicated by AF with RVR.  He is now s/p left VATS, RA decortication on 1/22/21.  He has been maintained on Amiodarone for maintenance of sinus rhythm. \par \par By his report he has never undergone DCCV, ablation or previously been on AAD therapy.  He denies any active CP, palpitations, dizziness, presyncope or syncope.  Tolerating anticoagulation without any bleeding issues.

## 2021-03-09 NOTE — PHYSICAL EXAM
[General Appearance - Well Developed] : well developed [Normal Appearance] : normal appearance [Well Groomed] : well groomed [General Appearance - Well Nourished] : well nourished [No Deformities] : no deformities [General Appearance - In No Acute Distress] : no acute distress [Normal Conjunctiva] : the conjunctiva exhibited no abnormalities [Eyelids - No Xanthelasma] : the eyelids demonstrated no xanthelasmas [Normal Oral Mucosa] : normal oral mucosa [No Oral Pallor] : no oral pallor [No Oral Cyanosis] : no oral cyanosis [Normal Jugular Venous A Waves Present] : normal jugular venous A waves present [Normal Jugular Venous V Waves Present] : normal jugular venous V waves present [No Jugular Venous Loyola A Waves] : no jugular venous loyola A waves [Respiration, Rhythm And Depth] : normal respiratory rhythm and effort [Exaggerated Use Of Accessory Muscles For Inspiration] : no accessory muscle use [Auscultation Breath Sounds / Voice Sounds] : lungs were clear to auscultation bilaterally [5th Left ICS - MCL] : palpated at the 5th LICS in the midclavicular line [Normal] : normal [Bradycardia] : bradycardic [Rhythm Regular] : regular [No Pitting Edema] : no pitting edema present [Abdomen Soft] : soft [Abdomen Tenderness] : non-tender [Abdomen Mass (___ Cm)] : no abdominal mass palpated [Abnormal Walk] : normal gait [Gait - Sufficient For Exercise Testing] : the gait was sufficient for exercise testing [Nail Clubbing] : no clubbing of the fingernails [Cyanosis, Localized] : no localized cyanosis [Petechial Hemorrhages (___cm)] : no petechial hemorrhages [Skin Color & Pigmentation] : normal skin color and pigmentation [] : no rash [No Venous Stasis] : no venous stasis [Skin Lesions] : no skin lesions [No Skin Ulcers] : no skin ulcer [No Xanthoma] : no  xanthoma was observed [Oriented To Time, Place, And Person] : oriented to person, place, and time [Affect] : the affect was normal [Mood] : the mood was normal [No Anxiety] : not feeling anxious

## 2021-03-11 NOTE — PHYSICAL EXAM
[General Appearance - Alert] : alert [General Appearance - In No Acute Distress] : in no acute distress [General Appearance - Well Nourished] : well nourished [General Appearance - Well Developed] : well developed [Heart Sounds Gallop] : no gallops [Murmurs] : no murmurs [Heart Sounds Pericardial Friction Rub] : no pericardial rub [Cervical Lymph Nodes Enlarged Anterior Bilaterally] : anterior cervical [Supraclavicular Lymph Nodes Enlarged Bilaterally] : supraclavicular [Abnormal Walk] : normal gait [___ (cm) Fistula] : [unfilled] (cm) fistula [Bruit] : a bruit was present [Thrill] : a thrill was present [Skin Turgor] : normal skin turgor [] : no rash [Oriented To Time, Place, And Person] : oriented to person, place, and time [Impaired Insight] : insight and judgment were intact [Affect] : the affect was normal [Memory Recent] : recent memory was not impaired [FreeTextEntry1] : left forearm

## 2021-03-11 NOTE — HISTORY OF PRESENT ILLNESS
[FreeTextEntry1] : 66 yo man for f/u evaluation of CKD 5, hypertension, hyperuricemia.\par amiodarone dc'd  by EPS yesterday\par recovered from his thoracic surgery\par 1/21/2021- LVATS, RA decortication with blow hole placement for SQ emphysema; right pleural effusion that needed pigtail drainage.\par pulm HTN- improved with diuresis\par inpt peak BUN/creat 44/5.81- did not initiate HD while in- patient\par has not needed additional chemotherapy for 8 weeks. (T-cell lymphoma)- \par no signs or symptoms of uremia- appetite good; no N or metallic taste.\par No SOB-  continues with torsemide every other day\par No recent gout attacks -- remains on high dose allopurinol and colchicine\par No CP \par Denies flank pain, dysuria, hematuria or frothy urine \par \par \par PMH: w/ PMHx of gout, atrial fibrillation on coumadin, sCHF w/ EF of 40-45%, HTN, BPH, CKD\par  T cell lymphoma  since early 2000's

## 2021-03-11 NOTE — ASSESSMENT
[FreeTextEntry1] : Reviewed all labs in detail with patient and his wife from 3/5/2021\par 66 yo man with CKD 5, hyperuricemia, CHF, T cell lymphoma, metabolic acidosis\par -CKD 5- not uremic- energy appetite okay; left arm AVF mature and functional- okay to hold off on starting HD. B/creat down a bit to 50/5.19  60/6.61; K 4.2; CO2- 25\par will go to in-center HD when needed at [facility] dialysis [address]\par -metabolic acidosis- CO2   better\par -dCHF- compensated; c/w torsemide\par - HTN- BP low today- instructed to stop nifedipine\par -Metabolic bone disease- c/w Calcitriol 0.5 mcg po daily;\par - hyperuricemia/gout -  no attacks- continues to have hyperuricemia due to lymphoma- c/w high dose allopurinol and use of colchicine daily \par - LE edema controlled, c/w torsemide\par -proteinuria -low grade- defer RAAS blockade as advanced CKD- \par -Afib  c/w eliquis\par \par f/u in 2 months

## 2021-03-16 ENCOUNTER — APPOINTMENT (OUTPATIENT)
Dept: HEART AND VASCULAR | Facility: CLINIC | Age: 66
End: 2021-03-16
Payer: MEDICARE

## 2021-03-16 ENCOUNTER — INPATIENT (INPATIENT)
Facility: HOSPITAL | Age: 66
LOS: 0 days | Discharge: HOME | DRG: 556 | End: 2021-03-17
Attending: HOSPITALIST | Admitting: STUDENT IN AN ORGANIZED HEALTH CARE EDUCATION/TRAINING PROGRAM
Payer: MEDICARE

## 2021-03-16 VITALS
BODY MASS INDEX: 20.72 KG/M2 | HEART RATE: 64 BPM | DIASTOLIC BLOOD PRESSURE: 90 MMHG | OXYGEN SATURATION: 99 % | HEIGHT: 71 IN | WEIGHT: 148 LBS | SYSTOLIC BLOOD PRESSURE: 151 MMHG

## 2021-03-16 VITALS
DIASTOLIC BLOOD PRESSURE: 88 MMHG | OXYGEN SATURATION: 100 % | RESPIRATION RATE: 16 BRPM | SYSTOLIC BLOOD PRESSURE: 166 MMHG | TEMPERATURE: 98 F | HEIGHT: 71 IN | HEART RATE: 68 BPM | WEIGHT: 147.93 LBS

## 2021-03-16 DIAGNOSIS — R63.8 OTHER SYMPTOMS AND SIGNS CONCERNING FOOD AND FLUID INTAKE: ICD-10-CM

## 2021-03-16 DIAGNOSIS — M60.9 MYOSITIS, UNSPECIFIED: ICD-10-CM

## 2021-03-16 DIAGNOSIS — I50.9 HEART FAILURE, UNSPECIFIED: ICD-10-CM

## 2021-03-16 DIAGNOSIS — N18.5 CHRONIC KIDNEY DISEASE, STAGE 5: ICD-10-CM

## 2021-03-16 DIAGNOSIS — I10 ESSENTIAL (PRIMARY) HYPERTENSION: ICD-10-CM

## 2021-03-16 DIAGNOSIS — Z41.9 ENCOUNTER FOR PROCEDURE FOR PURPOSES OTHER THAN REMEDYING HEALTH STATE, UNSPECIFIED: Chronic | ICD-10-CM

## 2021-03-16 DIAGNOSIS — I48.11 LONGSTANDING PERSISTENT ATRIAL FIBRILLATION: ICD-10-CM

## 2021-03-16 DIAGNOSIS — C85.90 NON-HODGKIN LYMPHOMA, UNSPECIFIED, UNSPECIFIED SITE: ICD-10-CM

## 2021-03-16 DIAGNOSIS — M10.9 GOUT, UNSPECIFIED: ICD-10-CM

## 2021-03-16 DIAGNOSIS — N40.0 BENIGN PROSTATIC HYPERPLASIA WITHOUT LOWER URINARY TRACT SYMPTOMS: ICD-10-CM

## 2021-03-16 DIAGNOSIS — Z90.49 ACQUIRED ABSENCE OF OTHER SPECIFIED PARTS OF DIGESTIVE TRACT: Chronic | ICD-10-CM

## 2021-03-16 LAB
ALBUMIN SERPL ELPH-MCNC: 3.3 G/DL — SIGNIFICANT CHANGE UP (ref 3.3–5)
ALP SERPL-CCNC: 101 U/L — SIGNIFICANT CHANGE UP (ref 40–120)
ALT FLD-CCNC: <5 U/L — LOW (ref 10–45)
ANION GAP SERPL CALC-SCNC: 13 MMOL/L — SIGNIFICANT CHANGE UP (ref 5–17)
APTT BLD: 45.7 SEC — HIGH (ref 27.5–35.5)
AST SERPL-CCNC: 8 U/L — LOW (ref 10–40)
BASOPHILS # BLD AUTO: 0.05 K/UL — SIGNIFICANT CHANGE UP (ref 0–0.2)
BASOPHILS NFR BLD AUTO: 0.7 % — SIGNIFICANT CHANGE UP (ref 0–2)
BILIRUB SERPL-MCNC: 0.5 MG/DL — SIGNIFICANT CHANGE UP (ref 0.2–1.2)
BLD GP AB SCN SERPL QL: NEGATIVE — SIGNIFICANT CHANGE UP
BUN SERPL-MCNC: 52 MG/DL — HIGH (ref 7–23)
CALCIUM SERPL-MCNC: 9.4 MG/DL — SIGNIFICANT CHANGE UP (ref 8.4–10.5)
CHLORIDE SERPL-SCNC: 104 MMOL/L — SIGNIFICANT CHANGE UP (ref 96–108)
CK MB CFR SERPL CALC: 1.4 NG/ML — SIGNIFICANT CHANGE UP (ref 0–6.7)
CK SERPL-CCNC: 47 U/L — SIGNIFICANT CHANGE UP (ref 30–200)
CK SERPL-CCNC: 55 U/L — SIGNIFICANT CHANGE UP (ref 30–200)
CO2 SERPL-SCNC: 26 MMOL/L — SIGNIFICANT CHANGE UP (ref 22–31)
CREAT SERPL-MCNC: 5.64 MG/DL — HIGH (ref 0.5–1.3)
EOSINOPHIL # BLD AUTO: 0.26 K/UL — SIGNIFICANT CHANGE UP (ref 0–0.5)
EOSINOPHIL NFR BLD AUTO: 3.8 % — SIGNIFICANT CHANGE UP (ref 0–6)
GLUCOSE SERPL-MCNC: 74 MG/DL — SIGNIFICANT CHANGE UP (ref 70–99)
HCT VFR BLD CALC: 32.1 % — LOW (ref 39–50)
HGB BLD-MCNC: 9.2 G/DL — LOW (ref 13–17)
IMM GRANULOCYTES NFR BLD AUTO: 0.4 % — SIGNIFICANT CHANGE UP (ref 0–1.5)
INR BLD: 1.42 — HIGH (ref 0.88–1.16)
LACTATE SERPL-SCNC: 0.8 MMOL/L — SIGNIFICANT CHANGE UP (ref 0.5–2)
LYMPHOCYTES # BLD AUTO: 1.26 K/UL — SIGNIFICANT CHANGE UP (ref 1–3.3)
LYMPHOCYTES # BLD AUTO: 18.5 % — SIGNIFICANT CHANGE UP (ref 13–44)
MCHC RBC-ENTMCNC: 27 PG — SIGNIFICANT CHANGE UP (ref 27–34)
MCHC RBC-ENTMCNC: 28.7 GM/DL — LOW (ref 32–36)
MCV RBC AUTO: 94.1 FL — SIGNIFICANT CHANGE UP (ref 80–100)
MONOCYTES # BLD AUTO: 0.76 K/UL — SIGNIFICANT CHANGE UP (ref 0–0.9)
MONOCYTES NFR BLD AUTO: 11.2 % — SIGNIFICANT CHANGE UP (ref 2–14)
NEUTROPHILS # BLD AUTO: 4.45 K/UL — SIGNIFICANT CHANGE UP (ref 1.8–7.4)
NEUTROPHILS NFR BLD AUTO: 65.4 % — SIGNIFICANT CHANGE UP (ref 43–77)
NRBC # BLD: 0 /100 WBCS — SIGNIFICANT CHANGE UP (ref 0–0)
PLATELET # BLD AUTO: 230 K/UL — SIGNIFICANT CHANGE UP (ref 150–400)
POTASSIUM SERPL-MCNC: 4 MMOL/L — SIGNIFICANT CHANGE UP (ref 3.5–5.3)
POTASSIUM SERPL-SCNC: 4 MMOL/L — SIGNIFICANT CHANGE UP (ref 3.5–5.3)
PROT SERPL-MCNC: 7.2 G/DL — SIGNIFICANT CHANGE UP (ref 6–8.3)
PROTHROM AB SERPL-ACNC: 16.8 SEC — HIGH (ref 10.6–13.6)
RBC # BLD: 3.41 M/UL — LOW (ref 4.2–5.8)
RBC # FLD: 19 % — HIGH (ref 10.3–14.5)
RH IG SCN BLD-IMP: POSITIVE — SIGNIFICANT CHANGE UP
SARS-COV-2 RNA SPEC QL NAA+PROBE: SIGNIFICANT CHANGE UP
SODIUM SERPL-SCNC: 143 MMOL/L — SIGNIFICANT CHANGE UP (ref 135–145)
TROPONIN T SERPL-MCNC: 0.02 NG/ML — HIGH (ref 0–0.01)
WBC # BLD: 6.81 K/UL — SIGNIFICANT CHANGE UP (ref 3.8–10.5)
WBC # FLD AUTO: 6.81 K/UL — SIGNIFICANT CHANGE UP (ref 3.8–10.5)

## 2021-03-16 PROCEDURE — G1004: CPT

## 2021-03-16 PROCEDURE — 99285 EMERGENCY DEPT VISIT HI MDM: CPT

## 2021-03-16 PROCEDURE — 99072 ADDL SUPL MATRL&STAF TM PHE: CPT

## 2021-03-16 PROCEDURE — 72192 CT PELVIS W/O DYE: CPT | Mod: 26,MG

## 2021-03-16 PROCEDURE — 99214 OFFICE O/P EST MOD 30 MIN: CPT

## 2021-03-16 PROCEDURE — 99223 1ST HOSP IP/OBS HIGH 75: CPT | Mod: GC

## 2021-03-16 RX ORDER — NIFEDIPINE 30 MG
30 TABLET, EXTENDED RELEASE 24 HR ORAL DAILY
Refills: 0 | Status: DISCONTINUED | OUTPATIENT
Start: 2021-03-16 | End: 2021-03-17

## 2021-03-16 RX ORDER — AMIODARONE HYDROCHLORIDE 400 MG/1
200 TABLET ORAL DAILY
Refills: 0 | Status: DISCONTINUED | OUTPATIENT
Start: 2021-03-16 | End: 2021-03-17

## 2021-03-16 RX ORDER — SODIUM BICARBONATE 1 MEQ/ML
650 SYRINGE (ML) INTRAVENOUS
Refills: 0 | Status: DISCONTINUED | OUTPATIENT
Start: 2021-03-16 | End: 2021-03-17

## 2021-03-16 RX ORDER — PANTOPRAZOLE SODIUM 20 MG/1
40 TABLET, DELAYED RELEASE ORAL
Refills: 0 | Status: DISCONTINUED | OUTPATIENT
Start: 2021-03-16 | End: 2021-03-17

## 2021-03-16 RX ORDER — METOPROLOL TARTRATE 50 MG
50 TABLET ORAL EVERY 12 HOURS
Refills: 0 | Status: DISCONTINUED | OUTPATIENT
Start: 2021-03-16 | End: 2021-03-17

## 2021-03-16 RX ORDER — DOXAZOSIN MESYLATE 4 MG
2 TABLET ORAL AT BEDTIME
Refills: 0 | Status: DISCONTINUED | OUTPATIENT
Start: 2021-03-16 | End: 2021-03-17

## 2021-03-16 RX ORDER — CALCITRIOL 0.5 UG/1
1 CAPSULE ORAL
Qty: 0 | Refills: 0 | DISCHARGE

## 2021-03-16 RX ORDER — VANCOMYCIN HCL 1 G
1000 VIAL (EA) INTRAVENOUS ONCE
Refills: 0 | Status: COMPLETED | OUTPATIENT
Start: 2021-03-16 | End: 2021-03-16

## 2021-03-16 RX ORDER — ALLOPURINOL 300 MG
100 TABLET ORAL
Refills: 0 | Status: DISCONTINUED | OUTPATIENT
Start: 2021-03-16 | End: 2021-03-17

## 2021-03-16 RX ORDER — PIPERACILLIN AND TAZOBACTAM 4; .5 G/20ML; G/20ML
3.38 INJECTION, POWDER, LYOPHILIZED, FOR SOLUTION INTRAVENOUS ONCE
Refills: 0 | Status: COMPLETED | OUTPATIENT
Start: 2021-03-16 | End: 2021-03-16

## 2021-03-16 RX ORDER — OXYCODONE AND ACETAMINOPHEN 5; 325 MG/1; MG/1
2 TABLET ORAL ONCE
Refills: 0 | Status: DISCONTINUED | OUTPATIENT
Start: 2021-03-16 | End: 2021-03-16

## 2021-03-16 RX ORDER — NIFEDIPINE 30 MG/1
30 TABLET, EXTENDED RELEASE ORAL
Qty: 90 | Refills: 3 | Status: DISCONTINUED | COMMUNITY
Start: 2021-02-16 | End: 2021-03-16

## 2021-03-16 RX ORDER — SEVELAMER CARBONATE 2400 MG/1
800 POWDER, FOR SUSPENSION ORAL EVERY 8 HOURS
Refills: 0 | Status: DISCONTINUED | OUTPATIENT
Start: 2021-03-16 | End: 2021-03-17

## 2021-03-16 RX ADMIN — Medication 50 MILLIGRAM(S): at 22:46

## 2021-03-16 RX ADMIN — PIPERACILLIN AND TAZOBACTAM 3.38 GRAM(S): 4; .5 INJECTION, POWDER, LYOPHILIZED, FOR SOLUTION INTRAVENOUS at 14:09

## 2021-03-16 RX ADMIN — Medication 100 MILLIGRAM(S): at 18:46

## 2021-03-16 RX ADMIN — Medication 30 MILLIGRAM(S): at 18:46

## 2021-03-16 RX ADMIN — OXYCODONE AND ACETAMINOPHEN 1 TABLET(S): 5; 325 TABLET ORAL at 10:50

## 2021-03-16 RX ADMIN — PANTOPRAZOLE SODIUM 40 MILLIGRAM(S): 20 TABLET, DELAYED RELEASE ORAL at 18:46

## 2021-03-16 RX ADMIN — Medication 650 MILLIGRAM(S): at 18:47

## 2021-03-16 RX ADMIN — PIPERACILLIN AND TAZOBACTAM 200 GRAM(S): 4; .5 INJECTION, POWDER, LYOPHILIZED, FOR SOLUTION INTRAVENOUS at 13:03

## 2021-03-16 RX ADMIN — Medication 250 MILLIGRAM(S): at 14:09

## 2021-03-16 RX ADMIN — Medication 20 MILLIGRAM(S): at 18:46

## 2021-03-16 RX ADMIN — AMIODARONE HYDROCHLORIDE 200 MILLIGRAM(S): 400 TABLET ORAL at 18:46

## 2021-03-16 RX ADMIN — Medication 2 MILLIGRAM(S): at 22:46

## 2021-03-16 RX ADMIN — OXYCODONE AND ACETAMINOPHEN 2 TABLET(S): 5; 325 TABLET ORAL at 13:03

## 2021-03-16 RX ADMIN — SEVELAMER CARBONATE 800 MILLIGRAM(S): 2400 POWDER, FOR SUSPENSION ORAL at 22:46

## 2021-03-16 NOTE — PHYSICAL EXAM
[General Appearance - Well Developed] : well developed [Normal Appearance] : normal appearance [Well Groomed] : well groomed [General Appearance - Well Nourished] : well nourished [General Appearance - In No Acute Distress] : no acute distress [Eyelids - No Xanthelasma] : the eyelids demonstrated no xanthelasmas [] : no respiratory distress [Respiration, Rhythm And Depth] : normal respiratory rhythm and effort [Auscultation Breath Sounds / Voice Sounds] : lungs were clear to auscultation bilaterally [Bowel Sounds] : normal bowel sounds [Abdomen Soft] : soft [Abdomen Tenderness] : non-tender [Abnormal Walk] : normal gait [Skin Color & Pigmentation] : normal skin color and pigmentation [Skin Turgor] : normal skin turgor [Oriented To Time, Place, And Person] : oriented to person, place, and time [Impaired Insight] : insight and judgment were intact [Affect] : the affect was normal [No Anxiety] : not feeling anxious [Heart Rate And Rhythm] : heart rate and rhythm were normal [Edema] : no peripheral edema present [FreeTextEntry1] : L arm AV fistula

## 2021-03-16 NOTE — H&P ADULT - ASSESSMENT
64 yo M with PMH HTN, BPH, T cell Lymphoma in remission since 11/2020, COPD, Stage 4 CKD (pending starting HD), Afib on Eliquis, severe MR, severe pulmonary HTN, CHF (EF was 40%, most recently 65-70%), L chest wall hematoma s/p evacuation (12/2020) and VATS (01/2021), presents to ED for R buttock pain that started on 3/13 64 yo M with PMH HTN, BPH, T cell Lymphoma in remission since 11/2020, COPD, Stage 5 CKD (pending starting HD), Afib on Eliquis, severe MR, severe pulmonary HTN, CHF (EF was 40%, most recently 65-70%), L chest wall hematoma s/p evacuation (12/2020) and VATS (01/2021), presents to ED for R buttock pain that started on 3/13

## 2021-03-16 NOTE — DISCUSSION/SUMMARY
[FreeTextEntry1] : 66 yo M with HFpEF (EF 65%) , Afib, CKD V who is s/o LVATS, RA decortication with blow hole placement for SQ emphysema (1/21) who is doing well since discharge.  He is ACC/AHA Stage C, NYHA Class II-III, euvolemic on exam. I have recommended the following:\par \par 1. HFpEF- Stable and compensated. Continue Toprol XL & Torsemide 20mg daily. Labs reviewed and stable. Educated on daily weights, diet & exercise. \par \par 2. Afib- Rate controlled on Eliquis. Amiodarone DCd. Followed by Dr. Herndon. \par \par 3. CKD V- Followed by Dr. Guadalupe. Has L arm AV fistula but not yet on HD. \par \par 4. R buttock pain- no visible cause or recent falls but has h/o hematomas. Very pain to touch. He is going to the ED for w/u. \par \par 5. Follow up in  2-3 months.

## 2021-03-16 NOTE — H&P ADULT - HISTORY OF PRESENT ILLNESS
66 yo M with PMH HTN, BPH, T cell Lymphoma in remission since 11/2020, COPD, Stage 4 CKD (pending starting HD), Afib on Eliquis, severe MR, severe pulmonary HTN, CHF (EF was 40%, most recently 65-70%), L chest wall hematoma s/p evacuation (12/2020) and VATS (01/2021), presents to ED for R buttock pain that started on 3/13. Pt says that he was in his normal state of health when he woke up with a tender lump on his R buttock. Denies trauma, scratch, bite to the area. Pt has not experienced fever, chills, erythema or purulence of the site. However, the lump continued to grow and cause him so much pain that he could not sit on his R buttock and needed to use a donut cushion to take his weight off the area. Because the area is not self-resolving, pt presented to ED. Pt has been on Eliquis for about 2 years, last taken yesterday night, previously was on coumadin for 17 years.     ED Course:  VS: T: 97.6, HR: 68, BP: 166/88, RR: 16, O2: 100%  Labs s/f: Hb: 9.2 (stable from previous admission), INR: 1.42, PTT: 45.7, Cr: 5.64 (at baseline)  CT pelvis: R gluteal myositis with focal hemorrhage and/or pus; cystitis  Treatment: percocet x2, vancomycin 1000mg, zosyn 3.375g 66 yo M with PMH HTN, BPH, T cell Lymphoma in remission since 11/2020, COPD, Stage 5 CKD (pending starting HD), Afib on Eliquis, severe MR, severe pulmonary HTN, CHF (EF was 40%, most recently 65-70%), L chest wall hematoma s/p evacuation (12/2020) and VATS (01/2021), presents to ED for R buttock pain that started on 3/13. Pt says that he was in his normal state of health when he woke up with a tender lump on his R buttock. Denies trauma, scratch, bite to the area. Pt has not experienced fever, chills, erythema or purulence of the site. However, the lump continued to grow and cause him so much pain that he could not sit on his R buttock and needed to use a donut cushion to take his weight off the area. Because the area is not self-resolving, pt presented to ED. Pt has been on Eliquis for about 2 years, last taken yesterday night, previously was on coumadin for 17 years.     ED Course:  VS: T: 97.6, HR: 68, BP: 166/88, RR: 16, O2: 100%  Labs s/f: Hb: 9.2 (stable from previous admission), INR: 1.42, PTT: 45.7, Cr: 5.64 (at baseline)  CT pelvis: R gluteal myositis with focal hemorrhage and/or pus; cystitis  Treatment: percocet x2, vancomycin 1000mg, zosyn 3.375g

## 2021-03-16 NOTE — ED PROVIDER NOTE - PHYSICAL EXAMINATION
VITAL SIGNS: I have reviewed nursing notes and confirm.  CONSTITUTIONAL: Well-developed; well-nourished; in no acute distress.   SKIN: R buttock swollen w/ tenderness to palpation. No overlying erythema. +induration to outer mid-buttock. No fluctuance.   HEAD:  normocephalic, atraumatic.  EYES: EOM intact; conjunctiva and sclera clear.  ENT: No nasal discharge; airway clear.   NECK: Supple; non tender.  CARD: S1, S2 normal; no murmurs, gallops, or rubs. Regular rate and rhythm.   RESP:  Clear to auscultation b/l, no wheezes, rales or rhonchi.  ABD: Normal bowel sounds; soft; non-distended; non-tender; no guarding/ rebound.  EXT: Normal ROM. No clubbing, cyanosis or edema. 2+ pulses to b/l ue/le.  NEURO: Alert, oriented, grossly unremarkable  PSYCH: Cooperative, mood and affect appropriate.

## 2021-03-16 NOTE — HISTORY OF PRESENT ILLNESS
[FreeTextEntry1] : 66 y/o M with HFpEF who presents for routine follow up. Other PMH is notable for HTN, COPD, CKD (stage 5 CKD, has LUE AVF, not on HD yet), T-cell lymphoma (diagnosed 19 years ago, on chemo romidepsin every Wednesday (not since Nov), AF (on Eliquis), systolic CHF (EF 40-45%), severe pHTN, severe MR, who recently underwent a left chest wall hematoma evacuation on 12/18/2020 secondary to loculated effusion & L VATS, RA decortication with blow hole placement (1/2021) for subQ emphysema (intraop findings c/w loculated hemothorax) requiring chest tubes and 3U PRBCs. Was DCd home 1/30/21. \par \par \par Since seen one month ago, he feels well overall.  He f/u with Dr. Guadalupe who DCd his Nifedipine 2/2 Hypotension as well as with Dr. Herndon who DCd his Amiodarone. His BP is high in the office today but he did not take his medicaitons. He finished PT last week which seemed to help him a lot. His breathing is good overall and he has stopped using the incentive Spirometer. He can now walk 1 [place] before having to stop because of fatigue, and not his breathing. He can still walk up one flight of stairs, slowly. He is complaining of pain on his R buttocks for 3 days. He describes it as a warm, swollen cyst that is similar to a hematoma he has had in the past. There is no lenny and he has had no injury. He denies CP,SOB at rest, orthopnea, PND, LH/dizziness, abdominal discomfort, palpitations and syncope. His appetite is normal and bowel and bladder habits are unchanged. He does not use excess salt but has not been limiting his fluid. \par \par

## 2021-03-16 NOTE — CONSULT NOTE ADULT - SUBJECTIVE AND OBJECTIVE BOX
This is a 64 y/o M with PMH HTN, BPH T cell Lymphoma in remission since last November COPD, Stage 4 CKD (pending starting HD), Afib on Eliquis, severe MR, severe pulmonary HTN, CHF (EF 30%), PSH: lap jameson, Creation of Left AV fistula for dialysis evacuation of R chest wall spontaneous hematoma, VATS of left chest for decortication, presents to ED this am because of R buttock pain that started on Saturday and was gradually increasing associated with a swelling of the R gluteal region, denies any discharge, trauma , fall or injection site, last time he took Eliquis was yesterday night, and he has been on that for one year and before that was on coumadin for 17 years. The pain si worse on sitting down, denies fever, chills, nausea or vomiting, no changes in bowel habits, last colonoscopy was 7 years ago and was normal.       Vital Signs Last 24 Hrs  T(C): 36.4 (16 Mar 2021 09:50), Max: 36.4 (16 Mar 2021 09:50)  T(F): 97.6 (16 Mar 2021 09:50), Max: 97.6 (16 Mar 2021 09:50)  HR: 68 (16 Mar 2021 09:50) (68 - 68)  BP: 166/88 (16 Mar 2021 09:50) (166/88 - 166/88)  BP(mean): --  RR: 16 (16 Mar 2021 09:50) (16 - 16)  SpO2: 100% (16 Mar 2021 09:50) (100% - 100%)    I&O's Summary      Physical Exam:  General: NAD, resting comfortably  HEENT: NC/AT, EOMI, normal hearing, no oral lesions, no LAD, neck supple  Pulmonary: normal resp effort, CTA-B  Cardiovascular: NSR, no murmurs  Abdominal: soft, ND/NT, no organomegaly  Gluteal region: R side is swollen, mild erythema, soft, tender to touch, no fluctuation or induration, left side is normal  Extremities:  WWP, normal strength, no clubbing/cyanosis/edema  Neuro: A/O x 3, CNs II-XII grossly intact, normal sensation, no focal deficits  Pulses: palpable distal pulses        LABS:                        9.2    6.81  )-----------( 230      ( 16 Mar 2021 10:47 )             32.1     03-16    143  |  104  |  52<H>  ----------------------------<  74  4.0   |  26  |  5.64<H>    Ca    9.4      16 Mar 2021 10:47    TPro  7.2  /  Alb  3.3  /  TBili  0.5  /  DBili  x   /  AST  8<L>  /  ALT  <5<L>  /  AlkPhos  101  03-16    PT/INR - ( 16 Mar 2021 10:47 )   PT: 16.8 sec;   INR: 1.42          PTT - ( 16 Mar 2021 10:47 )  PTT:45.7 sec    CAPILLARY BLOOD GLUCOSE        LIVER FUNCTIONS - ( 16 Mar 2021 10:47 )  Alb: 3.3 g/dL / Pro: 7.2 g/dL / ALK PHOS: 101 U/L / ALT: <5 U/L / AST: 8 U/L / GGT: x             Cultures:      RADIOLOGY & ADDITIONAL STUDIES:      < from: CT Pelvis No Cont (03.16.21 @ 11:58) >  FINDINGS: CT of the pelvis was performed without the administration of intravenous contrast. Reconstructions were performed in the sagittal and coronal planes. Comparison is made to prior study dated 8/9/2010.    Evaluation of the pelvis demonstrates marked enlargement and edematous infiltration of the right gluteus javier muscle belly with regions of high attenuation spanning 5.3 x 3.0 cm along the medial fibers, consistent with pus/proteinaceous material and/or hemorrhage. There is severe edematous infiltration of the subcutaneous tissues of the right buttock. Evaluation of the internal pelvic organs demonstrate very severe vascular calcification. There is edematous infiltration surrounding the anterior aspect of the bladder. There is diverticulosis. There is no free fluid. There is a large right hydrocele. No free fluid. Negative for pelvic adenopathy. Evaluation of the osseous structures demonstrates severe degenerative change of the lower lumbar spine and enthesopathic change of the bilateral ischial region. There is appropriate osseous mineralization.          IMPRESSION:    Severe right gluteal myositis with focal hemorrhage and/or pus medially.    Bladder cystitis.    < end of copied text >

## 2021-03-16 NOTE — H&P ADULT - PROBLEM SELECTOR PLAN 5
Hb: 9.2 on admission, stable from previous. Likely in the setting of CKD. No need to workup as no acute blood loss.

## 2021-03-16 NOTE — ED PROVIDER NOTE - OBJECTIVE STATEMENT
66 y/o M PMHx COPD, HTN, stage 4 CKD, T-cell lymphoma w/ last chemo 11/2020, Afib on Eliquis, CHF, severe pulmonary HTN, and severe MR presents to the ED c/o swelling and pain to R buttock x 4 D. The area started small and gradually became more swollen and painful. Pt now unable to sit on R buttock today and pt w/ pain w/ walking now. Denies fevers, chills, injury to area or prior abscess.

## 2021-03-16 NOTE — H&P ADULT - NSHPSOCIALHISTORY_GEN_ALL_CORE
Former smoker .5PPD x50 years, quit 6mo ago. Social alcohol. No other drug use. Independent of ADLs.

## 2021-03-16 NOTE — ED PROVIDER NOTE - PROGRESS NOTE DETAILS
CT shows edematous subcutaneous and fat infiltration with likely pus vs hemorrhage, given dose of IV abx, will consult surgery, f/u recommendations. surgery evaluated, recommend no acute surgical intervention, will admit to medicine for IV abx, monitoring

## 2021-03-16 NOTE — H&P ADULT - PROBLEM SELECTOR PLAN 2
Pt on amiodarone, metoprolol, and eliquis as outpatient. Hx of spontaneous chest wall hematoma and now presenting with spontaneous gluteal hemorrhage.   -cont amiodarone and metoprolol  -hold eliquis in the setting of bleed  -will likely need a Watchman procedure in the setting of hx of multiple spontaneous hematomas

## 2021-03-16 NOTE — H&P ADULT - ATTENDING COMMENTS
65 year old male patient with hypertension, BPH, T cell Lymphoma in remission since 11/2020, COPD, Stage 5 CKD, Afib and CHF on Eliquis, severe pulmonary HTN and recent history of left chest wall hematoma presented to ED for right buttock pain not associated with trauma and is admitted for working diagnosis of right buttock hematoma.     1. Right Buttock Hematoma  patient denies trauma however bruising present on physical exam  CT of the area show Severe right gluteal myositis with focal hemorrhage and/or pus medially.  current concern of infection is low, on physical exam no obvious port of entry for an infection is present, skin is intact, patient is afebrile, no WBC  given lack of other signs will treat as pure hematoma and patient with CKD stage 5 not on renal replacement therapy will try to avoid antibiotics if clinical suspicion is not very high to limit kidney toxicity, procal assessment will not be helpful due to possible elevation due to renal disease  d/c AC, monitor hematoma for resolution  if clinically develops fever, WBC or other signs of infection consider antimicrobial therapy    2. Afib  needs to obtain collateral from outpatient nephrologist and cardiology for AC evaluation  patient stage 5 kidney disease eliquis has not been studied in the population and given 2nd episode of significant bleed might not be a candidate for AC.

## 2021-03-16 NOTE — CONSULT NOTE ADULT - ASSESSMENT
This is a 64 y/o M with PMH HTN, BPH T cell Lymphoma in remission since last November COPD, Stage 4 CKD (pending starting HD), Afib on Eliquis, severe MR, severe pulmonary HTN, CHF (EF 30%), PSH: lap jameson, Creation of Left AV fistula for dialysis evacuation of R chest wall spontaneous hematoma, VATS of left chest for decortication, presents to ED with R gluteal pain and swelling, denies fever or chills, vitals are stable, exam tender slightly erythematous area, soft and non fluctuant in R gluteal region, WBC 6.8.    CT scan Severe right gluteal myositis with focal hemorrhage and/or pus medially.    The patient has very low suspicions for abscess collection clinically, given his exam, vitals and labs.    Discussed with Dr. Diop and chief resident:    Plan:  - Recommend admission under medicine for observation  - No acute surgical intervention is warranted as of now  - Recommend CTS consult for a watchman device given the spontaneous hematoma  - IF any clinical changes please page surgery   - Surgery team 4C will continue to follow

## 2021-03-16 NOTE — ED PROVIDER NOTE - DOMESTIC TRAVEL HIGH RISK QUESTION
No
[FreeTextEntry1] : PFTs 3/11/19 - Mildly reduced FVC and moderately reduced FEV1 with an obstructive pattern but without a significant BD response. Lung volumes were near normal with a normal diffusion capacity.\par Spirometry 3/20/19 - Moderate COPD without change from previous despite Incruse though has only been on it for 10 days.\par Spirometry 5/28/19 - Moderate COPD despite Incruse so will change to Breo 100\par PFTs 10/3/19 - Mildly reduced FVC and moderately reduced FEV1 with an obstructive pattern; lung volumes were normal except for an elevated RV with a mildly reduced diffusion capacity which corrected for alveolar volume.\par Spirometry 1/8/20 - Mildly reduced FVC and moderately reduced FEV1 with an obstructive pattern without significant change

## 2021-03-16 NOTE — REASON FOR VISIT
[Heart Failure] : congestive heart failure [Follow-Up - Clinic] : a clinic follow-up of [FreeTextEntry1] : PATIENT INSTRUCTIONS:\par \par 1. Keep all of your medications the same. \par \par 2. Follow up in 2-3 months, or earlier if needed.

## 2021-03-16 NOTE — ED ADULT NURSE NOTE - OBJECTIVE STATEMENT
66 y/o male came in c/o right side buttock pain and swelling, Pt noted significant swelling to right buttock.

## 2021-03-16 NOTE — ED PROVIDER NOTE - ATTENDING CONTRIBUTION TO CARE
64 y/o M PMHx COPD, HTN, stage 4 CKD, T-cell lymphoma w/ last chemo 11/2020, Afib on Eliquis, CHF, severe pulmonary HTN, and severe MR presents to the ED with swelling and pain to R buttock x 4 D. Deneis abd pain, n/v/f/c, rectal bleeding. No known prior surgeries in that locations. Well appearing, nad, nc/at, lung cta, heart reg, abd soft, nt, ext no gross deformity, no gross neuro deficits, Large amount of induration to R buttock, not fluctuant, will obtain labs, CT to further assess, dispo pending.

## 2021-03-16 NOTE — H&P ADULT - PROBLEM SELECTOR PLAN 3
Previous EF 40% in 12/2020, but TTE in 1/2021 with EF 65-70%.  -not currently fluid overload  -cont torsemide, metoprolol

## 2021-03-16 NOTE — H&P ADULT - NSHPLABSRESULTS_GEN_ALL_CORE
.  LABS:                         9.2    6.81  )-----------( 230      ( 16 Mar 2021 10:47 )             32.1     03-16    143  |  104  |  52<H>  ----------------------------<  74  4.0   |  26  |  5.64<H>    Ca    9.4      16 Mar 2021 10:47    TPro  7.2  /  Alb  3.3  /  TBili  0.5  /  DBili  x   /  AST  8<L>  /  ALT  <5<L>  /  AlkPhos  101  03-16    PT/INR - ( 16 Mar 2021 10:47 )   PT: 16.8 sec;   INR: 1.42          PTT - ( 16 Mar 2021 10:47 )  PTT:45.7 sec    CARDIAC MARKERS ( 16 Mar 2021 13:15 )  x     / x     / 55 U/L / x     / x            Lactate, Blood: 0.8 mmol/L (03-16 @ 13:13)      RADIOLOGY, EKG & ADDITIONAL TESTS: Reviewed.

## 2021-03-16 NOTE — ED ADULT TRIAGE NOTE - CHIEF COMPLAINT QUOTE
Pt c/o swelling to right buttock area x 3 days. Pt denies fever, chills, bleeding, drainage or any other sx.

## 2021-03-16 NOTE — H&P ADULT - NSHPPHYSICALEXAM_GEN_ALL_CORE
VITAL SIGNS:  T(C): 36.3 (03-16-21 @ 15:07), Max: 36.4 (03-16-21 @ 09:50)  T(F): 97.4 (03-16-21 @ 15:07), Max: 97.6 (03-16-21 @ 09:50)  HR: 69 (03-16-21 @ 15:07) (68 - 69)  BP: 136/80 (03-16-21 @ 15:07) (136/80 - 166/88)  BP(mean): --  RR: 16 (03-16-21 @ 15:07) (16 - 16)  SpO2: 100% (03-16-21 @ 15:07) (100% - 100%)  Wt(kg): --    PHYSICAL EXAM:    Constitutional: WDWN lying on side in bed  ENT: MMM  Neck: supple  Respiratory: CTA B/L; no W/R/R, no retractions  Cardiac: +S1/S2; systolic murmur appreciated  Gastrointestinal: soft, NT/ND; no rebound or guarding; +BSx4  Extremities: WWP, no clubbing or cyanosis; no peripheral edema  Musculoskeletal: NROM x4; no joint swelling, tenderness or erythema  Vascular: 2+ radial, DP/PT pulses B/L  Dermatologic: tender [id number] region on R gluteus without erythema, purulence, or fluctuence  Neurologic: AAOx3; CNII-XII grossly intact; no focal deficits  Psychiatric: affect and characteristics of appearance, verbalizations, behaviors are appropriate VITAL SIGNS:  T(C): 36.3 (03-16-21 @ 15:07), Max: 36.4 (03-16-21 @ 09:50)  T(F): 97.4 (03-16-21 @ 15:07), Max: 97.6 (03-16-21 @ 09:50)  HR: 69 (03-16-21 @ 15:07) (68 - 69)  BP: 136/80 (03-16-21 @ 15:07) (136/80 - 166/88)  BP(mean): --  RR: 16 (03-16-21 @ 15:07) (16 - 16)  SpO2: 100% (03-16-21 @ 15:07) (100% - 100%)  Wt(kg): --    PHYSICAL EXAM:    Constitutional: WDWN lying on side in bed  ENT: MMM  Neck: supple  Respiratory: CTA B/L; no W/R/R, no retractions  Cardiac: +S1/S2; systolic murmur appreciated  Gastrointestinal: soft, NT/ND; no rebound or guarding; +BSx4  Extremities: WWP, no clubbing or cyanosis; no peripheral edema  Musculoskeletal: NROM x4; no joint swelling, tenderness or erythema  Vascular: 2+ radial, DP/PT pulses B/L  Dermatologic: tender [id number] region on R gluteus with mild bruising over it, but without erythema, purulence, or fluctuence  Neurologic: AAOx3; CNII-XII grossly intact; no focal deficits  Psychiatric: affect and characteristics of appearance, verbalizations, behaviors are appropriate

## 2021-03-16 NOTE — H&P ADULT - PROBLEM SELECTOR PLAN 1
Presenting with R gluteal pain for 4 days without fever, erythema, purulence, or fluctuance. CT scan showing myositis with focal hemorrhage and/or pus.  -surgery consulted: recs appreciated  -likely spontaneous hemorrhage, less likely abcess in the setting of no fever, leukocytosis, erythema, fluctuance  -pt with hx of spontaneous chest wall hematoma  Plan:  -pain control  -f/u surgery recs  -hold eliquis in the setting of spontaneous hemorrhage

## 2021-03-16 NOTE — H&P ADULT - NSICDXPASTMEDICALHX_GEN_ALL_CORE_FT
PAST MEDICAL HISTORY:  Atrial fibrillation     BPH (benign prostatic hyperplasia)     CHF, chronic     CKD (chronic kidney disease)     Gout     H/O pulmonary hypertension     HTN (hypertension)     Lymphoma t cell; remission since 11/2020

## 2021-03-17 ENCOUNTER — TRANSCRIPTION ENCOUNTER (OUTPATIENT)
Age: 66
End: 2021-03-17

## 2021-03-17 VITALS
DIASTOLIC BLOOD PRESSURE: 71 MMHG | TEMPERATURE: 98 F | SYSTOLIC BLOOD PRESSURE: 133 MMHG | OXYGEN SATURATION: 97 % | RESPIRATION RATE: 17 BRPM | HEART RATE: 59 BPM

## 2021-03-17 LAB
ANION GAP SERPL CALC-SCNC: 14 MMOL/L — SIGNIFICANT CHANGE UP (ref 5–17)
BUN SERPL-MCNC: 57 MG/DL — HIGH (ref 7–23)
CALCIUM SERPL-MCNC: 9.1 MG/DL — SIGNIFICANT CHANGE UP (ref 8.4–10.5)
CHLORIDE SERPL-SCNC: 104 MMOL/L — SIGNIFICANT CHANGE UP (ref 96–108)
CO2 SERPL-SCNC: 24 MMOL/L — SIGNIFICANT CHANGE UP (ref 22–31)
CREAT SERPL-MCNC: 5.53 MG/DL — HIGH (ref 0.5–1.3)
GLUCOSE SERPL-MCNC: 73 MG/DL — SIGNIFICANT CHANGE UP (ref 70–99)
HCT VFR BLD CALC: 30.8 % — LOW (ref 39–50)
HGB BLD-MCNC: 8.8 G/DL — LOW (ref 13–17)
MAGNESIUM SERPL-MCNC: 2.2 MG/DL — SIGNIFICANT CHANGE UP (ref 1.6–2.6)
MCHC RBC-ENTMCNC: 26.9 PG — LOW (ref 27–34)
MCHC RBC-ENTMCNC: 28.6 GM/DL — LOW (ref 32–36)
MCV RBC AUTO: 94.2 FL — SIGNIFICANT CHANGE UP (ref 80–100)
NRBC # BLD: 0 /100 WBCS — SIGNIFICANT CHANGE UP (ref 0–0)
PHOSPHATE SERPL-MCNC: 6.2 MG/DL — HIGH (ref 2.5–4.5)
PLATELET # BLD AUTO: 234 K/UL — SIGNIFICANT CHANGE UP (ref 150–400)
POTASSIUM SERPL-MCNC: 3.6 MMOL/L — SIGNIFICANT CHANGE UP (ref 3.5–5.3)
POTASSIUM SERPL-SCNC: 3.6 MMOL/L — SIGNIFICANT CHANGE UP (ref 3.5–5.3)
RBC # BLD: 3.27 M/UL — LOW (ref 4.2–5.8)
RBC # FLD: 18.5 % — HIGH (ref 10.3–14.5)
SODIUM SERPL-SCNC: 142 MMOL/L — SIGNIFICANT CHANGE UP (ref 135–145)
WBC # BLD: 6.07 K/UL — SIGNIFICANT CHANGE UP (ref 3.8–10.5)
WBC # FLD AUTO: 6.07 K/UL — SIGNIFICANT CHANGE UP (ref 3.8–10.5)

## 2021-03-17 PROCEDURE — 99285 EMERGENCY DEPT VISIT HI MDM: CPT | Mod: XU

## 2021-03-17 PROCEDURE — 85610 PROTHROMBIN TIME: CPT

## 2021-03-17 PROCEDURE — 72192 CT PELVIS W/O DYE: CPT

## 2021-03-17 PROCEDURE — 82553 CREATINE MB FRACTION: CPT

## 2021-03-17 PROCEDURE — 85025 COMPLETE CBC W/AUTO DIFF WBC: CPT

## 2021-03-17 PROCEDURE — 80048 BASIC METABOLIC PNL TOTAL CA: CPT

## 2021-03-17 PROCEDURE — 86900 BLOOD TYPING SEROLOGIC ABO: CPT

## 2021-03-17 PROCEDURE — U0003: CPT

## 2021-03-17 PROCEDURE — G0378: CPT

## 2021-03-17 PROCEDURE — 83735 ASSAY OF MAGNESIUM: CPT

## 2021-03-17 PROCEDURE — 85730 THROMBOPLASTIN TIME PARTIAL: CPT

## 2021-03-17 PROCEDURE — 86901 BLOOD TYPING SEROLOGIC RH(D): CPT

## 2021-03-17 PROCEDURE — 36415 COLL VENOUS BLD VENIPUNCTURE: CPT

## 2021-03-17 PROCEDURE — U0005: CPT

## 2021-03-17 PROCEDURE — 85027 COMPLETE CBC AUTOMATED: CPT

## 2021-03-17 PROCEDURE — 84484 ASSAY OF TROPONIN QUANT: CPT

## 2021-03-17 PROCEDURE — 83605 ASSAY OF LACTIC ACID: CPT

## 2021-03-17 PROCEDURE — 86850 RBC ANTIBODY SCREEN: CPT

## 2021-03-17 PROCEDURE — 87040 BLOOD CULTURE FOR BACTERIA: CPT

## 2021-03-17 PROCEDURE — 99233 SBSQ HOSP IP/OBS HIGH 50: CPT | Mod: GC

## 2021-03-17 PROCEDURE — 84100 ASSAY OF PHOSPHORUS: CPT

## 2021-03-17 PROCEDURE — 80053 COMPREHEN METABOLIC PANEL: CPT

## 2021-03-17 PROCEDURE — 82550 ASSAY OF CK (CPK): CPT

## 2021-03-17 RX ORDER — POTASSIUM CHLORIDE 20 MEQ
40 PACKET (EA) ORAL ONCE
Refills: 0 | Status: COMPLETED | OUTPATIENT
Start: 2021-03-17 | End: 2021-03-17

## 2021-03-17 RX ADMIN — Medication 50 MILLIGRAM(S): at 09:17

## 2021-03-17 RX ADMIN — Medication 30 MILLIGRAM(S): at 06:57

## 2021-03-17 RX ADMIN — PANTOPRAZOLE SODIUM 40 MILLIGRAM(S): 20 TABLET, DELAYED RELEASE ORAL at 06:57

## 2021-03-17 RX ADMIN — AMIODARONE HYDROCHLORIDE 200 MILLIGRAM(S): 400 TABLET ORAL at 06:57

## 2021-03-17 RX ADMIN — Medication 40 MILLIEQUIVALENT(S): at 09:17

## 2021-03-17 RX ADMIN — Medication 20 MILLIGRAM(S): at 06:57

## 2021-03-17 RX ADMIN — Medication 650 MILLIGRAM(S): at 07:05

## 2021-03-17 RX ADMIN — Medication 100 MILLIGRAM(S): at 06:58

## 2021-03-17 RX ADMIN — SEVELAMER CARBONATE 800 MILLIGRAM(S): 2400 POWDER, FOR SUSPENSION ORAL at 13:08

## 2021-03-17 RX ADMIN — SEVELAMER CARBONATE 800 MILLIGRAM(S): 2400 POWDER, FOR SUSPENSION ORAL at 06:57

## 2021-03-17 NOTE — PROGRESS NOTE ADULT - ASSESSMENT
64 y/o M with PMH HTN, BPH T cell Lymphoma in remission since last November COPD, Stage 4 CKD (pending starting HD), Afib on Eliquis, severe MR, severe pulmonary HTN, CHF (EF 30%), PSH: lap jameson, Creation of Left AV fistula for dialysis evacuation of R chest wall spontaneous hematoma, VATS of left chest for decortication, presents to ED with R gluteal pain and swelling, denies fever or chills. CT scan with severe right gluteal myositis with focal hemorrhage and/or pus medially. Afebrile, HDS, WBC wnl.     -No surgical intervention indicated at this time. The patient has very low suspicion for abscess collection clinically, given his exam, vitals and labs.  -Recommend Heme consult to investigate why this patient continues to have spontaneous hemorrhages   -Recommend CTS consult for a watchman device given the spontaneous hematoma  -Will follow   -Discussed with Dr. Diop 
64 yo M with PMH HTN, BPH, T cell Lymphoma in remission since 11/2020, COPD, Stage 5 CKD (pending starting HD), Afib on Eliquis, severe MR, severe pulmonary HTN, CHF (EF was 40%, most recently 65-70%), L chest wall hematoma s/p evacuation (12/2020) and VATS (01/2021), presents to ED for R buttock pain that started on 3/13

## 2021-03-17 NOTE — PATIENT PROFILE ADULT - STATED REASON FOR ADMISSION
"Right buttocks swelling and pain. I really cannot sit on that side." Symptoms since Saturday 3/13/2021.

## 2021-03-17 NOTE — PROGRESS NOTE ADULT - SUBJECTIVE AND OBJECTIVE BOX
SUBJECTIVE: Patient seen and examined at bedside. Reports continued tenderness of the R glute but otherwise feels well with no fevers or chills.       Vital Signs Last 24 Hrs  T(C): 36.7 (17 Mar 2021 06:33), Max: 36.7 (17 Mar 2021 01:30)  T(F): 98.1 (17 Mar 2021 06:33), Max: 98.1 (17 Mar 2021 06:33)  HR: 66 (17 Mar 2021 06:33) (61 - 72)  BP: 156/81 (17 Mar 2021 06:33) (136/80 - 166/88)  BP(mean): --  RR: 16 (17 Mar 2021 06:33) (16 - 18)  SpO2: 97% (17 Mar 2021 06:33) (95% - 100%)    Physical Exam:  General: NAD  Pulmonary: Nonlabored breathing, no respiratory distress  Abdominal: soft, nondistended, nontender with no rebound or guarding  Extremities: WWP, normal strength, no clubbing/cyanosis/edema  Gluteal region: R side is swollen, mild erythema, soft, tender to touch, no fluctuatance or induration, left side with no abnormalities   Neuro: A/O x3    Lines/drains/tubes:    I&O's Summary    16 Mar 2021 07:01  -  17 Mar 2021 07:00  --------------------------------------------------------  IN: 0 mL / OUT: 150 mL / NET: -150 mL        LABS:                        8.8    6.07  )-----------( 234      ( 17 Mar 2021 06:48 )             30.8     03-17    142  |  104  |  57<H>  ----------------------------<  73  3.6   |  24  |  5.53<H>    Ca    9.1      17 Mar 2021 06:48  Phos  6.2     03-17  Mg     2.2     03-17    TPro  7.2  /  Alb  3.3  /  TBili  0.5  /  DBili  x   /  AST  8<L>  /  ALT  <5<L>  /  AlkPhos  101  03-16    PT/INR - ( 16 Mar 2021 10:47 )   PT: 16.8 sec;   INR: 1.42          PTT - ( 16 Mar 2021 10:47 )  PTT:45.7 sec    CAPILLARY BLOOD GLUCOSE        LIVER FUNCTIONS - ( 16 Mar 2021 10:47 )  Alb: 3.3 g/dL / Pro: 7.2 g/dL / ALK PHOS: 101 U/L / ALT: <5 U/L / AST: 8 U/L / GGT: x             RADIOLOGY & ADDITIONAL STUDIES:

## 2021-03-17 NOTE — PROGRESS NOTE ADULT - PROBLEM SELECTOR PLAN 5
Hb: 9.2 --> 8.8 stable from previous. Likely in the setting of CKD. No need to workup as no acute blood loss.

## 2021-03-17 NOTE — DISCHARGE NOTE PROVIDER - NSDCMRMEDTOKEN_GEN_ALL_CORE_FT
allopurinol 100 mg oral tablet: 2 tab(s) orally 2 times a day  amiodarone 200 mg oral tablet: 1 tab(s) orally once a day  apixaban 2.5 mg oral tablet: 1 tab(s) orally 2 times a day  doxazosin 2 mg oral tablet: 1 tab(s) orally once a day (at bedtime)  metoprolol tartrate 50 mg oral tablet: 1 tab(s) orally every 12 hours   NIFEdipine 30 mg oral tablet, extended release: 1 tab(s) orally once a day  pantoprazole 40 mg oral delayed release tablet: 1 tab(s) orally once a day (before a meal)  sevelamer carbonate 800 mg oral tablet: 1 tab(s) orally every 8 hours  sodium bicarbonate 650 mg oral tablet: 1 tab(s) orally 2 times a day  torsemide 20 mg oral tablet: 1 tab(s) orally once a day   allopurinol 100 mg oral tablet: 2 tab(s) orally 2 times a day  amiodarone 200 mg oral tablet: 1 tab(s) orally once a day  doxazosin 2 mg oral tablet: 1 tab(s) orally once a day (at bedtime)  metoprolol tartrate 50 mg oral tablet: 1 tab(s) orally every 12 hours   NIFEdipine 30 mg oral tablet, extended release: 1 tab(s) orally once a day  pantoprazole 40 mg oral delayed release tablet: 1 tab(s) orally once a day (before a meal)  sevelamer carbonate 800 mg oral tablet: 1 tab(s) orally every 8 hours  sodium bicarbonate 650 mg oral tablet: 1 tab(s) orally 2 times a day  torsemide 20 mg oral tablet: 1 tab(s) orally once a day

## 2021-03-17 NOTE — DISCHARGE NOTE PROVIDER - NSDCCPCAREPLAN_GEN_ALL_CORE_FT
PRINCIPAL DISCHARGE DIAGNOSIS  Diagnosis: Myositis  Assessment and Plan of Treatment:       SECONDARY DISCHARGE DIAGNOSES  Diagnosis: Longstanding persistent atrial fibrillation  Assessment and Plan of Treatment: Longstanding persistent atrial fibrillation    Diagnosis: Chronic congestive heart failure, unspecified heart failure type  Assessment and Plan of Treatment: Chronic congestive heart failure, unspecified heart failure type    Diagnosis: Stage 5 chronic kidney disease not on chronic dialysis  Assessment and Plan of Treatment: Stage 5 chronic kidney disease not on chronic dialysis    Diagnosis: HTN (hypertension)  Assessment and Plan of Treatment: HTN (hypertension)     PRINCIPAL DISCHARGE DIAGNOSIS  Diagnosis: Myositis  Assessment and Plan of Treatment: You presented with pain in your buttocks, you had imaging done that showed bleeding into your buttocks, this could be becuase your are on a blood thinner called eliquis. Please stop taking Eliquis until your appointment with Dr. Espana. The blood in your buttocks will resolve over time. You can your tylenol for pain as needed.      SECONDARY DISCHARGE DIAGNOSES  Diagnosis: Longstanding persistent atrial fibrillation  Assessment and Plan of Treatment: You have a known diagnosis of atrial fibrillation prior to your admission. This condition, if not treated, increases your risk of stroke or heart attack. Please continue to take AMIODARONE AND METOPROLOL ONLY, PLEASE DISCONTINUE ELIQUIS UNTIL YOUR APPOINTMENT WITH DR. ESPANA. Please make sure to continue taking these medications to avoid developing blood clots and to lower your risk of stroke. Additionally be sure to follow up with your primary care physician (and/or cardiologist) on a regular basis to ensure that this condition does not require changes to the dose or type of medication.      Diagnosis: Chronic congestive heart failure, unspecified heart failure type  Assessment and Plan of Treatment: Chronic congestive heart failure, unspecified heart failure type. PLEASE CONTINUE METOPROLOL AND TORSEMIDE.    Diagnosis: Stage 5 chronic kidney disease not on chronic dialysis  Assessment and Plan of Treatment: Stage 5 chronic kidney disease not on chronic dialysis. PLEASE CONTINUE WITH SEVELAMER AND SODIUM BICARBONATE    Diagnosis: HTN (hypertension)  Assessment and Plan of Treatment: HTN (hypertension). PLEASE CONTINUE WITH NIFEDIPINE AND TORSEMIDE.

## 2021-03-17 NOTE — DISCHARGE NOTE PROVIDER - PROVIDER TOKENS
PROVIDER:[TOKEN:[[id number]]] PROVIDER:[TOKEN:[[id number]],SCHEDULEDAPPT:[03/25/2021],SCHEDULEDAPPTTIME:[10:20 AM]]

## 2021-03-17 NOTE — DISCHARGE NOTE NURSING/CASE MANAGEMENT/SOCIAL WORK - PATIENT PORTAL LINK FT
You can access the FollowMyHealth Patient Portal offered by [facility] by registering at the following website: [email or web address]. By joining [facility]’s FollowMyHealth portal, you will also be able to view your health information using other applications (apps) compatible with our system.

## 2021-03-17 NOTE — PATIENT PROFILE ADULT - FUNCTIONAL SCREEN CURRENT LEVEL: COMMUNICATION, MLM
[facility] at [address], [address] [facility] [place]  Ph [phone]  Fx [phone]    Pelvic Floor Progress Note  Patient name: Bettina Cooks Start of Care: 2018   Referral source: Bruce Green CNM : 1973   Medical/Treatment Diagnosis: Stress incontinence (female) (male) [N39.3] Onset Date:May 2017      Prior Hospitalization: see medical history Provider#: [id number]   Medications: Verified on Patient Summary List     Comorbidities: HTN, melanoma   Prior Level of Function: patient was independent with ADLs and activities however now limited secondary to sudden urgency         Visits from Start of Care: 4    Missed Visits: 1    Established Goals:           Excellent Good         Limited           None  [x] Increase Pelvic MM strength       []  [x]  []  []  [x] Decrease Pelvic MM hypertonus     []  []  [x]  []  [x] Decrease Incontinence Episodes    []  []  []  [x]   [x] Improve Voiding Habits        []  []  [x]  []   [x] Decreased Urgency        []  []  []  [x]  [] Decrease Pelvic Pain        []  []  []  []    Short Term Goals: To be accomplished in 4 weeks:  1. Patient will demonstrate accurate performance of home exercise program/pelvic floor contractions as adjunct to physical therapy clinic visits to promote healthy lifestyle and improved quality of life. Status @ Eval: initiated PFM contractions   Current: progressed HEP-patient compliant MET   2. Patient will Complete Bladder Diary for use in patient education and goal setting. Status @ Eval: provided and review on proper use  Current: reviewed at 2nd visit-MET   3. Patient will have decreased leakage episodes to <2X/day for increased quality of life. Status @ Eval: 2X/day  Current: no change        Long Term Goals: To be accomplished in 8  weeks:  1. Patient will demonstrate independence in HEP for maintenance of pelvic floor program and improved quality of life.   Status @ Eval: initiated   Current: patient compliant with HEP   2. Patient will have decreased leakage episodes to </=2 per week for increased quality of life. Status @ Eval: 2X/day  Current: no change    3. Patient will have increased pelvic floor muscle motor performance by 1/2 to 1 grade for improved urinary continence and quality of life. Status @ Eval: 2/5 PERF 16/35   Current: 2+/5, 10, 5, 5=22/35 progression-MET  4. Patient will have FOTO Urinary Problem score change of 14 points or more indicating improvement in function for icreased quality of life. Status @ Eval: 45  Current: to be performed at 5th visit     Key Functional Changes: improvement in strength of PFM  Updated Goals: to be achieved in 4 weeks:   Continue with unmet goals     ASSESSMENT/RECOMMENDATIONS:Patient demonstrate continued compliance with improved noted endurance and anaerobic capacity of muscle and improve general strength. However, patient continues to have poor motility of PFM and relaxation which could limit effectiveness of muscle to contract and relax through full ROM to ensure decreased urgency sensation and successful emptying of bladder.   Patient will continue to benefit from skilled PT services to modify and progress therapeutic interventions, address functional mobility deficits, address ROM deficits, address strength deficits, analyze and address soft tissue restrictions, analyze and cue movement patterns, analyze and modify body mechanics/ergonomics and assess and modify postural abnormalities to attain remaining goals      [x]Continue therapy per initial plan/protocol at a frequency of  1 x per week for 4 weeks  []Continue therapy with the following recommended changes:_____________________      _____________________________________________________________________  []Discontinue therapy progressing towards or have reached established goals  []Discontinue therapy due to lack of appreciable progress towards goals  []Discontinue therapy due to lack of attendance or compliance  []Await Physician's recommendations/decisions regarding therapy  []Other:________________________________________________________________    Thank you for this referral.   Cassidy Gonzalez 12/10/2018 11:19 AM  NOTE TO PHYSICIAN:  PLEASE COMPLETE THE ORDERS BELOW AND   FAX TO [facility]: [phone]  If you are unable to process this request in 24 hours please contact our office: [phone]      ? I have read the above report and request that my patient continue as recommended. ? I have read the above report and request that my patient continue therapy with the following changes/special instructions:___________________________________________________________  ? I have read the above report and request that my patient be discharged from therapy.     Physicians signature: ________________________________Date: _____Time:_____ 0 = understands/communicates without difficulty

## 2021-03-17 NOTE — PROGRESS NOTE ADULT - PROBLEM SELECTOR PLAN 1
Presenting with R gluteal pain for 4 days without fever, erythema, purulence, or fluctuance. CT scan showing myositis with focal hemorrhage and/or pus.  -surgery consulted: recs appreciated  -likely spontaneous hemorrhage, less likely abscess in the setting of no fever, leukocytosis, erythema, fluctuance  -pt with hx of spontaneous chest wall hematoma  Plan:  -pain control  -f/u surgery recs  -If stable, discharge likely Presenting with R gluteal pain for 4 days without fever, erythema, purulence, or fluctuance. CT scan showing myositis with focal hemorrhage and/or pus.  -surgery consulted: recs appreciated  -likely spontaneous hemorrhage, less likely abscess in the setting of no fever, leukocytosis, erythema, fluctuance  -pt with hx of spontaneous chest wall hematoma  Plan:  -pain control  -Surgery following, no surgical intervention at this time no concern for abscess.  -Will continue to hold Eliquis until follow up with outpatient Cardiologist Dr. Ventura

## 2021-03-17 NOTE — PROGRESS NOTE ADULT - PROBLEM SELECTOR PLAN 2
Pt on amiodarone, metoprolol, and eliquis as outpatient. Hx of spontaneous chest wall hematoma and now presenting with spontaneous gluteal hemorrhage.   -cont amiodarone and metoprolol  -discuss holding eliquis in the setting of bleed, and watchman procedure with patients cardiologist Dr. Ventura  -will likely need a Watchman procedure in the setting of hx of multiple spontaneous hematomas

## 2021-03-17 NOTE — DISCHARGE NOTE PROVIDER - NSDCFUSCHEDAPPT_GEN_ALL_CORE_FT
MARCI VEGA ; 04/20/2021 ; [facility] Nephro [address]  MARCI VEGA ; 05/05/2021 ; [facility] PulmMed [address] [place] [address]  MARCI VEGA ; 05/10/2021 ; [facility] Cardio Vasc [address] [facility]  MARCI VEGA ; 06/15/2021 ; NPP Cardio Vasc [address] [facility]

## 2021-03-17 NOTE — DISCHARGE NOTE PROVIDER - HOSPITAL COURSE
64 yo M with PMH HTN, BPH, T cell Lymphoma in remission since 11/2020, COPD, Stage 5 CKD (pending starting HD), Afib on Eliquis, severe MR, severe pulmonary HTN, CHF (EF was 40%, most recently 65-70%), L chest wall hematoma s/p evacuation (12/2020) and VATS (01/2021), presents to ED for R buttock pain that started on 3/13    Problem List/Main Diagnoses (system-based):     Myositis.  Presenting with R gluteal pain for 4 days without fever, erythema, purulence, or fluctuance. CT scan showing myositis with focal hemorrhage and/or pus.  -surgery consulted: recs appreciated  -likely spontaneous hemorrhage, less likely abscess in the setting of no fever, leukocytosis, erythema, fluctuance  -pt with hx of spontaneous chest wall hematoma  Plan:  -pain control  -Surgery following, no surgical intervention at this time no concern for abscess.  -Will continue to hold Eliquis until follow up with outpatient Cardiologist Dr. Ventura.      Longstanding persistent atrial fibrillation.  Pt on amiodarone, metoprolol, and eliquis as outpatient. Hx of spontaneous chest wall hematoma and now presenting with spontaneous gluteal hemorrhage.   -cont amiodarone and metoprolol  -discuss holding eliquis in the setting of bleed, and watchman procedure with patients cardiologist Dr. Ventura  -will likely need a Watchman procedure in the setting of hx of multiple spontaneous hematomas.     HFpEF   Previous EF 40% in 12/2020, but TTE in 1/2021 with EF 65-70%.  -not currently fluid overload  -cont torsemide, metoprolol.      HTN (hypertension).    VSS, Hb stable  -cont home metoprolol and nifedipine.      Anemia due to stage 5 chronic kidney disease, not on chronic dialysis.  Plan: Hb: 9.2 --> 8.8 stable from previous. Likely in the setting of CKD. No need to workup as no acute blood loss.     Stage 5 chronic kidney disease not on chronic dialysis. Plan: AVF currently maturing. No indication for urgent dialysis.  -cont sevelamer, bicarb, torsemide.    Lymphoma.   Previously on chemo, now in remission since 11/2020.     BPH (benign prostatic hyperplasia).  Plan: -cont home doxazosin.     Gout.    -cont allopurinol.     Inpatient treatment course: See as above   New medications: None   Labs to be followed outpatient: None   Exam to be followed outpatient: None    66 yo M with PMH HTN, BPH, T cell Lymphoma in remission since 11/2020, COPD, Stage 5 CKD (pending starting HD), Afib on Eliquis, severe MR, severe pulmonary HTN, CHF (EF was 40%, most recently 65-70%), L chest wall hematoma s/p evacuation (12/2020) and VATS (01/2021), presents to ED for R buttock pain that started on 3/13. Patient medically optimized and ready for discharge to home with outpatient cardiology appointment.     Problem List/Main Diagnoses (system-based):     Myositis.  Presenting with R gluteal pain for 4 days without fever, erythema, purulence, or fluctuance. CT scan showing myositis with focal hemorrhage and/or pus.  -surgery consulted: recs appreciated  -likely spontaneous hemorrhage, less likely abscess in the setting of no fever, leukocytosis, erythema, fluctuance  -pt with hx of spontaneous chest wall hematoma  -pain control  -Surgery following, no surgical intervention at this time, no concern for abscess.  -Will continue to hold Eliquis until follow up with outpatient Cardiologist Dr. Ventura.       Longstanding persistent atrial fibrillation.  Pt on amiodarone, metoprolol, and eliquis as outpatient. Hx of spontaneous chest wall hematoma and now presenting with spontaneous gluteal hemorrhage.   -cont amiodarone and metoprolol  -discuss holding eliquis in the setting of bleed, and watchman procedure with patients cardiologist Dr. Ventura  -will likely need a Watchman procedure in the setting of hx of multiple spontaneous hematomas, will follow up with Dr. Aguila     HFpEF   Previous EF 40% in 12/2020, but TTE in 1/2021 with EF 65-70%.  -not currently fluid overload  -cont torsemide, metoprolol.      HTN (hypertension).    VSS, Hb stable  -cont home metoprolol and nifedipine.     Anemia due to stage 5 chronic kidney disease, not on chronic dialysis.  Plan: Hb: 9.2 --> 8.8 stable from previous. Likely in the setting of CKD. No need to workup as no acute blood loss.     Stage 5 chronic kidney disease not on chronic dialysis. Plan: AVF currently maturing. No indication for urgent dialysis.  -cont sevelamer, bicarb, torsemide.    Lymphoma.   Previously on chemo, now in remission since 11/2020.     BPH (benign prostatic hyperplasia).  Plan: -cont home doxazosin.     Gout.    -cont allopurinol.     Inpatient treatment course: See as above   New medications: None   Labs to be followed outpatient: None   Exam to be followed outpatient: None

## 2021-03-17 NOTE — DISCHARGE NOTE PROVIDER - CARE PROVIDER_API CALL
Carlos Ventura (MD)  Cardiovascular Disease; Internal Medicine  Cardiology [facility], [address] [place] [address]  Phone: [phone]  Fax: [phone]  Follow Up Time:    Carlos Ventura)  Cardiovascular Disease; Internal Medicine  Cardiology [facility], [address] [place] [address]  Phone: [phone]  Fax: [phone]  Scheduled Appointment: 03/25/2021 10:20 AM

## 2021-03-17 NOTE — PROGRESS NOTE ADULT - SUBJECTIVE AND OBJECTIVE BOX
**Incomplete Note**      OVERNIGHT EVENTS:    SUBJECTIVE / INTERVAL HPI: Patient seen and examined at bedside.     VITAL SIGNS:  Vital Signs Last 24 Hrs  T(C): 36.7 (17 Mar 2021 06:33), Max: 36.7 (17 Mar 2021 01:30)  T(F): 98.1 (17 Mar 2021 06:33), Max: 98.1 (17 Mar 2021 06:33)  HR: 66 (17 Mar 2021 06:33) (61 - 72)  BP: 156/81 (17 Mar 2021 06:33) (136/80 - 166/88)  BP(mean): --  RR: 16 (17 Mar 2021 06:33) (16 - 18)  SpO2: 97% (17 Mar 2021 06:33) (95% - 100%)    PHYSICAL EXAM:    General: WDWN  HEENT: NC/AT; PERRL, anicteric sclera; MMM  Neck: supple  Cardiovascular: +S1/S2; RRR  Respiratory: CTA B/L; no W/R/R  Gastrointestinal: soft, NT/ND; +BSx4  Extremities: WWP; no edema, clubbing or cyanosis  Vascular: 2+ radial, DP/PT pulses B/L  Neurological: AAOx3; no focal deficits    MEDICATIONS:  MEDICATIONS  (STANDING):  allopurinol 100 milliGRAM(s) Oral two times a day  aMIOdarone    Tablet 200 milliGRAM(s) Oral daily  doxazosin 2 milliGRAM(s) Oral at bedtime  metoprolol tartrate 50 milliGRAM(s) Oral every 12 hours  NIFEdipine XL 30 milliGRAM(s) Oral daily  pantoprazole    Tablet 40 milliGRAM(s) Oral before breakfast  potassium chloride    Tablet ER 40 milliEquivalent(s) Oral once  sevelamer carbonate 800 milliGRAM(s) Oral every 8 hours  sodium bicarbonate 650 milliGRAM(s) Oral two times a day  torsemide 20 milliGRAM(s) Oral daily    MEDICATIONS  (PRN):      ALLERGIES:  Allergies    No Known Allergies    Intolerances        LABS:                        8.8    6.07  )-----------( 234      ( 17 Mar 2021 06:48 )             30.8     03-17    142  |  104  |  57<H>  ----------------------------<  73  3.6   |  24  |  5.53<H>    Ca    9.1      17 Mar 2021 06:48  Phos  6.2     03-17  Mg     2.2     03-17    TPro  7.2  /  Alb  3.3  /  TBili  0.5  /  DBili  x   /  AST  8<L>  /  ALT  <5<L>  /  AlkPhos  101  03-16    PT/INR - ( 16 Mar 2021 10:47 )   PT: 16.8 sec;   INR: 1.42          PTT - ( 16 Mar 2021 10:47 )  PTT:45.7 sec    CAPILLARY BLOOD GLUCOSE          RADIOLOGY & ADDITIONAL TESTS: Reviewed.    ASSESSMENT:    PLAN:    **Incomplete Note**      OVERNIGHT EVENTS: No overnight events.    SUBJECTIVE / INTERVAL HPI: Patient feels well today. His pain is somewhat improved but he is still avoiding sitting on his right side. Has not felt feverish or experienced any weakness.  No SOB, CP, n/v/d.    VITAL SIGNS:  Vital Signs Last 24 Hrs  T(C): 36.7 (17 Mar 2021 06:33), Max: 36.7 (17 Mar 2021 01:30)  T(F): 98.1 (17 Mar 2021 06:33), Max: 98.1 (17 Mar 2021 06:33)  HR: 66 (17 Mar 2021 06:33) (61 - 72)  BP: 156/81 (17 Mar 2021 06:33) (136/80 - 166/88)  BP(mean): --  RR: 16 (17 Mar 2021 06:33) (16 - 18)  SpO2: 97% (17 Mar 2021 06:33) (95% - 100%)    PHYSICAL EXAM:    Constitutional: WDWN lying on side in bed  ENT: MMM  Neck: supple  Respiratory: CTA B/L; no W/R/R, no retractions  Cardiac: +S1/S2; systolic murmur appreciated  Gastrointestinal: soft, NT/ND; no rebound or guarding; +BSx4  Extremities: WWP, no clubbing or cyanosis; no peripheral edema  Musculoskeletal: NROM x4; no joint swelling, tenderness or erythema  Vascular: 2+ radial, DP/PT pulses B/L  Dermatologic: tender 2ftt6on region on R gluteus with mild bruising over it, but without erythema, purulence, or fluctuence  Neurologic: AAOx3; CNII-XII grossly intact; no focal deficits  Psychiatric: affect and characteristics of appearance, verbalizations, behaviors are appropriate    MEDICATIONS:  MEDICATIONS  (STANDING):  allopurinol 100 milliGRAM(s) Oral two times a day  aMIOdarone    Tablet 200 milliGRAM(s) Oral daily  doxazosin 2 milliGRAM(s) Oral at bedtime  metoprolol tartrate 50 milliGRAM(s) Oral every 12 hours  NIFEdipine XL 30 milliGRAM(s) Oral daily  pantoprazole    Tablet 40 milliGRAM(s) Oral before breakfast  potassium chloride    Tablet ER 40 milliEquivalent(s) Oral once  sevelamer carbonate 800 milliGRAM(s) Oral every 8 hours  sodium bicarbonate 650 milliGRAM(s) Oral two times a day  torsemide 20 milliGRAM(s) Oral daily    MEDICATIONS  (PRN):      ALLERGIES:  Allergies    No Known Allergies    Intolerances        LABS:                        8.8    6.07  )-----------( 234      ( 17 Mar 2021 06:48 )             30.8     03-17    142  |  104  |  57<H>  ----------------------------<  73  3.6   |  24  |  5.53<H>    Ca    9.1      17 Mar 2021 06:48  Phos  6.2     03-17  Mg     2.2     03-17    TPro  7.2  /  Alb  3.3  /  TBili  0.5  /  DBili  x   /  AST  8<L>  /  ALT  <5<L>  /  AlkPhos  101  03-16    PT/INR - ( 16 Mar 2021 10:47 )   PT: 16.8 sec;   INR: 1.42          PTT - ( 16 Mar 2021 10:47 )  PTT:45.7 sec    CAPILLARY BLOOD GLUCOSE          RADIOLOGY & ADDITIONAL TESTS: Reviewed.    ASSESSMENT:    PLAN:    **Incomplete Note**      OVERNIGHT EVENTS: No overnight events.    SUBJECTIVE / INTERVAL HPI: Patient feels well today. His pain is somewhat improved but he is still avoiding sitting on his right side. Has not felt feverish or experienced any weakness.  No SOB, CP, n/v/d.    VITAL SIGNS:  Vital Signs Last 24 Hrs  T(C): 36.7 (17 Mar 2021 06:33), Max: 36.7 (17 Mar 2021 01:30)  T(F): 98.1 (17 Mar 2021 06:33), Max: 98.1 (17 Mar 2021 06:33)  HR: 66 (17 Mar 2021 06:33) (61 - 72)  BP: 156/81 (17 Mar 2021 06:33) (136/80 - 166/88)  BP(mean): --  RR: 16 (17 Mar 2021 06:33) (16 - 18)  SpO2: 97% (17 Mar 2021 06:33) (95% - 100%)    PHYSICAL EXAM:    Constitutional: WDWN lying on side in bed  ENT: MMM  Respiratory: CTA B/L; no W/R/R, no retractions  Cardiac: +S1/S2; systolic murmur appreciated  Gastrointestinal: soft, NT/ND; no rebound or guarding; +BSx4  Extremities: WWP, no clubbing or cyanosis; no peripheral edema  Musculoskeletal: NROM x4; no joint swelling, tenderness or erythema  Vascular: 2+ radial, DP/PT pulses B/L  Dermatologic: tender 6osn8in region on R gluteus with mild bruising over it but without erythema, purulence, or fluctuence  Neurologic: AAOx3; CNII-XII grossly intact; no focal deficits  Psychiatric: affect and characteristics of appearance, verbalizations, behaviors are appropriate    MEDICATIONS:  MEDICATIONS  (STANDING):  allopurinol 100 milliGRAM(s) Oral two times a day  aMIOdarone    Tablet 200 milliGRAM(s) Oral daily  doxazosin 2 milliGRAM(s) Oral at bedtime  metoprolol tartrate 50 milliGRAM(s) Oral every 12 hours  NIFEdipine XL 30 milliGRAM(s) Oral daily  pantoprazole    Tablet 40 milliGRAM(s) Oral before breakfast  potassium chloride    Tablet ER 40 milliEquivalent(s) Oral once  sevelamer carbonate 800 milliGRAM(s) Oral every 8 hours  sodium bicarbonate 650 milliGRAM(s) Oral two times a day  torsemide 20 milliGRAM(s) Oral daily    MEDICATIONS  (PRN):      ALLERGIES:  Allergies    No Known Allergies    Intolerances        LABS:                        8.8    6.07  )-----------( 234      ( 17 Mar 2021 06:48 )             30.8     03-17    142  |  104  |  57<H>  ----------------------------<  73  3.6   |  24  |  5.53<H>    Ca    9.1      17 Mar 2021 06:48  Phos  6.2     03-17  Mg     2.2     03-17    TPro  7.2  /  Alb  3.3  /  TBili  0.5  /  DBili  x   /  AST  8<L>  /  ALT  <5<L>  /  AlkPhos  101  03-16    PT/INR - ( 16 Mar 2021 10:47 )   PT: 16.8 sec;   INR: 1.42          PTT - ( 16 Mar 2021 10:47 )  PTT:45.7 sec    CAPILLARY BLOOD GLUCOSE          RADIOLOGY & ADDITIONAL TESTS: Reviewed.    ASSESSMENT:    PLAN:    OVERNIGHT EVENTS: No overnight events.    SUBJECTIVE / INTERVAL HPI: Patient feels well today. His pain is somewhat improved but he is still avoiding sitting on his right side. Has not felt feverish or experienced any weakness.  No SOB, CP, n/v/d.    VITAL SIGNS:  Vital Signs Last 24 Hrs  T(C): 36.7 (17 Mar 2021 06:33), Max: 36.7 (17 Mar 2021 01:30)  T(F): 98.1 (17 Mar 2021 06:33), Max: 98.1 (17 Mar 2021 06:33)  HR: 66 (17 Mar 2021 06:33) (61 - 72)  BP: 156/81 (17 Mar 2021 06:33) (136/80 - 166/88)  BP(mean): --  RR: 16 (17 Mar 2021 06:33) (16 - 18)  SpO2: 97% (17 Mar 2021 06:33) (95% - 100%)    PHYSICAL EXAM:    Constitutional: WDWN lying on side in bed  ENT: MMM  Respiratory: CTA B/L; no W/R/R, no retractions  Cardiac: +S1/S2; + systolic murmur  Gastrointestinal: soft, NT/ND; no rebound or guarding; +BSx4  Extremities: WWP, no clubbing or cyanosis; no peripheral edema  Musculoskeletal: NROM x4; no joint swelling, tenderness or erythema  Vascular: 2+ radial, DP/PT pulses B/L  Dermatologic: tender 7vov2qa region on R gluteus with mild bruising over it but without erythema, purulence, or fluctuence, TTP   Neurologic: AAOx3; CNII-XII grossly intact; no focal deficits    MEDICATIONS:  MEDICATIONS  (STANDING):  allopurinol 100 milliGRAM(s) Oral two times a day  aMIOdarone    Tablet 200 milliGRAM(s) Oral daily  doxazosin 2 milliGRAM(s) Oral at bedtime  metoprolol tartrate 50 milliGRAM(s) Oral every 12 hours  NIFEdipine XL 30 milliGRAM(s) Oral daily  pantoprazole    Tablet 40 milliGRAM(s) Oral before breakfast  potassium chloride    Tablet ER 40 milliEquivalent(s) Oral once  sevelamer carbonate 800 milliGRAM(s) Oral every 8 hours  sodium bicarbonate 650 milliGRAM(s) Oral two times a day  torsemide 20 milliGRAM(s) Oral daily    MEDICATIONS  (PRN):      ALLERGIES:  Allergies    No Known Allergies    Intolerances        LABS:                        8.8    6.07  )-----------( 234      ( 17 Mar 2021 06:48 )             30.8     03-17    142  |  104  |  57<H>  ----------------------------<  73  3.6   |  24  |  5.53<H>    Ca    9.1      17 Mar 2021 06:48  Phos  6.2     03-17  Mg     2.2     03-17    TPro  7.2  /  Alb  3.3  /  TBili  0.5  /  DBili  x   /  AST  8<L>  /  ALT  <5<L>  /  AlkPhos  101  03-16    PT/INR - ( 16 Mar 2021 10:47 )   PT: 16.8 sec;   INR: 1.42          PTT - ( 16 Mar 2021 10:47 )  PTT:45.7 sec    CAPILLARY BLOOD GLUCOSE          RADIOLOGY & ADDITIONAL TESTS: Reviewed.    ASSESSMENT:    PLAN:

## 2021-03-22 PROBLEM — C85.90 NON-HODGKIN LYMPHOMA, UNSPECIFIED, UNSPECIFIED SITE: Chronic | Status: ACTIVE | Noted: 2019-11-11

## 2021-03-24 DIAGNOSIS — I27.20 PULMONARY HYPERTENSION, UNSPECIFIED: ICD-10-CM

## 2021-03-24 DIAGNOSIS — J44.9 CHRONIC OBSTRUCTIVE PULMONARY DISEASE, UNSPECIFIED: ICD-10-CM

## 2021-03-24 DIAGNOSIS — M10.9 GOUT, UNSPECIFIED: ICD-10-CM

## 2021-03-24 DIAGNOSIS — Z79.01 LONG TERM (CURRENT) USE OF ANTICOAGULANTS: ICD-10-CM

## 2021-03-24 DIAGNOSIS — D63.1 ANEMIA IN CHRONIC KIDNEY DISEASE: ICD-10-CM

## 2021-03-24 DIAGNOSIS — M60.88 OTHER MYOSITIS, OTHER SITE: ICD-10-CM

## 2021-03-24 DIAGNOSIS — I50.32 CHRONIC DIASTOLIC (CONGESTIVE) HEART FAILURE: ICD-10-CM

## 2021-03-24 DIAGNOSIS — N18.5 CHRONIC KIDNEY DISEASE, STAGE 5: ICD-10-CM

## 2021-03-24 DIAGNOSIS — I13.2 HYPERTENSIVE HEART AND CHRONIC KIDNEY DISEASE WITH HEART FAILURE AND WITH STAGE 5 CHRONIC KIDNEY DISEASE, OR END STAGE RENAL DISEASE: ICD-10-CM

## 2021-03-24 DIAGNOSIS — N40.0 BENIGN PROSTATIC HYPERPLASIA WITHOUT LOWER URINARY TRACT SYMPTOMS: ICD-10-CM

## 2021-03-24 DIAGNOSIS — C85.80 OTHER SPECIFIED TYPES OF NON-HODGKIN LYMPHOMA, UNSPECIFIED SITE: ICD-10-CM

## 2021-03-24 DIAGNOSIS — I48.11 LONGSTANDING PERSISTENT ATRIAL FIBRILLATION: ICD-10-CM

## 2021-03-24 DIAGNOSIS — I34.0 NONRHEUMATIC MITRAL (VALVE) INSUFFICIENCY: ICD-10-CM

## 2021-03-24 DIAGNOSIS — Z87.891 PERSONAL HISTORY OF NICOTINE DEPENDENCE: ICD-10-CM

## 2021-04-07 ENCOUNTER — APPOINTMENT (OUTPATIENT)
Dept: HEART AND VASCULAR | Facility: CLINIC | Age: 66
End: 2021-04-07
Payer: MEDICARE

## 2021-04-07 VITALS
BODY MASS INDEX: 21 KG/M2 | HEIGHT: 71 IN | WEIGHT: 150 LBS | DIASTOLIC BLOOD PRESSURE: 111 MMHG | HEART RATE: 65 BPM | SYSTOLIC BLOOD PRESSURE: 185 MMHG

## 2021-04-07 PROCEDURE — 99214 OFFICE O/P EST MOD 30 MIN: CPT

## 2021-04-07 PROCEDURE — 99072 ADDL SUPL MATRL&STAF TM PHE: CPT

## 2021-04-07 NOTE — ASSESSMENT
[FreeTextEntry1] : 65 year old male with a past medical history of HTN, ESRD (has LUE AVF, not on HD yet), stage 5 CKD, T Cell lymphoma, AFib off eliquis since last admission, systolic CHF (EF 40-45% in 2017), severe pHTN, who underwent a right chest wall hematoma evacuation on 12/18/20 and a  bronchoscopy, LVATS, robotic assisted drainage of pleural effusion, decortication, and pleurodesis (1/22/21). He presents today for follow up surgical consultation for mitral regurgitation and candidacy for Watchman device.\par -his last echo revealed mild-mod FMR, and he is not symptomatic\par -we discussed the possibility of Watchman device in the future given his sponatenous bleeds and Afib with CHADSVASC 3. At this time the procedure could put him into renal failure and HD requirements and will hold off.\par -Will discuss BP management with Dr. Ventura and restarting ASA or low dose eliquis at his appointment next wk\par -start norvasc 5mg for elevated BP\par -f/u with EPS\par -Return to [facility] in 6 months with repeat TTE/clinical reassessment

## 2021-04-07 NOTE — PHYSICAL EXAM
[General Appearance - Well Developed] : well developed [General Appearance - Well Nourished] : well nourished [Normal Oral Mucosa] : normal oral mucosa [Normal Oropharynx] : normal oropharynx [Normal Jugular Venous V Waves Present] : normal jugular venous V waves present [Respiration, Rhythm And Depth] : normal respiratory rhythm and effort [Auscultation Breath Sounds / Voice Sounds] : lungs were clear to auscultation bilaterally [Chest Palpation] : palpation of the chest revealed no abnormalities [Lungs Percussion] : the lungs were normal to percussion [Arterial Pulses Normal] : the arterial pulses were normal [FreeTextEntry1] : irregular, +systolic murmur [Abdomen Tenderness] : non-tender [Abnormal Walk] : normal gait [Skin Turgor] : normal skin turgor [] : no rash [Oriented To Time, Place, And Person] : oriented to person, place, and time [Impaired Insight] : insight and judgment were intact

## 2021-04-07 NOTE — HISTORY OF PRESENT ILLNESS
[FreeTextEntry1] : 65 year old male with a past medical history of HTN, ESRD (has LUE AVF, not on HD yet), stage 5 CKD, T Cell lymphoma (diagnosed about 19 years ago, now in remission), AFib (off eliquis since spontaneous bleeds), systolic CHF (EF 40-45% in 2017), severe pHTN, who underwent a right chest wall hematoma evacuation on 12/18/20 and a  bronchoscopy, LVATS, robotic assisted drainage of pleural effusion, decortication, and pleurodesis (1/22/21). He presents today for follow up surgical consultation for mitral regurgitation.\par \par On 3/16/21 patient presented to [facility] ED for right gluteal pain. CT pelvis on 3/16/21 revealed Severe right gluteal myositis with focal hemorrhage and/or pus medially. He was discharged home on 3/17/21. \par \par Since discharge, the patient has been feeling well.  He denies chest pain, shortness of breath at rest or with exertion, palpitations, dizziness, syncope, orthopnea, or PND since discharge.\par \par The patient lives at home with his wife and he remains independent with his ADLs.

## 2021-04-07 NOTE — REVIEW OF SYSTEMS
[Feeling Fatigued] : feeling fatigued [Lower Ext Edema] : lower extremity edema [see HPI] : see HPI [Negative] : Psychiatric [Cough] : no cough [Abdominal Pain] : no abdominal pain [Heartburn] : no heartburn [Joint Pain] : no joint pain

## 2021-04-15 ENCOUNTER — APPOINTMENT (OUTPATIENT)
Dept: HEART AND VASCULAR | Facility: CLINIC | Age: 66
End: 2021-04-15
Payer: MEDICARE

## 2021-04-15 VITALS
DIASTOLIC BLOOD PRESSURE: 90 MMHG | WEIGHT: 156.99 LBS | SYSTOLIC BLOOD PRESSURE: 140 MMHG | BODY MASS INDEX: 21.98 KG/M2 | TEMPERATURE: 97.5 F | OXYGEN SATURATION: 96 % | HEART RATE: 64 BPM | HEIGHT: 71 IN

## 2021-04-15 PROCEDURE — 99072 ADDL SUPL MATRL&STAF TM PHE: CPT

## 2021-04-15 PROCEDURE — 99214 OFFICE O/P EST MOD 30 MIN: CPT

## 2021-04-15 NOTE — REVIEW OF SYSTEMS
[Recent Weight Gain (___ Lbs)] : recent [unfilled] ~Ulb weight gain [Feeling Fatigued] : feeling fatigued [Lower Ext Edema] : lower extremity edema [Negative] : Heme/Lymph

## 2021-04-15 NOTE — ASSESSMENT
[FreeTextEntry1] : PreOp- Pt for decortication.  Cleared to proceed.  Would stop Eliquis 3 days prior to surgery with surgery on the 4th day. See beginning of this note for entire hospital stay.\par \par Systolic CHF - EF recovered, normal on June 2019 echo.  Secondary to chemo?  Then 40-45% on  admission this in Dec, then improved to 60-65 on last admission in January..  Doxazosin not ideal.\par \par MR- Was severe, now mild to moderate and EF improved.  Followed by Dr Cui\par \par Afib - in NSR today, no longer on Eliquis due to 2 bleeds and Amio.  ZIO patch pending.  I favor ASA and resuming Amio. Referred to Pulm to be monitored for Amio Pulm toxicity.  The patient's [id number] score = 2\par \par HTN- Stable\par \par HLD- not on statin\par \par T Cell Lymphoma - Was on chemo, finished Nov 2020 \par \par CKD- Dr Denton, close to starting HD\par  \par \par \par \par

## 2021-04-15 NOTE — REASON FOR VISIT
[Follow-Up - Clinic] : a clinic follow-up of [Cardiomyopathy] : cardiomyopathy [FreeTextEntry1] : Onc- Dr Vel Dimas  [phone]\par  Renal- Dr Denton\par Struc Hrt- Dr Cui\par HF- Hilaria Nunez\par EP- Dr Hrendon

## 2021-04-15 NOTE — HISTORY OF PRESENT ILLNESS
[FreeTextEntry1] : 64 y/o M with HFpEF initially met but HF team on recent admission at [facility] and now presents for follow up. Other PMH is notable for HTN, COPD, CKD (stage 5 CKD, has LUE AVF, not on HD yet), T-cell lymphoma (diagnosed 19 years ago, on chemo romidepsin every Wednesday (not since Nov)), AF (on Eliquis), systolic CHF (EF 40-45%), EF was 60-65 in 2019,severe pHTN, severe MR, who recently underwent a left chest wall hematoma evacuation on 12/18/2020 secondary to loculated effusion. Hospital course at that time was complicated by AF with RVR and thoracic surgery consulted at that time for surgical intervention of trapped lung. He followed up as an outpatient after discharge and deemed a good surgical candidate for lung decortication. On 1/18/21 patient presented to [facility] for pre-operative optimization with heparin gtt prior to OR. Upon admission pt noted to have erythema and swelling to his L knee, CT scan demonstrated cellulitis, operation was deferred. Ortho, Gen Surg and ID were consulted, pt with no drainable collection and was medically managed with resolution of infection. Nephrology was consulted for assistance in managing CKD stage 4. On 1/22 he underwent an uncomplicated L VATS, RA decortication with blow hole placement for subcutaneous emphysema. Intraop findings significant for loculated hemothorax. He received 3UPRBC in the OR and post operatively was brought to the CTICU stable and intubated with 3 chest tubes in place. He was extubated POD1 and required primacor for pulmonary HTN and renal perfusion. On POD 2 a right sided pigtail was placed for pleural effusion which drained 800cc and ultimately removed on POD 4. Cardiology was consulted for management of pulmonary hypertension and CHF, recommended IV diuresis. He required alternating BiPAP and HFNC which was weaned to NC. Primacor was weaned off on POD 4 and he was transferred to the stepdown unit. Heparin gtt was started for AC but was held for bleeding around the CT site. A CT chest noncon was preformed showing post surgical changes, no concern for hemothorax and heparin gtt resumed. On POD 5 first CT was removed. Bronch with no abnormal findings, Second chest tube subsequently removed and added nifedipine Xl 30mg for BP control on POD 6. POD 7 third chest tube removed, post chest tube removal xray showed no pneumothorax. Patient doing well today, POD8 and medically appropriate for discharge.\par \par \par 12/28/20  In [facility] 12/17- 12/23 with a chest wall hematoma, Rapid AF.  EF good but severe MR and TR.  In NSR by exam and EKG .  I was later informed he might need a decortification of his pleura(left). \par 3/1/21 Hospitalized for VATS 1/2021 as above. BNP 15,500, Cr 5.5,  Echo was done 1/2021, EF back to NL and MR mild to moderate, mod to severe TR and PAP 67.   Got the Covid Vaccine.  Here he is in NSR and generally doing well considering how complex he is.\par 4/15/21  Off Eliquis 3/17/21 due to a 2nd bleed on the buttocks.  Amio was stopped March 8, 2021\par \par EKG: NSR with 1st degree AVB. No ST-Tw abnormalities. 12/28/20\par EKG: NSR, IVCD, PRWP, 3/1/21\par \par

## 2021-04-15 NOTE — PHYSICAL EXAM
[Normal Appearance] : normal appearance [General Appearance - Well Developed] : well developed [Well Groomed] : well groomed [No Deformities] : no deformities [General Appearance - In No Acute Distress] : no acute distress [Normal Conjunctiva] : the conjunctiva exhibited no abnormalities [Eyelids - No Xanthelasma] : the eyelids demonstrated no xanthelasmas [Respiration, Rhythm And Depth] : normal respiratory rhythm and effort [Exaggerated Use Of Accessory Muscles For Inspiration] : no accessory muscle use [Auscultation Breath Sounds / Voice Sounds] : lungs were clear to auscultation bilaterally [Heart Rate And Rhythm] : heart rate and rhythm were normal [Heart Sounds] : normal S1 and S2 [Abdomen Soft] : soft [Abdomen Tenderness] : non-tender [Abdomen Mass (___ Cm)] : no abdominal mass palpated [Abnormal Walk] : normal gait [Gait - Sufficient For Exercise Testing] : the gait was sufficient for exercise testing [Nail Clubbing] : no clubbing of the fingernails [Cyanosis, Localized] : no localized cyanosis [Petechial Hemorrhages (___cm)] : no petechial hemorrhages [Skin Color & Pigmentation] : normal skin color and pigmentation [] : no rash [No Venous Stasis] : no venous stasis [Skin Lesions] : no skin lesions [No Skin Ulcers] : no skin ulcer [No Xanthoma] : no  xanthoma was observed [FreeTextEntry1] : no edema, AVF left arm

## 2021-04-20 ENCOUNTER — APPOINTMENT (OUTPATIENT)
Dept: NEPHROLOGY | Facility: CLINIC | Age: 66
End: 2021-04-20
Payer: MEDICARE

## 2021-04-20 VITALS
HEART RATE: 68 BPM | WEIGHT: 157 LBS | SYSTOLIC BLOOD PRESSURE: 138 MMHG | BODY MASS INDEX: 21.9 KG/M2 | DIASTOLIC BLOOD PRESSURE: 70 MMHG

## 2021-04-20 PROCEDURE — 99072 ADDL SUPL MATRL&STAF TM PHE: CPT

## 2021-04-20 PROCEDURE — 99214 OFFICE O/P EST MOD 30 MIN: CPT

## 2021-04-20 RX ORDER — APIXABAN 2.5 MG/1
2.5 TABLET, FILM COATED ORAL
Qty: 180 | Refills: 0 | Status: DISCONTINUED | COMMUNITY
Start: 2020-02-13 | End: 2021-04-20

## 2021-04-23 NOTE — ASSESSMENT
[FreeTextEntry1] : Reviewed all labs in detail with patient and his wife from 3/26/2021\par 64 yo man with CKD 5, hyperuricemia, CHF, T cell lymphoma, metabolic acidosis\par -CKD 5- not uremic- energy appetite okay; left arm AVF mature and functional-okay to defer HD. \par B/creat 56/5.07- stabilized.  (prior 2 data points: 50/5.19  60/6.61)\par will go to in-center HD when needed at [facility] dialysis 6th [place]\par -hyperphosphatemia- P- 6.1- c/w sevelamer \par -anemia- hgb 8.9- followed by heme MARCE\par -metabolic acidosis- CO2  23- okay\par -dCHF- compensated; c/w torsemide\par - HTN- BP controlled - c/w present meds\par -Metabolic bone disease- c/w Calcitriol 0.5 mcg po daily;\par - hyperuricemia/gout -  no attacks- continues to have hyperuricemia due to lymphoma- c/w high dose allopurinol and use of colchicine daily \par - LE edema controlled, c/w torsemide\par -proteinuria -low grade- defer RAAS blockade as advanced CKD- \par -Afib  -will review with cardio and EP- continues on amiodarone and off eliquis\par \par f/u in 2 months\par \par 4/23/2021: ADDENDUM: reviewed labs from 4/22/2021- creat - 5.31  K- 4.6  CO2 - 20- okay

## 2021-04-23 NOTE — HISTORY OF PRESENT ILLNESS
[FreeTextEntry1] : 66 yo man with CKD 5, HTN,  sCHF w/ EF of 40-45%, hyperuricemia and BPH, here for follow up evaluation\par h/o T cell lymphoma, s/p LVATS 1/2021, AFib, pulmHTN\par was seen by cardio last week- AFib again- back on amiodarone and ASA 81 mg- will need to clarify as EP recently dc'd and now restarted as he is off eliquis s/p GI bleeding\par no uremic symptoms- energy and appetite okay; no N or metallic taste.\par Vaccinated against COVID\par no SOB able to walk 1 block- feels that legs get tired after that-\par  continues with torsemide every other day\par No recent gout attacks -- remains on high dose allopurinol and colchicine (T cell lymphoma)\par \par  from prior note:\par 1/21/2021- LVATS, RA decortication with blow hole placement for SQ emphysema; right pleural effusion that needed pigtail drainage.\par \par \par PMH: w/ PMHx of \par  T cell lymphoma  since early 2000's \par has not needed additional chemotherapy since 11/2020 (T-cell lymphoma)-

## 2021-04-23 NOTE — PHYSICAL EXAM
[General Appearance - Alert] : alert [General Appearance - In No Acute Distress] : in no acute distress [General Appearance - Well Nourished] : well nourished [General Appearance - Well Developed] : well developed [Heart Sounds Gallop] : no gallops [Abnormal Walk] : normal gait [___ (cm) Fistula] : [unfilled] (cm) fistula [Bruit] : a bruit was present [Thrill] : a thrill was present [Skin Turgor] : normal skin turgor [] : no rash [Oriented To Time, Place, And Person] : oriented to person, place, and time [Impaired Insight] : insight and judgment were intact [Memory Recent] : recent memory was not impaired [Affect] : the affect was normal [Heart Sounds] : normal S1 and S2 [FreeTextEntry1] : left forearm

## 2021-04-27 ENCOUNTER — APPOINTMENT (OUTPATIENT)
Dept: HEART AND VASCULAR | Facility: CLINIC | Age: 66
End: 2021-04-27
Payer: MEDICARE

## 2021-04-27 PROCEDURE — 99072 ADDL SUPL MATRL&STAF TM PHE: CPT

## 2021-04-27 PROCEDURE — 93248 EXT ECG>7D<15D REV&INTERPJ: CPT

## 2021-05-05 ENCOUNTER — APPOINTMENT (OUTPATIENT)
Dept: PULMONOLOGY | Facility: CLINIC | Age: 66
End: 2021-05-05
Payer: MEDICARE

## 2021-05-05 VITALS
HEART RATE: 66 BPM | HEIGHT: 71 IN | OXYGEN SATURATION: 95 % | DIASTOLIC BLOOD PRESSURE: 81 MMHG | TEMPERATURE: 97.8 F | WEIGHT: 158 LBS | SYSTOLIC BLOOD PRESSURE: 165 MMHG | BODY MASS INDEX: 22.12 KG/M2

## 2021-05-05 PROCEDURE — 71046 X-RAY EXAM CHEST 2 VIEWS: CPT

## 2021-05-05 PROCEDURE — 99072 ADDL SUPL MATRL&STAF TM PHE: CPT

## 2021-05-05 PROCEDURE — 99215 OFFICE O/P EST HI 40 MIN: CPT | Mod: 25

## 2021-05-05 NOTE — ASSESSMENT
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  The patient underwent VATS procedure with pleural biopsy.  The cytology and the pathology from the pleural fluid and pleural effusion was consistent with chronic inflammatory process and no evidence of T-cell lymphoma.  Patient also has drainage of the pleural fluid in December which again was negative for underlying malignancy/lymphoma.  Patient also have underlying chronic renal insufficiency and "coronary artery disease which could be contributed to his pleural effusion.  The effusion was predominantly lymphocytic in origin.  The recent chest x-ray from today revealed no change of the pleural effusion on the right and possible small pleural effusion or pleural reaction on the left.  I will follow-up with CT scan of the chest for further evaluation of the left lower lobe and the pleural space.  The he is scheduled to have the CT scan in June.\par \par Atelectasis of the left lower lobe\par \par We will follow-up on repeat CT scan of the chest\par \par COPD\par \par The patient is is a symptomatic from COPD standpoint of view.  His exercise capacity improved.  He has no evidence of bronchospasm.  Hold on bronchodilator now.  Hold on PFT as it is effort dependent and is still having some tenderness from the VATS.  I will follow-up in 3 months and will order the PFT.\par \par Patient is stop smoking and and enforced that he cannot smoke at this time as he is already have radiographic  images consistent with emphysema.\par \par Dyspnea on exertion\par \par Is multifactorial but improved and he can walk 2 blocks with at this point.  He is encouraged to continue increasing exercise capacity\par \par I reviewed the CT scans and reports from the last admissions\par \par I discussed the case with the wife

## 2021-05-05 NOTE — REVIEW OF SYSTEMS
[Cough] : no cough [Hemoptysis] : no hemoptysis [Chest Tightness] : no chest tightness [Frequent URIs] : no frequent URIs [Sputum] : no sputum [Dyspnea] : no dyspnea [Pleuritic Pain] : no pleuritic pain [Wheezing] : no wheezing [A.M. Dry Mouth] : no a.m. dry mouth [SOB on Exertion] : sob on exertion [Negative] : Endocrine

## 2021-05-05 NOTE — HISTORY OF PRESENT ILLNESS
[TextBox_4] : Very well since he was discharged from the hospital.  He is working about 2 blocks which she was not able to do before.  He has no fever chills coughing wheezing.  The left chest is sore as well but the biopsy

## 2021-05-10 ENCOUNTER — TRANSCRIPTION ENCOUNTER (OUTPATIENT)
Age: 66
End: 2021-05-10

## 2021-05-10 ENCOUNTER — NON-APPOINTMENT (OUTPATIENT)
Age: 66
End: 2021-05-10

## 2021-05-10 ENCOUNTER — APPOINTMENT (OUTPATIENT)
Dept: HEART AND VASCULAR | Facility: CLINIC | Age: 66
End: 2021-05-10
Payer: MEDICARE

## 2021-05-10 VITALS
DIASTOLIC BLOOD PRESSURE: 84 MMHG | HEART RATE: 65 BPM | SYSTOLIC BLOOD PRESSURE: 166 MMHG | WEIGHT: 158 LBS | TEMPERATURE: 95.6 F | BODY MASS INDEX: 22.12 KG/M2 | HEIGHT: 71 IN

## 2021-05-10 PROCEDURE — 99214 OFFICE O/P EST MOD 30 MIN: CPT

## 2021-05-10 PROCEDURE — 99072 ADDL SUPL MATRL&STAF TM PHE: CPT

## 2021-05-10 NOTE — HISTORY OF PRESENT ILLNESS
[FreeTextEntry1] : Mr. Gamez is a pleasant 65 year-old gentleman with a past medical history significant for HTN, COPD, CKD (stage 4 CKD, has LUE AVF, not on HD yet), T-cell lymphoma (diagnosed 19 years ago, in remission since 11/2020) systolic CHF (EF 40-45%), severe pHTN, severe MR, atrial fibrillation, who underwent a left chest wall hematoma evacuation on 12/18/2020 secondary to loculated effusion. Hospital course at that time was complicated by AF with RVR.  He is now s/p left VATS, RA decortication on 1/22/21.  He was been maintained on Amiodarone for maintenance of sinus rhythm.  Amiodarone was briefly stopped after his visit on 3/8/21 as he was maintaining sinus rhythm.  He was admitted to [facility] 3/16/21 with a spontaneous gluteal hemorrhage.  Eliquis was discontinued and Amiodarone resumed.  \par \par By his report he has never undergone DCCV, ablation or previously been on AAD therapy.  He denies any active CP, palpitations, dizziness, presyncope or syncope. \par \par Of note, he reports he is starting to develop a pruritic rash on face and chest that is associated with lymphoma recurrence.  Scheduled for follow-up with Dr. Bach later this month.\par \par Echo 1/2021 showed EF 65-70%, mild to moderate MR\par

## 2021-05-10 NOTE — DISCUSSION/SUMMARY
[FreeTextEntry1] : Mr. Gamez is a 65 year-old gentleman with paroxysmal atrial fibrillation.  He is maintaining sinus rhythm on ECG today and has been unaware of any palpitations or symptoms of AFib.  He is advised to continue Amiodarone 200 mg daily for arrhythmia suppression.  The risks of long-term use of Amiodarone were discussed in detail and he knows to have routine eye exams.  Given his history of spontaneous bleeding on systemic anticoagulation, we discussed the indication for the Watchman device for TE/CVA risk reduction.  JORGE ordered today.  Would avoid use of CT / contrast given his CKD.  He was asked to return for follow-up in one month and knows to call with any questions or concerns in the interim.

## 2021-05-17 ENCOUNTER — FORM ENCOUNTER (OUTPATIENT)
Age: 66
End: 2021-05-17

## 2021-05-18 ENCOUNTER — OUTPATIENT (OUTPATIENT)
Dept: OUTPATIENT SERVICES | Facility: HOSPITAL | Age: 66
LOS: 1 days | End: 2021-05-18
Payer: MEDICARE

## 2021-05-18 DIAGNOSIS — I48.91 UNSPECIFIED ATRIAL FIBRILLATION: ICD-10-CM

## 2021-05-18 DIAGNOSIS — Z90.49 ACQUIRED ABSENCE OF OTHER SPECIFIED PARTS OF DIGESTIVE TRACT: Chronic | ICD-10-CM

## 2021-05-18 DIAGNOSIS — Z41.9 ENCOUNTER FOR PROCEDURE FOR PURPOSES OTHER THAN REMEDYING HEALTH STATE, UNSPECIFIED: Chronic | ICD-10-CM

## 2021-05-18 PROCEDURE — 93312 ECHO TRANSESOPHAGEAL: CPT

## 2021-05-18 PROCEDURE — 93312 ECHO TRANSESOPHAGEAL: CPT | Mod: 26

## 2021-05-18 PROCEDURE — 76377 3D RENDER W/INTRP POSTPROCES: CPT | Mod: 26

## 2021-05-18 PROCEDURE — 93320 DOPPLER ECHO COMPLETE: CPT | Mod: 26

## 2021-06-09 ENCOUNTER — NON-APPOINTMENT (OUTPATIENT)
Age: 66
End: 2021-06-09

## 2021-06-11 LAB — PCO2 BLDV: SIGNIFICANT CHANGE UP MMHG (ref 41–51)

## 2021-06-15 ENCOUNTER — APPOINTMENT (OUTPATIENT)
Dept: HEART AND VASCULAR | Facility: CLINIC | Age: 66
End: 2021-06-15
Payer: MEDICARE

## 2021-06-15 VITALS
BODY MASS INDEX: 23.57 KG/M2 | SYSTOLIC BLOOD PRESSURE: 146 MMHG | HEART RATE: 61 BPM | HEIGHT: 71 IN | DIASTOLIC BLOOD PRESSURE: 82 MMHG | OXYGEN SATURATION: 95 % | WEIGHT: 168.38 LBS

## 2021-06-15 PROCEDURE — 99214 OFFICE O/P EST MOD 30 MIN: CPT

## 2021-06-15 PROCEDURE — 99072 ADDL SUPL MATRL&STAF TM PHE: CPT

## 2021-06-15 NOTE — HISTORY OF PRESENT ILLNESS
[FreeTextEntry1] : 65 y/o M with HFpEF who presents for routine follow up. Other PMH is notable for HTN, COPD, CKD (stage 5 CKD, has LUE AVF, not on HD yet), T-cell lymphoma (diagnosed 19 years ago, on chemo romidepsin every Wednesday (not since Nov), AF (on Eliquis), systolic CHF (EF 40-45%), severe pHTN, severe MR, who recently underwent a left chest wall hematoma evacuation on 12/18/2020 secondary to loculated effusion & L VATS, RA decortication with blow hole placement (1/2021) for subQ emphysema (intraop findings c/w loculated hemothorax) requiring chest tubes and 3U PRBCs. \par \par Since seen last 3/16, i sent him to the ED that day for R gluteal pain and CT revealed Severe right gluteal myositis with focal hemorrhage and/or pus medially. DCd home the next day with pain control no surgical intervention. CT surgery also consulted for Watchman in the setting of multiple spontaneous hematomas. He was scheduled for the Watchman procedure 6/29, but had to cancel as his Lymphoma has returned and he restarted weekly Chemo (Istodax) last week. \par \par Currently, he is feeling well overall. His breathing has improved and can walk a bit farther without experiencing NIELSON (2+ blocks) He can still walk up one flight of stairs, slowly. The R gluteal hematoma has now subsided. He denies CP,SOB at rest, orthopnea, PND, LH/dizziness, abdominal discomfort, palpitations and syncope. His appetite is normal and bowel and bladder habits are unchanged. He does not use excess salt but has not been limiting his fluid. \par \par

## 2021-06-15 NOTE — CARDIOLOGY SUMMARY
[___] : [unfilled] [de-identified] : 5/18/21 JORGE: Normal LV size/function, RVE with normal function, LAE, no thrombus seen, GARETH orifice measures 2.86cmX 2.07cm, no e/o intracardiac shunt, mild AI, mild prolapse P1 and alteral commissure of the mitral valve, moderate MR, moderate TR, PH present, PASP 47, AR 4.12cm, SANTANA 3/95cm, mild to moderate plaque seen in the visualized portion of the descenging arota.

## 2021-06-15 NOTE — PHYSICAL EXAM
[General Appearance - Well Developed] : well developed [Normal Appearance] : normal appearance [Well Groomed] : well groomed [General Appearance - Well Nourished] : well nourished [General Appearance - In No Acute Distress] : no acute distress [Eyelids - No Xanthelasma] : the eyelids demonstrated no xanthelasmas [] : no respiratory distress [Respiration, Rhythm And Depth] : normal respiratory rhythm and effort [Auscultation Breath Sounds / Voice Sounds] : lungs were clear to auscultation bilaterally [Heart Rate And Rhythm] : heart rate and rhythm were normal [Edema] : no peripheral edema present [Bowel Sounds] : normal bowel sounds [Abdomen Soft] : soft [Abdomen Tenderness] : non-tender [Abnormal Walk] : normal gait [Skin Color & Pigmentation] : normal skin color and pigmentation [Skin Turgor] : normal skin turgor [Oriented To Time, Place, And Person] : oriented to person, place, and time [Impaired Insight] : insight and judgment were intact [Affect] : the affect was normal [No Anxiety] : not feeling anxious [FreeTextEntry1] : L arm AV fistula

## 2021-06-15 NOTE — DISCUSSION/SUMMARY
[FreeTextEntry1] : 65 yo M with HFpEF (EF 65%) , Afib, CKD V who is s/o LVATS, RA decortication with blow hole placement for SQ emphysema (1/21) who was in remission from Lymphoma but it has returned and he is now back on weekly Chemo.  He is ACC/AHA Stage C, NYHA Class II, euvolemic on exam. I have recommended the following:\par \par 1. HFpEF- Stable and compensated. Continue Toprol XL & Torsemide 20mg daily. Labs reviewed and stable. Educated on daily weights, diet & exercise. \par \par 2. Afib- He is on Amiodarone but Eliquis DCd as he was developing spontaneous Hematomas. Was scheduled for Watchman 6/29 but postponed due to Chemo.  \par \par 3. CKD V- Followed by Dr. Guadalupe. Has L arm AV fistula but not yet on HD. \par \par 4. T cell Lymphoma- Out of remission and now back on weekly Chemo (Istodax) starting last week. \par \par 5. HTN- Not well controlled. Will increase his Amlodipine to 10mg daily. \par \par 6. Follow up in 3 months.

## 2021-06-15 NOTE — REASON FOR VISIT
[Follow-Up - Clinic] : a clinic follow-up of [Heart Failure] : congestive heart failure [FreeTextEntry1] : PATIENT INSTRUCTIONS:\par \par 1. Please INCREASE the AMLODIPINE to 10mg daily. \par \par 2. Follow up in 3-4 months.

## 2021-06-22 ENCOUNTER — RESULT REVIEW (OUTPATIENT)
Age: 66
End: 2021-06-22

## 2021-06-22 ENCOUNTER — OUTPATIENT (OUTPATIENT)
Dept: OUTPATIENT SERVICES | Facility: HOSPITAL | Age: 66
LOS: 1 days | End: 2021-06-22
Payer: MEDICARE

## 2021-06-22 ENCOUNTER — APPOINTMENT (OUTPATIENT)
Dept: CT IMAGING | Facility: HOSPITAL | Age: 66
End: 2021-06-22
Payer: MEDICARE

## 2021-06-22 DIAGNOSIS — Z90.49 ACQUIRED ABSENCE OF OTHER SPECIFIED PARTS OF DIGESTIVE TRACT: Chronic | ICD-10-CM

## 2021-06-22 DIAGNOSIS — Z41.9 ENCOUNTER FOR PROCEDURE FOR PURPOSES OTHER THAN REMEDYING HEALTH STATE, UNSPECIFIED: Chronic | ICD-10-CM

## 2021-06-22 PROCEDURE — 71250 CT THORAX DX C-: CPT

## 2021-06-22 PROCEDURE — 71250 CT THORAX DX C-: CPT | Mod: 26

## 2021-06-28 ENCOUNTER — NON-APPOINTMENT (OUTPATIENT)
Age: 66
End: 2021-06-28

## 2021-06-29 ENCOUNTER — TRANSCRIPTION ENCOUNTER (OUTPATIENT)
Age: 66
End: 2021-06-29

## 2021-07-05 ENCOUNTER — INPATIENT (INPATIENT)
Facility: HOSPITAL | Age: 66
LOS: 4 days | Discharge: HOME | DRG: 187 | End: 2021-07-10
Payer: MEDICARE

## 2021-07-05 ENCOUNTER — TRANSCRIPTION ENCOUNTER (OUTPATIENT)
Age: 66
End: 2021-07-05

## 2021-07-05 VITALS
RESPIRATION RATE: 22 BRPM | HEIGHT: 71 IN | WEIGHT: 167.55 LBS | HEART RATE: 66 BPM | OXYGEN SATURATION: 99 % | DIASTOLIC BLOOD PRESSURE: 74 MMHG | TEMPERATURE: 98 F | SYSTOLIC BLOOD PRESSURE: 134 MMHG

## 2021-07-05 DIAGNOSIS — N40.0 BENIGN PROSTATIC HYPERPLASIA WITHOUT LOWER URINARY TRACT SYMPTOMS: ICD-10-CM

## 2021-07-05 DIAGNOSIS — N18.9 CHRONIC KIDNEY DISEASE, UNSPECIFIED: ICD-10-CM

## 2021-07-05 DIAGNOSIS — Z41.9 ENCOUNTER FOR PROCEDURE FOR PURPOSES OTHER THAN REMEDYING HEALTH STATE, UNSPECIFIED: Chronic | ICD-10-CM

## 2021-07-05 DIAGNOSIS — C85.90 NON-HODGKIN LYMPHOMA, UNSPECIFIED, UNSPECIFIED SITE: ICD-10-CM

## 2021-07-05 DIAGNOSIS — J98.11 ATELECTASIS: ICD-10-CM

## 2021-07-05 DIAGNOSIS — K74.60 UNSPECIFIED CIRRHOSIS OF LIVER: ICD-10-CM

## 2021-07-05 DIAGNOSIS — J90 PLEURAL EFFUSION, NOT ELSEWHERE CLASSIFIED: ICD-10-CM

## 2021-07-05 DIAGNOSIS — I27.20 PULMONARY HYPERTENSION, UNSPECIFIED: ICD-10-CM

## 2021-07-05 DIAGNOSIS — I10 ESSENTIAL (PRIMARY) HYPERTENSION: ICD-10-CM

## 2021-07-05 DIAGNOSIS — J44.9 CHRONIC OBSTRUCTIVE PULMONARY DISEASE, UNSPECIFIED: ICD-10-CM

## 2021-07-05 DIAGNOSIS — Z29.9 ENCOUNTER FOR PROPHYLACTIC MEASURES, UNSPECIFIED: ICD-10-CM

## 2021-07-05 DIAGNOSIS — I48.91 UNSPECIFIED ATRIAL FIBRILLATION: ICD-10-CM

## 2021-07-05 DIAGNOSIS — C84.A0 CUTANEOUS T-CELL LYMPHOMA, UNSPECIFIED, UNSPECIFIED SITE: ICD-10-CM

## 2021-07-05 DIAGNOSIS — I50.32 CHRONIC DIASTOLIC (CONGESTIVE) HEART FAILURE: ICD-10-CM

## 2021-07-05 DIAGNOSIS — I44.0 ATRIOVENTRICULAR BLOCK, FIRST DEGREE: ICD-10-CM

## 2021-07-05 DIAGNOSIS — N25.0 RENAL OSTEODYSTROPHY: ICD-10-CM

## 2021-07-05 DIAGNOSIS — R18.8 OTHER ASCITES: ICD-10-CM

## 2021-07-05 DIAGNOSIS — M10.9 GOUT, UNSPECIFIED: ICD-10-CM

## 2021-07-05 DIAGNOSIS — I48.11 LONGSTANDING PERSISTENT ATRIAL FIBRILLATION: ICD-10-CM

## 2021-07-05 DIAGNOSIS — D63.8 ANEMIA IN OTHER CHRONIC DISEASES CLASSIFIED ELSEWHERE: ICD-10-CM

## 2021-07-05 DIAGNOSIS — N18.5 CHRONIC KIDNEY DISEASE, STAGE 5: ICD-10-CM

## 2021-07-05 DIAGNOSIS — Z79.899 OTHER LONG TERM (CURRENT) DRUG THERAPY: ICD-10-CM

## 2021-07-05 DIAGNOSIS — I13.2 HYPERTENSIVE HEART AND CHRONIC KIDNEY DISEASE WITH HEART FAILURE AND WITH STAGE 5 CHRONIC KIDNEY DISEASE, OR END STAGE RENAL DISEASE: ICD-10-CM

## 2021-07-05 DIAGNOSIS — Z90.49 ACQUIRED ABSENCE OF OTHER SPECIFIED PARTS OF DIGESTIVE TRACT: ICD-10-CM

## 2021-07-05 DIAGNOSIS — R06.02 SHORTNESS OF BREATH: ICD-10-CM

## 2021-07-05 DIAGNOSIS — I50.9 HEART FAILURE, UNSPECIFIED: ICD-10-CM

## 2021-07-05 DIAGNOSIS — M94.0 CHONDROCOSTAL JUNCTION SYNDROME [TIETZE]: ICD-10-CM

## 2021-07-05 DIAGNOSIS — Z90.49 ACQUIRED ABSENCE OF OTHER SPECIFIED PARTS OF DIGESTIVE TRACT: Chronic | ICD-10-CM

## 2021-07-05 LAB
ALBUMIN SERPL ELPH-MCNC: 3.9 G/DL — SIGNIFICANT CHANGE UP (ref 3.3–5)
ALP SERPL-CCNC: 113 U/L — SIGNIFICANT CHANGE UP (ref 40–120)
ALT FLD-CCNC: 5 U/L — LOW (ref 10–45)
ANION GAP SERPL CALC-SCNC: 12 MMOL/L — SIGNIFICANT CHANGE UP (ref 5–17)
ANION GAP SERPL CALC-SCNC: 12 MMOL/L — SIGNIFICANT CHANGE UP (ref 5–17)
ANISOCYTOSIS BLD QL: SLIGHT — SIGNIFICANT CHANGE UP
AST SERPL-CCNC: 7 U/L — LOW (ref 10–40)
BASE EXCESS BLDV CALC-SCNC: -2.7 MMOL/L — LOW (ref -2–3)
BASOPHILS # BLD AUTO: 0.1 K/UL — SIGNIFICANT CHANGE UP (ref 0–0.2)
BASOPHILS NFR BLD AUTO: 1.8 % — SIGNIFICANT CHANGE UP (ref 0–2)
BILIRUB SERPL-MCNC: 0.4 MG/DL — SIGNIFICANT CHANGE UP (ref 0.2–1.2)
BUN SERPL-MCNC: 66 MG/DL — HIGH (ref 7–23)
BUN SERPL-MCNC: 66 MG/DL — HIGH (ref 7–23)
BURR CELLS BLD QL SMEAR: PRESENT — SIGNIFICANT CHANGE UP
CA-I SERPL-SCNC: 1.16 MMOL/L — SIGNIFICANT CHANGE UP (ref 1.15–1.33)
CALCIUM SERPL-MCNC: 8.6 MG/DL — SIGNIFICANT CHANGE UP (ref 8.4–10.5)
CALCIUM SERPL-MCNC: 9.1 MG/DL — SIGNIFICANT CHANGE UP (ref 8.4–10.5)
CHLORIDE SERPL-SCNC: 112 MMOL/L — HIGH (ref 96–108)
CHLORIDE SERPL-SCNC: 112 MMOL/L — HIGH (ref 96–108)
CO2 BLDV-SCNC: 23.8 MMOL/L — SIGNIFICANT CHANGE UP (ref 22–26)
CO2 SERPL-SCNC: 22 MMOL/L — SIGNIFICANT CHANGE UP (ref 22–31)
CO2 SERPL-SCNC: 23 MMOL/L — SIGNIFICANT CHANGE UP (ref 22–31)
CREAT SERPL-MCNC: 7.04 MG/DL — HIGH (ref 0.5–1.3)
CREAT SERPL-MCNC: 7.2 MG/DL — HIGH (ref 0.5–1.3)
DACRYOCYTES BLD QL SMEAR: SLIGHT — SIGNIFICANT CHANGE UP
EOSINOPHIL # BLD AUTO: 0 K/UL — SIGNIFICANT CHANGE UP (ref 0–0.5)
EOSINOPHIL NFR BLD AUTO: 0 % — SIGNIFICANT CHANGE UP (ref 0–6)
GAS PNL BLDV: 141 MMOL/L — SIGNIFICANT CHANGE UP (ref 136–145)
GAS PNL BLDV: SIGNIFICANT CHANGE UP
GIANT PLATELETS BLD QL SMEAR: PRESENT — SIGNIFICANT CHANGE UP
GLUCOSE SERPL-MCNC: 118 MG/DL — HIGH (ref 70–99)
GLUCOSE SERPL-MCNC: 87 MG/DL — SIGNIFICANT CHANGE UP (ref 70–99)
HCO3 BLDV-SCNC: 23 MMOL/L — SIGNIFICANT CHANGE UP (ref 22–29)
HCT VFR BLD CALC: 30.2 % — LOW (ref 39–50)
HGB BLD-MCNC: 9 G/DL — LOW (ref 13–17)
LYMPHOCYTES # BLD AUTO: 0.74 K/UL — LOW (ref 1–3.3)
LYMPHOCYTES # BLD AUTO: 14 % — SIGNIFICANT CHANGE UP (ref 13–44)
MACROCYTES BLD QL: SLIGHT — SIGNIFICANT CHANGE UP
MAGNESIUM SERPL-MCNC: 2.3 MG/DL — SIGNIFICANT CHANGE UP (ref 1.6–2.6)
MANUAL SMEAR VERIFICATION: SIGNIFICANT CHANGE UP
MCHC RBC-ENTMCNC: 27.1 PG — SIGNIFICANT CHANGE UP (ref 27–34)
MCHC RBC-ENTMCNC: 29.8 GM/DL — LOW (ref 32–36)
MCV RBC AUTO: 91 FL — SIGNIFICANT CHANGE UP (ref 80–100)
MONOCYTES # BLD AUTO: 0.51 K/UL — SIGNIFICANT CHANGE UP (ref 0–0.9)
MONOCYTES NFR BLD AUTO: 9.6 % — SIGNIFICANT CHANGE UP (ref 2–14)
MYELOCYTES NFR BLD: 0.9 % — HIGH (ref 0–0)
NEUTROPHILS # BLD AUTO: 3.89 K/UL — SIGNIFICANT CHANGE UP (ref 1.8–7.4)
NEUTROPHILS NFR BLD AUTO: 73.7 % — SIGNIFICANT CHANGE UP (ref 43–77)
NT-PROBNP SERPL-SCNC: HIGH PG/ML (ref 0–300)
OVALOCYTES BLD QL SMEAR: SLIGHT — SIGNIFICANT CHANGE UP
PCO2 BLDV: 40 MMHG — LOW (ref 42–55)
PH BLDV: 7.36 — SIGNIFICANT CHANGE UP (ref 7.32–7.43)
PHOSPHATE SERPL-MCNC: 5.4 MG/DL — HIGH (ref 2.5–4.5)
PLAT MORPH BLD: ABNORMAL
PLATELET # BLD AUTO: 210 K/UL — SIGNIFICANT CHANGE UP (ref 150–400)
PO2 BLDV: 35 MMHG — SIGNIFICANT CHANGE UP
POIKILOCYTOSIS BLD QL AUTO: SLIGHT — SIGNIFICANT CHANGE UP
POLYCHROMASIA BLD QL SMEAR: SLIGHT — SIGNIFICANT CHANGE UP
POTASSIUM BLDV-SCNC: 4 MMOL/L — SIGNIFICANT CHANGE UP (ref 3.5–5.1)
POTASSIUM SERPL-MCNC: 3.9 MMOL/L — SIGNIFICANT CHANGE UP (ref 3.5–5.3)
POTASSIUM SERPL-MCNC: 4.1 MMOL/L — SIGNIFICANT CHANGE UP (ref 3.5–5.3)
POTASSIUM SERPL-SCNC: 3.9 MMOL/L — SIGNIFICANT CHANGE UP (ref 3.5–5.3)
POTASSIUM SERPL-SCNC: 4.1 MMOL/L — SIGNIFICANT CHANGE UP (ref 3.5–5.3)
PROT SERPL-MCNC: 7 G/DL — SIGNIFICANT CHANGE UP (ref 6–8.3)
RBC # BLD: 3.32 M/UL — LOW (ref 4.2–5.8)
RBC # FLD: 20.6 % — HIGH (ref 10.3–14.5)
RBC BLD AUTO: ABNORMAL
SAO2 % BLDV: 62.3 % — SIGNIFICANT CHANGE UP
SARS-COV-2 RNA SPEC QL NAA+PROBE: SIGNIFICANT CHANGE UP
SCHISTOCYTES BLD QL AUTO: SLIGHT — SIGNIFICANT CHANGE UP
SODIUM SERPL-SCNC: 146 MMOL/L — HIGH (ref 135–145)
SODIUM SERPL-SCNC: 147 MMOL/L — HIGH (ref 135–145)
SPHEROCYTES BLD QL SMEAR: SLIGHT — SIGNIFICANT CHANGE UP
TROPONIN T SERPL-MCNC: 0.06 NG/ML — CRITICAL HIGH (ref 0–0.01)
TROPONIN T SERPL-MCNC: 0.07 NG/ML — CRITICAL HIGH (ref 0–0.01)
WBC # BLD: 5.28 K/UL — SIGNIFICANT CHANGE UP (ref 3.8–10.5)
WBC # FLD AUTO: 5.28 K/UL — SIGNIFICANT CHANGE UP (ref 3.8–10.5)

## 2021-07-05 PROCEDURE — 93010 ELECTROCARDIOGRAM REPORT: CPT

## 2021-07-05 PROCEDURE — 99232 SBSQ HOSP IP/OBS MODERATE 35: CPT

## 2021-07-05 PROCEDURE — 71250 CT THORAX DX C-: CPT | Mod: 26,MA

## 2021-07-05 PROCEDURE — 99285 EMERGENCY DEPT VISIT HI MDM: CPT

## 2021-07-05 PROCEDURE — 99222 1ST HOSP IP/OBS MODERATE 55: CPT

## 2021-07-05 PROCEDURE — 71045 X-RAY EXAM CHEST 1 VIEW: CPT | Mod: 26

## 2021-07-05 PROCEDURE — 74176 CT ABD & PELVIS W/O CONTRAST: CPT | Mod: 26,MA

## 2021-07-05 RX ORDER — SODIUM BICARBONATE 1 MEQ/ML
650 SYRINGE (ML) INTRAVENOUS
Refills: 0 | Status: DISCONTINUED | OUTPATIENT
Start: 2021-07-05 | End: 2021-07-10

## 2021-07-05 RX ORDER — CALCITRIOL 0.5 UG/1
1 CAPSULE ORAL
Qty: 0 | Refills: 0 | DISCHARGE

## 2021-07-05 RX ORDER — ACETAMINOPHEN 500 MG
650 TABLET ORAL EVERY 6 HOURS
Refills: 0 | Status: DISCONTINUED | OUTPATIENT
Start: 2021-07-05 | End: 2021-07-10

## 2021-07-05 RX ORDER — ALLOPURINOL 300 MG
100 TABLET ORAL
Refills: 0 | Status: DISCONTINUED | OUTPATIENT
Start: 2021-07-05 | End: 2021-07-10

## 2021-07-05 RX ORDER — SEVELAMER CARBONATE 2400 MG/1
800 POWDER, FOR SUSPENSION ORAL EVERY 8 HOURS
Refills: 0 | Status: DISCONTINUED | OUTPATIENT
Start: 2021-07-05 | End: 2021-07-10

## 2021-07-05 RX ORDER — CALCITRIOL 0.5 UG/1
0.25 CAPSULE ORAL DAILY
Refills: 0 | Status: DISCONTINUED | OUTPATIENT
Start: 2021-07-05 | End: 2021-07-10

## 2021-07-05 RX ORDER — METOPROLOL TARTRATE 50 MG
50 TABLET ORAL EVERY 12 HOURS
Refills: 0 | Status: DISCONTINUED | OUTPATIENT
Start: 2021-07-06 | End: 2021-07-08

## 2021-07-05 RX ORDER — HEPARIN SODIUM 5000 [USP'U]/ML
5000 INJECTION INTRAVENOUS; SUBCUTANEOUS EVERY 8 HOURS
Refills: 0 | Status: DISCONTINUED | OUTPATIENT
Start: 2021-07-05 | End: 2021-07-10

## 2021-07-05 RX ORDER — DOXAZOSIN MESYLATE 4 MG
2 TABLET ORAL
Qty: 0 | Refills: 0 | DISCHARGE

## 2021-07-05 RX ORDER — DOXAZOSIN MESYLATE 4 MG
2 TABLET ORAL
Refills: 0 | Status: DISCONTINUED | OUTPATIENT
Start: 2021-07-05 | End: 2021-07-10

## 2021-07-05 RX ORDER — AMLODIPINE BESYLATE 2.5 MG/1
10 TABLET ORAL DAILY
Refills: 0 | Status: DISCONTINUED | OUTPATIENT
Start: 2021-07-05 | End: 2021-07-10

## 2021-07-05 RX ORDER — AMIODARONE HYDROCHLORIDE 400 MG/1
200 TABLET ORAL DAILY
Refills: 0 | Status: DISCONTINUED | OUTPATIENT
Start: 2021-07-05 | End: 2021-07-10

## 2021-07-05 RX ORDER — ACETAMINOPHEN 500 MG
1000 TABLET ORAL ONCE
Refills: 0 | Status: COMPLETED | OUTPATIENT
Start: 2021-07-05 | End: 2021-07-05

## 2021-07-05 RX ORDER — ASPIRIN/CALCIUM CARB/MAGNESIUM 324 MG
1 TABLET ORAL
Qty: 0 | Refills: 0 | DISCHARGE

## 2021-07-05 RX ORDER — ASPIRIN/CALCIUM CARB/MAGNESIUM 324 MG
81 TABLET ORAL DAILY
Refills: 0 | Status: DISCONTINUED | OUTPATIENT
Start: 2021-07-05 | End: 2021-07-10

## 2021-07-05 RX ORDER — ENOXAPARIN SODIUM 100 MG/ML
40 INJECTION SUBCUTANEOUS EVERY 24 HOURS
Refills: 0 | Status: DISCONTINUED | OUTPATIENT
Start: 2021-07-05 | End: 2021-07-05

## 2021-07-05 RX ADMIN — Medication 400 MILLIGRAM(S): at 16:53

## 2021-07-05 NOTE — ED ADULT NURSE NOTE - PMH
Atrial fibrillation    BPH (benign prostatic hyperplasia)    CHF, chronic    CKD (chronic kidney disease)    Gout    H/O pulmonary hypertension    HTN (hypertension)    Lymphoma  t cell; remission since 11/2020

## 2021-07-05 NOTE — H&P ADULT - PROBLEM SELECTOR PLAN 6
-patient with history of gout  - c/w home allopurinol 200mg BID       #Anemia   -patient with hbg 9 on admission (baseline 8.8)  -will continue to monitor  -previous iron studies done 1/2021 consistent with anemia of chronic disease  -f/u TSH, folate, B12 patient with history of T cell lymphoma  -on chemo with oncologist at [facility]  - will f/u with oncologist in am

## 2021-07-05 NOTE — ED ADULT NURSE NOTE - SUICIDE SCREENING DEPRESSION
painful hemorrhoids since Saturday, treated for CDiff on [facility] since Saturday Negative

## 2021-07-05 NOTE — H&P ADULT - NSICDXPASTMEDICALHX_GEN_ALL_CORE_FT
PAST MEDICAL HISTORY:  Atrial fibrillation     BPH (benign prostatic hyperplasia)     CHF, chronic     CKD (chronic kidney disease)     COPD, mild     Gout     H/O pulmonary hypertension     HTN (hypertension)     Lymphoma t cell; remission since 11/2020    Mitral regurgitation

## 2021-07-05 NOTE — ED ADULT NURSE NOTE - CHIEF COMPLAINT QUOTE
65 y/o male c/o SOB, hx of pleural effusion with chest tube. Pt reports SOB is worsening, endorses abdominal pain.

## 2021-07-05 NOTE — H&P ADULT - PROBLEM SELECTOR PLAN 7
c/w home doxazosin 4mg BID -patient with history of gout  - c/w home allopurinol 200mg BID       #Anemia   -patient with hbg 9 on admission (baseline 8.8)  -will continue to monitor  -previous iron studies done 1/2021 consistent with anemia of chronic disease  -f/u TSH, folate, B12

## 2021-07-05 NOTE — H&P ADULT - NSHPSOCIALHISTORY_GEN_ALL_CORE
Former smoker .5PPD x50 years, social drinker, no drug use, lives at home with wife, independent of ADLs

## 2021-07-05 NOTE — H&P ADULT - PROBLEM SELECTOR PLAN 5
On home amiodarone 200mg qd. Previously on eliquis 2.5mg BID but stopped in June. Patient was scheduled to have Watchman procedure last week but had to postpone due to cough and chemo.   - c/w home amiodarone 200mg qd  - f/u outpatient for Watchman procedure (per patient was supposed to get procedure yesterday but postponed due to SOB and cough)  -CHADS-VASc score 3    #First degree AV block, prolonged QTc  -found on EKG on admission  -no ST or T wave changes  -f/u with Dr. Ventura outpatient   -repeat EKG in am

## 2021-07-05 NOTE — H&P ADULT - PROBLEM SELECTOR PLAN 10
F: none in the setting of pleural effusion and HFpEF; tolerating PO  E: replete Mg>2; K>4  N: regular diet   A: Lovenox 40mg injection

## 2021-07-05 NOTE — H&P ADULT - HISTORY OF PRESENT ILLNESS
ED Vitals: Afebrile, HR 66, 140/74, RR 22, SPO2 99 Room air---96% on 2L NC (unclear why placed on nasal canula)  ED Labs: Hgb 9 (unknown baseline), Na 146, Creatinine 7.04 (unknown baseline), trop .07, BNP 20,286  ED Imaging:  #CT Chest/Abdomen: Large R pleural effusion, bibasilar atelectasism, cardiomegaly w/ venous congestion, cirrhosis w/ increasing ascites    #EKst degree AV block, CWJ=653, No ST or T w    ED Course: Received Tylenol 1g      67 y/o M w/ PMH HTN, BPH, T cell Lymphoma in remission since 2020, COPD, Stage 5 CKD (pending starting HD), Afib on Eliquis, severe MR, severe pulmonary HTN, CHF (EF was 40%, most recently 65-70%), L chest wall hematoma s/p evacuation (2020) and VATS (2021). Pt has c/o of SOB and progressive abdominal pain with onset three days prior. Pt has associated cough. Denies fever, leg swelling, vomiting, diarrhea. Passed flatus today.            Anemia    hypernatremia    CKD    tropinemia    elevated BNP?    1st degree AV block    Prolonged QTC HPI: 67 y/o M w/ PMH HTN, BPH, T cell Lymphoma in remission since 2020, COPD, Stage 5 CKD (pending starting HD), Afib on Eliquis, severe MR, severe pulmonary HTN, CHF (EF was 40%, most recently 65-70%), L chest wall hematoma s/p evacuation (2020) and VATS (2021) presenting to the ED with complaints of progressively worsening SOB, cough, abdominal discomfort/distention that started Saturday. Patient reports saturday woke up with some trouble breathing and stomach started to swell. By  () SOB got so bad that he couldn't walk without stopping to rest and distention got so bad that he had trouble eating. This has never happened to him before. Reports LBM this morning, normal. Also endorses intermittent left chest tightness since Saturday. He denies headache, vision changes, fever, chills, sore throat, chest pain, palpitations, nausea, vomiting, diarrhea, leg pain or swelling. Reports improvement in breathing in ED on 2L NC.     In the ED:  ED Vitals: Afebrile, HR 66, 140/74, RR 22, SPO2 99 Room air---96% on 2L NC (unclear why placed on nasal canula)  ED Labs: Hgb 9 (unknown baseline), Na 146, Creatinine 7.04 (unknown baseline), trop .07, BNP 20,286  ED Imaging:  #CT Chest/Abdomen: Large R pleural effusion, bibasilar atelectasism, cardiomegaly w/ venous congestion, cirrhosis w/ increasing ascites    #EKst degree AV block, [id number], No ST or T w    ED Course: Received Tylenol 1g    Consults: none       Anemia    hypernatremia    CKD    tropinemia    elevated BNP?    1st degree AV block    Prolonged QTC HPI: 67 y/o M w/ PMH HTN, BPH, T cell Lymphoma in remission since 2020, COPD, Stage 5 CKD (pending starting HD), Afib on Eliquis, severe MR, severe pulmonary HTN, CHF (EF was 40%, most recently 65-70%), L chest wall hematoma s/p evacuation (2020) and VATS (2021) presenting to the ED with complaints of progressively worsening SOB, cough, abdominal discomfort/distention that started Saturday. Patient reports saturday woke up with some trouble breathing and stomach started to swell. By  () SOB got so bad that he couldn't walk without stopping to rest and distention got so bad that he had trouble eating. This has never happened to him before. Reports LBM this morning, normal. Also endorses intermittent left chest tightness since Saturday. He denies headache, vision changes, fever, chills, sore throat, chest pain, palpitations, nausea, vomiting, diarrhea, leg pain or swelling. Reports improvement in breathing in ED on 2L NC.     In the ED:  ED Vitals: Afebrile, HR 66, 140/74, RR 22, SPO2 99 Room air---96% on 2L NC (unclear why placed on nasal canula)  ED Labs: Hgb 9 (unknown baseline), Na 146, Creatinine 7.04 (unknown baseline), trop .07, BNP 20,286  ED Imaging:  #CT Chest/Abdomen: Large R pleural effusion, bibasilar atelectasism, cardiomegaly w/ venous congestion, cirrhosis w/ increasing ascites    #EKst degree AV block, [id number], No ST or T w    ED Course: Received Tylenol 1g    Consults: CT surgery        HPI: 65 y/o M w/ PMH HTN, BPH, T cell Lymphoma (on chemo), COPD, Stage 5 CKD (pending starting HD), Afib on Eliquis, pulmonary HTN, CHF (EF was 40%, most recently 65-70%), L chest wall hematoma s/p evacuation (2020) and VATS (2021) presenting to the ED with complaints of progressively worsening SOB, cough, abdominal discomfort/distention that started Saturday. Patient reports saturday woke up with some trouble breathing and stomach started to swell. By  () SOB got so bad that he couldn't walk without stopping to rest and distention got so bad that he had trouble eating. This has never happened to him before. Reports LBM this morning, normal. Also endorses intermittent left chest tightness since Saturday. He denies headache, vision changes, fever, chills, sore throat, chest pain, palpitations, nausea, vomiting, diarrhea, leg pain or swelling. Reports improvement in breathing in ED on 2L NC.     In the ED:  ED Vitals: Afebrile, HR 66, 140/74, RR 22, SPO2 99 Room air---96% on 2L NC (unclear why placed on nasal canula)  ED Labs: Hgb 9 (unknown baseline), Na 146, Creatinine 7.04 (unknown baseline), trop .07, BNP 20,286  ED Imaging:  #CT Chest/Abdomen: Large R pleural effusion, bibasilar atelectasism, cardiomegaly w/ venous congestion, cirrhosis w/ increasing ascites    #EKst degree AV block, [id number]=121, No ST or T w    ED Course: Received Tylenol 1g    Consults: CT surgery        HPI: 67 y/o M w/ PMH HTN, BPH, T cell Lymphoma (on chemo), COPD, Stage 5 CKD (pending starting HD), Afib not on Eliquis, pulmonary HTN, CHF (EF was 40%, most recently 65-70%), L chest wall hematoma s/p evacuation (2020) and VATS (2021) presenting to the ED with complaints of progressively worsening SOB, cough, abdominal discomfort/distention that started Saturday. Patient reports saturday woke up with some trouble breathing and stomach started to swell. By  () SOB got so bad that he couldn't walk without stopping to rest and distention got so bad that he had trouble eating. This has never happened to him before. Reports LBM this morning, normal. Also endorses intermittent left chest tightness since Saturday. He denies headache, vision changes, fever, chills, sore throat, chest pain, palpitations, nausea, vomiting, diarrhea, leg pain or swelling. Reports improvement in breathing in ED on 2L NC.     In the ED:  ED Vitals: Afebrile, HR 66, 140/74, RR 22, SPO2 99 Room air---96% on 2L NC (unclear why placed on nasal canula)  ED Labs: Hgb 9 (unknown baseline), Na 146, Creatinine 7.04 (unknown baseline), trop .07, BNP 20,286  ED Imaging:  #CT Chest/Abdomen: Large R pleural effusion, bibasilar atelectasism, cardiomegaly w/ venous congestion, cirrhosis w/ increasing ascites    #EKst degree AV block, UPU=534, No ST or T w    ED Course: Received Tylenol 1g    Consults: CT surgery

## 2021-07-05 NOTE — H&P ADULT - NSHPLABSRESULTS_GEN_ALL_CORE
.  LABS:                         9.0    5.28  )-----------( 210      ( 05 Jul 2021 12:15 )             30.2     07-05    146<H>  |  112<H>  |  66<H>  ----------------------------<  87  4.1   |  22  |  7.04<H>    Ca    9.1      05 Jul 2021 12:15    TPro  7.0  /  Alb  3.9  /  TBili  0.4  /  DBili  x   /  AST  7<L>  /  ALT  5<L>  /  AlkPhos  113  07-05        Serum Pro-Brain Natriuretic Peptide: 19756 pg/mL (07-05 @ 12:15)        RADIOLOGY, EKG & ADDITIONAL TESTS: Reviewed.

## 2021-07-05 NOTE — H&P ADULT - PROBLEM SELECTOR PLAN 9
F: none in the setting of pleural effusion and HFpEF; tolerating PO  E: replete Mg>2; K>4  N: regular diet   A: Lovenox 40mg injection Not on home O2. CT showing evidence of emphysematous changes. Prior smoking hx. No wheezing on exam or productive cough.  - no acute intervention at this time

## 2021-07-05 NOTE — CONSULT NOTE ADULT - ASSESSMENT
67 y/o M with a PMHx of HTN, BPH, T cell Lymphoma in remission since 11/2020, COPD, Stage 5 CKD (pending starting HD), Afib (on Eliquis), severe MR, severe pulmonary HTN, CHF (EF was 40%, most recently 65-70%), L chest wall hematoma s/p evacuation (12/2020) and VATS (01/2021). Ptpresented to the ED and has complained of SOB and progressive left sided rib pain starting from 3 days ago. Patient states he has associated NIELSON with ambulation that started around the same time. Thoracic surgery consulted in light of recent thoracic surgery procedure. Medicine team consulted for admission due to multiple comorbidities.     Plan:  Problem 1:  SOB  -likely multifactorial etiology (b/l pleural effusions, COPD, recent surgery)  -no intervention from thoracic surgery perspective at this time   -thoracic surgery will continue to follow   -Care and workup per primary team     Problem 2: HFpEF  -normal EF 44-60% (last in [facility] 2021)  -no acute CHF exacerbation appears euvolemic  -Continue to monitor BP and In/Outs while admitted  -Care per primary team     Problem 3: AF   -continue with home meds  -Pending possible watchman workup outpatient   -care per primary team     Problem 4: Cirrhosis  -CT this admission revealed cirrhosis with increasing ascities  -no known hx per patient and EMR   -care and workup per primary team    Case discussed with Dr. Harmon, thoracic surgery attending. Thoracic surgery team will continue to follow at this time.

## 2021-07-05 NOTE — ED ADULT NURSE NOTE - OBJECTIVE STATEMENT
pt c/o worsening SOB, abd pain & distension since 7/3/21. Pt speaks in full & clear sentences, airway WNL

## 2021-07-05 NOTE — H&P ADULT - NSHPPHYSICALEXAM_GEN_ALL_CORE
VITALS: T(C): 36.9 (05 Jul 2021 15:35), Max: 36.9 (05 Jul 2021 15:35)  T(F): 98.5 (05 Jul 2021 15:35), Max: 98.5 (05 Jul 2021 15:35)  HR: 61 (05 Jul 2021 16:50) (60 - 66)  BP: 140/74 (05 Jul 2021 15:35) (132/79 - 140/74)  BP(mean): --  ABP: --  ABP(mean): --  RR: 20 (05 Jul 2021 16:50) (20 - 22)  SpO2: 96% (05 Jul 2021 16:50) (95% - 99%)      GENERAL: NAD, lying in bed comfortably  HEAD:  Atraumatic, Normocephalic  EYES: EOMI, PERRLA, conjunctiva and sclera clear  ENT: Moist mucous membranes  NECK: Supple, No JVD  CHEST/LUNG: Clear to auscultation bilaterally; No rales, rhonchi, wheezing, or rubs. Unlabored respirations. Pain to palpation of left lower ribs at site of previous VATS  HEART: Regular rate and rhythm; Holosystolic murmur heard best at mitral area   ABDOMEN: Bowel sounds present; TTP LUQ of abdomen; abdomen firm and distended   EXTREMITIES:  2+ Peripheral Pulses, brisk capillary refill. No clubbing, cyanosis, or edema. Fistula placed L forearm   NERVOUS SYSTEM:  Alert & Oriented X3, speech clear. No deficits   MSK: FROM all 4 extremities, full and equal strength  SKIN: No rashes or lesions VITALS: T(C): 36.9 (05 Jul 2021 15:35), Max: 36.9 (05 Jul 2021 15:35)  T(F): 98.5 (05 Jul 2021 15:35), Max: 98.5 (05 Jul 2021 15:35)  HR: 61 (05 Jul 2021 16:50) (60 - 66)  BP: 140/74 (05 Jul 2021 15:35) (132/79 - 140/74)  BP(mean): --  ABP: --  ABP(mean): --  RR: 20 (05 Jul 2021 16:50) (20 - 22)  SpO2: 96% (05 Jul 2021 16:50) (95% - 99%)      GENERAL: NAD, lying in bed comfortably  HEAD:  Atraumatic, Normocephalic  EYES: EOMI, PERRLA, conjunctiva and sclera clear  ENT: Moist mucous membranes  NECK: Supple, No JVD  CHEST/LUNG: Clear to auscultation bilaterally; No rales, rhonchi, wheezing, or rubs. Unlabored respirations. Pain to palpation of left lower ribs at site of previous VATS  HEART: Regular rate and rhythm; Holosystolic murmur heard best at mitral area   ABDOMEN: Bowel sounds present; TTP LUQ of abdomen; abdomen soft but distended   EXTREMITIES:  2+ Peripheral Pulses, brisk capillary refill. No clubbing, cyanosis, or edema. Fistula placed L forearm   NERVOUS SYSTEM:  Alert & Oriented X3, speech clear. No deficits   MSK: FROM all 4 extremities, full and equal strength  SKIN: No rashes or lesions

## 2021-07-05 NOTE — ED PROVIDER NOTE - CARE PLAN
Principal Discharge DX:	Pleural effusion, right  Secondary Diagnosis:	CKD (chronic kidney disease)

## 2021-07-05 NOTE — H&P ADULT - PROBLEM SELECTOR PLAN 1
Patient with progressively worsening SOB x 3 days Reports dry cough with worsening abdominal distention. CT C/A/P showing large R pleural effusion, bibasilar atelectasis, cardiomegaly with venous congestion, cirrhosis with worsening ascites.   -SOB Most likely multifactorial, 2/2 to pleural effusion vs venous congestion   -low suspicion for PE as wells score 0  - less likely diastolic HF exacerbation as no clinical signs of volume overload (BNP at baseline 20,000, today at baseline)  -less likely COPD exacerbation as patient not wheezing and without productive cough   -abd distention likely 2/2 to worsening ascites   -possible costochondritis as patient has tenderness palpation at left lower ribs at site of previous VATS. No ecchymosis or recent trauma to site  -consulted CT surgery as patient was previously surgical patient under their service in January 2021; no CT surgery intervention at this time      # Pleural effusion   Hx of known recurring b/l pleural effusions. Follows w/ Dr. Johnson. CT imaging in ED shows large right pleural effusion and small loculated left pleural effusion. S/p VATS in January 2021 w/ Dr. Harmon. Slight increase in size of right effusion compared to previous imaging.   - consider Pulm consult    #atelectasis  - bibasilar atelectasis on admission CT chest   - incentive spirometry Patient with progressively worsening SOB x 3 days Reports dry cough with worsening abdominal distention. CT C/A/P showing large R pleural effusion, bibasilar atelectasis, cardiomegaly with venous congestion, cirrhosis with worsening ascites.   -SOB Most likely multifactorial, 2/2 to pleural effusion vs venous congestion   -low suspicion for PE as wells score 0  - less likely diastolic HF exacerbation as no clinical signs of volume overload (BNP at baseline 20,000, today at baseline)  -less likely COPD exacerbation as patient not wheezing and without productive cough   -abd distention likely 2/2 to worsening ascites   -possible costochondritis as patient has tenderness palpation at left lower ribs at site of previous VATS. No ecchymosis or recent trauma to site  -consulted CT surgery as patient was previously surgical patient under their service in January 2021; no CT surgery intervention at this time  -f/u CXR in morning       # Pleural effusion   Hx of known recurring b/l pleural effusions. Follows w/ Dr. Johnson. CT imaging in ED shows large right pleural effusion and small loculated left pleural effusion. S/p VATS in January 2021 w/ Dr. Harmon. Slight increase in size of right effusion compared to previous imaging.   - consider Pulm consult    #atelectasis  - bibasilar atelectasis on admission CT chest   - incentive spirometry

## 2021-07-05 NOTE — H&P ADULT - PROBLEM SELECTOR PLAN 3
HFpEF; patient follows with cardiologist Dr. Ventura outpatient  EF 55-60% in May 2021. No peripheral edema, JVD, crackles, or other signs of volume overload on exam.   - continue to monitor BP and volume status  - c/w home torsemide 20mg PO qd  - c/w home toprol 50mg BID  -BNP elevated on admission to 10,000 however this is baseline   -troponins elevated to 0.07 on admission, continue to trend

## 2021-07-05 NOTE — H&P ADULT - EJECTION FRACTION >40 %
Vascular Surgery Progress Note  C team [id number]      Subjective/interval events:  -pain controlled  -h/h stable this am       Objective:  Vital Signs Last 24 Hrs  T(C): 37.2 (24 Mar 2018 05:34), Max: 37.4 (23 Mar 2018 14:53)  T(F): 99 (24 Mar 2018 05:34), Max: 99.3 (23 Mar 2018 14:53)  HR: 75 (24 Mar 2018 05:34) (71 - 76)  BP: 188/69 (24 Mar 2018 05:34) (163/58 - 188/69)  BP(mean): 109 (23 Mar 2018 11:15) (109 - 109)  RR: 18 (24 Mar 2018 05:34) (18 - 19)  SpO2: 95% (24 Mar 2018 05:34) (94% - 98%)      I&O's Summary    23 Mar 2018 07:01  -  24 Mar 2018 07:00  --------------------------------------------------------  IN: 940 mL / OUT: 2350 mL / NET: -1410 mL        PE:  Gen: NAD, depressed affect  Ext: RLE s/p amputation; s/p LLE AKA, ace wrap dry; dressing removed, staples in place, soft, no drainage; dressing stained w/ serous drainage; no active bleeding. Redressed w/ clean kerlex & ACE x2      MEDICATIONS  (STANDING):  ALBUTerol/ipratropium for Nebulization 3 milliLiter(s) Nebulizer every 6 hours  aspirin  chewable 81 milliGRAM(s) Oral daily  atorvastatin 20 milliGRAM(s) Oral at bedtime  clopidogrel Tablet 75 milliGRAM(s) Oral daily  dextrose 50% Injectable 25 Gram(s) IV Push once  heparin  Injectable 5000 Unit(s) SubCutaneous every 12 hours  hydrALAZINE 50 milliGRAM(s) Oral every 12 hours  insulin detemir injectable (LEVEMIR) 23 Unit(s) SubCutaneous daily  insulin lispro (HumaLOG) corrective regimen sliding scale   SubCutaneous Before meals and at bedtime  insulin lispro Injectable (HumaLOG) 7 Unit(s) SubCutaneous three times a day before meals  isosorbide   mononitrate ER Tablet (IMDUR) 30 milliGRAM(s) Oral daily  nystatin Cream 1 Application(s) Topical two times a day  piperacillin/tazobactam IVPB. 3.375 Gram(s) IV Intermittent every 8 hours  sertraline 25 milliGRAM(s) Oral daily  vancomycin  IVPB      vancomycin  IVPB 1000 milliGRAM(s) IV Intermittent every 24 hours    MEDICATIONS  (PRN):  acetaminophen  IVPB. 1000 milliGRAM(s) IV Intermittent once PRN Mild Pain (1 - 3)  oxyCODONE    IR 5 milliGRAM(s) Oral every 4 hours PRN Moderate Pain (4 - 6)  oxyCODONE    IR 10 milliGRAM(s) Oral every 6 hours PRN Severe Pain (7 - 10)          Labs:                          9.7    15.61 )-----------( 265      ( 24 Mar 2018 07:00 )             30.3                         9.6    11.48 )-----------( 271      ( 23 Mar 2018 04:00 )             30.6                         7.8    11.68 )-----------( 294      ( 22 Mar 2018 05:00 )             25.0                         8.3    16.15 )-----------( 411      ( 20 Mar 2018 23:40 )             26.8                         9.0    12.60 )-----------( 526      ( 15 Mar 2018 07:10 )             28.8                         8.3    14.65 )-----------( 358      ( 12 Mar 2018 05:53 )             24.5   03-24    143  |  105  |  27<H>  ----------------------------<  127<H>  4.2   |  25  |  1.18    Ca    8.3<L>      24 Mar 2018 07:00  Phos  2.8     03-24  Mg     1.7     03-24 03-23    142  |  107  |  32<H>  ----------------------------<  143<H>  4.5   |  25  |  1.39<H>    Ca    8.1<L>      23 Mar 2018 04:00  Phos  3.8     03-23  Mg     1.8     03-23 03-22    142  |  105  |  31<H>  ----------------------------<  179<H>  4.4   |  26  |  1.46<H>    Ca    8.2<L>      22 Mar 2018 05:00  Phos  4.7     03-22  Mg     1.7     03-22 03-20    142  |  106  |  25<H>  ----------------------------<  212<H>  4.1   |  24  |  1.03    Ca    8.4      20 Mar 2018 04:50  Phos  3.2     03-20  Mg     1.7     03-20    TPro  7.5  /  Alb  2.4<L>  /  TBili  0.5  /  DBili  x   /  AST  45<H>  /  ALT  42<H>  /  AlkPhos  150<H>  03-19      03-15    143  |  112<H>  |  38<H>  ----------------------------<  66<L>  4.6   |  16<L>  |  1.25    Ca    8.2<L>      15 Mar 2018 07:10  Phos  4.2     03-15  Mg     2.2     03-15      03-12    135  |  104  |  45<H>  ----------------------------<  152<H>  4.8   |  19<L>  |  1.84<H>    Ca    8.3<L>      12 Mar 2018 05:53  Phos  4.5     03-12  Mg     2.1     03-12    Type + Screen (03.19.18 @ 17:09)    ABO Interpretation: A    Rh Interpretation: Positive    Antibody Screen: Negative        Type + Screen (03.10.18 @ 06:00)    ABO Interpretation: A    Rh Interpretation: Positive    Antibody Screen: Negative        Imaging:  US Duplex Venous Lower Ext Complete, Bilateral (03.05.18 @ 17:17) >  FINDINGS:    There is normal compressibility of the bilateral common femoral, femoral   and popliteal veins. Calf veins are not visualized in the right lower   extremity due to below the knee amputation. Left peroneal veins are not   visualized.    Doppler examination shows normal spontaneous and phasic flow.    IMPRESSION:     No evidence of bilateral lower extremity deep venous thrombosis.      MR Foot w/ IV Cont, Left (03.05.18 @ 14:18) >    Diffuse cellulitis throughout lower leg and imaged hindfoot. Status post   transmetatarsal amputation of the first through fifth rays. Acute   osteomyelitis involving the first and second metatarsal remnants distally   as above.    Mild edema and enhancement within the third and fourth metatarsal   remnants distally with grossly preserved T1 marrow signal. Differential   considerations include reactive osteitis and early osteomyelitis.    Status post Achilles tenotomy with a fluid collection in the tenotomy   gap. This may be related to postoperative seroma. Superimposed infection   at this site cannot be excluded.      Xray Chest 1 View-PORTABLE IMMEDIATE (03.10.18 @ 12:33) >  IMPRESSION:  Lungs underinflated.    Increased left basilar markings and strand-like opacities could be   compatible with subsegmental atelectatic changes or infiltrate/pneumonia   in the proper clinical context. Clear remaining visualized lungs. No   pleural effusion or pneumothorax.    Stable sternal wires, multiple small surgical clips, and slightly   prominent appearing cardiac and mediastinal silhouettes.    Trachea midline.    Left neck surgical clips. Vascular Surgery Progress Note  C team [id number]      Subjective/interval events:  -pain controlled  -h/h stable this am   -reports feeling "comme ci, comme ca"       Objective:  Vital Signs Last 24 Hrs  T(C): 37.2 (24 Mar 2018 05:34), Max: 37.4 (23 Mar 2018 14:53)  T(F): 99 (24 Mar 2018 05:34), Max: 99.3 (23 Mar 2018 14:53)  HR: 75 (24 Mar 2018 05:34) (71 - 76)  BP: 188/69 (24 Mar 2018 05:34) (163/58 - 188/69)  BP(mean): 109 (23 Mar 2018 11:15) (109 - 109)  RR: 18 (24 Mar 2018 05:34) (18 - 19)  SpO2: 95% (24 Mar 2018 05:34) (94% - 98%)      I&O's Summary    23 Mar 2018 07:01  -  24 Mar 2018 07:00  --------------------------------------------------------  IN: 940 mL / OUT: 2350 mL / NET: -1410 mL        PE:  Gen: NAD, down affect  Ext: RLE s/p amputation; s/p LLE AKA, ace wrap dry; dressing removed, staples in place, soft, no drainage expressed; dressing stained w/ serous drainage; no evidence of active bleeding. Redressed w/ clean kerlex & ACE      MEDICATIONS  (STANDING):  ALBUTerol/ipratropium for Nebulization 3 milliLiter(s) Nebulizer every 6 hours  aspirin  chewable 81 milliGRAM(s) Oral daily  atorvastatin 20 milliGRAM(s) Oral at bedtime  clopidogrel Tablet 75 milliGRAM(s) Oral daily  dextrose 50% Injectable 25 Gram(s) IV Push once  heparin  Injectable 5000 Unit(s) SubCutaneous every 12 hours  hydrALAZINE 50 milliGRAM(s) Oral every 12 hours  insulin detemir injectable (LEVEMIR) 23 Unit(s) SubCutaneous daily  insulin lispro (HumaLOG) corrective regimen sliding scale   SubCutaneous Before meals and at bedtime  insulin lispro Injectable (HumaLOG) 7 Unit(s) SubCutaneous three times a day before meals  isosorbide   mononitrate ER Tablet (IMDUR) 30 milliGRAM(s) Oral daily  nystatin Cream 1 Application(s) Topical two times a day  piperacillin/tazobactam IVPB. 3.375 Gram(s) IV Intermittent every 8 hours  sertraline 25 milliGRAM(s) Oral daily  vancomycin  IVPB      vancomycin  IVPB 1000 milliGRAM(s) IV Intermittent every 24 hours    MEDICATIONS  (PRN):  acetaminophen  IVPB. 1000 milliGRAM(s) IV Intermittent once PRN Mild Pain (1 - 3)  oxyCODONE    IR 5 milliGRAM(s) Oral every 4 hours PRN Moderate Pain (4 - 6)  oxyCODONE    IR 10 milliGRAM(s) Oral every 6 hours PRN Severe Pain (7 - 10)          Labs:                          9.7    15.61 )-----------( 265      ( 24 Mar 2018 07:00 )             30.3                         9.6    11.48 )-----------( 271      ( 23 Mar 2018 04:00 )             30.6                         7.8    11.68 )-----------( 294      ( 22 Mar 2018 05:00 )             25.0                         8.3    16.15 )-----------( 411      ( 20 Mar 2018 23:40 )             26.8                         9.0    12.60 )-----------( 526      ( 15 Mar 2018 07:10 )             28.8                         8.3    14.65 )-----------( 358      ( 12 Mar 2018 05:53 )             24.5   03-24    143  |  105  |  27<H>  ----------------------------<  127<H>  4.2   |  25  |  1.18    Ca    8.3<L>      24 Mar 2018 07:00  Phos  2.8     03-24  Mg     1.7     03-24 03-23    142  |  107  |  32<H>  ----------------------------<  143<H>  4.5   |  25  |  1.39<H>    Ca    8.1<L>      23 Mar 2018 04:00  Phos  3.8     03-23  Mg     1.8     03-23 03-22    142  |  105  |  31<H>  ----------------------------<  179<H>  4.4   |  26  |  1.46<H>    Ca    8.2<L>      22 Mar 2018 05:00  Phos  4.7     03-22  Mg     1.7     03-22 03-20    142  |  106  |  25<H>  ----------------------------<  212<H>  4.1   |  24  |  1.03    Ca    8.4      20 Mar 2018 04:50  Phos  3.2     03-20  Mg     1.7     03-20    TPro  7.5  /  Alb  2.4<L>  /  TBili  0.5  /  DBili  x   /  AST  45<H>  /  ALT  42<H>  /  AlkPhos  150<H>  03-19      03-15    143  |  112<H>  |  38<H>  ----------------------------<  66<L>  4.6   |  16<L>  |  1.25    Ca    8.2<L>      15 Mar 2018 07:10  Phos  4.2     03-15  Mg     2.2     03-15      03-12    135  |  104  |  45<H>  ----------------------------<  152<H>  4.8   |  19<L>  |  1.84<H>    Ca    8.3<L>      12 Mar 2018 05:53  Phos  4.5     03-12  Mg     2.1     03-12    Type + Screen (03.19.18 @ 17:09)    ABO Interpretation: A    Rh Interpretation: Positive    Antibody Screen: Negative        Type + Screen (03.10.18 @ 06:00)    ABO Interpretation: A    Rh Interpretation: Positive    Antibody Screen: Negative        Imaging:  US Duplex Venous Lower Ext Complete, Bilateral (03.05.18 @ 17:17) >  FINDINGS:    There is normal compressibility of the bilateral common femoral, femoral   and popliteal veins. Calf veins are not visualized in the right lower   extremity due to below the knee amputation. Left peroneal veins are not   visualized.    Doppler examination shows normal spontaneous and phasic flow.    IMPRESSION:     No evidence of bilateral lower extremity deep venous thrombosis.      MR Foot w/ IV Cont, Left (03.05.18 @ 14:18) >    Diffuse cellulitis throughout lower leg and imaged hindfoot. Status post   transmetatarsal amputation of the first through fifth rays. Acute   osteomyelitis involving the first and second metatarsal remnants distally   as above.    Mild edema and enhancement within the third and fourth metatarsal   remnants distally with grossly preserved T1 marrow signal. Differential   considerations include reactive osteitis and early osteomyelitis.    Status post Achilles tenotomy with a fluid collection in the tenotomy   gap. This may be related to postoperative seroma. Superimposed infection   at this site cannot be excluded.      Xray Chest 1 View-PORTABLE IMMEDIATE (03.10.18 @ 12:33) >  IMPRESSION:  Lungs underinflated.    Increased left basilar markings and strand-like opacities could be   compatible with subsegmental atelectatic changes or infiltrate/pneumonia   in the proper clinical context. Clear remaining visualized lungs. No   pleural effusion or pneumothorax.    Stable sternal wires, multiple small surgical clips, and slightly   prominent appearing cardiac and mediastinal silhouettes.    Trachea midline.    Left neck surgical clips. yes

## 2021-07-05 NOTE — ED PROVIDER NOTE - NS ED MD EM SELECTION
Is patient giving herself these injections?  Looks like someone else ordered these for her before.   [id number] Comprehensive

## 2021-07-05 NOTE — H&P ADULT - PROBLEM SELECTOR PLAN 4
Pt normotensive on exam. On home amlodipine 10mg qd  - c/w home amlodipine 10mg qd      #Hx of pulmonary htn  Likely 2/2 COPD. No evidence of cor pulmonale  - no acute intervention at this time

## 2021-07-05 NOTE — H&P ADULT - ASSESSMENT
65yo M w/ a PMHx of known recurrent b/l pleural effusions, T-cell Lymphoma (in remission since 11/2020), CKD5 (not on dialysis), HFpEF (EF 55-60% in May 2021), COPD (not on home O2), HTN, Afib (not on Eliquis), pulmonary HTN, BPH, and gout, presented w/ SOB and increasing abdominal distention for 3 days.

## 2021-07-05 NOTE — ED ADULT TRIAGE NOTE - CHIEF COMPLAINT QUOTE
67 y/o male c/o SOB, hx of pleural effusion with chest tube. Pt reports SOB is worsening, endorses abdominal pain.

## 2021-07-05 NOTE — CONSULT NOTE ADULT - ASSESSMENT
67yo M w/ a PMHx of known recurrent b/l pleural effusions, T-cell Lymphoma (on chemo), CKD5, HFpEF (EF 55-60% in May 2021), COPD (not on home O2), HTN, Afib (not on Eliquis), pulmonary HTN, BPH, and gout, presented w/ SOB and L. rib pain x3d. Likely 2/2 post-surgical constochondritis following VATS in January 2021. ICU consulted for SOB w/ multiple comorbidities.       NEURO  - no active issues      PULM  #SOB  Likely 2/2 post-surgical costrochondritis following VATS performed in January 2021 with Dr. Harmon.  - consult CT surgery  - pain control w/ Tylenol for mild pain and Percocet for moderate pain    #Costrochondritis  - see above    #Pleural Effusions  Hx of known recurring b/l pleural effusions. Follows w/ Dr. Johnson. CT imaging in ED shows large right pleural effusion and small loculated left pleural effusion. S/p VATS in January 2021 w/ Dr. Harmon. Slight increase in size of right effusion compared to previous imaging.   - consult Pulm     #COPD  Not on home O2    #Pulmonary HTN  Likely 2/2 COPD. No evidence of cor pulmonale  - no acute intervention at this time      CARDIOVASCULAR  #HFpEF  (EF 55-60% in May 2021)    #Afib    #HTN      GI  #?Cirrhosis?  CT imaging in ED states cirrhosis w/ increasing ascites. Pt w/ no hx of cirrhosis and no signs of ascites on exam.   - continue to monitor    /RENAL  #CKD5    #BPH      HEME/ONC:  #T-Cell Lymphoma  on chemo Romidepsin every Wednesday      METABOLIC  #Gout  - c/w home allopurinol 200mg BID     Dispo: RMF    Discussed with attending intensivist Dr. Villarreal. All recommendations are considered final after attending attestation.  67yo M w/ a PMHx of known recurrent b/l pleural effusions, T-cell Lymphoma (on chemo), CKD5, HFpEF (EF 55-60% in May 2021), COPD (not on home O2), HTN, Afib (not on Eliquis), pulmonary HTN, BPH, and gout, presented w/ SOB and L. rib pain x3d. Likely 2/2 post-surgical constochondritis following VATS in January 2021. ICU consulted for SOB w/ multiple comorbidities.       NEURO  - no active issues      PULM  #SOB  Likely 2/2 post-surgical costrochondritis following VATS performed in January 2021 with Dr. Harmon.  - consult CT surgery as patient was previously surgical patient under their service in January 2021  - recommend pain control w/ Tylenol and Percocet     #Costrochondritis  Pt has pain w/ inspiration and to palpation at left lower ribs at site of previous VATS. No ecchymosis or recent trauma to site.  - see above    #Pleural Effusions  Hx of known recurring b/l pleural effusions. Follows w/ Dr. Johnson. CT imaging in ED shows large right pleural effusion and small loculated left pleural effusion. S/p VATS in January 2021 w/ Dr. Harmon. Slight increase in size of right effusion compared to previous imaging.   - consider Pulm consult    #COPD  Not on home O2. CT showing evidence of emphysematous changes. Prior smoking hx. No wheezing on exam or productive cough.  - no acute intervention at this time    #Pulmonary HTN  Likely 2/2 COPD. No evidence of cor pulmonale  - no acute intervention at this time      CARDIOVASCULAR  #HFpEF  EF 55-60% in May 2021. No peripheral edema, JVD, crackles, or other signs of volume overload on exam.   - continue to monitor BP and volume status  - c/w home torsemide 20mg PO qd  - c/w home toprol 50mg BID    #Afib  On home amiodarone 200mg qd. Previously on eliquis 2.5mg BID but stopped in June. Patient was scheduled to have Watchman procedure last week but had to postpone due to cough and chemo.   - c/w home amiodarone 200mg qd  - f/u outpatient for Watchman procedure    #HTN  Pt normotensive on exam. On home amlodipine 10mg qd  - c/w home amlodipine 10mg qd      GI  #?Cirrhosis?  CT imaging in ED reports evidence of cirrhosis w/ increasing ascites. Pt w/ no documented hx of cirrhosis and no signs of ascites on exam.   - no acute intervention at this time, continue to monitor      /RENAL  #CKD5  Pt w/ hx of CKD. Follows outpatient w/ Dr. Denton. Has LUE AVF but has not started HD yet  - consult Nephrology as Cr has significantly worsened since March (Cr 7.04 on this admission, up from Cr 5.53 in March)  - c/w home sevelamer 800 TID    #BPH  - c/w home doxazosin 4mg BID      HEME/ONC:  #T-Cell Lymphoma  Pt restarted weekly Romidepsin on Wednesdays starting in the beginning of June. S/p three sessions. Follows at [facility]  - consider consulting heme/onc for chemo if patient remains at hospital through Wednesday       METABOLIC  #Gout  - c/w home allopurinol 200mg BID       Dispo: RMF    Discussed with attending intensivist Dr. Villarreal. All recommendations are considered final after attending attestation.

## 2021-07-05 NOTE — H&P ADULT - PROBLEM SELECTOR PLAN 2
Pt w/ hx of CKD. Follows outpatient w/ Dr. Denton. Has LUE AVF but has not started HD yet  - consult Nephrology in am as Cr has significantly worsened since March (Cr 7.04 on this admission, up from Cr 5.53 in March)  - c/w home sevelamer 800 TID      #Cirrhosis  CT imaging in ED reports evidence of cirrhosis w/ increasing ascites. Pt w/ no documented hx of cirrhosis and no signs of ascites on exam.   - no acute intervention at this time, continue to monitor Pt w/ hx of CKD. Follows outpatient w/ Dr. Denton. Has LUE AVF but has not started HD yet  - consult Nephrology in am as Cr has significantly worsened since March (Cr 7.04 on this admission, up from Cr 5.53 in March)  - c/w home sevelamer 800 TID      #Cirrhosis  CT imaging in ED reports evidence of cirrhosis w/ increasing ascites. Pt w/ no documented hx of cirrhosis   -f/u abdominal US  -f/u hepatitis A and B labs; previous hepatitis C labs negative  -possibly due to malignant ascites as patient being treated for lymphoma  -low suspicion for SBP as patient is afebrile and without abdominal pain  -consider tap if febrile Pt w/ hx of CKD. Follows outpatient w/ Dr. Denton. Has LUE AVF but has not started HD yet  - consult Nephrology in am as Cr has significantly worsened since March (Cr 7.04 on this admission, up from Cr 5.53 in March)  - c/w home sevelamer 800 TID      #Cirrhosis  CT imaging in ED reports evidence of cirrhosis w/ increasing ascites. Pt w/ no documented hx of cirrhosis   -f/u abdominal US  -f/u hepatitis A and B labs; previous hepatitis C labs negative  -possibly due to malignant ascites as patient being treated for lymphoma  -low suspicion for SBP as patient is afebrile and without abdominal pain  -diagnostic tap for tomorrow Pt w/ hx of CKD. Follows outpatient w/ Dr. Denton. Has LUE AVF but has not started HD yet  - consult Nephrology in am as Cr has significantly worsened since March (Cr 7.04 on this admission, up from Cr 5.53 in March)  - c/w home sevelamer 800 TID      #Cirrhosis  CT imaging in ED reports evidence of cirrhosis w/ increasing ascites. Pt w/ no documented hx of cirrhosis   -f/u abdominal US  -f/u hepatitis A and B labs; previous hepatitis C labs negative  -possibly due to malignant ascites as patient being treated for lymphoma  -low suspicion for SBP as patient is afebrile and without abdominal pain  -diagnostic paracentesis ordered

## 2021-07-05 NOTE — ED PROVIDER NOTE - CLINICAL SUMMARY MEDICAL DECISION MAKING FREE TEXT BOX
Pt s/p VATS procedure w R pleural effusion, sob, abd distension, CT ?new ascites, given complicated medical hx w multiple systems affected, age, elev trop w mild ST depressions, will obtain ICU/CT surgery consult prior to admission. Pt and family updated.

## 2021-07-05 NOTE — H&P ADULT - PROBLEM SELECTOR PLAN 8
Not on home O2. CT showing evidence of emphysematous changes. Prior smoking hx. No wheezing on exam or productive cough.  - no acute intervention at this time c/w home doxazosin 4mg BID

## 2021-07-06 ENCOUNTER — APPOINTMENT (OUTPATIENT)
Dept: HEART AND VASCULAR | Facility: CLINIC | Age: 66
End: 2021-07-06

## 2021-07-06 DIAGNOSIS — N18.5 CHRONIC KIDNEY DISEASE, STAGE 5: ICD-10-CM

## 2021-07-06 DIAGNOSIS — R18.8 OTHER ASCITES: ICD-10-CM

## 2021-07-06 DIAGNOSIS — I50.32 CHRONIC DIASTOLIC (CONGESTIVE) HEART FAILURE: ICD-10-CM

## 2021-07-06 DIAGNOSIS — I48.11 LONGSTANDING PERSISTENT ATRIAL FIBRILLATION: ICD-10-CM

## 2021-07-06 DIAGNOSIS — J44.9 CHRONIC OBSTRUCTIVE PULMONARY DISEASE, UNSPECIFIED: ICD-10-CM

## 2021-07-06 LAB
ALBUMIN FLD-MCNC: 2.1 G/DL — SIGNIFICANT CHANGE UP
ALBUMIN SERPL ELPH-MCNC: 3.1 G/DL — LOW (ref 3.3–5)
ALP SERPL-CCNC: 105 U/L — SIGNIFICANT CHANGE UP (ref 40–120)
ALT FLD-CCNC: <5 U/L — LOW (ref 10–45)
ANION GAP SERPL CALC-SCNC: 13 MMOL/L — SIGNIFICANT CHANGE UP (ref 5–17)
APTT BLD: 40 SEC — HIGH (ref 27.5–35.5)
AST SERPL-CCNC: 6 U/L — LOW (ref 10–40)
B PERT IGG+IGM PNL SER: SIGNIFICANT CHANGE UP
BASOPHILS # BLD AUTO: 0.03 K/UL — SIGNIFICANT CHANGE UP (ref 0–0.2)
BASOPHILS NFR BLD AUTO: 0.5 % — SIGNIFICANT CHANGE UP (ref 0–2)
BILIRUB SERPL-MCNC: 0.3 MG/DL — SIGNIFICANT CHANGE UP (ref 0.2–1.2)
BUN SERPL-MCNC: 65 MG/DL — HIGH (ref 7–23)
CALCIUM SERPL-MCNC: 8.3 MG/DL — LOW (ref 8.4–10.5)
CHLORIDE SERPL-SCNC: 112 MMOL/L — HIGH (ref 96–108)
CO2 SERPL-SCNC: 21 MMOL/L — LOW (ref 22–31)
COLOR FLD: YELLOW — SIGNIFICANT CHANGE UP
COVID-19 SPIKE DOMAIN AB INTERP: POSITIVE
COVID-19 SPIKE DOMAIN ANTIBODY RESULT: 8.88 U/ML — HIGH
CREAT SERPL-MCNC: 7.17 MG/DL — HIGH (ref 0.5–1.3)
EOSINOPHIL # BLD AUTO: 0.09 K/UL — SIGNIFICANT CHANGE UP (ref 0–0.5)
EOSINOPHIL NFR BLD AUTO: 1.5 % — SIGNIFICANT CHANGE UP (ref 0–6)
FLUID INTAKE SUBSTANCE CLASS: SIGNIFICANT CHANGE UP
FLUID SEGMENTED GRANULOCYTES: 13 % — SIGNIFICANT CHANGE UP
FOLATE SERPL-MCNC: 10.1 NG/ML — SIGNIFICANT CHANGE UP
GLUCOSE FLD-MCNC: 94 MG/DL — SIGNIFICANT CHANGE UP
GLUCOSE SERPL-MCNC: 78 MG/DL — SIGNIFICANT CHANGE UP (ref 70–99)
GRAM STN FLD: SIGNIFICANT CHANGE UP
HAV IGM SER-ACNC: SIGNIFICANT CHANGE UP
HBV CORE AB SER-ACNC: SIGNIFICANT CHANGE UP
HBV CORE IGM SER-ACNC: SIGNIFICANT CHANGE UP
HBV SURFACE AB SER-ACNC: SIGNIFICANT CHANGE UP
HBV SURFACE AG SER-ACNC: SIGNIFICANT CHANGE UP
HCT VFR BLD CALC: 28 % — LOW (ref 39–50)
HCV AB S/CO SERPL IA: 0.04 S/CO — SIGNIFICANT CHANGE UP
HCV AB SERPL-IMP: SIGNIFICANT CHANGE UP
HGB BLD-MCNC: 8.2 G/DL — LOW (ref 13–17)
IMM GRANULOCYTES NFR BLD AUTO: 0.3 % — SIGNIFICANT CHANGE UP (ref 0–1.5)
INR BLD: 1.2 — HIGH (ref 0.88–1.16)
LDH SERPL L TO P-CCNC: 97 U/L — SIGNIFICANT CHANGE UP
LYMPHOCYTES # BLD AUTO: 0.74 K/UL — LOW (ref 1–3.3)
LYMPHOCYTES # BLD AUTO: 12.6 % — LOW (ref 13–44)
LYMPHOCYTES # FLD: 52 % — SIGNIFICANT CHANGE UP
MAGNESIUM SERPL-MCNC: 2.4 MG/DL — SIGNIFICANT CHANGE UP (ref 1.6–2.6)
MCHC RBC-ENTMCNC: 27 PG — SIGNIFICANT CHANGE UP (ref 27–34)
MCHC RBC-ENTMCNC: 29.3 GM/DL — LOW (ref 32–36)
MCV RBC AUTO: 92.1 FL — SIGNIFICANT CHANGE UP (ref 80–100)
MESOTHL CELL # FLD: 3 % — SIGNIFICANT CHANGE UP
MONOCYTES # BLD AUTO: 1.02 K/UL — HIGH (ref 0–0.9)
MONOCYTES NFR BLD AUTO: 17.3 % — HIGH (ref 2–14)
MONOS+MACROS # FLD: 32 % — SIGNIFICANT CHANGE UP
NEUTROPHILS # BLD AUTO: 3.99 K/UL — SIGNIFICANT CHANGE UP (ref 1.8–7.4)
NEUTROPHILS NFR BLD AUTO: 67.8 % — SIGNIFICANT CHANGE UP (ref 43–77)
NRBC # BLD: 0 /100 WBCS — SIGNIFICANT CHANGE UP (ref 0–0)
PHOSPHATE SERPL-MCNC: 5.2 MG/DL — HIGH (ref 2.5–4.5)
PLATELET # BLD AUTO: 203 K/UL — SIGNIFICANT CHANGE UP (ref 150–400)
POTASSIUM SERPL-MCNC: 3.9 MMOL/L — SIGNIFICANT CHANGE UP (ref 3.5–5.3)
POTASSIUM SERPL-SCNC: 3.9 MMOL/L — SIGNIFICANT CHANGE UP (ref 3.5–5.3)
PROT FLD-MCNC: 3.8 G/DL — SIGNIFICANT CHANGE UP
PROT SERPL-MCNC: 6.3 G/DL — SIGNIFICANT CHANGE UP (ref 6–8.3)
PROTHROM AB SERPL-ACNC: 14.3 SEC — HIGH (ref 10.6–13.6)
RBC # BLD: 3.04 M/UL — LOW (ref 4.2–5.8)
RBC # FLD: 20.4 % — HIGH (ref 10.3–14.5)
RCV VOL RI: 9000 /UL — HIGH (ref 0–0)
SARS-COV-2 IGG+IGM SERPL QL IA: 8.88 U/ML — HIGH
SARS-COV-2 IGG+IGM SERPL QL IA: POSITIVE
SODIUM SERPL-SCNC: 146 MMOL/L — HIGH (ref 135–145)
SPECIMEN SOURCE FLD: SIGNIFICANT CHANGE UP
SPECIMEN SOURCE: SIGNIFICANT CHANGE UP
TOTAL NUCLEATED CELL COUNT, BODY FLUID: 276 /UL — SIGNIFICANT CHANGE UP
TSH SERPL-MCNC: 2.97 UIU/ML — SIGNIFICANT CHANGE UP (ref 0.27–4.2)
TUBE TYPE: SIGNIFICANT CHANGE UP
VIT B12 SERPL-MCNC: 763 PG/ML — SIGNIFICANT CHANGE UP (ref 232–1245)
WBC # BLD: 5.89 K/UL — SIGNIFICANT CHANGE UP (ref 3.8–10.5)
WBC # FLD AUTO: 5.89 K/UL — SIGNIFICANT CHANGE UP (ref 3.8–10.5)

## 2021-07-06 PROCEDURE — 71045 X-RAY EXAM CHEST 1 VIEW: CPT | Mod: 26

## 2021-07-06 PROCEDURE — 49083 ABD PARACENTESIS W/IMAGING: CPT

## 2021-07-06 PROCEDURE — 76705 ECHO EXAM OF ABDOMEN: CPT | Mod: 26

## 2021-07-06 PROCEDURE — 99232 SBSQ HOSP IP/OBS MODERATE 35: CPT | Mod: GC

## 2021-07-06 RX ADMIN — SEVELAMER CARBONATE 800 MILLIGRAM(S): 2400 POWDER, FOR SUSPENSION ORAL at 13:26

## 2021-07-06 RX ADMIN — Medication 100 MILLIGRAM(S): at 06:30

## 2021-07-06 RX ADMIN — HEPARIN SODIUM 5000 UNIT(S): 5000 INJECTION INTRAVENOUS; SUBCUTANEOUS at 13:26

## 2021-07-06 RX ADMIN — Medication 100 MILLIGRAM(S): at 18:21

## 2021-07-06 RX ADMIN — SEVELAMER CARBONATE 800 MILLIGRAM(S): 2400 POWDER, FOR SUSPENSION ORAL at 22:31

## 2021-07-06 RX ADMIN — SEVELAMER CARBONATE 800 MILLIGRAM(S): 2400 POWDER, FOR SUSPENSION ORAL at 06:30

## 2021-07-06 RX ADMIN — AMIODARONE HYDROCHLORIDE 200 MILLIGRAM(S): 400 TABLET ORAL at 06:30

## 2021-07-06 RX ADMIN — Medication 50 MILLIGRAM(S): at 06:32

## 2021-07-06 RX ADMIN — Medication 650 MILLIGRAM(S): at 10:47

## 2021-07-06 RX ADMIN — AMLODIPINE BESYLATE 10 MILLIGRAM(S): 2.5 TABLET ORAL at 06:30

## 2021-07-06 RX ADMIN — HEPARIN SODIUM 5000 UNIT(S): 5000 INJECTION INTRAVENOUS; SUBCUTANEOUS at 22:31

## 2021-07-06 RX ADMIN — Medication 650 MILLIGRAM(S): at 18:21

## 2021-07-06 RX ADMIN — Medication 2 MILLIGRAM(S): at 09:10

## 2021-07-06 RX ADMIN — HEPARIN SODIUM 5000 UNIT(S): 5000 INJECTION INTRAVENOUS; SUBCUTANEOUS at 00:08

## 2021-07-06 RX ADMIN — HEPARIN SODIUM 5000 UNIT(S): 5000 INJECTION INTRAVENOUS; SUBCUTANEOUS at 06:30

## 2021-07-06 RX ADMIN — SEVELAMER CARBONATE 800 MILLIGRAM(S): 2400 POWDER, FOR SUSPENSION ORAL at 00:08

## 2021-07-06 RX ADMIN — Medication 20 MILLIGRAM(S): at 06:30

## 2021-07-06 RX ADMIN — CALCITRIOL 0.25 MICROGRAM(S): 0.5 CAPSULE ORAL at 11:19

## 2021-07-06 RX ADMIN — Medication 50 MILLIGRAM(S): at 18:21

## 2021-07-06 RX ADMIN — Medication 81 MILLIGRAM(S): at 11:19

## 2021-07-06 RX ADMIN — Medication 2 MILLIGRAM(S): at 00:09

## 2021-07-06 RX ADMIN — Medication 650 MILLIGRAM(S): at 06:30

## 2021-07-06 RX ADMIN — Medication 650 MILLIGRAM(S): at 09:47

## 2021-07-06 RX ADMIN — Medication 2 MILLIGRAM(S): at 22:31

## 2021-07-06 NOTE — PROGRESS NOTE ADULT - PROBLEM SELECTOR PLAN 1
Presenting with R gluteal pain for 4 days without fever, erythema, purulence, or fluctuance. CT scan showing myositis with focal hemorrhage and/or pus.  -surgery consulted: recs appreciated  -likely spontaneous hemorrhage, less likely abscess in the setting of no fever, leukocytosis, erythema, fluctuance  -pt with hx of spontaneous chest wall hematoma  Plan:  -pain control  -Surgery following, no surgical intervention at this time no concern for abscess.  -Will continue to hold Eliquis until follow up with outpatient Cardiologist Dr. Ventura Patient with progressively worsening SOB x 3 days Reports dry cough with worsening abdominal distention. CT C/A/P showing large R pleural effusion, bibasilar atelectasis, cardiomegaly with venous congestion, cirrhosis with worsening ascites.   -SOB Most likely multifactorial, 2/2 to pleural effusion vs venous congestion   -low suspicion for PE as wells score 1  -less likely diastolic HF exacerbation as no clinical signs of volume overload (BNP at baseline 20,000, today at baseline)  -less likely COPD exacerbation as patient not wheezing and without productive cough   -abd distention likely 2/2 to worsening ascites   -consulted CT surgery as patient was previously surgical patient under their service in January 2021; no CT surgery intervention at this time  -f/u CXR in morning     # Pleural effusion   Hx of known recurring b/l pleural effusions. Follows w/ Dr. Johnson. CT imaging in ED shows large right pleural effusion and small loculated left pleural effusion. S/p VATS in January 2021 w/ Dr. Harmon for hematoma. Slight increase in size of right effusion compared to previous imaging.    #atelectasis  - bibasilar atelectasis on admission CT chest   - incentive spirometry Patient with progressively worsening SOB x 3 days Reports dry cough with worsening abdominal distention. CT C/A/P showing large R pleural effusion, bibasilar atelectasis, cardiomegaly with venous congestion, cirrhosis with worsening ascites.   -SOB Most likely multifactorial, 2/2 to pleural effusion vs venous congestion   -low suspicion for PE as wells score 1  -less likely diastolic HF exacerbation as no clinical signs of volume overload (BNP at baseline 20,000, today at baseline)  -less likely COPD exacerbation as patient not wheezing and without productive cough   -abd distention likely 2/2 to worsening ascites   -consulted CT surgery as patient was previously surgical patient under their service in January 2021; no CT surgery intervention at this time  -CXR this AM stable, off of O2    # Pleural effusion   /p VATS in January 2021 w/ Dr. Harmon for hematoma. Hx of known recurring b/l pleural effusions. Follows w/ Dr. Johnson. CT imaging in ED shows large right pleural effusion and small loculated left pleural effusion. S Slight increase in size of right effusion compared to previous imaging. Likely 2/2 malignancy.    #atelectasis  - bibasilar atelectasis on admission CT chest   - incentive spirometry Patient with progressively worsening SOB x 3 days Reports dry cough with worsening abdominal distention. CT C/A/P showing large R pleural effusion, bibasilar atelectasis, cardiomegaly with venous congestion, cirrhosis with worsening ascites.   -SOB Most likely multifactorial, 2/2 to pleural effusion vs venous congestion   -low suspicion for PE as wells score 1  -less likely diastolic HF exacerbation as no clinical signs of volume overload (BNP at baseline 20,000, today at baseline)  -less likely COPD exacerbation as patient not wheezing and without productive cough   -abd distention likely 2/2 to worsening ascites   -consulted CT surgery as patient was previously surgical patient under their service in January 2021; no CT surgery intervention at this time  -CXR this AM stable, off of O2    # Pleural effusion   /p VATS in January 2021 w/ Dr. Harmon for hematoma. Hx of known recurring b/l pleural effusions. Follows w/ Dr. Johnson. CT imaging in ED shows large right pleural effusion and small loculated left pleural effusion. S Slight increase in size of right effusion compared to previous imaging. Likely 2/2 malignancy.  -seen on CT in 2018.  -consider dx'c tap    #atelectasis  - bibasilar atelectasis on admission CT chest   - incentive spirometry Patient with progressively worsening SOB x 3 days Reports dry cough with worsening abdominal distention. CT C/A/P showing large R pleural effusion, bibasilar atelectasis, cardiomegaly with venous congestion, cirrhosis with worsening ascites.   -SOB Most likely multifactorial, 2/2 to pleural effusion vs venous congestion   -low suspicion for PE as wells score 1  -less likely diastolic HF exacerbation as no clinical signs of volume overload (BNP at baseline 20,000, today at baseline)  -less likely COPD exacerbation as patient not wheezing and without productive cough   NOT AECOPD  -abd distention likely 2/2 to worsening ascites   -consulted CT surgery as patient was previously surgical patient under their service in January 2021; no CT surgery intervention at this time  -CXR this AM stable, off of O2    # Pleural effusion   /p VATS in January 2021 w/ Dr. Harmon for hematoma. Hx of known recurring b/l pleural effusions. Follows w/ Dr. Johnson. CT imaging in ED shows large right pleural effusion and small loculated left pleural effusion. S Slight increase in size of right effusion compared to previous imaging. Likely 2/2 malignancy.  -seen on CT in 2018.  -consider dx'c tap    #atelectasis  - bibasilar atelectasis on admission CT chest   - incentive spirometry

## 2021-07-06 NOTE — PROGRESS NOTE ADULT - PROBLEM SELECTOR PLAN 7
Previously on chemo, now in remission since 11/2020 -patient with history of gout  - c/w home allopurinol 200mg BID

## 2021-07-06 NOTE — DISCHARGE NOTE PROVIDER - NSDCFUADDAPPT_GEN_ALL_CORE_FT
You are scheduled for dialysis at [address] [place] [address] on 7/12 and 7/13 You are scheduled for dialysis at [address] [place] [address] on 7/12 and 7/13  Please call your oncologist for an appointment

## 2021-07-06 NOTE — PROGRESS NOTE ADULT - SUBJECTIVE AND OBJECTIVE BOX
Internal Medicine Progress Note  Maureen Omi, PGY-1  Pager: [phone]    ******INCOMPLETE******    Patient is a 66y old  Male who presents with a chief complaint of Shortness of breath, abdominal distention, cough (05 Jul 2021 19:00)    OVERNIGHT EVENTS/INTERVAL HPI:    REVIEW OF SYSTEMS:  CONSTITUTIONAL: No fatigue, fevers/chills, no weight loss/gain, PO intake adequate  EYES/ENT: No visual changes, hearing changes, throat pain, rhinorrhea, cough, or congestion  NECK: No pain or stiffness, no LAD  RESPIRATORY: No SOB, wheezing, hemoptysis  CARDIOVASCULAR: No chest pain or palpitations  GASTROINTESTINAL: No abdominal pain. No nausea, vomiting, hematemesis, diarrhea, constipation, melena or hematochezia.  GENITOURINARY: No dysuria, frequency, or hematuria  NEUROLOGICAL: No numbness, weakness, vertigo, HA, dizziness  SKIN: No itching, burning, rashes, jaundice, bruising, or lesions  MSK: No arthralgias/joint pain, no back pain  All other review of systems is negative unless indicated above.    OBJECTIVE:  Daily Height in cm: 180.34 (05 Jul 2021 10:59)    Daily   Vital Signs Last 24 Hrs  T(C): 36.8 (06 Jul 2021 05:59), Max: 36.9 (05 Jul 2021 15:35)  T(F): 98.3 (06 Jul 2021 05:59), Max: 98.5 (05 Jul 2021 15:35)  HR: 71 (06 Jul 2021 05:59) (60 - 71)  BP: 132/79 (06 Jul 2021 05:59) (126/72 - 147/79)  BP(mean): --  RR: 18 (06 Jul 2021 05:59) (18 - 22)  SpO2: 93% (06 Jul 2021 06:39) (93% - 99%)  I&O's Detail    Physical Exam:  GENERAL: Awake, alert and interactive, no acute distress, appears comfortable  NEURO: A&Ox4, no focal deficits, 5/5 strength in all ext, reflexes 2+ throughout  HEENT: Normocephalic, atraumatic, CN2-12 intact, no conjunctivitis or scleral icterus, oral mucosa moist, no oral lesions noted  NECK: Supple, no JVD, no LAD, thyroid not palpable  CARDIAC: Regular rate and rhythm, +S1/S2, no murmurs/rubs/gallops  PULM: Breathing comfortably on RA, clear to auscultation bilaterally, no wheezes/rales/rhonchi, no inspiratory stridor  ABDOMEN: Soft, nontender, nondistended, +bs, no hepatosplenomegaly, no rebound tenderness or fluid wave, no CVA tenderness  : Deferred  MSK: Range of motion grossly intact, no back tenderness  SKIN: Warm and dry, no rashes, no lesions  VASC: 2+ peripheral pulses, no edema  Psych: Appropriate affect    Medications:  MEDICATIONS  (STANDING):  allopurinol 100 milliGRAM(s) Oral two times a day  aMIOdarone    Tablet 200 milliGRAM(s) Oral daily  amLODIPine   Tablet 10 milliGRAM(s) Oral daily  aspirin enteric coated 81 milliGRAM(s) Oral daily  calcitriol   Capsule 0.25 MICROGram(s) Oral daily  doxazosin 2 milliGRAM(s) Oral <User Schedule>  heparin   Injectable 5000 Unit(s) SubCutaneous every 8 hours  metoprolol succinate ER 50 milliGRAM(s) Oral every 12 hours  sevelamer carbonate 800 milliGRAM(s) Oral every 8 hours  sodium bicarbonate 650 milliGRAM(s) Oral two times a day  torsemide 20 milliGRAM(s) Oral daily    MEDICATIONS  (PRN):  acetaminophen    Suspension .. 650 milliGRAM(s) Oral every 6 hours PRN Mild Pain (1 - 3), Moderate Pain (4 - 6)      Labs:                        8.2    5.89  )-----------( 203      ( 06 Jul 2021 07:04 )             28.0     07-06    146<H>  |  112<H>  |  65<H>  ----------------------------<  78  3.9   |  21<L>  |  7.17<H>    Ca    8.3<L>      06 Jul 2021 07:04  Phos  5.4     07-05  Mg     2.4     07-06    TPro  6.3  /  Alb  3.1<L>  /  TBili  0.3  /  DBili  x   /  AST  6<L>  /  ALT  <5<L>  /  AlkPhos  105  07-06        COVID-19 PCR: NotDetec (05 Jul 2021 12:15)  COVID-19 PCR: NotDetec (16 Mar 2021 16:54)      Radiology: Reviewed Internal Medicine Progress Note  Maureen Braga, PGY-1  Pager: [phone]    HPI: 67 y/o M w/ PMH HTN, BPH, 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    OVERNIGHT EVENTS/INTERVAL HPI: KALA. Patient examined at bedside. Mild SOB but satting 92% off NC (patient with COPD so this is goal). Continues to c/o pain/tenderness at VATS procedure location. Also notes continued dry cough and abd distension. Patient reports good UOP.    REVIEW OF SYSTEMS:  CONSTITUTIONAL: No fatigue, fevers/chills, PO intake adequate  EYES/ENT: No visual changes,throat pain, rhinorrhea, or congestion  RESPIRATORY: +SOB, cough, pain in left subcostal region.   CARDIOVASCULAR: No chest pain or palpitations  GASTROINTESTINAL: +abd distension. No abdominal pain. No nausea, vomiting, hematemesis, diarrhea, constipation, melena or hematochezia.  GENITOURINARY: No dysuria, frequency, or hematuria  NEUROLOGICAL: No HA, dizziness, weakness  SKIN: No jaundice, bruising, or lesions  MSK: No arthralgias/joint pain  All other review of systems is negative unless indicated above.    OBJECTIVE:  Daily Height in cm: 180.34 (05 Jul 2021 10:59)    Daily   Vital Signs Last 24 Hrs  T(C): 36.8 (06 Jul 2021 05:59), Max: 36.9 (05 Jul 2021 15:35)  T(F): 98.3 (06 Jul 2021 05:59), Max: 98.5 (05 Jul 2021 15:35)  HR: 71 (06 Jul 2021 05:59) (60 - 71)  BP: 132/79 (06 Jul 2021 05:59) (126/72 - 147/79)  BP(mean): --  RR: 18 (06 Jul 2021 05:59) (18 - 22)  SpO2: 93% (06 Jul 2021 06:39) (93% - 99%)  I&O's Detail    Physical Exam:  GENERAL: Awake, alert and interactive, no acute distress, appears comfortable  NEURO: A&Ox4, no focal deficits  HEENT: Normocephalic, atraumatic, no conjunctivitis or scleral icterus, oral mucosa moist  NECK: Supple  CARDIAC: Regular rate and rhythm, +S1/S2, I/VI systolic murmur  PULM: +crackles at bases. Breathing comfortably on RA.  ABDOMEN: +distended, ttp in R subcostal region. +bs  : Deferred  MSK: Range of motion grossly intact  SKIN: Warm and dry  VASC: 2+ peripheral pulses, no edema, L RUE fistula with thrill (patent)  Psych: Appropriate affect    Medications:  MEDICATIONS  (STANDING):  allopurinol 100 milliGRAM(s) Oral two times a day  aMIOdarone    Tablet 200 milliGRAM(s) Oral daily  amLODIPine   Tablet 10 milliGRAM(s) Oral daily  aspirin enteric coated 81 milliGRAM(s) Oral daily  calcitriol   Capsule 0.25 MICROGram(s) Oral daily  doxazosin 2 milliGRAM(s) Oral <User Schedule>  heparin   Injectable 5000 Unit(s) SubCutaneous every 8 hours  metoprolol succinate ER 50 milliGRAM(s) Oral every 12 hours  sevelamer carbonate 800 milliGRAM(s) Oral every 8 hours  sodium bicarbonate 650 milliGRAM(s) Oral two times a day  torsemide 20 milliGRAM(s) Oral daily    MEDICATIONS  (PRN):  acetaminophen    Suspension .. 650 milliGRAM(s) Oral every 6 hours PRN Mild Pain (1 - 3), Moderate Pain (4 - 6)      Labs:                        8.2    5.89  )-----------( 203      ( 06 Jul 2021 07:04 )             28.0     07-06    146<H>  |  112<H>  |  65<H>  ----------------------------<  78  3.9   |  21<L>  |  7.17<H>    Ca    8.3<L>      06 Jul 2021 07:04  Phos  5.4     07-05  Mg     2.4     07-06    TPro  6.3  /  Alb  3.1<L>  /  TBili  0.3  /  DBili  x   /  AST  6<L>  /  ALT  <5<L>  /  AlkPhos  105  07-06        COVID-19 PCR: NotDetec (05 Jul 2021 12:15)  COVID-19 PCR: NotDetec (16 Mar 2021 16:54)      Radiology: Reviewed

## 2021-07-06 NOTE — DISCHARGE NOTE PROVIDER - NSDCCPCAREPLAN_GEN_ALL_CORE_FT
PRINCIPAL DISCHARGE DIAGNOSIS  Diagnosis: Ascites  Assessment and Plan of Treatment: You came in with abdominal distension due to fluid in your abdomen known as ascites. We drained some of the fluid and sent it for analysis. It was not infected. We are still waiting on results regarding whether this is related to your cancer. PLEASE FOLLOW UP WITH YOUR ONCOLOGIST. PLEASE RETURN TO THE ED IF YOU HAVE DIFFICULTY BREATHING, CONSTIPATION, NAUSEA/VOMITING, WORSENING ABDOMINAL DISTENSION, FEVER, OR CONFUSION.      SECONDARY DISCHARGE DIAGNOSES  Diagnosis: CKD (chronic kidney disease)  Assessment and Plan of Treatment: Your kidney function numbers looke worse during your hospital stay than they were when we saw you in March 2021. You are still making urine well, so our nephrology team did not intervene. PLEASE FOLLOW UP WITH YOUR NEPHROLOGIST.     PRINCIPAL DISCHARGE DIAGNOSIS  Diagnosis: Ascites  Assessment and Plan of Treatment: You came in with abdominal distension due to fluid in your abdomen known as ascites. We drained some of the fluid and sent it for analysis. It was not infected. We are still waiting on results regarding whether this is related to your cancer. PLEASE FOLLOW UP WITH YOUR ONCOLOGIST. PLEASE RETURN TO THE ED IF YOU HAVE DIFFICULTY BREATHING, CONSTIPATION, NAUSEA/VOMITING, WORSENING ABDOMINAL DISTENSION, FEVER, OR CONFUSION.      SECONDARY DISCHARGE DIAGNOSES  Diagnosis: Shortness of breath  Assessment and Plan of Treatment: You were having a hard time breathing when you came in. This is probably due to your ascites as well as having fluid around your lungs. PLEASE USE OXYGEN WHILE WALKING.    Diagnosis: CKD (chronic kidney disease)  Assessment and Plan of Treatment: Your kidney function numbers looke worse during your hospital stay than they were when we saw you in March 2021. You are still making urine well, so our nephrology team did not intervene. PLEASE FOLLOW UP WITH YOUR NEPHROLOGIST.    Diagnosis: AV block, 1st degree  Assessment and Plan of Treatment: You were noted to have an arrhythmia that is different from your a-fib. PLEASE FOLLOW UP WITH YOUR CARDIOLOGIST.     PRINCIPAL DISCHARGE DIAGNOSIS  Diagnosis: CKD (chronic kidney disease)  Assessment and Plan of Treatment: Your kidney function numbers looke worse during your hospital stay than they were when we saw you in March 2021. You are still making urine well, but since you had fluid build up, dialysis was started. PLEASE FOLLOW UP WITH YOUR NEPHROLOGIST. PLEASE CONTINUE DIALYSIS ON MONDAY AND TUESDAY.      SECONDARY DISCHARGE DIAGNOSES  Diagnosis: Ascites  Assessment and Plan of Treatment: You came in with abdominal distension due to fluid in your abdomen known as ascites. We drained some of the fluid and sent it for analysis. It was not infected. We are still waiting on results regarding whether this is related to your cancer. PLEASE FOLLOW UP WITH YOUR ONCOLOGIST. PLEASE RETURN TO THE ED IF YOU HAVE DIFFICULTY BREATHING, CONSTIPATION, NAUSEA/VOMITING, WORSENING ABDOMINAL DISTENSION, FEVER, OR CONFUSION.    Diagnosis: Shortness of breath  Assessment and Plan of Treatment: You were having a hard time breathing when you came in. This is probably due to your ascites as well as having fluid around your lungs. PLEASE USE OXYGEN WHILE WALKING.    Diagnosis: AV block, 1st degree  Assessment and Plan of Treatment: You were noted to have an arrhythmia that is different from your a-fib. PLEASE FOLLOW UP WITH YOUR CARDIOLOGIST.    Diagnosis: CKD (chronic kidney disease)  Assessment and Plan of Treatment: Your kidney function numbers looke worse during your hospital stay than they were when we saw you in March 2021. You are still making urine well, so our nephrology team did not intervene. PLEASE FOLLOW UP WITH YOUR NEPHROLOGIST.

## 2021-07-06 NOTE — PROGRESS NOTE ADULT - PROBLEM SELECTOR PLAN 2
Pt on amiodarone, metoprolol, and eliquis as outpatient. Hx of spontaneous chest wall hematoma and now presenting with spontaneous gluteal hemorrhage.   -cont amiodarone and metoprolol  -discuss holding eliquis in the setting of bleed, and watchman procedure with patients cardiologist Dr. Ventura  -will likely need a Watchman procedure in the setting of hx of multiple spontaneous hematomas Pt w/ hx of CKD. Follows outpatient w/ Dr. Denton. Has LUE AVF but has not started HD yet  - consult Nephrology in am as Cr has significantly worsened since March (Cr 7.04 on this admission, up from Cr 5.53 in March)  - c/w home sevelamer 800 TID CT imaging in ED reports evidence of cirrhosis w/ increasing ascites. Pt w/ no documented hx of cirrhosis   -Hepatitis labs begative  -f/u abdominal US  -possibly due to malignant ascites as patient being treated for lymphoma  -low suspicion for SBP as patient is afebrile and without abdominal pain  -diagnostic paracentesis ordered. CT imaging in ED reports evidence of cirrhosis w/ increasing ascites. Pt w/ no documented hx of cirrhosis   -Hepatitis labs begative  -f/u abdominal US  -possibly due to malignant ascites as patient being treated for lymphoma  -low suspicion for SBP as patient is afebrile and without abdominal pain  -diagnostic paracentesis ordered.  Done an dtolerated well

## 2021-07-06 NOTE — PROGRESS NOTE ADULT - PROBLEM SELECTOR PROBLEM 5
Anemia due to stage 5 chronic kidney disease, not on chronic dialysis Longstanding persistent atrial fibrillation

## 2021-07-06 NOTE — PROGRESS NOTE ADULT - PROBLEM SELECTOR PLAN 5
Hb: 9.2 --> 8.8 stable from previous. Likely in the setting of CKD. No need to workup as no acute blood loss. On home amiodarone 200mg qd. Previously on eliquis 2.5mg BID but stopped in June. Patient was scheduled to have Watchman procedure last week but had to postpone due to cough and chemo.   - c/w home amiodarone 200mg qd  - f/u outpatient for Watchman procedure (per patient was supposed to get procedure yesterday but postponed due to SOB and cough)  -CHADS-VASc score 3    #First degree AV block, prolonged QTc  -found on EKG on admission  -no ST or T wave changes  -f/u with Dr. Ventura outpatient   -repeat EKG in am On home amiodarone 200mg qd. Previously on eliquis 2.5mg BID but stopped in June. Patient was scheduled to have Watchman procedure last week but had to postpone due to cough and chemo.   - c/w home amiodarone 200mg qd  - f/u outpatient for Watchman procedure (per patient was supposed to get procedure 7/4 but postponed due to SOB and cough)  -CHADS-VASc score 3    #First degree AV block, prolonged QTc  -found on EKG on admission  -no ST or T wave changes  -f/u with Dr. Ventura outpatient   -repeat EKG in am

## 2021-07-06 NOTE — DISCHARGE NOTE PROVIDER - NSDCMRMEDTOKEN_GEN_ALL_CORE_FT
allopurinol 100 mg oral tablet: 2 tab(s) orally 2 times a day  amiodarone 200 mg oral tablet: 1 tab(s) orally once a day  amLODIPine 10 mg oral tablet: 1 tab(s) orally once a day  aspirin 81 mg oral delayed release tablet: 1 tab(s) orally once a day  calcitriol 0.25 mcg oral capsule: 1 cap(s) orally once a day  doxazosin 2 mg oral tablet: 2 tab(s) orally 2 times a day  Metoprolol Succinate ER 50 mg oral tablet, extended release: 1 tab(s) orally 2 times a day  sevelamer carbonate 800 mg oral tablet: 1 tab(s) orally every 8 hours  sodium bicarbonate 650 mg oral tablet: 1 tab(s) orally 2 times a day  torsemide 20 mg oral tablet: 1 tab(s) orally once a day   allopurinol 100 mg oral tablet: 2 tab(s) orally 2 times a day  amiodarone 200 mg oral tablet: 1 tab(s) orally once a day  amLODIPine 10 mg oral tablet: 1 tab(s) orally once a day  aspirin 81 mg oral delayed release tablet: 1 tab(s) orally once a day  calcitriol 0.25 mcg oral capsule: 1 cap(s) orally once a day  doxazosin 2 mg oral tablet: 2 tab(s) orally 2 times a day  Metoprolol Succinate ER 50 mg oral tablet, extended release: 1 tab(s) orally once a day  sevelamer carbonate 800 mg oral tablet: 1 tab(s) orally every 8 hours  sodium bicarbonate 650 mg oral tablet: 1 tab(s) orally 2 times a day  torsemide 20 mg oral tablet: 1 tab(s) orally once a day   allopurinol 100 mg oral tablet: 2 tab(s) orally 2 times a day  amiodarone 200 mg oral tablet: 1 tab(s) orally once a day  amLODIPine 10 mg oral tablet: 1 tab(s) orally once a day  aspirin 81 mg oral delayed release tablet: 1 tab(s) orally once a day  calcitriol 0.25 mcg oral capsule: 1 cap(s) orally once a day  doxazosin 2 mg oral tablet: 2 tab(s) orally 2 times a day  Metoprolol Succinate ER 50 mg oral tablet, extended release: 1 tab(s) orally once a day  sevelamer carbonate 800 mg oral tablet: 1 tab(s) orally every 8 hours  sodium bicarbonate 650 mg oral tablet: 1 tab(s) orally 2 times a day  torsemide 20 mg oral tablet: 1 tab(s) orally 2 times a day

## 2021-07-06 NOTE — PROGRESS NOTE ADULT - PROBLEM SELECTOR PLAN 4
VSS, Hb stable  -cont home metoprolol and nifedipine HFpEF; patient follows with cardiologist Dr. Ventura outpatient  EF 55-60% in May 2021. No peripheral edema, JVD, crackles, or other signs of volume overload on exam.   - continue to monitor BP and volume status  - c/w home torsemide 20mg PO qd  - c/w home toprol 50mg BID  -BNP elevated on admission to 10,000 however this is baseline   -troponins elevated to 0.07 on admission, continue to trend    Pt normotensive on exam. On home amlodipine 10mg qd  - c/w home amlodipine 10mg qd    #Hx of pulmonary htn  Likely 2/2 COPD. No evidence of cor pulmonale  - no acute intervention at this time HFpEF; patient follows with cardiologist Dr. Ventura outpatient EF 55-60% in May 2021.   - continue to monitor BP and volume status  - c/w home torsemide 20mg PO qd  - c/w home toprol 50mg BID  -BNP elevated on admission to 10,000 however this is baseline   -troponins elevated to 0.07 on admission, downtrending (now 0.06)    Pt normotensive on exam. On home amlodipine 10mg qd  - c/w home amlodipine 10mg qd    #Hx of pulmonary htn  Likely 2/2 COPD. No evidence of cor pulmonale  - no acute intervention at this time

## 2021-07-06 NOTE — PROGRESS NOTE ADULT - PROBLEM SELECTOR PLAN 3
Previous EF 40% in 12/2020, but TTE in 1/2021 with EF 65-70%.  -not currently fluid overload  -cont torsemide, metoprolol Pt w/ hx of CKD. Follows outpatient w/ Dr. Denton. Has LUE AVF but has not started HD yet  - consult Nephrology in am as Cr has significantly worsened since March (Cr 7.04 on this admission, up from Cr 5.53 in March)  - c/w home sevelamer 800 TID Pt w/ hx of CKD. Follows outpatient w/ Dr. Denton. Has LUE AVF but has not started HD yet  - consult Nephrology in am as Cr has significantly worsened since March (Cr 7.04 on this admission, up from Cr 5.53 in March)  - c/w home sevelamer 800 TID  -strict I&Os

## 2021-07-06 NOTE — PROGRESS NOTE ADULT - PROBLEM SELECTOR PLAN 10
F: none  E: ESRD  N: renal diet  DVT ppx: SCDs    Code Status: full code F: none in the setting of pleural effusion and HFpEF; tolerating PO  E: replete Mg>2; K>4  N: regular diet   A: Lovenox 40mg injection

## 2021-07-06 NOTE — CONSULT NOTE ADULT - ASSESSMENT
Assessment: 65 y/o M w/ PMH HTN, BPH, T cell Lymphoma (on chemo), COPD, Stage 5 CKD (pending starting HD), Afib on Eliquis, pulmonary HTN, CHF (EF was 40%, most recently 65-70%), L chest wall hematoma s/p evacuation (12/2020) and VATS (01/2021) presenting to the ED with complaints of progressively worsening SOB, cough, abdominal discomfort/distention that started Saturday. CT AP showing abdominal ascites, now for diagnostic and therapeutic paracentesis. Case reviewed with Dr. Anguiano, plan for procedure with local lidocaine.     Communicated with: primary team

## 2021-07-06 NOTE — PROGRESS NOTE ADULT - PROBLEM SELECTOR PLAN 9
-cont allopurinol Not on home O2. CT showing evidence of emphysematous changes. Prior smoking hx. No wheezing on exam or productive cough.  - no acute intervention at this time Not on home O2. CT showing evidence of emphysematous changes. Prior smoking hx. No wheezing on exam or productive cough.  - no acute intervention at this time  - goal O2 88-92%

## 2021-07-06 NOTE — DISCHARGE NOTE PROVIDER - HOSPITAL COURSE
67 y/o M w/ PMH HTN, BPH, T cell Lymphoma (on chemo), COPD, Stage 5 CKD (pending starting HD), Afib not on Eliquis 2/2 recent hematoma, pulmonary HTN, CHF (EF was 40%, most recently 65-70%), L chest wall hematoma s/p evacuation (12/2020) and VATS (01/2021) who presented with progressively worsening SOB, cough, abdominal discomfort/distention that started 7/2 found to have b/l pleural effusions and ascites. Paracentesis completed ___.     67 y/o M w/ PMH HTN, BPH, T cell Lymphoma (on chemo), COPD, Stage 5 CKD (pending starting HD), Afib not on Eliquis 2/2 recent hematoma, pulmonary HTN, CHF (EF was 40%, most recently 65-70%), L chest wall hematoma s/p evacuation (12/2020) and VATS (01/2021) who presented with progressively worsening SOB, cough, abdominal discomfort/distention that started 7/2 found to have b/l pleural effusions and ascites. Paracentesis completed 7/6 and 460cc removed. Abd u/s that evening still demonstrating ascites in all quadrants, with largest pockets in the R hemiabdomen.    #Ascites.    -CT imaging in ED reports evidence of cirrhosis w/ increasing ascites. Pt w/ no documented hx of cirrhosis   -Hepatitis labs negative  -diagnostic paracentesis done by IR 7/6 and removed 460cc fluid. Cytology pending. NGTD.    #Shortness of breath.   -SOB Most likely multifactorial, 2/2 to pleural effusion, ascites  -BNP at baseline ~20,000   -consulted CT surgery as patient was previously surgical patient under their service in January 2021; no CT surgery intervention at this time    #Stage 5 chronic kidney disease. Has LUE AVF but has not started HD yet  - Cr has significantly worsened since March (Cr 7.04 on this admission, up from Cr 5.53 in March)  - c/w home sevelamer 800 TID  - Follow up outpatient w/ Dr. Denton.   - UOP throughout stay    #Anemia   -patient with hbg 9 on admission (baseline 8.8)  -previous iron studies done 1/2021 consistent with anemia of chronic disease  -TSH, folate, B12 - all wnl.    Providers to f/u: Dr. Denton (nephro), [facility] (onc)  New medications: None  Discontinued medications: None  Exams/labs to f/u: None  65 y/o M w/ PMH HTN, BPH, T cell Lymphoma (on chemo), COPD, Stage 5 CKD (pending starting HD), Afib not on Eliquis 2/2 recent hematoma, pulmonary HTN, CHF (EF was 40%, most recently 65-70%), L chest wall hematoma s/p evacuation (12/2020) and VATS (01/2021) who presented with progressively worsening SOB, cough, abdominal discomfort/distention that started 7/2 found to have b/l pleural effusions and ascites. Paracentesis completed 7/6 and 460cc removed. Abd u/s that evening still demonstrating ascites in all quadrants, with largest pockets in the R hemiabdomen.    #Ascites.    -Hepatitis labs negative  -diagnostic paracentesis done by IR 7/6 and removed 460cc fluid. Cytology pending. NGTD.  -u/s still with sig ascites R>L post-para    #Shortness of breath.   -SOB Most likely multifactorial, 2/2 to pleural effusion, ascites  -desatted to 88% on RA while ambulating. Will need home O2  -BNP at baseline ~20,000   -consulted CT surgery as patient was previously surgical patient under their service in January 2021; no CT surgery intervention at this time    #Stage 5 chronic kidney disease. Has LUE AVF but has not started HD yet  - Cr has significantly worsened since March (Cr 7.04 on this admission, up from Cr 5.53 in March)  - c/w home sevelamer 800 TID  - Follow up outpatient w/ Dr. Denton.   - UOP throughout stay    #A-fib  -continued home amiodarone  -pt also found to have first degree AV block and prolonged QTc.  -f/u with Dr. Roche    #Anemia   -patient with hbg 9 on admission (baseline 8.8)  -previous iron studies done 1/2021 consistent with anemia of chronic disease  -TSH, folate, B12 - all wnl.    Providers to f/u: Dr. Denton (nephro), Dr. Dimas (onc), Dr. Roche (cards)  New medications: Portable O2 for ambulation  Discontinued medications: None  Exams/labs to f/u: Para cytology  67 y/o M w/ PMH HTN, BPH, T cell Lymphoma (on chemo), COPD, Stage 5 CKD (pending starting HD), Afib not on Eliquis 2/2 recent hematoma, pulmonary HTN, CHF (EF was 40%, most recently 65-70%), L chest wall hematoma s/p evacuation (12/2020) and VATS (01/2021) who presented with progressively worsening SOB, cough, abdominal discomfort/distention that started 7/2 found to have b/l pleural effusions and ascites. Paracentesis completed 7/6 and 460cc removed. Abd u/s that evening still demonstrating ascites in all quadrants, with largest pockets in the R hemiabdomen.    #Stage 5 chronic kidney disease. Has LUE AVF but had not started HD yet on admission  - HD started 7/8. Also done 7/9 and 7/10. Patient to continue HD on 7/12.  - Cr has significantly worsened since March (Cr 7.04 on this admission, up from Cr 5.53 in March)  - c/w home sevelamer 800 TID  - Follow up outpatient w/ Dr. Denton.   - UOP throughout stay    #Ascites.    -Hepatitis labs negative  -diagnostic paracentesis done by IR 7/6 and removed 460cc fluid. Cytology pending. NGTD.  -u/s still with sig ascites R>L post-para    #Shortness of breath.   -torsemide increased from 20mg qd to 20mg BID  -SOB Most likely multifactorial, 2/2 to pleural effusion, ascites  -desatted to 88% on RA while ambulating. Will need home O2  -BNP at baseline ~20,000   -consulted CT surgery as patient was previously surgical patient under their service in January 2021; no CT surgery intervention at this time      #A-fib  -continued home amiodarone  -pt also found to have first degree AV block and prolonged QTc.  -f/u with Dr. Roche    #Anemia   -patient with hbg 9 on admission (baseline 8.8)  -previous iron studies done 1/2021 consistent with anemia of chronic disease  -TSH, folate, B12 - all wnl.    Providers to f/u: Dr. Denton (nephro), Dr. Dimas (onc), Dr. Roche (cards)  New medications: Portable O2 for ambulation  Discontinued medications: None  Exams/labs to f/u: Para cytology  65 y/o M w/ PMH HTN, BPH, T cell Lymphoma (on chemo), COPD, Stage 5 CKD (pending starting HD), Afib not on Eliquis 2/2 recent hematoma, pulmonary HTN, CHF (EF was 40%, most recently 65-70%), L chest wall hematoma s/p evacuation (12/2020) and VATS (01/2021) who presented with progressively worsening SOB, cough, abdominal discomfort/distention that started 7/2 found to have b/l pleural effusions and ascites. Paracentesis completed 7/6 and 460cc removed. Abd u/s that evening still demonstrating ascites in all quadrants, with largest pockets in the R hemiabdomen. HD started 7/8. Also done 7/9 and 7/10.     #Stage 5 chronic kidney disease. Has LUE AVF but had not started HD yet on admission  - HD started 7/8 (removed 1.5L). Also done 7/9 and 7/10. Patient to continue HD on 7/12.  - Cr has significantly worsened since March (Cr 7.04 on this admission, up from Cr 5.53 in March)  - c/w home sevelamer 800 TID  - Follow up outpatient w/ Dr. Denton.   - UOP good throughout stay    #Ascites.    -Hepatitis labs negative  -diagnostic paracentesis done by IR 7/6 and removed 460cc fluid. Cytology pending. NGTD.  -u/s still with sig ascites R>L post-para    #Shortness of breath.   -torsemide increased from 20mg qd to 20mg BID  -SOB Most likely multifactorial, 2/2 to pleural effusion, ascites  -desatted to 88% on RA while ambulating. Will need home O2  -BNP at baseline ~20,000   -consulted CT surgery as patient was previously surgical patient under their service in January 2021; no CT surgery intervention at this time      #A-fib  -continued home amiodarone  -pt also found to have first degree AV block and prolonged QTc.  -f/u with Dr. Roche    #Anemia   -patient with hbg 9 on admission (baseline 8.8)  -previous iron studies done 1/2021 consistent with anemia of chronic disease  -TSH, folate, B12 - all wnl.    Providers to f/u: Dr. Denton (nephro), Dr. Dimas (onc), Dr. Roche (cards)  New medications: Portable O2 for ambulation  Discontinued medications: None  Exams/labs to f/u: Para cytology

## 2021-07-06 NOTE — PROGRESS NOTE ADULT - ASSESSMENT
64 yo M with PMH HTN, BPH, T cell Lymphoma in remission since 11/2020, COPD, Stage 5 CKD (pending starting HD), Afib on Eliquis, severe MR, severe pulmonary HTN, CHF (EF was 40%, most recently 65-70%), L chest wall hematoma s/p evacuation (12/2020) and VATS (01/2021), presents to ED for R buttock pain that started on 3/13 65yo M w/ a PMHx of known recurrent b/l pleural effusions, T-cell Lymphoma (recently restarted chemo), CKD5 (not on dialysis but fistula in LUE), HFpEF (EF 55-60% in May 2021), COPD (not on home O2), HTN, Afib (not on Eliquis), pulmonary HTN, BPH, and gout, presented w/ SOB and increasing abdominal distention for 3 days.

## 2021-07-06 NOTE — PROGRESS NOTE ADULT - PROBLEM SELECTOR PLAN 6
AVF currently maturing. No indication for urgent dialysis.  -cont sevelamer, bicarb, torsemide patient with history of T cell lymphoma  -on chemo with oncologist at [facility]  - will f/u with oncologist in am    #Anemia   -patient with hbg 9 on admission (baseline 8.8)  -will continue to monitor  -previous iron studies done 1/2021 consistent with anemia of chronic disease  -f/u TSH, folate, B12 patient with history of T cell lymphoma  -on chemo with oncologist at [facility]  - will f/u with oncologist in am    #Anemia   -patient with hbg 9 on admission (baseline 8.8)  -will continue to monitor  -previous iron studies done 1/2021 consistent with anemia of chronic disease  -TSH, folate, B12 - all wnl

## 2021-07-06 NOTE — DISCHARGE NOTE PROVIDER - PROVIDER TOKENS
PROVIDER:[TOKEN:[[id number]]],PROVIDER:[TOKEN:[[id number]]] PROVIDER:[TOKEN:[[id number]],ESTABLISHEDPATIENT:[T]] PROVIDER:[TOKEN:[[id number]],FOLLOWUP:[1 week],ESTABLISHEDPATIENT:[T]]

## 2021-07-06 NOTE — CONSULT NOTE ADULT - ASSESSMENT
per Internal Medicine    65 yo M w/ a PMHx of known recurrent b/l pleural effusions, T-cell Lymphoma (in remission since 11/2020), CKD5 (not on dialysis), HFpEF (EF 55-60% in May 2021), COPD (not on home O2), HTN, Afib (not on Eliquis), pulmonary HTN, BPH, and gout, presented w/ SOB and increasing abdominal distention for 3 days.    Problem/Plan - 1:  ·  Problem: Shortness of breath.  Plan: Patient with progressively worsening SOB x 3 days Reports dry cough with worsening abdominal distention. CT C/A/P showing large R pleural effusion, bibasilar atelectasis, cardiomegaly with venous congestion, cirrhosis with worsening ascites.   -SOB Most likely multifactorial, 2/2 to pleural effusion vs venous congestion   -low suspicion for PE as wells score 0  - less likely diastolic HF exacerbation as no clinical signs of volume overload (BNP at baseline 20,000, today at baseline)  -less likely COPD exacerbation as patient not wheezing and without productive cough   -abd distention likely 2/2 to worsening ascites   -possible costochondritis as patient has tenderness palpation at left lower ribs at site of previous VATS. No ecchymosis or recent trauma to site  -consulted CT surgery as patient was previously surgical patient under their service in January 2021; no CT surgery intervention at this time  -f/u CXR in morning       # Pleural effusion   Hx of known recurring b/l pleural effusions. Follows w/ Dr. Johnson. CT imaging in ED shows large right pleural effusion and small loculated left pleural effusion. S/p VATS in January 2021 w/ Dr. Harmon. Slight increase in size of right effusion compared to previous imaging.   - consider Pulm consult    #atelectasis  - bibasilar atelectasis on admission CT chest   - incentive spirometry.     Problem/Plan - 2:  ·  Problem: CKD (chronic kidney disease).  Plan: Pt w/ hx of CKD. Follows outpatient w/ Dr. Denton. Has LUE AVF but has not started HD yet  - consult Nephrology in am as Cr has significantly worsened since March (Cr 7.04 on this admission, up from Cr 5.53 in March)  - c/w home sevelamer 800 TID      #Cirrhosis  CT imaging in ED reports evidence of cirrhosis w/ increasing ascites. Pt w/ no documented hx of cirrhosis   -f/u abdominal US  -f/u hepatitis A and B labs; previous hepatitis C labs negative  -possibly due to malignant ascites as patient being treated for lymphoma  -low suspicion for SBP as patient is afebrile and without abdominal pain  -diagnostic paracentesis ordered.     Problem/Plan - 3:  ·  Problem: CHF, chronic.  Plan: HFpEF; patient follows with cardiologist Dr. Ventura outpatient  EF 55-60% in May 2021. No peripheral edema, JVD, crackles, or other signs of volume overload on exam.   - continue to monitor BP and volume status  - c/w home torsemide 20mg PO qd  - c/w home toprol 50mg BID  -BNP elevated on admission to 10,000 however this is baseline   -troponins elevated to 0.07 on admission, continue to trend.     Problem/Plan - 4:  ·  Problem: HTN (hypertension).  Plan: Pt normotensive on exam. On home amlodipine 10mg qd  - c/w home amlodipine 10mg qd      #Hx of pulmonary htn  Likely 2/2 COPD. No evidence of cor pulmonale  - no acute intervention at this time.     Problem/Plan - 5:  ·  Problem: Atrial fibrillation.  Plan: On home amiodarone 200mg qd. Previously on eliquis 2.5mg BID but stopped in June. Patient was scheduled to have Watchman procedure last week but had to postpone due to cough and chemo.   - c/w home amiodarone 200mg qd  - f/u outpatient for Watchman procedure (per patient was supposed to get procedure yesterday but postponed due to SOB and cough)  -CHADS-VASc score 3    #First degree AV block, prolonged QTc  -found on EKG on admission  -no ST or T wave changes  -f/u with Dr. Ventura outpatient   -repeat EKG in am.     Problem/Plan - 6:  Problem: Lymphoma. Plan: patient with history of T cell lymphoma  -on chemo with oncologist at [facility]  - will f/u with oncologist in am.    Problem/Plan - 7:  ·  Problem: Gout.  Plan: -patient with history of gout  - c/w home allopurinol 200mg BID       #Anemia   -patient with hbg 9 on admission (baseline 8.8)  -will continue to monitor  -previous iron studies done 1/2021 consistent with anemia of chronic disease  -f/u TSH, folate, B12.     Problem/Plan - 8:  ·  Problem: BPH (benign prostatic hyperplasia).  Plan: c/w home doxazosin 4mg BID.     Problem/Plan - 9:  ·  Problem: COPD, mild.  Plan: Not on home O2. CT showing evidence of emphysematous changes. Prior smoking hx. No wheezing on exam or productive cough.  - no acute intervention at this time.     Problem/Plan - 10:  Problem: Prophylactic measure. Plan; F: none in the setting of pleural effusion and HFpEF; tolerating PO  E: replete Mg>2; K>4  N: regular diet   A: Lovenox 40mg injection.

## 2021-07-06 NOTE — DISCHARGE NOTE PROVIDER - NSDCFUSCHEDAPPT_GEN_ALL_CORE_FT
MARCI VEGA ; 07/08/2021 ; NPP PulmMed [address]  MARCI VEGA ; 07/20/2021 ; NPP Nephro [address]  MARCI VEGA ; 08/09/2021 ; NPP Cardio Vasc [address] [facility]  MARCI VEGA ; 09/14/2021 ; NPP Cardio Vasc [address] [facility] MARCI VEGA ; 07/20/2021 ; NPP Nephro [address]   MARCI VEGA ; 08/09/2021 ; NPP Cardio Vasc [address]  MARCI VEGA ; 09/14/2021 ; NPP Cardio Vasc [address]  MARCI VEGA ; 10/06/2021 ; NPP Cardio Vasc [address]

## 2021-07-06 NOTE — DISCHARGE NOTE PROVIDER - CARE PROVIDER_API CALL
Carlos Ventura)  Cardiovascular Disease; Internal Medicine  Cardiology [facility], [address] [place] [address]  Phone: [phone]  Fax: [phone]  Follow Up Time:     Vel Dimas)  HematologyOncology; Medical Oncology  [address] [address]  Phone: [phone]  Fax: [phone]  Follow Up Time:    Vel Dimas)  HematologyOncology; Medical Oncology  [address] [place] [address]  Phone: [phone]  Fax: [phone]  Established Patient  Follow Up Time:    Vel Dimas)  HematologyOncology; Medical Oncology  [address] [place] [address]  Phone: [phone]  Fax: [phone]  Established Patient  Follow Up Time: 1 week

## 2021-07-06 NOTE — PROGRESS NOTE ADULT - PROBLEM SELECTOR PROBLEM 3
Chronic congestive heart failure, unspecified heart failure type Stage 5 chronic kidney disease not on chronic dialysis

## 2021-07-07 ENCOUNTER — RESULT REVIEW (OUTPATIENT)
Age: 66
End: 2021-07-07

## 2021-07-07 LAB
ALBUMIN SERPL ELPH-MCNC: 3.5 G/DL — SIGNIFICANT CHANGE UP (ref 3.3–5)
ALP SERPL-CCNC: 112 U/L — SIGNIFICANT CHANGE UP (ref 40–120)
ALT FLD-CCNC: <5 U/L — LOW (ref 10–45)
ANION GAP SERPL CALC-SCNC: 16 MMOL/L — SIGNIFICANT CHANGE UP (ref 5–17)
AST SERPL-CCNC: 6 U/L — LOW (ref 10–40)
BASOPHILS # BLD AUTO: 0.03 K/UL — SIGNIFICANT CHANGE UP (ref 0–0.2)
BASOPHILS NFR BLD AUTO: 0.6 % — SIGNIFICANT CHANGE UP (ref 0–2)
BILIRUB SERPL-MCNC: 0.3 MG/DL — SIGNIFICANT CHANGE UP (ref 0.2–1.2)
BUN SERPL-MCNC: 57 MG/DL — HIGH (ref 7–23)
CALCIUM SERPL-MCNC: 9.1 MG/DL — SIGNIFICANT CHANGE UP (ref 8.4–10.5)
CHLORIDE SERPL-SCNC: 109 MMOL/L — HIGH (ref 96–108)
CO2 SERPL-SCNC: 20 MMOL/L — LOW (ref 22–31)
CREAT SERPL-MCNC: 6.51 MG/DL — HIGH (ref 0.5–1.3)
EOSINOPHIL # BLD AUTO: 0.08 K/UL — SIGNIFICANT CHANGE UP (ref 0–0.5)
EOSINOPHIL NFR BLD AUTO: 1.5 % — SIGNIFICANT CHANGE UP (ref 0–6)
FOLATE SERPL-MCNC: 12.8 NG/ML — SIGNIFICANT CHANGE UP
GLUCOSE SERPL-MCNC: 66 MG/DL — LOW (ref 70–99)
HCT VFR BLD CALC: 29.3 % — LOW (ref 39–50)
HGB BLD-MCNC: 8.7 G/DL — LOW (ref 13–17)
IMM GRANULOCYTES NFR BLD AUTO: 0.4 % — SIGNIFICANT CHANGE UP (ref 0–1.5)
LYMPHOCYTES # BLD AUTO: 0.97 K/UL — LOW (ref 1–3.3)
LYMPHOCYTES # BLD AUTO: 17.9 % — SIGNIFICANT CHANGE UP (ref 13–44)
MAGNESIUM SERPL-MCNC: 2.5 MG/DL — SIGNIFICANT CHANGE UP (ref 1.6–2.6)
MCHC RBC-ENTMCNC: 26.7 PG — LOW (ref 27–34)
MCHC RBC-ENTMCNC: 29.7 GM/DL — LOW (ref 32–36)
MCV RBC AUTO: 89.9 FL — SIGNIFICANT CHANGE UP (ref 80–100)
MONOCYTES # BLD AUTO: 0.6 K/UL — SIGNIFICANT CHANGE UP (ref 0–0.9)
MONOCYTES NFR BLD AUTO: 11.1 % — SIGNIFICANT CHANGE UP (ref 2–14)
NEUTROPHILS # BLD AUTO: 3.71 K/UL — SIGNIFICANT CHANGE UP (ref 1.8–7.4)
NEUTROPHILS NFR BLD AUTO: 68.5 % — SIGNIFICANT CHANGE UP (ref 43–77)
NRBC # BLD: 0 /100 WBCS — SIGNIFICANT CHANGE UP (ref 0–0)
PHOSPHATE SERPL-MCNC: 5.2 MG/DL — HIGH (ref 2.5–4.5)
PLATELET # BLD AUTO: 245 K/UL — SIGNIFICANT CHANGE UP (ref 150–400)
POTASSIUM SERPL-MCNC: 4 MMOL/L — SIGNIFICANT CHANGE UP (ref 3.5–5.3)
POTASSIUM SERPL-SCNC: 4 MMOL/L — SIGNIFICANT CHANGE UP (ref 3.5–5.3)
PROT SERPL-MCNC: 6.6 G/DL — SIGNIFICANT CHANGE UP (ref 6–8.3)
RBC # BLD: 3.26 M/UL — LOW (ref 4.2–5.8)
RBC # FLD: 20.5 % — HIGH (ref 10.3–14.5)
SODIUM SERPL-SCNC: 145 MMOL/L — SIGNIFICANT CHANGE UP (ref 135–145)
TSH SERPL-MCNC: 2.51 UIU/ML — SIGNIFICANT CHANGE UP (ref 0.27–4.2)
VIT B12 SERPL-MCNC: 903 PG/ML — SIGNIFICANT CHANGE UP (ref 232–1245)
WBC # BLD: 5.41 K/UL — SIGNIFICANT CHANGE UP (ref 3.8–10.5)
WBC # FLD AUTO: 5.41 K/UL — SIGNIFICANT CHANGE UP (ref 3.8–10.5)

## 2021-07-07 PROCEDURE — 88305 TISSUE EXAM BY PATHOLOGIST: CPT | Mod: 26

## 2021-07-07 PROCEDURE — 99232 SBSQ HOSP IP/OBS MODERATE 35: CPT | Mod: GC

## 2021-07-07 PROCEDURE — 88112 CYTOPATH CELL ENHANCE TECH: CPT | Mod: 26

## 2021-07-07 RX ORDER — HYDROMORPHONE HYDROCHLORIDE 2 MG/ML
0.5 INJECTION INTRAMUSCULAR; INTRAVENOUS; SUBCUTANEOUS ONCE
Refills: 0 | Status: DISCONTINUED | OUTPATIENT
Start: 2021-07-07 | End: 2021-07-07

## 2021-07-07 RX ADMIN — CALCITRIOL 0.25 MICROGRAM(S): 0.5 CAPSULE ORAL at 11:43

## 2021-07-07 RX ADMIN — AMLODIPINE BESYLATE 10 MILLIGRAM(S): 2.5 TABLET ORAL at 06:58

## 2021-07-07 RX ADMIN — Medication 20 MILLIGRAM(S): at 06:58

## 2021-07-07 RX ADMIN — HEPARIN SODIUM 5000 UNIT(S): 5000 INJECTION INTRAVENOUS; SUBCUTANEOUS at 06:58

## 2021-07-07 RX ADMIN — Medication 2 MILLIGRAM(S): at 08:53

## 2021-07-07 RX ADMIN — AMIODARONE HYDROCHLORIDE 200 MILLIGRAM(S): 400 TABLET ORAL at 06:58

## 2021-07-07 RX ADMIN — Medication 50 MILLIGRAM(S): at 06:58

## 2021-07-07 RX ADMIN — HEPARIN SODIUM 5000 UNIT(S): 5000 INJECTION INTRAVENOUS; SUBCUTANEOUS at 22:16

## 2021-07-07 RX ADMIN — Medication 50 MILLIGRAM(S): at 18:02

## 2021-07-07 RX ADMIN — Medication 81 MILLIGRAM(S): at 11:43

## 2021-07-07 RX ADMIN — Medication 2 MILLIGRAM(S): at 22:17

## 2021-07-07 RX ADMIN — Medication 650 MILLIGRAM(S): at 18:02

## 2021-07-07 RX ADMIN — Medication 650 MILLIGRAM(S): at 06:58

## 2021-07-07 RX ADMIN — Medication 100 MILLIGRAM(S): at 18:02

## 2021-07-07 RX ADMIN — HEPARIN SODIUM 5000 UNIT(S): 5000 INJECTION INTRAVENOUS; SUBCUTANEOUS at 14:00

## 2021-07-07 RX ADMIN — SEVELAMER CARBONATE 800 MILLIGRAM(S): 2400 POWDER, FOR SUSPENSION ORAL at 06:58

## 2021-07-07 RX ADMIN — Medication 100 MILLIGRAM(S): at 06:58

## 2021-07-07 RX ADMIN — SEVELAMER CARBONATE 800 MILLIGRAM(S): 2400 POWDER, FOR SUSPENSION ORAL at 22:17

## 2021-07-07 RX ADMIN — SEVELAMER CARBONATE 800 MILLIGRAM(S): 2400 POWDER, FOR SUSPENSION ORAL at 14:00

## 2021-07-07 NOTE — PHYSICAL THERAPY INITIAL EVALUATION ADULT - GENERAL OBSERVATIONS, REHAB EVAL
Pt received supine with HOB elevated in NAD all lines in tact, call bell in reach, cleared for PT Evaluation by ALEE Manrique

## 2021-07-07 NOTE — PHYSICAL THERAPY INITIAL EVALUATION ADULT - PLANNED THERAPY INTERVENTIONS, PT EVAL
balance training/bed mobility training/gait training/manual therapy techniques/neuromuscular re-education/postural re-education/ROM/strengthening/stretching/transfer training

## 2021-07-07 NOTE — PROGRESS NOTE ADULT - PROBLEM SELECTOR PLAN 9
Not on home O2. CT showing evidence of emphysematous changes. Prior smoking hx. No wheezing on exam or productive cough.  - no acute intervention at this time  - goal O2 88-92% Not on home O2. CT showing evidence of emphysematous changes. Prior smoking hx. No wheezing on exam or productive cough.  - goal O2 88-92%

## 2021-07-07 NOTE — PROGRESS NOTE ADULT - PROBLEM SELECTOR PLAN 10
F: none in the setting of pleural effusion and HFpEF; tolerating PO  E: replete Mg>2; K>4  N: regular diet   A: Lovenox 40mg injection F: none in the setting of pleural effusion and HFpEF  E: replete Mg>2; K>4  N: regular diet   A: Lovenox 40mg injection  Dispo: D/c home, pending PT recs  Code status: FULL CODE

## 2021-07-07 NOTE — PROGRESS NOTE ADULT - SUBJECTIVE AND OBJECTIVE BOX
Internal Medicine Progress Note  Maureen Omi, PGY-1  Pager: [phone]    ******INCOMPLETE******    Patient is a 66y old  Male who presents with a chief complaint of Shortness of breath, abdominal distention, cough (06 Jul 2021 15:08)    OVERNIGHT EVENTS/INTERVAL HPI:    REVIEW OF SYSTEMS:  CONSTITUTIONAL: No fatigue, fevers/chills, no weight loss/gain, PO intake adequate  EYES/ENT: No visual changes, hearing changes, throat pain, rhinorrhea, cough, or congestion  NECK: No pain or stiffness, no LAD  RESPIRATORY: No SOB, wheezing, hemoptysis  CARDIOVASCULAR: No chest pain or palpitations  GASTROINTESTINAL: No abdominal pain. No nausea, vomiting, hematemesis, diarrhea, constipation, melena or hematochezia.  GENITOURINARY: No dysuria, frequency, or hematuria  NEUROLOGICAL: No numbness, weakness, vertigo, HA, dizziness  SKIN: No itching, burning, rashes, jaundice, bruising, or lesions  MSK: No arthralgias/joint pain, no back pain  All other review of systems is negative unless indicated above.    OBJECTIVE:  Daily     Daily   Vital Signs Last 24 Hrs  T(C): 36.7 (07 Jul 2021 05:37), Max: 36.9 (06 Jul 2021 20:26)  T(F): 98.1 (07 Jul 2021 05:37), Max: 98.4 (06 Jul 2021 20:26)  HR: 70 (07 Jul 2021 05:37) (70 - 70)  BP: 124/75 (07 Jul 2021 05:37) (122/62 - 137/69)  BP(mean): --  RR: 18 (07 Jul 2021 05:37) (18 - 18)  SpO2: 91% (07 Jul 2021 05:37) (91% - 93%)  I&O's Detail    06 Jul 2021 07:01  -  07 Jul 2021 07:00  --------------------------------------------------------  IN:  Total IN: 0 mL    OUT:    Voided (mL): 1180 mL  Total OUT: 1180 mL    Total NET: -1180 mL      07 Jul 2021 07:01  -  07 Jul 2021 08:21  --------------------------------------------------------  IN:  Total IN: 0 mL    OUT:    Voided (mL): 250 mL  Total OUT: 250 mL    Total NET: -250 mL        Physical Exam:  GENERAL: Awake, alert and interactive, no acute distress, appears comfortable  NEURO: A&Ox4, no focal deficits, 5/5 strength in all ext, reflexes 2+ throughout  HEENT: Normocephalic, atraumatic, CN2-12 intact, no conjunctivitis or scleral icterus, oral mucosa moist, no oral lesions noted  NECK: Supple, no JVD, no LAD, thyroid not palpable  CARDIAC: Regular rate and rhythm, +S1/S2, no murmurs/rubs/gallops  PULM: Breathing comfortably on RA, clear to auscultation bilaterally, no wheezes/rales/rhonchi, no inspiratory stridor  ABDOMEN: Soft, nontender, nondistended, +bs, no hepatosplenomegaly, no rebound tenderness or fluid wave, no CVA tenderness  : Deferred  MSK: Range of motion grossly intact, no back tenderness  SKIN: Warm and dry, no rashes, no lesions  VASC: 2+ peripheral pulses, no edema  Psych: Appropriate affect    Medications:  MEDICATIONS  (STANDING):  allopurinol 100 milliGRAM(s) Oral two times a day  aMIOdarone    Tablet 200 milliGRAM(s) Oral daily  amLODIPine   Tablet 10 milliGRAM(s) Oral daily  aspirin enteric coated 81 milliGRAM(s) Oral daily  calcitriol   Capsule 0.25 MICROGram(s) Oral daily  doxazosin 2 milliGRAM(s) Oral <User Schedule>  heparin   Injectable 5000 Unit(s) SubCutaneous every 8 hours  metoprolol succinate ER 50 milliGRAM(s) Oral every 12 hours  sevelamer carbonate 800 milliGRAM(s) Oral every 8 hours  sodium bicarbonate 650 milliGRAM(s) Oral two times a day  torsemide 20 milliGRAM(s) Oral daily    MEDICATIONS  (PRN):  acetaminophen    Suspension .. 650 milliGRAM(s) Oral every 6 hours PRN Mild Pain (1 - 3), Moderate Pain (4 - 6)      Labs:                        8.2    5.89  )-----------( 203      ( 06 Jul 2021 07:04 )             28.0     07-06    146<H>  |  112<H>  |  65<H>  ----------------------------<  78  3.9   |  21<L>  |  7.17<H>    Ca    8.3<L>      06 Jul 2021 07:04  Phos  5.2     07-06  Mg     2.4     07-06    TPro  6.3  /  Alb  3.1<L>  /  TBili  0.3  /  DBili  x   /  AST  6<L>  /  ALT  <5<L>  /  AlkPhos  105  07-06    PT/INR - ( 06 Jul 2021 12:24 )   PT: 14.3 sec;   INR: 1.20          PTT - ( 06 Jul 2021 12:24 )  PTT:40.0 sec    COVID-19 PCR: NotDetec (05 Jul 2021 12:15)  COVID-19 PCR: NotDetec (16 Mar 2021 16:54)      Radiology: Reviewed Internal Medicine Progress Note  Maureen Braga, PGY-1  Pager: [phone]    Hospital Course:  65 y/o M w/ PMH HTN, BPH, T cell Lymphoma (on chemo), COPD, Stage 5 CKD (pending starting HD), Afib not on Eliquis 2/2 recent hematoma, pulmonary HTN, CHF (EF was 40%, most recently 65-70%), L chest wall hematoma s/p evacuation (12/2020) and VATS (01/2021) who presented 7/5 with complaints of progressively worsening SOB, cough, abdominal discomfort/distention that started 7/2. IR drained 460cc from abd 7/6. u/s post-para showed abd ascites R>L.    OVERNIGHT EVENTS/INTERVAL HPI: IR drained 460cc from abd yesterday,  u/s post-para showed abd ascites R>L.. Patient examined at bedside. Patient states he has been less SOB and is feeling slightly better, but continues to c/o pain/tenderness at VATS procedure location as well as abd distension. Also notes continued dry cough. Patient continues to report good UOP. Patient reports he was supposed to get chemo today, but doesn't want to get it while here regardless.    REVIEW OF SYSTEMS:  CONSTITUTIONAL: No fatigue, fevers/chills, PO intake adequate  EYES/ENT: No throat pain.  RESPIRATORY: +cough, pain in left subcostal region. No SOB.  CARDIOVASCULAR: No chest pain or palpitations  GASTROINTESTINAL: +abd distension. No abdominal pain. No nausea, vomiting, hematemesis, diarrhea, constipation, melena or hematochezia.  GENITOURINARY: No dysuria, frequency, or hematuria  NEUROLOGICAL: No HA, dizziness, weakness  SKIN: No jaundice, bruising, or lesions  MSK: No arthralgias/joint pain  All other review of systems is negative unless indicated above.    OBJECTIVE:  Daily     Daily   Vital Signs Last 24 Hrs  T(C): 36.7 (07 Jul 2021 05:37), Max: 36.9 (06 Jul 2021 20:26)  T(F): 98.1 (07 Jul 2021 05:37), Max: 98.4 (06 Jul 2021 20:26)  HR: 70 (07 Jul 2021 05:37) (70 - 70)  BP: 124/75 (07 Jul 2021 05:37) (122/62 - 137/69)  BP(mean): --  RR: 18 (07 Jul 2021 05:37) (18 - 18)  SpO2: 91% (07 Jul 2021 05:37) (91% - 93%)    UOP: 0.76cc/hr over past 24hr    Physical Exam:  GENERAL: Awake, alert and interactive, no acute distress, appears comfortable  NEURO: A&Ox4, no focal deficits  HEENT: Normocephalic, atraumatic, no conjunctivitis or scleral icterus, oral mucosa moist  NECK: Supple  CARDIAC: Regular rate and rhythm, +S1/S2, I/VI systolic murmur  PULM: +crackles at bases. Breathing comfortably on RA.  ABDOMEN: +slightly less distended, ttp in R subcostal region. +bs  : Deferred  MSK: Range of motion grossly intact  SKIN: Warm and dry  VASC: 2+ peripheral pulses, no edema, L RUE fistula with thrill (patent)  Psych: Appropriate affect    Medications:  MEDICATIONS  (STANDING):  allopurinol 100 milliGRAM(s) Oral two times a day  aMIOdarone    Tablet 200 milliGRAM(s) Oral daily  amLODIPine   Tablet 10 milliGRAM(s) Oral daily  aspirin enteric coated 81 milliGRAM(s) Oral daily  calcitriol   Capsule 0.25 MICROGram(s) Oral daily  doxazosin 2 milliGRAM(s) Oral <User Schedule>  heparin   Injectable 5000 Unit(s) SubCutaneous every 8 hours  metoprolol succinate ER 50 milliGRAM(s) Oral every 12 hours  sevelamer carbonate 800 milliGRAM(s) Oral every 8 hours  sodium bicarbonate 650 milliGRAM(s) Oral two times a day  torsemide 20 milliGRAM(s) Oral daily    MEDICATIONS  (PRN):  acetaminophen    Suspension .. 650 milliGRAM(s) Oral every 6 hours PRN Mild Pain (1 - 3), Moderate Pain (4 - 6)      Labs:                        8.2    5.89  )-----------( 203      ( 06 Jul 2021 07:04 )             28.0     07-06    146<H>  |  112<H>  |  65<H>  ----------------------------<  78  3.9   |  21<L>  |  7.17<H>    Ca    8.3<L>      06 Jul 2021 07:04  Phos  5.2     07-06  Mg     2.4     07-06    TPro  6.3  /  Alb  3.1<L>  /  TBili  0.3  /  DBili  x   /  AST  6<L>  /  ALT  <5<L>  /  AlkPhos  105  07-06    PT/INR - ( 06 Jul 2021 12:24 )   PT: 14.3 sec;   INR: 1.20          PTT - ( 06 Jul 2021 12:24 )  PTT:40.0 sec    COVID-19 PCR: NotDetec (05 Jul 2021 12:15)  COVID-19 PCR: NotDetec (16 Mar 2021 16:54)      Radiology: Reviewed

## 2021-07-07 NOTE — PHYSICAL THERAPY INITIAL EVALUATION ADULT - ADDITIONAL COMMENTS
Pt lives in a private home with wife, no stairs to negotiate, reports he ambulates a flight of stairs to get care. Pt was independent prior to mobility.

## 2021-07-07 NOTE — PROGRESS NOTE ADULT - ASSESSMENT
67yo M w/ a PMHx of known recurrent b/l pleural effusions, T-cell Lymphoma (recently restarted chemo), CKD5 (not on dialysis but fistula in LUE), HFpEF (EF 55-60% in May 2021), COPD (not on home O2), HTN, Afib (not on Eliquis), pulmonary HTN, BPH, and gout, presented w/ SOB and increasing abdominal distention for 3 days. 67yo M w/ a PMHx of known recurrent b/l pleural effusions, T-cell Lymphoma (recently restarted chemo), CKD5 (not on dialysis but fistula in LUE), HFpEF (EF 55-60% in May 2021), COPD (not on home O2), HTN, Afib (not on Eliquis), pulmonary HTN, BPH, and gout, presented w/ ascites now s/p IR drainage 460cc on 7/6 with significant residual ascites on abd u/s post-para.

## 2021-07-07 NOTE — PROGRESS NOTE ADULT - PROBLEM SELECTOR PLAN 4
HFpEF; patient follows with cardiologist Dr. Ventura outpatient EF 55-60% in May 2021.   - continue to monitor BP and volume status  - c/w home torsemide 20mg PO qd  - c/w home toprol 50mg BID  -BNP elevated on admission to 10,000 however this is baseline   -troponins elevated to 0.07 on admission, downtrending (now 0.06)    Pt normotensive on exam. On home amlodipine 10mg qd  - c/w home amlodipine 10mg qd    #Hx of pulmonary htn  Likely 2/2 COPD. No evidence of cor pulmonale  - no acute intervention at this time HFpEF; patient follows with cardiologist Dr. Ventura outpatient EF 55-60% in May 2021.   - continue to monitor BP and volume status  - c/w home torsemide 20mg PO qd  - c/w home toprol 50mg BID  -BNP elevated on admission to 20,000 however this is baseline   -troponins elevated to 0.07 on admission, downtrending (now 0.06)    #HTN  - c/w home amlodipine 10mg qd

## 2021-07-07 NOTE — PHYSICAL THERAPY INITIAL EVALUATION ADULT - IMPAIRMENTS FOUND, PT EVAL
aerobic capacity/endurance/ergonomics and body mechanics/gait, locomotion, and balance/gross motor/muscle strength/neuromotor development and sensory integration/posture/ROM

## 2021-07-07 NOTE — PROGRESS NOTE ADULT - PROBLEM SELECTOR PLAN 3
Pt w/ hx of CKD. Follows outpatient w/ Dr. Denton. Has LUE AVF but has not started HD yet  - consult Nephrology in am as Cr has significantly worsened since March (Cr 7.04 on this admission, up from Cr 5.53 in March)  - c/w home sevelamer 800 TID  -strict I&Os Pt w/ hx of CKD. Follows outpatient w/ Dr. Denton. Has LUE AVF but has not started HD yet. Cr has significantly worsened since March (Cr 7.04 on this admission, up from Cr 5.53 in March)  - c/w home sevelamer 800 TID  -strict I&Os - good UOP  -will f/u with Dr. Denton as o/p

## 2021-07-07 NOTE — PHYSICAL THERAPY INITIAL EVALUATION ADULT - PERTINENT HX OF CURRENT PROBLEM, REHAB EVAL
65yo M w/ a PMHx of known recurrent b/l pleural effusions, T-cell Lymphoma (recently restarted chemo), CKD5 (not on dialysis but fistula in LUE), HFpEF (EF 55-60% in May 2021), COPD (not on home O2), HTN, Afib (not on Eliquis), pulmonary HTN, BPH, and gout, presented w/ SOB and increasing abdominal distention for 3 days.

## 2021-07-07 NOTE — PROGRESS NOTE ADULT - PROBLEM SELECTOR PLAN 1
Patient with progressively worsening SOB x 3 days Reports dry cough with worsening abdominal distention. CT C/A/P showing large R pleural effusion, bibasilar atelectasis, cardiomegaly with venous congestion, cirrhosis with worsening ascites.   -SOB Most likely multifactorial, 2/2 to pleural effusion vs venous congestion   -low suspicion for PE as wells score 1  -less likely diastolic HF exacerbation as no clinical signs of volume overload (BNP at baseline 20,000, today at baseline)  -less likely COPD exacerbation as patient not wheezing and without productive cough   NOT AECOPD  -abd distention likely 2/2 to worsening ascites   -consulted CT surgery as patient was previously surgical patient under their service in January 2021; no CT surgery intervention at this time  -CXR this AM stable, off of O2    # Pleural effusion   /p VATS in January 2021 w/ Dr. Harmon for hematoma. Hx of known recurring b/l pleural effusions. Follows w/ Dr. Johnson. CT imaging in ED shows large right pleural effusion and small loculated left pleural effusion. S Slight increase in size of right effusion compared to previous imaging. Likely 2/2 malignancy.  -seen on CT in 2018.  -consider dx'c tap    #atelectasis  - bibasilar atelectasis on admission CT chest   - incentive spirometry SOB Most likely multifactorial, 2/2 to pleural effusion vs venous congestion   s/p VATS in January 2021 w/ Dr. Harmon for hematoma. Hx of known recurring b/l pleural effusions. Follows w/ Dr. Johnson. CT imaging in ED shows large right pleural effusion and small loculated left pleural effusion. S Slight increase in size of right effusion compared to previous imaging. Likely 2/2 malignancy.  -BNP at baseline 20,000  -consulted CT surgery as patient was previously surgical patient under their service in January 2021; no CT surgery intervention at this time  - incentive spirometry for atelectasis seen on CT SOB Most likely multifactorial, 2/2 to pleural effusion vs venous congestion   s/p VATS in January 2021 w/ Dr. Harmon for hematoma. Hx of known recurring b/l pleural effusions. Follows w/ Dr. Johnson. CT imaging in ED shows large right pleural effusion and small loculated left pleural effusion. S Slight increase in size of right effusion compared to previous imaging. Likely 2/2 malignancy.  -BNP at baseline 20,000  -consulted CT surgery as patient was previously surgical patient under their service in January 2021; no CT surgery intervention at this time  - incentive spirometry for atelectasis seen on CT  - assess O2 status while ambulating

## 2021-07-07 NOTE — PROGRESS NOTE ADULT - PROBLEM SELECTOR PLAN 6
patient with history of T cell lymphoma  -on chemo with oncologist at [facility]  - will f/u with oncologist in am    #Anemia   -patient with hbg 9 on admission (baseline 8.8)  -will continue to monitor  -previous iron studies done 1/2021 consistent with anemia of chronic disease  -TSH, folate, B12 - all wnl patient with history of T cell lymphoma  -on chemo with oncologist at [facility]  - F/u with Dr. Dillard o/p    #Anemia   -patient with hbg 9 on admission (baseline 8.8)  -will continue to monitor  -previous iron studies done 1/2021 consistent with anemia of chronic disease  -TSH, folate, B12 - all wnl patient with history of T cell lymphoma  -on chemo with oncologist at [facility]  - F/u with Dr. Dimas o/p    #Anemia   -patient with hbg 9 on admission (baseline 8.8)  -will continue to monitor  -previous iron studies done 1/2021 consistent with anemia of chronic disease  -TSH, folate, B12 - all wnl

## 2021-07-07 NOTE — PROGRESS NOTE ADULT - PROBLEM SELECTOR PLAN 2
CT imaging in ED reports evidence of cirrhosis w/ increasing ascites. Pt w/ no documented hx of cirrhosis   -Hepatitis labs begative  -f/u abdominal US  -possibly due to malignant ascites as patient being treated for lymphoma  -low suspicion for SBP as patient is afebrile and without abdominal pain  -diagnostic paracentesis ordered.  Done an dtolerated well CT imaging in ED reports evidence of cirrhosis w/ increasing ascites. Pt w/ no documented hx of cirrhosis. s/p IR drainage 7/6 with significant ascites still seen on abd u/s post-para  -Hepatitis labs begative  -possibly due to malignant ascites as patient being treated for lymphoma CT imaging in ED reports evidence of cirrhosis w/ increasing ascites. Pt w/ no documented hx of cirrhosis. s/p IR drainage 7/6 with significant ascites still seen on abd u/s post-para  -Hepatitis labs negative  -possibly due to malignant ascites as patient being treated for lymphoma

## 2021-07-08 ENCOUNTER — APPOINTMENT (OUTPATIENT)
Dept: PULMONOLOGY | Facility: CLINIC | Age: 66
End: 2021-07-08

## 2021-07-08 LAB
ANION GAP SERPL CALC-SCNC: 15 MMOL/L — SIGNIFICANT CHANGE UP (ref 5–17)
BUN SERPL-MCNC: 59 MG/DL — HIGH (ref 7–23)
CALCIUM SERPL-MCNC: 8.4 MG/DL — SIGNIFICANT CHANGE UP (ref 8.4–10.5)
CHLORIDE SERPL-SCNC: 110 MMOL/L — HIGH (ref 96–108)
CO2 SERPL-SCNC: 21 MMOL/L — LOW (ref 22–31)
CREAT SERPL-MCNC: 6.57 MG/DL — HIGH (ref 0.5–1.3)
GLUCOSE SERPL-MCNC: 70 MG/DL — SIGNIFICANT CHANGE UP (ref 70–99)
HCT VFR BLD CALC: 26.4 % — LOW (ref 39–50)
HGB BLD-MCNC: 8 G/DL — LOW (ref 13–17)
MAGNESIUM SERPL-MCNC: 2.4 MG/DL — SIGNIFICANT CHANGE UP (ref 1.6–2.6)
MCHC RBC-ENTMCNC: 26.8 PG — LOW (ref 27–34)
MCHC RBC-ENTMCNC: 30.3 GM/DL — LOW (ref 32–36)
MCV RBC AUTO: 88.6 FL — SIGNIFICANT CHANGE UP (ref 80–100)
NRBC # BLD: 0 /100 WBCS — SIGNIFICANT CHANGE UP (ref 0–0)
PHOSPHATE SERPL-MCNC: 4.8 MG/DL — HIGH (ref 2.5–4.5)
PLATELET # BLD AUTO: 219 K/UL — SIGNIFICANT CHANGE UP (ref 150–400)
POTASSIUM SERPL-MCNC: 4.1 MMOL/L — SIGNIFICANT CHANGE UP (ref 3.5–5.3)
POTASSIUM SERPL-SCNC: 4.1 MMOL/L — SIGNIFICANT CHANGE UP (ref 3.5–5.3)
RBC # BLD: 2.98 M/UL — LOW (ref 4.2–5.8)
RBC # FLD: 20.2 % — HIGH (ref 10.3–14.5)
SODIUM SERPL-SCNC: 146 MMOL/L — HIGH (ref 135–145)
WBC # BLD: 4.73 K/UL — SIGNIFICANT CHANGE UP (ref 3.8–10.5)
WBC # FLD AUTO: 4.73 K/UL — SIGNIFICANT CHANGE UP (ref 3.8–10.5)

## 2021-07-08 PROCEDURE — 99223 1ST HOSP IP/OBS HIGH 75: CPT | Mod: 25

## 2021-07-08 PROCEDURE — 90935 HEMODIALYSIS ONE EVALUATION: CPT

## 2021-07-08 PROCEDURE — 99232 SBSQ HOSP IP/OBS MODERATE 35: CPT | Mod: GC

## 2021-07-08 PROCEDURE — 71045 X-RAY EXAM CHEST 1 VIEW: CPT | Mod: 26

## 2021-07-08 RX ORDER — METOPROLOL TARTRATE 50 MG
1 TABLET ORAL
Qty: 0 | Refills: 0 | DISCHARGE

## 2021-07-08 RX ORDER — METOPROLOL TARTRATE 50 MG
50 TABLET ORAL DAILY
Refills: 0 | Status: DISCONTINUED | OUTPATIENT
Start: 2021-07-08 | End: 2021-07-08

## 2021-07-08 RX ORDER — METOPROLOL TARTRATE 50 MG
50 TABLET ORAL DAILY
Refills: 0 | Status: DISCONTINUED | OUTPATIENT
Start: 2021-07-09 | End: 2021-07-10

## 2021-07-08 RX ADMIN — Medication 50 MILLIGRAM(S): at 06:57

## 2021-07-08 RX ADMIN — AMIODARONE HYDROCHLORIDE 200 MILLIGRAM(S): 400 TABLET ORAL at 06:57

## 2021-07-08 RX ADMIN — Medication 20 MILLIGRAM(S): at 18:43

## 2021-07-08 RX ADMIN — Medication 2 MILLIGRAM(S): at 09:59

## 2021-07-08 RX ADMIN — HEPARIN SODIUM 5000 UNIT(S): 5000 INJECTION INTRAVENOUS; SUBCUTANEOUS at 21:59

## 2021-07-08 RX ADMIN — Medication 650 MILLIGRAM(S): at 06:58

## 2021-07-08 RX ADMIN — Medication 100 MILLIGRAM(S): at 18:41

## 2021-07-08 RX ADMIN — AMLODIPINE BESYLATE 10 MILLIGRAM(S): 2.5 TABLET ORAL at 06:58

## 2021-07-08 RX ADMIN — Medication 81 MILLIGRAM(S): at 18:42

## 2021-07-08 RX ADMIN — SEVELAMER CARBONATE 800 MILLIGRAM(S): 2400 POWDER, FOR SUSPENSION ORAL at 18:42

## 2021-07-08 RX ADMIN — SEVELAMER CARBONATE 800 MILLIGRAM(S): 2400 POWDER, FOR SUSPENSION ORAL at 21:59

## 2021-07-08 RX ADMIN — CALCITRIOL 0.25 MICROGRAM(S): 0.5 CAPSULE ORAL at 18:42

## 2021-07-08 RX ADMIN — HEPARIN SODIUM 5000 UNIT(S): 5000 INJECTION INTRAVENOUS; SUBCUTANEOUS at 06:57

## 2021-07-08 RX ADMIN — Medication 20 MILLIGRAM(S): at 06:58

## 2021-07-08 RX ADMIN — Medication 650 MILLIGRAM(S): at 18:44

## 2021-07-08 RX ADMIN — Medication 2 MILLIGRAM(S): at 22:00

## 2021-07-08 RX ADMIN — Medication 100 MILLIGRAM(S): at 07:08

## 2021-07-08 RX ADMIN — SEVELAMER CARBONATE 800 MILLIGRAM(S): 2400 POWDER, FOR SUSPENSION ORAL at 06:57

## 2021-07-08 NOTE — PROGRESS NOTE ADULT - SUBJECTIVE AND OBJECTIVE BOX
Internal Medicine Progress Note  Maureen Omi, PGY-1  Pager: [phone]    ******INCOMPLETE******    Patient is a 66y old  Male who presents with a chief complaint of Shortness of breath, abdominal distention, cough (07 Jul 2021 08:20)    OVERNIGHT EVENTS/INTERVAL HPI:    REVIEW OF SYSTEMS:  CONSTITUTIONAL: No fatigue, fevers/chills, no weight loss/gain, PO intake adequate  EYES/ENT: No visual changes, hearing changes, throat pain, rhinorrhea, cough, or congestion  NECK: No pain or stiffness, no LAD  RESPIRATORY: No SOB, wheezing, hemoptysis  CARDIOVASCULAR: No chest pain or palpitations  GASTROINTESTINAL: No abdominal pain. No nausea, vomiting, hematemesis, diarrhea, constipation, melena or hematochezia.  GENITOURINARY: No dysuria, frequency, or hematuria  NEUROLOGICAL: No numbness, weakness, vertigo, HA, dizziness  SKIN: No itching, burning, rashes, jaundice, bruising, or lesions  MSK: No arthralgias/joint pain, no back pain  All other review of systems is negative unless indicated above.    OBJECTIVE:  Daily     Daily   Vital Signs Last 24 Hrs  T(C): 36.7 (08 Jul 2021 06:04), Max: 36.7 (08 Jul 2021 06:04)  T(F): 98.1 (08 Jul 2021 06:04), Max: 98.1 (08 Jul 2021 06:04)  HR: 64 (08 Jul 2021 06:04) (62 - 68)  BP: 126/75 (08 Jul 2021 06:04) (117/62 - 137/65)  BP(mean): --  RR: 16 (08 Jul 2021 06:04) (16 - 18)  SpO2: 98% (08 Jul 2021 06:04) (85% - 98%)  I&O's Detail    07 Jul 2021 07:01  -  08 Jul 2021 07:00  --------------------------------------------------------  IN:  Total IN: 0 mL    OUT:    Voided (mL): 250 mL  Total OUT: 250 mL    Total NET: -250 mL        Physical Exam:  GENERAL: Awake, alert and interactive, no acute distress, appears comfortable  NEURO: A&Ox4, no focal deficits, 5/5 strength in all ext, reflexes 2+ throughout  HEENT: Normocephalic, atraumatic, CN2-12 intact, no conjunctivitis or scleral icterus, oral mucosa moist, no oral lesions noted  NECK: Supple, no JVD, no LAD, thyroid not palpable  CARDIAC: Regular rate and rhythm, +S1/S2, no murmurs/rubs/gallops  PULM: Breathing comfortably on RA, clear to auscultation bilaterally, no wheezes/rales/rhonchi, no inspiratory stridor  ABDOMEN: Soft, nontender, nondistended, +bs, no hepatosplenomegaly, no rebound tenderness or fluid wave, no CVA tenderness  : Deferred  MSK: Range of motion grossly intact, no back tenderness  SKIN: Warm and dry, no rashes, no lesions  VASC: 2+ peripheral pulses, no edema  Psych: Appropriate affect    Medications:  MEDICATIONS  (STANDING):  allopurinol 100 milliGRAM(s) Oral two times a day  aMIOdarone    Tablet 200 milliGRAM(s) Oral daily  amLODIPine   Tablet 10 milliGRAM(s) Oral daily  aspirin enteric coated 81 milliGRAM(s) Oral daily  calcitriol   Capsule 0.25 MICROGram(s) Oral daily  doxazosin 2 milliGRAM(s) Oral <User Schedule>  heparin   Injectable 5000 Unit(s) SubCutaneous every 8 hours  sevelamer carbonate 800 milliGRAM(s) Oral every 8 hours  sodium bicarbonate 650 milliGRAM(s) Oral two times a day  torsemide 20 milliGRAM(s) Oral two times a day    MEDICATIONS  (PRN):  acetaminophen    Suspension .. 650 milliGRAM(s) Oral every 6 hours PRN Mild Pain (1 - 3), Moderate Pain (4 - 6)      Labs:                        8.0    4.73  )-----------( 219      ( 08 Jul 2021 07:45 )             26.4     07-08    146<H>  |  110<H>  |  59<H>  ----------------------------<  70  4.1   |  21<L>  |  6.57<H>    Ca    8.4      08 Jul 2021 07:45  Phos  4.8     07-08  Mg     2.4     07-08    TPro  6.6  /  Alb  3.5  /  TBili  0.3  /  DBili  x   /  AST  6<L>  /  ALT  <5<L>  /  AlkPhos  112  07-07    PT/INR - ( 06 Jul 2021 12:24 )   PT: 14.3 sec;   INR: 1.20          PTT - ( 06 Jul 2021 12:24 )  PTT:40.0 sec    COVID-19 PCR: NotDetec (05 Jul 2021 12:15)  COVID-19 PCR: NotDetec (16 Mar 2021 16:54)      Radiology: Reviewed Internal Medicine Progress Note  Maureen Braga, PGY-1  Pager: [phone]    Hospital Course:  67 y/o M w/ PMH HTN, BPH, T cell Lymphoma (on chemo), COPD, Stage 5 CKD (pending starting HD), Afib not on Eliquis 2/2 recent hematoma, pulmonary HTN, CHF (EF was 40%, most recently 65-70%), L chest wall hematoma s/p evacuation (12/2020) and VATS (01/2021) who presented 7/5 with complaints of progressively worsening SOB, cough, abdominal discomfort/distention that started 7/2. IR drained 460cc from abd 7/6. u/s post-para showed abd ascites R>L. Now requiring O2 on ambulation.    OVERNIGHT EVENTS/INTERVAL HPI: Torsemide increased from 20mg qd to 20mg BID. Patient examined at bedside. Continues to c/o pain/tenderness at VATS procedure location as well as abd distension. Also notes continued dry cough and epigastric tenderness. Patient continues to report good UOP. Patient is aware of his new O2 needs while ambulating and has a pulse ox at home.    REVIEW OF SYSTEMS:  CONSTITUTIONAL: No fatigue, fevers/chills, PO intake adequate  EYES/ENT: No throat pain.  RESPIRATORY: +cough, pain in left subcostal region. No SOB at rest.  CARDIOVASCULAR: No chest pain or palpitations  GASTROINTESTINAL: +abd distension, epigastric tenderness. No abdominal pain. No nausea, vomiting, hematemesis, diarrhea, constipation, melena or hematochezia.  GENITOURINARY: No dysuria, frequency, or hematuria  NEUROLOGICAL: No HA, dizziness, weakness  SKIN: No jaundice, bruising, or lesions  MSK: No arthralgias/joint pain  All other review of systems is negative unless indicated above.    OBJECTIVE:  Vital Signs Last 24 Hrs  T(C): 36.7 (08 Jul 2021 06:04), Max: 36.7 (08 Jul 2021 06:04)  T(F): 98.1 (08 Jul 2021 06:04), Max: 98.1 (08 Jul 2021 06:04)  HR: 64 (08 Jul 2021 06:04) (62 - 68)  BP: 126/75 (08 Jul 2021 06:04) (117/62 - 137/65)  BP(mean): --  RR: 16 (08 Jul 2021 06:04) (16 - 18)  SpO2: 98% (08 Jul 2021 06:04) (85% - 98%)  I&O's Detail    07 Jul 2021 07:01  -  08 Jul 2021 07:00  --------------------------------------------------------  IN:  Total IN: 0 mL    OUT:    Voided (mL): 250 mL  Total OUT: 250 mL    Total NET: -250 mL    Physical Exam:  GENERAL: Awake, alert and interactive, no acute distress, appears comfortable  NEURO: A&Ox4, no focal deficits  HEENT: Normocephalic, atraumatic, no conjunctivitis or scleral icterus, oral mucosa moist  NECK: Supple  CARDIAC: Regular rate and rhythm, +S1/S2, I/VI systolic murmur  PULM: +crackles at bases R>L. Breathing comfortably on RA.  ABDOMEN: +distended, ttp in R subcostal and epigastric region. +bs  : Deferred  MSK: Range of motion grossly intact  SKIN: Warm and dry  VASC: 2+ peripheral pulses, no edema, L RUE fistula with thrill (patent)  Psych: Appropriate affect    Medications:  MEDICATIONS  (STANDING):  allopurinol 100 milliGRAM(s) Oral two times a day  aMIOdarone    Tablet 200 milliGRAM(s) Oral daily  amLODIPine   Tablet 10 milliGRAM(s) Oral daily  aspirin enteric coated 81 milliGRAM(s) Oral daily  calcitriol   Capsule 0.25 MICROGram(s) Oral daily  doxazosin 2 milliGRAM(s) Oral <User Schedule>  heparin   Injectable 5000 Unit(s) SubCutaneous every 8 hours  sevelamer carbonate 800 milliGRAM(s) Oral every 8 hours  sodium bicarbonate 650 milliGRAM(s) Oral two times a day  torsemide 20 milliGRAM(s) Oral two times a day    MEDICATIONS  (PRN):  acetaminophen    Suspension .. 650 milliGRAM(s) Oral every 6 hours PRN Mild Pain (1 - 3), Moderate Pain (4 - 6)      Labs:                        8.0    4.73  )-----------( 219      ( 08 Jul 2021 07:45 )             26.4     07-08    146<H>  |  110<H>  |  59<H>  ----------------------------<  70  4.1   |  21<L>  |  6.57<H>    Ca    8.4      08 Jul 2021 07:45  Phos  4.8     07-08  Mg     2.4     07-08    TPro  6.6  /  Alb  3.5  /  TBili  0.3  /  DBili  x   /  AST  6<L>  /  ALT  <5<L>  /  AlkPhos  112  07-07    PT/INR - ( 06 Jul 2021 12:24 )   PT: 14.3 sec;   INR: 1.20          PTT - ( 06 Jul 2021 12:24 )  PTT:40.0 sec    COVID-19 PCR: NotDetec (05 Jul 2021 12:15)  COVID-19 PCR: NotDetec (16 Mar 2021 16:54)      Radiology: Reviewed

## 2021-07-08 NOTE — PROGRESS NOTE ADULT - PROBLEM SELECTOR PLAN 2
CT imaging in ED reports evidence of cirrhosis w/ increasing ascites. Pt w/ no documented hx of cirrhosis. s/p IR drainage 7/6 with significant ascites still seen on abd u/s post-para  -Hepatitis labs negative  -possibly due to malignant ascites as patient being treated for lymphoma

## 2021-07-08 NOTE — PROGRESS NOTE ADULT - SUBJECTIVE AND OBJECTIVE BOX
66y old Male who presents with a chief complaint of SOB with abdominal distension. Pt was admit on , and has known history of lymphoma. Pt was found to have significant fluid in his abdomen and underwent paracentesis. Primary team believes patient's fluid status is not improving and would like to initiate HD for which renal was consulted. Pt was already aware that this may happen, has LUE AVF in place in preparation for HD. Today states he was having SOB which prompted him to come to the ED and has had poor appetite while has been at home and has noticed his abdomen has become significantly swollen. He is  hoping that he will feel better with the assistance of dialysis to help improve his fluid status.     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 Patient reports saturday woke up with some trouble breathing and stomach started to swell. By  () SOB got so bad that he couldn't walk without stopping to rest and distention got so bad that he had trouble eating. This has never happened to him before. Reports LBM this morning, normal. Also endorses intermittent left chest tightness since Saturday. He denies headache, vision changes, fever, chills, sore throat, chest pain, palpitations, nausea, vomiting, diarrhea, leg pain or swelling. Reports improvement in breathing in ED on 2L NC.     In the ED:  ED Vitals: Afebrile, HR 66, 140/74, RR 22, SPO2 99 Room air---96% on 2L NC (unclear why placed on nasal canula)  ED Labs: Hgb 9 (unknown baseline), Na 146, Creatinine 7.04 (unknown baseline), trop .07, BNP 20,286  ED Imaging:  #CT Chest/Abdomen: Large R pleural effusion, bibasilar atelectasism, cardiomegaly w/ venous congestion, cirrhosis w/ increasing ascites    #EKst degree AV block, SRR=001, No ST or T w    ED Course: Received Tylenol 1g    Consults: CT surgery        (2021 17:43)   Nephrology consulted for dialysis.     PAST MEDICAL & SURGICAL HISTORY:  HTN (hypertension)    Atrial fibrillation    CKD (chronic kidney disease)    Lymphoma  t cell; remission since 2020    CHF, chronic    H/O pulmonary hypertension    Gout    BPH (benign prostatic hyperplasia)    COPD, mild    Mitral regurgitation    History of cholecystectomy    Elective surgery  rectal surgery          Allergies:  No Known Allergies      Home Medications:   acetaminophen    Suspension .. 650 milliGRAM(s) Oral every 6 hours PRN  allopurinol 100 milliGRAM(s) Oral two times a day  aMIOdarone    Tablet 200 milliGRAM(s) Oral daily  amLODIPine   Tablet 10 milliGRAM(s) Oral daily  aspirin enteric coated 81 milliGRAM(s) Oral daily  calcitriol   Capsule 0.25 MICROGram(s) Oral daily  doxazosin 2 milliGRAM(s) Oral <User Schedule>  heparin   Injectable 5000 Unit(s) SubCutaneous every 8 hours  sevelamer carbonate 800 milliGRAM(s) Oral every 8 hours  sodium bicarbonate 650 milliGRAM(s) Oral two times a day  torsemide 20 milliGRAM(s) Oral two times a day      Hospital Medications:   MEDICATIONS  (STANDING):  allopurinol 100 milliGRAM(s) Oral two times a day  aMIOdarone    Tablet 200 milliGRAM(s) Oral daily  amLODIPine   Tablet 10 milliGRAM(s) Oral daily  aspirin enteric coated 81 milliGRAM(s) Oral daily  calcitriol   Capsule 0.25 MICROGram(s) Oral daily  doxazosin 2 milliGRAM(s) Oral <User Schedule>  heparin   Injectable 5000 Unit(s) SubCutaneous every 8 hours  sevelamer carbonate 800 milliGRAM(s) Oral every 8 hours  sodium bicarbonate 650 milliGRAM(s) Oral two times a day  torsemide 20 milliGRAM(s) Oral two times a day      SOCIAL HISTORY:  Denies ETOh, Smoking,     Family History:  FAMILY HISTORY:  No pertinent family history in first degree relatives          VITALS:  T(F): 97.3 (21 @ 12:40), Max: 98.1 (21 @ 06:04)  HR: 65 (21 @ 12:40)  BP: 122/70 (21 @ 12:40)  RR: 18 (21 @ 12:40)  SpO2: 95% (21 @ 12:40)  Wt(kg): --     @ 07:01  -   @ 07:00  --------------------------------------------------------  IN: 0 mL / OUT: 250 mL / NET: -250 mL        CAPILLARY BLOOD GLUCOSE          Review of Systems:  10 point ROS negative except as per HPI    PHYSICAL EXAM:  GENERAL: Alert, awake, oriented x3 on RA   HEENT: neck supple, no JVP  CHEST/LUNG: Decreased b/l breath sounds  HEART: Regular rate and rhythm, no murmur, no gallops, no rub   ABDOMEN: Soft, nontender, distended. slight pain with rebound  EXTREMITIES: no pedal edema  Neurology: AAOx3, no asterixis   ACCESS: LUE AVF w/bruit and thrill     LABS:      146<H>  |  110<H>  |  59<H>  ----------------------------<  70  4.1   |  21<L>  |  6.57<H>    Ca    8.4      2021 07:45  Phos  4.8       Mg     2.4         TPro  6.6  /  Alb  3.5  /  TBili  0.3  /  DBili      /  AST  6<L>  /  ALT  <5<L>  /  AlkPhos  112      Creatinine Trend: 6.57 <--, 6.51 <--, 7.17 <--, 7.20 <--, 7.04 <--                        8.0    4.73  )-----------( 219      ( 2021 07:45 )             26.4     Urine Studies:

## 2021-07-08 NOTE — PROGRESS NOTE ADULT - PROBLEM SELECTOR PLAN 5
On home amiodarone 200mg qd. Previously on eliquis 2.5mg BID but stopped in June after hematoma. Patient was scheduled to have Watchman procedure last week but had to postpone due to cough and chemo.   - c/w home amiodarone 200mg qd  - f/u outpatient for Watchman procedure (per patient was supposed to get procedure 7/4 but postponed due to SOB and cough)  -CHADS-VASc score 3    #First degree AV block, prolonged QTc  -avoid meds that increase the QTc  -f/u with Dr. Ventura outpatient

## 2021-07-08 NOTE — PROGRESS NOTE ADULT - PROBLEM SELECTOR PLAN 4
HFpEF; patient follows with cardiologist Dr. Ventura outpatient EF 55-60% in May 2021.   - continue to monitor BP and volume status  - c/w home torsemide 20mg PO qd  - c/w home toprol 50mg BID  -BNP elevated on admission to 20,000 however this is baseline   -troponins elevated to 0.07 on admission, downtrending (now 0.06)    #HTN  - c/w home amlodipine 10mg qd HFpEF; patient follows with cardiologist Dr. Ventura outpatient EF 55-60% in May 2021.   - continue to monitor BP and volume status  - c/w home torsemide 20mg PO BID  - c/w home toprol 50mg qd  -BNP elevated on admission to 20,000 however this is baseline   -troponins elevated to 0.07 on admission, downtrended (was 0.06 7/7)    #HTN  - c/w home amlodipine 10mg qd

## 2021-07-08 NOTE — CONSULT NOTE ADULT - ASSESSMENT
66M Stage 5 CKD now newly initiated HD as of 7/8, cell Lymphoma (on chemo) p/w complaints of progressively worsening SOB, cough, abdominal discomfort/distention in the setting of ascites.    Assessment/Plan:     #CKD Stage 5  #New HD initiation   made aware to help assign pt to [address] [place] [address]  Usual Rx - TBD  EDW - TBD  First HD - 7/8  Next Hd - 7/9  -electrolytes at goal   -Hypervolemic UF w/HD goal is to remove 1.5L to help with volume optimization  -Hepatitis serologies ordered  -Quantiferon gold ordered.    #HTN   BP at goal   -On amlodipine and Cardura  -Would advise to hold on HD days to allow for better volume removal  -next HD session on 7/9    #access   LUE AVF functional     #anemia  Hgb at 8  -Transfusion as per primary team  -Would obtain iron panel to see if would benefit from IV iron therapy.   -Pt has active malignancy, would defer on administering EPO at this time    #renal bone disease   Ca ~8.5  Phos ~4.8   PTH pending   VitD25/1,25 pending     Patient was seen and evaluated on dialysis.     Dialyzer: Optiflux [id number]  QB: 250 mL/min  QD: 500 mL/min  K bath: 3  Goal UF: 1.5L  Duration: 150 min    Patient is tolerating the procedure well. Goal today to remove 1.5L and to perform slow HD to allow patient to transition smoothly into consistent HD.  Continue full hemodialysis treatment as prescribed.      Thank you for the opportunity to participate in the care of your patient. The nephrology service remains available to assist with any questions or concerns. Please feel free to reach us by paging the on-call nephrology fellow for urgent issues or as below.     Janna Deleon D.O.  PGY-4, Nephrology Fellow   C: [phone]   P: [phone]

## 2021-07-08 NOTE — PROGRESS NOTE ADULT - ASSESSMENT
66M Stage 5 CKD now newly initiated HD as of 7/8, cell Lymphoma (on chemo) p/w complaints of progressively worsening SOB, cough, abdominal discomfort/distention in the setting of ascites.    Assessment/Plan:     #CKD Stage 5  #New HD initiation   made aware to help assign pt to [address] [place] [address]  Usual Rx - TBD  EDW - TBD  First HD - 7/8  Next Hd - 7/9  -electrolytes at goal   -Hypervolemic UF w/HD goal is to remove 1.5L to help with volume optimization  -Hepatitis serologies ordered  -Quantiferon gold ordered.    #HTN   BP at goal   -On amlodipine and Cardura  -Would advise to hold on HD days to allow for better volume removal  -next HD session on 7/9    #access   LUE AVF functional     #anemia  Hgb at 8  -Transfusion as per primary team  -Would obtain iron panel to see if would benefit from IV iron therapy.   -Pt has active malignancy, would defer on administering EPO at this time    #renal bone disease   Ca ~8.5  Phos ~4.8   PTH pending   VitD25/1,25 pending     Patient was seen and evaluated on dialysis.     Dialyzer: Optiflux [id number]  QB: 250 mL/min  QD: 500 mL/min  K bath: 3  Goal UF: 1.5L  Duration: 150 min    Patient is tolerating the procedure well. Goal today to remove 1.5L and to perform slow HD to allow patient to transition smoothly into consistent HD.  Continue full hemodialysis treatment as prescribed.      Thank you for the opportunity to participate in the care of your patient. The nephrology service remains available to assist with any questions or concerns. Please feel free to reach us by paging the on-call nephrology fellow for urgent issues or as below.     Janna Deleon D.O.  PGY-4, Nephrology Fellow   C: [phone]   P: [phone]

## 2021-07-08 NOTE — PROGRESS NOTE ADULT - PROBLEM SELECTOR PLAN 3
Pt w/ hx of CKD. Follows outpatient w/ Dr. Denton. Has LUE AVF but has not started HD yet. Cr has significantly worsened since March (Cr 7.04 on this admission, up from Cr 5.53 in March)  - c/w home sevelamer 800 TID  -strict I&Os - good UOP  -will f/u with Dr. Denton as o/p Pt w/ hx of CKD. Follows outpatient w/ Dr. Denton. Has LUE AVF but has not started HD yet. Cr has significantly worsened since March (Cr 7.04 on this admission, up from Cr 5.53 in March)  - Per nephro, hemodialysis today, tomorrow, and Saturday. Will continue Monday/Tues as o/p  - c/w home sevelamer 800 TID  -strict I&Os  -will f/u with Dr. Denton as o/p

## 2021-07-08 NOTE — PROGRESS NOTE ADULT - PROBLEM SELECTOR PLAN 6
patient with history of T cell lymphoma  -on chemo with oncologist at [facility]  - F/u with Dr. Dimas o/p    #Anemia   -patient with hbg 9 on admission (baseline 8.8)  -will continue to monitor  -previous iron studies done 1/2021 consistent with anemia of chronic disease  -TSH, folate, B12 - all wnl

## 2021-07-08 NOTE — CONSULT NOTE ADULT - ATTENDING COMMENTS
Patient's SOB appears to be related to pleuritic CP that is reproducible by palpation around tenth rib anteriorly on left, possibly costochondritis. Will try tylenol for now. He is 98% sat on RA. The troponin is slightly elevated, maybe related to renal disease. His left effusion is slightly worse but no increase in intersitial edema and he has some pelvic fluid. Renal should evaluate for need to institute HD but he does not seem severely fluid overloaded and would not actively diurese right now. At this point no need for telemetry, would treat his pain as above.
Case discussed with thoracic surgery team. Agree with documentation above with following additions.    Patient is a very pleasant 66 year old male with multiple medical comorbidities and is well known to me after undergoing a left VATS robotic assisted decortication on 1/22/21. Thoracic surgery was consulted for management recommendations regarding his right pleural effusion.     This effusion is new since February and appears stable or slightly enlarged since the 6/22/21. The effusion appears simple. This is likely an  transudative effusion. No concerns in the left pleural space, which was the surgical side.     We will follow along and follow up the ongoing work up. Pigtail placement vs. thoracentesis can be considered when underlying cause is determined.     No surgical intervention required at this time.     Orlando Harmon MD  Attending Thoracic Surgeon
Pt known to renal service; admission and hospital  events noted.  remains volume overloaded; some uremic symtpoms- amenable to starting HD  has mature AVF left arm    seen and evaluated while on dialysis  tolerating the procedure adequately- limiting Qb and duration for 1st HD

## 2021-07-08 NOTE — PROGRESS NOTE ADULT - PROBLEM SELECTOR PLAN 1
SOB Most likely multifactorial, 2/2 to pleural effusion vs venous congestion   s/p VATS in January 2021 w/ Dr. Harmon for hematoma. Hx of known recurring b/l pleural effusions. Follows w/ Dr. Johnson. CT imaging in ED shows large right pleural effusion and small loculated left pleural effusion. S Slight increase in size of right effusion compared to previous imaging. Likely 2/2 malignancy.  -BNP at baseline 20,000  -consulted CT surgery as patient was previously surgical patient under their service in January 2021; no CT surgery intervention at this time  - incentive spirometry for atelectasis seen on CT  - assess O2 status while ambulating SOB Most likely multifactorial, 2/2 to pleural effusion vs venous congestion   s/p VATS in January 2021 w/ Dr. Harmon for hematoma. Hx of known recurring b/l pleural effusions. Follows w/ Dr. Johnson. CT imaging in ED shows large right pleural effusion and small loculated left pleural effusion. S Slight increase in size of right effusion compared to previous imaging. Likely 2/2 malignancy.  -torsemide increased from 20mg qd to 20mg BID  -BNP at baseline 20,000  -consulted CT surgery as patient was previously surgical patient under their service in January 2021; no CT surgery intervention at this time  - incentive spirometry for atelectasis seen on CT  - re-assess O2 status while ambulating before d/c

## 2021-07-08 NOTE — CONSULT NOTE ADULT - SUBJECTIVE AND OBJECTIVE BOX
66y old Male who presents with a chief complaint of SOB with abdominal distension. Pt was admit on , and has known history of lymphoma. Pt was found to have significant fluid in his abdomen and underwent paracentesis. Primary team believes patient's fluid status is not improving and would like to initiate HD for which renal was consulted. Pt was already aware that this may happen, has LUE AVF in place in preparation for HD. Today states he was having SOB which prompted him to come to the ED and has had poor appetite while has been at home and has noticed his abdomen has become significantly swollen. He is  hoping that he will feel better with the assistance of dialysis to help improve his fluid status.     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 Patient reports saturday woke up with some trouble breathing and stomach started to swell. By  () SOB got so bad that he couldn't walk without stopping to rest and distention got so bad that he had trouble eating. This has never happened to him before. Reports LBM this morning, normal. Also endorses intermittent left chest tightness since Saturday. He denies headache, vision changes, fever, chills, sore throat, chest pain, palpitations, nausea, vomiting, diarrhea, leg pain or swelling. Reports improvement in breathing in ED on 2L NC.     In the ED:  ED Vitals: Afebrile, HR 66, 140/74, RR 22, SPO2 99 Room air---96% on 2L NC (unclear why placed on nasal canula)  ED Labs: Hgb 9 (unknown baseline), Na 146, Creatinine 7.04 (unknown baseline), trop .07, BNP 20,286  ED Imaging:  #CT Chest/Abdomen: Large R pleural effusion, bibasilar atelectasism, cardiomegaly w/ venous congestion, cirrhosis w/ increasing ascites    #EKst degree AV block, OYM=656, No ST or T w    ED Course: Received Tylenol 1g    Consults: CT surgery        (2021 17:43)   Nephrology consulted for dialysis.     PAST MEDICAL & SURGICAL HISTORY:  HTN (hypertension)    Atrial fibrillation    CKD (chronic kidney disease)    Lymphoma  t cell; remission since 2020    CHF, chronic    H/O pulmonary hypertension    Gout    BPH (benign prostatic hyperplasia)    COPD, mild    Mitral regurgitation    History of cholecystectomy    Elective surgery  rectal surgery          Allergies:  No Known Allergies      Home Medications:   acetaminophen    Suspension .. 650 milliGRAM(s) Oral every 6 hours PRN  allopurinol 100 milliGRAM(s) Oral two times a day  aMIOdarone    Tablet 200 milliGRAM(s) Oral daily  amLODIPine   Tablet 10 milliGRAM(s) Oral daily  aspirin enteric coated 81 milliGRAM(s) Oral daily  calcitriol   Capsule 0.25 MICROGram(s) Oral daily  doxazosin 2 milliGRAM(s) Oral <User Schedule>  heparin   Injectable 5000 Unit(s) SubCutaneous every 8 hours  sevelamer carbonate 800 milliGRAM(s) Oral every 8 hours  sodium bicarbonate 650 milliGRAM(s) Oral two times a day  torsemide 20 milliGRAM(s) Oral two times a day      Hospital Medications:   MEDICATIONS  (STANDING):  allopurinol 100 milliGRAM(s) Oral two times a day  aMIOdarone    Tablet 200 milliGRAM(s) Oral daily  amLODIPine   Tablet 10 milliGRAM(s) Oral daily  aspirin enteric coated 81 milliGRAM(s) Oral daily  calcitriol   Capsule 0.25 MICROGram(s) Oral daily  doxazosin 2 milliGRAM(s) Oral <User Schedule>  heparin   Injectable 5000 Unit(s) SubCutaneous every 8 hours  sevelamer carbonate 800 milliGRAM(s) Oral every 8 hours  sodium bicarbonate 650 milliGRAM(s) Oral two times a day  torsemide 20 milliGRAM(s) Oral two times a day      SOCIAL HISTORY:  Denies ETOh, Smoking,     Family History:  FAMILY HISTORY:  No pertinent family history in first degree relatives          VITALS:  T(F): 97.3 (21 @ 12:40), Max: 98.1 (21 @ 06:04)  HR: 65 (21 @ 12:40)  BP: 122/70 (21 @ 12:40)  RR: 18 (21 @ 12:40)  SpO2: 95% (21 @ 12:40)  Wt(kg): --     @ 07:01  -   @ 07:00  --------------------------------------------------------  IN: 0 mL / OUT: 250 mL / NET: -250 mL        CAPILLARY BLOOD GLUCOSE          Review of Systems:  10 point ROS negative except as per HPI    PHYSICAL EXAM:  GENERAL: Alert, awake, oriented x3 on RA   HEENT: neck supple, no JVP  CHEST/LUNG: Decreased b/l breath sounds  HEART: Regular rate and rhythm, no murmur, no gallops, no rub   ABDOMEN: Soft, nontender, distended. slight pain with rebound  EXTREMITIES: no pedal edema  Neurology: AAOx3, no asterixis   ACCESS: LUE AVF w/bruit and thrill     LABS:      146<H>  |  110<H>  |  59<H>  ----------------------------<  70  4.1   |  21<L>  |  6.57<H>    Ca    8.4      2021 07:45  Phos  4.8       Mg     2.4         TPro  6.6  /  Alb  3.5  /  TBili  0.3  /  DBili      /  AST  6<L>  /  ALT  <5<L>  /  AlkPhos  112      Creatinine Trend: 6.57 <--, 6.51 <--, 7.17 <--, 7.20 <--, 7.04 <--                        8.0    4.73  )-----------( 219      ( 2021 07:45 )             26.4     Urine Studies:          
HPI:  67yo M w/ a PMHx of known recurrent b/l pleural effusions, T-cell Lymphoma (on Romidepsin), CKD5, HFpEF (EF 55-60% in May 2021), COPD (not on home O2), HTN, Afib (not on Eliquis), pulmonary HTN, BPH, and gout, presented w/ SOB and L. rib pain x3d. Pain started on 7/3 at site where patient previously had VATS in 2021 w/ Dr. Harmon. Patient reports pain has been present since undergoing the surgery and recently made it difficult for him to breath. Describes pain as intermittent, stabbing and worse w/ palpation and taking a deep breath. Denies taking pain medication at home. No recent trauma, travel or exposure to sick contacts. Denies fever, hemoptysis, retrosternal chest pain, abdominal pain, n/v/d. Of note, patient recently restarted chemo 3 weeks ago for T-cell lymphoma (takes Romidepsin every Wednesday, completed 3 sessions).      T 97.5, H 66, /74, R 22, SpO2 99% on NC (94-95% on RA w/ hx of COPD)  WBC 5.28, Hgb 9, Plt 210, K 4.1, BUN 66, Cr 7.04, Trop 0.07, BNP 20,286  EKst degree AV block, no ST changes  CXR: pleural effusions  CT C/A/P: Large right pleural effusion, small loculated left pleural effusion, pelvic ascites   Tylenol 1000mg IV x1     Allergies    No Known Allergies    Intolerances      Home Medications:      SOCIAL HX:     Smoking: Former smoker          ETOH/Illicit drugs: Social EtOH, no illicit drug use  Lives w/ wife          PAST MEDICAL & SURGICAL HISTORY:  HTN (hypertension)    Atrial fibrillation    CKD (chronic kidney disease)    Lymphoma  t cell; remission since 2020    CHF, chronic    H/O pulmonary hypertension    Gout    BPH (benign prostatic hyperplasia)    History of cholecystectomy    Elective surgery  rectal surgery        FAMILY HISTORY:  No pertinent family history in first degree relatives    :    No known cardiovascular or pulmonary family history     ROS:  See HPI     PHYSICAL EXAM    ICU Vital Signs Last 24 Hrs  T(C): 36.9 (2021 15:35), Max: 36.9 (2021 15:35)  T(F): 98.5 (2021 15:35), Max: 98.5 (2021 15:35)  HR: 61 (2021 16:50) (60 - 66)  BP: 140/74 (2021 15:35) (132/79 - 140/74)  BP(mean): --  ABP: --  ABP(mean): --  RR: 20 (2021 16:50) (20 - 22)  SpO2: 96% (2021 16:50) (95% - 99%)      Gen: NAD, laying in bed, alert, interactive  HEENT: PERRL, anicteric sclera, no JVD, no thyromegaly  Cardio: +S1/S2, RRR, systolic murmur  Resp: decreased breath sounds on right. no wheezing. pain to palpation of left lower ribs at site of previous VATS  GI: +BS x4, NT/ND  Ext: no peripheral edema, NROM x4  Vasc: 3+ peripheral pulses  Skin: warm, dry, and intact. no rashes, wounds or scars  Neuro: AAOx3, CN II-XII intact, no focal deficits         LABS:                          9.0    5.28  )-----------( 210      ( 2021 12:15 )             30.2                                               07-05    146<H>  |  112<H>  |  66<H>  ----------------------------<  87  4.1   |  22  |  7.04<H>    Ca    9.1      2021 12:15    TPro  7.0  /  Alb  3.9  /  TBili  0.4  /  DBili  x   /  AST  7<L>  /  ALT  5<L>  /  AlkPhos  113  07-05                                                 CARDIAC MARKERS ( 2021 12:15 )  x     / 0.07 ng/mL / x     / x     / x                                                LIVER FUNCTIONS - ( 2021 12:15 )  Alb: 3.9 g/dL / Pro: 7.0 g/dL / ALK PHOS: 113 U/L / ALT: 5 U/L / AST: 7 U/L / GGT: x                                                                                                                                       CXR:    ECHO:    MEDICATIONS  (STANDING):    MEDICATIONS  (PRN):        
  Patient is a 66y old  Male who presents with a chief complaint of Shortness of breath, abdominal distention, cough (2021 07:59)       HPI:  HPI: 67 y/o M w/ PMH HTN, BPH, T cell Lymphoma (on chemo), COPD, Stage 5 CKD (pending starting HD), Afib on Eliquis, pulmonary HTN, CHF (EF was 40%, most recently 65-70%), L chest wall hematoma s/p evacuation (2020) and VATS (2021) presenting to the ED with complaints of progressively worsening SOB, cough, abdominal discomfort/distention that started Saturday. Patient reports saturday woke up with some trouble breathing and stomach started to swell. By  () SOB got so bad that he couldn't walk without stopping to rest and distention got so bad that he had trouble eating. This has never happened to him before. Reports LBM this morning, normal. Also endorses intermittent left chest tightness since Saturday. He denies headache, vision changes, fever, chills, sore throat, chest pain, palpitations, nausea, vomiting, diarrhea, leg pain or swelling. Reports improvement in breathing in ED on 2L NC.     In the ED:  ED Vitals: Afebrile, HR 66, 140/74, RR 22, SPO2 99 Room air---96% on 2L NC (unclear why placed on nasal canula)  ED Labs: Hgb 9 (unknown baseline), Na 146, Creatinine 7.04 (unknown baseline), trop .07, BNP 20,286  ED Imaging:  #CT Chest/Abdomen: Large R pleural effusion, bibasilar atelectasism, cardiomegaly w/ venous congestion, cirrhosis w/ increasing ascites    #EKst degree AV block, AJQ=344, No ST or T w    ED Course: Received Tylenol 1g    Consults: CT surgery        (2021 17:43)      PAST MEDICAL & SURGICAL HISTORY:  HTN (hypertension)    Atrial fibrillation    CKD (chronic kidney disease)    Lymphoma  t cell; remission since 2020    CHF, chronic    H/O pulmonary hypertension    Gout    BPH (benign prostatic hyperplasia)    COPD, mild    Mitral regurgitation    History of cholecystectomy    Elective surgery  rectal surgery        MEDICATIONS  (STANDING):  allopurinol 100 milliGRAM(s) Oral two times a day  aMIOdarone    Tablet 200 milliGRAM(s) Oral daily  amLODIPine   Tablet 10 milliGRAM(s) Oral daily  aspirin enteric coated 81 milliGRAM(s) Oral daily  calcitriol   Capsule 0.25 MICROGram(s) Oral daily  doxazosin 2 milliGRAM(s) Oral <User Schedule>  heparin   Injectable 5000 Unit(s) SubCutaneous every 8 hours  metoprolol succinate ER 50 milliGRAM(s) Oral every 12 hours  sevelamer carbonate 800 milliGRAM(s) Oral every 8 hours  sodium bicarbonate 650 milliGRAM(s) Oral two times a day  torsemide 20 milliGRAM(s) Oral daily    MEDICATIONS  (PRN):  acetaminophen    Suspension .. 650 milliGRAM(s) Oral every 6 hours PRN Mild Pain (1 - 3), Moderate Pain (4 - 6)      FAMILY HISTORY:  No pertinent family history in first degree relatives        CBC Full  -  ( 2021 07:04 )  WBC Count : 5.89 K/uL  RBC Count : 3.04 M/uL  Hemoglobin : 8.2 g/dL  Hematocrit : 28.0 %  Platelet Count - Automated : 203 K/uL  Mean Cell Volume : 92.1 fl  Mean Cell Hemoglobin : 27.0 pg  Mean Cell Hemoglobin Concentration : 29.3 gm/dL  Auto Neutrophil # : 3.99 K/uL  Auto Lymphocyte # : 0.74 K/uL  Auto Monocyte # : 1.02 K/uL  Auto Eosinophil # : 0.09 K/uL  Auto Basophil # : 0.03 K/uL  Auto Neutrophil % : 67.8 %  Auto Lymphocyte % : 12.6 %  Auto Monocyte % : 17.3 %  Auto Eosinophil % : 1.5 %  Auto Basophil % : 0.5 %      07-    146<H>  |  112<H>  |  65<H>  ----------------------------<  78  3.9   |  21<L>  |  7.17<H>    Ca    8.3<L>      2021 07:04  Phos  5.4     07-05  Mg     2.4     07-    TPro  6.3  /  Alb  3.1<L>  /  TBili  0.3  /  DBili  x   /  AST  6<L>  /  ALT  <5<L>  /  AlkPhos  105  07-06            Radiology:    < from: Xray Chest 1 View-PORTABLE IMMEDIATE (21 @ 12:15) >  EXAM:  XR CHEST PORTABLE IMMED 1V                          PROCEDURE DATE:  2021          INTERPRETATION:  Clinical history/reason for exam: Shortness of breath.    Frontal chest.    COMPARISON: 2021.    Findings/  impression: Cardiomegaly, thoracic aortic calcification. Bilateral opacities/pleural effusions. Stable bony structures, dextroscoliosis.        < from: CT Chest No Cont (21 @ 13:36) >    EXAM:  CT CHEST                          EXAM:  CT ABDOMEN AND PELVIS                          PROCEDURE DATE:  2021          INTERPRETATION:  CLINICAL HISTORY: 66-year-old with pain, swelling and shortness of breath.          FINDINGS: CT ofthe chest, abdomen and pelvis was performed without the administration of intravenous contrast. Reconstructions were performed in the sagittal an. Comparison is made to prior studies dated 2021, 3/16/2021 and 2021.    Evaluation of the chest demonstrates that the visualized thyroid gland is normal in appearance. There is severe cardiomegaly with hypodensity of intracardiac blood, consistent with anemia. There is mild bilateral gynecomastia. Mild enlargement enlargement of the main pulmonary artery. Severe coronary arterial calcification. Mild mitral annular calcification, which can correlate with degree of aortic stenosis. No change in small loculated left pleural effusion and large right pleural effusion. Evaluation of the lung parenchyma demonstrates moderate to severe centrilobular emphysematous change. There is compressive atelectasis of the right lower lobe and passive atelectasis of the right middle lobe. There are parenchymal fibrotic/atelectatic opacities within the leftlower lobe with rounded atelectasis in the left lobe, overall unchanged since 2021. There is pulmonary venous congestion.    Evaluation of the upper abdomen demonstrates cysts within the liver with nodularity of the the hepatic surface, consistent with cirrhosis. Cholecystectomy. Bilateral adrenal glands, pancreas, bilateral kidneys and spleen are unremarkable. Evaluation of the gastrointestinal tract demonstrates sigmoid and colonic diverticulosis. Small hiatal hernia.  Evaluation of the pelvis demonstrates the prostate and seminal vesicles are unremarkable. Bladder is collapsed. Small fat-containing bilateral inguinal. Moderate free pelvic fluid, increased since 3/2021. No adenopathy. Severe aortic and iliac vascular calcification. Bilateral scrotal hydroceles.  Evaluation of the osseous structures demonstrates no destructive lesion. Severe degenerative change of the lower lumbar spine          IMPRESSION:    Persistent large right pleural effusion and small loculated left pleural effusion with bibasilar atelectasis including rounded atelectasis in the left lower lobe.    Cardiomegaly with pulmonary venous congestion and anemia.    Cirrhosis with increasing ascites.                Vital Signs Last 24 Hrs  T(C): 36.8 (2021 05:59), Max: 36.9 (2021 15:35)  T(F): 98.3 (2021 05:59), Max: 98.5 (2021 15:35)  HR: 71 (2021 05:59) (60 - 71)  BP: 132/79 (2021 05:59) (126/72 - 147/79)  BP(mean): --  RR: 18 (2021 05:59) (18 - 22)  SpO2: 93% (2021 06:39) (93% - 99%)        REVIEW OF SYSTEMS:    CONSTITUTIONAL: No fever, weight loss, or fatigue  EYES: No eye pain, visual disturbances, or discharge  ENMT:  No difficulty hearing, tinnitus, vertigo; No sinus or throat pain  NECK: No pain or stiffness  BREASTS: No pain, masses, or nipple discharge  RESPIRATORY:  shortness of breath  CARDIOVASCULAR: No chest pain, palpitations, dizziness, or leg swelling  GASTROINTESTINAL: abdominal distention  GENITOURINARY: No dysuria, frequency, hematuria, or incontinence  NEUROLOGICAL: No headaches, memory loss, loss of strength, numbness, or tremors  SKIN: No itching, burning, rashes, or lesions   LYMPH NODES: No enlarged glands  ENDOCRINE: No heat or cold intolerance; No hair loss  MUSCULOSKELETAL: No joint pain or swelling; No muscle, back, or extremity pain  PSYCHIATRIC: No depression, anxiety, mood swings, or difficulty sleeping  HEME/LYMPH: No easy bruising, or bleeding gums  ALLERGY AND IMMUNOLOGIC: No hives or eczema  VASCULAR: no swelling, erythema,   : no dysuria, hematuria, frequency        Physical Exam: 67 yo  gentleman lying in semi Howard's position, reports feeling the same    Head: normocephalic, atraumatic    Eyes: PERRLA, EOMI, no nystagmus, sclera anicteric    ENT: nasal discharge, uvula midline, no oropharyngeal erythema/exudate    Neck: supple, negative JVD, negative carotid bruits, no thyromegaly    Chest: bibasilar crackles, heeling VATS incisions    Cardiovascular: regular rate and rhythm,  II/VI systolic murmur    Abdomen: soft, distended,  LUQ TTP, negative rebound/guarding, normal bowel sounds    Extremities: WWP, neg cyanosis/clubbing/edema, negative calf tenderness to palpation, negative Jim's sign      Neurologic Exam:    Alert and oriented x 4     Motor Exam:    Right UE:           no focal weakness > 4/5    Left UE:             no focal weakness > 4/5    Right LE:           no focal weakness > 4/5    Left LE:             no focal weakness > 4/5               Sensation:         intact to light touch x 4 extremities                                                 DTR:                  biceps/brachioradialis: equal bilaterally                                                     patella/ankle: equal bilaterally                               Gait:  not tested        PM&R Impression:    1) deconditioned  2) no focal weakness    Plan:    1) Physical therapy focusing on therapeutic exercises, bed mobility/transfer out of bed evaluation, progressive ambulation with assistive devices prn.    2) Anticipated Disposition Plan/Recs:   pending functional progress, probable d/c home , +/- home PT                  
67 y/o M w/ PMH HTN, BPH, T cell Lymphoma (on chemo), COPD, Stage 5 CKD (pending starting HD), Afib on Eliquis, pulmonary HTN, CHF (EF was 40%, most recently 65-70%), L chest wall hematoma s/p evacuation (12/2020) and VATS (01/2021) presenting to the ED with complaints of progressively worsening SOB, cough, abdominal discomfort/distention that started Saturday. IR consulted for paracentesis.     Clinical History: PLEURAL EFFUSION RIGHTCHRONIC KIDNEY DISEASE    No pertinent family history in first degree relatives    Handoff    MEWS Score    HTN (hypertension)    Atrial fibrillation    CKD (chronic kidney disease)    Lymphoma    CHF, chronic    H/O pulmonary hypertension    Gout    BPH (benign prostatic hyperplasia)    COPD, mild    Mitral regurgitation    Pleural effusion, right    Shortness of breath    CKD (chronic kidney disease)    CHF, chronic    HTN (hypertension)    Atrial fibrillation    Gout    BPH (benign prostatic hyperplasia)    COPD, mild    Prophylactic measure    Lymphoma    Ascites    Stage 5 chronic kidney disease not on chronic dialysis    Chronic diastolic congestive heart failure    Longstanding persistent atrial fibrillation    Chronic obstructive pulmonary disease, unspecified COPD type    History of cholecystectomy    Elective surgery    SOB    90+    CKD (chronic kidney disease)    [email or web address]        Meds:acetaminophen    Suspension .. 650 milliGRAM(s) Oral every 6 hours PRN  allopurinol 100 milliGRAM(s) Oral two times a day  aMIOdarone    Tablet 200 milliGRAM(s) Oral daily  amLODIPine   Tablet 10 milliGRAM(s) Oral daily  aspirin enteric coated 81 milliGRAM(s) Oral daily  calcitriol   Capsule 0.25 MICROGram(s) Oral daily  doxazosin 2 milliGRAM(s) Oral <User Schedule>  heparin   Injectable 5000 Unit(s) SubCutaneous every 8 hours  metoprolol succinate ER 50 milliGRAM(s) Oral every 12 hours  sevelamer carbonate 800 milliGRAM(s) Oral every 8 hours  sodium bicarbonate 650 milliGRAM(s) Oral two times a day  torsemide 20 milliGRAM(s) Oral daily      Allergies:No Known Allergies        Labs:                           8.2    5.89  )-----------( 203      ( 06 Jul 2021 07:04 )             28.0     PT/INR - ( 06 Jul 2021 12:24 )   PT: 14.3 sec;   INR: 1.20          PTT - ( 06 Jul 2021 12:24 )  PTT:40.0 sec  07-06    146<H>  |  112<H>  |  65<H>  ----------------------------<  78  3.9   |  21<L>  |  7.17<H>    Ca    8.3<L>      06 Jul 2021 07:04  Phos  5.2     07-06  Mg     2.4     07-06    TPro  6.3  /  Alb  3.1<L>  /  TBili  0.3  /  DBili  x   /  AST  6<L>  /  ALT  <5<L>  /  AlkPhos  105  07-06          Imaging Findings: moderate abdominal ascites  
Surgeon: Dr. Harmon    Requesting Physician: Dr. Hayes     HISTORY OF PRESENT ILLNESS:  67 y/o M with a PMHx of HTN, BPH, T cell Lymphoma in remission since 11/2020, COPD, Stage 5 CKD (pending starting HD), Afib (on Eliquis), severe MR, severe pulmonary HTN, CHF (EF was 40%, most recently 65-70%), L chest wall hematoma s/p evacuation (12/2020) and VATS (01/2021). Ptpresented to the ED and has complained of SOB and progressive abdominal pain with onset only three days prior. Patient states he has associated NIELSON with ambulation that started around the same time. Thoracic surgery consulted in light of recent thoracic surgery procedure. Currently, pt feels SOB but denies any CP, palpitations, SOB, wheezing, abd pain, n/v/d/c or chills.    PAST MEDICAL & SURGICAL HISTORY:  HTN (hypertension)  Atrial fibrillation  CKD (chronic kidney disease)  Lymphoma  t cell; remission since 11/2020  CHF, chronic  H/O pulmonary hypertension  Gout  BPH (benign prostatic hyperplasia)  COPD, mild  Mitral regurgitation  History of cholecysectomy  Elective surgery  rectal surgery    MEDICATIONS  (STANDING):  aspirin enteric coated 81 milliGRAM(s) Oral daily    MEDICATIONS  (PRN):    Allergies  No Known Allergies  Intolerances    SOCIAL HISTORY:  ETOH: no  Smoking: former smoker   Illicit drugs: none  Lives with: significant other   Assistive device: Cane    FAMILY HISTORY:  No pertinent family history in first degree relatives    Review of Systems:  CONSTITUTIONAL: +fatigue, Denies fevers / chills, sweats, fatigue, weight loss, weight gain                                       NEURO:  Denies parathesias, seizures, syncope, confusion                                                                                  EYES:  Denies blurry vision, discharge, pain, loss of vision                                                                                    ENMT:  Denies difficulty hearing, vertigo, dysphagia, epistaxis, recent dental work                                       CV:  Denies chest pain, palpitations, orthopnea                                                                                           RESPIRATORY:  +SOB, +NIELSON, Denies wheezing,  cough / sputum, hemoptysis                                                               GI:  Denies nausea, vomiting, diarrhea, constipation, melena                                                                          : Denies hematuria, dysuria, urgency, incontinence                                                                                          MUSKULOSKELETAL:  Denies arthritis, joint swelling, muscle weakness                                                             SKIN/BREAST:  Denies rash, itching, hair loss, masses                                                                                              PSYCH:  Denies depression, anxiety, suicidal ideation                                                                                                HEME/LYMPH:  Denies bruises easily, enlarged lymph nodes, tender lymph nodes                                          ENDOCRINE:  Denies cold intolerance, heat intolerance, polydipsia                                                                      Vital Signs Last 24 Hrs  T(C): 36.4 (05 Jul 2021 17:41), Max: 36.9 (05 Jul 2021 15:35)  T(F): 97.5 (05 Jul 2021 17:41), Max: 98.5 (05 Jul 2021 15:35)  HR: 67 (05 Jul 2021 17:41) (60 - 67)  BP: 147/79 (05 Jul 2021 17:41) (132/79 - 147/79)  BP(mean): --  RR: 18 (05 Jul 2021 17:41) (18 - 22)  SpO2: 95% (05 Jul 2021 17:41) (95% - 99%)    PHYSICAL EXAM:  General: well appearing sitting in stretcher in NAD   Neurological: AOx3. Motor skills grossly intact  Cardiovascular: Tender to palpation on left chest wall, anteriorly. Normal S1/S2. Regular rate/rhythm. No murmurs  Respiratory: b/l crackles at bases. No wheezing or rales  Gastrointestinal: +BS in all 4 quadrants. Non-distended. Soft. Non-tender  Extremities: Strength 5/5 b/l upper/lower extremities. Sensation grossly intact upper/lower extremities. No edema. No calf tenderness.  Vascular: Radial 2+bilaterally, DP 2+ b/l  Incision Sites: previous VATS incisions healing well, no erythema, purulence or ecchymosis.                                                       LABS:                        9.0    5.28  )-----------( 210      ( 05 Jul 2021 12:15 )             30.2     07-05    146<H>  |  112<H>  |  66<H>  ----------------------------<  87  4.1   |  22  |  7.04<H>    Ca    9.1      05 Jul 2021 12:15    TPro  7.0  /  Alb  3.9  /  TBili  0.4  /  DBili  x   /  AST  7<L>  /  ALT  5<L>  /  AlkPhos  113  07-05    CARDIAC MARKERS ( 05 Jul 2021 12:15 )  x     / 0.07 ng/mL / x     / x     / x        RADIOLOGY & ADDITIONAL STUDIES:  < from: CT Chest No Cont (07.05.21 @ 13:36) >  IMPRESSION:  Persistent large right pleural effusion and small loculated left pleural effusion with bibasilar atelectasis including rounded atelectasis in the left lower lobe.  Cardiomegaly with pulmonary venous congestion and anemia.  Cirrhosis with increasing ascites.  < end of copied text >

## 2021-07-08 NOTE — PROGRESS NOTE ADULT - ASSESSMENT
67yo M w/ a PMHx of known recurrent b/l pleural effusions, T-cell Lymphoma (recently restarted chemo), CKD5 (not on dialysis but fistula in LUE), HFpEF (EF 55-60% in May 2021), COPD (not on home O2), HTN, Afib (not on Eliquis), pulmonary HTN, BPH, and gout, presented w/ ascites now s/p IR drainage 460cc on 7/6 with significant residual ascites on abd u/s post-para.

## 2021-07-08 NOTE — PROGRESS NOTE ADULT - PROBLEM SELECTOR PLAN 10
F: none in the setting of pleural effusion and HFpEF  E: replete Mg>2; K>4  N: regular diet   A: Lovenox 40mg injection  Dispo: D/c home, pending PT recs  Code status: FULL CODE F: none in the setting of pleural effusion and HFpEF  E: replete Mg>2; K>4  N: regular diet   A: Lovenox 40mg injection  Dispo: D/c home with O2 and Hemodialysis  Code status: FULL CODE

## 2021-07-08 NOTE — PROGRESS NOTE ADULT - PROBLEM SELECTOR PLAN 9
Not on home O2. CT showing evidence of emphysematous changes. Prior smoking hx. No wheezing on exam or productive cough.  - goal O2 88-92% Not on home O2. CT showing evidence of emphysematous changes. Prior smoking hx.  - goal O2 88-92%

## 2021-07-09 DIAGNOSIS — N18.5 CHRONIC KIDNEY DISEASE, STAGE 5: ICD-10-CM

## 2021-07-09 DIAGNOSIS — J90 PLEURAL EFFUSION, NOT ELSEWHERE CLASSIFIED: ICD-10-CM

## 2021-07-09 LAB
ALBUMIN SERPL ELPH-MCNC: 3.2 G/DL — LOW (ref 3.3–5)
ALP SERPL-CCNC: 117 U/L — SIGNIFICANT CHANGE UP (ref 40–120)
ALT FLD-CCNC: <5 U/L — LOW (ref 10–45)
ANION GAP SERPL CALC-SCNC: 13 MMOL/L — SIGNIFICANT CHANGE UP (ref 5–17)
AST SERPL-CCNC: 7 U/L — LOW (ref 10–40)
BILIRUB SERPL-MCNC: 0.3 MG/DL — SIGNIFICANT CHANGE UP (ref 0.2–1.2)
BUN SERPL-MCNC: 34 MG/DL — HIGH (ref 7–23)
CALCIUM SERPL-MCNC: 8.9 MG/DL — SIGNIFICANT CHANGE UP (ref 8.4–10.5)
CHLORIDE SERPL-SCNC: 106 MMOL/L — SIGNIFICANT CHANGE UP (ref 96–108)
CO2 SERPL-SCNC: 23 MMOL/L — SIGNIFICANT CHANGE UP (ref 22–31)
CREAT SERPL-MCNC: 5.7 MG/DL — HIGH (ref 0.5–1.3)
FERRITIN SERPL-MCNC: 678 NG/ML — HIGH (ref 30–400)
GLUCOSE SERPL-MCNC: 73 MG/DL — SIGNIFICANT CHANGE UP (ref 70–99)
HCT VFR BLD CALC: 28 % — LOW (ref 39–50)
HGB BLD-MCNC: 8.5 G/DL — LOW (ref 13–17)
IRON SATN MFR SERPL: 24 % — SIGNIFICANT CHANGE UP (ref 16–55)
IRON SATN MFR SERPL: 45 UG/DL — SIGNIFICANT CHANGE UP (ref 45–165)
MAGNESIUM SERPL-MCNC: 2.2 MG/DL — SIGNIFICANT CHANGE UP (ref 1.6–2.6)
MCHC RBC-ENTMCNC: 26.6 PG — LOW (ref 27–34)
MCHC RBC-ENTMCNC: 30.4 GM/DL — LOW (ref 32–36)
MCV RBC AUTO: 87.8 FL — SIGNIFICANT CHANGE UP (ref 80–100)
NRBC # BLD: 0 /100 WBCS — SIGNIFICANT CHANGE UP (ref 0–0)
PHOSPHATE SERPL-MCNC: 4.1 MG/DL — SIGNIFICANT CHANGE UP (ref 2.5–4.5)
PLATELET # BLD AUTO: 227 K/UL — SIGNIFICANT CHANGE UP (ref 150–400)
POTASSIUM SERPL-MCNC: 3.9 MMOL/L — SIGNIFICANT CHANGE UP (ref 3.5–5.3)
POTASSIUM SERPL-SCNC: 3.9 MMOL/L — SIGNIFICANT CHANGE UP (ref 3.5–5.3)
PROT SERPL-MCNC: 6.6 G/DL — SIGNIFICANT CHANGE UP (ref 6–8.3)
RBC # BLD: 3.19 M/UL — LOW (ref 4.2–5.8)
RBC # FLD: 19.8 % — HIGH (ref 10.3–14.5)
SODIUM SERPL-SCNC: 142 MMOL/L — SIGNIFICANT CHANGE UP (ref 135–145)
TIBC SERPL-MCNC: 191 UG/DL — LOW (ref 220–430)
UIBC SERPL-MCNC: 146 UG/DL — SIGNIFICANT CHANGE UP (ref 110–370)
WBC # BLD: 4.01 K/UL — SIGNIFICANT CHANGE UP (ref 3.8–10.5)
WBC # FLD AUTO: 4.01 K/UL — SIGNIFICANT CHANGE UP (ref 3.8–10.5)

## 2021-07-09 PROCEDURE — 71045 X-RAY EXAM CHEST 1 VIEW: CPT | Mod: 26

## 2021-07-09 PROCEDURE — 99232 SBSQ HOSP IP/OBS MODERATE 35: CPT | Mod: GC

## 2021-07-09 PROCEDURE — 90935 HEMODIALYSIS ONE EVALUATION: CPT

## 2021-07-09 RX ADMIN — Medication 20 MILLIGRAM(S): at 06:27

## 2021-07-09 RX ADMIN — AMIODARONE HYDROCHLORIDE 200 MILLIGRAM(S): 400 TABLET ORAL at 06:27

## 2021-07-09 RX ADMIN — Medication 100 MILLIGRAM(S): at 06:27

## 2021-07-09 RX ADMIN — HEPARIN SODIUM 5000 UNIT(S): 5000 INJECTION INTRAVENOUS; SUBCUTANEOUS at 06:29

## 2021-07-09 RX ADMIN — SEVELAMER CARBONATE 800 MILLIGRAM(S): 2400 POWDER, FOR SUSPENSION ORAL at 06:27

## 2021-07-09 RX ADMIN — Medication 650 MILLIGRAM(S): at 18:38

## 2021-07-09 RX ADMIN — Medication 100 MILLIGRAM(S): at 18:38

## 2021-07-09 RX ADMIN — Medication 2 MILLIGRAM(S): at 12:13

## 2021-07-09 RX ADMIN — Medication 650 MILLIGRAM(S): at 06:26

## 2021-07-09 RX ADMIN — AMLODIPINE BESYLATE 10 MILLIGRAM(S): 2.5 TABLET ORAL at 06:27

## 2021-07-09 RX ADMIN — HEPARIN SODIUM 5000 UNIT(S): 5000 INJECTION INTRAVENOUS; SUBCUTANEOUS at 14:29

## 2021-07-09 RX ADMIN — Medication 50 MILLIGRAM(S): at 06:43

## 2021-07-09 RX ADMIN — Medication 20 MILLIGRAM(S): at 18:38

## 2021-07-09 RX ADMIN — HEPARIN SODIUM 5000 UNIT(S): 5000 INJECTION INTRAVENOUS; SUBCUTANEOUS at 21:08

## 2021-07-09 RX ADMIN — CALCITRIOL 0.25 MICROGRAM(S): 0.5 CAPSULE ORAL at 12:13

## 2021-07-09 RX ADMIN — SEVELAMER CARBONATE 800 MILLIGRAM(S): 2400 POWDER, FOR SUSPENSION ORAL at 21:08

## 2021-07-09 RX ADMIN — Medication 2 MILLIGRAM(S): at 21:08

## 2021-07-09 RX ADMIN — SEVELAMER CARBONATE 800 MILLIGRAM(S): 2400 POWDER, FOR SUSPENSION ORAL at 14:29

## 2021-07-09 RX ADMIN — Medication 81 MILLIGRAM(S): at 12:13

## 2021-07-09 NOTE — PROGRESS NOTE ADULT - ASSESSMENT
66M Stage 5 CKD now newly initiated HD as of 7/8, cell Lymphoma (on chemo) p/w complaints of progressively worsening SOB, cough, abdominal discomfort/distention in the setting of ascites.    Assessment/Plan:     #CKD Stage 5  #New HD initiation   made aware to help assign pt to [address] [place] [address] [facility]  Usual Rx - TBD  EDW - TBD  Last HD - 7/9  Next Hd - 7/10  -electrolytes at goal   -Hypervolemic UF w/HD goal is to remove 1.5L to help with volume optimization  -Hepatitis serologies negative  -Quantiferon gold pending    #HTN   BP at goal   -On amlodipine and Cardura  -Would advise to hold on HD days to allow for better volume removal  -next HD session on 7/10    #access   During dialysis on 7/8, pt had episode where needle dislodged resulting in hematoma of the left forearm.  Evaluated this AM, non tender to touch  -LUE AVF functional   -Can use warm compresses to help with re-absorption    #anemia  Hgb at 8  -Transfusion as per primary team  -f/u iron panel  -Pt has active malignancy, would defer on administering EPO at this time    #renal bone disease   Ca ~8.9  Phos ~4.1   PTH pending   VitD25/1,25 pending     Patient was seen and evaluated on dialysis.     Dialyzer: Optiflux [id number]  QB: 250 mL/min  QD: 500 mL/min  K bath: 3  Goal UF: 1.5L  Duration: 150 min    Patient is tolerating the procedure well. Goal today to remove 1.5L and to perform slow HD to allow patient to transition smoothly into consistent HD.  Continue full hemodialysis treatment as prescribed.      Thank you for the opportunity to participate in the care of your patient. The nephrology service remains available to assist with any questions or concerns. Please feel free to reach us by paging the on-call nephrology fellow for urgent issues or as below.     Janna Deleon D.O.  PGY-4, Nephrology Fellow   C: [phone]   P: [phone]

## 2021-07-09 NOTE — PROGRESS NOTE ADULT - PROBLEM SELECTOR PLAN 4
HFpEF; patient follows with cardiologist Dr. Ventura outpatient EF 55-60% in May 2021.   - continue to monitor BP and volume status  - c/w home torsemide 20mg PO BID  - c/w home toprol 50mg qd  -BNP elevated on admission to 20,000 however this is baseline   -troponins elevated to 0.07 on admission, downtrended (was 0.06 7/7)    #HTN  - c/w home amlodipine 10mg qd

## 2021-07-09 NOTE — PROGRESS NOTE ADULT - PROBLEM SELECTOR PLAN 10
F: none in the setting of pleural effusion and HFpEF  E: replete Mg>2; K>4  N: regular diet   A: Lovenox 40mg injection  Dispo: D/c home with O2 and Hemodialysis  Code status: FULL CODE

## 2021-07-09 NOTE — PROGRESS NOTE ADULT - PROBLEM SELECTOR PLAN 6
patient with history of T cell lymphoma  -on chemo with oncologist at [facility]  - F/u with Dr. Dimas o/p    #Anemia   -patient with hbg 9 on admission (baseline 8.8)  -will continue to monitor  -previous iron studies done 1/2021 as well as studies this admission consistent with anemia of chronic disease  -TSH, folate, B12 - all wnl

## 2021-07-09 NOTE — PROGRESS NOTE ADULT - SUBJECTIVE AND OBJECTIVE BOX
Internal Medicine Progress Note  Maureen Braga, PGY-1  Pager: [phone]    Hospital Course:  65 y/o M w/ PMH HTN, BPH, T cell Lymphoma (on chemo), COPD, Stage 5 CKD (pending starting HD), Afib not on Eliquis / recent hematoma, pulmonary HTN, CHF (EF was 40%, most recently 65-70%), L chest wall hematoma s/p evacuation (2020) and VATS (2021) who presented  with complaints of progressively worsening SOB, cough, abdominal discomfort/distention that started . IR drained 460cc from abd . u/s post-para showed abd ascites R>L. Now requiring O2 on ambulation. Torsemide increased from 20mg qd to 20mg BID.    OVERNIGHT EVENTS/INTERVAL HPI: Patient started dialysis yesterday and had 1.5L removed. Patient tolerated well. Patient examined at bedside this morning. States breathing feels better and cough has improved. His epigastric tenderness has resolved. Patient continues to report good UOP.     REVIEW OF SYSTEMS:  CONSTITUTIONAL: No fatigue, fevers/chills, PO intake adequate  EYES/ENT: No throat pain.  RESPIRATORY: No SOB at rest.  CARDIOVASCULAR: No chest pain or palpitations  GASTROINTESTINAL: +abd distension. No abdominal pain. No nausea, vomiting, hematemesis, diarrhea, constipation, melena or hematochezia.  GENITOURINARY: No dysuria, frequency, or hematuria  NEUROLOGICAL: No HA, dizziness, weakness  SKIN: No jaundice, bruising, or lesions  MSK: No arthralgias/joint pain  All other review of systems is negative unless indicated above.    OBJECTIVE:  Daily     Daily Weight in k.4 (2021 11:40)  Vital Signs Last 24 Hrs  T(C): 36.7 (2021 12:18), Max: 36.9 (2021 11:40)  T(F): 98 (2021 12:18), Max: 98.4 (2021 11:40)  HR: 76 (2021 12:18) (64 - 76)  BP: 137/66 (2021 12:18) (117/67 - 142/81)  BP(mean): --  RR: 18 (2021 12:18) (16 - 18)  SpO2: 96% (2021 12:18) (94% - 97%)  I&O's Detail    2021 07:01  -  2021 07:00  --------------------------------------------------------  IN:    Other (mL): 400 mL  Total IN: 400 mL    OUT:    Other (mL): 1900 mL    Voided (mL): 825 mL  Total OUT: 2725 mL    Total NET: -2325 mL      2021 07:01  -  2021 14:16  --------------------------------------------------------  IN:  Total IN: 0 mL    OUT:    Other (mL): 1500 mL  Total OUT: 1500 mL    Total NET: -1500 mL    Physical Exam:  GENERAL: Awake, alert and interactive, no acute distress, appears comfortable  NEURO: A&Ox4, no focal deficits  HEENT: Normocephalic, atraumatic, no conjunctivitis or scleral icterus, oral mucosa moist  NECK: Supple  CARDIAC: Regular rate and rhythm, +S1/S2, I/VI systolic murmur  PULM: Breathing comfortably on RA. CTAB. No crackles.  ABDOMEN: +distended. Non-tender, +bs  : Deferred  MSK: Range of motion grossly intact  SKIN: Warm and dry  VASC: 2+ peripheral pulses, no edema, L RUE fistula with thrill (patent), non-tender swelling above the fistula.  Psych: Appropriate affect    Medications:  MEDICATIONS  (STANDING):  allopurinol 100 milliGRAM(s) Oral two times a day  aMIOdarone    Tablet 200 milliGRAM(s) Oral daily  amLODIPine   Tablet 10 milliGRAM(s) Oral daily  aspirin enteric coated 81 milliGRAM(s) Oral daily  calcitriol   Capsule 0.25 MICROGram(s) Oral daily  doxazosin 2 milliGRAM(s) Oral <User Schedule>  heparin   Injectable 5000 Unit(s) SubCutaneous every 8 hours  metoprolol succinate ER 50 milliGRAM(s) Oral daily  sevelamer carbonate 800 milliGRAM(s) Oral every 8 hours  sodium bicarbonate 650 milliGRAM(s) Oral two times a day  torsemide 20 milliGRAM(s) Oral two times a day    MEDICATIONS  (PRN):  acetaminophen    Suspension .. 650 milliGRAM(s) Oral every 6 hours PRN Mild Pain (1 - 3), Moderate Pain (4 - 6)      Labs:                        8.5    4.01  )-----------( 227      ( 2021 07:33 )             28.0         142  |  106  |  34<H>  ----------------------------<  73  3.9   |  23  |  5.70<H>    Ca    8.9      2021 07:33  Phos  4.1       Mg     2.2         TPro  6.6  /  Alb  3.2<L>  /  TBili  0.3  /  DBili  x   /  AST  7<L>  /  ALT  <5<L>  /  AlkPhos  117  -        COVID-19 PCR: NotDetec (2021 12:15)  COVID-19 PCR: NotDetec (16 Mar 2021 16:54)      Radiology: Reviewed Internal Medicine Progress Note  Maureen Omi, PGY-1  Pager: [phone]    Hospital Course:  67 y/o M w/ PMH HTN, BPH, T cell Lymphoma (on chemo), COPD, Stage 5 CKD (pending starting HD), Afib not on Eliquis / recent hematoma, pulmonary HTN, CHF (EF was 40%, most recently 65-70%), L chest wall hematoma s/p evacuation (2020) and VATS (2021) who presented  with complaints of progressively worsening SOB, cough, abdominal discomfort/distention that started . IR drained 460cc from abd . u/s post-para showed abd ascites R>L. Now requiring O2 on ambulation. Torsemide increased from 20mg qd to 20mg BID. HD started  with 1.5L removed.    OVERNIGHT EVENTS/INTERVAL HPI: Patient started dialysis yesterday and had 1.5L removed. Patient tolerated well. Patient examined at bedside this morning. States breathing feels better and cough has improved. His epigastric tenderness has resolved. Patient continues to report good UOP.     REVIEW OF SYSTEMS:  CONSTITUTIONAL: No fatigue, fevers/chills, PO intake adequate  EYES/ENT: No throat pain.  RESPIRATORY: No SOB at rest.  CARDIOVASCULAR: No chest pain or palpitations  GASTROINTESTINAL: +abd distension. No abdominal pain. No nausea, vomiting, hematemesis, diarrhea, constipation, melena or hematochezia.  GENITOURINARY: No dysuria, frequency, or hematuria  NEUROLOGICAL: No HA, dizziness, weakness  SKIN: No jaundice, bruising, or lesions  MSK: No arthralgias/joint pain  All other review of systems is negative unless indicated above.    OBJECTIVE:  Daily     Daily Weight in k.4 (2021 11:40)  Vital Signs Last 24 Hrs  T(C): 36.7 (2021 12:18), Max: 36.9 (2021 11:40)  T(F): 98 (2021 12:18), Max: 98.4 (2021 11:40)  HR: 76 (2021 12:18) (64 - 76)  BP: 137/66 (2021 12:18) (117/67 - 142/81)  BP(mean): --  RR: 18 (2021 12:18) (16 - 18)  SpO2: 96% (2021 12:18) (94% - 97%)  I&O's Detail    2021 07:01  -  2021 07:00  --------------------------------------------------------  IN:    Other (mL): 400 mL  Total IN: 400 mL    OUT:    Other (mL): 1900 mL    Voided (mL): 825 mL  Total OUT: 2725 mL    Total NET: -2325 mL      2021 07:01  -  2021 14:16  --------------------------------------------------------  IN:  Total IN: 0 mL    OUT:    Other (mL): 1500 mL  Total OUT: 1500 mL    Total NET: -1500 mL    Physical Exam:  GENERAL: Awake, alert and interactive, no acute distress, appears comfortable  NEURO: A&Ox4, no focal deficits  HEENT: Normocephalic, atraumatic, no conjunctivitis or scleral icterus, oral mucosa moist  NECK: Supple  CARDIAC: Regular rate and rhythm, +S1/S2, I/VI systolic murmur  PULM: Breathing comfortably on RA. CTAB. No crackles.  ABDOMEN: +distended. Non-tender, +bs  : Deferred  MSK: Range of motion grossly intact  SKIN: Warm and dry  VASC: 2+ peripheral pulses, no edema, L RUE fistula with thrill (patent), non-tender swelling above the fistula.  Psych: Appropriate affect    Medications:  MEDICATIONS  (STANDING):  allopurinol 100 milliGRAM(s) Oral two times a day  aMIOdarone    Tablet 200 milliGRAM(s) Oral daily  amLODIPine   Tablet 10 milliGRAM(s) Oral daily  aspirin enteric coated 81 milliGRAM(s) Oral daily  calcitriol   Capsule 0.25 MICROGram(s) Oral daily  doxazosin 2 milliGRAM(s) Oral <User Schedule>  heparin   Injectable 5000 Unit(s) SubCutaneous every 8 hours  metoprolol succinate ER 50 milliGRAM(s) Oral daily  sevelamer carbonate 800 milliGRAM(s) Oral every 8 hours  sodium bicarbonate 650 milliGRAM(s) Oral two times a day  torsemide 20 milliGRAM(s) Oral two times a day    MEDICATIONS  (PRN):  acetaminophen    Suspension .. 650 milliGRAM(s) Oral every 6 hours PRN Mild Pain (1 - 3), Moderate Pain (4 - 6)      Labs:                        8.5    4.01  )-----------( 227      ( 2021 07:33 )             28.0     07-    142  |  106  |  34<H>  ----------------------------<  73  3.9   |  23  |  5.70<H>    Ca    8.9      2021 07:33  Phos  4.1     07-  Mg     2.2     -    TPro  6.6  /  Alb  3.2<L>  /  TBili  0.3  /  DBili  x   /  AST  7<L>  /  ALT  <5<L>  /  AlkPhos  117  07-09        COVID-19 PCR: Karentec (2021 12:15)  COVID-19 PCR: Jennyfer (16 Mar 2021 16:54)      Radiology: Reviewed

## 2021-07-09 NOTE — PROGRESS NOTE ADULT - ATTENDING COMMENTS
new start ESRD  seen and evaluated while on dialysis  tolerating treatment well  to pull off more fluid-   says he does feel a bit better over past 24 hours since 1st HD treatment  small area of infiltration of AVF- able to cannulate distal to affected area  for outpatient HD at [facility]- making arrangements    anticipate repeat HD again tomorrow 7/10, then should be nephrologically stable for dc with outpatient follow up

## 2021-07-09 NOTE — DIETITIAN INITIAL EVALUATION ADULT. - PROBLEM SELECTOR PLAN 2
Pt w/ hx of CKD. Follows outpatient w/ Dr. Denton. Has LUE AVF but has not started HD yet  - consult Nephrology in am as Cr has significantly worsened since March (Cr 7.04 on this admission, up from Cr 5.53 in March)  - c/w home sevelamer 800 TID      #Cirrhosis  CT imaging in ED reports evidence of cirrhosis w/ increasing ascites. Pt w/ no documented hx of cirrhosis   -f/u abdominal US  -f/u hepatitis A and B labs; previous hepatitis C labs negative  -possibly due to malignant ascites as patient being treated for lymphoma  -low suspicion for SBP as patient is afebrile and without abdominal pain  -diagnostic paracentesis ordered

## 2021-07-09 NOTE — DIETITIAN INITIAL EVALUATION ADULT. - OTHER INFO
66M with PMH HTN, BPH, T cell Lymphoma (on chemo), COPD, Stage 5 CKD (pending starting HD), Afib not on Eliquis, pulmonary HTN, CHF (EF was 40%, most recently 65-70%), L chest wall hematoma s/p evacuation (12/2020) and VATS (01/2021) presenting to the ED with complaints of progressively worsening SOB, cough, abdominal discomfort/distention that started Saturday. Stage 5 CKD now newly initiated HD as of 7/8, cell Lymphoma (on chemo) p/w complaints of progressively worsening SOB, cough, abdominal discomfort/distention in the setting of ascites. 66M with PMH HTN, BPH, T cell Lymphoma (on chemo), COPD, Stage 5 CKD (pending starting HD), Afib not on Eliquis, pulmonary HTN, CHF (EF was 40%, most recently 65-70%), L chest wall hematoma s/p evacuation (12/2020) and VATS (01/2021) presenting to the ED with complaints of progressively worsening SOB, cough, abdominal discomfort/distention that started Saturday. Stage 5 CKD now newly initiated HD as of 7/8. Pt with second HD session 7/9. Nephro following- started on Demadex, Rocaltrol, Renvela, Na bicarbonate.     On assessment, pt was out of room likely at HD. Currently on regular diet tolerating PO well per disc with team, but unable to assess. No noted n/v/d/c. Abd distention 2/2 ascites. Skin WDL, niels score 20. +1 pitting edema BL ankles. No pain reported. Unable to assess appetite PTA. Weight has flucuated during this admission 2/2 fluid retention and HD. Pt admitted at 141lbs (7/5), now weighing 151lbs. Cont to trend weight before and after each HD session. NKFA. Please see nutr recs below. RD to follow.

## 2021-07-09 NOTE — PROGRESS NOTE ADULT - SUBJECTIVE AND OBJECTIVE BOX
Patient is a 66y Male seen and evaluated at bedside and during HD. Patient states he feels well this morning, feels like he is breathing better. He tolerated the initial HD session well yesterday with 1.5L removed. He had some swelling occur in the area of the fistula which he admits may have happened from when one of the needles became dislodged. He denies any pain in the area of swelling. No chest pain, palpitations, nausea or vomiting. Admits to abdominal swelling.       Meds:  acetaminophen    Suspension .. 650 every 6 hours PRN  allopurinol 100 two times a day  aMIOdarone    Tablet 200 daily  amLODIPine   Tablet 10 daily  aspirin enteric coated 81 daily  calcitriol   Capsule 0.25 daily  doxazosin 2 <User Schedule>  heparin   Injectable 5000 every 8 hours  metoprolol succinate ER 50 daily  sevelamer carbonate 800 every 8 hours  sodium bicarbonate 650 two times a day  torsemide 20 two times a day      T(C): , Max: 36.7 (07-08-21 @ 10:06)  T(F): , Max: 98.1 (07-08-21 @ 10:06)  HR: 73 (07-09-21 @ 09:10)  BP: 142/81 (07-09-21 @ 09:10)  BP(mean): --  RR: 17 (07-09-21 @ 09:10)  SpO2: 94% (07-09-21 @ 09:10)  Wt(kg): --    07-08 @ 07:01  -  07-09 @ 07:00  --------------------------------------------------------  IN: 400 mL / OUT: 2725 mL / NET: -2325 mL          Review of Systems:  10 point ROS negative except as per HPI    PHYSICAL EXAM:  GENERAL: Alert, awake, oriented x3 on RA   HEENT: neck supple, no JVP  CHEST/LUNG: Decreased b/l breath sounds  HEART: Regular rate and rhythm, no murmur, no gallops, no rub   ABDOMEN: Soft, nontender, distended. minor pain with rebound  EXTREMITIES: no pedal edema  Neurology: AAOx3, no asterixis   ACCESS: LUE AVF w/bruit and thrill, hematoma appreciated on the left forearm, non tender.       LABS:                        8.5    4.01  )-----------( 227      ( 09 Jul 2021 07:33 )             28.0     07-09    142  |  106  |  34<H>  ----------------------------<  73  3.9   |  23  |  5.70<H>    Ca    8.9      09 Jul 2021 07:33  Phos  4.1     07-09  Mg     2.2     07-09    TPro  6.6  /  Alb  3.2<L>  /  TBili  0.3  /  DBili  x   /  AST  7<L>  /  ALT  <5<L>  /  AlkPhos  117  07-09                RADIOLOGY & ADDITIONAL STUDIES:

## 2021-07-09 NOTE — DIETITIAN INITIAL EVALUATION ADULT. - PROBLEM SELECTOR PLAN 7
-patient with history of gout  - c/w home allopurinol 200mg BID       #Anemia   -patient with hbg 9 on admission (baseline 8.8)  -will continue to monitor  -previous iron studies done 1/2021 consistent with anemia of chronic disease  -f/u TSH, folate, B12

## 2021-07-09 NOTE — DIETITIAN INITIAL EVALUATION ADULT. - PROBLEM SELECTOR PLAN 1
Patient with progressively worsening SOB x 3 days Reports dry cough with worsening abdominal distention. CT C/A/P showing large R pleural effusion, bibasilar atelectasis, cardiomegaly with venous congestion, cirrhosis with worsening ascites.   -SOB Most likely multifactorial, 2/2 to pleural effusion vs venous congestion   -low suspicion for PE as wells score 0  - less likely diastolic HF exacerbation as no clinical signs of volume overload (BNP at baseline 20,000, today at baseline)  -less likely COPD exacerbation as patient not wheezing and without productive cough   -abd distention likely 2/2 to worsening ascites   -possible costochondritis as patient has tenderness palpation at left lower ribs at site of previous VATS. No ecchymosis or recent trauma to site  -consulted CT surgery as patient was previously surgical patient under their service in January 2021; no CT surgery intervention at this time  -f/u CXR in morning       # Pleural effusion   Hx of known recurring b/l pleural effusions. Follows w/ Dr. Johnson. CT imaging in ED shows large right pleural effusion and small loculated left pleural effusion. S/p VATS in January 2021 w/ Dr. Harmon. Slight increase in size of right effusion compared to previous imaging.   - consider Pulm consult    #atelectasis  - bibasilar atelectasis on admission CT chest   - incentive spirometry

## 2021-07-09 NOTE — PROGRESS NOTE ADULT - PROBLEM SELECTOR PLAN 1
Pt w/ hx of CKD. Follows outpatient w/ Dr. Denton. Has LUE AVF through which HD was started 7/8. Cr has significantly worsened since March (Cr 7.04 on this admission, up from Cr 5.53 in March), but has downtrended since admission (now 5.70).  - Per nephro, hemodialysis started 7/8 and will be done today and tomorrow. Will continue Monday/Tues as o/p  - c/w home sevelamer 800 TID  -strict I&Os  -will f/u with Dr. Denton as o/p

## 2021-07-09 NOTE — PROGRESS NOTE ADULT - PROBLEM SELECTOR PLAN 2
Hx of known recurring b/l pleural effusions. Follows w/ Dr. Johnson. CT imaging in ED shows large right pleural effusion and small loculated left pleural effusion. Slight increase in size of right effusion compared to previous imaging. Likely 2/2 malignancy vs. worsening CKD  -torsemide 20mg BID  - incentive spirometry for atelectasis seen on CT  - re-assess O2 status while ambulating before d/c Hx of known recurring b/l pleural effusions. Follows w/ Dr. Johnson. CT imaging in ED shows large right pleural effusion and small loculated left pleural effusion. Slight increase in size of right effusion compared to previous imaging. Likely 2/2 malignancy vs. worsening CKD  -chest xray 7/9 slightly improved (per author's review)  -torsemide 20mg BID  - incentive spirometry for atelectasis seen on CT  - re-assess O2 status while ambulating before d/c Hx of known recurring b/l pleural effusions. Follows w/ Dr. Johnson. CT imaging in ED shows large right pleural effusion and small loculated left pleural effusion. Slight increase in size of right effusion compared to previous imaging. Likely 2/2 malignancy vs. worsening CKD  -chest xray 7/9 unchanged  -torsemide 20mg BID  - incentive spirometry for atelectasis seen on CT  - re-assess O2 status while ambulating before d/c

## 2021-07-09 NOTE — PROGRESS NOTE ADULT - PROBLEM SELECTOR PLAN 5
On home amiodarone 200mg qd. Previously on eliquis 2.5mg BID but stopped in June after hematoma.  - c/w home amiodarone 200mg qd  - f/u outpatient for Watchman procedure (per patient was supposed to get procedure 7/4 but postponed due to SOB and cough)  -CHADS-VASc score 3    #First degree AV block, prolonged QTc  -avoid meds that increase the QTc  -f/u with Dr. Ventura outpatient

## 2021-07-09 NOTE — DIETITIAN INITIAL EVALUATION ADULT. - PROBLEM SELECTOR PLAN 6
patient with history of T cell lymphoma  -on chemo with oncologist at [facility]  - will f/u with oncologist in am

## 2021-07-09 NOTE — DIETITIAN INITIAL EVALUATION ADULT. - PROBLEM SELECTOR PLAN 9
Not on home O2. CT showing evidence of emphysematous changes. Prior smoking hx. No wheezing on exam or productive cough.  - no acute intervention at this time

## 2021-07-09 NOTE — DIETITIAN INITIAL EVALUATION ADULT. - DIET TYPE
Recommend change to Renal diet when feasible/renal replacement pts:no protein restr,no conc K & phos, low sodium

## 2021-07-09 NOTE — PROGRESS NOTE ADULT - ASSESSMENT
65yo M w/ a PMHx of known recurrent b/l pleural effusions, T-cell Lymphoma (recently restarted chemo), CKD5 (not on dialysis but fistula in LUE), HFpEF (EF 55-60% in May 2021), COPD (not on home O2), HTN, Afib (not on Eliquis), pulmonary HTN, BPH, and gout, presented w/ ascites now s/p IR drainage 460cc on 7/6 with significant residual ascites on abd u/s post-para.

## 2021-07-09 NOTE — PROGRESS NOTE ADULT - TIME BILLING
Patient seen and examined with house-staff during bedside rounds.  Resident note read, including vitals, physical findings, laboratory data, and radiological reports.   Revisions included below.  Direct personal management at bed side and extensive interpretation of the data.  Plan was outlined and discussed in details with the housestaff.  Decision making of high complexity  Action taken for acute disease activity to reflect the level of care provided:  - medication reconciliation  - review laboratory data  he is stable.  CXR unchanged.  On HD.  Hold on thorac.  Cytology pendsing.
Patient seen and examined with house-staff during bedside rounds.  Resident note read, including vitals, physical findings, laboratory data, and radiological reports.   Revisions included below.  Direct personal management at bed side and extensive interpretation of the data.  Plan was outlined and discussed in details with the housestaff.  Decision making of high complexity  Action taken for acute disease activity to reflect the level of care provided:  - medication reconciliation  - review laboratory data    I discussed the case in details with the patient and the wife.  I explained to them that in my opinion there is evidence of fluid overload.  Patient will be on dialysis today and also he is on increased torsemide.  Hold on thoracentesis on this he has decrease in the oxygen requirement.  Await the cytology from the peritoneal aspirate
Patient seen and examined with house-staff during bedside rounds.  Resident note read, including vitals, physical findings, laboratory data, and radiological reports.   Revisions included below.  Direct personal management at bed side and extensive interpretation of the data.  Plan was outlined and discussed in details with the housestaff.  Decision making of high complexity  Action taken for acute disease activity to reflect the level of care provided:  - medication reconciliation  - review laboratory data   the patient improve after the peritoneal centesis.  Culture negative from the peritoneal fluid.  Await cytology. Dyspnea on exertion is multifactorial.  Is related to his cardiac, pulmonary, and renal status.  Patient has bilateral pleural effusion with evidence of congestion on the chest x-ray with elevated BNP.  The patient is on Torsemide.  The patient is sodium bicarb for his metabolic acidosis.  Patient had abdominal ultrasound which revealed right lower lobe atelectasis with moderate amount effusion.  Will discuss with thoracic surgery.  Patient had VATS prior and thoracentesis might be a little bit difficult.  We discussed with the renal  whether the patient will benefit from further diuresis or the patient close to being a candidate for hemodialysis.
Patient seen and examined with house-staff during bedside rounds.  Resident note read, including vitals, physical findings, laboratory data, and radiological reports.   Revisions included below.  Direct personal management at bed side and extensive interpretation of the data.  Plan was outlined and discussed in details with the housestaff.  Decision making of high complexity  Action taken for acute disease activity to reflect the level of care provided:  - medication reconciliation  - review laboratory data

## 2021-07-10 ENCOUNTER — TRANSCRIPTION ENCOUNTER (OUTPATIENT)
Age: 66
End: 2021-07-10

## 2021-07-10 VITALS
TEMPERATURE: 97 F | RESPIRATION RATE: 18 BRPM | HEART RATE: 68 BPM | DIASTOLIC BLOOD PRESSURE: 82 MMHG | SYSTOLIC BLOOD PRESSURE: 124 MMHG | WEIGHT: 149.03 LBS | OXYGEN SATURATION: 95 %

## 2021-07-10 LAB
ALBUMIN SERPL ELPH-MCNC: 3.2 G/DL — LOW (ref 3.3–5)
ALP SERPL-CCNC: 97 U/L — SIGNIFICANT CHANGE UP (ref 40–120)
ALT FLD-CCNC: <5 U/L — LOW (ref 10–45)
ANION GAP SERPL CALC-SCNC: 11 MMOL/L — SIGNIFICANT CHANGE UP (ref 5–17)
AST SERPL-CCNC: 7 U/L — LOW (ref 10–40)
BILIRUB SERPL-MCNC: 0.3 MG/DL — SIGNIFICANT CHANGE UP (ref 0.2–1.2)
BUN SERPL-MCNC: 26 MG/DL — HIGH (ref 7–23)
CALCIUM SERPL-MCNC: 8.5 MG/DL — SIGNIFICANT CHANGE UP (ref 8.4–10.5)
CHLORIDE SERPL-SCNC: 100 MMOL/L — SIGNIFICANT CHANGE UP (ref 96–108)
CO2 SERPL-SCNC: 26 MMOL/L — SIGNIFICANT CHANGE UP (ref 22–31)
CREAT SERPL-MCNC: 5.05 MG/DL — HIGH (ref 0.5–1.3)
GAMMA INTERFERON BACKGROUND BLD IA-ACNC: 0.03 IU/ML — SIGNIFICANT CHANGE UP
GLUCOSE SERPL-MCNC: 96 MG/DL — SIGNIFICANT CHANGE UP (ref 70–99)
HCT VFR BLD CALC: 27.2 % — LOW (ref 39–50)
HGB BLD-MCNC: 8.2 G/DL — LOW (ref 13–17)
M TB IFN-G BLD-IMP: NEGATIVE — SIGNIFICANT CHANGE UP
M TB IFN-G CD4+ BCKGRND COR BLD-ACNC: 0 IU/ML — SIGNIFICANT CHANGE UP
M TB IFN-G CD4+CD8+ BCKGRND COR BLD-ACNC: 0 IU/ML — SIGNIFICANT CHANGE UP
MAGNESIUM SERPL-MCNC: 2.1 MG/DL — SIGNIFICANT CHANGE UP (ref 1.6–2.6)
MCHC RBC-ENTMCNC: 27.2 PG — SIGNIFICANT CHANGE UP (ref 27–34)
MCHC RBC-ENTMCNC: 30.1 GM/DL — LOW (ref 32–36)
MCV RBC AUTO: 90.4 FL — SIGNIFICANT CHANGE UP (ref 80–100)
NRBC # BLD: 0 /100 WBCS — SIGNIFICANT CHANGE UP (ref 0–0)
PHOSPHATE SERPL-MCNC: 3.8 MG/DL — SIGNIFICANT CHANGE UP (ref 2.5–4.5)
PLATELET # BLD AUTO: 232 K/UL — SIGNIFICANT CHANGE UP (ref 150–400)
POTASSIUM SERPL-MCNC: 3.7 MMOL/L — SIGNIFICANT CHANGE UP (ref 3.5–5.3)
POTASSIUM SERPL-SCNC: 3.7 MMOL/L — SIGNIFICANT CHANGE UP (ref 3.5–5.3)
PROT SERPL-MCNC: 6.4 G/DL — SIGNIFICANT CHANGE UP (ref 6–8.3)
QUANT TB PLUS MITOGEN MINUS NIL: 2.29 IU/ML — SIGNIFICANT CHANGE UP
RBC # BLD: 3.01 M/UL — LOW (ref 4.2–5.8)
RBC # FLD: 19.7 % — HIGH (ref 10.3–14.5)
SODIUM SERPL-SCNC: 137 MMOL/L — SIGNIFICANT CHANGE UP (ref 135–145)
WBC # BLD: 4.26 K/UL — SIGNIFICANT CHANGE UP (ref 3.8–10.5)
WBC # FLD AUTO: 4.26 K/UL — SIGNIFICANT CHANGE UP (ref 3.8–10.5)

## 2021-07-10 PROCEDURE — 83550 IRON BINDING TEST: CPT

## 2021-07-10 PROCEDURE — 86706 HEP B SURFACE ANTIBODY: CPT

## 2021-07-10 PROCEDURE — 97161 PT EVAL LOW COMPLEX 20 MIN: CPT

## 2021-07-10 PROCEDURE — 85730 THROMBOPLASTIN TIME PARTIAL: CPT

## 2021-07-10 PROCEDURE — 84132 ASSAY OF SERUM POTASSIUM: CPT

## 2021-07-10 PROCEDURE — 84157 ASSAY OF PROTEIN OTHER: CPT

## 2021-07-10 PROCEDURE — 83880 ASSAY OF NATRIURETIC PEPTIDE: CPT

## 2021-07-10 PROCEDURE — 82945 GLUCOSE OTHER FLUID: CPT

## 2021-07-10 PROCEDURE — 88112 CYTOPATH CELL ENHANCE TECH: CPT

## 2021-07-10 PROCEDURE — 87075 CULTR BACTERIA EXCEPT BLOOD: CPT

## 2021-07-10 PROCEDURE — 85610 PROTHROMBIN TIME: CPT

## 2021-07-10 PROCEDURE — U0005: CPT

## 2021-07-10 PROCEDURE — 74176 CT ABD & PELVIS W/O CONTRAST: CPT

## 2021-07-10 PROCEDURE — 84100 ASSAY OF PHOSPHORUS: CPT

## 2021-07-10 PROCEDURE — 90935 HEMODIALYSIS ONE EVALUATION: CPT

## 2021-07-10 PROCEDURE — 82042 OTHER SOURCE ALBUMIN QUAN EA: CPT

## 2021-07-10 PROCEDURE — 76705 ECHO EXAM OF ABDOMEN: CPT

## 2021-07-10 PROCEDURE — 82607 VITAMIN B-12: CPT

## 2021-07-10 PROCEDURE — 99285 EMERGENCY DEPT VISIT HI MDM: CPT

## 2021-07-10 PROCEDURE — 99239 HOSP IP/OBS DSCHRG MGMT >30: CPT | Mod: GC

## 2021-07-10 PROCEDURE — 96374 THER/PROPH/DIAG INJ IV PUSH: CPT

## 2021-07-10 PROCEDURE — 86803 HEPATITIS C AB TEST: CPT

## 2021-07-10 PROCEDURE — 82728 ASSAY OF FERRITIN: CPT

## 2021-07-10 PROCEDURE — 82330 ASSAY OF CALCIUM: CPT

## 2021-07-10 PROCEDURE — 85025 COMPLETE CBC W/AUTO DIFF WBC: CPT

## 2021-07-10 PROCEDURE — 86480 TB TEST CELL IMMUN MEASURE: CPT

## 2021-07-10 PROCEDURE — 82746 ASSAY OF FOLIC ACID SERUM: CPT

## 2021-07-10 PROCEDURE — 87070 CULTURE OTHR SPECIMN AEROBIC: CPT

## 2021-07-10 PROCEDURE — 83615 LACTATE (LD) (LDH) ENZYME: CPT

## 2021-07-10 PROCEDURE — 88305 TISSUE EXAM BY PATHOLOGIST: CPT

## 2021-07-10 PROCEDURE — 93005 ELECTROCARDIOGRAM TRACING: CPT

## 2021-07-10 PROCEDURE — U0003: CPT

## 2021-07-10 PROCEDURE — 86709 HEPATITIS A IGM ANTIBODY: CPT

## 2021-07-10 PROCEDURE — 71250 CT THORAX DX C-: CPT

## 2021-07-10 PROCEDURE — 36415 COLL VENOUS BLD VENIPUNCTURE: CPT

## 2021-07-10 PROCEDURE — 80048 BASIC METABOLIC PNL TOTAL CA: CPT

## 2021-07-10 PROCEDURE — 87205 SMEAR GRAM STAIN: CPT

## 2021-07-10 PROCEDURE — 84295 ASSAY OF SERUM SODIUM: CPT

## 2021-07-10 PROCEDURE — 82803 BLOOD GASES ANY COMBINATION: CPT

## 2021-07-10 PROCEDURE — 80053 COMPREHEN METABOLIC PANEL: CPT

## 2021-07-10 PROCEDURE — 86769 SARS-COV-2 COVID-19 ANTIBODY: CPT

## 2021-07-10 PROCEDURE — 86704 HEP B CORE ANTIBODY TOTAL: CPT

## 2021-07-10 PROCEDURE — 49083 ABD PARACENTESIS W/IMAGING: CPT

## 2021-07-10 PROCEDURE — 87340 HEPATITIS B SURFACE AG IA: CPT

## 2021-07-10 PROCEDURE — 85027 COMPLETE CBC AUTOMATED: CPT

## 2021-07-10 PROCEDURE — 83735 ASSAY OF MAGNESIUM: CPT

## 2021-07-10 PROCEDURE — 89051 BODY FLUID CELL COUNT: CPT

## 2021-07-10 PROCEDURE — 83540 ASSAY OF IRON: CPT

## 2021-07-10 PROCEDURE — 71045 X-RAY EXAM CHEST 1 VIEW: CPT

## 2021-07-10 PROCEDURE — 84484 ASSAY OF TROPONIN QUANT: CPT

## 2021-07-10 PROCEDURE — 86705 HEP B CORE ANTIBODY IGM: CPT

## 2021-07-10 PROCEDURE — 84443 ASSAY THYROID STIM HORMONE: CPT

## 2021-07-10 RX ADMIN — Medication 650 MILLIGRAM(S): at 05:14

## 2021-07-10 RX ADMIN — Medication 2 MILLIGRAM(S): at 10:33

## 2021-07-10 RX ADMIN — Medication 20 MILLIGRAM(S): at 05:13

## 2021-07-10 RX ADMIN — SEVELAMER CARBONATE 800 MILLIGRAM(S): 2400 POWDER, FOR SUSPENSION ORAL at 05:11

## 2021-07-10 RX ADMIN — Medication 50 MILLIGRAM(S): at 05:12

## 2021-07-10 RX ADMIN — HEPARIN SODIUM 5000 UNIT(S): 5000 INJECTION INTRAVENOUS; SUBCUTANEOUS at 05:10

## 2021-07-10 RX ADMIN — AMIODARONE HYDROCHLORIDE 200 MILLIGRAM(S): 400 TABLET ORAL at 05:15

## 2021-07-10 RX ADMIN — AMLODIPINE BESYLATE 10 MILLIGRAM(S): 2.5 TABLET ORAL at 05:11

## 2021-07-10 RX ADMIN — Medication 100 MILLIGRAM(S): at 05:17

## 2021-07-10 NOTE — PROGRESS NOTE ADULT - SUBJECTIVE AND OBJECTIVE BOX
CC: PLEURAL EFFUSION RIGHTCHRONIC KIDNEY DISEASE        INTERVAL HISTORY:  not sleeping well secondary to roommate  otherwise no new complaints      ROS: No chest pain, no sob, no abd pain. No n/v/d    PAST MEDICAL & SURGICAL HISTORY:  HTN (hypertension)    Atrial fibrillation    CKD (chronic kidney disease)    Lymphoma  t cell; remission since 11/2020    CHF, chronic    H/O pulmonary hypertension    Gout    BPH (benign prostatic hyperplasia)    COPD, mild    Mitral regurgitation    History of cholecystectomy    Elective surgery  rectal surgery        PHYSICAL EXAM:  T(C): 36.8 (07-09-21 @ 20:24), Max: 36.9 (07-09-21 @ 11:40)  HR: 66 (07-09-21 @ 20:24)  BP: 137/66 (07-09-21 @ 12:18) (137/66 - 142/81)  RR: 16 (07-09-21 @ 20:24)  SpO2: 97% (07-09-21 @ 20:24)  Wt(kg): --  I&O's Summary    09 Jul 2021 07:01  -  10 Jul 2021 07:00  --------------------------------------------------------  IN: 0 mL / OUT: 1500 mL / NET: -1500 mL      Weight 64.2 (07-05 @ 21:20)  General: AAO x 3,  NAD.  HEENT: moist mucous membranes, no pallor/cyanosis.  Neck: no JVD visible.  Cardiac: S1, S2. RRR. No murmurs   Respratory: CTA b/l, no access muscle use.   Abdomen:  distended; ascites  Skin: no rashes.  Extremities: no LE edema b/l  Access: + bruit in L AVF      DATA:                        8.5<L>  4.01  )-----------( 227      ( 09 Jul 2021 07:33 )             28.0<L>    Ferritin, Serum: 678 ng/mL *H* (07-09 @ 09:48)   Iron Total, Serum: 45 ug/dL (07-09 @ 09:48)     142    |  106    |  34<H>  ----------------------------<  73     Ca:8.9   (09 Jul 2021 07:33)  3.9     |  23     |  5.70<H>        TPro  6.6    /  Alb  3.2<L>  /  TBili  0.3    /  DBili  x      /  AST  7<L>   /  ALT  <5<L>  /  AlkPhos  117    09 Jul 2021 07:33                    MEDICATIONS  (STANDING):  allopurinol 100 milliGRAM(s) Oral two times a day  aMIOdarone    Tablet 200 milliGRAM(s) Oral daily  amLODIPine   Tablet 10 milliGRAM(s) Oral daily  aspirin enteric coated 81 milliGRAM(s) Oral daily  calcitriol   Capsule 0.25 MICROGram(s) Oral daily  doxazosin 2 milliGRAM(s) Oral <User Schedule>  heparin   Injectable 5000 Unit(s) SubCutaneous every 8 hours  metoprolol succinate ER 50 milliGRAM(s) Oral daily  sevelamer carbonate 800 milliGRAM(s) Oral every 8 hours  sodium bicarbonate 650 milliGRAM(s) Oral two times a day  torsemide 20 milliGRAM(s) Oral two times a day    MEDICATIONS  (PRN):  acetaminophen    Suspension .. 650 milliGRAM(s) Oral every 6 hours PRN Mild Pain (1 - 3), Moderate Pain (4 - 6)

## 2021-07-10 NOTE — PROGRESS NOTE ADULT - PROBLEM SELECTOR PLAN 2
on Metoprolol, Norvasc, and Torsemide  hold on HD days so better UF can be achieved without hypotensive episodes

## 2021-07-10 NOTE — PROGRESS NOTE ADULT - ASSESSMENT
65 y/o M w/ PMH HTN, BPH, T cell Lymphoma (on chemo), COPD, Stage 5 CKD (pending starting HD), Afib not on Eliquis, pulmonary HTN, CHF (EF was 40%, most recently 65-70%), L chest wall hematoma s/p evacuation (12/2020) and VATS (01/2021) presented to the ED with complaints of progressively worsening SOB, cough, abdominal discomfort/distention   hemodialysis initiated on this admission

## 2021-07-10 NOTE — PROGRESS NOTE ADULT - REASON FOR ADMISSION
Shortness of breath, abdominal distention, cough

## 2021-07-10 NOTE — PROGRESS NOTE ADULT - PROVIDER SPECIALTY LIST ADULT
Internal Medicine
Nephrology
Internal Medicine

## 2021-07-10 NOTE — DISCHARGE NOTE NURSING/CASE MANAGEMENT/SOCIAL WORK - NSDCFUADDAPPT_GEN_ALL_CORE_FT
You are scheduled for dialysis at [address] [place] [address] on 7/12 and 7/13  Please call your oncologist for an appointment

## 2021-07-10 NOTE — PROGRESS NOTE ADULT - PROBLEM SELECTOR PLAN 1
for third hemodialysis session today for further fluid removal  plan for UF ~1.5L as tolerated for ascites

## 2021-07-10 NOTE — DISCHARGE NOTE NURSING/CASE MANAGEMENT/SOCIAL WORK - PATIENT PORTAL LINK FT
You can access the FollowMyHealth Patient Portal offered by [facility] by registering at the following website: [email or web address]. By joining [facility]’s FollowMyHealth portal, you will also be able to view your health information using other applications (apps) compatible with our system.

## 2021-07-11 LAB
CULTURE RESULTS: NO GROWTH — SIGNIFICANT CHANGE UP
SPECIMEN SOURCE: SIGNIFICANT CHANGE UP

## 2021-07-12 LAB — NON-GYNECOLOGICAL CYTOLOGY STUDY: SIGNIFICANT CHANGE UP

## 2021-07-15 ENCOUNTER — TRANSCRIPTION ENCOUNTER (OUTPATIENT)
Age: 66
End: 2021-07-15

## 2021-07-20 ENCOUNTER — APPOINTMENT (OUTPATIENT)
Dept: NEPHROLOGY | Facility: CLINIC | Age: 66
End: 2021-07-20
Payer: MEDICARE

## 2021-07-20 VITALS — HEART RATE: 62 BPM | DIASTOLIC BLOOD PRESSURE: 76 MMHG | SYSTOLIC BLOOD PRESSURE: 130 MMHG

## 2021-07-20 PROBLEM — I34.0 NONRHEUMATIC MITRAL (VALVE) INSUFFICIENCY: Chronic | Status: ACTIVE | Noted: 2021-07-05

## 2021-07-20 PROBLEM — J44.9 CHRONIC OBSTRUCTIVE PULMONARY DISEASE, UNSPECIFIED: Chronic | Status: ACTIVE | Noted: 2021-07-05

## 2021-07-20 PROCEDURE — 99072 ADDL SUPL MATRL&STAF TM PHE: CPT

## 2021-07-20 PROCEDURE — 99213 OFFICE O/P EST LOW 20 MIN: CPT

## 2021-07-20 RX ORDER — SODIUM BICARBONATE 650 MG/1
650 TABLET ORAL TWICE DAILY
Qty: 180 | Refills: 1 | Status: DISCONTINUED | COMMUNITY
Start: 2021-03-01 | End: 2021-07-20

## 2021-07-20 RX ORDER — CALCITRIOL 0.25 UG/1
0.25 CAPSULE, LIQUID FILLED ORAL
Qty: 90 | Refills: 3 | Status: DISCONTINUED | COMMUNITY
End: 2021-07-20

## 2021-07-20 NOTE — PHYSICAL EXAM
[General Appearance - In No Acute Distress] : in no acute distress [General Appearance - Alert] : alert [General Appearance - Well Nourished] : well nourished [General Appearance - Well Developed] : well developed [Heart Sounds] : normal S1 and S2 [Heart Sounds Gallop] : no gallops [Abnormal Walk] : normal gait [___ (cm) Fistula] : [unfilled] (cm) fistula [Bruit] : a bruit was present [Thrill] : a thrill was present [Skin Turgor] : normal skin turgor [] : no rash [Oriented To Time, Place, And Person] : oriented to person, place, and time [Affect] : the affect was normal [Impaired Insight] : insight and judgment were intact [Memory Recent] : recent memory was not impaired [FreeTextEntry1] : left forearm

## 2021-07-20 NOTE — ASSESSMENT
[FreeTextEntry1] : Reviewed all labs in detail with patient and his wife from 7/19/2021\par 65 yo man with HTN, ESRD, hyperuricemia, CHF, T cell lymphoma\par -HTN- BP controlled- c/w amlodipine doxazosin and metoprolol- if BP \par adjust other meds- \par dc bicarbonate, dc calcitriol; \par - hyperuricemia/gout -  no attacks- continues to have hyperuricemia due to lymphoma- c/w high dose allopurinol and use of colchicine daily \par \par \par f/u in 2 months\par

## 2021-07-20 NOTE — HISTORY OF PRESENT ILLNESS
[FreeTextEntry1] : 67 yo man here for f/u evaluation of HTN,  sCHF w/ EF of 40-45%, hyperuricemia and BPH.\par reached ESRD and started hemodialysis earlier this month.\par Says his appetite is improved\par breathing improved- was SOB- had pleural effusions\par +AFib continues on amiodarone and baby ASA\par h/o T cell lymphoma, s/p LVATS 1/2021, AFib, pulmHTN\par using torsemide every few days\par No recent gout attacks -- remains on high dose allopurinol and colchicine (T cell lymphoma)\par \par  from prior note:\par 1/21/2021- LVATS, RA decortication with blow hole placement for SQ emphysema; right pleural effusion that needed pigtail drainage.\par \par \par PMH: w/ PMHx of \par  T cell lymphoma  since early 2000's \par has not needed additional chemotherapy since 11/2020 (T-cell lymphoma)-

## 2021-07-29 ENCOUNTER — APPOINTMENT (OUTPATIENT)
Dept: VASCULAR SURGERY | Facility: CLINIC | Age: 66
End: 2021-07-29
Payer: MEDICARE

## 2021-07-29 VITALS
WEIGHT: 168 LBS | BODY MASS INDEX: 23.52 KG/M2 | HEART RATE: 64 BPM | DIASTOLIC BLOOD PRESSURE: 51 MMHG | HEIGHT: 71 IN | SYSTOLIC BLOOD PRESSURE: 91 MMHG

## 2021-07-29 PROCEDURE — 93990 DOPPLER FLOW TESTING: CPT

## 2021-07-29 PROCEDURE — 99213 OFFICE O/P EST LOW 20 MIN: CPT | Mod: 25

## 2021-07-29 NOTE — END OF VISIT
[FreeTextEntry3] : I, Dr. Amadou Cisneros have read and attest that all the information, medical decision making and discharge instructions within are true and accurate. I personally performed the evaluation and management (E/M) services for this established patient who presents today with (a) new problem(s)/exacerbation of (an) existing condition(s).  That E/M includes conducting the examination, assessing all new/exacerbated conditions, and establishing a new plan of care.  Today, my ACP, Rosalia Callahan PA-C, was here to observe my evaluation and management services for this new problem/exacerbated condition to be followed going forward.\par

## 2021-07-29 NOTE — HISTORY OF PRESENT ILLNESS
[FreeTextEntry1] : 65 year old male s/p left arm radiocephalic AVF 6/12/2020. Followed by Dr. Guadalupe, now on dialysis starting 7/8/21. Getting chemo weekly, does have some dizziness. Dialysis center called and mentioned some issues with access. They were having some trouble accessing and he was developing bruising. They had him come on special days with good technicians and they do not have problem accessing it.

## 2021-07-29 NOTE — PHYSICAL EXAM
[Respiratory Effort] : normal respiratory effort [Normal Heart Sounds] : normal heart sounds [2+] : left 2+ [Calm] : calm [de-identified] : well appearing [de-identified] : Left arm AVF is well healing, palpable veil, good thrill.

## 2021-07-29 NOTE — ASSESSMENT
[FreeTextEntry1] : 65 year old male s/p left arm radiocepahlic AVF 6/12/2020. Followed by Dr. Guadalupe, now on dialysis. Getting chemo weekly, does have some dizziness. Dialysis center called and mentioned some issues with access. \par \par US done showing patent AVF, no flow restrictive lesions. Recommend continue to use the AVF, have specialized technician due his sessions from now on. FU here as needed.

## 2021-08-05 ENCOUNTER — RESULT CHARGE (OUTPATIENT)
Age: 66
End: 2021-08-05

## 2021-08-09 ENCOUNTER — NON-APPOINTMENT (OUTPATIENT)
Age: 66
End: 2021-08-09

## 2021-08-09 ENCOUNTER — APPOINTMENT (OUTPATIENT)
Dept: HEART AND VASCULAR | Facility: CLINIC | Age: 66
End: 2021-08-09
Payer: MEDICARE

## 2021-08-09 ENCOUNTER — OUTPATIENT (OUTPATIENT)
Dept: OUTPATIENT SERVICES | Facility: HOSPITAL | Age: 66
LOS: 1 days | End: 2021-08-09
Payer: MEDICARE

## 2021-08-09 VITALS
DIASTOLIC BLOOD PRESSURE: 76 MMHG | BODY MASS INDEX: 23.52 KG/M2 | HEART RATE: 60 BPM | WEIGHT: 168 LBS | SYSTOLIC BLOOD PRESSURE: 144 MMHG | HEIGHT: 71 IN

## 2021-08-09 DIAGNOSIS — Z90.49 ACQUIRED ABSENCE OF OTHER SPECIFIED PARTS OF DIGESTIVE TRACT: Chronic | ICD-10-CM

## 2021-08-09 DIAGNOSIS — Z41.9 ENCOUNTER FOR PROCEDURE FOR PURPOSES OTHER THAN REMEDYING HEALTH STATE, UNSPECIFIED: Chronic | ICD-10-CM

## 2021-08-09 PROCEDURE — 99214 OFFICE O/P EST MOD 30 MIN: CPT

## 2021-08-09 PROCEDURE — 71046 X-RAY EXAM CHEST 2 VIEWS: CPT | Mod: 26

## 2021-08-09 PROCEDURE — 71046 X-RAY EXAM CHEST 2 VIEWS: CPT

## 2021-08-09 PROCEDURE — 93000 ELECTROCARDIOGRAM COMPLETE: CPT

## 2021-08-09 RX ORDER — DOXAZOSIN 2 MG/1
2 TABLET ORAL
Qty: 180 | Refills: 3 | Status: DISCONTINUED | COMMUNITY
End: 2021-08-09

## 2021-08-09 RX ORDER — TORSEMIDE 20 MG/1
20 TABLET ORAL DAILY
Refills: 0 | Status: DISCONTINUED | COMMUNITY
Start: 2021-02-16 | End: 2021-08-09

## 2021-08-10 NOTE — HISTORY OF PRESENT ILLNESS
[FreeTextEntry1] : Mr. Gamez is a pleasant 66 year-old gentleman with a past medical history significant for HTN, COPD, CKD, T-cell lymphoma (diagnosed 19 years ago, in remission since 11/2020) systolic CHF (EF 40-45%), severe pHTN, severe MR, atrial fibrillation, who underwent a left chest wall hematoma evacuation on 12/18/2020 secondary to loculated effusion. Hospital course at that time was complicated by AF with RVR.  He is now s/p left VATS, RA decortication on 1/22/21.  He was been maintained on Amiodarone for maintenance of sinus rhythm.  Amiodarone was briefly stopped after his visit on 3/8/21 as he was maintaining sinus rhythm.  He was admitted to [facility] 3/16/21 with a spontaneous gluteal hemorrhage.  Eliquis was discontinued and Amiodarone resumed.  By his report he has never undergone DCCV, ablation or previously been on AAD therapy.  \par \par Interval history significant for progression to ESRD now on HD M/W/F (Left AV Fistula).  He is back on weekly chemotherapy (Dr. Bach) for management of t-Cell lymphoma.\par \par He underwent JORGE 5/18/21 to evaluate candidacy for the Watchman device and presents for follow-up.\par \par He denies any active CP, palpitations, dizziness, presyncope or syncope. \par \par Echo 1/2021 showed EF 65-70%, mild to moderate MR\par

## 2021-08-10 NOTE — PHYSICAL EXAM
[General Appearance - Well Developed] : well developed [Normal Appearance] : normal appearance [Well Groomed] : well groomed [General Appearance - Well Nourished] : well nourished [No Deformities] : no deformities [General Appearance - In No Acute Distress] : no acute distress [Eyelids - No Xanthelasma] : the eyelids demonstrated no xanthelasmas [Normal Oral Mucosa] : normal oral mucosa [No Oral Pallor] : no oral pallor [No Oral Cyanosis] : no oral cyanosis [Normal Jugular Venous A Waves Present] : normal jugular venous A waves present [Normal Jugular Venous V Waves Present] : normal jugular venous V waves present [No Jugular Venous Loyola A Waves] : no jugular venous loyola A waves [Respiration, Rhythm And Depth] : normal respiratory rhythm and effort [Exaggerated Use Of Accessory Muscles For Inspiration] : no accessory muscle use [Auscultation Breath Sounds / Voice Sounds] : lungs were clear to auscultation bilaterally [5th Left ICS - MCL] : palpated at the 5th LICS in the midclavicular line [Normal] : normal [Bradycardia] : bradycardic [Rhythm Regular] : regular [No Pitting Edema] : no pitting edema present [Abdomen Soft] : soft [Abdomen Tenderness] : non-tender [Abdomen Mass (___ Cm)] : no abdominal mass palpated [Abnormal Walk] : normal gait [Gait - Sufficient For Exercise Testing] : the gait was sufficient for exercise testing [Nail Clubbing] : no clubbing of the fingernails [Cyanosis, Localized] : no localized cyanosis [Petechial Hemorrhages (___cm)] : no petechial hemorrhages [Skin Color & Pigmentation] : normal skin color and pigmentation [] : no rash [No Venous Stasis] : no venous stasis [Skin Lesions] : no skin lesions [No Skin Ulcers] : no skin ulcer [No Xanthoma] : no  xanthoma was observed [Oriented To Time, Place, And Person] : oriented to person, place, and time [Affect] : the affect was normal [Mood] : the mood was normal [No Anxiety] : not feeling anxious [Ill-Appearing] : ill-appearing [Normal Conjunctiva] : normal conjunctiva [No Carotid Bruit] : no carotid bruit [Normal S1, S2] : normal S1, S2 [No Rub] : no rub [No Gallop] : no gallop [Clear Lung Fields] : clear lung fields [Soft] : abdomen soft [Non Tender] : non-tender [Normal Bowel Sounds] : normal bowel sounds [Normal Gait] : normal gait [No Edema] : no edema [Moves all extremities] : moves all extremities [Alert and Oriented] : alert and oriented [Normal memory] : normal memory

## 2021-08-10 NOTE — DISCUSSION/SUMMARY
[FreeTextEntry1] : Mr. Gamez is a 66 year-old gentleman with paroxysmal atrial fibrillation.  He is maintaining sinus rhythm on low dose Amiodarone 200 mg daily. The risks of long-term use of Amiodarone were discussed in detail and he knows to have routine eye exams.  Given his history of spontaneous bleeding on systemic anticoagulation, we discussed the indication for the Watchman device for TE/CVA risk reduction and he is an appropriate candidate based upon his JORGE findings.  Advised to start Plavix 75 mg daily.  If he is able to tolerate ASA / Plavix for two weeks without any bleeding issues, we will schedule the Watchman procedure.  He knows to call with any questions or concerns in the interim.

## 2021-08-10 NOTE — CARDIOLOGY SUMMARY
[___] : [unfilled] [de-identified] : 8/10/21 SR @ 61 bpm  [de-identified] : JORGE 5/18/21 \par 1. Normal left ventricular size and systolic function. \par  2. Dilated right ventricular size. Normal right ventricular systolic function. \par  3. Dilated left and right atrium. \par  4. No thrombus seen in the left atrium or in the left atrial appendage. Spectral Doppler reveals normal left \par atrial appendage velocities. Left atrial appendage orifice measures 2.86 cm x 2.07 cm. \par  5. No evidence of an intracardiac shunt. \par  6. Mild aortic regurgitation. \par  7. Mild prolapse at P1 and lateral commissure of the mitral valve. \par  8. Moderate mitral regurgitation. \par  9. Moderate tricuspid regurgitation. \par 10. Pulmonary hypertension present, pulmonary artery systolic pressure is at least 47 mmHg. \par 11. Trivial pericardial effusion. \par 12. Aortic root is dilated measuring 4.12 cm. Proximal ascending aorta is dilated measuring 3.95 cm. \par 13. Mild to moderate plaque seen in the visualized portion of the descending aorta.

## 2021-08-16 ENCOUNTER — APPOINTMENT (OUTPATIENT)
Dept: PULMONOLOGY | Facility: CLINIC | Age: 66
End: 2021-08-16

## 2021-08-31 ENCOUNTER — APPOINTMENT (OUTPATIENT)
Dept: HEART AND VASCULAR | Facility: CLINIC | Age: 66
End: 2021-08-31
Payer: MEDICARE

## 2021-08-31 VITALS
HEIGHT: 71 IN | OXYGEN SATURATION: 98 % | SYSTOLIC BLOOD PRESSURE: 119 MMHG | TEMPERATURE: 98 F | HEART RATE: 64 BPM | DIASTOLIC BLOOD PRESSURE: 67 MMHG

## 2021-08-31 PROCEDURE — 99214 OFFICE O/P EST MOD 30 MIN: CPT

## 2021-08-31 NOTE — DISCUSSION/SUMMARY
[Atrial Fibrillation] : atrial fibrillation [Rate Control] : rate control [Anticoagulation] : anticoagulation [Systolic Heart Failure] : systolic heart failure [Responding to Treatment] : responding to treatment [Outpatient Evaluation] : outpatient evaluation [ECG] : ECG [Echocardiogram] : echocardiogram [Hypertension] : hypertension [Stable] : stable

## 2021-08-31 NOTE — REASON FOR VISIT
[Follow-Up - Clinic] : a clinic follow-up of [Cardiomyopathy] : cardiomyopathy [FreeTextEntry1] : Onc- Dr Vel Dimas  [phone]\par  Renal- Dr Denton\par Struc Hrt- Dr Cui\par HF- Hilaria Nunez\par EP- Dr Herndon\par Vasc- Dr Cisneros\par Pulm- Dr Johnson

## 2021-08-31 NOTE — PHYSICAL EXAM
[General Appearance - Well Developed] : well developed [Normal Appearance] : normal appearance [Well Groomed] : well groomed [No Deformities] : no deformities [General Appearance - In No Acute Distress] : no acute distress [Normal Conjunctiva] : the conjunctiva exhibited no abnormalities [Eyelids - No Xanthelasma] : the eyelids demonstrated no xanthelasmas [Respiration, Rhythm And Depth] : normal respiratory rhythm and effort [Exaggerated Use Of Accessory Muscles For Inspiration] : no accessory muscle use [Auscultation Breath Sounds / Voice Sounds] : lungs were clear to auscultation bilaterally [Heart Rate And Rhythm] : heart rate and rhythm were normal [Heart Sounds] : normal S1 and S2 [Abdomen Soft] : soft [Abdomen Tenderness] : non-tender [Abdomen Mass (___ Cm)] : no abdominal mass palpated [Abnormal Walk] : normal gait [Gait - Sufficient For Exercise Testing] : the gait was sufficient for exercise testing [Nail Clubbing] : no clubbing of the fingernails [Cyanosis, Localized] : no localized cyanosis [Petechial Hemorrhages (___cm)] : no petechial hemorrhages [Skin Color & Pigmentation] : normal skin color and pigmentation [] : no rash [No Venous Stasis] : no venous stasis [Skin Lesions] : no skin lesions [No Skin Ulcers] : no skin ulcer [No Xanthoma] : no  xanthoma was observed [FreeTextEntry1] : no edema, AVF left arm

## 2021-08-31 NOTE — HISTORY OF PRESENT ILLNESS
[FreeTextEntry1] : 67 y/o M with HFpEF initially met but HF team on recent admission at [facility] and now presents for follow up. Other PMH is notable for HTN, COPD, CKD (stage 5 CKD, has LUE AVF, not on HD yet), T-cell lymphoma (diagnosed 19 years ago, on chemo romidepsin every Wednesday (not since Nov)), AF (on Eliquis), systolic CHF (EF 40-45%), EF was 60-65 in 2019,severe pHTN, severe MR, who recently underwent a left chest wall hematoma evacuation on 12/18/2020 secondary to loculated effusion. Hospital course at that time was complicated by AF with RVR and thoracic surgery consulted at that time for surgical intervention of trapped lung. He followed up as an outpatient after discharge and deemed a good surgical candidate for lung decortication. On 1/18/21 patient presented to [facility] for pre-operative optimization with heparin gtt prior to OR. Upon admission pt noted to have erythema and swelling to his L knee, CT scan demonstrated cellulitis, operation was deferred. Ortho, Gen Surg and ID were consulted, pt with no drainable collection and was medically managed with resolution of infection. Nephrology was consulted for assistance in managing CKD stage 4. On 1/22 he underwent an uncomplicated L VATS, RA decortication with blow hole placement for subcutaneous emphysema. Intraop findings significant for loculated hemothorax. He received 3UPRBC in the OR and post operatively was brought to the CTICU stable and intubated with 3 chest tubes in place. He was extubated POD1 and required primacor for pulmonary HTN and renal perfusion. On POD 2 a right sided pigtail was placed for pleural effusion which drained 800cc and ultimately removed on POD 4. Cardiology was consulted for management of pulmonary hypertension and CHF, recommended IV diuresis. He required alternating BiPAP and HFNC which was weaned to NC. Primacor was weaned off on POD 4 and he was transferred to the stepdown unit. Heparin gtt was started for AC but was held for bleeding around the CT site. A CT chest noncon was preformed showing post surgical changes, no concern for hemothorax and heparin gtt resumed. On POD 5 first CT was removed. Bronch with no abnormal findings, Second chest tube subsequently removed and added nifedipine Xl 30mg for BP control on POD 6. POD 7 third chest tube removed, post chest tube removal xray showed no pneumothorax. \par \par \par 12/28/20  In [facility] 12/17- 12/23 with a chest wall hematoma, Rapid AF.  EF good but severe MR and TR.  In NSR by exam and EKG .  I was later informed he might need a decortification of his pleura(left). \par 3/1/21 Hospitalized for VATS 1/2021 as above. BNP 15,500, Cr 5.5,  Echo was done 1/2021, EF back to NL and MR mild to moderate, mod to severe TR and PAP 67.   Got the Covid Vaccine.  Here he is in NSR and generally doing well considering how complex he is.\par 4/15/21  Off Eliquis 3/17/21 due to a 2nd bleed on the buttocks.  Amio was stopped March 8, 2021\par 8/31/21 On ASA 81 and Plavix 75, to consider a Watchman, on HD.  Edema resolved\par \par EKG: NSR with 1st degree AVB. No ST-Tw abnormalities. 12/28/20\par EKG: NSR, IVCD, PRWP, 3/1/21\par \par

## 2021-08-31 NOTE — ASSESSMENT
[FreeTextEntry1] : Systolic CHF - EF recovered, normal on June 2019 echo.  Secondary to chemo?  Then 40-45% on  admission in Dec 2020, then improved to 60-65 on last admission in January..  \par \par MR- Was severe, now mild to moderate and EF improved.  Followed by Dr Cui\par \par Afib - in NSR today, no longer on Eliquis due to 2 bleeds and Amio.  ZIO patch pending.  I favor ASA and resuming Amio. Referred to Pulm to be monitored for Amio Pulm toxicity.  The patient's [id number] score = 2.  He is considering a Watchman device\par \par HTN- Stable\par \par HLD- not on statin\par \par T Cell Lymphoma - remains on chemo, \par \par CKD- Dr Denton, now on HD\par \par PreOp- Pt for decortication.  Cleared to proceed.  Would stop Eliquis 3 days prior to surgery with surgery on the 4th day. See beginning of this note for entire hospital stay.\par \par  \par \par \par \par

## 2021-09-14 ENCOUNTER — APPOINTMENT (OUTPATIENT)
Dept: HEART AND VASCULAR | Facility: CLINIC | Age: 66
End: 2021-09-14
Payer: MEDICARE

## 2021-09-14 VITALS
BODY MASS INDEX: 20.02 KG/M2 | SYSTOLIC BLOOD PRESSURE: 107 MMHG | DIASTOLIC BLOOD PRESSURE: 66 MMHG | HEART RATE: 60 BPM | OXYGEN SATURATION: 100 % | HEIGHT: 71 IN | WEIGHT: 143 LBS

## 2021-09-14 PROCEDURE — 99214 OFFICE O/P EST MOD 30 MIN: CPT

## 2021-09-14 NOTE — REASON FOR VISIT
[Follow-Up - Clinic] : a clinic follow-up of [Heart Failure] : congestive heart failure [FreeTextEntry1] : \par \par

## 2021-09-14 NOTE — PHYSICAL EXAM
[General Appearance - Well Developed] : well developed [Normal Appearance] : normal appearance [Well Groomed] : well groomed [General Appearance - Well Nourished] : well nourished [General Appearance - In No Acute Distress] : no acute distress [Eyelids - No Xanthelasma] : the eyelids demonstrated no xanthelasmas [] : no respiratory distress [Respiration, Rhythm And Depth] : normal respiratory rhythm and effort [Auscultation Breath Sounds / Voice Sounds] : lungs were clear to auscultation bilaterally [Edema] : no peripheral edema present [Heart Rate And Rhythm] : heart rate and rhythm were normal [Bowel Sounds] : normal bowel sounds [Abdomen Soft] : soft [Abdomen Tenderness] : non-tender [Abnormal Walk] : normal gait [Skin Color & Pigmentation] : normal skin color and pigmentation [Skin Turgor] : normal skin turgor [Oriented To Time, Place, And Person] : oriented to person, place, and time [Impaired Insight] : insight and judgment were intact [Affect] : the affect was normal [No Anxiety] : not feeling anxious [FreeTextEntry1] : L arm AV fistula

## 2021-09-14 NOTE — HISTORY OF PRESENT ILLNESS
[FreeTextEntry1] : 65 y/o M with HFpEF who presents for routine follow up. Other PMH is notable for HTN, COPD, ESRD (stage 5 CKD, has LUE AVF,), T-cell lymphoma (diagnosed 19 years ago, on chemo romidepsin every Wednesday (not since Nov), AF (on Eliquis), systolic CHF (EF 40-45%), severe pHTN, severe MR, who recently underwent a left chest wall hematoma evacuation on 12/18/2020 secondary to loculated effusion & L VATS, RA decortication with blow hole placement (1/2021) for subQ emphysema (intraop findings c/w loculated hemothorax) requiring chest tubes and 3U PRBCs. \par \par Since seen last 6/15/21, he remains on weekly Chemotherapy and has lost ~30#s. His CKD progressed to ESRD and is now on HD M/W/F since July followed by Dr. Guadalupe. He was also started on Plavix 8/9 by Dr. Herndon (already on aspirin) and will see Dr. Cui in October to decide on the Watchman device. \par \par Currently, he is feeling well overall and has no complaints.  His breathing is much better and he can walk a 2-3 blocks on flat ground and no longer has any swelling. He does not use stairs. He denies CP,SOB at rest, orthopnea, PND, LH/dizziness, abdominal discomfort, palpitations and syncope. His appetite is so so and depends on the chemo/HD days. He still makes a very small amount of urine.  \par \par

## 2021-09-14 NOTE — CARDIOLOGY SUMMARY
[___] : [unfilled] [de-identified] : 5/18/21 JORGE: Normal LV size/function, RVE with normal function, LAE, no thrombus seen, GARETH orifice measures 2.86cmX 2.07cm, no e/o intracardiac shunt, mild AI, mild prolapse P1 and alteral commissure of the mitral valve, moderate MR, moderate TR, PH present, PASP 47, AR 4.12cm, SANTANA 3/95cm, mild to moderate plaque seen in the visualized portion of the descenging arota.

## 2021-09-29 ENCOUNTER — TRANSCRIPTION ENCOUNTER (OUTPATIENT)
Age: 66
End: 2021-09-29

## 2021-10-06 ENCOUNTER — APPOINTMENT (OUTPATIENT)
Dept: HEART AND VASCULAR | Facility: CLINIC | Age: 66
End: 2021-10-06
Payer: MEDICARE

## 2021-10-06 VITALS
WEIGHT: 152 LBS | SYSTOLIC BLOOD PRESSURE: 109 MMHG | OXYGEN SATURATION: 100 % | DIASTOLIC BLOOD PRESSURE: 67 MMHG | HEIGHT: 71 IN | BODY MASS INDEX: 21.28 KG/M2 | HEART RATE: 65 BPM

## 2021-10-06 PROCEDURE — 99214 OFFICE O/P EST MOD 30 MIN: CPT

## 2021-10-06 NOTE — HISTORY OF PRESENT ILLNESS
[FreeTextEntry1] : 66 year old male with a past medical history of HTN, ESRD (has LUE AVF,on HD MWF), T Cell lymphoma (diagnosed about 19 years ago, now in remission), AFib (off eliquis since spontaneous bleeds),HFpEF (followed by CHF team), mitral regurgitation, severe pHTN, who underwent a right chest wall hematoma evacuation on 12/18/20 and a  bronchoscopy, LVATS, robotic assisted drainage of pleural effusion, decortication, and pleurodesis (1/22/21) who presents today for rediscussion of potential left atrial appendage closure using the Watchman device. \par Since starting HD, pt feels much better, NYHA II\par His most recent JORGE in May showed moderate MR\par He was recently started on ASA/Plavix and has no bleeding events.\par \par

## 2021-10-06 NOTE — CARDIOLOGY SUMMARY
[de-identified] : 2/16/21 ECG: SR at 61bpm, first degree AV block  [de-identified] : 5/18/21 JORGE: Normal LV size/function, RVE with normal function, LAE, no thrombus seen, GARETH orifice measures 2.86cmX 2.07cm, no e/o intracardiac shunt, mild AI, mild prolapse P1 and alteral commissure of the mitral valve, moderate MR, moderate TR, PH present, PASP 47, AR 4.12cm, SANTANA 3/95cm, mild to moderate plaque seen in the visualized portion of the descenging arota. \par

## 2021-10-06 NOTE — REVIEW OF SYSTEMS
[Fever] : no fever [Blurry Vision] : no blurred vision [Dyspnea on exertion] : dyspnea during exertion [Chest Discomfort] : no chest discomfort [Palpitations] : no palpitations [Orthopnea] : no orthopnea [Cough] : no cough [Abdominal Pain] : no abdominal pain [Urinary Frequency] : no change in urinary frequency [Erectile Dysfunction] : no erectile dysfunction [Joint Pain] : no joint pain [Myalgia] : no myalgia [Rash] : no rash [Dizziness] : no dizziness [Convulsions] : no convulsions [Confusion] : no confusion was observed [Under Stress] : not under stress [Easy Bleeding] : no tendency for easy bleeding

## 2021-10-06 NOTE — PHYSICAL EXAM
[General Appearance - Well Developed] : well developed [General Appearance - Well Nourished] : well nourished [Normal Oral Mucosa] : normal oral mucosa [Normal Oropharynx] : normal oropharynx [Normal Jugular Venous V Waves Present] : normal jugular venous V waves present [Respiration, Rhythm And Depth] : normal respiratory rhythm and effort [Auscultation Breath Sounds / Voice Sounds] : lungs were clear to auscultation bilaterally [Chest Palpation] : palpation of the chest revealed no abnormalities [Lungs Percussion] : the lungs were normal to percussion [Arterial Pulses Normal] : the arterial pulses were normal [Abdomen Tenderness] : non-tender [Abnormal Walk] : normal gait [Skin Turgor] : normal skin turgor [] : no rash [Oriented To Time, Place, And Person] : oriented to person, place, and time [Impaired Insight] : insight and judgment were intact [Well Developed] : well developed [Well Nourished] : well nourished [Normal Venous Pressure] : normal venous pressure [No Murmur] : no murmur [Clear Lung Fields] : clear lung fields [Good Air Entry] : good air entry [No Respiratory Distress] : no respiratory distress  [Soft] : abdomen soft [Non Tender] : non-tender [No Masses/organomegaly] : no masses/organomegaly [Normal Gait] : normal gait [No Edema] : no edema [No Cyanosis] : no cyanosis [No Rash] : no rash [No Skin Lesions] : no skin lesions [Moves all extremities] : moves all extremities [No Focal Deficits] : no focal deficits [Alert and Oriented] : alert and oriented [Normal memory] : normal memory [FreeTextEntry1] : irregular, +systolic murmur

## 2021-10-06 NOTE — ASSESSMENT
[FreeTextEntry1] : 66 year old male with a past medical history of HTN, ESRD (has LUE AVF,on HD MWF), T Cell lymphoma (diagnosed about 19 years ago, now in remission), AFib (off eliquis since spontaneous bleeds),HFpEF (followed by CHF team), mitral regurgitation, severe pHTN, who underwent a right chest wall hematoma evacuation on 12/18/20 and a  bronchoscopy, LVATS, robotic assisted drainage of pleural effusion, decortication, and pleurodesis (1/22/21) who presents today for rediscussion of potential left atrial appendage closure using the Watchman device. \par Since starting HD, pt feels much better, NYHA II symptoms\par His most recent JORGE in May showed moderate MR\par He was recently started on ASA/Plavix and has no bleeding events.\par \par Plan:\par Mitral Regurgitation- medical management (moderate on JORGE, mild-mod on TTE in May). Will repeat imaging when comes in for LAAO\par ESRD on HD- f/u renal recs\par Afib- now on ASA/Plavix with no bleeding. I therefore feel that he could tolerate short term anticoagulation and that this is a good opportunity for LAAO with the watchman device. Benefits/risks discussed, JORGE reviewed and pt/wife would like to pursue LAAO.\par -HFpEF- euvolemic on exam, f/u with CHF team\par HTN- BP well controlled\par

## 2021-10-18 ENCOUNTER — NON-APPOINTMENT (OUTPATIENT)
Age: 66
End: 2021-10-18

## 2021-10-18 VITALS
RESPIRATION RATE: 17 BRPM | TEMPERATURE: 97 F | OXYGEN SATURATION: 99 % | HEIGHT: 71 IN | WEIGHT: 151.9 LBS | DIASTOLIC BLOOD PRESSURE: 69 MMHG | SYSTOLIC BLOOD PRESSURE: 130 MMHG | HEART RATE: 60 BPM

## 2021-10-18 RX ORDER — CALCITRIOL 0.5 UG/1
1 CAPSULE ORAL
Qty: 0 | Refills: 0 | DISCHARGE

## 2021-10-18 RX ORDER — SODIUM CHLORIDE 9 MG/ML
3 INJECTION INTRAMUSCULAR; INTRAVENOUS; SUBCUTANEOUS EVERY 8 HOURS
Refills: 0 | Status: DISCONTINUED | OUTPATIENT
Start: 2021-10-19 | End: 2021-10-20

## 2021-10-19 ENCOUNTER — INPATIENT (INPATIENT)
Facility: HOSPITAL | Age: 66
LOS: 0 days | Discharge: HOME | DRG: 273 | End: 2021-10-20
Attending: INTERNAL MEDICINE | Admitting: INTERNAL MEDICINE
Payer: MEDICARE

## 2021-10-19 DIAGNOSIS — Z41.9 ENCOUNTER FOR PROCEDURE FOR PURPOSES OTHER THAN REMEDYING HEALTH STATE, UNSPECIFIED: Chronic | ICD-10-CM

## 2021-10-19 DIAGNOSIS — Z90.49 ACQUIRED ABSENCE OF OTHER SPECIFIED PARTS OF DIGESTIVE TRACT: Chronic | ICD-10-CM

## 2021-10-19 LAB
A1C WITH ESTIMATED AVERAGE GLUCOSE RESULT: 4.7 % — SIGNIFICANT CHANGE UP (ref 4–5.6)
ALBUMIN SERPL ELPH-MCNC: 3.3 G/DL — SIGNIFICANT CHANGE UP (ref 3.3–5)
ALBUMIN SERPL ELPH-MCNC: 3.5 G/DL — SIGNIFICANT CHANGE UP (ref 3.3–5)
ALBUMIN SERPL ELPH-MCNC: 4.1 G/DL — SIGNIFICANT CHANGE UP (ref 3.3–5)
ALP SERPL-CCNC: 127 U/L — HIGH (ref 40–120)
ALP SERPL-CCNC: 129 U/L — HIGH (ref 40–120)
ALP SERPL-CCNC: 170 U/L — HIGH (ref 40–120)
ALT FLD-CCNC: 14 U/L — SIGNIFICANT CHANGE UP (ref 10–45)
ALT FLD-CCNC: 8 U/L — LOW (ref 10–45)
ALT FLD-CCNC: 9 U/L — LOW (ref 10–45)
ANION GAP SERPL CALC-SCNC: 12 MMOL/L — SIGNIFICANT CHANGE UP (ref 5–17)
ANION GAP SERPL CALC-SCNC: 13 MMOL/L — SIGNIFICANT CHANGE UP (ref 5–17)
ANION GAP SERPL CALC-SCNC: 15 MMOL/L — SIGNIFICANT CHANGE UP (ref 5–17)
ANISOCYTOSIS BLD QL: SLIGHT — SIGNIFICANT CHANGE UP
APPEARANCE UR: CLEAR — SIGNIFICANT CHANGE UP
APTT BLD: 30.8 SEC — SIGNIFICANT CHANGE UP (ref 27.5–35.5)
APTT BLD: 37.3 SEC — HIGH (ref 27.5–35.5)
APTT BLD: >200 SEC — CRITICAL HIGH (ref 27.5–35.5)
AST SERPL-CCNC: 14 U/L — SIGNIFICANT CHANGE UP (ref 10–40)
AST SERPL-CCNC: 23 U/L — SIGNIFICANT CHANGE UP (ref 10–40)
AST SERPL-CCNC: 38 U/L — SIGNIFICANT CHANGE UP (ref 10–40)
BACTERIA # UR AUTO: ABNORMAL /HPF
BASOPHILS # BLD AUTO: 0.02 K/UL — SIGNIFICANT CHANGE UP (ref 0–0.2)
BASOPHILS # BLD AUTO: 0.04 K/UL — SIGNIFICANT CHANGE UP (ref 0–0.2)
BASOPHILS NFR BLD AUTO: 0.3 % — SIGNIFICANT CHANGE UP (ref 0–2)
BASOPHILS NFR BLD AUTO: 0.9 % — SIGNIFICANT CHANGE UP (ref 0–2)
BILIRUB SERPL-MCNC: 0.2 MG/DL — SIGNIFICANT CHANGE UP (ref 0.2–1.2)
BILIRUB SERPL-MCNC: 0.3 MG/DL — SIGNIFICANT CHANGE UP (ref 0.2–1.2)
BILIRUB SERPL-MCNC: 0.3 MG/DL — SIGNIFICANT CHANGE UP (ref 0.2–1.2)
BILIRUB UR-MCNC: NEGATIVE — SIGNIFICANT CHANGE UP
BUN SERPL-MCNC: 32 MG/DL — HIGH (ref 7–23)
BUN SERPL-MCNC: 36 MG/DL — HIGH (ref 7–23)
BUN SERPL-MCNC: 39 MG/DL — HIGH (ref 7–23)
CALCIUM SERPL-MCNC: 8.4 MG/DL — SIGNIFICANT CHANGE UP (ref 8.4–10.5)
CALCIUM SERPL-MCNC: 8.7 MG/DL — SIGNIFICANT CHANGE UP (ref 8.4–10.5)
CALCIUM SERPL-MCNC: 9 MG/DL — SIGNIFICANT CHANGE UP (ref 8.4–10.5)
CHLORIDE SERPL-SCNC: 102 MMOL/L — SIGNIFICANT CHANGE UP (ref 96–108)
CHLORIDE SERPL-SCNC: 96 MMOL/L — SIGNIFICANT CHANGE UP (ref 96–108)
CHLORIDE SERPL-SCNC: 99 MMOL/L — SIGNIFICANT CHANGE UP (ref 96–108)
CO2 SERPL-SCNC: 27 MMOL/L — SIGNIFICANT CHANGE UP (ref 22–31)
CO2 SERPL-SCNC: 29 MMOL/L — SIGNIFICANT CHANGE UP (ref 22–31)
CO2 SERPL-SCNC: 32 MMOL/L — HIGH (ref 22–31)
COLOR SPEC: YELLOW — SIGNIFICANT CHANGE UP
COMMENT - URINE: SIGNIFICANT CHANGE UP
CREAT SERPL-MCNC: 6.05 MG/DL — HIGH (ref 0.5–1.3)
CREAT SERPL-MCNC: 6.33 MG/DL — HIGH (ref 0.5–1.3)
CREAT SERPL-MCNC: 6.59 MG/DL — HIGH (ref 0.5–1.3)
DIFF PNL FLD: ABNORMAL
EOSINOPHIL # BLD AUTO: 0.12 K/UL — SIGNIFICANT CHANGE UP (ref 0–0.5)
EOSINOPHIL # BLD AUTO: 0.14 K/UL — SIGNIFICANT CHANGE UP (ref 0–0.5)
EOSINOPHIL NFR BLD AUTO: 1.7 % — SIGNIFICANT CHANGE UP (ref 0–6)
EOSINOPHIL NFR BLD AUTO: 3.5 % — SIGNIFICANT CHANGE UP (ref 0–6)
EPI CELLS # UR: ABNORMAL /HPF (ref 0–5)
ESTIMATED AVERAGE GLUCOSE: 88 MG/DL — SIGNIFICANT CHANGE UP (ref 68–114)
GAS PNL BLDA: SIGNIFICANT CHANGE UP
GAS PNL BLDA: SIGNIFICANT CHANGE UP
GIANT PLATELETS BLD QL SMEAR: PRESENT — SIGNIFICANT CHANGE UP
GLUCOSE BLDC GLUCOMTR-MCNC: 91 MG/DL — SIGNIFICANT CHANGE UP (ref 70–99)
GLUCOSE BLDC GLUCOMTR-MCNC: 94 MG/DL — SIGNIFICANT CHANGE UP (ref 70–99)
GLUCOSE SERPL-MCNC: 137 MG/DL — HIGH (ref 70–99)
GLUCOSE SERPL-MCNC: 78 MG/DL — SIGNIFICANT CHANGE UP (ref 70–99)
GLUCOSE SERPL-MCNC: 83 MG/DL — SIGNIFICANT CHANGE UP (ref 70–99)
GLUCOSE UR QL: NEGATIVE — SIGNIFICANT CHANGE UP
HCT VFR BLD CALC: 31.8 % — LOW (ref 39–50)
HCT VFR BLD CALC: 33.6 % — LOW (ref 39–50)
HCT VFR BLD CALC: 39.9 % — SIGNIFICANT CHANGE UP (ref 39–50)
HGB BLD-MCNC: 10.1 G/DL — LOW (ref 13–17)
HGB BLD-MCNC: 11.7 G/DL — LOW (ref 13–17)
HGB BLD-MCNC: 9.6 G/DL — LOW (ref 13–17)
IMM GRANULOCYTES NFR BLD AUTO: 0.4 % — SIGNIFICANT CHANGE UP (ref 0–1.5)
INR BLD: 1.02 — SIGNIFICANT CHANGE UP (ref 0.88–1.16)
INR BLD: 1.05 — SIGNIFICANT CHANGE UP (ref 0.88–1.16)
INR BLD: 1.23 — HIGH (ref 0.88–1.16)
KETONES UR-MCNC: NEGATIVE — SIGNIFICANT CHANGE UP
LEUKOCYTE ESTERASE UR-ACNC: ABNORMAL
LYMPHOCYTES # BLD AUTO: 1.03 K/UL — SIGNIFICANT CHANGE UP (ref 1–3.3)
LYMPHOCYTES # BLD AUTO: 1.41 K/UL — SIGNIFICANT CHANGE UP (ref 1–3.3)
LYMPHOCYTES # BLD AUTO: 14.2 % — SIGNIFICANT CHANGE UP (ref 13–44)
LYMPHOCYTES # BLD AUTO: 35.4 % — SIGNIFICANT CHANGE UP (ref 13–44)
MACROCYTES BLD QL: SLIGHT — SIGNIFICANT CHANGE UP
MAGNESIUM SERPL-MCNC: 2.1 MG/DL — SIGNIFICANT CHANGE UP (ref 1.6–2.6)
MANUAL SMEAR VERIFICATION: SIGNIFICANT CHANGE UP
MCHC RBC-ENTMCNC: 29.3 GM/DL — LOW (ref 32–36)
MCHC RBC-ENTMCNC: 29.3 PG — SIGNIFICANT CHANGE UP (ref 27–34)
MCHC RBC-ENTMCNC: 29.7 PG — SIGNIFICANT CHANGE UP (ref 27–34)
MCHC RBC-ENTMCNC: 29.7 PG — SIGNIFICANT CHANGE UP (ref 27–34)
MCHC RBC-ENTMCNC: 30.1 GM/DL — LOW (ref 32–36)
MCHC RBC-ENTMCNC: 30.2 GM/DL — LOW (ref 32–36)
MCV RBC AUTO: 98.5 FL — SIGNIFICANT CHANGE UP (ref 80–100)
MCV RBC AUTO: 98.8 FL — SIGNIFICANT CHANGE UP (ref 80–100)
MCV RBC AUTO: 99.8 FL — SIGNIFICANT CHANGE UP (ref 80–100)
MONOCYTES # BLD AUTO: 0.35 K/UL — SIGNIFICANT CHANGE UP (ref 0–0.9)
MONOCYTES # BLD AUTO: 0.4 K/UL — SIGNIFICANT CHANGE UP (ref 0–0.9)
MONOCYTES NFR BLD AUTO: 5.5 % — SIGNIFICANT CHANGE UP (ref 2–14)
MONOCYTES NFR BLD AUTO: 8.9 % — SIGNIFICANT CHANGE UP (ref 2–14)
NEUTROPHILS # BLD AUTO: 2.04 K/UL — SIGNIFICANT CHANGE UP (ref 1.8–7.4)
NEUTROPHILS # BLD AUTO: 5.65 K/UL — SIGNIFICANT CHANGE UP (ref 1.8–7.4)
NEUTROPHILS NFR BLD AUTO: 51.3 % — SIGNIFICANT CHANGE UP (ref 43–77)
NEUTROPHILS NFR BLD AUTO: 77.9 % — HIGH (ref 43–77)
NITRITE UR-MCNC: NEGATIVE — SIGNIFICANT CHANGE UP
NRBC # BLD: 0 /100 WBCS — SIGNIFICANT CHANGE UP (ref 0–0)
NRBC # BLD: 0 /100 WBCS — SIGNIFICANT CHANGE UP (ref 0–0)
NRBC # BLD: 1 /100 — HIGH (ref 0–0)
NRBC # BLD: SIGNIFICANT CHANGE UP /100 WBCS (ref 0–0)
NT-PROBNP SERPL-SCNC: HIGH PG/ML (ref 0–300)
OVALOCYTES BLD QL SMEAR: SLIGHT — SIGNIFICANT CHANGE UP
PH UR: 8 — SIGNIFICANT CHANGE UP (ref 5–8)
PHOSPHATE SERPL-MCNC: 6.5 MG/DL — HIGH (ref 2.5–4.5)
PLAT MORPH BLD: ABNORMAL
PLATELET # BLD AUTO: 142 K/UL — LOW (ref 150–400)
PLATELET # BLD AUTO: 148 K/UL — LOW (ref 150–400)
PLATELET # BLD AUTO: 200 K/UL — SIGNIFICANT CHANGE UP (ref 150–400)
POIKILOCYTOSIS BLD QL AUTO: SLIGHT — SIGNIFICANT CHANGE UP
POTASSIUM SERPL-MCNC: 4 MMOL/L — SIGNIFICANT CHANGE UP (ref 3.5–5.3)
POTASSIUM SERPL-MCNC: 4.2 MMOL/L — SIGNIFICANT CHANGE UP (ref 3.5–5.3)
POTASSIUM SERPL-MCNC: 4.6 MMOL/L — SIGNIFICANT CHANGE UP (ref 3.5–5.3)
POTASSIUM SERPL-SCNC: 4 MMOL/L — SIGNIFICANT CHANGE UP (ref 3.5–5.3)
POTASSIUM SERPL-SCNC: 4.2 MMOL/L — SIGNIFICANT CHANGE UP (ref 3.5–5.3)
POTASSIUM SERPL-SCNC: 4.6 MMOL/L — SIGNIFICANT CHANGE UP (ref 3.5–5.3)
PROT SERPL-MCNC: 6.6 G/DL — SIGNIFICANT CHANGE UP (ref 6–8.3)
PROT SERPL-MCNC: 6.8 G/DL — SIGNIFICANT CHANGE UP (ref 6–8.3)
PROT SERPL-MCNC: 7.7 G/DL — SIGNIFICANT CHANGE UP (ref 6–8.3)
PROT UR-MCNC: >=300 MG/DL
PROTHROM AB SERPL-ACNC: 12.2 SEC — SIGNIFICANT CHANGE UP (ref 10.6–13.6)
PROTHROM AB SERPL-ACNC: 12.6 SEC — SIGNIFICANT CHANGE UP (ref 10.6–13.6)
PROTHROM AB SERPL-ACNC: 14.6 SEC — HIGH (ref 10.6–13.6)
RBC # BLD: 3.23 M/UL — LOW (ref 4.2–5.8)
RBC # BLD: 3.4 M/UL — LOW (ref 4.2–5.8)
RBC # BLD: 4 M/UL — LOW (ref 4.2–5.8)
RBC # FLD: 18.8 % — HIGH (ref 10.3–14.5)
RBC # FLD: 18.9 % — HIGH (ref 10.3–14.5)
RBC # FLD: 19 % — HIGH (ref 10.3–14.5)
RBC BLD AUTO: ABNORMAL
RBC CASTS # UR COMP ASSIST: < 5 /HPF — SIGNIFICANT CHANGE UP
SODIUM SERPL-SCNC: 140 MMOL/L — SIGNIFICANT CHANGE UP (ref 135–145)
SODIUM SERPL-SCNC: 141 MMOL/L — SIGNIFICANT CHANGE UP (ref 135–145)
SODIUM SERPL-SCNC: 144 MMOL/L — SIGNIFICANT CHANGE UP (ref 135–145)
SP GR SPEC: 1.02 — SIGNIFICANT CHANGE UP (ref 1–1.03)
TSH SERPL-MCNC: 1.04 UIU/ML — SIGNIFICANT CHANGE UP (ref 0.27–4.2)
UROBILINOGEN FLD QL: 0.2 E.U./DL — SIGNIFICANT CHANGE UP
WBC # BLD: 3.97 K/UL — SIGNIFICANT CHANGE UP (ref 3.8–10.5)
WBC # BLD: 5.43 K/UL — SIGNIFICANT CHANGE UP (ref 3.8–10.5)
WBC # BLD: 7.25 K/UL — SIGNIFICANT CHANGE UP (ref 3.8–10.5)
WBC # FLD AUTO: 3.97 K/UL — SIGNIFICANT CHANGE UP (ref 3.8–10.5)
WBC # FLD AUTO: 5.43 K/UL — SIGNIFICANT CHANGE UP (ref 3.8–10.5)
WBC # FLD AUTO: 7.25 K/UL — SIGNIFICANT CHANGE UP (ref 3.8–10.5)
WBC UR QL: ABNORMAL /HPF

## 2021-10-19 PROCEDURE — 71045 X-RAY EXAM CHEST 1 VIEW: CPT | Mod: 26

## 2021-10-19 PROCEDURE — 99222 1ST HOSP IP/OBS MODERATE 55: CPT

## 2021-10-19 PROCEDURE — 99232 SBSQ HOSP IP/OBS MODERATE 35: CPT

## 2021-10-19 PROCEDURE — 33340 PERQ CLSR TCAT L ATR APNDGE: CPT | Mod: Q0

## 2021-10-19 PROCEDURE — 99223 1ST HOSP IP/OBS HIGH 75: CPT

## 2021-10-19 PROCEDURE — 93308 TTE F-UP OR LMTD: CPT | Mod: 26

## 2021-10-19 RX ORDER — ALLOPURINOL 300 MG
200 TABLET ORAL
Refills: 0 | Status: DISCONTINUED | OUTPATIENT
Start: 2021-10-19 | End: 2021-10-20

## 2021-10-19 RX ORDER — INSULIN LISPRO 100/ML
VIAL (ML) SUBCUTANEOUS
Refills: 0 | Status: DISCONTINUED | OUTPATIENT
Start: 2021-10-19 | End: 2021-10-20

## 2021-10-19 RX ORDER — SODIUM CHLORIDE 9 MG/ML
1000 INJECTION, SOLUTION INTRAVENOUS
Refills: 0 | Status: DISCONTINUED | OUTPATIENT
Start: 2021-10-19 | End: 2021-10-20

## 2021-10-19 RX ORDER — MEPERIDINE HYDROCHLORIDE 50 MG/ML
25 INJECTION INTRAMUSCULAR; INTRAVENOUS; SUBCUTANEOUS ONCE
Refills: 0 | Status: DISCONTINUED | OUTPATIENT
Start: 2021-10-19 | End: 2021-10-19

## 2021-10-19 RX ORDER — DEXTROSE 50 % IN WATER 50 %
25 SYRINGE (ML) INTRAVENOUS ONCE
Refills: 0 | Status: DISCONTINUED | OUTPATIENT
Start: 2021-10-19 | End: 2021-10-20

## 2021-10-19 RX ORDER — ACETAMINOPHEN 500 MG
650 TABLET ORAL EVERY 6 HOURS
Refills: 0 | Status: DISCONTINUED | OUTPATIENT
Start: 2021-10-19 | End: 2021-10-20

## 2021-10-19 RX ORDER — DEXTROSE 50 % IN WATER 50 %
12.5 SYRINGE (ML) INTRAVENOUS ONCE
Refills: 0 | Status: DISCONTINUED | OUTPATIENT
Start: 2021-10-19 | End: 2021-10-20

## 2021-10-19 RX ORDER — GLUCAGON INJECTION, SOLUTION 0.5 MG/.1ML
1 INJECTION, SOLUTION SUBCUTANEOUS ONCE
Refills: 0 | Status: DISCONTINUED | OUTPATIENT
Start: 2021-10-19 | End: 2021-10-20

## 2021-10-19 RX ORDER — SEVELAMER CARBONATE 2400 MG/1
800 POWDER, FOR SUSPENSION ORAL
Refills: 0 | Status: DISCONTINUED | OUTPATIENT
Start: 2021-10-19 | End: 2021-10-20

## 2021-10-19 RX ORDER — DEXTROSE 50 % IN WATER 50 %
15 SYRINGE (ML) INTRAVENOUS ONCE
Refills: 0 | Status: DISCONTINUED | OUTPATIENT
Start: 2021-10-19 | End: 2021-10-20

## 2021-10-19 RX ORDER — PANTOPRAZOLE SODIUM 20 MG/1
40 TABLET, DELAYED RELEASE ORAL
Refills: 0 | Status: DISCONTINUED | OUTPATIENT
Start: 2021-10-19 | End: 2021-10-20

## 2021-10-19 RX ORDER — SODIUM CHLORIDE 9 MG/ML
1000 INJECTION INTRAMUSCULAR; INTRAVENOUS; SUBCUTANEOUS
Refills: 0 | Status: DISCONTINUED | OUTPATIENT
Start: 2021-10-19 | End: 2021-10-20

## 2021-10-19 RX ORDER — CHLORHEXIDINE GLUCONATE 213 G/1000ML
5 SOLUTION TOPICAL
Refills: 0 | Status: DISCONTINUED | OUTPATIENT
Start: 2021-10-19 | End: 2021-10-19

## 2021-10-19 RX ORDER — CALCIUM GLUCONATE 100 MG/ML
2 VIAL (ML) INTRAVENOUS ONCE
Refills: 0 | Status: COMPLETED | OUTPATIENT
Start: 2021-10-19 | End: 2021-10-19

## 2021-10-19 RX ORDER — HEPARIN SODIUM 5000 [USP'U]/ML
5000 INJECTION INTRAVENOUS; SUBCUTANEOUS EVERY 8 HOURS
Refills: 0 | Status: DISCONTINUED | OUTPATIENT
Start: 2021-10-19 | End: 2021-10-20

## 2021-10-19 RX ORDER — ASPIRIN/CALCIUM CARB/MAGNESIUM 324 MG
81 TABLET ORAL DAILY
Refills: 0 | Status: DISCONTINUED | OUTPATIENT
Start: 2021-10-19 | End: 2021-10-20

## 2021-10-19 RX ORDER — AMIODARONE HYDROCHLORIDE 400 MG/1
200 TABLET ORAL DAILY
Refills: 0 | Status: DISCONTINUED | OUTPATIENT
Start: 2021-10-19 | End: 2021-10-20

## 2021-10-19 RX ORDER — CEFAZOLIN SODIUM 1 G
2000 VIAL (EA) INJECTION EVERY 8 HOURS
Refills: 0 | Status: COMPLETED | OUTPATIENT
Start: 2021-10-19 | End: 2021-10-20

## 2021-10-19 RX ORDER — ASPIRIN/CALCIUM CARB/MAGNESIUM 324 MG
81 TABLET ORAL
Qty: 0 | Refills: 0 | DISCHARGE

## 2021-10-19 RX ORDER — CLOPIDOGREL BISULFATE 75 MG/1
75 TABLET, FILM COATED ORAL DAILY
Refills: 0 | Status: DISCONTINUED | OUTPATIENT
Start: 2021-10-20 | End: 2021-10-20

## 2021-10-19 RX ADMIN — Medication 100 MILLIGRAM(S): at 19:32

## 2021-10-19 RX ADMIN — SODIUM CHLORIDE 3 MILLILITER(S): 9 INJECTION INTRAMUSCULAR; INTRAVENOUS; SUBCUTANEOUS at 21:26

## 2021-10-19 RX ADMIN — Medication 200 MILLIGRAM(S): at 17:28

## 2021-10-19 RX ADMIN — Medication 200 GRAM(S): at 20:49

## 2021-10-19 RX ADMIN — SODIUM CHLORIDE 3 MILLILITER(S): 9 INJECTION INTRAMUSCULAR; INTRAVENOUS; SUBCUTANEOUS at 14:37

## 2021-10-19 RX ADMIN — HEPARIN SODIUM 5000 UNIT(S): 5000 INJECTION INTRAVENOUS; SUBCUTANEOUS at 21:19

## 2021-10-19 RX ADMIN — SEVELAMER CARBONATE 800 MILLIGRAM(S): 2400 POWDER, FOR SUSPENSION ORAL at 18:59

## 2021-10-19 NOTE — H&P ADULT - ASSESSMENT
This 66 year old male with PMH significant for HTN, ESRD (has LUE AVF and on HD MWF), T-cell lymphoma (diagnosed about 19 years ago and now in remission), a-fib (on ASA/plavix, not compatible with Eliquis due to bleedings), HFpEF (followed by CHF team), mitral regurgitation, severe pHTN and history of LVATS robotic assisted drainage of pleural effusion, decortication and pleurodesis and chest wall hematoma evacuation is brought to this admission today for Watchman device placement in order to prevent him from blood clots and subsequently decrease his requirement of anticoagulation.  Patient denies symptoms of malaise, syncopes, chest pain, shortness of breath, or LE edema.      Upon arrival, patient is not in acute distress and hemodynamically stable.  Patient is awake and alert who is able to sign his consent and he understands the procedures.  He denies changes of his recent health, malaise, shortness of breath, chest pain, LE edema.  He continues his HD schedule.    Neurovascular: No delirium. Pain well controlled with current regimen.  -PRN's.    Cardiovascular: Hemodynamically stable. HR controlled.  -BP. HR. EKG. TTE. Cardiac Panel. Lipid Panel. BNP.   -ASA. Plavix. BBlocker. Statin.      Respiratory: 02 Sat = 98% on RA.  -If on oxygen wean to RA from for O2 Sat > 93%.  -Encourage C+DB and Use of IS 10x / hr while awake.  -CXR.    GI: Stable.  -NPO after MN.  -PPX.  -PO Diet.    Renal / :  -Monitor renal function.  -Monitor I/O's.    Endocrine:    -A1c.  -TSH.    Hematologic:  -CBC.  -Coagulation Panel.    ID:  -Tempature.  -CBC.  -Observe for SIRS/Sepsis Syndrome.    Prophylaxis:  -DVT prophylaxis with 5000 SubQ Heparin q8h.  -SCD's    Disposition:  -ICU for frequent monitoring.

## 2021-10-19 NOTE — BRIEF OPERATIVE NOTE - COMMENTS
________ was the first assistant for this case including but not limited to  ________, _________, and _________.    I was present for this procedure and participated as first assistant as described by the PA above, unless otherwise noted below. Dr. Jauregui was the first assistant for this case including but not limited to Watchman device deployment     I was present for this procedure and participated as first assistant as described by the PA above, unless otherwise noted below.

## 2021-10-19 NOTE — H&P ADULT - NSHPPHYSICALEXAM_GEN_ALL_CORE
T(C): --  T(F): --  HR: --  BP: --  BP(mean): --  RR: --  SpO2: --    CONSTITUTIONAL:                                                                           NEURO:                                                                                                                  EYES:                                                                                                    ENMT:                                                                                                  CV:                                                                                                       RESPIRATORY:                                                                                   GI:                                                                                                        : BACK + / -                                                                                  MUSKULOSKELETAL:                                                                        SKIN / BREAST: T(F): 97F  HR: 60  BP: 130/69  BP(mean): --  RR: 17  SpO2: 99% at room air     CONSTITUTIONAL: NAD                                                                         NEURO:    grossly intact                                                                                                               EYES:   PERRL                                                                                                  ENMT:    supple and non lymphadenopathy                                                                                               CV:         irregular irregular rhythm                                                                                               RESPIRATORY:      no wheezing and non rhonchi                                                                       GI:   soft.  BS and BM are intact                                                                                                    : deferred                                                                              MUSKULOSKELETAL:        no deformity                                                                 SKIN / BREAST:  warm  no ulceration

## 2021-10-19 NOTE — H&P ADULT - NSHPSOCIALHISTORY_GEN_ALL_CORE
Smoker:   ETOH use:   [employer] Drug use:  Occupation:  Assisted device use (Cane / Walker):  Live with: Smoker: former   ETOH use:  n/a  [employer] Drug use: n/a  Occupation: noncontributory   Assisted device use (Cane / Walker): n/a  Live with: wife and family

## 2021-10-19 NOTE — CONSULT NOTE ADULT - ATTENDING COMMENTS
ESRD, known to renal team  s/p watchman procedure- says he feels okay-  electrolytes and volume status acceptable, nephrologically stable; for dialysis tomorrow

## 2021-10-19 NOTE — H&P ADULT - HISTORY OF PRESENT ILLNESS
Surgeon:    Requesting Physician:    HISTORY OF PRESENT ILLNESS:  66y Male    PAST MEDICAL & SURGICAL HISTORY:  HTN (hypertension)    Atrial fibrillation    CKD (chronic kidney disease)    Lymphoma  t cell; remission since 11/2020    CHF, chronic    H/O pulmonary hypertension    Gout    BPH (benign prostatic hyperplasia)    COPD, mild    Mitral regurgitation    History of cholecystectomy    Elective surgery  rectal surgery        MEDICATIONS  (STANDING):  sodium chloride 0.9% lock flush 3 milliLiter(s) IV Push every 8 hours    MEDICATIONS  (PRN):      Allergies    No Known Allergies    Intolerances                                                              This 66 year old male with PMH significant for HTN, ESRD (has LUE AVF and on HD MWF), T-cell lymphoma (diagnosed about 19 years ago and now in remission), a-fib (on ASA/plavix, not compatible with Eliquis due to bleedings), HFpEF (followed by CHF team), mitral regurgitation, severe pHTN and history of LVATS robotic assisted drainage of pleural effusion, decortication and pleurodesis and chest wall hematoma evacuation is brought to this admission today for Watchman device placement in order to prevent him from blood clots and subsequently decrease his requirement of anticoagulation.  Patient denies symptoms of malaise, syncopes, chest pain, shortness of breath, or LE edema.      Upon arrival, patient is not in acute distress and hemodynamically stable.  Patient is awake and alert who is able to sign his consent and he understands the procedures.  He denies changes of his recent health, malaise, shortness of breath, chest pain, LE edema.  He continues his HD schedule.

## 2021-10-19 NOTE — H&P ADULT - NSHPLABSRESULTS_GEN_ALL_CORE
LABS:                        11.7   3.97  )-----------( 200      ( 19 Oct 2021 09:05 )             39.9       RADIOLOGY & ADDITIONAL STUDIES:  CAROTID U/S:    CXR:    CT Scan:    EKG:    TTE / JORGE:    Cardiac Cath: LABS:                        11.7   3.97  )-----------( 200      ( 19 Oct 2021 09:05 )             39.9       RADIOLOGY & ADDITIONAL STUDIES:  CAROTID U/S:  n/a  CXR:    Findings/  impression: Stable cardiomegaly, thoracic aortic calcification. Bilateral opacities/pleural effusions, decreased. Stable bony structures.    CT Scan:    Persistent large right pleural effusion and small loculated left pleural effusion with bibasilar atelectasis including rounded atelectasis in the left lower lobe.    Cardiomegaly with pulmonary venous congestion and anemia.    Cirrhosis with increasing ascites.    EKG:  reviewed in the chard    TTE / JORGE:  < from: JORGE w/Doppler (05.18.21 @ 10:17) >     1. Normal left ventricular size and systolic function.   2. Dilated right ventricular size. Normal right ventricular systolic function.   3. Dilated left and right atrium.   4. No thrombus seen in the left atrium or in the left atrial appendage. Spectral Doppler reveals normal left atrial appendage velocities. Left atrial appendage orifice measures 2.86 cm x 2.07 cm.   5. No evidence of an intracardiac shunt.   6. Mild aortic regurgitation.   7. Mild prolapse at P1 and lateral commissure of the mitral valve.   8. Moderate mitral regurgitation.   9. Moderate tricuspid regurgitation.  10. Pulmonary hypertension present, pulmonary artery systolic pressure is at least 47 mmHg.  11. Trivial pericardial effusion.  12. Aortic root is dilated measuring 4.12 cm. Proximal ascending aorta is dilated measuring 3.95 cm.  13. Mild to moderate plaque seen in the visualized portion of the descending aorta.      Cardiac Cath:  n/a

## 2021-10-19 NOTE — H&P ADULT - NSHPREVIEWOFSYSTEMS_GEN_ALL_CORE
CONSTITUTIONAL:  Fevers / chills, sweats, fatigue, weight loss, weight gain                                     NEURO:  parathesias, seizures, syncope, confusion                                                                                EYES:  Blurry vision, discharge, pain, loss of vision                                                                                  ENMT:  Difficulty hearing, vertigo, dysphagia, epistaxis, recent dental work                                       CV:  Chest pain, palpitations, NIELSON, orthopnea                                                                                           RESPIRATORY:  Wheezing, SOB, cough / sputum, hemoptysis                                                               GI:  Nausea, vommiting, diarrhea, constipation, melena                                                                      : Hematuria, dysuria, urgency, incontinence                                                                                        MUSKULOSKELETAL:  arthritis, joint swelling, muscle weakness                                                            SKIN/BREAST:  rash, itching, av loss, masses                                                                                             PSYCH:  depresion, anxiety, suicidal ideation                                                                                              HEME/LYMPH:  bruises easily, enlarged lymph nodes, tender lymph nodes                                         ENDOCRINE:  cold intolerance, heat intolerance, polydipsia CONSTITUTIONAL: denies Fevers / chills, sweats, fatigue, weight loss, weight gain                                     NEURO: denies parathesias, seizures, syncope, confusion                                                                                EYES: denies Blurry vision, discharge, pain, loss of vision                                                                                  ENMT: denies Difficulty hearing, vertigo, dysphagia, epistaxis, recent dental work                                       CV: denies Chest pain, palpitations, NIELSON, orthopnea                                                                                           RESPIRATORY: denies Wheezing, SOB, cough / sputum, hemoptysis                                                               GI: denies Nausea, vomiting, diarrhea, constipation, melena                                                                      : denies Hematuria, dysuria, urgency, incontinence                                                                                        MUSKULOSKELETAL: denies arthritis, joint swelling, muscle weakness                                                            SKIN/BREAST: denies rash, itching, hair loss, masses                                                                                             PSYCH: denies depression, anxiety, suicidal ideation                                                                                              HEME/LYMPH: denies bruises easily, enlarged lymph nodes, tender lymph nodes                                         ENDOCRINE: denies cold intolerance, heat intolerance, polydipsia

## 2021-10-19 NOTE — BRIEF OPERATIVE NOTE - NSICDXBRIEFPROCEDURE_GEN_ALL_CORE_FT
PROCEDURES:  Insertion, Watchman left atrial appendage closure device 19-Oct-2021 10:11:32  Jesusita Otero

## 2021-10-19 NOTE — CONSULT NOTE ADULT - SUBJECTIVE AND OBJECTIVE BOX
Patient is a 66y old  Male who presents with a chief complaint of a-fib (19 Oct 2021 14:27)      HPI:  This 66 year old male with PMH HTN, ESRD (has LUE AVF and on HD MWF), T-cell lymphoma (diagnosed about 19 years ago and now in remission), a-fib (on ASA/plavix, not compatible with Eliquis due to bleedings), HFpEF (followed by CHF team), mitral regurgitation, severe pHTN and history of LVATS robotic assisted drainage of pleural effusion, decortication and pleurodesis and chest wall hematoma evacuation is brought to this admission today for Watchman device placement. Now s/p procedure 10/19. Admitted for overnight observation. Renal consulted for HD prior to dc tomorrow.     PAST MEDICAL & SURGICAL HISTORY:  HTN (hypertension)    Atrial fibrillation    CKD (chronic kidney disease)    Lymphoma  t cell; remission since 2020    CHF, chronic    H/O pulmonary hypertension    Gout    BPH (benign prostatic hyperplasia)    COPD, mild    Mitral regurgitation    History of cholecystectomy    Elective surgery  rectal surgery        Allergies:  No Known Allergies      Home Medications:   allopurinol 200 milliGRAM(s) Oral two times a day  aMIOdarone    Tablet 200 milliGRAM(s) Oral daily  aspirin enteric coated 81 milliGRAM(s) Oral daily  ceFAZolin   IVPB 2000 milliGRAM(s) IV Intermittent every 8 hours  chlorhexidine 0.12% Liquid 5 milliLiter(s) Oral Mucosa two times a day  dextrose 40% Gel 15 Gram(s) Oral once  dextrose 5%. 1000 milliLiter(s) IV Continuous <Continuous>  dextrose 5%. 1000 milliLiter(s) IV Continuous <Continuous>  dextrose 50% Injectable 25 Gram(s) IV Push once  dextrose 50% Injectable 12.5 Gram(s) IV Push once  dextrose 50% Injectable 25 Gram(s) IV Push once  glucagon  Injectable 1 milliGRAM(s) IntraMuscular once  heparin   Injectable 5000 Unit(s) SubCutaneous every 8 hours  insulin lispro (ADMELOG) corrective regimen sliding scale   SubCutaneous Before meals and at bedtime  meperidine     Injectable 25 milliGRAM(s) IV Push once  sevelamer carbonate 800 milliGRAM(s) Oral three times a day with meals  sodium chloride 0.9% lock flush 3 milliLiter(s) IV Push every 8 hours  sodium chloride 0.9%. 1000 milliLiter(s) IV Continuous <Continuous>      Hospital Medications:   MEDICATIONS  (STANDING):  allopurinol 200 milliGRAM(s) Oral two times a day  aMIOdarone    Tablet 200 milliGRAM(s) Oral daily  aspirin enteric coated 81 milliGRAM(s) Oral daily  ceFAZolin   IVPB 2000 milliGRAM(s) IV Intermittent every 8 hours  chlorhexidine 0.12% Liquid 5 milliLiter(s) Oral Mucosa two times a day  dextrose 40% Gel 15 Gram(s) Oral once  dextrose 5%. 1000 milliLiter(s) (50 mL/Hr) IV Continuous <Continuous>  dextrose 5%. 1000 milliLiter(s) (100 mL/Hr) IV Continuous <Continuous>  dextrose 50% Injectable 25 Gram(s) IV Push once  dextrose 50% Injectable 12.5 Gram(s) IV Push once  dextrose 50% Injectable 25 Gram(s) IV Push once  glucagon  Injectable 1 milliGRAM(s) IntraMuscular once  heparin   Injectable 5000 Unit(s) SubCutaneous every 8 hours  insulin lispro (ADMELOG) corrective regimen sliding scale   SubCutaneous Before meals and at bedtime  meperidine     Injectable 25 milliGRAM(s) IV Push once  sevelamer carbonate 800 milliGRAM(s) Oral three times a day with meals  sodium chloride 0.9% lock flush 3 milliLiter(s) IV Push every 8 hours  sodium chloride 0.9%. 1000 milliLiter(s) (10 mL/Hr) IV Continuous <Continuous>      Family History:  FAMILY HISTORY:  No pertinent family history in first degree relatives    ROS:  RESPIRATORY: No shortness of breath  CARDIOVASCULAR: Mild Chest pain  MUSCULOSKELETAL: No leg swelling    VITALS:  T(F): 97.2 (10-19-21 @ 14:15), Max: 97.2 (10-19-21 @ 14:15)  HR: 61 (10-19-21 @ 15:00)  BP: --  RR: 16 (10-19-21 @ 15:00)  SpO2: 96% (10-19-21 @ 15:00)  Wt(kg): --      CAPILLARY BLOOD GLUCOSE      PHYSICAL EXAM:  GENERAL: Alert, awake, oriented x3 on NC 3L  CHEST/LUNG: Bilateral clear breath sounds, no rales  HEART: Regular rate and rhythm, no murmur  ABDOMEN: Soft, nontender, non distended  EXTREMITIES: no pedal oedema  ACCESS: LUE AVF w/bruit and thrill     LABS:  10-19    144  |  102  |  36<H>  ----------------------------<  83  4.2   |  29  |  6.33<H>    Ca    8.4      19 Oct 2021 14:03    TPro  6.6  /  Alb  3.5  /  TBili  0.3  /  DBili      /  AST  23  /  ALT  9<L>  /  AlkPhos  129<H>  10-19    Creatinine Trend: 6.33 <--, 6.05 <--                        9.6    7.25  )-----------( 148      ( 19 Oct 2021 14:03 )             31.8       Urine Studies:  Urinalysis Basic - ( 19 Oct 2021 10:43 )    Color: Yellow / Appearance: Clear / S.025 / pH:   Gluc:  / Ketone: NEGATIVE  / Bili: Negative / Urobili: 0.2 E.U./dL   Blood:  / Protein: >=300 mg/dL / Nitrite: NEGATIVE   Leuk Esterase: Moderate / RBC: < 5 /HPF / WBC Many /HPF   Sq Epi:  / Non Sq Epi: 5-10 /HPF / Bacteria: Many /HPF

## 2021-10-19 NOTE — PROGRESS NOTE ADULT - ASSESSMENT
This 66 year old male with PMH significant for HTN, ESRD (has LUE AVF and on HD MWF), T-cell lymphoma (diagnosed about 19 years ago and now in remission), a-fib (on ASA/plavix, not compatible with Eliquis due to bleedings), HFpEF (followed by CHF team), mitral regurgitation, severe pHTN and history of LVATS robotic assisted drainage of pleural effusion, decortication and pleurodesis and chest wall hematoma evacuation is brought to this admission today for Watchman device placement in order to prevent him from blood clots and subsequently decrease his requirement of anticoagulation.    ==== Neurovascular ====  -No delirium, pain well managed on current regimen  -C/w PRNs for Pain control  -Monitor neuro status    ==== Respiratory ====  -Saturates well on RA     -AM CXR stable, repeat in AM  -Encourage IS 10x/hour while awake, Cough and deep breathing exercises  -Monitor respiratory status via SpO2    ==== Cardiovascular ====  Monitor on Tele  Continue aspirin 81mg QD/Plavix 75mg QD  Continue Amio 200mg QD  Restart Toprol 50mg QD as tolerated  Restart amlodipine 10mg QD as tolerated  ECHO tomorrow    ==== GI ====   -Tolerating PO  -Prophylaxis: Protonix  -C/w bowel regimen    ==== /Renal ====  -ESRD on HD  -Renal Consulted for HD tomomorrow  -Trend Cr on AM labs  -Replete electrolytes as needed  -BPH: Will place on flomax while in house     ==== ID ====   Afebrile, asymptomatic  -WBC: 7.25  -Continue to monitor for SIRS/Sepsis syndrome while inpatient    ==== Endocrine ====   -A1C: 4.7, no h/o DM  -TSH: 1.040, no h/o thyroid disease     ==== Hematologic ====   -H/H: 9.6/31.8  -CBC, chem in AM  -DVT ppx: HSQ 5000u q8h and SCDs    ==== Disposition Planning ====  Mini-ICU This 66 year old male with PMH significant for HTN, ESRD (has LUE AVF and on HD MWF), T-cell lymphoma (diagnosed about 19 years ago and now in remission), a-fib (on ASA/plavix, not compatible with Eliquis due to bleedings), HFpEF (followed by CHF team), mitral regurgitation, severe pHTN and history of LVATS robotic assisted drainage of pleural effusion, decortication and pleurodesis and chest wall hematoma evacuation is brought to this admission today for Watchman device placement in order to prevent him from blood clots and subsequently decrease his requirement of anticoagulation.    ==== Neurovascular ====  -No delirium, pain well managed on current regimen  -C/w PRNs for Pain control  -Monitor neuro status    ==== Respiratory ====  -Saturates well on RA     -AM CXR stable, repeat in AM  -Encourage IS 10x/hour while awake, Cough and deep breathing exercises  -Monitor respiratory status via SpO2    ==== Cardiovascular ====  Monitor on Tele  Continue aspirin 81mg QD/Plavix 75mg QD  Continue Amio 200mg QD  Restart Toprol 50mg QD as tolerated  Restart amlodipine 10mg QD as tolerated  ECHO tomorrow    ==== GI ====   -Tolerating PO  -Prophylaxis: Protonix  -C/w bowel regimen    ==== /Renal ====  -ESRD on HD  -Cr/Bun: 36/6.36  -Renal Consulted for HD tomomorrow  -Trend Cr on AM labs  -Replete electrolytes as needed    ==== ID ====   Afebrile, asymptomatic  -WBC: 7.25  -Continue to monitor for SIRS/Sepsis syndrome while inpatient    ==== Endocrine ====   -A1C: 4.7, no h/o DM  -TSH: 1.040, no h/o thyroid disease     ==== Hematologic ====   -H/H: 9.6/31.8  -CBC, chem in AM  -DVT ppx: HSQ 5000u q8h and SCDs    ==== Disposition Planning ====  Mini-ICU This 66 year old male with PMH significant for HTN, ESRD (has LUE AVF and on HD MWF), T-cell lymphoma (diagnosed about 19 years ago and now in remission), a-fib (on ASA/plavix, not compatible with Eliquis due to bleedings), HFpEF (followed by CHF team), mitral regurgitation, severe pHTN and history of LVATS robotic assisted drainage of pleural effusion, decortication and pleurodesis and chest wall hematoma evacuation is brought to this admission today for Watchman device placement in order to prevent him from blood clots and subsequently decrease his requirement of anticoagulation.  On 10/19/21 patient underwent watchman device placement.  Patient brought to [facility] for recovery,    ==== Neurovascular ====  -No delirium, pain well managed on current regimen  -C/w PRNs for Pain control  -Monitor neuro status    ==== Respiratory ====  -Saturates well on RA     -AM CXR stable, repeat in AM  -Encourage IS 10x/hour while awake, Cough and deep breathing exercises  -Monitor respiratory status via SpO2    ==== Cardiovascular ====  Monitor on Tele  Continue aspirin 81mg QD/Plavix 75mg QD  Continue Amio 200mg QD  Restart Toprol 50mg QD as tolerated  Restart amlodipine 10mg QD as tolerated  ECHO tomorrow    ==== GI ====   -Tolerating PO  -Prophylaxis: Protonix  -C/w bowel regimen    ==== /Renal ====  -ESRD on HD  -Cr/Bun: 36/6.36  -Renal Consulted for HD tomomorrow  -Trend Cr on AM labs  -Replete electrolytes as needed    ==== ID ====   Afebrile, asymptomatic  -WBC: 7.25  -Continue to monitor for SIRS/Sepsis syndrome while inpatient    ==== Endocrine ====   -A1C: 4.7, no h/o DM  -TSH: 1.040, no h/o thyroid disease     ==== Hematologic ====   -H/H: 9.6/31.8  -CBC, chem in AM  -DVT ppx: HSQ 5000u q8h and SCDs    ==== Disposition Planning ====  Mini-ICU

## 2021-10-19 NOTE — CONSULT NOTE ADULT - ASSESSMENT
Assessment/Plan:   66 year old male with PMH HTN, ESRD (has LUE AVF and on HD MWF), T-cell lymphoma (in remission), a-fib (on ASA/plavix, not compatible with Eliquis due to bleedings), HFpEF, mitral regurgitation, severe pHTN and history of LVATS robotic assisted drainage of pleural effusion, decortication and pleurodesis and chest wall hematoma evacuation admitted for overnight observation after watchman device placement 10/19. Renal consulted for HD prior to dc tomorrow.     ESRD on HD MWF @[address]  No need for HD today, plan for tomorrow prior to dc  BP: hypertensive, UF with HD  Anaemia- check iron panel and ferritin, epo with HD  BMD: check phos daily with labs, on renvela 800 TID  Access: AVF    Daily weights, strict I&Os, renally dose meds, renal diet    Thank you for the opportunity to participate in the care of your patient. The nephrology service remains available to assist with any questions or concerns. Please feel free to reach us by paging the on-call nephrology fellow for urgent issues or as below.     Christine Braxton M.D.   PGY-5  Pager: [phone]

## 2021-10-20 ENCOUNTER — TRANSCRIPTION ENCOUNTER (OUTPATIENT)
Age: 66
End: 2021-10-20

## 2021-10-20 VITALS
WEIGHT: 152.12 LBS | DIASTOLIC BLOOD PRESSURE: 65 MMHG | HEART RATE: 69 BPM | OXYGEN SATURATION: 99 % | SYSTOLIC BLOOD PRESSURE: 127 MMHG | RESPIRATION RATE: 18 BRPM | TEMPERATURE: 98 F

## 2021-10-20 LAB
ALBUMIN SERPL ELPH-MCNC: 3.3 G/DL — SIGNIFICANT CHANGE UP (ref 3.3–5)
ALP SERPL-CCNC: 156 U/L — HIGH (ref 40–120)
ALT FLD-CCNC: 7 U/L — LOW (ref 10–45)
ANION GAP SERPL CALC-SCNC: 16 MMOL/L — SIGNIFICANT CHANGE UP (ref 5–17)
APTT BLD: 42.6 SEC — HIGH (ref 27.5–35.5)
AST SERPL-CCNC: 18 U/L — SIGNIFICANT CHANGE UP (ref 10–40)
BILIRUB SERPL-MCNC: 0.2 MG/DL — SIGNIFICANT CHANGE UP (ref 0.2–1.2)
BUN SERPL-MCNC: 45 MG/DL — HIGH (ref 7–23)
CALCIUM SERPL-MCNC: 8.9 MG/DL — SIGNIFICANT CHANGE UP (ref 8.4–10.5)
CHLORIDE SERPL-SCNC: 100 MMOL/L — SIGNIFICANT CHANGE UP (ref 96–108)
CO2 SERPL-SCNC: 26 MMOL/L — SIGNIFICANT CHANGE UP (ref 22–31)
CREAT SERPL-MCNC: 6.98 MG/DL — HIGH (ref 0.5–1.3)
GAS PNL BLDA: SIGNIFICANT CHANGE UP
GLUCOSE BLDC GLUCOMTR-MCNC: 86 MG/DL — SIGNIFICANT CHANGE UP (ref 70–99)
GLUCOSE BLDC GLUCOMTR-MCNC: 89 MG/DL — SIGNIFICANT CHANGE UP (ref 70–99)
GLUCOSE SERPL-MCNC: 104 MG/DL — HIGH (ref 70–99)
HBV SURFACE AB SER-ACNC: SIGNIFICANT CHANGE UP
HBV SURFACE AG SER-ACNC: SIGNIFICANT CHANGE UP
HCT VFR BLD CALC: 31.8 % — LOW (ref 39–50)
HCV AB S/CO SERPL IA: 0.05 S/CO — SIGNIFICANT CHANGE UP
HCV AB SERPL-IMP: SIGNIFICANT CHANGE UP
HGB BLD-MCNC: 9.7 G/DL — LOW (ref 13–17)
INR BLD: 1.03 — SIGNIFICANT CHANGE UP (ref 0.88–1.16)
MAGNESIUM SERPL-MCNC: 2.2 MG/DL — SIGNIFICANT CHANGE UP (ref 1.6–2.6)
MCHC RBC-ENTMCNC: 29.6 PG — SIGNIFICANT CHANGE UP (ref 27–34)
MCHC RBC-ENTMCNC: 30.5 GM/DL — LOW (ref 32–36)
MCV RBC AUTO: 97 FL — SIGNIFICANT CHANGE UP (ref 80–100)
NRBC # BLD: 0 /100 WBCS — SIGNIFICANT CHANGE UP (ref 0–0)
PHOSPHATE SERPL-MCNC: 6.8 MG/DL — HIGH (ref 2.5–4.5)
PLATELET # BLD AUTO: 133 K/UL — LOW (ref 150–400)
POTASSIUM SERPL-MCNC: 4 MMOL/L — SIGNIFICANT CHANGE UP (ref 3.5–5.3)
POTASSIUM SERPL-SCNC: 4 MMOL/L — SIGNIFICANT CHANGE UP (ref 3.5–5.3)
PROT SERPL-MCNC: 6.5 G/DL — SIGNIFICANT CHANGE UP (ref 6–8.3)
PROTHROM AB SERPL-ACNC: 12.3 SEC — SIGNIFICANT CHANGE UP (ref 10.6–13.6)
RBC # BLD: 3.28 M/UL — LOW (ref 4.2–5.8)
RBC # FLD: 18.3 % — HIGH (ref 10.3–14.5)
SODIUM SERPL-SCNC: 142 MMOL/L — SIGNIFICANT CHANGE UP (ref 135–145)
WBC # BLD: 4.89 K/UL — SIGNIFICANT CHANGE UP (ref 3.8–10.5)
WBC # FLD AUTO: 4.89 K/UL — SIGNIFICANT CHANGE UP (ref 3.8–10.5)

## 2021-10-20 PROCEDURE — 99239 HOSP IP/OBS DSCHRG MGMT >30: CPT

## 2021-10-20 PROCEDURE — 71045 X-RAY EXAM CHEST 1 VIEW: CPT | Mod: 26

## 2021-10-20 PROCEDURE — 90935 HEMODIALYSIS ONE EVALUATION: CPT

## 2021-10-20 PROCEDURE — 93306 TTE W/DOPPLER COMPLETE: CPT | Mod: 26

## 2021-10-20 RX ORDER — PANTOPRAZOLE SODIUM 20 MG/1
1 TABLET, DELAYED RELEASE ORAL
Qty: 30 | Refills: 0
Start: 2021-10-20 | End: 2021-11-18

## 2021-10-20 RX ORDER — ACETAMINOPHEN 500 MG
2 TABLET ORAL
Qty: 0 | Refills: 0 | DISCHARGE
Start: 2021-10-20

## 2021-10-20 RX ORDER — CALCIUM GLUCONATE 100 MG/ML
2 VIAL (ML) INTRAVENOUS ONCE
Refills: 0 | Status: COMPLETED | OUTPATIENT
Start: 2021-10-20 | End: 2021-10-20

## 2021-10-20 RX ORDER — AMLODIPINE BESYLATE 2.5 MG/1
1 TABLET ORAL
Qty: 0 | Refills: 0 | DISCHARGE

## 2021-10-20 RX ADMIN — Medication 200 GRAM(S): at 05:09

## 2021-10-20 RX ADMIN — SODIUM CHLORIDE 3 MILLILITER(S): 9 INJECTION INTRAMUSCULAR; INTRAVENOUS; SUBCUTANEOUS at 14:29

## 2021-10-20 RX ADMIN — Medication 100 MILLIGRAM(S): at 05:10

## 2021-10-20 RX ADMIN — HEPARIN SODIUM 5000 UNIT(S): 5000 INJECTION INTRAVENOUS; SUBCUTANEOUS at 05:11

## 2021-10-20 RX ADMIN — Medication 81 MILLIGRAM(S): at 12:38

## 2021-10-20 RX ADMIN — SEVELAMER CARBONATE 800 MILLIGRAM(S): 2400 POWDER, FOR SUSPENSION ORAL at 12:38

## 2021-10-20 RX ADMIN — HEPARIN SODIUM 5000 UNIT(S): 5000 INJECTION INTRAVENOUS; SUBCUTANEOUS at 14:19

## 2021-10-20 RX ADMIN — SODIUM CHLORIDE 3 MILLILITER(S): 9 INJECTION INTRAMUSCULAR; INTRAVENOUS; SUBCUTANEOUS at 05:05

## 2021-10-20 RX ADMIN — PANTOPRAZOLE SODIUM 40 MILLIGRAM(S): 20 TABLET, DELAYED RELEASE ORAL at 06:33

## 2021-10-20 RX ADMIN — CLOPIDOGREL BISULFATE 75 MILLIGRAM(S): 75 TABLET, FILM COATED ORAL at 12:38

## 2021-10-20 RX ADMIN — Medication 200 MILLIGRAM(S): at 05:12

## 2021-10-20 RX ADMIN — SEVELAMER CARBONATE 800 MILLIGRAM(S): 2400 POWDER, FOR SUSPENSION ORAL at 09:00

## 2021-10-20 RX ADMIN — AMIODARONE HYDROCHLORIDE 200 MILLIGRAM(S): 400 TABLET ORAL at 05:12

## 2021-10-20 RX ADMIN — Medication 100 MILLIGRAM(S): at 14:28

## 2021-10-20 NOTE — PROGRESS NOTE ADULT - ATTENDING COMMENTS
ESRD  s/p watchman procedure  tolerating dialysis well  HR, BP okay  continue full treatment as prescribed

## 2021-10-20 NOTE — DISCHARGE NOTE PROVIDER - NSDCFUSCHEDAPPT_GEN_ALL_CORE_FT
MARCI VEGA ; 11/02/2021 ; [facility]PP [facility] [address]  MARCI VEGA ; 01/04/2022 ; [facility] [address]

## 2021-10-20 NOTE — DISCHARGE NOTE PROVIDER - NSDCFUADDAPPT_GEN_ALL_CORE_FT
-You will follow up with your physicians in about 1 week  -our office will make an appointment for your  -if you do not hear from us by this afternoon please call us a [phone]

## 2021-10-20 NOTE — DISCHARGE NOTE PROVIDER - HOSPITAL COURSE
This 66 year old male with PMH significant for HTN, ESRD (has LUE AVF and on HD MWF), T-cell lymphoma (diagnosed about 19 years ago and now in remission), a-fib (on ASA/plavix, not compatible with Eliquis due to bleedings), HFpEF (followed by CHF team), mitral regurgitation, severe pHTN and history of LVATS robotic assisted drainage of pleural effusion, decortication and pleurodesis and chest wall hematoma evacuation is brought to this admission today for Watchman device placement in order to prevent him from blood clots and subsequently decrease his requirement of anticoagulation.  On 10/19/21 patient underwent watchman device placement.  Patient brought to [facility] for recovery, POD1 patient arterial line removed, echocardiogram completed. Patient underwent dialysis prior to discharge home.     Patient was seen and examined this morning, care was discussed with Dr. Cui and deemed medically appropriate for discharge with outpatient follow up. Patient was hemodynamically stable and afebrile overnight and feels comfortable being discharged home this AM.     Over 40 minutes was spent with the patient reviewing the discharge material including medications, follow up appointments, recovery, concerning symptoms, and how to contact their health care providers if they have questions

## 2021-10-20 NOTE — DISCHARGE NOTE PROVIDER - NSDCMRMEDTOKEN_GEN_ALL_CORE_FT
acetaminophen 325 mg oral tablet: 2 tab(s) orally every 6 hours, As needed, Mild Pain (1 - 3)  allopurinol 100 mg oral tablet: 2 tab(s) orally 2 times a day  amiodarone 200 mg oral tablet: 1 tab(s) orally once a day  amLODIPine 10 mg oral tablet: 1 tab(s) orally once a day  aspirin 81 mg oral delayed release tablet: 1 tab(s) orally once a day  Metoprolol Succinate ER 50 mg oral tablet, extended release: 1 tab(s) orally once a day  pantoprazole 40 mg oral delayed release tablet: 1 tab(s) orally once a day (before a meal)  Plavix 75 mg oral tablet: 1 tab(s) orally once a day  Protonix 40 mg oral delayed release tablet: 1 tab(s) orally once a day   sevelamer carbonate 800 mg oral tablet: 1 tab(s) orally every 8 hours   acetaminophen 325 mg oral tablet: 2 tab(s) orally every 6 hours, As needed, Mild Pain (1 - 3)  allopurinol 100 mg oral tablet: 2 tab(s) orally 2 times a day  amiodarone 200 mg oral tablet: 1 tab(s) orally once a day  amLODIPine 10 mg oral tablet: 1 tab(s) orally once a day  aspirin 81 mg oral delayed release tablet: 1 tab(s) orally once a day  Metoprolol Succinate ER 50 mg oral tablet, extended release: 1 tab(s) orally once a day  pantoprazole 40 mg oral delayed release tablet: 1 tab(s) orally once a day (before a meal)  Plavix 75 mg oral tablet: 1 tab(s) orally once a day  sevelamer carbonate 800 mg oral tablet: 1 tab(s) orally every 8 hours

## 2021-10-20 NOTE — DISCHARGE NOTE NURSING/CASE MANAGEMENT/SOCIAL WORK - PATIENT PORTAL LINK FT
You can access the FollowMyHealth Patient Portal offered by [facility] by registering at the following website: [email or web address]. By joining [facility]’s FollowMyHealth portal, you will also be able to view your health information using other applications (apps) compatible with our system.

## 2021-10-20 NOTE — DISCHARGE NOTE PROVIDER - NSDCFUADDINST_GEN_ALL_CORE_FT
-Walk daily as tolerated and use your incentive spirometer every hour.    -No driving or strenuous activity/exercise for 6 weeks, or until cleared by your surgeon.    -Gently clean your incisions with anti-bacterial soap and water, pat dry.  Dressing should be kept in place until your follow up appointment. Should the dressing fall off please please a new dry bandage over the surgical site.     -Call your doctor if you have shortness of breath, chest pain not relieved by pain medication, dizziness, fever >101.5, or increased redness or drainage from incisions.

## 2021-10-20 NOTE — DISCHARGE NOTE NURSING/CASE MANAGEMENT/SOCIAL WORK - NSDCFUADDAPPT_GEN_ALL_CORE_FT
-You will follow up with your physicians in about 1 week  -our office will make an appointment for your  -if you do not hear from us by this afternoon please call us a [phone]

## 2021-10-20 NOTE — PROGRESS NOTE ADULT - ASSESSMENT
66 year old male with PMH HTN, ESRD (has LUE AVF and on HD MWF), T-cell lymphoma (in remission), a-fib (on ASA/plavix, not compatible with Eliquis due to bleedings), HFpEF, mitral regurgitation, severe pHTN and history of LVATS robotic assisted drainage of pleural effusion, decortication and pleurodesis and chest wall hematoma evacuation admitted for overnight observation after watchman device placement 10/19. Renal consulted for HD prior to dc tomorrow.     ESRD on HD MWF @[address]  HD today prior to dc  EDW 65kg  BP: hypertensive, UF with HD  Anaemia- check iron panel and ferritin, epo with HD  BMD: hyperphos on renvela 800 TID  Access: AVF    Patient was seen and evaluated on dialysis.     Dialyzer: Optiflux [id number]  QB: 400 mL/min  QD: 500 mL/min  K bath: 2  Goal UF: 2L  Duration: 180 min    Patient is tolerating the procedure well. Continue full hemodialysis treatment as prescribed.  Daily weights, strict I&Os, renally dose meds, renal diet

## 2021-10-20 NOTE — DISCHARGE NOTE PROVIDER - CARE PROVIDER_API CALL
Howard Cui)  Cardiovascular Disease; Internal Medicine; Interventional Cardiology  [address] [place] [address]  Phone: [phone]  Fax: [phone]  Follow Up Time:     Brandon Pappas)  Cardiovascular Disease; Internal Medicine  [address], [address]  [place] [address]  Phone: [phone]  Fax: [phone]  Follow Up Time:

## 2021-10-20 NOTE — PROGRESS NOTE ADULT - ASSESSMENT
Med Reconciliation:  Medication Reconciliation Status	Admission Reconciliation is Completed  Discharge Reconciliation is Completed  Discharge Medications	acetaminophen 325 mg oral tablet: 2 tab(s) orally every 6 hours, As needed, Mild Pain (1 - 3)  allopurinol 100 mg oral tablet: 2 tab(s) orally 2 times a day  amiodarone 200 mg oral tablet: 1 tab(s) orally once a day  amLODIPine 10 mg oral tablet: 1 tab(s) orally once a day  aspirin 81 mg oral delayed release tablet: 1 tab(s) orally once a day  Metoprolol Succinate ER 50 mg oral tablet, extended release: 1 tab(s) orally once a day  pantoprazole 40 mg oral delayed release tablet: 1 tab(s) orally once a day (before a meal)  Plavix 75 mg oral tablet: 1 tab(s) orally once a day  sevelamer carbonate 800 mg oral tablet: 1 tab(s) orally every 8 hours  	  ,  ,     Care Plan/Procedures:  Discharge Diagnoses, Assessment and Plan of Treatment	PRINCIPAL DISCHARGE DIAGNOSIS  Diagnosis: Afib  Assessment and Plan of Treatment:  Discharge Procedures, Findings and Treatment	PRINCIPAL PROCEDURE  Procedure: Insertion, Watchman left atrial appendage closure device  Findings and Treatment:  Goal(s)	To get better and follow your care plan as instructed.     Follow Up:  Care Providers for Follow up (PCP/Outpatient Provider)	Howard Cui)  Cardiovascular Disease; Internal Medicine; Interventional Cardiology  [address] [place] [address]  Phone: [phone]  Fax: [phone]  Follow Up Time:     Brandon Pappas)  Cardiovascular Disease; Internal Medicine  [address], [address]  [place] [address]  Phone: [phone]  Fax: [phone]  Follow Up Time:  Patient's Scheduled Appointments	MARCI VEGA ; 11/02/2021 ; NPP Pulm[facility] [address]  MARCI VEGA ; 01/04/2022 ; NPP [facility] [address]  Additional Scheduled Appointments	-You will follow up with your physicians in about 1 week  -our office will make an appointment for your  -if you do not hear from us by this afternoon please call us a [phone]  Discharge Diet	Consistent Carbohydrate Diabetic Diets  Activity	Do not drive or operate machinery, Do not make important decisions, No heavy lifting/straining, Walking - Indoors allowed, Walking - Outdoors allowed, Follow Instructions Provided by your Surgical Team  Additional Instructions	-Walk daily as tolerated and use your incentive spirometer every hour.    -No driving or strenuous activity/exercise for 6 weeks, or until cleared by your surgeon.    -Gently clean your incisions with anti-bacterial soap and water, pat dry.  Dressing should be kept in place until your follow up appointment. Should the dressing fall off please please a new dry bandage over the surgical site.     -Call your doctor if you have shortness of breath, chest pain not relieved by pain medication, dizziness, fever >101.5, or increased redness or drainage from incisions.

## 2021-10-21 RX ORDER — AMLODIPINE BESYLATE 2.5 MG/1
1 TABLET ORAL
Qty: 0 | Refills: 0 | DISCHARGE
Start: 2021-10-21

## 2021-10-27 DIAGNOSIS — Z90.49 ACQUIRED ABSENCE OF OTHER SPECIFIED PARTS OF DIGESTIVE TRACT: ICD-10-CM

## 2021-10-27 DIAGNOSIS — I48.0 PAROXYSMAL ATRIAL FIBRILLATION: ICD-10-CM

## 2021-10-27 DIAGNOSIS — C91.51 ADULT T-CELL LYMPHOMA/LEUKEMIA (HTLV-1-ASSOCIATED), IN REMISSION: ICD-10-CM

## 2021-10-27 DIAGNOSIS — Z87.891 PERSONAL HISTORY OF NICOTINE DEPENDENCE: ICD-10-CM

## 2021-10-27 DIAGNOSIS — Z79.82 LONG TERM (CURRENT) USE OF ASPIRIN: ICD-10-CM

## 2021-10-27 DIAGNOSIS — N18.6 END STAGE RENAL DISEASE: ICD-10-CM

## 2021-10-27 DIAGNOSIS — Z99.2 DEPENDENCE ON RENAL DIALYSIS: ICD-10-CM

## 2021-10-27 DIAGNOSIS — N40.0 BENIGN PROSTATIC HYPERPLASIA WITHOUT LOWER URINARY TRACT SYMPTOMS: ICD-10-CM

## 2021-10-27 DIAGNOSIS — D63.1 ANEMIA IN CHRONIC KIDNEY DISEASE: ICD-10-CM

## 2021-10-27 DIAGNOSIS — I34.0 NONRHEUMATIC MITRAL (VALVE) INSUFFICIENCY: ICD-10-CM

## 2021-10-27 DIAGNOSIS — M10.9 GOUT, UNSPECIFIED: ICD-10-CM

## 2021-10-27 DIAGNOSIS — I50.32 CHRONIC DIASTOLIC (CONGESTIVE) HEART FAILURE: ICD-10-CM

## 2021-10-27 DIAGNOSIS — Z79.02 LONG TERM (CURRENT) USE OF ANTITHROMBOTICS/ANTIPLATELETS: ICD-10-CM

## 2021-10-27 DIAGNOSIS — I13.2 HYPERTENSIVE HEART AND CHRONIC KIDNEY DISEASE WITH HEART FAILURE AND WITH STAGE 5 CHRONIC KIDNEY DISEASE, OR END STAGE RENAL DISEASE: ICD-10-CM

## 2021-10-27 DIAGNOSIS — J44.9 CHRONIC OBSTRUCTIVE PULMONARY DISEASE, UNSPECIFIED: ICD-10-CM

## 2021-10-27 DIAGNOSIS — I27.20 PULMONARY HYPERTENSION, UNSPECIFIED: ICD-10-CM

## 2021-11-02 ENCOUNTER — APPOINTMENT (OUTPATIENT)
Dept: PULMONOLOGY | Facility: CLINIC | Age: 66
End: 2021-11-02
Payer: MEDICARE

## 2021-11-02 ENCOUNTER — APPOINTMENT (OUTPATIENT)
Dept: HEART AND VASCULAR | Facility: CLINIC | Age: 66
End: 2021-11-02

## 2021-11-02 VITALS
TEMPERATURE: 98.2 F | SYSTOLIC BLOOD PRESSURE: 126 MMHG | HEIGHT: 71 IN | HEART RATE: 65 BPM | OXYGEN SATURATION: 96 % | WEIGHT: 155 LBS | DIASTOLIC BLOOD PRESSURE: 71 MMHG | BODY MASS INDEX: 21.7 KG/M2

## 2021-11-02 VITALS
BODY MASS INDEX: 21.42 KG/M2 | OXYGEN SATURATION: 98 % | TEMPERATURE: 98.7 F | HEART RATE: 62 BPM | WEIGHT: 153 LBS | SYSTOLIC BLOOD PRESSURE: 119 MMHG | HEIGHT: 71 IN | DIASTOLIC BLOOD PRESSURE: 77 MMHG

## 2021-11-02 DIAGNOSIS — Z00.6 ENCOUNTER FOR EXAMINATION FOR NORMAL COMPARISON AND CONTROL IN CLINICAL RESEARCH PROGRAM: ICD-10-CM

## 2021-11-02 PROCEDURE — 99214 OFFICE O/P EST MOD 30 MIN: CPT | Mod: 25

## 2021-11-02 PROCEDURE — 71046 X-RAY EXAM CHEST 2 VIEWS: CPT

## 2021-11-02 NOTE — HISTORY OF PRESENT ILLNESS
[TextBox_4] : 66 yr old male renal failure (M,W,F), s/p watchman device,  \par \par He is getting chemo every 2 weeks in [place] with Dr. Dimas.  His appiette is coming back and gaining weight.  Denies hemoptysis, coughing, wheezing, fever, edema, chest pain or night sweats.  He walks 3 blocks which is improvement, feels weak after 3 blocks.  Denies SOB with ADLS.  Very well since he was discharged from the hospital.  He is working about 2 blocks which she was not able to do before.

## 2021-11-02 NOTE — ASSESSMENT
[FreeTextEntry1] : Systolic CHF - EF recovered, normal on June 2019 echo.  Secondary to chemo?  Then 40-45% on  admission in Dec 2020, then improved to 60-65 on last admission in January..  \par \par MR- Was severe, now mild to moderate and EF improved.  Followed by Dr Cui\par \par Afib - in NSR today, no longer on Eliquis due to 2 bleeds and Amio.  ZIO patch pending.  I favor ASA and resuming Amio. Referred to Pulm to be monitored for Amio Pulm toxicity.  The patient's LCQIB4BSQc score = 2.  He is S/P a Watchman device\par \par HTN- Stable\par \par HLD- not on statin\par \par T Cell Lymphoma - remains on chemo, \par \par CKD- Dr Denton, now on HD\par \par PreOp- Pt for decortication.  Cleared to proceed.  Would stop Eliquis 3 days prior to surgery with surgery on the 4th day. See beginning of this note for entire hospital stay.\par \par  \par \par \par \par

## 2021-11-02 NOTE — REASON FOR VISIT
[Follow-Up - Clinic] : a clinic follow-up of [Cardiomyopathy] : cardiomyopathy [FreeTextEntry1] : Onc- Dr Vle Dimas  [phone]\par  Renal- Dr Denton\par Struc Hrt- Dr Cui\par HF- Hilaria Nunez\par EP- Dr Herndon\par Vasc- Dr Cisneros\par Pulm- Dr Johnson

## 2021-11-02 NOTE — ASSESSMENT
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  The patient underwent VATS procedure with pleural biopsy.  The cytology and the pathology from the pleural fluid and pleural effusion was consistent with chronic inflammatory process and no evidence of T-cell lymphoma.  Patient also has drainage of the pleural fluid in December 2021 which again was negative for underlying malignancy/lymphoma.  Patient also have underlying chronic renal insufficiency and "coronary artery disease which could be contributed to his pleural effusion.  The effusion was predominantly lymphocytic in origin.  The recent chest x-ray from today revealed small bilateral pleural effusion. I reviewed his CT scan form june 2021 improvement in the left hemithorax and there was increased right effusion.  Pt to follow with repeat CT scan with oncologist. \par \par Atelectasis of the left lower lobe\par \par We will follow-up on repeat CT scan of the chest\par \par COPD\par \par The patient is is a symptomatic from COPD standpoint of view.  His exercise capacity improved.  He has no evidence of bronchospasm.  Hold on bronchodilator now.  Follow with PFT next visit.\par \par Patient is stop smoking and and enforced that he cannot smoke at this time as he is already have radiographic  images consistent with emphysema.\par \par Dyspnea on exertion\par \par Is multifactorial but improved and he can walk  3-4 blocks with at this point.  He is encouraged to continue increasing exercise capacity\par \par f/u 6 months up to date with vaccines.

## 2021-11-02 NOTE — PROCEDURE
[FreeTextEntry1] : AP and lateral CXR performed today in office 11/2/2021\par \par There is bilateral pleural effusion small right and left pleural effusion.

## 2021-11-02 NOTE — HISTORY OF PRESENT ILLNESS
[FreeTextEntry1] : 65 y/o M with HFpEF initially met but HF team on recent admission at [facility] and now presents for follow up. Other PMH is notable for HTN, COPD, CKD (stage 5 CKD, has LUE AVF, not on HD yet), T-cell lymphoma (diagnosed 19 years ago, on chemo romidepsin every Wednesday (not since Nov)), AF (on Eliquis), systolic CHF (EF 40-45%), EF was 60-65 in 2019,severe pHTN, severe MR, who recently underwent a left chest wall hematoma evacuation on 12/18/2020 secondary to loculated effusion. Hospital course at that time was complicated by AF with RVR and thoracic surgery consulted at that time for surgical intervention of trapped lung. He followed up as an outpatient after discharge and deemed a good surgical candidate for lung decortication. On 1/18/21 patient presented to [facility] for pre-operative optimization with heparin gtt prior to OR. Upon admission pt noted to have erythema and swelling to his L knee, CT scan demonstrated cellulitis, operation was deferred. Ortho, Gen Surg and ID were consulted, pt with no drainable collection and was medically managed with resolution of infection. Nephrology was consulted for assistance in managing CKD stage 4. On 1/22 he underwent an uncomplicated L VATS, RA decortication with blow hole placement for subcutaneous emphysema. Intraop findings significant for loculated hemothorax. He received 3UPRBC in the OR and post operatively was brought to the CTICU stable and intubated with 3 chest tubes in place. He was extubated POD1 and required primacor for pulmonary HTN and renal perfusion. On POD 2 a right sided pigtail was placed for pleural effusion which drained 800cc and ultimately removed on POD 4. Cardiology was consulted for management of pulmonary hypertension and CHF, recommended IV diuresis. He required alternating BiPAP and HFNC which was weaned to NC. Primacor was weaned off on POD 4 and he was transferred to the stepdown unit. Heparin gtt was started for AC but was held for bleeding around the CT site. A CT chest noncon was preformed showing post surgical changes, no concern for hemothorax and heparin gtt resumed. On POD 5 first CT was removed. Bronch with no abnormal findings, Second chest tube subsequently removed and added nifedipine Xl 30mg for BP control on POD 6. POD 7 third chest tube removed, post chest tube removal xray showed no pneumothorax. \par \par \par 12/28/20  In [facility] 12/17- 12/23 with a chest wall hematoma, Rapid AF.  EF good but severe MR and TR.  In NSR by exam and EKG .  I was later informed he might need a decortification of his pleura(left). \par 3/1/21 Hospitalized for VATS 1/2021 as above. BNP 15,500, Cr 5.5,  Echo was done 1/2021, EF back to NL and MR mild to moderate, mod to severe TR and PAP 67.   Got the Covid Vaccine.  Here he is in NSR and generally doing well considering how complex he is.\par 4/15/21  Off Eliquis 3/17/21 due to a 2nd bleed on the buttocks.  Amio was stopped March 8, 2021\par 8/31/21 On ASA 81 and Plavix 75, to consider a Watchman, on HD.  Edema resolved\par 11/2/21  S/P wATCHMAN DEVICE oCT 20TH, 2021.  hE HAD SEEN OTHER mdS TODAY AND WALKED OUT AT 5 PM\par \par EKG: NSR with 1st degree AVB. No ST-Tw abnormalities. 12/28/20\par EKG: NSR, IVCD, PRWP, 3/1/21\par \par

## 2021-11-02 NOTE — END OF VISIT
[Time Spent: ___ minutes] : I have spent [unfilled] minutes of time on the encounter. [>50% of the face to face encounter time was spent on counseling and/or coordination of care for ___] : Greater than 50% of the face to face encounter time was spent on counseling and/or coordination of care for [unfilled] [FreeTextEntry3] : Pt seen with RADHA Fernandez and agree on the above plan of care.

## 2021-11-02 NOTE — REVIEW OF SYSTEMS
[SOB on Exertion] : sob on exertion [Negative] : Endocrine [Cough] : no cough [Hemoptysis] : no hemoptysis [Chest Tightness] : no chest tightness [Frequent URIs] : no frequent URIs [Sputum] : no sputum [Dyspnea] : no dyspnea [Pleuritic Pain] : no pleuritic pain [Wheezing] : no wheezing [A.M. Dry Mouth] : no a.m. dry mouth

## 2021-11-03 ENCOUNTER — APPOINTMENT (OUTPATIENT)
Dept: HEART AND VASCULAR | Facility: CLINIC | Age: 66
End: 2021-11-03
Payer: MEDICARE

## 2021-11-03 ENCOUNTER — NON-APPOINTMENT (OUTPATIENT)
Age: 66
End: 2021-11-03

## 2021-11-03 VITALS
WEIGHT: 153 LBS | SYSTOLIC BLOOD PRESSURE: 109 MMHG | BODY MASS INDEX: 21.42 KG/M2 | HEIGHT: 71 IN | OXYGEN SATURATION: 96 % | HEART RATE: 70 BPM | DIASTOLIC BLOOD PRESSURE: 66 MMHG

## 2021-11-03 PROCEDURE — 99214 OFFICE O/P EST MOD 30 MIN: CPT

## 2021-11-03 NOTE — ASSESSMENT
[FreeTextEntry1] : 65 y/o w/ ESRD, MR, HFpEF, bleeding issues, HTN, HLD s/p LAAO with the Watchman device (31mm Watchman FLX)\par \par Plan:\par Mitral Regurgitation- medical management (moderate on JORGE, mild-mod on TTE in May). Will repeat imaging when comes in for Watchman f/u JORGE. Appears stable/euvolemic\par ESRD on HD- f/u renal recs\par S/p Watchman device- continue ASA/Plavix, and repeat JORGE 45 days postop \par -HFpEF- euvolemic on exam, f/u with CHF team\par HTN- BP well controlled\par

## 2021-11-03 NOTE — CARDIOLOGY SUMMARY
[de-identified] : 2/16/21 ECG: SR at 61bpm, first degree AV block  [de-identified] : 5/18/21 JORGE: Normal LV size/function, RVE with normal function, LAE, no thrombus seen, GARETH orifice measures 2.86cmX 2.07cm, no e/o intracardiac shunt, mild AI, mild prolapse P1 and alteral commissure of the mitral valve, moderate MR, moderate TR, PH present, PASP 47, AR 4.12cm, SANTANA 3/95cm, mild to moderate plaque seen in the visualized portion of the descenging arota. \par

## 2021-11-03 NOTE — HISTORY OF PRESENT ILLNESS
[FreeTextEntry1] : 66 year old male with a past medical history of HTN, ESRD (has LUE AVF,on HD MWF), T Cell lymphoma (diagnosed about 19 years ago, now in remission), AFib (off eliquis since spontaneous bleeds),HFpEF (followed by CHF team), mitral regurgitation, severe pHTN, who underwent a right chest wall hematoma evacuation on 12/18/20 and a  bronchoscopy, LVATS, robotic assisted drainage of pleural effusion, decortication, and pleurodesis (1/22/21) s/p left atrial appendage closure on 10/19/2021 who presents for follow up after discharge.\par \par The patient tolerated the procedure well with no complaints and was discharged on POD #1. \par \par Since his discharge, the patient states NYHA 1 symptoms, and no bleeding issues.\par

## 2021-11-03 NOTE — REVIEW OF SYSTEMS
[Fever] : no fever [Blurry Vision] : no blurred vision [SOB] : no shortness of breath [Dyspnea on exertion] : not dyspnea during exertion [Chest Discomfort] : no chest discomfort [Palpitations] : no palpitations [Orthopnea] : no orthopnea [Cough] : no cough [Abdominal Pain] : no abdominal pain [Urinary Frequency] : no change in urinary frequency [Erectile Dysfunction] : no erectile dysfunction [Joint Pain] : no joint pain [Myalgia] : no myalgia [Rash] : no rash [Dizziness] : no dizziness [Convulsions] : no convulsions [Confusion] : no confusion was observed [Under Stress] : not under stress [Easy Bleeding] : no tendency for easy bleeding

## 2021-11-18 NOTE — PATIENT PROFILE ADULT - NSASFALLNEEDSASSISTWITH_GEN_A_NUR
Co congestion, chills and ha 
Uses a cane/standing/walking

## 2021-11-26 ENCOUNTER — RX RENEWAL (OUTPATIENT)
Age: 66
End: 2021-11-26

## 2021-12-02 ENCOUNTER — OUTPATIENT (OUTPATIENT)
Dept: OUTPATIENT SERVICES | Facility: HOSPITAL | Age: 66
LOS: 1 days | End: 2021-12-02
Payer: MEDICARE

## 2021-12-02 DIAGNOSIS — Z41.9 ENCOUNTER FOR PROCEDURE FOR PURPOSES OTHER THAN REMEDYING HEALTH STATE, UNSPECIFIED: Chronic | ICD-10-CM

## 2021-12-02 DIAGNOSIS — Z90.49 ACQUIRED ABSENCE OF OTHER SPECIFIED PARTS OF DIGESTIVE TRACT: Chronic | ICD-10-CM

## 2021-12-02 DIAGNOSIS — I50.30 UNSPECIFIED DIASTOLIC (CONGESTIVE) HEART FAILURE: ICD-10-CM

## 2021-12-02 PROCEDURE — 93312 ECHO TRANSESOPHAGEAL: CPT

## 2021-12-02 PROCEDURE — 76377 3D RENDER W/INTRP POSTPROCES: CPT | Mod: 26

## 2021-12-02 PROCEDURE — 93312 ECHO TRANSESOPHAGEAL: CPT | Mod: 26

## 2021-12-15 ENCOUNTER — APPOINTMENT (OUTPATIENT)
Dept: HEART AND VASCULAR | Facility: CLINIC | Age: 66
End: 2021-12-15
Payer: MEDICARE

## 2021-12-15 VITALS
HEIGHT: 71 IN | OXYGEN SATURATION: 96 % | WEIGHT: 149 LBS | BODY MASS INDEX: 20.86 KG/M2 | HEART RATE: 59 BPM | SYSTOLIC BLOOD PRESSURE: 119 MMHG | DIASTOLIC BLOOD PRESSURE: 66 MMHG

## 2021-12-15 PROCEDURE — 99214 OFFICE O/P EST MOD 30 MIN: CPT

## 2021-12-15 NOTE — ASSESSMENT
[FreeTextEntry1] : Afib s/p Watchman- rate controlled, ok to stop Plavix with Watchman device in proper position.\par -ASA monotherapy\par \par Chronic systolic heart failure\par -euvolemic, LVEF borderline low\par -on metoprolol\par \par Tricuspid Regurgitation- severe on most recent echo, but pt with no swelling, NYHA 1\par -Medical mgmt for TR, f/u in 1 yr with repeat TTE\par \par Functional MR- improved\par -repeat TTE in 6 months - 1 yr for f/u\par -mild on JORGE\par \par Continue f/u with Dr. Ventura

## 2021-12-15 NOTE — HISTORY OF PRESENT ILLNESS
[FreeTextEntry1] : 66 year old male with a past medical history of HTN, ESRD (has LUE AVF,on HD MWF), T Cell lymphoma (diagnosed about 19 years ago, now in remission), AFib (off eliquis since spontaneous bleeds),HFpEF (followed by CHF team), mitral regurgitation, severe pHTN, who underwent a right chest wall hematoma evacuation on 12/18/20 and a  bronchoscopy, LVATS, robotic assisted drainage of pleural effusion, decortication, and pleurodesis (1/22/21) s/p left atrial appendage closure on 10/19/2021 (Watchman 31mm FLX) who presents for follow up after discharge.\par \par The patient tolerated the procedure well with no complaints and was discharged on POD #1. \par \par Since his discharge, the patient states NYHA 1 symptoms, and no bleeding issues. No leg swelling\par \par Post-Intervention JORGE performed (12/2021): Reviewed- well seated Watchman with trivial marisa-device leak. +mod-severe TR, +mild MR, +plaque in aortic root.\par \par EKG reviewed: NSR, LVH\par

## 2021-12-22 PROCEDURE — 82330 ASSAY OF CALCIUM: CPT

## 2021-12-22 PROCEDURE — 85027 COMPLETE CBC AUTOMATED: CPT

## 2021-12-22 PROCEDURE — C1889: CPT

## 2021-12-22 PROCEDURE — 85610 PROTHROMBIN TIME: CPT

## 2021-12-22 PROCEDURE — 86923 COMPATIBILITY TEST ELECTRIC: CPT

## 2021-12-22 PROCEDURE — 93306 TTE W/DOPPLER COMPLETE: CPT

## 2021-12-22 PROCEDURE — 83880 ASSAY OF NATRIURETIC PEPTIDE: CPT

## 2021-12-22 PROCEDURE — 90935 HEMODIALYSIS ONE EVALUATION: CPT

## 2021-12-22 PROCEDURE — 71045 X-RAY EXAM CHEST 1 VIEW: CPT

## 2021-12-22 PROCEDURE — 84100 ASSAY OF PHOSPHORUS: CPT

## 2021-12-22 PROCEDURE — 82962 GLUCOSE BLOOD TEST: CPT

## 2021-12-22 PROCEDURE — C9399: CPT

## 2021-12-22 PROCEDURE — 84443 ASSAY THYROID STIM HORMONE: CPT

## 2021-12-22 PROCEDURE — C1894: CPT

## 2021-12-22 PROCEDURE — 85025 COMPLETE CBC W/AUTO DIFF WBC: CPT

## 2021-12-22 PROCEDURE — 86706 HEP B SURFACE ANTIBODY: CPT

## 2021-12-22 PROCEDURE — 86900 BLOOD TYPING SEROLOGIC ABO: CPT

## 2021-12-22 PROCEDURE — 83735 ASSAY OF MAGNESIUM: CPT

## 2021-12-22 PROCEDURE — C1887: CPT

## 2021-12-22 PROCEDURE — 86803 HEPATITIS C AB TEST: CPT

## 2021-12-22 PROCEDURE — 86850 RBC ANTIBODY SCREEN: CPT

## 2021-12-22 PROCEDURE — 83036 HEMOGLOBIN GLYCOSYLATED A1C: CPT

## 2021-12-22 PROCEDURE — 36415 COLL VENOUS BLD VENIPUNCTURE: CPT

## 2021-12-22 PROCEDURE — 82803 BLOOD GASES ANY COMBINATION: CPT

## 2021-12-22 PROCEDURE — C1769: CPT

## 2021-12-22 PROCEDURE — 85730 THROMBOPLASTIN TIME PARTIAL: CPT

## 2021-12-22 PROCEDURE — 80053 COMPREHEN METABOLIC PANEL: CPT

## 2021-12-22 PROCEDURE — 93312 ECHO TRANSESOPHAGEAL: CPT

## 2021-12-22 PROCEDURE — 84132 ASSAY OF SERUM POTASSIUM: CPT

## 2021-12-22 PROCEDURE — 81001 URINALYSIS AUTO W/SCOPE: CPT

## 2021-12-22 PROCEDURE — 87340 HEPATITIS B SURFACE AG IA: CPT

## 2021-12-22 PROCEDURE — 86901 BLOOD TYPING SEROLOGIC RH(D): CPT

## 2021-12-22 PROCEDURE — 84295 ASSAY OF SERUM SODIUM: CPT

## 2022-01-04 ENCOUNTER — NON-APPOINTMENT (OUTPATIENT)
Age: 67
End: 2022-01-04

## 2022-01-04 ENCOUNTER — APPOINTMENT (OUTPATIENT)
Dept: HEART AND VASCULAR | Facility: CLINIC | Age: 67
End: 2022-01-04
Payer: MEDICARE

## 2022-01-04 VITALS
HEART RATE: 54 BPM | HEIGHT: 71 IN | DIASTOLIC BLOOD PRESSURE: 61 MMHG | SYSTOLIC BLOOD PRESSURE: 91 MMHG | WEIGHT: 145 LBS | BODY MASS INDEX: 20.3 KG/M2 | TEMPERATURE: 96.8 F

## 2022-01-04 VITALS — DIASTOLIC BLOOD PRESSURE: 58 MMHG | SYSTOLIC BLOOD PRESSURE: 88 MMHG

## 2022-01-04 PROCEDURE — 93000 ELECTROCARDIOGRAM COMPLETE: CPT

## 2022-01-04 PROCEDURE — 99214 OFFICE O/P EST MOD 30 MIN: CPT

## 2022-01-04 RX ORDER — AMLODIPINE BESYLATE 10 MG/1
10 TABLET ORAL DAILY
Qty: 30 | Refills: 0 | Status: DISCONTINUED | COMMUNITY
Start: 2021-04-08 | End: 2022-01-04

## 2022-01-04 NOTE — HISTORY OF PRESENT ILLNESS
[FreeTextEntry1] : 65 y/o M with HFpEF initially met but HF team on recent admission at [facility] and now presents for follow up. Other PMH is notable for HTN, COPD, CKD (stage 5 CKD, has LUE AVF, not on HD yet), T-cell lymphoma (diagnosed 19 years ago, on chemo romidepsin every Wednesday (not since Nov)), AF (on Eliquis), systolic CHF (EF 40-45%), EF was 60-65 in 2019,severe pHTN, severe MR, who recently underwent a left chest wall hematoma evacuation on 12/18/2020 secondary to loculated effusion. Hospital course at that time was complicated by AF with RVR and thoracic surgery consulted at that time for surgical intervention of trapped lung. He followed up as an outpatient after discharge and deemed a good surgical candidate for lung decortication. On 1/18/21 patient presented to [facility] for pre-operative optimization with heparin gtt prior to OR. Upon admission pt noted to have erythema and swelling to his L knee, CT scan demonstrated cellulitis, operation was deferred. Ortho, Gen Surg and ID were consulted, pt with no drainable collection and was medically managed with resolution of infection. Nephrology was consulted for assistance in managing CKD stage 4. On 1/22 he underwent an uncomplicated L VATS, RA decortication with blow hole placement for subcutaneous emphysema. Intraop findings significant for loculated hemothorax. He received 3UPRBC in the OR and post operatively was brought to the CTICU stable and intubated with 3 chest tubes in place. He was extubated POD1 and required primacor for pulmonary HTN and renal perfusion. On POD 2 a right sided pigtail was placed for pleural effusion which drained 800cc and ultimately removed on POD 4. Cardiology was consulted for management of pulmonary hypertension and CHF, recommended IV diuresis. He required alternating BiPAP and HFNC which was weaned to NC. Primacor was weaned off on POD 4 and he was transferred to the stepdown unit. Heparin gtt was started for AC but was held for bleeding around the CT site. A CT chest noncon was preformed showing post surgical changes, no concern for hemothorax and heparin gtt resumed. On POD 5 first CT was removed. Bronch with no abnormal findings, Second chest tube subsequently removed and added nifedipine Xl 30mg for BP control on POD 6. POD 7 third chest tube removed, post chest tube removal xray showed no pneumothorax. \par \par \par 12/28/20  In [facility] 12/17- 12/23 with a chest wall hematoma, Rapid AF.  EF good but severe MR and TR.  In NSR by exam and EKG .  I was later informed he might need a decortification of his pleura(left). \par 3/1/21 Hospitalized for VATS 1/2021 as above. BNP 15,500, Cr 5.5,  Echo was done 1/2021, EF back to NL and MR mild to moderate, mod to severe TR and PAP 67.   Got the Covid Vaccine.  Here he is in NSR and generally doing well considering how complex he is.\par 4/15/21  Off Eliquis 3/17/21 due to a 2nd bleed on the buttocks.  Amio was stopped March 8, 2021\par 8/31/21 On ASA 81 and Plavix 75, to consider a Watchman, on HD.  Edema resolved\par 11/2/21  S/P wATCHMAN DEVICE oCT 20TH, 2021.  hE HAD SEEN OTHER mdS TODAY AND WALKED OUT AT 5 PM\par 1/4/22 echo done Dec 2021, EF NL, MR mild, PAP 59, aortic plaque noted.\par EKG: NSR with 1st degree AVB. ST-Tw abnormalities. 12/28/20\par EKG: NSR, IVCD, PRWP, STTs 3/1/21, 1/4/22\par \par

## 2022-01-04 NOTE — REASON FOR VISIT
[Follow-Up - Clinic] : a clinic follow-up of [Cardiomyopathy] : cardiomyopathy [FreeTextEntry1] : Onc- Dr Vel Dimas  [phone]\par  Renal- Dr Denton\par Struc Hrt- Dr Cui\par HF- Weston\par EP- Dr Herndon\par Vasc- Dr Cisneros\par Pulm- Dr Johnson

## 2022-01-04 NOTE — ASSESSMENT
[FreeTextEntry1] : Systolic CHF - EF recovered, normal on June 2019 echo.  Secondary to chemo?  Then 40-45% on  admission in Dec 2020, then improved to 60-65 on last admission in January 2021,  NL EF Dec 2021. \par \par MR- Was severe, now mild to moderate and EF improved.  Followed by Dr Cui.  MR is mild on echo Dec 2021.\par \par Afib - in NSR today, no longer on Eliquis due to 2 bleeds and Amio.  ZIO patch pending.  I favor ASA and resuming Amio. Referred to Pulm to be monitored for Amio Pulm toxicity.  The patient's [id number] score = 2.  He is S/P a Watchman device.  Off Eliquis. Still on Amio, told to request TFTs next time blood is drawn and secure a copy for me.\par \par HTN- BP too low, will reduce Norvasc from 10 to 5 mg on 1/4/22. \par \par HLD- not on statin. I favor a statin due to aortic atherosclerosis.\par \par T Cell Lymphoma - remains on chemo, \par \par CKD- Dr Denton, now on HD since June 2021\par \par PreOp- Pt s/p decortication.  topped Eliquis 3 days prior to surgery with surgery on the 4th day. See beginning of this note for entire hospital stay.\par \par  \par \par \par \par

## 2022-01-11 ENCOUNTER — TRANSCRIPTION ENCOUNTER (OUTPATIENT)
Age: 67
End: 2022-01-11

## 2022-01-24 ENCOUNTER — RX RENEWAL (OUTPATIENT)
Age: 67
End: 2022-01-24

## 2022-02-24 ENCOUNTER — RX RENEWAL (OUTPATIENT)
Age: 67
End: 2022-02-24

## 2022-02-24 ENCOUNTER — TRANSCRIPTION ENCOUNTER (OUTPATIENT)
Age: 67
End: 2022-02-24

## 2022-02-24 RX ORDER — SEVELAMER HYDROCHLORIDE 800 MG/1
800 TABLET, FILM COATED ORAL 3 TIMES DAILY
Qty: 270 | Refills: 3 | Status: DISCONTINUED | COMMUNITY
Start: 2021-02-16 | End: 2022-02-24

## 2022-03-01 ENCOUNTER — INPATIENT (INPATIENT)
Facility: HOSPITAL | Age: 67
LOS: 3 days | Discharge: HOME | DRG: 177 | End: 2022-03-05
Attending: HOSPITALIST | Admitting: HOSPITALIST
Payer: MEDICARE

## 2022-03-01 VITALS
SYSTOLIC BLOOD PRESSURE: 105 MMHG | WEIGHT: 154.98 LBS | HEIGHT: 71 IN | DIASTOLIC BLOOD PRESSURE: 65 MMHG | HEART RATE: 67 BPM | RESPIRATION RATE: 18 BRPM | OXYGEN SATURATION: 98 % | TEMPERATURE: 98 F

## 2022-03-01 DIAGNOSIS — I48.91 UNSPECIFIED ATRIAL FIBRILLATION: ICD-10-CM

## 2022-03-01 DIAGNOSIS — Z90.49 ACQUIRED ABSENCE OF OTHER SPECIFIED PARTS OF DIGESTIVE TRACT: Chronic | ICD-10-CM

## 2022-03-01 DIAGNOSIS — I50.32 CHRONIC DIASTOLIC (CONGESTIVE) HEART FAILURE: ICD-10-CM

## 2022-03-01 DIAGNOSIS — I10 ESSENTIAL (PRIMARY) HYPERTENSION: ICD-10-CM

## 2022-03-01 DIAGNOSIS — R63.8 OTHER SYMPTOMS AND SIGNS CONCERNING FOOD AND FLUID INTAKE: ICD-10-CM

## 2022-03-01 DIAGNOSIS — R11.2 NAUSEA WITH VOMITING, UNSPECIFIED: ICD-10-CM

## 2022-03-01 DIAGNOSIS — R19.7 DIARRHEA, UNSPECIFIED: ICD-10-CM

## 2022-03-01 DIAGNOSIS — M10.9 GOUT, UNSPECIFIED: ICD-10-CM

## 2022-03-01 DIAGNOSIS — Z41.9 ENCOUNTER FOR PROCEDURE FOR PURPOSES OTHER THAN REMEDYING HEALTH STATE, UNSPECIFIED: Chronic | ICD-10-CM

## 2022-03-01 DIAGNOSIS — N17.9 ACUTE KIDNEY FAILURE, UNSPECIFIED: ICD-10-CM

## 2022-03-01 DIAGNOSIS — J18.9 PNEUMONIA, UNSPECIFIED ORGANISM: ICD-10-CM

## 2022-03-01 LAB
ALBUMIN SERPL ELPH-MCNC: 3.6 G/DL — SIGNIFICANT CHANGE UP (ref 3.3–5)
ALP SERPL-CCNC: 119 U/L — SIGNIFICANT CHANGE UP (ref 40–120)
ALT FLD-CCNC: 7 U/L — LOW (ref 10–45)
ANION GAP SERPL CALC-SCNC: 18 MMOL/L — HIGH (ref 5–17)
APPEARANCE UR: CLEAR — SIGNIFICANT CHANGE UP
AST SERPL-CCNC: 18 U/L — SIGNIFICANT CHANGE UP (ref 10–40)
BACTERIA # UR AUTO: PRESENT /HPF
BASOPHILS # BLD AUTO: 0.04 K/UL — SIGNIFICANT CHANGE UP (ref 0–0.2)
BASOPHILS NFR BLD AUTO: 0.4 % — SIGNIFICANT CHANGE UP (ref 0–2)
BILIRUB SERPL-MCNC: 0.4 MG/DL — SIGNIFICANT CHANGE UP (ref 0.2–1.2)
BILIRUB UR-MCNC: NEGATIVE — SIGNIFICANT CHANGE UP
BUN SERPL-MCNC: 59 MG/DL — HIGH (ref 7–23)
CALCIUM SERPL-MCNC: 9.1 MG/DL — SIGNIFICANT CHANGE UP (ref 8.4–10.5)
CHLORIDE SERPL-SCNC: 96 MMOL/L — SIGNIFICANT CHANGE UP (ref 96–108)
CO2 SERPL-SCNC: 24 MMOL/L — SIGNIFICANT CHANGE UP (ref 22–31)
COLOR SPEC: YELLOW — SIGNIFICANT CHANGE UP
CREAT SERPL-MCNC: 10.77 MG/DL — HIGH (ref 0.5–1.3)
DIFF PNL FLD: ABNORMAL
EGFR: 5 ML/MIN/1.73M2 — LOW
EOSINOPHIL # BLD AUTO: 0.02 K/UL — SIGNIFICANT CHANGE UP (ref 0–0.5)
EOSINOPHIL NFR BLD AUTO: 0.2 % — SIGNIFICANT CHANGE UP (ref 0–6)
EPI CELLS # UR: SIGNIFICANT CHANGE UP /HPF (ref 0–5)
GLUCOSE SERPL-MCNC: 86 MG/DL — SIGNIFICANT CHANGE UP (ref 70–99)
GLUCOSE UR QL: NEGATIVE — SIGNIFICANT CHANGE UP
HCT VFR BLD CALC: 32.7 % — LOW (ref 39–50)
HGB BLD-MCNC: 9.8 G/DL — LOW (ref 13–17)
IMM GRANULOCYTES NFR BLD AUTO: 0.4 % — SIGNIFICANT CHANGE UP (ref 0–1.5)
KETONES UR-MCNC: NEGATIVE — SIGNIFICANT CHANGE UP
LACTATE SERPL-SCNC: 1.5 MMOL/L — SIGNIFICANT CHANGE UP (ref 0.5–2)
LEGIONELLA AG UR QL: NEGATIVE — SIGNIFICANT CHANGE UP
LEUKOCYTE ESTERASE UR-ACNC: ABNORMAL
LIDOCAIN IGE QN: 24 U/L — SIGNIFICANT CHANGE UP (ref 7–60)
LYMPHOCYTES # BLD AUTO: 0.64 K/UL — LOW (ref 1–3.3)
LYMPHOCYTES # BLD AUTO: 5.9 % — LOW (ref 13–44)
MAGNESIUM SERPL-MCNC: 2.1 MG/DL — SIGNIFICANT CHANGE UP (ref 1.6–2.6)
MCHC RBC-ENTMCNC: 28 PG — SIGNIFICANT CHANGE UP (ref 27–34)
MCHC RBC-ENTMCNC: 30 GM/DL — LOW (ref 32–36)
MCV RBC AUTO: 93.4 FL — SIGNIFICANT CHANGE UP (ref 80–100)
MONOCYTES # BLD AUTO: 0.42 K/UL — SIGNIFICANT CHANGE UP (ref 0–0.9)
MONOCYTES NFR BLD AUTO: 3.9 % — SIGNIFICANT CHANGE UP (ref 2–14)
NEUTROPHILS # BLD AUTO: 9.67 K/UL — HIGH (ref 1.8–7.4)
NEUTROPHILS NFR BLD AUTO: 89.2 % — HIGH (ref 43–77)
NITRITE UR-MCNC: NEGATIVE — SIGNIFICANT CHANGE UP
NRBC # BLD: 0 /100 WBCS — SIGNIFICANT CHANGE UP (ref 0–0)
PH UR: 8 — SIGNIFICANT CHANGE UP (ref 5–8)
PHOSPHATE SERPL-MCNC: 4.6 MG/DL — HIGH (ref 2.5–4.5)
PLATELET # BLD AUTO: 175 K/UL — SIGNIFICANT CHANGE UP (ref 150–400)
POTASSIUM SERPL-MCNC: 3.8 MMOL/L — SIGNIFICANT CHANGE UP (ref 3.5–5.3)
POTASSIUM SERPL-SCNC: 3.8 MMOL/L — SIGNIFICANT CHANGE UP (ref 3.5–5.3)
PROT SERPL-MCNC: 7 G/DL — SIGNIFICANT CHANGE UP (ref 6–8.3)
PROT UR-MCNC: >=300 MG/DL
RBC # BLD: 3.5 M/UL — LOW (ref 4.2–5.8)
RBC # FLD: 18.2 % — HIGH (ref 10.3–14.5)
RBC CASTS # UR COMP ASSIST: < 5 /HPF — SIGNIFICANT CHANGE UP
S PNEUM AG UR QL: NEGATIVE — SIGNIFICANT CHANGE UP
SARS-COV-2 RNA SPEC QL NAA+PROBE: SIGNIFICANT CHANGE UP
SODIUM SERPL-SCNC: 138 MMOL/L — SIGNIFICANT CHANGE UP (ref 135–145)
SP GR SPEC: 1.02 — SIGNIFICANT CHANGE UP (ref 1–1.03)
UROBILINOGEN FLD QL: 0.2 E.U./DL — SIGNIFICANT CHANGE UP
WBC # BLD: 10.83 K/UL — HIGH (ref 3.8–10.5)
WBC # FLD AUTO: 10.83 K/UL — HIGH (ref 3.8–10.5)
WBC UR QL: ABNORMAL /HPF

## 2022-03-01 PROCEDURE — 71045 X-RAY EXAM CHEST 1 VIEW: CPT | Mod: 26

## 2022-03-01 PROCEDURE — 99285 EMERGENCY DEPT VISIT HI MDM: CPT | Mod: 25

## 2022-03-01 PROCEDURE — 99223 1ST HOSP IP/OBS HIGH 75: CPT

## 2022-03-01 RX ORDER — METOPROLOL TARTRATE 50 MG
50 TABLET ORAL EVERY 12 HOURS
Refills: 0 | Status: DISCONTINUED | OUTPATIENT
Start: 2022-03-01 | End: 2022-03-05

## 2022-03-01 RX ORDER — AZITHROMYCIN 500 MG/1
500 TABLET, FILM COATED ORAL ONCE
Refills: 0 | Status: COMPLETED | OUTPATIENT
Start: 2022-03-01 | End: 2022-03-01

## 2022-03-01 RX ORDER — ASPIRIN/CALCIUM CARB/MAGNESIUM 324 MG
81 TABLET ORAL DAILY
Refills: 0 | Status: DISCONTINUED | OUTPATIENT
Start: 2022-03-01 | End: 2022-03-05

## 2022-03-01 RX ORDER — METOPROLOL TARTRATE 50 MG
1 TABLET ORAL
Qty: 0 | Refills: 0 | DISCHARGE

## 2022-03-01 RX ORDER — ALLOPURINOL 300 MG
100 TABLET ORAL EVERY 12 HOURS
Refills: 0 | Status: DISCONTINUED | OUTPATIENT
Start: 2022-03-01 | End: 2022-03-05

## 2022-03-01 RX ORDER — METOCLOPRAMIDE HCL 10 MG
10 TABLET ORAL ONCE
Refills: 0 | Status: COMPLETED | OUTPATIENT
Start: 2022-03-01 | End: 2022-03-01

## 2022-03-01 RX ORDER — SEVELAMER CARBONATE 2400 MG/1
800 POWDER, FOR SUSPENSION ORAL THREE TIMES A DAY
Refills: 0 | Status: DISCONTINUED | OUTPATIENT
Start: 2022-03-01 | End: 2022-03-02

## 2022-03-01 RX ORDER — CEFTRIAXONE 500 MG/1
1000 INJECTION, POWDER, FOR SOLUTION INTRAMUSCULAR; INTRAVENOUS EVERY 24 HOURS
Refills: 0 | Status: DISCONTINUED | OUTPATIENT
Start: 2022-03-02 | End: 2022-03-03

## 2022-03-01 RX ORDER — ACETAMINOPHEN 500 MG
650 TABLET ORAL EVERY 6 HOURS
Refills: 0 | Status: DISCONTINUED | OUTPATIENT
Start: 2022-03-01 | End: 2022-03-05

## 2022-03-01 RX ORDER — AMIODARONE HYDROCHLORIDE 400 MG/1
200 TABLET ORAL DAILY
Refills: 0 | Status: DISCONTINUED | OUTPATIENT
Start: 2022-03-01 | End: 2022-03-05

## 2022-03-01 RX ORDER — CEFTRIAXONE 500 MG/1
1000 INJECTION, POWDER, FOR SOLUTION INTRAMUSCULAR; INTRAVENOUS ONCE
Refills: 0 | Status: COMPLETED | OUTPATIENT
Start: 2022-03-01 | End: 2022-03-01

## 2022-03-01 RX ORDER — ONDANSETRON 8 MG/1
4 TABLET, FILM COATED ORAL EVERY 8 HOURS
Refills: 0 | Status: DISCONTINUED | OUTPATIENT
Start: 2022-03-01 | End: 2022-03-04

## 2022-03-01 RX ORDER — HEPARIN SODIUM 5000 [USP'U]/ML
5000 INJECTION INTRAVENOUS; SUBCUTANEOUS EVERY 8 HOURS
Refills: 0 | Status: DISCONTINUED | OUTPATIENT
Start: 2022-03-01 | End: 2022-03-05

## 2022-03-01 RX ORDER — CLOPIDOGREL BISULFATE 75 MG/1
1 TABLET, FILM COATED ORAL
Qty: 0 | Refills: 0 | DISCHARGE

## 2022-03-01 RX ORDER — LANOLIN ALCOHOL/MO/W.PET/CERES
3 CREAM (GRAM) TOPICAL AT BEDTIME
Refills: 0 | Status: DISCONTINUED | OUTPATIENT
Start: 2022-03-01 | End: 2022-03-04

## 2022-03-01 RX ORDER — ACETAMINOPHEN 500 MG
650 TABLET ORAL ONCE
Refills: 0 | Status: COMPLETED | OUTPATIENT
Start: 2022-03-01 | End: 2022-03-01

## 2022-03-01 RX ADMIN — CEFTRIAXONE 100 MILLIGRAM(S): 500 INJECTION, POWDER, FOR SOLUTION INTRAMUSCULAR; INTRAVENOUS at 11:43

## 2022-03-01 RX ADMIN — Medication 650 MILLIGRAM(S): at 11:58

## 2022-03-01 RX ADMIN — Medication 104 MILLIGRAM(S): at 14:23

## 2022-03-01 RX ADMIN — AMIODARONE HYDROCHLORIDE 200 MILLIGRAM(S): 400 TABLET ORAL at 16:15

## 2022-03-01 RX ADMIN — Medication 50 MILLIGRAM(S): at 16:15

## 2022-03-01 RX ADMIN — AZITHROMYCIN 500 MILLIGRAM(S): 500 TABLET, FILM COATED ORAL at 11:43

## 2022-03-01 RX ADMIN — CEFTRIAXONE 1000 MILLIGRAM(S): 500 INJECTION, POWDER, FOR SOLUTION INTRAMUSCULAR; INTRAVENOUS at 11:58

## 2022-03-01 RX ADMIN — Medication 650 MILLIGRAM(S): at 11:43

## 2022-03-01 RX ADMIN — Medication 81 MILLIGRAM(S): at 16:15

## 2022-03-01 RX ADMIN — Medication 200 MILLIGRAM(S): at 16:15

## 2022-03-01 RX ADMIN — Medication 100 MILLIGRAM(S): at 16:15

## 2022-03-01 RX ADMIN — Medication 650 MILLIGRAM(S): at 16:14

## 2022-03-01 NOTE — H&P ADULT - NSHPPHYSICALEXAM_GEN_ALL_CORE
VITALS:   T(C): 37.6 (03-01-22 @ 14:28), Max: 37.6 (03-01-22 @ 14:28)  HR: 70 (03-01-22 @ 14:28) (67 - 70)  BP: 128/68 (03-01-22 @ 14:28) (105/65 - 128/68)  RR: 18 (03-01-22 @ 14:28) (18 - 18)  SpO2: 96% (03-01-22 @ 14:28) (96% - 98%)    GENERAL: elderly male resting in bed in NAD  HEAD:  Atraumatic, normocephalic  EYES: EOMI, PERRLA, conjunctiva and sclera clear  ENT: Moist mucous membranes  NECK: no JVD  HEART: Regular rate and rhythm, no murmurs, rubs, or gallops  LUNGS: diminished breath sounds b/l L worse than R  ABDOMEN: Soft, nontender, nondistended, +BS  EXTREMITIES: 2+ peripheral pulses bilaterally. No clubbing, cyanosis, or edema, +LUE AVF  NERVOUS SYSTEM:  A&Ox3, no focal deficits   SKIN: No rashes or lesions

## 2022-03-01 NOTE — H&P ADULT - PROBLEM SELECTOR PLAN 5
Hx of AFib s/p watchmen procedure in 10/21. Follows Dr. Ventura. On amiodarone 200mg Qd and ASA 81.    Plan  - c/w home meds Hx of AFib s/p watchmen procedure in 10/21. Follows Dr. Ventura. On amiodarone 200mg Qd and ASA 81. Not on AC due to Hx of bleeding.    Plan  - c/w home meds

## 2022-03-01 NOTE — ED ADULT NURSE NOTE - OBJECTIVE STATEMENT
Pt 67yo M, complaining of headache with n/v/d and cough, sx started yesterday, vomiting after eating something, headache without fever, temp 97~98'F checked, No blood in vomit, HD M/W/F, missed Mon due to bad condition, having fistula in Lt arm, Lt arm saved, Denied cp, abd pain or sob, no fever, cough+, Last chemo 3wks ago.

## 2022-03-01 NOTE — H&P ADULT - PROBLEM SELECTOR PLAN 7
On metoprolol succ 50mg bid, amlodipine 5mg Qd. BP on admission 105/65. Did not take any meds today    Plan  - resumed home toprol  - holding amlodipine, resume as tolerated

## 2022-03-01 NOTE — CONSULT NOTE ADULT - SUBJECTIVE AND OBJECTIVE BOX
HPI:  66M with pmhx of ESRD (MWF), lymphoma (last chemo 3 weeks ago) who presents to the hospital in the setting of headache, cough, nausea, vomiting and diarrhea for the last 2 days. Pt states over weekend he developed cough and headache, after wards started having  diarrhea like symptoms. Does not endorse traveling anywhere recently, but did go to a bowling event with children recently. Last HD done on Friday, has a LUE AVF, gets HD for 3 hours, EDW 64Kg. Labs noted and BP noted stable.  Nephrology consulted for dialysis.     PAST MEDICAL & SURGICAL HISTORY:  HTN (hypertension)    Atrial fibrillation    CKD (chronic kidney disease)    Lymphoma  t cell; remission since 11/2020    CHF, chronic    H/O pulmonary hypertension    Gout    BPH (benign prostatic hyperplasia)    COPD, mild    Mitral regurgitation    History of cholecystectomy    Elective surgery  rectal surgery    Allergies:  No Known Allergies      Home Medications:   acetaminophen     Tablet .. 650 milliGRAM(s) Oral every 6 hours PRN  aluminum hydroxide/magnesium hydroxide/simethicone Suspension 30 milliLiter(s) Oral every 4 hours PRN  melatonin 3 milliGRAM(s) Oral at bedtime PRN  ondansetron Injectable 4 milliGRAM(s) IV Push every 8 hours PRN      Hospital Medications:   MEDICATIONS  (STANDING):      SOCIAL HISTORY:  Denies ETOh, Smoking,     Family History:  FAMILY HISTORY:        VITALS:  T(F): 99.6 (03-01-22 @ 14:28), Max: 99.6 (03-01-22 @ 14:28)  HR: 70 (03-01-22 @ 14:28)  BP: 128/68 (03-01-22 @ 14:28)  RR: 18 (03-01-22 @ 14:28)  SpO2: 96% (03-01-22 @ 14:28)  Wt(kg): --    Height (cm): 180.3 (03-01 @ 10:22)  Weight (kg): 70.3 (03-01 @ 10:22)  BMI (kg/m2): 21.6 (03-01 @ 10:22)  BSA (m2): 1.89 (03-01 @ 10:22)  CAPILLARY BLOOD GLUCOSE      POCT Blood Glucose.: 83 mg/dL (01 Mar 2022 10:26)      Review of Systems:  ROS negative except as per HPI    PHYSICAL EXAM:  GENERAL: Alert, awake, oriented x3 on RA   HEENT: SARAI, EOMI, neck supple, no JVP  CHEST/LUNG: Bilateral clear breath sounds  HEART: Regular rate and rhythm, no murmur, no gallops, no rub   ABDOMEN: Soft, nontender, non distended  EXTREMITIES: no pedal edema, WWP  Neurology: AAOx3, no asterixis   SKIN: No rash or skin lesion   ACCESS: LUE AVF w/bruit and thrill     LABS:  03-01    138  |  96  |  59<H>  ----------------------------<  86  3.8   |  24  |  10.77<H>    Ca    9.1      01 Mar 2022 11:27  Phos  4.6     03-01  Mg     2.1     03-01    TPro  7.0  /  Alb  3.6  /  TBili  0.4  /  DBili      /  AST  18  /  ALT  7<L>  /  AlkPhos  119  03-01    Creatinine Trend: 10.77 <--                        9.8    10.83 )-----------( 175      ( 01 Mar 2022 11:27 )             32.7     Urine Studies:

## 2022-03-01 NOTE — H&P ADULT - PROBLEM SELECTOR PLAN 1
p/w 3 days of productive cough and malaise, diarrhea, decreased BS L worse than R. CXR showing new RLL infiltrate. WBC 10.8 with neutrophilic predominance (baseline 4-7), Na wnl. s/p CTX1g and azithro 500mg IV in ED    Plan  - Continue with CTX 1g IV qD for 5 days  - continue azithromycin 500mg PO X 3 days i/s/o prolonged Qtc 503  - f/u urine strep and legionella  - f/u blood Cx  - Robitussin 200mg Q6hrs  - incentive spirometry p/w 3 days of productive cough and malaise, diarrhea, decreased BS L worse than R. CXR showing new RLL infiltrate. WBC 10.8 with neutrophilic predominance (baseline 4-7), Na wnl. s/p CTX1g and azithro 500mg IV in ED    Plan  - Continue with CTX 1g IV qD for 5 days  - continue azithromycin 500mg PO X 3 days i/s/o prolonged Qtc 503  - f/u urine strep and legionella  - f/u blood Cx  - Robitussin 200mg Q6hrs  - incentive spirometry  - Goal O2>92% (Hx of mild COPD) p/w 3 days of productive cough and malaise, diarrhea, decreased BS L worse than R. CXR showing new RLL infiltrate. WBC 10.8 with neutrophilic predominance (baseline 4-7), Na wnl. s/p CTX1g and azithro 500mg IV in ED    Plan  - Continue with CTX 1g IV qD for 5 days (03/01-03/05)  - Begin doxycycline 100mg BID for atypical coverage X 5 days (03/02-03/06)  - f/u CT chest non con to r/o empyema  - f/u urine strep and legionella  - f/u blood Cx  - Robitussin 200mg Q6hrs  - incentive spirometry  - Goal O2>92% (Hx of mild COPD)

## 2022-03-01 NOTE — H&P ADULT - PROBLEM SELECTOR PLAN 6
Last echo 12/21 showing LVEF 65% PASP 59 mild AR and MR. on HD    Plan  - c/w metoprolol succinate 50mg BID  - HD per renal

## 2022-03-01 NOTE — H&P ADULT - NSICDXPASTMEDICALHX_GEN_ALL_CORE_FT
PAST MEDICAL HISTORY:  Atrial fibrillation     BPH (benign prostatic hyperplasia)     CHF, chronic LVEF 65 PASP 59 midl MR, AR on 12/2/21    CKD (chronic kidney disease)     COPD, mild     Gout     H/O pulmonary hypertension     HTN (hypertension)     Lymphoma t cell; remission since 11/2020    Mitral regurgitation

## 2022-03-01 NOTE — H&P ADULT - PROBLEM SELECTOR PLAN 4
Hx of CKD5 on HD MWF via LUE AVF (06/21). Follows Dr. Guadalupe. Makes minimal urine at baseline. Missed Monday session because of his illness, last session on friday. Cr 10.7 on admission baseline 5-6. dec BS b/l, No LE edema.    Plan  - nephro consulted -> plan for HD tomorrow  - c/w sevelamer 800mg TID  - nephro reccs

## 2022-03-01 NOTE — H&P ADULT - HISTORY OF PRESENT ILLNESS
66M PMHx CKD on HD (MWF follows Dr. Guadalupe), T cell lymphoma on chemo every other week ([facility] in [place]), Afib s/p watchmen procedure in 10/19/21 at [facility], HFpEF, mod PHtn, L VATS procedure 2 years ago presenting with cough and malaise for 3 days. Pt reports productive cough with white sputum that began 3 days ago, associated with headache, non bloody episodes of diarrhea and 2-3 episodes of nausea and vomiting that are only present after eating. He also endorses subjective fevers, chills and sweats. Pt denies any sick contact but notes that he was at a [employer] last week for grandsons birthday. He makes small amount of urine at baseline and is vaccinated against covid X3. His last chemo session was 3 week ago because his Dr is currently not in office. He denies SOB, chest pain or palpitations.    In the ED:    - VS: Tmax: 98.2, HR: 67, BP: 98.2, RR: 18, O2: 98%     - Pertinent Labs: WBC 10.8 (baseline 4-7) N% 89, Hgb 9.8 (baseline 8-11),  Na 138 BUN 59 Cr 10.7 (baseline 5-6)    - Imaging: CXR: RLL infiltrate, small L pleural effusion    - EKG: Sinus rhythm with 1st degree av block QTc 503    - Treatment/interventions: CTX 1g, azithromycin 500mg, reglan 10mg

## 2022-03-01 NOTE — H&P ADULT - PROBLEM SELECTOR PLAN 2
2-3 total episodes of N/V associated only with PO intake. s/p 10mg Reglan in ED.    Plan  - Avoid zofran/reglan i/s/o Qtc prolongation  - Can give tigan PRN for nausea  - Started diet, monitor PO intake

## 2022-03-01 NOTE — H&P ADULT - PROBLEM SELECTOR PLAN 9
Fluids: None  Electrolytes: Mg>2, K>4  Nutrition:  DASH  Prophylaxis: hep 5000 subq q8  Activity: AAT, OOBTC  GI: none  C: FC  Dispo: [facility]

## 2022-03-01 NOTE — H&P ADULT - ATTENDING COMMENTS
67 yo M PMHx CKD on HD (MWF follows Dr. Guadalupe), T cell lymphoma on chemo every other week (Dr. Judie Lacey [facility] clinic in [place]), Afib s/p watchmen procedure in 10/19/21 at [facility], HFpEF, mod PHtn, L VATS procedure 2 years ago presenting with cough and malaise for 3 days admitted for CAP.     # CAP.   # CKD on HD.   # A fib s/p wathcmen procedure.   # T-cell lymphoma on chemotherapy.     - Will start ceftriaxone and doxycycline while waiting for culture and sensitivity.   - Continue hemodialysis as schedule.   - Physical therapy evaluation and discharge planning.

## 2022-03-01 NOTE — ED ADULT NURSE NOTE - NSICDXFAMILYHX_GEN_ALL_CORE_FT
FAMILY HISTORY:  No pertinent family history in first degree relatives     FAMILY HISTORY:  Mother  Still living? Unknown  FH: type 2 diabetes, Age at diagnosis: Age Unknown

## 2022-03-01 NOTE — ED PROVIDER NOTE - OBJECTIVE STATEMENT
67 y/o M w/ Hx of CKD (on HD M/W/F, last session last Fri), and lymphoma (last chemo 3 weeks ago), presents today w/ headache, nonproductive cough, and N/V/D for about 2 days. Reports sudden onset headache Sun afternoon w/ cough. N/V started Mon morning. Pt did not undergo dialysis on Mon secondary to symptoms. Denies sore throat, nasal congestion, CP, fever, chills, abd pain. No sick contacts or recent travels. Took Tylenol this morning.

## 2022-03-01 NOTE — ED ADULT TRIAGE NOTE - CHIEF COMPLAINT QUOTE
Pt reports vomiting, diarrhea, frontal headache, cough x 2 days. Denies abd pain, chest pain, sob, fevers. Hx of lymphoma, last chemo 3 weeks ago. Pt on dialysis MWF, missed dialysis yesterday, last dialysis Friday.

## 2022-03-01 NOTE — ED PROVIDER NOTE - ENMT, MLM
No sinus tenderness. FROM of neck. No meningeal signs. Airway patent, Nasal mucosa clear. Mouth with normal mucosa. Throat has no vesicles, no oropharyngeal exudates and uvula is midline.

## 2022-03-01 NOTE — H&P ADULT - PROBLEM SELECTOR PLAN 3
Multiple episodes of Non bloody diarrhea assoc with PO intake. Last BM today.    Plan  - Monitor BMs

## 2022-03-01 NOTE — CONSULT NOTE ADULT - ASSESSMENT
66M with pmhx of ESRD (MWF), lymphoma (last chemo 3 weeks ago) who presents to the hospital in the setting of headache, cough, nausea, vomiting and diarrhea for the last 2 days.    Assessment/Plan:     #ESRD on HD MWF @[address] [place] [address] [facility]   usual Rx 3h, EDW 64Kg  last HD 2/25  -Can do HD per schedule on 3/2  -electrolytes at goal   -euvolemic, UF w/HD     #HTN   BP at goal   -Continue to monitor    #access   LUE AVF functional w/ palpable thrill and bruit    #anemia  Hb at goal   -Epo w/ HD  -Check iron profile and ferritin  transfusion as per primary team     #renal bone disease   Ca at goal  Phos  at goal    Thank you for the opportunity to participate in the care of your patient. The nephrology service remains available to assist with any questions or concerns. Please feel free to reach us by paging the on-call nephrology fellow for urgent issues or as below.     Janna Deleon D.O.  PGY-4, Nephrology Fellow   P: [phone]

## 2022-03-01 NOTE — H&P ADULT - NSHPSOCIALHISTORY_GEN_ALL_CORE
Lives in apartment with wife. Short walk up. Ambulates with cane at baseline, also has walker at home. Drinks beer 1-2/week, former smoker quit 2 years ago, previously smoked 1/2PPD since age 14. Denies other drug use, but interested in trying medical marijuana for cancer related pain.

## 2022-03-01 NOTE — ED PROVIDER NOTE - CONSTITUTIONAL, MLM
Acute rehab following. Felipe from rehab requested updated [place] notes which was completed yesterday 10/17. Dr. Marie at bedside currently.    normal... Awake, alert, oriented to person, place, time/situation and in no apparent distress. Coughing on exam, wet sounding, nonproductive.

## 2022-03-01 NOTE — H&P ADULT - ASSESSMENT
66M PMHx CKD on HD (MWF follows Dr. Guadalupe), T cell lymphoma on chemo every other week (Dr. Judie Lacey [facility] in [place]), Afib s/p watchmen procedure in 10/19/21 at [facility], HFpEF, mod PHtn, L VATS procedure 2 years ago presenting with cough and malaise for 3 days admitted for CAP and HD 66 M PMHx CKD on HD (MWF follows Dr. Guadalupe), T cell lymphoma on chemo every other week ([facility] [place]), Afib s/p watchmen procedure in 10/19/21 at [facility], HFpEF, mod PHtn, L VATS procedure 2 years ago presenting with cough and malaise for 3 days admitted for CAP and HD

## 2022-03-01 NOTE — ED ADULT NURSE NOTE - NSICDXPASTMEDICALHX_GEN_ALL_CORE_FT
PAST MEDICAL HISTORY:  Atrial fibrillation     BPH (benign prostatic hyperplasia)     CHF, chronic     CKD (chronic kidney disease)     COPD, mild     Gout     H/O pulmonary hypertension     HTN (hypertension)     Lymphoma t cell; remission since 11/2020    Mitral regurgitation      PAST MEDICAL HISTORY:  Atrial fibrillation     BPH (benign prostatic hyperplasia)     CHF, chronic LVEF 66 PASP 59 midl MR, AR on 12/2/21    CKD (chronic kidney disease)     COPD, mild     Gout     H/O pulmonary hypertension     HTN (hypertension)     Lymphoma t cell; remission since 11/2020    Mitral regurgitation

## 2022-03-01 NOTE — PROGRESS NOTE ADULT - SUBJECTIVE AND OBJECTIVE BOX
HPI  66M PMHx CKD on HD (MWF follows Dr. Guadalupe), T cell lymphoma on chemo every other week ([facility]. Judie Lacey [facility] in [place]), Afib s/p watchmen procedure in 10/19/21 at [facility], HFpEF, mod PHtn, L VATS procedure 2 years ago presenting with cough and malaise for 3 days. Pt reports productive cough with white sputum that began 3 days ago, associated with headache, non bloody episodes of diarrhea and 2-3 episodes of nausea and vomiting that are only present after eating. He also endorses subjective fevers, chills and sweats. Pt denies any sick contact but notes that he was at a [employer] last week for grandsons birthday. He makes small amount of urine at baseline and is vaccinated against covid X3. His last chemo session was 3 week ago because his Dr is currently not in office. He denies SOB, chest pain or palpitations.    In the ED:    - VS: Tmax: 98.2, HR: 67, BP: 98.2, RR: 18, O2: 98%     - Pertinent Labs: WBC 10.8 (baseline 4-7) N% 89, Hgb 9.8 (baseline 8-11),  Na 138 BUN 59 Cr 10.7 (baseline 5-6)    - Imaging: CXR: RLL infiltrate, small L pleural effusion    - EKG: Sinus rhythm with 1st degree av block QTc 503    - Treatment/interventions: CTX 1g, azithromycin 500mg, reglan 10mg

## 2022-03-01 NOTE — ED PROVIDER NOTE - CLINICAL SUMMARY MEDICAL DECISION MAKING FREE TEXT BOX
67 y/o M w/ headache, cough, N/V/D starting around 2 days ago. Wet cough on exam. Stable vitals. Soft and nontender abdomen. DDx include pneumonia, viral syndrome, enteritis. Plan for labs, viral panel, xray, treat symptoms, and reassess. Do not suspect subarachnoid hemorrhage.

## 2022-03-01 NOTE — H&P ADULT - NSHPLABSRESULTS_GEN_ALL_CORE
LABS:                        9.8    10.83 )-----------( 175      ( 01 Mar 2022 11:27 )             32.7     03-01    138  |  96  |  59<H>  ----------------------------<  86  3.8   |  24  |  10.77<H>    Ca    9.1      01 Mar 2022 11:27  Phos  4.6     03-01  Mg     2.1     03-01    TPro  7.0  /  Alb  3.6  /  TBili  0.4  /  DBili  x   /  AST  18  /  ALT  7<L>  /  AlkPhos  119  03-01

## 2022-03-01 NOTE — ED PROVIDER NOTE - CARDIAC, MLM
Normal rate, regular rhythm.  3/6 systolic murmur heard best over left sternal borders. Non-radiating. Heart sounds S1, S2.

## 2022-03-02 ENCOUNTER — TRANSCRIPTION ENCOUNTER (OUTPATIENT)
Age: 67
End: 2022-03-02

## 2022-03-02 DIAGNOSIS — A41.9 SEPSIS, UNSPECIFIED ORGANISM: ICD-10-CM

## 2022-03-02 DIAGNOSIS — I48.20 CHRONIC ATRIAL FIBRILLATION, UNSPECIFIED: ICD-10-CM

## 2022-03-02 LAB
ALBUMIN SERPL ELPH-MCNC: 3.1 G/DL — LOW (ref 3.3–5)
ALP SERPL-CCNC: 109 U/L — SIGNIFICANT CHANGE UP (ref 40–120)
ALT FLD-CCNC: 8 U/L — LOW (ref 10–45)
ANION GAP SERPL CALC-SCNC: 21 MMOL/L — HIGH (ref 5–17)
AST SERPL-CCNC: 16 U/L — SIGNIFICANT CHANGE UP (ref 10–40)
BASOPHILS # BLD AUTO: 0.05 K/UL — SIGNIFICANT CHANGE UP (ref 0–0.2)
BASOPHILS NFR BLD AUTO: 0.4 % — SIGNIFICANT CHANGE UP (ref 0–2)
BILIRUB SERPL-MCNC: 0.3 MG/DL — SIGNIFICANT CHANGE UP (ref 0.2–1.2)
BUN SERPL-MCNC: 65 MG/DL — HIGH (ref 7–23)
CALCIUM SERPL-MCNC: 9 MG/DL — SIGNIFICANT CHANGE UP (ref 8.4–10.5)
CHLORIDE SERPL-SCNC: 97 MMOL/L — SIGNIFICANT CHANGE UP (ref 96–108)
CO2 SERPL-SCNC: 19 MMOL/L — LOW (ref 22–31)
CREAT SERPL-MCNC: 11.7 MG/DL — HIGH (ref 0.5–1.3)
EGFR: 4 ML/MIN/1.73M2 — LOW
EOSINOPHIL # BLD AUTO: 0 K/UL — SIGNIFICANT CHANGE UP (ref 0–0.5)
EOSINOPHIL NFR BLD AUTO: 0 % — SIGNIFICANT CHANGE UP (ref 0–6)
GLUCOSE SERPL-MCNC: 75 MG/DL — SIGNIFICANT CHANGE UP (ref 70–99)
HBV SURFACE AG SER-ACNC: SIGNIFICANT CHANGE UP
HCT VFR BLD CALC: 28.1 % — LOW (ref 39–50)
HGB BLD-MCNC: 8.7 G/DL — LOW (ref 13–17)
IMM GRANULOCYTES NFR BLD AUTO: 0.9 % — SIGNIFICANT CHANGE UP (ref 0–1.5)
LYMPHOCYTES # BLD AUTO: 0.79 K/UL — LOW (ref 1–3.3)
LYMPHOCYTES # BLD AUTO: 5.7 % — LOW (ref 13–44)
MCHC RBC-ENTMCNC: 28.9 PG — SIGNIFICANT CHANGE UP (ref 27–34)
MCHC RBC-ENTMCNC: 31 GM/DL — LOW (ref 32–36)
MCV RBC AUTO: 93.4 FL — SIGNIFICANT CHANGE UP (ref 80–100)
MONOCYTES # BLD AUTO: 0.43 K/UL — SIGNIFICANT CHANGE UP (ref 0–0.9)
MONOCYTES NFR BLD AUTO: 3.1 % — SIGNIFICANT CHANGE UP (ref 2–14)
NEUTROPHILS # BLD AUTO: 12.57 K/UL — HIGH (ref 1.8–7.4)
NEUTROPHILS NFR BLD AUTO: 89.9 % — HIGH (ref 43–77)
NRBC # BLD: 0 /100 WBCS — SIGNIFICANT CHANGE UP (ref 0–0)
PLATELET # BLD AUTO: 198 K/UL — SIGNIFICANT CHANGE UP (ref 150–400)
POTASSIUM SERPL-MCNC: 3.9 MMOL/L — SIGNIFICANT CHANGE UP (ref 3.5–5.3)
POTASSIUM SERPL-SCNC: 3.9 MMOL/L — SIGNIFICANT CHANGE UP (ref 3.5–5.3)
PROT SERPL-MCNC: 6.4 G/DL — SIGNIFICANT CHANGE UP (ref 6–8.3)
RBC # BLD: 3.01 M/UL — LOW (ref 4.2–5.8)
RBC # FLD: 18.6 % — HIGH (ref 10.3–14.5)
SODIUM SERPL-SCNC: 137 MMOL/L — SIGNIFICANT CHANGE UP (ref 135–145)
WBC # BLD: 13.97 K/UL — HIGH (ref 3.8–10.5)
WBC # FLD AUTO: 13.97 K/UL — HIGH (ref 3.8–10.5)

## 2022-03-02 PROCEDURE — 71250 CT THORAX DX C-: CPT | Mod: 26

## 2022-03-02 PROCEDURE — 99223 1ST HOSP IP/OBS HIGH 75: CPT | Mod: GC

## 2022-03-02 PROCEDURE — 71045 X-RAY EXAM CHEST 1 VIEW: CPT | Mod: 26

## 2022-03-02 PROCEDURE — 90935 HEMODIALYSIS ONE EVALUATION: CPT

## 2022-03-02 PROCEDURE — 99233 SBSQ HOSP IP/OBS HIGH 50: CPT | Mod: GC

## 2022-03-02 PROCEDURE — 93010 ELECTROCARDIOGRAM REPORT: CPT

## 2022-03-02 RX ORDER — ACETAMINOPHEN 500 MG
650 TABLET ORAL ONCE
Refills: 0 | Status: COMPLETED | OUTPATIENT
Start: 2022-03-02 | End: 2022-03-02

## 2022-03-02 RX ORDER — ERYTHROPOIETIN 10000 [IU]/ML
3000 INJECTION, SOLUTION INTRAVENOUS; SUBCUTANEOUS ONCE
Refills: 0 | Status: COMPLETED | OUTPATIENT
Start: 2022-03-02 | End: 2022-03-02

## 2022-03-02 RX ORDER — SEVELAMER CARBONATE 2400 MG/1
800 POWDER, FOR SUSPENSION ORAL ONCE
Refills: 0 | Status: COMPLETED | OUTPATIENT
Start: 2022-03-02 | End: 2022-03-02

## 2022-03-02 RX ORDER — SEVELAMER CARBONATE 2400 MG/1
800 POWDER, FOR SUSPENSION ORAL
Refills: 0 | Status: DISCONTINUED | OUTPATIENT
Start: 2022-03-03 | End: 2022-03-05

## 2022-03-02 RX ADMIN — Medication 50 MILLIGRAM(S): at 18:38

## 2022-03-02 RX ADMIN — Medication 200 MILLIGRAM(S): at 00:15

## 2022-03-02 RX ADMIN — SEVELAMER CARBONATE 800 MILLIGRAM(S): 2400 POWDER, FOR SUSPENSION ORAL at 22:19

## 2022-03-02 RX ADMIN — Medication 200 MILLIGRAM(S): at 22:15

## 2022-03-02 RX ADMIN — CEFTRIAXONE 100 MILLIGRAM(S): 500 INJECTION, POWDER, FOR SOLUTION INTRAMUSCULAR; INTRAVENOUS at 14:46

## 2022-03-02 RX ADMIN — SEVELAMER CARBONATE 800 MILLIGRAM(S): 2400 POWDER, FOR SUSPENSION ORAL at 14:47

## 2022-03-02 RX ADMIN — Medication 650 MILLIGRAM(S): at 13:21

## 2022-03-02 RX ADMIN — SEVELAMER CARBONATE 800 MILLIGRAM(S): 2400 POWDER, FOR SUSPENSION ORAL at 01:34

## 2022-03-02 RX ADMIN — Medication 100 MILLIGRAM(S): at 18:38

## 2022-03-02 RX ADMIN — Medication 100 MILLIGRAM(S): at 06:59

## 2022-03-02 RX ADMIN — HEPARIN SODIUM 5000 UNIT(S): 5000 INJECTION INTRAVENOUS; SUBCUTANEOUS at 22:15

## 2022-03-02 RX ADMIN — AMIODARONE HYDROCHLORIDE 200 MILLIGRAM(S): 400 TABLET ORAL at 06:59

## 2022-03-02 RX ADMIN — HEPARIN SODIUM 5000 UNIT(S): 5000 INJECTION INTRAVENOUS; SUBCUTANEOUS at 06:59

## 2022-03-02 RX ADMIN — Medication 650 MILLIGRAM(S): at 19:09

## 2022-03-02 RX ADMIN — HEPARIN SODIUM 5000 UNIT(S): 5000 INJECTION INTRAVENOUS; SUBCUTANEOUS at 00:16

## 2022-03-02 RX ADMIN — Medication 81 MILLIGRAM(S): at 14:46

## 2022-03-02 RX ADMIN — Medication 200 MILLIGRAM(S): at 14:46

## 2022-03-02 RX ADMIN — Medication 200 MILLIGRAM(S): at 06:59

## 2022-03-02 RX ADMIN — Medication 650 MILLIGRAM(S): at 05:02

## 2022-03-02 RX ADMIN — ERYTHROPOIETIN 3000 UNIT(S): 10000 INJECTION, SOLUTION INTRAVENOUS; SUBCUTANEOUS at 11:27

## 2022-03-02 RX ADMIN — HEPARIN SODIUM 5000 UNIT(S): 5000 INJECTION INTRAVENOUS; SUBCUTANEOUS at 14:47

## 2022-03-02 RX ADMIN — Medication 50 MILLIGRAM(S): at 06:59

## 2022-03-02 RX ADMIN — Medication 200 MILLIGRAM(S): at 18:37

## 2022-03-02 RX ADMIN — SEVELAMER CARBONATE 800 MILLIGRAM(S): 2400 POWDER, FOR SUSPENSION ORAL at 08:36

## 2022-03-02 NOTE — PROGRESS NOTE ADULT - PROBLEM SELECTOR PROBLEM 5
Afib Acute renal failure superimposed on chronic kidney disease, unspecified CKD stage, unspecified acute renal failure type

## 2022-03-02 NOTE — PROGRESS NOTE ADULT - PROBLEM SELECTOR PLAN 9
Fluids: None  Electrolytes: Mg>2, K>4  Nutrition:  DASH  Prophylaxis: hep 5000 subq q8  Activity: AAT, OOBTC  GI: none  C: FC  Dispo: [facility] On metoprolol succ 50mg bid, amlodipine 5mg Qd. BP on admission 105/65. Did not take any meds today  - resumed home toprol  - holding amlodipine, resume as tolerated

## 2022-03-02 NOTE — DISCHARGE NOTE PROVIDER - PROVIDER TOKENS
PROVIDER:[TOKEN:[[id number]],FOLLOWUP:[2 weeks],ESTABLISHEDPATIENT:[T]] PROVIDER:[TOKEN:[[id number]],FOLLOWUP:[2 weeks],ESTABLISHEDPATIENT:[T]],FREE:[LAST:[Baugh],FIRST:[Edwardo BLACK],PHONE:[phone]],FAX:[(   [phone]    -],ADDRESS:[INTERNAL MEDICINE  [address] [place] [address]],SCHEDULEDAPPT:[03/17/2022],SCHEDULEDAPPTTIME:[11:00 AM]]

## 2022-03-02 NOTE — DISCHARGE NOTE PROVIDER - CARE PROVIDERS DIRECT ADDRESSES
,[email or web address]_Unknown

## 2022-03-02 NOTE — PROGRESS NOTE ADULT - PROBLEM SELECTOR PLAN 3
Multiple episodes of Non bloody diarrhea assoc with PO intake. Last BM today.  - Monitor BMs 2-3 total episodes of N/V associated only with PO intake. s/p 10mg Reglan in ED.  - Avoid zofran/reglan i/s/o Qtc prolongation  - Can give tigan PRN for nausea  - Started diet, monitor PO intake    #B/L Pleural Effusions: CT chest non con w/ Lobar pneumonia right lower lobe. Paraseptal and centrilobular emphysema. Unchanged round atelectasis in left lower lobe. Small right pleural effusion, cannot assess for visceral and parietal pleural enhancement to suggest empyema without intravenous contrast. No left pleural effusion. Patient respiring RA, w/o increased WOB, minimally decreased breath sounds in R lung base.   - monitor resp status  - f/u pulm recs

## 2022-03-02 NOTE — PROGRESS NOTE ADULT - PROBLEM SELECTOR PLAN 4
Hx of CKD5 on HD MWF via LUE AVF (06/21). Follows Dr. Guadalupe. Makes minimal urine at baseline. Missed Monday session because of his illness, last session on friday. Cr 10.7 on admission baseline 5-6. dec BS b/l, No LE edema.  - f/u nephro consulted HD today   - EPO w/ HD  - c/w sevelamer 800mg TID Multiple episodes of Non bloody diarrhea assoc with PO intake. Last BM today.  - Monitor BMs

## 2022-03-02 NOTE — DISCHARGE NOTE PROVIDER - NSDCFUSCHEDAPPT_GEN_ALL_CORE_FT
MARCI VEGA ; 03/15/2022 ; P [facility] [address]  MARCI VEGA ; 05/03/2022 ; N[facility] [address]   MARCI VEGA ; 05/10/2022 ; [facility] [address]

## 2022-03-02 NOTE — DISCHARGE NOTE PROVIDER - HOSPITAL COURSE
#Discharge: do not delete    MARCI VEGA is a 66y Male with a past medical history of CKD on HD (MWF follows Dr. Guadalupe), T cell lymphoma on chemo every other week (Dr. Judie Lacey [facility] clinic in [place]), Afib s/p watchmen procedure in 10/19/21 at [facility], HFpEF, mod PHtn, L VATS procedure 2 years ago. Presented with productive cough, found to have CAP w/ small B/L pleural effusions.     Problem List/Main Diagnoses (system-based):   #     #     #    Patient was discharged to: Home  New medications: Cefpodoxime  Changes to old medications: None   Medications that were stopped: None    Items to follow up as outpatient: RENAL & PCP    Physical exam at the time of discharge:       LABS & STUDIES:  COVID-19 PCR: Jennyfer (01 Mar 2022 11:28)   #Discharge: do not delete    HERNAN VEGA is a 66y Male with a past medical history of CKD on HD (MWF follows Dr. Guadalupe), T cell lymphoma on chemo every other week (Dr. Judie Lacey [facility] clinic in [place]), Afib s/p watchmen procedure in 10/19/21 at [facility], HFpEF, mod PHtn, L VATS procedure 2 years ago. Presented with productive cough, found to have CAP w/ small B/L pleural effusions.     Problem List/Main Diagnoses (system-based):   Hernan Vega     #Sepsis, unspecified organism: Meeting 3/4 SIRS criteria today - fever, WBC > 12, HR > 90. 3/3 patient spiked fever 102.1F while on CTX, MRSA swab negative broadened to Zosyn.  -With source as below (PNA)  -zosyn 2.25g q8h x 7 day (3/3-3/4)-->discharged on cefpodoxime rather than augmented i/s/o better CAP coverage  -BCx 3/1 - NGTD  -UA negative     #CAP (community acquired pneumonia): p/w 3 days of productive cough and malaise, diarrhea, decreased BS L worse than R. CXR showing new RLL infiltrate. WBC 10.8 with neutrophilic predominance (baseline 4-7), Na wnl. s/p CTX1g and azithro 500mg IV in ED  - Zosyn 2.25 (dose adjusted for CrCl) IV q8h x 7d (nonpseudomonal dosing)  - D/C CTX 1g IV qD for 5 days (03/01-03/03) switched to Zosyn as patient had fever  - CT chest non con to r/o empyema: Lobar pneumonia right lower lobe. Small right pleural effusion. Limited assessment for infection without intravenous contrast.No left pleural effusion  - negative urine strep and legionella  - blood Cx see above  - Robitussin 200mg Q6hrs  - incentive spirometry  - Goal O2>92% (Hx of mild COPD).    #Nausea and vomiting in adult: 2-3 total episodes of N/V associated only with PO intake. s/p 10mg Reglan in ED.  - Avoided zofran/reglan i/s/o Qtc prolongation  - tigan PRN for nausea  - Renal diet    #B/L Pleural Effusions: CT chest non con w/ Lobar pneumonia right lower lobe. Paraseptal and centrilobular emphysema. Unchanged round atelectasis in left lower lobe. Small right pleural effusion, cannot assess for visceral and parietal pleural enhancement to suggest empyema without intravenous contrast. No left pleural effusion. Patient respiring RA, w/o increased WOB, minimally decreased breath sounds in R lung base.   - monitor resp status  - pulm consulted recs appreciated, no acute need for drainage of pleural effusion  - RVP negative   - will need repeat imaging in 6-8 weeks.    Diarrhea: Multiple episodes of Non bloody diarrhea assoc with PO intake. Last BM today.  - Monitor BMs.    #Acute renal failure superimposed on chronic kidney disease, unspecified CKD stage, unspecified acute renal failure type: Hx of CKD5 on HD MWF via LUE AVF (06/21). Follows Dr. Guadalupe. Makes minimal urine at baseline. Missed Monday session because of his illness, last session on friday. Cr 10.7 on admission baseline 5-6. dec BS b/l, No LE edema.  - f/u nephro consulted HD today   - EPO w/ HD  - c/w sevelamer 800mg TID.    #Afib: Hx of AFib s/p watchmen procedure in 10/21. Follows Dr. Ventura. On amiodarone 200mg Qd and ASA 81. Not on AC due to Hx of bleeding.  - c/w home meds.    #Chronic atrial fibrillation: s/p Watchmen therefore no need for AC    #Chronic heart failure with preserved ejection fraction (HFpEF): Last echo 12/21 showing LVEF 65% PASP 59 mild AR and MR. on HD  - continued metoprolol succinate 50mg BID  - HD per renal last dialyzed 3/3.    #HTN (hypertension): On metoprolol succ 50mg bid, amlodipine 5mg Qd. BP on admission 105/65. Did not take any meds today  - resumed home toprol  - held home amlodipine    #Gout: Hx of gout on allopurinol 100mg Qd. No s/s of acute flare  - continued home med.    #Nutrition, metabolism, and development symptoms.   Fluids: None  Electrolytes: Mg>2, K>4  Nutrition:  DASH  Prophylaxis: hep 5000 subq q8  Activity: AAT, OOBTC  GI: none  C: FC  Dispo: RMF.    Patient was discharged to: Home  New medications: Cefpodoxime  Changes to old medications: None   Medications that were stopped: None    Items to follow up as outpatient: RENAL & PCP repeat chest CT imaging to evaluate pleural effusion    Physical exam at the time of discharge:     LABS & STUDIES:  COVID-19 PCR: Jennyfer (01 Mar 2022 11:28)   #Discharge: do not delete    HERNAN VEGA is a 66y Male with a past medical history of CKD on HD (MWF follows Dr. Guadalupe), T cell lymphoma on chemo every other week (Dr. Judie Lacey [facility] clinic in [place]), Afib s/p watchmen procedure in 10/19/21 at [facility], HFpEF, mod PHtn, L VATS procedure 2 years ago. Presented with productive cough, found to have CAP w/ small B/L pleural effusions.     Problem List/Main Diagnoses (system-based):   Hernan Vega     #Sepsis, unspecified organism: Meeting 3/4 SIRS criteria today - fever, WBC > 12, HR > 90. 3/3 patient spiked fever 102.1F while on CTX, MRSA swab negative broadened to Zosyn.  -With source as below (PNA)  -zosyn 2.25g q8h x 7 day (3/3-3/4)-->discharged on cefpodoxime rather than augmented i/s/o better CAP coverage  -BCx 3/1 - NGTD  -UA negative     #CAP (community acquired pneumonia): p/w 3 days of productive cough and malaise, diarrhea, decreased BS L worse than R. CXR showing new RLL infiltrate. WBC 10.8 with neutrophilic predominance (baseline 4-7), Na wnl. s/p CTX1g and azithro 500mg IV in ED  - Zosyn 2.25 (dose adjusted for CrCl) IV q8h x 7d (nonpseudomonal dosing)  - D/C CTX 1g IV qD for 5 days (03/01-03/03) switched to Zosyn as patient had fever  - CT chest non con to r/o empyema: Lobar pneumonia right lower lobe. Small right pleural effusion. Limited assessment for infection without intravenous contrast.No left pleural effusion  - negative urine strep and legionella  - blood Cx see above  - Robitussin 200mg Q6hrs  - incentive spirometry  - Goal O2>92% (Hx of mild COPD).    #Nausea and vomiting in adult: 2-3 total episodes of N/V associated only with PO intake. s/p 10mg Reglan in ED.  - Avoided zofran/reglan i/s/o Qtc prolongation  - tigan PRN for nausea  - Renal diet    #B/L Pleural Effusions: CT chest non con w/ Lobar pneumonia right lower lobe. Paraseptal and centrilobular emphysema. Unchanged round atelectasis in left lower lobe. Small right pleural effusion, cannot assess for visceral and parietal pleural enhancement to suggest empyema without intravenous contrast. No left pleural effusion. Patient respiring RA, w/o increased WOB, minimally decreased breath sounds in R lung base.   - monitor resp status  - pulm consulted recs appreciated, no acute need for drainage of pleural effusion  - RVP negative   - will need repeat imaging in 6-8 weeks.    Diarrhea: Multiple episodes of Non bloody diarrhea assoc with PO intake. Last BM today.  - Monitor BMs.    #Acute renal failure superimposed on chronic kidney disease, unspecified CKD stage, unspecified acute renal failure type: Hx of CKD5 on HD MWF via LUE AVF (06/21). Follows Dr. Guadalupe. Makes minimal urine at baseline. Missed Monday session because of his illness, last session on friday. Cr 10.7 on admission baseline 5-6. dec BS b/l, No LE edema.  - f/u nephro consulted HD today   - EPO w/ HD  - c/w sevelamer 800mg TID.    #Afib: Hx of AFib s/p watchmen procedure in 10/21. Follows Dr. Ventura. On amiodarone 200mg Qd and ASA 81. Not on AC due to Hx of bleeding.  - c/w home meds.    #Chronic atrial fibrillation: s/p Watchmen therefore no need for AC    #Chronic heart failure with preserved ejection fraction (HFpEF): Last echo 12/21 showing LVEF 65% PASP 59 mild AR and MR. on HD  - continued metoprolol succinate 50mg BID  - HD per renal last dialyzed 3/3.    #HTN (hypertension): On metoprolol succ 50mg bid, amlodipine 5mg Qd. BP on admission 105/65. Did not take any meds today  - resumed home toprol  - held home amlodipine    #Gout: Hx of gout on allopurinol 100mg Qd. No s/s of acute flare  - continued home med.    #Nutrition, metabolism, and development symptoms.   Fluids: None  Electrolytes: Mg>2, K>4  Nutrition:  DASH  Prophylaxis: hep 5000 subq q8  Activity: AAT, OOBTC  GI: none  C: FC  Dispo: RMF.    Patient was discharged to: Home  New medications: Cefpodoxime  Changes to old medications: None   Medications that were stopped: None    Items to follow up as outpatient: RENAL & PCP repeat chest CT imaging to evaluate pleural effusion    Physical exam at the time of discharge:     LABS & STUDIES:  COVID-19 PCR: Jennyfer (01 Mar 2022 11:28)   #Discharge: do not delete    HERNAN VEGA is a 66y Male with a past medical history of CKD on HD (MWF follows Dr. Guadalupe), T cell lymphoma on chemo every other week (Dr. Judie Lacey [facility] clinic in [place]), Afib s/p watchmen procedure in 10/19/21 at [facility], HFpEF, mod PHtn, L VATS procedure 2 years ago. Presented with productive cough, found to have CAP w/ small B/L pleural effusions.     Problem List/Main Diagnoses (system-based):   Hernan Vega     #Sepsis, unspecified organism: Meeting 3/4 SIRS criteria today - fever, WBC > 12, HR > 90. 3/3 patient spiked fever 102.1F while on CTX, MRSA swab negative broadened to Zosyn.  -With source as below (PNA)  -zosyn 2.25g q8h x 7 day (3/3-3/4)-->discharged on cefpodoxime rather than augmented i/s/o better CAP coverage  -BCx 3/1 - NGTD  -UA negative     #Gram negative Pneumonia CAP (community acquired pneumonia): p/w 3 days of productive cough and malaise, diarrhea, decreased BS L worse than R. CXR showing new RLL infiltrate. WBC 10.8 with neutrophilic predominance (baseline 4-7), Na wnl. s/p CTX1g and azithro 500mg IV in ED  - Zosyn 2.25 (dose adjusted for CrCl) IV q8h x 7d (nonpseudomonal dosing)  - D/C CTX 1g IV qD for 5 days (03/01-03/03) switched to Zosyn as patient had fever  - CT chest non con to r/o empyema: Lobar pneumonia right lower lobe. Small right pleural effusion. Limited assessment for infection without intravenous contrast.No left pleural effusion  - negative urine strep and legionella  - blood Cx see above  - Robitussin 200mg Q6hrs  - incentive spirometry  - Goal O2>92% (Hx of mild COPD).    #Nausea and vomiting in adult: 2-3 total episodes of N/V associated only with PO intake. s/p 10mg Reglan in ED.  - Avoided zofran/reglan i/s/o Qtc prolongation  - tigan PRN for nausea  - Renal diet    #B/L Pleural Effusions: CT chest non con w/ Lobar pneumonia right lower lobe. Paraseptal and centrilobular emphysema. Unchanged round atelectasis in left lower lobe. Small right pleural effusion, cannot assess for visceral and parietal pleural enhancement to suggest empyema without intravenous contrast. No left pleural effusion. Patient respiring RA, w/o increased WOB, minimally decreased breath sounds in R lung base.   - monitor resp status  - pulm consulted recs appreciated, no acute need for drainage of pleural effusion  - RVP negative   - will need repeat imaging in 6-8 weeks.    Diarrhea: Multiple episodes of Non bloody diarrhea assoc with PO intake. Last BM today.  - Monitor BMs.    #ESRD on HD  Hx of CKD5 on HD MWF via LUE AVF (06/21). Follows Dr. Guadalupe. Makes minimal urine at baseline. Missed Monday session because of his illness, last session on friday. Cr 10.7 on admission baseline 5-6. dec BS b/l, No LE edema.  - f/u nephro consulted HD   - EPO w/ HD  - c/w sevelamer 800mg TID.    #Chronic atrial fibrillation: s/p Watchmen therefore no need for AC    #Chronic heart failure with preserved ejection fraction (HFpEF): Last echo 12/21 showing LVEF 65% PASP 59 mild AR and MR. on HD  - continued metoprolol succinate 50mg BID  - HD per renal last dialyzed 3/3.    #HTN (hypertension): On metoprolol succ 50mg bid, amlodipine 5mg Qd. BP on admission 105/65. Did not take any meds today  - resumed home toprol  - held home amlodipine    #Gout: Hx of gout on allopurinol 100mg Qd. No s/s of acute flare  - continued home med.    #Nutrition, metabolism, and development symptoms.   Fluids: None  Electrolytes: Mg>2, K>4  Nutrition:  DASH  Prophylaxis: hep 5000 subq q8  Activity: AAT, OOBTC  GI: none  C: FC  Dispo: RMF.    Patient was discharged to: Home  New medications: Cefpodoxime  Changes to old medications: None   Medications that were stopped: None    Items to follow up as outpatient: RENAL & PCP repeat chest CT imaging to evaluate pleural effusion    Physical exam at the time of discharge:     LABS & STUDIES:  COVID-19 PCR: NotDetec (01 Mar 2022 11:28)   #Discharge: do not delete    HERNAN VEGA is a 66y Male with a past medical history of CKD on HD (MWF follows Dr. Guadalupe), T cell lymphoma on chemo every other week (Dr. Judie Lacey [facility] clinic in [place]), Afib s/p watchmen procedure in 10/19/21 at [facility], HFpEF, mod PHtn, L VATS procedure 2 years ago. Presented with productive cough, found to have CAP w/ small B/L pleural effusions.     Problem List/Main Diagnoses (system-based):   Hernan Vega     #Sepsis, unspecified organism: Meeting 3/4 SIRS criteria today - fever, WBC > 12, HR > 90. 3/3 patient spiked fever 102.1F while on CTX, MRSA swab negative broadened to Zosyn.  -With source as below (PNA)  -zosyn 2.25g q8h x 7 day (3/3-3/5)-->discharged on cefpodoxime rather than augmented i/s/o better CAP coverage  -BCx - NGTD  -UA negative     #Gram negative Pneumonia CAP (community acquired pneumonia): p/w 3 days of productive cough and malaise, diarrhea, decreased BS L worse than R. CXR showing new RLL infiltrate. WBC 10.8 with neutrophilic predominance (baseline 4-7), Na wnl. s/p CTX1g and azithro 500mg IV in ED  - Zosyn 2.25 (dose adjusted for CrCl) IV q8h x 7d (nonpseudomonal dosing)  - D/C CTX 1g IV qD for 5 days (03/01-03/03) switched to Zosyn as patient had fever  - CT chest non con to r/o empyema: Lobar pneumonia right lower lobe. Small right pleural effusion. Limited assessment for infection without intravenous contrast.No left pleural effusion  - negative urine strep and legionella  - blood Cx see above  - Robitussin 200mg Q6hrs  - incentive spirometry  - Goal O2>92% (Hx of mild COPD).    #Nausea and vomiting in adult: 2-3 total episodes of N/V associated only with PO intake. s/p 10mg Reglan in ED.  - Avoided zofran/reglan i/s/o Qtc prolongation  - tigan PRN for nausea  - Renal diet    #B/L Pleural Effusions: CT chest non con w/ Lobar pneumonia right lower lobe. Paraseptal and centrilobular emphysema. Unchanged round atelectasis in left lower lobe. Small right pleural effusion, cannot assess for visceral and parietal pleural enhancement to suggest empyema without intravenous contrast. No left pleural effusion. Patient respiring RA, w/o increased WOB, minimally decreased breath sounds in R lung base.   - monitor resp status  - pulm consulted recs appreciated, no acute need for drainage of pleural effusion  - RVP negative   - will need repeat imaging in 6-8 weeks.    Diarrhea: Multiple episodes of Non bloody diarrhea assoc with PO intake. Last BM today.  - Monitor BMs.    #ESRD on HD  Hx of CKD5 on HD MWF via LUE AVF (06/21). Follows Dr. Guadalupe. Makes minimal urine at baseline. Missed Monday session because of his illness, last session on friday. Cr 10.7 on admission baseline 5-6. dec BS b/l, No LE edema.  - f/u nephro consulted HD   - EPO w/ HD  - c/w sevelamer 800mg TID.    #Chronic atrial fibrillation: s/p Watchmen therefore no need for AC    #Chronic heart failure with preserved ejection fraction (HFpEF): Last echo 12/21 showing LVEF 65% PASP 59 mild AR and MR. on HD  - continued metoprolol succinate 50mg BID  - HD per renal last dialyzed 3/3.    #HTN (hypertension): On metoprolol succ 50mg bid, amlodipine 5mg Qd. BP on admission 105/65. Did not take any meds today  - resumed home toprol  - held home amlodipine    #Gout: Hx of gout on allopurinol 100mg Qd. No s/s of acute flare  - continued home med.    #Nutrition, metabolism, and development symptoms.   Fluids: None  Electrolytes: Mg>2, K>4  Nutrition:  DASH  Prophylaxis: hep 5000 subq q8  Activity: AAT, OOBTC  GI: none  C: FC  Dispo: RMF.    Patient was discharged to: Home  New medications: Cefpodoxime  Changes to old medications: None   Medications that were stopped: None    Items to follow up as outpatient: RENAL & PCP repeat chest CT imaging to evaluate pleural effusion    Physical exam at the time of discharge:     LABS & STUDIES:  COVID-19 PCR: NotDetec (01 Mar 2022 11:28)

## 2022-03-02 NOTE — PROGRESS NOTE ADULT - PROBLEM SELECTOR PLAN 2
2-3 total episodes of N/V associated only with PO intake. s/p 10mg Reglan in ED.  - Avoid zofran/reglan i/s/o Qtc prolongation  - Can give tigan PRN for nausea  - Started diet, monitor PO intake 2-3 total episodes of N/V associated only with PO intake. s/p 10mg Reglan in ED.  - Avoid zofran/reglan i/s/o Qtc prolongation  - Can give tigan PRN for nausea  - Started diet, monitor PO intake    #B/L Pleural Effusions: CT chest non con w/ Lobar pneumonia right lower lobe. Paraseptal and centrilobular emphysema. Unchanged round atelectasis in left lower lobe. Small right pleural effusion, cannot assess for visceral and parietal pleural enhancement to suggest empyema without intravenous contrast. No left pleural effusion. Patient respiring RA, w/o increased WOB, minimally decreased breath sounds in R lung base.   - monitor resp status  - f/u pulm recs p/w 3 days of productive cough and malaise, diarrhea, decreased BS L worse than R. CXR showing new RLL infiltrate. WBC 10.8 with neutrophilic predominance (baseline 4-7), Na wnl. s/p CTX1g and azithro 500mg IV in ED  - Continue with CTX 1g IV qD for 5 days (03/01-03/05)  - D/C doxycycline 100mg BID for atypical coverage X 5 days (03/02-03/06) as legionella Ag negative  - f/u CT chest non con to r/o empyema: Lobar pneumonia right lower lobe. Small right pleural effusion. Limited assessment for infection without intravenous contrast. Consider fluid sampling. No left pleural effusion  - negative urine strep and legionella  - blood Cx NGTD  - Robitussin 200mg Q6hrs  - incentive spirometry  - Goal O2>92% (Hx of mild COPD)

## 2022-03-02 NOTE — PROGRESS NOTE ADULT - PROBLEM SELECTOR PLAN 8
Hx of gout on allopurinol 100mg Qd. No s/s of acute flare  - c/w home med Last echo 12/21 showing LVEF 65% PASP 59 mild AR and MR. on HD  - c/w metoprolol succinate 50mg BID  - HD per renal

## 2022-03-02 NOTE — PROGRESS NOTE ADULT - SUBJECTIVE AND OBJECTIVE BOX
OVERNIGHT EVENTS: KALA    SUBJECTIVE / INTERVAL HPI: Patient seen and examined at bedside. Patient c/o dry cough triggering HAs with improvement of diarrhea, n/v. Denies remaining ROS this AM & is w/o dizziness/lightheadedness, dyspnea SOB, CP, abdominal pain, n/v/d.     VITAL SIGNS:  Vital Signs Last 24 Hrs  T(C): 36.8 (02 Mar 2022 10:10), Max: 37.9 (02 Mar 2022 05:27)  T(F): 98.3 (02 Mar 2022 10:10), Max: 100.2 (02 Mar 2022 05:27)  HR: 71 (02 Mar 2022 10:10) (69 - 86)  BP: 117/70 (02 Mar 2022 10:10) (114/70 - 167/85)  BP(mean): --  RR: 18 (02 Mar 2022 10:10) (16 - 20)  SpO2: 92% (02 Mar 2022 10:10) (92% - 100%)    PHYSICAL EXAM:    General: NAD, thin elderly male  HEENT: NC/AT; PERRL, anicteric sclera; MMM  Neck: supple w/o palpable nodularity  Cardiovascular: +S1/S2; RRR; systolic murmur best auscultated at apex  Respiratory: bibasilar crackles; no W/R/R  Gastrointestinal: soft, NT/ND; +BSx4  Extremities: WWP; no edema, clubbing or cyanosis, LUE AVF  Vascular: 2+ radial, DP/PT pulses B/L, AVF w/ palpable bruit  Neurological: AAOx3; no focal deficits    MEDICATIONS:  MEDICATIONS  (STANDING):  allopurinol 100 milliGRAM(s) Oral every 12 hours  aMIOdarone    Tablet 200 milliGRAM(s) Oral daily  aspirin  chewable 81 milliGRAM(s) Oral daily  cefTRIAXone   IVPB 1000 milliGRAM(s) IV Intermittent every 24 hours  epoetin donny-epbx (RETACRIT) Injectable 3000 Unit(s) IV Push once  guaiFENesin Oral Liquid (Sugar-Free) 200 milliGRAM(s) Oral every 6 hours  heparin   Injectable 5000 Unit(s) SubCutaneous every 8 hours  metoprolol succinate ER 50 milliGRAM(s) Oral every 12 hours  sevelamer carbonate 800 milliGRAM(s) Oral three times a day    MEDICATIONS  (PRN):  acetaminophen     Tablet .. 650 milliGRAM(s) Oral every 6 hours PRN Temp greater or equal to 38C (100.4F), Mild Pain (1 - 3)  aluminum hydroxide/magnesium hydroxide/simethicone Suspension 30 milliLiter(s) Oral every 4 hours PRN Dyspepsia  melatonin 3 milliGRAM(s) Oral at bedtime PRN Insomnia  ondansetron Injectable 4 milliGRAM(s) IV Push every 8 hours PRN Nausea and/or Vomiting      ALLERGIES:  Allergies    No Known Allergies    Intolerances        LABS:                        8.7    13.97 )-----------( 198      ( 02 Mar 2022 07:06 )             28.1     03-02    137  |  97  |  65<H>  ----------------------------<  75  3.9   |  19<L>  |  11.70<H>    Ca    9.0      02 Mar 2022 07:07  Phos  4.6     03-01  Mg     2.1     03-01    TPro  6.4  /  Alb  3.1<L>  /  TBili  0.3  /  DBili  x   /  AST  16  /  ALT  8<L>  /  AlkPhos  109  03-02      Urinalysis Basic - ( 01 Mar 2022 16:31 )    Color: Yellow / Appearance: Clear / S.020 / pH: x  Gluc: x / Ketone: NEGATIVE  / Bili: Negative / Urobili: 0.2 E.U./dL   Blood: x / Protein: >=300 mg/dL / Nitrite: NEGATIVE   Leuk Esterase: Small / RBC: < 5 /HPF / WBC Many /HPF   Sq Epi: x / Non Sq Epi: 0-5 /HPF / Bacteria: Present /HPF      CAPILLARY BLOOD GLUCOSE      POCT Blood Glucose.: 83 mg/dL (01 Mar 2022 10:26)      RADIOLOGY & ADDITIONAL TESTS: Reviewed.

## 2022-03-02 NOTE — DISCHARGE NOTE PROVIDER - NSDCCPCAREPLAN_GEN_ALL_CORE_FT
PRINCIPAL DISCHARGE DIAGNOSIS  Diagnosis: Community acquired pneumonia  Assessment and Plan of Treatment: Infection that inflames air sacs in one or both lungs, which may fill with fluid. With pneumonia, the air sacs may fill with fluid or pus. The infection can be life-threatening to infants, children, and people over 65. Symptoms include cough with phlegm or pus, fever, chills, and difficulty breathing. Antibiotics can treat many forms of pneumonia. Some forms of pneumonia can be prevented by vaccines.        SECONDARY DISCHARGE DIAGNOSES  Diagnosis: Pleural effusion  Assessment and Plan of Treatment: What are the symptoms of pleural effusion? The symptoms include: Chest pain – This pain can be sharp and get worse when you cough or take a deep breath. Sometimes the pain is a dull ache. Breathing problems – You might breathe faster than normal or feel short of breath, Cough, Fever, Hiccups, Will I need tests for pleural effusion? Yes. Tests for pleural effusion include: An X-ray of your chest, A CT scan of your chest – This is a special type of X-ray. Ultrasound – This test uses sound waves to take pictures of the fluid around your lungs. Your doctor might do this test if you need to have some fluid taken out.   The treatment depends on your symptoms and the cause of the problem. If you are having trouble breathing, your doctor will remove the fluid from around your lungs with a needle or plastic tube. If heart failure caused the fluid around your lungs, your doctor will prescribe medicines called "diuretics." These help pull fluid out of your lungs. Examples include furosemide (brand name: Lasix) and torsemide (brand name: Demadex). You might also need to take other medicines for heart failure.     PRINCIPAL DISCHARGE DIAGNOSIS  Diagnosis: Community acquired pneumonia  Assessment and Plan of Treatment: Infection that inflames air sacs in one or both lungs, which may fill with fluid. With pneumonia, the air sacs may fill with fluid or pus. The infection can be life-threatening to infants, children, and people over 65. Symptoms include cough with phlegm or pus, fever, chills, and difficulty breathing. Antibiotics can treat many forms of pneumonia. Some forms of pneumonia can be prevented by vaccines.  Please continue to take your oral antibiotic: cefpodoxime 200mg once daily for 5 days. You may begin taking this medication the day ater you are discharged.         SECONDARY DISCHARGE DIAGNOSES  Diagnosis: Pleural effusion  Assessment and Plan of Treatment: What are the symptoms of pleural effusion? The symptoms include: Chest pain – This pain can be sharp and get worse when you cough or take a deep breath. Sometimes the pain is a dull ache. Breathing problems – You might breathe faster than normal or feel short of breath, Cough, Fever, Hiccups, Will I need tests for pleural effusion? Yes. Tests for pleural effusion include: An X-ray of your chest, A CT scan of your chest – This is a special type of X-ray. Ultrasound – This test uses sound waves to take pictures of the fluid around your lungs. Your doctor might do this test if you need to have some fluid taken out.   The treatment depends on your symptoms and the cause of the problem. If you are having trouble breathing, your doctor will remove the fluid from around your lungs with a needle or plastic tube. If heart failure caused the fluid around your lungs, your doctor will prescribe medicines called "diuretics." These help pull fluid out of your lungs. Examples include furosemide (brand name: Lasix) and torsemide (brand name: Demadex). You might also need to take other medicines for heart failure.  You should follow up with yout PCP and notidfy them that repeat CT chest imaging should be performed in 6-8 weeks.

## 2022-03-02 NOTE — CONSULT NOTE ADULT - SUBJECTIVE AND OBJECTIVE BOX
PULMONARY SERVICE INITIAL CONSULT NOTE    HPI:  67 y/o M with PMHx ESRD on HD (M/W/F follows Dr. Guadalupe), T cell lymphoma on chemo every other week (Dr. [facility]ie Lacey [facility] clinic in [place]), Afib s/p watchmen procedure in 10/19/21 at [facility], HFpEF, mod PHTN, L VATS procedure 2 years ago presenting with cough and malaise for 3 days. Pt reports productive cough with white sputum that began 3 days ago, associated with headache, non bloody episodes of diarrhea and 2-3 episodes of nausea and vomiting that are only present after eating. He also endorses subjective fevers, chills and sweats. Pt denies any sick contact but notes that he was at a [employer] last week for grandsons birthday. He makes small amount of urine at baseline and is vaccinated against COVID19 X3. His last chemo session was 3 week ago because his Dr is currently not in office. He denies SOB, chest pain or palpitations.    In the ED:    - VS: Tmax: 98.2, HR: 67, BP: 98.2, RR: 18, O2: 98%     - Pertinent Labs: WBC 10.8 (baseline 4-7) N% 89, Hgb 9.8 (baseline 8-11),  Na 138 BUN 59 Cr 10.7 (baseline 5-6)    - Imaging: CXR: RLL infiltrate, small L pleural effusion    - EKG: Sinus rhythm with 1st degree av block QTc 503    - Treatment/interventions: CTX 1g, azithromycin 500mg, reglan 10mg (01 Mar 2022 15:24)      REVIEW OF SYSTEMS:  Constitutional: No fever, weight loss or fatigue  Eyes: No eye pain, visual disturbances, or discharge  ENMT:  No difficulty hearing, tinnitus, vertigo; No sinus or throat pain  Neck: No pain, stiffness or neck swelling  Respiratory: see HPI  Cardiovascular: No chest pain, palpitations, dizziness or leg swelling  Gastrointestinal: No abdominal or epigastric pain. No nausea, vomiting or hematemesis; No diarrhea or constipation. No melena or hematochezia.  Genitourinary: No dysuria, frequency, hematuria or incontinence  Neurological: No headaches, memory loss, loss of strength, numbness or tremors  Skin: No itching, burning, rashes or lesions   Lymph Nodes: No enlarged glands  Endocrine: No heat or cold intolerance; No hair loss  Musculoskeletal: No joint pain or swelling; No muscle, back or extremity pain  Psychiatric: No depression, anxiety, mood swings or difficulty sleeping  Heme/Lymph: No easy bruising or bleeding gums  Allergy and Immunologic: No hives or eczema    PAST MEDICAL & SURGICAL HISTORY:  HTN (hypertension)    Atrial fibrillation    CKD (chronic kidney disease)    Lymphoma  t cell; remission since 2020    CHF, chronic  LVEF 65 PASP 59 midl MR, AR on 21    H/O pulmonary hypertension    Gout    BPH (benign prostatic hyperplasia)    COPD, mild    Mitral regurgitation    History of cholecystectomy    Elective surgery  rectal surgery        FAMILY HISTORY:  FH: type 2 diabetes (Mother)        SOCIAL HISTORY:  Smoking Status: [ ] Current, [ ] Former, [ ] Never  Pack Years:    MEDICATIONS:  Pulmonary:  guaiFENesin Oral Liquid (Sugar-Free) 200 milliGRAM(s) Oral every 6 hours    Antimicrobials:  cefTRIAXone   IVPB 1000 milliGRAM(s) IV Intermittent every 24 hours    Anticoagulants:  aspirin  chewable 81 milliGRAM(s) Oral daily  heparin   Injectable 5000 Unit(s) SubCutaneous every 8 hours    Onc:    GI/:  aluminum hydroxide/magnesium hydroxide/simethicone Suspension 30 milliLiter(s) Oral every 4 hours PRN    Endocrine:  allopurinol 100 milliGRAM(s) Oral every 12 hours    Cardiac:  aMIOdarone    Tablet 200 milliGRAM(s) Oral daily  metoprolol succinate ER 50 milliGRAM(s) Oral every 12 hours    Other Medications:  acetaminophen     Tablet .. 650 milliGRAM(s) Oral every 6 hours PRN  melatonin 3 milliGRAM(s) Oral at bedtime PRN  ondansetron Injectable 4 milliGRAM(s) IV Push every 8 hours PRN  sevelamer carbonate 800 milliGRAM(s) Oral three times a day      Allergies    No Known Allergies    Intolerances        Vital Signs Last 24 Hrs  T(C): 37.8 (02 Mar 2022 13:10), Max: 37.9 (02 Mar 2022 05:27)  T(F): 100.1 (02 Mar 2022 13:10), Max: 100.2 (02 Mar 2022 05:27)  HR: 86 (02 Mar 2022 13:10) (69 - 86)  BP: 142/76 (02 Mar 2022 13:10) (114/70 - 167/85)  BP(mean): --  RR: 18 (02 Mar 2022 13:10) (16 - 20)  SpO2: 94% (02 Mar 2022 13:10) (92% - 100%)     @ 07:01  -   @ 14:59  --------------------------------------------------------  IN: 400 mL / OUT: 1000 mL / NET: -600 mL          PHYSICAL EXAM:  GENERAL: NAD, average weight, on 2L NC  HEAD:  Atraumatic, Normocephalic  EYES:  conjunctiva and sclera clear  NECK: Supple, No JVD, Normal thyroid  CHEST/LUNG: Clear to auscultation. Clear to percussion bilaterally; No rales, rhonchi, wheezing, or rubs  HEART: Regular rate and rhythm; No murmurs, rubs, or gallops  ABDOMEN: Soft, Nontender, Nondistended; Bowel sounds present, no pain or masses on palpation   NERVOUS SYSTEM:  Alert & Oriented X3  EXTREMITIES:  2+ Peripheral Pulses, No clubbing, cyanosis, or edema. Left arm AVF functional   SKIN: warm, dry    LABS:      CBC Full  -  ( 02 Mar 2022 07:06 )  WBC Count : 13.97 K/uL  RBC Count : 3.01 M/uL  Hemoglobin : 8.7 g/dL  Hematocrit : 28.1 %  Platelet Count - Automated : 198 K/uL  Mean Cell Volume : 93.4 fl  Mean Cell Hemoglobin : 28.9 pg  Mean Cell Hemoglobin Concentration : 31.0 gm/dL  Auto Neutrophil # : 12.57 K/uL  Auto Lymphocyte # : 0.79 K/uL  Auto Monocyte # : 0.43 K/uL  Auto Eosinophil # : 0.00 K/uL  Auto Basophil # : 0.05 K/uL  Auto Neutrophil % : 89.9 %  Auto Lymphocyte % : 5.7 %  Auto Monocyte % : 3.1 %  Auto Eosinophil % : 0.0 %  Auto Basophil % : 0.4 %        137  |  97  |  65<H>  ----------------------------<  75  3.9   |  19<L>  |  11.70<H>    Ca    9.0      02 Mar 2022 07:07  Phos  4.6     03-  Mg     2.1     03-    TPro  6.4  /  Alb  3.1<L>  /  TBili  0.3  /  DBili  x   /  AST  16  /  ALT  8<L>  /  AlkPhos  109  03-          Urinalysis Basic - ( 01 Mar 2022 16:31 )    Color: Yellow / Appearance: Clear / S.020 / pH: x  Gluc: x / Ketone: NEGATIVE  / Bili: Negative / Urobili: 0.2 E.U./dL   Blood: x / Protein: >=300 mg/dL / Nitrite: NEGATIVE   Leuk Esterase: Small / RBC: < 5 /HPF / WBC Many /HPF   Sq Epi: x / Non Sq Epi: 0-5 /HPF / Bacteria: Present /HPF                RADIOLOGY & ADDITIONAL STUDIES: PULMONARY SERVICE INITIAL CONSULT NOTE    HPI:  67 y/o M with PMHx ESRD on HD (M/W/F follows Dr. Guadalupe), T cell lymphoma on chemo every other week (Dr. Judie Lacey Ca clinic in [place]), Afib s/p watchmen procedure in 10/19/21 at [facility], HFpEF, mod PHTN, L VATS procedure 2 years ago presenting with cough and malaise for 3 days. Pt reports productive cough with white sputum that began 3 days ago, associated with headache, non bloody episodes of diarrhea and 2-3 episodes of nausea and vomiting that are only present after eating. He also endorses subjective fevers, chills and sweats. Pt denies any sick contact but notes that he was at a [employer] last week for grandsons birthday. He makes small amount of urine at baseline and is vaccinated against COVID19 X3. His last chemo session was 3 week ago because his Dr is currently not in office. He denies SOB, chest pain or palpitations.    In the ED:    - VS: Tmax: 98.2, HR: 67, BP: 98.2, RR: 18, O2: 98%     - Pertinent Labs: WBC 10.8 (baseline 4-7) N% 89, Hgb 9.8 (baseline 8-11),  Na 138 BUN 59 Cr 10.7 (baseline 5-6)    - Imaging: CXR: RLL infiltrate, small L pleural effusion    - EKG: Sinus rhythm with 1st degree av block QTc 503    - Treatment/interventions: CTX 1g, azithromycin 500mg, reglan 10mg (01 Mar 2022 15:24)    Subjective:  Pt corroborates the above hx. In addition, states his cough is ongoing. Missed dialysis 2/2 migraine. He usually gets dialysis on MWF. He has seen Dr. Johnson in clinic previously. Prior surgery of L lung with Dr. Harmon for fluid around the lung. Former smoker from age [age].     REVIEW OF SYSTEMS:  Constitutional: No fever, weight loss or fatigue  Eyes: No eye pain, visual disturbances, or discharge  ENMT:  No difficulty hearing, tinnitus, vertigo; No sinus or throat pain  Neck: No pain, stiffness or neck swelling  Respiratory: see HPI  Cardiovascular: No chest pain, palpitations, dizziness or leg swelling  Gastrointestinal: No abdominal or epigastric pain. No nausea, vomiting or hematemesis; No diarrhea or constipation. No melena or hematochezia.  Genitourinary: No dysuria, frequency, hematuria or incontinence  Neurological: No headaches, memory loss, loss of strength, numbness or tremors  Skin: No itching, burning, rashes or lesions   Lymph Nodes: No enlarged glands  Endocrine: No heat or cold intolerance; No hair loss  Musculoskeletal: No joint pain or swelling; No muscle, back or extremity pain  Psychiatric: No depression, anxiety, mood swings or difficulty sleeping  Heme/Lymph: No easy bruising or bleeding gums  Allergy and Immunologic: No hives or eczema    PAST MEDICAL & SURGICAL HISTORY:  HTN (hypertension)    Atrial fibrillation    CKD (chronic kidney disease)    Lymphoma  t cell; remission since 2020    CHF, chronic  LVEF 65 PASP 59 midl MR, AR on 21    H/O pulmonary hypertension    Gout    BPH (benign prostatic hyperplasia)    COPD, mild    Mitral regurgitation    History of cholecystectomy    Elective surgery  rectal surgery    FAMILY HISTORY:  FH: type 2 diabetes (Mother)    SOCIAL HISTORY:  as in HPI     MEDICATIONS:  Pulmonary:  guaiFENesin Oral Liquid (Sugar-Free) 200 milliGRAM(s) Oral every 6 hours    Antimicrobials:  cefTRIAXone   IVPB 1000 milliGRAM(s) IV Intermittent every 24 hours    Anticoagulants:  aspirin  chewable 81 milliGRAM(s) Oral daily  heparin   Injectable 5000 Unit(s) SubCutaneous every 8 hours    Onc:    GI/:  aluminum hydroxide/magnesium hydroxide/simethicone Suspension 30 milliLiter(s) Oral every 4 hours PRN    Endocrine:  allopurinol 100 milliGRAM(s) Oral every 12 hours    Cardiac:  aMIOdarone    Tablet 200 milliGRAM(s) Oral daily  metoprolol succinate ER 50 milliGRAM(s) Oral every 12 hours    Other Medications:  acetaminophen     Tablet .. 650 milliGRAM(s) Oral every 6 hours PRN  melatonin 3 milliGRAM(s) Oral at bedtime PRN  ondansetron Injectable 4 milliGRAM(s) IV Push every 8 hours PRN  sevelamer carbonate 800 milliGRAM(s) Oral three times a day    Allergies  No Known Allergies    Vital Signs Last 24 Hrs  T(C): 37.8 (02 Mar 2022 13:10), Max: 37.9 (02 Mar 2022 05:27)  T(F): 100.1 (02 Mar 2022 13:10), Max: 100.2 (02 Mar 2022 05:27)  HR: 86 (02 Mar 2022 13:10) (69 - 86)  BP: 142/76 (02 Mar 2022 13:10) (114/70 - 167/85)  BP(mean): --  RR: 18 (02 Mar 2022 13:10) (16 - 20)  SpO2: 94% (02 Mar 2022 13:10) (92% - 100%)     @ 07:01  -  - @ 14:59  --------------------------------------------------------  IN: 400 mL / OUT: 1000 mL / NET: -600 mL    PHYSICAL EXAM:  GENERAL: NAD, on room air, thin male   HEAD:  Atraumatic, Normocephalic  EYES:  conjunctiva and sclera clear  NECK: Supple, No JVD, Normal thyroid  CHEST/LUNG: diminished breath sounds at right base; no wheeze. POCUS with R sided large consolidation/air bronchograms and tiny simple pleural effusion.   HEART: Regular rate and rhythm; No murmurs, rubs, or gallops   ABDOMEN: Soft, Nontender, Nondistended; Bowel sounds present, no pain or masses on palpation   NERVOUS SYSTEM:  Alert & Oriented X3  EXTREMITIES:  2+ Peripheral Pulses, No clubbing, cyanosis, or edema. Left arm AVF functional   SKIN: warm, dry    LABS:  CBC Full  -  ( 02 Mar 2022 07:06 )  WBC Count : 13.97 K/uL  RBC Count : 3.01 M/uL  Hemoglobin : 8.7 g/dL  Hematocrit : 28.1 %  Platelet Count - Automated : 198 K/uL  Mean Cell Volume : 93.4 fl  Mean Cell Hemoglobin : 28.9 pg  Mean Cell Hemoglobin Concentration : 31.0 gm/dL  Auto Neutrophil # : 12.57 K/uL  Auto Lymphocyte # : 0.79 K/uL  Auto Monocyte # : 0.43 K/uL  Auto Eosinophil # : 0.00 K/uL  Auto Basophil # : 0.05 K/uL  Auto Neutrophil % : 89.9 %  Auto Lymphocyte % : 5.7 %  Auto Monocyte % : 3.1 %  Auto Eosinophil % : 0.0 %  Auto Basophil % : 0.4 %        137  |  97  |  65<H>  ----------------------------<  75  3.9   |  19<L>  |  11.70<H>    Ca    9.0      02 Mar 2022 07:07  Phos  4.6     03-  Mg     2.1     -    TPro  6.4  /  Alb  3.1<L>  /  TBili  0.3  /  DBili  x   /  AST  16  /  ALT  8<L>  /  AlkPhos  109  -    Urinalysis Basic - ( 01 Mar 2022 16:31 )    Color: Yellow / Appearance: Clear / S.020 / pH: x  Gluc: x / Ketone: NEGATIVE  / Bili: Negative / Urobili: 0.2 E.U./dL   Blood: x / Protein: >=300 mg/dL / Nitrite: NEGATIVE   Leuk Esterase: Small / RBC: < 5 /HPF / WBC Many /HPF   Sq Epi: x / Non Sq Epi: 0-5 /HPF / Bacteria: Present /HPF    RADIOLOGY & ADDITIONAL STUDIES:  CXR:  FINDINGS:  Single frontal view of the chest demonstrates right lower lobe infiltrate. Small bilateral pleural effusions. The cardiomediastinal silhouette is enlarged. No acute osseous abnormalities.  IMPRESSION: Right lower lobe infiltrate. Small bilateral pleural effusions. Cardiomegaly..    CT chest:  Findings:  Lungs and large airways: Lobar pneumonia right lower lobe. Paraseptal and centrilobular emphysema. Unchanged round atelectasis in left lower lobe..  Pleura: Small right pleural effusion, cannot assess for visceral and parietal pleural enhancement to suggest empyema without intravenous contrast. No left pleural effusion.  Mediastinum and hilar regions: No thoracic lymphadenopathy.  Heart and pericardium: Heart size is enlarged. No pericardial effusion. Atrial appendage closure device.  Vessels: Unchanged mildly dilated mid ascending thoracic aorta up to 4.1 cm. Aortic atherosclerotic calcifications. Aortic valve calcifications. Coronary artery calcifications. Mildly dilated pulmonary trunk consistent with known pulmonary hypertension.  Chest wall and lower neck: Normal.  Upper abdomen: Cirrhotic liver morphology. Cholecystectomy. Small hiatal hernia.  Bones: Degenerative changes.  Impression:  Lobar pneumonia right lower lobe.  Small right pleural effusion. Limited assessment for infection without intravenous contrast. Consider fluid sampling.  No left pleural effusion.

## 2022-03-02 NOTE — CONSULT NOTE ADULT - ATTENDING COMMENTS
patient known to me-  h/o T cell lymphoma, ESRD  admitted with cough probable PNA and severe HAs  COVID and legionaire's tests negative  last HD was yesterday 2/28-  nephrologically stable to day- for repeat HD tomorrow, 3/2  reviewed with pt's wife at bedside as well
Small right parapneumonic effusion; no indication for thoracentesis; broad spectrum abx. Call with questions.

## 2022-03-02 NOTE — DISCHARGE NOTE PROVIDER - CARE PROVIDER_API CALL
Ann-Marie Denton (MD)  Internal Medicine; Nephrology  [address]  [place], [place] [address]  Phone: [phone]  Fax: [phone]  Established Patient  Follow Up Time: 2 weeks   Ann-Marie Denton)  Internal Medicine; Nephrology  [address] [place] [address]  Phone: [phone]  Fax: [phone]  Established Patient  Follow Up Time: 2 weeks    Ewdardo Baugh  INTERNAL MEDICINE  [address] [place] [address]  Phone: [phone]  Fax: (   )    -  Scheduled Appointment: 03/17/2022 11:00 AM

## 2022-03-02 NOTE — DISCHARGE NOTE PROVIDER - NSDCFUADDAPPT_GEN_ALL_CORE_FT
1) Please make appointment for patient Dr. Baugh [address] [facility], [place], [place] [id number] [phone] within 1 week of discharge.    2) Please schedule patient appointment with nephrology (Dr. Guadalupe) within 2 weeks of discharge.  Please bring your Insurance card, Photo ID and Discharge paperwork to the following appointment:    (1) Please follow up with your Primary Care Provider, Dr. Edwardo Baugh at [address] [place] [address] on 03/17/2022 at 11:00am.    Appointment was scheduled by Ms. LESVIA Magana, Referral Coordinator.

## 2022-03-02 NOTE — PROGRESS NOTE ADULT - PROBLEM SELECTOR PLAN 5
Hx of AFib s/p watchmen procedure in 10/21. Follows Dr. Vetnura. On amiodarone 200mg Qd and ASA 81. Not on AC due to Hx of bleeding.  - c/w home meds Hx of CKD5 on HD MWF via LUE AVF (06/21). Follows Dr. Guadalupe. Makes minimal urine at baseline. Missed Monday session because of his illness, last session on friday. Cr 10.7 on admission baseline 5-6. dec BS b/l, No LE edema.  - f/u nephro consulted HD today   - EPO w/ HD  - c/w sevelamer 800mg TID

## 2022-03-02 NOTE — PROGRESS NOTE ADULT - PROBLEM SELECTOR PLAN 6
Last echo 12/21 showing LVEF 65% PASP 59 mild AR and MR. on HD  - c/w metoprolol succinate 50mg BID  - HD per renal Hx of AFib s/p watchmen procedure in 10/21. Follows Dr. Ventura. On amiodarone 200mg Qd and ASA 81. Not on AC due to Hx of bleeding.  - c/w home meds

## 2022-03-02 NOTE — CONSULT NOTE ADULT - ASSESSMENT
65 y/o M with PMHx of ESRD on HD (M/W/F follows Dr. Guadalupe), T cell lymphoma on chemo every other week (Dr. Judie Lacey Ca clinic in [place]), Afib s/p watchmen procedure in 10/19/21 at [facility], HFpEF, mod PHTN, L VATS procedure 2 years ago is admitted for LLL pneumonia on antibiotics.     Pulmonary was consulted for right pleural effusion, bedside POCUS showed small right simple pleural effusion, no plan for thoracentesis at this time. It is very unlikely that this small right pleural effusion is causing SOB.   Recommend to continue HD as scheduled to keep patient euvolemic and c/w antibiotic treatment as per primary team.     Will continue to follow up.     INCOMPLETE*********** 65 y/o M with PMHx of ESRD on HD (M/W/F follows Dr. Guadalupe), T cell lymphoma on chemo every other week (Dr. Judie Lacey Ca clinic in [place]), Afib s/p watchmen procedure in 10/19/21 at [facility], HFpEF, mod PHTN, L VATS procedure 2 years ago is admitted for LLL pneumonia on antibiotics.     Pulmonary was consulted for right pleural effusion, CXR and CT chest reviewed, bedside POCUS showed small right simple pleural effusion, no plan for thoracentesis at this time. It is very unlikely that this small right pleural effusion is causing SOB.   Recommend to continue HD as scheduled to keep patient euvolemic and c/w antibiotic treatment as per primary team.     Will continue to follow up.     INCOMPLETE*********** 67 y/o M with PMHx of ESRD on HD (M/W/F follows Dr. Guadalupe), T cell lymphoma currently on treatment every other week (Dr. Judie Lacey Ca clinic in [place]), Afib s/p Watchman procedure in 10/21 at [facility], HFpEF, mod PHTN, recurrent L sided pleural effusion s/p VATS with pleural biopsy presenting with cough, subjective fevers with imaging c/f RLL pneumonia and b/l pleural effusions.     #pleural effusions  #RLL PNA  #former smoker, emphysema   #chronic NIELSON    Pulmonary was consulted for evaluation of R pleural effusion. CT chest with RLL lobar consolidation and air bronchograms with small b/l pleural effusions c/f lobar PNA in the setting of subjective fevers, cough, and leukocytosis. Bedside POCUS showed similar consolidation of R lung base with small right simple pleural effusion that would not be able to be safely sampled given small size. Fluid in the fissure. Regarding treatment of PNA would recommend continuing CAP coverage with CTX and doxycycline (chosen in place of azithromycin for prolonged QTc). Note that strep and legionella were negative. Given lobar quality less likely to be atypical PNA however there is little downside in continuing doxycycline to cover other potential atypical bugs especially since he is receiving active tx for his T cell lymphoma.      Pt has followed with Dr. Johnson in pulmonary clinic previously and has history of L recurrent pleural effusion s/p prior thora and trapped lung for which he underwent Left VATS and pleural bx with Dr. Harmon 2 years ago. The cytology and the pathology from the pleural fluid and pleural effusion showed chronic inflammatory process and no evidence of T-cell lymphoma. Patient also had drainage of the pleural fluid again in December 2021 which again was negative for underlying malignancy/lymphoma. Patient also with ESRD and HFpEF which could contribute pleural effusions. He is s/p HD today at time of visit with the above POCUS.     -hold off on thoracentesis  -continue CAP coverage with ceftriaxone and doxycycline for 5 day course   -RVP (contains mycoplasma)   -continue usual HD schedule per renal/primary team   -will need repeat imaging in 6-8 weeks   -can follow up as outpatient with Dr. Johnson

## 2022-03-02 NOTE — PROGRESS NOTE ADULT - PROBLEM SELECTOR PROBLEM 4
Acute renal failure superimposed on chronic kidney disease, unspecified CKD stage, unspecified acute renal failure type Diarrhea

## 2022-03-02 NOTE — PROGRESS NOTE ADULT - PROBLEM SELECTOR PLAN 1
p/w 3 days of productive cough and malaise, diarrhea, decreased BS L worse than R. CXR showing new RLL infiltrate. WBC 10.8 with neutrophilic predominance (baseline 4-7), Na wnl. s/p CTX1g and azithro 500mg IV in ED  - Continue with CTX 1g IV qD for 5 days (03/01-03/05)  - D/C doxycycline 100mg BID for atypical coverage X 5 days (03/02-03/06) as legionella Ag negative  - f/u CT chest non con to r/o empyema: Lobar pneumonia right lower lobe. Small right pleural effusion. Limited assessment for infection without intravenous contrast. Consider fluid sampling. No left pleural effusion  - negative urine strep and legionella  - blood Cx NGTD  - Robitussin 200mg Q6hrs  - incentive spirometry  - Goal O2>92% (Hx of mild COPD) Meeting 3/4 SIRS criteria today - fever, WBC > 12, HR > 90  -With source as below (PNA)  -C/w CTX  -Check blood cultures, lactate, sputum culture

## 2022-03-02 NOTE — PROGRESS NOTE ADULT - SUBJECTIVE AND OBJECTIVE BOX
--------------------------------------------------------------------------------  Chief Complaint: ESRD/Ongoing hemodialysis requirement    24 hour events/subjective:  Feeling a bit better this morning, states his cough has improved however headache still present  in a throbbing fashion. States the headache worsens with coughing.         PAST HISTORY  --------------------------------------------------------------------------------  No significant changes to PMH, PSH, FHx, SHx, unless otherwise noted    ALLERGIES & MEDICATIONS  --------------------------------------------------------------------------------  Allergies    No Known Allergies    Intolerances      Standing Inpatient Medications  allopurinol 100 milliGRAM(s) Oral every 12 hours  aMIOdarone    Tablet 200 milliGRAM(s) Oral daily  aspirin  chewable 81 milliGRAM(s) Oral daily  cefTRIAXone   IVPB 1000 milliGRAM(s) IV Intermittent every 24 hours  epoetin donny-epbx (RETACRIT) Injectable 3000 Unit(s) IV Push once  guaiFENesin Oral Liquid (Sugar-Free) 200 milliGRAM(s) Oral every 6 hours  heparin   Injectable 5000 Unit(s) SubCutaneous every 8 hours  metoprolol succinate ER 50 milliGRAM(s) Oral every 12 hours  sevelamer carbonate 800 milliGRAM(s) Oral three times a day    PRN Inpatient Medications  acetaminophen     Tablet .. 650 milliGRAM(s) Oral every 6 hours PRN  aluminum hydroxide/magnesium hydroxide/simethicone Suspension 30 milliLiter(s) Oral every 4 hours PRN  melatonin 3 milliGRAM(s) Oral at bedtime PRN  ondansetron Injectable 4 milliGRAM(s) IV Push every 8 hours PRN      REVIEW OF SYSTEMS  --------------------------------------------------------------------------------  ROS negative except as per HPI    VITALS/PHYSICAL EXAM  --------------------------------------------------------------------------------  T(C): 36.6 (22 @ 08:32), Max: 37.9 (22 @ 05:27)  HR: 71 (22 @ 08:32) (69 - 86)  BP: 121/70 (22 @ 08:32) (114/70 - 167/85)  RR: 16 (22 @ 08:32) (16 - 20)  SpO2: 94% (22 @ 08:32) (92% - 100%)  Wt(kg): --  Drug Dosing Weight  Height (cm): 180.3 (01 Mar 2022 10:22)  Weight (kg): 70.3 (01 Mar 2022 10:22)  BMI (kg/m2): 21.6 (01 Mar 2022 10:22)  BSA (m2): 1.89 (01 Mar 2022 10:22)  Height (cm): 180.3 (22 @ 10:22)  Weight (kg): 70.3 (22 @ 10:22)  BMI (kg/m2): 21.6 (22 @ 10:22)  BSA (m2): 1.89 (22 @ 10:22)      Physical Exam:  GENERAL: Alert, awake, oriented x3 on RA   HEENT: SARAI, EOMI, neck supple, no JVP  CHEST/LUNG: Bilateral clear breath sounds  HEART: Regular rate and rhythm, no murmur, no gallops, no rub   ABDOMEN: Soft, nontender, non distended  EXTREMITIES: no pedal edema, WWP  ACCESS: SUSAN KAYE w/bruit and thrill       LABS/STUDIES  --------------------------------------------------------------------------------              8.7    13.97 >-----------<  198      [22 @ 07:06]              28.1     137  |  97  |  65  ----------------------------<  75      [22 @ 07:07]  3.9   |  19  |  11.70        Ca     9.0     [22 @ 07:07]      Mg     2.1     [22 @ 11:27]      Phos  4.6     [22 11:27]    TPro  6.4  /  Alb  3.1  /  TBili  0.3  /  DBili  x   /  AST  16  /  ALT  8   /  AlkPhos  109  [22 @ 07:07]          Iron 45, TIBC 191, %sat 24      [21 @ 09:48]  Ferritin 678      [21 09:48]  HbA1c 4.9      [17 @ 09:36]  TSH 1.040      [10-19-21 @ 09:05]    HBsAg Nonreact      [22 @ 07:06]      RADIOLOGY:  --------------------------------------------------------------------------------------    Hemoglobin: 8.7 g/dL (22 @ 07:06)  Hemoglobin: 9.8 g/dL (22 @ 11:27)  Phosphorus Level, Serum: 4.6 mg/dL (22 @ 11:27)    Albumin, Serum: 3.1 g/dL (22 @ 07:07)  Hepatitis B Surface Antigen: Nonreact (22 @ 07:06)    epoetin donny-epbx (RETACRIT) Injectable 3000 Unit(s) IV Push once, 22 @ 08:09, Routine, Stop order after: 1 Doses  sevelamer carbonate 800 milliGRAM(s) Oral three times a day, 22 @ 15:38, Routine      Hemodialysis Treatment.:     Schedule: Once, Modality: Hemodialysis, Access: Arteriovenous Fistula    Dialyzer: Optiflux [id number], Time: 180 Min    Blood Flow: 400 mL/Min , Dialysate Flow: 500 mL/Min, Dialysate Temp: 36.5, Tubinmm (Adult)    Target Dry Weight: 64 kG    Dialysate Electrolytes (mEq/L): Potassium 3, Calcium 2.5, Sodium 138, Bicarbonate 35 (22 @ 08:09) [Completed]

## 2022-03-02 NOTE — DISCHARGE NOTE PROVIDER - NSDCCPTREATMENT_GEN_ALL_CORE_FT
PRINCIPAL PROCEDURE  Procedure: CXR, single AP view  Findings and Treatment: CXR: Single frontal view of the chest demonstrates right lower lobe infiltrate. Small bilateral pleural effusions. The cardiomediastinal silhouette is enlarged. No acute osseous abnormalities.        SECONDARY PROCEDURE  Procedure: CT chest wo contrast  Findings and Treatment: CT Chest noncon: Lobar pneumonia right lower lobe. Paraseptal and centrilobular emphysema. Unchanged round atelectasis in left lower lobe. Small right pleural effusion, cannot assess for visceral and parietal pleural enhancement to suggest empyema without intravenous contrast. No left pleural effusion. Mediastinum and hilar regions: No thoracic lymphadenopathy. Heart size is enlarged. No pericardial effusion. Atrial appendage closure device. Unchanged mildly dilated mid ascending thoracic aorta up to 4.1 cm. Aortic atherosclerotic calcifications. Aortic valve calcifications. Coronary artery calcifications. Mildly dilated pulmonary trunk consistent with known pulmonary hypertension. Cirrhotic liver morphology. Cholecystectomy. Small hiatal hernia. Bones show egenerative changes.

## 2022-03-02 NOTE — DISCHARGE NOTE PROVIDER - NSDCMRMEDTOKEN_GEN_ALL_CORE_FT
allopurinol 100 mg oral tablet: 2 tab(s) orally 2 times a day  amiodarone 200 mg oral tablet: 1 tab(s) orally once a day  amLODIPine 5 mg oral tablet: 1 tab(s) orally once a day  aspirin 81 mg oral delayed release tablet: 1 tab(s) orally once a day  metoprolol succinate 50 mg oral tablet, extended release: 1 tab(s) orally 2 times a day  sevelamer carbonate 800 mg oral tablet: 1 tab(s) orally every 8 hours   allopurinol 100 mg oral tablet: 2 tab(s) orally 2 times a day  amiodarone 200 mg oral tablet: 1 tab(s) orally once a day  amLODIPine 5 mg oral tablet: 1 tab(s) orally once a day  aspirin 81 mg oral delayed release tablet: 1 tab(s) orally once a day  cefpodoxime 200 mg oral tablet: 1 tab(s) orally once a day   metoprolol succinate 50 mg oral tablet, extended release: 1 tab(s) orally 2 times a day  sevelamer carbonate 800 mg oral tablet: 1 tab(s) orally every 8 hours

## 2022-03-03 LAB
ALBUMIN SERPL ELPH-MCNC: 2.4 G/DL — LOW (ref 3.3–5)
ALP SERPL-CCNC: 130 U/L — HIGH (ref 40–120)
ALT FLD-CCNC: 9 U/L — LOW (ref 10–45)
ANION GAP SERPL CALC-SCNC: 14 MMOL/L — SIGNIFICANT CHANGE UP (ref 5–17)
APPEARANCE UR: CLEAR — SIGNIFICANT CHANGE UP
AST SERPL-CCNC: 21 U/L — SIGNIFICANT CHANGE UP (ref 10–40)
BACTERIA # UR AUTO: PRESENT /HPF
BASOPHILS # BLD AUTO: 0.02 K/UL — SIGNIFICANT CHANGE UP (ref 0–0.2)
BASOPHILS NFR BLD AUTO: 0.2 % — SIGNIFICANT CHANGE UP (ref 0–2)
BILIRUB SERPL-MCNC: 0.4 MG/DL — SIGNIFICANT CHANGE UP (ref 0.2–1.2)
BILIRUB UR-MCNC: NEGATIVE — SIGNIFICANT CHANGE UP
BUN SERPL-MCNC: 32 MG/DL — HIGH (ref 7–23)
CALCIUM SERPL-MCNC: 9 MG/DL — SIGNIFICANT CHANGE UP (ref 8.4–10.5)
CHLORIDE SERPL-SCNC: 99 MMOL/L — SIGNIFICANT CHANGE UP (ref 96–108)
CO2 SERPL-SCNC: 21 MMOL/L — LOW (ref 22–31)
COLOR SPEC: YELLOW — SIGNIFICANT CHANGE UP
CREAT SERPL-MCNC: 6.98 MG/DL — HIGH (ref 0.5–1.3)
DIFF PNL FLD: ABNORMAL
EGFR: 8 ML/MIN/1.73M2 — LOW
EOSINOPHIL # BLD AUTO: 0 K/UL — SIGNIFICANT CHANGE UP (ref 0–0.5)
EOSINOPHIL NFR BLD AUTO: 0 % — SIGNIFICANT CHANGE UP (ref 0–6)
EPI CELLS # UR: SIGNIFICANT CHANGE UP /HPF (ref 0–5)
GLUCOSE SERPL-MCNC: 97 MG/DL — SIGNIFICANT CHANGE UP (ref 70–99)
GLUCOSE UR QL: NEGATIVE — SIGNIFICANT CHANGE UP
HCT VFR BLD CALC: 28.8 % — LOW (ref 39–50)
HGB BLD-MCNC: 9 G/DL — LOW (ref 13–17)
IMM GRANULOCYTES NFR BLD AUTO: 0.7 % — SIGNIFICANT CHANGE UP (ref 0–1.5)
KETONES UR-MCNC: NEGATIVE — SIGNIFICANT CHANGE UP
LEUKOCYTE ESTERASE UR-ACNC: ABNORMAL
LYMPHOCYTES # BLD AUTO: 0.74 K/UL — LOW (ref 1–3.3)
LYMPHOCYTES # BLD AUTO: 7.2 % — LOW (ref 13–44)
MAGNESIUM SERPL-MCNC: 2.2 MG/DL — SIGNIFICANT CHANGE UP (ref 1.6–2.6)
MCHC RBC-ENTMCNC: 28 PG — SIGNIFICANT CHANGE UP (ref 27–34)
MCHC RBC-ENTMCNC: 31.3 GM/DL — LOW (ref 32–36)
MCV RBC AUTO: 89.4 FL — SIGNIFICANT CHANGE UP (ref 80–100)
MONOCYTES # BLD AUTO: 0.43 K/UL — SIGNIFICANT CHANGE UP (ref 0–0.9)
MONOCYTES NFR BLD AUTO: 4.2 % — SIGNIFICANT CHANGE UP (ref 2–14)
MRSA PCR RESULT.: NEGATIVE — SIGNIFICANT CHANGE UP
NEUTROPHILS # BLD AUTO: 9.04 K/UL — HIGH (ref 1.8–7.4)
NEUTROPHILS NFR BLD AUTO: 87.7 % — HIGH (ref 43–77)
NITRITE UR-MCNC: NEGATIVE — SIGNIFICANT CHANGE UP
NRBC # BLD: 0 /100 WBCS — SIGNIFICANT CHANGE UP (ref 0–0)
PH UR: 8.5 — HIGH (ref 5–8)
PHOSPHATE SERPL-MCNC: 3 MG/DL — SIGNIFICANT CHANGE UP (ref 2.5–4.5)
PLATELET # BLD AUTO: 203 K/UL — SIGNIFICANT CHANGE UP (ref 150–400)
POTASSIUM SERPL-MCNC: 4.5 MMOL/L — SIGNIFICANT CHANGE UP (ref 3.5–5.3)
POTASSIUM SERPL-SCNC: 4.5 MMOL/L — SIGNIFICANT CHANGE UP (ref 3.5–5.3)
PROT SERPL-MCNC: 6.5 G/DL — SIGNIFICANT CHANGE UP (ref 6–8.3)
PROT UR-MCNC: 100 MG/DL
RAPID RVP RESULT: SIGNIFICANT CHANGE UP
RBC # BLD: 3.22 M/UL — LOW (ref 4.2–5.8)
RBC # FLD: 18.7 % — HIGH (ref 10.3–14.5)
RBC CASTS # UR COMP ASSIST: ABNORMAL /HPF
S AUREUS DNA NOSE QL NAA+PROBE: NEGATIVE — SIGNIFICANT CHANGE UP
SARS-COV-2 RNA SPEC QL NAA+PROBE: SIGNIFICANT CHANGE UP
SODIUM SERPL-SCNC: 134 MMOL/L — LOW (ref 135–145)
SP GR SPEC: 1.02 — SIGNIFICANT CHANGE UP (ref 1–1.03)
UROBILINOGEN FLD QL: 0.2 E.U./DL — SIGNIFICANT CHANGE UP
WBC # BLD: 10.3 K/UL — SIGNIFICANT CHANGE UP (ref 3.8–10.5)
WBC # FLD AUTO: 10.3 K/UL — SIGNIFICANT CHANGE UP (ref 3.8–10.5)
WBC UR QL: < 5 /HPF — SIGNIFICANT CHANGE UP

## 2022-03-03 PROCEDURE — 99233 SBSQ HOSP IP/OBS HIGH 50: CPT | Mod: GC

## 2022-03-03 PROCEDURE — 71045 X-RAY EXAM CHEST 1 VIEW: CPT | Mod: 26

## 2022-03-03 RX ORDER — PIPERACILLIN AND TAZOBACTAM 4; .5 G/20ML; G/20ML
2.25 INJECTION, POWDER, LYOPHILIZED, FOR SOLUTION INTRAVENOUS EVERY 8 HOURS
Refills: 0 | Status: DISCONTINUED | OUTPATIENT
Start: 2022-03-03 | End: 2022-03-05

## 2022-03-03 RX ADMIN — SEVELAMER CARBONATE 800 MILLIGRAM(S): 2400 POWDER, FOR SUSPENSION ORAL at 09:13

## 2022-03-03 RX ADMIN — SEVELAMER CARBONATE 800 MILLIGRAM(S): 2400 POWDER, FOR SUSPENSION ORAL at 17:12

## 2022-03-03 RX ADMIN — Medication 100 MILLIGRAM(S): at 05:27

## 2022-03-03 RX ADMIN — PIPERACILLIN AND TAZOBACTAM 200 GRAM(S): 4; .5 INJECTION, POWDER, LYOPHILIZED, FOR SOLUTION INTRAVENOUS at 17:12

## 2022-03-03 RX ADMIN — AMIODARONE HYDROCHLORIDE 200 MILLIGRAM(S): 400 TABLET ORAL at 05:28

## 2022-03-03 RX ADMIN — Medication 3 MILLIGRAM(S): at 23:14

## 2022-03-03 RX ADMIN — Medication 200 MILLIGRAM(S): at 12:25

## 2022-03-03 RX ADMIN — Medication 650 MILLIGRAM(S): at 09:13

## 2022-03-03 RX ADMIN — HEPARIN SODIUM 5000 UNIT(S): 5000 INJECTION INTRAVENOUS; SUBCUTANEOUS at 13:57

## 2022-03-03 RX ADMIN — PIPERACILLIN AND TAZOBACTAM 200 GRAM(S): 4; .5 INJECTION, POWDER, LYOPHILIZED, FOR SOLUTION INTRAVENOUS at 09:25

## 2022-03-03 RX ADMIN — Medication 50 MILLIGRAM(S): at 17:13

## 2022-03-03 RX ADMIN — Medication 200 MILLIGRAM(S): at 17:12

## 2022-03-03 RX ADMIN — HEPARIN SODIUM 5000 UNIT(S): 5000 INJECTION INTRAVENOUS; SUBCUTANEOUS at 21:26

## 2022-03-03 RX ADMIN — Medication 100 MILLIGRAM(S): at 17:13

## 2022-03-03 RX ADMIN — Medication 200 MILLIGRAM(S): at 23:14

## 2022-03-03 RX ADMIN — Medication 200 MILLIGRAM(S): at 05:27

## 2022-03-03 RX ADMIN — SEVELAMER CARBONATE 800 MILLIGRAM(S): 2400 POWDER, FOR SUSPENSION ORAL at 12:25

## 2022-03-03 RX ADMIN — Medication 650 MILLIGRAM(S): at 10:35

## 2022-03-03 RX ADMIN — Medication 81 MILLIGRAM(S): at 12:26

## 2022-03-03 RX ADMIN — Medication 50 MILLIGRAM(S): at 05:28

## 2022-03-03 RX ADMIN — HEPARIN SODIUM 5000 UNIT(S): 5000 INJECTION INTRAVENOUS; SUBCUTANEOUS at 05:28

## 2022-03-03 NOTE — PROGRESS NOTE ADULT - PROBLEM SELECTOR PLAN 3
2-3 total episodes of N/V associated only with PO intake. s/p 10mg Reglan in ED.  - Avoid zofran/reglan i/s/o Qtc prolongation  - Can give tigan PRN for nausea  - Started diet, monitor PO intake    #B/L Pleural Effusions: CT chest non con w/ Lobar pneumonia right lower lobe. Paraseptal and centrilobular emphysema. Unchanged round atelectasis in left lower lobe. Small right pleural effusion, cannot assess for visceral and parietal pleural enhancement to suggest empyema without intravenous contrast. No left pleural effusion. Patient respiring RA, w/o increased WOB, minimally decreased breath sounds in R lung base.   - monitor resp status  - f/u pulm recs  - RVP (contains mycoplasma)   - continue usual HD schedule per renal/primary team   - will need repeat imaging in 6-8 weeks

## 2022-03-03 NOTE — PROGRESS NOTE ADULT - PROBLEM SELECTOR PLAN 4
cough Multiple episodes of Non bloody diarrhea assoc with PO intake. Last BM today.  - Monitor BMs Cough

## 2022-03-03 NOTE — PROGRESS NOTE ADULT - PROBLEM SELECTOR PLAN 6
Hx of AFib s/p watchmen procedure in 10/21. Follows Dr. Ventura. On amiodarone 200mg Qd and ASA 81. Not on AC due to Hx of bleeding.  - c/w home meds

## 2022-03-03 NOTE — PROGRESS NOTE ADULT - PROBLEM SELECTOR PLAN 5
Hx of CKD5 on HD MWF via LUE AVF (06/21). Follows Dr. Guadalupe. Makes minimal urine at baseline. Missed Monday session because of his illness, last session on friday. Cr 10.7 on admission baseline 5-6. dec BS b/l, No LE edema.  - f/u nephro consulted HD today   - EPO w/ HD  - c/w sevelamer 800mg TID

## 2022-03-03 NOTE — PROGRESS NOTE ADULT - PROBLEM SELECTOR PLAN 8
Last echo 12/21 showing LVEF 65% PASP 59 mild AR and MR. on HD  - c/w metoprolol succinate 50mg BID  - HD per renal last dialyzed 3/3

## 2022-03-03 NOTE — PROGRESS NOTE ADULT - SUBJECTIVE AND OBJECTIVE BOX
OVERNIGHT EVENTS:    SUBJECTIVE / INTERVAL HPI: Patient seen and examined at bedside.     VITAL SIGNS:  Vital Signs Last 24 Hrs  T(C): 38.9 (03 Mar 2022 09:06), Max: 38.9 (03 Mar 2022 09:06)  T(F): 102.1 (03 Mar 2022 09:06), Max: 102.1 (03 Mar 2022 09:06)  HR: 82 (03 Mar 2022 09:06) (73 - 94)  BP: 127/72 (03 Mar 2022 09:06) (117/65 - 145/74)  BP(mean): --  RR: 18 (03 Mar 2022 09:06) (18 - 18)  SpO2: 93% (03 Mar 2022 09:06) (93% - 96%)    PHYSICAL EXAM:    General: NAD  HEENT: NC/AT; PERRL, anicteric sclera; MMM  Neck: supple w/o palpable nodularity  Cardiovascular: +S1/S2; RRR  Respiratory: CTA B/L; no W/R/R  Gastrointestinal: soft, NT/ND; +BSx4  Extremities: WWP; no edema, clubbing or cyanosis  Vascular: 2+ radial, DP/PT pulses B/L  Neurological: AAOx3; no focal deficits    MEDICATIONS:  MEDICATIONS  (STANDING):  allopurinol 100 milliGRAM(s) Oral every 12 hours  aMIOdarone    Tablet 200 milliGRAM(s) Oral daily  aspirin  chewable 81 milliGRAM(s) Oral daily  guaiFENesin Oral Liquid (Sugar-Free) 200 milliGRAM(s) Oral every 6 hours  heparin   Injectable 5000 Unit(s) SubCutaneous every 8 hours  metoprolol succinate ER 50 milliGRAM(s) Oral every 12 hours  piperacillin/tazobactam IVPB.. 2.25 Gram(s) IV Intermittent every 8 hours  sevelamer carbonate 800 milliGRAM(s) Oral three times a day with meals    MEDICATIONS  (PRN):  acetaminophen     Tablet .. 650 milliGRAM(s) Oral every 6 hours PRN Temp greater or equal to 38C (100.4F), Mild Pain (1 - 3)  aluminum hydroxide/magnesium hydroxide/simethicone Suspension 30 milliLiter(s) Oral every 4 hours PRN Dyspepsia  melatonin 3 milliGRAM(s) Oral at bedtime PRN Insomnia  ondansetron Injectable 4 milliGRAM(s) IV Push every 8 hours PRN Nausea and/or Vomiting      ALLERGIES:  Allergies    No Known Allergies    Intolerances        LABS:                        9.0    10.30 )-----------( 203      ( 03 Mar 2022 09:25 )             28.8     03-03    134<L>  |  99  |  32<H>  ----------------------------<  97  4.5   |  21<L>  |  6.98<H>    Ca    9.0      03 Mar 2022 09:25  Phos  3.0     03-03  Mg     2.2     03-03    TPro  6.5  /  Alb  2.4<L>  /  TBili  0.4  /  DBili  x   /  AST  21  /  ALT  9<L>  /  AlkPhos  130<H>  03-03      Urinalysis Basic - ( 01 Mar 2022 16:31 )    Color: Yellow / Appearance: Clear / S.020 / pH: x  Gluc: x / Ketone: NEGATIVE  / Bili: Negative / Urobili: 0.2 E.U./dL   Blood: x / Protein: >=300 mg/dL / Nitrite: NEGATIVE   Leuk Esterase: Small / RBC: < 5 /HPF / WBC Many /HPF   Sq Epi: x / Non Sq Epi: 0-5 /HPF / Bacteria: Present /HPF      CAPILLARY BLOOD GLUCOSE      POCT Blood Glucose.: 83 mg/dL (01 Mar 2022 10:26)      RADIOLOGY & ADDITIONAL TESTS: Reviewed.   OVERNIGHT EVENTS: KALA    SUBJECTIVE / INTERVAL HPI: Patient seen and examined at bedside. Patient c/o hematuria w/o pain, drinking cranberry juice. Also notes nonproductive cough (triggers HA). Denies remaining ROS: SOB, dyspnea, CP, abdominal pain at rest, n/v/d.     VITAL SIGNS:  Vital Signs Last 24 Hrs  T(C): 38.9 (03 Mar 2022 09:06), Max: 38.9 (03 Mar 2022 09:06)  T(F): 102.1 (03 Mar 2022 09:06), Max: 102.1 (03 Mar 2022 09:06)  HR: 82 (03 Mar 2022 09:06) (73 - 94)  BP: 127/72 (03 Mar 2022 09:06) (117/65 - 145/74)  BP(mean): --  RR: 18 (03 Mar 2022 09:06) (18 - 18)  SpO2: 93% (03 Mar 2022 09:06) (93% - 96%)    PHYSICAL EXAM:    General: NAD  HEENT: NC/AT; PERRL, anicteric sclera; MM dry   Neck: supple w/o palpable nodularity  Cardiovascular: +S1/S2; II/VI systolic murmur best auscultated at apex, RRR  Respiratory: bibasilar crackles; no W/R/R  Gastrointestinal: soft, diffuse tenderness to palpation, ND; no rebound or guarding; +BSx4  Extremities: WWP; no edema, clubbing or cyanosis, LUE AVF  Vascular: 2+ radial, DP/PT pulses B/L, AVF w/ palpable bruit  Neurological: AAOx3; no focal deficits    MEDICATIONS:  MEDICATIONS  (STANDING):  allopurinol 100 milliGRAM(s) Oral every 12 hours  aMIOdarone    Tablet 200 milliGRAM(s) Oral daily  aspirin  chewable 81 milliGRAM(s) Oral daily  guaiFENesin Oral Liquid (Sugar-Free) 200 milliGRAM(s) Oral every 6 hours  heparin   Injectable 5000 Unit(s) SubCutaneous every 8 hours  metoprolol succinate ER 50 milliGRAM(s) Oral every 12 hours  piperacillin/tazobactam IVPB.. 2.25 Gram(s) IV Intermittent every 8 hours  sevelamer carbonate 800 milliGRAM(s) Oral three times a day with meals    MEDICATIONS  (PRN):  acetaminophen     Tablet .. 650 milliGRAM(s) Oral every 6 hours PRN Temp greater or equal to 38C (100.4F), Mild Pain (1 - 3)  aluminum hydroxide/magnesium hydroxide/simethicone Suspension 30 milliLiter(s) Oral every 4 hours PRN Dyspepsia  melatonin 3 milliGRAM(s) Oral at bedtime PRN Insomnia  ondansetron Injectable 4 milliGRAM(s) IV Push every 8 hours PRN Nausea and/or Vomiting      ALLERGIES:  Allergies    No Known Allergies    Intolerances        LABS:                        9.0    10.30 )-----------( 203      ( 03 Mar 2022 09:25 )             28.8     03-03    134<L>  |  99  |  32<H>  ----------------------------<  97  4.5   |  21<L>  |  6.98<H>    Ca    9.0      03 Mar 2022 09:25  Phos  3.0     03-03  Mg     2.2     -03    TPro  6.5  /  Alb  2.4<L>  /  TBili  0.4  /  DBili  x   /  AST  21  /  ALT  9<L>  /  AlkPhos  130<H>  03-03      Urinalysis Basic - ( 01 Mar 2022 16:31 )    Color: Yellow / Appearance: Clear / S.020 / pH: x  Gluc: x / Ketone: NEGATIVE  / Bili: Negative / Urobili: 0.2 E.U./dL   Blood: x / Protein: >=300 mg/dL / Nitrite: NEGATIVE   Leuk Esterase: Small / RBC: < 5 /HPF / WBC Many /HPF   Sq Epi: x / Non Sq Epi: 0-5 /HPF / Bacteria: Present /HPF      CAPILLARY BLOOD GLUCOSE      POCT Blood Glucose.: 83 mg/dL (01 Mar 2022 10:26)      RADIOLOGY & ADDITIONAL TESTS: Reviewed.

## 2022-03-03 NOTE — PROGRESS NOTE ADULT - PROBLEM SELECTOR PLAN 1
Meeting 3/4 SIRS criteria today - fever, WBC > 12, HR > 90. 3/3 patient spiked fever 102.1F while on CTX, MRSA swab negative broadened to Zosyn.  -With source as below (PNA)  -zosyn 2.25g q8h x 7 day (3/3-3/9)  -f/u BCx 3/1 - NGTD  -f/u BCx 3/2 - NGTD  -d/c'd CTX  -Collect sputum culture  -collect UA

## 2022-03-03 NOTE — PROGRESS NOTE ADULT - PROBLEM SELECTOR PLAN 9
On metoprolol succ 50mg bid, amlodipine 5mg Qd. BP on admission 105/65. Did not take any meds today  - resumed home toprol  - holding amlodipine, resume as tolerated

## 2022-03-03 NOTE — PROGRESS NOTE ADULT - PROBLEM SELECTOR PLAN 2
p/w 3 days of productive cough and malaise, diarrhea, decreased BS L worse than R. CXR showing new RLL infiltrate. WBC 10.8 with neutrophilic predominance (baseline 4-7), Na wnl. s/p CTX1g and azithro 500mg IV in ED  - Zosyn 2.25 (dose adjusted for CrCl) IV q8h x 7d (nonpseudomonal dosing)  - D/C CTX 1g IV qD for 5 days (03/01-03/05) as patient spiking fevers while treated  - D/C doxycycline as legionella Ag negative  - f/u CT chest non con to r/o empyema: Lobar pneumonia right lower lobe. Small right pleural effusion. Limited assessment for infection without intravenous contrast. Consider fluid sampling. No left pleural effusion  - negative urine strep and legionella  - blood Cx see above  - Robitussin 200mg Q6hrs  - incentive spirometry  - Goal O2>92% (Hx of mild COPD)

## 2022-03-04 LAB
ALBUMIN SERPL ELPH-MCNC: 2.9 G/DL — LOW (ref 3.3–5)
ALP SERPL-CCNC: 102 U/L — SIGNIFICANT CHANGE UP (ref 40–120)
ALT FLD-CCNC: <5 U/L — LOW (ref 10–45)
ANION GAP SERPL CALC-SCNC: 17 MMOL/L — SIGNIFICANT CHANGE UP (ref 5–17)
AST SERPL-CCNC: 12 U/L — SIGNIFICANT CHANGE UP (ref 10–40)
BASOPHILS # BLD AUTO: 0.03 K/UL — SIGNIFICANT CHANGE UP (ref 0–0.2)
BASOPHILS NFR BLD AUTO: 0.4 % — SIGNIFICANT CHANGE UP (ref 0–2)
BILIRUB SERPL-MCNC: 0.3 MG/DL — SIGNIFICANT CHANGE UP (ref 0.2–1.2)
BUN SERPL-MCNC: 46 MG/DL — HIGH (ref 7–23)
CALCIUM SERPL-MCNC: 8.8 MG/DL — SIGNIFICANT CHANGE UP (ref 8.4–10.5)
CHLORIDE SERPL-SCNC: 94 MMOL/L — LOW (ref 96–108)
CO2 SERPL-SCNC: 25 MMOL/L — SIGNIFICANT CHANGE UP (ref 22–31)
CREAT SERPL-MCNC: 8.91 MG/DL — HIGH (ref 0.5–1.3)
EGFR: 6 ML/MIN/1.73M2 — LOW
EOSINOPHIL # BLD AUTO: 0.07 K/UL — SIGNIFICANT CHANGE UP (ref 0–0.5)
EOSINOPHIL NFR BLD AUTO: 0.9 % — SIGNIFICANT CHANGE UP (ref 0–6)
GLUCOSE SERPL-MCNC: 84 MG/DL — SIGNIFICANT CHANGE UP (ref 70–99)
HCT VFR BLD CALC: 27.4 % — LOW (ref 39–50)
HGB BLD-MCNC: 8.7 G/DL — LOW (ref 13–17)
IMM GRANULOCYTES NFR BLD AUTO: 0.6 % — SIGNIFICANT CHANGE UP (ref 0–1.5)
LYMPHOCYTES # BLD AUTO: 0.88 K/UL — LOW (ref 1–3.3)
LYMPHOCYTES # BLD AUTO: 10.8 % — LOW (ref 13–44)
MAGNESIUM SERPL-MCNC: 2.3 MG/DL — SIGNIFICANT CHANGE UP (ref 1.6–2.6)
MCHC RBC-ENTMCNC: 28.9 PG — SIGNIFICANT CHANGE UP (ref 27–34)
MCHC RBC-ENTMCNC: 31.8 GM/DL — LOW (ref 32–36)
MCV RBC AUTO: 91 FL — SIGNIFICANT CHANGE UP (ref 80–100)
MONOCYTES # BLD AUTO: 0.65 K/UL — SIGNIFICANT CHANGE UP (ref 0–0.9)
MONOCYTES NFR BLD AUTO: 8 % — SIGNIFICANT CHANGE UP (ref 2–14)
NEUTROPHILS # BLD AUTO: 6.48 K/UL — SIGNIFICANT CHANGE UP (ref 1.8–7.4)
NEUTROPHILS NFR BLD AUTO: 79.3 % — HIGH (ref 43–77)
NRBC # BLD: 0 /100 WBCS — SIGNIFICANT CHANGE UP (ref 0–0)
PHOSPHATE SERPL-MCNC: 3.9 MG/DL — SIGNIFICANT CHANGE UP (ref 2.5–4.5)
PLATELET # BLD AUTO: 239 K/UL — SIGNIFICANT CHANGE UP (ref 150–400)
POTASSIUM SERPL-MCNC: 3.8 MMOL/L — SIGNIFICANT CHANGE UP (ref 3.5–5.3)
POTASSIUM SERPL-SCNC: 3.8 MMOL/L — SIGNIFICANT CHANGE UP (ref 3.5–5.3)
PROT SERPL-MCNC: 6.2 G/DL — SIGNIFICANT CHANGE UP (ref 6–8.3)
RBC # BLD: 3.01 M/UL — LOW (ref 4.2–5.8)
RBC # FLD: 19.1 % — HIGH (ref 10.3–14.5)
SODIUM SERPL-SCNC: 136 MMOL/L — SIGNIFICANT CHANGE UP (ref 135–145)
WBC # BLD: 8.16 K/UL — SIGNIFICANT CHANGE UP (ref 3.8–10.5)
WBC # FLD AUTO: 8.16 K/UL — SIGNIFICANT CHANGE UP (ref 3.8–10.5)

## 2022-03-04 PROCEDURE — 99233 SBSQ HOSP IP/OBS HIGH 50: CPT | Mod: GC

## 2022-03-04 PROCEDURE — 90935 HEMODIALYSIS ONE EVALUATION: CPT

## 2022-03-04 RX ORDER — ACETYLCYSTEINE 200 MG/ML
4 VIAL (ML) MISCELLANEOUS ONCE
Refills: 0 | Status: COMPLETED | OUTPATIENT
Start: 2022-03-04 | End: 2022-03-04

## 2022-03-04 RX ORDER — ERYTHROPOIETIN 10000 [IU]/ML
3000 INJECTION, SOLUTION INTRAVENOUS; SUBCUTANEOUS ONCE
Refills: 0 | Status: COMPLETED | OUTPATIENT
Start: 2022-03-04 | End: 2022-03-04

## 2022-03-04 RX ORDER — IPRATROPIUM/ALBUTEROL SULFATE 18-103MCG
3 AEROSOL WITH ADAPTER (GRAM) INHALATION ONCE
Refills: 0 | Status: COMPLETED | OUTPATIENT
Start: 2022-03-04 | End: 2022-03-04

## 2022-03-04 RX ORDER — CEFPODOXIME PROXETIL 100 MG
1 TABLET ORAL
Qty: 6 | Refills: 0
Start: 2022-03-04 | End: 2022-03-09

## 2022-03-04 RX ADMIN — Medication 200 MILLIGRAM(S): at 05:46

## 2022-03-04 RX ADMIN — SEVELAMER CARBONATE 800 MILLIGRAM(S): 2400 POWDER, FOR SUSPENSION ORAL at 14:11

## 2022-03-04 RX ADMIN — HEPARIN SODIUM 5000 UNIT(S): 5000 INJECTION INTRAVENOUS; SUBCUTANEOUS at 05:47

## 2022-03-04 RX ADMIN — PIPERACILLIN AND TAZOBACTAM 200 GRAM(S): 4; .5 INJECTION, POWDER, LYOPHILIZED, FOR SOLUTION INTRAVENOUS at 23:04

## 2022-03-04 RX ADMIN — Medication 50 MILLIGRAM(S): at 19:04

## 2022-03-04 RX ADMIN — Medication 81 MILLIGRAM(S): at 14:11

## 2022-03-04 RX ADMIN — Medication 200 MILLIGRAM(S): at 19:04

## 2022-03-04 RX ADMIN — Medication 650 MILLIGRAM(S): at 17:18

## 2022-03-04 RX ADMIN — Medication 50 MILLIGRAM(S): at 05:47

## 2022-03-04 RX ADMIN — HEPARIN SODIUM 5000 UNIT(S): 5000 INJECTION INTRAVENOUS; SUBCUTANEOUS at 14:11

## 2022-03-04 RX ADMIN — AMIODARONE HYDROCHLORIDE 200 MILLIGRAM(S): 400 TABLET ORAL at 05:47

## 2022-03-04 RX ADMIN — Medication 100 MILLIGRAM(S): at 05:47

## 2022-03-04 RX ADMIN — PIPERACILLIN AND TAZOBACTAM 200 GRAM(S): 4; .5 INJECTION, POWDER, LYOPHILIZED, FOR SOLUTION INTRAVENOUS at 00:02

## 2022-03-04 RX ADMIN — Medication 100 MILLIGRAM(S): at 19:04

## 2022-03-04 RX ADMIN — Medication 200 MILLIGRAM(S): at 14:10

## 2022-03-04 RX ADMIN — Medication 650 MILLIGRAM(S): at 16:18

## 2022-03-04 RX ADMIN — ERYTHROPOIETIN 3000 UNIT(S): 10000 INJECTION, SOLUTION INTRAVENOUS; SUBCUTANEOUS at 11:52

## 2022-03-04 RX ADMIN — PIPERACILLIN AND TAZOBACTAM 200 GRAM(S): 4; .5 INJECTION, POWDER, LYOPHILIZED, FOR SOLUTION INTRAVENOUS at 14:10

## 2022-03-04 RX ADMIN — SEVELAMER CARBONATE 800 MILLIGRAM(S): 2400 POWDER, FOR SUSPENSION ORAL at 19:04

## 2022-03-04 NOTE — PROGRESS NOTE ADULT - SUBJECTIVE AND OBJECTIVE BOX
OVERNIGHT EVENTS: KALA    SUBJECTIVE / INTERVAL HPI: Patient seen and examined at bedside. Notes nonproductive cough (triggers HA). Denies remaining ROS: SOB, dyspnea, CP, abdominal pain at rest, n/v/d.    VITAL SIGNS:  Vital Signs Last 24 Hrs  T(C): 36.7 (04 Mar 2022 05:15), Max: 38.9 (03 Mar 2022 09:06)  T(F): 98.1 (04 Mar 2022 05:15), Max: 102.1 (03 Mar 2022 09:06)  HR: 64 (04 Mar 2022 05:15) (64 - 82)  BP: 115/63 (04 Mar 2022 05:15) (101/64 - 136/71)  BP(mean): --  RR: 16 (04 Mar 2022 05:15) (16 - 18)  SpO2: 91% (04 Mar 2022 05:15) (89% - 93%)    PHYSICAL EXAM:    General: NAD  HEENT: NC/AT; PERRL, anicteric sclera; MM dry   Neck: supple w/o palpable nodularity  Cardiovascular: +S1/S2; II/VI systolic murmur best auscultated at apex, RRR  Respiratory: bibasilar crackles; no W/R/R  Gastrointestinal: soft, diffuse tenderness to palpation, ND; no rebound or guarding; +BSx4  Extremities: WWP; no edema, clubbing or cyanosis, LUE AVF  Vascular: 2+ radial, DP/PT pulses B/L, AVF w/ palpable bruit  Neurological: AAOx3; no focal deficits    MEDICATIONS:  MEDICATIONS  (STANDING):  allopurinol 100 milliGRAM(s) Oral every 12 hours  aMIOdarone    Tablet 200 milliGRAM(s) Oral daily  aspirin  chewable 81 milliGRAM(s) Oral daily  epoetin donny-epbx (RETACRIT) Injectable 3000 Unit(s) IV Push once  guaiFENesin Oral Liquid (Sugar-Free) 200 milliGRAM(s) Oral every 6 hours  heparin   Injectable 5000 Unit(s) SubCutaneous every 8 hours  metoprolol succinate ER 50 milliGRAM(s) Oral every 12 hours  piperacillin/tazobactam IVPB.. 2.25 Gram(s) IV Intermittent every 8 hours  sevelamer carbonate 800 milliGRAM(s) Oral three times a day with meals    MEDICATIONS  (PRN):  acetaminophen     Tablet .. 650 milliGRAM(s) Oral every 6 hours PRN Temp greater or equal to 38C (100.4F), Mild Pain (1 - 3)  aluminum hydroxide/magnesium hydroxide/simethicone Suspension 30 milliLiter(s) Oral every 4 hours PRN Dyspepsia  melatonin 3 milliGRAM(s) Oral at bedtime PRN Insomnia  ondansetron Injectable 4 milliGRAM(s) IV Push every 8 hours PRN Nausea and/or Vomiting      ALLERGIES:  Allergies    No Known Allergies    Intolerances        LABS:                        8.7    8.16  )-----------( 239      ( 04 Mar 2022 08:00 )             27.4     03-03    134<L>  |  99  |  32<H>  ----------------------------<  97  4.5   |  21<L>  |  6.98<H>    Ca    9.0      03 Mar 2022 09:25  Phos  3.9     03-04  Mg     2.3     03-04    TPro  6.5  /  Alb  2.4<L>  /  TBili  0.4  /  DBili  x   /  AST  21  /  ALT  9<L>  /  AlkPhos  130<H>  03-03      Urinalysis Basic - ( 03 Mar 2022 22:40 )    Color: Yellow / Appearance: Clear / S.020 / pH: x  Gluc: x / Ketone: NEGATIVE  / Bili: Negative / Urobili: 0.2 E.U./dL   Blood: x / Protein: 100 mg/dL / Nitrite: NEGATIVE   Leuk Esterase: Trace / RBC: Many /HPF / WBC < 5 /HPF   Sq Epi: x / Non Sq Epi: 0-5 /HPF / Bacteria: Present /HPF      CAPILLARY BLOOD GLUCOSE          RADIOLOGY & ADDITIONAL TESTS: Reviewed.

## 2022-03-04 NOTE — DIETITIAN INITIAL EVALUATION ADULT. - ADD RECOMMEND
1. DASH/TLC diet, consider change to renal restricted diet prn, pending Na, k, Phos labs 2. Monitor %PO intake, need for nepro 3. Monitor BMP, BG q6hrs, POCT, renal indices, LFTs, lytes, replete prn 4. Diet edu prn

## 2022-03-04 NOTE — PROGRESS NOTE ADULT - PROBLEM SELECTOR PLAN 3
2-3 total episodes of N/V associated only with PO intake. s/p 10mg Reglan in ED.  - Avoid zofran/reglan i/s/o Qtc prolongation  - Can give tigan PRN for nausea  - Started diet, monitor PO intake    #B/L Pleural Effusions: CT chest non con w/ Lobar pneumonia right lower lobe. Paraseptal and centrilobular emphysema. Unchanged round atelectasis in left lower lobe. Small right pleural effusion, cannot assess for visceral and parietal pleural enhancement to suggest empyema without intravenous contrast. No left pleural effusion. Patient respiring RA, w/o increased WOB, minimally decreased breath sounds in R lung base.   - monitor resp status  - pulm recs appreciated repeat imahing CT chest as o/p 6-8 weeks  - RVP negative   - continue usual HD schedule per renal/primary team   - will need repeat imaging in 6-8 weeks

## 2022-03-04 NOTE — PROGRESS NOTE ADULT - PROBLEM SELECTOR PLAN 5
Hx of CKD5 on HD MWF via LUE AVF (06/21). Follows Dr. Guadalupe. Makes minimal urine at baseline. Missed Monday session because of his illness, last session on friday. Cr 10.7 on admission baseline 5-6. dec BS b/l, No LE edema.  - f/u nephro consulted on regular home HD schedule  - EPO w/ HD  - c/w sevelamer 800mg TID

## 2022-03-04 NOTE — DIETITIAN INITIAL EVALUATION ADULT. - PROBLEM SELECTOR PLAN 1
p/w 3 days of productive cough and malaise, diarrhea, decreased BS L worse than R. CXR showing new RLL infiltrate. WBC 10.8 with neutrophilic predominance (baseline 4-7), Na wnl. s/p CTX1g and azithro 500mg IV in ED    Plan  - Continue with CTX 1g IV qD for 5 days (03/01-03/05)  - Begin doxycycline 100mg BID for atypical coverage X 5 days (03/02-03/06)  - f/u CT chest non con to r/o empyema  - f/u urine strep and legionella  - f/u blood Cx  - Robitussin 200mg Q6hrs  - incentive spirometry  - Goal O2>92% (Hx of mild COPD)

## 2022-03-04 NOTE — PROGRESS NOTE ADULT - SUBJECTIVE AND OBJECTIVE BOX
--------------------------------------------------------------------------------  Chief Complaint: ESRD/Ongoing hemodialysis requirement    24 hour events/subjective:  Seen this AM on HD below dry weight of 64Kg. BP stable. Still complaining of headache when he has his coughing bouts.          PAST HISTORY  --------------------------------------------------------------------------------  No significant changes to PMH, PSH, FHx, SHx, unless otherwise noted    ALLERGIES & MEDICATIONS  --------------------------------------------------------------------------------  Allergies    No Known Allergies    Intolerances      Standing Inpatient Medications  allopurinol 100 milliGRAM(s) Oral every 12 hours  aMIOdarone    Tablet 200 milliGRAM(s) Oral daily  aspirin  chewable 81 milliGRAM(s) Oral daily  epoetin donny-epbx (RETACRIT) Injectable 3000 Unit(s) IV Push once  guaiFENesin Oral Liquid (Sugar-Free) 200 milliGRAM(s) Oral every 6 hours  heparin   Injectable 5000 Unit(s) SubCutaneous every 8 hours  metoprolol succinate ER 50 milliGRAM(s) Oral every 12 hours  piperacillin/tazobactam IVPB.. 2.25 Gram(s) IV Intermittent every 8 hours  sevelamer carbonate 800 milliGRAM(s) Oral three times a day with meals    PRN Inpatient Medications  acetaminophen     Tablet .. 650 milliGRAM(s) Oral every 6 hours PRN  aluminum hydroxide/magnesium hydroxide/simethicone Suspension 30 milliLiter(s) Oral every 4 hours PRN  melatonin 3 milliGRAM(s) Oral at bedtime PRN  ondansetron Injectable 4 milliGRAM(s) IV Push every 8 hours PRN      REVIEW OF SYSTEMS  --------------------------------------------------------------------------------  ROS negative except as per HPI    VITALS/PHYSICAL EXAM  --------------------------------------------------------------------------------  T(C): 36.6 (22 @ 09:36), Max: 37.3 (22 @ 10:35)  HR: 66 (22 @ 09:36) (64 - 82)  BP: 124/75 (22 @ 09:36) (101/64 - 136/71)  RR: 19 (22 @ 09:36) (16 - 19)  SpO2: 91% (22 @ 09:36) (89% - 91%)  Wt(kg): --  Drug Dosing Weight  Height (cm): 180.3 (01 Mar 2022 10:22)  Weight (kg): 70.3 (01 Mar 2022 10:22)  BMI (kg/m2): 21.6 (01 Mar 2022 10:22)  BSA (m2): 1.89 (01 Mar 2022 10:22)        Physical Exam:  GENERAL: Alert, awake, oriented x3 on RA   HEENT: SARAI, EOMI, neck supple, no JVP  CHEST/LUNG: Bilateral clear breath sounds  HEART: Regular rate and rhythm, no murmur, no gallops, no rub   ABDOMEN: Soft, nontender, non distended  EXTREMITIES: no pedal edema, WWP  ACCESS: LUE AVF w/bruit and thrill       LABS/STUDIES  --------------------------------------------------------------------------------              8.7    8.16  >-----------<  239      [22 08:00]              27.4     136  |  94  |  46  ----------------------------<  84      [22 08:00]  3.8   |  25  |  8.91        Ca     8.8     [22 08:00]      Mg     2.3     [22 08:00]      Phos  3.9     [22 08:00]    TPro  6.2  /  Alb  2.9  /  TBili  0.3  /  DBili  x   /  AST  12  /  ALT  <5  /  AlkPhos  102  [22 08:00]          Iron 45, TIBC 191, %sat 24      [21 @ 09:48]  Ferritin 678      [21 09:48]  HbA1c 4.9      [17 @ 09:36]  TSH 1.040      [10-19-21 @ 09:05]    HBsAg Nonreact      [22 07:06]      RADIOLOGY:  --------------------------------------------------------------------------------------  Hemoglobin: 8.7 g/dL (22 08:00)  Phosphorus Level, Serum: 3.9 mg/dL (22 08:00)  Hemoglobin: 9.0 g/dL (22 @ 09:25)  Phosphorus Level, Serum: 3.0 mg/dL (22 @ 09:25)    Albumin, Serum: 2.9 g/dL (22 @ 08:00)  Albumin, Serum: 2.4 g/dL (22 @ 09:25)    epoetin donny-epbx (RETACRIT) Injectable 3000 Unit(s) IV Push once, 22 @ 08:09, Routine, Stop order after: 1 Doses  epoetin donny-epbx (RETACRIT) Injectable 3000 Unit(s) IV Push once, 22 @ 08:26, Routine, Stop order after: 1 Doses  sevelamer carbonate 800 milliGRAM(s) Oral three times a day, 22 @ 15:38, Routine  sevelamer carbonate 800 milliGRAM(s) Oral once, 22 @ 22:00, 22:00, Stop order after: 1 Doses  sevelamer carbonate 800 milliGRAM(s) Oral three times a day with meals, 22 @ 00:00, Routine      Hemodialysis Treatment.:     Schedule: Once, Modality: Hemodialysis, Access: Arteriovenous Fistula    Dialyzer: Optiflux [id number], Time: 180 Min    Blood Flow: 400 mL/Min , Dialysate Flow: 500 mL/Min, Dialysate Temp: 36.5, Tubinmm (Adult)    Target Fluid Removal: 1 Liters    Target Dry Weight: 64 kG    Dialysate Electrolytes (mEq/L): Potassium 2, Calcium 2.5, Sodium 138, Bicarbonate 35 (22 @ 08:26) [Active]    Seen on HD doing well. Came in below dry weight. Blood pressure stable, changed to clearance only.

## 2022-03-04 NOTE — DIETITIAN INITIAL EVALUATION ADULT. - OTHER CALCULATIONS
ABW used to calculate energy needs due to pt's current body weight within % IBW (90%). Needs adjusted for age and wt, CKD on HD, CAP. fluids per team 2/2 CKD

## 2022-03-04 NOTE — PROGRESS NOTE ADULT - PROBLEM SELECTOR PLAN 1
Meeting 3/4 SIRS criteria today - fever, WBC > 12, HR > 90. 3/3 patient spiked fever 102.1F while on CTX, MRSA swab negative broadened to Zosyn.  -With source as below (PNA)  -zosyn 2.25g q8h x 7 day (3/3-3/9)  -f/u BCx 3/1 - NGTD  -f/u BCx 3/2 - NGTD  -f/u sputum culture  -UA w/ RBC

## 2022-03-04 NOTE — DIETITIAN INITIAL EVALUATION ADULT. - PROBLEM SELECTOR PLAN 9
Fluids: None  Electrolytes: Mg>2, K>4  Nutrition:  DASH  Prophylaxis: hep 5000 subq q8  Activity: AAT, OOBTC  GI: none  C: FC  Dispo: [facility]

## 2022-03-04 NOTE — DIETITIAN INITIAL EVALUATION ADULT. - OTHER INFO
66 M PMHx CKD on HD (MWF follows Dr. Guadalupe), T cell lymphoma on chemo every other week ([facility]. [facility] clinic in [place]), Afib s/p watchmen procedure in 10/19/21 at [facility], HFpEF, mod PHtn, L VATS procedure 2 years ago presenting with cough and malaise for 3 days admitted for CAP and HD.     Attempted to see pt x2 this AM but pt was at dialysis, not in room. Limited information obtained at this time. Per wt hx in EMR, pt appears to usually fluctuate between 145 lbs - 155 lbs since Sept 2021. Current adm wt of 155 lbs, appears relatively consistent. Currently on DASH/TLC diet. unable to assess PO intake at this time. Per flowsheet, no reported pain, n/v/d/c. +BM 3/3. Skin: Dandre 20, WNL. RD to follow per protocol. Please see below for nutr recs.

## 2022-03-04 NOTE — PROGRESS NOTE ADULT - PROBLEM SELECTOR PLAN 2
p/w 3 days of productive cough and malaise, diarrhea, decreased BS L worse than R. CXR showing new RLL infiltrate. WBC 10.8 with neutrophilic predominance (baseline 4-7), Na wnl. s/p CTX1g and azithro 500mg IV in ED. D/C'd doxycycline as legionella Ag negative. D/C'd CTX 1g IV qD for 5 days (03/01-03/05) as patient spiking fevers while treated  - Zosyn 2.25 (dose adjusted for CrCl) IV q8h x 7d (nonpseudomonal dosing)  - CT chest non con to r/o empyema: Lobar pneumonia right lower lobe. Small right pleural effusion. Limited assessment for infection without intravenous contrast. Consider fluid sampling. No left pleural effusion  - negative urine strep and legionella  - blood Cx see above  - Robitussin 200mg Q6hrs  - incentive spirometry  - Goal O2>92% (Hx of mild COPD)

## 2022-03-04 NOTE — DIETITIAN INITIAL EVALUATION ADULT. - PROBLEM SELECTOR PLAN 5
Hx of AFib s/p watchmen procedure in 10/21. Follows Dr. Ventura. On amiodarone 200mg Qd and ASA 81. Not on AC due to Hx of bleeding.    Plan  - c/w home meds

## 2022-03-05 ENCOUNTER — TRANSCRIPTION ENCOUNTER (OUTPATIENT)
Age: 67
End: 2022-03-05

## 2022-03-05 VITALS
DIASTOLIC BLOOD PRESSURE: 60 MMHG | HEART RATE: 74 BPM | RESPIRATION RATE: 18 BRPM | SYSTOLIC BLOOD PRESSURE: 96 MMHG | OXYGEN SATURATION: 91 % | TEMPERATURE: 99 F

## 2022-03-05 DIAGNOSIS — J43.9 EMPHYSEMA, UNSPECIFIED: ICD-10-CM

## 2022-03-05 DIAGNOSIS — J90 PLEURAL EFFUSION, NOT ELSEWHERE CLASSIFIED: ICD-10-CM

## 2022-03-05 DIAGNOSIS — Z87.891 PERSONAL HISTORY OF NICOTINE DEPENDENCE: ICD-10-CM

## 2022-03-05 LAB
CK MB CFR SERPL CALC: <1 NG/ML — SIGNIFICANT CHANGE UP (ref 0–6.7)
CK SERPL-CCNC: 52 U/L — SIGNIFICANT CHANGE UP (ref 30–200)
CULTURE RESULTS: SIGNIFICANT CHANGE UP
GRAM STN FLD: SIGNIFICANT CHANGE UP
HCT VFR BLD CALC: 27.6 % — LOW (ref 39–50)
HGB BLD-MCNC: 8.7 G/DL — LOW (ref 13–17)
MCHC RBC-ENTMCNC: 28.9 PG — SIGNIFICANT CHANGE UP (ref 27–34)
MCHC RBC-ENTMCNC: 31.5 GM/DL — LOW (ref 32–36)
MCV RBC AUTO: 91.7 FL — SIGNIFICANT CHANGE UP (ref 80–100)
NRBC # BLD: 0 /100 WBCS — SIGNIFICANT CHANGE UP (ref 0–0)
PLATELET # BLD AUTO: 306 K/UL — SIGNIFICANT CHANGE UP (ref 150–400)
RBC # BLD: 3.01 M/UL — LOW (ref 4.2–5.8)
RBC # FLD: 19.1 % — HIGH (ref 10.3–14.5)
SPECIMEN SOURCE: SIGNIFICANT CHANGE UP
TROPONIN T SERPL-MCNC: 0.06 NG/ML — CRITICAL HIGH (ref 0–0.01)
TROPONIN T SERPL-MCNC: 0.06 NG/ML — CRITICAL HIGH (ref 0–0.01)
WBC # BLD: 7.96 K/UL — SIGNIFICANT CHANGE UP (ref 3.8–10.5)
WBC # FLD AUTO: 7.96 K/UL — SIGNIFICANT CHANGE UP (ref 3.8–10.5)

## 2022-03-05 PROCEDURE — 85025 COMPLETE CBC W/AUTO DIFF WBC: CPT

## 2022-03-05 PROCEDURE — 83690 ASSAY OF LIPASE: CPT

## 2022-03-05 PROCEDURE — 82962 GLUCOSE BLOOD TEST: CPT

## 2022-03-05 PROCEDURE — 90935 HEMODIALYSIS ONE EVALUATION: CPT

## 2022-03-05 PROCEDURE — 84484 ASSAY OF TROPONIN QUANT: CPT

## 2022-03-05 PROCEDURE — 83735 ASSAY OF MAGNESIUM: CPT

## 2022-03-05 PROCEDURE — U0003: CPT

## 2022-03-05 PROCEDURE — 82553 CREATINE MB FRACTION: CPT

## 2022-03-05 PROCEDURE — 82550 ASSAY OF CK (CPK): CPT

## 2022-03-05 PROCEDURE — 99285 EMERGENCY DEPT VISIT HI MDM: CPT

## 2022-03-05 PROCEDURE — 36415 COLL VENOUS BLD VENIPUNCTURE: CPT

## 2022-03-05 PROCEDURE — 83605 ASSAY OF LACTIC ACID: CPT

## 2022-03-05 PROCEDURE — 99233 SBSQ HOSP IP/OBS HIGH 50: CPT

## 2022-03-05 PROCEDURE — 96374 THER/PROPH/DIAG INJ IV PUSH: CPT

## 2022-03-05 PROCEDURE — 87340 HEPATITIS B SURFACE AG IA: CPT

## 2022-03-05 PROCEDURE — 93005 ELECTROCARDIOGRAM TRACING: CPT

## 2022-03-05 PROCEDURE — 87070 CULTURE OTHR SPECIMN AEROBIC: CPT

## 2022-03-05 PROCEDURE — 71045 X-RAY EXAM CHEST 1 VIEW: CPT

## 2022-03-05 PROCEDURE — 80053 COMPREHEN METABOLIC PANEL: CPT

## 2022-03-05 PROCEDURE — 0225U NFCT DS DNA&RNA 21 SARSCOV2: CPT

## 2022-03-05 PROCEDURE — 85027 COMPLETE CBC AUTOMATED: CPT

## 2022-03-05 PROCEDURE — 87641 MR-STAPH DNA AMP PROBE: CPT

## 2022-03-05 PROCEDURE — 84100 ASSAY OF PHOSPHORUS: CPT

## 2022-03-05 PROCEDURE — 87899 AGENT NOS ASSAY W/OPTIC: CPT

## 2022-03-05 PROCEDURE — 87449 NOS EACH ORGANISM AG IA: CPT

## 2022-03-05 PROCEDURE — 81001 URINALYSIS AUTO W/SCOPE: CPT

## 2022-03-05 PROCEDURE — 71250 CT THORAX DX C-: CPT

## 2022-03-05 PROCEDURE — 94640 AIRWAY INHALATION TREATMENT: CPT

## 2022-03-05 PROCEDURE — 87640 STAPH A DNA AMP PROBE: CPT

## 2022-03-05 PROCEDURE — U0005: CPT

## 2022-03-05 PROCEDURE — 99232 SBSQ HOSP IP/OBS MODERATE 35: CPT

## 2022-03-05 PROCEDURE — 87040 BLOOD CULTURE FOR BACTERIA: CPT

## 2022-03-05 PROCEDURE — 96375 TX/PRO/DX INJ NEW DRUG ADDON: CPT

## 2022-03-05 RX ORDER — CEFPODOXIME PROXETIL 100 MG
1 TABLET ORAL
Qty: 5 | Refills: 0
Start: 2022-03-05 | End: 2022-03-09

## 2022-03-05 RX ORDER — ACETYLCYSTEINE 200 MG/ML
4 VIAL (ML) MISCELLANEOUS THREE TIMES A DAY
Refills: 0 | Status: DISCONTINUED | OUTPATIENT
Start: 2022-03-05 | End: 2022-03-05

## 2022-03-05 RX ORDER — IPRATROPIUM/ALBUTEROL SULFATE 18-103MCG
3 AEROSOL WITH ADAPTER (GRAM) INHALATION THREE TIMES A DAY
Refills: 0 | Status: DISCONTINUED | OUTPATIENT
Start: 2022-03-05 | End: 2022-03-05

## 2022-03-05 RX ADMIN — Medication 4 MILLILITER(S): at 01:10

## 2022-03-05 RX ADMIN — PIPERACILLIN AND TAZOBACTAM 200 GRAM(S): 4; .5 INJECTION, POWDER, LYOPHILIZED, FOR SOLUTION INTRAVENOUS at 15:40

## 2022-03-05 RX ADMIN — HEPARIN SODIUM 5000 UNIT(S): 5000 INJECTION INTRAVENOUS; SUBCUTANEOUS at 06:50

## 2022-03-05 RX ADMIN — Medication 50 MILLIGRAM(S): at 06:50

## 2022-03-05 RX ADMIN — SEVELAMER CARBONATE 800 MILLIGRAM(S): 2400 POWDER, FOR SUSPENSION ORAL at 08:48

## 2022-03-05 RX ADMIN — SEVELAMER CARBONATE 800 MILLIGRAM(S): 2400 POWDER, FOR SUSPENSION ORAL at 12:06

## 2022-03-05 RX ADMIN — Medication 100 MILLIGRAM(S): at 06:50

## 2022-03-05 RX ADMIN — Medication 3 MILLILITER(S): at 15:42

## 2022-03-05 RX ADMIN — HEPARIN SODIUM 5000 UNIT(S): 5000 INJECTION INTRAVENOUS; SUBCUTANEOUS at 15:39

## 2022-03-05 RX ADMIN — Medication 3 MILLILITER(S): at 00:45

## 2022-03-05 RX ADMIN — PIPERACILLIN AND TAZOBACTAM 200 GRAM(S): 4; .5 INJECTION, POWDER, LYOPHILIZED, FOR SOLUTION INTRAVENOUS at 06:51

## 2022-03-05 RX ADMIN — Medication 4 MILLILITER(S): at 15:40

## 2022-03-05 RX ADMIN — Medication 3 MILLILITER(S): at 08:48

## 2022-03-05 RX ADMIN — Medication 4 MILLILITER(S): at 08:48

## 2022-03-05 RX ADMIN — AMIODARONE HYDROCHLORIDE 200 MILLIGRAM(S): 400 TABLET ORAL at 06:50

## 2022-03-05 RX ADMIN — Medication 200 MILLIGRAM(S): at 06:50

## 2022-03-05 RX ADMIN — Medication 200 MILLIGRAM(S): at 00:45

## 2022-03-05 RX ADMIN — Medication 200 MILLIGRAM(S): at 12:06

## 2022-03-05 RX ADMIN — Medication 81 MILLIGRAM(S): at 12:06

## 2022-03-05 NOTE — PROGRESS NOTE ADULT - PROBLEM SELECTOR PLAN 10
Hx of gout on allopurinol 100mg Qd. No s/s of acute flare  - c/w home med

## 2022-03-05 NOTE — DISCHARGE NOTE NURSING/CASE MANAGEMENT/SOCIAL WORK - NSDCPEFALRISK_GEN_ALL_CORE
For information on Fall & Injury Prevention, visit: [email or web address] OR  [email or web address] OR  https://www.cdc.gov/steadi/patient.html

## 2022-03-05 NOTE — PROGRESS NOTE ADULT - SUBJECTIVE AND OBJECTIVE BOX
Interval Events: Reviewed  Patient seen and examined at bedside.    Patient is a 66y old  Male who presents with a chief complaint of SOB (04 Mar 2022 15:18)      PAST MEDICAL & SURGICAL HISTORY:  HTN (hypertension)    Atrial fibrillation    CKD (chronic kidney disease)    Lymphoma  t cell; remission since 2020    CHF, chronic  LVEF 65 PASP 59 midl MR, AR on 21    H/O pulmonary hypertension    Gout    BPH (benign prostatic hyperplasia)    COPD, mild    Mitral regurgitation    History of cholecystectomy    Elective surgery  rectal surgery        MEDICATIONS:  Pulmonary:  acetylcysteine 10%  Inhalation 4 milliLiter(s) Inhalation three times a day  albuterol/ipratropium for Nebulization 3 milliLiter(s) Nebulizer three times a day  guaiFENesin Oral Liquid (Sugar-Free) 200 milliGRAM(s) Oral every 6 hours    Antimicrobials:  piperacillin/tazobactam IVPB.. 2.25 Gram(s) IV Intermittent every 8 hours    Anticoagulants:  aspirin  chewable 81 milliGRAM(s) Oral daily  heparin   Injectable 5000 Unit(s) SubCutaneous every 8 hours    Cardiac:  aMIOdarone    Tablet 200 milliGRAM(s) Oral daily  metoprolol succinate ER 50 milliGRAM(s) Oral every 12 hours      Allergies    No Known Allergies    Intolerances        Vital Signs Last 24 Hrs  T(C): 36.4 (05 Mar 2022 09:35), Max: 38.1 (04 Mar 2022 16:23)  T(F): 97.5 (05 Mar 2022 09:35), Max: 100.5 (04 Mar 2022 16:23)  HR: 70 (05 Mar 2022 09:35) (70 - 108)  BP: 104/59 (05 Mar 2022 09:35) (98/61 - 110/61)  BP(mean): --  RR: 18 (05 Mar 2022 09:35) (18 - 19)  SpO2: 91% (05 Mar 2022 10:25) (91% - 98%)    -04 @ 07:01  -  03-05 @ 07:00  --------------------------------------------------------  IN: 100 mL / OUT: 0 mL / NET: 100 mL          Review of Systems:   •	General: negative  •	Skin/Breast: negative  •	Ophthalmologic: negative  •	ENMT: negative  •	Respiratory and Thorax: negative  •	Cardiovascular: negative  •	Gastrointestinal: negative  •	Genitourinary: negative  •	Musculoskeletal: negative  •	Neurological: negative  •	Psychiatric: negative  •	Hematology/Lymphatics: negative  •	Endocrine: negative  •	Allergic/Immunologic: negative    Physical Exam:   • Constitutional:	Well-developed, well nourished  • Eyes:	EOMI; PERRL; no drainage or redness  • ENMT:	No oral lesions; no gross abnormalities  • Neck	No bruits; no thyromegaly or nodules  • Breasts:	not examined  • Back:	No deformity or limitation of movement  • Respiratory:	Breath Sounds equal & clear to percussion & auscultation, no accessory muscle use  • Cardiovascular:	Regular rate & rhythm, normal S1, S2; no murmurs, gallops or rubs; no S3, S4  • Gastrointestinal:	Soft, non-tender, no hepatosplenomegaly, normal bowel sounds  • Genitourinary:	not examined  • Rectal: not examined  • Extremities:	No cyanosis, clubbing or edema  • Vascular:	Equal and normal pulses (carotid, femoral, dorsalis pedis)  • Neurologica:l	not examined  • Skin:	No lesions; no rash  • Lymph Nodes:	No lymphadedenopathy  • Musculoskeletal:	No joint pain, swelling or deformity; no limitation of movement        LABS:      CBC Full  -  ( 05 Mar 2022 00:21 )  WBC Count : 7.96 K/uL  RBC Count : 3.01 M/uL  Hemoglobin : 8.7 g/dL  Hematocrit : 27.6 %  Platelet Count - Automated : 306 K/uL  Mean Cell Volume : 91.7 fl  Mean Cell Hemoglobin : 28.9 pg  Mean Cell Hemoglobin Concentration : 31.5 gm/dL  Auto Neutrophil # : x  Auto Lymphocyte # : x  Auto Monocyte # : x  Auto Eosinophil # : x  Auto Basophil # : x  Auto Neutrophil % : x  Auto Lymphocyte % : x  Auto Monocyte % : x  Auto Eosinophil % : x  Auto Basophil % : x    03-04    136  |  94<L>  |  46<H>  ----------------------------<  84  3.8   |  25  |  8.91<H>    Ca    8.8      04 Mar 2022 08:00  Phos  3.9     03-04  Mg     2.3     03-04    TPro  6.2  /  Alb  2.9<L>  /  TBili  0.3  /  DBili  x   /  AST  12  /  ALT  <5<L>  /  AlkPhos  102  -          Urinalysis Basic - ( 03 Mar 2022 22:40 )    Color: Yellow / Appearance: Clear / S.020 / pH: x  Gluc: x / Ketone: NEGATIVE  / Bili: Negative / Urobili: 0.2 E.U./dL   Blood: x / Protein: 100 mg/dL / Nitrite: NEGATIVE   Leuk Esterase: Trace / RBC: Many /HPF / WBC < 5 /HPF   Sq Epi: x / Non Sq Epi: 0-5 /HPF / Bacteria: Present /HPF                  RADIOLOGY & ADDITIONAL STUDIES (The following images were personally reviewed):  Ramirez:                                     No  Urine output:                       adequate  DVT prophylaxis:                 Yes  Flattus:                                  Yes  Bowel movement:              No   Interval Events: Reviewed  Patient seen and examined at bedside.    Patient is a 66y old  Male who presents with a chief complaint of SOB (04 Mar 2022 15:18)      PAST MEDICAL & SURGICAL HISTORY:  HTN (hypertension)    Atrial fibrillation    CKD (chronic kidney disease)    Lymphoma  t cell; remission since 2020    CHF, chronic  LVEF 65 PASP 59 midl MR, AR on 21    H/O pulmonary hypertension    Gout    BPH (benign prostatic hyperplasia)    COPD, mild    Mitral regurgitation    History of cholecystectomy    Elective surgery  rectal surgery        MEDICATIONS:  Pulmonary:  acetylcysteine 10%  Inhalation 4 milliLiter(s) Inhalation three times a day  albuterol/ipratropium for Nebulization 3 milliLiter(s) Nebulizer three times a day  guaiFENesin Oral Liquid (Sugar-Free) 200 milliGRAM(s) Oral every 6 hours    Antimicrobials:  piperacillin/tazobactam IVPB.. 2.25 Gram(s) IV Intermittent every 8 hours    Anticoagulants:  aspirin  chewable 81 milliGRAM(s) Oral daily  heparin   Injectable 5000 Unit(s) SubCutaneous every 8 hours    Cardiac:  aMIOdarone    Tablet 200 milliGRAM(s) Oral daily  metoprolol succinate ER 50 milliGRAM(s) Oral every 12 hours      Allergies    No Known Allergies    Intolerances        Vital Signs Last 24 Hrs  T(C): 36.4 (05 Mar 2022 09:35), Max: 38.1 (04 Mar 2022 16:23)  T(F): 97.5 (05 Mar 2022 09:35), Max: 100.5 (04 Mar 2022 16:23)  HR: 70 (05 Mar 2022 09:35) (70 - 108)  BP: 104/59 (05 Mar 2022 09:35) (98/61 - 110/61)  BP(mean): --  RR: 18 (05 Mar 2022 09:35) (18 - 19)  SpO2: 91% (05 Mar 2022 10:25) (91% - 98%)    -04 @ 07:01  -  03-05 @ 07:00  --------------------------------------------------------  IN: 100 mL / OUT: 0 mL / NET: 100 mL          Review of Systems:   •	General: negative  •	Skin/Breast: negative  •	Ophthalmologic: negative  •	ENMT: negative  •	Respiratory and Thorax: negative  •	Cardiovascular: negative  •	Gastrointestinal: negative  •	Genitourinary: negative  •	Musculoskeletal: negative  •	Neurological: negative  •	Psychiatric: negative  •	Hematology/Lymphatics: negative  •	Endocrine: negative  •	Allergic/Immunologic: negative    Physical Exam:   • Constitutional:	Well-developed, well nourished  • Eyes:	EOMI; PERRL; no drainage or redness  • ENMT:	No oral lesions; no gross abnormalities  • Neck	No bruits; no thyromegaly or nodules  • Breasts:	not examined  • Back:	No deformity or limitation of movement  • Respiratory:	no accessory muscle use, bibasilar rales  • Cardiovascular:	Regular rate & rhythm, normal S1, S2; no murmurs, gallops or rubs; no S3, S4  • Gastrointestinal:	Soft, non-tender, no hepatosplenomegaly, normal bowel sounds  • Genitourinary:	not examined  • Rectal: not examined  • Extremities:	No cyanosis, clubbing or edema  • Vascular:	Equal and normal pulses (carotid, femoral, dorsalis pedis)  • Neurologica:l	not examined  • Skin:	No lesions; no rash  • Lymph Nodes:	No lymphadedenopathy  • Musculoskeletal:	No joint pain, swelling or deformity; no limitation of movement        LABS:      CBC Full  -  ( 05 Mar 2022 00:21 )  WBC Count : 7.96 K/uL  RBC Count : 3.01 M/uL  Hemoglobin : 8.7 g/dL  Hematocrit : 27.6 %  Platelet Count - Automated : 306 K/uL  Mean Cell Volume : 91.7 fl  Mean Cell Hemoglobin : 28.9 pg  Mean Cell Hemoglobin Concentration : 31.5 gm/dL  Auto Neutrophil # : x  Auto Lymphocyte # : x  Auto Monocyte # : x  Auto Eosinophil # : x  Auto Basophil # : x  Auto Neutrophil % : x  Auto Lymphocyte % : x  Auto Monocyte % : x  Auto Eosinophil % : x  Auto Basophil % : x    03-04    136  |  94<L>  |  46<H>  ----------------------------<  84  3.8   |  25  |  8.91<H>    Ca    8.8      04 Mar 2022 08:00  Phos  3.9     03-04  Mg     2.3     03-04    TPro  6.2  /  Alb  2.9<L>  /  TBili  0.3  /  DBili  x   /  AST  12  /  ALT  <5<L>  /  AlkPhos  102  -          Urinalysis Basic - ( 03 Mar 2022 22:40 )    Color: Yellow / Appearance: Clear / S.020 / pH: x  Gluc: x / Ketone: NEGATIVE  / Bili: Negative / Urobili: 0.2 E.U./dL   Blood: x / Protein: 100 mg/dL / Nitrite: NEGATIVE   Leuk Esterase: Trace / RBC: Many /HPF / WBC < 5 /HPF   Sq Epi: x / Non Sq Epi: 0-5 /HPF / Bacteria: Present /HPF                  RADIOLOGY & ADDITIONAL STUDIES (The following images were personally reviewed):  Ramirez:                                     No  Urine output:                       adequate  DVT prophylaxis:                 Yes  Flattus:                                  Yes  Bowel movement:              No

## 2022-03-05 NOTE — PROGRESS NOTE ADULT - ATTENDING COMMENTS
Patient was seen and examined at bedside on 3/4/2022 at 1130am. Reports cough, although improved. Denies CP. ROS is otherwise negative. Vitals, labwork and pertinent imaging reviewed - normalized WBC, low grade fever this afternoon. Physical exam - NAD, AAO x 4, PERRLA, EOMI, MMM, supple neck, chest - crackles at R base, CV - s1s2, no m/r/g, abd - soft, NTND, + BS, ext - wwp psych - normal affect, skin - no rash    Plan  -C/w CAP coverage, legionella negative  -BCX - NGTD  -Made outpt pulmonologist - Dr. Johnson aware of his patient  -Anticipate d/c tomorrow
ESRD  CAP  no SOB or cough-  seen and evaluated while on dialysis  tolerating the procedure  only .6 above target weight- limiting UF- -120  HA persists- not related to BP or ESRD- will need to review with team
ESRD  CAP- still with a lot of phlegm, some cough now  HAs improving but not resolved  seen and evaluated while on dialysis  tolerating the procedure  c/w present treatment as prescribed
66 M PMHx CKD on HD (MWF follows Dr. Guadalupe), T cell lymphoma on chemo every other week (Dr. [facility]ie [facility]acey [facility] clinic in [place]), Afib s/p watchmen procedure in 10/19/21 at [facility], HFpEF, mod PHtn, L VATS procedure 2 years ago presenting with cough and malaise for 3 days admitted for CAP and HD.     Patient was seen and examined today. pt was sitting on chair, denied any complaints. Chest clear to ascultation.   Vitals: stable.   CT chest: RLL pneumonia.   Continue zosyn 2.25 mg q8h, physical therapy for discharge planning.
Patient was seen and examined at bedside on 3/3/2022 at 1130am. Reports cough. Denies CP. ROS is otherwise negative. Vitals, labwork and pertinent imaging reviewed - uptrending leukocytosis. Physical exam - NAD, AAO x 4, PERRLA, EOMI, MMM, supple neck, chest - crackles at R base, CV - s1s2, no m/r/g, abd - soft, NTND, + BS, ext - wwp psych - normal affect, skin - no rash    Plan  -C/w CAP coverage, legionella negative  -Check blood culture, sputum culture
Patient was seen and examined at bedside on 3/2/2022 at 1130am. Reports cough. Denies CP. ROS is otherwise negative. Vitals, labwork and pertinent imaging reviewed - uptrending leukocytosis. Physical exam - NAD, AAO x 4, PERRLA, EOMI, MMM, supple neck, chest - crackles at R base, CV - s1s2, no m/r/g, abd - soft, NTND, + BS, ext - wwp psych - normal affect, skin - no rash    Plan  -C/w CAP coverage, legionella negative  -Check blood culture, sputum culture

## 2022-03-05 NOTE — PROGRESS NOTE ADULT - ASSESSMENT
66M with pmhx of ESRD (MWF), lymphoma (last chemo 3 weeks ago) who presents to the hospital in the setting of headache, cough, nausea, vomiting and diarrhea found to have RLL pneumonia     Assessment/Plan:     #ESRD on HD MWF @[address] [place] [address] Dialysis Center   usual Rx 3h, EDW 64Kg  -Hd per schedule today  -electrolytes at goal Sodium of 136 and K of 3.8  -euvolemic, UF w/HD     #HTN   BP at goal   -Continue to monitor    #access   LUE AVF functional w/ palpable thrill and bruit    #anemia  Hb at goal   -Epo w/ HD  transfusion as per primary team     #renal bone disease   Ca at goal  Phos  at goal    Thank you for the opportunity to participate in the care of your patient. The nephrology service remains available to assist with any questions or concerns. Please feel free to reach us by paging the on-call nephrology fellow for urgent issues or as below.     Janna Deleon D.O.  PGY-4, Nephrology Fellow   P: [phone]       
66M with pmhx of ESRD (MWF), lymphoma (last chemo 3 weeks ago) who presents to the hospital in the setting of headache, cough, nausea, vomiting and diarrhea found to have RLL pneumonia     Assessment/Plan:     #ESRD on HD MWF @[address] [place] [address] Dialysis Center   usual Rx 3h, EDW 64Kg  -Hd per schedule today  -electrolytes at goal Sodium of 137 and K of 3.9  -euvolemic, UF w/HD     #HTN   BP at goal   -Continue to monitor    #access   LUE AVF functional w/ palpable thrill and bruit    #anemia  Hb at goal   -Epo w/ HD  transfusion as per primary team     #renal bone disease   Ca at goal  Phos  at goal    Thank you for the opportunity to participate in the care of your patient. The nephrology service remains available to assist with any questions or concerns. Please feel free to reach us by paging the on-call nephrology fellow for urgent issues or as below.     Janna Deleon D.O.  PGY-4, Nephrology Fellow   P: [phone]     
65 y/o M with PMHx of ESRD on HD (M/W/F follows Dr. Guadalupe), T cell lymphoma currently on treatment every other week (Dr. Judie Lacey [facility] clinic in [place]), Afib s/p Watchman procedure in 10/21 at [facility], HFpEF, mod PHTN, recurrent L sided pleural effusion s/p VATS with pleural biopsy presenting with cough, subjective fevers with imaging c/f RLL pneumonia and b/l pleural effusions.   Pulmonary was consulted for evaluation of R pleural effusion. CT chest with RLL lobar consolidation and air bronchograms with small b/l pleural effusions c/f lobar PNA in the setting of subjective fevers, cough, and leukocytosis.
66 M PMHx CKD on HD (MWF follows Dr. Guadalupe), T cell lymphoma on chemo every other week (Dr. Judie Lacey Ca clinic in [place]), Afib s/p watchmen procedure in 10/19/21 at [facility], HFpEF, mod PHtn, L VATS procedure 2 years ago presenting with cough and malaise for 3 days admitted for CAP and HD. 
66 M PMHx CKD on HD (MWF follows Dr. Guadalupe), T cell lymphoma on chemo every other week (Dr. Judie Lacey [facility] clinic in [place]), Afib s/p watchmen procedure in 10/19/21 at [facility], HFpEF, mod PHtn, L VATS procedure 2 years ago presenting with cough and malaise for 3 days admitted for CAP and HD. 
66 M PMHx CKD on HD (MWF follows Dr. Guadalupe), T cell lymphoma on chemo every other week (Dr. Judie Lacey Ca clinic in [place]), Afib s/p watchmen procedure in 10/19/21 at [facility], HFpEF, mod PHtn, L VATS procedure 2 years ago presenting with cough and malaise for 3 days admitted for CAP and HD. 
66 M PMHx CKD on HD (MWF follows Dr. Guadalupe), T cell lymphoma on chemo every other week (Dr. Judie Lacey Ca clinic in [place]), Afib s/p watchmen procedure in 10/19/21 at [facility], HFpEF, mod PHtn, L VATS procedure 2 years ago presenting with cough and malaise for 3 days admitted for CAP and HD.

## 2022-03-05 NOTE — DISCHARGE NOTE NURSING/CASE MANAGEMENT/SOCIAL WORK - NSDCFUADDAPPT_GEN_ALL_CORE_FT
Please bring your Insurance card, Photo ID and Discharge paperwork to the following appointment:    (1) Please follow up with your Primary Care Provider, Dr. Edwardo Baugh at [address] [place] [address] on 03/17/2022 at 11:00am.    Appointment was scheduled by Ms. LESVIA Magana, Referral Coordinator.

## 2022-03-05 NOTE — DISCHARGE NOTE NURSING/CASE MANAGEMENT/SOCIAL WORK - PATIENT PORTAL LINK FT
You can access the FollowMyHealth Patient Portal offered by [facility] by registering at the following website: [email or web address]. By joining [employer]’s FollowMyHealth portal, you will also be able to view your health information using other applications (apps) compatible with our system.

## 2022-03-05 NOTE — PROGRESS NOTE ADULT - PROVIDER SPECIALTY LIST ADULT
Nephrology
Internal Medicine
Nephrology
Pulmonology
Internal Medicine

## 2022-03-05 NOTE — PROGRESS NOTE ADULT - SUBJECTIVE AND OBJECTIVE BOX
OVERNIGHT EVENTS:    SUBJECTIVE / INTERVAL HPI: Patient seen and examined at bedside.  Notes improvement in nonproductive cough (that triggers HA). Denies ROS: SOB, dyspnea, CP, abdominal pain at rest, n/v/d.        VITAL SIGNS:  Vital Signs Last 24 Hrs  T(C): 36.7 (05 Mar 2022 05:51), Max: 38.1 (04 Mar 2022 16:23)  T(F): 98.1 (05 Mar 2022 05:51), Max: 100.5 (04 Mar 2022 16:23)  HR: 75 (05 Mar 2022 05:51) (65 - 108)  BP: 110/61 (05 Mar 2022 05:51) (98/61 - 124/75)  BP(mean): --  RR: 18 (05 Mar 2022 05:51) (18 - 19)  SpO2: 93% (05 Mar 2022 05:51) (91% - 95%)    PHYSICAL EXAM:    General: NAD  HEENT: NC/AT; PERRL, anicteric sclera; MM dry   Neck: supple w/o palpable nodularity  Cardiovascular: +S1/S2; II/VI systolic murmur best auscultated at apex, RRR  Respiratory: bibasilar crackles; no W/R/R  Gastrointestinal: soft, diffuse tenderness to palpation, ND; no rebound or guarding; +BSx4  Extremities: WWP; no edema, clubbing or cyanosis, LUE AVF  Vascular: 2+ radial, DP/PT pulses B/L, AVF w/ palpable bruit  Neurological: AAOx3; no focal deficits    MEDICATIONS:  MEDICATIONS  (STANDING):  acetylcysteine 10%  Inhalation 4 milliLiter(s) Inhalation three times a day  albuterol/ipratropium for Nebulization 3 milliLiter(s) Nebulizer three times a day  allopurinol 100 milliGRAM(s) Oral every 12 hours  aMIOdarone    Tablet 200 milliGRAM(s) Oral daily  aspirin  chewable 81 milliGRAM(s) Oral daily  guaiFENesin Oral Liquid (Sugar-Free) 200 milliGRAM(s) Oral every 6 hours  heparin   Injectable 5000 Unit(s) SubCutaneous every 8 hours  metoprolol succinate ER 50 milliGRAM(s) Oral every 12 hours  piperacillin/tazobactam IVPB.. 2.25 Gram(s) IV Intermittent every 8 hours  sevelamer carbonate 800 milliGRAM(s) Oral three times a day with meals    MEDICATIONS  (PRN):  acetaminophen     Tablet .. 650 milliGRAM(s) Oral every 6 hours PRN Temp greater or equal to 38C (100.4F), Mild Pain (1 - 3)      ALLERGIES:  Allergies    No Known Allergies    Intolerances        LABS:                        8.7    7.96  )-----------( 306      ( 05 Mar 2022 00:21 )             27.6     03-04    136  |  94<L>  |  46<H>  ----------------------------<  84  3.8   |  25  |  8.91<H>    Ca    8.8      04 Mar 2022 08:00  Phos  3.9     03-04  Mg     2.3     03-04    TPro  6.2  /  Alb  2.9<L>  /  TBili  0.3  /  DBili  x   /  AST  12  /  ALT  <5<L>  /  AlkPhos  102  03-04      Urinalysis Basic - ( 03 Mar 2022 22:40 )    Color: Yellow / Appearance: Clear / S.020 / pH: x  Gluc: x / Ketone: NEGATIVE  / Bili: Negative / Urobili: 0.2 E.U./dL   Blood: x / Protein: 100 mg/dL / Nitrite: NEGATIVE   Leuk Esterase: Trace / RBC: Many /HPF / WBC < 5 /HPF   Sq Epi: x / Non Sq Epi: 0-5 /HPF / Bacteria: Present /HPF      CAPILLARY BLOOD GLUCOSE          RADIOLOGY & ADDITIONAL TESTS: Reviewed.

## 2022-03-05 NOTE — PROGRESS NOTE ADULT - PROBLEM SELECTOR PLAN 2
small pleural effusion on POCUS  no indication for thoracentesis  recs repeat CT chest in 6-8weeks outpt  f/u with Dr. Johnson in outpt.

## 2022-03-05 NOTE — PROGRESS NOTE ADULT - PROBLEM SELECTOR PLAN 7
No need for treatment as patient w/ Watchmen device.
No need for treatment as patient w/ Watchmen device.
On metoprolol succ 50mg bid, amlodipine 5mg Qd. BP on admission 105/65. Did not take any meds today  - resumed home toprol  - holding amlodipine, resume as tolerated

## 2022-03-05 NOTE — PROGRESS NOTE ADULT - PROBLEM SELECTOR PLAN 11
Fluids: None  Electrolytes: Mg>2, K>4  Nutrition:  DASH  Prophylaxis: hep 5000 subq q8  Activity: AAT, OOBTC  GI: none  C: FC  Dispo: [facility]
Fluids: None  Electrolytes: Mg>2, K>4  Nutrition:  DASH  Prophylaxis: hep 5000 subq q8  Activity: AAT, OOBTC  GI: none  C: FC  Dispo: [facility]
Fluids: None  Electrolytes: Mg>2, K>4  Nutrition:  DASH  Prophylaxis: hep 5000 subq q8  Activity: AAT, OOBTC  GI: none  C: FC  Dispo: [facility]
Fluids: None  Electrolytes: Mg>2, K>4  Nutrition:  DASH  Prophylaxis: hep 5000 subq q8  Activity: AAT, OOBTC  GI: none  C: FC  Dispo: [facility]

## 2022-03-07 PROBLEM — I50.9 HEART FAILURE, UNSPECIFIED: Chronic | Status: ACTIVE | Noted: 2019-11-12

## 2022-03-09 DIAGNOSIS — C84.90 MATURE T/NK-CELL LYMPHOMAS, UNSPECIFIED, UNSPECIFIED SITE: ICD-10-CM

## 2022-03-09 DIAGNOSIS — I50.32 CHRONIC DIASTOLIC (CONGESTIVE) HEART FAILURE: ICD-10-CM

## 2022-03-09 DIAGNOSIS — I25.10 ATHEROSCLEROTIC HEART DISEASE OF NATIVE CORONARY ARTERY WITHOUT ANGINA PECTORIS: ICD-10-CM

## 2022-03-09 DIAGNOSIS — I27.20 PULMONARY HYPERTENSION, UNSPECIFIED: ICD-10-CM

## 2022-03-09 DIAGNOSIS — N40.0 BENIGN PROSTATIC HYPERPLASIA WITHOUT LOWER URINARY TRACT SYMPTOMS: ICD-10-CM

## 2022-03-09 DIAGNOSIS — Z79.82 LONG TERM (CURRENT) USE OF ASPIRIN: ICD-10-CM

## 2022-03-09 DIAGNOSIS — Z87.891 PERSONAL HISTORY OF NICOTINE DEPENDENCE: ICD-10-CM

## 2022-03-09 DIAGNOSIS — J15.6 PNEUMONIA DUE TO OTHER GRAM-NEGATIVE BACTERIA: ICD-10-CM

## 2022-03-09 DIAGNOSIS — A41.9 SEPSIS, UNSPECIFIED ORGANISM: ICD-10-CM

## 2022-03-09 DIAGNOSIS — I48.20 CHRONIC ATRIAL FIBRILLATION, UNSPECIFIED: ICD-10-CM

## 2022-03-09 DIAGNOSIS — Z79.02 LONG TERM (CURRENT) USE OF ANTITHROMBOTICS/ANTIPLATELETS: ICD-10-CM

## 2022-03-09 DIAGNOSIS — D63.1 ANEMIA IN CHRONIC KIDNEY DISEASE: ICD-10-CM

## 2022-03-09 DIAGNOSIS — Z20.822 CONTACT WITH AND (SUSPECTED) EXPOSURE TO COVID-19: ICD-10-CM

## 2022-03-09 DIAGNOSIS — N18.6 END STAGE RENAL DISEASE: ICD-10-CM

## 2022-03-09 DIAGNOSIS — Z95.818 PRESENCE OF OTHER CARDIAC IMPLANTS AND GRAFTS: ICD-10-CM

## 2022-03-09 DIAGNOSIS — M10.9 GOUT, UNSPECIFIED: ICD-10-CM

## 2022-03-09 DIAGNOSIS — J18.9 PNEUMONIA, UNSPECIFIED ORGANISM: ICD-10-CM

## 2022-03-09 DIAGNOSIS — I13.2 HYPERTENSIVE HEART AND CHRONIC KIDNEY DISEASE WITH HEART FAILURE AND WITH STAGE 5 CHRONIC KIDNEY DISEASE, OR END STAGE RENAL DISEASE: ICD-10-CM

## 2022-03-09 DIAGNOSIS — Z99.2 DEPENDENCE ON RENAL DIALYSIS: ICD-10-CM

## 2022-03-09 DIAGNOSIS — J43.2 CENTRILOBULAR EMPHYSEMA: ICD-10-CM

## 2022-03-09 DIAGNOSIS — R11.2 NAUSEA WITH VOMITING, UNSPECIFIED: ICD-10-CM

## 2022-03-15 ENCOUNTER — APPOINTMENT (OUTPATIENT)
Dept: HEART AND VASCULAR | Facility: CLINIC | Age: 67
End: 2022-03-15
Payer: MEDICARE

## 2022-03-15 ENCOUNTER — APPOINTMENT (OUTPATIENT)
Dept: HEART AND VASCULAR | Facility: CLINIC | Age: 67
End: 2022-03-15

## 2022-03-15 VITALS
SYSTOLIC BLOOD PRESSURE: 100 MMHG | TEMPERATURE: 97.3 F | WEIGHT: 136 LBS | DIASTOLIC BLOOD PRESSURE: 58 MMHG | OXYGEN SATURATION: 97 % | BODY MASS INDEX: 19.04 KG/M2 | HEART RATE: 64 BPM | HEIGHT: 71 IN

## 2022-03-15 PROCEDURE — 99215 OFFICE O/P EST HI 40 MIN: CPT

## 2022-03-15 NOTE — ASSESSMENT
[FreeTextEntry1] :  65 YO M with a history of HFpEF, ESRD on HD via LUE AVF, pAF s/p Watchman 10/2021, T-cell lymphoma on chemotherapy, HTN, and L chest wall hematoma s/p evacuation 12/2020 complicated by prolonged hospitalization presenting to HF clinic for further management. \par \par Today he reports NYHA III symptoms and appears euvolemic on exam. I suspect his dyspnea and weakness are related to chemotherapy and cancer with possible contribution of pulmonary disease. Given normal LVEF and no signs of hypervolemia he is optimized from a cardiac perspective. He would benefit from physical therapy.

## 2022-03-15 NOTE — CARDIOLOGY SUMMARY
[de-identified] : \par EKG 1/2022: SB, low voltage in limb leads, lateral ST depressions \par  [de-identified] : \par TTE 12/2021: normal biventricular function, dilated LA, moderate-severe TR, estimated PASP 59 mmHg, mild MR, mobile plaque in aortic root,

## 2022-03-15 NOTE — HISTORY OF PRESENT ILLNESS
[FreeTextEntry1] : Cardiologist: Dr. Ventura\par Nephrologist: Dr. Guadalupe\par \par Mr. Gamez is a 67 YO M with a history of HFpEF, ESRD on HD via LUE AVF, pAF s/p Watchman 10/2021, T-cell lymphoma on chemotherapy, HTN, and L chest wall hematoma s/p evacuation 12/2020 complicated by prolonged hospitalization presenting to HF clinic for further management. \par \par He was recently admitted with pneumonia and volume overload in setting of missed HD.\par \par He presents today for followup. He has been feeling overall aside from weakness. He occasionally notes orthopnea but this does not occur if he lays on his side. He has cut down on his physical activity due to weakness. His primary complaint is weakness which occurs with minimal ambulation and also associated with dyspnea. He has been compliant with TIW dialysis as well as chemotherapy. He has noted poor appetite which is attributed in part to not putting on dentures which limits his ability to eat. He denies any lower extremity edema. Notes occasional dizziness or lightheadedness. \par \par \par \par \par

## 2022-03-15 NOTE — DISCUSSION/SUMMARY
[FreeTextEntry1] : # HFpEF\par - continue routine volume removal at HD, euvolemic at this time \par - due to ESRD he is not a candidate for SGLT2i or MRA\par \par # Pulmonary hypertension\par - estimated PASP of 59 mmHg on TTE, suspect WHO II/III related to HF and lung disease, no clear indication to proceed with RHC as low suspicion for PAH responsive to pulmonary vasodilators\par \par # pAF\par - s/p Watchman, follows with Dr. Herndon \par \par Return to clinic to HF clinic as needed, continue routine care with Dr. Ventura

## 2022-03-15 NOTE — PHYSICAL EXAM
[Normal Venous Pressure] : normal venous pressure [Normal S1, S2] : normal S1, S2 [Soft] : abdomen soft [Non Tender] : non-tender [Moves all extremities] : moves all extremities [No Focal Deficits] : no focal deficits [Alert and Oriented] : alert and oriented [de-identified] : Frail, no distress, temporal wasting  [de-identified] : 2/6 HSM at apex  [de-identified] : Decreased breath sounds at bases  [de-identified] : Warm, no edema

## 2022-04-05 ENCOUNTER — APPOINTMENT (OUTPATIENT)
Dept: PULMONOLOGY | Facility: CLINIC | Age: 67
End: 2022-04-05
Payer: MEDICARE

## 2022-04-05 VITALS
TEMPERATURE: 97.1 F | SYSTOLIC BLOOD PRESSURE: 108 MMHG | WEIGHT: 138 LBS | DIASTOLIC BLOOD PRESSURE: 69 MMHG | HEART RATE: 64 BPM | OXYGEN SATURATION: 100 % | HEIGHT: 71 IN | BODY MASS INDEX: 19.32 KG/M2

## 2022-04-05 PROCEDURE — 99213 OFFICE O/P EST LOW 20 MIN: CPT | Mod: 25

## 2022-04-05 PROCEDURE — 71046 X-RAY EXAM CHEST 2 VIEWS: CPT

## 2022-04-05 NOTE — PROCEDURE
[FreeTextEntry1] : Strain AP and lateral\par \par Chest wall no abnormality\par \par \par Pleural Space bilateral small pleural effusions right more than left and has not changed compared to the previous x-ray from March 1\par \par Cardiac silhouette normal\par \par Parenchymal abnormality\par \par Resolution of the right lower lobe infiltrate\par \par Impression stable bilateral pleural effusion right more than left and resolution of the right lower lobe infiltrate

## 2022-04-05 NOTE — HISTORY OF PRESENT ILLNESS
[Former] : former [Never] : never [TextBox_4] : Is doing much better than before.  His start to eat appetite is coming back he is on Ensure 3 times a day and also on protein shake is eating small meals frequently.  He is not short of breath he is not complaining of fever chills night sweats coughing wheezing coughing blood sugar

## 2022-04-12 ENCOUNTER — TRANSCRIPTION ENCOUNTER (OUTPATIENT)
Age: 67
End: 2022-04-12

## 2022-05-03 ENCOUNTER — APPOINTMENT (OUTPATIENT)
Dept: PULMONOLOGY | Facility: CLINIC | Age: 67
End: 2022-05-03

## 2022-05-10 ENCOUNTER — APPOINTMENT (OUTPATIENT)
Dept: HEART AND VASCULAR | Facility: CLINIC | Age: 67
End: 2022-05-10
Payer: MEDICARE

## 2022-05-10 VITALS
OXYGEN SATURATION: 94 % | HEIGHT: 71 IN | BODY MASS INDEX: 20.58 KG/M2 | TEMPERATURE: 97.7 F | SYSTOLIC BLOOD PRESSURE: 93 MMHG | WEIGHT: 146.98 LBS | HEART RATE: 72 BPM | DIASTOLIC BLOOD PRESSURE: 61 MMHG

## 2022-05-10 PROCEDURE — 99214 OFFICE O/P EST MOD 30 MIN: CPT

## 2022-05-10 NOTE — HISTORY OF PRESENT ILLNESS
[FreeTextEntry1] : 67 y/o M with HFpEF initially met but HF team on recent admission at [facility] and now presents for follow up. Other PMH is notable for HTN, COPD, CKD (stage 5 CKD, has LUE AVF, not on HD yet), T-cell lymphoma (diagnosed 19 years ago, on chemo romidepsin every Wednesday (not since Nov)), AF (on Eliquis), systolic CHF (EF 40-45%), EF was 60-65 in 2019,severe pHTN, severe MR, who recently underwent a left chest wall hematoma evacuation on 12/18/2020 secondary to loculated effusion. Hospital course at that time was complicated by AF with RVR and thoracic surgery consulted at that time for surgical intervention of trapped lung. He followed up as an outpatient after discharge and deemed a good surgical candidate for lung decortication. On 1/18/21 patient presented to [facility] for pre-operative optimization with heparin gtt prior to OR. Upon admission pt noted to have erythema and swelling to his L knee, CT scan demonstrated cellulitis, operation was deferred. Ortho, Gen Surg and ID were consulted, pt with no drainable collection and was medically managed with resolution of infection. Nephrology was consulted for assistance in managing CKD stage 4. On 1/22 he underwent an uncomplicated L VATS, RA decortication with blow hole placement for subcutaneous emphysema. Intraop findings significant for loculated hemothorax. He received 3UPRBC in the OR and post operatively was brought to the CTICU stable and intubated with 3 chest tubes in place. He was extubated POD1 and required primacor for pulmonary HTN and renal perfusion. On POD 2 a right sided pigtail was placed for pleural effusion which drained 800cc and ultimately removed on POD 4. Cardiology was consulted for management of pulmonary hypertension and CHF, recommended IV diuresis. He required alternating BiPAP and HFNC which was weaned to NC. Primacor was weaned off on POD 4 and he was transferred to the stepdown unit. Heparin gtt was started for AC but was held for bleeding around the CT site. A CT chest noncon was preformed showing post surgical changes, no concern for hemothorax and heparin gtt resumed. On POD 5 first CT was removed. Bronch with no abnormal findings, Second chest tube subsequently removed and added nifedipine Xl 30mg for BP control on POD 6. POD 7 third chest tube removed, post chest tube removal xray showed no pneumothorax. \par \par \par 12/28/20  In [facility] 12/17- 12/23 with a chest wall hematoma, Rapid AF.  EF good but severe MR and TR.  In NSR by exam and EKG .  I was later informed he might need a decortification of his pleura(left). \par 3/1/21 Hospitalized for VATS 1/2021 as above. BNP 15,500, Cr 5.5,  Echo was done 1/2021, EF back to NL and MR mild to moderate, mod to severe TR and PAP 67.   Got the Covid Vaccine.  Here he is in NSR and generally doing well considering how complex he is.\par 4/15/21  Off Eliquis 3/17/21 due to a 2nd bleed on the buttocks.  Amio was stopped March 8, 2021\par 8/31/21 On ASA 81 and Plavix 75, to consider a Watchman, on HD.  Edema resolved\par 11/2/21  S/P wATCHMAN DEVICE oCT 20TH, 2021.  hE HAD SEEN OTHER mdS TODAY AND WALKED OUT AT 5 PM\par 1/4/22 echo done Dec 2021, EF NL, MR mild, PAP 59, aortic plaque noted.\par 5/10/22 Doing well, recovering from PNA\par EKG: NSR with 1st degree AVB. ST-Tw abnormalities. 12/28/20\par EKG: NSR, IVCD, PRWP, STTs 3/1/21, 1/4/22\par \par

## 2022-05-10 NOTE — PRE-OP CHECKLIST - BP NONINVASIVE DIASTOLIC (MM HG)
58 Information: Selecting Yes will display possible errors in your note based on the variables you have selected. This validation is only offered as a suggestion for you. PLEASE NOTE THAT THE VALIDATION TEXT WILL BE REMOVED WHEN YOU FINALIZE YOUR NOTE. IF YOU WANT TO FAX A PRELIMINARY NOTE YOU WILL NEED TO TOGGLE THIS TO 'NO' IF YOU DO NOT WANT IT IN YOUR FAXED NOTE.

## 2022-05-10 NOTE — ASSESSMENT
[FreeTextEntry1] : Systolic CHF - EF recovered, normal on June 2019 echo.  Secondary to chemo?  Then 40-45% on  admission in Dec 2020, then improved to 60-65 on last admission in January 2021,  NL EF Dec 2021. \par \par MR- Was severe, now mild to moderate and EF improved.  Followed by Dr Cui.  MR is mild on echo Dec 2021.\par \par Afib - in NSR today, no longer on Eliquis due to 2 bleeds and Amio.  ZIO patch pending.  I favor ASA and resuming Amio. Referred to Pulm to be monitored for Amio Pulm toxicity.  The patient's [id number] score = 2.  He is S/P a Watchman device.  Off Eliquis. Still on Amio, told to request TFTs next time blood is drawn and secure a copy for me.\par \par HTN- BP too low, will reduce Norvasc from 10 to 5 mg on 1/4/22. Will reduce to 2.5 mg on dialysis days.\par \par HLD- not on statin. I favor a statin due to aortic atherosclerosis.\par \par T Cell Lymphoma - remains on chemo, Dr Dimas\par \par CKD- Dr Denton, now on HD since June 2021\par \par PreOp- Pt s/p decortication.  Stopped Eliquis 3 days prior to surgery with surgery on the 4th day. See beginning of this note for entire hospital stay.\par \par  \par \par \par \par

## 2022-05-10 NOTE — REASON FOR VISIT
[Follow-Up - Clinic] : a clinic follow-up of [Cardiomyopathy] : cardiomyopathy [FreeTextEntry1] : Onc- Dr Vel Dimas  [phone]\par  Renal- Dr Denton\par Struc Hrt- Dr Cui\par HF- [facility]\par EP- Dr Herndon\par Vasc- Dr Cisneros\par Pulm- Dr Johnson

## 2022-05-16 NOTE — PROGRESS NOTE ADULT - SUBJECTIVE AND OBJECTIVE BOX
extubated this am   renally stable w uop 25-35 ml/hr  volume status on wet site, IVF discontinued        Meds:  acetaminophen   Tablet .. 650 every 6 hours PRN  albumin human 25% IVPB 50 every 1 hour  allopurinol 200 two times a day  aMIOdarone    Tablet 200 daily  desmopressin IVPB 21 once  dextrose 50% Injectable 50 every 15 minutes  insulin regular Infusion 1 <Continuous>  lactated ringers. 1000 <Continuous>  milrinone Infusion 0.25 <Continuous>  norepinephrine Infusion 0.05 <Continuous>  oxycodone    5 mG/acetaminophen 325 mG 1 every 6 hours PRN  pantoprazole    Tablet 40 before breakfast  piperacillin/tazobactam IVPB.. 2.25 every 8 hours  polyethylene glycol 3350 17 daily  sodium chloride 0.9%. 1000 <Continuous>      T(C): , Max: 36.8 (01-23-21 @ 14:00)  T(F): , Max: 98.2 (01-23-21 @ 14:00)  HR: 66 (01-23-21 @ 17:00)  BP: 103/59 (01-22-21 @ 23:00)  BP(mean): 77 (01-22-21 @ 23:00)  RR: 18 (01-23-21 @ 13:00)  SpO2: 98% (01-23-21 @ 17:00)  Wt(kg): --    01-22 @ 07:01 - 01-23 @ 07:00  --------------------------------------------------------  IN: 1222.8 mL / OUT: 874 mL / NET: 348.8 mL    01-23 @ 07:01 - 01-23 @ 19:22  --------------------------------------------------------  IN: 310 mL / OUT: 936 mL / NET: -626 mL      Review of Systems:  RESPIRATORY: No shortness of breath  CARDIOVASCULAR: No chest pain  MUSCULOSKELETAL: LLE swelling    PHYSICAL EXAM:  GENERAL: Alert, NAD   CHEST/LUNG: Distant breath sounds, no rales/rhonchi  HEART: Regular rate and rhythm, no murmur  ABDOMEN: Soft, nontender, non distended  EXTREMITIES: LE edema  ACCESS: LUE AVF w/bruit and thrill       LABS:                        9.8    10.41 )-----------( 180      ( 23 Jan 2021 11:43 )             32.3     01-23    145  |  107  |  36<H>  ----------------------------<  115<H>  4.2   |  22  |  5.06<H>    Ca    9.3      23 Jan 2021 11:43  Phos  7.5     01-23  Mg     2.2     01-23    TPro  6.6  /  Alb  3.5  /  TBili  0.7  /  DBili  x   /  AST  10  /  ALT  <5<L>  /  AlkPhos  57  01-23      PT/INR - ( 23 Jan 2021 02:28 )   PT: 14.6 sec;   INR: 1.23          PTT - ( 23 Jan 2021 02:28 )  PTT:36.8 sec          RADIOLOGY & ADDITIONAL STUDIES:           Spironolactone Pregnancy And Lactation Text: This medication can cause feminization of the male fetus and should be avoided during pregnancy. The active metabolite is also found in breast milk.

## 2022-06-08 NOTE — PHYSICAL THERAPY INITIAL EVALUATION ADULT - PHYSICAL ASSIST/NONPHYSICAL ASSIST: SIT/STAND, REHAB EVAL
Post-Care Instructions: I reviewed with the patient in detail post-care instructions. Patient is to wear sunprotection, and avoid picking at any of the treated lesions. Pt may apply Vaseline to crusted or scabbing areas. verbal cues/nonverbal cues (demo/gestures)/1 person assist Render Note In Bullet Format When Appropriate: No Detail Level: Detailed Show Aperture Variable?: Yes Duration Of Freeze Thaw-Cycle (Seconds): 20 Number Of Freeze-Thaw Cycles: 1 freeze-thaw cycle Consent: The patient's consent was obtained including but not limited to risks of crusting, scabbing, blistering, scarring, darker or lighter pigmentary change, recurrence, incomplete removal and infection.

## 2022-06-13 NOTE — HISTORY OF PRESENT ILLNESS
[facility]    See media for referral to anticoagulation.  Apparently pt failed Eliquis therapy and now on warfarin. Pt will obtain INR tomorrow.     Michael Pitt, PharmD   
[FreeTextEntry1] : 65 yo man with CKD 4, hypertension, hyperuricemia, here for f/u evaluation.\par continues on weekly chemotherapy. (T-cell lymphoma)\par Energy and appetite okay\par No flank pain, dysuria, hematuria or frothy urine\par No recurrent gout attacks -- remains on high dose allopurinol and colchicine\par No SOB, CP\par \par \par \par PMH: w/ PMHx of gout, atrial fibrillation on coumadin, sCHF w/ EF of 40-45%, HTN, BPH, CKD\par  T cell lymphoma  since early 2000's

## 2022-06-15 NOTE — ED PROVIDER NOTE - CARE PLAN
Principal Discharge DX:	Atrial fibrillation Principal Discharge DX:	Atrial fibrillation with RVR  Secondary Diagnosis:	MAIRA (acute kidney injury)  Secondary Diagnosis:	Hypotension Azithromycin Pregnancy And Lactation Text: This medication is considered safe during pregnancy and is also secreted in breast milk.

## 2022-07-11 ENCOUNTER — NON-APPOINTMENT (OUTPATIENT)
Age: 67
End: 2022-07-11

## 2022-07-12 ENCOUNTER — EMERGENCY (EMERGENCY)
Facility: HOSPITAL | Age: 67
LOS: 1 days | Discharge: HOME | End: 2022-07-12
Attending: EMERGENCY MEDICINE | Admitting: EMERGENCY MEDICINE
Payer: MEDICARE

## 2022-07-12 VITALS
RESPIRATION RATE: 22 BRPM | TEMPERATURE: 98 F | HEART RATE: 61 BPM | HEIGHT: 71 IN | DIASTOLIC BLOOD PRESSURE: 75 MMHG | SYSTOLIC BLOOD PRESSURE: 119 MMHG | WEIGHT: 149.91 LBS | OXYGEN SATURATION: 94 %

## 2022-07-12 VITALS
DIASTOLIC BLOOD PRESSURE: 80 MMHG | RESPIRATION RATE: 16 BRPM | OXYGEN SATURATION: 94 % | HEART RATE: 66 BPM | SYSTOLIC BLOOD PRESSURE: 110 MMHG | TEMPERATURE: 98 F

## 2022-07-12 DIAGNOSIS — Z20.822 CONTACT WITH AND (SUSPECTED) EXPOSURE TO COVID-19: ICD-10-CM

## 2022-07-12 DIAGNOSIS — Z41.9 ENCOUNTER FOR PROCEDURE FOR PURPOSES OTHER THAN REMEDYING HEALTH STATE, UNSPECIFIED: Chronic | ICD-10-CM

## 2022-07-12 DIAGNOSIS — R05.9 COUGH, UNSPECIFIED: ICD-10-CM

## 2022-07-12 DIAGNOSIS — Z87.09 PERSONAL HISTORY OF OTHER DISEASES OF THE RESPIRATORY SYSTEM: ICD-10-CM

## 2022-07-12 DIAGNOSIS — N18.6 END STAGE RENAL DISEASE: ICD-10-CM

## 2022-07-12 DIAGNOSIS — J90 PLEURAL EFFUSION, NOT ELSEWHERE CLASSIFIED: ICD-10-CM

## 2022-07-12 DIAGNOSIS — Z99.2 DEPENDENCE ON RENAL DIALYSIS: ICD-10-CM

## 2022-07-12 DIAGNOSIS — Z79.82 LONG TERM (CURRENT) USE OF ASPIRIN: ICD-10-CM

## 2022-07-12 DIAGNOSIS — Z90.49 ACQUIRED ABSENCE OF OTHER SPECIFIED PARTS OF DIGESTIVE TRACT: Chronic | ICD-10-CM

## 2022-07-12 LAB
RAPID RVP RESULT: SIGNIFICANT CHANGE UP
SARS-COV-2 RNA SPEC QL NAA+PROBE: SIGNIFICANT CHANGE UP

## 2022-07-12 PROCEDURE — 71046 X-RAY EXAM CHEST 2 VIEWS: CPT | Mod: 26

## 2022-07-12 PROCEDURE — 71046 X-RAY EXAM CHEST 2 VIEWS: CPT

## 2022-07-12 PROCEDURE — 0225U NFCT DS DNA&RNA 21 SARSCOV2: CPT

## 2022-07-12 PROCEDURE — 99285 EMERGENCY DEPT VISIT HI MDM: CPT | Mod: 25

## 2022-07-12 PROCEDURE — 99284 EMERGENCY DEPT VISIT MOD MDM: CPT | Mod: 25

## 2022-07-12 RX ORDER — CLARITHROMYCIN 500 MG
2 TABLET ORAL
Qty: 6 | Refills: 0
Start: 2022-07-12 | End: 2022-07-16

## 2022-07-12 NOTE — ED PROVIDER NOTE - PATIENT PORTAL LINK FT
You can access the FollowMyHealth Patient Portal offered by [facility] by registering at the following website: [email or web address]. By joining [employer]’s FollowMyHealth portal, you will also be able to view your health information using other applications (apps) compatible with our system.

## 2022-07-12 NOTE — ED ADULT NURSE NOTE - OBJECTIVE STATEMENT
Pt presenting with productive cough x2 days. Denies fevers/chills/CP. Endorsing SOB. Phlegm is clear. O2 94% on RA, ambulatory sat 93-94% on RA.

## 2022-07-12 NOTE — ED PROVIDER NOTE - OBJECTIVE STATEMENT
66 yo M with ESRD now with cough x 3 days.  No sob (despite triage note).  Denies fever, chills, chest pain, hemoptysis or le edema.  No missed HD sessions.  Hx chronic pleural effusions.

## 2022-07-12 NOTE — ED PROVIDER NOTE - CARE PROVIDER_API CALL
Elise Johnson)  Critical Care Medicine; Pulmonary Disease  [address] [place] [address]  Phone: [phone]  Fax: [phone]  Follow Up Time: 4-6 Days

## 2022-07-12 NOTE — ED PROVIDER NOTE - CLINICAL SUMMARY MEDICAL DECISION MAKING FREE TEXT BOX
Cough x 3 days.  Hx pleural effusions and pna in past.  No fever.  VSS.  Pt looks very well, nad, clear lungs.  CXR noted, chronic findings.  Discussed with PCP Dr. Johnson.  Rec dc with outpt tx with Azithro and close fu.

## 2022-07-19 ENCOUNTER — APPOINTMENT (OUTPATIENT)
Dept: PULMONOLOGY | Facility: CLINIC | Age: 67
End: 2022-07-19

## 2022-07-19 VITALS
BODY MASS INDEX: 21 KG/M2 | WEIGHT: 150 LBS | TEMPERATURE: 97 F | SYSTOLIC BLOOD PRESSURE: 103 MMHG | DIASTOLIC BLOOD PRESSURE: 66 MMHG | OXYGEN SATURATION: 93 % | HEART RATE: 64 BPM | HEIGHT: 71 IN

## 2022-07-19 DIAGNOSIS — Z78.9 OTHER SPECIFIED HEALTH STATUS: ICD-10-CM

## 2022-07-19 DIAGNOSIS — Z72.89 OTHER PROBLEMS RELATED TO LIFESTYLE: ICD-10-CM

## 2022-07-19 PROCEDURE — 99214 OFFICE O/P EST MOD 30 MIN: CPT

## 2022-07-19 RX ORDER — DARBEPOETIN ALFA 500 UG/ML
500 INJECTION, SOLUTION INTRAVENOUS; SUBCUTANEOUS
Qty: 1 | Refills: 0 | Status: DISCONTINUED | COMMUNITY
Start: 2018-05-10 | End: 2022-07-19

## 2022-07-19 NOTE — HISTORY OF PRESENT ILLNESS
[TextBox_4] : he is complaining of coughing.  He feels he has sputum but does not come up.  He is also has runny nse and postnasal drip.  The secretions are white.  The cough is constant.  he was in the ER and was told he has URI and was started on azithromycn.  he is not any better,  He is not sob.  He has no fever [ESS] : 0

## 2022-07-19 NOTE — REVIEW OF SYSTEMS
[Nasal Congestion] : nasal congestion [Postnasal Drip] : postnasal drip [Cough] : cough [Sputum] : sputum [Negative] : Endocrine [Dry Eyes] : no dry eyes [Ear Disturbance] : no ear disturbance [Epistaxis] : no epistaxis [Sore Throat] : no sore throat [Eye Irritation] : no eye irritation [Dry Mouth] : no dry mouth [Sinus Problems] : no sinus problems [Mouth Ulcers] : no mouth ulcers [Poor Dentition] : no poor dentition [Edentulous] : no edentulous [Hemoptysis] : no hemoptysis [Chest Tightness] : no chest tightness [Frequent URIs] : no frequent URIs [Dyspnea] : no dyspnea [Pleuritic Pain] : no pleuritic pain [Wheezing] : no wheezing [A.M. Dry Mouth] : no a.m. dry mouth [SOB on Exertion] : no sob on exertion

## 2022-07-19 NOTE — ASSESSMENT
[FreeTextEntry1] : Cough/Post Nasal Drip\par \par Patient's symptoms not concerning for infection. CXR done 7/12/22 improved compared to prior and patient denies any SOB or wheezing. Symptoms likely 2/2 to post nasal drip. Advised on taking Flonase once in each nostril twice a day in addition to Claritin daily. \par \par Pulmonary HTN\par \par Discussed that patient will need to have 6 minute walk test as well as PFTs completed during next visit that is scheduled for next month.\par \par Patient was not started on sildenafil.\par \par Follow-up PFT and 6-minute walk test\par \par Atelectasis\par \par Stable\par \par Pleural effusion\par \par Stable\par \par I reviewed that recent chest x-ray could.  The x-ray added from March.  There is resolution of the right lower lobe consolidation and no change in the right effusion.\par \par COPD\par \par Patient is off inhalers but he is asymptomatic and will follow up in the PFT

## 2022-07-19 NOTE — PHYSICAL EXAM
[No Acute Distress] : no acute distress [Well Nourished] : well nourished [Normal Oropharynx] : normal oropharynx [Nasal congestion] : nasal congestion [Normal Appearance] : normal appearance [Supple] : supple [Normal Rate/Rhythm] : normal rate/rhythm [Normal S1, S2] : normal s1, s2 [No Resp Distress] : no resp distress [Clear to Auscultation Bilaterally] : clear to auscultation bilaterally [No Abnormalities] : no abnormalities [Normal Gait] : normal gait [No Clubbing] : no clubbing [No Edema] : no edema [Normal Color/ Pigmentation] : normal color/ pigmentation [No Focal Deficits] : no focal deficits

## 2022-07-19 NOTE — PROCEDURE
[FreeTextEntry1] : ACC: [id number] EXAM: XR CHEST PA LAT 2V\par \par PROCEDURE DATE: 07/12/2022\par \par \par \par INTERPRETATION: Chest 2 views\par \par HISTORY: Cough shortness of breath\par \par IMPRESSION:\par \par Lower lung infiltrates have improved since prior exam 3/3/2022. Bilateral small pleural effusions again noted. No definite focal infiltrate or lung consolidation. Cardiomegaly. No pneumothorax. No acute bone abnormality.

## 2022-07-19 NOTE — END OF VISIT
[] : Fellow [Time Spent: ___ minutes] : I have spent [unfilled] minutes of time on the encounter. [>50% of the face to face encounter time was spent on counseling and/or coordination of care for ___] : Greater than 50% of the face to face encounter time was spent on counseling and/or coordination of care for [unfilled] [FreeTextEntry3] : see my addendum

## 2022-09-16 ENCOUNTER — RX RENEWAL (OUTPATIENT)
Age: 67
End: 2022-09-16

## 2022-09-17 ENCOUNTER — RX RENEWAL (OUTPATIENT)
Age: 67
End: 2022-09-17

## 2022-09-21 NOTE — CHART NOTE - NSCHARTNOTEFT_GEN_A_CORE
Admitting Diagnosis:   Patient is a 65y old  Male who presents with a chief complaint of VATS, decortication, pleurodesis (2021 13:49)    PAST MEDICAL & SURGICAL HISTORY:  BPH (benign prostatic hyperplasia)    Gout    H/O pulmonary hypertension    CHF, chronic    Lymphoma  t cell; Chemotherapy weekly- wednesday    CKD (chronic kidney disease)    Atrial fibrillation    HTN (hypertension)    Elective surgery  rectal surgery    History of cholecystectomy      Current Nutrition Order: DASH/TLC (w renal restrictions)    PO Intake: Good (%) [   ]  Fair (50-75%) [ X  ] Poor (<25%) [   ]  Pt notes consuming only <50% of meals, indicating that he would eat more if the food was warm.    GI Issues:   Per pt, No N/V/C/D; +BM x2 on   Pt ordered for Protonix and Miralax    Pain:  Pt endorses some pain, team aware  Pt ordered for Tylenol, Oxycodone    Skin Integrity:  Dandre Score: 21  Surgical incision: L VATS sites, L upper chest wall  No edema/pressure ulcers    Labs:       140  |  104  |  40<H>  ----------------------------<  81  3.8   |  22  |  5.61<H>    Ca    8.3<L>      2021 03:11  Mg     2.0           CAPILLARY BLOOD GLUCOSE  POCT Blood Glucose.: 107 mg/dL (2021 11:21)  POCT Blood Glucose.: 76 mg/dL (2021 07:22)  POCT Blood Glucose.: 94 mg/dL (2021 17:08)      Medications:  MEDICATIONS  (STANDING):  allopurinol 200 milliGRAM(s) Oral two times a day  aMIOdarone    Tablet 200 milliGRAM(s) Oral daily  dextrose 40% Gel 15 Gram(s) Oral once  dextrose 5%. 1000 milliLiter(s) (50 mL/Hr) IV Continuous <Continuous>  dextrose 5%. 1000 milliLiter(s) (100 mL/Hr) IV Continuous <Continuous>  dextrose 50% Injectable 25 Gram(s) IV Push once  dextrose 50% Injectable 12.5 Gram(s) IV Push once  dextrose 50% Injectable 25 Gram(s) IV Push once  doxazosin 2 milliGRAM(s) Oral at bedtime  furosemide   Injectable 60 milliGRAM(s) IV Push once  glucagon  Injectable 1 milliGRAM(s) IntraMuscular once  heparin  Infusion 1000 Unit(s)/Hr (10 mL/Hr) IV Continuous <Continuous>  insulin lispro (ADMELOG) corrective regimen sliding scale   SubCutaneous Before meals and at bedtime  metoprolol tartrate 25 milliGRAM(s) Oral every 6 hours  milrinone Infusion 0.25 MICROgram(s)/kG/Min (5.42 mL/Hr) IV Continuous <Continuous>  pantoprazole    Tablet 40 milliGRAM(s) Oral before breakfast  polyethylene glycol 3350 17 Gram(s) Oral daily  sodium bicarbonate 650 milliGRAM(s) Oral two times a day  sodium chloride 0.9%. 1000 milliLiter(s) (10 mL/Hr) IV Continuous <Continuous>    MEDICATIONS  (PRN):  acetaminophen   Tablet .. 650 milliGRAM(s) Oral every 6 hours PRN Mild Pain (1 - 3)  oxycodone    5 mG/acetaminophen 325 mG 1 Tablet(s) Oral every 6 hours PRN Moderate Pain (4 - 6)      Admitting Anthropometrics:  Ht: 5'11" ABW 72.2kg, IBW 78kg, 92% IBW, ht 71", BMI 22.2.     Weight Change: No new wts noted.    Estimated energy needs:   ABW used for calculations as pt between % of IBW.   Nutrient needs based on [facility] standards of care for maintenance in adults, adjusted for CHF, age, pre-op needs.  1805-2166kcals(25-30kcal/kg)  86-101gm or protein(1.2-1.4 g/kg)  Fluids per team.    Subjective:   65M with PMH of HTN, COPD, CKD (with AVF but not on HD yet), T-cell lymphoma on chemo every Wednesday, AF on Eliquis at home, CHF (EF 40-45%), Pulm HTN, recent L chest wall hematoma evacuation, severe MR, who was admitted for scheduled VATS on  for recurrent pleural effusion, and was started on heparin drip. Pt had a recent fall on concrete last week when he tried to stop his dogs fighting, and since then, he noticed a small mass in the L anterior leg. General surgery was consulted for evaluation and possible intervention, no surgical intervention to take place. CT scan of LLE demonstrated severe cellulitis with small seroma superficial to the distal aspect of the patellar tendon.  Pt is now POD3 s/p VATS decortication of loculated pleural effusion, pleurodesis on . Postoperatively with fluid overload. Vascular surgery consulted for evaluation of AVF for possible HD. Pt has a radiocephalic fistula that was placed on 20, however has not needed to initiate HD yet. Per nephrology no indication for HD at this time.     Visited pt in room, noted sitting in bedside chair; on NC with humidifier satting at 91-97%. Noted MAP: 89, not requiring pressors at this time. Pt is currently ordered for DASH/TLC (w renal restrictions) diet. Pt reports good appetite and tolerating diet well, consuming corn muffin + coffee + orange juice for breakfast(~50%). He also attempted to eat the eggs+ potatoes but indicated that its was "tasteless and cold". Pt denies difficulty chewing/swallowing at this time. Discussed DASH/TLC diet and low-sodium food choices w/ Mrs DASH seasoning. Pt appeared very receptive. Notable Labs: BUN:40 (H), Cr: 5.61 (H), T. Please see below for full nutritional recommendations- d/w team. RD to monitor and f/u per protocol.      Previous Nutrition Diagnosis:  Increased Nutrient Needs kcal/pro r/t hypermetabolic state AEB pre/post-op demand, CHF.   Active [ X ]  Resolved [   ]    Goal/Expected Outcome meet >/= 75% EER via medically tolerated route.    Recommendations:  1. Encourage PO intake through day.  >> Monitor %PO intake and need for DASH/TLC (w/ renal restrictions). Recommend liberalize diet if %PO intake does not improve.   2. Trend wts biweekly.  3. Monitor lytes and replete prn. Monitor POC q6hrs, renal indices  4. Reinforce ed   5. Pain and bowel regimen per team.     Education: Discussed DASH/TLC (w/renal retrictions) diet. Pt appeared receptive.    Risk Level: High [X ] Moderate [   ] Low [   ] Admitting Diagnosis:   Patient is a 65y old  Male who presents with a chief complaint of VATS, decortication, pleurodesis (2021 13:49)    PAST MEDICAL & SURGICAL HISTORY:  BPH (benign prostatic hyperplasia)    Gout    H/O pulmonary hypertension    CHF, chronic    Lymphoma  t cell; Chemotherapy weekly- wednesday    CKD (chronic kidney disease)    Atrial fibrillation    HTN (hypertension)    Elective surgery  rectal surgery    History of cholecystectomy      Current Nutrition Order: DASH/TLC (w renal restrictions)    PO Intake: Good (%) [   ]  Fair (50-75%) [ X  ] Poor (<25%) [   ]  Pt notes consuming only <50% of meals, indicating that he would eat more if the food was warm.    GI Issues:   Per pt, No N/V/C/D; +BM x2 on   Pt ordered for Protonix and Miralax    Pain:  Pt endorses some pain, team aware  Pt ordered for Tylenol, Oxycodone    Skin Integrity:  Dandre Score: 21  Surgical incision: L VATS sites, L upper chest wall  No edema/pressure ulcers    Labs:       140  |  104  |  40<H>  ----------------------------<  81  3.8   |  22  |  5.61<H>    Ca    8.3<L>      2021 03:11  Mg     2.0           CAPILLARY BLOOD GLUCOSE  POCT Blood Glucose.: 107 mg/dL (2021 11:21)  POCT Blood Glucose.: 76 mg/dL (2021 07:22)  POCT Blood Glucose.: 94 mg/dL (2021 17:08)      Medications:  MEDICATIONS  (STANDING):  allopurinol 200 milliGRAM(s) Oral two times a day  aMIOdarone    Tablet 200 milliGRAM(s) Oral daily  dextrose 40% Gel 15 Gram(s) Oral once  dextrose 5%. 1000 milliLiter(s) (50 mL/Hr) IV Continuous <Continuous>  dextrose 5%. 1000 milliLiter(s) (100 mL/Hr) IV Continuous <Continuous>  dextrose 50% Injectable 25 Gram(s) IV Push once  dextrose 50% Injectable 12.5 Gram(s) IV Push once  dextrose 50% Injectable 25 Gram(s) IV Push once  doxazosin 2 milliGRAM(s) Oral at bedtime  furosemide   Injectable 60 milliGRAM(s) IV Push once  glucagon  Injectable 1 milliGRAM(s) IntraMuscular once  heparin  Infusion 1000 Unit(s)/Hr (10 mL/Hr) IV Continuous <Continuous>  insulin lispro (ADMELOG) corrective regimen sliding scale   SubCutaneous Before meals and at bedtime  metoprolol tartrate 25 milliGRAM(s) Oral every 6 hours  milrinone Infusion 0.25 MICROgram(s)/kG/Min (5.42 mL/Hr) IV Continuous <Continuous>  pantoprazole    Tablet 40 milliGRAM(s) Oral before breakfast  polyethylene glycol 3350 17 Gram(s) Oral daily  sodium bicarbonate 650 milliGRAM(s) Oral two times a day  sodium chloride 0.9%. 1000 milliLiter(s) (10 mL/Hr) IV Continuous <Continuous>    MEDICATIONS  (PRN):  acetaminophen   Tablet .. 650 milliGRAM(s) Oral every 6 hours PRN Mild Pain (1 - 3)  oxycodone    5 mG/acetaminophen 325 mG 1 Tablet(s) Oral every 6 hours PRN Moderate Pain (4 - 6)      Admitting Anthropometrics:  Ht: 5'11" ABW 72.2kg, IBW 78kg, 92% IBW, ht 71", BMI 22.2.     Weight Change: No new wts noted.    Estimated energy needs:   ABW used for calculations as pt between % of IBW.   Nutrient needs based on [facility] standards of care for maintenance in adults, adjusted for CHF, age, pre-op needs.  1805-2166kcals(25-30kcal/kg)  86-101gm or protein(1.2-1.4 g/kg)  Fluids per team.    Subjective:   65M with PMH of HTN, COPD, CKD (with AVF but not on HD yet), T-cell lymphoma on chemo every Wednesday, AF on Eliquis at home, CHF (EF 40-45%), Pulm HTN, recent L chest wall hematoma evacuation, severe MR, who was admitted for scheduled VATS on  for recurrent pleural effusion, and was started on heparin drip. Pt had a recent fall on concrete last week when he tried to stop his dogs fighting, and since then, he noticed a small mass in the L anterior leg. General surgery was consulted for evaluation and possible intervention, no surgical intervention to take place. CT scan of LLE demonstrated severe cellulitis with small seroma superficial to the distal aspect of the patellar tendon.  Pt is now POD3 s/p VATS decortication of loculated pleural effusion, pleurodesis on . Postoperatively with fluid overload. Vascular surgery consulted for evaluation of AVF for possible HD. Pt has a radiocephalic fistula that was placed on 20, however has not needed to initiate HD yet. Per nephrology no indication for HD at this time.     Visited pt in room, noted sitting in bedside chair; on NC with humidifier satting at 91-97%. Noted MAP: 89, not requiring pressors at this time. Pt is currently ordered for DASH/TLC (w renal restrictions) diet. Pt reports good appetite and tolerating diet well, consuming corn muffin + coffee + orange juice for breakfast(~50%). He also attempted to eat the eggs+ potatoes but indicated that its was "tasteless and cold". Pt denies difficulty chewing/swallowing at this time. Discussed DASH/TLC diet and low-sodium food choices w/ Mrs DASH seasoning. Pt appeared very receptive. Notable Labs: BUN:40 (H), Cr: 5.61 (H), T. Please see below for full nutritional recommendations- d/w team. RD to monitor and f/u per protocol.      Previous Nutrition Diagnosis:  Increased Nutrient Needs kcal/pro r/t hypermetabolic state AEB pre/post-op demand, CHF.   Active [ X ]  Resolved [   ]    Goal/Expected Outcome meet >/= 75% EER via medically tolerated route.    Recommendations:  1. Encourage PO intake through day.  >> Monitor %PO intake and need for DASH/TLC (w/ renal restrictions). Recommend liberalize diet if %PO intake does not improve.   2. Trend wts biweekly.  3. Monitor lytes and replete prn. Monitor POC q6hrs, renal indices  4. Reinforce ed   5. Pain and bowel regimen per team.     Education: Discussed DASH/TLC (w/renal retrictions) diet. Pt appeared receptive     Risk Level: High [X ] Moderate [   ] Low [   ] Admitting Diagnosis:   Patient is a 65y old  Male who presents with a chief complaint of VATS, decortication, pleurodesis (2021 13:49)    PAST MEDICAL & SURGICAL HISTORY:  BPH (benign prostatic hyperplasia)    Gout    H/O pulmonary hypertension    CHF, chronic    Lymphoma  t cell; Chemotherapy weekly- Wednesday    CKD (chronic kidney disease)    Atrial fibrillation    HTN (hypertension)    Elective surgery  rectal surgery    History of cholecystectomy      Current Nutrition Order: DASH/TLC + Renal Diet     PO Intake: Good (%) [   ]  Fair (50-75%) [ X  ] Poor (<25%) [   ]  Pt notes consuming only <50% of meals, indicating that he would eat more if the food was warm.    GI Issues:   Per pt, No N/V/C/D; +BM x2 on   Pt ordered for Protonix and Miralax    Pain:  Pt endorses some pain, team aware  Pt ordered for Tylenol, Oxycodone    Skin Integrity:  Dandre Score: 21  Surgical incision: L VATS sites, L upper chest wall  Edema: 2+ B/L foot.    Labs:       140  |  104  |  40<H>  ----------------------------<  81  3.8   |  22  |  5.61<H>    Ca    8.3<L>      2021 03:11  Mg     2.0           CAPILLARY BLOOD GLUCOSE  POCT Blood Glucose.: 107 mg/dL (2021 11:21)  POCT Blood Glucose.: 76 mg/dL (2021 07:22)  POCT Blood Glucose.: 94 mg/dL (2021 17:08)      Medications:  MEDICATIONS  (STANDING):  allopurinol 200 milliGRAM(s) Oral two times a day  aMIOdarone    Tablet 200 milliGRAM(s) Oral daily  dextrose 40% Gel 15 Gram(s) Oral once  dextrose 5%. 1000 milliLiter(s) (50 mL/Hr) IV Continuous <Continuous>  dextrose 5%. 1000 milliLiter(s) (100 mL/Hr) IV Continuous <Continuous>  dextrose 50% Injectable 25 Gram(s) IV Push once  dextrose 50% Injectable 12.5 Gram(s) IV Push once  dextrose 50% Injectable 25 Gram(s) IV Push once  doxazosin 2 milliGRAM(s) Oral at bedtime  furosemide   Injectable 60 milliGRAM(s) IV Push once  glucagon  Injectable 1 milliGRAM(s) IntraMuscular once  heparin  Infusion 1000 Unit(s)/Hr (10 mL/Hr) IV Continuous <Continuous>  insulin lispro (ADMELOG) corrective regimen sliding scale   SubCutaneous Before meals and at bedtime  metoprolol tartrate 25 milliGRAM(s) Oral every 6 hours  milrinone Infusion 0.25 MICROgram(s)/kG/Min (5.42 mL/Hr) IV Continuous <Continuous>  pantoprazole    Tablet 40 milliGRAM(s) Oral before breakfast  polyethylene glycol 3350 17 Gram(s) Oral daily  sodium bicarbonate 650 milliGRAM(s) Oral two times a day  sodium chloride 0.9%. 1000 milliLiter(s) (10 mL/Hr) IV Continuous <Continuous>    MEDICATIONS  (PRN):  acetaminophen   Tablet .. 650 milliGRAM(s) Oral every 6 hours PRN Mild Pain (1 - 3)  oxycodone    5 mG/acetaminophen 325 mG 1 Tablet(s) Oral every 6 hours PRN Moderate Pain (4 - 6)      Admitting Anthropometrics:  Ht: 5'11" ABW 72.2kg, IBW 78kg, 92% IBW, ht 71", BMI 22.2.     Weight Change: No new wts noted.    Estimated energy needs:   ABW used for calculations as pt between % of IBW.   Nutrient needs based on [facility] standards of care for maintenance in adults, adjusted for CHF, age, pre-op needs.  1805-2166kcals(25-30kcal/kg)  86-101gm or protein(1.2-1.4 g/kg)  Fluids per team.    Subjective:   65M with PMH of HTN, COPD, CKD (with AVF but not on HD yet), T-cell lymphoma on chemo every Wednesday, AF on Eliquis at home, CHF (EF 40-45%), Pulm HTN, recent L chest wall hematoma evacuation, severe MR, who was admitted for scheduled VATS on  for recurrent pleural effusion, and was started on heparin drip. Pt had a recent fall on concrete last week when he tried to stop his dogs fighting, and since then, he noticed a small mass in the L anterior leg. General surgery was consulted for evaluation and possible intervention, no surgical intervention to take place. CT scan of LLE demonstrated severe cellulitis with small seroma superficial to the distal aspect of the patellar tendon.  Pt is now POD3 s/p VATS decortication of loculated pleural effusion, pleurodesis on . Postoperatively with fluid overload. Vascular surgery consulted for evaluation of AVF for possible HD. Pt has a radiocephalic fistula that was placed on 20, however has not needed to initiate HD yet. Per nephrology no indication for HD at this time.     Visited pt in room, noted sitting in bedside chair; on NC with humidifier satting at 91-97%. Noted MAP: 89, not requiring pressors at this time. Pt is currently ordered for DASH/TLC (w renal restrictions) diet. Pt reports good appetite and tolerating diet well, consuming corn muffin + coffee + orange juice for breakfast(~50%). He also attempted to eat the eggs+ potatoes but indicated that its was "tasteless and cold". Pt denies difficulty chewing/swallowing at this time. Discussed DASH/TLC diet and low-sodium food choices w/ Mrs DASH seasoning. Pt appeared very receptive. Notable Labs: BUN:40 (H), Cr: 5.61 (H), T. Please see below for full nutritional recommendations- d/w team. RD to monitor and f/u per protocol.      Previous Nutrition Diagnosis:  Increased Nutrient Needs kcal/pro r/t hypermetabolic state AEB pre/post-op demand, CHF.   Active [ X ]  Resolved [   ]    Goal/Expected Outcome meet >/= 75% EER via medically tolerated route.    Recommendations:  1. Encourage PO intake through day.  >> Monitor %PO intake and need for DASH/TLC + Renal diet. Recommend liberalizing diet if %PO intake does not improve.   2. Trend wts biweekly.  3. Monitor lytes and replete prn. Monitor POC q6hrs, renal indices  4. Reinforce ed   5. Pain and bowel regimen per team.     Education: Discussed DASH/TLC (w/renal retrictions) diet. Pt appeared receptive     Risk Level: High [X ] Moderate [   ] Low [   ] Clindamycin Pregnancy And Lactation Text: This medication can be used in pregnancy if certain situations. Clindamycin is also present in breast milk.

## 2022-09-25 LAB — SARS-COV-2 N GENE NPH QL NAA+PROBE: NOT DETECTED

## 2022-09-27 ENCOUNTER — APPOINTMENT (OUTPATIENT)
Dept: PULMONOLOGY | Facility: CLINIC | Age: 67
End: 2022-09-27

## 2022-09-27 ENCOUNTER — APPOINTMENT (OUTPATIENT)
Dept: VASCULAR SURGERY | Facility: CLINIC | Age: 67
End: 2022-09-27

## 2022-09-27 VITALS
HEART RATE: 60 BPM | DIASTOLIC BLOOD PRESSURE: 67 MMHG | OXYGEN SATURATION: 97 % | BODY MASS INDEX: 21 KG/M2 | WEIGHT: 150 LBS | HEIGHT: 71 IN | SYSTOLIC BLOOD PRESSURE: 109 MMHG | TEMPERATURE: 97.9 F

## 2022-09-27 VITALS
DIASTOLIC BLOOD PRESSURE: 59 MMHG | SYSTOLIC BLOOD PRESSURE: 112 MMHG | BODY MASS INDEX: 21 KG/M2 | WEIGHT: 150 LBS | HEIGHT: 71 IN | HEART RATE: 54 BPM

## 2022-09-27 PROCEDURE — 94729 DIFFUSING CAPACITY: CPT

## 2022-09-27 PROCEDURE — G0008: CPT

## 2022-09-27 PROCEDURE — 99214 OFFICE O/P EST MOD 30 MIN: CPT | Mod: 25

## 2022-09-27 PROCEDURE — ZZZZZ: CPT

## 2022-09-27 PROCEDURE — 94010 BREATHING CAPACITY TEST: CPT

## 2022-09-27 PROCEDURE — 94618 PULMONARY STRESS TESTING: CPT

## 2022-09-27 PROCEDURE — 94727 GAS DIL/WSHOT DETER LNG VOL: CPT

## 2022-09-27 PROCEDURE — 90662 IIV NO PRSV INCREASED AG IM: CPT

## 2022-09-27 PROCEDURE — 99204 OFFICE O/P NEW MOD 45 MIN: CPT | Mod: 25

## 2022-09-27 PROCEDURE — 93990 DOPPLER FLOW TESTING: CPT

## 2022-09-27 NOTE — HISTORY OF PRESENT ILLNESS
[>= 20 pack years] : >= 20 pack years [Former] : former [TextBox_4] : Mr. Gamez presents to the clinic today for follow up. He states he still has post nasal drip and a chronic cough but he states that he has not used his flonase consistently during the last several weeks. He denies any recent fevers, chills, or infections. He states he does not exercise very much because he's usually very tired after dialysis. He's able to walk around and do things without issue.  [ESS] : 14

## 2022-09-27 NOTE — ASSESSMENT
[FreeTextEntry1] : #Pulmonary HTN\par #Restrictive Lung Defect with Reduced DLCO\par #Emphysema\par \par Patient's last CT scan demonstrated right pleural effusion and RLL infiltrate. Patient had PFTs completed which demonstrated a normal FEV1/FVC ratio with reduced FEV1, FVC, and TLC which is consistent with a restrictive lung defect. He also had severely reduced DLCO. Reduced DLCO can be explained by patient's history of emphysema that was also noted on prior CT however the etiology of his restrictive defect warrants further workup. Patient did well on his 6MWT without desaturation. He was between 94%-95% on room air before and immediately after the test. He had a Dom dyspnea score of 2 prior to and after his 6mwt walking 300+ meters. He has good functional status. He was also noted to have pulmonary HTN based on his last TTE with a PASP of 56. He was started on Sildenafil TID and has been adherent to it without side effects. Would recommend repeat TTE to assess for improvement but will defer to cardiology (Dr. Ventura). Will order repeat CT for evaluation of lung parenchyma.\par Flu vaccine administered during this visit.  \par \par #Post Nasal Drip\par \par Explained to patient that for improvement in his post-nasal drip he will require regular consistent use of his Flonase. He states he had only been using it intermittently. Will follow up for improvement at the next visit\par \par #Daytime Somnolence\par Patient with excessive daytime sleepiness and states he does not sleep well at night with frequent awakenings. There is a likelihood that undiagnosed and uncontrolled ERIK (specifically hypoventilation) can be contributing to his pulmonary HTN. Will order home sleep study for further evaluation. \par

## 2022-09-27 NOTE — REVIEW OF SYSTEMS
[Cough] : cough [Sputum] : sputum [Negative] : Endocrine [Dyspnea] : no dyspnea [SOB on Exertion] : no sob on exertion

## 2022-09-27 NOTE — END OF VISIT
[] : Fellow [Time Spent: ___ minutes] : I have spent [unfilled] minutes of time on the encounter. [>50% of the face to face encounter time was spent on counseling and/or coordination of care for ___] : Greater than 50% of the face to face encounter time was spent on counseling and/or coordination of care for [unfilled] [FreeTextEntry3] : he is doing well and had the flu vaccine and tolerated well.  On sildenafil and will follow with CT scan and sleep study

## 2022-09-29 ENCOUNTER — NON-APPOINTMENT (OUTPATIENT)
Age: 67
End: 2022-09-29

## 2022-09-29 ENCOUNTER — APPOINTMENT (OUTPATIENT)
Dept: HEART AND VASCULAR | Facility: CLINIC | Age: 67
End: 2022-09-29

## 2022-09-29 VITALS
TEMPERATURE: 98 F | SYSTOLIC BLOOD PRESSURE: 110 MMHG | DIASTOLIC BLOOD PRESSURE: 58 MMHG | BODY MASS INDEX: 20.76 KG/M2 | HEART RATE: 55 BPM | HEIGHT: 71 IN | OXYGEN SATURATION: 90 % | WEIGHT: 148.31 LBS

## 2022-09-29 PROCEDURE — 93000 ELECTROCARDIOGRAM COMPLETE: CPT

## 2022-09-29 PROCEDURE — 36415 COLL VENOUS BLD VENIPUNCTURE: CPT

## 2022-09-29 PROCEDURE — 99214 OFFICE O/P EST MOD 30 MIN: CPT | Mod: 25

## 2022-09-29 RX ORDER — AMOXICILLIN 500 MG/1
500 CAPSULE ORAL
Qty: 22 | Refills: 0 | Status: DISCONTINUED | COMMUNITY
Start: 2022-09-12 | End: 2022-09-29

## 2022-09-29 RX ORDER — AZITHROMYCIN 250 MG/1
250 TABLET, FILM COATED ORAL
Qty: 6 | Refills: 0 | Status: DISCONTINUED | COMMUNITY
Start: 2022-07-12 | End: 2022-09-29

## 2022-09-29 RX ORDER — GABAPENTIN 100 MG/1
100 CAPSULE ORAL TWICE DAILY
Qty: 180 | Refills: 2 | Status: DISCONTINUED | COMMUNITY
Start: 2022-01-08 | End: 2022-09-29

## 2022-09-29 NOTE — HISTORY OF PRESENT ILLNESS
[FreeTextEntry1] : 67yoM w/h/o ESRD on HD via LUE AVF performed by Dr. Cisneros in 2020, returning for reevaluation of his access in the setting of L wrist pain w/overuse of the arm/hand.  Pt denies any issues w/the access, denies swelling/prolonged bleeding/ulceration of the skin, even at the site of his pseudoaneurysm.

## 2022-09-29 NOTE — ASSESSMENT
[FreeTextEntry1] : 67yoM w/h/o ESRD on HD via LUE AVF performed by Dr. Cisneros in 2020, returning for reevaluation of his access in the setting of L wrist pain w/overuse of the arm/hand.  Pt denies any issues w/the access, denies swelling/prolonged bleeding/ulceration of the skin, even at the site of his pseudoaneurysm.\par \par LUE AVF widely patent on exam, and LUE duplex performed to evaluate AVF for patency reveals widely patent radiocephalic AVF, aneurysmal dilatation to 2.6cm at the L wrist, diameter throughout the AVF 2.6cm.  As there are no functional issues w/his access at this time, and no evidence of failure or skin ulceration, instructed pt to continue to exercise the arm regularly and RTO PRN complications w/cannulation/use.

## 2022-09-29 NOTE — HISTORY OF PRESENT ILLNESS
[FreeTextEntry1] : 67 M HFpEF, ESRD on HD, pAF s/p Watchman 10/2021, T-Cell lymphoma on chemotherapy, HTN, L chest wall hematoma s/p evacuation 12/2020, Aortic Aneurysm \par \par here today BP is low at HD he  has to sit at HD for a while he is dizzy post dialysis he is not sob and has no leg edema he otherwise feels well\par \par \par ecg sb 1 avb IVCD anterior t wave changes 9/28/2022 \par JORGE 5/2021 EF MVP posterior leaflet mod MR and mod TR

## 2022-09-29 NOTE — PHYSICAL EXAM
[Respiratory Effort] : normal respiratory effort [Normal Heart Sounds] : normal heart sounds [2+] : left 2+ [Calm] : calm [Normal Thyroid] : the thyroid was normal [Normal Breath Sounds] : Normal breath sounds [Normal Rate and Rhythm] : normal rate and rhythm [No Rash or Lesion] : No rash or lesion [Alert] : alert [JVD] : no jugular venous distention  [Ankle Swelling (On Exam)] : not present [Purpura] : no purpura  [Petechiae] : no petechiae [Skin Ulcer] : no ulcer [Skin Induration] : no induration [de-identified] : well appearing [de-identified] : NC/AT, anicteric [FreeTextEntry1] : +thrill/bruit, large AVF w/pseudoaneurysm noted at the wrist [de-identified] : FROM throughout, strength 5/5x4 [de-identified] : No ulceration of the skin of the L forearm

## 2022-09-29 NOTE — ASSESSMENT
[FreeTextEntry1] : - HFpEF he has elevated JVD due to his VHD\par - Low bp at hd don’t take amlodipine post HD\par - repeat echo for valves his exam is consistent with severe VHD\par - he has known chronic right lung crackles with effusion no intervention he does not urinate\par - bradycardia reduce toprol to 50 mg daily he is on amiodarone check TSH fu with EPS he likely needs sinus rhythm ? should be on long term amiodarone\par - fu Dr Ventura in three months

## 2022-09-29 NOTE — PROCEDURE
[FreeTextEntry1] : LUE duplex performed to evaluate AVF for patency reveals widely patent radiocephalic AVF, aneurysmal dilatation to 2.6cm at the L wrist, diameter throughout the AVF 2.6cm

## 2022-09-29 NOTE — PHYSICAL EXAM
[Well Developed] : well developed [Well Nourished] : well nourished [No Acute Distress] : no acute distress [Normal Conjunctiva] : normal conjunctiva [Normal Venous Pressure] : normal venous pressure [No Carotid Bruit] : no carotid bruit [Normal S1, S2] : normal S1, S2 [No Murmur] : no murmur [No Rub] : no rub [No Gallop] : no gallop [Clear Lung Fields] : clear lung fields [Good Air Entry] : good air entry [No Respiratory Distress] : no respiratory distress  [Soft] : abdomen soft [Non Tender] : non-tender [No Masses/organomegaly] : no masses/organomegaly [Normal Bowel Sounds] : normal bowel sounds [Normal Gait] : normal gait [No Edema] : no edema [No Cyanosis] : no cyanosis [No Clubbing] : no clubbing [No Varicosities] : no varicosities [No Rash] : no rash [No Skin Lesions] : no skin lesions [Moves all extremities] : moves all extremities [No Focal Deficits] : no focal deficits [Normal Speech] : normal speech [Alert and Oriented] : alert and oriented [Normal memory] : normal memory [de-identified] : elevated JVD [de-identified] : HSM to axilla [de-identified] : crackles right lung

## 2022-09-29 NOTE — ADDENDUM
[FreeTextEntry1] : This note was written by Lamont Mccoy, acting as a scribe for Dr. Ayesha Francis.  I, Dr. Ayesha Francis, have read and attest that all the information, medical decision-making, and discharge instructions within are true and accurate.\par \par I, Dr. Ayesha Francis, personally performed the evaluation and management (E/M) services for this established patient who presents today with (an) existing condition(s).  That E/M includes conducting the examination, assessing all conditions, and (re)establishing/reinforcing a plan of care.  Today, my ACP, Lamont Mccoy, was here to observe my evaluation and management services for this condition to be followed going forward.

## 2022-09-30 LAB
ALBUMIN SERPL ELPH-MCNC: 3.5 G/DL
ALP BLD-CCNC: 222 U/L
ALT SERPL-CCNC: 6 U/L
ANION GAP SERPL CALC-SCNC: 14 MMOL/L
AST SERPL-CCNC: 14 U/L
BILIRUB SERPL-MCNC: 0.4 MG/DL
BUN SERPL-MCNC: 14 MG/DL
CALCIUM SERPL-MCNC: 9.1 MG/DL
CHLORIDE SERPL-SCNC: 100 MMOL/L
CO2 SERPL-SCNC: 31 MMOL/L
CREAT SERPL-MCNC: 5.71 MG/DL
EGFR: 10 ML/MIN/1.73M2
GLUCOSE SERPL-MCNC: 73 MG/DL
POTASSIUM SERPL-SCNC: 4.3 MMOL/L
PROT SERPL-MCNC: 6.7 G/DL
SODIUM SERPL-SCNC: 145 MMOL/L
T4 FREE SERPL-MCNC: 1.7 NG/DL
TSH SERPL-ACNC: 2.51 UIU/ML

## 2022-10-03 ENCOUNTER — RX RENEWAL (OUTPATIENT)
Age: 67
End: 2022-10-03

## 2022-10-03 ENCOUNTER — INPATIENT (INPATIENT)
Facility: HOSPITAL | Age: 67
LOS: 3 days | Discharge: HOME | DRG: 193 | End: 2022-10-07
Payer: MEDICARE

## 2022-10-03 VITALS
DIASTOLIC BLOOD PRESSURE: 69 MMHG | HEIGHT: 71 IN | RESPIRATION RATE: 26 BRPM | WEIGHT: 149.91 LBS | OXYGEN SATURATION: 78 % | TEMPERATURE: 98 F | HEART RATE: 72 BPM | SYSTOLIC BLOOD PRESSURE: 124 MMHG

## 2022-10-03 DIAGNOSIS — Z29.9 ENCOUNTER FOR PROPHYLACTIC MEASURES, UNSPECIFIED: ICD-10-CM

## 2022-10-03 DIAGNOSIS — Z41.9 ENCOUNTER FOR PROCEDURE FOR PURPOSES OTHER THAN REMEDYING HEALTH STATE, UNSPECIFIED: Chronic | ICD-10-CM

## 2022-10-03 DIAGNOSIS — D64.9 ANEMIA, UNSPECIFIED: ICD-10-CM

## 2022-10-03 DIAGNOSIS — I10 ESSENTIAL (PRIMARY) HYPERTENSION: ICD-10-CM

## 2022-10-03 DIAGNOSIS — R09.02 HYPOXEMIA: ICD-10-CM

## 2022-10-03 DIAGNOSIS — N18.6 END STAGE RENAL DISEASE: ICD-10-CM

## 2022-10-03 DIAGNOSIS — Z90.49 ACQUIRED ABSENCE OF OTHER SPECIFIED PARTS OF DIGESTIVE TRACT: Chronic | ICD-10-CM

## 2022-10-03 DIAGNOSIS — I48.91 UNSPECIFIED ATRIAL FIBRILLATION: ICD-10-CM

## 2022-10-03 DIAGNOSIS — I50.30 UNSPECIFIED DIASTOLIC (CONGESTIVE) HEART FAILURE: ICD-10-CM

## 2022-10-03 LAB
ALBUMIN SERPL ELPH-MCNC: 3.9 G/DL — SIGNIFICANT CHANGE UP (ref 3.3–5)
ALP SERPL-CCNC: 196 U/L — HIGH (ref 40–120)
ALT FLD-CCNC: <5 U/L — LOW (ref 10–45)
ANION GAP SERPL CALC-SCNC: 14 MMOL/L — SIGNIFICANT CHANGE UP (ref 5–17)
APPEARANCE UR: ABNORMAL
AST SERPL-CCNC: 13 U/L — SIGNIFICANT CHANGE UP (ref 10–40)
BACTERIA # UR AUTO: PRESENT /HPF
BASE EXCESS BLDV CALC-SCNC: 10.4 MMOL/L — HIGH (ref -2–3)
BASOPHILS # BLD AUTO: 0.07 K/UL — SIGNIFICANT CHANGE UP (ref 0–0.2)
BASOPHILS NFR BLD AUTO: 1 % — SIGNIFICANT CHANGE UP (ref 0–2)
BILIRUB SERPL-MCNC: 0.7 MG/DL — SIGNIFICANT CHANGE UP (ref 0.2–1.2)
BILIRUB UR-MCNC: NEGATIVE — SIGNIFICANT CHANGE UP
BUN SERPL-MCNC: 25 MG/DL — HIGH (ref 7–23)
CA-I SERPL-SCNC: 1.14 MMOL/L — LOW (ref 1.15–1.33)
CALCIUM SERPL-MCNC: 9.4 MG/DL — SIGNIFICANT CHANGE UP (ref 8.4–10.5)
CHLORIDE SERPL-SCNC: 94 MMOL/L — LOW (ref 96–108)
CO2 BLDV-SCNC: 37.2 MMOL/L — HIGH (ref 22–26)
CO2 SERPL-SCNC: 32 MMOL/L — HIGH (ref 22–31)
COLOR SPEC: YELLOW — SIGNIFICANT CHANGE UP
COMMENT - URINE: SIGNIFICANT CHANGE UP
CREAT SERPL-MCNC: 8.48 MG/DL — HIGH (ref 0.5–1.3)
D DIMER BLD IA.RAPID-MCNC: 516 NG/ML DDU — HIGH
DIFF PNL FLD: ABNORMAL
EGFR: 6 ML/MIN/1.73M2 — LOW
EOSINOPHIL # BLD AUTO: 0.17 K/UL — SIGNIFICANT CHANGE UP (ref 0–0.5)
EOSINOPHIL NFR BLD AUTO: 2.3 % — SIGNIFICANT CHANGE UP (ref 0–6)
EPI CELLS # UR: SIGNIFICANT CHANGE UP /HPF (ref 0–5)
GAS PNL BLDV: 137 MMOL/L — SIGNIFICANT CHANGE UP (ref 136–145)
GAS PNL BLDV: SIGNIFICANT CHANGE UP
GLUCOSE SERPL-MCNC: 81 MG/DL — SIGNIFICANT CHANGE UP (ref 70–99)
GLUCOSE UR QL: NEGATIVE — SIGNIFICANT CHANGE UP
HBV SURFACE AG SER-ACNC: SIGNIFICANT CHANGE UP
HCO3 BLDV-SCNC: 36 MMOL/L — HIGH (ref 22–29)
HCT VFR BLD CALC: 33.2 % — LOW (ref 39–50)
HCV AB S/CO SERPL IA: 0.04 S/CO — SIGNIFICANT CHANGE UP
HCV AB SERPL-IMP: SIGNIFICANT CHANGE UP
HGB BLD-MCNC: 10.2 G/DL — LOW (ref 13–17)
IMM GRANULOCYTES NFR BLD AUTO: 0.4 % — SIGNIFICANT CHANGE UP (ref 0–0.9)
KETONES UR-MCNC: NEGATIVE — SIGNIFICANT CHANGE UP
LACTATE SERPL-SCNC: 1.7 MMOL/L — SIGNIFICANT CHANGE UP (ref 0.5–2)
LEUKOCYTE ESTERASE UR-ACNC: ABNORMAL
LYMPHOCYTES # BLD AUTO: 1.49 K/UL — SIGNIFICANT CHANGE UP (ref 1–3.3)
LYMPHOCYTES # BLD AUTO: 20.6 % — SIGNIFICANT CHANGE UP (ref 13–44)
MCHC RBC-ENTMCNC: 28.3 PG — SIGNIFICANT CHANGE UP (ref 27–34)
MCHC RBC-ENTMCNC: 30.7 GM/DL — LOW (ref 32–36)
MCV RBC AUTO: 92 FL — SIGNIFICANT CHANGE UP (ref 80–100)
MONOCYTES # BLD AUTO: 0.84 K/UL — SIGNIFICANT CHANGE UP (ref 0–0.9)
MONOCYTES NFR BLD AUTO: 11.6 % — SIGNIFICANT CHANGE UP (ref 2–14)
NEUTROPHILS # BLD AUTO: 4.64 K/UL — SIGNIFICANT CHANGE UP (ref 1.8–7.4)
NEUTROPHILS NFR BLD AUTO: 64.1 % — SIGNIFICANT CHANGE UP (ref 43–77)
NITRITE UR-MCNC: NEGATIVE — SIGNIFICANT CHANGE UP
NRBC # BLD: 0 /100 WBCS — SIGNIFICANT CHANGE UP (ref 0–0)
NT-PROBNP SERPL-SCNC: HIGH PG/ML (ref 0–300)
PCO2 BLDV: 49 MMHG — SIGNIFICANT CHANGE UP (ref 42–55)
PH BLDV: 7.47 — HIGH (ref 7.32–7.43)
PH UR: 7.5 — SIGNIFICANT CHANGE UP (ref 5–8)
PLATELET # BLD AUTO: 209 K/UL — SIGNIFICANT CHANGE UP (ref 150–400)
PO2 BLDV: <33 MMHG — LOW (ref 25–45)
POTASSIUM BLDV-SCNC: 3.8 MMOL/L — SIGNIFICANT CHANGE UP (ref 3.5–5.1)
POTASSIUM SERPL-MCNC: 3.8 MMOL/L — SIGNIFICANT CHANGE UP (ref 3.5–5.3)
POTASSIUM SERPL-SCNC: 3.8 MMOL/L — SIGNIFICANT CHANGE UP (ref 3.5–5.3)
PROT SERPL-MCNC: 7.6 G/DL — SIGNIFICANT CHANGE UP (ref 6–8.3)
PROT UR-MCNC: 100 MG/DL
RBC # BLD: 3.61 M/UL — LOW (ref 4.2–5.8)
RBC # FLD: 17.3 % — HIGH (ref 10.3–14.5)
RBC CASTS # UR COMP ASSIST: < 5 /HPF — SIGNIFICANT CHANGE UP
SAO2 % BLDV: 20.4 % — LOW (ref 67–88)
SARS-COV-2 RNA SPEC QL NAA+PROBE: NEGATIVE — SIGNIFICANT CHANGE UP
SODIUM SERPL-SCNC: 140 MMOL/L — SIGNIFICANT CHANGE UP (ref 135–145)
SP GR SPEC: 1.02 — SIGNIFICANT CHANGE UP (ref 1–1.03)
TROPONIN T SERPL-MCNC: 0.06 NG/ML — CRITICAL HIGH (ref 0–0.01)
UROBILINOGEN FLD QL: 0.2 E.U./DL — SIGNIFICANT CHANGE UP
WBC # BLD: 7.24 K/UL — SIGNIFICANT CHANGE UP (ref 3.8–10.5)
WBC # FLD AUTO: 7.24 K/UL — SIGNIFICANT CHANGE UP (ref 3.8–10.5)
WBC UR QL: ABNORMAL /HPF

## 2022-10-03 PROCEDURE — 99222 1ST HOSP IP/OBS MODERATE 55: CPT

## 2022-10-03 PROCEDURE — 71250 CT THORAX DX C-: CPT | Mod: 26,MA

## 2022-10-03 PROCEDURE — 71045 X-RAY EXAM CHEST 1 VIEW: CPT | Mod: 26

## 2022-10-03 PROCEDURE — 99223 1ST HOSP IP/OBS HIGH 75: CPT | Mod: GC

## 2022-10-03 PROCEDURE — 99285 EMERGENCY DEPT VISIT HI MDM: CPT

## 2022-10-03 PROCEDURE — 93010 ELECTROCARDIOGRAM REPORT: CPT

## 2022-10-03 RX ORDER — AMIODARONE HYDROCHLORIDE 400 MG/1
200 TABLET ORAL DAILY
Refills: 0 | Status: DISCONTINUED | OUTPATIENT
Start: 2022-10-03 | End: 2022-10-07

## 2022-10-03 RX ORDER — AMLODIPINE BESYLATE 2.5 MG/1
5 TABLET ORAL DAILY
Refills: 0 | Status: DISCONTINUED | OUTPATIENT
Start: 2022-10-03 | End: 2022-10-07

## 2022-10-03 RX ORDER — PIPERACILLIN AND TAZOBACTAM 4; .5 G/20ML; G/20ML
3.38 INJECTION, POWDER, LYOPHILIZED, FOR SOLUTION INTRAVENOUS EVERY 12 HOURS
Refills: 0 | Status: DISCONTINUED | OUTPATIENT
Start: 2022-10-03 | End: 2022-10-07

## 2022-10-03 RX ORDER — ALLOPURINOL 300 MG
100 TABLET ORAL
Refills: 0 | Status: DISCONTINUED | OUTPATIENT
Start: 2022-10-03 | End: 2022-10-07

## 2022-10-03 RX ORDER — ERYTHROPOIETIN 10000 [IU]/ML
6000 INJECTION, SOLUTION INTRAVENOUS; SUBCUTANEOUS ONCE
Refills: 0 | Status: COMPLETED | OUTPATIENT
Start: 2022-10-03 | End: 2022-10-03

## 2022-10-03 RX ORDER — METOPROLOL TARTRATE 50 MG
50 TABLET ORAL DAILY
Refills: 0 | Status: DISCONTINUED | OUTPATIENT
Start: 2022-10-03 | End: 2022-10-07

## 2022-10-03 RX ORDER — ATORVASTATIN CALCIUM 80 MG/1
40 TABLET, FILM COATED ORAL AT BEDTIME
Refills: 0 | Status: DISCONTINUED | OUTPATIENT
Start: 2022-10-03 | End: 2022-10-07

## 2022-10-03 RX ORDER — ACETAMINOPHEN 500 MG
975 TABLET ORAL ONCE
Refills: 0 | Status: COMPLETED | OUTPATIENT
Start: 2022-10-03 | End: 2022-10-03

## 2022-10-03 RX ORDER — IPRATROPIUM/ALBUTEROL SULFATE 18-103MCG
3 AEROSOL WITH ADAPTER (GRAM) INHALATION EVERY 6 HOURS
Refills: 0 | Status: DISCONTINUED | OUTPATIENT
Start: 2022-10-03 | End: 2022-10-07

## 2022-10-03 RX ORDER — PIPERACILLIN AND TAZOBACTAM 4; .5 G/20ML; G/20ML
3.38 INJECTION, POWDER, LYOPHILIZED, FOR SOLUTION INTRAVENOUS ONCE
Refills: 0 | Status: COMPLETED | OUTPATIENT
Start: 2022-10-03 | End: 2022-10-03

## 2022-10-03 RX ORDER — METOPROLOL TARTRATE 50 MG
1 TABLET ORAL
Qty: 0 | Refills: 0 | DISCHARGE

## 2022-10-03 RX ORDER — SEVELAMER CARBONATE 2400 MG/1
800 POWDER, FOR SUSPENSION ORAL EVERY 8 HOURS
Refills: 0 | Status: DISCONTINUED | OUTPATIENT
Start: 2022-10-03 | End: 2022-10-07

## 2022-10-03 RX ORDER — ASPIRIN/CALCIUM CARB/MAGNESIUM 324 MG
81 TABLET ORAL DAILY
Refills: 0 | Status: DISCONTINUED | OUTPATIENT
Start: 2022-10-03 | End: 2022-10-07

## 2022-10-03 RX ORDER — VANCOMYCIN HCL 1 G
1000 VIAL (EA) INTRAVENOUS ONCE
Refills: 0 | Status: COMPLETED | OUTPATIENT
Start: 2022-10-03 | End: 2022-10-03

## 2022-10-03 RX ADMIN — Medication 250 MILLIGRAM(S): at 13:40

## 2022-10-03 RX ADMIN — ERYTHROPOIETIN 6000 UNIT(S): 10000 INJECTION, SOLUTION INTRAVENOUS; SUBCUTANEOUS at 17:46

## 2022-10-03 RX ADMIN — SEVELAMER CARBONATE 800 MILLIGRAM(S): 2400 POWDER, FOR SUSPENSION ORAL at 22:57

## 2022-10-03 RX ADMIN — Medication 975 MILLIGRAM(S): at 11:46

## 2022-10-03 RX ADMIN — PIPERACILLIN AND TAZOBACTAM 200 GRAM(S): 4; .5 INJECTION, POWDER, LYOPHILIZED, FOR SOLUTION INTRAVENOUS at 12:20

## 2022-10-03 RX ADMIN — Medication 3 MILLILITER(S): at 22:57

## 2022-10-03 RX ADMIN — ATORVASTATIN CALCIUM 40 MILLIGRAM(S): 80 TABLET, FILM COATED ORAL at 22:57

## 2022-10-03 RX ADMIN — Medication 100 MILLIGRAM(S): at 22:57

## 2022-10-03 NOTE — H&P ADULT - PROBLEM SELECTOR PLAN 3
/69 in ED.  Takes amlodipine 5mg, metoprolol succ ER 50mg, amiodarone 200mg at home (for afib and HFpEF, all also lower BP).    - continue  w/ home meds

## 2022-10-03 NOTE — H&P ADULT - NSHPPHYSICALEXAM_GEN_ALL_CORE
CONSTITUTIONAL:  Elderly appearing. Non-toxic; in no apparent distress  HEAD: Normocephalic; atraumatic  EYES: pupils equal; EOM intact   ENMT: External appears normal  NECK: Supple; non-tender  CARD: Normal S1, S2; no murmurs, rubs, or gallops  RESP: On NC, in no respiratory distress.  B/l crackles at BL lung bases. Decreased lung sounds at BL lung bases.   ABD: Soft, non-distended; non-tender  EXT: Normal ROM in all four extremities; non-tender to palpation. No peripheral edema.  L arm fistula well-healed.  SKIN: Warm, dry, no rash  NEURO:  No focal neurological deficiencies.

## 2022-10-03 NOTE — ED PROVIDER NOTE - PROGRESS NOTE DETAILS
Pt comfortable in ED on 3L NC. Sat 99%. CT shows loculated possibly infected effusion - given pts requirement for zosyn on last admission for PNA, will cover broadly w vanc/zosyn.  Nephrology contacted for HD today.  Thoracic surgery contacted for loculated effusion on CT.  Admitted to medicine. Signed out to KODY.

## 2022-10-03 NOTE — PATIENT PROFILE ADULT - NSPRESCRUSEDDRG_GEN_A_NUR
Infectious Disease Consult Note    Reason for Consult: PrEP   Date of Consultation: May 28, 2019        HPI:    Mr Tika Rosen is a 36year old gentleman with no significant medical/surgical hx here to discuss about PrEP. His  is a patient of mine with HIV and on treatment. Mr Tika Rosen would like to start PrEP. They are sexually active and recently  within last year. He denied any symptoms today and feels well. Denied hx of STI. Denied issues with taking medications if prescribed. 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,   · Tobacco: admits to smoking ( 2 packs over a week)   · Alcohol: drinks wine socially   · Illicit Drugs:denied   · Sexual History: MSM   · Travel History: [facility] recently   · Exposures:   · Outdoor/Hiking: denied  · Animal/Pet: Dogs at home   · Construction: denied  · Hot Tub: denied   · Brackish/stagnant exposure: denied  · TB exposure: denied  · Sick Contacts: denied     Allergies:   Allergies   Allergen Reactions    Pcn [Penicillins] Anaphylaxis and Swelling     eys and throat         Review of Systems:     Gen: Negative for chills, fevers, weight loss, weight gain   HEENT: Negative for headache, vision changes, ear ache or discharge, tingling,  nasal discharge, swelling, lumps in neck, sores on tongue   CV:  Negative for chest pain, dyspnea on exertion, leg edema   Lungs: Negative for shortness of breath, cough, wheezing   Abdomen: Negative for abdominal pain, nausea, vomiting, diarrhea, constipation   Genitourinary: Negative for genital pain or genital discharge     Neuro: Negative for headache, numbness, tingling, extremity weakness,  syncope, seizures    Skin: Negative for rash, sores/open wounds   Musculoskeletal: Negative for joint pain, joint swelling, joint erythema    Endocrine: Negative for high or low blood sugars, heat or cold intolerance    Psych: Negative for manic behavior     Medications:  Current Outpatient Medications on File Prior to Visit   Medication Sig Dispense Refill    fluticasone propionate (FLONASE ALLERGY RELIEF) 50 mcg/actuation nasal spray 2 Sprays by Both Nostrils route daily.  ipratropium (ATROVENT) 0.03 % nasal spray 2 Sprays by Both Nostrils route every twelve (12) hours. 30 mL 5     No current facility-administered medications on file prior to visit. Physical Exam:    · Vitals: reviewed bp up slightly   · GEN: NAD  · HEENT: Normocephalic, atraumatic, PERRL, no scleral icterus,  no cervical, no lymphadenopathy, no sinus tenderness, no thrush  · CV: S1, S2 heard regularly, no murmurs, thrills or rubs heard   · Lungs: Clear to auscultation bilaterally  · Abdomen: soft, non distended, non tender, no organomegaly appreciated, no CVA tenderness   · Genitourinary: no genital discharge, no conway  · Extremities: no edema  · Neuro: Alert, oriented to self,  place and situation, moves all extremities to commands, verbal   · Skin: no rash  · Psych: good affect, good eye contact, non tearful          Labs none recently     Micro none recently            Assessment / Plan:     Mr Bree Prajapati is a 36year old gentleman with no significant medical/surgical hx here to discuss about PrEP. His  is a patient of mine with HIV and on treatment and well controlled.       1) PrEP: discussed about PrEP, the commitment needed for labs every 3 months , Truvada medication, side effects, and long term effects on kidney/bone and compliance needed  D/W him that we would first need to check HIV VL, HIV screen, Hepatitis screen , RPR, Chlamydia, gonorrhea  Cannot use Truvada alone with HIV and rational for periodic testing to ensure no conversion   D/W about consistent condom use    Will check labs today and follow up shortly after for PrEP initiation based on testing No

## 2022-10-03 NOTE — ED ADULT NURSE NOTE - OBJECTIVE STATEMENT
Patient is a 66yo male reporting shortness of breath worsening since last night. Hx of T-cell lymphoma, currently in remission. Denies chest pain, fevers, abdominal pain. Speaking only in short sentences d/t shortness of breath.

## 2022-10-03 NOTE — PATIENT PROFILE ADULT - LAST TOBACCO USE (DD-MM-YY)
Consult requested by Dr. Bustos    Reason for consult - cholelithiasis    HPI:  Patient is a 51-year-old white female with a long-standing history of alcohol abuse who was recently admitted for alcoholic hepatitis with ascites. She was also noted to have some portal hypertension at that time. During that admission imaging also revealed a thickened gallbladder wall with stones. She reports a long standing history of right upper quadrant pain and bloating associated with high fat meals. He denies any nausea vomiting fevers or chills. She is now being referred for evaluation of her gallbladder. She appears inebriated on today's visit.    Past Medical History:   Diagnosis Date     Alcohol abuse 2013     Alcohol dependence 2013     Alcohol withdrawal 2013     Anxiety 10/10/2011     CKD (chronic kidney disease) stage 3, GFR 30-59 ml/min     last GFR was 42     CKD (chronic kidney disease) stage 3, GFR 30-59 ml/min 2011     Esophageal reflux 10/7/2002     Hypertension goal BP (blood pressure) < 130/80 2011     Int derangement knee NEC     ACL Internal derangement, knee/ original injury in 5th grade, torn cartilage     Moderate Depression [296.32] 2009    stable on wellbutrin     Pap smear     no abnormals, due for paps q 2-3 yrs     Unspecified essential hypertension      Past Surgical History:   Procedure Laterality Date     C  DELIVERY ONLY      , Low Cervical     C RMV,KIDNEY,DONOR,LIVING      donated kidney 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         Allergies   Allergen Reactions     Albuterol      Tongue \"hardened and painful\"     Social History   Substance Use Topics     Smoking status: Current Every Day Smoker     Packs/day: 0.50     Years: 10.00     Smokeless tobacco: Never Used      Comment: pt quit 1992 after smoking for 8 yrs, started again in 2004     Alcohol use No      Comment: stopped drinking 3/26/2017     Family History   Problem Relation Age of Onset     Hypertension Mother      HEART DISEASE Paternal Grandmother      HEART DISEASE Paternal Grandfather      CEREBROVASCULAR DISEASE Maternal Grandmother      CEREBROVASCULAR DISEASE Maternal Grandfather      Alcohol/Drug Maternal Grandfather      Substance Abuse Maternal Grandfather      HEART DISEASE Father      Silent MI stents x 2     DIABETES Sister 17     older sister also had kidney and pancreas transplant     HEART DISEASE Sister      MI secondary to diabetes     Genitourinary Problems Sister 43     kidney transplant from renal failure     DIABETES Sister 9     youngest, juvenile type I (onset age 9 with no complications)     CANCER Paternal Aunt      ?kind      ROS: 10 point ROS neg other than the symptoms noted above in the HPI.    PE:  B/P: Data Unavailable, T: 98, P: 101, R: Data Unavailable  General: well developed, well nourished WF who appears older than her stated age  HEENT: NC/AT, EOMI, (-)icterus, (-)injection  Neck: Supple, No JVD  Chest: CTA  Heart: S1, S2, (-)m/r/g  Abd: Soft, non distended,, RUQ tenderness to deep palpation.  Small ventral hernia - reducible  Ext; Warm, no edema  Psych: AAOx3, appears inebriated  Neuro: No focal deficits    Lab Results   Component Value Date    WBC 4.0 07/17/2017     Lab Results   Component Value Date    RBC 3.07 07/17/2017     Lab Results   Component Value Date    HGB 8.6 07/17/2017     Lab Results   Component Value Date    HCT 28.9 07/17/2017 "     No components found for: MCT  Lab Results   Component Value Date    MCV 94 07/17/2017     Lab Results   Component Value Date    MCH 28.0 07/17/2017     Lab Results   Component Value Date    MCHC 29.8 07/17/2017     Lab Results   Component Value Date    RDW 14.9 07/17/2017     Lab Results   Component Value Date    PLT 58 07/17/2017     Liver Function Studies -   Recent Labs   Lab Test  07/17/17   1119   PROTTOTAL  6.3*   ALBUMIN  3.0*   BILITOTAL  1.0   ALKPHOS  314*   AST  73*   ALT  36     Lipase - 617    ETOH - 0.04    U/S -   LIMITED ABDOMINAL ULTRASOUND 6/21/2017 9:32 AM      HISTORY: Other specified diseases of gallbladder.     COMPARISON: Ultrasound abdomen dated 3/26/2017 and CT abdomen and  pelvis dated 3/26/2017.     FINDINGS:   Gallbladder: 1.9 cm diameter calculus is again seen in the dependent  aspect of the gallbladder. There is posterior shadowing. Sludge is  also seen in the dependent aspect of the gallbladder. Gallbladder wall  is abnormally thickened at 0.8 cm. No pericholecystic fluid or  sonographic Gaston's sign.     Bile ducts: CHD is normal diameter. No intrahepatic biliary  dilatation.     Liver: There is diffuse fatty infiltration of the liver causing  increased echogenicity. No focal intrahepatic lesion or biliary ductal  dilatation.     Pancreas: Partially obscured but grossly unremarkable, where seen.      Right kidney: Normal.      Aorta and Proximal IVC: Normal.         IMPRESSION:   1. Cholelithiasis and abnormal thickening of the gallbladder wall are  again noted. No sonographic Gaston's sign to confirm cholecystitis.  Sludge is also seen in the dependent aspect of the gallbladder. These  findings were also present on the prior study.  2. Mild diffuse fatty infiltration of the liver.  3. No ascites is identified.     PRATIK LAKHANI MD      CT -   CT CHEST, ABDOMEN, PELVIS WITH CONTRAST 3/26/2017 7:59 PM     TECHNIQUE: Images from thoracic inlet to pubic symphysis 50 mL Isovue  370  IV contrast  Radiation dose for this scan was reduced using  automated exposure control, adjustment of the mA and/or kV according  to patient size, or iterative reconstruction technique.     HISTORY: Chronic cough, bilateral rhonchi, smoker, large epigastric  mass, jaundice.     COMPARISON: 3/17/2015 chest x-ray.     FINDINGS:   Chest:  No mediastinal, hilar, or axillary adenopathy. 0.4 x 0.6 cm  nodular density right lower lobe series 3 image 35 is indeterminate.  The lungs are otherwise clear.     Abdomen and Pelvis: Slightly heterogeneous liver with diffuse fatty  infiltration. Borderline splenomegaly 13 cm in length. The gallbladder  is distended approximately 7 x 4.5 x 5 cm in size. Multiple  gallstones. Gallbladder wall appears mildly thickened.     There is small midline ventral hernia at the level of the gallbladder  containing fat and small vessels. There is some collateral vessels  compatible with portal hypertension.     Left nephrectomy changes. Normal-appearing right kidney and adrenal  glands. Pancreas appears normal. There is mild upper abdominal fat  stranding, no periaortic or pelvic adenopathy.     No acute bowel abnormality, there is some fat deposition in the wall  of the right colon which is nonspecific, can be seen with chronic or  previous colitis. No aggressive bone lesions.         IMPRESSION:  1. Gallbladder mildly distended with gallstones, suspect mild  gallbladder wall thickening. Cholecystitis could have this appearance  and if clinically indicated nuclear medicine hepatobiliary scan may be  helpful for further evaluation.  2. Diffuse liver disease with fatty infiltration and findings  suggesting portal hypertension. Borderline splenomegaly and vascular  collaterals.  3. Left nephrectomy.  4. Indeterminate nodular density right lung base up to 0.6 cm.  Recommendations for an incidental lung nodule = or > 6mm to 8mm:    Low risk patients: Initial follow-up CT at 6-12 months,  then  consider CT at 18-24 months if no change.    High risk patients: Initial follow-up CT at 6-12 months, then CT at  18-24 months if no change.     *Low Risk: Minimal or absent history of smoking or other known risk  factors.  *Nonsolid (ground-glass) or partly solid nodules may require longer  follow-up to exclude indolent adenocarcinoma.  *Recommendations based on Guidelines for the Management of Incidental  Pulmonary Nodules Detected at CT: From the Fleischner Society 2017,  Radiology 2017.     JORDAN RAMOS MD    MRCP -   MR ABDOMEN MRCP WITHOUT AND WITH CONTRAST   3/27/2017 12:14 PM      HISTORY: Hyperbilirubinemia with thickened gallbladder wall on CT.     TECHNIQUE: Multiplanar, multiecho MRI was performed using T1, T2, and  in- and out-of-phase imaging. Pre- and postcontrast T1 images were  acquired after the administration of contrast IV (5 mL Gadavist ).  Multisignal, multiplanar imaging is also performed through the biliary  system.     COMPARISON: CT abdomen and pelvis dated 3/26/2017 and ultrasound of  the abdomen dated 3/26/2017.     FINDINGS: Signal intensity of the liver is mildly heterogeneous. No  focal mass lesion in the liver is seen. No enhancing lesions are  identified. Minimal signal dropout in the liver is noted and could  represent diffuse fatty infiltration of the liver. No intrahepatic  biliary ductal dilatation.     The common bile duct is grossly of normal caliber and tapers normally  in the head of the pancreas. No filling defects are seen in the common  bile duct or proper hepatic duct. Biliary ducts are somewhat limited  in evaluation.     Dependently layering low-signal intensity structures within the  gallbladder likely represents sludge. Calculi are considered less  likely. No obvious gallbladder wall thickening is seen. There is  minimal gallbladder wall enhancement.     Small amount of free fluid is seen around the liver and around the  proximal small bowel as well as in the  right paracolic gutter. Minimal  free fluid is also seen around the spleen. The spleen is mildly  enlarged and measures 12.5 x 7.7 x 12.9 cm in the AP, transverse, and  craniocaudal dimensions, respectively. Left kidney is absent.  Visualized portions of the upper abdominal contents are otherwise  unremarkable.         IMPRESSION:  1. No evidence for biliary ductal dilatation or intraluminal filling  defects within the common or proper hepatic duct to suggest  obstructive process. No mass in the head of the pancreas is seen.  2. Gallbladder contains dependently layering material likely  representing sludge, although calculi are also possible.  3. Small amount of free fluid in the perihepatic and perisplenic  locations as well as in the right upper abdomen and in the right  paracolic gutter. This is of uncertain clinical significance and  etiology.  4. Slightly decreased signal on the out-of-phase imaging within the  liver could represent diffuse fatty infiltration of liver. Minimal  irregularity of the liver contour is noted.  5. The spleen is mildly enlarged. Portal venous hypertension is not  excluded.  6. No other significant abnormalities identified.     PRATIK LAKHANI MD    Impression/plan:  This 51-year-old lady with a history of alcohol abuse, alcoholic hepatitis, portal hypertension, thrombocytopenia who also has chronic cholecystitis. I discussed these findings with the patient. These factors make her higher risk for an operative procedure due to bleeding complications.  I obtained labs today and  she also appears to have ETOH pancreatitis as her ETOH level was also elevated.   Her thrombocytopenia has worsened as well.  I view of the possible need for a blood bank and her underlying liver dz I feel it would be safer to have her Gallbladder removed at the [facility].      Andrea Escalante MD, FACS   20-Jan-2020

## 2022-10-03 NOTE — H&P ADULT - NSHPREVIEWOFSYSTEMS_GEN_ALL_CORE
CONSTITUTIONAL: No fever, + chills, no fatigue  EYES: No eye redness, no visual changes  ENT: No ear pain, no sore throat  CARDIOVASCULAR: No chest pain, no palpitations  RESPIRATORY: No cough, + SOB  GI: No abdominal pain, no nausea, no vomiting, no constipation, no diarrhea  GENITOURINARY: No dysuria, no frequency, no hematuria  MUSCULOSKELETAL: No back pain, no joint pain, no myalgias  SKIN: No rash, no peripheral edema  NEURO: No headache, no confusion

## 2022-10-03 NOTE — ED ADULT TRIAGE NOTE - OTHER COMPLAINTS
patient c/o SOB x last night with hx of t cell lymphoma and dialysis (MWF)-- denies fevers/chills-- +tachypneic +hypoxic

## 2022-10-03 NOTE — ED PROVIDER NOTE - PHYSICAL EXAMINATION
CONSTITUTIONAL:  Elderly appearing. Non-toxic; in no apparent distress  HEAD: Normocephalic; atraumatic  EYES: PERRL; EOM intact   ENMT: External appears normal  NECK: Supple; non-tender  CARD: Normal S1, S2; no murmurs, rubs, or gallops  RESP: Mild respiratory distress, tachypneic.  B/l crackles at R-lung base. Decreased lung sounds at R-lung base. No peripheral edema.  ABD: Soft, non-distended; non-tender  EXT: Normal ROM in all four extremities; non-tender to palpation  SKIN: Warm, dry, no rash  NEURO:  No focal neurological deficiencies. CONSTITUTIONAL:  Elderly appearing. Non-toxic; in no apparent distress  HEAD: Normocephalic; atraumatic  EYES: PERRL; EOM intact   ENMT: External appears normal  NECK: Supple; non-tender  CARD: Normal S1, S2; no murmurs, rubs, or gallops  RESP: Mild respiratory distress, tachypneic.  B/l crackles at R-lung base. Decreased lung sounds at R-lung base.   ABD: Soft, non-distended; non-tender  EXT: Normal ROM in all four extremities; non-tender to palpation. No peripheral edema.  SKIN: Warm, dry, no rash  NEURO:  No focal neurological deficiencies. CONSTITUTIONAL:  Elderly appearing. Non-toxic; in no apparent distress  HEAD: Normocephalic; atraumatic  EYES: PERRL; EOM intact   ENMT: External appears normal  NECK: Supple; non-tender  CARD: Normal S1, S2; no murmurs, rubs, or gallops  RESP: Mild respiratory distress, tachypneic.  B/l crackles at bl lung bases. Decreased lung sounds at BL lung bases.   ABD: Soft, non-distended; non-tender  EXT: Normal ROM in all four extremities; non-tender to palpation. No peripheral edema.  SKIN: Warm, dry, no rash  NEURO:  No focal neurological deficiencies.

## 2022-10-03 NOTE — H&P ADULT - HISTORY OF PRESENT ILLNESS
66 yo M w PMH CKD on HD (MWF follows Dr. Guadalupe), T cell lymphoma on chemo every other week ([facility] in [place]), Afib s/p watchmen procedure in 10/19/21 at [facility], HFpEF, mod PHtn, L VATS procedure 2 years ago, pleural effusion, pneumonia in March 2022, p/w shortness of breath since today (10/3/22). Pt found to be hypoxic at home to 72% on RA, placed on O2 via NC by wife and came to ED. Pt endorsed mild chills in ED initially. No chest pain. Speaking in full sentences in ED. C/o mild cough. No leg swelling, hemoptysis, exogenous estrogen, recent travel, surgery, personal or FHx of VTE. States similar symptoms to prior PNA in March.  Placed on 3L NC and breathing comfortably, endorsing feeling much better speaking in full sentences.   68 yo M w PMH CKD on HD (MWF follows Dr. Guadalupe), T cell lymphoma on chemo every other week ([facility]. [facility] in [place]), Afib s/p watchmen procedure in 10/19/21 at [facility], HFpEF, mod PHTN, L VATS procedure 2 years ago, pleural effusion, pneumonia in March 2022, p/w shortness of breath since today (10/3/22). Pt found to be hypoxic at home to 72% on RA, placed on O2 via NC by wife and came to ED. Pt endorsed mild chills in ED initially. No chest pain. Speaking in full sentences in ED. C/o mild cough. No leg swelling, hemoptysis, exogenous estrogen, recent travel, surgery, personal or FHx of VTE. States similar symptoms to prior PNA in March.  Placed on 3L NC and breathing comfortably, endorsing feeling much better speaking in full sentences.      ED VS: T 98.5 (oral), HR 72, /69, RR 26, 78% on RA--> 2L NC: RR 22, 99%  ED Labs: WBC 7.24, Hgb 10.2, D-Dimer 516, Cr 8.48, AlkPhos 196, GFR 6, Trop T 0.06, BNP <45615, pH venous 7.47, CO2 venous 37.2, pCo2 venous 49, pO2 venous <33, HCO3 venous 36, O2 sat venous 20.4  ED Imaging: CXR, CT Chest - loculated possibly infected effusion  ED Interventions:  Tylenol 975mg, Zosyn 3.375g, vancomycin 1g

## 2022-10-03 NOTE — ED PROVIDER NOTE - CLINICAL SUMMARY MEDICAL DECISION MAKING FREE TEXT BOX
Pt with SOB, acute onset today. States similar symptoms to prior PNA. No reported fever, however, does have subjective chills in ED. Will perform CXR +/- CT to r/o PNA vs effusion. Age adjusted D-Dimer negative (516). Pt to receive dialysis. Will also check electrolyte abnormalities and consider dialysis if necessary. Will also consider dialysis for fluid overload, however, pt does not appear clinically fluid overloaded. Breathing comfortably on 3L nasal canula and states he feels much better. Speaking in full sentences, HD stable and will reach out to patients' PMD, Dr. Johnson. Pt with SOB, acute onset today. States similar symptoms to prior PNA. No reported fever, however, does have subjective chills in ED. Will perform CXR +/- CT to r/o PNA vs effusion. Age adjusted D-Dimer negative (516). Pt to receive dialysis today. Will also check electrolyte abnormalities and consider if emergency dialysis necessary. Will also consider dialysis for fluid overload, however pt does not appear clinically fluid overloaded mary kay. Breathing comfortably on 3L nasal canula and states he feels much better. Speaking in full sentences, HD stable and will reach out to patients' PMD, Dr. Johnson.

## 2022-10-03 NOTE — ED PROVIDER NOTE - NS ED ROS FT
CONSTITUTIONAL: No fever, + chills, no fatigue  EYES: No eye redness, no visual changes  ENT: No ear pain, no sore throat  CARDIOVASCULAR: No chest pain, no palpitations  RESPIRATORY: No cough, + SOB  GI: No abdominal pain, no nausea, no vomiting, no constipation, no diarrhea  GENITOURINARY: No dysuria, no frequency, no hematuria  MUSCULOSKELETAL: No backpain, no joint pain, no myalgias  SKIN: No rash, no peripheral edema  NEURO: No headache, no confusion    ALL OTHER SYSTEMS NEGATIVE.

## 2022-10-03 NOTE — H&P ADULT - PROBLEM SELECTOR PLAN 1
Pt with SOB, acute onset today. States similar symptoms to prior PNA in March. No leukocytosis, no reported fever, however, does have subjective chills in ED. Will perform CXR +/- CT to r/o PNA vs effusion. Age adjusted D-Dimer negative (516).  Initially RR 26, 78%, improved ease of breathing on 3L NC: RR 22, 99%.     - CT Chest showed: "resolved left lower lobe pneumonia; new bilateral pulmonary opacities infectious/inflammatory; less likely atypical pulmonary edema; small right pleural effusion, now loculated; thickening of the right pleural lining, nonspecific, can be seen in setting of infected effusion."    - on 3L NC in ED breathing with ease  - s/p Zosyn 3.375g + vancomycin 1g in ED  - c/w Zosyn + vancomycin (required broad coverage on March admission for PNA)  - Thoracic surgery contacted for loculated effusion on CT

## 2022-10-03 NOTE — PROGRESS NOTE ADULT - SUBJECTIVE AND OBJECTIVE BOX
Hemoglobin: 10.2 g/dL (10-03-22 @ 09:42)    Albumin, Serum: 3.9 g/dL (10-03-22 @ 09:42)    T(C): 36.5 (10-03-22 @ 14:42), Max: 36.9 (10-03-22 @ 09:23)  HR: 65 (10-03-22 @ 14:42) (65 - 74)  BP: 125/71 (10-03-22 @ 14:42) (124/69 - 145/75)  RR: 18 (10-03-22 @ :42) (18 - 26)  SpO2: 95% (10-03-22 @ 14:42) (78% - 100%)  epoetin donny-epbx (RETACRIT) Injectable 6000 Unit(s) IV Push once, 10-03-22 @ 12:55, Routine, Stop order after: 1 Doses      Hemodialysis Treatment.:     Schedule: Once, Modality: Hemodialysis, Access: Arteriovenous Fistula    Dialyzer: Optiflux [id number], Time: 180 Min    Blood Flow: 400 mL/Min , Dialysate Flow: 500 mL/Min, Dialysate Temp: 36.5, Tubinmm (Adult)    Target Dry Weight: 67 kG    Dialysate Electrolytes (mEq/L): Potassium 3, Calcium 2.5, Sodium 138, Bicarbonate 35 (10-03-22 @ 12:55) [Active]    Weighed upon arrival to HD below dry weight at 66.5kg, will try for 2L of UF. SBP stable 126

## 2022-10-03 NOTE — CONSULT NOTE ADULT - SUBJECTIVE AND OBJECTIVE BOX
HPI:   67M with pmhx of ESRD (MWF Via LUE AVF), Lymphoma on weekly chemo, Afibb, HTn who presented to the ER in the setting of difficulty breathing. Pt with last HD on Friday and states he tolerated it well without any issues. Over weekend no new acute symptoms, but yesterday endorses having some SOB and chest tightness which made him come to the ER. Normal HD sessions typically 3 hrs, DW of 67Kg.  Nephrology consulted for dialysis.     PAST MEDICAL & SURGICAL HISTORY:  HTN (hypertension)      Atrial fibrillation      CKD (chronic kidney disease)      Lymphoma  t cell; remission since 2020      CHF, chronic  LVEF 65 PASP 59 midl MR, AR on 21      H/O pulmonary hypertension      Gout      BPH (benign prostatic hyperplasia)      COPD, mild      Mitral regurgitation      History of cholecystectomy      Elective surgery  rectal surgery      Allergies:  No Known Allergies      Home Medications:   vancomycin  IVPB 1000 milliGRAM(s) IV Intermittent once      Hospital Medications:   MEDICATIONS  (STANDING):  vancomycin  IVPB 1000 milliGRAM(s) IV Intermittent once      SOCIAL HISTORY:  Denies ETOh, Smoking,     Family History:  FAMILY HISTORY:  FH: type 2 diabetes (Mother)          VITALS:  T(F): 98.5 (10-03-22 @ 09:23), Max: 98.5 (10-03-22 @ 09:23)  HR: 74 (10-03-22 @ 09:36)  BP: 145/75 (10-03-22 @ 09:36)  RR: 20 (10-03-22 @ 09:36)  SpO2: 100% (10-03-22 @ 09:36)  Wt(kg): --    Height (cm): 180.3 (10-03 @ 09:23)  Weight (kg): 68 (10-03 @ 09:23)  BMI (kg/m2): 20.9 (10-03 @ 09:23)  BSA (m2): 1.87 (10-03 @ 09:23)  CAPILLARY BLOOD GLUCOSE    Review of Systems:  ROS negative except as per HPI    PHYSICAL EXAM:  GENERAL: Alert, awake, oriented x3 on NC  HEENT: SARAI, EOMI, neck supple, no JVP  CHEST/LUNG: decreased breath sounds bilaterally  HEART: Regular rate and rhythm, no murmur, no gallops, no rub   ABDOMEN: Soft, nontender, non distended  EXTREMITIES: no pedal edema  Neurology: AAOx3, no asterixis   SKIN: No rash or skin lesion   ACCESS: BAME JOSEF w/bruit and thrill     LABS:  10-03    140  |  94<L>  |  25<H>  ----------------------------<  81  3.8   |  32<H>  |  8.48<H>    Ca    9.4      03 Oct 2022 09:42    TPro  7.6  /  Alb  3.9  /  TBili  0.7  /  DBili      /  AST  13  /  ALT  <5<L>  /  AlkPhos  196<H>  10-03    Creatinine Trend: 8.48 <--                        10.2   7.24  )-----------( 209      ( 03 Oct 2022 09:42 )             33.2     Urine Studies:  Urinalysis Basic - ( 03 Oct 2022 12:20 )    Color: Yellow / Appearance: Cloudy / S.020 / pH:   Gluc:  / Ketone: NEGATIVE  / Bili: Negative / Urobili: 0.2 E.U./dL   Blood:  / Protein: 100 mg/dL / Nitrite: NEGATIVE   Leuk Esterase: Moderate / RBC: < 5 /HPF / WBC Many /HPF   Sq Epi:  / Non Sq Epi: 0-5 /HPF / Bacteria: Present /HPF

## 2022-10-03 NOTE — CONSULT NOTE ADULT - ASSESSMENT
67M with pmhx of ESRD (MWF Via LUE AVF), Lymphoma on weekly chemo, Afibb, HTn who presented to the ER in the setting of difficulty breathing found to have loculated effusion vs new PNA    Assessment/Plan:   #ESRD on HD MWF @[address] [place] [address] [facility]   usual Rx 3h EDW 67kg  -HD today as per usual  -electrolytes noted at goal   -UF w/ HD    #HTN   BP at goal   Continue  w/ home meds    #access   LUE AVF functional     #anemia  Hb at goal   Epo w/ HD  transfusion as per primary team     #renal bone disease   Ca ~9.4  Trend phos daily    Thank you for the opportunity to participate in the care of your patient. The nephrology service remains available to assist with any questions or concerns. Please feel free to reach us by paging the on-call nephrology fellow for urgent issues or as below.     Janna Deleon D.O.  PGY-5, Nephrology Fellow    P: [phone]

## 2022-10-03 NOTE — ED PROVIDER NOTE - OBJECTIVE STATEMENT
68 yo M w PMH CKD on HD (MWF follows Dr. Guadalupe), T cell lymphoma on chemo every other week ([facility] in [place]), Afib s/p watchmen procedure in 10/19/21 at [facility], HFpEF, mod PHtn, L VATS procedure 2 years ago, chronic bl pleural effusions, pneumonia in March 2022, p/w shortness of breath since today. Pt found to be hypoxic at home to 72% on RA, placed on O2 via NC by wife and came to ED. Pt states he has mild chills. No chest pain. Speaking in full sentences in ED. C/o mild cough for past hour. No leg swelling, cough, hemoptysis, exogenous estrogen, recent travel, surgery, personal or FHx of VTE. He last received HD 3 days ago. 68 yo M w PMH CKD on HD (MWF follows Dr. Guadalupe), T cell lymphoma on chemo every other week (Dr. [facility]ie Lacey [facility] clinic in [place]), Afib s/p watchmen procedure in 10/19/21 at [facility], HFpEF, mod PHtn, L VATS procedure 2 years ago, chronic pleural effusions, pneumonia in March 2022, p/w shortness of breath since today. Pt found to be hypoxic at home to 72% on RA, placed on O2 via NC by wife and came to ED. Pt states he has mild chills. No chest pain. Speaking in full sentences in ED. C/o mild cough for past hour. No leg swelling, cough, hemoptysis, exogenous estrogen, recent travel, surgery, personal or FHx of VTE. He last received HD 3 days ago. 68 yo M w PMH CKD on HD (MWF follows Dr. Guadalupe), T cell lymphoma on chemo every other week (Dr. [facility]ie Lacey [facility] clinic in [place]), Afib s/p watchmen procedure in 10/19/21 at [facility], HFpEF, mod PHtn, L VATS procedure 2 years ago, pleural effusion, pneumonia in March 2022, p/w shortness of breath since today. Pt found to be hypoxic at home to 72% on RA, placed on O2 via NC by wife and came to ED. Pt states he has mild chills. No chest pain. Speaking in full sentences in ED. C/o mild cough for past hour. No leg swelling, cough, hemoptysis, exogenous estrogen, recent travel, surgery, personal or FHx of VTE. He last received HD 3 days ago.

## 2022-10-03 NOTE — H&P ADULT - PROBLEM SELECTOR PLAN 2
Receives HD MWF, follows Dr. Guadalupe.  Pt to receive dialysis today (10/3). Pt does not appear clinically fluid overloaded.    Renal consulted:  - on HD MWF @[address] [place] [address] [facility] Center   - HD today as per usual  - electrolytes noted at goal   - UF w/ HD  - trend phos daily

## 2022-10-03 NOTE — PATIENT PROFILE ADULT - FALL HARM RISK - HARM RISK INTERVENTIONS
Assistance with ambulation/Assistance OOB with selected safe patient handling equipment/Communicate Risk of Fall with Harm to all staff/Discuss with provider need for PT consult/Monitor gait and stability/Provide patient with walking aids - walker, cane, crutches/Reinforce activity limits and safety measures with patient and family/Sit up slowly, dangle for a short time, stand at bedside before walking/Tailored Fall Risk Interventions/Visual Cue: Yellow wristband and red socks/Bed in lowest position, wheels locked, appropriate side rails in place/Call bell, personal items and telephone in reach/Instruct patient to call for assistance before getting out of bed or chair/Non-slip footwear when patient is out of bed/[place] to call system/Physically safe environment - no spills, clutter or unnecessary equipment/Purposeful Proactive Rounding/Room/bathroom lighting operational, light cord in reach

## 2022-10-03 NOTE — H&P ADULT - TIME BILLING
Patient seen and examined with house-staff during bedside rounds.  Resident note read, including vitals, physical findings, laboratory data, and radiological reports.   Revisions included below.  Direct personal management at bed side and extensive interpretation of the data.  Plan was outlined and discussed in details with the housestaff.  Decision making of high complexity  Action taken for acute disease activity to reflect the level of care provided:  - medication reconciliation  - review laboratory data  The patient had an episode of chest tightness and cough.  The patient is stable at this point.  There is no clinical evidence of acute coronary syndrome.  The chest CT scan revealed loculated small pleural effusion resolution left lower lobe infiltrate and bilateral groundglass opacity.  I doubt the picture is consistent with amiodarone pneumonitis.  Possibility of underlying infectious process.  The patient was started on Zosyn and given 1 dose of vancomycin.  Stop bronchodilator.  Hold on steroid at the patient is improved.  The patient might have mild exacerbation of COPD.

## 2022-10-03 NOTE — ED PROVIDER NOTE - CARE PLAN
1 Principal Discharge DX:	Pneumonia  Secondary Diagnosis:	Pleural effusion  Secondary Diagnosis:	Hypoxia

## 2022-10-03 NOTE — H&P ADULT - NSHPLABSRESULTS_GEN_ALL_CORE
LABS:                       10.2   7.24  )-----------( 209      ( 03 Oct 2022 09:42 )             33.2     10-03    140  |  94<L>  |  25<H>  ----------------------------<  81  3.8   |  32<H>  |  8.48<H>    Ca    9.4      03 Oct 2022 09:42    TPro  7.6  /  Alb  3.9  /  TBili  0.7  /  DBili  x   /  AST  13  /  ALT  <5<L>  /  AlkPhos  196<H>  10-03

## 2022-10-03 NOTE — ED PROVIDER NOTE - WR ORDER DATE AND TIME 1
Problem: Patient Care Overview  Goal: Plan of Care Review  Outcome: Ongoing (interventions implemented as appropriate)  Flowsheets (Taken 3/22/2020 5536)  Progress: improving  Plan of Care Reviewed With: patient  Outcome Summary: VSS. With very mid upper abdominal  pain. Not medicated. Zofran given once for nausea. Up ad trina. Voided freely.  IV Fluids on flow. NPO from midnight.  Slept well.       03-Oct-2022 09:26

## 2022-10-03 NOTE — ED ADULT TRIAGE NOTE - BANDS:
well developed, well nourished , in no acute distress , ambulating without difficulty , normal communication ability Do Not Use Extremity;

## 2022-10-03 NOTE — H&P ADULT - ASSESSMENT
67M with pmhx of ESRD (MWF Via LUE AVF), Lymphoma on weekly chemo, Afib, HTN presenting with difficulty breathing found to have loculated effusion vs new PNA.

## 2022-10-04 DIAGNOSIS — J44.1 CHRONIC OBSTRUCTIVE PULMONARY DISEASE WITH (ACUTE) EXACERBATION: ICD-10-CM

## 2022-10-04 LAB
A1C WITH ESTIMATED AVERAGE GLUCOSE RESULT: 4.7 % — SIGNIFICANT CHANGE UP (ref 4–5.6)
ALBUMIN SERPL ELPH-MCNC: 3.1 G/DL — LOW (ref 3.3–5)
ALP SERPL-CCNC: 163 U/L — HIGH (ref 40–120)
ALT FLD-CCNC: <5 U/L — LOW (ref 10–45)
ANION GAP SERPL CALC-SCNC: 12 MMOL/L — SIGNIFICANT CHANGE UP (ref 5–17)
ANISOCYTOSIS BLD QL: SLIGHT — SIGNIFICANT CHANGE UP
AST SERPL-CCNC: 12 U/L — SIGNIFICANT CHANGE UP (ref 10–40)
BASOPHILS # BLD AUTO: 0 K/UL — SIGNIFICANT CHANGE UP (ref 0–0.2)
BASOPHILS NFR BLD AUTO: 0 % — SIGNIFICANT CHANGE UP (ref 0–2)
BILIRUB SERPL-MCNC: 0.6 MG/DL — SIGNIFICANT CHANGE UP (ref 0.2–1.2)
BUN SERPL-MCNC: 14 MG/DL — SIGNIFICANT CHANGE UP (ref 7–23)
CALCIUM SERPL-MCNC: 8.9 MG/DL — SIGNIFICANT CHANGE UP (ref 8.4–10.5)
CHLORIDE SERPL-SCNC: 96 MMOL/L — SIGNIFICANT CHANGE UP (ref 96–108)
CO2 SERPL-SCNC: 31 MMOL/L — SIGNIFICANT CHANGE UP (ref 22–31)
CREAT SERPL-MCNC: 5.31 MG/DL — HIGH (ref 0.5–1.3)
CULTURE RESULTS: SIGNIFICANT CHANGE UP
DACRYOCYTES BLD QL SMEAR: SLIGHT — SIGNIFICANT CHANGE UP
EGFR: 11 ML/MIN/1.73M2 — LOW
EOSINOPHIL # BLD AUTO: 0.23 K/UL — SIGNIFICANT CHANGE UP (ref 0–0.5)
EOSINOPHIL NFR BLD AUTO: 5.2 % — SIGNIFICANT CHANGE UP (ref 0–6)
ESTIMATED AVERAGE GLUCOSE: 88 MG/DL — SIGNIFICANT CHANGE UP (ref 68–114)
GIANT PLATELETS BLD QL SMEAR: PRESENT — SIGNIFICANT CHANGE UP
GLUCOSE BLDC GLUCOMTR-MCNC: 127 MG/DL — HIGH (ref 70–99)
GLUCOSE BLDC GLUCOMTR-MCNC: 38 MG/DL — CRITICAL LOW (ref 70–99)
GLUCOSE BLDC GLUCOMTR-MCNC: 40 MG/DL — CRITICAL LOW (ref 70–99)
GLUCOSE BLDC GLUCOMTR-MCNC: 51 MG/DL — CRITICAL LOW (ref 70–99)
GLUCOSE BLDC GLUCOMTR-MCNC: 64 MG/DL — LOW (ref 70–99)
GLUCOSE BLDC GLUCOMTR-MCNC: 70 MG/DL — SIGNIFICANT CHANGE UP (ref 70–99)
GLUCOSE BLDC GLUCOMTR-MCNC: 76 MG/DL — SIGNIFICANT CHANGE UP (ref 70–99)
GLUCOSE BLDC GLUCOMTR-MCNC: 82 MG/DL — SIGNIFICANT CHANGE UP (ref 70–99)
GLUCOSE BLDC GLUCOMTR-MCNC: 87 MG/DL — SIGNIFICANT CHANGE UP (ref 70–99)
GLUCOSE SERPL-MCNC: 48 MG/DL — CRITICAL LOW (ref 70–99)
HCT VFR BLD CALC: 30.7 % — LOW (ref 39–50)
HGB BLD-MCNC: 9.1 G/DL — LOW (ref 13–17)
HYPOCHROMIA BLD QL: SIGNIFICANT CHANGE UP
LYMPHOCYTES # BLD AUTO: 0.98 K/UL — LOW (ref 1–3.3)
LYMPHOCYTES # BLD AUTO: 22.6 % — SIGNIFICANT CHANGE UP (ref 13–44)
MACROCYTES BLD QL: SLIGHT — SIGNIFICANT CHANGE UP
MAGNESIUM SERPL-MCNC: 2 MG/DL — SIGNIFICANT CHANGE UP (ref 1.6–2.6)
MANUAL SMEAR VERIFICATION: SIGNIFICANT CHANGE UP
MCHC RBC-ENTMCNC: 27.6 PG — SIGNIFICANT CHANGE UP (ref 27–34)
MCHC RBC-ENTMCNC: 29.6 GM/DL — LOW (ref 32–36)
MCV RBC AUTO: 93 FL — SIGNIFICANT CHANGE UP (ref 80–100)
MICROCYTES BLD QL: SLIGHT — SIGNIFICANT CHANGE UP
MONOCYTES # BLD AUTO: 0.53 K/UL — SIGNIFICANT CHANGE UP (ref 0–0.9)
MONOCYTES NFR BLD AUTO: 12.2 % — SIGNIFICANT CHANGE UP (ref 2–14)
NEUTROPHILS # BLD AUTO: 2.61 K/UL — SIGNIFICANT CHANGE UP (ref 1.8–7.4)
NEUTROPHILS NFR BLD AUTO: 60 % — SIGNIFICANT CHANGE UP (ref 43–77)
OVALOCYTES BLD QL SMEAR: SLIGHT — SIGNIFICANT CHANGE UP
PHOSPHATE SERPL-MCNC: 2.9 MG/DL — SIGNIFICANT CHANGE UP (ref 2.5–4.5)
PLAT MORPH BLD: ABNORMAL
PLATELET # BLD AUTO: 182 K/UL — SIGNIFICANT CHANGE UP (ref 150–400)
POIKILOCYTOSIS BLD QL AUTO: SLIGHT — SIGNIFICANT CHANGE UP
POLYCHROMASIA BLD QL SMEAR: SLIGHT — SIGNIFICANT CHANGE UP
POTASSIUM SERPL-MCNC: 3.4 MMOL/L — LOW (ref 3.5–5.3)
POTASSIUM SERPL-SCNC: 3.4 MMOL/L — LOW (ref 3.5–5.3)
PROT SERPL-MCNC: 6.6 G/DL — SIGNIFICANT CHANGE UP (ref 6–8.3)
RBC # BLD: 3.3 M/UL — LOW (ref 4.2–5.8)
RBC # FLD: 17.1 % — HIGH (ref 10.3–14.5)
RBC BLD AUTO: ABNORMAL
ROULEAUX BLD QL SMEAR: PRESENT
SODIUM SERPL-SCNC: 139 MMOL/L — SIGNIFICANT CHANGE UP (ref 135–145)
SPECIMEN SOURCE: SIGNIFICANT CHANGE UP
TARGETS BLD QL SMEAR: SLIGHT — SIGNIFICANT CHANGE UP
WBC # BLD: 4.35 K/UL — SIGNIFICANT CHANGE UP (ref 3.8–10.5)
WBC # FLD AUTO: 4.35 K/UL — SIGNIFICANT CHANGE UP (ref 3.8–10.5)

## 2022-10-04 PROCEDURE — 99232 SBSQ HOSP IP/OBS MODERATE 35: CPT | Mod: 25

## 2022-10-04 PROCEDURE — 99232 SBSQ HOSP IP/OBS MODERATE 35: CPT | Mod: GC

## 2022-10-04 PROCEDURE — 93306 TTE W/DOPPLER COMPLETE: CPT | Mod: 26

## 2022-10-04 RX ORDER — TIOTROPIUM BROMIDE AND OLODATEROL 3.124; 2.736 UG/1; UG/1
2 SPRAY, METERED RESPIRATORY (INHALATION) DAILY
Refills: 0 | Status: DISCONTINUED | OUTPATIENT
Start: 2022-10-04 | End: 2022-10-07

## 2022-10-04 RX ADMIN — AMLODIPINE BESYLATE 5 MILLIGRAM(S): 2.5 TABLET ORAL at 06:13

## 2022-10-04 RX ADMIN — PIPERACILLIN AND TAZOBACTAM 25 GRAM(S): 4; .5 INJECTION, POWDER, LYOPHILIZED, FOR SOLUTION INTRAVENOUS at 23:12

## 2022-10-04 RX ADMIN — SEVELAMER CARBONATE 800 MILLIGRAM(S): 2400 POWDER, FOR SUSPENSION ORAL at 14:04

## 2022-10-04 RX ADMIN — SEVELAMER CARBONATE 800 MILLIGRAM(S): 2400 POWDER, FOR SUSPENSION ORAL at 22:10

## 2022-10-04 RX ADMIN — Medication 3 MILLILITER(S): at 13:08

## 2022-10-04 RX ADMIN — Medication 81 MILLIGRAM(S): at 13:08

## 2022-10-04 RX ADMIN — Medication 3 MILLILITER(S): at 06:15

## 2022-10-04 RX ADMIN — ATORVASTATIN CALCIUM 40 MILLIGRAM(S): 80 TABLET, FILM COATED ORAL at 22:10

## 2022-10-04 RX ADMIN — Medication 50 MILLIGRAM(S): at 06:13

## 2022-10-04 RX ADMIN — AMIODARONE HYDROCHLORIDE 200 MILLIGRAM(S): 400 TABLET ORAL at 06:13

## 2022-10-04 RX ADMIN — SEVELAMER CARBONATE 800 MILLIGRAM(S): 2400 POWDER, FOR SUSPENSION ORAL at 06:13

## 2022-10-04 RX ADMIN — Medication 100 MILLIGRAM(S): at 17:37

## 2022-10-04 RX ADMIN — PIPERACILLIN AND TAZOBACTAM 25 GRAM(S): 4; .5 INJECTION, POWDER, LYOPHILIZED, FOR SOLUTION INTRAVENOUS at 13:08

## 2022-10-04 RX ADMIN — Medication 100 MILLIGRAM(S): at 06:13

## 2022-10-04 RX ADMIN — Medication 3 MILLILITER(S): at 22:10

## 2022-10-04 RX ADMIN — Medication 3 MILLILITER(S): at 17:37

## 2022-10-04 RX ADMIN — PIPERACILLIN AND TAZOBACTAM 25 GRAM(S): 4; .5 INJECTION, POWDER, LYOPHILIZED, FOR SOLUTION INTRAVENOUS at 00:22

## 2022-10-04 NOTE — PROGRESS NOTE ADULT - SUBJECTIVE AND OBJECTIVE BOX
--------------------------------------------------------------------------------  Chief Complaint: ESRD/Ongoing hemodialysis requirement    24 hour events/subjective:  Seen this morning, tolerated HD well. Was told by primary team maybe DC tomorrow.   Noted to have low blood sugar this morning, skipped dinner last night. Encouraged to eat well today.      PAST HISTORY  --------------------------------------------------------------------------------  No significant changes to PMH, PSH, FHx, SHx, unless otherwise noted    ALLERGIES & MEDICATIONS  --------------------------------------------------------------------------------  Allergies    No Known Allergies    Intolerances      Standing Inpatient Medications  albuterol/ipratropium for Nebulization 3 milliLiter(s) Nebulizer every 6 hours  allopurinol 100 milliGRAM(s) Oral two times a day  aMIOdarone    Tablet 200 milliGRAM(s) Oral daily  amLODIPine   Tablet 5 milliGRAM(s) Oral daily  aspirin enteric coated 81 milliGRAM(s) Oral daily  atorvastatin 40 milliGRAM(s) Oral at bedtime  metoprolol succinate ER 50 milliGRAM(s) Oral daily  piperacillin/tazobactam IVPB.. 3.375 Gram(s) IV Intermittent every 12 hours  sevelamer carbonate 800 milliGRAM(s) Oral every 8 hours    PRN Inpatient Medications      REVIEW OF SYSTEMS  --------------------------------------------------------------------------------  All other systems were reviewed and are negative, except as noted.    VITALS/PHYSICAL EXAM  --------------------------------------------------------------------------------  T(C): 36.9 (10-04-22 @ 06:13), Max: 36.9 (10-04-22 @ 06:13)  HR: 59 (10-04-22 @ 06:13) (59 - 66)  BP: 120/68 (10-04-22 @ 06:13) (120/68 - 134/79)  RR: 18 (10-04-22 @ 06:13) (18 - 19)  SpO2: 95% (10-04-22 @ 06:13) (93% - 98%)  Wt(kg): --  Drug Dosing Weight  Height (cm): 180.3 (03 Oct 2022 09:23)  Weight (kg): 68 (03 Oct 2022 09:23)  BMI (kg/m2): 20.9 (03 Oct 2022 09:23)  BSA (m2): 1.87 (03 Oct 2022 09:23)  Height (cm): 180.3 (10-03-22 @ 09:23)  Weight (kg): 68 (10-03-22 @ 09:23)  BMI (kg/m2): 20.9 (10-03-22 @ 09:23)  BSA (m2): 1.87 (10-03-22 @ 09:23)      10-03-22 @ 07:01  -  10-04-22 @ 07:00  --------------------------------------------------------  IN: 400 mL / OUT: 2400 mL / NET: -2000 mL    PHYSICAL EXAM:  GENERAL: Alert, awake, oriented x3 on NC  HEENT: SARAI, EOMI, neck supple, no JVP  CHEST/LUNG: decreased breath sounds bilaterally  HEART: Regular rate and rhythm, no murmur, no gallops, no rub   ABDOMEN: Soft, nontender, non distended  EXTREMITIES: no pedal edema  Neurology: AAOx3, no asterixis   SKIN: No rash or skin lesion   ACCESS: SUSAN KAYE w/bruchance and marty     LABS/STUDIES  --------------------------------------------------------------------------------              9.1    4.35  >-----------<  182      [10-04-22 @ 05:30]              30.7     139  |  96  |  14  ----------------------------<  48      [10-04-22 @ 05:30]  3.4   |  31  |  5.31        Ca     8.9     [10-04-22 @ 05:30]      Mg     2.0     [10-04-22 @ 05:30]      Phos  2.9     [10-04-22 @ 05:30]    TPro  6.6  /  Alb  3.1  /  TBili  0.6  /  DBili  x   /  AST  12  /  ALT  <5  /  AlkPhos  163  [10-04-22 @ 05:30]        Troponin 0.06      [10-03-22 @ 09:42]    TSH 1.040      [10-19-21 @ 09:05]    HBsAg Nonreact      [10-03-22 @ 15:04]  HCV 0.04, Nonreact      [10-03-22 @ 15:04]      RADIOLOGY:  --------------------------------------------------------------------------------------

## 2022-10-04 NOTE — DIETITIAN INITIAL EVALUATION ADULT - ADD RECOMMEND
1. Continue current DASH diet +Ensure Max x1/day (150kcal, 30g protein/can)  2. Monitor po intake and trend weekly weight   3. Monitor labs, skin and GI distress   4. Pain and bowel regimen per macario   5. Encourage adherence to diet order   6. Diet edu reinforcement prn

## 2022-10-04 NOTE — PROGRESS NOTE ADULT - SUBJECTIVE AND OBJECTIVE BOX
OVERNIGHT EVENTS:    SUBJECTIVE / INTERVAL HPI: Patient seen and examined at bedside. No complaints at this time. Patient denies: fever, chills, dizziness, weakness, HA, Changes in vision, CP, palpitations, SOB, cough, N/V/D/C, dysuria, changes in bowel movements, LE edema. ROS otherwise negative.    VITAL SIGNS:  Vital Signs Last 24 Hrs  T(C): 36.9 (04 Oct 2022 06:13), Max: 36.9 (03 Oct 2022 09:23)  T(F): 98.5 (04 Oct 2022 06:13), Max: 98.5 (03 Oct 2022 09:23)  HR: 59 (04 Oct 2022 06:13) (59 - 74)  BP: 120/68 (04 Oct 2022 06:13) (120/68 - 145/75)  BP(mean): --  RR: 18 (04 Oct 2022 06:13) (18 - 26)  SpO2: 95% (04 Oct 2022 06:13) (78% - 100%)    Parameters below as of 04 Oct 2022 06:13  Patient On (Oxygen Delivery Method): nasal cannula  O2 Flow (L/min): 3      PHYSICAL EXAM:  [id number]      MEDICATIONS:  MEDICATIONS  (STANDING):  albuterol/ipratropium for Nebulization 3 milliLiter(s) Nebulizer every 6 hours  allopurinol 100 milliGRAM(s) Oral two times a day  aMIOdarone    Tablet 200 milliGRAM(s) Oral daily  amLODIPine   Tablet 5 milliGRAM(s) Oral daily  aspirin enteric coated 81 milliGRAM(s) Oral daily  atorvastatin 40 milliGRAM(s) Oral at bedtime  metoprolol succinate ER 50 milliGRAM(s) Oral daily  piperacillin/tazobactam IVPB.. 3.375 Gram(s) IV Intermittent every 12 hours  sevelamer carbonate 800 milliGRAM(s) Oral every 8 hours    MEDICATIONS  (PRN):      ALLERGIES:  Allergies    No Known Allergies    Intolerances        LABS:                        9.1    4.35  )-----------( 182      ( 04 Oct 2022 05:30 )             30.7     10-03    140  |  94<L>  |  25<H>  ----------------------------<  81  3.8   |  32<H>  |  8.48<H>    Ca    9.4      03 Oct 2022 09:42  Phos  2.9     10-04  Mg     2.0     10-04    TPro  7.6  /  Alb  3.9  /  TBili  0.7  /  DBili  x   /  AST  13  /  ALT  <5<L>  /  AlkPhos  196<H>  10-03      Urinalysis Basic - ( 03 Oct 2022 12:20 )    Color: Yellow / Appearance: Cloudy / S.020 / pH: x  Gluc: x / Ketone: NEGATIVE  / Bili: Negative / Urobili: 0.2 E.U./dL   Blood: x / Protein: 100 mg/dL / Nitrite: NEGATIVE   Leuk Esterase: Moderate / RBC: < 5 /HPF / WBC Many /HPF   Sq Epi: x / Non Sq Epi: 0-5 /HPF / Bacteria: Present /HPF      CAPILLARY BLOOD GLUCOSE          RADIOLOGY & ADDITIONAL TESTS: Reviewed. OVERNIGHT EVENTS:    SUBJECTIVE / INTERVAL HPI: Patient seen and examined at bedside. No complaints at this time. Stated that he felt short of breath while walking to the bathroom without supplemental O2, sx resolved when he puts NC back on.     VITAL SIGNS:  Vital Signs Last 24 Hrs  T(C): 36.9 (04 Oct 2022 06:13), Max: 36.9 (03 Oct 2022 09:23)  T(F): 98.5 (04 Oct 2022 06:13), Max: 98.5 (03 Oct 2022 09:23)  HR: 59 (04 Oct 2022 06:13) (59 - 74)  BP: 120/68 (04 Oct 2022 06:13) (120/68 - 145/75)  BP(mean): --  RR: 18 (04 Oct 2022 06:13) (18 - 26)  SpO2: 95% (04 Oct 2022 06:13) (78% - 100%)    Parameters below as of 04 Oct 2022 06:13  Patient On (Oxygen Delivery Method): nasal cannula  O2 Flow (L/min): 3      PHYSICAL EXAM:  CONSTITUTIONAL:  Elderly appearing. Non-toxic; in no apparent distress  HEAD: NCAT  EYES: anicteric sclera  NECK: Supple; non-tender  CARD: Normal S1, S2; 2/6 systolic murmur  RESP: On NC, in no respiratory distress.  B/l crackles at BL lung bases. Decreased lung sounds at BL lung bases.   ABD: Soft, non-distended; non-tender  EXT: Normal ROM in all four extremities; non-tender to palpation. No peripheral edema.  L arm fistula well-healed.  SKIN: Warm, dry, no rash  NEURO:  No focal neurological deficiencies.    MEDICATIONS:  MEDICATIONS  (STANDING):  albuterol/ipratropium for Nebulization 3 milliLiter(s) Nebulizer every 6 hours  allopurinol 100 milliGRAM(s) Oral two times a day  aMIOdarone    Tablet 200 milliGRAM(s) Oral daily  amLODIPine   Tablet 5 milliGRAM(s) Oral daily  aspirin enteric coated 81 milliGRAM(s) Oral daily  atorvastatin 40 milliGRAM(s) Oral at bedtime  metoprolol succinate ER 50 milliGRAM(s) Oral daily  piperacillin/tazobactam IVPB.. 3.375 Gram(s) IV Intermittent every 12 hours  sevelamer carbonate 800 milliGRAM(s) Oral every 8 hours    MEDICATIONS  (PRN):      ALLERGIES:  Allergies    No Known Allergies    Intolerances        LABS:                        9.1    4.35  )-----------( 182      ( 04 Oct 2022 05:30 )             30.7     10-    140  |  94<L>  |  25<H>  ----------------------------<  81  3.8   |  32<H>  |  8.48<H>    Ca    9.4      03 Oct 2022 09:42  Phos  2.9     10-  Mg     2.0     10-    TPro  7.6  /  Alb  3.9  /  TBili  0.7  /  DBili  x   /  AST  13  /  ALT  <5<L>  /  AlkPhos  196<H>  10-      Urinalysis Basic - ( 03 Oct 2022 12:20 )    Color: Yellow / Appearance: Cloudy / S.020 / pH: x  Gluc: x / Ketone: NEGATIVE  / Bili: Negative / Urobili: 0.2 E.U./dL   Blood: x / Protein: 100 mg/dL / Nitrite: NEGATIVE   Leuk Esterase: Moderate / RBC: < 5 /HPF / WBC Many /HPF   Sq Epi: x / Non Sq Epi: 0-5 /HPF / Bacteria: Present /HPF      CAPILLARY BLOOD GLUCOSE          RADIOLOGY & ADDITIONAL TESTS: Reviewed.

## 2022-10-04 NOTE — PROGRESS NOTE ADULT - PROBLEM SELECTOR PLAN 8
he was admitted awith moderate AECIPD and improved.  He is Gr D and started on stiolto.  Enrolled on Coker trial.

## 2022-10-04 NOTE — DIETITIAN INITIAL EVALUATION ADULT - OTHER CALCULATIONS
Pt IBW: 172lbs; 86.6%IBW  Based on standard of care, pt within % IBW. Thus, CBW is used for all calculations. Needs adjusted for age, CKD and dialysis. Fluids per team

## 2022-10-04 NOTE — PROGRESS NOTE ADULT - PROBLEM SELECTOR PLAN 7
F:   E: replete K<4, Mg<2  N:   VTE Prophylaxis:   GI: none  C: Full Code  D: [facility] F: None  E: replete K<4, Mg<2  N: DASH/TLC  VTE Prophylaxis:   GI: none  C: Full Code  D: [facility]

## 2022-10-04 NOTE — PROVIDER CONTACT NOTE (CRITICAL VALUE NOTIFICATION) - ASSESSMENT
Pt easily arousable. Pt without any symptoms. Pt denies any sweating, shaking, feeling funny. Pt states he has not eaten anything in ~24 hours despite having food brought by family. Pt states he does not like the food he was given.

## 2022-10-04 NOTE — PROGRESS NOTE ADULT - PROBLEM SELECTOR PLAN 1
Pt with SOB, acute onset today. States similar symptoms to prior PNA in March. No leukocytosis, no reported fever, however, does have subjective chills in ED. Will perform CXR +/- CT to r/o PNA vs effusion. Age adjusted D-Dimer negative (89).  Initially RR 26, 78%, improved ease of breathing on 3L NC: RR 22, 99%.     - CT Chest showed: "resolved left lower lobe pneumonia; new bilateral pulmonary opacities infectious/inflammatory; less likely atypical pulmonary edema; small right pleural effusion, now loculated; thickening of the right pleural lining, nonspecific, can be seen in setting of infected effusion."    - on 3L NC in ED breathing with ease  - s/p Zosyn 3.375g + vancomycin 1g in ED  - c/w Zosyn + vancomycin (required broad coverage on March admission for PNA)  - Thoracic surgery contacted for loculated effusion on CT he was admitted with moderate AECIPD and improved.  He is Gr D and started on stiolto.  Enrolled on RELIANCE trial.    Presented with SOB, acute onset. States similar symptoms to prior PNA in March.   - CT Chest showed: "resolved left lower lobe pneumonia; new bilateral pulmonary opacities infectious/inflammatory; less likely atypical pulmonary edema; small right pleural effusion, now loculated; thickening of the right pleural lining, nonspecific, can be seen in setting of infected effusion."    - on 3L NC, wean as tolerated  - c/w Zosyn + vancomycin (required broad coverage on March admission for PNA)  - Thoracic surgery contacted for loculated effusion on CT, f/u recs  - c/w Stiolto, as part of RELIANCE trial he was admitted with moderate AECIPD and improved.  He is Gr D and started on stiolto.  Enrolled on RELIANCE trial.    Presented with SOB, acute onset. States similar symptoms to prior PNA in March.   - CT Chest showed: "resolved left lower lobe pneumonia; new bilateral pulmonary opacities infectious/inflammatory; less likely atypical pulmonary edema; small right pleural effusion, now loculated; thickening of the right pleural lining, nonspecific, can be seen in setting of infected effusion."    - on 3L NC, wean as tolerated  - c/w Zosyn   - Thoracic surgery contacted for loculated effusion on CT, f/u recs  - c/w Stiolto, as part of RELIANCE trial

## 2022-10-04 NOTE — DIETITIAN INITIAL EVALUATION ADULT - PERTINENT LABORATORY DATA
10-04    139  |  96  |  14  ----------------------------<  48<LL>  3.4<L>   |  31  |  5.31<H>    Ca    8.9      04 Oct 2022 05:30  Phos  2.9     10-04  Mg     2.0     10-04    TPro  6.6  /  Alb  3.1<L>  /  TBili  0.6  /  DBili  x   /  AST  12  /  ALT  <5<L>  /  AlkPhos  163<H>  10-04  POCT Blood Glucose.: 82 mg/dL (10-04-22 @ 12:50)  A1C with Estimated Average Glucose Result: 4.7 % (10-04-22 @ 05:30)  A1C with Estimated Average Glucose Result: 4.7 % (10-19-21 @ 09:05)

## 2022-10-04 NOTE — DIETITIAN INITIAL EVALUATION ADULT - OTHER INFO
67M with pmhx of ESRD (MWF Via L[facility] AVF), Lymphoma on weekly chemo, Afibb, HTn who presented to the ER in the setting of difficulty breathing found to have loculated effusion vs new PNA.    Pt seen at bedside for initial assessment. On renal restriction diet at the time of visit. Pt has difficulty chewing because of teeth loss, would benefit from soft and bite sized food. Pt had fair po intake this morning (10/4/2022) and was ordering lunch at the time of RD visit. Reports having poor appetite in the past week and is gradually having more appetite now. Pt denies recent weight loss, reports -152lbs per pt. Per EMR, pt weight has been stable around 145-155lbs in the past year. Current dosing weight 149lbs. Confirms NKFA. No N/V/C, reports diarrhea after having juices this morning for treating hypoglycemia. POCT 38-82 10/4. Hypoglycemia has been managed since this morning. Electrolytes are within limits. Recommend changing to DASH diet because pt on dialysis and the electrolytes are well managed. Paged team and diet order has been changed to DASH, soft and bite sized now. PTA, pt reports good po intake with good appetite from variety of food at home where wife cooks. Discussed the benefits of low sodium and adequate protein. Pt receptive and reports been following the diet at home. No edema or pressure injury documented at this time. Dandre score 20. Will follow up per protocol. See nutrition recommendations below.

## 2022-10-04 NOTE — PROGRESS NOTE ADULT - ASSESSMENT
67M with pmhx of ESRD (MWF Via [facility]), Lymphoma on weekly chemo, Afib, HTN presenting with difficulty breathing found to have loculated effusion vs new PNA. 67M with pmhx of ESRD (MWF Via [facility]), Lymphoma on weekly chemo, Afib, HTN presenting with difficulty breathing found to have loculated effusion vs new b/l opacities,, admitted for possible COPD exacerbation vs PNA.

## 2022-10-04 NOTE — DIETITIAN INITIAL EVALUATION ADULT - PERTINENT MEDS FT
MEDICATIONS  (STANDING):  albuterol/ipratropium for Nebulization 3 milliLiter(s) Nebulizer every 6 hours  allopurinol 100 milliGRAM(s) Oral two times a day  aMIOdarone    Tablet 200 milliGRAM(s) Oral daily  amLODIPine   Tablet 5 milliGRAM(s) Oral daily  aspirin enteric coated 81 milliGRAM(s) Oral daily  atorvastatin 40 milliGRAM(s) Oral at bedtime  metoprolol succinate ER 50 milliGRAM(s) Oral daily  piperacillin/tazobactam IVPB.. 3.375 Gram(s) IV Intermittent every 12 hours  sevelamer carbonate 800 milliGRAM(s) Oral every 8 hours    MEDICATIONS  (PRN):

## 2022-10-04 NOTE — PROGRESS NOTE ADULT - PROBLEM SELECTOR PLAN 2
Receives HD MWF, follows Dr. Guadalupe.  Pt to receive dialysis today (10/3). Pt does not appear clinically fluid overloaded.    Renal consulted:  - on HD MWF @[address] [place] [address] [facility]   - HD today as per usual  - electrolytes noted at goal   - UF w/ HD  - trend phos daily Receives HD MWF, follows Dr. Guadalupe.  Pt received dialysis today, tolerated well. Nephrology following    - continue sevelamer 800mg TID w meals  - trend phos daily  - f/u any further nephrology recs

## 2022-10-04 NOTE — PROGRESS NOTE ADULT - ASSESSMENT
67M with pmhx of ESRD (MWF Via [facility]), Lymphoma on weekly chemo, Afibb, HTn who presented to the ER in the setting of difficulty breathing found to have loculated effusion vs new PNA    Assessment/Plan:   #ESRD on HD MWF @[address] [place] [address] [facility]   usual Rx 3h EDW 67kg  -HD per schedule on 10/5  -electrolytes noted at goal   -UF w/ HD    #HTN   BP at goal   Continue  w/ home meds  Amlodipine 5mg qday  Toprol XL 50mg Qday    #access   LUE AVF functional     #anemia  Hb at goal   Epo w/ HD  -Check iron profile and ferritin  -transfusion as per primary team     #renal bone disease   Ca ~8.9  Phos 2.9  -Trend Phos daily  -C/w sevelamer 800mg TID w/ meals    Thank you for the opportunity to participate in the care of your patient. The nephrology service remains available to assist with any questions or concerns. Please feel free to reach us by paging the on-call nephrology fellow for urgent issues or as below.     Janna Deleon D.O.  PGY-5, Nephrology Fellow    P: [phone]

## 2022-10-04 NOTE — PROGRESS NOTE ADULT - PROBLEM SELECTOR PLAN 6
Takes metoprolol succ ER 50mg, amiodarone 200mg at home.    - continue w/ home meds Takes metoprolol succ ER 50mg, amiodarone 200mg at home.  - continue w/ home meds    -TTE performed today  EF 60%, Mild symmetric left ventricular hypertrophy. Grade II left ventricular diastolic dysfunction. Mild-to-moderate aortic, mitral, & tricuspid regurgitation.

## 2022-10-05 DIAGNOSIS — Z86.79 PERSONAL HISTORY OF OTHER DISEASES OF THE CIRCULATORY SYSTEM: ICD-10-CM

## 2022-10-05 LAB
ALBUMIN SERPL ELPH-MCNC: 2.8 G/DL — LOW (ref 3.3–5)
ALP SERPL-CCNC: 161 U/L — HIGH (ref 40–120)
ALT FLD-CCNC: <5 U/L — LOW (ref 10–45)
ANION GAP SERPL CALC-SCNC: 13 MMOL/L — SIGNIFICANT CHANGE UP (ref 5–17)
ANISOCYTOSIS BLD QL: SLIGHT — SIGNIFICANT CHANGE UP
AST SERPL-CCNC: 11 U/L — SIGNIFICANT CHANGE UP (ref 10–40)
BASOPHILS # BLD AUTO: 0.07 K/UL — SIGNIFICANT CHANGE UP (ref 0–0.2)
BASOPHILS NFR BLD AUTO: 1.7 % — SIGNIFICANT CHANGE UP (ref 0–2)
BILIRUB SERPL-MCNC: 0.4 MG/DL — SIGNIFICANT CHANGE UP (ref 0.2–1.2)
BUN SERPL-MCNC: 19 MG/DL — SIGNIFICANT CHANGE UP (ref 7–23)
BURR CELLS BLD QL SMEAR: PRESENT — SIGNIFICANT CHANGE UP
CALCIUM SERPL-MCNC: 9 MG/DL — SIGNIFICANT CHANGE UP (ref 8.4–10.5)
CHLORIDE SERPL-SCNC: 95 MMOL/L — LOW (ref 96–108)
CO2 SERPL-SCNC: 30 MMOL/L — SIGNIFICANT CHANGE UP (ref 22–31)
CREAT SERPL-MCNC: 7.15 MG/DL — HIGH (ref 0.5–1.3)
EGFR: 8 ML/MIN/1.73M2 — LOW
EOSINOPHIL # BLD AUTO: 0.34 K/UL — SIGNIFICANT CHANGE UP (ref 0–0.5)
EOSINOPHIL NFR BLD AUTO: 7.9 % — HIGH (ref 0–6)
GIANT PLATELETS BLD QL SMEAR: PRESENT — SIGNIFICANT CHANGE UP
GLUCOSE BLDC GLUCOMTR-MCNC: 52 MG/DL — CRITICAL LOW (ref 70–99)
GLUCOSE BLDC GLUCOMTR-MCNC: 71 MG/DL — SIGNIFICANT CHANGE UP (ref 70–99)
GLUCOSE BLDC GLUCOMTR-MCNC: 78 MG/DL — SIGNIFICANT CHANGE UP (ref 70–99)
GLUCOSE BLDC GLUCOMTR-MCNC: 78 MG/DL — SIGNIFICANT CHANGE UP (ref 70–99)
GLUCOSE BLDC GLUCOMTR-MCNC: 86 MG/DL — SIGNIFICANT CHANGE UP (ref 70–99)
GLUCOSE BLDC GLUCOMTR-MCNC: 88 MG/DL — SIGNIFICANT CHANGE UP (ref 70–99)
GLUCOSE BLDC GLUCOMTR-MCNC: 90 MG/DL — SIGNIFICANT CHANGE UP (ref 70–99)
GLUCOSE BLDC GLUCOMTR-MCNC: 94 MG/DL — SIGNIFICANT CHANGE UP (ref 70–99)
GLUCOSE SERPL-MCNC: 85 MG/DL — SIGNIFICANT CHANGE UP (ref 70–99)
HCT VFR BLD CALC: 29.5 % — LOW (ref 39–50)
HGB BLD-MCNC: 8.9 G/DL — LOW (ref 13–17)
HYPOCHROMIA BLD QL: SLIGHT — SIGNIFICANT CHANGE UP
LYMPHOCYTES # BLD AUTO: 1.06 K/UL — SIGNIFICANT CHANGE UP (ref 1–3.3)
LYMPHOCYTES # BLD AUTO: 24.6 % — SIGNIFICANT CHANGE UP (ref 13–44)
MACROCYTES BLD QL: SLIGHT — SIGNIFICANT CHANGE UP
MAGNESIUM SERPL-MCNC: 2.1 MG/DL — SIGNIFICANT CHANGE UP (ref 1.6–2.6)
MANUAL SMEAR VERIFICATION: SIGNIFICANT CHANGE UP
MCHC RBC-ENTMCNC: 27.7 PG — SIGNIFICANT CHANGE UP (ref 27–34)
MCHC RBC-ENTMCNC: 30.2 GM/DL — LOW (ref 32–36)
MCV RBC AUTO: 91.9 FL — SIGNIFICANT CHANGE UP (ref 80–100)
MONOCYTES # BLD AUTO: 0.64 K/UL — SIGNIFICANT CHANGE UP (ref 0–0.9)
MONOCYTES NFR BLD AUTO: 14.9 % — HIGH (ref 2–14)
NEUTROPHILS # BLD AUTO: 2.2 K/UL — SIGNIFICANT CHANGE UP (ref 1.8–7.4)
NEUTROPHILS NFR BLD AUTO: 50.9 % — SIGNIFICANT CHANGE UP (ref 43–77)
OVALOCYTES BLD QL SMEAR: SLIGHT — SIGNIFICANT CHANGE UP
PHOSPHATE SERPL-MCNC: 3.4 MG/DL — SIGNIFICANT CHANGE UP (ref 2.5–4.5)
PLAT MORPH BLD: ABNORMAL
PLATELET # BLD AUTO: 185 K/UL — SIGNIFICANT CHANGE UP (ref 150–400)
POIKILOCYTOSIS BLD QL AUTO: SLIGHT — SIGNIFICANT CHANGE UP
POLYCHROMASIA BLD QL SMEAR: SLIGHT — SIGNIFICANT CHANGE UP
POTASSIUM SERPL-MCNC: 3.1 MMOL/L — LOW (ref 3.5–5.3)
POTASSIUM SERPL-SCNC: 3.1 MMOL/L — LOW (ref 3.5–5.3)
PROT SERPL-MCNC: 6.4 G/DL — SIGNIFICANT CHANGE UP (ref 6–8.3)
RBC # BLD: 3.21 M/UL — LOW (ref 4.2–5.8)
RBC # FLD: 16.8 % — HIGH (ref 10.3–14.5)
RBC BLD AUTO: ABNORMAL
SCHISTOCYTES BLD QL AUTO: SLIGHT — SIGNIFICANT CHANGE UP
SODIUM SERPL-SCNC: 138 MMOL/L — SIGNIFICANT CHANGE UP (ref 135–145)
SPHEROCYTES BLD QL SMEAR: SLIGHT — SIGNIFICANT CHANGE UP
TARGETS BLD QL SMEAR: SLIGHT — SIGNIFICANT CHANGE UP
TSH SERPL-MCNC: 1.65 UIU/ML — SIGNIFICANT CHANGE UP (ref 0.27–4.2)
VANCOMYCIN TROUGH SERPL-MCNC: 8.6 UG/ML — LOW (ref 10–20)
WBC # BLD: 4.32 K/UL — SIGNIFICANT CHANGE UP (ref 3.8–10.5)
WBC # FLD AUTO: 4.32 K/UL — SIGNIFICANT CHANGE UP (ref 3.8–10.5)

## 2022-10-05 PROCEDURE — 99232 SBSQ HOSP IP/OBS MODERATE 35: CPT | Mod: 25

## 2022-10-05 PROCEDURE — 36000 PLACE NEEDLE IN VEIN: CPT

## 2022-10-05 PROCEDURE — 76937 US GUIDE VASCULAR ACCESS: CPT | Mod: 26

## 2022-10-05 PROCEDURE — 99232 SBSQ HOSP IP/OBS MODERATE 35: CPT | Mod: GC

## 2022-10-05 PROCEDURE — 90935 HEMODIALYSIS ONE EVALUATION: CPT

## 2022-10-05 RX ORDER — DEXTROSE 10 % IN WATER 10 %
1000 INTRAVENOUS SOLUTION INTRAVENOUS
Refills: 0 | Status: DISCONTINUED | OUTPATIENT
Start: 2022-10-05 | End: 2022-10-06

## 2022-10-05 RX ORDER — ERYTHROPOIETIN 10000 [IU]/ML
6000 INJECTION, SOLUTION INTRAVENOUS; SUBCUTANEOUS ONCE
Refills: 0 | Status: COMPLETED | OUTPATIENT
Start: 2022-10-05 | End: 2022-10-05

## 2022-10-05 RX ORDER — DEXTROSE 10 % IN WATER 10 %
1000 INTRAVENOUS SOLUTION INTRAVENOUS
Refills: 0 | Status: DISCONTINUED | OUTPATIENT
Start: 2022-10-05 | End: 2022-10-05

## 2022-10-05 RX ORDER — DEXTROSE 50 % IN WATER 50 %
50 SYRINGE (ML) INTRAVENOUS ONCE
Refills: 0 | Status: COMPLETED | OUTPATIENT
Start: 2022-10-05 | End: 2022-10-05

## 2022-10-05 RX ORDER — AZITHROMYCIN 500 MG/1
250 TABLET, FILM COATED ORAL
Refills: 0 | Status: DISCONTINUED | OUTPATIENT
Start: 2022-10-05 | End: 2022-10-07

## 2022-10-05 RX ADMIN — SEVELAMER CARBONATE 800 MILLIGRAM(S): 2400 POWDER, FOR SUSPENSION ORAL at 13:46

## 2022-10-05 RX ADMIN — AMLODIPINE BESYLATE 5 MILLIGRAM(S): 2.5 TABLET ORAL at 06:35

## 2022-10-05 RX ADMIN — Medication 100 MILLIGRAM(S): at 06:35

## 2022-10-05 RX ADMIN — Medication 3 MILLILITER(S): at 12:38

## 2022-10-05 RX ADMIN — Medication 81 MILLIGRAM(S): at 12:38

## 2022-10-05 RX ADMIN — PIPERACILLIN AND TAZOBACTAM 25 GRAM(S): 4; .5 INJECTION, POWDER, LYOPHILIZED, FOR SOLUTION INTRAVENOUS at 12:37

## 2022-10-05 RX ADMIN — Medication 3 MILLILITER(S): at 19:04

## 2022-10-05 RX ADMIN — ATORVASTATIN CALCIUM 40 MILLIGRAM(S): 80 TABLET, FILM COATED ORAL at 22:47

## 2022-10-05 RX ADMIN — SEVELAMER CARBONATE 800 MILLIGRAM(S): 2400 POWDER, FOR SUSPENSION ORAL at 06:35

## 2022-10-05 RX ADMIN — TIOTROPIUM BROMIDE AND OLODATEROL 2 PUFF(S): 3.124; 2.736 SPRAY, METERED RESPIRATORY (INHALATION) at 12:39

## 2022-10-05 RX ADMIN — SEVELAMER CARBONATE 800 MILLIGRAM(S): 2400 POWDER, FOR SUSPENSION ORAL at 22:47

## 2022-10-05 RX ADMIN — Medication 50 MILLILITER(S): at 13:45

## 2022-10-05 RX ADMIN — Medication 100 MILLIGRAM(S): at 19:04

## 2022-10-05 RX ADMIN — AZITHROMYCIN 250 MILLIGRAM(S): 500 TABLET, FILM COATED ORAL at 19:11

## 2022-10-05 RX ADMIN — Medication 50 MILLIGRAM(S): at 06:36

## 2022-10-05 RX ADMIN — Medication 3 MILLILITER(S): at 23:18

## 2022-10-05 RX ADMIN — AMIODARONE HYDROCHLORIDE 200 MILLIGRAM(S): 400 TABLET ORAL at 06:35

## 2022-10-05 RX ADMIN — Medication 3 MILLILITER(S): at 06:35

## 2022-10-05 RX ADMIN — Medication 30 MILLILITER(S): at 18:14

## 2022-10-05 RX ADMIN — ERYTHROPOIETIN 6000 UNIT(S): 10000 INJECTION, SOLUTION INTRAVENOUS; SUBCUTANEOUS at 09:53

## 2022-10-05 RX ADMIN — PIPERACILLIN AND TAZOBACTAM 25 GRAM(S): 4; .5 INJECTION, POWDER, LYOPHILIZED, FOR SOLUTION INTRAVENOUS at 23:17

## 2022-10-05 NOTE — PROGRESS NOTE ADULT - SUBJECTIVE AND OBJECTIVE BOX
--------------------------------------------------------------------------------  Chief Complaint: ESRD/Ongoing hemodialysis requirement    24 hour events/subjective:  NAEO, remains stable. Endorses feeling well. Hoping to go home soon. Due for HD today.    PAST HISTORY  --------------------------------------------------------------------------------  No significant changes to PMH, PSH, FHx, SHx, unless otherwise noted    ALLERGIES & MEDICATIONS  --------------------------------------------------------------------------------  Allergies    No Known Allergies    Intolerances      Standing Inpatient Medications  albuterol/ipratropium for Nebulization 3 milliLiter(s) Nebulizer every 6 hours  allopurinol 100 milliGRAM(s) Oral two times a day  aMIOdarone    Tablet 200 milliGRAM(s) Oral daily  amLODIPine   Tablet 5 milliGRAM(s) Oral daily  aspirin enteric coated 81 milliGRAM(s) Oral daily  atorvastatin 40 milliGRAM(s) Oral at bedtime  epoetin donny-epbx (RETACRIT) Injectable 6000 Unit(s) IV Push once  metoprolol succinate ER 50 milliGRAM(s) Oral daily  piperacillin/tazobactam IVPB.. 3.375 Gram(s) IV Intermittent every 12 hours  sevelamer carbonate 800 milliGRAM(s) Oral every 8 hours  tiotropium 2.5 MICROgram(s)/olodaterol 2.5 MICROgram(s) (STIOLTO) Inhaler 2 Puff(s) Inhalation daily    PRN Inpatient Medications      REVIEW OF SYSTEMS  --------------------------------------------------------------------------------  All other systems were reviewed and are negative, except as noted.    VITALS/PHYSICAL EXAM  --------------------------------------------------------------------------------  T(C): 36.6 (10-05-22 @ 05:49), Max: 36.7 (10-04-22 @ 12:55)  HR: 64 (10-05-22 @ 05:49) (62 - 64)  BP: 108/75 (10-05-22 @ 05:49) (108/75 - 119/68)  RR: 19 (10-05-22 @ 05:49) (18 - 19)  SpO2: 97% (10-05-22 @ 05:49) (95% - 97%)  Wt(kg): --  Drug Dosing Weight  Height (cm): 180.3 (03 Oct 2022 09:23)  Weight (kg): 68 (03 Oct 2022 09:23)  BMI (kg/m2): 20.9 (03 Oct 2022 09:23)  BSA (m2): 1.87 (03 Oct 2022 09:23)  Height (cm): 180.3 (10-03-22 @ 09:23)  Weight (kg): 68 (10-03-22 @ 09:23)  BMI (kg/m2): 20.9 (10-03-22 @ 09:23)  BSA (m2): 1.87 (10-03-22 @ 09:23)    PHYSICAL EXAM:  GENERAL: Alert, awake, oriented x3 on NC  HEENT: SARAI, EOMI, neck supple, no JVP  CHEST/LUNG: decreased breath sounds bilaterally  HEART: Regular rate and rhythm, no murmur, no gallops, no rub   ABDOMEN: Soft, nontender, non distended  EXTREMITIES: no pedal edema  Neurology: AAOx3, no asterixis   SKIN: No rash or skin lesion   ACCESS: SUSAN chandler/zaida and marty     LABS/STUDIES  --------------------------------------------------------------------------------              8.9    4.32  >-----------<  185      [10-05-22 @ 05:56]              29.5     138  |  95  |  19  ----------------------------<  85      [10-05-22 @ 05:56]  3.1   |  30  |  7.15        Ca     9.0     [10-05-22 @ 05:56]      Mg     2.1     [10-05-22 @ 05:56]      Phos  3.4     [10-05-22 @ 05:56]    TPro  6.4  /  Alb  2.8  /  TBili  0.4  /  DBili  x   /  AST  11  /  ALT  <5  /  AlkPhos  161  [10-05-22 @ 05:56]        Troponin 0.06      [10-03-22 @ 09:42]    TSH 1.040      [10-19-21 @ 09:05]    HBsAg Nonreact      [10-03-22 @ 15:04]  HCV 0.04, Nonreact      [10-03-22 @ 15:04]      RADIOLOGY:  --------------------------------------------------------------------------------------

## 2022-10-05 NOTE — PROVIDER CONTACT NOTE (HYPOGLYCEMIA EVENT) - NS PROVIDER CONTACT BACKGROUND-HYPO
Age: 67y    Gender: Male    POCT Blood Glucose:  52 mg/dL (10-05-22 @ 12:39)  78 mg/dL (10-05-22 @ 08:15)  90 mg/dL (10-05-22 @ 06:40)  127 mg/dL (10-04-22 @ 22:59)  87 mg/dL (10-04-22 @ 17:43)      eMAR:allopurinol   100 milliGRAM(s) Oral (10-05-22 @ 06:35)   100 milliGRAM(s) Oral (10-04-22 @ 17:37)    atorvastatin   40 milliGRAM(s) Oral (10-04-22 @ 22:10)

## 2022-10-05 NOTE — PROGRESS NOTE ADULT - PROBLEM SELECTOR PLAN 1
Most likely explanation is volume overload with pulmonary vascular congestion.  He has had an EF as low as 40 % but recently has normalized. Remove fluid at HD.

## 2022-10-05 NOTE — PROGRESS NOTE ADULT - ASSESSMENT
67M with pmhx of ESRD (MWF Via [facility]), Lymphoma on weekly chemo, Afib, HTN presenting with difficulty breathing found to have loculated effusion vs new b/l opacities,, admitted for possible COPD exacerbation vs PNA.

## 2022-10-05 NOTE — PROGRESS NOTE ADULT - SUBJECTIVE AND OBJECTIVE BOX
INTERVAL HISTORY:  Pt well known to me, office notes left in chart.  Most recently seen by Dr Marsh 9/29/22 who noted marked JVD	  MEDICATIONS:  aMIOdarone    Tablet 200 milliGRAM(s) Oral daily  amLODIPine   Tablet 5 milliGRAM(s) Oral daily  metoprolol succinate ER 50 milliGRAM(s) Oral daily  piperacillin/tazobactam IVPB.. 3.375 Gram(s) IV Intermittent every 12 hours  albuterol/ipratropium for Nebulization 3 milliLiter(s) Nebulizer every 6 hours  tiotropium 2.5 MICROgram(s)/olodaterol 2.5 MICROgram(s) (STIOLTO) Inhaler 2 Puff(s) Inhalation daily        allopurinol 100 milliGRAM(s) Oral two times a day  atorvastatin 40 milliGRAM(s) Oral at bedtime    aspirin enteric coated 81 milliGRAM(s) Oral daily        PHYSICAL EXAM:  T(C): 36.6 (10-05-22 @ 05:49), Max: 36.7 (10-04-22 @ 12:55)  HR: 64 (10-05-22 @ 05:49) (62 - 64)  BP: 108/75 (10-05-22 @ 05:49) (108/75 - 119/68)  RR: 19 (10-05-22 @ 05:49) (18 - 19)  SpO2: 97% (10-05-22 @ 05:49) (95% - 97%)  Wt(kg): --  I&O's Summary        Appearance: Normal	  Cardiovascular: Normal S1 S2, No JVD, OLVIN and an apical systolic murmur, No edema  Respiratory: Lungs clear to auscultation	  Psychiatry: A & O x 3, Mood & affect appropriate  Gastrointestinal:  Soft, Non-tender, + BS	  Skin: No rashes, No ecchymoses, No cyanosis  Neurologic: Non-focal  Extremities:  no C/C/E  Vascular: Peripheral pulses palpable 2+ bilaterally    TELEMETRY: 	    ECG:  	  RADIOLOGY:   DIAGNOSTIC TESTING:  [ ] Echocardiogram:  [ ]  Catheterization:  [ ] Stress Test:    OTHER: 	    LABS:	 	    CARDIAC MARKERS:                                  8.9    4.32  )-----------( 185      ( 05 Oct 2022 05:56 )             29.5     10-05    138  |  95<L>  |  19  ----------------------------<  85  3.1<L>   |  30  |  7.15<H>    Ca    9.0      05 Oct 2022 05:56  Phos  3.4     10-05  Mg     2.1     10-05    TPro  6.4  /  Alb  2.8<L>  /  TBili  0.4  /  DBili  x   /  AST  11  /  ALT  <5<L>  /  AlkPhos  161<H>  10-05    proBNP:   Lipid Profile:   HgA1c:   TSH:     ASSESSMENT/PLAN:

## 2022-10-05 NOTE — PROGRESS NOTE ADULT - PROBLEM SELECTOR PLAN 5
Has previously had severe MR when his EF was lower, since then EF has improved and the MR on last several echoes has been deemed mild to moderate. No one valve lesion would account for CHF but all together the AS and MR could be playing a role especially if the MR is transiently worse or he pops into AF

## 2022-10-05 NOTE — PROVIDER CONTACT NOTE (HYPOGLYCEMIA EVENT) - NS PROVIDER CONTACT RECOMMEND-HYPO
Pt blood sugar 52,  pt given fruit juice, no hypoglycemia order set in for pt, MD Porterav contacted, awaiting on page back.  Pt blood sugar 52,  pt given fruit juice, no hypoglycemia order set in for pt, MD Tamez contacted, awaiting on page back.       Patient b/s increased to 78 w/ fruit juice, will give d50% IVP per MD Huerta. Pt b/s 52, asymptomatic,  pt given fruit juice, no hypoglycemia order set, MD Tamez contacted, awaiting on page back.     Patient b/s increased to 78 w/ fruit juice, will give d50% IVP per MD Huerta.

## 2022-10-05 NOTE — PROCEDURE NOTE - NSICDXPROCEDURE_GEN_ALL_CORE_FT
PROCEDURES:  Insertion of intravenous catheter with ultrasound guidance 05-Oct-2022 17:30:00  Aneta Hickey

## 2022-10-05 NOTE — PROGRESS NOTE ADULT - PROBLEM SELECTOR PLAN 6
Takes metoprolol succ ER 50mg, amiodarone 200mg at home.  - continue w/ home meds    -TTE performed today  EF 60%, Mild symmetric left ventricular hypertrophy. Grade II left ventricular diastolic dysfunction. Mild-to-moderate aortic, mitral, & tricuspid regurgitation.

## 2022-10-05 NOTE — PROGRESS NOTE ADULT - ASSESSMENT
67M with pmhx of ESRD (MWF Via [facility]), Lymphoma on weekly chemo, Afibb, HTn who presented to the ER in the setting of difficulty breathing found to have loculated effusion vs new PNA    Assessment/Plan:   #ESRD on HD MWF @[address] [place] [address] [facility]   usual Rx 3h EDW 67kg  -HD today as per schedule  -electrolytes noted K of 3.1  -Will use 4K bath   -UF w/ HD    #HTN   BP at goal   Continue  w/ home meds  Amlodipine 5mg qday - hold on HD days  Toprol XL 50mg Qday    #access   LUE AVF functional     #anemia  Hb near goal  Epo w/ HD  -Check iron profile and ferritin  -transfusion as per primary team     #renal bone disease   Ca ~9.0  Phos 3.4  -Trend Phos daily  -C/w sevelamer 800mg TID w/ meals    Thank you for the opportunity to participate in the care of your patient. The nephrology service remains available to assist with any questions or concerns. Please feel free to reach us by paging the on-call nephrology fellow for urgent issues or as below.     Janna Deleon D.O.  PGY-5, Nephrology Fellow    P: [phone]     Hemoglobin: 8.9 g/dL (10-05-22 @ 05:56)  Phosphorus Level, Serum: 3.4 mg/dL (10-05-22 @ 05:56)  Hemoglobin: 9.1 g/dL (10-04-22 @ 05:30)  Phosphorus Level, Serum: 2.9 mg/dL (10-04-22 @ 05:30)    Albumin, Serum: 2.8 g/dL (10-05-22 @ 05:56)  Albumin, Serum: 3.1 g/dL (10-04-22 @ 05:30)    T(C): 36.6 (10-05-22 @ 05:49), Max: 36.7 (10-04-22 @ 12:55)  HR: 64 (10-05-22 @ 05:49) (62 - 64)  BP: 108/75 (10-05-22 @ 05:49) (108/75 - 119/68)  RR: 19 (10-05-22 @ 05:49) (18 - 19)  SpO2: 97% (10-05-22 @ 05:49) (95% - 97%)  epoetin donny-epbx (RETACRIT) Injectable 6000 Unit(s) IV Push once, 10-03-22 @ 12:55, Routine, Stop order after: 1 Doses  epoetin donny-epbx (RETACRIT) Injectable 6000 Unit(s) IV Push once, 10-05-22 @ 07:39, Routine, Stop order after: 1 Doses  sevelamer carbonate 800 milliGRAM(s) Oral every 8 hours, 10-03-22 @ 16:55, Routine      Hemodialysis Treatment.:     Schedule: Once, Modality: Hemodialysis, Access: Arteriovenous Fistula    Dialyzer: Optiflux [id number], Time: 180 Min    Blood Flow: 400 mL/Min , Dialysate Flow: 500 mL/Min, Dialysate Temp: 36.5, Tubinmm (Adult)    Target Fluid Removal: 1 Liters    Target Dry Weight: 67 kG    Dialysate Electrolytes (mEq/L): Potassium 4, Calcium 2.5, Sodium 138, Bicarbonate 35 (10-05-22 @ 07:39) [Active]       67M with pmhx of ESRD (MWF Via L[facility] AVF), Lymphoma on weekly chemo, Afibb, HTn who presented to the ER in the setting of difficulty breathing found to have loculated effusion vs new PNA    Assessment/Plan:   #ESRD on HD MWF @[address] [place] [address] [facility]   usual Rx 3h EDW 67kg  -HD today as per schedule  -electrolytes noted K of 3.1  -Will use 4K bath   -UF w/ HD    #HTN   BP at goal   Continue  w/ home meds  Amlodipine 5mg qday - hold on HD days  Toprol XL 50mg Qday    #access   LUE AVF functional     #anemia  Hb near goal  Epo w/ HD  -Check iron profile and ferritin  -transfusion as per primary team     #renal bone disease   Ca ~9.0  Phos 3.4  -Trend Phos daily  -C/w sevelamer 800mg TID w/ meals    Thank you for the opportunity to participate in the care of your patient. The nephrology service remains available to assist with any questions or concerns. Please feel free to reach us by paging the on-call nephrology fellow for urgent issues or as below.     Janna Deleon D.O.  PGY-5, Nephrology Fellow    P: [phone]     Hemoglobin: 8.9 g/dL (10-05-22 @ 05:56)  Phosphorus Level, Serum: 3.4 mg/dL (10-05-22 @ 05:56)  Hemoglobin: 9.1 g/dL (10-04-22 @ 05:30)  Phosphorus Level, Serum: 2.9 mg/dL (10-04-22 @ 05:30)    Albumin, Serum: 2.8 g/dL (10-05-22 @ 05:56)  Albumin, Serum: 3.1 g/dL (10-04-22 @ 05:30)    T(C): 36.6 (10-05-22 @ 05:49), Max: 36.7 (10-04-22 @ 12:55)  HR: 64 (10-05-22 @ 05:49) (62 - 64)  BP: 108/75 (10-05-22 @ 05:49) (108/75 - 119/68)  RR: 19 (10-05-22 @ 05:49) (18 - 19)  SpO2: 97% (10-05-22 @ 05:49) (95% - 97%)  epoetin donny-epbx (RETACRIT) Injectable 6000 Unit(s) IV Push once, 10-03-22 @ 12:55, Routine, Stop order after: 1 Doses  epoetin donny-epbx (RETACRIT) Injectable 6000 Unit(s) IV Push once, 10-05-22 @ 07:39, Routine, Stop order after: 1 Doses  sevelamer carbonate 800 milliGRAM(s) Oral every 8 hours, 10-03-22 @ 16:55, Routine      Hemodialysis Treatment.:     Schedule: Once, Modality: Hemodialysis, Access: Arteriovenous Fistula    Dialyzer: Optiflux [id number], Time: 180 Min    Blood Flow: 400 mL/Min , Dialysate Flow: 500 mL/Min, Dialysate Temp: 36.5, Tubinmm (Adult)    Target Fluid Removal: 1 Liters    Target Dry Weight: 67 kG    Dialysate Electrolytes (mEq/L): Potassium 4, Calcium 2.5, Sodium 138, Bicarbonate 35 (10-05-22 @ 07:39) [Active]    Seen on HD, c/w tx as prescribed above. 's   67M with pmhx of ESRD (MWF Via LUE AVF), Lymphoma on weekly chemo, Afibb, HTn who presented to the ER in the setting of difficulty breathing found to have loculated effusion vs new PNA    Assessment/Plan:   #ESRD on HD MWF @[address] [place] [address] [facility]   usual Rx 3h EDW 67kg  -HD today as per schedule  -electrolytes noted K of 3.1  -Will use 4K bath   -UF w/ HD    #HTN   BP at goal   Continue  w/ home meds  Amlodipine 5mg qday - hold on HD days  Toprol XL 50mg Qday    #access   LUE AVF functional     #anemia  Hb near goal  Epo w/ HD  -Check iron profile and ferritin  -transfusion for Hgb < 7     #renal bone disease   Ca ~9.0  Phos 3.4  -Trend Phos daily  -C/w sevelamer 800mg TID w/ meals    Thank you for the opportunity to participate in the care of your patient. The nephrology service remains available to assist with any questions or concerns. Please feel free to reach us by paging the on-call nephrology fellow for urgent issues or as below.     Janna Deleon D.O.  PGY-5, Nephrology Fellow    P: [phone]     Hemoglobin: 8.9 g/dL (10-05-22 @ 05:56)  Phosphorus Level, Serum: 3.4 mg/dL (10-05-22 @ 05:56)  Hemoglobin: 9.1 g/dL (10-04-22 @ 05:30)  Phosphorus Level, Serum: 2.9 mg/dL (10-04-22 @ 05:30)    Albumin, Serum: 2.8 g/dL (10-05-22 @ 05:56)  Albumin, Serum: 3.1 g/dL (10-04-22 @ 05:30)    T(C): 36.6 (10-05-22 @ 05:49), Max: 36.7 (10-04-22 @ 12:55)  HR: 64 (10-05-22 @ 05:49) (62 - 64)  BP: 108/75 (10-05-22 @ 05:49) (108/75 - 119/68)  RR: 19 (10-05-22 @ 05:49) (18 - 19)  SpO2: 97% (10-05-22 @ 05:49) (95% - 97%)  epoetin donny-epbx (RETACRIT) Injectable 6000 Unit(s) IV Push once, 10-03-22 @ 12:55, Routine, Stop order after: 1 Doses  epoetin donny-epbx (RETACRIT) Injectable 6000 Unit(s) IV Push once, 10-05-22 @ 07:39, Routine, Stop order after: 1 Doses  sevelamer carbonate 800 milliGRAM(s) Oral every 8 hours, 10-03-22 @ 16:55, Routine      Hemodialysis Treatment.:     Schedule: Once, Modality: Hemodialysis, Access: Arteriovenous Fistula    Dialyzer: Optiflux [id number], Time: 180 Min    Blood Flow: 400 mL/Min , Dialysate Flow: 500 mL/Min, Dialysate Temp: 36.5, Tubinmm (Adult)    Target Fluid Removal: 1 Liters    Target Dry Weight: 67 kG    Dialysate Electrolytes (mEq/L): Potassium 4, Calcium 2.5, Sodium 138, Bicarbonate 35 (10-05-22 @ 07:39) [Active]    Seen on HD, c/w tx as prescribed above. 's

## 2022-10-05 NOTE — PROGRESS NOTE ADULT - SUBJECTIVE AND OBJECTIVE BOX
Medicine Progress Note (INCOMPLETE)    SUBJECTIVE / OVERNIGHT EVENTS:  O/N events: KALA  Patient was seen and examined at bedside.  Otherwise negative ROS    MEDICATIONS  (STANDING):  albuterol/ipratropium for Nebulization 3 milliLiter(s) Nebulizer every 6 hours  allopurinol 100 milliGRAM(s) Oral two times a day  aMIOdarone    Tablet 200 milliGRAM(s) Oral daily  amLODIPine   Tablet 5 milliGRAM(s) Oral daily  aspirin enteric coated 81 milliGRAM(s) Oral daily  atorvastatin 40 milliGRAM(s) Oral at bedtime  metoprolol succinate ER 50 milliGRAM(s) Oral daily  piperacillin/tazobactam IVPB.. 3.375 Gram(s) IV Intermittent every 12 hours  sevelamer carbonate 800 milliGRAM(s) Oral every 8 hours  tiotropium 2.5 MICROgram(s)/olodaterol 2.5 MICROgram(s) (STIOLTO) Inhaler 2 Puff(s) Inhalation daily    MEDICATIONS  (PRN):    CAPILLARY BLOOD GLUCOSE      POCT Blood Glucose.: 78 mg/dL (05 Oct 2022 08:15)  POCT Blood Glucose.: 90 mg/dL (05 Oct 2022 06:40)  POCT Blood Glucose.: 127 mg/dL (04 Oct 2022 22:59)  POCT Blood Glucose.: 87 mg/dL (04 Oct 2022 17:43)  POCT Blood Glucose.: 82 mg/dL (04 Oct 2022 12:50)  POCT Blood Glucose.: 76 mg/dL (04 Oct 2022 11:37)        OBJECTIVE:  Vital Signs Last 24 Hrs  T(C): 36.7 (05 Oct 2022 09:00), Max: 36.7 (04 Oct 2022 12:55)  T(F): 98.1 (05 Oct 2022 09:00), Max: 98.1 (05 Oct 2022 09:00)  HR: 63 (05 Oct 2022 09:00) (62 - 64)  BP: 118/73 (05 Oct 2022 09:00) (108/75 - 119/68)  BP(mean): --  RR: 18 (05 Oct 2022 09:00) (18 - 19)  SpO2: 97% (05 Oct 2022 09:00) (95% - 97%)    Parameters below as of 05 Oct 2022 09:00  Patient On (Oxygen Delivery Method): room air        PHYSICAL EXAM:  General: AAOx3, NAD  Head: NC/AT; MMM; PERRL; EOMI;  Neck: Supple; no JVD  Respiratory: CTAB; no wheezes/rales/rhonchi  Cardiovascular: Regular rhythm/rate; S1/S2+, no murmurs, rubs gallops   Gastrointestinal: Soft; NTND; bowel sounds normal and present  Extremities: WWP; no edema/cyanosis  Neurological: CNII-XII grossly intact; no obvious focal deficits  Skin: Clean and intact. Good skin turgor. Without open wounds and sores  I&O's Summary      LABS:                        8.9    4.32  )-----------( 185      ( 05 Oct 2022 05:56 )             29.5     10-05    138  |  95<L>  |  19  ----------------------------<  85  3.1<L>   |  30  |  7.15<H>    Ca    9.0      05 Oct 2022 05:56  Phos  3.4     10-05  Mg     2.1     10-05    TPro  6.4  /  Alb  2.8<L>  /  TBili  0.4  /  DBili  x   /  AST  11  /  ALT  <5<L>  /  AlkPhos  161<H>  10-05          Urinalysis Basic - ( 03 Oct 2022 12:20 )    Color: Yellow / Appearance: Cloudy / S.020 / pH: x  Gluc: x / Ketone: NEGATIVE  / Bili: Negative / Urobili: 0.2 E.U./dL   Blood: x / Protein: 100 mg/dL / Nitrite: NEGATIVE   Leuk Esterase: Moderate / RBC: < 5 /HPF / WBC Many /HPF   Sq Epi: x / Non Sq Epi: 0-5 /HPF / Bacteria: Present /HPF        Culture - Urine (collected 03 Oct 2022 12:20)  Source: Clean Catch None  Final Report (04 Oct 2022 12:02):    Specimen appears CONTAMINATED. Lab suggests repeat clean catch specimen.    Urinalysis with Rflx Culture (collected 03 Oct 2022 12:20)    Culture - Blood (collected 03 Oct 2022 09:43)  Source: .Blood Blood-Peripheral  Preliminary Report (04 Oct 2022 11:00):    No growth at 1 day.    Culture - Blood (collected 03 Oct 2022 09:43)  Source: .Blood Blood-Peripheral  Preliminary Report (04 Oct 2022 11:00):    No growth at 1 day.          RADIOLOGY & ADDITIONAL TESTS:  Imaging from Last 24 Hours: Medicine Progress Note     SUBJECTIVE / OVERNIGHT EVENTS:  O/N events: KALA  Patient was seen and examined at bedside resting comfortably in bed. Reports generalized fatigue from HD but otherwise no SOB. Has not been out of bed much, can not report NIELSON. Otherwise denies fever chills CP.   Otherwise negative ROS    MEDICATIONS  (STANDING):  albuterol/ipratropium for Nebulization 3 milliLiter(s) Nebulizer every 6 hours  allopurinol 100 milliGRAM(s) Oral two times a day  aMIOdarone    Tablet 200 milliGRAM(s) Oral daily  amLODIPine   Tablet 5 milliGRAM(s) Oral daily  aspirin enteric coated 81 milliGRAM(s) Oral daily  atorvastatin 40 milliGRAM(s) Oral at bedtime  metoprolol succinate ER 50 milliGRAM(s) Oral daily  piperacillin/tazobactam IVPB.. 3.375 Gram(s) IV Intermittent every 12 hours  sevelamer carbonate 800 milliGRAM(s) Oral every 8 hours  tiotropium 2.5 MICROgram(s)/olodaterol 2.5 MICROgram(s) (STIOLTO) Inhaler 2 Puff(s) Inhalation daily    MEDICATIONS  (PRN):    CAPILLARY BLOOD GLUCOSE      POCT Blood Glucose.: 78 mg/dL (05 Oct 2022 08:15)  POCT Blood Glucose.: 90 mg/dL (05 Oct 2022 06:40)  POCT Blood Glucose.: 127 mg/dL (04 Oct 2022 22:59)  POCT Blood Glucose.: 87 mg/dL (04 Oct 2022 17:43)  POCT Blood Glucose.: 82 mg/dL (04 Oct 2022 12:50)  POCT Blood Glucose.: 76 mg/dL (04 Oct 2022 11:37)        OBJECTIVE:  Vital Signs Last 24 Hrs  T(C): 36.7 (05 Oct 2022 09:00), Max: 36.7 (04 Oct 2022 12:55)  T(F): 98.1 (05 Oct 2022 09:00), Max: 98.1 (05 Oct 2022 09:00)  HR: 63 (05 Oct 2022 09:00) (62 - 64)  BP: 118/73 (05 Oct 2022 09:00) (108/75 - 119/68)  BP(mean): --  RR: 18 (05 Oct 2022 09:00) (18 - 19)  SpO2: 97% (05 Oct 2022 09:00) (95% - 97%)    Parameters below as of 05 Oct 2022 09:00  Patient On (Oxygen Delivery Method): room air        PHYSICAL EXAM:  General: AAOx3, NAD  Head: NC/AT; MMM; PERRL; EOMI;  Neck: Supple; no JVD  Respiratory: crackles b/l, L>R   Cardiovascular: Regular rhythm/rate; S1/S2+,  diastolic murmur appreciated in FABIAN, LUE fistula with palpable thrill  Gastrointestinal: Soft; NTND; bowel sounds normal and present  Extremities: WWP; no edema/cyanosis  Neurological: CNII-XII grossly intact; no obvious focal deficits  Skin: Clean and intact. Good skin turgor. Without open wounds and sores  I&O's Summary      LABS:                        8.9    4.32  )-----------( 185      ( 05 Oct 2022 05:56 )             29.5     10-05    138  |  95<L>  |  19  ----------------------------<  85  3.1<L>   |  30  |  7.15<H>    Ca    9.0      05 Oct 2022 05:56  Phos  3.4     10-05  Mg     2.1     10-05    TPro  6.4  /  Alb  2.8<L>  /  TBili  0.4  /  DBili  x   /  AST  11  /  ALT  <5<L>  /  AlkPhos  161<H>  10-05          Urinalysis Basic - ( 03 Oct 2022 12:20 )    Color: Yellow / Appearance: Cloudy / S.020 / pH: x  Gluc: x / Ketone: NEGATIVE  / Bili: Negative / Urobili: 0.2 E.U./dL   Blood: x / Protein: 100 mg/dL / Nitrite: NEGATIVE   Leuk Esterase: Moderate / RBC: < 5 /HPF / WBC Many /HPF   Sq Epi: x / Non Sq Epi: 0-5 /HPF / Bacteria: Present /HPF        Culture - Urine (collected 03 Oct 2022 12:20)  Source: Clean Catch None  Final Report (04 Oct 2022 12:02):    Specimen appears CONTAMINATED. Lab suggests repeat clean catch specimen.    Urinalysis with Rflx Culture (collected 03 Oct 2022 12:20)    Culture - Blood (collected 03 Oct 2022 09:43)  Source: .Blood Blood-Peripheral  Preliminary Report (04 Oct 2022 11:00):    No growth at 1 day.    Culture - Blood (collected 03 Oct 2022 09:43)  Source: .Blood Blood-Peripheral  Preliminary Report (04 Oct 2022 11:00):    No growth at 1 day.          RADIOLOGY & ADDITIONAL TESTS:  Imaging from Last 24 Hours:

## 2022-10-05 NOTE — PROGRESS NOTE ADULT - PROBLEM SELECTOR PLAN 3
Pt is usually in NSR but a bout of AF could trigger CHF, check EKG.  Would also check TFTs as pt is on Amio

## 2022-10-05 NOTE — PROGRESS NOTE ADULT - PROBLEM SELECTOR PLAN 2
Receives HD MWF, follows Dr. Guadalupe.  Pt received dialysis today, tolerated well. Nephrology following    - continue sevelamer 800mg TID w meals  - trend phos daily  - f/u any further nephrology recs

## 2022-10-05 NOTE — PROGRESS NOTE ADULT - PROBLEM SELECTOR PLAN 7
F: None  E: replete K<4, Mg<2  N: DASH/TLC  VTE Prophylaxis:   GI: none  C: Full Code  D: [facility]

## 2022-10-05 NOTE — PROGRESS NOTE ADULT - PROBLEM SELECTOR PLAN 1
he was admitted with moderate AECIPD and improved.  He is Gr D and started on stiolto.  Enrolled on RELIANCE trial.    Presented with SOB, acute onset. States similar symptoms to prior PNA in March.   - CT Chest showed: "resolved left lower lobe pneumonia; new bilateral pulmonary opacities infectious/inflammatory; less likely atypical pulmonary edema; small right pleural effusion, now loculated; thickening of the right pleural lining, nonspecific, can be seen in setting of infected effusion."    - on 3L NC, wean as tolerated  - c/w Zosyn   - dc vancomycin place on pphx azithromycin 250 MWF  - Thoracic surgery contacted for loculated effusion on CT, f/u recs  - c/w Stiolto, as part of RELIANCE trial

## 2022-10-06 DIAGNOSIS — E16.2 HYPOGLYCEMIA, UNSPECIFIED: ICD-10-CM

## 2022-10-06 LAB
ALBUMIN SERPL ELPH-MCNC: 2.9 G/DL — LOW (ref 3.3–5)
ALP SERPL-CCNC: 169 U/L — HIGH (ref 40–120)
ALT FLD-CCNC: <5 U/L — LOW (ref 10–45)
ANION GAP SERPL CALC-SCNC: 10 MMOL/L — SIGNIFICANT CHANGE UP (ref 5–17)
ANISOCYTOSIS BLD QL: SLIGHT — SIGNIFICANT CHANGE UP
AST SERPL-CCNC: 10 U/L — SIGNIFICANT CHANGE UP (ref 10–40)
BASOPHILS # BLD AUTO: 0.04 K/UL — SIGNIFICANT CHANGE UP (ref 0–0.2)
BASOPHILS NFR BLD AUTO: 0.9 % — SIGNIFICANT CHANGE UP (ref 0–2)
BILIRUB SERPL-MCNC: 0.5 MG/DL — SIGNIFICANT CHANGE UP (ref 0.2–1.2)
BUN SERPL-MCNC: 12 MG/DL — SIGNIFICANT CHANGE UP (ref 7–23)
CALCIUM SERPL-MCNC: 9.1 MG/DL — SIGNIFICANT CHANGE UP (ref 8.4–10.5)
CHLORIDE SERPL-SCNC: 94 MMOL/L — LOW (ref 96–108)
CO2 SERPL-SCNC: 31 MMOL/L — SIGNIFICANT CHANGE UP (ref 22–31)
CREAT SERPL-MCNC: 5.1 MG/DL — HIGH (ref 0.5–1.3)
EGFR: 12 ML/MIN/1.73M2 — LOW
EOSINOPHIL # BLD AUTO: 0.22 K/UL — SIGNIFICANT CHANGE UP (ref 0–0.5)
EOSINOPHIL NFR BLD AUTO: 5.3 % — SIGNIFICANT CHANGE UP (ref 0–6)
GIANT PLATELETS BLD QL SMEAR: PRESENT — SIGNIFICANT CHANGE UP
GLUCOSE BLDC GLUCOMTR-MCNC: 85 MG/DL — SIGNIFICANT CHANGE UP (ref 70–99)
GLUCOSE BLDC GLUCOMTR-MCNC: 85 MG/DL — SIGNIFICANT CHANGE UP (ref 70–99)
GLUCOSE BLDC GLUCOMTR-MCNC: 86 MG/DL — SIGNIFICANT CHANGE UP (ref 70–99)
GLUCOSE SERPL-MCNC: 76 MG/DL — SIGNIFICANT CHANGE UP (ref 70–99)
HCT VFR BLD CALC: 32.5 % — LOW (ref 39–50)
HGB BLD-MCNC: 9.9 G/DL — LOW (ref 13–17)
HYPOCHROMIA BLD QL: SLIGHT — SIGNIFICANT CHANGE UP
LYMPHOCYTES # BLD AUTO: 1.33 K/UL — SIGNIFICANT CHANGE UP (ref 1–3.3)
LYMPHOCYTES # BLD AUTO: 31.6 % — SIGNIFICANT CHANGE UP (ref 13–44)
MACROCYTES BLD QL: SLIGHT — SIGNIFICANT CHANGE UP
MAGNESIUM SERPL-MCNC: 2.1 MG/DL — SIGNIFICANT CHANGE UP (ref 1.6–2.6)
MANUAL SMEAR VERIFICATION: SIGNIFICANT CHANGE UP
MCHC RBC-ENTMCNC: 28 PG — SIGNIFICANT CHANGE UP (ref 27–34)
MCHC RBC-ENTMCNC: 30.5 GM/DL — LOW (ref 32–36)
MCV RBC AUTO: 92.1 FL — SIGNIFICANT CHANGE UP (ref 80–100)
MICROCYTES BLD QL: SLIGHT — SIGNIFICANT CHANGE UP
MONOCYTES # BLD AUTO: 0.66 K/UL — SIGNIFICANT CHANGE UP (ref 0–0.9)
MONOCYTES NFR BLD AUTO: 15.8 % — HIGH (ref 2–14)
NEUTROPHILS # BLD AUTO: 1.88 K/UL — SIGNIFICANT CHANGE UP (ref 1.8–7.4)
NEUTROPHILS NFR BLD AUTO: 44.7 % — SIGNIFICANT CHANGE UP (ref 43–77)
NRBC # BLD: 1 /100 — HIGH (ref 0–0)
NRBC # BLD: SIGNIFICANT CHANGE UP /100 WBCS (ref 0–0)
OVALOCYTES BLD QL SMEAR: SLIGHT — SIGNIFICANT CHANGE UP
PHOSPHATE SERPL-MCNC: 2.7 MG/DL — SIGNIFICANT CHANGE UP (ref 2.5–4.5)
PLAT MORPH BLD: ABNORMAL
PLATELET # BLD AUTO: 189 K/UL — SIGNIFICANT CHANGE UP (ref 150–400)
POLYCHROMASIA BLD QL SMEAR: SLIGHT — SIGNIFICANT CHANGE UP
POTASSIUM SERPL-MCNC: 4.1 MMOL/L — SIGNIFICANT CHANGE UP (ref 3.5–5.3)
POTASSIUM SERPL-SCNC: 4.1 MMOL/L — SIGNIFICANT CHANGE UP (ref 3.5–5.3)
PROT SERPL-MCNC: 6.6 G/DL — SIGNIFICANT CHANGE UP (ref 6–8.3)
RBC # BLD: 3.53 M/UL — LOW (ref 4.2–5.8)
RBC # FLD: 17.1 % — HIGH (ref 10.3–14.5)
RBC BLD AUTO: ABNORMAL
SODIUM SERPL-SCNC: 135 MMOL/L — SIGNIFICANT CHANGE UP (ref 135–145)
SPHEROCYTES BLD QL SMEAR: SLIGHT — SIGNIFICANT CHANGE UP
T4 FREE SERPL-MCNC: 1.83 NG/DL — HIGH (ref 0.93–1.7)
VARIANT LYMPHS # BLD: 1.7 % — SIGNIFICANT CHANGE UP (ref 0–6)
WBC # BLD: 4.2 K/UL — SIGNIFICANT CHANGE UP (ref 3.8–10.5)
WBC # FLD AUTO: 4.2 K/UL — SIGNIFICANT CHANGE UP (ref 3.8–10.5)

## 2022-10-06 PROCEDURE — 99232 SBSQ HOSP IP/OBS MODERATE 35: CPT

## 2022-10-06 PROCEDURE — 93010 ELECTROCARDIOGRAM REPORT: CPT

## 2022-10-06 PROCEDURE — 99232 SBSQ HOSP IP/OBS MODERATE 35: CPT | Mod: GC

## 2022-10-06 RX ORDER — HEPARIN SODIUM 5000 [USP'U]/ML
5000 INJECTION INTRAVENOUS; SUBCUTANEOUS EVERY 8 HOURS
Refills: 0 | Status: DISCONTINUED | OUTPATIENT
Start: 2022-10-06 | End: 2022-10-07

## 2022-10-06 RX ORDER — HEPARIN SODIUM 5000 [USP'U]/ML
5000 INJECTION INTRAVENOUS; SUBCUTANEOUS EVERY 12 HOURS
Refills: 0 | Status: DISCONTINUED | OUTPATIENT
Start: 2022-10-06 | End: 2022-10-06

## 2022-10-06 RX ADMIN — Medication 3 MILLILITER(S): at 23:47

## 2022-10-06 RX ADMIN — Medication 3 MILLILITER(S): at 06:08

## 2022-10-06 RX ADMIN — ATORVASTATIN CALCIUM 40 MILLIGRAM(S): 80 TABLET, FILM COATED ORAL at 23:20

## 2022-10-06 RX ADMIN — Medication 3 MILLILITER(S): at 11:32

## 2022-10-06 RX ADMIN — Medication 100 MILLIGRAM(S): at 17:19

## 2022-10-06 RX ADMIN — AMLODIPINE BESYLATE 5 MILLIGRAM(S): 2.5 TABLET ORAL at 06:08

## 2022-10-06 RX ADMIN — HEPARIN SODIUM 5000 UNIT(S): 5000 INJECTION INTRAVENOUS; SUBCUTANEOUS at 23:21

## 2022-10-06 RX ADMIN — Medication 100 MILLIGRAM(S): at 06:08

## 2022-10-06 RX ADMIN — AMIODARONE HYDROCHLORIDE 200 MILLIGRAM(S): 400 TABLET ORAL at 06:08

## 2022-10-06 RX ADMIN — PIPERACILLIN AND TAZOBACTAM 25 GRAM(S): 4; .5 INJECTION, POWDER, LYOPHILIZED, FOR SOLUTION INTRAVENOUS at 11:32

## 2022-10-06 RX ADMIN — TIOTROPIUM BROMIDE AND OLODATEROL 2 PUFF(S): 3.124; 2.736 SPRAY, METERED RESPIRATORY (INHALATION) at 14:25

## 2022-10-06 RX ADMIN — Medication 81 MILLIGRAM(S): at 11:31

## 2022-10-06 RX ADMIN — SEVELAMER CARBONATE 800 MILLIGRAM(S): 2400 POWDER, FOR SUSPENSION ORAL at 14:25

## 2022-10-06 RX ADMIN — Medication 50 MILLIGRAM(S): at 06:08

## 2022-10-06 RX ADMIN — PIPERACILLIN AND TAZOBACTAM 25 GRAM(S): 4; .5 INJECTION, POWDER, LYOPHILIZED, FOR SOLUTION INTRAVENOUS at 23:46

## 2022-10-06 RX ADMIN — SEVELAMER CARBONATE 800 MILLIGRAM(S): 2400 POWDER, FOR SUSPENSION ORAL at 23:20

## 2022-10-06 RX ADMIN — SEVELAMER CARBONATE 800 MILLIGRAM(S): 2400 POWDER, FOR SUSPENSION ORAL at 06:08

## 2022-10-06 RX ADMIN — Medication 3 MILLILITER(S): at 17:19

## 2022-10-06 NOTE — PROGRESS NOTE ADULT - SUBJECTIVE AND OBJECTIVE BOX
Medicine Progress Note (INCOMPLETE)    SUBJECTIVE / OVERNIGHT EVENTS:  O/N events:  Patient was seen and examined at bedside.  Otherwise negative ROS    MEDICATIONS  (STANDING):  albuterol/ipratropium for Nebulization 3 milliLiter(s) Nebulizer every 6 hours  allopurinol 100 milliGRAM(s) Oral two times a day  aMIOdarone    Tablet 200 milliGRAM(s) Oral daily  amLODIPine   Tablet 5 milliGRAM(s) Oral daily  aspirin enteric coated 81 milliGRAM(s) Oral daily  atorvastatin 40 milliGRAM(s) Oral at bedtime  azithromycin   Tablet 250 milliGRAM(s) Oral <User Schedule>  dextrose 10%. 1000 milliLiter(s) (30 mL/Hr) IV Continuous <Continuous>  metoprolol succinate ER 50 milliGRAM(s) Oral daily  piperacillin/tazobactam IVPB.. 3.375 Gram(s) IV Intermittent every 12 hours  sevelamer carbonate 800 milliGRAM(s) Oral every 8 hours  tiotropium 2.5 MICROgram(s)/olodaterol 2.5 MICROgram(s) (STIOLTO) Inhaler 2 Puff(s) Inhalation daily    MEDICATIONS  (PRN):    CAPILLARY BLOOD GLUCOSE      POCT Blood Glucose.: 85 mg/dL (06 Oct 2022 06:29)  POCT Blood Glucose.: 94 mg/dL (05 Oct 2022 23:49)  POCT Blood Glucose.: 86 mg/dL (05 Oct 2022 17:48)  POCT Blood Glucose.: 71 mg/dL (05 Oct 2022 16:35)  POCT Blood Glucose.: 88 mg/dL (05 Oct 2022 14:19)  POCT Blood Glucose.: 78 mg/dL (05 Oct 2022 13:14)  POCT Blood Glucose.: 52 mg/dL (05 Oct 2022 12:39)  POCT Blood Glucose.: 78 mg/dL (05 Oct 2022 08:15)        OBJECTIVE:  Vital Signs Last 24 Hrs  T(C): 36.4 (06 Oct 2022 05:55), Max: 36.7 (05 Oct 2022 09:00)  T(F): 97.6 (06 Oct 2022 05:55), Max: 98.1 (05 Oct 2022 09:00)  HR: 63 (06 Oct 2022 05:55) (59 - 63)  BP: 115/69 (06 Oct 2022 05:55) (115/69 - 126/73)  BP(mean): --  RR: 18 (06 Oct 2022 05:55) (18 - 18)  SpO2: 94% (06 Oct 2022 05:55) (94% - 97%)    Parameters below as of 06 Oct 2022 05:55  Patient On (Oxygen Delivery Method): nasal cannula        PHYSICAL EXAM:  General: AAOx3, NAD  Head: NC/AT; MMM; PERRL; EOMI;  Neck: Supple; no JVD  Respiratory: CTAB; no wheezes/rales/rhonchi  Cardiovascular: Regular rhythm/rate; S1/S2+, no murmurs, rubs gallops   Gastrointestinal: Soft; NTND; bowel sounds normal and present  Extremities: WWP; no edema/cyanosis  Neurological: CNII-XII grossly intact; no obvious focal deficits  Skin: Clean and intact. Good skin turgor. Without open wounds and sores  I&O's Summary    05 Oct 2022 07:01  -  06 Oct 2022 06:59  --------------------------------------------------------  IN: 800 mL / OUT: 1800 mL / NET: -1000 mL        LABS:                        8.9    4.32  )-----------( 185      ( 05 Oct 2022 05:56 )             29.5     10-05    138  |  95<L>  |  19  ----------------------------<  85  3.1<L>   |  30  |  7.15<H>    Ca    9.0      05 Oct 2022 05:56  Phos  3.4     10-05  Mg     2.1     10-05    TPro  6.4  /  Alb  2.8<L>  /  TBili  0.4  /  DBili  x   /  AST  11  /  ALT  <5<L>  /  AlkPhos  161<H>  10-05              Culture - Urine (collected 03 Oct 2022 12:20)  Source: Clean Catch None  Final Report (04 Oct 2022 12:02):    Specimen appears CONTAMINATED. Lab suggests repeat clean catch specimen.    Urinalysis with Rflx Culture (collected 03 Oct 2022 12:20)    Culture - Blood (collected 03 Oct 2022 09:43)  Source: .Blood Blood-Peripheral  Preliminary Report (05 Oct 2022 11:00):    No growth at 2 days.    Culture - Blood (collected 03 Oct 2022 09:43)  Source: .Blood Blood-Peripheral  Preliminary Report (05 Oct 2022 11:00):    No growth at 2 days.          RADIOLOGY & ADDITIONAL TESTS:  Imaging from Last 24 Hours: Medicine Progress Note (INCOMPLETE)    SUBJECTIVE / OVERNIGHT EVENTS:  O/N events: Fingerstick 85-95s throughout the night on D10 gtt running at 30cc/hr  Patient was seen and examined at bedside.  Otherwise negative ROS    MEDICATIONS  (STANDING):  albuterol/ipratropium for Nebulization 3 milliLiter(s) Nebulizer every 6 hours  allopurinol 100 milliGRAM(s) Oral two times a day  aMIOdarone    Tablet 200 milliGRAM(s) Oral daily  amLODIPine   Tablet 5 milliGRAM(s) Oral daily  aspirin enteric coated 81 milliGRAM(s) Oral daily  atorvastatin 40 milliGRAM(s) Oral at bedtime  azithromycin   Tablet 250 milliGRAM(s) Oral <User Schedule>  dextrose 10%. 1000 milliLiter(s) (30 mL/Hr) IV Continuous <Continuous>  metoprolol succinate ER 50 milliGRAM(s) Oral daily  piperacillin/tazobactam IVPB.. 3.375 Gram(s) IV Intermittent every 12 hours  sevelamer carbonate 800 milliGRAM(s) Oral every 8 hours  tiotropium 2.5 MICROgram(s)/olodaterol 2.5 MICROgram(s) (STIOLTO) Inhaler 2 Puff(s) Inhalation daily    MEDICATIONS  (PRN):    CAPILLARY BLOOD GLUCOSE      POCT Blood Glucose.: 85 mg/dL (06 Oct 2022 06:29)  POCT Blood Glucose.: 94 mg/dL (05 Oct 2022 23:49)  POCT Blood Glucose.: 86 mg/dL (05 Oct 2022 17:48)  POCT Blood Glucose.: 71 mg/dL (05 Oct 2022 16:35)  POCT Blood Glucose.: 88 mg/dL (05 Oct 2022 14:19)  POCT Blood Glucose.: 78 mg/dL (05 Oct 2022 13:14)  POCT Blood Glucose.: 52 mg/dL (05 Oct 2022 12:39)  POCT Blood Glucose.: 78 mg/dL (05 Oct 2022 08:15)        OBJECTIVE:  Vital Signs Last 24 Hrs  T(C): 36.4 (06 Oct 2022 05:55), Max: 36.7 (05 Oct 2022 09:00)  T(F): 97.6 (06 Oct 2022 05:55), Max: 98.1 (05 Oct 2022 09:00)  HR: 63 (06 Oct 2022 05:55) (59 - 63)  BP: 115/69 (06 Oct 2022 05:55) (115/69 - 126/73)  BP(mean): --  RR: 18 (06 Oct 2022 05:55) (18 - 18)  SpO2: 94% (06 Oct 2022 05:55) (94% - 97%)    Parameters below as of 06 Oct 2022 05:55  Patient On (Oxygen Delivery Method): nasal cannula        PHYSICAL EXAM:  General: AAOx3, NAD  Head: NC/AT; MMM; PERRL; EOMI;  Neck: Supple; no JVD  Respiratory: CTAB; no wheezes/rales/rhonchi  Cardiovascular: Regular rhythm/rate; S1/S2+, no murmurs, rubs gallops   Gastrointestinal: Soft; NTND; bowel sounds normal and present  Extremities: WWP; no edema/cyanosis  Neurological: CNII-XII grossly intact; no obvious focal deficits  Skin: Clean and intact. Good skin turgor. Without open wounds and sores  I&O's Summary    05 Oct 2022 07:01  -  06 Oct 2022 06:59  --------------------------------------------------------  IN: 800 mL / OUT: 1800 mL / NET: -1000 mL        LABS:                        8.9    4.32  )-----------( 185      ( 05 Oct 2022 05:56 )             29.5     10-05    138  |  95<L>  |  19  ----------------------------<  85  3.1<L>   |  30  |  7.15<H>    Ca    9.0      05 Oct 2022 05:56  Phos  3.4     10-05  Mg     2.1     10-05    TPro  6.4  /  Alb  2.8<L>  /  TBili  0.4  /  DBili  x   /  AST  11  /  ALT  <5<L>  /  AlkPhos  161<H>  10-05              Culture - Urine (collected 03 Oct 2022 12:20)  Source: Clean Catch None  Final Report (04 Oct 2022 12:02):    Specimen appears CONTAMINATED. Lab suggests repeat clean catch specimen.    Urinalysis with Rflx Culture (collected 03 Oct 2022 12:20)    Culture - Blood (collected 03 Oct 2022 09:43)  Source: .Blood Blood-Peripheral  Preliminary Report (05 Oct 2022 11:00):    No growth at 2 days.    Culture - Blood (collected 03 Oct 2022 09:43)  Source: .Blood Blood-Peripheral  Preliminary Report (05 Oct 2022 11:00):    No growth at 2 days.          RADIOLOGY & ADDITIONAL TESTS:  Imaging from Last 24 Hours: Medicine Progress Note    SUBJECTIVE / OVERNIGHT EVENTS:  O/N events: Fingerstick 85-95s throughout the night on D10 gtt running at 30cc/hr  Patient was seen and examined at bedside reports poor oral intake difficulty chewing and swallowing.   Otherwise negative ROS    MEDICATIONS  (STANDING):  albuterol/ipratropium for Nebulization 3 milliLiter(s) Nebulizer every 6 hours  allopurinol 100 milliGRAM(s) Oral two times a day  aMIOdarone    Tablet 200 milliGRAM(s) Oral daily  amLODIPine   Tablet 5 milliGRAM(s) Oral daily  aspirin enteric coated 81 milliGRAM(s) Oral daily  atorvastatin 40 milliGRAM(s) Oral at bedtime  azithromycin   Tablet 250 milliGRAM(s) Oral <User Schedule>  dextrose 10%. 1000 milliLiter(s) (30 mL/Hr) IV Continuous <Continuous>  metoprolol succinate ER 50 milliGRAM(s) Oral daily  piperacillin/tazobactam IVPB.. 3.375 Gram(s) IV Intermittent every 12 hours  sevelamer carbonate 800 milliGRAM(s) Oral every 8 hours  tiotropium 2.5 MICROgram(s)/olodaterol 2.5 MICROgram(s) (STIOLTO) Inhaler 2 Puff(s) Inhalation daily    MEDICATIONS  (PRN):    CAPILLARY BLOOD GLUCOSE      POCT Blood Glucose.: 85 mg/dL (06 Oct 2022 06:29)  POCT Blood Glucose.: 94 mg/dL (05 Oct 2022 23:49)  POCT Blood Glucose.: 86 mg/dL (05 Oct 2022 17:48)  POCT Blood Glucose.: 71 mg/dL (05 Oct 2022 16:35)  POCT Blood Glucose.: 88 mg/dL (05 Oct 2022 14:19)  POCT Blood Glucose.: 78 mg/dL (05 Oct 2022 13:14)  POCT Blood Glucose.: 52 mg/dL (05 Oct 2022 12:39)  POCT Blood Glucose.: 78 mg/dL (05 Oct 2022 08:15)        OBJECTIVE:  Vital Signs Last 24 Hrs  T(C): 36.4 (06 Oct 2022 05:55), Max: 36.7 (05 Oct 2022 09:00)  T(F): 97.6 (06 Oct 2022 05:55), Max: 98.1 (05 Oct 2022 09:00)  HR: 63 (06 Oct 2022 05:55) (59 - 63)  BP: 115/69 (06 Oct 2022 05:55) (115/69 - 126/73)  BP(mean): --  RR: 18 (06 Oct 2022 05:55) (18 - 18)  SpO2: 94% (06 Oct 2022 05:55) (94% - 97%)    Parameters below as of 06 Oct 2022 05:55  Patient On (Oxygen Delivery Method): nasal cannula        PHYSICAL EXAM:  General: AAOx3, NAD  Head: NC/AT; MMM; PERRL; EOMI;  Neck: Supple; no JVD  Respiratory: clear upper lobes b/l, crackles L>R  Cardiovascular: Regular rhythm/rate; S1/S2+, diastolic murmur appreciated in FABIAN, LUE fistula with palpable thrill   Gastrointestinal: Soft; NTND; bowel sounds normal and present  Extremities: WWP; no edema/cyanosis  Neurological: CNII-XII grossly intact; no obvious focal deficits  Skin: Clean and intact. Good skin turgor. Without open wounds and sores  I&O's Summary    05 Oct 2022 07:01  -  06 Oct 2022 06:59  --------------------------------------------------------  IN: 800 mL / OUT: 1800 mL / NET: -1000 mL        LABS:                        8.9    4.32  )-----------( 185      ( 05 Oct 2022 05:56 )             29.5     10-05    138  |  95<L>  |  19  ----------------------------<  85  3.1<L>   |  30  |  7.15<H>    Ca    9.0      05 Oct 2022 05:56  Phos  3.4     10-05  Mg     2.1     10-05    TPro  6.4  /  Alb  2.8<L>  /  TBili  0.4  /  DBili  x   /  AST  11  /  ALT  <5<L>  /  AlkPhos  161<H>  10-05              Culture - Urine (collected 03 Oct 2022 12:20)  Source: Clean Catch None  Final Report (04 Oct 2022 12:02):    Specimen appears CONTAMINATED. Lab suggests repeat clean catch specimen.    Urinalysis with Rflx Culture (collected 03 Oct 2022 12:20)    Culture - Blood (collected 03 Oct 2022 09:43)  Source: .Blood Blood-Peripheral  Preliminary Report (05 Oct 2022 11:00):    No growth at 2 days.    Culture - Blood (collected 03 Oct 2022 09:43)  Source: .Blood Blood-Peripheral  Preliminary Report (05 Oct 2022 11:00):    No growth at 2 days.          RADIOLOGY & ADDITIONAL TESTS:  Imaging from Last 24 Hours:

## 2022-10-06 NOTE — PROGRESS NOTE ADULT - PROBLEM SELECTOR PLAN 4
Takes metoprolol succ ER 50mg at home.    - continue  w/ home meds /69 in ED.  Takes amlodipine 5mg, metoprolol succ ER 50mg, amiodarone 200mg at home (for afib and HFpEF, all also lower BP).    - continue  w/ home meds /69 in ED.  Takes amlodipine 5mg, metoprolol succ ER 50mg, amiodarone 200mg at home    - continue  w/ home meds

## 2022-10-06 NOTE — PROGRESS NOTE ADULT - PROBLEM SELECTOR PLAN 6
Takes metoprolol succ ER 50mg, amiodarone 200mg at home.  - continue w/ home meds    -TTE performed today  EF 60%, Mild symmetric left ventricular hypertrophy. Grade II left ventricular diastolic dysfunction. Mild-to-moderate aortic, mitral, & tricuspid regurgitation. Hb at goal per Renal (10.2)    - receiving Epo 6000u w/ HD Hb at goal per Renal (10.2)    - receiving Epo 6000u w/ HD  - f/u Fe panel

## 2022-10-06 NOTE — PROGRESS NOTE ADULT - PROBLEM SELECTOR PLAN 1
Most likely explanation is volume overload with pulmonary vascular congestion.  He has had an EF as low as 40 % but recently has normalized. Remove fluid at HD. Follow daily wts, repeat BNP prior to DC

## 2022-10-06 NOTE — PROGRESS NOTE ADULT - PROBLEM SELECTOR PLAN 5
Hb at goal per Renal (10.2)    - receiving Epo 6000u w/ HD Takes metoprolol succ ER 50mg at home.    - continue  w/ home meds Takes metoprolol succ ER 50mg at home.  rate controlled    - continue  w/ home meds Takes metoprolol succ ER 50mg at home, amlodipine 200 qd.   Currently rate controlled.     - continue  w/ home meds

## 2022-10-06 NOTE — PROGRESS NOTE ADULT - PROBLEM SELECTOR PLAN 8
F: None  E: replete K<4, Mg<2  N: DASH/TLC  VTE Prophylaxis:   GI: none  C: Full Code  D: [facility] VTE Prophylaxis: heparin 5000 sq q12  F: None  E: replete K<4, Mg<2  N: DASH/TLC minced and moist  GI: none  C: Full Code  D: F

## 2022-10-06 NOTE — PHYSICAL THERAPY INITIAL EVALUATION ADULT - ADDITIONAL COMMENTS
pt lives w/ his wife in an elevator access apt building. ambulates w/ use of SC. has 2LPM O2 at home but does not typically use it. denies hx of recent falls

## 2022-10-06 NOTE — PROGRESS NOTE ADULT - TIME BILLING
Patient seen and examined with house-staff during bedside rounds.  Resident note read, including vitals, physical findings, laboratory data, and radiological reports.   Revisions included below.  Direct personal management at bed side and extensive interpretation of the data.  Plan was outlined and discussed in details with the housestaff.  Decision making of high complexity  Action taken for acute disease activity to reflect the level of care provided:  - medication reconciliation  - review laboratory data  Patient is improving.  Continue Stiolto and as needed albuterol.  No indication for systemic steroids.  Due to acute exacerbation is improving.  Patient on hemodialysis today.  Started azithromycin Monday Wednesday and Friday.  EKG tomorrow.  Patient was hypoglycemic.  Started on D50 and D10.  Avoid hypoglycemia.  No evidence of sepsis.  DC vancomycin.  Continue Zosyn.  Stop physical therapy and start weaning off the oxygen.  The patient is seen by nephrology and cardiology
Patient seen and examined with house-staff during bedside rounds.  Resident note read, including vitals, physical findings, laboratory data, and radiological reports.   Revisions included below.  Direct personal management at bed side and extensive interpretation of the data.  Plan was outlined and discussed in details with the housestaff.  Decision making of high complexity  Action taken for acute disease activity to reflect the level of care provided:  - medication reconciliation  - review laboratory data
Patient seen and examined with house-staff during bedside rounds.  Resident note read, including vitals, physical findings, laboratory data, and radiological reports.   Revisions included below.  Direct personal management at bed side and extensive interpretation of the data.  Plan was outlined and discussed in details with the housestaff.  Decision making of high complexity  Action taken for acute disease activity to reflect the level of care provided:  - medication reconciliation  - review laboratory data  The patient was admitted with acute hypoxic respiratory failure secondary to COPD exacerbation.  There was no evidence of CO2 retention.  The patient is clinically stable.  The patient is dyspnea with exertion.  I ambulated the patient he did not desaturate but became dyspneic.  The patient was a D10 because of decreased oral intake because the quality of diet and that consistent some fluid overload.  Discussed case with cardiology and the patient is in fluid overload status.  The discussed with renal regarding fluid removal tomorrow.  7 days of Zosyn.  Continue azithromycin Monday Wednesday Friday.  Continue Stiolto

## 2022-10-06 NOTE — PROGRESS NOTE ADULT - PROBLEM SELECTOR PLAN 1
he was admitted with moderate AECIPD and improved.  He is Gr D and started on stiolto.  Enrolled on RELIANCE trial.    Presented with SOB, acute onset. States similar symptoms to prior PNA in March.   - CT Chest showed: "resolved left lower lobe pneumonia; new bilateral pulmonary opacities infectious/inflammatory; less likely atypical pulmonary edema; small right pleural effusion, now loculated; thickening of the right pleural lining, nonspecific, can be seen in setting of infected effusion."    - on 3L NC, wean as tolerated  - c/w Zosyn   - dc vancomycin place on pphx azithromycin 250 MWF  - Thoracic surgery contacted for loculated effusion on CT, f/u recs  - c/w Stiolto, as part of RELIANCE trial Persistent hypoglycemia   Possibly 2/2 ESRD     - maintained on D10 gtt   - Persistent hypoglycemia likely 2/2 poor PO intake   A1C: 4.7    - change diet to minced moist  - encourage PO intake

## 2022-10-06 NOTE — PROGRESS NOTE ADULT - PROBLEM SELECTOR PLAN 2
Receives HD MWF, follows Dr. Guadalupe.  Pt received dialysis today, tolerated well. Nephrology following    - continue sevelamer 800mg TID w meals  - trend phos daily  - f/u any further nephrology recs he was admitted with moderate AECIPD and improved.  He is Gr D and started on stiolto.  Enrolled on RELIANCE trial.  Presented with SOB, acute onset. States similar symptoms to prior PNA in March. s/p vanc x1 started due to previous hospitalizations  - CT Chest showed resolved left lower lobe pneumonia; R loculated pleural effusion; thickening of the right pleural lining, nonspecific, can be seen in setting of infected effusion.    - on 1L NC, wean as tolerated  - c/w Zosyn   - c/w pphx azithromycin 250 MWF  - Thoracic surgery contacted for loculated effusion on CT, f/u recs  - c/w Stiolto, as part of RELIANCE trial he was admitted with moderate AECIPD and improved.  He is Gr D and started on stiolto.  Enrolled on RELIANCE trial.  Presented with SOB, acute onset. States similar symptoms to prior PNA in March. s/p vanc x1 started due to previous hospitalizations  - CT Chest showed resolved left lower lobe pneumonia; R loculated pleural effusion; thickening of the right pleural lining, nonspecific, can be seen in setting of infected effusion.    - on 1L NC, wean as tolerated  - c/w Zosyn, pphx azithromycin 250 MWF  - c/w Stiolto, as part of RELIANCE trial

## 2022-10-06 NOTE — PROGRESS NOTE ADULT - SUBJECTIVE AND OBJECTIVE BOX
INTERVAL HISTORY:  Slow improvement, needs to start Ambulating  	  MEDICATIONS:  aMIOdarone    Tablet 200 milliGRAM(s) Oral daily  amLODIPine   Tablet 5 milliGRAM(s) Oral daily  metoprolol succinate ER 50 milliGRAM(s) Oral daily    azithromycin   Tablet 250 milliGRAM(s) Oral <User Schedule>  piperacillin/tazobactam IVPB.. 3.375 Gram(s) IV Intermittent every 12 hours    albuterol/ipratropium for Nebulization 3 milliLiter(s) Nebulizer every 6 hours  tiotropium 2.5 MICROgram(s)/olodaterol 2.5 MICROgram(s) (STIOLTO) Inhaler 2 Puff(s) Inhalation daily        allopurinol 100 milliGRAM(s) Oral two times a day  atorvastatin 40 milliGRAM(s) Oral at bedtime    aspirin enteric coated 81 milliGRAM(s) Oral daily  dextrose 10%. 1000 milliLiter(s) IV Continuous <Continuous>        PHYSICAL EXAM:  T(C): 36.4 (10-06-22 @ 05:55), Max: 36.7 (10-05-22 @ 09:00)  HR: 63 (10-06-22 @ 05:55) (59 - 63)  BP: 115/69 (10-06-22 @ 05:55) (115/69 - 126/73)  RR: 18 (10-06-22 @ 05:55) (18 - 18)  SpO2: 94% (10-06-22 @ 05:55) (94% - 97%)  Wt(kg): --  I&O's Summary    05 Oct 2022 07:01  -  06 Oct 2022 06:54  --------------------------------------------------------  IN: 800 mL / OUT: 1800 mL / NET: -1000 mL          Appearance: Normal	  Cardiovascular: Normal S1 S2, No JVD, 2 murmur, No edema  Respiratory: Lungs clear to auscultation	  Psychiatry: A & O x 3, Mood & affect appropriate  Gastrointestinal:  Soft, Non-tender, + BS	  Skin: No rashes, No ecchymoses, No cyanosis  Neurologic: Non-focal  Extremities:   No clubbing, cyanosis or edema, LUE AVF  Vascular: Peripheral pulses palpable 2+ bilaterally    TELEMETRY: 	    ECG:  	  RADIOLOGY:   DIAGNOSTIC TESTING:  [ ] Echocardiogram:  [ ]  Catheterization:  [ ] Stress Test:    OTHER: 	    LABS:	 	    CARDIAC MARKERS:                                  8.9    4.32  )-----------( 185      ( 05 Oct 2022 05:56 )             29.5     10-05    138  |  95<L>  |  19  ----------------------------<  85  3.1<L>   |  30  |  7.15<H>    Ca    9.0      05 Oct 2022 05:56  Phos  3.4     10-05  Mg     2.1     10-05    TPro  6.4  /  Alb  2.8<L>  /  TBili  0.4  /  DBili  x   /  AST  11  /  ALT  <5<L>  /  AlkPhos  161<H>  10-05    proBNP:   Lipid Profile:   HgA1c:   TSH:     ASSESSMENT/PLAN:

## 2022-10-06 NOTE — PROGRESS NOTE ADULT - PROBLEM SELECTOR PLAN 3
/69 in ED.  Takes amlodipine 5mg, metoprolol succ ER 50mg, amiodarone 200mg at home (for afib and HFpEF, all also lower BP).    - continue  w/ home meds Receives HD MWF, follows Dr. Guadalupe.  Pt received dialysis today, tolerated well. Nephrology following    - continue sevelamer 800mg TID w meals  - trend phos daily  - f/u any further nephrology recs Receives HD MWF, follows Dr. Guadalupe.  Nephrology following  Pt receiving HD tomorrow    - continue sevelamer 800mg TID w meals  - trend phos daily  - f/u any further nephrology recs

## 2022-10-06 NOTE — PROGRESS NOTE ADULT - PROBLEM SELECTOR PLAN 7
F: None  E: replete K<4, Mg<2  N: DASH/TLC  VTE Prophylaxis:   GI: none  C: Full Code  D: [facility] Takes metoprolol succ ER 50mg, amiodarone 200mg at home.  - continue w/ home meds    -TTE performed today  EF 60%, Mild symmetric left ventricular hypertrophy. Grade II left ventricular diastolic dysfunction. Mild-to-moderate aortic, mitral, & tricuspid regurgitation.

## 2022-10-07 ENCOUNTER — TRANSCRIPTION ENCOUNTER (OUTPATIENT)
Age: 67
End: 2022-10-07

## 2022-10-07 VITALS
SYSTOLIC BLOOD PRESSURE: 136 MMHG | TEMPERATURE: 97 F | HEART RATE: 66 BPM | OXYGEN SATURATION: 97 % | RESPIRATION RATE: 18 BRPM | DIASTOLIC BLOOD PRESSURE: 76 MMHG

## 2022-10-07 LAB
ALBUMIN SERPL ELPH-MCNC: 2.7 G/DL — LOW (ref 3.3–5)
ALP SERPL-CCNC: 167 U/L — HIGH (ref 40–120)
ALT FLD-CCNC: <5 U/L — LOW (ref 10–45)
ANION GAP SERPL CALC-SCNC: 11 MMOL/L — SIGNIFICANT CHANGE UP (ref 5–17)
ANISOCYTOSIS BLD QL: SLIGHT — SIGNIFICANT CHANGE UP
AST SERPL-CCNC: 10 U/L — SIGNIFICANT CHANGE UP (ref 10–40)
BASOPHILS # BLD AUTO: 0.11 K/UL — SIGNIFICANT CHANGE UP (ref 0–0.2)
BASOPHILS NFR BLD AUTO: 2.6 % — HIGH (ref 0–2)
BILIRUB SERPL-MCNC: 0.4 MG/DL — SIGNIFICANT CHANGE UP (ref 0.2–1.2)
BUN SERPL-MCNC: 18 MG/DL — SIGNIFICANT CHANGE UP (ref 7–23)
CALCIUM SERPL-MCNC: 9.2 MG/DL — SIGNIFICANT CHANGE UP (ref 8.4–10.5)
CHLORIDE SERPL-SCNC: 96 MMOL/L — SIGNIFICANT CHANGE UP (ref 96–108)
CO2 SERPL-SCNC: 31 MMOL/L — SIGNIFICANT CHANGE UP (ref 22–31)
CREAT SERPL-MCNC: 6.49 MG/DL — HIGH (ref 0.5–1.3)
DACRYOCYTES BLD QL SMEAR: SLIGHT — SIGNIFICANT CHANGE UP
EGFR: 9 ML/MIN/1.73M2 — LOW
EOSINOPHIL # BLD AUTO: 0.45 K/UL — SIGNIFICANT CHANGE UP (ref 0–0.5)
EOSINOPHIL NFR BLD AUTO: 10.4 % — HIGH (ref 0–6)
FERRITIN SERPL-MCNC: 1321 NG/ML — HIGH (ref 30–400)
GIANT PLATELETS BLD QL SMEAR: PRESENT — SIGNIFICANT CHANGE UP
GLUCOSE BLDC GLUCOMTR-MCNC: 61 MG/DL — LOW (ref 70–99)
GLUCOSE BLDC GLUCOMTR-MCNC: 86 MG/DL — SIGNIFICANT CHANGE UP (ref 70–99)
GLUCOSE BLDC GLUCOMTR-MCNC: 87 MG/DL — SIGNIFICANT CHANGE UP (ref 70–99)
GLUCOSE BLDC GLUCOMTR-MCNC: 87 MG/DL — SIGNIFICANT CHANGE UP (ref 70–99)
GLUCOSE BLDC GLUCOMTR-MCNC: 88 MG/DL — SIGNIFICANT CHANGE UP (ref 70–99)
GLUCOSE SERPL-MCNC: 86 MG/DL — SIGNIFICANT CHANGE UP (ref 70–99)
HCT VFR BLD CALC: 31.4 % — LOW (ref 39–50)
HGB BLD-MCNC: 9.7 G/DL — LOW (ref 13–17)
HYPOCHROMIA BLD QL: SLIGHT — SIGNIFICANT CHANGE UP
IRON SATN MFR SERPL: 24 % — SIGNIFICANT CHANGE UP (ref 16–55)
IRON SATN MFR SERPL: 40 UG/DL — LOW (ref 45–165)
LYMPHOCYTES # BLD AUTO: 1.65 K/UL — SIGNIFICANT CHANGE UP (ref 1–3.3)
LYMPHOCYTES # BLD AUTO: 38.3 % — SIGNIFICANT CHANGE UP (ref 13–44)
MACROCYTES BLD QL: SLIGHT — SIGNIFICANT CHANGE UP
MAGNESIUM SERPL-MCNC: 2 MG/DL — SIGNIFICANT CHANGE UP (ref 1.6–2.6)
MANUAL SMEAR VERIFICATION: SIGNIFICANT CHANGE UP
MCHC RBC-ENTMCNC: 28.1 PG — SIGNIFICANT CHANGE UP (ref 27–34)
MCHC RBC-ENTMCNC: 30.9 GM/DL — LOW (ref 32–36)
MCV RBC AUTO: 91 FL — SIGNIFICANT CHANGE UP (ref 80–100)
MICROCYTES BLD QL: SLIGHT — SIGNIFICANT CHANGE UP
MONOCYTES # BLD AUTO: 0.41 K/UL — SIGNIFICANT CHANGE UP (ref 0–0.9)
MONOCYTES NFR BLD AUTO: 9.6 % — SIGNIFICANT CHANGE UP (ref 2–14)
NEUTROPHILS # BLD AUTO: 1.64 K/UL — LOW (ref 1.8–7.4)
NEUTROPHILS NFR BLD AUTO: 38.2 % — LOW (ref 43–77)
OVALOCYTES BLD QL SMEAR: SLIGHT — SIGNIFICANT CHANGE UP
PHOSPHATE SERPL-MCNC: 2.9 MG/DL — SIGNIFICANT CHANGE UP (ref 2.5–4.5)
PLAT MORPH BLD: ABNORMAL
PLATELET # BLD AUTO: 175 K/UL — SIGNIFICANT CHANGE UP (ref 150–400)
POIKILOCYTOSIS BLD QL AUTO: SLIGHT — SIGNIFICANT CHANGE UP
POLYCHROMASIA BLD QL SMEAR: SLIGHT — SIGNIFICANT CHANGE UP
POTASSIUM SERPL-MCNC: 4.4 MMOL/L — SIGNIFICANT CHANGE UP (ref 3.5–5.3)
POTASSIUM SERPL-SCNC: 4.4 MMOL/L — SIGNIFICANT CHANGE UP (ref 3.5–5.3)
PROT SERPL-MCNC: 6.7 G/DL — SIGNIFICANT CHANGE UP (ref 6–8.3)
RBC # BLD: 3.45 M/UL — LOW (ref 4.2–5.8)
RBC # FLD: 16.7 % — HIGH (ref 10.3–14.5)
RBC BLD AUTO: ABNORMAL
SCHISTOCYTES BLD QL AUTO: SLIGHT — SIGNIFICANT CHANGE UP
SODIUM SERPL-SCNC: 138 MMOL/L — SIGNIFICANT CHANGE UP (ref 135–145)
SPHEROCYTES BLD QL SMEAR: SLIGHT — SIGNIFICANT CHANGE UP
TIBC SERPL-MCNC: 168 UG/DL — LOW (ref 220–430)
UIBC SERPL-MCNC: 128 UG/DL — SIGNIFICANT CHANGE UP (ref 110–370)
VARIANT LYMPHS # BLD: 0.9 % — SIGNIFICANT CHANGE UP (ref 0–6)
WBC # BLD: 4.3 K/UL — SIGNIFICANT CHANGE UP (ref 3.8–10.5)
WBC # FLD AUTO: 4.3 K/UL — SIGNIFICANT CHANGE UP (ref 3.8–10.5)

## 2022-10-07 PROCEDURE — 90935 HEMODIALYSIS ONE EVALUATION: CPT

## 2022-10-07 PROCEDURE — 99232 SBSQ HOSP IP/OBS MODERATE 35: CPT | Mod: 25

## 2022-10-07 PROCEDURE — 99238 HOSP IP/OBS DSCHRG MGMT 30/<: CPT | Mod: GC

## 2022-10-07 RX ORDER — CEFPODOXIME PROXETIL 100 MG
1 TABLET ORAL
Qty: 4 | Refills: 0
Start: 2022-10-07 | End: 2022-10-10

## 2022-10-07 RX ORDER — ERYTHROPOIETIN 10000 [IU]/ML
6000 INJECTION, SOLUTION INTRAVENOUS; SUBCUTANEOUS ONCE
Refills: 0 | Status: COMPLETED | OUTPATIENT
Start: 2022-10-07 | End: 2022-10-07

## 2022-10-07 RX ORDER — AZITHROMYCIN 500 MG/1
1 TABLET, FILM COATED ORAL
Qty: 0 | Refills: 0 | DISCHARGE
Start: 2022-10-07

## 2022-10-07 RX ORDER — AZITHROMYCIN 500 MG/1
1 TABLET, FILM COATED ORAL
Qty: 13 | Refills: 0
Start: 2022-10-07 | End: 2022-11-05

## 2022-10-07 RX ORDER — TIOTROPIUM BROMIDE AND OLODATEROL 3.124; 2.736 UG/1; UG/1
2 SPRAY, METERED RESPIRATORY (INHALATION)
Qty: 1 | Refills: 0
Start: 2022-10-07 | End: 2022-11-05

## 2022-10-07 RX ADMIN — Medication 3 MILLILITER(S): at 13:58

## 2022-10-07 RX ADMIN — Medication 3 MILLILITER(S): at 06:12

## 2022-10-07 RX ADMIN — AMIODARONE HYDROCHLORIDE 200 MILLIGRAM(S): 400 TABLET ORAL at 06:13

## 2022-10-07 RX ADMIN — AZITHROMYCIN 250 MILLIGRAM(S): 500 TABLET, FILM COATED ORAL at 07:37

## 2022-10-07 RX ADMIN — Medication 3 MILLILITER(S): at 17:21

## 2022-10-07 RX ADMIN — Medication 100 MILLIGRAM(S): at 17:19

## 2022-10-07 RX ADMIN — PIPERACILLIN AND TAZOBACTAM 25 GRAM(S): 4; .5 INJECTION, POWDER, LYOPHILIZED, FOR SOLUTION INTRAVENOUS at 13:58

## 2022-10-07 RX ADMIN — SEVELAMER CARBONATE 800 MILLIGRAM(S): 2400 POWDER, FOR SUSPENSION ORAL at 06:12

## 2022-10-07 RX ADMIN — ERYTHROPOIETIN 6000 UNIT(S): 10000 INJECTION, SOLUTION INTRAVENOUS; SUBCUTANEOUS at 10:39

## 2022-10-07 RX ADMIN — AMLODIPINE BESYLATE 5 MILLIGRAM(S): 2.5 TABLET ORAL at 06:12

## 2022-10-07 RX ADMIN — HEPARIN SODIUM 5000 UNIT(S): 5000 INJECTION INTRAVENOUS; SUBCUTANEOUS at 13:57

## 2022-10-07 RX ADMIN — HEPARIN SODIUM 5000 UNIT(S): 5000 INJECTION INTRAVENOUS; SUBCUTANEOUS at 06:12

## 2022-10-07 RX ADMIN — Medication 50 MILLIGRAM(S): at 06:12

## 2022-10-07 RX ADMIN — SEVELAMER CARBONATE 800 MILLIGRAM(S): 2400 POWDER, FOR SUSPENSION ORAL at 13:57

## 2022-10-07 RX ADMIN — Medication 100 MILLIGRAM(S): at 06:13

## 2022-10-07 RX ADMIN — Medication 81 MILLIGRAM(S): at 13:58

## 2022-10-07 RX ADMIN — TIOTROPIUM BROMIDE AND OLODATEROL 2 PUFF(S): 3.124; 2.736 SPRAY, METERED RESPIRATORY (INHALATION) at 13:59

## 2022-10-07 NOTE — DISCHARGE NOTE PROVIDER - NSDCCPTREATMENT_GEN_ALL_CORE_FT
PRINCIPAL PROCEDURE  Procedure: CT chest wo con  Findings and Treatment: 1. Since March 2, 2022, resolved left lower lobe pneumonia.  2. New bilateral pulmonary opacities infectious/inflammatory. Less likely   atypical pulmonary edema.  3. Small right pleural effusion, now loculated. Thickening of the right   pleural lining, nonspecific, can be seen in setting of infected effusion.      SECONDARY PROCEDURE  Procedure: Transthoracic echocardiography (TTE)  Findings and Treatment: 1. Normal left ventricular size and systolic function. LVEF 60%   2. Mild symmetric left ventricular hypertrophy.   3. Grade II left ventricular diastolic dysfunction.   4. Normal right ventricular size and systolic function.   5. Biatrial enlargement.   6. Mild aortic stenosis.   7. Mild-to-moderate aortic regurgitation.   8. Mild-to-moderate mitral regurgitation.   9. Mild-to-moderate tricuspid regurgitation.  10. Pulmonary artery systolic pressure is 34 mmHg.  11. Trivial pericardial effusion.  12. Compared to the previous TTE performed on 10/20/2021, PASP is lower   in this study.

## 2022-10-07 NOTE — DISCHARGE NOTE PROVIDER - HOSPITAL COURSE
#Discharge: do not delete      67M PMHx ESRD (on HD MWF), lymphoma on weekly chemo, afib, HTN, HFpEF presented with SOB. Imaging showed loculated effusion and b/l lung opacities. Possible mild COPD exacerbation vs infectious etiology (PNA).   On duonebs, 3L NC, started on Zosyn. Part of Dr. Johnson's RELIANCE trial, on Stiolto.    #COPD exacerbation.   ·  Pt with SOB, acute onset. States similar symptoms to prior PNA in March. s/p vanc x1 started due to previous hospitalizations. he was admitted with moderate AECIPD and improved.  He is Gr D and started on stiolto.  Enrolled on RELIANCE trial. Weaned off oxygen. CT Chest showed resolved left lower lobe pneumonia; R loculated pleural effusion; thickening of the right pleural lining, nonspecific, can be seen in setting of infected effusion. Pt weaned off oxygen.   - c/w Zosyn, pphx azithromycin 250 MWF  - c/w Stiolto, as part of RELIANCE trial.      #Hypoglycemia  Pt BG 53, asymptomatic after HD session in setting of poor PO intake day prior. Given 1amp D50 and started on D50 drip. Changed diet to minced and moist due to pt reporting difficulty chewing with dentures. BG stable in 70-80s.  A1C: 4.7  - encourage PO intake.    #ESRD on dialysis.   Pt on HD MWF, follows Dr. Guadalupe.  Nephrology following. LUE fistula thrill palpated. C/w sevelmar 800mg TID with meals. Phosphate wnl or repleted.   - continue sevelamer 800mg TID w meals    #HTN (hypertension).   /69 in ED.  Home medications: amlodipine 5mg, metoprolol succ ER 50mg. HTN wnl   - continue  w/ home meds.    #Atrial fibrillation.   Takes metoprolol succ ER 50mg at home, amlodipine 200 qd. Rate controlled in 70-80s during hospitalization.   - continue  w/ home meds.    #Anemia.   Hb at goal per Renal (10.2). Received Epo 6000u w/ HD.  - c/w EPO at HD    #(HFpEF) heart failure with preserved ejection fraction.   ·  Plan: Takes metoprolol succ ER 50mg, amiodarone 200mg at home.  - continue w/ home meds    -TTE performed today  EF 60%, Mild symmetric left ventricular hypertrophy. Grade II left ventricular diastolic dysfunction. Mild-to-moderate aortic, mitral, & tricuspid regurgitation.  Patient was discharged to: home  New medications:   Changes to old medications:   Medications that were stopped:  Items to Follow up as outpatient: blood sugar  Physical exam at time of discharge:    #Discharge: do not delete      67M PMHx ESRD (on HD MWF), lymphoma on weekly chemo, afib, HTN, HFpEF presented with SOB. Imaging showed loculated effusion and b/l lung opacities. Possible mild COPD exacerbation vs infectious etiology (PNA).   On duonebs, 3L NC, started on Zosyn. Part of Dr. Johnson's RELIANCE trial, on Stiolto.    #COPD exacerbation.   ·  Pt with SOB, acute onset. States similar symptoms to prior PNA in March. s/p vanc x1 started due to previous hospitalizations. He was admitted with moderate AECIPD and improved.  He is Gr D and started on stiolto.  Enrolled on RELIANCE trial. Weaned off oxygen. CT Chest showed resolved left lower lobe pneumonia; R loculated pleural effusion; thickening of the right pleural lining, nonspecific, can be seen in setting of infected effusion. Started on azithromycin 250 MWF, duonebs. Pt weaned off oxygen.   - c/w Zosyn, pphx azithromycin 250 MWF  - c/w Stiolto, as part of RELIANCE trial.    #pneumonia  Pt presenting with SOB. COVID negative. CT chest showed R loculated pleural effusions. s/p vancomycin 1g x 1 dose . Started zosyn 3.375 q12 x 7 days, due to previous hospitalizations. Encouraged incentive spirometry. Bcx NTD.      #Hypoglycemia  Pt BG 53, asymptomatic after HD session in setting of poor PO intake day prior. Given 1amp D50 and started on D50 drip. Changed diet to minced and moist due to pt reporting difficulty chewing with dentures. BG stable in 70-80s.  A1C: 4.7  - encourage PO intake.    #ESRD on dialysis.   Pt on HD MWF, follows Dr. Guadalupe.  Nephrology following. LUE fistula thrill palpated. C/w sevelmar 800mg TID with meals. Phosphate wnl or repleted.   - continue sevelamer 800mg TID w meals    #HTN (hypertension).   /69 in ED.  Home medications: amlodipine 5mg, metoprolol succ ER 50mg. HTN wnl   - continue  w/ home meds.    #Atrial fibrillation.   Takes metoprolol succ ER 50mg at home, amlodipine 200 qd. Rate controlled in 70-80s during hospitalization.   - continue  w/ home meds.    #Anemia.   Hb at goal per Renal (10.2). Received Epo 6000u w/ HD.  - c/w EPO at HD    #(HFpEF) heart failure with preserved ejection fraction.   ·  Plan: Takes metoprolol succ ER 50mg, amiodarone 200mg at home.  - continue w/ home meds    -TTE performed today  EF 60%, Mild symmetric left ventricular hypertrophy. Grade II left ventricular diastolic dysfunction. Mild-to-moderate aortic, mitral, & tricuspid regurgitation.  Patient was discharged to: home  New medications:   Changes to old medications:   Medications that were stopped:  Items to Follow up as outpatient: blood sugar  Physical exam at time of discharge:    #Discharge: do not delete    67M PMHx ESRD (on HD MWF), lymphoma on weekly chemo, afib, HTN, HFpEF presented with SOB. Imaging showed loculated effusion and b/l lung opacities. Possible mild COPD exacerbation vs infectious etiology (PNA).   On duonebs, 3L NC, started on Zosyn. Part of Dr. Johnson's RELIANCE trial, on Stiolto.    #COPD exacerbation.   ·  Pt with SOB, acute onset. States similar symptoms to prior PNA in March. s/p vanc x1 started due to previous hospitalizations. He was admitted with moderate AECIPD and improved.  He is Gr D and started on stiolto.  Enrolled on RELIANCE trial. Weaned off oxygen. CT Chest showed resolved left lower lobe pneumonia; R loculated pleural effusion; thickening of the right pleural lining, nonspecific, can be seen in setting of infected effusion. Started on azithromycin 250 MWF, duonebs. Pt weaned off oxygen.   - c/w Zosyn, pphx azithromycin 250 MWF  - c/w Stiolto, as part of RELIANCE trial.    #pneumonia  Pt presenting with SOB. COVID negative. CT chest showed R loculated pleural effusions. s/p vancomycin 1g x 1 dose . Started zosyn 3.375 q12 x 7 days, due to previous hospitalizations. Encouraged incentive spirometry. Bcx NTD.      #Hypoglycemia  Pt BG 53, asymptomatic after HD session in setting of poor PO intake day prior. Given 1amp D50 and started on D50 drip. Changed diet to minced and moist due to pt reporting difficulty chewing with dentures. BG stable in 70-80s.  A1C: 4.7  - encourage PO intake.    #ESRD on dialysis.   Pt on HD MWF, follows Dr. Guadalupe.  Nephrology following. LUE fistula thrill palpated. C/w sevelmar 800mg TID with meals. Phosphate wnl or repleted.   - continue sevelamer 800mg TID w meals    #HTN (hypertension).   /69 in ED.  Home medications: amlodipine 5mg, metoprolol succ ER 50mg. HTN wnl   - continue  w/ home meds.    #Atrial fibrillation.   Takes metoprolol succ ER 50mg at home, amlodipine 200 qd. Rate controlled in 70-80s during hospitalization.   - continue  w/ home meds.    #Anemia.   Hb at goal per Renal (10.2). Received Epo 6000u w/ HD.  - c/w EPO at HD    #(HFpEF) heart failure with preserved ejection fraction.   ·  Plan: Takes metoprolol succ ER 50mg, amiodarone 200mg at home.  - continue w/ home meds    -TTE performed today  EF 60%, Mild symmetric left ventricular hypertrophy. Grade II left ventricular diastolic dysfunction. Mild-to-moderate aortic, mitral, & tricuspid regurgitation.  Patient was discharged to: home  New medications:   Changes to old medications:   Medications that were stopped:  Items to Follow up as outpatient: blood sugar  Physical exam at time of discharge:    #Discharge: do not delete    67M PMHx ESRD (on HD MWF), lymphoma on weekly chemo, afib, HTN, HFpEF presented with SOB. Imaging showed loculated effusion and b/l lung opacities. Possible mild COPD exacerbation vs infectious etiology (PNA).   On duonebs, 3L NC, started on Zosyn. Part of Dr. Johnson's RELIANCE trial, on Stiolto.    #COPD exacerbation.   ·  Pt with SOB, acute onset. States similar symptoms to prior PNA in March. s/p vanc x1 started due to previous hospitalizations. He was admitted with moderate AECIPD and improved.  He is Gr D and started on stiolto.  Enrolled on RELIANCE trial. Weaned off oxygen. CT Chest showed resolved left lower lobe pneumonia; R loculated pleural effusion; thickening of the right pleural lining, nonspecific, can be seen in setting of infected effusion. Started on azithromycin 250 MWF, duonebs. Pt weaned off oxygen.   - c/w cefpodoxime 200 q24 and azithromycin 250 MWF  - c/w Stiolto, as part of RELIANCE trial.    #pneumonia  Pt presenting with SOB. COVID negative. CT chest showed R loculated pleural effusions. s/p vancomycin 1g x 1 dose . Started zosyn 3.375 q12 x 7 days, due to previous hospitalizations. Encouraged incentive spirometry. Bcx NTD.  - continue course with cefpodoxime 200mg q24 (after HD)      #Hypoglycemia  Pt BG 53, asymptomatic after HD session in setting of poor PO intake day prior. Given 1amp D50 and started on D50 drip. Changed diet to minced and moist due to pt reporting difficulty chewing with dentures. BG stable in 70-80s.  A1C: 4.7  - encourage PO intake.    #ESRD on dialysis.   Pt on HD MWF, follows Dr. Guadalupe.  Nephrology following. LUE fistula thrill palpated. C/w sevelmar 800mg TID with meals. Phosphate wnl or repleted.   - continue sevelamer 800mg TID w meals    #HTN (hypertension).   /69 in ED.  Home medications: amlodipine 5mg, metoprolol succ ER 50mg. HTN wnl   - continue  w/ home meds.    #Atrial fibrillation.   Takes metoprolol succ ER 50mg at home, amlodipine 200 qd. Rate controlled in 70-80s during hospitalization.   - continue  w/ home meds.    #Anemia.   Hb at goal per Renal (10.2). Received Epo 6000u w/ HD.  - c/w EPO at HD    #(HFpEF) heart failure with preserved ejection fraction.   ·  Plan: Takes metoprolol succ ER 50mg, amiodarone 200mg at home.  - continue w/ home meds    -TTE performed today  EF 60%, Mild symmetric left ventricular hypertrophy. Grade II left ventricular diastolic dysfunction. Mild-to-moderate aortic, mitral, & tricuspid regurgitation.  Patient was discharged to: home  New medications: cefpodoxime 200q x 4 days, azithromycin 250mg MWF, stiola inhaler   Changes to old medications:   Medications that were stopped:  Items to Follow up as outpatient: blood sugar  Physical exam at time of discharge:    #Discharge: do not delete    67M PMHx ESRD (on HD MWF), lymphoma on weekly chemo, afib, HTN, HFpEF presented with SOB. Imaging showed loculated effusion and b/l lung opacities. Possible mild COPD exacerbation vs infectious etiology (PNA). On duonebs, 3L NC, started on Zosyn. Part of Dr. Johnson's [facility]OS[facility] trial, on Stiolto.    #COPD exacerbation.   ·  Pt with SOB, acute onset. States similar symptoms to prior PNA in March. s/p vanc x1 started due to previous hospitalizations. He was admitted with moderate AECIPD and improved.  He is Gr D and started on stiolto.  Enrolled on RELIANCE trial. CT Chest showed resolved left lower lobe pneumonia; R loculated pleural effusion; thickening of the right pleural lining, nonspecific, can be seen in setting of infected effusion. 2/2 COPD exacerbation in setting of bacterial PNA. Started on azithromycin 250 MWF, duonebs. Pt on 1L at dispo.  - c/w cefpodoxime 200 q24 and azithromycin 250 MWF  - c/w Stiolto, as part of RELIANCE trial.  - c/w home O2 as needed  - f/u with Dr Johnson    #pneumonia  Pt presenting with SOB. COVID negative. CT chest showed R loculated pleural effusions. s/p vancomycin 1g x 1 dose . Started zosyn 3.375 q12 x 7 days, due to previous hospitalizations. Bcx NTD.  - continue course with cefpodoxime 200mg q24 (after HD)      #Hypoglycemia  Pt BG 53, asymptomatic after HD session in setting of poor PO intake day prior. Given 1amp D50 and started on D50 drip. Changed diet to minced and moist due to pt reporting difficulty chewing with dentures. BG stable in 70-80s.   A1C: 4.7  - encourage PO intake.  - f/u with PCP Dr Baugh 10/18     #ESRD on dialysis.   Pt on HD MWF, follows Dr. Guadalupe.  Nephrology following. LUE fistula thrill palpated. C/w sevelmar 800mg TID with meals. Phosphate wnl or repleted.   - continue sevelamer 800mg TID w meals    #HTN (hypertension).   /69 in ED.  Home medications: amlodipine 5mg, metoprolol succ ER 50mg. HTN wnl   - continue  w/ home meds.    #Atrial fibrillation.   Takes metoprolol succ ER 50mg at home, amlodipine 200 qd. Rate controlled in 70-80s during hospitalization.   - continue  w/ home meds.    #Anemia.   Hb at goal per Renal (10.2). Received Epo 6000u w/ HD.  - c/w EPO at HD    #(HFpEF) heart failure with preserved ejection fraction.   ·  Plan: Takes metoprolol succ ER 50mg, amiodarone 200mg at home.  - continue w/ home meds    -TTE performed today  EF 60%, Mild symmetric left ventricular hypertrophy. Grade II left ventricular diastolic dysfunction. Mild-to-moderate aortic, mitral, & tricuspid regurgitation.  Patient was discharged to: home  New medications: cefpodoxime 200q x 4 days, azithromycin 250mg MWF, stiola inhaler   Changes to old medications: none  Medications that were stopped: none  Items to Follow up as outpatient: blood sugar  Physical exam at time of discharge:   AAOx3, lungs crackles at b/l bases R>L, irregular rhythm +S1, S2 holosystolic murmur appreciated, no r/g, abd soft NT/ND +bowel sounds, no L/E edema

## 2022-10-07 NOTE — DISCHARGE NOTE NURSING/CASE MANAGEMENT/SOCIAL WORK - NSDCPEFALRISK_GEN_ALL_CORE
For information on Fall & Injury Prevention, visit: [email or web address] OR  [email or web address] OR  https://www.cdc.gov/steadi/patient.html

## 2022-10-07 NOTE — PROGRESS NOTE ADULT - ATTENDING COMMENTS
plan for HD tmrw
dialyzed today, 1.5L UF, tolerated well. EDW down 5kg since admission
dialyzed today, 4K bath. BP at goal as is volume status

## 2022-10-07 NOTE — PROGRESS NOTE ADULT - SUBJECTIVE AND OBJECTIVE BOX
--------------------------------------------------------------------------------  Chief Complaint: ESRD/Ongoing hemodialysis requirement    24 hour events/subjective:  RAVI, doing well this AM. To be DC later today.  HD today as per schedule.       PAST HISTORY  --------------------------------------------------------------------------------  No significant changes to PMH, PSH, FHx, SHx, unless otherwise noted    ALLERGIES & MEDICATIONS  --------------------------------------------------------------------------------  Allergies    No Known Allergies    Intolerances      Standing Inpatient Medications  albuterol/ipratropium for Nebulization 3 milliLiter(s) Nebulizer every 6 hours  allopurinol 100 milliGRAM(s) Oral two times a day  aMIOdarone    Tablet 200 milliGRAM(s) Oral daily  amLODIPine   Tablet 5 milliGRAM(s) Oral daily  aspirin enteric coated 81 milliGRAM(s) Oral daily  atorvastatin 40 milliGRAM(s) Oral at bedtime  azithromycin   Tablet 250 milliGRAM(s) Oral <User Schedule>  epoetin donny-epbx (RETACRIT) Injectable 6000 Unit(s) IV Push once  heparin   Injectable 5000 Unit(s) SubCutaneous every 8 hours  metoprolol succinate ER 50 milliGRAM(s) Oral daily  piperacillin/tazobactam IVPB.. 3.375 Gram(s) IV Intermittent every 12 hours  sevelamer carbonate 800 milliGRAM(s) Oral every 8 hours  tiotropium 2.5 MICROgram(s)/olodaterol 2.5 MICROgram(s) (STIOLTO) Inhaler 2 Puff(s) Inhalation daily    PRN Inpatient Medications    REVIEW OF SYSTEMS  --------------------------------------------------------------------------------  All other systems were reviewed and are negative, except as noted.    VITALS/PHYSICAL EXAM  --------------------------------------------------------------------------------  T(C): 36.7 (10-07-22 @ 05:08), Max: 36.7 (10-07-22 @ 05:08)  HR: 64 (10-07-22 @ 05:08) (60 - 64)  BP: 116/67 (10-07-22 @ 05:08) (116/67 - 128/80)  RR: 18 (10-07-22 @ 05:08) (17 - 18)  SpO2: 96% (10-07-22 @ 05:08) (96% - 100%)  Wt(kg): --  Drug Dosing Weight  Height (cm): 180.3 (03 Oct 2022 09:23)  Weight (kg): 68 (03 Oct 2022 09:23)  BMI (kg/m2): 20.9 (03 Oct 2022 09:23)  BSA (m2): 1.87 (03 Oct 2022 09:23)    PHYSICAL EXAM:  GENERAL: Alert, awake, oriented x3 on NC  HEENT: SARAI, EOMI, neck supple, no JVP  CHEST/LUNG: decreased breath sounds bilaterally  HEART: Regular rate and rhythm, no murmur, no gallops, no rub   ABDOMEN: Soft, nontender, non distended  EXTREMITIES: no pedal edema  Neurology: AAOx3, no asterixis   SKIN: No rash or skin lesion   ACCESS: SUSAN chandler/zaida and marty     LABS/STUDIES  --------------------------------------------------------------------------------              9.7    4.30  >-----------<  175      [10-07-22 @ 05:30]              31.4     138  |  96  |  18  ----------------------------<  86      [10-07-22 @ 05:30]  4.4   |  31  |  6.49        Ca     9.2     [10-07-22 @ 05:30]      Mg     2.0     [10-07-22 @ 05:30]      Phos  2.9     [10-07-22 @ 05:30]    TPro  6.7  /  Alb  2.7  /  TBili  0.4  /  DBili  x   /  AST  10  /  ALT  <5  /  AlkPhos  167  [10-07-22 @ 05:30]          Iron 40, TIBC 168, %sat 24      [10-07-22 @ 05:30]  Ferritin 1321      [10-07-22 @ 05:30]  TSH 1.650      [10-05-22 @ 05:56]    HBsAg Nonreact      [10-03-22 @ 15:04]  HCV 0.04, Nonreact      [10-03-22 @ 15:04]      RADIOLOGY:  -------------------------------------------------------------------------------------- --------------------------------------------------------------------------------  Chief Complaint: ESRD/Ongoing hemodialysis requirement    24 hour events/subjective:  RAVI, doing well this AM. To be DC later today.  HD today as per schedule. Last day of Zosyn therapy as per team      PAST HISTORY  --------------------------------------------------------------------------------  No significant changes to PMH, PSH, FHx, SHx, unless otherwise noted    ALLERGIES & MEDICATIONS  --------------------------------------------------------------------------------  Allergies    No Known Allergies    Intolerances      Standing Inpatient Medications  albuterol/ipratropium for Nebulization 3 milliLiter(s) Nebulizer every 6 hours  allopurinol 100 milliGRAM(s) Oral two times a day  aMIOdarone    Tablet 200 milliGRAM(s) Oral daily  amLODIPine   Tablet 5 milliGRAM(s) Oral daily  aspirin enteric coated 81 milliGRAM(s) Oral daily  atorvastatin 40 milliGRAM(s) Oral at bedtime  azithromycin   Tablet 250 milliGRAM(s) Oral <User Schedule>  epoetin donny-epbx (RETACRIT) Injectable 6000 Unit(s) IV Push once  heparin   Injectable 5000 Unit(s) SubCutaneous every 8 hours  metoprolol succinate ER 50 milliGRAM(s) Oral daily  piperacillin/tazobactam IVPB.. 3.375 Gram(s) IV Intermittent every 12 hours  sevelamer carbonate 800 milliGRAM(s) Oral every 8 hours  tiotropium 2.5 MICROgram(s)/olodaterol 2.5 MICROgram(s) (STIOLTO) Inhaler 2 Puff(s) Inhalation daily    PRN Inpatient Medications    REVIEW OF SYSTEMS  --------------------------------------------------------------------------------  All other systems were reviewed and are negative, except as noted.    VITALS/PHYSICAL EXAM  --------------------------------------------------------------------------------  T(C): 36.7 (10-07-22 @ 05:08), Max: 36.7 (10-07-22 @ 05:08)  HR: 64 (10-07-22 @ 05:08) (60 - 64)  BP: 116/67 (10-07-22 @ 05:08) (116/67 - 128/80)  RR: 18 (10-07-22 @ 05:08) (17 - 18)  SpO2: 96% (10-07-22 @ 05:08) (96% - 100%)  Wt(kg): --  Drug Dosing Weight  Height (cm): 180.3 (03 Oct 2022 09:23)  Weight (kg): 68 (03 Oct 2022 09:23)  BMI (kg/m2): 20.9 (03 Oct 2022 09:23)  BSA (m2): 1.87 (03 Oct 2022 09:23)    PHYSICAL EXAM:  GENERAL: Alert, awake, oriented x3 on NC  HEENT: SARAI, EOMI, neck supple, no JVP  CHEST/LUNG: decreased breath sounds bilaterally  HEART: Regular rate and rhythm, no murmur, no gallops, no rub   ABDOMEN: Soft, nontender, non distended  EXTREMITIES: no pedal edema  Neurology: AAOx3, no asterixis   SKIN: No rash or skin lesion   ACCESS: SUSAN chandler/zaida and marty     LABS/STUDIES  --------------------------------------------------------------------------------              9.7    4.30  >-----------<  175      [10-07-22 @ 05:30]              31.4     138  |  96  |  18  ----------------------------<  86      [10-07-22 @ 05:30]  4.4   |  31  |  6.49        Ca     9.2     [10-07-22 @ 05:30]      Mg     2.0     [10-07-22 @ 05:30]      Phos  2.9     [10-07-22 @ 05:30]    TPro  6.7  /  Alb  2.7  /  TBili  0.4  /  DBili  x   /  AST  10  /  ALT  <5  /  AlkPhos  167  [10-07-22 @ 05:30]          Iron 40, TIBC 168, %sat 24      [10-07-22 @ 05:30]  Ferritin 1321      [10-07-22 @ 05:30]  TSH 1.650      [10-05-22 @ 05:56]    HBsAg Nonreact      [10-03-22 @ 15:04]  HCV 0.04, Nonreact      [10-03-22 @ 15:04]      RADIOLOGY:  --------------------------------------------------------------------------------------

## 2022-10-07 NOTE — DISCHARGE NOTE PROVIDER - NSDCCPCAREPLAN_GEN_ALL_CORE_FT
PRINCIPAL DISCHARGE DIAGNOSIS  Diagnosis: Pneumonia  Assessment and Plan of Treatment:       SECONDARY DISCHARGE DIAGNOSES  Diagnosis: ESRD on dialysis  Assessment and Plan of Treatment:     Diagnosis: HTN (hypertension)  Assessment and Plan of Treatment:     Diagnosis: Atrial fibrillation  Assessment and Plan of Treatment:     Diagnosis: Hypoxia  Assessment and Plan of Treatment:      PRINCIPAL DISCHARGE DIAGNOSIS  Diagnosis: Pneumonia  Assessment and Plan of Treatment: You had a small loculated pleulra effusion seen on your chest imaging. You were started on antibiotics Zosyn in the hospital and will complete the course at home.  Please take cefpodoxime 200mg for four days. Take them after your dialysis session on dialysis days   Please take azithromycin 250mg every Monday, Wednesday, Friday. You will take them after your dialysis session until you see Dr Johnson in 2 weeks.  Use your home oxygen as needed.   Please follow up with Dr Johnson in his office in 2 weeks.      SECONDARY DISCHARGE DIAGNOSES  Diagnosis: ESRD on dialysis  Assessment and Plan of Treatment: Please continue to go to your dialysis sessions Monday Wednesday Friday. Please continue to take sevelmar 800mg    Diagnosis: HTN (hypertension)  Assessment and Plan of Treatment: You were previously diagnosed with high blood pressure. Please continue on your home medications at this time and follow up with your primary care physician for further surveillance and management of your high blood pressure.  Please continue to take amlodipine 5 mg daily    Diagnosis: Atrial fibrillation  Assessment and Plan of Treatment: You were previously diagnosed with atrial fibrillation. This is an abnormal heart beat that, if left uncontrolled, can cause symptoms like chest pain and shortness of breath. Please follow up with your primary care physician for further management of your atrial fibrillation.  Please continue to take your amiodarone 200mg daily     PRINCIPAL DISCHARGE DIAGNOSIS  Diagnosis: Pneumonia  Assessment and Plan of Treatment: You had a small loculated pleulra effusion seen on your chest imaging. You were started on antibiotics Zosyn in the hospital and will complete the course at home.  Please take cefpodoxime 200mg for four days. Take them after your dialysis session on dialysis days   Please take azithromycin 250mg every Monday, Wednesday, Friday. You will take them after your dialysis session until you see Dr Johnson in 2 weeks.  Use your home oxygen as needed.   Please follow up with Dr Johnson in his office in 2 weeks.      SECONDARY DISCHARGE DIAGNOSES  Diagnosis: Hypoglycemia  Assessment and Plan of Treatment: You were found to be hypoglycemic in the hospital. Your A1c was 6.4. This is likely in the setting of your poor oral intake. It is important for you to eat regularly. Consider Nepro supplements.   Please follow up with Dr Baugh 10/18 to check your blood sugar.    Diagnosis: ESRD on dialysis  Assessment and Plan of Treatment: Please continue to go to your dialysis sessions Monday Wednesday Friday. Please continue to take sevelmar 800mg    Diagnosis: HTN (hypertension)  Assessment and Plan of Treatment: You were previously diagnosed with high blood pressure. Please continue on your home medications at this time and follow up with your primary care physician for further surveillance and management of your high blood pressure.  Please continue to take amlodipine 5 mg daily    Diagnosis: Atrial fibrillation  Assessment and Plan of Treatment: You were previously diagnosed with atrial fibrillation. This is an abnormal heart beat that, if left uncontrolled, can cause symptoms like chest pain and shortness of breath. Please follow up with your primary care physician for further management of your atrial fibrillation.  Please continue to take your amiodarone 200mg daily    Diagnosis: Gout  Assessment and Plan of Treatment: You were previously diagnosed with gout. Please continue to take your medications as prescribed and follow up with your primary care physician for further monitoring of your symptoms.  Please continue to take your allopurinol

## 2022-10-07 NOTE — DISCHARGE NOTE PROVIDER - CARE PROVIDER_API CALL
Elise Johnson)  Critical Care Medicine; Pulmonary Disease  [address] [place] [address]  Phone: [phone]  Fax: [phone]  Established Patient  Follow Up Time:    Elise Johnson)  Critical Care Medicine; Pulmonary Disease  [address] [address]  Phone: [phone]  Fax: [phone]  Established Patient  Follow Up Time:     Edwardo Baugh  Internal Medicine  [address] [place] [address]  Phone: [phone]  Fax: ([phone]  Established Patient  Scheduled Appointment: 10/17/2022 09:30 AM   Elise Johnson)  Critical Care Medicine; Pulmonary Disease  [address] [address]  Phone: [phone]  Fax: [phone]  Established Patient  Follow Up Time:     Edwardo Baugh  Internal Medicine  [address] [place] [address]  Phone: [phone]  Fax: ([phone]  Established Patient  Scheduled Appointment: 10/18/2022 09:30 AM

## 2022-10-07 NOTE — DISCHARGE NOTE PROVIDER - NSDCCAREPROVSEEN_GEN_ALL_CORE_FT
Elise Johnson Elise Johnson A  Patient seen and examined with house-staff during bedside rounds.  Resident note read, including vitals, physical findings, laboratory data, and radiological reports.   Revisions included below.  Direct personal management at bed side and extensive interpretation of the data.  Plan was outlined and discussed in details with the housestaff.  Decision making of high complexity  Action taken for acute disease activity to reflect the level of care provided:  - medication reconciliation  - review laboratory data  The patient clinically stable.  The patient improved.  The patient had dialysis.  I discussed the case yesterday with dialysis and with extra fluid removal.  Continue Stiolto.  Continue azithromycin with follow-up as an outpatient

## 2022-10-07 NOTE — PROGRESS NOTE ADULT - SUBJECTIVE AND OBJECTIVE BOX
INTERVAL HISTORY:  Ambulated with mild SOB, is feeling better  	  MEDICATIONS:  aMIOdarone    Tablet 200 milliGRAM(s) Oral daily  amLODIPine   Tablet 5 milliGRAM(s) Oral daily  metoprolol succinate ER 50 milliGRAM(s) Oral daily    azithromycin   Tablet 250 milliGRAM(s) Oral <User Schedule>  piperacillin/tazobactam IVPB.. 3.375 Gram(s) IV Intermittent every 12 hours    albuterol/ipratropium for Nebulization 3 milliLiter(s) Nebulizer every 6 hours  tiotropium 2.5 MICROgram(s)/olodaterol 2.5 MICROgram(s) (STIOLTO) Inhaler 2 Puff(s) Inhalation daily        allopurinol 100 milliGRAM(s) Oral two times a day  atorvastatin 40 milliGRAM(s) Oral at bedtime    aspirin enteric coated 81 milliGRAM(s) Oral daily  heparin   Injectable 5000 Unit(s) SubCutaneous every 8 hours        PHYSICAL EXAM:  T(C): 36.7 (10-07-22 @ 05:08), Max: 36.7 (10-07-22 @ 05:08)  HR: 64 (10-07-22 @ 05:08) (60 - 64)  BP: 116/67 (10-07-22 @ 05:08) (116/67 - 128/80)  RR: 18 (10-07-22 @ 05:08) (17 - 18)  SpO2: 96% (10-07-22 @ 05:08) (96% - 100%)  Wt(kg): --  I&O's Summary        Appearance: Normal	  Cardiovascular: Normal S1 S2, No JVD, 2 murmurs, No edema  Respiratory: Lungs clear to auscultation	  Psychiatry: A & O x 3, Mood & affect appropriate  Gastrointestinal:  Soft, Non-tender, + BS	  Skin: No rashes, No ecchymoses, No cyanosis  Neurologic: Non-focal  Extremities:  No clubbing, cyanosis or edema  Vascular: Peripheral pulses palpable 2+ bilaterally    TELEMETRY: 	    ECG:  	  RADIOLOGY:   DIAGNOSTIC TESTING:  [ ] Echocardiogram:  [ ]  Catheterization:  [ ] Stress Test:    OTHER: 	    LABS:	 	    CARDIAC MARKERS:                                  9.7    4.30  )-----------( 175      ( 07 Oct 2022 05:30 )             31.4     10-07    138  |  96  |  18  ----------------------------<  86  4.4   |  31  |  6.49<H>    Ca    9.2      07 Oct 2022 05:30  Phos  2.9     10-07  Mg     2.0     10-07    TPro  6.7  /  Alb  2.7<L>  /  TBili  0.4  /  DBili  x   /  AST  10  /  ALT  <5<L>  /  AlkPhos  167<H>  10-07    proBNP:   Lipid Profile:   HgA1c:   TSH:     ASSESSMENT/PLAN:

## 2022-10-07 NOTE — DISCHARGE NOTE PROVIDER - NSDCFUSCHEDAPPT_GEN_ALL_CORE_FT
Mando Tom  [facility] PreAdmits  Scheduled Appointment: 10/11/2022    [facility]  SLEEPLAB Patient Sabi  Scheduled Appointment: 10/11/2022    Nelia Herndon  [facility]  HEARTVASC [address]  Scheduled Appointment: 10/24/2022    Mando Weldon  [facility]  OPHTHALM [address]  Scheduled Appointment: 11/03/2022    Carlos Ventura  [facility]  HEARTVASC [address]  Scheduled Appointment: 12/15/2022    Elise Johnson  [facility]  PULMMED [address]  Scheduled Appointment: 12/27/2022

## 2022-10-07 NOTE — DISCHARGE NOTE PROVIDER - PROVIDER TOKENS
PROVIDER:[TOKEN:[[id number]],ESTABLISHEDPATIENT:[T]] PROVIDER:[TOKEN:[[id number]],ESTABLISHEDPATIENT:[T]],PROVIDER:[TOKEN:[[id number]],SCHEDULEDAPPT:[10/17/2022],SCHEDULEDAPPTTIME:[09:30 AM],ESTABLISHEDPATIENT:[T]] PROVIDER:[TOKEN:[[id number]],ESTABLISHEDPATIENT:[T]],PROVIDER:[TOKEN:[[id number]],SCHEDULEDAPPT:[10/18/2022],SCHEDULEDAPPTTIME:[09:30 AM],ESTABLISHEDPATIENT:[T]]

## 2022-10-07 NOTE — PROGRESS NOTE ADULT - PROVIDER SPECIALTY LIST ADULT
Internal Medicine
Nephrology
Cardiology
Cardiology
Internal Medicine
Cardiology
Internal Medicine

## 2022-10-07 NOTE — PROGRESS NOTE ADULT - PROBLEM SELECTOR PLAN 3
Pt is usually in NSR but a bout of AF could trigger CHF, check EKG.  Would also check TFTs as pt is on Amio.  TSH NL, T4 minimally elevated.

## 2022-10-07 NOTE — DISCHARGE NOTE NURSING/CASE MANAGEMENT/SOCIAL WORK - PATIENT PORTAL LINK FT
You can access the FollowMyHealth Patient Portal offered by [facility] by registering at the following website: [email or web address]. By joining [facility]’s FollowMyHealth portal, you will also be able to view your health information using other applications (apps) compatible with our system.

## 2022-10-07 NOTE — PROGRESS NOTE ADULT - REASON FOR ADMISSION
acute shortness of breath

## 2022-10-07 NOTE — DISCHARGE NOTE PROVIDER - CARE PROVIDERS DIRECT ADDRESSES
,[email or web address],DirectAddress_Unknown

## 2022-10-07 NOTE — DISCHARGE NOTE PROVIDER - NSDCMRMEDTOKEN_GEN_ALL_CORE_FT
allopurinol 100 mg oral tablet: 1 tab(s) orally 2 times a day  amiodarone 200 mg oral tablet: 1 tab(s) orally once a day  amLODIPine 5 mg oral tablet: 1 tab(s) orally once a day  aspirin 81 mg oral delayed release tablet: 1 tab(s) orally once a day  atorvastatin 40 mg oral tablet: 1 tab(s) orally once a day  azithromycin 250 mg oral tablet: 1 tab(s) orally   metoprolol succinate 50 mg oral tablet, extended release: 1 tab(s) orally once a day  sevelamer carbonate 800 mg oral tablet: 1 tab(s) orally every 8 hours   allopurinol 100 mg oral tablet: 1 tab(s) orally 2 times a day  amiodarone 200 mg oral tablet: 1 tab(s) orally once a day  amLODIPine 5 mg oral tablet: 1 tab(s) orally once a day  aspirin 81 mg oral delayed release tablet: 1 tab(s) orally once a day  atorvastatin 40 mg oral tablet: 1 tab(s) orally once a day  azithromycin 250 mg oral tablet: 1 tab(s) orally Monday, Wednesday, and Friday   cefpodoxime 200 mg oral tablet: 1 tab(s) orally every 24 hours     On dialysis days, please take AFTER dialysis treatment  metoprolol succinate 50 mg oral tablet, extended release: 1 tab(s) orally once a day  sevelamer carbonate 800 mg oral tablet: 1 tab(s) orally every 8 hours   allopurinol 100 mg oral tablet: 1 tab(s) orally 2 times a day  amiodarone 200 mg oral tablet: 1 tab(s) orally once a day  amLODIPine 5 mg oral tablet: 1 tab(s) orally once a day  aspirin 81 mg oral delayed release tablet: 1 tab(s) orally once a day  atorvastatin 40 mg oral tablet: 1 tab(s) orally once a day  azithromycin 250 mg oral tablet: 1 tab(s) orally Monday, Wednesday, and Friday   cefpodoxime 200 mg oral tablet: 1 tab(s) orally every 24 hours     On dialysis days, please take AFTER dialysis treatment  metoprolol succinate 50 mg oral tablet, extended release: 1 tab(s) orally once a day  sevelamer carbonate 800 mg oral tablet: 1 tab(s) orally every 8 hours  tiotropium-olodaterol 2.5 mcg-2.5 mcg/inh inhalation aerosol: 2 puff(s) inhaled once a day

## 2022-10-16 ENCOUNTER — TRANSCRIPTION ENCOUNTER (OUTPATIENT)
Age: 67
End: 2022-10-16

## 2022-10-17 DIAGNOSIS — C83.30 DIFFUSE LARGE B-CELL LYMPHOMA, UNSPECIFIED SITE: ICD-10-CM

## 2022-10-17 DIAGNOSIS — N40.0 BENIGN PROSTATIC HYPERPLASIA WITHOUT LOWER URINARY TRACT SYMPTOMS: ICD-10-CM

## 2022-10-17 DIAGNOSIS — I08.0 RHEUMATIC DISORDERS OF BOTH MITRAL AND AORTIC VALVES: ICD-10-CM

## 2022-10-17 DIAGNOSIS — J22 UNSPECIFIED ACUTE LOWER RESPIRATORY INFECTION: ICD-10-CM

## 2022-10-17 DIAGNOSIS — J15.9 UNSPECIFIED BACTERIAL PNEUMONIA: ICD-10-CM

## 2022-10-17 DIAGNOSIS — I27.20 PULMONARY HYPERTENSION, UNSPECIFIED: ICD-10-CM

## 2022-10-17 DIAGNOSIS — M10.9 GOUT, UNSPECIFIED: ICD-10-CM

## 2022-10-17 DIAGNOSIS — R73.9 HYPERGLYCEMIA, UNSPECIFIED: ICD-10-CM

## 2022-10-17 DIAGNOSIS — I13.2 HYPERTENSIVE HEART AND CHRONIC KIDNEY DISEASE WITH HEART FAILURE AND WITH STAGE 5 CHRONIC KIDNEY DISEASE, OR END STAGE RENAL DISEASE: ICD-10-CM

## 2022-10-17 DIAGNOSIS — N18.6 END STAGE RENAL DISEASE: ICD-10-CM

## 2022-10-17 DIAGNOSIS — I08.3 COMBINED RHEUMATIC DISORDERS OF MITRAL, AORTIC AND TRICUSPID VALVES: ICD-10-CM

## 2022-10-17 DIAGNOSIS — R06.02 SHORTNESS OF BREATH: ICD-10-CM

## 2022-10-17 DIAGNOSIS — J44.0 CHRONIC OBSTRUCTIVE PULMONARY DISEASE WITH (ACUTE) LOWER RESPIRATORY INFECTION: ICD-10-CM

## 2022-10-17 DIAGNOSIS — J96.01 ACUTE RESPIRATORY FAILURE WITH HYPOXIA: ICD-10-CM

## 2022-10-17 DIAGNOSIS — Z99.2 DEPENDENCE ON RENAL DIALYSIS: ICD-10-CM

## 2022-10-17 DIAGNOSIS — I50.32 CHRONIC DIASTOLIC (CONGESTIVE) HEART FAILURE: ICD-10-CM

## 2022-10-17 DIAGNOSIS — I48.91 UNSPECIFIED ATRIAL FIBRILLATION: ICD-10-CM

## 2022-10-17 DIAGNOSIS — D64.9 ANEMIA, UNSPECIFIED: ICD-10-CM

## 2022-10-20 PROCEDURE — 86803 HEPATITIS C AB TEST: CPT

## 2022-10-20 PROCEDURE — 82728 ASSAY OF FERRITIN: CPT

## 2022-10-20 PROCEDURE — 85379 FIBRIN DEGRADATION QUANT: CPT

## 2022-10-20 PROCEDURE — 82803 BLOOD GASES ANY COMBINATION: CPT

## 2022-10-20 PROCEDURE — 81001 URINALYSIS AUTO W/SCOPE: CPT

## 2022-10-20 PROCEDURE — 71045 X-RAY EXAM CHEST 1 VIEW: CPT

## 2022-10-20 PROCEDURE — 80202 ASSAY OF VANCOMYCIN: CPT

## 2022-10-20 PROCEDURE — 84443 ASSAY THYROID STIM HORMONE: CPT

## 2022-10-20 PROCEDURE — 82962 GLUCOSE BLOOD TEST: CPT

## 2022-10-20 PROCEDURE — 84132 ASSAY OF SERUM POTASSIUM: CPT

## 2022-10-20 PROCEDURE — 87040 BLOOD CULTURE FOR BACTERIA: CPT

## 2022-10-20 PROCEDURE — 36415 COLL VENOUS BLD VENIPUNCTURE: CPT

## 2022-10-20 PROCEDURE — 93306 TTE W/DOPPLER COMPLETE: CPT

## 2022-10-20 PROCEDURE — 82330 ASSAY OF CALCIUM: CPT

## 2022-10-20 PROCEDURE — 80053 COMPREHEN METABOLIC PANEL: CPT

## 2022-10-20 PROCEDURE — 97161 PT EVAL LOW COMPLEX 20 MIN: CPT

## 2022-10-20 PROCEDURE — 83550 IRON BINDING TEST: CPT

## 2022-10-20 PROCEDURE — 99285 EMERGENCY DEPT VISIT HI MDM: CPT | Mod: 25

## 2022-10-20 PROCEDURE — 93005 ELECTROCARDIOGRAM TRACING: CPT

## 2022-10-20 PROCEDURE — 87086 URINE CULTURE/COLONY COUNT: CPT

## 2022-10-20 PROCEDURE — 83880 ASSAY OF NATRIURETIC PEPTIDE: CPT

## 2022-10-20 PROCEDURE — 94640 AIRWAY INHALATION TREATMENT: CPT

## 2022-10-20 PROCEDURE — 84100 ASSAY OF PHOSPHORUS: CPT

## 2022-10-20 PROCEDURE — 87635 SARS-COV-2 COVID-19 AMP PRB: CPT

## 2022-10-20 PROCEDURE — 84439 ASSAY OF FREE THYROXINE: CPT

## 2022-10-20 PROCEDURE — 84295 ASSAY OF SERUM SODIUM: CPT

## 2022-10-20 PROCEDURE — 71250 CT THORAX DX C-: CPT | Mod: MA

## 2022-10-20 PROCEDURE — 84484 ASSAY OF TROPONIN QUANT: CPT

## 2022-10-20 PROCEDURE — 85025 COMPLETE CBC W/AUTO DIFF WBC: CPT

## 2022-10-20 PROCEDURE — 83605 ASSAY OF LACTIC ACID: CPT

## 2022-10-20 PROCEDURE — 83036 HEMOGLOBIN GLYCOSYLATED A1C: CPT

## 2022-10-20 PROCEDURE — 83540 ASSAY OF IRON: CPT

## 2022-10-20 PROCEDURE — 90935 HEMODIALYSIS ONE EVALUATION: CPT

## 2022-10-20 PROCEDURE — 83735 ASSAY OF MAGNESIUM: CPT

## 2022-10-20 PROCEDURE — 87340 HEPATITIS B SURFACE AG IA: CPT

## 2022-10-24 ENCOUNTER — APPOINTMENT (OUTPATIENT)
Dept: HEART AND VASCULAR | Facility: CLINIC | Age: 67
End: 2022-10-24

## 2022-10-24 ENCOUNTER — NON-APPOINTMENT (OUTPATIENT)
Age: 67
End: 2022-10-24

## 2022-10-24 VITALS
DIASTOLIC BLOOD PRESSURE: 55 MMHG | HEIGHT: 71 IN | HEART RATE: 52 BPM | SYSTOLIC BLOOD PRESSURE: 96 MMHG | WEIGHT: 145 LBS | BODY MASS INDEX: 20.3 KG/M2 | TEMPERATURE: 97 F

## 2022-10-24 PROCEDURE — 99213 OFFICE O/P EST LOW 20 MIN: CPT | Mod: 25

## 2022-10-24 PROCEDURE — 93000 ELECTROCARDIOGRAM COMPLETE: CPT

## 2022-10-24 RX ORDER — CEFPODOXIME PROXETIL 200 MG/1
200 TABLET, FILM COATED ORAL
Qty: 4 | Refills: 0 | Status: COMPLETED | COMMUNITY
Start: 2022-10-07

## 2022-10-24 RX ORDER — AZITHROMYCIN 250 MG/1
250 TABLET, FILM COATED ORAL
Qty: 90 | Refills: 3 | Status: DISCONTINUED | COMMUNITY
Start: 2022-10-16 | End: 2022-10-24

## 2022-10-24 RX ORDER — TIOTROPIUM BROMIDE AND OLODATEROL 3.124; 2.736 UG/1; UG/1
2.5-2.5 SPRAY, METERED RESPIRATORY (INHALATION) DAILY
Qty: 3 | Refills: 3 | Status: DISCONTINUED | COMMUNITY
Start: 2022-10-16 | End: 2022-10-24

## 2022-10-24 RX ORDER — AZELASTINE HYDROCHLORIDE AND FLUTICASONE PROPIONATE 137; 50 UG/1; UG/1
137-50 SPRAY, METERED NASAL TWICE DAILY
Qty: 23 | Refills: 11 | Status: DISCONTINUED | COMMUNITY
Start: 2022-07-19 | End: 2022-10-24

## 2022-10-24 NOTE — PHYSICAL EXAM
[Ill-Appearing] : ill-appearing [No Carotid Bruit] : no carotid bruit [Normal S1, S2] : normal S1, S2 [No Rub] : no rub [No Gallop] : no gallop [Clear Lung Fields] : clear lung fields [Soft] : abdomen soft [Non Tender] : non-tender [Normal Bowel Sounds] : normal bowel sounds [Normal Gait] : normal gait [No Edema] : no edema [Moves all extremities] : moves all extremities [Alert and Oriented] : alert and oriented [Normal memory] : normal memory [General Appearance - Well Developed] : well developed [Normal Appearance] : normal appearance [Well Groomed] : well groomed [General Appearance - Well Nourished] : well nourished [No Deformities] : no deformities [General Appearance - In No Acute Distress] : no acute distress [Normal Conjunctiva] : the conjunctiva exhibited no abnormalities [Eyelids - No Xanthelasma] : the eyelids demonstrated no xanthelasmas [Normal Oral Mucosa] : normal oral mucosa [No Oral Pallor] : no oral pallor [No Oral Cyanosis] : no oral cyanosis [Normal Jugular Venous A Waves Present] : normal jugular venous A waves present [Normal Jugular Venous V Waves Present] : normal jugular venous V waves present [No Jugular Venous Loyola A Waves] : no jugular venous loyola A waves [Respiration, Rhythm And Depth] : normal respiratory rhythm and effort [Exaggerated Use Of Accessory Muscles For Inspiration] : no accessory muscle use [Auscultation Breath Sounds / Voice Sounds] : lungs were clear to auscultation bilaterally [5th Left ICS - MCL] : palpated at the 5th LICS in the midclavicular line [Normal] : normal [Bradycardia] : bradycardic [Rhythm Regular] : regular [No Pitting Edema] : no pitting edema present [Abdomen Soft] : soft [Abdomen Tenderness] : non-tender [Abdomen Mass (___ Cm)] : no abdominal mass palpated [Abnormal Walk] : normal gait [Gait - Sufficient For Exercise Testing] : the gait was sufficient for exercise testing [Nail Clubbing] : no clubbing of the fingernails [Cyanosis, Localized] : no localized cyanosis [Petechial Hemorrhages (___cm)] : no petechial hemorrhages [Skin Color & Pigmentation] : normal skin color and pigmentation [] : no rash [No Venous Stasis] : no venous stasis [Skin Lesions] : no skin lesions [No Skin Ulcers] : no skin ulcer [No Xanthoma] : no  xanthoma was observed [Oriented To Time, Place, And Person] : oriented to person, place, and time [Affect] : the affect was normal [Mood] : the mood was normal [No Anxiety] : not feeling anxious

## 2022-10-26 NOTE — CARDIOLOGY SUMMARY
[___] : [unfilled] [de-identified] : 10/24/22 SB @ 57 bpm, first deg AVB ANDRÉS 272 ms \par 8/10/21 SR @ 61 bpm  [de-identified] : JORGE 5/18/21 \par 1. Normal left ventricular size and systolic function. \par  2. Dilated right ventricular size. Normal right ventricular systolic function. \par  3. Dilated left and right atrium. \par  4. No thrombus seen in the left atrium or in the left atrial appendage. Spectral Doppler reveals normal left \par atrial appendage velocities. Left atrial appendage orifice measures 2.86 cm x 2.07 cm. \par  5. No evidence of an intracardiac shunt. \par  6. Mild aortic regurgitation. \par  7. Mild prolapse at P1 and lateral commissure of the mitral valve. \par  8. Moderate mitral regurgitation. \par  9. Moderate tricuspid regurgitation. \par 10. Pulmonary hypertension present, pulmonary artery systolic pressure is at least 47 mmHg. \par 11. Trivial pericardial effusion. \par 12. Aortic root is dilated measuring 4.12 cm. Proximal ascending aorta is dilated measuring 3.95 cm. \par 13. Mild to moderate plaque seen in the visualized portion of the descending aorta.

## 2022-10-26 NOTE — DISCUSSION/SUMMARY
[FreeTextEntry1] : Mr. Gamez is a 67 year-old gentleman with paroxysmal atrial fibrillation.  He is maintaining sinus rhythm on low dose Amiodarone 200 mg daily. The risks of long-term use of Amiodarone were discussed in detail and he knows to have routine eye exams and labs for TFTs, LFTs.  Now s/p Watchman placement 10/2021 and JORGE performed (12/2021 showed well seated Watchman with trivial marisa-device leak. +mod-severe TR, mild MR, plaque in aortic root.  Now on Aspirin only.  No changes to current regimen were made today.  He was asked to return for follow-up in six months.

## 2022-10-26 NOTE — ADDENDUM
[FreeTextEntry1] : I, Nelia Herndon, hereby attest that the medical record entry for this patient accurately reflects signatures/notations that I made on the Date of Service in my capacity as an Attending Physician when I treated/diagnosed the above patient. I do hereby attest that this information is true, accurate and complete to the best of my knowledge.  I was present for the entire visit and supervised the entire visit and made/agree with the plan as outlined.\par

## 2022-10-26 NOTE — HISTORY OF PRESENT ILLNESS
[FreeTextEntry1] : Mr. Gamez is a pleasant 67 year-old gentleman with a past medical history significant for HTN, COPD, CKD, T-cell lymphoma (diagnosed 20 years ago) systolic CHF (EF 40-45%),  ESRD on HD M/W/F (Left AV fistula), severe pHTN, severe MR, atrial fibrillation, who underwent a left chest wall hematoma evacuation on 12/18/2020 secondary to loculated effusion. Hospital course at that time was complicated by AF with RVR (no history of rhythm management).  He is now s/p left VATS, RA decortication on 1/22/21.  He was been maintained on Amiodarone for maintenance of sinus rhythm.  He was admitted to [facility] 3/16/21 with a spontaneous gluteal hemorrhage.  Eliquis was discontinued and Amiodarone resumed.   Now s/p Watchman placement 10/2021 and JORGE performed (12/2021 showed well seated Watchman with trivial marisa-device leak. +mod-severe TR, mild MR, plaque in aortic root.  Now on Aspirin only.  \par \par He denies any active CP, palpitations, dizziness, presyncope or syncope. \par \par TSH normal 9/2022, LFTs normal 10/2022  (transiently elevated 9/2022)\par \par Echo 1/2021 showed EF 65-70%, mild to moderate MR\par

## 2022-11-01 ENCOUNTER — APPOINTMENT (OUTPATIENT)
Dept: SLEEP CENTER | Facility: HOME HEALTH | Age: 67
End: 2022-11-01

## 2022-11-01 ENCOUNTER — OUTPATIENT (OUTPATIENT)
Dept: OUTPATIENT SERVICES | Facility: HOSPITAL | Age: 67
LOS: 1 days | End: 2022-11-01
Payer: MEDICARE

## 2022-11-01 DIAGNOSIS — Z41.9 ENCOUNTER FOR PROCEDURE FOR PURPOSES OTHER THAN REMEDYING HEALTH STATE, UNSPECIFIED: Chronic | ICD-10-CM

## 2022-11-01 DIAGNOSIS — Z90.49 ACQUIRED ABSENCE OF OTHER SPECIFIED PARTS OF DIGESTIVE TRACT: Chronic | ICD-10-CM

## 2022-11-01 PROCEDURE — 95800 SLP STDY UNATTENDED: CPT | Mod: 26

## 2022-11-01 PROCEDURE — 95800 SLP STDY UNATTENDED: CPT

## 2022-11-03 ENCOUNTER — APPOINTMENT (OUTPATIENT)
Dept: OPHTHALMOLOGY | Facility: CLINIC | Age: 67
End: 2022-11-03

## 2022-11-03 ENCOUNTER — NON-APPOINTMENT (OUTPATIENT)
Age: 67
End: 2022-11-03

## 2022-11-03 DIAGNOSIS — G47.33 OBSTRUCTIVE SLEEP APNEA (ADULT) (PEDIATRIC): ICD-10-CM

## 2022-11-03 PROCEDURE — 92004 COMPRE OPH EXAM NEW PT 1/>: CPT

## 2022-11-03 PROCEDURE — 68761 CLOSE TEAR DUCT OPENING: CPT | Mod: E2,LT

## 2022-11-08 ENCOUNTER — APPOINTMENT (OUTPATIENT)
Dept: BARIATRICS | Facility: CLINIC | Age: 67
End: 2022-11-08

## 2022-11-08 VITALS
WEIGHT: 142 LBS | SYSTOLIC BLOOD PRESSURE: 120 MMHG | BODY MASS INDEX: 19.88 KG/M2 | HEART RATE: 57 BPM | HEIGHT: 71 IN | DIASTOLIC BLOOD PRESSURE: 68 MMHG | TEMPERATURE: 97.3 F | OXYGEN SATURATION: 97 %

## 2022-11-08 PROCEDURE — 99204 OFFICE O/P NEW MOD 45 MIN: CPT

## 2022-11-08 NOTE — PHYSICAL EXAM
[JVD] : no jugular venous distention  [Respiratory Effort] : normal respiratory effort [Normal Rate and Rhythm] : normal rate and rhythm [Abdominal Masses] : No abdominal masses [Abdomen Tenderness] : ~T ~M No abdominal tenderness [Alert] : alert [Oriented to Person] : oriented to person [Oriented to Place] : oriented to place [Oriented to Time] : oriented to time [Calm] : calm [de-identified] : no distress, thin [de-identified] : no wheezes, well healed vats ports on chest [de-identified] : cannot even see lap jameson incisions/very well healed, to left of umbilicus area of subcut asymmetry is non tender and not discrete. on exam at rest and valsalva I cannot feel a fascial defect there  [de-identified] : reducible bilateral inguinal hernias, right > left

## 2022-11-08 NOTE — REVIEW OF SYSTEMS
[Fever] : no fever [Chills] : no chills [Feeling Poorly] : not feeling poorly [Feeling Tired] : not feeling tired [Recent Weight Gain (___ Lbs)] : no recent weight gain [Recent Weight Loss (___ Lbs)] : no recent weight loss [Chest Pain] : no chest pain [Palpitations] : no palpitations [Lower Ext Edema] : no extremity edema [Shortness Of Breath] : no shortness of breath [Cough] : no cough [SOB on Exertion] : no shortness of breath during exertion [Abdominal Pain] : no abdominal pain [Vomiting] : no vomiting [Constipation] : no constipation [Diarrhea] : no diarrhea [Arthralgias] : arthralgias [Confused] : no confusion [Easy Bleeding] : no tendency for easy bleeding [Negative] : Genitourinary [FreeTextEntry6] : Reports able to walk distances and stairs (limited by leg pain at times, not SOB) [FreeTextEntry8] : HD dependent, small trickle or urine on occasion opn [FreeTextEntry9] : LE bilateral

## 2022-11-08 NOTE — REASON FOR VISIT
[Consultation] : a consultation visit [Spouse] : spouse [FreeTextEntry1] : Referred by Dr. Johnson for abdominal and inguinal bulges

## 2022-11-08 NOTE — ASSESSMENT
[FreeTextEntry1] : Mr. Gamez is a very pleasant 66 y/o man with multiple medical co-morbidities with enlarging right inguinal hernia and palpable early left inguinal hernia. In the marisa-umb left sided area of bulge I cannot feel a hernia, exam is more c/w asymmetry subcutaneous tissue, subtle and most likely related to gradual weight loss he has had.

## 2022-11-08 NOTE — PLAN
[FreeTextEntry1] : We reviewed the risks and benefits of observation vs repair of the bilateral inguinal hernias.  He is bothered by the enlarging right hernia and wishes to proceed with repair. He is quite active and his overall appearance clinically is better than his PMH record as many of his past issues have resolved.  Discussed risks of lap vs open, robotic assisted inguinal hernia repair, including but not limited to bleeding, infection, recurrence, injury to nearby structures, and nerve pain. I will look at the left periumbilical location and assess for occult hernia there as well intra-op and repair as needed primarily. \par \par We reviewed the typical marisa-op and post-op course.  He tolerated gen anesthesia for his VATs well and was sick at the time. We reviewed plan to coordinate with HD timing and check labs am of surgery. He will need cardiac and pulm clearance for the general anesthesia.  I expect the patient will be able to go home the same day. All questions were answered and the patient wishes to be scheduled for surgery.\par

## 2022-11-08 NOTE — HISTORY OF PRESENT ILLNESS
[de-identified] : MARCI VEGA is a very pleasant 67 year year old man with and left mid abdomen and right groin bulge. He and his wife report this has been present for a few months. It was brought on by severe coughing when he had pneumonia. The left sided mid abd bulge is unchanged and always the same. the right groin bulge changes size with activity. He denies pain severe enough to require pain medication or limit his activities. The bulges are becoming more noticeable however. He has some overall chronic low appetite due to HD and CKD effects but no acute change. Denies nausea, vomiting or change in bowel habits. He has a daily BM, last today. He has very gradually lost weight over the years and no acute change. Has had T cell lymphoma in the past, currently in remission, last chemo in June

## 2022-11-26 ENCOUNTER — RX RENEWAL (OUTPATIENT)
Age: 67
End: 2022-11-26

## 2022-12-01 ENCOUNTER — APPOINTMENT (OUTPATIENT)
Dept: OPHTHALMOLOGY | Facility: CLINIC | Age: 67
End: 2022-12-01

## 2022-12-01 ENCOUNTER — NON-APPOINTMENT (OUTPATIENT)
Age: 67
End: 2022-12-01

## 2022-12-01 PROCEDURE — 92012 INTRM OPH EXAM EST PATIENT: CPT

## 2022-12-03 ENCOUNTER — LABORATORY RESULT (OUTPATIENT)
Age: 67
End: 2022-12-03

## 2022-12-03 ENCOUNTER — RESULT REVIEW (OUTPATIENT)
Age: 67
End: 2022-12-03

## 2022-12-03 ENCOUNTER — OUTPATIENT (OUTPATIENT)
Dept: OUTPATIENT SERVICES | Facility: HOSPITAL | Age: 67
LOS: 1 days | End: 2022-12-03
Payer: MEDICARE

## 2022-12-03 DIAGNOSIS — Z41.9 ENCOUNTER FOR PROCEDURE FOR PURPOSES OTHER THAN REMEDYING HEALTH STATE, UNSPECIFIED: Chronic | ICD-10-CM

## 2022-12-03 DIAGNOSIS — Z90.49 ACQUIRED ABSENCE OF OTHER SPECIFIED PARTS OF DIGESTIVE TRACT: Chronic | ICD-10-CM

## 2022-12-03 PROCEDURE — 71046 X-RAY EXAM CHEST 2 VIEWS: CPT | Mod: 26

## 2022-12-03 PROCEDURE — 71046 X-RAY EXAM CHEST 2 VIEWS: CPT

## 2022-12-06 ENCOUNTER — NON-APPOINTMENT (OUTPATIENT)
Age: 67
End: 2022-12-06

## 2022-12-06 ENCOUNTER — APPOINTMENT (OUTPATIENT)
Dept: HEART AND VASCULAR | Facility: CLINIC | Age: 67
End: 2022-12-06

## 2022-12-06 VITALS
WEIGHT: 138 LBS | TEMPERATURE: 97.1 F | DIASTOLIC BLOOD PRESSURE: 68 MMHG | SYSTOLIC BLOOD PRESSURE: 122 MMHG | RESPIRATION RATE: 17 BRPM | BODY MASS INDEX: 19.32 KG/M2 | HEIGHT: 71 IN | HEART RATE: 64 BPM | OXYGEN SATURATION: 96 %

## 2022-12-06 PROCEDURE — 99214 OFFICE O/P EST MOD 30 MIN: CPT | Mod: 25

## 2022-12-06 PROCEDURE — 93000 ELECTROCARDIOGRAM COMPLETE: CPT | Mod: NC

## 2022-12-06 NOTE — HISTORY OF PRESENT ILLNESS
[FreeTextEntry1] : 67 M HFpEF, ESRD on HD, pAF s/p Watchman 10/2021, T-Cell lymphoma on chemotherapy, HTN, L chest wall hematoma s/p evacuation 12/2020, Aortic Aneurysm \par \par here for preoperative optimization prior to inguinal hernia surgery he feels well he has baseline sob walks one blocks then stop no other cardiac complaints \par \par \par ecg sb 1 avb IVCD anterior t wave changes prolonged qtc12/6/2022\par JORGE 5/2021 EF MVP posterior leaflet mod MR and mod TR \par echo 10/2022 EF normal Mild AS mild to mod AI mild to mod MR PASP 34 mmHg

## 2022-12-06 NOTE — PHYSICAL EXAM
[Well Developed] : well developed [Well Nourished] : well nourished [No Acute Distress] : no acute distress [Normal Conjunctiva] : normal conjunctiva [Normal Venous Pressure] : normal venous pressure [No Carotid Bruit] : no carotid bruit [Normal S1, S2] : normal S1, S2 [No Murmur] : no murmur [No Rub] : no rub [No Gallop] : no gallop [Clear Lung Fields] : clear lung fields [Good Air Entry] : good air entry [No Respiratory Distress] : no respiratory distress  [Soft] : abdomen soft [Non Tender] : non-tender [No Masses/organomegaly] : no masses/organomegaly [Normal Bowel Sounds] : normal bowel sounds [Normal Gait] : normal gait [No Edema] : no edema [No Cyanosis] : no cyanosis [No Clubbing] : no clubbing [No Varicosities] : no varicosities [No Rash] : no rash [No Skin Lesions] : no skin lesions [Moves all extremities] : moves all extremities [No Focal Deficits] : no focal deficits [Normal Speech] : normal speech [Alert and Oriented] : alert and oriented [Normal memory] : normal memory [de-identified] : HSM to axilla [de-identified] : crackles right lung

## 2022-12-06 NOTE — ASSESSMENT
[FreeTextEntry1] : - HFpEF he is now euvolemic\par - Low bp at hd don’t take amlodipine post HD\par - he has known chronic right lung crackles with effusion no intervention he does not urinate\par - bradycardia reduce toprol to 50 mg daily he is on amiodarone  he likely needs sinus rhythm ? should be on long term amiodarone\par - RCRI score is 2 low risks urgery ok to stop aspirin resume when allowable\par - fu in three months

## 2022-12-07 ENCOUNTER — APPOINTMENT (OUTPATIENT)
Dept: PULMONOLOGY | Facility: CLINIC | Age: 67
End: 2022-12-07

## 2022-12-07 VITALS
HEIGHT: 71 IN | OXYGEN SATURATION: 96 % | BODY MASS INDEX: 19.32 KG/M2 | WEIGHT: 138 LBS | SYSTOLIC BLOOD PRESSURE: 94 MMHG | HEART RATE: 57 BPM | DIASTOLIC BLOOD PRESSURE: 58 MMHG

## 2022-12-07 PROCEDURE — 99214 OFFICE O/P EST MOD 30 MIN: CPT

## 2022-12-07 NOTE — HISTORY OF PRESENT ILLNESS
[>= 20 pack years] : >= 20 pack years [Former] : former [TextBox_4] : Pt presents today for pulmonary clearance for hernia repair with Dr. Odonnell 12/15/2022\par \par He walks a block and is fatigued.  Denies coughing, fevers, wheezing.  He is not sleeping well. He is compliant with his inhalers and Azithromycin three times a week.  Recent CXR showed decrease in fluid.   [ESS] : 14

## 2022-12-07 NOTE — REASON FOR VISIT
Need for Home Care Service was communicated by Ana PARRY RN CM on 7/19/19.   Choice was offered by Ana and patient chose [facility].     At this time, spoke with Andrea regarding [facility] for nursing and PT.   The patient will benefit from home care services based on this recent hospitalization related to Lt foot surgical wound infection.   Current LACE Score 6.    Homebound criteria was discussed in detail and patient verbalizes understanding.   Patient meets homebound criteria based on requiring at least one person to leave his home and the use of an assistive device he will also be having a home wound VAC in place when he is discharge.   Patient identifies support system to be spouse.     Verbal and written information regarding [facility] provided to patient along with [facility] contact information. Teach back demonstrated by patient.     Address and phone number confirmed with patient.   Service address and phone number are correct on the patients face sheet. Andrea has recently had home care services Aure POLLARD was his home care nurse.     Liaison will continue to follow until discharge.   Anticipated dc date is unknown at this time.     Izzy MCCAULEY RN [facility] Liaison  Desk: [phone], Cell Phone: [phone]   [Follow-Up] : a follow-up visit [COPD] : COPD [Abnormal CXR/ Chest CT] : an abnormal CXR/ chest CT

## 2022-12-07 NOTE — REVIEW OF SYSTEMS
[Cough] : cough [Negative] : Endocrine [Sputum] : no sputum [Dyspnea] : no dyspnea [SOB on Exertion] : no sob on exertion

## 2022-12-07 NOTE — END OF VISIT
[] : Fellow [Time Spent: ___ minutes] : I have spent [unfilled] minutes of time on the encounter. [>50% of the face to face encounter time was spent on counseling and/or coordination of care for ___] : Greater than 50% of the face to face encounter time was spent on counseling and/or coordination of care for [unfilled] [FreeTextEntry3] : Pt seen with RADHA Fernandez and agree on the above plan of care.

## 2022-12-07 NOTE — ASSESSMENT
[FreeTextEntry1] : #pulmonary preop\par \par MARCI VEGA  is optimized for surgery. he  is to be extubated once fully awake and able to protect airway.  The patient is to be monitored in the recovery room. They might benefit for high flow oxygen or noninvasive ventilation to prevent or reverse atelectasis.  Patient is to be admitted to a monitored bed postoperatively.  Avoid oversedation.  MARCI is high risk for DVT and will require bimodal agents for DVT prophylaxis early mobilization is recommended. he is to use the incentive spirometry postoperative. \par \par #Pulmonary HTN\par # Lung Defect with Reduced DLCO\par #Emphysema\par \par Patient's last CT scan demonstrated right pleural effusion and RLL infiltrate. Patient had PFTs completed which demonstrated a normal FEV1/FVC ratio with reduced FEV1, FVC, and TLC which is consistent with a restrictive lung defect. He also had severely reduced DLCO. Reduced DLCO can be explained by patient's history of emphysema that was also noted on prior CT however the etiology of his restrictive defect warrants further workup. Patient did well on his 6MWT without desaturation. He was between 94%-95% on room air before and immediately after the test. He had a Dom dyspnea score of 2 prior to and after his 6mwt walking 300+ meters. He has good functional status. He was also noted to have pulmonary HTN based on his last TTE with a PASP of 56. He is on Sildenafil TID and has been adherent to it without side effects. Repeat CT for evaluation of lung parenchyma ordered for January.  Up to date with vaccines.  Continue on inhalers and azithromycin TID. Reviewed EKGs and will follow in a month with repeat EKG.  \par \par #Post Nasal Drip\par \par Explained to patient that for improvement in his post-nasal drip he will require regular consistent use of his Flonase. He states he had only been using it intermittently. Will follow up for improvement at the next visit\par \par #Daytime Somnolence\par Patient with severe ERIK (specifically hypoventilation) can be contributing to his pulmonary HTN. Pt without CPAP and will follow with in lab titration. \par

## 2022-12-08 ENCOUNTER — TRANSCRIPTION ENCOUNTER (OUTPATIENT)
Age: 67
End: 2022-12-08

## 2022-12-12 ENCOUNTER — LABORATORY RESULT (OUTPATIENT)
Age: 67
End: 2022-12-12

## 2022-12-13 ENCOUNTER — RX RENEWAL (OUTPATIENT)
Age: 67
End: 2022-12-13

## 2022-12-14 ENCOUNTER — TRANSCRIPTION ENCOUNTER (OUTPATIENT)
Age: 67
End: 2022-12-14

## 2022-12-14 VITALS
OXYGEN SATURATION: 99 % | SYSTOLIC BLOOD PRESSURE: 99 MMHG | HEART RATE: 56 BPM | RESPIRATION RATE: 16 BRPM | DIASTOLIC BLOOD PRESSURE: 57 MMHG | WEIGHT: 136.91 LBS | HEIGHT: 71 IN | TEMPERATURE: 98 F

## 2022-12-14 RX ORDER — AMLODIPINE BESYLATE 2.5 MG/1
1 TABLET ORAL
Qty: 0 | Refills: 0 | DISCHARGE

## 2022-12-14 NOTE — ASU PREOP CHECKLIST - SELECT TESTS ORDERED
BMP/CBC/CMP/PT/PTT/INR/Urinalysis/EKG/CXR/COVID-19 K+ 3.4 DOS, last dialysis 12/14/22/BMP/CBC/CMP/PT/PTT/INR/Urinalysis/EKG/CXR/COVID-19

## 2022-12-14 NOTE — ASU PATIENT PROFILE, ADULT - FALL HARM RISK - HARM RISK INTERVENTIONS
Assistance with ambulation/Assistance OOB with selected safe patient handling equipment/Communicate Risk of Fall with Harm to all staff/Discuss with provider need for PT consult/Monitor gait and stability/Provide patient with walking aids - walker, cane, crutches/Reinforce activity limits and safety measures with patient and family/Sit up slowly, dangle for a short time, stand at bedside before walking/Tailored Fall Risk Interventions/Visual Cue: Yellow wristband and red socks/Bed in lowest position, wheels locked, appropriate side rails in place/Call bell, personal items and telephone in reach/Instruct patient to call for assistance before getting out of bed or chair/Non-slip footwear when patient is out of bed/[place] to call system/Physically safe environment - no spills, clutter or unnecessary equipment/Purposeful Proactive Rounding/Room/bathroom lighting operational, light cord in reach Communicate Risk of Fall with Harm to all staff/Reinforce activity limits and safety measures with patient and family/Tailored Fall Risk Interventions/Visual Cue: Yellow wristband and red socks/Bed in lowest position, wheels locked, appropriate side rails in place/Call bell, personal items and telephone in reach/Instruct patient to call for assistance before getting out of bed or chair/Non-slip footwear when patient is out of bed/[place] to call system/Physically safe environment - no spills, clutter or unnecessary equipment/Purposeful Proactive Rounding/Room/bathroom lighting operational, light cord in reach

## 2022-12-14 NOTE — ASU PREOP CHECKLIST - 1.
ESCORT: level will decrease  Outcome: Completed  Goal: Recognizes and communicates pain  Description: Recognizes and communicates pain  Outcome: Completed     Problem: Mobility - Impaired:  Goal: Able to ambulate independently  Description: Able to ambulate independently  Outcome: Completed  Goal: Able to ambulate with minimal assistance  Description: Able to ambulate with minimal assistance  Outcome: Completed  Goal: Ability to appropriately use an adaptive device for ambulation will improve  Description: Ability to appropriately use an adaptive device for ambulation will improve  Outcome: Completed  Goal: Able to verbalize acceptance of life and situations over which he or she has no control  Description: Absence of contracture deformity  Outcome: Completed     Problem: Respiratory:  Goal: Ability to maintain a clear airway will improve  Description: Ability to maintain a clear airway will improve  Outcome: Completed  Goal: Ability to maintain adequate ventilation will improve  Description: Ability to maintain adequate ventilation will improve  Outcome: Completed  Goal: Complications related to the disease process, condition or treatment will be avoided or minimized  Description: Complications related to the disease process, condition or treatment will be avoided or minimized  Outcome: Completed     Problem: Discharge Planning:  Goal: Discharged to appropriate level of care  Description: Discharged to appropriate level of care  Outcome: Completed  Goal: Ability to perform activities of daily living will improve  Description: Ability to perform activities of daily living will improve  Outcome: Completed  Goal: Participates in care planning  Description: Participates in care planning  Outcome: Completed     Problem: Nutrition  Goal: Optimal nutrition therapy  Outcome: Completed     Problem: Coping:  Goal: Ability to cope will improve  Description: Ability to cope will improve  Outcome: Completed  Goal: Ability to identify appropriate support needs will improve  Description: Ability to identify appropriate support needs will improve  Outcome: Completed     Problem: Physical Regulation:  Goal: Signs of adequate cerebral perfusion will increase  Description: Signs of adequate cerebral perfusion will increase  Outcome: Completed  Goal: Ability to maintain a stable neurologic state will improve  Description: Ability to maintain a stable neurologic state will improve  Outcome: Completed     Problem: Self-Concept:  Goal: Level of anxiety will decrease  Description: Level of anxiety will decrease  Outcome: Completed  Goal: Ability to verbalize feelings about condition will improve  Description: Ability to verbalize feelings about condition will improve  Outcome: Completed     Problem: Confusion - Acute:  Goal: Absence of continued neurological deterioration signs and symptoms  Description: Absence of continued neurological deterioration signs and symptoms  Outcome: Completed  Goal: Mental status will be restored to baseline  Description: Mental status will be restored to baseline  Outcome: Completed     Problem: Injury - Risk of, Physical Injury:  Goal: Will remain free from falls  Description: Will remain free from falls  Outcome: Completed  Goal: Absence of physical injury  Description: Absence of physical injury  Outcome: Completed     Problem: Mood - Altered:  Goal: Mood stable  Description: Mood stable  Outcome: Completed  Goal: Absence of abusive behavior  Description: Absence of abusive behavior  Outcome: Completed  Goal: Verbalizations of feeling emotionally comfortable while being cared for will increase  Description: Verbalizations of feeling emotionally comfortable while being cared for will increase  Outcome: Completed     Problem: Psychomotor Activity - Altered:  Goal: Absence of psychomotor disturbance signs and symptoms  Description: Absence of psychomotor disturbance signs and symptoms  Outcome: Completed     Problem: Sensory ESCORT:Mckenna Gamez wife

## 2022-12-14 NOTE — ASU PATIENT PROFILE, ADULT - NSICDXPASTSURGICALHX_GEN_ALL_CORE_FT
PAST SURGICAL HISTORY:  Elective surgery rectal surgery    History of cholecystectomy     History of surgery watchman left- dialysis port

## 2022-12-15 ENCOUNTER — TRANSCRIPTION ENCOUNTER (OUTPATIENT)
Age: 67
End: 2022-12-15

## 2022-12-15 ENCOUNTER — APPOINTMENT (OUTPATIENT)
Dept: HEART AND VASCULAR | Facility: CLINIC | Age: 67
End: 2022-12-15

## 2022-12-15 ENCOUNTER — OUTPATIENT (OUTPATIENT)
Dept: INPATIENT UNIT | Facility: HOSPITAL | Age: 67
LOS: 1 days | Discharge: HOME | End: 2022-12-15
Payer: MEDICARE

## 2022-12-15 ENCOUNTER — APPOINTMENT (OUTPATIENT)
Dept: SURGERY | Facility: HOSPITAL | Age: 67
End: 2022-12-15

## 2022-12-15 VITALS
SYSTOLIC BLOOD PRESSURE: 96 MMHG | OXYGEN SATURATION: 96 % | RESPIRATION RATE: 18 BRPM | HEART RATE: 51 BPM | DIASTOLIC BLOOD PRESSURE: 54 MMHG

## 2022-12-15 DIAGNOSIS — Z90.49 ACQUIRED ABSENCE OF OTHER SPECIFIED PARTS OF DIGESTIVE TRACT: Chronic | ICD-10-CM

## 2022-12-15 DIAGNOSIS — Z98.890 OTHER SPECIFIED POSTPROCEDURAL STATES: Chronic | ICD-10-CM

## 2022-12-15 DIAGNOSIS — Z41.9 ENCOUNTER FOR PROCEDURE FOR PURPOSES OTHER THAN REMEDYING HEALTH STATE, UNSPECIFIED: Chronic | ICD-10-CM

## 2022-12-15 LAB
ISTAT VENOUS BE: 11 MMOL/L — HIGH (ref -2–3)
ISTAT VENOUS GLUCOSE: 81 MG/DL — SIGNIFICANT CHANGE UP (ref 70–99)
ISTAT VENOUS HCO3: 38 MMOL/L — HIGH (ref 23–28)
ISTAT VENOUS HEMATOCRIT: 35 % — LOW (ref 39–50)
ISTAT VENOUS HEMOGLOBIN: 11.9 GM/DL — LOW (ref 13–17)
ISTAT VENOUS IONIZED CALCIUM: 1.17 MMOL/L — SIGNIFICANT CHANGE UP (ref 1.12–1.3)
ISTAT VENOUS PCO2: 61 MMHG — HIGH (ref 41–51)
ISTAT VENOUS PH: 7.4 — SIGNIFICANT CHANGE UP (ref 7.31–7.41)
ISTAT VENOUS PO2: <66 MMHG — LOW (ref 35–40)
ISTAT VENOUS POTASSIUM: 3.4 MMOL/L — LOW (ref 3.5–5.3)
ISTAT VENOUS SO2: 12 % — SIGNIFICANT CHANGE UP
ISTAT VENOUS SODIUM: 139 MMOL/L — SIGNIFICANT CHANGE UP (ref 135–145)
ISTAT VENOUS TCO2: 39 MMOL/L — HIGH (ref 22–31)

## 2022-12-15 PROCEDURE — C1781: CPT

## 2022-12-15 PROCEDURE — 49650 LAP ING HERNIA REPAIR INIT: CPT | Mod: 50

## 2022-12-15 PROCEDURE — S2900: CPT

## 2022-12-15 PROCEDURE — 85014 HEMATOCRIT: CPT

## 2022-12-15 PROCEDURE — 82947 ASSAY GLUCOSE BLOOD QUANT: CPT

## 2022-12-15 PROCEDURE — 84132 ASSAY OF SERUM POTASSIUM: CPT

## 2022-12-15 PROCEDURE — C9399: CPT

## 2022-12-15 PROCEDURE — 82330 ASSAY OF CALCIUM: CPT

## 2022-12-15 PROCEDURE — 82803 BLOOD GASES ANY COMBINATION: CPT

## 2022-12-15 PROCEDURE — 84295 ASSAY OF SERUM SODIUM: CPT

## 2022-12-15 DEVICE — MESH HERNIA INGUINAL 3DMAX MEDIUM 3 X 5" LEFT: Type: IMPLANTABLE DEVICE | Site: RIGHT INGUINAL POSSIBLE LEFT | Status: FUNCTIONAL

## 2022-12-15 DEVICE — MESH HERNIA INGUINAL 3DMAX MEDIUM 3 X 5" RIGHT: Type: IMPLANTABLE DEVICE | Site: RIGHT INGUINAL POSSIBLE LEFT | Status: FUNCTIONAL

## 2022-12-15 RX ORDER — ASPIRIN/CALCIUM CARB/MAGNESIUM 324 MG
1 TABLET ORAL
Qty: 0 | Refills: 0 | DISCHARGE

## 2022-12-15 RX ORDER — OXYCODONE HYDROCHLORIDE 5 MG/1
1 TABLET ORAL
Qty: 6 | Refills: 0
Start: 2022-12-15 | End: 2022-12-16

## 2022-12-15 RX ORDER — ONDANSETRON 8 MG/1
4 TABLET, FILM COATED ORAL ONCE
Refills: 0 | Status: DISCONTINUED | OUTPATIENT
Start: 2022-12-15 | End: 2022-12-15

## 2022-12-15 RX ORDER — OXYCODONE HYDROCHLORIDE 5 MG/1
5 TABLET ORAL ONCE
Refills: 0 | Status: DISCONTINUED | OUTPATIENT
Start: 2022-12-15 | End: 2022-12-15

## 2022-12-15 RX ORDER — HYDROMORPHONE HYDROCHLORIDE 2 MG/ML
0.25 INJECTION INTRAMUSCULAR; INTRAVENOUS; SUBCUTANEOUS ONCE
Refills: 0 | Status: DISCONTINUED | OUTPATIENT
Start: 2022-12-15 | End: 2022-12-15

## 2022-12-15 NOTE — ASU DISCHARGE PLAN (ADULT/PEDIATRIC) - ASU DC SPECIAL INSTRUCTIONSFT
Please resume aspirin in 48 hours.  Follow up with your PCP within 1 week to discuss surgery and home medications.  Ensure you get plenty of rest. Drink plenty of fluids.  Please wear compressive garments (i.e. - biker shorts) to compress bilateral groins until follow up with Dr. Odonnell.  Use ice packs liberally in the groin.  Take Tylenol for pain and Oxycodone as needed for severe post surgical pain. Do not take medication more than prescribed.  Please follow up with Dr. Odonnell in 2 weeks.

## 2022-12-15 NOTE — ASU DISCHARGE PLAN (ADULT/PEDIATRIC) - NS MD DC FALL RISK RISK
For information on Fall & Injury Prevention, visit: [email or web address] OR  [email or web address] OR  https://www.cdc.gov/steadi/patient.html

## 2022-12-15 NOTE — BRIEF OPERATIVE NOTE - OPERATION/FINDINGS
Patient placed supine. Prepped and draped in sterile fashion. Veress needle entry in Aguilar's point and insufflation. 3 robotic ports placed supraumbilical and 2 lateral. Medium mesh x2 placed intraperitoneal. Robotic docked and instruments introduced. Inspection of abdomen with bilateral indirect inguinal hernias and no bowel injury. R peritoneal flap incised. Dissection through preperitoneal space - vas deferens, spermatic cord vessels, inferior epigastric vessels identified. Indirect fat containing hernia reduced manually with traction. No femoral or direct hernia noted.  Similar procedure performed for L inguinal region with findings of indirect hernia and direct hernias both manually reduced with traction. Cord lipoma noted and excised. Meshes placed bilaterally and tacked to Bill's ligament and lateral-superior abdominal wall with 2-0 vicryl suture. Hemostasis achieved with electrocautery. Peritoneal flap closed bilaterally with 3-0 Vlock suture. Peritoneal defects closed with peritoneal flap and 2-0 vicryl suture. Abdomen inspected with hemostasis and no evidence of hematoma. Instruments removed, robotic undocked and abdomen desufflated. Skin closed with 4-0 monocryl and dermabond. Patient tolerated procedure well.

## 2022-12-15 NOTE — ASU DISCHARGE PLAN (ADULT/PEDIATRIC) - CARE PROVIDER_API CALL
Shruthi Odonnell)  Surgery  [address] [address]  Phone: [phone]  Fax: [phone]  Follow Up Time: 2 weeks

## 2022-12-15 NOTE — BRIEF OPERATIVE NOTE - NSICDXBRIEFPROCEDURE_GEN_ALL_CORE_FT
PROCEDURES:  Robot-assisted laparoscopic repair of bilateral inguinal hernias using da Christine Xi 15-Dec-2022 11:06:14  Darian Restrepo

## 2022-12-20 NOTE — ASU PREOP CHECKLIST - NOTHING BY MOUTH SINCE
December 20, 2022       Abisai Austin MD  [address] [facility] [address]  Via In Basket      Patient: Joy Aviles   YOB: 2000   Date of Visit: 12/20/2022       Dear Dr. Austin:    Thank you for referring Joy Aviles to me for evaluation. Below are my notes for this visit with her.    If you have questions, please do not hesitate to call me. I look forward to following your patient along with you.      Sincerely,        Rea Dodson MD        CC: No Recipients  Rea Dodson MD  12/20/2022  4:52 PM  Signed  Chief Complaint   Patient presents with   • Office Visit   • New Patient     Referred by Abisai Austin MD   • Diabetes Mellitus     Smbg x0, bg last week-82, dm labs completed in Oct, ee completed retinavue in July (scanned in media)       HPI  Ms. Ankur Aviles is a 22 year old female  with PMHx of Vit D Deficiency, DM type 2, depression, anxiety, tachycardia referred by Dr. Abisai Austin presenting for initial evaluation and management of DM type 2. Previously f/u with pediatric endocrinologist, Dr. Nanette Gracia, last seen in 2017. Dx with prediabetes at age 16. Started on insulin when pregnant at age 20. Has been on insulin since pregnancy. DM DX in 2013. Her child is 1.5 years old. Was thought to have gestational DM. Was taken off insulin for a short period of time after pregnancy. When BG went back up, started on insulin. Restarted on insulin per PCP about 9 months ago. Ran out of insulin.  Denies hospitalizations for hyper or hypoglycemia.       - Regimen: Metformin 500 mg BID. Ran out of insulin, last took insulin 3 months ago. Was taking Humalog 75/25 mix 25 U TID with meals. During pregnancy, took Humalog 75/25 mix and Levemir.   - SMBG: checks sugars if she feels like she needs to. Has some supplies from when she was pregnant to check sugars. Denies sx of hypoglycemia. Was never in the habit of checking sugars regularly   - Diet: eating tacos. Not watching  her diet. Has pasta 2x weekly. Not overindulging in sugar. Had potatoes, egg omelet and pork sausage this AM.   - Exercise: not very active.   - The pt endorses polyuria and polydipsia. Still breast-feeding. Pt does not have numbness and tingling in their feet. No abdominal complaints. No CP or SOB. The pt denies anxiety. She has depression. Recently taken off sertraline. Was on lexapro but reports it didn't help so stopped this 2 weeks ago. Not currently on anti-depressants. In f/u with PCP for depression. No blurry vision. Had eye exam in 7/2022. No issues with the feet. Pt has an IUD, no plans for pregnancy. Does not get her period. Denies pregnancy. She gets HA, dizziness. No weight loss.     Pt admitted to [facility] on 8/13/2021 for induction of labor. She delivered on 8/15/2021.       FHx of DM: DM- mother, maternal grandfather and  grandmother, paternal grandmother and grandfather  Other FHx: reviewed, cousin with thyroid issue  SHx: She lives with her daughter. Pt is a  at a hotel. Doesn't smoke or drink.   PMHx: as above  PSHx: reviewed    General: no fatigue, no fever, no weight loss, no appetite changes, + polydipsia   Eye: no pain, no redness, no diplopia, no blurry vision   Resp: no dyspnea, no cough   CV: no chest pain, no palpitations, no pedal edema   GI: no abdominal pain, no diarrhea, no nausea, no vomiting   MSK: no myalgias, no joint pains   Neuro: no numbness or tingling in feet, no confusion, + HA , + dizziness  Urinary: + polyuria   Skin: no rash   Psych: + depression, no anxiety    Vitals  Visit Vitals  /78   Pulse (!) 108   Wt 82.2 kg (181 lb 1.7 oz)   SpO2 97%       Physical Exam  General: No apparent distress, well appearing, no pallor, no icterus, obese  HEENT: EOMI, no proptosis, no nodules , thyroid a bit prominent  Chest: CTA, no crackles or rhonchi   CVS: S1 S2, no murmur   Abd: Soft, non tender   Ext: No edema   CNS: Conscious, alert, oriented, speech intact  Skin: No  rash    Discussion/Summary  Endocrine Impression: 22 year old year old female  with PMHx of Vit D Deficiency, DM type 2, depression, anxiety, tachycardia referred by Dr. Abisai Austin presenting for initial evaluation and management of DM type 2. Previously f/u with pediatric endocrinologist, Dr. Nanette Gracia, last seen in 2017.  DM DX in 2013. Dx with prediabetes at age 16. Started on insulin when pregnant at age 20. Has been on insulin since pregnancy.  Last delivered in 8/2021. She lives with her daughter. Pt is a  at a hotel. Daughter is 1.5 years old.     DM type 2 uncontrolled with strong FHx of DM  Lab Results   Component Value Date    HGBA1C 10.3 (H) 10/04/2022    HGBA1C 6.7 (H) 06/10/2021    CREATININE 0.70 08/13/2021    GFRESTIMATE >90 08/13/2021    TSH 2.011 04/29/2021    MALBCR 22.3 10/04/2022     HGB (g/dL)   Date Value   08/17/2021 8.9 (L)   - Regimen: Metformin 500 mg BID. Ran out of insulin, last took insulin 3 months ago. Was taking Humalog 75/25 mix 25 U TID with meals. During pregnancy, took Humalog 75/25 mix and Levemir.   - checks sugars if she feels like she needs to. Has some supplies from when she was pregnant to check sugars. Will send in glucometer supplies. Advised to start checking sugars at least 2x daily. Discussed about CGM. Will send in CGM.   - Recommend yearly eye exam to screen for retinopathy.  - insulin high risk factor for hypoglycemia. Needs to check sugars. Start Lantus 15 U daily.  Start Humalog 5 U three times a day with meals if sugar is over 100 plus low dose sliding scale.   - Had potatoes, egg omelet and pork sausage this AM. BG check in clinic:334  - Pt has an IUD, no plans for pregnancy. Does not get her period. Denies pregnancy.  - Eye exam from 7/2022 reported no diabetic retinopathy  - recommended healthy diet and lifestyle modifications. eating tacos. Not watching her diet. Has pasta 2x weekly. Not overindulging in sugar. Cut down on pasta. Will refer  to nutritionist for further counseling on diet  - Will refer to PharmD for chronic disease management/insulin adjustment   - check HCG, CMP, c-peptide, BENITA    Thyromegaly  - will order thyroid US  - check TSH    /78, controlled  Lab Results   Component Value Date    MALBCR 22.3 10/04/2022    POTASSIUM 4.4 08/13/2021       DLD associated with DM type 2  Lab Results   Component Value Date    CHOLESTEROL 145 01/07/2016    CALCLDL 79 01/07/2016    HDL 35 (L) 01/07/2016    TRIGLYCERIDE 155 (H) 01/07/2016     Lab Results   Component Value Date    ALKPT 204 (H) 08/13/2021    AST 19 08/13/2021    GPT 15 08/13/2021   - Continue dietary modifications  - check lipid panel, CMP    I thank Dr. Abisai Austin  for allowing me to participate in the care of the patient If any of the questions are unanswered please don't hesitate to give me a call.    Plan  Management Plan and Instructions:        Rotate injection sites, Limit the amount of sugar sweetened drinks like soda pop and juice. , For Blood Glucose < 80 mg/dl treat with 4 ounces of juice or 4 glucose tablets (15g). Recheck Blood Glucose in 15 minutes. Repeat until blood glucose is > 80. , Continue lifestyle modifications, Call if sugars consistently less than 70.   and Bring your sugar log and meter to each visit    - schedule thyroid ultrasound  - Continue Metformin 500 mg 2 times daily  - Start Lantus 15 U daily.   - Start Humalog 5 U three times a day with meals if sugar is over 100 plus low dose sliding scale.  Do not take Humalog if sugar is less than 100.   - Make an appointment with PharmD for chronic disease management/insulin adjustment  - Make an appointment with the nutritionist for further counseling on diet  - Schedule yearly eye exam to screen for diabetic retinopathy with an ophthalmologist  - start checking sugars at least 2x daily  - Call the office or send a message through the portal if blood sugars are under 70 or consistently over 300  - Limit  carb intake of things like rice, pasta, potatoes, and bread. Avoid sugary foods and drinks like juice and soda  - Walk for 10 minutes after every meal or for 30 minutes daily at least 5 days a week  - Complete blood work as per orders at an ACL lab, schedule an appointment for blood work, fast for 8 hours prior to blood draw.  - Follow up in 3 weeks      Scribe Attestation:  On 12/20/2022, Leeanna HOPKINS [facility] scribed the services personally performed by Rea Dodson MD MD Attestation:  The documentation recorded by the scribe accurately and completely reflects the service(s) I personally performed and the decisions made by me.           Allergies  ALLERGIES:  No Known Allergies    MEDICATIONS  Current Outpatient Medications   Medication Sig Dispense Refill   • sertraline (ZOLOFT) 25 MG tablet Take 1 tablet by mouth daily. 30 tablet 3   • escitalopram (LEXAPRO) 10 MG tablet Take 1 tablet by mouth daily. 30 tablet 1   • metFORMIN (GLUCOPHAGE) 500 MG tablet Take 1 tablet by mouth in the morning and 1 tablet in the evening. Take with meals. 60 tablet 3   • Insulin Lispro Prot & Lispro (HUMALOG MIX 75/25 KWIKPEN SC) Inject 25 Units into the skin 3 times daily (before meals).       No current facility-administered medications for this visit.       HISTORIES  Active Problems  Patient Active Problem List   Diagnosis   • Vitamin D deficiency   • Acute low back pain without sciatica   • Type 2 diabetes mellitus without complication, without long-term current use of insulin (CMS/HCC)   • IUD (intrauterine device) in place   • Depressive disorder       Past Medical History  Past Medical History:   Diagnosis Date   • Anxiety    • Depressive disorder    • Diabetes mellitus, type 2 (CMS/HCC)    • Lump or mass in breast 4/29/2021    Both breasts   • Postpartum depression 5/3/2022   • Tachycardia 6/29/2021    Intermittent tachycardia as high as 130s this pregnancy EKG sinus tachycardia TSH WNL [ ] cards consult       Surgical  History  Past Surgical History:   Procedure Laterality Date   •  section, low transverse         Family History  Family History   Problem Relation Age of Onset   • Diabetes Mother    • Hypertension Mother    • Stroke Mother    • Patient is unaware of any medical problems Father    • Diabetes Maternal Grandmother    • Hypertension Maternal Grandmother    • Diabetes Paternal Grandmother    • Stroke Paternal Grandmother    • Diabetes Maternal Grandfather    • Cancer Paternal Grandfather    • Diabetes Paternal Grandfather        Social History  Social History     Socioeconomic History   • Marital status: Single     Spouse name: boyfrienmio Rodriguez   • Number of children: Not on file   • Years of education: Not on file   • Highest education level: Not on file   Occupational History   • Occupation: unemployed   Tobacco Use   • Smoking status: Former   • Smokeless tobacco: Never   Vaping Use   • Vaping Use: never used   Substance and Sexual Activity   • Alcohol use: Not Currently   • Drug use: Not Currently     Frequency: 5.0 times per week     Types: Marijuana     Comment: , quit with pregnancy   • Sexual activity: Yes     Partners: Male     Birth control/protection: I.U.D.   Other Topics Concern   • Not on file   Social History Narrative   • Not on file     Social Determinants of Health     Financial Resource Strain: Low Risk    • Social Determinants: Financial Resource Strain: None   Food Insecurity: No Food Insecurity   • Social Determinants: Food Insecurity: Never   Transportation Needs: Not on file   Physical Activity: Not on file   Stress: Not on file   Social Connections: Not on file   Intimate Partner Violence: Not At Risk   • Social Determinants: Intimate Partner Violence Past Fear: No   • Social Determinants: Intimate Partner Violence Current Fear: No       Review  Past medical history, problem list, family medical history, surgical history and social history reviewed.                      28-Jan-2020 21:00

## 2022-12-27 ENCOUNTER — APPOINTMENT (OUTPATIENT)
Dept: PULMONOLOGY | Facility: CLINIC | Age: 67
End: 2022-12-27

## 2022-12-27 ENCOUNTER — APPOINTMENT (OUTPATIENT)
Dept: BARIATRICS | Facility: CLINIC | Age: 67
End: 2022-12-27

## 2022-12-27 PROCEDURE — 99024 POSTOP FOLLOW-UP VISIT: CPT

## 2022-12-27 NOTE — HISTORY OF PRESENT ILLNESS
[de-identified] : Patient is a 68 y/o man s/p hernia repairs. He and his wife report steady recovery. \par Eating a regular diet without difficulty. Bowel movements are NORMAL. The patient is not having any pain and no fever or chills. \par Back to baseline activity.

## 2022-12-27 NOTE — REASON FOR VISIT
[Post Op: _________] : a [unfilled] post op visit [FreeTextEntry1] : s/p b/l robotic assisted lap inguinal hernia repairs with mesh

## 2023-01-05 ENCOUNTER — APPOINTMENT (OUTPATIENT)
Dept: VASCULAR SURGERY | Facility: CLINIC | Age: 68
End: 2023-01-05
Payer: MEDICARE

## 2023-01-05 VITALS
DIASTOLIC BLOOD PRESSURE: 53 MMHG | WEIGHT: 138 LBS | SYSTOLIC BLOOD PRESSURE: 101 MMHG | HEIGHT: 71 IN | BODY MASS INDEX: 19.32 KG/M2 | HEART RATE: 56 BPM

## 2023-01-05 PROCEDURE — 93990 DOPPLER FLOW TESTING: CPT

## 2023-01-05 PROCEDURE — 99213 OFFICE O/P EST LOW 20 MIN: CPT

## 2023-01-05 NOTE — PROCEDURE
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

## 2023-01-05 NOTE — ASSESSMENT
[FreeTextEntry1] : 67yoM w/h/o ESRD on HD via LUE AVF created by Dr. Cisneros in 2020, returning for reevaluation of his access due to pseudoaneurysm.  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 RABs were discussed. Patient agrees and would like to proceed.\par We will schedule him in few weeks.

## 2023-01-05 NOTE — HISTORY OF PRESENT ILLNESS
[FreeTextEntry1] : 67yoM w/h/o ESRD on HD via LUE AVF created by Dr. Cisneros in 2020, returning for reevaluation of his access due to pseudoaneurysm. Pt denies any issues during his dialysis sessions, denies swelling/prolonged bleeding/ulceration of the skin, even at the site of his pseudoaneurysm. He states that dialysis nurses worry that the pseudoaneurysm "might burst".

## 2023-01-05 NOTE — PHYSICAL EXAM
[Normal Thyroid] : the thyroid was normal [Normal Breath Sounds] : Normal breath sounds [Respiratory Effort] : normal respiratory effort [Normal Heart Sounds] : normal heart sounds [Normal Rate and Rhythm] : normal rate and rhythm [2+] : left 2+ [No Rash or Lesion] : No rash or lesion [Alert] : alert [Calm] : calm [JVD] : no jugular venous distention  [Ankle Swelling (On Exam)] : not present [Purpura] : no purpura  [Petechiae] : no petechiae [Skin Ulcer] : no ulcer [Skin Induration] : no induration [de-identified] : well appearing [de-identified] : NC/AT, anicteric [FreeTextEntry1] : +thrill/bruit, large AVF w/pseudoaneurysm noted at the wrist [de-identified] : FROM throughout, strength 5/5x4 [de-identified] : No ulceration of the skin of the L forearm

## 2023-01-06 NOTE — DIETITIAN INITIAL EVALUATION ADULT. - NS FNS CHANGE IN WEIGHT
Subjective:      Ernesto is a 5 y.o. male follow up CF and mild transaminitis.  Lab sin [facility] were normal.  Complaint with meds    PMH:  CF  SH: lives in [place]  FH: dad with tongue cancer  Past medical, family, and social history reviewed as documented in chart with pertinent positive medical, family, and social history detailed in HPI.    Diet: regular    The following portions of the patient's history were reviewed and updated as appropriate: allergies, current medications, past family history, past medical history, past social history, past surgical history and problem list.  History was provided by the caregiver.     Review of Systems:  A review of 10+ systems was conducted with pertinent positive and negative findings documented in HPI with all other systems reviewed and negative       Current Outpatient Medications:     albuterol (PROVENTIL/VENTOLIN HFA) 90 mcg/actuation inhaler, Inhale 2 puffs into the lungs., Disp: , Rfl:     azithromycin (ZITHROMAX) 100 mg/5 mL suspension, give 2.5mls BY MOUTH DAILY FOR 28 DAYS., Disp: , Rfl:     dornase alpha (PULMOZYME) 1 mg/mL nebulizer solution, INHALE 2.5 ML USING NEBULIZER QD, Disp: , Rfl:     fluticasone propionate (FLONASE) 50 mcg/actuation nasal spray, by Nasal route., Disp: , Rfl:     ibuprofen (ADVIL,MOTRIN) 100 mg/5 mL suspension, Take 5 mg/kg by mouth every 6 (six) hours as needed., Disp: , Rfl:     lipase-protease-amylase 6,000-19,000-30,000 units (CREON) 6,000-19,000 -30,000 unit capsule, 4 capsules with meals and 3 with snacks, Disp: 550 capsule, Rfl: 6    lumacaftor-ivacaftor 150-188 mg GrPk, Take 1 packet by mouth., Disp: , Rfl:     pedi multivit no.128-vitamin K 500 mcg/mL Liqd, Take by mouth., Disp: , Rfl:     polyethylene glycol (GLYCOLAX) 17 gram/dose powder, Take 17 g by mouth daily as needed., Disp: , Rfl:     sodium chloride 3% 3 % nebulizer solution, 15 mLs., Disp: , Rfl:     ursodioL (ACTIGALL) 300 mg capsule, Compound to liquid 160 mg po TID  "disp 1mo, Disp: 50 capsule, Rfl: 6     Objective:     Vitals:    12/08/20 0924   BP: (!) 114/72   Pulse: 90   Weight: 17.5 kg (38 lb 9.3 oz)   Height: 3' 4.5" (1.029 m)   PainSc: 0-No pain     80 %ile (Z= 0.84) based on CDC (Boys, 2-20 Years) BMI-for-age based on BMI available as of 12/8/2020.    Gen : No acute distress  HEENT : throat is clear  Heart : RRR no Murmur  Lungs : B clear  Abd : Non-tender, non-distended, no Hepatosplenomegaly  Ext : Good mass and tone  Neuro : no significant deficits  Skin : No rash    Assessment:       CF - controlled   transaminitis - controlled      Plan:        CBC,CMP, vit D, vit E       For urgent problems after 5pm or on weekends, please call [phone] and ask for the [facility] pediatric [facility] physician on call.         " Gain na

## 2023-01-10 ENCOUNTER — APPOINTMENT (OUTPATIENT)
Dept: PULMONOLOGY | Facility: CLINIC | Age: 68
End: 2023-01-10
Payer: MEDICARE

## 2023-01-10 VITALS
BODY MASS INDEX: 18.62 KG/M2 | SYSTOLIC BLOOD PRESSURE: 115 MMHG | HEART RATE: 60 BPM | TEMPERATURE: 98.96 F | HEIGHT: 71 IN | DIASTOLIC BLOOD PRESSURE: 63 MMHG | WEIGHT: 133 LBS | OXYGEN SATURATION: 96 %

## 2023-01-10 PROCEDURE — 99214 OFFICE O/P EST MOD 30 MIN: CPT

## 2023-01-10 NOTE — HISTORY OF PRESENT ILLNESS
[Former] : former [Never] : never [TextBox_4] : Very well.  He is not eating much because he is waiting for to have the dental implant.  He is not short of breath.  This coughing is dramatically improved he is walking with no difficulty.  He is compliant with the Stiolto.  No coughing blood.  Is getting hemodialysis [ESS] : 0

## 2023-01-10 NOTE — ASSESSMENT
[FreeTextEntry1] : COPD\par \par The patient is clinically stable.  Patient is compliant with the Stiolto and does not need any rescue dose inhaler.  The patient did not require neither urgent care nor systemic steroids since the last time I saw him.  The baseline oxygen saturation is normal.  The exercise capacity improved.  He is to continue on the current regimen\par \par Pleural effusion\par \par The patient was recently admitted with fluid overload and pneumonia.  I reviewed the last CT scan with the.  Will follow-up on repeat CT scan of the chest.  The patient is on dialysis and actually [place] fluid status is improving.\par \par Pulmonary hypertension\par \par Patient improved with hemodialysis fluid removal.  Follow-up with echocardiogram.  No evidence of right ventricular strain.  The baseline oxygen saturation is stable\par \par Obstructive sleep apnea\par \par Mild no indication for CPAP\par \par The patient is follow-up with oncology and was just

## 2023-01-11 NOTE — PATIENT PROFILE ADULT - BRADEN SCORE
Occupational Therapy Attempt  Pt attempted for OT treatment this date, RN approved therapy. Upon arrival, pt endorsing fatigue stating she just finished working w/ PT and wanting time to rest. Pt offered bed level ADLs to prepare for anticipated d/c later today, but pt requested OT re-attempt this afternoon closer to her anticipated d/c time. Will re-attempt as schedule permits and pt medically appropriate.      Thank you,    Aakash Moura, OTR/L #[id number]

## 2023-01-17 ENCOUNTER — TRANSCRIPTION ENCOUNTER (OUTPATIENT)
Age: 68
End: 2023-01-17

## 2023-01-17 LAB — SARS-COV-2 N GENE NPH QL NAA+PROBE: NOT DETECTED

## 2023-01-17 NOTE — ASU PATIENT PROFILE, ADULT - NSICDXPASTSURGICALHX_GEN_ALL_CORE_FT
PAST SURGICAL HISTORY:  AV fistula left arm    Elective surgery rectal surgery    H/O inguinal hernia repair bilateral    History of cholecystectomy     History of surgery watchman left- dialysis port

## 2023-01-17 NOTE — ASU PATIENT PROFILE, ADULT - NSICDXPASTMEDICALHX_GEN_ALL_CORE_FT
PAST MEDICAL HISTORY:  Atrial fibrillation     BPH (benign prostatic hyperplasia)     CHF, chronic LVEF 65 PASP 59 midl MR, AR on 12/2/21    CKD (chronic kidney disease)     COPD, mild     ESRD (end stage renal disease) dialysis [facility]    Gout     H/O pulmonary hypertension     HTN (hypertension)     Lymphoma t cell; remission since 11/2020    Mitral regurgitation     ERIK (obstructive sleep apnea)

## 2023-01-17 NOTE — ASU PATIENT PROFILE, ADULT - AS [facility]
Patient: Rishi Pate Date: 2017   : 1954 Attending: Get Mayberry MD   63 year old male Room: /A     Chief Complaint: Ischemic cardiomyopathy, chest pain.  Post-op Day #: 34  Surgical Procedure:   1. Emergency Redo Sternotomy, Placement of the Heartmate 3 LVAD  2. Percutaneous Placement of the TandemHeart RVAD (Dr. Baca)    Bronchoscopy  with CC  Bronchoscopy  with Dr. Mosquera with minimal secretions    Right heart cath 2017    Subjective: Awake and alert. Denies pain or SOB.     Vital Last Value 24 Hour Range   Temperature 98.5 °F (36.9 °C) (17 0756) Temp  Min: 98 °F (36.7 °C)  Max: 98.7 °F (37.1 °C)   Pulse 87 (17 1208) Pulse  Min: 57  Max: 87   Respiratory Rate 22 (17 1208) Resp  Min: 18  Max: 22   Non-Invasive   Blood Pressure 112/64 (17 0400) No Data Recorded   Arterial  Blood Pressure 79/54 (17 1800) No Data Recorded   Pulse Oximetry 99 % (17 1208) SpO2  Min: 96 %  Max: 99 %     Oxygen:  RA    Weight over the past 48 Hours:  Patient Vitals for the past 48 hrs:   Weight   17 0446 95.5 kg   17 0445 96 kg      Admit Weight:   Weight: 108.7 kg (06/10/17 2007)  BMI:   BMI (Calculated): 32.57 (06/10/17 2007)    Intake/Output:    Last bowel movement:    I/O this shift:  In: 274 [NG/GT:274]  Out: 0     I/O last 3 completed shifts:  In: 1681 [NG/GT:1681]  Out: 1025 [Urine:1025]      Intake/Output Summary (Last 24 hours) at 17 1313  Last data filed at 17 0814   Gross per 24 hour   Intake             1754 ml   Output             1025 ml   Net              729 ml       Rhythm: Sinus rhythm BBB.     Physical Exam:   Heart: Regular rate and rhythm, Left Ventricular Assist Device Flow: 5.4, Speed: 5900, PI: 1.8 and Power: 4.7  Lungs: Diminished lung sounds bibasilar.    Abdomen: soft, non-tender, normoactive bowel sounds.   Incision(s): Sternal incision approximated. No erythema, edema, drainage. Right neck approximated  without erythema, edema, or drainage. Left Ventricular Assist Device Clean, dry and intact  Neurologic: Moves left side to command. Flaccid right side. Oriented to self and place. Disoriented to time. Visual deficits-difficult to assess degree due to patient's cognition.   Extremities:  Upper and lower etremities warm. No edema. .    Laboratory Results:       Recent Labs  Lab 07/25/17  0605 07/24/17  1233 07/24/17  0530 07/23/17  2130  07/23/17  0615  07/22/17  0410   SODIUM 134*  --  137  --   --  139  --  139   POTASSIUM 4.0  --  4.0 3.9  < > 3.9  --  4.0   CHLORIDE 103  --  103  --   --  105  --  104   CO2 24  --  24  --   --  25  --  26   BUN 28*  --  24*  --   --  28*  --  26*   CREATININE 0.86  --  0.75  --   --  0.86  --  0.84   GLUCOSE 125*  --  176*  --   --  193*  --  91   MG 1.7  --  1.9 1.5*  < >  --   --   --    WBC 9.9  --  8.4  --   --  8.6  --  9.5   HGB 7.7* 7.6* 7.9* 8.2*  < > 8.1*  < > 8.1*   HCT 22.9* 24.0* 25.2* 26.0*  < > 25.7*  < > 24.5*     --  193  --   --  195  --  200   PT 14.6*  --  15.2*  --   --  17.0*  < >  --    INR 1.3  --  1.4  --   --  1.5  < >  --    PTT 39*  --   --   --   --   --   --   --    BILIRUBIN  --   --  0.4  --   --  0.4  --  0.5   ALKPT  --   --  97  --   --  98  --  88   AST  --   --  22  --   --  28  --  30   GPT  --   --  29  --   --  37  --  38   < > = values in this interval not displayed.     Cultures: Reviewed       Chest X-Ray: Reviewed     Medications/Infusions:  Scheduled:   • aspirin  81 mg Per NG tube Daily   • insulin regular (human)  18 Units Subcutaneous 4 times per day   • tamsulosin  0.4 mg Oral Daily PC   • WARFARIN - PHYSICIAN MONITORED 1 each  1 each Oral Q Evening   • nystatin  500,000 Units Swish & Spit 4x Daily   • carvedilol  12.5 mg Per NG tube BID WC   • amiodarone  200 mg Per NG tube 2 times per day   • ferrous sulfate  300 mg Per NG tube Daily with breakfast   • spironolactone  25 mg Per NG tube Daily   • ramelteon  8 mg Per NG tube  Nightly   • atorvastatin  20 mg Per NG tube Nightly   • pantoprazole  40 mg Per NG tube Daily   • insulin glargine  25 Units Subcutaneous 2 times per day   • sodium chloride (PF)  10 mL Injection 3 times per day   • lactobacillus acidophilus  2 tablet Per NG tube BID       Continuous Infusions:   • lactated ringers infusion     • sodium chloride 0.9% infusion     • dextrose 5 % infusion         CMS Criteria:  ASA: Aspirin contraindicated: other bleeding    BB: Yes    Statin:Yes    ACE: ACEI / ARB not ordered due to Hypotension    Cardiologist: HF Cardiology  EF: 24%  Nares MRSA: negative, MSSA: negative  Hemoglobin A1c: 5.3  Preoperative Creatinine: 0.97       CTOH 7/11/2017:  1. Evolving bioccipital subacute infarcts left greater than right.  Unchanged left occipital gyriform hyperattenuation compatible with  petechial hemorrhage and/or laminar necrosis. No masslike hemorrhage.  2. Punctate 5 mm ill-defined hyperattenuation of the subcortical right  middle frontal gyrus posteriorly. This is believed artifactual. Recommend  attention on follow-up.  3. Unchanged remote microvascular ischemia and presumed remote small  cortical infarct of the left perirolandic region.    Assessment/Plan:  Ischemic Cardiomyopathy - S/p Emergency Redo Sternotomy, Placement of the Heartmate 3 LVAD  RV Failure - S/p Percutaneous Placement of the TandemHeart RVAD (Dr. Baca)   - Continue Coumadin.   CVA: CTOH as above. Neurology following.  Repeat CT of head without further involvement. Right side flaccid. Blind. PT/OT. Speech for swallow and neuro/cog.  Acute blood loss anemia/Occult stool positive: S/P EGD 7/22- normal upper GI. Protonix. H/H stable. GI following.  PRATIK/Apneic events: CPAP PRN. Pulmonary recommending outpatient sleep study.   Wide Complex Tachycardia - BB and Amio per HF. EP signed off.   Acute kidney injury/Hypervolemia - Resolved. Nephrology following. Daily spironolactone.  Acute Ischemic Hepatitis - LFTs now normal.  Monitor.  IDDM - Management per Endocrine.   Nutrition - TF at goal rate. Speech following.   Deconditioning: PT/OT. Moderate assist needed due to deconditioning, right sided flaccidity and cognitive impairment. Needs regular, consistent activity schedule. Will d/w RN and therapies.       Pathways:   Wires Out: Yes  Ambulating: No  Coumadin: Yes, for Mechanical Circulatory Support Device    Discussed with or notes reviewed:  Patient, RN and MD    Discharge Disposition: To be determined    (3) slightly limited

## 2023-01-18 ENCOUNTER — TRANSCRIPTION ENCOUNTER (OUTPATIENT)
Age: 68
End: 2023-01-18

## 2023-01-18 ENCOUNTER — OUTPATIENT (OUTPATIENT)
Dept: INPATIENT UNIT | Facility: HOSPITAL | Age: 68
LOS: 1 days | Discharge: HOME | End: 2023-01-18
Payer: MEDICARE

## 2023-01-18 VITALS
HEART RATE: 56 BPM | DIASTOLIC BLOOD PRESSURE: 67 MMHG | HEIGHT: 71 IN | WEIGHT: 137.79 LBS | RESPIRATION RATE: 16 BRPM | TEMPERATURE: 98 F | SYSTOLIC BLOOD PRESSURE: 135 MMHG | OXYGEN SATURATION: 98 %

## 2023-01-18 VITALS — RESPIRATION RATE: 25 BRPM | OXYGEN SATURATION: 100 % | HEART RATE: 54 BPM

## 2023-01-18 DIAGNOSIS — Z98.890 OTHER SPECIFIED POSTPROCEDURAL STATES: Chronic | ICD-10-CM

## 2023-01-18 DIAGNOSIS — I77.0 ARTERIOVENOUS FISTULA, ACQUIRED: Chronic | ICD-10-CM

## 2023-01-18 DIAGNOSIS — Z90.49 ACQUIRED ABSENCE OF OTHER SPECIFIED PARTS OF DIGESTIVE TRACT: Chronic | ICD-10-CM

## 2023-01-18 DIAGNOSIS — Z41.9 ENCOUNTER FOR PROCEDURE FOR PURPOSES OTHER THAN REMEDYING HEALTH STATE, UNSPECIFIED: Chronic | ICD-10-CM

## 2023-01-18 LAB
ISTAT VENOUS BE: 8 MMOL/L — HIGH (ref -2–3)
ISTAT VENOUS GLUCOSE: 80 MG/DL — SIGNIFICANT CHANGE UP (ref 70–99)
ISTAT VENOUS HCO3: 34 MMOL/L — HIGH (ref 23–28)
ISTAT VENOUS HEMATOCRIT: 36 % — LOW (ref 39–50)
ISTAT VENOUS HEMOGLOBIN: 12.2 GM/DL — LOW (ref 13–17)
ISTAT VENOUS IONIZED CALCIUM: 1.08 MMOL/L — LOW (ref 1.12–1.3)
ISTAT VENOUS PCO2: 50 MMHG — SIGNIFICANT CHANGE UP (ref 41–51)
ISTAT VENOUS PH: 7.44 — HIGH (ref 7.31–7.41)
ISTAT VENOUS PO2: <66 MMHG — LOW (ref 35–40)
ISTAT VENOUS POTASSIUM: 4.3 MMOL/L — SIGNIFICANT CHANGE UP (ref 3.5–5.3)
ISTAT VENOUS SO2: 45 % — SIGNIFICANT CHANGE UP
ISTAT VENOUS SODIUM: 140 MMOL/L — SIGNIFICANT CHANGE UP (ref 135–145)
ISTAT VENOUS TCO2: 35 MMOL/L — HIGH (ref 22–31)

## 2023-01-18 PROCEDURE — 36832 AV FISTULA REVISION OPEN: CPT | Mod: GC

## 2023-01-18 DEVICE — SURGICEL 4 X 8": Type: IMPLANTABLE DEVICE | Site: LEFT, ARM | Status: FUNCTIONAL

## 2023-01-18 DEVICE — LIGATING CLIPS WECK HORIZON MEDIUM (BLUE) 24: Type: IMPLANTABLE DEVICE | Site: LEFT, ARM | Status: FUNCTIONAL

## 2023-01-18 DEVICE — LIGATING CLIPS WECK HORIZON SMALL (YELLOW) 24: Type: IMPLANTABLE DEVICE | Site: LEFT, ARM | Status: FUNCTIONAL

## 2023-01-18 RX ORDER — ACETAMINOPHEN 500 MG
2 TABLET ORAL
Qty: 0 | Refills: 0 | DISCHARGE
Start: 2023-01-18

## 2023-01-18 RX ORDER — ACETAMINOPHEN 500 MG
650 TABLET ORAL EVERY 6 HOURS
Refills: 0 | Status: DISCONTINUED | OUTPATIENT
Start: 2023-01-18 | End: 2023-01-18

## 2023-01-18 NOTE — PRE-ANESTHESIA EVALUATION ADULT - LAST ECHOCARDIOGRAM
10/4/22 report reviewed.  NL LV and RV function.  EF 55-60%.  mild-mod MR and TR.  mild AS PG 32mmHg, MG 11mmHg, SANJUANA 1.68cm2, mild-mod AI

## 2023-01-18 NOTE — BRIEF OPERATIVE NOTE - NSICDXBRIEFPROCEDURE_GEN_ALL_CORE_FT
PROCEDURES:  Revision of arteriovenous fistula of upper extremity for pseudoaneurysm 18-Jan-2023 10:08:50 Left upper extremity arteriorvenous fistula revision and pseudoaneurysm excision Aneta Webber  Revision of arteriovenous shunt or graft 18-Jan-2023 10:09:26  Aneta Webber

## 2023-01-18 NOTE — BRIEF OPERATIVE NOTE - OPERATION/FINDINGS
Findings: LUE AVF pseudoaneurysm    Procedure: Left upper extremity arteriovenous fistula revision and pseudoaneurysm excision

## 2023-01-18 NOTE — ASU DISCHARGE PLAN (ADULT/PEDIATRIC) - CARE PROVIDER_API CALL
Ayesha Francis)  Surgery; Vascular Surgery  [address] [place] [address]  Phone: [phone]  Fax: [phone]  Follow Up Time: 1 week

## 2023-01-19 PROBLEM — N18.6 END STAGE RENAL DISEASE: Chronic | Status: ACTIVE | Noted: 2023-01-17

## 2023-01-19 PROBLEM — G47.33 OBSTRUCTIVE SLEEP APNEA (ADULT) (PEDIATRIC): Chronic | Status: ACTIVE | Noted: 2023-01-17

## 2023-01-20 ENCOUNTER — RESULT REVIEW (OUTPATIENT)
Age: 68
End: 2023-01-20

## 2023-01-20 PROCEDURE — 85014 HEMATOCRIT: CPT

## 2023-01-20 PROCEDURE — 88304 TISSUE EXAM BY PATHOLOGIST: CPT

## 2023-01-20 PROCEDURE — 84132 ASSAY OF SERUM POTASSIUM: CPT

## 2023-01-20 PROCEDURE — 82947 ASSAY GLUCOSE BLOOD QUANT: CPT

## 2023-01-20 PROCEDURE — 88304 TISSUE EXAM BY PATHOLOGIST: CPT | Mod: 26

## 2023-01-20 PROCEDURE — 84295 ASSAY OF SERUM SODIUM: CPT

## 2023-01-20 PROCEDURE — 36833 AV FISTULA REVISION: CPT | Mod: LT

## 2023-01-20 PROCEDURE — 82330 ASSAY OF CALCIUM: CPT

## 2023-01-20 PROCEDURE — C1889: CPT

## 2023-01-20 PROCEDURE — 82803 BLOOD GASES ANY COMBINATION: CPT

## 2023-01-24 ENCOUNTER — APPOINTMENT (OUTPATIENT)
Dept: VASCULAR SURGERY | Facility: CLINIC | Age: 68
End: 2023-01-24
Payer: MEDICARE

## 2023-01-24 VITALS
HEIGHT: 71 IN | HEART RATE: 59 BPM | SYSTOLIC BLOOD PRESSURE: 111 MMHG | WEIGHT: 133 LBS | BODY MASS INDEX: 18.62 KG/M2 | DIASTOLIC BLOOD PRESSURE: 61 MMHG

## 2023-01-24 LAB — SURGICAL PATHOLOGY STUDY: SIGNIFICANT CHANGE UP

## 2023-01-24 PROCEDURE — 99024 POSTOP FOLLOW-UP VISIT: CPT

## 2023-01-26 ENCOUNTER — OUTPATIENT (OUTPATIENT)
Dept: OUTPATIENT SERVICES | Facility: HOSPITAL | Age: 68
LOS: 1 days | End: 2023-01-26
Payer: MEDICARE

## 2023-01-26 ENCOUNTER — APPOINTMENT (OUTPATIENT)
Dept: CT IMAGING | Facility: HOSPITAL | Age: 68
End: 2023-01-26
Payer: MEDICARE

## 2023-01-26 DIAGNOSIS — Z98.890 OTHER SPECIFIED POSTPROCEDURAL STATES: Chronic | ICD-10-CM

## 2023-01-26 DIAGNOSIS — Z41.9 ENCOUNTER FOR PROCEDURE FOR PURPOSES OTHER THAN REMEDYING HEALTH STATE, UNSPECIFIED: Chronic | ICD-10-CM

## 2023-01-26 DIAGNOSIS — Z90.49 ACQUIRED ABSENCE OF OTHER SPECIFIED PARTS OF DIGESTIVE TRACT: Chronic | ICD-10-CM

## 2023-01-26 DIAGNOSIS — I77.0 ARTERIOVENOUS FISTULA, ACQUIRED: Chronic | ICD-10-CM

## 2023-01-26 PROCEDURE — 71250 CT THORAX DX C-: CPT | Mod: 26

## 2023-01-26 PROCEDURE — 71250 CT THORAX DX C-: CPT

## 2023-01-30 NOTE — PHYSICAL EXAM
[Normal Thyroid] : the thyroid was normal [Normal Breath Sounds] : Normal breath sounds [Respiratory Effort] : normal respiratory effort [Normal Heart Sounds] : normal heart sounds [Normal Rate and Rhythm] : normal rate and rhythm [2+] : left 2+ [No Rash or Lesion] : No rash or lesion [Alert] : alert [Calm] : calm [JVD] : no jugular venous distention  [Ankle Swelling (On Exam)] : not present [Purpura] : no purpura  [Petechiae] : no petechiae [Skin Ulcer] : no ulcer [Skin Induration] : no induration [de-identified] : well appearing [de-identified] : NC/AT, anicteric [FreeTextEntry1] : +thrill/bruit, large AVF w/pseudoaneurysm noted at the wrist [de-identified] : FROM throughout, strength 5/5x4 [de-identified] : No ulceration of the skin of the L forearm

## 2023-01-30 NOTE — REASON FOR VISIT
[de-identified] : Open repair of LUE AVF pseudoaneurysm [de-identified] : 01/18/2023 [de-identified] : 6 [de-identified] : 67yoM w/pseudoaneursym of the L radiocephalic AVF, now s/p open repair.  Pt presents today for f/u w/swelling and redness of the area, some bruising around the incision.  Pt reports that the swelling and redness in the area have improved steadily over the past few days.  No other complaints reported at this time, and upper segment of AVF used for access w/o issue.

## 2023-02-02 ENCOUNTER — TRANSCRIPTION ENCOUNTER (OUTPATIENT)
Age: 68
End: 2023-02-02

## 2023-02-07 ENCOUNTER — TRANSCRIPTION ENCOUNTER (OUTPATIENT)
Age: 68
End: 2023-02-07

## 2023-03-03 ENCOUNTER — TRANSCRIPTION ENCOUNTER (OUTPATIENT)
Age: 68
End: 2023-03-03

## 2023-03-07 ENCOUNTER — APPOINTMENT (OUTPATIENT)
Dept: HEART AND VASCULAR | Facility: CLINIC | Age: 68
End: 2023-03-07
Payer: MEDICARE

## 2023-03-07 VITALS
WEIGHT: 146 LBS | BODY MASS INDEX: 20.44 KG/M2 | HEIGHT: 71 IN | DIASTOLIC BLOOD PRESSURE: 58 MMHG | OXYGEN SATURATION: 99 % | SYSTOLIC BLOOD PRESSURE: 102 MMHG | HEART RATE: 72 BPM | TEMPERATURE: 208.22 F

## 2023-03-07 PROCEDURE — 99214 OFFICE O/P EST MOD 30 MIN: CPT

## 2023-03-07 NOTE — ASSESSMENT
[FreeTextEntry1] : Systolic CHF - EF recovered, normal on June 2019 echo.  Secondary to chemo?  Then 40-45% on  admission in Dec 2020, then improved to 60-65 on last admission in January 2021,  NL EF Oct 2022.. \par \par MR- Was severe, now mild to moderate and EF improved.  Followed by Dr Cui.  MR is mild to moderate on  echo Oct 2022\par \par Afib - in NSR today, no longer on Eliquis due to 2 bleeds and Amio.  ZIO patch pending.  I favor ASA and resuming Amio. Referred to Pulm to be monitored for Amio Pulm toxicity.  The patient's [id number] score = 2.  He is S/P a Watchman device.  Off Eliquis. Still on Amio, TSH WNL Sept 2022.\par \par HTN- BP too low, will reduce Norvasc from 10 to 5 mg on 1/4/22. Will reduce to 2.5 mg on dialysis days.\par \par HLD- not on statin. I favor a statin due to aortic atherosclerosis.\par \par T Cell Lymphoma - remains on chemo, Dr Dimas.  Now in remission.\par \par CKD- Dr Denton, now on HD since June 2021\par \par PreOp- Pt s/p decortication.  Stopped Eliquis 3 days prior to surgery with surgery on the 4th day. See beginning of this note for entire hospital stay.\par \par  \par \par \par \par

## 2023-03-07 NOTE — HISTORY OF PRESENT ILLNESS
[FreeTextEntry1] : 67 y/o M with HFpEF initially met but HF team on recent admission at [facility] and now presents for follow up. Other PMH is notable for HTN, COPD, CKD (stage 5 CKD, has LUE AVF, not on HD yet), T-cell lymphoma (diagnosed 19 years ago, on chemo romidepsin every Wednesday (not since Nov)), AF (on Eliquis), systolic CHF (EF 40-45%), EF was 60-65 in 2019,severe pHTN, severe MR, who recently underwent a left chest wall hematoma evacuation on 12/18/2020 secondary to loculated effusion. Hospital course at that time was complicated by AF with RVR and thoracic surgery consulted at that time for surgical intervention of trapped lung. He followed up as an outpatient after discharge and deemed a good surgical candidate for lung decortication. On 1/18/21 patient presented to [facility] for pre-operative optimization with heparin gtt prior to OR. Upon admission pt noted to have erythema and swelling to his L knee, CT scan demonstrated cellulitis, operation was deferred. Ortho, Gen Surg and ID were consulted, pt with no drainable collection and was medically managed with resolution of infection. Nephrology was consulted for assistance in managing CKD stage 4. On 1/22 he underwent an uncomplicated L VATS, RA decortication with blow hole placement for subcutaneous emphysema. Intraop findings significant for loculated hemothorax. He received 3UPRBC in the OR and post operatively was brought to the CTICU stable and intubated with 3 chest tubes in place. He was extubated POD1 and required primacor for pulmonary HTN and renal perfusion. On POD 2 a right sided pigtail was placed for pleural effusion which drained 800cc and ultimately removed on POD 4. Cardiology was consulted for management of pulmonary hypertension and CHF, recommended IV diuresis. He required alternating BiPAP and HFNC which was weaned to NC. Primacor was weaned off on POD 4 and he was transferred to the stepdown unit. Heparin gtt was started for AC but was held for bleeding around the CT site. A CT chest noncon was preformed showing post surgical changes, no concern for hemothorax and heparin gtt resumed. On POD 5 first CT was removed. Bronch with no abnormal findings, Second chest tube subsequently removed and added nifedipine Xl 30mg for BP control on POD 6. POD 7 third chest tube removed, post chest tube removal xray showed no pneumothorax. \par \par \par 12/28/20  In [facility] 12/17- 12/23 with a chest wall hematoma, Rapid AF.  EF good but severe MR and TR.  In NSR by exam and EKG .  I was later informed he might need a decortification of his pleura(left). \par 3/1/21 Hospitalized for VATS 1/2021 as above. BNP 15,500, Cr 5.5,  Echo was done 1/2021, EF back to NL and MR mild to moderate, mod to severe TR and PAP 67.   Got the Covid Vaccine.  Here he is in NSR and generally doing well considering how complex he is.\par 4/15/21  Off Eliquis 3/17/21 due to a 2nd bleed on the buttocks.  Amio was stopped March 8, 2021\par 8/31/21 On ASA 81 and Plavix 75, to consider a Watchman, on HD.  Edema resolved.\par 11/2/21  S/P WATCHMAN DEVICE Oct 20th, 2021.  hE HAD SEEN OTHER MDs TODAY AND WALKED OUT AT 5 PM\par 1/4/22 echo done Dec 2021, EF NL, MR mild, PAP 59, aortic plaque noted.\par 5/10/22 Doing well, recovering from PNA. JORGE 5/2021 EF MVP posterior leaflet mod MR and mod TR \par 3/7/23 echo 10/2022 EF normal Mild AS mild to mod AI mild to mod MR PASP 34 mmHg.  Pt denies CHF.  CT scan chest done Jan 2023  shows improvement in aeration.\par  \par EKG: NSR with 1st degree AVB. ST-Tw abnormalities. 12/28/20\par EKG: NSR, IVCD, PRWP, STTs 3/1/21, 1/4/22\par \par

## 2023-03-07 NOTE — PHYSICAL EXAM
[Well Developed] : well developed [Well Nourished] : well nourished [No Acute Distress] : no acute distress [Normal Conjunctiva] : normal conjunctiva [Normal Venous Pressure] : normal venous pressure [No Carotid Bruit] : no carotid bruit [Normal S1, S2] : normal S1, S2 [No Murmur] : no murmur [No Rub] : no rub [No Gallop] : no gallop [Clear Lung Fields] : clear lung fields [Good Air Entry] : good air entry [No Respiratory Distress] : no respiratory distress  [Soft] : abdomen soft [Non Tender] : non-tender [No Masses/organomegaly] : no masses/organomegaly [Normal Bowel Sounds] : normal bowel sounds [Normal Gait] : normal gait [No Edema] : no edema [No Cyanosis] : no cyanosis [No Clubbing] : no clubbing [No Varicosities] : no varicosities [No Rash] : no rash [No Skin Lesions] : no skin lesions [Moves all extremities] : moves all extremities [No Focal Deficits] : no focal deficits [Normal Speech] : normal speech [Alert and Oriented] : alert and oriented [Normal memory] : normal memory [de-identified] : OLVIN, A2 present, apical systolic murmur

## 2023-03-07 NOTE — REASON FOR VISIT
[Follow-Up - Clinic] : a clinic follow-up of [Cardiomyopathy] : cardiomyopathy [FreeTextEntry1] : Onc- Dr Vel Dimas  [phone]\par  Renal- Dr Denton\par Struc Hrt- Dr Cui\par HF- Weston\par EP- Dr Herndon\par Vasc- Dr Francis\par Pulm- Dr Johnson

## 2023-03-16 ENCOUNTER — APPOINTMENT (OUTPATIENT)
Dept: OPHTHALMOLOGY | Facility: CLINIC | Age: 68
End: 2023-03-16

## 2023-03-30 NOTE — DISCUSSION/SUMMARY
How Severe Is Your Rash?: severe Is This A New Presentation, Or A Follow-Up?: Rash [FreeTextEntry1] : 67yoM w/pseudoaneursym of the L radiocephalic AVF, now s/p open repair.  Pt presents today for f/u w/swelling and redness of the area, some bruising around the incision.  Pt reports that the swelling and redness in the area have improved steadily over the past few days.  No other complaints reported at this time, and upper segment of AVF used for access w/o issue.\par \par AVF easily palpable w/strong thrill and bruit on exam, duplex performed today to evaluate swelling reveals hematoma overlying the AVF above the wrist, some slight compression of the AVF at that site but no stenosis.  Instructed pt to continue to use upper segment of AVF for access and exercise hand regularly; he will start warm compresses to the operative site BID to facilitate resolution of the hematoma.\par \par Pt will RTO PRN. Additional History: Patient voices concern of a rash on her left leg, patient seen  for the same rash on the same leg last year he gave her 2 injections of kenalog about 3-4 weeks apart it did help but she is now having another flare and it?s worse today.

## 2023-04-02 ENCOUNTER — TRANSCRIPTION ENCOUNTER (OUTPATIENT)
Age: 68
End: 2023-04-02

## 2023-04-04 ENCOUNTER — TRANSCRIPTION ENCOUNTER (OUTPATIENT)
Age: 68
End: 2023-04-04

## 2023-04-12 ENCOUNTER — TRANSCRIPTION ENCOUNTER (OUTPATIENT)
Age: 68
End: 2023-04-12

## 2023-04-15 NOTE — PROGRESS NOTE ADULT - PROBLEM SELECTOR PROBLEM 1
69yo male with no known PMhx admitted with progressive weight loss, and oropharyngeal dysphagia with imaging thus far noting L parietal lobe lesion, mildly dilated prox/mid esophagus, Right testicular mass concerning for malignancy of unknown primary.    Labs notable for neutrophil predominant leukocytosis with microcytic anemia and elevated liver enzymes/ Esophagus is mildly dilated in prox and mid esophagus, though CT limited as PO contrast not administered. Liver nml appearing on RUQUS with CBD 6mm  Uncertain ENT findings following laryngoscopy described, though sx seem to be localized to upper esophagus     Esophagram noting prominent CP bar which can explain oropharyngeal dysphagia, though cannot exclude alternative explanation for previously noted aspirations or sx, particularly given mid-distal esophagus hypokinesis also seen.  Luminal abnormality cannot be excluded though dysmotility syndrome also on ddx    Recommendations:  -Appreciate SLP evaluation  -Please check iron studies; replete as indicated  -Agree with full thin liquid diet as recommended by speech pathologist. Please consult nutrition services  -Plan for EGD. Tentatively 4/17. Please make NPO at midnight on 4/16    Dixon Liriano DO  Gastroenterology Fellow  Pager: [phone]       CAP (community acquired pneumonia) Sepsis, unspecified organism

## 2023-05-09 ENCOUNTER — APPOINTMENT (OUTPATIENT)
Dept: PULMONOLOGY | Facility: CLINIC | Age: 68
End: 2023-05-09
Payer: MEDICARE

## 2023-05-09 VITALS
HEART RATE: 63 BPM | TEMPERATURE: 96.8 F | SYSTOLIC BLOOD PRESSURE: 103 MMHG | DIASTOLIC BLOOD PRESSURE: 65 MMHG | BODY MASS INDEX: 21.45 KG/M2 | OXYGEN SATURATION: 97 % | HEIGHT: 71 IN | WEIGHT: 153.25 LBS

## 2023-05-09 PROCEDURE — 99213 OFFICE O/P EST LOW 20 MIN: CPT

## 2023-05-09 NOTE — HISTORY OF PRESENT ILLNESS
[Former] : former [Never] : never [TextBox_4] : The patient is doing very well.  He walks in with no shortness of breath.  Did not require any albuterol.  He is not coughing more than usual no phlegm. [ESS] : 0

## 2023-05-09 NOTE — ASSESSMENT
[FreeTextEntry1] : Obstructive sleep apnea\par \par The patient has mild sleep apnea no indication for CPAP.\par \par COPD\par \par The patient is clinically stable.  The patient did not require neither urgent care, ER visit, nor systemic steroids since the last time I saw him.  CAT score is 5.  Patient does not require short acting beta agonist.  The patient tolerated Stiolto with no side effects.  There is no limitation of exercise capacity.  The patient oxygen saturation 97 on room air at rest.  PFT is scheduled for September.\par \par Pulmonary hypertension\par \par The patient is on hemodialysis and would require repeat echocardiogram.  No evidence of right heart failure.\par \par Pleural effusion patient is stable with no evidence of respiratory distress and is scheduled for CT scan of the chest at the end of the

## 2023-06-13 ENCOUNTER — EMERGENCY (EMERGENCY)
Facility: HOSPITAL | Age: 68
LOS: 1 days | Discharge: HOME | End: 2023-06-13
Attending: STUDENT IN AN ORGANIZED HEALTH CARE EDUCATION/TRAINING PROGRAM | Admitting: STUDENT IN AN ORGANIZED HEALTH CARE EDUCATION/TRAINING PROGRAM
Payer: MEDICARE

## 2023-06-13 VITALS
HEART RATE: 60 BPM | SYSTOLIC BLOOD PRESSURE: 96 MMHG | DIASTOLIC BLOOD PRESSURE: 59 MMHG | WEIGHT: 149.91 LBS | TEMPERATURE: 98 F | OXYGEN SATURATION: 97 % | RESPIRATION RATE: 18 BRPM

## 2023-06-13 VITALS
RESPIRATION RATE: 18 BRPM | OXYGEN SATURATION: 97 % | DIASTOLIC BLOOD PRESSURE: 70 MMHG | SYSTOLIC BLOOD PRESSURE: 112 MMHG | TEMPERATURE: 98 F | HEART RATE: 82 BPM

## 2023-06-13 DIAGNOSIS — R10.31 RIGHT LOWER QUADRANT PAIN: ICD-10-CM

## 2023-06-13 DIAGNOSIS — Z98.890 OTHER SPECIFIED POSTPROCEDURAL STATES: Chronic | ICD-10-CM

## 2023-06-13 DIAGNOSIS — Z90.49 ACQUIRED ABSENCE OF OTHER SPECIFIED PARTS OF DIGESTIVE TRACT: Chronic | ICD-10-CM

## 2023-06-13 DIAGNOSIS — Z79.82 LONG TERM (CURRENT) USE OF ASPIRIN: ICD-10-CM

## 2023-06-13 DIAGNOSIS — Z41.9 ENCOUNTER FOR PROCEDURE FOR PURPOSES OTHER THAN REMEDYING HEALTH STATE, UNSPECIFIED: Chronic | ICD-10-CM

## 2023-06-13 DIAGNOSIS — Z99.2 DEPENDENCE ON RENAL DIALYSIS: ICD-10-CM

## 2023-06-13 DIAGNOSIS — I48.91 UNSPECIFIED ATRIAL FIBRILLATION: ICD-10-CM

## 2023-06-13 DIAGNOSIS — N18.6 END STAGE RENAL DISEASE: ICD-10-CM

## 2023-06-13 DIAGNOSIS — I12.0 HYPERTENSIVE CHRONIC KIDNEY DISEASE WITH STAGE 5 CHRONIC KIDNEY DISEASE OR END STAGE RENAL DISEASE: ICD-10-CM

## 2023-06-13 DIAGNOSIS — Z87.19 PERSONAL HISTORY OF OTHER DISEASES OF THE DIGESTIVE SYSTEM: ICD-10-CM

## 2023-06-13 DIAGNOSIS — J44.9 CHRONIC OBSTRUCTIVE PULMONARY DISEASE, UNSPECIFIED: ICD-10-CM

## 2023-06-13 DIAGNOSIS — I77.0 ARTERIOVENOUS FISTULA, ACQUIRED: Chronic | ICD-10-CM

## 2023-06-13 LAB
ANION GAP SERPL CALC-SCNC: 15 MMOL/L — SIGNIFICANT CHANGE UP (ref 5–17)
APTT BLD: 39.3 SEC — HIGH (ref 27.5–35.5)
BASOPHILS # BLD AUTO: 0.05 K/UL — SIGNIFICANT CHANGE UP (ref 0–0.2)
BASOPHILS NFR BLD AUTO: 0.8 % — SIGNIFICANT CHANGE UP (ref 0–2)
BUN SERPL-MCNC: 26 MG/DL — HIGH (ref 7–23)
CALCIUM SERPL-MCNC: 9.3 MG/DL — SIGNIFICANT CHANGE UP (ref 8.4–10.5)
CHLORIDE SERPL-SCNC: 98 MMOL/L — SIGNIFICANT CHANGE UP (ref 96–108)
CO2 SERPL-SCNC: 31 MMOL/L — SIGNIFICANT CHANGE UP (ref 22–31)
CREAT SERPL-MCNC: 7.47 MG/DL — HIGH (ref 0.5–1.3)
EGFR: 7 ML/MIN/1.73M2 — LOW
EOSINOPHIL # BLD AUTO: 0.31 K/UL — SIGNIFICANT CHANGE UP (ref 0–0.5)
EOSINOPHIL NFR BLD AUTO: 5 % — SIGNIFICANT CHANGE UP (ref 0–6)
GLUCOSE SERPL-MCNC: 78 MG/DL — SIGNIFICANT CHANGE UP (ref 70–99)
HCT VFR BLD CALC: 34.3 % — LOW (ref 39–50)
HGB BLD-MCNC: 10.5 G/DL — LOW (ref 13–17)
IMM GRANULOCYTES NFR BLD AUTO: 0.3 % — SIGNIFICANT CHANGE UP (ref 0–0.9)
INR BLD: 1.2 — HIGH (ref 0.88–1.16)
LACTATE SERPL-SCNC: 1.3 MMOL/L — SIGNIFICANT CHANGE UP (ref 0.5–2)
LYMPHOCYTES # BLD AUTO: 1.61 K/UL — SIGNIFICANT CHANGE UP (ref 1–3.3)
LYMPHOCYTES # BLD AUTO: 25.7 % — SIGNIFICANT CHANGE UP (ref 13–44)
MCHC RBC-ENTMCNC: 30.2 PG — SIGNIFICANT CHANGE UP (ref 27–34)
MCHC RBC-ENTMCNC: 30.6 GM/DL — LOW (ref 32–36)
MCV RBC AUTO: 98.6 FL — SIGNIFICANT CHANGE UP (ref 80–100)
MONOCYTES # BLD AUTO: 0.94 K/UL — HIGH (ref 0–0.9)
MONOCYTES NFR BLD AUTO: 15 % — HIGH (ref 2–14)
NEUTROPHILS # BLD AUTO: 3.33 K/UL — SIGNIFICANT CHANGE UP (ref 1.8–7.4)
NEUTROPHILS NFR BLD AUTO: 53.2 % — SIGNIFICANT CHANGE UP (ref 43–77)
NRBC # BLD: 1 /100 WBCS — HIGH (ref 0–0)
PLATELET # BLD AUTO: 211 K/UL — SIGNIFICANT CHANGE UP (ref 150–400)
POTASSIUM SERPL-MCNC: 4.4 MMOL/L — SIGNIFICANT CHANGE UP (ref 3.5–5.3)
POTASSIUM SERPL-SCNC: 4.4 MMOL/L — SIGNIFICANT CHANGE UP (ref 3.5–5.3)
PROTHROM AB SERPL-ACNC: 14.3 SEC — HIGH (ref 10.5–13.4)
RBC # BLD: 3.48 M/UL — LOW (ref 4.2–5.8)
RBC # FLD: 16.3 % — HIGH (ref 10.3–14.5)
SODIUM SERPL-SCNC: 144 MMOL/L — SIGNIFICANT CHANGE UP (ref 135–145)
WBC # BLD: 6.26 K/UL — SIGNIFICANT CHANGE UP (ref 3.8–10.5)
WBC # FLD AUTO: 6.26 K/UL — SIGNIFICANT CHANGE UP (ref 3.8–10.5)

## 2023-06-13 PROCEDURE — 96374 THER/PROPH/DIAG INJ IV PUSH: CPT | Mod: XU

## 2023-06-13 PROCEDURE — 99284 EMERGENCY DEPT VISIT MOD MDM: CPT | Mod: 25

## 2023-06-13 PROCEDURE — 80048 BASIC METABOLIC PNL TOTAL CA: CPT

## 2023-06-13 PROCEDURE — 85025 COMPLETE CBC W/AUTO DIFF WBC: CPT

## 2023-06-13 PROCEDURE — 85730 THROMBOPLASTIN TIME PARTIAL: CPT

## 2023-06-13 PROCEDURE — 74177 CT ABD & PELVIS W/CONTRAST: CPT | Mod: 26,MA

## 2023-06-13 PROCEDURE — 85610 PROTHROMBIN TIME: CPT

## 2023-06-13 PROCEDURE — 36415 COLL VENOUS BLD VENIPUNCTURE: CPT

## 2023-06-13 PROCEDURE — 99284 EMERGENCY DEPT VISIT MOD MDM: CPT

## 2023-06-13 PROCEDURE — 83605 ASSAY OF LACTIC ACID: CPT

## 2023-06-13 PROCEDURE — 74177 CT ABD & PELVIS W/CONTRAST: CPT | Mod: MA

## 2023-06-13 RX ORDER — ACETAMINOPHEN 500 MG
650 TABLET ORAL ONCE
Refills: 0 | Status: COMPLETED | OUTPATIENT
Start: 2023-06-13 | End: 2023-06-13

## 2023-06-13 RX ORDER — SODIUM CHLORIDE 9 MG/ML
1000 INJECTION, SOLUTION INTRAVENOUS ONCE
Refills: 0 | Status: COMPLETED | OUTPATIENT
Start: 2023-06-13 | End: 2023-06-13

## 2023-06-13 RX ORDER — LIDOCAINE 4 G/100G
1 CREAM TOPICAL
Qty: 7 | Refills: 0
Start: 2023-06-13 | End: 2023-06-19

## 2023-06-13 RX ORDER — IOHEXOL 300 MG/ML
30 INJECTION, SOLUTION INTRAVENOUS ONCE
Refills: 0 | Status: COMPLETED | OUTPATIENT
Start: 2023-06-13 | End: 2023-06-13

## 2023-06-13 RX ORDER — LIDOCAINE 4 G/100G
1 CREAM TOPICAL ONCE
Refills: 0 | Status: COMPLETED | OUTPATIENT
Start: 2023-06-13 | End: 2023-06-13

## 2023-06-13 RX ORDER — KETOROLAC TROMETHAMINE 30 MG/ML
15 SYRINGE (ML) INJECTION ONCE
Refills: 0 | Status: DISCONTINUED | OUTPATIENT
Start: 2023-06-13 | End: 2023-06-13

## 2023-06-13 RX ADMIN — IOHEXOL 30 MILLILITER(S): 300 INJECTION, SOLUTION INTRAVENOUS at 14:58

## 2023-06-13 RX ADMIN — Medication 15 MILLIGRAM(S): at 15:29

## 2023-06-13 RX ADMIN — SODIUM CHLORIDE 1000 MILLILITER(S): 9 INJECTION, SOLUTION INTRAVENOUS at 15:26

## 2023-06-13 NOTE — ED PROVIDER NOTE - CLINICAL SUMMARY MEDICAL DECISION MAKING FREE TEXT BOX
Pt w/ intermittent sharp pain to one spot in R mid/lower quadrant  will get labs and CTAP to r/o acute intra-abdominal pathology    --->labs and CT grossly unremarkable. Informed pt of incidental finding of splenic lesion. Patient's pain is worsened w/ movement and improved w/ rest, consider pain from abdominal muscle. No acute emergent pathology identified. Instructed pt to f/u with his nephrologist and go to HD in AM as scheduled bc he got IV contrast today.  No further indication for emergent or inpatient workup, pt stable and ready for discharge. Results, diagnosis and post-discharge plan including appropriate outpatient follow up were discussed and all questions answered

## 2023-06-13 NOTE — ED ADULT TRIAGE NOTE - SOURCE OF INFORMATION
Tylenol/Ibuprofen as needed for pain. Drink lots of fluids, stay well hydrated. Follow-up with pediatrician for reevaluation. Return to this ED if patient begins with nausea/vomiting, if she begins with fever, if pain worsens or persists despite treatment, if any other problems occur.   
Patient

## 2023-06-13 NOTE — ED PROVIDER NOTE - IV ALTEPLASE EXCL ABS HIDDEN
show Itraconazole Counseling:  I discussed with the patient the risks of itraconazole including but not limited to liver damage, nausea/vomiting, neuropathy, and severe allergy.  The patient understands that this medication is best absorbed when taken with acidic beverages such as non-diet cola or ginger ale.  The patient understands that monitoring is required including baseline LFTs and repeat LFTs at intervals.  The patient understands that they are to contact us or the primary physician if concerning signs are noted.

## 2023-06-13 NOTE — ED PROVIDER NOTE - PATIENT PORTAL LINK FT
You can access the FollowMyHealth Patient Portal offered by [facility] by registering at the following website: [email or web address]. By joining [employer]’s FollowMyHealth portal, you will also be able to view your health information using other applications (apps) compatible with our system.

## 2023-06-13 NOTE — ED PROVIDER NOTE - NSFOLLOWUPINSTRUCTIONS_ED_ALL_ED_FT
Make sure to get your dialysis session tomorrow morning as scheduled, and follow up with your doctor.    Abdominal Pain    Many things can cause abdominal pain. Many times, abdominal pain is not caused by a disease and will improve without treatment. Your health care provider will do a physical exam to determine if there is a dangerous cause of your pain; blood tests and imaging may help determine the cause of your pain. However, in many cases, no cause may be found and you may need further testing as an outpatient. Monitor your abdominal pain for any changes.     SEEK IMMEDIATE MEDICAL CARE IF YOU HAVE ANY OF THE FOLLOWING SYMPTOMS: worsening abdominal pain, uncontrollable vomiting, profuse diarrhea, inability to have bowel movements or pass gas, black or bloody stools, fever accompanying chest pain or back pain, or fainting. These symptoms may represent a serious problem that is an emergency. Do not wait to see if the symptoms will go away. Get medical help right away. Call 911 and do not drive yourself to the hospital.

## 2023-06-13 NOTE — ED ADULT NURSE NOTE - NSFALLUNIVINTERV_ED_ALL_ED
Bed/Stretcher in lowest position, wheels locked, appropriate side rails in place/Call bell, personal items and telephone in reach/Instruct patient to call for assistance before getting out of bed/chair/stretcher/Non-slip footwear applied when patient is off stretcher/[place] to call system/Physically safe environment - no spills, clutter or unnecessary equipment/Purposeful proactive rounding/Room/bathroom lighting operational, light cord in reach

## 2023-06-13 NOTE — ED ADULT NURSE NOTE - OBJECTIVE STATEMENT
68yoM PMH of CKD, dialysis MWF. PSH of hernia repair in december. Patient came to the ED today c/o worsening abdominal pain for the past week.  Patient states "It is a stabbing pain where I had my surgery in December". Pt states that he took Tylenol last night for the pain.  Patient denies c/p SOB, fever, n/v/d.

## 2023-06-13 NOTE — ED PROVIDER NOTE - PHYSICAL EXAMINATION
CONST: nontoxic NAD speaking in full sentences  HEAD: atraumatic  EYES: conjunctivae clear, PERRL, EOMI  ENT: mmm  NECK: supple/FROM, nttp  CARD: rrr   CHEST: ctab no r/r/w, no stridor/retractions/tripoding  ABD: soft, nd, well healed old surgical scars, +ttp to one specific spot in the R mid/lower abdomen, no rebound/guarding  EXT: FROM, symmetric distal pulses intact  SKIN: warm, dry, no rash, no pedal edema/ttp/rash, cap refill <2sec  NEURO: awake alert answering questions following commands moving all extremities

## 2023-06-13 NOTE — ED PROVIDER NOTE - OBJECTIVE STATEMENT
68-year-old male with ESRD on HD MWF, A-fib, HTN, COPD, prior hernia repair comes in for evaluation of pain to the right mid/lower abdomen.  Patient says he has had intermittent mild pain for the last few weeks, however today it was the worst.  It feels like a sharp stabbing pain that comes and go and is worsened with movement.  No fever, chills, nausea, vomiting, diarrhea, constipation.  Having normal bowel movements.  Did not miss any sessions of dialysis.

## 2023-06-13 NOTE — ED ADULT TRIAGE NOTE - AS TEMP SITE
Wilian Murray)  Cardiac Electrophysiology; Cardiology  [address] [place] [address]  Phone: [phone]  Fax: [phone]  Follow Up Time: oral

## 2023-06-19 NOTE — DISCHARGE NOTE PROVIDER - NSDCCAREPROVSEEN_GEN_ALL_CORE_FT
Patient needs an appt      For Pharmacy Admin Tracking Only    Program: Medication Refill  CPA in place:    Recommendation Provided To:    Intervention Detail: New Rx: 3, reason: Patient Preference and Scheduled Appointment  Intervention Accepted By:   Tez Zhou Closed?:    Time Spent (min): 5
Howard Cui

## 2023-07-27 NOTE — DISCHARGE NOTE NURSING/CASE MANAGEMENT/SOCIAL WORK - NSPROEXTENSIONSOFSELF_GEN_A_NUR
Problem: Adult Inpatient Plan of Care  Goal: Plan of Care Review  Outcome: Ongoing, Progressing  Flowsheets (Taken 7/27/2023 1807)  Progress: improving  Plan of Care Reviewed With:   patient   family     Problem: Adult Inpatient Plan of Care  Goal: Absence of Hospital-Acquired Illness or Injury  Outcome: Ongoing, Progressing  Intervention: Identify and Manage Fall Risk  Recent Flowsheet Documentation  Taken 7/27/2023 1637 by Torri Campbell RN  Safety Promotion/Fall Prevention:   activity supervised   assistive device/personal items within reach   clutter free environment maintained   fall prevention program maintained   room organization consistent   safety round/check completed  Taken 7/27/2023 1419 by Torri Campbell RN  Safety Promotion/Fall Prevention:   activity supervised   assistive device/personal items within reach   clutter free environment maintained   fall prevention program maintained   room organization consistent   safety round/check completed  Taken 7/27/2023 1225 by Torri Campbell RN  Safety Promotion/Fall Prevention:   activity supervised   assistive device/personal items within reach   clutter free environment maintained   fall prevention program maintained   room organization consistent   safety round/check completed  Taken 7/27/2023 1032 by Torri Campbell RN  Safety Promotion/Fall Prevention:   activity supervised   assistive device/personal items within reach   clutter free environment maintained   room organization consistent   safety round/check completed  Taken 7/27/2023 0911 by Torri Campbell RN  Safety Promotion/Fall Prevention:   activity supervised   assistive device/personal items within reach   clutter free environment maintained   fall prevention program maintained   room organization consistent   safety round/check completed  Intervention: Prevent Skin Injury  Recent Flowsheet Documentation  Taken 7/27/2023 1637 by Torri Campbell RN  Body Position:   position  changed independently   foot of bed elevated  Taken 7/27/2023 1419 by Torri Campbell RN  Body Position:   position changed independently   foot of bed elevated  Taken 7/27/2023 1225 by Torri Campbell RN  Body Position:   position changed independently   foot of bed elevated  Taken 7/27/2023 1032 by Torri Campbell RN  Body Position:   position changed independently   foot of bed elevated  Taken 7/27/2023 0911 by Torri Campbell RN  Body Position:   position changed independently   foot of bed elevated  Skin Protection:   adhesive use limited   incontinence pads utilized   skin-to-device areas padded   skin-to-skin areas padded   transparent dressing maintained   tubing/devices free from skin contact  Intervention: Prevent and Manage VTE (Venous Thromboembolism) Risk  Recent Flowsheet Documentation  Taken 7/27/2023 0911 by Torri Campbell RN  Activity Management: activity encouraged  Range of Motion:   active ROM (range of motion) encouraged   ROM (range of motion) performed  Intervention: Prevent Infection  Recent Flowsheet Documentation  Taken 7/27/2023 0911 by Torri Campbell RN  Infection Prevention:   environmental surveillance performed   hand hygiene promoted   rest/sleep promoted   single patient room provided     Problem: Adult Inpatient Plan of Care  Goal: Optimal Comfort and Wellbeing  Outcome: Ongoing, Progressing  Intervention: Provide Person-Centered Care  Recent Flowsheet Documentation  Taken 7/27/2023 0911 by Torri Campbell RN  Trust Relationship/Rapport:   care explained   choices provided   questions answered   questions encouraged     Problem: Fall Injury Risk  Goal: Absence of Fall and Fall-Related Injury  Outcome: Ongoing, Progressing  Intervention: Identify and Manage Contributors  Recent Flowsheet Documentation  Taken 7/27/2023 1637 by Torri Campbell RN  Medication Review/Management: medications reviewed  Taken 7/27/2023 1419 by Torri Campbell RN  Medication  Review/Management: medications reviewed  Taken 7/27/2023 1225 by Torri Campbell RN  Medication Review/Management: medications reviewed  Taken 7/27/2023 1032 by Torri Campbell RN  Medication Review/Management: medications reviewed  Taken 7/27/2023 0911 by Torri Campbell RN  Medication Review/Management: medications reviewed  Self-Care Promotion: independence encouraged  Intervention: Promote Injury-Free Environment  Recent Flowsheet Documentation  Taken 7/27/2023 1637 by Torri Campbell, RN  Safety Promotion/Fall Prevention:   activity supervised   assistive device/personal items within reach   clutter free environment maintained   fall prevention program maintained   room organization consistent   safety round/check completed  Taken 7/27/2023 1419 by Torri Campbell RN  Safety Promotion/Fall Prevention:   activity supervised   assistive device/personal items within reach   clutter free environment maintained   fall prevention program maintained   room organization consistent   safety round/check completed  Taken 7/27/2023 1225 by Torri Campbell RN  Safety Promotion/Fall Prevention:   activity supervised   assistive device/personal items within reach   clutter free environment maintained   fall prevention program maintained   room organization consistent   safety round/check completed  Taken 7/27/2023 1032 by Torri Campbell RN  Safety Promotion/Fall Prevention:   activity supervised   assistive device/personal items within reach   clutter free environment maintained   room organization consistent   safety round/check completed  Taken 7/27/2023 0911 by Torri Campbell RN  Safety Promotion/Fall Prevention:   activity supervised   assistive device/personal items within reach   clutter free environment maintained   fall prevention program maintained   room organization consistent   safety round/check completed     Problem: Skin Injury Risk Increased  Goal: Skin Health and Integrity  Outcome: Ongoing,  Progressing  Intervention: Optimize Skin Protection  Recent Flowsheet Documentation  Taken 7/27/2023 1637 by Torri Campbell RN  Head of Bed (HOB) Positioning: HOB elevated  Taken 7/27/2023 1419 by Torri Campbell RN  Head of Bed (HOB) Positioning: HOB elevated  Taken 7/27/2023 1225 by Torri Campbell RN  Head of Bed (HOB) Positioning: HOB elevated  Taken 7/27/2023 1032 by Torri Campbell RN  Head of Bed (HOB) Positioning: HOB elevated  Taken 7/27/2023 0911 by Torri Campbell RN  Pressure Reduction Techniques:   frequent weight shift encouraged   weight shift assistance provided   heels elevated off bed   positioned off wounds  Head of Bed (HOB) Positioning: HOB elevated  Pressure Reduction Devices: positioning supports utilized  Skin Protection:   adhesive use limited   incontinence pads utilized   skin-to-device areas padded   skin-to-skin areas padded   transparent dressing maintained   tubing/devices free from skin contact   Goal Outcome Evaluation:  Plan of Care Reviewed With: patient, family        Progress: improving             dentures

## 2023-08-23 NOTE — ED PROVIDER NOTE - TEMPLATE
Addended by: EMILY APRIL YVAN on: 8/23/2023 07:38 AM     Modules accepted: Orders    
Abdominal Pain, N/V/D

## 2023-09-03 ENCOUNTER — RX RENEWAL (OUTPATIENT)
Age: 68
End: 2023-09-03

## 2023-09-04 ENCOUNTER — RX RENEWAL (OUTPATIENT)
Age: 68
End: 2023-09-04

## 2023-09-04 ENCOUNTER — TRANSCRIPTION ENCOUNTER (OUTPATIENT)
Age: 68
End: 2023-09-04

## 2023-09-12 ENCOUNTER — APPOINTMENT (OUTPATIENT)
Dept: PULMONOLOGY | Facility: CLINIC | Age: 68
End: 2023-09-12
Payer: MEDICARE

## 2023-09-12 PROCEDURE — 94010 BREATHING CAPACITY TEST: CPT

## 2023-09-12 PROCEDURE — 94729 DIFFUSING CAPACITY: CPT

## 2023-09-12 PROCEDURE — 94727 GAS DIL/WSHOT DETER LNG VOL: CPT

## 2023-09-19 ENCOUNTER — APPOINTMENT (OUTPATIENT)
Dept: FAMILY MEDICINE | Facility: CLINIC | Age: 68
End: 2023-09-19
Payer: MEDICARE

## 2023-09-19 VITALS
OXYGEN SATURATION: 97 % | BODY MASS INDEX: 21.25 KG/M2 | HEART RATE: 57 BPM | TEMPERATURE: 98.1 F | HEIGHT: 71 IN | DIASTOLIC BLOOD PRESSURE: 66 MMHG | SYSTOLIC BLOOD PRESSURE: 116 MMHG | WEIGHT: 151.8 LBS

## 2023-09-19 DIAGNOSIS — N40.0 BENIGN PROSTATIC HYPERPLASIA WITHOUT LOWER URINARY TRACT SYMPMS: ICD-10-CM

## 2023-09-19 DIAGNOSIS — E87.6 HYPOKALEMIA: ICD-10-CM

## 2023-09-19 DIAGNOSIS — Z09 ENCOUNTER FOR FOLLOW-UP EXAMINATION AFTER COMPLETED TREATMENT FOR CONDITIONS OTHER THAN MALIGNANT NEOPLASM: ICD-10-CM

## 2023-09-19 DIAGNOSIS — I50.23 ACUTE ON CHRONIC SYSTOLIC (CONGESTIVE) HEART FAILURE: ICD-10-CM

## 2023-09-19 DIAGNOSIS — Z80.1 FAMILY HISTORY OF MALIGNANT NEOPLASM OF TRACHEA, BRONCHUS AND LUNG: ICD-10-CM

## 2023-09-19 DIAGNOSIS — Z01.818 ENCOUNTER FOR OTHER PREPROCEDURAL EXAMINATION: ICD-10-CM

## 2023-09-19 DIAGNOSIS — Z87.09 PERSONAL HISTORY OF OTHER DISEASES OF THE RESPIRATORY SYSTEM: ICD-10-CM

## 2023-09-19 DIAGNOSIS — I51.7 CARDIOMEGALY: ICD-10-CM

## 2023-09-19 DIAGNOSIS — N18.5 CHRONIC KIDNEY DISEASE, STAGE 5: ICD-10-CM

## 2023-09-19 DIAGNOSIS — N25.81 SECONDARY HYPERPARATHYROIDISM OF RENAL ORIGIN: ICD-10-CM

## 2023-09-19 DIAGNOSIS — R31.29 OTHER MICROSCOPIC HEMATURIA: ICD-10-CM

## 2023-09-19 DIAGNOSIS — S20.211S CONTUSION OF RIGHT FRONT WALL OF THORAX, SEQUELA: ICD-10-CM

## 2023-09-19 DIAGNOSIS — Z87.891 PERSONAL HISTORY OF NICOTINE DEPENDENCE: ICD-10-CM

## 2023-09-19 DIAGNOSIS — M25.531 PAIN IN RIGHT WRIST: ICD-10-CM

## 2023-09-19 DIAGNOSIS — Z78.9 OTHER SPECIFIED HEALTH STATUS: ICD-10-CM

## 2023-09-19 DIAGNOSIS — K40.20 BILATERAL INGUINAL HERNIA, W/OUT OBSTRUCTION OR GANGRENE, NOT SPECIFIED AS RECURRENT: ICD-10-CM

## 2023-09-19 DIAGNOSIS — N18.6 END STAGE RENAL DISEASE: ICD-10-CM

## 2023-09-19 DIAGNOSIS — G47.34 IDIOPATHIC SLEEP RELATED NONOBSTRUCTIVE ALVEOLAR HYPOVENTILATION: ICD-10-CM

## 2023-09-19 DIAGNOSIS — R63.4 ABNORMAL WEIGHT LOSS: ICD-10-CM

## 2023-09-19 DIAGNOSIS — R40.0 SOMNOLENCE: ICD-10-CM

## 2023-09-19 DIAGNOSIS — N17.9 ACUTE KIDNEY FAILURE, UNSPECIFIED: ICD-10-CM

## 2023-09-19 DIAGNOSIS — Z87.898 PERSONAL HISTORY OF OTHER SPECIFIED CONDITIONS: ICD-10-CM

## 2023-09-19 DIAGNOSIS — R80.9 PROTEINURIA, UNSPECIFIED: ICD-10-CM

## 2023-09-19 DIAGNOSIS — T82.510A: ICD-10-CM

## 2023-09-19 DIAGNOSIS — Z87.39 PERSONAL HISTORY OF OTHER DISEASES OF THE MUSCULOSKELETAL SYSTEM AND CONNECTIVE TISSUE: ICD-10-CM

## 2023-09-19 DIAGNOSIS — Z00.00 ENCOUNTER FOR GENERAL ADULT MEDICAL EXAMINATION W/OUT ABNORMAL FINDINGS: ICD-10-CM

## 2023-09-19 DIAGNOSIS — E79.0 HYPERURICEMIA W/OUT SIGNS OF INFLAMMATORY ARTHRITIS AND TOPHACEOUS DISEASE: ICD-10-CM

## 2023-09-19 DIAGNOSIS — J98.11 ATELECTASIS: ICD-10-CM

## 2023-09-19 DIAGNOSIS — Z11.59 ENCOUNTER FOR SCREENING FOR OTHER VIRAL DISEASES: ICD-10-CM

## 2023-09-19 DIAGNOSIS — Z92.29 PERSONAL HISTORY OF OTHER DRUG THERAPY: ICD-10-CM

## 2023-09-19 DIAGNOSIS — Z23 ENCOUNTER FOR IMMUNIZATION: ICD-10-CM

## 2023-09-19 DIAGNOSIS — Z99.2 END STAGE RENAL DISEASE: ICD-10-CM

## 2023-09-19 DIAGNOSIS — C85.90 NON-HODGKIN LYMPHOMA, UNSPECIFIED, UNSPECIFIED SITE: ICD-10-CM

## 2023-09-19 DIAGNOSIS — Z01.810 ENCOUNTER FOR PREPROCEDURAL CARDIOVASCULAR EXAMINATION: ICD-10-CM

## 2023-09-19 DIAGNOSIS — Z86.79 PERSONAL HISTORY OF OTHER DISEASES OF THE CIRCULATORY SYSTEM: ICD-10-CM

## 2023-09-19 PROCEDURE — G0296 VISIT TO DETERM LDCT ELIG: CPT

## 2023-09-19 PROCEDURE — G0438: CPT

## 2023-09-19 PROCEDURE — 36415 COLL VENOUS BLD VENIPUNCTURE: CPT

## 2023-09-21 ENCOUNTER — APPOINTMENT (OUTPATIENT)
Dept: GASTROENTEROLOGY | Facility: CLINIC | Age: 68
End: 2023-09-21
Payer: MEDICARE

## 2023-09-21 VITALS
SYSTOLIC BLOOD PRESSURE: 125 MMHG | TEMPERATURE: 96.8 F | BODY MASS INDEX: 21 KG/M2 | HEIGHT: 71 IN | DIASTOLIC BLOOD PRESSURE: 65 MMHG | RESPIRATION RATE: 16 BRPM | WEIGHT: 150 LBS | OXYGEN SATURATION: 95 % | HEART RATE: 58 BPM

## 2023-09-21 DIAGNOSIS — Z12.11 ENCOUNTER FOR SCREENING FOR MALIGNANT NEOPLASM OF COLON: ICD-10-CM

## 2023-09-21 LAB
25(OH)D3 SERPL-MCNC: 34.8 NG/ML
ALBUMIN SERPL ELPH-MCNC: 4.1 G/DL
ALP BLD-CCNC: 318 U/L
ALT SERPL-CCNC: 13 U/L
ANION GAP SERPL CALC-SCNC: 16 MMOL/L
APPEARANCE: ABNORMAL
AST SERPL-CCNC: 16 U/L
BACTERIA: ABNORMAL /HPF
BILIRUB SERPL-MCNC: 0.4 MG/DL
BILIRUBIN URINE: NEGATIVE
BLOOD URINE: ABNORMAL
BUN SERPL-MCNC: 33 MG/DL
CALCIUM SERPL-MCNC: 9.7 MG/DL
CHLORIDE SERPL-SCNC: 96 MMOL/L
CHOLEST SERPL-MCNC: 83 MG/DL
CO2 SERPL-SCNC: 28 MMOL/L
COLOR: YELLOW
CREAT SERPL-MCNC: 6.31 MG/DL
EGFR: 9 ML/MIN/1.73M2
ESTIMATED AVERAGE GLUCOSE: 105 MG/DL
FOLATE SERPL-MCNC: 5.6 NG/ML
GLUCOSE QUALITATIVE U: NEGATIVE MG/DL
GLUCOSE SERPL-MCNC: 76 MG/DL
HBA1C MFR BLD HPLC: 5.3 %
HCT VFR BLD CALC: 31.8 %
HDLC SERPL-MCNC: 46 MG/DL
HGB BLD-MCNC: 9.4 G/DL
HYALINE CASTS: NORMAL
IRON SATN MFR SERPL: 37 %
IRON SERPL-MCNC: 88 UG/DL
KETONES URINE: NEGATIVE MG/DL
LDLC SERPL CALC-MCNC: 25 MG/DL
LEUKOCYTE ESTERASE URINE: ABNORMAL
MCHC RBC-ENTMCNC: 29.2 PG
MCHC RBC-ENTMCNC: 29.6 GM/DL
MCV RBC AUTO: 98.8 FL
MICROSCOPIC-UA: NORMAL
NITRITE URINE: NEGATIVE
NONHDLC SERPL-MCNC: 37 MG/DL
PH URINE: 8
PLATELET # BLD AUTO: 132 K/UL
POTASSIUM SERPL-SCNC: 5.3 MMOL/L
PROT SERPL-MCNC: 7.3 G/DL
PROTEIN URINE: 300 MG/DL
RBC # BLD: 3.22 M/UL
RBC # FLD: 16.3 %
RED BLOOD CELLS URINE: 25 /HPF
SODIUM SERPL-SCNC: 140 MMOL/L
SPECIFIC GRAVITY URINE: 1.01
SQUAMOUS EPITHELIAL CELLS: PRESENT
TIBC SERPL-MCNC: 241 UG/DL
TRIGL SERPL-MCNC: 45 MG/DL
TSH SERPL-ACNC: 1.72 UIU/ML
UIBC SERPL-MCNC: 153 UG/DL
URATE SERPL-MCNC: 2.4 MG/DL
UROBILINOGEN URINE: 1 MG/DL
VIT B12 SERPL-MCNC: 960 PG/ML
WBC # FLD AUTO: 6.1 K/UL
WHITE BLOOD CELLS URINE: >998 /HPF

## 2023-09-21 PROCEDURE — 99203 OFFICE O/P NEW LOW 30 MIN: CPT

## 2023-09-22 ENCOUNTER — NON-APPOINTMENT (OUTPATIENT)
Age: 68
End: 2023-09-22

## 2023-10-20 NOTE — PROGRESS NOTE ADULT - PROBLEM/PLAN-4
"Patient: Rosalva Pineda    Procedure Summary       Date: 10/19/23 Room / Location: [facility]    Anesthesia Start: 444 Anesthesia Stop:     Procedure:  Section Diagnosis:     Surgeons: Cynthia WILLS MD Responsible Provider: HAYDER Han    Anesthesia Type: epidural ASA Status: 2            Anesthesia Type: epidural    /52   Pulse 108   Temp 36.5 °C (97.7 °F) (Oral)   Resp 16   Ht 1.575 m (5' 2\")   Wt 99.3 kg (219 lb 0.4 oz)   LMP 2022   SpO2 96%   Breastfeeding Yes   BMI 40.06 kg/m²      Anesthesia Post Evaluation    Patient location during evaluation: bedside  Patient participation: complete - patient participated  Level of consciousness: awake and alert  Pain management: satisfactory to patient  Cardiovascular status: acceptable  Respiratory status: acceptable  Hydration status: acceptable  Comments: Epidural catheter removed by nursing. No redness, swelling, or drainage at puncture site.    Complete resolution of numbness. Patient is able to lift legs, bend at the knees, and ambulate.    Patient denies headache or severe back pain.         There were no known notable events for this encounter.    "
DISPLAY PLAN FREE TEXT

## 2023-10-31 ENCOUNTER — NON-APPOINTMENT (OUTPATIENT)
Age: 68
End: 2023-10-31

## 2023-10-31 ENCOUNTER — APPOINTMENT (OUTPATIENT)
Dept: HEART AND VASCULAR | Facility: CLINIC | Age: 68
End: 2023-10-31
Payer: MEDICARE

## 2023-10-31 VITALS
TEMPERATURE: 97.6 F | HEIGHT: 71 IN | OXYGEN SATURATION: 94 % | WEIGHT: 153.99 LBS | BODY MASS INDEX: 21.56 KG/M2 | DIASTOLIC BLOOD PRESSURE: 60 MMHG | HEART RATE: 67 BPM | SYSTOLIC BLOOD PRESSURE: 120 MMHG

## 2023-10-31 DIAGNOSIS — I35.0 NONRHEUMATIC AORTIC (VALVE) STENOSIS: ICD-10-CM

## 2023-10-31 PROCEDURE — 99214 OFFICE O/P EST MOD 30 MIN: CPT | Mod: 25

## 2023-10-31 PROCEDURE — 93000 ELECTROCARDIOGRAM COMPLETE: CPT

## 2023-11-06 ENCOUNTER — RESULT REVIEW (OUTPATIENT)
Age: 68
End: 2023-11-06

## 2023-11-07 ENCOUNTER — APPOINTMENT (OUTPATIENT)
Dept: PULMONOLOGY | Facility: CLINIC | Age: 68
End: 2023-11-07
Payer: MEDICARE

## 2023-11-07 ENCOUNTER — APPOINTMENT (OUTPATIENT)
Dept: PULMONOLOGY | Facility: CLINIC | Age: 68
End: 2023-11-07

## 2023-11-07 VITALS
HEART RATE: 55 BPM | RESPIRATION RATE: 16 BRPM | OXYGEN SATURATION: 96 % | TEMPERATURE: 97.5 F | SYSTOLIC BLOOD PRESSURE: 104 MMHG | DIASTOLIC BLOOD PRESSURE: 63 MMHG

## 2023-11-07 DIAGNOSIS — Z01.811 ENCOUNTER FOR PREPROCEDURAL RESPIRATORY EXAMINATION: ICD-10-CM

## 2023-11-07 PROCEDURE — 99214 OFFICE O/P EST MOD 30 MIN: CPT

## 2023-11-08 ENCOUNTER — OUTPATIENT (OUTPATIENT)
Dept: OUTPATIENT SERVICES | Facility: HOSPITAL | Age: 68
LOS: 1 days | End: 2023-11-08
Payer: MEDICARE

## 2023-11-08 ENCOUNTER — APPOINTMENT (OUTPATIENT)
Dept: SLEEP CENTER | Facility: HOSPITAL | Age: 68
End: 2023-11-08

## 2023-11-08 DIAGNOSIS — Z98.890 OTHER SPECIFIED POSTPROCEDURAL STATES: Chronic | ICD-10-CM

## 2023-11-08 DIAGNOSIS — Z90.49 ACQUIRED ABSENCE OF OTHER SPECIFIED PARTS OF DIGESTIVE TRACT: Chronic | ICD-10-CM

## 2023-11-08 DIAGNOSIS — Z41.9 ENCOUNTER FOR PROCEDURE FOR PURPOSES OTHER THAN REMEDYING HEALTH STATE, UNSPECIFIED: Chronic | ICD-10-CM

## 2023-11-08 DIAGNOSIS — G47.33 OBSTRUCTIVE SLEEP APNEA (ADULT) (PEDIATRIC): ICD-10-CM

## 2023-11-08 DIAGNOSIS — I77.0 ARTERIOVENOUS FISTULA, ACQUIRED: Chronic | ICD-10-CM

## 2023-11-08 PROCEDURE — 95811 POLYSOM 6/>YRS CPAP 4/> PARM: CPT | Mod: 26

## 2023-11-08 PROCEDURE — 95811 POLYSOM 6/>YRS CPAP 4/> PARM: CPT

## 2023-11-20 NOTE — DISCHARGE NOTE NURSING/CASE MANAGEMENT/SOCIAL WORK - NSDCPEELIQUISCOMP_GEN_ALL_CORE
Him/He Apixaban/Eliquis is used to treat and prevent blood clots. If you are not able to swallow the tablets whole, they may be crushed and mixed in water, apple juice, or applesauce and promptly taken within four hours. Never skip a dose of Apixaban/Eliquis. If you forget to take your Apixaban/Eliquis, take a dose as soon as you remember. If it is almost time for your next Apixaban/Eliquis dose, wait until then and take a regular dose. DO NOT take an extra pill to ‘catch up’.  NEVER TAKE A DOUBLE DOSE. Notify your doctor that you missed a dose. Take Apixaban/Eliquis at the same time each morning and evening. Apixaban/Eliquis may be taken with other medication or food.

## 2023-11-21 ENCOUNTER — TRANSCRIPTION ENCOUNTER (OUTPATIENT)
Age: 68
End: 2023-11-21

## 2023-11-30 ENCOUNTER — RX RENEWAL (OUTPATIENT)
Age: 68
End: 2023-11-30

## 2023-12-02 ENCOUNTER — INPATIENT (INPATIENT)
Facility: HOSPITAL | Age: 68
LOS: 2 days | Discharge: HOME | DRG: 377 | End: 2023-12-05
Attending: SURGERY | Admitting: SURGERY
Payer: MEDICARE

## 2023-12-02 VITALS
SYSTOLIC BLOOD PRESSURE: 91 MMHG | RESPIRATION RATE: 18 BRPM | DIASTOLIC BLOOD PRESSURE: 55 MMHG | TEMPERATURE: 97 F | OXYGEN SATURATION: 95 % | HEART RATE: 56 BPM | WEIGHT: 158.95 LBS

## 2023-12-02 DIAGNOSIS — Z90.49 ACQUIRED ABSENCE OF OTHER SPECIFIED PARTS OF DIGESTIVE TRACT: Chronic | ICD-10-CM

## 2023-12-02 DIAGNOSIS — Z98.890 OTHER SPECIFIED POSTPROCEDURAL STATES: Chronic | ICD-10-CM

## 2023-12-02 DIAGNOSIS — I77.0 ARTERIOVENOUS FISTULA, ACQUIRED: Chronic | ICD-10-CM

## 2023-12-02 DIAGNOSIS — Z41.9 ENCOUNTER FOR PROCEDURE FOR PURPOSES OTHER THAN REMEDYING HEALTH STATE, UNSPECIFIED: Chronic | ICD-10-CM

## 2023-12-02 LAB
ALBUMIN SERPL ELPH-MCNC: 3.8 G/DL — SIGNIFICANT CHANGE UP (ref 3.3–5)
ALBUMIN SERPL ELPH-MCNC: 3.8 G/DL — SIGNIFICANT CHANGE UP (ref 3.3–5)
ALP SERPL-CCNC: 243 U/L — HIGH (ref 40–120)
ALP SERPL-CCNC: 243 U/L — HIGH (ref 40–120)
ALT FLD-CCNC: 8 U/L — LOW (ref 10–45)
ALT FLD-CCNC: 8 U/L — LOW (ref 10–45)
ANION GAP SERPL CALC-SCNC: 13 MMOL/L — SIGNIFICANT CHANGE UP (ref 5–17)
ANION GAP SERPL CALC-SCNC: 13 MMOL/L — SIGNIFICANT CHANGE UP (ref 5–17)
APTT BLD: 43.8 SEC — HIGH (ref 24.5–35.6)
APTT BLD: 43.8 SEC — HIGH (ref 24.5–35.6)
AST SERPL-CCNC: 15 U/L — SIGNIFICANT CHANGE UP (ref 10–40)
AST SERPL-CCNC: 15 U/L — SIGNIFICANT CHANGE UP (ref 10–40)
BASOPHILS # BLD AUTO: 0.03 K/UL — SIGNIFICANT CHANGE UP (ref 0–0.2)
BASOPHILS # BLD AUTO: 0.03 K/UL — SIGNIFICANT CHANGE UP (ref 0–0.2)
BASOPHILS NFR BLD AUTO: 0.6 % — SIGNIFICANT CHANGE UP (ref 0–2)
BASOPHILS NFR BLD AUTO: 0.6 % — SIGNIFICANT CHANGE UP (ref 0–2)
BILIRUB SERPL-MCNC: 0.4 MG/DL — SIGNIFICANT CHANGE UP (ref 0.2–1.2)
BILIRUB SERPL-MCNC: 0.4 MG/DL — SIGNIFICANT CHANGE UP (ref 0.2–1.2)
BLD GP AB SCN SERPL QL: NEGATIVE — SIGNIFICANT CHANGE UP
BLD GP AB SCN SERPL QL: NEGATIVE — SIGNIFICANT CHANGE UP
BUN SERPL-MCNC: 21 MG/DL — SIGNIFICANT CHANGE UP (ref 7–23)
BUN SERPL-MCNC: 21 MG/DL — SIGNIFICANT CHANGE UP (ref 7–23)
CALCIUM SERPL-MCNC: 9.8 MG/DL — SIGNIFICANT CHANGE UP (ref 8.4–10.5)
CALCIUM SERPL-MCNC: 9.8 MG/DL — SIGNIFICANT CHANGE UP (ref 8.4–10.5)
CHLORIDE SERPL-SCNC: 101 MMOL/L — SIGNIFICANT CHANGE UP (ref 96–108)
CHLORIDE SERPL-SCNC: 101 MMOL/L — SIGNIFICANT CHANGE UP (ref 96–108)
CO2 SERPL-SCNC: 33 MMOL/L — HIGH (ref 22–31)
CO2 SERPL-SCNC: 33 MMOL/L — HIGH (ref 22–31)
CREAT SERPL-MCNC: 5.91 MG/DL — HIGH (ref 0.5–1.3)
CREAT SERPL-MCNC: 5.91 MG/DL — HIGH (ref 0.5–1.3)
EGFR: 10 ML/MIN/1.73M2 — LOW
EGFR: 10 ML/MIN/1.73M2 — LOW
EOSINOPHIL # BLD AUTO: 0.19 K/UL — SIGNIFICANT CHANGE UP (ref 0–0.5)
EOSINOPHIL # BLD AUTO: 0.19 K/UL — SIGNIFICANT CHANGE UP (ref 0–0.5)
EOSINOPHIL NFR BLD AUTO: 3.9 % — SIGNIFICANT CHANGE UP (ref 0–6)
EOSINOPHIL NFR BLD AUTO: 3.9 % — SIGNIFICANT CHANGE UP (ref 0–6)
GLUCOSE BLDC GLUCOMTR-MCNC: 96 MG/DL — SIGNIFICANT CHANGE UP (ref 70–99)
GLUCOSE BLDC GLUCOMTR-MCNC: 96 MG/DL — SIGNIFICANT CHANGE UP (ref 70–99)
GLUCOSE SERPL-MCNC: 84 MG/DL — SIGNIFICANT CHANGE UP (ref 70–99)
GLUCOSE SERPL-MCNC: 84 MG/DL — SIGNIFICANT CHANGE UP (ref 70–99)
HCT VFR BLD CALC: 29.2 % — LOW (ref 39–50)
HCT VFR BLD CALC: 29.2 % — LOW (ref 39–50)
HCT VFR BLD CALC: 33.2 % — LOW (ref 39–50)
HCT VFR BLD CALC: 33.2 % — LOW (ref 39–50)
HGB BLD-MCNC: 10.2 G/DL — LOW (ref 13–17)
HGB BLD-MCNC: 10.2 G/DL — LOW (ref 13–17)
HGB BLD-MCNC: 9.2 G/DL — LOW (ref 13–17)
HGB BLD-MCNC: 9.2 G/DL — LOW (ref 13–17)
IMM GRANULOCYTES NFR BLD AUTO: 0.2 % — SIGNIFICANT CHANGE UP (ref 0–0.9)
IMM GRANULOCYTES NFR BLD AUTO: 0.2 % — SIGNIFICANT CHANGE UP (ref 0–0.9)
INR BLD: 1.15 — SIGNIFICANT CHANGE UP (ref 0.85–1.18)
INR BLD: 1.15 — SIGNIFICANT CHANGE UP (ref 0.85–1.18)
LYMPHOCYTES # BLD AUTO: 1.27 K/UL — SIGNIFICANT CHANGE UP (ref 1–3.3)
LYMPHOCYTES # BLD AUTO: 1.27 K/UL — SIGNIFICANT CHANGE UP (ref 1–3.3)
LYMPHOCYTES # BLD AUTO: 26.2 % — SIGNIFICANT CHANGE UP (ref 13–44)
LYMPHOCYTES # BLD AUTO: 26.2 % — SIGNIFICANT CHANGE UP (ref 13–44)
MCHC RBC-ENTMCNC: 30.7 GM/DL — LOW (ref 32–36)
MCHC RBC-ENTMCNC: 30.7 GM/DL — LOW (ref 32–36)
MCHC RBC-ENTMCNC: 30.8 PG — SIGNIFICANT CHANGE UP (ref 27–34)
MCHC RBC-ENTMCNC: 30.8 PG — SIGNIFICANT CHANGE UP (ref 27–34)
MCHC RBC-ENTMCNC: 31.5 GM/DL — LOW (ref 32–36)
MCHC RBC-ENTMCNC: 31.5 GM/DL — LOW (ref 32–36)
MCHC RBC-ENTMCNC: 31.5 PG — SIGNIFICANT CHANGE UP (ref 27–34)
MCHC RBC-ENTMCNC: 31.5 PG — SIGNIFICANT CHANGE UP (ref 27–34)
MCV RBC AUTO: 100 FL — SIGNIFICANT CHANGE UP (ref 80–100)
MCV RBC AUTO: 100 FL — SIGNIFICANT CHANGE UP (ref 80–100)
MCV RBC AUTO: 100.3 FL — HIGH (ref 80–100)
MCV RBC AUTO: 100.3 FL — HIGH (ref 80–100)
MONOCYTES # BLD AUTO: 0.62 K/UL — SIGNIFICANT CHANGE UP (ref 0–0.9)
MONOCYTES # BLD AUTO: 0.62 K/UL — SIGNIFICANT CHANGE UP (ref 0–0.9)
MONOCYTES NFR BLD AUTO: 12.8 % — SIGNIFICANT CHANGE UP (ref 2–14)
MONOCYTES NFR BLD AUTO: 12.8 % — SIGNIFICANT CHANGE UP (ref 2–14)
NEUTROPHILS # BLD AUTO: 2.72 K/UL — SIGNIFICANT CHANGE UP (ref 1.8–7.4)
NEUTROPHILS # BLD AUTO: 2.72 K/UL — SIGNIFICANT CHANGE UP (ref 1.8–7.4)
NEUTROPHILS NFR BLD AUTO: 56.3 % — SIGNIFICANT CHANGE UP (ref 43–77)
NEUTROPHILS NFR BLD AUTO: 56.3 % — SIGNIFICANT CHANGE UP (ref 43–77)
NRBC # BLD: 0 /100 WBCS — SIGNIFICANT CHANGE UP (ref 0–0)
PLATELET # BLD AUTO: 156 K/UL — SIGNIFICANT CHANGE UP (ref 150–400)
PLATELET # BLD AUTO: 156 K/UL — SIGNIFICANT CHANGE UP (ref 150–400)
PLATELET # BLD AUTO: 167 K/UL — SIGNIFICANT CHANGE UP (ref 150–400)
PLATELET # BLD AUTO: 167 K/UL — SIGNIFICANT CHANGE UP (ref 150–400)
POTASSIUM SERPL-MCNC: 4.2 MMOL/L — SIGNIFICANT CHANGE UP (ref 3.5–5.3)
POTASSIUM SERPL-MCNC: 4.2 MMOL/L — SIGNIFICANT CHANGE UP (ref 3.5–5.3)
POTASSIUM SERPL-SCNC: 4.2 MMOL/L — SIGNIFICANT CHANGE UP (ref 3.5–5.3)
POTASSIUM SERPL-SCNC: 4.2 MMOL/L — SIGNIFICANT CHANGE UP (ref 3.5–5.3)
PROT SERPL-MCNC: 8 G/DL — SIGNIFICANT CHANGE UP (ref 6–8.3)
PROT SERPL-MCNC: 8 G/DL — SIGNIFICANT CHANGE UP (ref 6–8.3)
PROTHROM AB SERPL-ACNC: 13.1 SEC — HIGH (ref 9.5–13)
PROTHROM AB SERPL-ACNC: 13.1 SEC — HIGH (ref 9.5–13)
RBC # BLD: 2.92 M/UL — LOW (ref 4.2–5.8)
RBC # BLD: 2.92 M/UL — LOW (ref 4.2–5.8)
RBC # BLD: 3.31 M/UL — LOW (ref 4.2–5.8)
RBC # BLD: 3.31 M/UL — LOW (ref 4.2–5.8)
RBC # FLD: 16.6 % — HIGH (ref 10.3–14.5)
RBC # FLD: 16.6 % — HIGH (ref 10.3–14.5)
RBC # FLD: 16.7 % — HIGH (ref 10.3–14.5)
RBC # FLD: 16.7 % — HIGH (ref 10.3–14.5)
RH IG SCN BLD-IMP: POSITIVE — SIGNIFICANT CHANGE UP
RH IG SCN BLD-IMP: POSITIVE — SIGNIFICANT CHANGE UP
SODIUM SERPL-SCNC: 147 MMOL/L — HIGH (ref 135–145)
SODIUM SERPL-SCNC: 147 MMOL/L — HIGH (ref 135–145)
WBC # BLD: 4.84 K/UL — SIGNIFICANT CHANGE UP (ref 3.8–10.5)
WBC # BLD: 4.84 K/UL — SIGNIFICANT CHANGE UP (ref 3.8–10.5)
WBC # BLD: 5.23 K/UL — SIGNIFICANT CHANGE UP (ref 3.8–10.5)
WBC # BLD: 5.23 K/UL — SIGNIFICANT CHANGE UP (ref 3.8–10.5)
WBC # FLD AUTO: 4.84 K/UL — SIGNIFICANT CHANGE UP (ref 3.8–10.5)
WBC # FLD AUTO: 4.84 K/UL — SIGNIFICANT CHANGE UP (ref 3.8–10.5)
WBC # FLD AUTO: 5.23 K/UL — SIGNIFICANT CHANGE UP (ref 3.8–10.5)
WBC # FLD AUTO: 5.23 K/UL — SIGNIFICANT CHANGE UP (ref 3.8–10.5)

## 2023-12-02 PROCEDURE — 99285 EMERGENCY DEPT VISIT HI MDM: CPT

## 2023-12-02 PROCEDURE — 74177 CT ABD & PELVIS W/CONTRAST: CPT | Mod: 26,MA

## 2023-12-02 PROCEDURE — 99222 1ST HOSP IP/OBS MODERATE 55: CPT

## 2023-12-02 PROCEDURE — 93010 ELECTROCARDIOGRAM REPORT: CPT

## 2023-12-02 RX ORDER — GLUCAGON INJECTION, SOLUTION 0.5 MG/.1ML
1 INJECTION, SOLUTION SUBCUTANEOUS ONCE
Refills: 0 | Status: DISCONTINUED | OUTPATIENT
Start: 2023-12-02 | End: 2023-12-05

## 2023-12-02 RX ORDER — AMIODARONE HYDROCHLORIDE 400 MG/1
200 TABLET ORAL DAILY
Refills: 0 | Status: DISCONTINUED | OUTPATIENT
Start: 2023-12-02 | End: 2023-12-05

## 2023-12-02 RX ORDER — DEXTROSE 50 % IN WATER 50 %
25 SYRINGE (ML) INTRAVENOUS ONCE
Refills: 0 | Status: DISCONTINUED | OUTPATIENT
Start: 2023-12-02 | End: 2023-12-03

## 2023-12-02 RX ORDER — DEXTROSE 50 % IN WATER 50 %
15 SYRINGE (ML) INTRAVENOUS ONCE
Refills: 0 | Status: DISCONTINUED | OUTPATIENT
Start: 2023-12-02 | End: 2023-12-03

## 2023-12-02 RX ORDER — INFLUENZA VIRUS VACCINE 15; 15; 15; 15 UG/.5ML; UG/.5ML; UG/.5ML; UG/.5ML
0.7 SUSPENSION INTRAMUSCULAR ONCE
Refills: 0 | Status: DISCONTINUED | OUTPATIENT
Start: 2023-12-02 | End: 2023-12-05

## 2023-12-02 RX ORDER — DEXTROSE 50 % IN WATER 50 %
12.5 SYRINGE (ML) INTRAVENOUS ONCE
Refills: 0 | Status: DISCONTINUED | OUTPATIENT
Start: 2023-12-02 | End: 2023-12-03

## 2023-12-02 RX ORDER — SEVELAMER CARBONATE 2400 MG/1
800 POWDER, FOR SUSPENSION ORAL EVERY 8 HOURS
Refills: 0 | Status: DISCONTINUED | OUTPATIENT
Start: 2023-12-02 | End: 2023-12-05

## 2023-12-02 RX ORDER — ATORVASTATIN CALCIUM 80 MG/1
1 TABLET, FILM COATED ORAL
Qty: 0 | Refills: 0 | DISCHARGE

## 2023-12-02 RX ORDER — AZITHROMYCIN 500 MG/1
250 TABLET, FILM COATED ORAL
Refills: 0 | Status: DISCONTINUED | OUTPATIENT
Start: 2023-12-02 | End: 2023-12-05

## 2023-12-02 RX ORDER — AMLODIPINE BESYLATE 2.5 MG/1
1 TABLET ORAL
Qty: 0 | Refills: 0 | DISCHARGE

## 2023-12-02 RX ORDER — IOHEXOL 300 MG/ML
30 INJECTION, SOLUTION INTRAVENOUS ONCE
Refills: 0 | Status: COMPLETED | OUTPATIENT
Start: 2023-12-02 | End: 2023-12-02

## 2023-12-02 RX ORDER — SODIUM CHLORIDE 9 MG/ML
1000 INJECTION, SOLUTION INTRAVENOUS
Refills: 0 | Status: DISCONTINUED | OUTPATIENT
Start: 2023-12-02 | End: 2023-12-03

## 2023-12-02 RX ORDER — CHLORHEXIDINE GLUCONATE 213 G/1000ML
1 SOLUTION TOPICAL
Refills: 0 | Status: DISCONTINUED | OUTPATIENT
Start: 2023-12-02 | End: 2023-12-03

## 2023-12-02 RX ORDER — TIOTROPIUM BROMIDE AND OLODATEROL 3.124; 2.736 UG/1; UG/1
2 SPRAY, METERED RESPIRATORY (INHALATION) DAILY
Refills: 0 | Status: DISCONTINUED | OUTPATIENT
Start: 2023-12-02 | End: 2023-12-05

## 2023-12-02 RX ORDER — ATORVASTATIN CALCIUM 80 MG/1
40 TABLET, FILM COATED ORAL AT BEDTIME
Refills: 0 | Status: DISCONTINUED | OUTPATIENT
Start: 2023-12-02 | End: 2023-12-05

## 2023-12-02 RX ORDER — INSULIN LISPRO 100/ML
VIAL (ML) SUBCUTANEOUS
Refills: 0 | Status: DISCONTINUED | OUTPATIENT
Start: 2023-12-02 | End: 2023-12-03

## 2023-12-02 RX ADMIN — ATORVASTATIN CALCIUM 40 MILLIGRAM(S): 80 TABLET, FILM COATED ORAL at 21:44

## 2023-12-02 RX ADMIN — SEVELAMER CARBONATE 800 MILLIGRAM(S): 2400 POWDER, FOR SUSPENSION ORAL at 21:44

## 2023-12-02 RX ADMIN — IOHEXOL 30 MILLILITER(S): 300 INJECTION, SOLUTION INTRAVENOUS at 13:27

## 2023-12-02 NOTE — ED PROVIDER NOTE - CLINICAL SUMMARY MEDICAL DECISION MAKING FREE TEXT BOX
here w/ LLQ pain and BRBPR   CBC stable from prior, pt with normal VS  will check CT scan to r/o diverticulitis/colitis  not actively bleeding in the ED - dispo pending results and reassessment

## 2023-12-02 NOTE — ED ADULT NURSE NOTE - OBJECTIVE STATEMENT
69 y/o male c/o rectal bleed with black stool, hx of ESRD on dialysis. Pt denies pain, endorses fatigue.

## 2023-12-02 NOTE — CONSULT NOTE ADULT - SUBJECTIVE AND OBJECTIVE BOX
Attending:      HPI:  68M w/ PMHx AFib s/p watchman, HTN, ERIK (not on CPAP), COPD, ESRD (MWF), T cell lymphoma (remission 2020), perirectal abscess s/p I&D (2010), PSHx lap jameson, LUE AVF (6/12/20, Qato), chest hematoma evacuation (12/18/20, Qa), L VATS decortication (1/22/21, Eden), Watchman (10/19/21, Juventino), bilat IHR (12/15/22, Belle), LUE AVF revision (1/18/23, Penny) here for LGIB since yesterday.    In the ED, pt was afebrile, nontachycardic, normotensive, and satting well on RA. Labs significant for WBC 5.23. Hgb initially 10.2 -> 9.2 without resuscitation.    PMHx: AFib s/p watchman, HTN, ERIK (not on CPAP), COPD, ESRD (MWF), T cell lymphoma (remission 2020), perirectal abscess s/p I&D (2010)  PSHx: lap jameson, LUE AVF (6/12/20, Qato), chest hematoma evacuation (12/18/20, Qa), L VATS decortication (1/22/21, Domenic), Watchman (10/19/21, Juventino), bilat IHR (12/15/22, Belle), LUE AVF revision (1/18/23, Penny)  Family Hx: Denies family hx of IBS, Crohn's, UC, or colon cancer.  Last Cscope: 12 years ago, planned for repeat 12/20  Last EGD: Denies  All: No Known Allergies    Meds: azithromycin 250 mg oral tablet: 1 tab(s) orally Monday, Wednesday, and Friday   tiotropium-olodaterol 2.5 mcg-2.5 mcg/inh inhalation aerosol: 2 puff(s) inhaled once a day   sevelamer carbonate 800 mg oral tablet: 1 tab(s) orally every 8 hours  allopurinol 100 mg oral tablet: 1 tab(s) orally 2 times a day  amiodarone 200 mg oral tablet: 1 tab(s) orally once a day  atorvastatin 40 mg oral tablet: 1 tab(s) orally once a day  metoprolol succinate 50 mg oral tablet, extended release: 1 tab(s) orally once a day  aspirin 81 mg oral delayed release tablet: 1 tab(s) orally once a day  amLODIPine 5 mg oral tablet: 1 tab(s) orally once a day  sildenafil 20 TID   (02 Dec 2023 20:29)    SICU Addendum:  68M w/ PMHx and PSHx as above here for LGIB. Pt with multiple BRBPR, most recent in the ED. Admitted to SICU for HD monitoring with GI consult for LGIB.    PAST MEDICAL & SURGICAL HISTORY:  HTN (hypertension)      Atrial fibrillation      CKD (chronic kidney disease)      Lymphoma  t cell; remission since 11/2020      CHF, chronic  LVEF 65 PASP 59 midl MR, AR on 12/2/21      H/O pulmonary hypertension      Gout      BPH (benign prostatic hyperplasia)      COPD, mild      Mitral regurgitation      REIK (obstructive sleep apnea)      ESRD (end stage renal disease)  dialysis M W F      History of cholecystectomy      Elective surgery  rectal surgery      History of surgery  watchman left- dialysis port      AV fistula  left arm      H/O inguinal hernia repair  bilateral          MEDICATIONS  (STANDING):  aMIOdarone    Tablet 200 milliGRAM(s) Oral daily  atorvastatin 40 milliGRAM(s) Oral at bedtime  azithromycin   Tablet 250 milliGRAM(s) Oral <User Schedule>  influenza  Vaccine (HIGH DOSE) 0.7 milliLiter(s) IntraMuscular once  sevelamer carbonate 800 milliGRAM(s) Oral every 8 hours  tiotropium 2.5 MICROgram(s)/olodaterol 2.5 MICROgram(s) (STIOLTO) Inhaler 2 Puff(s) Inhalation daily    MEDICATIONS  (PRN):      Allergies    No Known Allergies    Intolerances      FAMILY HISTORY:  FH: type 2 diabetes (Mother)    Vital Signs Last 24 Hrs  T(C): 36.9 (02 Dec 2023 19:13), Max: 36.9 (02 Dec 2023 19:13)  T(F): 98.5 (02 Dec 2023 19:13), Max: 98.5 (02 Dec 2023 19:13)  HR: 54 (02 Dec 2023 19:13) (50 - 59)  BP: 106/59 (02 Dec 2023 19:13) (91/55 - 128/62)  BP(mean): --  RR: 16 (02 Dec 2023 19:13) (16 - 20)  SpO2: 92% (02 Dec 2023 19:13) (91% - 95%)    Parameters below as of 02 Dec 2023 19:13  Patient On (Oxygen Delivery Method): room air        I&O's Summary      Physical Exam:  General: NAD, resting comfortably  HEENT: NC/AT, EOMI, normal hearing, no oral lesions, no LAD, neck supple  Pulmonary: normal resp effort, chronic cough from COPD  Cardiovascular: NSR, no murmurs  Abdominal: soft, ND, mild LLQ TTP, no organomegaly, old blood on glove during HANNAH  Extremities: WWP, normal strength, no clubbing/cyanosis/edema  Neuro: A/O x 3, CNs II-XII grossly intact, normal sensation, no focal deficits  Pulses: palpable distal pulses    LABS:                        9.2    5.23  )-----------( 156      ( 02 Dec 2023 16:17 )             29.2     12-02    147<H>  |  101  |  21  ----------------------------<  84  4.2   |  33<H>  |  5.91<H>    Ca    9.8      02 Dec 2023 13:00    TPro  8.0  /  Alb  3.8  /  TBili  0.4  /  DBili  x   /  AST  15  /  ALT  8<L>  /  AlkPhos  243<H>  12-02    PT/INR - ( 02 Dec 2023 13:00 )   PT: 13.1 sec;   INR: 1.15          PTT - ( 02 Dec 2023 13:00 )  PTT:43.8 sec  Urinalysis Basic - ( 02 Dec 2023 13:00 )    Color: x / Appearance: x / SG: x / pH: x  Gluc: 84 mg/dL / Ketone: x  / Bili: x / Urobili: x   Blood: x / Protein: x / Nitrite: x   Leuk Esterase: x / RBC: x / WBC x   Sq Epi: x / Non Sq Epi: x / Bacteria: x     Attending:      HPI:  68M w/ PMHx AFib s/p watchman, HTN, ERIK (not on CPAP), COPD, ESRD (MWF), T cell lymphoma (remission 2020), perirectal abscess s/p I&D (2010), PSHx lap jameson, LUE AVF (6/12/20, Qato), chest hematoma evacuation (12/18/20, Qa), L VATS decortication (1/22/21, Eden), Watchman (10/19/21, Juventino), bilat IHR (12/15/22, Belle), LUE AVF revision (1/18/23, Penny) here for LGIB since yesterday.    In the ED, pt was afebrile, nontachycardic, normotensive, and satting well on RA. Labs significant for WBC 5.23. Hgb initially 10.2 -> 9.2 without resuscitation.    PMHx: AFib s/p watchman, HTN, ERIK (not on CPAP), COPD, ESRD (MWF), T cell lymphoma (remission 2020), perirectal abscess s/p I&D (2010)  PSHx: lap jameson, LUE AVF (6/12/20, Qato), chest hematoma evacuation (12/18/20, Qa), L VATS decortication (1/22/21, Domenic), Watchman (10/19/21, Juventino), bilat IHR (12/15/22, Belle), LUE AVF revision (1/18/23, Penny)  Family Hx: Denies family hx of IBS, Crohn's, UC, or colon cancer.  Last Cscope: 12 years ago, planned for repeat 12/20  Last EGD: Denies  All: No Known Allergies    Meds: azithromycin 250 mg oral tablet: 1 tab(s) orally Monday, Wednesday, and Friday   tiotropium-olodaterol 2.5 mcg-2.5 mcg/inh inhalation aerosol: 2 puff(s) inhaled once a day   sevelamer carbonate 800 mg oral tablet: 1 tab(s) orally every 8 hours  allopurinol 100 mg oral tablet: 1 tab(s) orally 2 times a day  amiodarone 200 mg oral tablet: 1 tab(s) orally once a day  atorvastatin 40 mg oral tablet: 1 tab(s) orally once a day  metoprolol succinate 50 mg oral tablet, extended release: 1 tab(s) orally once a day  aspirin 81 mg oral delayed release tablet: 1 tab(s) orally once a day  amLODIPine 5 mg oral tablet: 1 tab(s) orally once a day  sildenafil 20 TID   (02 Dec 2023 20:29)    SICU Addendum:  68M w/ PMHx and PSHx as above here for LGIB. Pt with multiple BRBPR, most recent in the ED. Admitted to SICU for HD monitoring with GI consult for LGIB.    PAST MEDICAL & SURGICAL HISTORY:  HTN (hypertension)      Atrial fibrillation      CKD (chronic kidney disease)      Lymphoma  t cell; remission since 11/2020      CHF, chronic  LVEF 65 PASP 59 midl MR, AR on 12/2/21      H/O pulmonary hypertension      Gout      BPH (benign prostatic hyperplasia)      COPD, mild      Mitral regurgitation      ERIK (obstructive sleep apnea)      ESRD (end stage renal disease)  dialysis M [facility] F      History of cholecystectomy      Elective surgery  rectal surgery      History of surgery  watchman left- dialysis port      AV fistula  left arm      H/O inguinal hernia repair  bilateral          MEDICATIONS  (STANDING):  aMIOdarone    Tablet 200 milliGRAM(s) Oral daily  atorvastatin 40 milliGRAM(s) Oral at bedtime  azithromycin   Tablet 250 milliGRAM(s) Oral <User Schedule>  influenza  Vaccine (HIGH DOSE) 0.7 milliLiter(s) IntraMuscular once  sevelamer carbonate 800 milliGRAM(s) Oral every 8 hours  tiotropium 2.5 MICROgram(s)/olodaterol 2.5 MICROgram(s) (STIOLTO) Inhaler 2 Puff(s) Inhalation daily    MEDICATIONS  (PRN):      Allergies    No Known Allergies    Intolerances      FAMILY HISTORY:  FH: type 2 diabetes (Mother)    Vital Signs Last 24 Hrs  T(C): 36.9 (02 Dec 2023 19:13), Max: 36.9 (02 Dec 2023 19:13)  T(F): 98.5 (02 Dec 2023 19:13), Max: 98.5 (02 Dec 2023 19:13)  HR: 54 (02 Dec 2023 19:13) (50 - 59)  BP: 106/59 (02 Dec 2023 19:13) (91/55 - 128/62)  BP(mean): --  RR: 16 (02 Dec 2023 19:13) (16 - 20)  SpO2: 92% (02 Dec 2023 19:13) (91% - 95%)    Parameters below as of 02 Dec 2023 19:13  Patient On (Oxygen Delivery Method): room air        I&O's Summary      Physical Exam:  General: NAD, resting comfortably  HEENT: NC/AT, EOMI, normal hearing, no oral lesions, no LAD, neck supple  Pulmonary: normal resp effort, chronic cough from COPD  Cardiovascular: NSR, no murmurs  Abdominal: soft, ND, mild LLQ TTP, no organomegaly, old blood on glove during HANNAH  Extremities: WWP, normal strength, no clubbing/cyanosis/edema  Neuro: A/O x 3, CNs II-XII grossly intact, normal sensation, no focal deficits  Pulses: palpable distal pulses    LABS:                        9.2    5.23  )-----------( 156      ( 02 Dec 2023 16:17 )             29.2     12-02    147<H>  |  101  |  21  ----------------------------<  84  4.2   |  33<H>  |  5.91<H>    Ca    9.8      02 Dec 2023 13:00    TPro  8.0  /  Alb  3.8  /  TBili  0.4  /  DBili  x   /  AST  15  /  ALT  8<L>  /  AlkPhos  243<H>  12-02    PT/INR - ( 02 Dec 2023 13:00 )   PT: 13.1 sec;   INR: 1.15          PTT - ( 02 Dec 2023 13:00 )  PTT:43.8 sec  Urinalysis Basic - ( 02 Dec 2023 13:00 )    Color: x / Appearance: x / SG: x / pH: x  Gluc: 84 mg/dL / Ketone: x  / Bili: x / Urobili: x   Blood: x / Protein: x / Nitrite: x   Leuk Esterase: x / RBC: x / WBC x   Sq Epi: x / Non Sq Epi: x / Bacteria: x

## 2023-12-02 NOTE — CONSULT NOTE ADULT - ASSESSMENT
68M w/ AFib s/p watchman (on ASA), COPD, ESRD (MWF), multiple surgeries here for LGIB. Pt presented with 7 bloody BMs over the past 2 days, Hgb on admission was 10.2 that lowered to 9.2 on repeat. Admit to SICU for HD monitoring.    Neuro: nausea PRN, neuro intact  CV: continue lipitor 40, amio 200, hold metop 50, ASA 81, hold amlodipine 5, sildenafil 20 TID  Pulm: Hx of COPD on tiotropium-olodaterol, azithromycin 250 MWF, chronic nonproductive cough, ERIK not on CPAP  GI: NPO, monitor for bloody BMs, GI consult for possible Cscope  : ESRD MWF, sevelamer 800 TID  Heme: q6 CBCs, active T&S, trend Hgb, goal >7  Dispo: SICU for HD monitoring

## 2023-12-02 NOTE — ED ADULT NURSE NOTE - NSFALLUNIVINTERV_ED_ALL_ED
Bed/Stretcher in lowest position, wheels locked, appropriate side rails in place/Call bell, personal items and telephone in reach/Instruct patient to call for assistance before getting out of bed/chair/stretcher/Non-slip footwear applied when patient is off stretcher/[place] to call system/Physically safe environment - no spills, clutter or unnecessary equipment/Purposeful proactive rounding/Room/bathroom lighting operational, light cord in reach

## 2023-12-02 NOTE — H&P ADULT - NSHPLABSRESULTS_GEN_ALL_CORE
LABS:                        9.2    5.23  )-----------( 156      ( 02 Dec 2023 16:17 )             29.2     12-02    147<H>  |  101  |  21  ----------------------------<  84  4.2   |  33<H>  |  5.91<H>    Ca    9.8      02 Dec 2023 13:00    TPro  8.0  /  Alb  3.8  /  TBili  0.4  /  DBili  x   /  AST  15  /  ALT  8<L>  /  AlkPhos  243<H>  12-02    PT/INR - ( 02 Dec 2023 13:00 )   PT: 13.1 sec;   INR: 1.15          PTT - ( 02 Dec 2023 13:00 )  PTT:43.8 sec    CT AP:    FINDINGS:  LOWER CHEST: Cardiomegaly. Atelectasis/consolidation at the lung bases.   Trace pleural effusions.    LIVER: Stable too small to characterize low attenuating foci.  BILE DUCTS: Normal caliber.  GALLBLADDER: Cholecystectomy.  SPLEEN: Indeterminate 7.0 cm hypoattenuating splenic lesion, previously   6.4 cm  PANCREAS: Few calcifications, likely sequela of pancreatitis.  ADRENALS: Within normal limits.  KIDNEYS/URETERS: Atrophic. Bilateral cysts and indeterminate low   attenuating foci.    BLADDER: Cystitis. Diffuse bladder wall thickening and urothelial   hyperenhancement.  REPRODUCTIVE ORGANS: Within normal limits.    BOWEL: No bowel obstruction. Appendix normal. Colonic diverticulosis.   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  PERITONEUM: No ascites.  VESSELS: Atherosclerotic changes. Ectatic suprarenal aorta and distal   descending aorta.  RETROPERITONEUM/LYMPH NODES: No lymphadenopathy.  ABDOMINAL WALL: See above.  BONES: Avascular necrosis, bilateral femoral heads. Degenerative changes.    IMPRESSION:  1.  No CT evidence of colitis.  2.  Cystitis.  3.  Indeterminate enlarging 7 cm splenic lesion. Advise MRI with and   without contrast for further assessment.  4.  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   Colorectal surgical referral recommended. Consider nonemergent pelvic MRI   with and without contrast for further assessment.  5.  Atelectasis/consolidation at the lung bases and trace pleural   effusions.  6.  Additional findings as above.

## 2023-12-02 NOTE — CONSULT NOTE ADULT - ATTENDING COMMENTS
67 y/o m esrd, h/o lymphoma afib s/p watchman (not on AC tx) admitted w multiple episodes Bloody BM, presumed LGI bleed.  admitted to SICU for observation  Plan  trend Hgb.  GI consult  hold antihypertensives 69 y/o m esrd, h/o lymphoma afib s/p watchman (not on AC tx) admitted w multiple episodes Bloody BM, presumed LGI bleed.  admitted to SICU for observation  Plan  trend Hgb.  GI consult  hold antihypertensives

## 2023-12-02 NOTE — ED PROVIDER NOTE - PHYSICAL EXAMINATION
CONSTITUTIONAL: Well-appearing; in no apparent distress.   HEAD: Normocephalic; atraumatic.   EYES:  conjunctiva and sclera clear  ENT: normal nose; no rhinorrhea; normal pharynx with no erythema or lesions.   NECK: Supple; non-tender;   CARDIOVASCULAR: Normal S1, S2; no murmurs, rubs, or gallops. Regular rate and rhythm.   RESPIRATORY: Breathing easily; breath sounds clear and equal bilaterally; no wheezes, rhonchi, or rales.  GI: Soft; non-distended; tender diffusely in LLQ  EXT: No cyanosis or edema; N/V intact  SKIN: Normal for age and race; warm; dry; good turgor; no apparent lesions or rash.   NEURO: A & O x 3; face symmetric; grossly unremarkable.   PSYCHOLOGICAL: The patient’s mood and manner are appropriate. CONSTITUTIONAL: Well-appearing; in no apparent distress.   HEAD: Normocephalic; atraumatic.   EYES:  conjunctiva and sclera clear  ENT: normal nose; no rhinorrhea; normal pharynx with no erythema or lesions.   NECK: Supple; non-tender;   CARDIOVASCULAR: Normal S1, S2; no murmurs, rubs, or gallops. Regular rate and rhythm.   RESPIRATORY: Breathing easily; breath sounds clear and equal bilaterally; no wheezes, rhonchi, or rales.  GI: Soft; non-distended; tender diffusely in LLQ  RECTAL: normal external sphincter, brown stool tinged with red blood  EXT: No cyanosis or edema; N/V intact  SKIN: Normal for age and race; warm; dry; good turgor; no apparent lesions or rash.   NEURO: A & O x 3; face symmetric; grossly unremarkable.   PSYCHOLOGICAL: The patient’s mood and manner are appropriate.

## 2023-12-02 NOTE — H&P ADULT - HISTORY OF PRESENT ILLNESS
68M w/ PMHx AFib s/p watchman, HTN, ERIK (not on CPAP), COPD, ESRD (MWF), T cell lymphoma (remission 2020), perirectal abscess s/p I&D (2010), PSHx lap jameson, LUE AVF (6/12/20, Qato), chest hematoma evacuation (12/18/20, Qato), L VATS decortication (1/22/21, Inra), Watchman (10/19/21, Juventino), bilat IHR (12/15/22, Belle), LUE AVF revision (1/18/23, Carrrachael) here for LGIB since yesterday.    In the ED, pt was afebrile, nontachycardic, normotensive, and satting well on RA. Labs significant for WBC 5.23. Hgb initially 10.2 -> 9.2 without resuscitation.    PMHx: AFib s/p watchman, HTN, ERIK (not on CPAP), COPD, ESRD (MWF), T cell lymphoma (remission 2020), perirectal abscess s/p I&D (2010)  PSHx: lap jameson, LUE AVF (6/12/20, Qato), chest hematoma evacuation (12/18/20, Qato), L VATS decortication (1/22/21, Inra), Watchman (10/19/21, Juventino), bilat IHR (12/15/22, Belle), LUE AVF revision (1/18/23, Carrrachael)  Family Hx: Denies family hx of IBS, Crohn's, UC, or colon cancer.  Last Cscope: 12 years ago, planned for repeat 12/20  Last EGD: Denies  All: No Known Allergies    Meds: azithromycin 250 mg oral tablet: 1 tab(s) orally Monday, Wednesday, and Friday   tiotropium-olodaterol 2.5 mcg-2.5 mcg/inh inhalation aerosol: 2 puff(s) inhaled once a day   sevelamer carbonate 800 mg oral tablet: 1 tab(s) orally every 8 hours  allopurinol 100 mg oral tablet: 1 tab(s) orally 2 times a day  amiodarone 200 mg oral tablet: 1 tab(s) orally once a day  atorvastatin 40 mg oral tablet: 1 tab(s) orally once a day  metoprolol succinate 50 mg oral tablet, extended release: 1 tab(s) orally once a day  aspirin 81 mg oral delayed release tablet: 1 tab(s) orally once a day  amLODIPine 5 mg oral tablet: 1 tab(s) orally once a day  sildenafil 20 TID

## 2023-12-02 NOTE — PATIENT PROFILE ADULT - FALL HARM RISK - HARM RISK INTERVENTIONS
Assistance with ambulation/Assistance OOB with selected safe patient handling equipment/Communicate Risk of Fall with Harm to all staff/Orthostatic vital signs/Reinforce activity limits and safety measures with patient and family/Tailored Fall Risk Interventions/Visual Cue: Yellow wristband and red socks/Bed in lowest position, wheels locked, appropriate side rails in place/Call bell, personal items and telephone in reach/Instruct patient to call for assistance before getting out of bed or chair/Non-slip footwear when patient is out of bed/[place] to call system/Physically safe environment - no spills, clutter or unnecessary equipment/Purposeful Proactive Rounding/Room/bathroom lighting operational, light cord in reach

## 2023-12-02 NOTE — H&P ADULT - NSHPPHYSICALEXAM_GEN_ALL_CORE
Physical Exam  General: NAD, laying comfortably in bed  Cardio: S1,S2, No MRG, RRR  Pulm: No respiratory distress, breathing comfortably on room air  Abdomen: soft, mild LLQ TTP, nondistended, dark blood on HANNAH  Extremities: WWP, peripheral pulses appreciated  Neuro: CNII-XII grossly intact, no gross motor deficits  Psych: AAOx3, pleasant

## 2023-12-02 NOTE — ED PROVIDER NOTE - OBJECTIVE STATEMENT
68-year-old male with ESRD on HD MWF, A-fib, HTN, COPD, pulm HTN, prior hernia repair and cholecystectomy, chronic anemia, here w/ complaint of bloody diarrhea and LLQ pain since yesterday, 6 episodes total of BRBPR mixed with stool, and LLQ pain. pain and diarrhea started at the same time. never had it before. last colonoscopy 12 yrs ago, planned for one on 12/20 for screening. Completed HD without issue yesterday.

## 2023-12-03 LAB
A1C WITH ESTIMATED AVERAGE GLUCOSE RESULT: 4.9 % — SIGNIFICANT CHANGE UP (ref 4–5.6)
A1C WITH ESTIMATED AVERAGE GLUCOSE RESULT: 4.9 % — SIGNIFICANT CHANGE UP (ref 4–5.6)
ANION GAP SERPL CALC-SCNC: 14 MMOL/L — SIGNIFICANT CHANGE UP (ref 5–17)
ANION GAP SERPL CALC-SCNC: 14 MMOL/L — SIGNIFICANT CHANGE UP (ref 5–17)
BLD GP AB SCN SERPL QL: NEGATIVE — SIGNIFICANT CHANGE UP
BLD GP AB SCN SERPL QL: NEGATIVE — SIGNIFICANT CHANGE UP
BUN SERPL-MCNC: 26 MG/DL — HIGH (ref 7–23)
BUN SERPL-MCNC: 26 MG/DL — HIGH (ref 7–23)
CALCIUM SERPL-MCNC: 9.5 MG/DL — SIGNIFICANT CHANGE UP (ref 8.4–10.5)
CALCIUM SERPL-MCNC: 9.5 MG/DL — SIGNIFICANT CHANGE UP (ref 8.4–10.5)
CHLORIDE SERPL-SCNC: 95 MMOL/L — LOW (ref 96–108)
CHLORIDE SERPL-SCNC: 95 MMOL/L — LOW (ref 96–108)
CO2 SERPL-SCNC: 30 MMOL/L — SIGNIFICANT CHANGE UP (ref 22–31)
CO2 SERPL-SCNC: 30 MMOL/L — SIGNIFICANT CHANGE UP (ref 22–31)
CREAT SERPL-MCNC: 6.99 MG/DL — HIGH (ref 0.5–1.3)
CREAT SERPL-MCNC: 6.99 MG/DL — HIGH (ref 0.5–1.3)
EGFR: 8 ML/MIN/1.73M2 — LOW
EGFR: 8 ML/MIN/1.73M2 — LOW
ESTIMATED AVERAGE GLUCOSE: 94 MG/DL — SIGNIFICANT CHANGE UP (ref 68–114)
ESTIMATED AVERAGE GLUCOSE: 94 MG/DL — SIGNIFICANT CHANGE UP (ref 68–114)
GLUCOSE BLDC GLUCOMTR-MCNC: 83 MG/DL — SIGNIFICANT CHANGE UP (ref 70–99)
GLUCOSE SERPL-MCNC: 72 MG/DL — SIGNIFICANT CHANGE UP (ref 70–99)
GLUCOSE SERPL-MCNC: 72 MG/DL — SIGNIFICANT CHANGE UP (ref 70–99)
HCT VFR BLD CALC: 29.8 % — LOW (ref 39–50)
HCT VFR BLD CALC: 29.8 % — LOW (ref 39–50)
HCT VFR BLD CALC: 29.9 % — LOW (ref 39–50)
HCT VFR BLD CALC: 29.9 % — LOW (ref 39–50)
HGB BLD-MCNC: 9.4 G/DL — LOW (ref 13–17)
MAGNESIUM SERPL-MCNC: 2.5 MG/DL — SIGNIFICANT CHANGE UP (ref 1.6–2.6)
MAGNESIUM SERPL-MCNC: 2.5 MG/DL — SIGNIFICANT CHANGE UP (ref 1.6–2.6)
MCHC RBC-ENTMCNC: 30.4 PG — SIGNIFICANT CHANGE UP (ref 27–34)
MCHC RBC-ENTMCNC: 30.4 PG — SIGNIFICANT CHANGE UP (ref 27–34)
MCHC RBC-ENTMCNC: 30.9 PG — SIGNIFICANT CHANGE UP (ref 27–34)
MCHC RBC-ENTMCNC: 30.9 PG — SIGNIFICANT CHANGE UP (ref 27–34)
MCHC RBC-ENTMCNC: 31.4 GM/DL — LOW (ref 32–36)
MCHC RBC-ENTMCNC: 31.4 GM/DL — LOW (ref 32–36)
MCHC RBC-ENTMCNC: 31.5 GM/DL — LOW (ref 32–36)
MCHC RBC-ENTMCNC: 31.5 GM/DL — LOW (ref 32–36)
MCV RBC AUTO: 96.8 FL — SIGNIFICANT CHANGE UP (ref 80–100)
MCV RBC AUTO: 96.8 FL — SIGNIFICANT CHANGE UP (ref 80–100)
MCV RBC AUTO: 98 FL — SIGNIFICANT CHANGE UP (ref 80–100)
MCV RBC AUTO: 98 FL — SIGNIFICANT CHANGE UP (ref 80–100)
NRBC # BLD: 0 /100 WBCS — SIGNIFICANT CHANGE UP (ref 0–0)
PHOSPHATE SERPL-MCNC: 4.7 MG/DL — HIGH (ref 2.5–4.5)
PHOSPHATE SERPL-MCNC: 4.7 MG/DL — HIGH (ref 2.5–4.5)
PLATELET # BLD AUTO: 152 K/UL — SIGNIFICANT CHANGE UP (ref 150–400)
PLATELET # BLD AUTO: 152 K/UL — SIGNIFICANT CHANGE UP (ref 150–400)
PLATELET # BLD AUTO: 154 K/UL — SIGNIFICANT CHANGE UP (ref 150–400)
PLATELET # BLD AUTO: 154 K/UL — SIGNIFICANT CHANGE UP (ref 150–400)
POTASSIUM SERPL-MCNC: 4.4 MMOL/L — SIGNIFICANT CHANGE UP (ref 3.5–5.3)
POTASSIUM SERPL-MCNC: 4.4 MMOL/L — SIGNIFICANT CHANGE UP (ref 3.5–5.3)
POTASSIUM SERPL-SCNC: 4.4 MMOL/L — SIGNIFICANT CHANGE UP (ref 3.5–5.3)
POTASSIUM SERPL-SCNC: 4.4 MMOL/L — SIGNIFICANT CHANGE UP (ref 3.5–5.3)
RBC # BLD: 3.04 M/UL — LOW (ref 4.2–5.8)
RBC # BLD: 3.04 M/UL — LOW (ref 4.2–5.8)
RBC # BLD: 3.09 M/UL — LOW (ref 4.2–5.8)
RBC # BLD: 3.09 M/UL — LOW (ref 4.2–5.8)
RBC # FLD: 16.3 % — HIGH (ref 10.3–14.5)
RBC # FLD: 16.3 % — HIGH (ref 10.3–14.5)
RBC # FLD: 16.4 % — HIGH (ref 10.3–14.5)
RBC # FLD: 16.4 % — HIGH (ref 10.3–14.5)
RH IG SCN BLD-IMP: POSITIVE — SIGNIFICANT CHANGE UP
RH IG SCN BLD-IMP: POSITIVE — SIGNIFICANT CHANGE UP
SODIUM SERPL-SCNC: 139 MMOL/L — SIGNIFICANT CHANGE UP (ref 135–145)
SODIUM SERPL-SCNC: 139 MMOL/L — SIGNIFICANT CHANGE UP (ref 135–145)
WBC # BLD: 4.53 K/UL — SIGNIFICANT CHANGE UP (ref 3.8–10.5)
WBC # BLD: 4.53 K/UL — SIGNIFICANT CHANGE UP (ref 3.8–10.5)
WBC # BLD: 5.13 K/UL — SIGNIFICANT CHANGE UP (ref 3.8–10.5)
WBC # BLD: 5.13 K/UL — SIGNIFICANT CHANGE UP (ref 3.8–10.5)
WBC # FLD AUTO: 4.53 K/UL — SIGNIFICANT CHANGE UP (ref 3.8–10.5)
WBC # FLD AUTO: 4.53 K/UL — SIGNIFICANT CHANGE UP (ref 3.8–10.5)
WBC # FLD AUTO: 5.13 K/UL — SIGNIFICANT CHANGE UP (ref 3.8–10.5)
WBC # FLD AUTO: 5.13 K/UL — SIGNIFICANT CHANGE UP (ref 3.8–10.5)

## 2023-12-03 PROCEDURE — 99223 1ST HOSP IP/OBS HIGH 75: CPT

## 2023-12-03 PROCEDURE — 99231 SBSQ HOSP IP/OBS SF/LOW 25: CPT | Mod: GC

## 2023-12-03 RX ORDER — PANTOPRAZOLE SODIUM 20 MG/1
40 TABLET, DELAYED RELEASE ORAL
Refills: 0 | Status: DISCONTINUED | OUTPATIENT
Start: 2023-12-03 | End: 2023-12-05

## 2023-12-03 RX ORDER — SOD SULF/SODIUM/NAHCO3/KCL/PEG
4000 SOLUTION, RECONSTITUTED, ORAL ORAL ONCE
Refills: 0 | Status: COMPLETED | OUTPATIENT
Start: 2023-12-03 | End: 2023-12-03

## 2023-12-03 RX ADMIN — Medication 4000 MILLILITER(S): at 16:20

## 2023-12-03 RX ADMIN — PANTOPRAZOLE SODIUM 40 MILLIGRAM(S): 20 TABLET, DELAYED RELEASE ORAL at 06:59

## 2023-12-03 RX ADMIN — ATORVASTATIN CALCIUM 40 MILLIGRAM(S): 80 TABLET, FILM COATED ORAL at 22:07

## 2023-12-03 RX ADMIN — TIOTROPIUM BROMIDE AND OLODATEROL 2 PUFF(S): 3.124; 2.736 SPRAY, METERED RESPIRATORY (INHALATION) at 16:17

## 2023-12-03 RX ADMIN — SEVELAMER CARBONATE 800 MILLIGRAM(S): 2400 POWDER, FOR SUSPENSION ORAL at 08:39

## 2023-12-03 RX ADMIN — AMIODARONE HYDROCHLORIDE 200 MILLIGRAM(S): 400 TABLET ORAL at 06:58

## 2023-12-03 RX ADMIN — PANTOPRAZOLE SODIUM 40 MILLIGRAM(S): 20 TABLET, DELAYED RELEASE ORAL at 18:24

## 2023-12-03 RX ADMIN — SEVELAMER CARBONATE 800 MILLIGRAM(S): 2400 POWDER, FOR SUSPENSION ORAL at 22:07

## 2023-12-03 RX ADMIN — SEVELAMER CARBONATE 800 MILLIGRAM(S): 2400 POWDER, FOR SUSPENSION ORAL at 16:17

## 2023-12-03 RX ADMIN — CHLORHEXIDINE GLUCONATE 1 APPLICATION(S): 213 SOLUTION TOPICAL at 06:58

## 2023-12-03 NOTE — PROGRESS NOTE ADULT - SUBJECTIVE AND OBJECTIVE BOX
GI Consult Progress Note:     OVERNIGHT EVENTS: KALA.     SUBJECTIVE / INTERVAL HPI: Per surgery team, patient       VITAL SIGNS:  Vital Signs Last 24 Hrs  T(C): 36.4 (03 Dec 2023 09:15), Max: 36.9 (02 Dec 2023 19:13)  T(F): 97.6 (03 Dec 2023 09:15), Max: 98.5 (02 Dec 2023 19:13)  HR: 52 (03 Dec 2023 11:06) (51 - 59)  BP: 143/64 (03 Dec 2023 11:06) (103/62 - 143/64)  BP(mean): 92 (03 Dec 2023 11:06) (75 - 101)  RR: 19 (03 Dec 2023 11:06) (14 - 31)  SpO2: 94% (03 Dec 2023 11:06) (89% - 96%)    Parameters below as of 03 Dec 2023 11:06  Patient On (Oxygen Delivery Method): room air        12-02-23 @ 07:01  -  12-03-23 @ 07:00  --------------------------------------------------------  IN: 440 mL / OUT: 60 mL / NET: 380 mL        PHYSICAL EXAM:    General: WDWN  HEENT: NC/AT; PERRL, anicteric sclera; MMM  Neck: supple  Cardiovascular: +S1/S2; RRR  Respiratory: CTA B/L; no W/R/R  Gastrointestinal: soft, NT/ND; +BSx4  Extremities: WWP; no edema, clubbing or cyanosis  Vascular: 2+ radial, DP/PT pulses B/L  Neurological: AAOx3; no focal deficits    MEDICATIONS:  MEDICATIONS  (STANDING):  aMIOdarone    Tablet 200 milliGRAM(s) Oral daily  atorvastatin 40 milliGRAM(s) Oral at bedtime  azithromycin   Tablet 250 milliGRAM(s) Oral <User Schedule>  glucagon  Injectable 1 milliGRAM(s) IntraMuscular once  influenza  Vaccine (HIGH DOSE) 0.7 milliLiter(s) IntraMuscular once  pantoprazole  Injectable 40 milliGRAM(s) IV Push two times a day  polyethylene glycol/electrolyte Solution. 4000 milliLiter(s) Oral once  sevelamer carbonate 800 milliGRAM(s) Oral every 8 hours  tiotropium 2.5 MICROgram(s)/olodaterol 2.5 MICROgram(s) (STIOLTO) Inhaler 2 Puff(s) Inhalation daily    MEDICATIONS  (PRN):      ALLERGIES:  Allergies    No Known Allergies    Intolerances        LABS:                        9.4    5.13  )-----------( 152      ( 03 Dec 2023 06:15 )             29.9     12-03    139  |  95<L>  |  26<H>  ----------------------------<  72  4.4   |  30  |  6.99<H>    Ca    9.5      03 Dec 2023 06:15  Phos  4.7     12-03  Mg     2.5     12-03    TPro  8.0  /  Alb  3.8  /  TBili  0.4  /  DBili  x   /  AST  15  /  ALT  8<L>  /  AlkPhos  243<H>  12-02    PT/INR - ( 02 Dec 2023 13:00 )   PT: 13.1 sec;   INR: 1.15          PTT - ( 02 Dec 2023 13:00 )  PTT:43.8 sec  Urinalysis Basic - ( 03 Dec 2023 06:15 )    Color: x / Appearance: x / SG: x / pH: x  Gluc: 72 mg/dL / Ketone: x  / Bili: x / Urobili: x   Blood: x / Protein: x / Nitrite: x   Leuk Esterase: x / RBC: x / WBC x   Sq Epi: x / Non Sq Epi: x / Bacteria: x      CAPILLARY BLOOD GLUCOSE      POCT Blood Glucose.: 83 mg/dL (03 Dec 2023 10:45)        RADIOLOGY & ADDITIONAL TESTS: Reviewed.    ASSESSMENT:    PLAN:

## 2023-12-03 NOTE — CONSULT NOTE ADULT - ASSESSMENT
68M with PMH of a fib (s/p Watchman), HTN, ERIK (not on CPAP), COPD, ESRD (MWF), T cell lymphoma (remission 2020), perirectal abscess s/p I&D (2010), PSHx lap jameson, LUE AVF (6/12/20, Qa), chest hematoma evacuation (12/18/20, [facility]), L VATS decortication (1/22/21, Eden), Watchman (10/19/21, Juventino), bilat IHR (12/15/22, Belle), LUE AVF revision (1/18/23, Penny) here for LGIB since yesterday.    Since admission, patient has remained hemodynamically stable. Had 2 brown bowel movements overnight. Hb now 9.4 from 10.2.     Recommendations:   - clear liquid diet today   - plan for colonoscopy tomorrow in the afternoon after HD   - please ensure that patient goes to HD in the AM   - repeat echocardiogram as last echo Oct 2022  - obtain cardiac risk assessment/optimization for colonoscopy tomorrow     Case discussed with Dr. Piper. GI Team will continue to follow.     Tanisha Lea D.O.   Gastroenterology Fellow  Weekday 7am-5pm Pager: [phone]  Weeknights/Weekend/Holiday Coverage: Please call the  for contact info   68M with PMH of a fib (s/p Watchman), HTN, ERIK (not on CPAP), COPD, ESRD (MWF), T cell lymphoma (remission 2020), perirectal abscess s/p I&D (2010), PSHx lap jameson, LUE AVF (6/12/20, Qa), [facility] L VATS decortication (1/22/21, Eden), Watchman (10/19/21, Juventino), bilat IHR (12/15/22, Belle), LUE AVF revision (1/18/23, Penny) here for LGIB since yesterday.    Since admission, patient has remained hemodynamically stable. Had 2 brown bowel movements overnight. Hb now 9.4 from 10.2.     Recommendations:   - clear liquid diet today   - plan for colonoscopy tomorrow in the afternoon after HD   - please ensure that patient goes to HD in the AM   - repeat echocardiogram as last echo Oct 2022  - obtain cardiac risk assessment/optimization for colonoscopy tomorrow     Case discussed with Dr. Piper. GI Team will continue to follow.     Tanisha Lea D.O.   Gastroenterology Fellow  Weekday 7am-5pm Pager: [phone]  Weeknights/Weekend/Holiday Coverage: Please call the  for contact info   68M with PMH of a fib (s/p Watchman), HTN, ERIK (not on CPAP), COPD, ESRD (MWF), T cell lymphoma (remission 2020), perirectal abscess s/p I&D (2010), PSHx lap jameson, LUE AVF (6/12/20, Qa), chest hematoma evacuation (12/18/20, [facility]), L VATS decortication (1/22/21, Eden), Watchman (10/19/21, Juventino), bilat IHR (12/15/22, Belle), LUE AVF revision (1/18/23, Penny) here for LGIB since yesterday.    Since admission, patient has remained hemodynamically stable. Had 2 brown bowel movements overnight. Hb now 9.4 from 10.2.     Recommendations:   - clear liquid diet today   - plan for colonoscopy tomorrow in the afternoon after HD   - please ensure that patient goes to HD in the AM   - repeat echocardiogram as last echo Oct 2022  - obtain cardiac risk assessment/optimization for colonoscopy tomorrow     Case discussed with Dr. Piper. GI Team will continue to follow.     Tanisha Lea D.O.   Gastroenterology Fellow  Weekday 7am-5pm Pager: [phone]  Weeknights/Weekend/Holiday Coverage: Please call the  for contact info   68M with PMH of a fib (s/p Watchman), HTN, ERIK (not on CPAP), COPD, ESRD (MWF), T cell lymphoma (remission 2020), perirectal abscess s/p I&D (2010), PSHx lap jameson, LUE AVF (6/12/20, Qa), chest hematoma evacuation (12/18/20, [facility]), L VATS decortication (1/22/21, Eden), Watchman (10/19/21, Juventino), bilat IHR (12/15/22, Belle), LUE AVF revision (1/18/23, Penny) here for LGIB since yesterday.    Since admission, patient has remained hemodynamically stable. Had 2 brown bowel movements overnight. Hb now 9.4 from 10.2.     Recommendations:   - clear liquid diet today   - plan for colonoscopy tomorrow in the afternoon after HD   - please ensure that patient goes to HD in the AM   - repeat echocardiogram as last echo Oct 2022 showed mild to moderate aortic regurgitation, mitral regurgitation, and tricuspid regurgitation, though patient may have been volume overloaded   - obtain cardiac risk assessment/optimization for colonoscopy tomorrow     Case discussed with Dr. Piper. GI Team will continue to follow.     Tnaisha Lea D.O.   Gastroenterology Fellow  Weekday 7am-5pm Pager: [phone]  Weeknights/Weekend/Holiday Coverage: Please call the  for contact info   68M with PMH of a fib (s/p Watchman), HTN, ERIK (not on CPAP), COPD, ESRD (MWF), T cell lymphoma (remission 2020), perirectal abscess s/p I&D (2010), PSHx lap jameson, LUE AVF (6/12/20, Qa), chest hematoma evacuation (12/18/20, [facility]), L VATS decortication (1/22/21, [place]), Watchman (10/19/21, Juventino), bilat IHR (12/15/22, Belle), LUE AVF revision (1/18/23, Penny) here for LGIB since yesterday.    Since admission, patient has remained hemodynamically stable. Had 2 brown bowel movements overnight. Hb now 9.4 from 10.2.     Recommendations:   - clear liquid diet today   - plan for colonoscopy tomorrow in the afternoon after HD   - please ensure that patient goes to HD in the AM   - repeat echocardiogram as last echo Oct 2022 showed mild to moderate aortic regurgitation, mitral regurgitation, and tricuspid regurgitation, though patient may have been volume overloaded   - obtain cardiac risk assessment/optimization for colonoscopy tomorrow     Case discussed with Dr. Piper. GI Team will continue to follow.     Tanisha Lea D.O.   Gastroenterology Fellow  Weekday 7am-5pm Pager: [phone]  Weeknights/Weekend/Holiday Coverage: Please call the  for contact info   68M with PMH of a fib (s/p Watchman), HTN, ERIK (not on CPAP), COPD, ESRD (MWF), T cell lymphoma (remission 2020), perirectal abscess s/p I&D (2010), PSHx lap jameson, LUE AVF (6/12/20, Qa), chest hematoma evacuation (12/18/20, [facility]), L VATS decortication (1/22/21, Eden), Watchman (10/19/21, Juventino), bilat IHR (12/15/22, Belle), LUE AVF revision (1/18/23, Penny) here for LGIB since yesterday.    Since admission, patient has remained hemodynamically stable. Had 2 brown bowel movements overnight. Hb now 9.4 from 10.2.     Recommendations:   - clear liquid diet today   - plan for colonoscopy tomorrow in the afternoon after HD   - please ensure that patient goes to HD in the AM   - repeat echocardiogram as last echo Oct 2022 showed mild to moderate aortic regurgitation, mitral regurgitation, and tricuspid regurgitation, though patient may have been volume overloaded   - obtain cardiac risk assessment/optimization for colonoscopy tomorrow     Case discussed with Dr. Piper. GI Team will continue to follow.     Tanisha Lea D.O.   Gastroenterology Fellow  Weekday 7am-5pm Pager: [phone]  Weeknights/Weekend/Holiday Coverage: Please call the  for contact info

## 2023-12-03 NOTE — CONSULT NOTE ADULT - ATTENDING COMMENTS
Pt seen and d/w fellow.  Pt admitted with lower GI bleeding but now two brown bm's overnight.  HDS.  Will plan for colonoscopy tomorrow to evaluate etiology.

## 2023-12-03 NOTE — PROGRESS NOTE ADULT - SUBJECTIVE AND OBJECTIVE BOX
INTERVAL/OVERNIGHT EVENTS: Patient presented to ED complaining of 7 bloody bowel movements over the prior 2 days. Admitted to SICU for drop in HG from 10.2 to 9.2 Since then has had a stable Hg of 9.4 and no more bloody bowel movements. GI consulted and plan for colonoscopy on Monday.     SUBJECTIVE: Patient seen at bedside this morning. Denies headache, nausea, dizziness, chest pain, shortness of breath, abdominal pain, bloody bowel movement.       Respiratory Medications  tiotropium 2.5 MICROgram(s)/olodaterol 2.5 MICROgram(s) (STIOLTO) Inhaler 2 Puff(s) Inhalation daily    Cardiovascular Medications  aMIOdarone    Tablet 200 milliGRAM(s) Oral daily    Gastrointestinal Medications  dextrose 5%. 1000 milliLiter(s) IV Continuous <Continuous>  dextrose 5%. 1000 milliLiter(s) IV Continuous <Continuous>  pantoprazole  Injectable 40 milliGRAM(s) IV Push two times a day  polyethylene glycol/electrolyte Solution. 4000 milliLiter(s) Oral once    Hematologic/Oncologic Medications  influenza  Vaccine (HIGH DOSE) 0.7 milliLiter(s) IntraMuscular once    Antimicrobial/Immunologic Medications  azithromycin   Tablet 250 milliGRAM(s) Oral <User Schedule>    Endocrine/Metabolic Medications  atorvastatin 40 milliGRAM(s) Oral at bedtime  dextrose 50% Injectable 12.5 Gram(s) IV Push once  dextrose 50% Injectable 25 Gram(s) IV Push once  dextrose 50% Injectable 25 Gram(s) IV Push once  dextrose Oral Gel 15 Gram(s) Oral once PRN Blood Glucose LESS THAN 70 milliGRAM(s)/deciliter  glucagon  Injectable 1 milliGRAM(s) IntraMuscular once  insulin lispro (ADMELOG) corrective regimen sliding scale   SubCutaneous Before meals and at bedtime    Topical/Other Medications  chlorhexidine 2% Cloths 1 Application(s) Topical <User Schedule>  sevelamer carbonate 800 milliGRAM(s) Oral every 8 hours      MEDICATIONS  (PRN):  dextrose Oral Gel 15 Gram(s) Oral once PRN Blood Glucose LESS THAN 70 milliGRAM(s)/deciliter      I&O's Detail    02 Dec 2023 07:01  -  03 Dec 2023 07:00  --------------------------------------------------------  IN:    Oral Fluid: 440 mL  Total IN: 440 mL    OUT:    Voided (mL): 60 mL  Total OUT: 60 mL    Total NET: 380 mL          Vital Signs Last 24 Hrs  T(C): 36.4 (03 Dec 2023 09:15), Max: 36.9 (02 Dec 2023 19:13)  T(F): 97.6 (03 Dec 2023 09:15), Max: 98.5 (02 Dec 2023 19:13)  HR: 56 (03 Dec 2023 10:00) (50 - 59)  BP: 141/65 (03 Dec 2023 10:00) (91/55 - 141/65)  BP(mean): 93 (03 Dec 2023 10:00) (75 - 101)  RR: 17 (03 Dec 2023 10:00) (14 - 31)  SpO2: 93% (03 Dec 2023 10:00) (89% - 96%)    Parameters below as of 03 Dec 2023 10:00  Patient On (Oxygen Delivery Method): room air        GENERAL: NAD, resting comfortably in bed  HEENT: NCAT, MMM  C/V: Normal rate, regular rhythm, crescendo-decrescendo systolic murmur  PULM: Nonlabored breathing, no respiratory distress, clear to auscultation bilaterally   ABD: Soft, ND, NT, no rebound tenderness, no guarding  EXTREM: WWP, no edema  NEURO: No focal deficits    LABS:                        9.4    5.13  )-----------( 152      ( 03 Dec 2023 06:15 )             29.9     12-03    139  |  95<L>  |  26<H>  ----------------------------<  72  4.4   |  30  |  6.99<H>    Ca    9.5      03 Dec 2023 06:15  Phos  4.7     12-03  Mg     2.5     12-03    TPro  8.0  /  Alb  3.8  /  TBili  0.4  /  DBili  x   /  AST  15  /  ALT  8<L>  /  AlkPhos  243<H>  12-02    PT/INR - ( 02 Dec 2023 13:00 )   PT: 13.1 sec;   INR: 1.15          PTT - ( 02 Dec 2023 13:00 )  PTT:43.8 sec  Urinalysis Basic - ( 03 Dec 2023 06:15 )    Color: x / Appearance: x / SG: x / pH: x  Gluc: 72 mg/dL / Ketone: x  / Bili: x / Urobili: x   Blood: x / Protein: x / Nitrite: x   Leuk Esterase: x / RBC: x / WBC x   Sq Epi: x / Non Sq Epi: x / Bacteria: x        RADIOLOGY & ADDITIONAL STUDIES:  CT Abdomen and Pelvis w/ Oral Cont and w/ IV Cont:   ACC: [id number] EXAM:  CT ABDOMEN AND PELVIS OC IC   ORDERED BY: MANUEL AGUILAR     PROCEDURE DATE:  12/02/2023          INTERPRETATION:  CLINICAL INFORMATION: Bloody diarrhea and left lower   quadrant pain    COMPARISON: CT of the abdomen and pelvis dated 06/13/2023    CONTRAST/COMPLICATIONS:  IV Contrast: Isovue 370  90 cc administered   10 cc discarded  Oral Contrast: Omnipaque 300  Complications: None reported at time of study completion    PROCEDURE:  CT of the Abdomen and Pelvis was performed.  Sagittal and coronal reformats were performed.    FINDINGS:  LOWER CHEST: Cardiomegaly. Atelectasis/consolidation at the lung bases.   Trace pleural effusions.    LIVER: Stable too small to characterize low attenuating foci.  BILE DUCTS: Normal caliber.  GALLBLADDER: Cholecystectomy.  SPLEEN: Indeterminate 7.0 cm hypoattenuating splenic lesion, previously   6.4 cm  PANCREAS: Few calcifications, likely sequela of pancreatitis.  ADRENALS: Within normal limits.  KIDNEYS/URETERS: Atrophic. Bilateral cysts and indeterminate low   attenuating foci.    BLADDER: Cystitis. Diffuse bladder wall thickening and urothelial   hyperenhancement.  REPRODUCTIVE ORGANS: Within normal limits.    BOWEL: No bowel obstruction. Appendix normal. Colonic diverticulosis.   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  PERITONEUM: No ascites.  VESSELS: Atherosclerotic changes. Ectatic suprarenal aorta and distal   descending aorta.  RETROPERITONEUM/LYMPH NODES: No lymphadenopathy.  ABDOMINAL WALL: See above.  BONES: Avascular necrosis, bilateral femoral heads. Degenerative changes.    IMPRESSION:  1.  No CT evidence of colitis.  2.  Cystitis.  3.  Indeterminate enlarging 7 cm splenic lesion. Advise MRI with and   without contrast for further assessment.  4.  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   Colorectal surgical referral recommended. Consider nonemergent pelvic MRI   with and without contrast for further assessment.  5.  Atelectasis/consolidation at the lung bases and trace pleural   effusions.  6.  Additional findings as above.    --- End ofReport ---            NILO OCASIO MD; Attending Radiologist  This document has been electronically signed. Dec  2 2023  3:59PM (12-02-23 @ 15:17)     INTERVAL/OVERNIGHT EVENTS: Patient presented to ED complaining of 7 bloody bowel movements over the prior 2 days. Admitted to SICU for drop in HG from 10.2 to 9.2 Since then has had a stable Hg of 9.4 and no more bloody bowel movements. GI consulted and plan for colonoscopy on Monday.     SUBJECTIVE: Patient seen at bedside this morning. Denies headache, nausea, dizziness, chest pain, shortness of breath, abdominal pain, bloody bowel movement.       Respiratory Medications  tiotropium 2.5 MICROgram(s)/olodaterol 2.5 MICROgram(s) (STIOLTO) Inhaler 2 Puff(s) Inhalation daily    Cardiovascular Medications  aMIOdarone    Tablet 200 milliGRAM(s) Oral daily    Gastrointestinal Medications  dextrose 5%. 1000 milliLiter(s) IV Continuous <Continuous>  dextrose 5%. 1000 milliLiter(s) IV Continuous <Continuous>  pantoprazole  Injectable 40 milliGRAM(s) IV Push two times a day  polyethylene glycol/electrolyte Solution. 4000 milliLiter(s) Oral once    Hematologic/Oncologic Medications  influenza  Vaccine (HIGH DOSE) 0.7 milliLiter(s) IntraMuscular once    Antimicrobial/Immunologic Medications  azithromycin   Tablet 250 milliGRAM(s) Oral <User Schedule>    Endocrine/Metabolic Medications  atorvastatin 40 milliGRAM(s) Oral at bedtime  dextrose 50% Injectable 12.5 Gram(s) IV Push once  dextrose 50% Injectable 25 Gram(s) IV Push once  dextrose 50% Injectable 25 Gram(s) IV Push once  dextrose Oral Gel 15 Gram(s) Oral once PRN Blood Glucose LESS THAN 70 milliGRAM(s)/deciliter  glucagon  Injectable 1 milliGRAM(s) IntraMuscular once  insulin lispro (ADMELOG) corrective regimen sliding scale   SubCutaneous Before meals and at bedtime    Topical/Other Medications  chlorhexidine 2% Cloths 1 Application(s) Topical <User Schedule>  sevelamer carbonate 800 milliGRAM(s) Oral every 8 hours      MEDICATIONS  (PRN):  dextrose Oral Gel 15 Gram(s) Oral once PRN Blood Glucose LESS THAN 70 milliGRAM(s)/deciliter      I&O's Detail    02 Dec 2023 07:01  -  03 Dec 2023 07:00  --------------------------------------------------------  IN:    Oral Fluid: 440 mL  Total IN: 440 mL    OUT:    Voided (mL): 60 mL  Total OUT: 60 mL    Total NET: 380 mL          Vital Signs Last 24 Hrs  T(C): 36.4 (03 Dec 2023 09:15), Max: 36.9 (02 Dec 2023 19:13)  T(F): 97.6 (03 Dec 2023 09:15), Max: 98.5 (02 Dec 2023 19:13)  HR: 56 (03 Dec 2023 10:00) (50 - 59)  BP: 141/65 (03 Dec 2023 10:00) (91/55 - 141/65)  BP(mean): 93 (03 Dec 2023 10:00) (75 - 101)  RR: 17 (03 Dec 2023 10:00) (14 - 31)  SpO2: 93% (03 Dec 2023 10:00) (89% - 96%)    Parameters below as of 03 Dec 2023 10:00  Patient On (Oxygen Delivery Method): room air        GENERAL: NAD, resting comfortably in bed  HEENT: NCAT, MMM  C/V: Normal rate, regular rhythm, crescendo-decrescendo systolic murmur  PULM: Nonlabored breathing, no respiratory distress, clear to auscultation bilaterally   ABD: Soft, ND, NT, no rebound tenderness, no guarding  EXTREM: WWP, no edema  NEURO: No focal deficits    LABS:                        9.4    5.13  )-----------( 152      ( 03 Dec 2023 06:15 )             29.9     12-03    139  |  95<L>  |  26<H>  ----------------------------<  72  4.4   |  30  |  6.99<H>    Ca    9.5      03 Dec 2023 06:15  Phos  4.7     12-03  Mg     2.5     12-03    TPro  8.0  /  Alb  3.8  /  TBili  0.4  /  DBili  x   /  AST  15  /  ALT  8<L>  /  AlkPhos  243<H>  12-02    PT/INR - ( 02 Dec 2023 13:00 )   PT: 13.1 sec;   INR: 1.15          PTT - ( 02 Dec 2023 13:00 )  PTT:43.8 sec  Urinalysis Basic - ( 03 Dec 2023 06:15 )    Color: x / Appearance: x / SG: x / pH: x  Gluc: 72 mg/dL / Ketone: x  / Bili: x / Urobili: x   Blood: x / Protein: x / Nitrite: x   Leuk Esterase: x / RBC: x / WBC x   Sq Epi: x / Non Sq Epi: x / Bacteria: x        RADIOLOGY & ADDITIONAL STUDIES:  CT Abdomen and Pelvis w/ Oral Cont and w/ IV Cont:   ACC: [id number] EXAM:  CT ABDOMEN AND PELVIS OC IC   ORDERED BY: MANUEL AGUILAR     PROCEDURE DATE:  12/02/2023          INTERPRETATION:  CLINICAL INFORMATION: Bloody diarrhea and left lower   quadrant pain    COMPARISON: CT of the abdomen and pelvis dated 06/13/2023    CONTRAST/COMPLICATIONS:  IV Contrast: Isovue 370  90 cc administered   10 cc discarded  Oral Contrast: Omnipaque 300  Complications: None reported at time of study completion    PROCEDURE:  CT of the Abdomen and Pelvis was performed.  Sagittal and coronal reformats were performed.    FINDINGS:  LOWER CHEST: Cardiomegaly. Atelectasis/consolidation at the lung bases.   Trace pleural effusions.    LIVER: Stable too small to characterize low attenuating foci.  BILE DUCTS: Normal caliber.  GALLBLADDER: Cholecystectomy.  SPLEEN: Indeterminate 7.0 cm hypoattenuating splenic lesion, previously   6.4 cm  PANCREAS: Few calcifications, likely sequela of pancreatitis.  ADRENALS: Within normal limits.  KIDNEYS/URETERS: Atrophic. Bilateral cysts and indeterminate low   attenuating foci.    BLADDER: Cystitis. Diffuse bladder wall thickening and urothelial   hyperenhancement.  REPRODUCTIVE ORGANS: Within normal limits.    BOWEL: No bowel obstruction. Appendix normal. Colonic diverticulosis.   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  PERITONEUM: No ascites.  VESSELS: Atherosclerotic changes. Ectatic suprarenal aorta and distal   descending aorta.  RETROPERITONEUM/LYMPH NODES: No lymphadenopathy.  ABDOMINAL WALL: See above.  BONES: Avascular necrosis, bilateral femoral heads. Degenerative changes.    IMPRESSION:  1.  No CT evidence of colitis.  2.  Cystitis.  3.  Indeterminate enlarging 7 cm splenic lesion. Advise MRI with and   without contrast for further assessment.  4.  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   Colorectal surgical referral recommended. Consider nonemergent pelvic MRI   with and without contrast for further assessment.  5.  Atelectasis/consolidation at the lung bases and trace pleural   effusions.  6.  Additional findings as above.    --- End ofReport ---            NILO OCASIO MD; Attending Radiologist  This document has been electronically signed. Dec  2 2023  3:59PM (12-02-23 @ 15:17)     INTERVAL/OVERNIGHT EVENTS: Patient presented to ED complaining of 7 bloody bowel movements over the prior 2 days. Admitted to SICU for drop in HG from 10.2 to 9.2 Since then has had a stable Hg of 9.4 and no more bloody bowel movements. GI consulted and plan for colonoscopy on Monday.     SUBJECTIVE: Patient seen at bedside this morning. Denies headache, nausea, dizziness, chest pain, shortness of breath, abdominal pain, bloody bowel movement.       Respiratory Medications  tiotropium 2.5 MICROgram(s)/olodaterol 2.5 MICROgram(s) (STIOLTO) Inhaler 2 Puff(s) Inhalation daily    Cardiovascular Medications  aMIOdarone    Tablet 200 milliGRAM(s) Oral daily    Gastrointestinal Medications  dextrose 5%. 1000 milliLiter(s) IV Continuous <Continuous>  dextrose 5%. 1000 milliLiter(s) IV Continuous <Continuous>  pantoprazole  Injectable 40 milliGRAM(s) IV Push two times a day  polyethylene glycol/electrolyte Solution. 4000 milliLiter(s) Oral once    Hematologic/Oncologic Medications  influenza  Vaccine (HIGH DOSE) 0.7 milliLiter(s) IntraMuscular once    Antimicrobial/Immunologic Medications  azithromycin   Tablet 250 milliGRAM(s) Oral <User Schedule>    Endocrine/Metabolic Medications  atorvastatin 40 milliGRAM(s) Oral at bedtime  dextrose 50% Injectable 12.5 Gram(s) IV Push once  dextrose 50% Injectable 25 Gram(s) IV Push once  dextrose 50% Injectable 25 Gram(s) IV Push once  dextrose Oral Gel 15 Gram(s) Oral once PRN Blood Glucose LESS THAN 70 milliGRAM(s)/deciliter  glucagon  Injectable 1 milliGRAM(s) IntraMuscular once  insulin lispro (ADMELOG) corrective regimen sliding scale   SubCutaneous Before meals and at bedtime    Topical/Other Medications  chlorhexidine 2% Cloths 1 Application(s) Topical <User Schedule>  sevelamer carbonate 800 milliGRAM(s) Oral every 8 hours      MEDICATIONS  (PRN):  dextrose Oral Gel 15 Gram(s) Oral once PRN Blood Glucose LESS THAN 70 milliGRAM(s)/deciliter      I&O's Detail    02 Dec 2023 07:01  -  03 Dec 2023 07:00  --------------------------------------------------------  IN:    Oral Fluid: 440 mL  Total IN: 440 mL    OUT:    Voided (mL): 60 mL  Total OUT: 60 mL    Total NET: 380 mL          Vital Signs Last 24 Hrs  T(C): 36.4 (03 Dec 2023 09:15), Max: 36.9 (02 Dec 2023 19:13)  T(F): 97.6 (03 Dec 2023 09:15), Max: 98.5 (02 Dec 2023 19:13)  HR: 56 (03 Dec 2023 10:00) (50 - 59)  BP: 141/65 (03 Dec 2023 10:00) (91/55 - 141/65)  BP(mean): 93 (03 Dec 2023 10:00) (75 - 101)  RR: 17 (03 Dec 2023 10:00) (14 - 31)  SpO2: 93% (03 Dec 2023 10:00) (89% - 96%)    Parameters below as of 03 Dec 2023 10:00  Patient On (Oxygen Delivery Method): room air        GENERAL: NAD, resting comfortably in bed  HEENT: NCAT, MMM  C/V: Normal rate, regular rhythm, crescendo-decrescendo systolic murmur  PULM: Nonlabored breathing, no respiratory distress, clear to auscultation bilaterally   ABD: Soft, ND, NT, no rebound tenderness, no guarding  EXTREM: WWP, no edema  NEURO: No focal deficits    LABS:                        9.4    5.13  )-----------( 152      ( 03 Dec 2023 06:15 )             29.9     12-03    139  |  95<L>  |  26<H>  ----------------------------<  72  4.4   |  30  |  6.99<H>    Ca    9.5      03 Dec 2023 06:15  Phos  4.7     12-03  Mg     2.5     12-03    TPro  8.0  /  Alb  3.8  /  TBili  0.4  /  DBili  x   /  AST  15  /  ALT  8<L>  /  AlkPhos  243<H>  12-02    PT/INR - ( 02 Dec 2023 13:00 )   PT: 13.1 sec;   INR: 1.15          PTT - ( 02 Dec 2023 13:00 )  PTT:43.8 sec  Urinalysis Basic - ( 03 Dec 2023 06:15 )    Color: x / Appearance: x / SG: x / pH: x  Gluc: 72 mg/dL / Ketone: x  / Bili: x / Urobili: x   Blood: x / Protein: x / Nitrite: x   Leuk Esterase: x / RBC: x / WBC x   Sq Epi: x / Non Sq Epi: x / Bacteria: x        RADIOLOGY & ADDITIONAL STUDIES:  CT Abdomen and Pelvis w/ Oral Cont and w/ IV Cont:   ACC: [id number] EXAM:  CT ABDOMEN AND PELVIS OC IC   ORDERED BY: MANUEL AGUILAR     PROCEDURE DATE:  12/02/2023          INTERPRETATION:  CLINICAL INFORMATION: Bloody diarrhea and left lower   quadrant pain    COMPARISON: CT of the abdomen and pelvis dated 06/13/2023    CONTRAST/COMPLICATIONS:  IV Contrast: Isovue 370  90 cc administered   10 cc discarded  Oral Contrast: Omnipaque 300  Complications: None reported at time of study completion    PROCEDURE:  CT of the Abdomen and Pelvis was performed.  Sagittal and coronal reformats were performed.    FINDINGS:  LOWER CHEST: Cardiomegaly. Atelectasis/consolidation at the lung bases.   Trace pleural effusions.    LIVER: Stable too small to characterize low attenuating foci.  BILE DUCTS: Normal caliber.  GALLBLADDER: Cholecystectomy.  SPLEEN: Indeterminate 7.0 cm hypoattenuating splenic lesion, previously   6.4 cm  PANCREAS: Few calcifications, likely sequela of pancreatitis.  ADRENALS: Within normal limits.  KIDNEYS/URETERS: Atrophic. Bilateral cysts and indeterminate low   attenuating foci.    BLADDER: Cystitis. Diffuse bladder wall thickening and urothelial   hyperenhancement.  REPRODUCTIVE ORGANS: Within normal limits.    BOWEL: No bowel obstruction. Appendix normal. Colonic diverticulosis.   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  PERITONEUM: No ascites.  VESSELS: Atherosclerotic changes. Ectatic suprarenal aorta and distal   descending aorta.  RETROPERITONEUM/LYMPH NODES: No lymphadenopathy.  ABDOMINAL WALL: See above.  BONES: Avascular necrosis, bilateral femoral heads. Degenerative changes.    IMPRESSION:  1.  No CT evidence of colitis.  2.  Cystitis.  3.  Indeterminate enlarging 7 cm splenic lesion. Advise MRI with and   without contrast for further assessment.  4.  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   Colorectal surgical referral recommended. Consider nonemergent pelvic MRI   with and without contrast for further assessment.  5.  Atelectasis/consolidation at the lung bases and trace pleural   effusions.  6.  Additional findings as above.    --- End ofReport ---            NILO OCASIO MD; Attending Radiologist  This document has been electronically signed. Dec  2 2023  3:59PM (12-02-23 @ 15:17)

## 2023-12-03 NOTE — CONSULT NOTE ADULT - SUBJECTIVE AND OBJECTIVE BOX
Initial GI Consult Note:    HPI:  68M with PMH of a fib (s/p Watchman), HTN, ERIK (not on CPAP), COPD, ESRD (MWF), T cell lymphoma (remission 2020), perirectal abscess s/p I&D (2010), PSHx lap jameson, LUE AVF (6/12/20, Qato), chest hematoma evacuation (12/18/20, Qato), L VATS decortication (1/22/21, Inra), Watchman (10/19/21, Juventino), bilat IHR (12/15/22, Olaskmiri), LUE AVF revision (1/18/23, Carroccio) here for LGIB since yesterday.    In the ED, pt was afebrile, nontachycardic, normotensive, and satting well on RA. Labs significant for WBC 5.23. Hgb initially 10.2 -> 9.2 without resuscitation.    PMHx: AFib s/p watchman, HTN, ERIK (not on CPAP), COPD, ESRD (MWF), T cell lymphoma (remission 2020), perirectal abscess s/p I&D (2010)  PSHx: lap jameson, LUE AVF (6/12/20, Qato), chest hematoma evacuation (12/18/20, Qato), L VATS decortication (1/22/21, Inra), Watchman (10/19/21, Juventino), bilat IHR (12/15/22, Oldiana), LUE AVF revision (1/18/23, Carroccetienne)  Family Hx: Denies family hx of IBS, Crohn's, UC, or colon cancer.  Last Cscope: 12 years ago, planned for repeat 12/20  Last EGD: Denies  All: No Known Allergies    Meds: azithromycin 250 mg oral tablet: 1 tab(s) orally Monday, Wednesday, and Friday   tiotropium-olodaterol 2.5 mcg-2.5 mcg/inh inhalation aerosol: 2 puff(s) inhaled once a day   sevelamer carbonate 800 mg oral tablet: 1 tab(s) orally every 8 hours  allopurinol 100 mg oral tablet: 1 tab(s) orally 2 times a day  amiodarone 200 mg oral tablet: 1 tab(s) orally once a day  atorvastatin 40 mg oral tablet: 1 tab(s) orally once a day  metoprolol succinate 50 mg oral tablet, extended release: 1 tab(s) orally once a day  aspirin 81 mg oral delayed release tablet: 1 tab(s) orally once a day  amLODIPine 5 mg oral tablet: 1 tab(s) orally once a day  sildenafil 20 TID    GI consulted for LGIB.      Initial GI Consult Note:    HPI:  68M with PMH of a fib (s/p Watchman), HTN, ERIK (not on CPAP), COPD, ESRD (MWF), T cell lymphoma (remission 2020), perirectal abscess s/p I&D (2010), PSHx lap jameson, LUE AVF (6/12/20, Qato), chest hematoma evacuation (12/18/20, Qato), L VATS decortication (1/22/21, Inra), Watchman (10/19/21, Juventino), bilat IHR (12/15/22, Olaskmiri), LUE AVF revision (1/18/23, Carroccio) here for LGIB since yesterday.    In the ED, pt was afebrile, nontachycardic, normotensive, and satting well on RA. Labs significant for WBC 5.23. Hgb initially 10.2 -> 9.2 without resuscitation.    PMHx: AFib s/p watchman, HTN, ERIK (not on CPAP), COPD, ESRD (MWF), T cell lymphoma (remission 2020), perirectal abscess s/p I&D (2010)  PSHx: lap jameson, LUE AVF (6/12/20, Qato), chest hematoma evacuation (12/18/20, Qato), L VATS decortication (1/22/21, Inra), Watchman (10/19/21, uJventino), bilat IHR (12/15/22, Oldiana), LUE AVF revision (1/18/23, Carroccetienne)  Family Hx: Denies family hx of IBS, Crohn's, UC, or colon cancer.  Last Cscope: 12 years ago, planned for repeat 12/20  Last EGD: Denies  All: No Known Allergies    Meds: azithromycin 250 mg oral tablet: 1 tab(s) orally Monday, Wednesday, and Friday   tiotropium-olodaterol 2.5 mcg-2.5 mcg/inh inhalation aerosol: 2 puff(s) inhaled once a day   sevelamer carbonate 800 mg oral tablet: 1 tab(s) orally every 8 hours  allopurinol 100 mg oral tablet: 1 tab(s) orally 2 times a day  amiodarone 200 mg oral tablet: 1 tab(s) orally once a day  atorvastatin 40 mg oral tablet: 1 tab(s) orally once a day  metoprolol succinate 50 mg oral tablet, extended release: 1 tab(s) orally once a day  aspirin 81 mg oral delayed release tablet: 1 tab(s) orally once a day  amLODIPine 5 mg oral tablet: 1 tab(s) orally once a day  sildenafil 20 TID    GI consulted for LGIB.      Initial GI Consult Note:      HPI:  68M with PMH of a fib (s/p Watchman), HTN, ERIK (not on CPAP), COPD, ESRD (MWF), T cell lymphoma (remission 2020), perirectal abscess s/p I&D (2010), PSHx lap jameson, LUE AVF (6/12/20, Qato), chest hematoma evacuation (12/18/20, Qato), L VATS decortication (1/22/21, Inra), Watchman (10/19/21, Juventino), bilat IHR (12/15/22, Olaskmiri), LUE AVF revision (1/18/23, Carroccio) here for LGIB since yesterday.    In the ED, pt was afebrile, nontachycardic, normotensive, and satting well on RA. Labs significant for WBC 5.23. Hgb initially 10.2 -> 9.2 without resuscitation.    PMHx: AFib s/p watchman, HTN, ERIK (not on CPAP), COPD, ESRD (MWF), T cell lymphoma (remission 2020), perirectal abscess s/p I&D (2010)  PSHx: lap jameson, LUE AVF (6/12/20, Qato), chest hematoma evacuation (12/18/20, Qato), L VATS decortication (1/22/21, Inra), Watchman (10/19/21, Juventino), bilat IHR (12/15/22, Oldiana), LUE AVF revision (1/18/23, Carroccetienne)  Family Hx: Denies family hx of IBS, Crohn's, UC, or colon cancer.  Last Cscope: 12 years ago, planned for repeat 12/20  Last EGD: Denies  All: No Known Allergies    Meds: azithromycin 250 mg oral tablet: 1 tab(s) orally Monday, Wednesday, and Friday   tiotropium-olodaterol 2.5 mcg-2.5 mcg/inh inhalation aerosol: 2 puff(s) inhaled once a day   sevelamer carbonate 800 mg oral tablet: 1 tab(s) orally every 8 hours  allopurinol 100 mg oral tablet: 1 tab(s) orally 2 times a day  amiodarone 200 mg oral tablet: 1 tab(s) orally once a day  atorvastatin 40 mg oral tablet: 1 tab(s) orally once a day  metoprolol succinate 50 mg oral tablet, extended release: 1 tab(s) orally once a day  aspirin 81 mg oral delayed release tablet: 1 tab(s) orally once a day  amLODIPine 5 mg oral tablet: 1 tab(s) orally once a day  sildenafil 20 TID    GI consulted for LGIB. Per surgery team, overnight patient had 1-2 brown bowel movements with no further episodes of bleeding. Patient has remained hemodynamically stable. Hb this AM 9.4 from 10.2 upon admission.         VITAL SIGNS:  Vital Signs Last 24 Hrs  T(C): 36.4 (03 Dec 2023 09:15), Max: 36.9 (02 Dec 2023 19:13)  T(F): 97.6 (03 Dec 2023 09:15), Max: 98.5 (02 Dec 2023 19:13)  HR: 52 (03 Dec 2023 11:06) (51 - 59)  BP: 143/64 (03 Dec 2023 11:06) (103/62 - 143/64)  BP(mean): 92 (03 Dec 2023 11:06) (75 - 101)  RR: 19 (03 Dec 2023 11:06) (14 - 31)  SpO2: 94% (03 Dec 2023 11:06) (89% - 96%)    Parameters below as of 03 Dec 2023 11:06  Patient On (Oxygen Delivery Method): room air        12-02-23 @ 07:01  -  12-03-23 @ 07:00  --------------------------------------------------------  IN: 440 mL / OUT: 60 mL / NET: 380 mL      PHYSICAL EXAM:  General: No acute distress  Lungs: Normal respiratory effort and no intercostal retractions  Cardiovascular: RRR  Abdomen: Soft, non-tender, non-distended  Neurological: Alert and oriented x3  Skin: Warm and dry. No obvious rash       MEDICATIONS:  MEDICATIONS  (STANDING):  aMIOdarone    Tablet 200 milliGRAM(s) Oral daily  atorvastatin 40 milliGRAM(s) Oral at bedtime  azithromycin   Tablet 250 milliGRAM(s) Oral <User Schedule>  glucagon  Injectable 1 milliGRAM(s) IntraMuscular once  influenza  Vaccine (HIGH DOSE) 0.7 milliLiter(s) IntraMuscular once  pantoprazole  Injectable 40 milliGRAM(s) IV Push two times a day  polyethylene glycol/electrolyte Solution. 4000 milliLiter(s) Oral once  sevelamer carbonate 800 milliGRAM(s) Oral every 8 hours  tiotropium 2.5 MICROgram(s)/olodaterol 2.5 MICROgram(s) (STIOLTO) Inhaler 2 Puff(s) Inhalation daily    MEDICATIONS  (PRN):      ALLERGIES:  Allergies    No Known Allergies    Intolerances        LABS:                        9.4    5.13  )-----------( 152      ( 03 Dec 2023 06:15 )             29.9     12-03    139  |  95<L>  |  26<H>  ----------------------------<  72  4.4   |  30  |  6.99<H>    Ca    9.5      03 Dec 2023 06:15  Phos  4.7     12-03  Mg     2.5     12-03    TPro  8.0  /  Alb  3.8  /  TBili  0.4  /  DBili  x   /  AST  15  /  ALT  8<L>  /  AlkPhos  243<H>  12-02    PT/INR - ( 02 Dec 2023 13:00 )   PT: 13.1 sec;   INR: 1.15          PTT - ( 02 Dec 2023 13:00 )  PTT:43.8 sec  Urinalysis Basic - ( 03 Dec 2023 06:15 )    Color: x / Appearance: x / SG: x / pH: x  Gluc: 72 mg/dL / Ketone: x  / Bili: x / Urobili: x   Blood: x / Protein: x / Nitrite: x   Leuk Esterase: x / RBC: x / WBC x   Sq Epi: x / Non Sq Epi: x / Bacteria: x      CAPILLARY BLOOD GLUCOSE  POCT Blood Glucose.: 83 mg/dL (03 Dec 2023 10:45)  RADIOLOGY & ADDITIONAL TESTS: Reviewed.         Initial GI Consult Note:      HPI:  68M with PMH of a fib (s/p Watchman), HTN, ERIK (not on CPAP), COPD, ESRD (MWF), T cell lymphoma (remission 2020), perirectal abscess s/p I&D (2010), PSHx lap jameson, LUE AVF (6/12/20, Qato), chest hematoma evacuation (12/18/20, Qato), L VATS decortication (1/22/21, Inra), Watchman (10/19/21, Juventino), bilat IHR (12/15/22, Olaskmiri), LUE AVF revision (1/18/23, Carroccio) here for LGIB since yesterday.    In the ED, pt was afebrile, nontachycardic, normotensive, and satting well on RA. Labs significant for WBC 5.23. Hgb initially 10.2 -> 9.2 without resuscitation.    PMHx: AFib s/p watchman, HTN, ERIK (not on CPAP), COPD, ESRD (MWF), T cell lymphoma (remission 2020), perirectal abscess s/p I&D (2010)  PSHx: lap jameson, LUE AVF (6/12/20, Qato), chest hematoma evacuation (12/18/20, Qato), L VATS decortication (1/22/21, Inra), Watchman (10/19/21, Juventino), bilat IHR (12/15/22, Oldiana), LUE AVF revision (1/18/23, Carroccetienne)  Family Hx: Denies family hx of IBS, Crohn's, UC, or colon cancer.  Last Cscope: 12 years ago, planned for repeat 12/20  Last EGD: Denies  All: No Known Allergies    Meds: azithromycin 250 mg oral tablet: 1 tab(s) orally Monday, Wednesday, and Friday   tiotropium-olodaterol 2.5 mcg-2.5 mcg/inh inhalation aerosol: 2 puff(s) inhaled once a day   sevelamer carbonate 800 mg oral tablet: 1 tab(s) orally every 8 hours  allopurinol 100 mg oral tablet: 1 tab(s) orally 2 times a day  amiodarone 200 mg oral tablet: 1 tab(s) orally once a day  atorvastatin 40 mg oral tablet: 1 tab(s) orally once a day  metoprolol succinate 50 mg oral tablet, extended release: 1 tab(s) orally once a day  aspirin 81 mg oral delayed release tablet: 1 tab(s) orally once a day  amLODIPine 5 mg oral tablet: 1 tab(s) orally once a day  sildenafil 20 TID    GI consulted for LGIB. Per surgery team, overnight patient had 1-2 brown bowel movements with no further episodes of bleeding. Patient has remained hemodynamically stable. Hb this AM 9.4 from 10.2 upon admission. Patient seen and examined at bedside. States that he is not having any more rectal bleeding. Aware of plan for colonoscopy tomorrow pending cardiac evaluation.       VITAL SIGNS:  Vital Signs Last 24 Hrs  T(C): 36.4 (03 Dec 2023 09:15), Max: 36.9 (02 Dec 2023 19:13)  T(F): 97.6 (03 Dec 2023 09:15), Max: 98.5 (02 Dec 2023 19:13)  HR: 52 (03 Dec 2023 11:06) (51 - 59)  BP: 143/64 (03 Dec 2023 11:06) (103/62 - 143/64)  BP(mean): 92 (03 Dec 2023 11:06) (75 - 101)  RR: 19 (03 Dec 2023 11:06) (14 - 31)  SpO2: 94% (03 Dec 2023 11:06) (89% - 96%)    Parameters below as of 03 Dec 2023 11:06  Patient On (Oxygen Delivery Method): room air        12-02-23 @ 07:01  -  12-03-23 @ 07:00  --------------------------------------------------------  IN: 440 mL / OUT: 60 mL / NET: 380 mL      PHYSICAL EXAM:  General: No acute distress  Lungs: Normal respiratory effort and no intercostal retractions  Cardiovascular: RRR  Abdomen: Soft, non-tender, non-distended  Neurological: Alert and oriented x3  Skin: Warm and dry. No obvious rash       MEDICATIONS:  MEDICATIONS  (STANDING):  aMIOdarone    Tablet 200 milliGRAM(s) Oral daily  atorvastatin 40 milliGRAM(s) Oral at bedtime  azithromycin   Tablet 250 milliGRAM(s) Oral <User Schedule>  glucagon  Injectable 1 milliGRAM(s) IntraMuscular once  influenza  Vaccine (HIGH DOSE) 0.7 milliLiter(s) IntraMuscular once  pantoprazole  Injectable 40 milliGRAM(s) IV Push two times a day  polyethylene glycol/electrolyte Solution. 4000 milliLiter(s) Oral once  sevelamer carbonate 800 milliGRAM(s) Oral every 8 hours  tiotropium 2.5 MICROgram(s)/olodaterol 2.5 MICROgram(s) (STIOLTO) Inhaler 2 Puff(s) Inhalation daily    MEDICATIONS  (PRN):      ALLERGIES:  Allergies    No Known Allergies    Intolerances        LABS:                        9.4    5.13  )-----------( 152      ( 03 Dec 2023 06:15 )             29.9     12-03    139  |  95<L>  |  26<H>  ----------------------------<  72  4.4   |  30  |  6.99<H>    Ca    9.5      03 Dec 2023 06:15  Phos  4.7     12-03  Mg     2.5     12-03    TPro  8.0  /  Alb  3.8  /  TBili  0.4  /  DBili  x   /  AST  15  /  ALT  8<L>  /  AlkPhos  243<H>  12-02    PT/INR - ( 02 Dec 2023 13:00 )   PT: 13.1 sec;   INR: 1.15          PTT - ( 02 Dec 2023 13:00 )  PTT:43.8 sec  Urinalysis Basic - ( 03 Dec 2023 06:15 )    Color: x / Appearance: x / SG: x / pH: x  Gluc: 72 mg/dL / Ketone: x  / Bili: x / Urobili: x   Blood: x / Protein: x / Nitrite: x   Leuk Esterase: x / RBC: x / WBC x   Sq Epi: x / Non Sq Epi: x / Bacteria: x      CAPILLARY BLOOD GLUCOSE  POCT Blood Glucose.: 83 mg/dL (03 Dec 2023 10:45)  RADIOLOGY & ADDITIONAL TESTS: Reviewed.

## 2023-12-04 ENCOUNTER — TRANSCRIPTION ENCOUNTER (OUTPATIENT)
Age: 68
End: 2023-12-04

## 2023-12-04 DIAGNOSIS — I35.0 NONRHEUMATIC AORTIC (VALVE) STENOSIS: ICD-10-CM

## 2023-12-04 DIAGNOSIS — I48.0 PAROXYSMAL ATRIAL FIBRILLATION: ICD-10-CM

## 2023-12-04 DIAGNOSIS — I50.42 CHRONIC COMBINED SYSTOLIC (CONGESTIVE) AND DIASTOLIC (CONGESTIVE) HEART FAILURE: ICD-10-CM

## 2023-12-04 DIAGNOSIS — I10 ESSENTIAL (PRIMARY) HYPERTENSION: ICD-10-CM

## 2023-12-04 DIAGNOSIS — I34.0 NONRHEUMATIC MITRAL (VALVE) INSUFFICIENCY: ICD-10-CM

## 2023-12-04 LAB
ANION GAP SERPL CALC-SCNC: 20 MMOL/L — HIGH (ref 5–17)
ANION GAP SERPL CALC-SCNC: 20 MMOL/L — HIGH (ref 5–17)
BUN SERPL-MCNC: 30 MG/DL — HIGH (ref 7–23)
BUN SERPL-MCNC: 30 MG/DL — HIGH (ref 7–23)
CALCIUM SERPL-MCNC: 9.2 MG/DL — SIGNIFICANT CHANGE UP (ref 8.4–10.5)
CALCIUM SERPL-MCNC: 9.2 MG/DL — SIGNIFICANT CHANGE UP (ref 8.4–10.5)
CHLORIDE SERPL-SCNC: 94 MMOL/L — LOW (ref 96–108)
CHLORIDE SERPL-SCNC: 94 MMOL/L — LOW (ref 96–108)
CO2 SERPL-SCNC: 27 MMOL/L — SIGNIFICANT CHANGE UP (ref 22–31)
CO2 SERPL-SCNC: 27 MMOL/L — SIGNIFICANT CHANGE UP (ref 22–31)
CREAT SERPL-MCNC: 8.41 MG/DL — HIGH (ref 0.5–1.3)
CREAT SERPL-MCNC: 8.41 MG/DL — HIGH (ref 0.5–1.3)
EGFR: 6 ML/MIN/1.73M2 — LOW
EGFR: 6 ML/MIN/1.73M2 — LOW
GLUCOSE BLDC GLUCOMTR-MCNC: 120 MG/DL — HIGH (ref 70–99)
GLUCOSE BLDC GLUCOMTR-MCNC: 120 MG/DL — HIGH (ref 70–99)
GLUCOSE BLDC GLUCOMTR-MCNC: 55 MG/DL — LOW (ref 70–99)
GLUCOSE BLDC GLUCOMTR-MCNC: 55 MG/DL — LOW (ref 70–99)
GLUCOSE SERPL-MCNC: 52 MG/DL — CRITICAL LOW (ref 70–99)
GLUCOSE SERPL-MCNC: 52 MG/DL — CRITICAL LOW (ref 70–99)
HBV SURFACE AB SER-ACNC: SIGNIFICANT CHANGE UP
HBV SURFACE AB SER-ACNC: SIGNIFICANT CHANGE UP
HBV SURFACE AG SER-ACNC: SIGNIFICANT CHANGE UP
HBV SURFACE AG SER-ACNC: SIGNIFICANT CHANGE UP
HCT VFR BLD CALC: 29.5 % — LOW (ref 39–50)
HCT VFR BLD CALC: 29.5 % — LOW (ref 39–50)
HCV AB S/CO SERPL IA: 0.05 S/CO — SIGNIFICANT CHANGE UP
HCV AB S/CO SERPL IA: 0.05 S/CO — SIGNIFICANT CHANGE UP
HCV AB SERPL-IMP: SIGNIFICANT CHANGE UP
HCV AB SERPL-IMP: SIGNIFICANT CHANGE UP
HGB BLD-MCNC: 9.2 G/DL — LOW (ref 13–17)
HGB BLD-MCNC: 9.2 G/DL — LOW (ref 13–17)
MAGNESIUM SERPL-MCNC: 2.7 MG/DL — HIGH (ref 1.6–2.6)
MAGNESIUM SERPL-MCNC: 2.7 MG/DL — HIGH (ref 1.6–2.6)
MCHC RBC-ENTMCNC: 30.9 PG — SIGNIFICANT CHANGE UP (ref 27–34)
MCHC RBC-ENTMCNC: 30.9 PG — SIGNIFICANT CHANGE UP (ref 27–34)
MCHC RBC-ENTMCNC: 31.2 GM/DL — LOW (ref 32–36)
MCHC RBC-ENTMCNC: 31.2 GM/DL — LOW (ref 32–36)
MCV RBC AUTO: 99 FL — SIGNIFICANT CHANGE UP (ref 80–100)
MCV RBC AUTO: 99 FL — SIGNIFICANT CHANGE UP (ref 80–100)
NRBC # BLD: 0 /100 WBCS — SIGNIFICANT CHANGE UP (ref 0–0)
NRBC # BLD: 0 /100 WBCS — SIGNIFICANT CHANGE UP (ref 0–0)
PHOSPHATE SERPL-MCNC: 5.5 MG/DL — HIGH (ref 2.5–4.5)
PHOSPHATE SERPL-MCNC: 5.5 MG/DL — HIGH (ref 2.5–4.5)
PLATELET # BLD AUTO: 149 K/UL — LOW (ref 150–400)
PLATELET # BLD AUTO: 149 K/UL — LOW (ref 150–400)
POTASSIUM SERPL-MCNC: 4 MMOL/L — SIGNIFICANT CHANGE UP (ref 3.5–5.3)
POTASSIUM SERPL-MCNC: 4 MMOL/L — SIGNIFICANT CHANGE UP (ref 3.5–5.3)
POTASSIUM SERPL-SCNC: 4 MMOL/L — SIGNIFICANT CHANGE UP (ref 3.5–5.3)
POTASSIUM SERPL-SCNC: 4 MMOL/L — SIGNIFICANT CHANGE UP (ref 3.5–5.3)
RBC # BLD: 2.98 M/UL — LOW (ref 4.2–5.8)
RBC # BLD: 2.98 M/UL — LOW (ref 4.2–5.8)
RBC # FLD: 16.2 % — HIGH (ref 10.3–14.5)
RBC # FLD: 16.2 % — HIGH (ref 10.3–14.5)
SODIUM SERPL-SCNC: 141 MMOL/L — SIGNIFICANT CHANGE UP (ref 135–145)
SODIUM SERPL-SCNC: 141 MMOL/L — SIGNIFICANT CHANGE UP (ref 135–145)
WBC # BLD: 4.32 K/UL — SIGNIFICANT CHANGE UP (ref 3.8–10.5)
WBC # BLD: 4.32 K/UL — SIGNIFICANT CHANGE UP (ref 3.8–10.5)
WBC # FLD AUTO: 4.32 K/UL — SIGNIFICANT CHANGE UP (ref 3.8–10.5)
WBC # FLD AUTO: 4.32 K/UL — SIGNIFICANT CHANGE UP (ref 3.8–10.5)

## 2023-12-04 PROCEDURE — 88305 TISSUE EXAM BY PATHOLOGIST: CPT | Mod: 26

## 2023-12-04 PROCEDURE — 45380 COLONOSCOPY AND BIOPSY: CPT | Mod: GC

## 2023-12-04 PROCEDURE — 90935 HEMODIALYSIS ONE EVALUATION: CPT

## 2023-12-04 PROCEDURE — 99223 1ST HOSP IP/OBS HIGH 75: CPT

## 2023-12-04 PROCEDURE — 99232 SBSQ HOSP IP/OBS MODERATE 35: CPT

## 2023-12-04 PROCEDURE — 99223 1ST HOSP IP/OBS HIGH 75: CPT | Mod: 25

## 2023-12-04 RX ORDER — DEXTROSE 50 % IN WATER 50 %
12.5 SYRINGE (ML) INTRAVENOUS ONCE
Refills: 0 | Status: COMPLETED | OUTPATIENT
Start: 2023-12-04 | End: 2023-12-04

## 2023-12-04 RX ORDER — ERYTHROPOIETIN 10000 [IU]/ML
7000 INJECTION, SOLUTION INTRAVENOUS; SUBCUTANEOUS ONCE
Refills: 0 | Status: DISCONTINUED | OUTPATIENT
Start: 2023-12-04 | End: 2023-12-05

## 2023-12-04 RX ORDER — ACETAMINOPHEN 500 MG
1000 TABLET ORAL EVERY 6 HOURS
Refills: 0 | Status: DISCONTINUED | OUTPATIENT
Start: 2023-12-04 | End: 2023-12-05

## 2023-12-04 RX ADMIN — Medication 1000 MILLIGRAM(S): at 21:36

## 2023-12-04 RX ADMIN — AZITHROMYCIN 250 MILLIGRAM(S): 500 TABLET, FILM COATED ORAL at 07:11

## 2023-12-04 RX ADMIN — SEVELAMER CARBONATE 800 MILLIGRAM(S): 2400 POWDER, FOR SUSPENSION ORAL at 05:22

## 2023-12-04 RX ADMIN — Medication 1000 MILLIGRAM(S): at 22:11

## 2023-12-04 RX ADMIN — AMIODARONE HYDROCHLORIDE 200 MILLIGRAM(S): 400 TABLET ORAL at 05:22

## 2023-12-04 RX ADMIN — ATORVASTATIN CALCIUM 40 MILLIGRAM(S): 80 TABLET, FILM COATED ORAL at 21:36

## 2023-12-04 RX ADMIN — Medication 12.5 MILLILITER(S): at 08:39

## 2023-12-04 RX ADMIN — PANTOPRAZOLE SODIUM 40 MILLIGRAM(S): 20 TABLET, DELAYED RELEASE ORAL at 17:57

## 2023-12-04 RX ADMIN — TIOTROPIUM BROMIDE AND OLODATEROL 2 PUFF(S): 3.124; 2.736 SPRAY, METERED RESPIRATORY (INHALATION) at 11:02

## 2023-12-04 RX ADMIN — PANTOPRAZOLE SODIUM 40 MILLIGRAM(S): 20 TABLET, DELAYED RELEASE ORAL at 05:22

## 2023-12-04 RX ADMIN — SEVELAMER CARBONATE 800 MILLIGRAM(S): 2400 POWDER, FOR SUSPENSION ORAL at 17:57

## 2023-12-04 NOTE — PROGRESS NOTE ADULT - SUBJECTIVE AND OBJECTIVE BOX
Hemoglobin: 9.2 g/dL (23 @ 07:14)  Phosphorus: 5.5 mg/dL (23 @ 07:14)  Phosphorus: 4.7 mg/dL (23 @ 06:15)  Hemoglobin: 9.4 g/dL (23 @ 06:15)    Hepatitis B Surface Antibody: Nonreact (23 @ 12:02)  Hepatitis B Surface Antigen: Nonreact (23 @ 12:02)    T(C): 36.5 (23 @ 12:40), Max: 36.7 (23 @ 14:00)  HR: 62 (23 @ 12:40) (52 - 62)  BP: 136/72 (23 @ 12:40) (124/70 - 145/79)  RR: 18 (23 @ 12:40) (17 - 18)  SpO2: 94% (23 @ 12:40) (91% - 94%)  epoetin donny-epbx (RETACRIT) Injectable 7000 Unit(s) IV Push once, 23 @ 13:53, Routine, Stop order after: 1 Doses  sevelamer carbonate 800 milliGRAM(s) Oral every 8 hours, 23 @ 20:32, Routine      Hemodialysis Treatment.:     Schedule: Once, Modality: Hemodialysis, Access: Arteriovenous Fistula    Dialyzer: Optiflux [id number], Time: 210 Min    Blood Flow: 400 mL/Min , Dialysate Flow: 500 mL/Min, Dialysate Temp: 36.5, Tubinmm (Adult)    Target Fluid Removal: 2 Liters    Dialysate Electrolytes (mEq/L): Potassium 3, Calcium 2.5, Sodium 138, Bicarbonate 35 (23 @ 09:53) [Completed]      Patient seen on HD.  Tolerating well.  Asymptomatic.  BP stable.  Patient below his EDW, and given recent GI bleed and n.p.o. status for the scope, reduced UF to 1L.  Continue HD. Hemoglobin: 9.2 g/dL (23 @ 07:14)  Phosphorus: 5.5 mg/dL (23 @ 07:14)  Phosphorus: 4.7 mg/dL (23 @ 06:15)  Hemoglobin: 9.4 g/dL (23 @ 06:15)    Hepatitis B Surface Antibody: Nonreact (23 @ 12:02)  Hepatitis B Surface Antigen: Nonreact (23 @ 12:02)    T(C): 36.5 (23 @ 12:40), Max: 36.7 (23 @ 14:00)  HR: 62 (23 @ 12:40) (52 - 62)  BP: 136/72 (23 @ 12:40) (124/70 - 145/79)  RR: 18 (23 @ 12:40) (17 - 18)  SpO2: 94% (23 @ 12:40) (91% - 94%)  epoetin donny-epbx (RETACRIT) Injectable 7000 Unit(s) IV Push once, 23 @ 13:53, Routine, Stop order after: 1 Doses  sevelamer carbonate 800 milliGRAM(s) Oral every 8 hours, 23 @ 20:32, Routine      Hemodialysis Treatment.:     Schedule: Once, Modality: Hemodialysis, Access: Arteriovenous Fistula    Dialyzer: Optiflux [id number], Time: 210 Min    Blood Flow: 400 mL/Min , Dialysate Flow: 500 mL/Min, Dialysate Temp: 36.5, Tubinmm (Adult)    Target Fluid Removal: 2 Liters    Dialysate Electrolytes (mEq/L): Potassium 3, Calcium 2.5, Sodium 138, Bicarbonate 35 (23 @ 09:53) [Completed]      Patient seen on HD.  Tolerating well.  Asymptomatic.  BP stable.  Patient below his EDW, and given recent GI bleed and n.p.o. status for the scope, reduced UF to 1L.  Continue HD.

## 2023-12-04 NOTE — PROGRESS NOTE ADULT - SUBJECTIVE AND OBJECTIVE BOX
INTERVAL HISTORY:  Admitted with several days of increasing BRBPR.  No recent colonoscopy.  	  MEDICATIONS:  aMIOdarone    Tablet 200 milliGRAM(s) Oral daily  azithromycin   Tablet 250 milliGRAM(s) Oral <User Schedule>  tiotropium 2.5 MICROgram(s)/olodaterol 2.5 MICROgram(s) (STIOLTO) Inhaler 2 Puff(s) Inhalation daily  pantoprazole  Injectable 40 milliGRAM(s) IV Push two times a day  atorvastatin 40 milliGRAM(s) Oral at bedtime  glucagon  Injectable 1 milliGRAM(s) IntraMuscular once  influenza  Vaccine (HIGH DOSE) 0.7 milliLiter(s) IntraMuscular once      PHYSICAL EXAM:  T(C): 36.3 (12-04-23 @ 05:28), Max: 36.7 (12-03-23 @ 14:00)  HR: 56 (12-04-23 @ 04:19) (51 - 58)  BP: 128/72 (12-04-23 @ 04:19) (103/62 - 145/79)  RR: 17 (12-04-23 @ 04:19) (17 - 31)  SpO2: 94% (12-04-23 @ 04:19) (92% - 95%)  Wt(kg): --  I&O's Summary    03 Dec 2023 07:01  -  04 Dec 2023 07:00  --------------------------------------------------------  IN: 700 mL / OUT: 0 mL / NET: 700 mL      Height (cm): 180.3 (12-03 @ 08:00)    Appearance: Normal	  Cardiovascular: Normal S1 S2, No JVD, OLVIN, ASM, No edema  Respiratory: Lungs clear to auscultation	  Psychiatry: A & O x 3, Mood & affect appropriate  Gastrointestinal:  Soft, Non-tender, + BS	  Skin: No rashes, No ecchymoses, No cyanosis  Neurologic: Non-focal  Extremities:  No clubbing, cyanosis or edema  Vascular: Peripheral pulses palpable 2+ bilaterally    TELEMETRY: 	    ECG:  	  RADIOLOGY:   DIAGNOSTIC TESTING:  [ ] Echocardiogram:  [ ]  Catheterization:  [ ] Stress Test:    OTHER: 	    LABS:	 	    CARDIAC MARKERS:                                  9.4    5.13  )-----------( 152      ( 03 Dec 2023 06:15 )             29.9     12-03    139  |  95<L>  |  26<H>  ----------------------------<  72  4.4   |  30  |  6.99<H>    Ca    9.5      03 Dec 2023 06:15  Phos  4.7     12-03  Mg     2.5     12-03    TPro  8.0  /  Alb  3.8  /  TBili  0.4  /  DBili  x   /  AST  15  /  ALT  8<L>  /  AlkPhos  243<H>  12-02    proBNP:   Lipid Profile:   HgA1c:   TSH:     ASSESSMENT/PLAN:

## 2023-12-04 NOTE — CONSULT NOTE ADULT - ATTENDING COMMENTS
I agree with the fellow's findings and plans as written above with the following additions/amendments:    Seen and examined at bedside, patient to have HD today while inpatient. Tolerating colonoscopy prep. Otherwise no other issues, for HD later today, patient agreeable.

## 2023-12-04 NOTE — CONSULT NOTE ADULT - SUBJECTIVE AND OBJECTIVE BOX
HPI:  68M w/ PMHx AFib s/p watchman, HTN, ERIK (not on CPAP), COPD, ESRD (MWF), T cell lymphoma (remission 2020), perirectal abscess s/p I&D (2010), PSHx lap jameson, LUE AVF (6/12/20, Qato), chest hematoma evacuation (12/18/20, Qato), L VATS decortication (1/22/21, Inra), Watchman (10/19/21, Juventino), bilat IHR (12/15/22, Belle), LUE AVF revision (1/18/23, Penny) here for LGIB since yesterday.    In the ED, pt was afebrile, nontachycardic, normotensive, and satting well on RA. Labs significant for WBC 5.23. Hgb initially 10.2 -> 9.2 without resuscitation.    PMHx: AFib s/p watchman, HTN, ERIK (not on CPAP), COPD, ESRD (MWF), T cell lymphoma (remission 2020), perirectal abscess s/p I&D (2010)  PSHx: lap jameson, LUE AVF (6/12/20, Qato), chest hematoma evacuation (12/18/20, Qato), L VATS decortication (1/22/21, Inra), Watchman (10/19/21, Juventino), bilat IHR (12/15/22, Belle), LUE AVF revision (1/18/23, Penny)  Family Hx: Denies family hx of IBS, Crohn's, UC, or colon cancer.  Last Cscope: 12 years ago, planned for repeat 12/20  Last EGD: Denies  All: No Known Allergies    Meds: azithromycin 250 mg oral tablet: 1 tab(s) orally Monday, Wednesday, and Friday   tiotropium-olodaterol 2.5 mcg-2.5 mcg/inh inhalation aerosol: 2 puff(s) inhaled once a day   sevelamer carbonate 800 mg oral tablet: 1 tab(s) orally every 8 hours  allopurinol 100 mg oral tablet: 1 tab(s) orally 2 times a day  amiodarone 200 mg oral tablet: 1 tab(s) orally once a day  atorvastatin 40 mg oral tablet: 1 tab(s) orally once a day  metoprolol succinate 50 mg oral tablet, extended release: 1 tab(s) orally once a day  aspirin 81 mg oral delayed release tablet: 1 tab(s) orally once a day  amLODIPine 5 mg oral tablet: 1 tab(s) orally once a day  sildenafil 20 TID   (02 Dec 2023 20:29)      PAST MEDICAL & SURGICAL HISTORY:  HTN (hypertension)      Atrial fibrillation      CKD (chronic kidney disease)      Lymphoma  t cell; remission since 11/2020      CHF, chronic  LVEF 65 PASP 59 midl MR, AR on 12/2/21      H/O pulmonary hypertension      Gout      BPH (benign prostatic hyperplasia)      COPD, mild      Mitral regurgitation      ERIK (obstructive sleep apnea)      ESRD (end stage renal disease)  dialysis [facility] F      History of cholecystectomy      Elective surgery  rectal surgery      History of surgery  watchman left- dialysis port      AV fistula  left arm      H/O inguinal hernia repair  bilateral          Home Medications:  acetaminophen 325 mg oral tablet: 2 tab(s) orally every 6 hours, As needed, Moderate Pain (4 - 6) (02 Dec 2023 20:30)  allopurinol 100 mg oral tablet: 1 tab(s) orally 2 times a day (02 Dec 2023 20:30)  amLODIPine 5 mg oral tablet: 1 tab(s) orally once a day (02 Dec 2023 20:30)  aspirin 81 mg oral delayed release tablet: 1 tab(s) orally once a day (02 Dec 2023 20:30)  atorvastatin 40 mg oral tablet: 1 tab(s) orally once a day (02 Dec 2023 20:30)  metoprolol succinate 50 mg oral tablet, extended release: 1 tab(s) orally once a day (02 Dec 2023 20:30)      Allergies    No Known Allergies    Intolerances        FAMILY HISTORY:  FH: type 2 diabetes (Mother)        Social History:      REVIEW OF SYSTEMS:  CONSTITUTIONAL: +fatigue   EYES: No eye pain, visual disturbances, or discharge  ENMT:  No difficulty hearing, tinnitus, vertigo; No throat pain  NECK: No pain or stiffness  RESPIRATORY: No cough, wheezing, or hemoptysis; No dyspnea  CARDIOVASCULAR: No chest pain, palpitations, dizziness, or leg swelling  GASTROINTESTINAL: No abdominal pain. No nausea, vomiting   GENITOURINARY: No dysuria, frequency, or hematuria  NEUROLOGICAL: No headaches, memory loss, numbness, or tremors  SKIN: No itching, burning, rashes, or lesions   LYMPH NODES: No enlarged glands  ENDOCRINE: No heat or cold intolerance;  MUSCULOSKELETAL: No joint pain or swelling;   PSYCHIATRIC: No mood swings, or difficulty sleeping  HEME/LYMPH: No easy bruising, or bleeding gums  ALLERGY AND IMMUNOLOGIC: No hives or eczema    CURRENT MEDICATIONS:   aMIOdarone    Tablet 200 milliGRAM(s) Oral daily  atorvastatin 40 milliGRAM(s) Oral at bedtime  azithromycin   Tablet 250 milliGRAM(s) Oral <User Schedule>  glucagon  Injectable 1 milliGRAM(s) IntraMuscular once  influenza  Vaccine (HIGH DOSE) 0.7 milliLiter(s) IntraMuscular once  pantoprazole  Injectable 40 milliGRAM(s) IV Push two times a day  sevelamer carbonate 800 milliGRAM(s) Oral every 8 hours  tiotropium 2.5 MICROgram(s)/olodaterol 2.5 MICROgram(s) (STIOLTO) Inhaler 2 Puff(s) Inhalation daily      VITAL SIGNS, INS/OUTS (last 24 hours):  Vital Signs Last 24 Hrs  T(C): 36.5 (04 Dec 2023 11:35), Max: 36.7 (03 Dec 2023 14:00)  T(F): 97.7 (04 Dec 2023 11:35), Max: 98.1 (03 Dec 2023 14:00)  HR: 62 (04 Dec 2023 11:35) (52 - 62)  BP: 136/72 (04 Dec 2023 11:35) (124/70 - 145/79)  BP(mean): 98 (04 Dec 2023 11:08) (85 - 106)  RR: 18 (04 Dec 2023 11:35) (17 - 18)  SpO2: 94% (04 Dec 2023 11:35) (91% - 94%)    Parameters below as of 04 Dec 2023 11:35  Patient On (Oxygen Delivery Method): room air      I&O's Summary    03 Dec 2023 07:01  -  04 Dec 2023 07:00  --------------------------------------------------------  IN: 4100 mL / OUT: 0 mL / NET: 4100 mL        PHYSICAL EXAM:  Gen: Reclining in bed at time of exam, appears stated age  HEENT: NCAT, MMM, clear OP  Neck: supple, trachea at midline  CV: RRR, +S1/S2  Pulm: adequate respiratory effort, no increase in work of breathing  Abd: soft, NTND  Skin: warm and dry, no new rashes vs prior report  Ext: WWP, no LE edema  Neuro: AOx3, no gross focal neurological deficits  Psych: affect and behavior appropriate, pleasant at time of interview    BASIC LABS:                        9.2    4.32  )-----------( 149      ( 04 Dec 2023 07:14 )             29.5     12-04    141  |  94<L>  |  30<H>  ----------------------------<  52<LL>  4.0   |  27  |  8.41<H>    Ca    9.2      04 Dec 2023 07:14  Phos  5.5     12-04  Mg     2.7     12-04    TPro  8.0  /  Alb  3.8  /  TBili  0.4  /  DBili  x   /  AST  15  /  ALT  8<L>  /  AlkPhos  243<H>  12-02    PT/INR - ( 02 Dec 2023 13:00 )   PT: 13.1 sec;   INR: 1.15          PTT - ( 02 Dec 2023 13:00 )  PTT:43.8 sec  Urinalysis Basic - ( 04 Dec 2023 07:14 )    Color: x / Appearance: x / SG: x / pH: x  Gluc: 52 mg/dL / Ketone: x  / Bili: x / Urobili: x   Blood: x / Protein: x / Nitrite: x   Leuk Esterase: x / RBC: x / WBC x   Sq Epi: x / Non Sq Epi: x / Bacteria: x      CAPILLARY BLOOD GLUCOSE      POCT Blood Glucose.: 120 mg/dL (04 Dec 2023 09:03)  POCT Blood Glucose.: 55 mg/dL (04 Dec 2023 08:17)      OTHER LABS:        MICRODATA:      IMAGING:    EKG:    #Diet - Diet, NPO after Midnight:      NPO Start Date: 03-Dec-2023,   NPO Start Time: 23:59  Except Medications (12-03-23 @ 09:46) [Active]  Diet, Clear Liquid (12-03-23 @ 09:23) [Active]        #DVT PPx -  #Dispo -

## 2023-12-04 NOTE — PROGRESS NOTE ADULT - SUBJECTIVE AND OBJECTIVE BOX
SUBJECTIVE: Feeling well, no pain, clear/yellow BMs, Patient seen and examined bedside by chief resident.    aMIOdarone    Tablet 200 milliGRAM(s) Oral daily  azithromycin   Tablet 250 milliGRAM(s) Oral <User Schedule>    MEDICATIONS  (PRN):      I&O's Detail    03 Dec 2023 07:01  -  04 Dec 2023 07:00  --------------------------------------------------------  IN:    Oral Fluid: 4100 mL  Total IN: 4100 mL    OUT:    Voided (mL): 0 mL  Total OUT: 0 mL    Total NET: 4100 mL          Vital Signs Last 24 Hrs  T(C): 36.3 (04 Dec 2023 05:28), Max: 36.7 (03 Dec 2023 14:00)  T(F): 97.3 (04 Dec 2023 05:28), Max: 98.1 (03 Dec 2023 14:00)  HR: 56 (04 Dec 2023 08:15) (51 - 58)  BP: 140/69 (04 Dec 2023 08:15) (116/65 - 145/79)  BP(mean): 96 (04 Dec 2023 08:15) (85 - 106)  RR: 17 (04 Dec 2023 08:15) (17 - 19)  SpO2: 94% (04 Dec 2023 08:15) (92% - 95%)    Parameters below as of 04 Dec 2023 08:15  Patient On (Oxygen Delivery Method): room air        General: NAD, resting comfortably in bed  C/V: NSR  Pulm: Nonlabored breathing, no respiratory distress  Abd: soft, NT/ND  Extrem:  SCDs in place    LABS:                        9.2    4.32  )-----------( 149      ( 04 Dec 2023 07:14 )             29.5     12-04    141  |  94<L>  |  30<H>  ----------------------------<  52<LL>  4.0   |  27  |  8.41<H>    Ca    9.2      04 Dec 2023 07:14  Phos  5.5     12-04  Mg     2.7     12-04    TPro  8.0  /  Alb  3.8  /  TBili  0.4  /  DBili  x   /  AST  15  /  ALT  8<L>  /  AlkPhos  243<H>  12-02    PT/INR - ( 02 Dec 2023 13:00 )   PT: 13.1 sec;   INR: 1.15          PTT - ( 02 Dec 2023 13:00 )  PTT:43.8 sec  Urinalysis Basic - ( 04 Dec 2023 07:14 )    Color: x / Appearance: x / SG: x / pH: x  Gluc: 52 mg/dL / Ketone: x  / Bili: x / Urobili: x   Blood: x / Protein: x / Nitrite: x   Leuk Esterase: x / RBC: x / WBC x   Sq Epi: x / Non Sq Epi: x / Bacteria: x        RADIOLOGY & ADDITIONAL STUDIES:  CT Abdomen and Pelvis w/ Oral Cont and w/ IV Cont:   ACC: [id number] EXAM:  CT ABDOMEN AND PELVIS OC IC   ORDERED BY: MANUEL AGUILAR     PROCEDURE DATE:  12/02/2023          INTERPRETATION:  CLINICAL INFORMATION: Bloody diarrhea and left lower   quadrant pain    COMPARISON: CT of the abdomen and pelvis dated 06/13/2023    CONTRAST/COMPLICATIONS:  IV Contrast: Isovue 370  90 cc administered   10 cc discarded  Oral Contrast: Omnipaque 300  Complications: None reported at time of study completion    PROCEDURE:  CT of the Abdomen and Pelvis was performed.  Sagittal and coronal reformats were performed.    FINDINGS:  LOWER CHEST: Cardiomegaly. Atelectasis/consolidation at the lung bases.   Trace pleural effusions.    LIVER: Stable too small to characterize low attenuating foci.  BILE DUCTS: Normal caliber.  GALLBLADDER: Cholecystectomy.  SPLEEN: Indeterminate 7.0 cm hypoattenuating splenic lesion, previously   6.4 cm  PANCREAS: Few calcifications, likely sequela of pancreatitis.  ADRENALS: Within normal limits.  KIDNEYS/URETERS: Atrophic. Bilateral cysts and indeterminate low   attenuating foci.    BLADDER: Cystitis. Diffuse bladder wall thickening and urothelial   hyperenhancement.  REPRODUCTIVE ORGANS: Within normal limits.    BOWEL: No bowel obstruction. Appendix normal. Colonic diverticulosis.   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  PERITONEUM: No ascites.  VESSELS: Atherosclerotic changes. Ectatic suprarenal aorta and distal   descending aorta.  RETROPERITONEUM/LYMPH NODES: No lymphadenopathy.  ABDOMINAL WALL: See above.  BONES: Avascular necrosis, bilateral femoral heads. Degenerative changes.    IMPRESSION:  1.  No CT evidence of colitis.  2.  Cystitis.  3.  Indeterminate enlarging 7 cm splenic lesion. Advise MRI with and   without contrast for further assessment.  4.  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   Colorectal surgical referral recommended. Consider nonemergent pelvic MRI   with and without contrast for further assessment.  5.  Atelectasis/consolidation at the lung bases and trace pleural   effusions.  6.  Additional findings as above.    --- End ofReport ---            NILO OCASIO MD; Attending Radiologist  This document has been electronically signed. Dec  2 2023  3:59PM (12-02-23 @ 15:17)       SUBJECTIVE: Feeling well, no pain, clear/yellow BMs, Patient seen and examined bedside by chief resident.    aMIOdarone    Tablet 200 milliGRAM(s) Oral daily  azithromycin   Tablet 250 milliGRAM(s) Oral <User Schedule>    MEDICATIONS  (PRN):      I&O's Detail    03 Dec 2023 07:01  -  04 Dec 2023 07:00  --------------------------------------------------------  IN:    Oral Fluid: 4100 mL  Total IN: 4100 mL    OUT:    Voided (mL): 0 mL  Total OUT: 0 mL    Total NET: 4100 mL          Vital Signs Last 24 Hrs  T(C): 36.3 (04 Dec 2023 05:28), Max: 36.7 (03 Dec 2023 14:00)  T(F): 97.3 (04 Dec 2023 05:28), Max: 98.1 (03 Dec 2023 14:00)  HR: 56 (04 Dec 2023 08:15) (51 - 58)  BP: 140/69 (04 Dec 2023 08:15) (116/65 - 145/79)  BP(mean): 96 (04 Dec 2023 08:15) (85 - 106)  RR: 17 (04 Dec 2023 08:15) (17 - 19)  SpO2: 94% (04 Dec 2023 08:15) (92% - 95%)    Parameters below as of 04 Dec 2023 08:15  Patient On (Oxygen Delivery Method): room air        General: NAD, resting comfortably in bed  C/V: NSR  Pulm: Nonlabored breathing, no respiratory distress  Abd: soft, NT/ND  Extrem:  SCDs in place    LABS:                        9.2    4.32  )-----------( 149      ( 04 Dec 2023 07:14 )             29.5     12-04    141  |  94<L>  |  30<H>  ----------------------------<  52<LL>  4.0   |  27  |  8.41<H>    Ca    9.2      04 Dec 2023 07:14  Phos  5.5     12-04  Mg     2.7     12-04    TPro  8.0  /  Alb  3.8  /  TBili  0.4  /  DBili  x   /  AST  15  /  ALT  8<L>  /  AlkPhos  243<H>  12-02    PT/INR - ( 02 Dec 2023 13:00 )   PT: 13.1 sec;   INR: 1.15          PTT - ( 02 Dec 2023 13:00 )  PTT:43.8 sec  Urinalysis Basic - ( 04 Dec 2023 07:14 )    Color: x / Appearance: x / SG: x / pH: x  Gluc: 52 mg/dL / Ketone: x  / Bili: x / Urobili: x   Blood: x / Protein: x / Nitrite: x   Leuk Esterase: x / RBC: x / WBC x   Sq Epi: x / Non Sq Epi: x / Bacteria: x        RADIOLOGY & ADDITIONAL STUDIES:  CT Abdomen and Pelvis w/ Oral Cont and w/ IV Cont:   ACC: [id number] EXAM:  CT ABDOMEN AND PELVIS OC IC   ORDERED BY: MANUEL AGUILAR     PROCEDURE DATE:  12/02/2023          INTERPRETATION:  CLINICAL INFORMATION: Bloody diarrhea and left lower   quadrant pain    COMPARISON: CT of the abdomen and pelvis dated 06/13/2023    CONTRAST/COMPLICATIONS:  IV Contrast: Isovue 370  90 cc administered   10 cc discarded  Oral Contrast: Omnipaque 300  Complications: None reported at time of study completion    PROCEDURE:  CT of the Abdomen and Pelvis was performed.  Sagittal and coronal reformats were performed.    FINDINGS:  LOWER CHEST: Cardiomegaly. Atelectasis/consolidation at the lung bases.   Trace pleural effusions.    LIVER: Stable too small to characterize low attenuating foci.  BILE DUCTS: Normal caliber.  GALLBLADDER: Cholecystectomy.  SPLEEN: Indeterminate 7.0 cm hypoattenuating splenic lesion, previously   6.4 cm  PANCREAS: Few calcifications, likely sequela of pancreatitis.  ADRENALS: Within normal limits.  KIDNEYS/URETERS: Atrophic. Bilateral cysts and indeterminate low   attenuating foci.    BLADDER: Cystitis. Diffuse bladder wall thickening and urothelial   hyperenhancement.  REPRODUCTIVE ORGANS: Within normal limits.    BOWEL: No bowel obstruction. Appendix normal. Colonic diverticulosis.   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  PERITONEUM: No ascites.  VESSELS: Atherosclerotic changes. Ectatic suprarenal aorta and distal   descending aorta.  RETROPERITONEUM/LYMPH NODES: No lymphadenopathy.  ABDOMINAL WALL: See above.  BONES: Avascular necrosis, bilateral femoral heads. Degenerative changes.    IMPRESSION:  1.  No CT evidence of colitis.  2.  Cystitis.  3.  Indeterminate enlarging 7 cm splenic lesion. Advise MRI with and   without contrast for further assessment.  4.  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   Colorectal surgical referral recommended. Consider nonemergent pelvic MRI   with and without contrast for further assessment.  5.  Atelectasis/consolidation at the lung bases and trace pleural   effusions.  6.  Additional findings as above.    --- End ofReport ---            NILO OCASIO MD; Attending Radiologist  This document has been electronically signed. Dec  2 2023  3:59PM (12-02-23 @ 15:17)

## 2023-12-04 NOTE — PROGRESS NOTE ADULT - PROBLEM SELECTOR PLAN 3
EF has improved back to normal on an echo done @ [facility] Oct 2022.  Do need to watch for CHF if over hydrated. EF has improved back to normal on an echo done @ [facility] Oct 2022.  Do need to watch for CHF if over hydrated.

## 2023-12-04 NOTE — CHART NOTE - NSCHARTNOTEFT_GEN_A_CORE
Patient is s/p Colonoscopy performed in Endoscopy Unit    Several diverticula were seen in the whole colon. Diverticulosis appeared to be of moderate severity. Worse in right colon and cecum.	    Few polyps ranging in size from 5 mm to 8 mm were found in the ascending colon.	    Three polyps ranging in size from 1.1 cm to 1.8 cm were found in the descending colon. Multiple cold forceps targeted only biopsies were performed for histology in the descending colon.	    Two semi-pedunculated bleeding polyps of multilobular appearance ranging in size from 2 cm to 2.5 cm were found in the sigmoid colon. Multiple cold forceps both targeted and random biopsies were performed for histology in the sigmoid colon. Eval for malignancy.	    On retroflexion, there was a marisa-anal scarred tract, suggesting fistula.    Plan:	  ?Return to floor for further management ?  ?Await pathology results. ?  ?Continue current medical regimen. ?  ?Advance diet as tolerated ?  ?Suspicious appearing polyps in sigmoid would require definitive therapy, less likely amenable to endoscopic removal, would await path    Thank you for the courtesy of this consult. We will follow along with you.      Oliver Ferrari M.D.  Gastroenterology Fellow  Weekday 7am-5pm Pager: [phone]  Weeknights/Weekend/Holiday Coverage: Please Call the  for contact info  Follow Up Questions welcome via [facility] [employer] Teams Messenger Patient is s/p Colonoscopy performed in Endoscopy Unit    Several diverticula were seen in the whole colon. Diverticulosis appeared to be of moderate severity. Worse in right colon and cecum.	    Few polyps ranging in size from 5 mm to 8 mm were found in the ascending colon.	    Three polyps ranging in size from 1.1 cm to 1.8 cm were found in the descending colon. Multiple cold forceps targeted only biopsies were performed for histology in the descending colon.	    Two semi-pedunculated bleeding polyps of multilobular appearance ranging in size from 2 cm to 2.5 cm were found in the sigmoid colon. Multiple cold forceps both targeted and random biopsies were performed for histology in the sigmoid colon. Eval for malignancy.	    On retroflexion, there was a marisa-anal scarred tract, suggesting fistula.    Plan:	  ?Return to floor for further management ?  ?Await pathology results. ?  ?Continue current medical regimen. ?  ?Advance diet as tolerated ?  ?Suspicious appearing polyps in sigmoid would require definitive therapy, less likely amenable to endoscopic removal, would await path    Thank you for the courtesy of this consult. We will follow along with you.      Oliver Ferrari M.D.  Gastroenterology Fellow  Weekday 7am-5pm Pager: [phone]  Weeknights/Weekend/Holiday Coverage: Please Call the  for contact info  Follow Up Questions welcome via [facility] [employer] Teams Messenger

## 2023-12-04 NOTE — CONSULT NOTE ADULT - ASSESSMENT
67 yo M w/ ESRD on HD, admitted 12/2 for LGIB pending (7 bloody BMs in 2 days), admission Hb 9.2, now pending scope w/ GI. Nephrology consulted for HD    Assessment/Plan:   #ESRD on HD MWF @[address] [place] [address] [facility]   HD today per schedule  Next HD 12/6  Renal diet      #HTN   BP at goal       #access   LUE AVF functional     #anemia  Hb at goal 9.2  Will obtain outpatient records regarding recent iron studies    #renal bone disease   Ca ~9.2  Phos 5.5   C/w home ruthy Tenorio  PGY-5, Nephrology Fellow    P: [phone] 67 yo M w/ ESRD on HD, admitted 12/2 for LGIB pending (7 bloody BMs in 2 days), admission Hb 9.2, now pending scope w/ GI. Nephrology consulted for HD    Assessment/Plan:   #ESRD on HD MWF @[address] [place] [address] [facility]   HD today per schedule  Next HD 12/6  Renal diet      #HTN   BP at goal       #access   LUE AVF functional     #anemia  Hb at goal 9.2  Will obtain outpatient records regarding recent iron studies    #renal bone disease   Ca ~9.2  Phos 5.5   C/w home ruthy Tenorio  PGY-5, Nephrology Fellow    P: [phone] 69 yo M w/ ESRD on HD, admitted 12/2 for LGIB pending (7 bloody BMs in 2 days), admission Hb 9.2, now pending scope w/ GI. Nephrology consulted for HD    Assessment/Plan:   #ESRD on HD MWF @[address] [place] [address] [facility]   Outpatient HD prescription: 3 hours, F180N dialyzer, 2K 2.5 Ca dialysate, EDW 68.5 kg   HD today per schedule  Next HD 12/6  Renal diet      #HTN   BP at goal   Can hold home BP meds for now given recent GI bleeding and BP at goal    #access   LUE AVF functional     #anemia  Hb at goal 9.2  epo w/ HD      #renal bone disease   Ca ~9.2  Phos 5.5   C/w home sevelaraegan Tenorio  PGY-5, Nephrology Fellow    P: [phone] 69 yo M w/ ESRD on HD, admitted 12/2 for LGIB pending (7 bloody BMs in 2 days), admission Hb 9.2, now pending scope w/ GI. Nephrology consulted for HD    Assessment/Plan:   #ESRD on HD MWF @[address] [place] [address] [facility]   Outpatient HD prescription: 3 hours, F180N dialyzer, 2K 2.5 Ca dialysate, EDW 68.5 kg   HD today per schedule  Next HD 12/6  Renal diet      #HTN   BP at goal   Can hold home BP meds for now given recent GI bleeding and BP at goal    #access   LUE AVF functional     #anemia  Hb at goal 9.2  epo w/ HD      #renal bone disease   Ca ~9.2  Phos 5.5   C/w home sevelaraegan Tenorio  PGY-5, Nephrology Fellow    P: [phone]

## 2023-12-04 NOTE — CONSULT NOTE ADULT - SUBJECTIVE AND OBJECTIVE BOX
HPI:  68M w/ PMHx AFib s/p watchman, HTN, ERIK (not on CPAP), COPD, ESRD (MWF), T cell lymphoma (remission 2020), perirectal abscess s/p I&D (2010), PSHx lap jameson, LUE AVF (6/12/20, Qato), chest hematoma evacuation (12/18/20, Qato), L VATS decortication (1/22/21, Inra), Watchman (10/19/21, Juventino), bilat IHR (12/15/22, Belle), LUE AVF revision (1/18/23, Penny) here for LGIB since yesterday.    In the ED, pt was afebrile, nontachycardic, normotensive, and satting well on RA. Labs significant for WBC 5.23. Hgb initially 10.2 -> 9.2 without resuscitation.    PMHx: AFib s/p watchman, HTN, ERIK (not on CPAP), COPD, ESRD (MWF), T cell lymphoma (remission 2020), perirectal abscess s/p I&D (2010)  PSHx: lap jameson, LUE AVF (6/12/20, Qato), chest hematoma evacuation (12/18/20, Qato), L VATS decortication (1/22/21, Inra), Watchman (10/19/21, Juventino), bilat IHR (12/15/22, Belle), LUE AVF revision (1/18/23, Penny)  Family Hx: Denies family hx of IBS, Crohn's, UC, or colon cancer.  Last Cscope: 12 years ago, planned for repeat 12/20  Last EGD: Denies  All: No Known Allergies    Meds: azithromycin 250 mg oral tablet: 1 tab(s) orally Monday, Wednesday, and Friday   tiotropium-olodaterol 2.5 mcg-2.5 mcg/inh inhalation aerosol: 2 puff(s) inhaled once a day   sevelamer carbonate 800 mg oral tablet: 1 tab(s) orally every 8 hours  allopurinol 100 mg oral tablet: 1 tab(s) orally 2 times a day  amiodarone 200 mg oral tablet: 1 tab(s) orally once a day  atorvastatin 40 mg oral tablet: 1 tab(s) orally once a day  metoprolol succinate 50 mg oral tablet, extended release: 1 tab(s) orally once a day  aspirin 81 mg oral delayed release tablet: 1 tab(s) orally once a day  amLODIPine 5 mg oral tablet: 1 tab(s) orally once a day  sildenafil 20 TID   (02 Dec 2023 20:29)      PAST MEDICAL & SURGICAL HISTORY:  HTN (hypertension)      Atrial fibrillation      CKD (chronic kidney disease)      Lymphoma  t cell; remission since 11/2020      CHF, chronic  LVEF 65 PASP 59 midl MR, AR on 12/2/21      H/O pulmonary hypertension      Gout      BPH (benign prostatic hyperplasia)      COPD, mild      Mitral regurgitation      ERIK (obstructive sleep apnea)      ESRD (end stage renal disease)  dialysis M W F      History of cholecystectomy      Elective surgery  rectal surgery      History of surgery  watchman left- dialysis port      AV fistula  left arm      H/O inguinal hernia repair  bilateral            Allergies:  No Known Allergies      Home Medications:   acetaminophen 325 mg oral tablet: 2 tab(s) orally every 6 hours, As needed, Moderate Pain (4 - 6)  allopurinol 100 mg oral tablet: 1 tab(s) orally 2 times a day  amiodarone 200 mg oral tablet: 1 tab(s) orally once a day  amLODIPine 5 mg oral tablet: 1 tab(s) orally once a day  aspirin 81 mg oral delayed release tablet: 1 tab(s) orally once a day  atorvastatin 40 mg oral tablet: 1 tab(s) orally once a day  azithromycin 250 mg oral tablet: 1 tab(s) orally Monday, Wednesday, and Friday   metoprolol succinate 50 mg oral tablet, extended release: 1 tab(s) orally once a day  sevelamer carbonate 800 mg oral tablet: 1 tab(s) orally every 8 hours  tiotropium-olodaterol 2.5 mcg-2.5 mcg/inh inhalation aerosol: 2 puff(s) inhaled once a day       Hospital Medications:   MEDICATIONS  (STANDING):  aMIOdarone    Tablet 200 milliGRAM(s) Oral daily  atorvastatin 40 milliGRAM(s) Oral at bedtime  azithromycin   Tablet 250 milliGRAM(s) Oral <User Schedule>  glucagon  Injectable 1 milliGRAM(s) IntraMuscular once  influenza  Vaccine (HIGH DOSE) 0.7 milliLiter(s) IntraMuscular once  pantoprazole  Injectable 40 milliGRAM(s) IV Push two times a day  sevelamer carbonate 800 milliGRAM(s) Oral every 8 hours  tiotropium 2.5 MICROgram(s)/olodaterol 2.5 MICROgram(s) (STIOLTO) Inhaler 2 Puff(s) Inhalation daily      SOCIAL HISTORY:  Denies ETOh, Smoking    Family History:  FAMILY HISTORY:  FH: type 2 diabetes (Mother)          VITALS:  T(F): 97.7 (12-04-23 @ 11:35), Max: 98.1 (12-03-23 @ 14:00)  HR: 62 (12-04-23 @ 12:40)  BP: 136/72 (12-04-23 @ 12:40)  RR: 18 (12-04-23 @ 12:40)  SpO2: 94% (12-04-23 @ 12:40)  Wt(kg): --    12-03 @ 07:01  -  12-04 @ 07:00  --------------------------------------------------------  IN: 4100 mL / OUT: 0 mL / NET: 4100 mL      Height (cm): 180.3 (12-04 @ 12:40)  Weight (kg): 72.1 (12-04 @ 12:40)  BMI (kg/m2): 22.2 (12-04 @ 12:40)  BSA (m2): 1.91 (12-04 @ 12:40)  CAPILLARY BLOOD GLUCOSE      POCT Blood Glucose.: 120 mg/dL (04 Dec 2023 09:03)  POCT Blood Glucose.: 55 mg/dL (04 Dec 2023 08:17)      Review of Systems:  Negative except as mentioned in HPI    PHYSICAL EXAM:  GENERAL: Alert, awake, oriented x3   CHEST/LUNG: Bilateral clear breath sounds  HEART: Regular rate and rhythm, no murmur, no gallops, no rub   ABDOMEN: Soft, nontender, non distended  : No flank or supra pubic tenderness.  EXTREMITIES: no pedal edema  Neurology: AAOx3, no focal neurological deficit  Access: LUE AVF w/ thrill and bruit      LABS:  12-04    141  |  94<L>  |  30<H>  ----------------------------<  52<LL>  4.0   |  27  |  8.41<H>    Ca    9.2      04 Dec 2023 07:14  Phos  5.5     12-04  Mg     2.7     12-04      Creatinine Trend: 8.41 <--, 6.99 <--, 5.91 <--                        9.2    4.32  )-----------( 149      ( 04 Dec 2023 07:14 )             29.5     Urine Studies:  Urinalysis Basic - ( 04 Dec 2023 07:14 )    Color:  / Appearance:  / SG:  / pH:   Gluc: 52 mg/dL / Ketone:   / Bili:  / Urobili:    Blood:  / Protein:  / Nitrite:    Leuk Esterase:  / RBC:  / WBC    Sq Epi:  / Non Sq Epi:  / Bacteria:

## 2023-12-04 NOTE — PRE-ANESTHESIA EVALUATION ADULT - NSANTHSUBSTSD_GEN_ALL_CORE
----- Message from Cassie Walker sent at 7/23/2018 10:11 AM EDT -----  Contact: ALONA ESPINO CALLED ABOUT PATIENT RX. THE BENAZEPRIL-HYDROCHLORTHIAZIDE  IS ON BACK ORDER, ASKING IF THEY CAN FILL WITH THE INDIVIDUAL COMPONENTS.     [facility] [place]   No

## 2023-12-04 NOTE — CONSULT NOTE ADULT - ASSESSMENT
68M with PMH of a fib (s/p Watchman), HTN, ERIK (not on CPAP), COPD (quit smoking 10 years ago), ESRD (F), T cell lymphoma (remission 2020), perirectal abscess s/p I&D (2010), PSHx lap jameson, LUE AVF (6/12/20, [facility]), chest hematoma evacuation (12/18/20, [facility]), L VATS decortication (1/22/21, Eden), Watchman (10/19/21, Juventino), bilat IHR (12/15/22, Belle), LUE AVF revision (1/18/23, Penny) presented with BRBPr. Admitted for  LGIB      LGIB:  anemia:   anemia multifactorial with ACD in the setting of ESRD and LGIB   Hgb downtrended from time of admission 10.2-> 9.4--9.2-  Hemoglobin: 9.2   continue to trend daily CBC, keep active type and screen   plan for c -scope with GI     chronic atrial fibrillation   mild Aortic stenosis and moderate MR   chronic HFpEF - EF has improved back to normal on an last echo - pending repeat   HTN   s/p watchman + amiodarone now   resume toprol, asa if no further bleeding  not on AC   cw statin   pending repeat Echo   bp meds on hold - resume as tolerated   cardio following , will defer cardiac optimization to cardiology team     COPD and ERIK   not on cpap - states he had recent sleep study done and pending results  continue with home medications,       ESRD   HD m/w/f  HD today 12/4  fu recs from nephro     dvt ppx scd  68M with PMH of a fib (s/p Watchman), HTN, ERIK (not on CPAP), COPD (quit smoking 10 years ago), ESRD (F), T cell lymphoma (remission 2020), perirectal abscess s/p I&D (2010), PSHx lap jameson, LUE AVF (6/12/20, [facility]), chest hematoma evacuation (12/18/20, [facility]), L VATS decortication (1/22/21, Eden), Watchman (10/19/21, Juventino), bilat IHR (12/15/22, Belle), LUE AVF revision (1/18/23, Penny) presented with BRBPr. Admitted for  LGIB      LGIB:  anemia:   anemia multifactorial with ACD in the setting of ESRD and LGIB   Hgb downtrended from time of admission 10.2-> 9.4--9.2-  Hemoglobin: 9.2   continue to trend daily CBC, keep active type and screen   plan for c -scope with GI     chronic atrial fibrillation   mild Aortic stenosis and moderate MR   chronic HFpEF - EF has improved back to normal on an last echo - pending repeat   HTN   s/p watchman + amiodarone now   resume toprol, asa if no further bleeding  not on AC   cw statin   pending repeat Echo   bp meds on hold - resume as tolerated   cardio following , will defer cardiac optimization to cardiology team     COPD and ERIK   not on cpap - states he had recent sleep study done and pending results  continue with home medications,       ESRD   HD m/w/f  HD today 12/4  fu recs from nephro     dvt ppx scd

## 2023-12-04 NOTE — PRE-ANESTHESIA EVALUATION ADULT - NSANTHPMHFT_GEN_ALL_CORE
68M w/ PMHx AFib s/p watchman, HTN, ERIK (not on CPAP), COPD, ESRD (MWF), T cell lymphoma (remission 2020), perirectal abscess s/p I&D (2010), PSHx lap jameson, LUE AVF (6/12/20, Gabe), chest hematoma evacuation (12/18/20, Gabe), L VATS decortication (1/22/21, Eden), Watchman (10/19/21, Juventino), bilat IHR (12/15/22, Belle), LUE AVF revision (1/18/23, Penny) here for LGIB since yesterday.    In the ED, pt was afebrile, nontachycardic, normotensive, and satting well on RA. Labs significant for WBC 5.23. Hgb initially 10.2 -> 9.2 without resuscitation.

## 2023-12-04 NOTE — PROVIDER CONTACT NOTE (OTHER) - ASSESSMENT
A&Ox4, satting well RA, no comp. of pain, PT blood sugar was as per lab 52 , fingerstick done to recheck 55

## 2023-12-04 NOTE — PROGRESS NOTE ADULT - ATTENDING COMMENTS
I agree with the fellow's findings and plans as written above with the following additions/amendments:    Seen and examined on HD, tolerating wel, continue HD as above
AF, COPD, ESRD with LGI bleed  physical as above  hold amlodipine and sildenafil  continue amiodarone  H/H stable  colonoscopy tomorrow   HD

## 2023-12-05 ENCOUNTER — TRANSCRIPTION ENCOUNTER (OUTPATIENT)
Age: 68
End: 2023-12-05

## 2023-12-05 VITALS — TEMPERATURE: 98 F

## 2023-12-05 LAB
ANION GAP SERPL CALC-SCNC: 13 MMOL/L — SIGNIFICANT CHANGE UP (ref 5–17)
ANION GAP SERPL CALC-SCNC: 13 MMOL/L — SIGNIFICANT CHANGE UP (ref 5–17)
BUN SERPL-MCNC: 13 MG/DL — SIGNIFICANT CHANGE UP (ref 7–23)
BUN SERPL-MCNC: 13 MG/DL — SIGNIFICANT CHANGE UP (ref 7–23)
CALCIUM SERPL-MCNC: 9.1 MG/DL — SIGNIFICANT CHANGE UP (ref 8.4–10.5)
CALCIUM SERPL-MCNC: 9.1 MG/DL — SIGNIFICANT CHANGE UP (ref 8.4–10.5)
CHLORIDE SERPL-SCNC: 96 MMOL/L — SIGNIFICANT CHANGE UP (ref 96–108)
CHLORIDE SERPL-SCNC: 96 MMOL/L — SIGNIFICANT CHANGE UP (ref 96–108)
CO2 SERPL-SCNC: 30 MMOL/L — SIGNIFICANT CHANGE UP (ref 22–31)
CO2 SERPL-SCNC: 30 MMOL/L — SIGNIFICANT CHANGE UP (ref 22–31)
CREAT SERPL-MCNC: 5.67 MG/DL — HIGH (ref 0.5–1.3)
CREAT SERPL-MCNC: 5.67 MG/DL — HIGH (ref 0.5–1.3)
EGFR: 10 ML/MIN/1.73M2 — LOW
EGFR: 10 ML/MIN/1.73M2 — LOW
GLUCOSE SERPL-MCNC: 61 MG/DL — LOW (ref 70–99)
GLUCOSE SERPL-MCNC: 61 MG/DL — LOW (ref 70–99)
HCT VFR BLD CALC: 30.4 % — LOW (ref 39–50)
HCT VFR BLD CALC: 30.4 % — LOW (ref 39–50)
HGB BLD-MCNC: 9.7 G/DL — LOW (ref 13–17)
HGB BLD-MCNC: 9.7 G/DL — LOW (ref 13–17)
MAGNESIUM SERPL-MCNC: 2.3 MG/DL — SIGNIFICANT CHANGE UP (ref 1.6–2.6)
MAGNESIUM SERPL-MCNC: 2.3 MG/DL — SIGNIFICANT CHANGE UP (ref 1.6–2.6)
MCHC RBC-ENTMCNC: 30.9 PG — SIGNIFICANT CHANGE UP (ref 27–34)
MCHC RBC-ENTMCNC: 30.9 PG — SIGNIFICANT CHANGE UP (ref 27–34)
MCHC RBC-ENTMCNC: 31.9 GM/DL — LOW (ref 32–36)
MCHC RBC-ENTMCNC: 31.9 GM/DL — LOW (ref 32–36)
MCV RBC AUTO: 96.8 FL — SIGNIFICANT CHANGE UP (ref 80–100)
MCV RBC AUTO: 96.8 FL — SIGNIFICANT CHANGE UP (ref 80–100)
NRBC # BLD: 0 /100 WBCS — SIGNIFICANT CHANGE UP (ref 0–0)
NRBC # BLD: 0 /100 WBCS — SIGNIFICANT CHANGE UP (ref 0–0)
PHOSPHATE SERPL-MCNC: 4.9 MG/DL — HIGH (ref 2.5–4.5)
PHOSPHATE SERPL-MCNC: 4.9 MG/DL — HIGH (ref 2.5–4.5)
PLATELET # BLD AUTO: 139 K/UL — LOW (ref 150–400)
PLATELET # BLD AUTO: 139 K/UL — LOW (ref 150–400)
POTASSIUM SERPL-MCNC: 4.4 MMOL/L — SIGNIFICANT CHANGE UP (ref 3.5–5.3)
POTASSIUM SERPL-MCNC: 4.4 MMOL/L — SIGNIFICANT CHANGE UP (ref 3.5–5.3)
POTASSIUM SERPL-SCNC: 4.4 MMOL/L — SIGNIFICANT CHANGE UP (ref 3.5–5.3)
POTASSIUM SERPL-SCNC: 4.4 MMOL/L — SIGNIFICANT CHANGE UP (ref 3.5–5.3)
RBC # BLD: 3.14 M/UL — LOW (ref 4.2–5.8)
RBC # BLD: 3.14 M/UL — LOW (ref 4.2–5.8)
RBC # FLD: 16 % — HIGH (ref 10.3–14.5)
RBC # FLD: 16 % — HIGH (ref 10.3–14.5)
SODIUM SERPL-SCNC: 139 MMOL/L — SIGNIFICANT CHANGE UP (ref 135–145)
SODIUM SERPL-SCNC: 139 MMOL/L — SIGNIFICANT CHANGE UP (ref 135–145)
WBC # BLD: 3.73 K/UL — LOW (ref 3.8–10.5)
WBC # BLD: 3.73 K/UL — LOW (ref 3.8–10.5)
WBC # FLD AUTO: 3.73 K/UL — LOW (ref 3.8–10.5)
WBC # FLD AUTO: 3.73 K/UL — LOW (ref 3.8–10.5)

## 2023-12-05 PROCEDURE — 86706 HEP B SURFACE ANTIBODY: CPT

## 2023-12-05 PROCEDURE — 86803 HEPATITIS C AB TEST: CPT

## 2023-12-05 PROCEDURE — 86900 BLOOD TYPING SEROLOGIC ABO: CPT

## 2023-12-05 PROCEDURE — 85025 COMPLETE CBC W/AUTO DIFF WBC: CPT

## 2023-12-05 PROCEDURE — 93005 ELECTROCARDIOGRAM TRACING: CPT

## 2023-12-05 PROCEDURE — 82962 GLUCOSE BLOOD TEST: CPT

## 2023-12-05 PROCEDURE — 85027 COMPLETE CBC AUTOMATED: CPT

## 2023-12-05 PROCEDURE — 88305 TISSUE EXAM BY PATHOLOGIST: CPT

## 2023-12-05 PROCEDURE — 80053 COMPREHEN METABOLIC PANEL: CPT

## 2023-12-05 PROCEDURE — 83036 HEMOGLOBIN GLYCOSYLATED A1C: CPT

## 2023-12-05 PROCEDURE — 99231 SBSQ HOSP IP/OBS SF/LOW 25: CPT

## 2023-12-05 PROCEDURE — 94640 AIRWAY INHALATION TREATMENT: CPT

## 2023-12-05 PROCEDURE — 99285 EMERGENCY DEPT VISIT HI MDM: CPT

## 2023-12-05 PROCEDURE — 74177 CT ABD & PELVIS W/CONTRAST: CPT | Mod: MA

## 2023-12-05 PROCEDURE — 84100 ASSAY OF PHOSPHORUS: CPT

## 2023-12-05 PROCEDURE — 85610 PROTHROMBIN TIME: CPT

## 2023-12-05 PROCEDURE — 36415 COLL VENOUS BLD VENIPUNCTURE: CPT

## 2023-12-05 PROCEDURE — 86901 BLOOD TYPING SEROLOGIC RH(D): CPT

## 2023-12-05 PROCEDURE — 80048 BASIC METABOLIC PNL TOTAL CA: CPT

## 2023-12-05 PROCEDURE — 86850 RBC ANTIBODY SCREEN: CPT

## 2023-12-05 PROCEDURE — 90935 HEMODIALYSIS ONE EVALUATION: CPT

## 2023-12-05 PROCEDURE — 87340 HEPATITIS B SURFACE AG IA: CPT

## 2023-12-05 PROCEDURE — 83735 ASSAY OF MAGNESIUM: CPT

## 2023-12-05 PROCEDURE — 85730 THROMBOPLASTIN TIME PARTIAL: CPT

## 2023-12-05 RX ORDER — METOPROLOL TARTRATE 50 MG
1 TABLET ORAL
Qty: 0 | Refills: 0 | DISCHARGE

## 2023-12-05 RX ORDER — METOPROLOL TARTRATE 50 MG
1 TABLET ORAL
Qty: 30 | Refills: 0
Start: 2023-12-05 | End: 2024-01-03

## 2023-12-05 RX ORDER — ASPIRIN/CALCIUM CARB/MAGNESIUM 324 MG
1 TABLET ORAL
Qty: 0 | Refills: 0 | DISCHARGE

## 2023-12-05 RX ADMIN — PANTOPRAZOLE SODIUM 40 MILLIGRAM(S): 20 TABLET, DELAYED RELEASE ORAL at 05:24

## 2023-12-05 RX ADMIN — SEVELAMER CARBONATE 800 MILLIGRAM(S): 2400 POWDER, FOR SUSPENSION ORAL at 08:59

## 2023-12-05 RX ADMIN — SEVELAMER CARBONATE 800 MILLIGRAM(S): 2400 POWDER, FOR SUSPENSION ORAL at 01:33

## 2023-12-05 RX ADMIN — AMIODARONE HYDROCHLORIDE 200 MILLIGRAM(S): 400 TABLET ORAL at 05:24

## 2023-12-05 NOTE — PROGRESS NOTE ADULT - SUBJECTIVE AND OBJECTIVE BOX
INTERVAL HISTORY:  s/p colonoscopy, results pending  	  MEDICATIONS:  aMIOdarone    Tablet 200 milliGRAM(s) Oral daily    azithromycin   Tablet 250 milliGRAM(s) Oral <User Schedule>    tiotropium 2.5 MICROgram(s)/olodaterol 2.5 MICROgram(s) (STIOLTO) Inhaler 2 Puff(s) Inhalation daily    acetaminophen     Tablet .. 1000 milliGRAM(s) Oral every 6 hours PRN    pantoprazole  Injectable 40 milliGRAM(s) IV Push two times a day    atorvastatin 40 milliGRAM(s) Oral at bedtime  glucagon  Injectable 1 milliGRAM(s) IntraMuscular once    epoetin donny-epbx (RETACRIT) Injectable 7000 Unit(s) IV Push once  influenza  Vaccine (HIGH DOSE) 0.7 milliLiter(s) IntraMuscular once        PHYSICAL EXAM:  T(C): 36.6 (12-05-23 @ 05:37), Max: 36.6 (12-04-23 @ 17:42)  HR: 52 (12-05-23 @ 03:44) (52 - 68)  BP: 126/77 (12-05-23 @ 03:44) (104/60 - 140/69)  RR: 18 (12-05-23 @ 03:44) (17 - 18)  SpO2: 93% (12-05-23 @ 00:01) (91% - 96%)  Wt(kg): --  I&O's Summary    04 Dec 2023 07:01  -  05 Dec 2023 07:00  --------------------------------------------------------  IN: 0 mL / OUT: 1300 mL / NET: -1300 mL      Height (cm): 180.3 (12-04 @ 12:40)  Weight (kg): 72.1 (12-04 @ 12:40)  BMI (kg/m2): 22.2 (12-04 @ 12:40)  BSA (m2): 1.91 (12-04 @ 12:40)    Appearance: Normal	  Cardiovascular: Normal S1 S2, No JVD, OLVIN, ASM, No edema  Respiratory: Lungs clear to auscultation	  Psychiatry: A & O x 3, Mood & affect appropriate  Gastrointestinal:  Soft, Non-tender, + BS	  Skin: No rashes, No ecchymoses, No cyanosis  Neurologic: Non-focal  Extremities:  No clubbing, cyanosis or edema       TELEMETRY: 	    ECG:  	  RADIOLOGY:   DIAGNOSTIC TESTING:  [ ] Echocardiogram:  [ ]  Catheterization:  [ ] Stress Test:    OTHER: 	    LABS:	 	    CARDIAC MARKERS:                                  9.7    3.73  )-----------( 139      ( 05 Dec 2023 07:09 )             30.4     12-04    141  |  94<L>  |  30<H>  ----------------------------<  52<LL>  4.0   |  27  |  8.41<H>    Ca    9.2      04 Dec 2023 07:14  Phos  5.5     12-04  Mg     2.7     12-04      proBNP:   Lipid Profile:   HgA1c:   TSH:     ASSESSMENT/PLAN:

## 2023-12-05 NOTE — DISCHARGE NOTE PROVIDER - PROVIDER TOKENS
PROVIDER:[TOKEN:[[id number]]] PROVIDER:[TOKEN:[[id number]]],PROVIDER:[TOKEN:[[id number]]],PROVIDER:[TOKEN:[[id number]]] PROVIDER:[TOKEN:[[id number]]],PROVIDER:[TOKEN:[[id number]]],PROVIDER:[TOKEN:[[id number]]]

## 2023-12-05 NOTE — DISCHARGE NOTE PROVIDER - HOSPITAL COURSE
68M w/ AFib s/p watchman (on ASA), HTN, COPD, ESRD (MWF), T cell lymphoma (remission 2020), multiple surgeries presented to [facility] on 12/2 with LGIB. Pt presented with 7 bloody BMs over the past 2 days, Hgb on admission was 10.2 that lowered to 9.2 on repeat. Pt was admitted to SICU for HD monitoring, stepped down to [facility] (12/3) and underwent c-scope on 12/4 which showed polyps (5 mm to 8 mm) in the ascending colon, polyps (1.1 cm to 1.8 cm) in the descending colon (bx sent), polyps (2 cm to 2.5 cm) in the sigmoid colon, source of bleeding (bx sent), marias-anal scarred tract, suggesting fistula, moderate diverticulosis. Pts diet was advanced and tolerated. Pt received dialysis per usual schedule. Pt had no further bleeding episodes and on day of discharge pt was stable for dc home.    68M w/ AFib s/p watchman (on ASA), HTN, COPD, ESRD (MWF), T cell lymphoma (remission 2020), multiple surgeries presented to [facility] on 12/2 with LGIB. Pt presented with 7 bloody BMs over the past 2 days, Hgb on admission was 10.2 that lowered to 9.2 on repeat. Pt was admitted to SICU for HD monitoring, stepped down to [facility] (12/3) and underwent c-scope on 12/4 which showed polyps (5 mm to 8 mm) in the ascending colon, polyps (1.1 cm to 1.8 cm) in the descending colon (bx sent), polyps (2 cm to 2.5 cm) in the sigmoid colon, source of bleeding (bx sent), marisa-anal scarred tract, suggesting fistula, moderate diverticulosis. Pts diet was advanced and tolerated. Pt received dialysis per usual schedule. Pt had no further bleeding episodes and on day of discharge pt was stable for dc home.    68M w/ AFib s/p watchman (on ASA), HTN, COPD, ESRD (MWF), T cell lymphoma (remission 2020), multiple surgeries presented to [facility] on 12/2 with LGIB. Pt presented with 7 bloody BMs over the past 2 days, Hgb on admission was 10.2 that lowered to 9.2 on repeat. Pt was admitted to SICU for HD monitoring, stepped down to [facility] (12/3) and underwent c-scope on 12/4 which showed polyps (5 mm to 8 mm) in the ascending colon, polyps (1.1 cm to 1.8 cm) in the descending colon (bx sent), polyps (2 cm to 2.5 cm) in the sigmoid colon, source of bleeding (bx sent), marisa-anal scarred tract, suggesting fistula, moderate diverticulosis. Pts diet was advanced and tolerated. Pt received dialysis per usual schedule. Pt had no further bleeding episodes and on day of discharge pt was stable for dc home with outpt follow up.   68M w/ AFib s/p watchman (on ASA), HTN, COPD, ESRD (MWF), T cell lymphoma (remission 2020), multiple surgeries presented to [facility] on 12/2 with LGIB. Pt presented with 7 bloody BMs over the past 2 days, Hgb on admission was 10.2 that lowered to 9.2 on repeat. Pt was admitted to SICU for HD monitoring, stepped down to [facility] (12/3) and underwent c-scope on 12/4 which showed polyps (5 mm to 8 mm) in the ascending colon, polyps (1.1 cm to 1.8 cm) in the descending colon (bx sent), polyps (2 cm to 2.5 cm) in the sigmoid colon, source of bleeding (bx sent), marisa-anal scarred tract, suggesting fistula, moderate diverticulosis. Pts diet was advanced and tolerated. Pt received dialysis per usual schedule. Pt had no further bleeding episodes and on day of discharge pt was stable for dc home with outpt follow up.

## 2023-12-05 NOTE — PROGRESS NOTE ADULT - ASSESSMENT
68M w/ AFib s/p watchman (on ASA), COPD, ESRD (MWF), multiple surgeries here for LGIB. Pt presented with 7 bloody BMs over the past 2 days, Hgb on admission was 10.2 that lowered to 9.2 on repeat. Admit to SICU for HD monitoring.    Neuro: nausea PRN, neuro intact  CV: continue lipitor 40, amio 200, hold metop 50, ASA 81, hold amlodipine 5, sildenafil 20 TID  Pulm: Hx of COPD on tiotropium-olodaterol, azithromycin 250 MWF, chronic nonproductive cough, ERIK not on CPAP  GI: CLD, npo after midnight, monitor for bloody BMs, golytely prep for c-scope 12/4  : ESRD MWF, sevelamer 800 TID  Heme: active T&S, trend Hgb, goal >7  PPx: SCDs  Dispo: SDU
 68M w/ AFib s/p watchman (on ASA), HTN, COPD, ESRD (MWF), T cell lymphoma (remission 2020), multiple surgeries here for LGIB. Pt presented with 7 bloody BMs over the past 2 days, Hgb on admission was 10.2 that lowered to 9.2 on repeat. Admit to SICU for HD monitoring, steped down to tele (12/3)    NPO/IVF  Pain/nausea PRN  ERIK not on CPAP  Monitor for bloody BMs  ESRD, HD MWF  SCDs/OOBA  AM labs  Scope w/ GI
68M w/ AFib s/p watchman (on ASA), HTN, COPD, ESRD (MWF), T cell lymphoma (remission 2020), multiple surgeries here for LGIB. Pt presented with 7 bloody BMs over the past 2 days, Hgb on admission was 10.2 that lowered to 9.2 on repeat. Admit to SICU for HD monitoring, steped down to tele (12/3) now s/p cscope w/ w/ polyps (5 mm to 8 mm) in the ascending colon, polyps (1.1 cm to 1.8 cm) in the descending colon (bx sent), polyps (2 cm to 2.5 cm) in the sigmoid colon, source of bleeding (bx sent), marisa-anal scarred tract, suggesting fistula, moderate diverticulosis     CLD  Pain/nausea PRN  ERIK not on CPAP  ESRD, HD MWF  SCDs/OOBA  Holding SQH  AM labs    Plans to be discussed with attending and chief resident for finalization.

## 2023-12-05 NOTE — PROGRESS NOTE ADULT - SUBJECTIVE AND OBJECTIVE BOX
Overnight events: No acute overnight events.       POD#1 scope    SUBJECTIVE: Patient seen at bedside with chief resident. Patient denies any further BM. He denies any pain, nausea or vomiting.       MEDICATIONS  (STANDING):  aMIOdarone    Tablet 200 milliGRAM(s) Oral daily  atorvastatin 40 milliGRAM(s) Oral at bedtime  azithromycin   Tablet 250 milliGRAM(s) Oral <User Schedule>  epoetin donny-epbx (RETACRIT) Injectable 7000 Unit(s) IV Push once  glucagon  Injectable 1 milliGRAM(s) IntraMuscular once  influenza  Vaccine (HIGH DOSE) 0.7 milliLiter(s) IntraMuscular once  pantoprazole  Injectable 40 milliGRAM(s) IV Push two times a day  sevelamer carbonate 800 milliGRAM(s) Oral every 8 hours  tiotropium 2.5 MICROgram(s)/olodaterol 2.5 MICROgram(s) (STIOLTO) Inhaler 2 Puff(s) Inhalation daily    MEDICATIONS  (PRN):  acetaminophen     Tablet .. 1000 milliGRAM(s) Oral every 6 hours PRN Mild Pain (1 - 3)      Vital Signs Last 24 Hrs  T(C): 36.6 (05 Dec 2023 05:37), Max: 36.6 (04 Dec 2023 17:42)  T(F): 97.9 (05 Dec 2023 05:37), Max: 97.9 (04 Dec 2023 17:42)  HR: 52 (05 Dec 2023 03:44) (52 - 68)  BP: 126/77 (05 Dec 2023 03:44) (104/60 - 140/69)  BP(mean): 95 (05 Dec 2023 03:44) (77 - 98)  RR: 18 (05 Dec 2023 03:44) (17 - 18)  SpO2: 93% (05 Dec 2023 00:01) (91% - 96%)    Parameters below as of 05 Dec 2023 03:44  Patient On (Oxygen Delivery Method): room air        Physical Exam:  General: NAD, resting comfortably in bed  Pulmonary: Nonlabored breathing, no respiratory distress  Cardiovascular: NSR  Abdominal: soft, NT/ND  Extremities: WWP, normal strength  Neuro: A/O x 3, CNs II-XII grossly intact,     I&O's Summary    03 Dec 2023 07:01  -  04 Dec 2023 07:00  --------------------------------------------------------  IN: 4100 mL / OUT: 0 mL / NET: 4100 mL    04 Dec 2023 07:01  -  05 Dec 2023 06:59  --------------------------------------------------------  IN: 0 mL / OUT: 1300 mL / NET: -1300 mL        LABS:                        9.2    4.32  )-----------( 149      ( 04 Dec 2023 07:14 )             29.5     12-04    141  |  94<L>  |  30<H>  ----------------------------<  52<LL>  4.0   |  27  |  8.41<H>    Ca    9.2      04 Dec 2023 07:14  Phos  5.5     12-04  Mg     2.7     12-04        Urinalysis Basic - ( 04 Dec 2023 07:14 )    Color: x / Appearance: x / SG: x / pH: x  Gluc: 52 mg/dL / Ketone: x  / Bili: x / Urobili: x   Blood: x / Protein: x / Nitrite: x   Leuk Esterase: x / RBC: x / WBC x   Sq Epi: x / Non Sq Epi: x / Bacteria: x      CAPILLARY BLOOD GLUCOSE      POCT Blood Glucose.: 120 mg/dL (04 Dec 2023 09:03)  POCT Blood Glucose.: 55 mg/dL (04 Dec 2023 08:17)        RADIOLOGY & ADDITIONAL STUDIES:

## 2023-12-05 NOTE — DISCHARGE NOTE PROVIDER - NSDCCPCAREPLAN_GEN_ALL_CORE_FT
PRINCIPAL DISCHARGE DIAGNOSIS  Diagnosis: Acute lower GI bleeding  Assessment and Plan of Treatment:

## 2023-12-05 NOTE — DISCHARGE NOTE PROVIDER - CARE PROVIDER_API CALL
Yosvany Nolasco  Colon/Rectal Surgery  [address], [address]  [place] [address]  Phone: [phone]  Fax: [phone]  Follow Up Time:    Yosvany Nolasco  Colon/Rectal Surgery  [address], [address]  [place] [address]  Phone: [phone]  Fax: [phone]  Follow Up Time:    Yosvany Nolasco  Colon/Rectal Surgery  [address], [address]  [place] [address]  Phone: [phone]  Fax: [phone]  Follow Up Time:     Naman Piper  Gastroenterology  [address]  [place] [address]  Phone: [phone]  Fax: [phone]  Follow Up Time:     Carlos Ventura  Cardiovascular Disease  [address] [address]  Phone: [phone]  Fax: [phone]  Follow Up Time:    Yosvany Nolasco  Colon/Rectal Surgery  [address], [address]  [place] [address]  Phone: [phone]  Fax: [phone]  Follow Up Time:     Naman Piper  Gastroenterology  [address]  [place] [address]  Phone: [phone]  Fax: [phone]  Follow Up Time:     Carlos Ventura  Cardiovascular Disease  [address] [address]  Phone: [phone]  Fax: [phone]  Follow Up Time:

## 2023-12-05 NOTE — DISCHARGE NOTE PROVIDER - CARE PROVIDERS DIRECT ADDRESSES
,DirectAddress_Unknown ,DirectAddress_Unknown,DirectAddress_Unknown,[email or web address] ,DirectAddress_Unknown,DirectAddress_Unknown,[email or web address]

## 2023-12-05 NOTE — DISCHARGE NOTE NURSING/CASE MANAGEMENT/SOCIAL WORK - PATIENT PORTAL LINK FT
You can access the FollowMyHealth Patient Portal offered by [facility] by registering at the following website: [email or web address]. By joining [employer]’s FollowMyHealth portal, you will also be able to view your health information using other applications (apps) compatible with our system. You can access the FollowMyHealth Patient Portal offered by [facility] by registering at the following website: [email or web address]. By joining [employer]’s FollowMyHealth portal, you will also be able to view your health information using other applications (apps) compatible with our system.

## 2023-12-05 NOTE — DISCHARGE NOTE PROVIDER - DID THE PATIENT PRESENT WITH OR WAS TREATED FOR MALNUTRITION DURING THIS ADMISSION
[FreeTextEntry6] : Afrin and lidocaine were sprayed topically in both nasal passages. Rigid scope #1 was used. Right nasal passage with crusting that was debrided with forceps.  Large crust that was straddling the septal perforation was taken down piecemeal until the entire thing was removed. There was crusting along the sphenoidotomy which was gently debrided and suctioned as well bilateral maxillary antrostomies were patent and these were suctioned clear of some crusting and mucus as well.  At the conclusion left and right nasal passages were both widely patent.  Patient tolerated the procedure well.  No

## 2023-12-05 NOTE — PROGRESS NOTE ADULT - PROBLEM SELECTOR PLAN 5
MR has ranged from mild to severe.  With HD and BP control it was mild to moderate on echo Oct 2022.
MR has ranged from mild to severe.  With HD and BP control it was mild to moderate on echo Oct 2022.
6

## 2023-12-05 NOTE — DISCHARGE NOTE PROVIDER - NPI NUMBER (FOR SYSADMIN USE ONLY) :
[[id number]] [[id number]] [[id number]],[[id number]],[[id number]] [[id number]],[[id number]],[[id number]]

## 2023-12-05 NOTE — DISCHARGE NOTE NURSING/CASE MANAGEMENT/SOCIAL WORK - NSDCPEFALRISK_GEN_ALL_CORE
For information on Fall & Injury Prevention, visit: [email or web address] OR  [email or web address] OR  https://www.cdc.gov/steadi/patient.html For information on Fall & Injury Prevention, visit: [email or web address]://[email or web address]/[email or web address]-health-[email or web address]-mobility OR  [email or web address]://www.[email or web address]/[email or web address]-prevention-tips-to-avoid-injury OR  https://www.cdc.gov/steadi/patient.html

## 2023-12-05 NOTE — DISCHARGE NOTE PROVIDER - NSDCMRMEDTOKEN_GEN_ALL_CORE_FT
acetaminophen 325 mg oral tablet: 2 tab(s) orally every 6 hours, As needed, Moderate Pain (4 - 6)  allopurinol 100 mg oral tablet: 1 tab(s) orally 2 times a day  amiodarone 200 mg oral tablet: 1 tab(s) orally once a day  amLODIPine 5 mg oral tablet: 1 tab(s) orally once a day  aspirin 81 mg oral delayed release tablet: 1 tab(s) orally once a day  atorvastatin 40 mg oral tablet: 1 tab(s) orally once a day  azithromycin 250 mg oral tablet: 1 tab(s) orally Monday, Wednesday, and Friday   metoprolol succinate 50 mg oral tablet, extended release: 1 tab(s) orally once a day  sevelamer carbonate 800 mg oral tablet: 1 tab(s) orally every 8 hours  tiotropium-olodaterol 2.5 mcg-2.5 mcg/inh inhalation aerosol: 2 puff(s) inhaled once a day    acetaminophen 325 mg oral tablet: 2 tab(s) orally every 6 hours, As needed, Moderate Pain (4 - 6)  allopurinol 100 mg oral tablet: 1 tab(s) orally 2 times a day  amiodarone 200 mg oral tablet: 1 tab(s) orally once a day  amLODIPine 5 mg oral tablet: 1 tab(s) orally once a day  atorvastatin 40 mg oral tablet: 1 tab(s) orally once a day  azithromycin 250 mg oral tablet: 1 tab(s) orally Monday, Wednesday, and Friday   metoprolol succinate 25 mg oral tablet, extended release: 1 tab(s) orally once a day  sevelamer carbonate 800 mg oral tablet: 1 tab(s) orally every 8 hours  tiotropium-olodaterol 2.5 mcg-2.5 mcg/inh inhalation aerosol: 2 puff(s) inhaled once a day

## 2023-12-05 NOTE — DISCHARGE NOTE PROVIDER - NSDCFUSCHEDAPPT_GEN_ALL_CORE_FT
Edwardo Wade  [facility]  GASTRO  [address]  Scheduled Appointment: 12/20/2023    Elise Johnson  [facility]  PULMMED [address] [place] [address]  Scheduled Appointment: 02/06/2024     Edwardo Wade  [facility]  GASTRO  [address]  Scheduled Appointment: 12/20/2023    Elise Johnson  [facility]  PULMMED [address] [place] [address]  Scheduled Appointment: 02/06/2024

## 2023-12-05 NOTE — PROGRESS NOTE ADULT - PROBLEM SELECTOR PLAN 3
EF has improved back to normal on an echo done @ [facility] Oct 2022.  Do need to watch for CHF if over hydrated. EF has improved back to normal on an echo done @ [facility] Oct 2022.  Do need to watch for CHF if over hydrated.

## 2023-12-05 NOTE — PROGRESS NOTE ADULT - PROBLEM SELECTOR PLAN 4
Mild AS by echo.  A2 preserved.  Cleared to proceed with colonoscopy.
Mild AS by echo.  A2 preserved.  Cleared to proceed with colonoscopy.  s/p colonoscopy.  Pt tolerated it well.  Results to be posted.
Statement Selected

## 2023-12-05 NOTE — DISCHARGE NOTE PROVIDER - NSDCFUADDINST_GEN_ALL_CORE_FT
Gastroenterology Follow Up:  Please follow up with Dr. Piper in 1-2 weeks to follow up on results of biopsies taken during your colonoscopy; you may call the office to make an appointment at your earliest convenience.    Surgery Follow Up:  Please follow up with Dr. Nolasco as needed; you may call the office to make an appointment at your earliest convenience.     General Discharge Instructions:  Please resume all regular home medications unless specifically advised not to take a particular medication. Also, please take any new medications as prescribed.  Please get plenty of rest, continue to ambulate several times per day, and drink adequate amounts of fluids.   Please follow-up with your surgeon and Primary Care Provider (PCP) in 1-2 weeks.    Warning Signs:  Please call your doctor if you experience the following:  *You experience new chest pain, pressure, squeezing or tightness.  *New or worsening cough, shortness of breath, or wheeze.  *If you are vomiting and cannot keep down fluids or your medications.  *You are getting dehydrated due to continued vomiting, diarrhea, or other reasons. Signs of dehydration include dry mouth, rapid heartbeat, or feeling dizzy or faint when standing.  *You see blood or dark/black material when you vomit or have a bowel movement.  *You experience burning when you urinate, have blood in your urine, or experience a discharge.  *Your pain is not improving within 8-12 hours or is not gone within 24 hours. Call or return immediately if your pain is getting worse, changes location, or moves to your chest or back.  *You have shaking chills, or fever greater than 101.5 degrees Fahrenheit or 38 degrees Celsius.  *Any change in your symptoms, or any new symptoms that concern you.   Gastroenterology Follow Up:  Please follow up with Dr. Piper in 1-2 weeks to follow up on results of biopsies taken during your colonoscopy; you may call the office to make an appointment at your earliest convenience.    Cardiology Follow Up:  Please follow up with your cardiologist, Dr. Ventura, in 1-2 weeks; you may call the office to make an appointment at your earliest convenience.    Dialysis:  Please continue your dialysis per your usual schedule.          Surgery Follow Up:  Please follow up with Dr. Nolasco as needed; you may call the office to make an appointment at your earliest convenience.     General Discharge Instructions:  Please resume all regular home medications unless specifically advised not to take a particular medication. Also, please take any new medications as prescribed.  Please get plenty of rest, continue to ambulate several times per day, and drink adequate amounts of fluids.   Please follow-up with your surgeon and Primary Care Provider (PCP) in 1-2 weeks.    Warning Signs:  Please call your doctor if you experience the following:  *You experience new chest pain, pressure, squeezing or tightness.  *New or worsening cough, shortness of breath, or wheeze.  *If you are vomiting and cannot keep down fluids or your medications.  *You are getting dehydrated due to continued vomiting, diarrhea, or other reasons. Signs of dehydration include dry mouth, rapid heartbeat, or feeling dizzy or faint when standing.  *You see blood or dark/black material when you vomit or have a bowel movement.  *You experience burning when you urinate, have blood in your urine, or experience a discharge.  *Your pain is not improving within 8-12 hours or is not gone within 24 hours. Call or return immediately if your pain is getting worse, changes location, or moves to your chest or back.  *You have shaking chills, or fever greater than 101.5 degrees Fahrenheit or 38 degrees Celsius.  *Any change in your symptoms, or any new symptoms that concern you.   Gastroenterology Follow Up:  Please follow up with Dr. Piper in 1-2 weeks to follow up on results of biopsies taken during your colonoscopy; you may call the office to make an appointment at your earliest convenience.    Surgery Follow Up:  Please follow up with Dr. Nolasco in 1-2 weeks to follow up on results of biopsies taken from your colonoscopy to see if any further management is needed; you may call the office to make an appointment at your earliest convenience.     Cardiology Follow Up:  Per Dr. Ventura: 1. please take Metoprolol 25mg daily instead of your Metoprolol 50mg daily. 2. continue to hold your Aspirin. 3. Please continue all other home meds.   Please follow up with your cardiologist, Dr. Ventura, in 1-2 weeks about these adjustments in medications and when you should restart your aspirin; you may call the office to make an appointment at your earliest convenience.     Dialysis:  Please continue your dialysis per your usual schedule.      General Discharge Instructions:  Please resume all regular home medications unless specifically advised not to take a particular medication. Also, please take any new medications as prescribed.  Please get plenty of rest, continue to ambulate several times per day, and drink adequate amounts of fluids.   Please follow-up with your surgeon and Primary Care Provider (PCP) in 1-2 weeks.    Warning Signs:  Please call your doctor if you experience the following:  *You experience new chest pain, pressure, squeezing or tightness.  *New or worsening cough, shortness of breath, or wheeze.  *If you are vomiting and cannot keep down fluids or your medications.  *You are getting dehydrated due to continued vomiting, diarrhea, or other reasons. Signs of dehydration include dry mouth, rapid heartbeat, or feeling dizzy or faint when standing.  *You see blood or dark/black material when you vomit or have a bowel movement.  *You experience burning when you urinate, have blood in your urine, or experience a discharge.  *Your pain is not improving within 8-12 hours or is not gone within 24 hours. Call or return immediately if your pain is getting worse, changes location, or moves to your chest or back.  *You have shaking chills, or fever greater than 101.5 degrees Fahrenheit or 38 degrees Celsius.  *Any change in your symptoms, or any new symptoms that concern you.   Gastroenterology Follow Up:  Please follow up with Dr. Piper in 1-2 weeks to follow up on results of biopsies taken during your colonoscopy; you may call the office to make an appointment at your earliest convenience.    Surgery Follow Up:  Please follow up with Dr. Nolasco in 1-2 weeks to follow up on results of biopsies for the possible parianal fistula that was seen on your colonoscopy for further management is needed; you may call the office to make an appointment at your earliest convenience.     Cardiology Follow Up:  Per Dr. Ventura: 1. please take Metoprolol 25mg daily instead of your Metoprolol 50mg daily. 2. continue to hold your Aspirin. 3. Please continue all other home meds.   Please follow up with your cardiologist, Dr. Ventura, in 1-2 weeks about these adjustments in medications and when you should restart your aspirin; you may call the office to make an appointment at your earliest convenience.     Dialysis:  Please continue your dialysis per your usual schedule.      General Discharge Instructions:  Please resume all regular home medications unless specifically advised not to take a particular medication. Also, please take any new medications as prescribed.  Please get plenty of rest, continue to ambulate several times per day, and drink adequate amounts of fluids.   Please follow-up with your surgeon and Primary Care Provider (PCP) in 1-2 weeks.    Warning Signs:  Please call your doctor if you experience the following:  *You experience new chest pain, pressure, squeezing or tightness.  *New or worsening cough, shortness of breath, or wheeze.  *If you are vomiting and cannot keep down fluids or your medications.  *You are getting dehydrated due to continued vomiting, diarrhea, or other reasons. Signs of dehydration include dry mouth, rapid heartbeat, or feeling dizzy or faint when standing.  *You see blood or dark/black material when you vomit or have a bowel movement.  *You experience burning when you urinate, have blood in your urine, or experience a discharge.  *Your pain is not improving within 8-12 hours or is not gone within 24 hours. Call or return immediately if your pain is getting worse, changes location, or moves to your chest or back.  *You have shaking chills, or fever greater than 101.5 degrees Fahrenheit or 38 degrees Celsius.  *Any change in your symptoms, or any new symptoms that concern you.

## 2023-12-06 LAB
SURGICAL PATHOLOGY STUDY: SIGNIFICANT CHANGE UP
SURGICAL PATHOLOGY STUDY: SIGNIFICANT CHANGE UP

## 2023-12-07 DIAGNOSIS — Z79.82 LONG TERM (CURRENT) USE OF ASPIRIN: ICD-10-CM

## 2023-12-07 DIAGNOSIS — N25.0 RENAL OSTEODYSTROPHY: ICD-10-CM

## 2023-12-07 DIAGNOSIS — D12.5 BENIGN NEOPLASM OF SIGMOID COLON: ICD-10-CM

## 2023-12-07 DIAGNOSIS — Z79.2 LONG TERM (CURRENT) USE OF ANTIBIOTICS: ICD-10-CM

## 2023-12-07 DIAGNOSIS — Z79.899 OTHER LONG TERM (CURRENT) DRUG THERAPY: ICD-10-CM

## 2023-12-07 DIAGNOSIS — I13.2 HYPERTENSIVE HEART AND CHRONIC KIDNEY DISEASE WITH HEART FAILURE AND WITH STAGE 5 CHRONIC KIDNEY DISEASE, OR END STAGE RENAL DISEASE: ICD-10-CM

## 2023-12-07 DIAGNOSIS — I50.42 CHRONIC COMBINED SYSTOLIC (CONGESTIVE) AND DIASTOLIC (CONGESTIVE) HEART FAILURE: ICD-10-CM

## 2023-12-07 DIAGNOSIS — K62.5 HEMORRHAGE OF ANUS AND RECTUM: ICD-10-CM

## 2023-12-07 DIAGNOSIS — Z95.818 PRESENCE OF OTHER CARDIAC IMPLANTS AND GRAFTS: ICD-10-CM

## 2023-12-07 DIAGNOSIS — C91.51 ADULT T-CELL LYMPHOMA/LEUKEMIA (HTLV-1-ASSOCIATED), IN REMISSION: ICD-10-CM

## 2023-12-07 DIAGNOSIS — K57.31 DIVERTICULOSIS OF LARGE INTESTINE WITHOUT PERFORATION OR ABSCESS WITH BLEEDING: ICD-10-CM

## 2023-12-07 DIAGNOSIS — I48.0 PAROXYSMAL ATRIAL FIBRILLATION: ICD-10-CM

## 2023-12-07 DIAGNOSIS — I08.0 RHEUMATIC DISORDERS OF BOTH MITRAL AND AORTIC VALVES: ICD-10-CM

## 2023-12-07 DIAGNOSIS — I48.20 CHRONIC ATRIAL FIBRILLATION, UNSPECIFIED: ICD-10-CM

## 2023-12-07 DIAGNOSIS — Z99.2 DEPENDENCE ON RENAL DIALYSIS: ICD-10-CM

## 2023-12-07 DIAGNOSIS — J44.9 CHRONIC OBSTRUCTIVE PULMONARY DISEASE, UNSPECIFIED: ICD-10-CM

## 2023-12-07 DIAGNOSIS — Z90.49 ACQUIRED ABSENCE OF OTHER SPECIFIED PARTS OF DIGESTIVE TRACT: ICD-10-CM

## 2023-12-07 DIAGNOSIS — M10.9 GOUT, UNSPECIFIED: ICD-10-CM

## 2023-12-07 DIAGNOSIS — G47.33 OBSTRUCTIVE SLEEP APNEA (ADULT) (PEDIATRIC): ICD-10-CM

## 2023-12-07 DIAGNOSIS — D63.1 ANEMIA IN CHRONIC KIDNEY DISEASE: ICD-10-CM

## 2023-12-07 DIAGNOSIS — N18.6 END STAGE RENAL DISEASE: ICD-10-CM

## 2023-12-07 DIAGNOSIS — Z85.72 PERSONAL HISTORY OF NON-HODGKIN LYMPHOMAS: ICD-10-CM

## 2023-12-08 ENCOUNTER — TRANSCRIPTION ENCOUNTER (OUTPATIENT)
Age: 68
End: 2023-12-08

## 2023-12-18 ENCOUNTER — NON-APPOINTMENT (OUTPATIENT)
Age: 68
End: 2023-12-18

## 2023-12-20 ENCOUNTER — APPOINTMENT (OUTPATIENT)
Age: 68
End: 2023-12-20

## 2023-12-29 ENCOUNTER — APPOINTMENT (OUTPATIENT)
Dept: GASTROENTEROLOGY | Facility: CLINIC | Age: 68
End: 2023-12-29
Payer: MEDICARE

## 2023-12-29 VITALS
TEMPERATURE: 97.2 F | BODY MASS INDEX: 21 KG/M2 | OXYGEN SATURATION: 89 % | SYSTOLIC BLOOD PRESSURE: 110 MMHG | HEIGHT: 71 IN | DIASTOLIC BLOOD PRESSURE: 62 MMHG | HEART RATE: 75 BPM | WEIGHT: 150 LBS

## 2023-12-29 PROCEDURE — 99214 OFFICE O/P EST MOD 30 MIN: CPT

## 2023-12-29 NOTE — PATIENT PROFILE ADULT - OVER THE PAST TWO WEEKS HAVE YOU FELT DOWN, DEPRESSED OR HOPELESS?
Chief Complaint: Annual Exam    Subjective: Savi Newton is a 33 year old , , White female who is an established patient in our practice who presents for annual preventative health screening. Review of her OB/GYN history form is completed. She is accompanied today by her young daughter. Most recent Pap was obtained in 2021 which was negative/HPV negative She is sexually active with her . She is using the DYLAN pill for birth control in the form of Microgestin FE. Most recent withdrawal bleed began on 23. She typically experiences a withdrawal bleed every 28 days with four to five days of medium flow. She had a LSC left ovarian dermoid which was removed in 2019. She believes that she may be interested in attempting another pregnancy later next year. She was counseled on the importance of starting a prenatal vitamin a few months prior to attempting a pregnancy. She has no other concerns at this time. She does not take a multivitamin daily. She does exercise regularly. She denies any emotional, physical or sexual abuse.     I have reviewed the patient's medications and allergies, past medical, surgical, social and family history, updating these as appropriate. See Histories section of the EMR for a display of this information.    Patient Active Problem List   Diagnosis   (none) - all problems resolved or deleted     Review of Systems:  Denies unexpected weight loss, night sweats, or fever.  Eyes: Denies visual changes or headaches.  Cardiovascular: Denies chest pain, shortness of breath or exercise intolerance.  Respiratory: Denies cough, sputum or hemoptysis.  Gastrointestinal: Denies nausea, vomiting, diarrhea or constipation.  Genitourinary: Denies dysuria, hematuria, or polyuria.  Psychiatric: Denies difficulty sleeping, depression, or anxiety.     Health Care Maintenance:  Cholesterol - Declines level today  Mammogram - Counseled on mammography guidelines  Safe sex -  Discussed  Pregnancy Prevention - Discussed  Diet/Exercise - She is encouraged to maintain a minimum of 30 minutes of weightbearing aerobic activity most days of the week. She is encouraged to attempt to achieve a 10% weight loss over the next several months with the ultimate goal of achieving a BMI of 18.5 to 24.9%.  Tobacco/Alcohol use - Not applicable  Calcium intake - Discussed, appears to have adequate intake She was counseled on the universal recommendations regarding calcium and vitamin D and avoidance of risk factors.  Osteoporosis prevention - Discussed  Folic acid intake - Discussed  Colon cancer screening - Not applicable  Immunizations: - Discussed     Social History:  One five year old daughter. Travels to [place] frequently for her employer.     Objective:  General - Healthy, alert, in no distress   height is 5' 1\" (1.549 m) and weight is 98.7 kg (217 lb 9.5 oz). Her blood pressure is 110/72.    Neck - Trachea midline, thyroid without tenderness, enlargement or masses. No cervical lymphadenopathy.   Lungs - Clear to auscultation, easy respirations.  Heart - Regular rate and rhythm without murmurs, rubs or gallops.   Breasts/Axillae - Breast are symmetrical, no skin changes, no masses or dimpling. No nipple discharge. No axillary or supraclavicular nodes bilaterally.  Abdomen - Soft, flat, nontender.  No masses or organomegaly.  Extremities - Normal, no cyanosis, no clubbing and no edema.  Skin - Skin color, texture, turgor are normal. There are no bruises, rashes or lesions.  Pelvic - External Genitalia -  Normal appearing labia, clitoris, perineum. No lesions.  Half Moon's and bulbourethral glands normal.  Vagina -  Normal mucosa. No lesions and normal appearing discharge.  Cervix - Normal in appearance, no lesions or masses. A thin prep Pap was obtained.  Uterus - Small, nontender, symmetrical.  Adnexa - Unremarkable, no fullness noted and no masses noted.  Rectal - Not performed     Assessment and  Plan:  1. Well woman screening exam for this 33 year old female. A thin prep Pap was obtained. She was counseled on breast self awareness and mammography guidelines. She and her  are considering a pregnancy in the next year and she was counseled on the importance of starting prenatal vitamins.       2. Surveillance of previously prescribed contraceptive pill. ACHES reviewed. She is given a refill of:  -     norethindrone-ethinyl estradiol-FE (Loestrin Fe 1/20) 1-20 MG-MCG per tablet; Take 1 tablet by mouth daily.     3. Obesity (BMI 30-39.9). She is encouraged to maintain a minimum of 30 minutes of weightbearing aerobic activity most days of the week. She is encouraged to attempt to achieve a 10% weight loss over the next several months with the ultimate goal of achieving a BMI of 18.5 to 24.9%.      She is to return in one year for annual screening exam. Call or return sooner with gynecological concerns.    Primary OB/GYN is Dr. Lal   no

## 2023-12-30 NOTE — ASSESSMENT
[FreeTextEntry1] : 66M with PMH of HTN, COPD, ESRD on HD (MWF), T-cell lymphoma (diagnosed 19 years ago, s/p chemo), AF (now s/p Watchman no longer on Eliquis and on ASA per wife), systolic CHF (EF 40-45%), severe pulmonary HTN, severe MR and TR, s/p VATS procedure in 2021, and ERIK, presenting for large polyps noted on inpatient colonoscopy in the sigmoid colon (path showing TA with focal high-grade dysplasia), so referred to Dr. Piper for polypectomy. Colonoscopy was performed inpatient for rectal bleeding, but patient has no complaints at this time.   Colonoscopy (12/04/2023):  - polyps 5-8 mm in ascending colon  - polyps 1.1 to 1.8 cm in the descending colon (biopsied) - path showing TA - polyps 2 cm to 2.5 cm) in the sigmoid colon (biopsied) - path showing TA with focal high grade dysplasia  - perianal scarred tract suggesting possible fistula  - mild diverticulosis of the whole colon   Plan:  - discussed risks/benefits of colonsocopy for polypectomy (infection, bleeding, perforation, or a missed lesion)  - plan for colonsocopy at [facility] on a Thursday given co-morbidties and patient's MWF HD schedule  - plan to do a 2-day prep with 4L of Golytely given prior BBPS on colonoscopy was 6  - discussed need for CLD x 2 days prior, NPO after midnight, and need for escort home from procedure  - will need cardiac and pulmonology clearance (patient aware and has cardio appointment end of January)  - currently on aspirin 81 mg PO daily but no AC per patient and his wife

## 2023-12-30 NOTE — END OF VISIT
[] : Fellow [FreeTextEntry3] : Pt seen and d/w fellow.  Will plan on a colonoscopy with polypectomy at [facility], two day prep.

## 2023-12-30 NOTE — PHYSICAL EXAM
[Normal] : alert, normal voice/communication, healthy appearing, no acute distress [Abdomen Tenderness] : non-tender [No Masses] : no abdominal mass palpated [Abdomen Soft] : soft [de-identified] : Poor historian

## 2024-01-01 ENCOUNTER — EMERGENCY (EMERGENCY)
Facility: HOSPITAL | Age: 69
LOS: 1 days | Discharge: HOME | End: 2024-01-01
Attending: STUDENT IN AN ORGANIZED HEALTH CARE EDUCATION/TRAINING PROGRAM | Admitting: STUDENT IN AN ORGANIZED HEALTH CARE EDUCATION/TRAINING PROGRAM
Payer: MEDICARE

## 2024-01-01 VITALS
OXYGEN SATURATION: 94 % | HEART RATE: 63 BPM | SYSTOLIC BLOOD PRESSURE: 99 MMHG | RESPIRATION RATE: 20 BRPM | TEMPERATURE: 98 F | DIASTOLIC BLOOD PRESSURE: 71 MMHG

## 2024-01-01 VITALS
OXYGEN SATURATION: 96 % | RESPIRATION RATE: 22 BRPM | TEMPERATURE: 98 F | DIASTOLIC BLOOD PRESSURE: 56 MMHG | SYSTOLIC BLOOD PRESSURE: 98 MMHG | HEART RATE: 82 BPM

## 2024-01-01 DIAGNOSIS — J44.9 CHRONIC OBSTRUCTIVE PULMONARY DISEASE, UNSPECIFIED: ICD-10-CM

## 2024-01-01 DIAGNOSIS — Z98.890 OTHER SPECIFIED POSTPROCEDURAL STATES: Chronic | ICD-10-CM

## 2024-01-01 DIAGNOSIS — Z41.9 ENCOUNTER FOR PROCEDURE FOR PURPOSES OTHER THAN REMEDYING HEALTH STATE, UNSPECIFIED: Chronic | ICD-10-CM

## 2024-01-01 DIAGNOSIS — I44.0 ATRIOVENTRICULAR BLOCK, FIRST DEGREE: ICD-10-CM

## 2024-01-01 DIAGNOSIS — N18.6 END STAGE RENAL DISEASE: ICD-10-CM

## 2024-01-01 DIAGNOSIS — Z99.2 DEPENDENCE ON RENAL DIALYSIS: ICD-10-CM

## 2024-01-01 DIAGNOSIS — R05.1 ACUTE COUGH: ICD-10-CM

## 2024-01-01 DIAGNOSIS — C84.Z0: ICD-10-CM

## 2024-01-01 DIAGNOSIS — I27.20 PULMONARY HYPERTENSION, UNSPECIFIED: ICD-10-CM

## 2024-01-01 DIAGNOSIS — G47.33 OBSTRUCTIVE SLEEP APNEA (ADULT) (PEDIATRIC): ICD-10-CM

## 2024-01-01 DIAGNOSIS — I50.30 UNSPECIFIED DIASTOLIC (CONGESTIVE) HEART FAILURE: ICD-10-CM

## 2024-01-01 DIAGNOSIS — Z79.82 LONG TERM (CURRENT) USE OF ASPIRIN: ICD-10-CM

## 2024-01-01 DIAGNOSIS — I77.0 ARTERIOVENOUS FISTULA, ACQUIRED: Chronic | ICD-10-CM

## 2024-01-01 DIAGNOSIS — Z90.49 ACQUIRED ABSENCE OF OTHER SPECIFIED PARTS OF DIGESTIVE TRACT: Chronic | ICD-10-CM

## 2024-01-01 DIAGNOSIS — I13.2 HYPERTENSIVE HEART AND CHRONIC KIDNEY DISEASE WITH HEART FAILURE AND WITH STAGE 5 CHRONIC KIDNEY DISEASE, OR END STAGE RENAL DISEASE: ICD-10-CM

## 2024-01-01 DIAGNOSIS — U07.1 COVID-19: ICD-10-CM

## 2024-01-01 DIAGNOSIS — I48.91 UNSPECIFIED ATRIAL FIBRILLATION: ICD-10-CM

## 2024-01-01 LAB
ANION GAP SERPL CALC-SCNC: 17 MMOL/L — SIGNIFICANT CHANGE UP (ref 5–17)
BASE EXCESS BLDV CALC-SCNC: 2.5 MMOL/L — SIGNIFICANT CHANGE UP (ref -2–3)
BASOPHILS # BLD AUTO: 0.02 K/UL — SIGNIFICANT CHANGE UP (ref 0–0.2)
BASOPHILS NFR BLD AUTO: 0.5 % — SIGNIFICANT CHANGE UP (ref 0–2)
BUN SERPL-MCNC: 34 MG/DL — HIGH (ref 7–23)
CALCIUM SERPL-MCNC: 9.1 MG/DL — SIGNIFICANT CHANGE UP (ref 8.4–10.5)
CHLORIDE SERPL-SCNC: 93 MMOL/L — LOW (ref 96–108)
CO2 BLDV-SCNC: 30 MMOL/L — HIGH (ref 22–26)
CO2 SERPL-SCNC: 27 MMOL/L — SIGNIFICANT CHANGE UP (ref 22–31)
CREAT SERPL-MCNC: 8.84 MG/DL — HIGH (ref 0.5–1.3)
EGFR: 6 ML/MIN/1.73M2 — LOW
EGFR: 6 ML/MIN/1.73M2 — LOW
EOSINOPHIL # BLD AUTO: 0.09 K/UL — SIGNIFICANT CHANGE UP (ref 0–0.5)
EOSINOPHIL NFR BLD AUTO: 2.5 % — SIGNIFICANT CHANGE UP (ref 0–6)
FLUAV AG NPH QL: SIGNIFICANT CHANGE UP
FLUBV AG NPH QL: SIGNIFICANT CHANGE UP
GLUCOSE SERPL-MCNC: 89 MG/DL — SIGNIFICANT CHANGE UP (ref 70–99)
HCO3 BLDV-SCNC: 28 MMOL/L — SIGNIFICANT CHANGE UP (ref 22–29)
HCT VFR BLD CALC: 37.4 % — LOW (ref 39–50)
HGB BLD-MCNC: 11.7 G/DL — LOW (ref 13–17)
IMM GRANULOCYTES NFR BLD AUTO: 0.3 % — SIGNIFICANT CHANGE UP (ref 0–0.9)
LYMPHOCYTES # BLD AUTO: 1.36 K/UL — SIGNIFICANT CHANGE UP (ref 1–3.3)
LYMPHOCYTES # BLD AUTO: 37.2 % — SIGNIFICANT CHANGE UP (ref 13–44)
MCHC RBC-ENTMCNC: 28.9 PG — SIGNIFICANT CHANGE UP (ref 27–34)
MCHC RBC-ENTMCNC: 31.3 G/DL — LOW (ref 32–36)
MCV RBC AUTO: 92.3 FL — SIGNIFICANT CHANGE UP (ref 80–100)
MONOCYTES # BLD AUTO: 0.33 K/UL — SIGNIFICANT CHANGE UP (ref 0–0.9)
MONOCYTES NFR BLD AUTO: 9 % — SIGNIFICANT CHANGE UP (ref 2–14)
NEUTROPHILS # BLD AUTO: 1.85 K/UL — SIGNIFICANT CHANGE UP (ref 1.8–7.4)
NEUTROPHILS NFR BLD AUTO: 50.5 % — SIGNIFICANT CHANGE UP (ref 43–77)
NRBC # BLD: 0 /100 WBCS — SIGNIFICANT CHANGE UP (ref 0–0)
NRBC BLD-RTO: 0 /100 WBCS — SIGNIFICANT CHANGE UP (ref 0–0)
PCO2 BLDV: 48 MMHG — SIGNIFICANT CHANGE UP (ref 42–55)
PH BLDV: 7.38 — SIGNIFICANT CHANGE UP (ref 7.32–7.43)
PLATELET # BLD AUTO: 120 K/UL — LOW (ref 150–400)
PO2 BLDV: <33 MMHG — LOW (ref 25–45)
POTASSIUM SERPL-MCNC: 4.1 MMOL/L — SIGNIFICANT CHANGE UP (ref 3.5–5.3)
POTASSIUM SERPL-SCNC: 4.1 MMOL/L — SIGNIFICANT CHANGE UP (ref 3.5–5.3)
RBC # BLD: 4.05 M/UL — LOW (ref 4.2–5.8)
RBC # FLD: 16.5 % — HIGH (ref 10.3–14.5)
RSV RNA NPH QL NAA+NON-PROBE: SIGNIFICANT CHANGE UP
SAO2 % BLDV: 25.1 % — LOW (ref 67–88)
SARS-COV-2 RNA SPEC QL NAA+PROBE: DETECTED
SODIUM SERPL-SCNC: 137 MMOL/L — SIGNIFICANT CHANGE UP (ref 135–145)
WBC # BLD: 3.66 K/UL — LOW (ref 3.8–10.5)
WBC # FLD AUTO: 3.66 K/UL — LOW (ref 3.8–10.5)

## 2024-01-01 PROCEDURE — 80048 BASIC METABOLIC PNL TOTAL CA: CPT

## 2024-01-01 PROCEDURE — 99285 EMERGENCY DEPT VISIT HI MDM: CPT | Mod: 25

## 2024-01-01 PROCEDURE — 71045 X-RAY EXAM CHEST 1 VIEW: CPT

## 2024-01-01 PROCEDURE — 94640 AIRWAY INHALATION TREATMENT: CPT

## 2024-01-01 PROCEDURE — 36415 COLL VENOUS BLD VENIPUNCTURE: CPT

## 2024-01-01 PROCEDURE — 36000 PLACE NEEDLE IN VEIN: CPT

## 2024-01-01 PROCEDURE — 87637 SARSCOV2&INF A&B&RSV AMP PRB: CPT

## 2024-01-01 PROCEDURE — 93005 ELECTROCARDIOGRAM TRACING: CPT

## 2024-01-01 PROCEDURE — 99285 EMERGENCY DEPT VISIT HI MDM: CPT

## 2024-01-01 PROCEDURE — 85025 COMPLETE CBC W/AUTO DIFF WBC: CPT

## 2024-01-01 PROCEDURE — 71045 X-RAY EXAM CHEST 1 VIEW: CPT | Mod: 26

## 2024-01-01 PROCEDURE — 82803 BLOOD GASES ANY COMBINATION: CPT

## 2024-01-01 PROCEDURE — 93010 ELECTROCARDIOGRAM REPORT: CPT | Mod: 1L

## 2024-01-01 RX ORDER — IPRATROPIUM BROMIDE AND ALBUTEROL SULFATE .5; 2.5 MG/3ML; MG/3ML
3 SOLUTION RESPIRATORY (INHALATION) ONCE
Refills: 0 | Status: COMPLETED | OUTPATIENT
Start: 2024-01-01 | End: 2024-01-01

## 2024-01-01 RX ORDER — PREDNISONE 20 MG/1
50 TABLET ORAL ONCE
Refills: 0 | Status: COMPLETED | OUTPATIENT
Start: 2024-01-01 | End: 2024-01-01

## 2024-01-01 RX ORDER — ACETAMINOPHEN 500 MG/5ML
650 LIQUID (ML) ORAL ONCE
Refills: 0 | Status: COMPLETED | OUTPATIENT
Start: 2024-01-01 | End: 2024-01-01

## 2024-01-01 RX ORDER — PREDNISONE 20 MG/1
1 TABLET ORAL
Qty: 4 | Refills: 0
Start: 2024-01-01 | End: 2024-01-01

## 2024-01-01 RX ORDER — LEVOFLOXACIN 25 MG/ML
1 SOLUTION ORAL
Qty: 6 | Refills: 0
Start: 2024-01-01 | End: 2024-01-01

## 2024-01-01 RX ADMIN — Medication 650 MILLIGRAM(S): at 09:44

## 2024-01-01 RX ADMIN — IPRATROPIUM BROMIDE AND ALBUTEROL SULFATE 3 MILLILITER(S): .5; 2.5 SOLUTION RESPIRATORY (INHALATION) at 10:24

## 2024-01-01 RX ADMIN — Medication 250 MILLILITER(S): at 09:44

## 2024-01-01 RX ADMIN — PREDNISONE 50 MILLIGRAM(S): 20 TABLET ORAL at 09:43

## 2024-01-01 RX ADMIN — IPRATROPIUM BROMIDE AND ALBUTEROL SULFATE 3 MILLILITER(S): .5; 2.5 SOLUTION RESPIRATORY (INHALATION) at 09:43

## 2024-01-01 RX ADMIN — IPRATROPIUM BROMIDE AND ALBUTEROL SULFATE 3 MILLILITER(S): .5; 2.5 SOLUTION RESPIRATORY (INHALATION) at 10:04

## 2024-01-01 NOTE — CONSULT NOTE ADULT - PROVIDER SPECIALTY LIST ADULT
Cardiology
Cardiology
Electrophysiology
Internal Medicine
SICU
Thoracic Surgery
Pulmonology
Intervent Radiology
unknown

## 2024-01-05 ENCOUNTER — TRANSCRIPTION ENCOUNTER (OUTPATIENT)
Age: 69
End: 2024-01-05

## 2024-01-11 ENCOUNTER — TRANSCRIPTION ENCOUNTER (OUTPATIENT)
Age: 69
End: 2024-01-11

## 2024-01-22 ENCOUNTER — TRANSCRIPTION ENCOUNTER (OUTPATIENT)
Age: 69
End: 2024-01-22

## 2024-01-23 ENCOUNTER — APPOINTMENT (OUTPATIENT)
Dept: HEART AND VASCULAR | Facility: CLINIC | Age: 69
End: 2024-01-23
Payer: MEDICARE

## 2024-01-23 ENCOUNTER — NON-APPOINTMENT (OUTPATIENT)
Age: 69
End: 2024-01-23

## 2024-01-23 VITALS
OXYGEN SATURATION: 88 % | HEIGHT: 71 IN | BODY MASS INDEX: 21.28 KG/M2 | DIASTOLIC BLOOD PRESSURE: 70 MMHG | SYSTOLIC BLOOD PRESSURE: 130 MMHG | WEIGHT: 152 LBS | TEMPERATURE: 97.5 F | HEART RATE: 57 BPM

## 2024-01-23 DIAGNOSIS — I50.30 UNSPECIFIED DIASTOLIC (CONGESTIVE) HEART FAILURE: ICD-10-CM

## 2024-01-23 PROCEDURE — 99214 OFFICE O/P EST MOD 30 MIN: CPT | Mod: 25

## 2024-01-23 PROCEDURE — 93000 ELECTROCARDIOGRAM COMPLETE: CPT

## 2024-01-23 NOTE — HISTORY OF PRESENT ILLNESS
[FreeTextEntry1] : 69 y/o M with HFpEF initially met but HF team on recent admission at [facility] and now presents for follow up. Other PMH is notable for HTN, COPD, CKD (stage 5 CKD, has LUE AVF, not on HD yet), T-cell lymphoma (diagnosed 19 years ago, on chemo romidepsin every Wednesday (not since Nov)), AF (on Eliquis), systolic CHF (EF 40-45%), EF was 60-65 in 2019,severe pHTN, severe MR, who recently underwent a left chest wall hematoma evacuation on 12/18/2020 secondary to loculated effusion. Hospital course at that time was complicated by AF with RVR and thoracic surgery consulted at that time for surgical intervention of trapped lung. He followed up as an outpatient after discharge and deemed a good surgical candidate for lung decortication. On 1/18/21 patient presented to [facility] for pre-operative optimization with heparin gtt prior to OR. Upon admission pt noted to have erythema and swelling to his L knee, CT scan demonstrated cellulitis, operation was deferred. Ortho, Gen Surg and ID were consulted, pt with no drainable collection and was medically managed with resolution of infection. Nephrology was consulted for assistance in managing CKD stage 4. On 1/22 he underwent an uncomplicated L VATS, RA decortication with blow hole placement for subcutaneous emphysema. Intraop findings significant for loculated hemothorax. He received 3UPRBC in the OR and post operatively was brought to the CTICU stable and intubated with 3 chest tubes in place. He was extubated POD1 and required primacor for pulmonary HTN and renal perfusion. On POD 2 a right sided pigtail was placed for pleural effusion which drained 800cc and ultimately removed on POD 4. Cardiology was consulted for management of pulmonary hypertension and CHF, recommended IV diuresis. He required alternating BiPAP and HFNC which was weaned to NC. Primacor was weaned off on POD 4 and he was transferred to the stepdown unit. Heparin gtt was started for AC but was held for bleeding around the CT site. A CT chest noncon was preformed showing post surgical changes, no concern for hemothorax and heparin gtt resumed. On POD 5 first CT was removed. Bronch with no abnormal findings, Second chest tube subsequently removed and added nifedipine Xl 30mg for BP control on POD 6. POD 7 third chest tube removed, post chest tube removal xray showed no pneumothorax.    12/28/20  In [facility] 12/17- 12/23 with a chest wall hematoma, Rapid AF.  EF good but severe MR and TR.  In NSR by exam and EKG .  I was later informed he might need a decortification of his pleura(left).  3/1/21 Hospitalized for VATS 1/2021 as above. BNP 15,500, Cr 5.5,  Echo was done 1/2021, EF back to NL and MR mild to moderate, mod to severe TR and PAP 67.   Got the Covid Vaccine.  Here he is in NSR and generally doing well considering how complex he is. 4/15/21  Off Eliquis 3/17/21 due to a 2nd bleed on the buttocks.  Amio was stopped March 8, 2021 8/31/21 On ASA 81 and Plavix 75, to consider a Watchman, on HD.  Edema resolved. 11/2/21  S/P WATCHMAN DEVICE Oct 20th, 2021.  hE HAD SEEN OTHER MDs TODAY AND WALKED OUT AT 5 PM 1/4/22 echo done Dec 2021, EF NL, MR mild, PAP 59, aortic plaque noted. 5/10/22 Doing well, recovering from PNA. JORGE 5/2021 EF MVP posterior leaflet mod MR and mod TR  3/7/23 echo 10/2022 EF normal Mild AS mild to mod AI mild to mod MR PASP 34 mmHg.  Pt denies CHF.  CT scan chest done Jan 2023  shows improvement in aeration. 10/31/23 No cardiac c/o,needs colonoscopy TBD 1/23/24  NIELSON, 02 sats are in the 80s last 2 determinations. No edema, remains on Amio   EKG: NSR with 1st degree AVB. ST-Tw abnormalities. 12/28/20 EKG: NSR, IVCD, PRWP, STTs 3/1/21, 1/4/22

## 2024-01-23 NOTE — PHYSICAL EXAM
[Well Developed] : well developed [Well Nourished] : well nourished [Normal Conjunctiva] : normal conjunctiva [No Acute Distress] : no acute distress [No Carotid Bruit] : no carotid bruit [Normal Venous Pressure] : normal venous pressure [Normal S1, S2] : normal S1, S2 [No Murmur] : no murmur [No Gallop] : no gallop [No Rub] : no rub [Good Air Entry] : good air entry [No Respiratory Distress] : no respiratory distress  [Soft] : abdomen soft [Non Tender] : non-tender [No Masses/organomegaly] : no masses/organomegaly [Normal Bowel Sounds] : normal bowel sounds [Normal Gait] : normal gait [No Edema] : no edema [No Cyanosis] : no cyanosis [No Clubbing] : no clubbing [No Varicosities] : no varicosities [No Rash] : no rash [No Skin Lesions] : no skin lesions [Moves all extremities] : moves all extremities [No Focal Deficits] : no focal deficits [Normal Speech] : normal speech [Alert and Oriented] : alert and oriented [Normal memory] : normal memory [de-identified] : Upper dentures [de-identified] : OLVIN, A2 present, apical systolic murmur [de-identified] : fine bibas crackles

## 2024-01-23 NOTE — ASSESSMENT
[FreeTextEntry1] : Systolic CHF - EF recovered, normal on June 2019 echo. Secondary to chemo? Then 40-45% on admission in Dec 2020, then improved to 60-65 on last admission in January 2021, NL EF Oct 2022..  Drop in 02 Sats x 2. Dec and Jan.  CXR ordered.  No overt failure. ? Amio related.  PreOp- Needs colonoscopy.  No overt CHF.  Pt is cleared to proceed.  OK to stop ASA 5-7 days before.  He does not take any supplements.  Repeat colonoscopy to be dome March 2024.  MR- Was severe, now mild to moderate and EF improved. Followed by Dr Cui. MR is mild to moderate on echo Oct 2022  Afib - in NSR today, no longer on Eliquis due to 2 bleeds and Amio. ZIO patch pending. I favor ASA and resuming Amio. Referred to Pulm to be monitored for Amio Pulm toxicity. The patient's [id number] score = 2. He is S/P a Watchman device. Off Eliquis. Still on Amio, TSH WNL Sept 2022. 2023.  IN NSR 1/23/24 by EKG  HTN- BP too low, will reduce Norvasc from 10 to 5 mg on 1/4/22. Will reduce to 2.5 mg on dialysis days.  HLD- not on statin. I favor a statin due to aortic atherosclerosis. On Atorvastatin 40 mg.  T Cell Lymphoma - Finished with chemo, Dr Dimas. Now in remission.  CKD- Dr Denton, now on HD since June 2021.  Pt is on the transplant list.

## 2024-01-23 NOTE — REASON FOR VISIT
[Follow-Up - Clinic] : a clinic follow-up of [Cardiomyopathy] : cardiomyopathy [FreeTextEntry1] : Onc- Dr Vel Dimas  [phone] Renal- Dr Bertin Felton Hrt- Dr See CLEARY- [place] EP- Dr Yanick Francois- Dr Penny Nj- Dr Alex DAS- Mikhail HALE- Celestina Pendleton

## 2024-02-05 ENCOUNTER — RESULT REVIEW (OUTPATIENT)
Age: 69
End: 2024-02-05

## 2024-02-06 ENCOUNTER — APPOINTMENT (OUTPATIENT)
Dept: PULMONOLOGY | Facility: CLINIC | Age: 69
End: 2024-02-06
Payer: MEDICARE

## 2024-02-06 VITALS
HEIGHT: 71 IN | OXYGEN SATURATION: 89 % | DIASTOLIC BLOOD PRESSURE: 62 MMHG | SYSTOLIC BLOOD PRESSURE: 116 MMHG | TEMPERATURE: 97.6 F | BODY MASS INDEX: 21 KG/M2 | WEIGHT: 150 LBS | RESPIRATION RATE: 16 BRPM | HEART RATE: 47 BPM

## 2024-02-06 DIAGNOSIS — R06.09 OTHER FORMS OF DYSPNEA: ICD-10-CM

## 2024-02-06 DIAGNOSIS — J90 PLEURAL EFFUSION, NOT ELSEWHERE CLASSIFIED: ICD-10-CM

## 2024-02-06 PROCEDURE — 99213 OFFICE O/P EST LOW 20 MIN: CPT

## 2024-02-06 NOTE — HISTORY OF PRESENT ILLNESS
[Former] : former [Never] : never [TextBox_4] : 68 yr old male with PMH of COPD, pulmonary HTN, pleural effusion, ERIK. Pt here for follow up.   Pt has cough with mucus production. He was non-compliant with the inhalers but started using them again. He has not been in hospital, ER or needed steroids.  He is non-compliant with the CPAP.  He is able to do his ADLs without SOB.  He walks 1/2 block and then gets out of breath.   His weight is stable.   [ESS] : 0

## 2024-02-06 NOTE — PHYSICAL EXAM
[No Acute Distress] : no acute distress [Normal Oropharynx] : normal oropharynx [Normal Appearance] : normal appearance [No Neck Mass] : no neck mass [Normal Rate/Rhythm] : normal rate/rhythm [Murmur ___ / 6] : murmur [unfilled] / 6 [Normal S1, S2] : normal s1, s2 [No Resp Distress] : no resp distress [No Abnormalities] : no abnormalities [Benign] : benign [Normal Gait] : normal gait [No Clubbing] : no clubbing [No Cyanosis] : no cyanosis [No Edema] : no edema [FROM] : FROM [Normal Color/ Pigmentation] : normal color/ pigmentation [No Focal Deficits] : no focal deficits [Oriented x3] : oriented x3 [Normal Affect] : normal affect [TextBox_68] : scatttered rales at the bases

## 2024-02-06 NOTE — END OF VISIT
[Time Spent: ___ minutes] : I have spent [unfilled] minutes of time on the encounter. [>50% of the face to face encounter time was spent on counseling and/or coordination of care for ___] : Greater than 50% of the face to face encounter time was spent on counseling and/or coordination of care for [unfilled] [FreeTextEntry3] : Pt seen with RADHA Fernandez and agree on the above plan of care. Patient is doing okay.  He has a cough with sputum.  I reviewed the CT scan of the chest and there is worsening of the pleural-based abnormality in the left upper lobe in the bases.  Will await the official report and minute the PET.  I informed the patient.  The patient is to be compliant with the bronchodilators.  He did not tolerate the CPAP

## 2024-02-06 NOTE — ASSESSMENT
[FreeTextEntry1] : Obstructive sleep apnea  I reviewed the sleep study the patient has severe obstructive sleep apnea.  He has a CPAP at home and non-compliant does not want to continue with the device and wants it picked up. He will contact the [facility].  COPD  The patient is clinically stable.  The patient did not require neither urgent care nor hospitalizations since last visit. The patient did not require systemic steroids since the last time I saw him.  The patient is compliant with the Stiolto.  The patient is taking the Stiolto twice a day.  Patient instructed to use Stiolto once a day and albuterol as needed basis.  Continue azithromycin 3 times a week as anti-inflammatory for his COPD.  Patient baseline oxygen saturation is stable.  Patient is still has reasonable quality of life although his dyspnea could be related to both cardiac pulmonary and renal status.  Reviewed the images of the CT scan the scarring in the left lung is getting bigger.  Will await the official report.  Discussed may need to follow with PET scan.    Pulmonary hypertension  The last echocardiogram was done at [facility] and the patient will obtain the report from the facility.  The last echocardiogram was done in October of last year revealed pulmonary pressure 35.  The patient is on sildenafil for his pulmonary hypertension which most likely group 2 and 3.  There is no clinical evidence of right heart failure.

## 2024-02-12 ENCOUNTER — NON-APPOINTMENT (OUTPATIENT)
Age: 69
End: 2024-02-12

## 2024-02-12 RX ORDER — POLYETHYLENE GLYCOL 3350 AND ELECTROLYTES WITH LEMON FLAVOR 236; 22.74; 6.74; 5.86; 2.97 G/4L; G/4L; G/4L; G/4L; G/4L
236 POWDER, FOR SOLUTION ORAL
Qty: 1 | Refills: 0 | Status: ACTIVE | COMMUNITY
Start: 2023-12-29 | End: 1900-01-01

## 2024-02-14 NOTE — ASU PATIENT PROFILE, ADULT - NSICDXPASTMEDICALHX_GEN_ALL_CORE_FT
Yes PAST MEDICAL HISTORY:  Atrial fibrillation     BPH (benign prostatic hyperplasia)     CHF, chronic LVEF 65 PASP 59 midl MR, AR on 12/2/21    CKD (chronic kidney disease)     COPD, mild     Gout     H/O pulmonary hypertension     HTN (hypertension)     Lymphoma t cell; remission since 11/2020    Mitral regurgitation

## 2024-02-15 ENCOUNTER — OUTPATIENT (OUTPATIENT)
Dept: OUTPATIENT SERVICES | Facility: HOSPITAL | Age: 69
LOS: 1 days | End: 2024-02-15
Payer: MEDICARE

## 2024-02-15 DIAGNOSIS — Z98.890 OTHER SPECIFIED POSTPROCEDURAL STATES: Chronic | ICD-10-CM

## 2024-02-15 DIAGNOSIS — I77.0 ARTERIOVENOUS FISTULA, ACQUIRED: Chronic | ICD-10-CM

## 2024-02-15 DIAGNOSIS — Z41.9 ENCOUNTER FOR PROCEDURE FOR PURPOSES OTHER THAN REMEDYING HEALTH STATE, UNSPECIFIED: Chronic | ICD-10-CM

## 2024-02-15 DIAGNOSIS — Z90.49 ACQUIRED ABSENCE OF OTHER SPECIFIED PARTS OF DIGESTIVE TRACT: Chronic | ICD-10-CM

## 2024-02-15 PROCEDURE — 71046 X-RAY EXAM CHEST 2 VIEWS: CPT

## 2024-02-15 PROCEDURE — 71046 X-RAY EXAM CHEST 2 VIEWS: CPT | Mod: 26

## 2024-02-21 ENCOUNTER — RX RENEWAL (OUTPATIENT)
Age: 69
End: 2024-02-21

## 2024-02-26 ENCOUNTER — EMERGENCY (EMERGENCY)
Facility: HOSPITAL | Age: 69
LOS: 1 days | Discharge: HOME | End: 2024-02-26
Attending: STUDENT IN AN ORGANIZED HEALTH CARE EDUCATION/TRAINING PROGRAM | Admitting: STUDENT IN AN ORGANIZED HEALTH CARE EDUCATION/TRAINING PROGRAM
Payer: MEDICARE

## 2024-02-26 VITALS
RESPIRATION RATE: 18 BRPM | TEMPERATURE: 97 F | DIASTOLIC BLOOD PRESSURE: 63 MMHG | SYSTOLIC BLOOD PRESSURE: 127 MMHG | OXYGEN SATURATION: 94 % | HEART RATE: 58 BPM

## 2024-02-26 VITALS
HEIGHT: 70 IN | TEMPERATURE: 97 F | DIASTOLIC BLOOD PRESSURE: 68 MMHG | HEART RATE: 56 BPM | RESPIRATION RATE: 16 BRPM | WEIGHT: 149.91 LBS | OXYGEN SATURATION: 95 % | SYSTOLIC BLOOD PRESSURE: 136 MMHG

## 2024-02-26 DIAGNOSIS — I48.91 UNSPECIFIED ATRIAL FIBRILLATION: ICD-10-CM

## 2024-02-26 DIAGNOSIS — N18.6 END STAGE RENAL DISEASE: ICD-10-CM

## 2024-02-26 DIAGNOSIS — Z98.890 OTHER SPECIFIED POSTPROCEDURAL STATES: Chronic | ICD-10-CM

## 2024-02-26 DIAGNOSIS — C91.51 ADULT T-CELL LYMPHOMA/LEUKEMIA (HTLV-1-ASSOCIATED), IN REMISSION: ICD-10-CM

## 2024-02-26 DIAGNOSIS — Z99.2 DEPENDENCE ON RENAL DIALYSIS: ICD-10-CM
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
## 2024-02-26 DIAGNOSIS — J44.9 CHRONIC OBSTRUCTIVE PULMONARY DISEASE, UNSPECIFIED: ICD-10-CM

## 2024-02-26 DIAGNOSIS — S00.81XA ABRASION OF OTHER PART OF HEAD, INITIAL ENCOUNTER: ICD-10-CM

## 2024-02-26 DIAGNOSIS — I77.0 ARTERIOVENOUS FISTULA, ACQUIRED: Chronic | ICD-10-CM

## 2024-02-26 DIAGNOSIS — Z41.9 ENCOUNTER FOR PROCEDURE FOR PURPOSES OTHER THAN REMEDYING HEALTH STATE, UNSPECIFIED: Chronic | ICD-10-CM

## 2024-02-26 DIAGNOSIS — I12.0 HYPERTENSIVE CHRONIC KIDNEY DISEASE WITH STAGE 5 CHRONIC KIDNEY DISEASE OR END STAGE RENAL DISEASE: ICD-10-CM

## 2024-02-26 DIAGNOSIS — Z90.49 ACQUIRED ABSENCE OF OTHER SPECIFIED PARTS OF DIGESTIVE TRACT: Chronic | ICD-10-CM

## 2024-02-26 DIAGNOSIS — S09.90XA UNSPECIFIED INJURY OF HEAD, INITIAL ENCOUNTER: ICD-10-CM
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
## 2024-02-26 DIAGNOSIS — Z95.818 PRESENCE OF OTHER CARDIAC IMPLANTS AND GRAFTS: ICD-10-CM

## 2024-02-26 PROCEDURE — 70450 CT HEAD/BRAIN W/O DYE: CPT | Mod: MC

## 2024-02-26 PROCEDURE — 99284 EMERGENCY DEPT VISIT MOD MDM: CPT | Mod: 25

## 2024-02-26 PROCEDURE — 99284 EMERGENCY DEPT VISIT MOD MDM: CPT

## 2024-02-26 PROCEDURE — 70450 CT HEAD/BRAIN W/O DYE: CPT | Mod: 26,MC

## 2024-02-26 PROCEDURE — 82962 GLUCOSE BLOOD TEST: CPT

## 2024-02-26 NOTE — ED ADULT NURSE NOTE - OBJECTIVE STATEMENT
67 yo M c/o fall. Reports he fell off his bed this morning and hit his head his L arm. Pt was at dialysis, completed an hour and a half, and provider recommended he come to the ED. Pt reports he takes 81mg aspirin daily. Pt is alert and oriented, and ambulatory independently. Denies any pain right now since he took Tylenol earlier.

## 2024-02-26 NOTE — ED PROVIDER NOTE - PHYSICAL EXAMINATION
general: Well appearing, in no acute distress  HEENT: Normocephalic, small frontal abrasion, extraocular movements intact  CV: Regular rate  Pulm: No respiratory distress, no tachypnea  Abd: Flat, no gross distension  Ext: warm and well perfused  MSK: No C/T/L s ttp  Skin: No gross rashes or lesions  Neuro: Alert and oriented, moving all extremities, CN II-XII intact, motor 5/5 bilaterally, sensation intact bilaterally

## 2024-02-26 NOTE — ED PROVIDER NOTE - OBJECTIVE STATEMENT
68M w/ AFib s/p watchman (on AC), HTN, COPD, ESRD (MWF), T cell lymphoma (remission 2020) presenting after head injury. Pt rolled out of body this AM, hit head. Had headache, resolved. Pt went to dialysis today, received 1.5 hrs of dialysis. Was complaining of headache and per nephrologist, was a bit more confused so told to come here for CT Head. Pt now asymptomatic. No headache, blurry vision, no cp or sob, no numbness, weakness, tingling. No other acute complaints. ROS as above.

## 2024-02-26 NOTE — ED PROVIDER NOTE - NSFOLLOWUPINSTRUCTIONS_ED_ALL_ED_FT
You presented to the Emergency Department after a head injury. You had a CT scan of your head that was normal. Please take tylenol as needed for headache. Please go to your dialysis as scheduled. Return for any worsening symptoms, headache, chest pain, shortness of breath, fatigue, nausea or vomiting. Otherwise, please follow up with your primary physician.     I hope you feel better soon!    Sincerely,  Jason Gaviria MD

## 2024-02-26 NOTE — ED ADULT NURSE NOTE - NSFALLHARMRISKINTERV_ED_ALL_ED

## 2024-02-26 NOTE — ED ADULT TRIAGE NOTE - CHIEF COMPLAINT QUOTE
Pt presents to ED by EMS C/O AMS S/P unwitnessed fall out of bed +head strike + blood thinners at 5AM. Pt wife at bedside states, " He wasn't acting himself, he went to dialysis and they did half of a round of dialysis because he was confused, he seems a little better now". BG/EKG in progress.

## 2024-02-26 NOTE — ED PROVIDER NOTE - PATIENT PORTAL LINK FT
You can access the FollowMyHealth Patient Portal offered by [facility] by registering at the following website: [email or web address]. By joining [employer]’s FollowMyHealth portal, you will also be able to view your health information using other applications (apps) compatible with our system.

## 2024-03-02 ENCOUNTER — INPATIENT (INPATIENT)
Facility: HOSPITAL | Age: 69
LOS: 9 days | Discharge: HOME HEALTH | DRG: 871 | End: 2024-03-12
Attending: HOSPITALIST | Admitting: HOSPITALIST
Payer: MEDICARE

## 2024-03-02 VITALS
RESPIRATION RATE: 25 BRPM | HEIGHT: 70 IN | SYSTOLIC BLOOD PRESSURE: 116 MMHG | TEMPERATURE: 98 F | WEIGHT: 149.91 LBS | DIASTOLIC BLOOD PRESSURE: 60 MMHG | OXYGEN SATURATION: 95 % | HEART RATE: 56 BPM

## 2024-03-02 DIAGNOSIS — D63.8 ANEMIA IN OTHER CHRONIC DISEASES CLASSIFIED ELSEWHERE: ICD-10-CM

## 2024-03-02 DIAGNOSIS — Z98.890 OTHER SPECIFIED POSTPROCEDURAL STATES: Chronic | ICD-10-CM

## 2024-03-02 DIAGNOSIS — I50.32 CHRONIC DIASTOLIC (CONGESTIVE) HEART FAILURE: ICD-10-CM

## 2024-03-02 DIAGNOSIS — Z90.49 ACQUIRED ABSENCE OF OTHER SPECIFIED PARTS OF DIGESTIVE TRACT: Chronic | ICD-10-CM

## 2024-03-02 DIAGNOSIS — I48.91 UNSPECIFIED ATRIAL FIBRILLATION: ICD-10-CM

## 2024-03-02 DIAGNOSIS — J18.9 PNEUMONIA, UNSPECIFIED ORGANISM: ICD-10-CM

## 2024-03-02 DIAGNOSIS — J44.1 CHRONIC OBSTRUCTIVE PULMONARY DISEASE WITH (ACUTE) EXACERBATION: ICD-10-CM

## 2024-03-02 DIAGNOSIS — I77.0 ARTERIOVENOUS FISTULA, ACQUIRED: Chronic | ICD-10-CM

## 2024-03-02 DIAGNOSIS — Z41.9 ENCOUNTER FOR PROCEDURE FOR PURPOSES OTHER THAN REMEDYING HEALTH STATE, UNSPECIFIED: Chronic | ICD-10-CM

## 2024-03-02 DIAGNOSIS — I10 ESSENTIAL (PRIMARY) HYPERTENSION: ICD-10-CM

## 2024-03-02 DIAGNOSIS — Z29.9 ENCOUNTER FOR PROPHYLACTIC MEASURES, UNSPECIFIED: ICD-10-CM

## 2024-03-02 DIAGNOSIS — N18.6 END STAGE RENAL DISEASE: ICD-10-CM

## 2024-03-02 DIAGNOSIS — I27.20 PULMONARY HYPERTENSION, UNSPECIFIED: ICD-10-CM

## 2024-03-02 LAB
ADD ON TEST-SPECIMEN IN LAB: SIGNIFICANT CHANGE UP
ADD ON TEST-SPECIMEN IN LAB: SIGNIFICANT CHANGE UP
ANION GAP SERPL CALC-SCNC: 16 MMOL/L — SIGNIFICANT CHANGE UP (ref 5–17)
BASOPHILS # BLD AUTO: 0.04 K/UL — SIGNIFICANT CHANGE UP (ref 0–0.2)
BASOPHILS NFR BLD AUTO: 0.6 % — SIGNIFICANT CHANGE UP (ref 0–2)
BUN SERPL-MCNC: 42 MG/DL — HIGH (ref 7–23)
CALCIUM SERPL-MCNC: 10.2 MG/DL — SIGNIFICANT CHANGE UP (ref 8.4–10.5)
CHLORIDE SERPL-SCNC: 97 MMOL/L — SIGNIFICANT CHANGE UP (ref 96–108)
CO2 SERPL-SCNC: 30 MMOL/L — SIGNIFICANT CHANGE UP (ref 22–31)
CREAT SERPL-MCNC: 8.68 MG/DL — HIGH (ref 0.5–1.3)
EGFR: 6 ML/MIN/1.73M2 — LOW
EOSINOPHIL # BLD AUTO: 0.31 K/UL — SIGNIFICANT CHANGE UP (ref 0–0.5)
EOSINOPHIL NFR BLD AUTO: 4.5 % — SIGNIFICANT CHANGE UP (ref 0–6)
FLUAV AG NPH QL: SIGNIFICANT CHANGE UP
FLUBV AG NPH QL: SIGNIFICANT CHANGE UP
GLUCOSE SERPL-MCNC: 79 MG/DL — SIGNIFICANT CHANGE UP (ref 70–99)
HBV CORE AB SER-ACNC: SIGNIFICANT CHANGE UP
HBV SURFACE AB SER-ACNC: SIGNIFICANT CHANGE UP
HBV SURFACE AG SER-ACNC: SIGNIFICANT CHANGE UP
HCT VFR BLD CALC: 33 % — LOW (ref 39–50)
HCV AB S/CO SERPL IA: 0.04 S/CO — SIGNIFICANT CHANGE UP
HCV AB SERPL-IMP: SIGNIFICANT CHANGE UP
HGB BLD-MCNC: 10 G/DL — LOW (ref 13–17)
IMM GRANULOCYTES NFR BLD AUTO: 0.4 % — SIGNIFICANT CHANGE UP (ref 0–0.9)
LACTATE SERPL-SCNC: 0.9 MMOL/L — SIGNIFICANT CHANGE UP (ref 0.5–2)
LYMPHOCYTES # BLD AUTO: 1.37 K/UL — SIGNIFICANT CHANGE UP (ref 1–3.3)
LYMPHOCYTES # BLD AUTO: 19.9 % — SIGNIFICANT CHANGE UP (ref 13–44)
MCHC RBC-ENTMCNC: 29.9 PG — SIGNIFICANT CHANGE UP (ref 27–34)
MCHC RBC-ENTMCNC: 30.3 GM/DL — LOW (ref 32–36)
MCV RBC AUTO: 98.8 FL — SIGNIFICANT CHANGE UP (ref 80–100)
MONOCYTES # BLD AUTO: 0.7 K/UL — SIGNIFICANT CHANGE UP (ref 0–0.9)
MONOCYTES NFR BLD AUTO: 10.2 % — SIGNIFICANT CHANGE UP (ref 2–14)
NEUTROPHILS # BLD AUTO: 4.44 K/UL — SIGNIFICANT CHANGE UP (ref 1.8–7.4)
NEUTROPHILS NFR BLD AUTO: 64.4 % — SIGNIFICANT CHANGE UP (ref 43–77)
NRBC # BLD: 0 /100 WBCS — SIGNIFICANT CHANGE UP (ref 0–0)
PLATELET # BLD AUTO: 127 K/UL — LOW (ref 150–400)
POTASSIUM SERPL-MCNC: 4.7 MMOL/L — SIGNIFICANT CHANGE UP (ref 3.5–5.3)
POTASSIUM SERPL-SCNC: 4.7 MMOL/L — SIGNIFICANT CHANGE UP (ref 3.5–5.3)
PROCALCITONIN SERPL-MCNC: 0.68 NG/ML — HIGH (ref 0.02–0.1)
RBC # BLD: 3.34 M/UL — LOW (ref 4.2–5.8)
RBC # FLD: 16 % — HIGH (ref 10.3–14.5)
RSV RNA NPH QL NAA+NON-PROBE: SIGNIFICANT CHANGE UP
SARS-COV-2 RNA SPEC QL NAA+PROBE: SIGNIFICANT CHANGE UP
SODIUM SERPL-SCNC: 143 MMOL/L — SIGNIFICANT CHANGE UP (ref 135–145)
WBC # BLD: 6.89 K/UL — SIGNIFICANT CHANGE UP (ref 3.8–10.5)
WBC # FLD AUTO: 6.89 K/UL — SIGNIFICANT CHANGE UP (ref 3.8–10.5)

## 2024-03-02 PROCEDURE — 99285 EMERGENCY DEPT VISIT HI MDM: CPT

## 2024-03-02 PROCEDURE — 93010 ELECTROCARDIOGRAM REPORT: CPT

## 2024-03-02 PROCEDURE — 99233 SBSQ HOSP IP/OBS HIGH 50: CPT | Mod: GC

## 2024-03-02 PROCEDURE — 71045 X-RAY EXAM CHEST 1 VIEW: CPT | Mod: 26

## 2024-03-02 RX ORDER — TIOTROPIUM BROMIDE AND OLODATEROL 3.124; 2.736 UG/1; UG/1
2 SPRAY, METERED RESPIRATORY (INHALATION) DAILY
Refills: 0 | Status: DISCONTINUED | OUTPATIENT
Start: 2024-03-02 | End: 2024-03-12

## 2024-03-02 RX ORDER — CEFTRIAXONE 500 MG/1
1000 INJECTION, POWDER, FOR SOLUTION INTRAMUSCULAR; INTRAVENOUS EVERY 24 HOURS
Refills: 0 | Status: DISCONTINUED | OUTPATIENT
Start: 2024-03-03 | End: 2024-03-06

## 2024-03-02 RX ORDER — IPRATROPIUM/ALBUTEROL SULFATE 18-103MCG
3 AEROSOL WITH ADAPTER (GRAM) INHALATION ONCE
Refills: 0 | Status: COMPLETED | OUTPATIENT
Start: 2024-03-02 | End: 2024-03-02

## 2024-03-02 RX ORDER — ONDANSETRON 8 MG/1
4 TABLET, FILM COATED ORAL EVERY 8 HOURS
Refills: 0 | Status: DISCONTINUED | OUTPATIENT
Start: 2024-03-02 | End: 2024-03-12

## 2024-03-02 RX ORDER — AMIODARONE HYDROCHLORIDE 400 MG/1
200 TABLET ORAL EVERY 24 HOURS
Refills: 0 | Status: DISCONTINUED | OUTPATIENT
Start: 2024-03-03 | End: 2024-03-12

## 2024-03-02 RX ORDER — HEPARIN SODIUM 5000 [USP'U]/ML
5000 INJECTION INTRAVENOUS; SUBCUTANEOUS EVERY 8 HOURS
Refills: 0 | Status: DISCONTINUED | OUTPATIENT
Start: 2024-03-02 | End: 2024-03-05

## 2024-03-02 RX ORDER — ALLOPURINOL 300 MG
100 TABLET ORAL EVERY 12 HOURS
Refills: 0 | Status: DISCONTINUED | OUTPATIENT
Start: 2024-03-02 | End: 2024-03-06

## 2024-03-02 RX ORDER — CEFTRIAXONE 500 MG/1
1000 INJECTION, POWDER, FOR SOLUTION INTRAMUSCULAR; INTRAVENOUS ONCE
Refills: 0 | Status: COMPLETED | OUTPATIENT
Start: 2024-03-02 | End: 2024-03-02

## 2024-03-02 RX ORDER — ATORVASTATIN CALCIUM 80 MG/1
40 TABLET, FILM COATED ORAL AT BEDTIME
Refills: 0 | Status: DISCONTINUED | OUTPATIENT
Start: 2024-03-02 | End: 2024-03-12

## 2024-03-02 RX ORDER — IPRATROPIUM/ALBUTEROL SULFATE 18-103MCG
3 AEROSOL WITH ADAPTER (GRAM) INHALATION EVERY 6 HOURS
Refills: 0 | Status: DISCONTINUED | OUTPATIENT
Start: 2024-03-02 | End: 2024-03-12

## 2024-03-02 RX ORDER — LANOLIN ALCOHOL/MO/W.PET/CERES
3 CREAM (GRAM) TOPICAL AT BEDTIME
Refills: 0 | Status: DISCONTINUED | OUTPATIENT
Start: 2024-03-02 | End: 2024-03-12

## 2024-03-02 RX ORDER — AZITHROMYCIN 500 MG/1
500 TABLET, FILM COATED ORAL EVERY 24 HOURS
Refills: 0 | Status: COMPLETED | OUTPATIENT
Start: 2024-03-03 | End: 2024-03-04

## 2024-03-02 RX ORDER — ASPIRIN/CALCIUM CARB/MAGNESIUM 324 MG
81 TABLET ORAL EVERY 24 HOURS
Refills: 0 | Status: DISCONTINUED | OUTPATIENT
Start: 2024-03-03 | End: 2024-03-05

## 2024-03-02 RX ORDER — METOPROLOL TARTRATE 50 MG
50 TABLET ORAL EVERY 24 HOURS
Refills: 0 | Status: DISCONTINUED | OUTPATIENT
Start: 2024-03-03 | End: 2024-03-12

## 2024-03-02 RX ORDER — AZITHROMYCIN 500 MG/1
500 TABLET, FILM COATED ORAL ONCE
Refills: 0 | Status: COMPLETED | OUTPATIENT
Start: 2024-03-02 | End: 2024-03-02

## 2024-03-02 RX ORDER — ASPIRIN/CALCIUM CARB/MAGNESIUM 324 MG
0 TABLET ORAL
Refills: 0 | DISCHARGE

## 2024-03-02 RX ORDER — SEVELAMER CARBONATE 2400 MG/1
800 POWDER, FOR SUSPENSION ORAL EVERY 8 HOURS
Refills: 0 | Status: DISCONTINUED | OUTPATIENT
Start: 2024-03-02 | End: 2024-03-12

## 2024-03-02 RX ORDER — ACETAMINOPHEN 500 MG
650 TABLET ORAL EVERY 6 HOURS
Refills: 0 | Status: DISCONTINUED | OUTPATIENT
Start: 2024-03-02 | End: 2024-03-12

## 2024-03-02 RX ADMIN — Medication 3 MILLILITER(S): at 07:54

## 2024-03-02 RX ADMIN — Medication 20 MILLIGRAM(S): at 23:55

## 2024-03-02 RX ADMIN — Medication 650 MILLIGRAM(S): at 11:18

## 2024-03-02 RX ADMIN — Medication 3 MILLILITER(S): at 21:50

## 2024-03-02 RX ADMIN — ATORVASTATIN CALCIUM 40 MILLIGRAM(S): 80 TABLET, FILM COATED ORAL at 21:49

## 2024-03-02 RX ADMIN — SEVELAMER CARBONATE 800 MILLIGRAM(S): 2400 POWDER, FOR SUSPENSION ORAL at 23:55

## 2024-03-02 RX ADMIN — CEFTRIAXONE 100 MILLIGRAM(S): 500 INJECTION, POWDER, FOR SOLUTION INTRAMUSCULAR; INTRAVENOUS at 08:42

## 2024-03-02 RX ADMIN — SEVELAMER CARBONATE 800 MILLIGRAM(S): 2400 POWDER, FOR SUSPENSION ORAL at 15:46

## 2024-03-02 RX ADMIN — HEPARIN SODIUM 5000 UNIT(S): 5000 INJECTION INTRAVENOUS; SUBCUTANEOUS at 21:49

## 2024-03-02 RX ADMIN — AZITHROMYCIN 255 MILLIGRAM(S): 500 TABLET, FILM COATED ORAL at 09:10

## 2024-03-02 RX ADMIN — Medication 3 MILLILITER(S): at 15:47

## 2024-03-02 RX ADMIN — Medication 20 MILLIGRAM(S): at 15:54

## 2024-03-02 RX ADMIN — Medication 100 MILLIGRAM(S): at 18:06

## 2024-03-02 RX ADMIN — TIOTROPIUM BROMIDE AND OLODATEROL 2 PUFF(S): 3.124; 2.736 SPRAY, METERED RESPIRATORY (INHALATION) at 15:46

## 2024-03-02 RX ADMIN — HEPARIN SODIUM 5000 UNIT(S): 5000 INJECTION INTRAVENOUS; SUBCUTANEOUS at 15:54

## 2024-03-02 RX ADMIN — Medication 125 MILLIGRAM(S): at 07:54

## 2024-03-02 NOTE — PROGRESS NOTE ADULT - SUBJECTIVE AND OBJECTIVE BOX
Seen and evaluated on HD, tolerating procedure well  VSS, Continue HD as prescribed with no changes in rx   Hemodialysis Treatment.:     Schedule: Once, Modality: Hemodialysis, Access: Arteriovenous Fistula    Dialyzer: Optiflux [id number], Time: 180 Min    Blood Flow: 400 mL/Min , Dialysate Flow: 500 mL/Min, Dialysate Temp: 36.5, Tubinmm (Adult)    Target Fluid Removal: 1 Liters    Dialysate Electrolytes (mEq/L): Potassium 2, Calcium 2.5, Sodium 138, Bicarbonate 35

## 2024-03-02 NOTE — PATIENT PROFILE ADULT - FALL HARM RISK - HARM RISK INTERVENTIONS
Assistance with ambulation/Assistance OOB with selected safe patient handling equipment/Communicate Risk of Fall with Harm to all staff/Discuss with provider need for PT consult/Monitor gait and stability/Reinforce activity limits and safety measures with patient and family/Tailored Fall Risk Interventions/Visual Cue: Yellow wristband and red socks/Bed in lowest position, wheels locked, appropriate side rails in place/Call bell, personal items and telephone in reach/Instruct patient to call for assistance before getting out of bed or chair/Non-slip footwear when patient is out of bed/[place] to call system/Physically safe environment - no spills, clutter or unnecessary equipment/Purposeful Proactive Rounding/Room/bathroom lighting operational, light cord in reach

## 2024-03-02 NOTE — ED ADULT NURSE REASSESSMENT NOTE - NS ED NURSE REASSESS COMMENT FT1
patient c/o pain to IV cath. no discoloration/swelling noted. removed for patient comfort. new iv placed.

## 2024-03-02 NOTE — ED ADULT NURSE NOTE - ISAR VISION
Prior to previous admission in early April 2023, patient was having increasing BEARDEN, SOB with performance of ADLs, and functional limitation due to his heart failure.  With his increasing symptom burden he was not able to return to his group home setting and was recently discharged to [facility].  Symptom burden now exacerbated by recurrent YASSINE on CKD with need for chronic indwelling Reyes catheter    Patient has Stage C NYHA class III heart failure now complicated by worsening YASSINE on CKD and further clinical debility.  He is at risk for continued progression of disease, further functional decline, recurrent infections and hospitalization, increasing morbidity, and sudden death.  Not a candidate for AICD placement (class III contraindication).  Per previous Cardiology consult from Dr. Jiménez, CPR likely would be medically futile given his underlying heart failure and NICM.      Patient is medically appropriate for hospice.  Patient under auspices of [facility]--discussions have begun with [facility] and local [facility] medical director Dr. Emilia Hearn for initiation of MOLST DNR/DNI orders.      Continue optimization of GDMT  Remainder of management as per primary medical team. No

## 2024-03-02 NOTE — H&P ADULT - PROBLEM SELECTOR PLAN 1
Presenting with SOB, productive cough w/ dark green sputum, wheezing at home, requiring more oxygen than baseline 1L NC. Follows with Dr Johnson outpatient. Prior smoker, quit 19 years ago. On exam, diffuse b/l crackles and minimal wheezing. CXR w/ increased infiltrates. Per HIE, CT Chest 2/2024 w/ new consolidative opacities within the lingula and multifocally throughout the right lower lobe compared to 1/26/2023, pending outpatient PET per Dr Johnson. Home meds: prophylactic azithromycin 250 MWF, stiolto, ventolin HFA. s/p CTX 1g, azithromycin, solumedrol in ED. RVP negative.  - on 3L NC, wean as tolerated  - duonebs and hypertonic saline q6  - c/w CTX 1g q24  - c/w azithromycin 500mg q24 x2 days  - c/w prednisone 40mg qd   - pulm consult  - pending HD Presenting with SOB, productive cough w/ dark green sputum, wheezing at home, requiring more oxygen than baseline 1L NC. Follows with Dr Johnson outpatient. Prior smoker, quit 19 years ago. On exam, diffuse b/l crackles and minimal wheezing. CXR w/ increased infiltrates. Per HIE, CT Chest 2/2024 w/ new consolidative opacities within the lingula and multifocally throughout the right lower lobe compared to 1/26/2023, pending outpatient PET per Dr Johnson. Home meds: prophylactic azithromycin 250 MWF, stiolto, ventolin HFA. s/p CTX 1g, azithromycin, solumedrol in ED. RVP negative.  - on 3L NC, wean as tolerated  - f/u ambulatory sats  - duonebs and hypertonic saline q6  - c/w CTX 1g q24  - c/w azithromycin 500mg q24 x2 days  - c/w prednisone 40mg qd   - pulm consult  - pending HD Presenting with SOB, productive cough w/ dark green sputum, wheezing at home, requiring more oxygen than baseline 1L NC. Follows with Dr Johnson outpatient. Prior smoker, quit 19 years ago. On exam, diffuse b/l crackles and minimal wheezing. CXR w/ increased infiltrates. Per HIE, CT Chest 2/2024 w/ new consolidative opacities within the lingula and multifocally throughout the right lower lobe compared to 1/26/2023, pending outpatient PET per Dr Johnson. Home meds: prophylactic azithromycin 250 MWF, stiolto, ventolin HFA. s/p CTX 1g, azithromycin, solumedrol in ED. RVP negative.  - on 3L NC, wean as tolerated  - f/u ambulatory sats  - duonebs q6  - c/w home stiolto qd  - c/w CTX 1g q24  - c/w azithromycin 500mg q24 x2 days, then resume azithromycin ppx  - c/w prednisone 40mg qd   - consider pulm consult if sx w/o improvement  - pending HD

## 2024-03-02 NOTE — CONSULT NOTE ADULT - ASSESSMENT
68Y M with hx of AFib s/p watchman (on ASA), HTN, COPD, ESRD (MWF), severe ERIK, pulm HTN, T cell lymphoma (s/p chemo), HFpEF, s/p VATS procedure in 2021, recent [facility] admission 12/2023 for LGIB, admitted for CAP and  exacerbation, missed HD on 3/1, consulted for in patient HD management     ESRD:  Outpatient HD at [address] [place] [address], follows Dr. Guadalupe    68Y M with hx of AFib s/p watchman (on ASA), HTN, COPD, ESRD (MWF), severe ERIK, pulm HTN, T cell lymphoma (s/p chemo), HFpEF, s/p VATS procedure in 2021, recent [facility] admission 12/2023 for LGIB, admitted for CAP and  exacerbation, missed HD on 3/1, consulted for in patient HD management     ESRD:  Outpatient HD at [address] [place] [address], follows Dr. Guadalupe   Rx: 3hrs   EDW: 68kg , Pre HD weight 68.9kg     Plan:  HD today for clearance and volume management   Encourage PO intake   Fluid restriction <1.5L/day   Abx dosed appropriately   Next HD if still admitted 3/4    Access:  LUE AVF functional     Anemia:  Hgb 10   EPO with HD     HTN:  BP at goal   UF with HD as tolerated  Hold Amlodipine for now given low BP     MBD:  Calcium: 10.2  Phos: pending   C/w phos binders

## 2024-03-02 NOTE — H&P ADULT - PROBLEM SELECTOR PLAN 3
Follows with Dr Guadalupe outpatient. Last HD session 2/28. Missed HD 3/1 due to sx. Renal consulted in ED  - pending iHD  - c/w sevelamer 600mg TID Follows with Dr Guadalupe outpatient. Last HD session 2/28. Missed HD 3/1 due to sx. Renal consulted in ED  - pending iHD  - c/w sevelamer 800mg TID

## 2024-03-02 NOTE — H&P ADULT - ASSESSMENT
68M PMH AFib s/p watchman (on ASA), HTN, COPD, ESRD (MWF), severe ERIK, pulm HTN, T cell lymphoma (s/p chemo), HFpEF, s/p VATS procedure in 2021, recent [facility] admission 12/2023 for LGIB p/w productive cough, congestion, SOB x5 days. Admitted to [facility] for management of CAP and COPD exacerbation.

## 2024-03-02 NOTE — H&P ADULT - PROBLEM SELECTOR PLAN 6
Hgb 10, at baseline. Of note, pt recently admitted 12/2023 for LGIB, s/p c-scope, found to have large polyps on inpatient colonoscopy. Pending outpatient repeat colonoscopy w/ polypectomy on Thursday 3/7/24 @[facility].  - maintain active T&S  - outpatient GI followup

## 2024-03-02 NOTE — ED PROVIDER NOTE - CARE PLAN
1 Principal Discharge DX:	Cough  Secondary Diagnosis:	COPD exacerbation  Secondary Diagnosis:	ESRD on hemodialysis

## 2024-03-02 NOTE — H&P ADULT - NSHPPHYSICALEXAM_GEN_ALL_CORE
PHYSICAL EXAM  General: NAD  Head: pupils reactive, mucous membranes dry, +bruise on R eye  Neck: Supple; no JVD  Respiratory: b/l crackles, no wheezing, +bruising on anterior chest w/ TTP  Cardiovascular: Regular rhythm/rate; S1/S2+, no murmurs, rubs gallops   Gastrointestinal: Soft; NTND; bowel sounds normal and present  Extremities: WWP  Neurological: A&Ox3

## 2024-03-02 NOTE — ED PROVIDER NOTE - OBJECTIVE STATEMENT
67 y/o m with PMH of ESRD on HD MWF, pulm HTN, emphysema/COPD on home O2 as needed, ERIK (noncompliant with CPAP), HTN, HFpEF followed by Dr. Johnson presents to ED with cough, congestion x 5 days causing shortness of breath.  As per wife, pt has been using increasing O2 at home usually uses 1 L but has been using 3.  Pt recently seen by Dr. Johnson on 2/26 and arranged for PET scan as outpt.  Pt compliant with inhalers, has had PNA in the past.  Denies fever or chills.  Pt missed dialysis today because feeling ill.  Last dialysis 2/28/24. 69 y/o m with PMH of ESRD on HD MWF, pulm HTN, emphysema/COPD on home O2 as needed, ERIK (noncompliant with CPAP), HTN, HFpEF followed by Dr. Johnson presents to ED with cough, congestion x 5 days causing shortness of breath.  As per wife, pt has been using increasing O2 at home usually uses 1 L but has been using 3.  Pt recently seen by Dr. Johnson on 2/26 and arranged for PET scan as outpt.  Pt compliant with inhalers, has had PNA in the past.  Denies fever or chills.  Pt missed dialysis today because feeling ill.  Last dialysis 2/28/24. Cards: Javier Ventura: Anayeli

## 2024-03-02 NOTE — H&P ADULT - HISTORY OF PRESENT ILLNESS
68M PMH AFib s/p watchman (on ASA), HTN, COPD, ESRD (MWF), severe ERIK, pulm HTN, T cell lymphoma (s/p chemo), HFpEF, s/p VATS procedure in 2021, recent [facility] admission 12/2023 for LGIB p/w cough, congestion, SOB x5 days. As per wife, pt requiring more oxygen at home, uses 1 L at baseline but has been using 3L.  Pt recently seen by Dr. Johnson on 2/26 and arranged for PET scan as outpt.  Pt compliant with inhalers, has had PNA in the past.  Denies fever or chills.  Pt missed dialysis today because feeling ill.  Last dialysis 2/28/24. Placed on 3L NC and breathing comfortably, endorsing feeling much better speaking in full sentences.    ED Course  VS T 97.6 HR 56 /60 RR 25 SpO2 97% on 3L NC  Labs WBC 6.89 Hgb 10.0 Plt 127 Lactate 0.9 BMP Lytes WNL BUN/Cr 42/8.68  Micro RVP negative  EKG   Imaging CXR w/ b/l infiltrates  Interventions azithromycin 500mg x1, CTX 1000mg x1, soludmedrol 125mcg x1, duoneb x1 68M PMH AFib s/p watchman (on ASA), HTN, COPD, ESRD (MWF), severe ERIK, pulm HTN, T cell lymphoma (s/p chemo), HFpEF, s/p VATS procedure in 2021, recent [facility] admission 12/2023 for LGIB p/w productive cough, congestion, SOB x5 days. Pt requiring more oxygen at home, uses 1 L as needed at baseline but has been using 3L over the past few days. ROS +headache, productive cough with dark green sputum, chills, wheezing. Was never hypoxic, but feeling subjectively better with 3L NC and could "feel the air." Denies fevers, CP, lightheadedness. Pt missed HD session yesterday due to feeling weak, fatigued, and SOB, HD session initially rescheduled for today however pt reports feeling worse this morning and decided to present to ED. At baseline, pt able to walk less than half a block. Able to perform ADLs as normal, however with increased NIELSON. Makes minimal urine. Denies sick contacts, however wife and pt routinely  6 year old grandson from school. Of note, pt w/ recent [facility] ED visit 2/26 after fall at home, rolled out of bed w/ head strike.    ED Course  VS T 97.6 HR 56 /60 RR 25 SpO2 97% on 3L NC  Labs WBC 6.89 Hgb 10.0 Plt 127 Lactate 0.9 BMP Lytes WNL BUN/Cr 42/8.68  Micro RVP negative  EKG   Imaging CXR w/ b/l infiltrates  Interventions azithromycin 500mg x1, CTX 1000mg x1, soludmedrol 125mcg x1, duoneb x1  Consults Renal

## 2024-03-02 NOTE — H&P ADULT - PROBLEM SELECTOR PLAN 2
CT Chest 2/12/2024 w/ new consolidative opacities within the lingula and multifocally throughout the right lower lobe compared to 1/26/2023. Has b/l pleural effusions at baseline, s/p VATS w/ CT surgery in 2021 for L chest wall loculated effusion. Denies sick contacts, however w/ 6 year old grandson at home. RVP negative. s/p CTX 1g and azithromycin 500mg in ED. Takes ppx azithromycin at home.   - f/u BCx  - f/u procal  - c/w CTX 1g q24  - c/w azithromycin 500mg q24  - treat empirically for CAP, unable to obtain urine studies as pt makes minimal urine CT Chest 2/12/2024 w/ new consolidative opacities within the lingula and multifocally throughout the right lower lobe compared to 1/26/2023. Has b/l pleural effusions at baseline, s/p VATS w/ CT surgery in 2021 for L chest wall loculated effusion. Denies sick contacts, however w/ 6 year old grandson at home. RVP negative. s/p CTX 1g and azithromycin 500mg in ED. Takes ppx azithromycin at home.   - f/u BCx  - f/u sputum culture  - f/u procal  - c/w CTX 1g q24  - c/w azithromycin 500mg q24  - treat empirically for CAP, unable to obtain urine studies as pt makes minimal urine CT Chest 2/12/2024 w/ new consolidative opacities within the lingula and multifocally throughout the right lower lobe compared to 1/26/2023. Has b/l pleural effusions at baseline, s/p VATS w/ CT surgery in 2021 for L chest wall loculated effusion. Denies sick contacts, however w/ 6 year old grandson at home. RVP negative. s/p CTX 1g and azithromycin 500mg in ED. Takes ppx azithromycin at home.   - f/u BCx  - f/u sputum culture  - c/w CTX 1g q24  - c/w azithromycin 500mg q24  - treat empirically for CAP, unable to obtain urine studies as pt makes minimal urine

## 2024-03-02 NOTE — H&P ADULT - PROBLEM SELECTOR PLAN 5
Home med: Aspirin 81mg qd, amio 200mg qd, metoprolol XL 50mg qd. [id number] 2. s/p Watchman device, not on Eliquis. NSR this admission.  - c/w home meds

## 2024-03-02 NOTE — ED ADULT NURSE NOTE - NSFALLUNIVINTERV_ED_ALL_ED
Bed/Stretcher in lowest position, wheels locked, appropriate side rails in place/Call bell, personal items and telephone in reach/Instruct patient to call for assistance before getting out of bed/chair/stretcher/Non-slip footwear applied when patient is off stretcher/[place] to call system/Physically safe environment - no spills, clutter or unnecessary equipment/Purposeful proactive rounding/Room/bathroom lighting operational, light cord in reach

## 2024-03-02 NOTE — ED ADULT NURSE NOTE - OBJECTIVE STATEMENT
68yM presents to ED c/o SOB since Tuesday. Pt unable to go to dialysis on Friday 2/2 SOB and then unable to go this AM as well so came to ED instead. Pt in obvious distress while breathing, put on O2, states "my hurts too and I am nauseous." Denies cp, fever, chills. Fistula to left arm.

## 2024-03-02 NOTE — ED ADULT NURSE REASSESSMENT NOTE - NS ED NURSE REASSESS COMMENT FT1
Patient received for continuation of care at this time. Patient awake, alert in no acute distress at this time.

## 2024-03-02 NOTE — H&P ADULT - PROBLEM SELECTOR PLAN 4
Home med: amlodipine 5mg  - c/w home meds Home med: amlodipine 5mg, not on HD days  - c/w home meds Home med: amlodipine 5mg, not on HD days  - normotensive, holding this admission

## 2024-03-02 NOTE — H&P ADULT - PROBLEM SELECTOR PLAN 8
Home med: sildenafil citrate 20mg TID. Follows w/ Dr Johnson.  - c/w home med Home med: sildenafil citrate 20mg TID. Follows w/ Dr Johnson.  - c/w home med    #ERIK  hx of severe ERIK, non-compliant with CPAP  - outpatient followup

## 2024-03-02 NOTE — H&P ADULT - ATTENDING COMMENTS
68M PMH AFib s/p watchman (on ASA), HTN, COPD, ESRD (MWF), severe ERIK, pulm HTN, T cell lymphoma (s/p chemo), HFpEF, s/p VATS procedure in 2021, recent [facility] admission 12/2023 for LGIB p/w productive cough, congestion, SOB x5 days. Admitted to [facility] for management of CAP and COPD exacerbation. 68M PMH AFib s/p watchman (on ASA), HTN, COPD, ESRD (MWF), severe ERIK, pulm HTN, T cell lymphoma (s/p chemo), HFpEF, s/p VATS procedure in 2021, recent [facility] admission 12/2023 for LGIB p/w productive cough, congestion, SOB x5 days. Admitted to [facility] for management of CAP and COPD exacerbation.    #CAP  -Pt w/ negative RVP and Covid 19   - Will continue CTX and Azithromycin   - Will f/u Blood cx     #COPD on home O2 1 L   - Pt s/p solumedrol in the ER  - Will continue Prednisone   - Pt is currently on 3L nasal cannula ; Will attempt to titrate to 1L     #ESRD  - Pt missed his HD session on 3/1  - Pt receiving HD today; Management of HD as per nephrology   -Continue sevelamer     #HTN  - Will continue home Amlodipine 5mg daily but not on HD days     #Atrial fibrillation   #Chronic heart failure with preserved ejection fraction (HFpEF).   -Pt s/p watchman device and not on AC   - Will continue home meds :  Aspirin 81mg qd, amio 200mg qd, metoprolol XL 50mg qd.    #DVT prop  -heparin SQ     #DISPO  - Likely d/c tomorrow or Monday 3/4 pending continued clinical improvement

## 2024-03-02 NOTE — ED PROVIDER NOTE - CLINICAL SUMMARY MEDICAL DECISION MAKING FREE TEXT BOX
67 y/o m with PMH of ESRD on HD MWF, pulm HTN, emphysema/COPD on home O2 as needed, ERIK (noncompliant with CPAP), HTN, HFpEF followed by Dr. Johnson presents to ED with cough, congestion x 5 days causing shortness of breath.  As per wife, pt has been using increasing O2 at home usually uses 1 L but has been using 3.  Pt recently seen by Dr. Johnson on 2/26 and arranged for PET scan as outpt.  Pt compliant with inhalers, has had PNA in the past.  Denies fever or chills.  Pt missed dialysis today because feeling ill.  Last dialysis 2/28/24.    VSS  95% on 3 L  crackles b/l  EKG sinus ceci without hyper-K changes  Will obtain basic labs, cxr  neb and steroids  discuss with dialysis fellow 67 y/o m with PMH of ESRD on HD MWF, pulm HTN, emphysema/COPD on home O2 as needed, ERIK (noncompliant with CPAP), HTN, HFpEF followed by Dr. Johnson presents to ED with cough, congestion x 5 days causing shortness of breath.  As per wife, pt has been using increasing O2 at home usually uses 1 L but has been using 3.  Pt recently seen by Dr. Johnson on 2/26 and arranged for PET scan as outpt.  Pt compliant with inhalers, has had PNA in the past.  Denies fever or chills.  Pt missed dialysis today because feeling ill.  Last dialysis 2/28/24.    VSS  95% on 3 L  crackles b/l  EKG sinus ceci without hyper-K changes  Will obtain basic labs, cxr  Xray with ? infiltrate vs congestion  blood cultures, CAP coverage  Discussed with KODY who agrees on admission  neb and steroids  discuss with dialysis fellow  Xray with ? pna/fluid

## 2024-03-03 LAB
A1C WITH ESTIMATED AVERAGE GLUCOSE RESULT: 4.9 % — SIGNIFICANT CHANGE UP (ref 4–5.6)
ALBUMIN SERPL ELPH-MCNC: 3.8 G/DL — SIGNIFICANT CHANGE UP (ref 3.3–5)
ALP SERPL-CCNC: 167 U/L — HIGH (ref 40–120)
ALT FLD-CCNC: <5 U/L — LOW (ref 10–45)
ANION GAP SERPL CALC-SCNC: 15 MMOL/L — SIGNIFICANT CHANGE UP (ref 5–17)
AST SERPL-CCNC: 9 U/L — LOW (ref 10–40)
BASOPHILS # BLD AUTO: 0.02 K/UL — SIGNIFICANT CHANGE UP (ref 0–0.2)
BASOPHILS NFR BLD AUTO: 0.2 % — SIGNIFICANT CHANGE UP (ref 0–2)
BILIRUB SERPL-MCNC: 0.3 MG/DL — SIGNIFICANT CHANGE UP (ref 0.2–1.2)
BUN SERPL-MCNC: 32 MG/DL — HIGH (ref 7–23)
CALCIUM SERPL-MCNC: 9.9 MG/DL — SIGNIFICANT CHANGE UP (ref 8.4–10.5)
CHLORIDE SERPL-SCNC: 94 MMOL/L — LOW (ref 96–108)
CO2 SERPL-SCNC: 30 MMOL/L — SIGNIFICANT CHANGE UP (ref 22–31)
CREAT SERPL-MCNC: 5.97 MG/DL — HIGH (ref 0.5–1.3)
EGFR: 10 ML/MIN/1.73M2 — LOW
EOSINOPHIL # BLD AUTO: 0 K/UL — SIGNIFICANT CHANGE UP (ref 0–0.5)
EOSINOPHIL NFR BLD AUTO: 0 % — SIGNIFICANT CHANGE UP (ref 0–6)
ESTIMATED AVERAGE GLUCOSE: 94 MG/DL — SIGNIFICANT CHANGE UP (ref 68–114)
GLUCOSE SERPL-MCNC: 161 MG/DL — HIGH (ref 70–99)
HCT VFR BLD CALC: 29.2 % — LOW (ref 39–50)
HGB BLD-MCNC: 9.2 G/DL — LOW (ref 13–17)
IMM GRANULOCYTES NFR BLD AUTO: 0.4 % — SIGNIFICANT CHANGE UP (ref 0–0.9)
LYMPHOCYTES # BLD AUTO: 0.74 K/UL — LOW (ref 1–3.3)
LYMPHOCYTES # BLD AUTO: 7.2 % — LOW (ref 13–44)
MAGNESIUM SERPL-MCNC: 2.3 MG/DL — SIGNIFICANT CHANGE UP (ref 1.6–2.6)
MCHC RBC-ENTMCNC: 30.3 PG — SIGNIFICANT CHANGE UP (ref 27–34)
MCHC RBC-ENTMCNC: 31.5 GM/DL — LOW (ref 32–36)
MCV RBC AUTO: 96.1 FL — SIGNIFICANT CHANGE UP (ref 80–100)
MONOCYTES # BLD AUTO: 0.34 K/UL — SIGNIFICANT CHANGE UP (ref 0–0.9)
MONOCYTES NFR BLD AUTO: 3.3 % — SIGNIFICANT CHANGE UP (ref 2–14)
NEUTROPHILS # BLD AUTO: 9.1 K/UL — HIGH (ref 1.8–7.4)
NEUTROPHILS NFR BLD AUTO: 88.9 % — HIGH (ref 43–77)
NRBC # BLD: 0 /100 WBCS — SIGNIFICANT CHANGE UP (ref 0–0)
PHOSPHATE SERPL-MCNC: 4.8 MG/DL — HIGH (ref 2.5–4.5)
PLATELET # BLD AUTO: 146 K/UL — LOW (ref 150–400)
POTASSIUM SERPL-MCNC: 4.4 MMOL/L — SIGNIFICANT CHANGE UP (ref 3.5–5.3)
POTASSIUM SERPL-SCNC: 4.4 MMOL/L — SIGNIFICANT CHANGE UP (ref 3.5–5.3)
PROT SERPL-MCNC: 7.4 G/DL — SIGNIFICANT CHANGE UP (ref 6–8.3)
RBC # BLD: 3.04 M/UL — LOW (ref 4.2–5.8)
RBC # FLD: 16 % — HIGH (ref 10.3–14.5)
SODIUM SERPL-SCNC: 139 MMOL/L — SIGNIFICANT CHANGE UP (ref 135–145)
WBC # BLD: 10.24 K/UL — SIGNIFICANT CHANGE UP (ref 3.8–10.5)
WBC # FLD AUTO: 10.24 K/UL — SIGNIFICANT CHANGE UP (ref 3.8–10.5)

## 2024-03-03 PROCEDURE — 71275 CT ANGIOGRAPHY CHEST: CPT | Mod: 26

## 2024-03-03 PROCEDURE — 99233 SBSQ HOSP IP/OBS HIGH 50: CPT | Mod: GC

## 2024-03-03 RX ADMIN — HEPARIN SODIUM 5000 UNIT(S): 5000 INJECTION INTRAVENOUS; SUBCUTANEOUS at 06:01

## 2024-03-03 RX ADMIN — Medication 100 MILLIGRAM(S): at 06:00

## 2024-03-03 RX ADMIN — Medication 20 MILLIGRAM(S): at 16:07

## 2024-03-03 RX ADMIN — Medication 40 MILLIGRAM(S): at 06:01

## 2024-03-03 RX ADMIN — HEPARIN SODIUM 5000 UNIT(S): 5000 INJECTION INTRAVENOUS; SUBCUTANEOUS at 22:58

## 2024-03-03 RX ADMIN — SEVELAMER CARBONATE 800 MILLIGRAM(S): 2400 POWDER, FOR SUSPENSION ORAL at 07:32

## 2024-03-03 RX ADMIN — Medication 20 MILLIGRAM(S): at 07:32

## 2024-03-03 RX ADMIN — Medication 100 MILLIGRAM(S): at 16:01

## 2024-03-03 RX ADMIN — SEVELAMER CARBONATE 800 MILLIGRAM(S): 2400 POWDER, FOR SUSPENSION ORAL at 16:01

## 2024-03-03 RX ADMIN — Medication 3 MILLILITER(S): at 16:01

## 2024-03-03 RX ADMIN — ATORVASTATIN CALCIUM 40 MILLIGRAM(S): 80 TABLET, FILM COATED ORAL at 22:58

## 2024-03-03 RX ADMIN — TIOTROPIUM BROMIDE AND OLODATEROL 2 PUFF(S): 3.124; 2.736 SPRAY, METERED RESPIRATORY (INHALATION) at 10:09

## 2024-03-03 RX ADMIN — Medication 3 MILLILITER(S): at 22:58

## 2024-03-03 RX ADMIN — HEPARIN SODIUM 5000 UNIT(S): 5000 INJECTION INTRAVENOUS; SUBCUTANEOUS at 14:46

## 2024-03-03 RX ADMIN — Medication 3 MILLILITER(S): at 06:00

## 2024-03-03 RX ADMIN — Medication 100 MILLIGRAM(S): at 18:04

## 2024-03-03 RX ADMIN — CEFTRIAXONE 100 MILLIGRAM(S): 500 INJECTION, POWDER, FOR SOLUTION INTRAMUSCULAR; INTRAVENOUS at 07:32

## 2024-03-03 RX ADMIN — Medication 81 MILLIGRAM(S): at 06:00

## 2024-03-03 RX ADMIN — Medication 3 MILLILITER(S): at 10:10

## 2024-03-03 RX ADMIN — AMIODARONE HYDROCHLORIDE 200 MILLIGRAM(S): 400 TABLET ORAL at 06:00

## 2024-03-03 RX ADMIN — AZITHROMYCIN 500 MILLIGRAM(S): 500 TABLET, FILM COATED ORAL at 06:00

## 2024-03-03 NOTE — PROGRESS NOTE ADULT - PROBLEM SELECTOR PLAN 7
#CAD  Follows with Dr Ventura, last seen 1/2024. Prior EF 45%, now recovered. b/l crackles on exam, pending iHD. no LE edema. Home med: lipitor 40  - c/w home med

## 2024-03-03 NOTE — PROGRESS NOTE ADULT - PROBLEM SELECTOR PLAN 1
Presenting with SOB, productive cough w/ dark green sputum, wheezing at home, requiring more oxygen than baseline 1L NC. Follows with Dr Johnson outpatient. Prior smoker, quit 19 years ago. On exam, diffuse b/l crackles and minimal wheezing. CXR w/ increased infiltrates. Per HIE, CT Chest 2/2024 w/ new consolidative opacities within the lingula and multifocally throughout the right lower lobe compared to 1/26/2023, pending outpatient PET per Dr Johnson. Home meds: prophylactic azithromycin 250 MWF, stiolto, ventolin HFA. s/p CTX 1g, azithromycin, solumedrol in ED. RVP negative.  - on 3L NC, wean as tolerated  - f/u ambulatory sats  - duonebs q6  - c/w home stiolto qd  - c/w CTX 1g q24  - c/w azithromycin 500mg q24 x2 days, then resume azithromycin ppx  - c/w prednisone 40mg qd   - consider pulm consult if sx w/o improvement  - pending HD

## 2024-03-03 NOTE — PROGRESS NOTE ADULT - PROBLEM SELECTOR PLAN 5
Home med: Aspirin 81mg qd, amio 200mg qd, metoprolol XL 50mg qd. [id number] 2. s/p Watchman device, not on Eliquis. NSR this admission.  - c/w home meds

## 2024-03-03 NOTE — PROGRESS NOTE ADULT - PROBLEM SELECTOR PLAN 6
Hgb 10, at baseline. Of note, pt recently admitted 12/2023 for LGIB, s/p c-scope, found to have large polyps on inpatient colonoscopy. Pending outpatient repeat colonoscopy w/ polypectomy on Thursday 3/7/24 @[facility].  - maintain active T&S  - outpatient GI followup

## 2024-03-03 NOTE — PROGRESS NOTE ADULT - SUBJECTIVE AND OBJECTIVE BOX
Patient is a 68y old  Male who presents with a chief complaint of SOB (02 Mar 2024 13:18)    INTERVAL EVENTS: KALA    SUBJECTIVE:  Patient was seen and examined at bedside. Patient reports that he becomes winded with simple activities. Reports productive cough.     Review of systems: Patient denies: fever, chills, dizziness, HA, Changes in vision, CP, dyspnea, nausea or vomiting, dysuria, changes in bowel movements, LE edema. Rest of 12 point Review of systems negative unless otherwise documented elsewhere in note.     Diet, Renal Restrictions:   For patients receiving Renal Replacement - No Protein Restr, No Conc K, No Conc Phos, Low Sodium (03-02-24 @ 11:06) [Active]      MEDICATIONS:  MEDICATIONS  (STANDING):  albuterol/ipratropium for Nebulization 3 milliLiter(s) Nebulizer every 6 hours  allopurinol 100 milliGRAM(s) Oral every 12 hours  aMIOdarone    Tablet 200 milliGRAM(s) Oral every 24 hours  aspirin  chewable 81 milliGRAM(s) Oral every 24 hours  atorvastatin 40 milliGRAM(s) Oral at bedtime  azithromycin   Tablet 500 milliGRAM(s) Oral every 24 hours  cefTRIAXone   IVPB 1000 milliGRAM(s) IV Intermittent every 24 hours  heparin   Injectable 5000 Unit(s) SubCutaneous every 8 hours  metoprolol succinate ER 50 milliGRAM(s) Oral every 24 hours  predniSONE   Tablet 40 milliGRAM(s) Oral every 24 hours  sevelamer carbonate 800 milliGRAM(s) Oral every 8 hours  sildenafil (REVATIO) 20 milliGRAM(s) Oral every 8 hours  tiotropium 2.5 MICROgram(s)/olodaterol 2.5 MICROgram(s) (STIOLTO) Inhaler 2 Puff(s) Inhalation daily    MEDICATIONS  (PRN):  acetaminophen     Tablet .. 650 milliGRAM(s) Oral every 6 hours PRN Temp greater or equal to 38C (100.4F), Mild Pain (1 - 3)  aluminum hydroxide/magnesium hydroxide/simethicone Suspension 30 milliLiter(s) Oral every 4 hours PRN Dyspepsia  melatonin 3 milliGRAM(s) Oral at bedtime PRN Insomnia  ondansetron Injectable 4 milliGRAM(s) IV Push every 8 hours PRN Nausea and/or Vomiting      Allergies    No Known Allergies    Intolerances        OBJECTIVE:  Vital Signs Last 24 Hrs  T(C): 37.2 (03 Mar 2024 05:54), Max: 37.2 (03 Mar 2024 05:54)  T(F): 98.9 (03 Mar 2024 05:54), Max: 98.9 (03 Mar 2024 05:54)  HR: 58 (03 Mar 2024 05:54) (58 - 65)  BP: 107/57 (03 Mar 2024 05:54) (107/57 - 133/71)  BP(mean): --  RR: 18 (03 Mar 2024 05:54) (18 - 19)  SpO2: 96% (03 Mar 2024 05:54) (93% - 98%)    Parameters below as of 03 Mar 2024 05:54  Patient On (Oxygen Delivery Method): nasal cannula  O2 Flow (L/min): 2    I&O's Summary    02 Mar 2024 07:01  -  03 Mar 2024 07:00  --------------------------------------------------------  IN: 0 mL / OUT: 1000 mL / NET: -1000 mL        PHYSICAL EXAM:  Gen: Reclining in bed at time of exam, appears stated age  HEENT: NCAT, MMM, clear OP  Neck: supple, trachea at midline  CV: RRR, +S1/S2  Pulm: bibasilar crackles  Abd: soft, NTND  Skin: warm and dry, no new rashes vs prior report  Ext: WWP, no LE edema  Neuro: AOx3, no gross focal neurological deficits  Psych: affect and behavior appropriate, pleasant at time of interview  Bruising on L chest, R knee    LABS:                        9.2    10.24 )-----------( 146      ( 03 Mar 2024 09:01 )             29.2     03-03    139  |  94<L>  |  32<H>  ----------------------------<  161<H>  4.4   |  30  |  5.97<H>    Ca    9.9      03 Mar 2024 09:01  Phos  4.8     03-03  Mg     2.3     03-03    TPro  7.4  /  Alb  3.8  /  TBili  0.3  /  DBili  x   /  AST  9<L>  /  ALT  <5<L>  /  AlkPhos  167<H>  03-03    LIVER FUNCTIONS - ( 03 Mar 2024 09:01 )  Alb: 3.8 g/dL / Pro: 7.4 g/dL / ALK PHOS: 167 U/L / ALT: <5 U/L / AST: 9 U/L / GGT: x             CAPILLARY BLOOD GLUCOSE        Urinalysis Basic - ( 03 Mar 2024 09:01 )    Color: x / Appearance: x / SG: x / pH: x  Gluc: 161 mg/dL / Ketone: x  / Bili: x / Urobili: x   Blood: x / Protein: x / Nitrite: x   Leuk Esterase: x / RBC: x / WBC x   Sq Epi: x / Non Sq Epi: x / Bacteria: x        MICRODATA:    Culture - Blood (collected 02 Mar 2024 08:27)  Source: .Blood Blood  Preliminary Report (03 Mar 2024 11:00):    No growth at 1 day.    Culture - Blood (collected 02 Mar 2024 08:27)  Source: .Blood Blood  Preliminary Report (03 Mar 2024 11:00):    No growth at 1 day.        RADIOLOGY/OTHER STUDIES:

## 2024-03-03 NOTE — PROGRESS NOTE ADULT - PROBLEM SELECTOR PLAN 8
Home med: sildenafil citrate 20mg TID. Follows w/ Dr Johnson.  - c/w home med    #ERIK  hx of severe ERIK, non-compliant with CPAP  - outpatient followup

## 2024-03-03 NOTE — PROGRESS NOTE ADULT - ASSESSMENT
68M PMH AFib s/p watchman (on ASA), HTN, COPD, ESRD (MWF), severe ERIK, pulm HTN, T cell lymphoma (s/p chemo), HFpEF, s/p VATS procedure in 2021, recent [facility] admission 12/2023 for LGIB p/w productive cough, congestion, SOB x5 days. Admitted to [facility] for management of CAP and COPD exacerbation.

## 2024-03-03 NOTE — PROGRESS NOTE ADULT - PROBLEM SELECTOR PLAN 3
Follows with Dr Guadalupe outpatient. Last HD session 2/28. Missed HD 3/1 due to sx. Renal consulted in ED  - pending iHD  - c/w sevelamer 800mg TID

## 2024-03-03 NOTE — PROGRESS NOTE ADULT - NSPROGADDITIONALINFOA_GEN_ALL_CORE
{Diane Price is a 32 year old female who is here for a LEEP procedure.    The patient is having a LEEP because of COMFORT II.  She has had colposcopy only.  Patient understands and accepts the risks of LEEP, including bleeding, infection, pain, weakening of the cervix and anesthesia.      PROCEDURE  After informed consent was obtained, Coated speculum was inserted in the vagina.  The cervix was coated with Lugol's solution and margins of lesion were seen.  1% Lidocaine with epinephrine was injected circumferentially on the cervix.  LEEP procedure was done using small applecore .  Hemostasis was obtained using Monsel's plus cautery.  Patient is instructed regarding pelvic rest, pain precautions, bleeding precautions, counseling regarding LEEP and RTC in 2 weeks.    PLAN  RTC in 2 weeks for follow up.  Will call with pathology results.     Check CT PE  -Echo  -C/w abx  -Check legionella  Check rectal temp  -Sputum culture  -Daily standing weight - will find out dry weight  -PT/OT

## 2024-03-03 NOTE — PROGRESS NOTE ADULT - PROBLEM SELECTOR PLAN 2
CT Chest 2/12/2024 w/ new consolidative opacities within the lingula and multifocally throughout the right lower lobe compared to 1/26/2023. Has b/l pleural effusions at baseline, s/p VATS w/ CT surgery in 2021 for L chest wall loculated effusion. Denies sick contacts, however w/ 6 year old grandson at home. RVP negative. s/p CTX 1g and azithromycin 500mg in ED. Takes ppx azithromycin at home.   - f/u BCx  - f/u sputum culture  - c/w CTX 1g q24  - c/w azithromycin 500mg q24  - treat empirically for CAP, unable to obtain urine studies as pt makes minimal urine

## 2024-03-04 ENCOUNTER — TRANSCRIPTION ENCOUNTER (OUTPATIENT)
Age: 69
End: 2024-03-04

## 2024-03-04 ENCOUNTER — RESULT REVIEW (OUTPATIENT)
Age: 69
End: 2024-03-04

## 2024-03-04 DIAGNOSIS — G47.33 OBSTRUCTIVE SLEEP APNEA (ADULT) (PEDIATRIC): ICD-10-CM

## 2024-03-04 LAB
ALBUMIN SERPL ELPH-MCNC: 3.6 G/DL — SIGNIFICANT CHANGE UP (ref 3.3–5)
ALP SERPL-CCNC: 158 U/L — HIGH (ref 40–120)
ALT FLD-CCNC: 5 U/L — LOW (ref 10–45)
ANION GAP SERPL CALC-SCNC: 16 MMOL/L — SIGNIFICANT CHANGE UP (ref 5–17)
AST SERPL-CCNC: 13 U/L — SIGNIFICANT CHANGE UP (ref 10–40)
BASOPHILS # BLD AUTO: 0.02 K/UL — SIGNIFICANT CHANGE UP (ref 0–0.2)
BASOPHILS NFR BLD AUTO: 0.2 % — SIGNIFICANT CHANGE UP (ref 0–2)
BILIRUB SERPL-MCNC: 0.3 MG/DL — SIGNIFICANT CHANGE UP (ref 0.2–1.2)
BUN SERPL-MCNC: 46 MG/DL — HIGH (ref 7–23)
CALCIUM SERPL-MCNC: 9.8 MG/DL — SIGNIFICANT CHANGE UP (ref 8.4–10.5)
CHLORIDE SERPL-SCNC: 96 MMOL/L — SIGNIFICANT CHANGE UP (ref 96–108)
CO2 SERPL-SCNC: 27 MMOL/L — SIGNIFICANT CHANGE UP (ref 22–31)
CREAT SERPL-MCNC: 7.7 MG/DL — HIGH (ref 0.5–1.3)
EGFR: 7 ML/MIN/1.73M2 — LOW
EOSINOPHIL # BLD AUTO: 0 K/UL — SIGNIFICANT CHANGE UP (ref 0–0.5)
EOSINOPHIL NFR BLD AUTO: 0 % — SIGNIFICANT CHANGE UP (ref 0–6)
GLUCOSE SERPL-MCNC: 130 MG/DL — HIGH (ref 70–99)
HBV SURFACE AB SER-ACNC: <3 MIU/ML — LOW
HCT VFR BLD CALC: 28 % — LOW (ref 39–50)
HGB BLD-MCNC: 8.7 G/DL — LOW (ref 13–17)
IMM GRANULOCYTES NFR BLD AUTO: 0.5 % — SIGNIFICANT CHANGE UP (ref 0–0.9)
LEGIONELLA AG UR QL: NEGATIVE — SIGNIFICANT CHANGE UP
LYMPHOCYTES # BLD AUTO: 0.81 K/UL — LOW (ref 1–3.3)
LYMPHOCYTES # BLD AUTO: 6.2 % — LOW (ref 13–44)
MAGNESIUM SERPL-MCNC: 2.6 MG/DL — SIGNIFICANT CHANGE UP (ref 1.6–2.6)
MCHC RBC-ENTMCNC: 30.4 PG — SIGNIFICANT CHANGE UP (ref 27–34)
MCHC RBC-ENTMCNC: 31.1 GM/DL — LOW (ref 32–36)
MCV RBC AUTO: 97.9 FL — SIGNIFICANT CHANGE UP (ref 80–100)
MONOCYTES # BLD AUTO: 0.54 K/UL — SIGNIFICANT CHANGE UP (ref 0–0.9)
MONOCYTES NFR BLD AUTO: 4.1 % — SIGNIFICANT CHANGE UP (ref 2–14)
NEUTROPHILS # BLD AUTO: 11.62 K/UL — HIGH (ref 1.8–7.4)
NEUTROPHILS NFR BLD AUTO: 89 % — HIGH (ref 43–77)
NRBC # BLD: 0 /100 WBCS — SIGNIFICANT CHANGE UP (ref 0–0)
NT-PROBNP SERPL-SCNC: HIGH PG/ML (ref 0–300)
PHOSPHATE SERPL-MCNC: 4.4 MG/DL — SIGNIFICANT CHANGE UP (ref 2.5–4.5)
PLATELET # BLD AUTO: 153 K/UL — SIGNIFICANT CHANGE UP (ref 150–400)
POTASSIUM SERPL-MCNC: 4.6 MMOL/L — SIGNIFICANT CHANGE UP (ref 3.5–5.3)
POTASSIUM SERPL-SCNC: 4.6 MMOL/L — SIGNIFICANT CHANGE UP (ref 3.5–5.3)
PROT SERPL-MCNC: 7.1 G/DL — SIGNIFICANT CHANGE UP (ref 6–8.3)
RBC # BLD: 2.86 M/UL — LOW (ref 4.2–5.8)
RBC # FLD: 16.1 % — HIGH (ref 10.3–14.5)
S PNEUM AG UR QL: NEGATIVE — SIGNIFICANT CHANGE UP
SODIUM SERPL-SCNC: 139 MMOL/L — SIGNIFICANT CHANGE UP (ref 135–145)
TROPONIN T, HIGH SENSITIVITY RESULT: 66 NG/L — CRITICAL HIGH (ref 0–51)
WBC # BLD: 13.06 K/UL — HIGH (ref 3.8–10.5)
WBC # FLD AUTO: 13.06 K/UL — HIGH (ref 3.8–10.5)

## 2024-03-04 PROCEDURE — 99232 SBSQ HOSP IP/OBS MODERATE 35: CPT | Mod: GC

## 2024-03-04 PROCEDURE — 90947 DIALYSIS REPEATED EVAL: CPT

## 2024-03-04 PROCEDURE — 93306 TTE W/DOPPLER COMPLETE: CPT | Mod: 26

## 2024-03-04 RX ADMIN — Medication 3 MILLILITER(S): at 22:46

## 2024-03-04 RX ADMIN — Medication 50 MILLIGRAM(S): at 07:11

## 2024-03-04 RX ADMIN — Medication 3 MILLILITER(S): at 04:56

## 2024-03-04 RX ADMIN — Medication 650 MILLIGRAM(S): at 14:15

## 2024-03-04 RX ADMIN — Medication 650 MILLIGRAM(S): at 15:15

## 2024-03-04 RX ADMIN — HEPARIN SODIUM 5000 UNIT(S): 5000 INJECTION INTRAVENOUS; SUBCUTANEOUS at 14:14

## 2024-03-04 RX ADMIN — AMIODARONE HYDROCHLORIDE 200 MILLIGRAM(S): 400 TABLET ORAL at 07:12

## 2024-03-04 RX ADMIN — Medication 100 MILLIGRAM(S): at 07:12

## 2024-03-04 RX ADMIN — ATORVASTATIN CALCIUM 40 MILLIGRAM(S): 80 TABLET, FILM COATED ORAL at 22:43

## 2024-03-04 RX ADMIN — TIOTROPIUM BROMIDE AND OLODATEROL 2 PUFF(S): 3.124; 2.736 SPRAY, METERED RESPIRATORY (INHALATION) at 14:16

## 2024-03-04 RX ADMIN — Medication 100 MILLIGRAM(S): at 18:39

## 2024-03-04 RX ADMIN — Medication 650 MILLIGRAM(S): at 23:54

## 2024-03-04 RX ADMIN — SEVELAMER CARBONATE 800 MILLIGRAM(S): 2400 POWDER, FOR SUSPENSION ORAL at 17:07

## 2024-03-04 RX ADMIN — Medication 650 MILLIGRAM(S): at 22:46

## 2024-03-04 RX ADMIN — HEPARIN SODIUM 5000 UNIT(S): 5000 INJECTION INTRAVENOUS; SUBCUTANEOUS at 07:11

## 2024-03-04 RX ADMIN — HEPARIN SODIUM 5000 UNIT(S): 5000 INJECTION INTRAVENOUS; SUBCUTANEOUS at 22:43

## 2024-03-04 RX ADMIN — Medication 40 MILLIGRAM(S): at 07:13

## 2024-03-04 RX ADMIN — Medication 20 MILLIGRAM(S): at 17:07

## 2024-03-04 RX ADMIN — AZITHROMYCIN 500 MILLIGRAM(S): 500 TABLET, FILM COATED ORAL at 07:12

## 2024-03-04 RX ADMIN — Medication 3 MILLILITER(S): at 17:08

## 2024-03-04 RX ADMIN — SEVELAMER CARBONATE 800 MILLIGRAM(S): 2400 POWDER, FOR SUSPENSION ORAL at 07:13

## 2024-03-04 RX ADMIN — Medication 81 MILLIGRAM(S): at 07:11

## 2024-03-04 RX ADMIN — Medication 20 MILLIGRAM(S): at 07:24

## 2024-03-04 RX ADMIN — CEFTRIAXONE 100 MILLIGRAM(S): 500 INJECTION, POWDER, FOR SOLUTION INTRAMUSCULAR; INTRAVENOUS at 07:12

## 2024-03-04 NOTE — OCCUPATIONAL THERAPY INITIAL EVALUATION ADULT - MODIFIED CLINICAL TEST OF SENSORY INTEGRATION IN BALANCE TEST
Pt ambulated 150 feet with CGA using SC, requiring min A for occasional LOB and postural sway Pt ambulated 150 feet with CGA using SC, demonstrated good chanel, mild postural sway, 1 episode loss of balance required min A to recover, amb SpO2 >92% on 2L O2 via NC

## 2024-03-04 NOTE — OCCUPATIONAL THERAPY INITIAL EVALUATION ADULT - DIAGNOSIS, OT EVAL
Pt admitted s/p SOB and presents with decreased balance, activity tolerance, and postural control impacting his independence in ADLs and mobility tasks.

## 2024-03-04 NOTE — PHYSICAL THERAPY INITIAL EVALUATION ADULT - GAIT DEVIATIONS NOTED, PT EVAL
Pt demo good chanel, mild postural sway, 1 episode loss of balance required min A to recover, amb SpO2 >92% on 2L O2.

## 2024-03-04 NOTE — PROGRESS NOTE ADULT - ASSESSMENT
69 yo M with hx of ESRD on HD MWF via LUE AVF, AFib s/p watchman (on ASA), HTN, COPD, severe ERIK, pulm HTN, T cell lymphoma (s/p chemo), HFpEF, s/p VATS procedure in 2021, recent [facility] admission 12/2023 for LGIB, admitted for CAP and  exacerbation, missed HD on 3/1, consulted for in patient HD management     ESRD:  Outpatient HD at [address] [place] [address], follows Dr. Guadalupe   Rx: 3hrs   EDW: 68kg    Plan:  Cont HD today for clearance and volume management   Encourage PO intake   Fluid restriction <1.2L/day   Abx dosed appropriately   Renal diet    Access:  LUE AVF functional     Anemia:  Hgb 8.7  EPO with next HD     HTN:  BP at goal   UF with HD as tolerated  Hold Amlodipine for now given low BP     MBD:  Calcium: 9.8  Phos: 4.4  C/w phos binders  69 yo M with hx of ESRD on HD MWF via LUE AVF, AFib s/p watchman (on ASA), HTN, COPD, severe ERIK, pulm HTN, T cell lymphoma (s/p chemo), HFpEF, s/p VATS procedure in 2021, recent [facility] admission 12/2023 for LGIB, admitted for CAP and  exacerbation, missed HD on 3/1, consulted for in patient HD management     ESRD:  Outpatient HD at [address] [place] [address], follows Dr. Guadalupe   Rx: 3hrs   EDW: 68kg    Plan:  Cont HD today for clearance and volume management   Encourage PO intake   Fluid restriction <1.2L/day   Abx dosed appropriately   Renal diet    Access:  LUE AVF functional     Anemia:  Hgb 8.7  EPO with next HD   Check iron studies    HTN:  BP at goal   UF with HD as tolerated  Hold Amlodipine for now given low BP     MBD:  Calcium: 9.8  Phos: 4.4  C/w phos binders   Please adjust allopurinol dose to 50mg daily or 100mg every other day while inpatient

## 2024-03-04 NOTE — DISCHARGE NOTE PROVIDER - CARE PROVIDER_API CALL
Elise Johnson  Pulmonary Disease  [address] [place] [id number]  Phone: [phone]  Fax: [phone]  Established Patient  Follow Up Time: 1 week

## 2024-03-04 NOTE — CONSULT NOTE ADULT - ASSESSMENT
Mr. Gamez is a 67 yo M with aFib s/p watchman (on ASA), HTN, COPD, ESRD (MWF), severe ERIK noncompliant to CPAP, pulm HTN (group 2 and3) on sildenafil T cell lymphoma (s/p chemo), HFpEF who presented with productive cough brown and SOB x5 days requiring more oxygen from baseline of 1 L via NC concerning for community acquired pneumonia and COPD exacerbation     Work up:   RVP negative  urine strep and legionella negative  CXR showed b/l infiltrates at the bases R >L  CTPE done and negative for PE but showed RLL infiltrate, Left pleural based scar  echo elevated PASP 58 up from 34    Pulmonary is consulted for COPD, ERIK, and PH management/optimization. Follows with Dr. Johnson.    # Community acquired pneumonia  - urine legionella and strep negative  - productive sputum, brown  - b/l consolidations R >L  - agree w/ ceftriaxone 5-7 day course   - sputum culture if possible, can guide abx choice    # COPD, in acute exacerbation  - on stiolto at home, intermittently complaint w/ inhalers  - agree w/. duonebs and prednisone 5 day course for exacerbation      # ERIK  - AHI 36 in nov 2022  - noncompliant to CPAP  - encouraged patient to use it    # Pleural based scars  - follows w/. Dr Johnson. PET scan outpatient planned. can follow up outpatient regarding this    # PH  - combination of Group 2 and 3  - likely large factor of noncompliant to CPAP in the setting of severe ERIK  - encourage optimization w/ CPAP  - c/w home med sildanefil   - repeat echo here w/ elevated PASP 58 up from 34 on previous.

## 2024-03-04 NOTE — DISCHARGE NOTE PROVIDER - NSDCCPTREATMENT_GEN_ALL_CORE_FT
PRINCIPAL PROCEDURE  Procedure: CT chest w con  Findings and Treatment: IMPRESSION:  1.  No pulmonary embolism.  2.  Persistent bilateral pulmonary consolidations.

## 2024-03-04 NOTE — PROGRESS NOTE ADULT - PROBLEM SELECTOR PLAN 10
F: None  E: replete cautiously in ESRD  N: Renal diet  GI: None  DVT: hep subq  Dispo: F #ERIK  hx of severe ERIK, non-compliant with CPAP  - outpatient followup

## 2024-03-04 NOTE — DISCHARGE NOTE PROVIDER - NSDCFUSCHEDAPPT_GEN_ALL_CORE_FT
Naman Piper  [facility] PreAdmits  Scheduled Appointment: 03/07/2024    [facility]  HEART[facility] [address]  Scheduled Appointment: 04/23/2024     [facility]  HEARTVASC [address]  Scheduled Appointment: 04/23/2024     Carlos Ventura  [facility]  HEARTVASC [address]  Scheduled Appointment: 04/23/2024    Elise Johnson  [facility]  PULMMED [address]  Scheduled Appointment: 06/27/2024

## 2024-03-04 NOTE — DISCHARGE NOTE PROVIDER - NSDCMRMEDTOKEN_GEN_ALL_CORE_FT
acetaminophen 325 mg oral tablet: 2 tab(s) orally every 6 hours, As needed, Moderate Pain (4 - 6)  allopurinol 100 mg oral tablet: 1 tab(s) orally 2 times a day  amiodarone 200 mg oral tablet: 1 tab(s) orally once a day  amLODIPine 5 mg oral tablet: 1 tab(s) orally once a day  aspirin 81 mg oral tablet: orally  atorvastatin 40 mg oral tablet: 1 tab(s) orally once a day  azithromycin 250 mg oral tablet: 1 tab(s) orally Monday, Wednesday, and Friday   metoprolol succinate 50 mg oral capsule, extended release: 1 cap(s) orally  sevelamer carbonate 800 mg oral tablet: 1 tab(s) orally every 8 hours  tiotropium-olodaterol 2.5 mcg-2.5 mcg/inh inhalation aerosol: 2 puff(s) inhaled once a day    acetaminophen 325 mg oral tablet: 2 tab(s) orally every 6 hours, As needed, Moderate Pain (4 - 6)  allopurinol 100 mg oral tablet: 1 tab(s) orally 2 times a day  amiodarone 200 mg oral tablet: 1 tab(s) orally once a day  amLODIPine 5 mg oral tablet: 1 tab(s) orally once a day  aspirin 81 mg oral tablet: orally  atorvastatin 40 mg oral tablet: 1 tab(s) orally once a day  azithromycin 250 mg oral tablet: 1 tab(s) orally Monday, Wednesday, and Friday  cefpodoxime 200 mg oral tablet: 1 tab(s) orally 2 times a day  metoprolol succinate 50 mg oral capsule, extended release: 1 cap(s) orally once a day  sevelamer carbonate 800 mg oral tablet: 1 tab(s) orally every 8 hours  sildenafil 20 mg oral tablet: 1 tab(s) orally every 8 hours  tiotropium-olodaterol 2.5 mcg-2.5 mcg/inh inhalation aerosol: 2 puff(s) inhaled once a day    acetaminophen 325 mg oral tablet: 2 tab(s) orally every 6 hours, As needed, Moderate Pain (4 - 6)  allopurinol 100 mg oral tablet: 1 tab(s) orally 2 times a day  amiodarone 200 mg oral tablet: 1 tab(s) orally once a day  amLODIPine 5 mg oral tablet: 1 tab(s) orally once a day  aspirin 81 mg oral tablet: orally  atorvastatin 40 mg oral tablet: 1 tab(s) orally once a day  azithromycin 250 mg oral tablet: 1 tab(s) orally Monday, Wednesday, and Friday  cefpodoxime 200 mg oral tablet: 1 tab(s) orally 2 times a day  ertapenem 1 g injection: 500 milligram(s) intravenously once a day  metoprolol succinate 50 mg oral capsule, extended release: 1 cap(s) orally once a day  sevelamer carbonate 800 mg oral tablet: 1 tab(s) orally every 8 hours  sildenafil 20 mg oral tablet: 1 tab(s) orally every 8 hours  tiotropium-olodaterol 2.5 mcg-2.5 mcg/inh inhalation aerosol: 2 puff(s) inhaled once a day    acetaminophen 325 mg oral tablet: 2 tab(s) orally every 6 hours, As needed, Moderate Pain (4 - 6)  allopurinol 100 mg oral tablet: 1 tab(s) orally 2 times a day  amiodarone 200 mg oral tablet: 1 tab(s) orally once a day  amLODIPine 5 mg oral tablet: 1 tab(s) orally once a day  aspirin 81 mg oral tablet: orally  atorvastatin 40 mg oral tablet: 1 tab(s) orally once a day  azithromycin 250 mg oral tablet: 1 tab(s) orally Monday, Wednesday, and Friday  ertapenem 1 g injection: 500 milligram(s) injectable once a day  metoprolol succinate 50 mg oral capsule, extended release: 1 cap(s) orally once a day  sevelamer carbonate 800 mg oral tablet: 1 tab(s) orally every 8 hours  sildenafil 20 mg oral tablet: 1 tab(s) orally every 8 hours  tiotropium-olodaterol 2.5 mcg-2.5 mcg/inh inhalation aerosol: 2 puff(s) inhaled once a day

## 2024-03-04 NOTE — OCCUPATIONAL THERAPY INITIAL EVALUATION ADULT - ADDITIONAL COMMENTS
Pt states he lives with his wife in an apartment on the 1st floor with 3 [address] and a ramp. Pt reports he was independent with ADLs, IADLs, and mobility tasks. He has a SC, mostly used for community mobility. Pt has had home PT/OT in the past. He has had 2 falls recently, reporting that he fell off his bed when sleeping.

## 2024-03-04 NOTE — PROGRESS NOTE ADULT - PROBLEM SELECTOR PLAN 1
Presenting with SOB, productive cough w/ dark green sputum, wheezing at home, requiring more oxygen than baseline 1L NC. Follows with Dr Johnson outpatient. Prior smoker, quit 19 years ago.   - CXR w/ increased infiltrates.   - Outpatient CT chest 02/2024 showing signs of PNA.   - pending outpatient PET per Dr Johnson.   - Home meds: prophylactic azithromycin 250 MWF, stiolto, ventolin HFA  - RVP negative.  - CTA PE: No pulmonary embolism. Persistent bilateral pulmonary consolidations.    PLAN:  - on 2L O2, continue to wean.  - f/u ambulatory sats  - duonebs q6  - c/w home stiolto qd  - c/w CTX 1g q24  - finished azithromycin 500mg q24 x2 days, resume home azithromycin ppx  - c/w prednisone 40mg qd   - Pulm consulted. Presenting with SOB, productive cough w/ dark green sputum, wheezing at home, requiring more oxygen than baseline 1L NC. Likely 2/2 CAP. Follows with Dr Johnson outpatient. Prior smoker, quit 19 years ago.   - CXR w/ increased infiltrates.   - Outpatient CT chest 02/2024 showing signs of PNA.   - pending outpatient PET per Dr Johnson.   - Home meds: prophylactic azithromycin 250 MWF, stiolto, ventolin HFA  - RVP negative.  - CTA PE: No pulmonary embolism. Persistent bilateral pulmonary consolidations.    PLAN:  - on 2L O2, continue to wean.  - f/u ambulatory sats  - duonebs q6  - c/w home stiolto qd  - c/w CTX 1g q24  - finished azithromycin 500mg q24 x2 days, resume home azithromycin ppx  - c/w prednisone 40mg qd   - Pulm consulted. Increased oxygen requirement

## 2024-03-04 NOTE — DISCHARGE NOTE PROVIDER - NSDCCPCAREPLAN_GEN_ALL_CORE_FT
PRINCIPAL DISCHARGE DIAGNOSIS  Diagnosis: COPD exacerbation  Assessment and Plan of Treatment: Chronic obstructive pulmonary disease (COPD) is a chronic inflammatory lung disease that causes obstructed airflow from the lungs. Symptoms include breathing difficulty, cough, mucus (sputum) production and wheezing. It's typically caused by long-term exposure to irritating gases or particulate matter, most often from cigarette smoke. People with COPD are at increased risk of developing heart disease, lung cancer and a variety of other conditions.  Emphysema and chronic bronchitis are the two most common conditions that contribute to COPD. These two conditions usually occur together and can vary in severity among individuals with COPD.  Chronic bronchitis is inflammation of the lining of the bronchial tubes, which carry air to and from the air sacs (alveoli) of the lungs. It's characterized by daily cough and mucus (sputum) production.  Emphysema is a condition in which the alveoli at the end of the smallest air passages (bronchioles) of the lungs are destroyed as a result of damaging exposure to cigarette smoke and other irritating gases and particulate matter.  Although COPD is a progressive disease that gets worse over time, COPD is treatable. With proper management, most people with COPD can achieve good symptom control and quality of life, as well as reduced risk of other associated conditions.  Please finish your antibiotic. Your last day is on 3/10.      SECONDARY DISCHARGE DIAGNOSES  Diagnosis: Pulmonary hypertension  Assessment and Plan of Treatment: Pulmonary hypertension is a type of high blood pressure that affects the arteries in the lungs and the right side of the heart.  In one form of pulmonary hypertension, called pulmonary arterial hypertension (PAH), blood vessels in the lungs are narrowed, blocked or destroyed. The damage slows blood flow through the lungs. Blood pressure in the lung arteries goes up. The heart must work harder to pump blood through the lungs. The extra effort eventually causes the heart muscle to become weak and fail.  In some people, pulmonary hypertension slowly gets worse and can be life-threatening. There's no cure for pulmonary hypertension. But treatments are available to help you feel better, live longer and improve your quality of life.  Please follow up with Dr. Alex ramsey pulmonolgist within 1-2 weeks after being discharge    Diagnosis: ESRD on hemodialysis  Assessment and Plan of Treatment:     Diagnosis: COPD exacerbation  Assessment and Plan of Treatment:      PRINCIPAL DISCHARGE DIAGNOSIS  Diagnosis: COPD exacerbation  Assessment and Plan of Treatment: Chronic obstructive pulmonary disease (COPD) is a chronic inflammatory lung disease that causes obstructed airflow from the lungs. Symptoms include breathing difficulty, cough, mucus (sputum) production and wheezing. It's typically caused by long-term exposure to irritating gases or particulate matter, most often from cigarette smoke. People with COPD are at increased risk of developing heart disease, lung cancer and a variety of other conditions.  Although COPD is a progressive disease that gets worse over time, COPD is treatable. With proper management, most people with COPD can achieve good symptom control and quality of life, as well as reduced risk of other associated conditions.  Please finish your antibiotics and take your medications as prescribed by your physicians.      SECONDARY DISCHARGE DIAGNOSES  Diagnosis: Pulmonary hypertension  Assessment and Plan of Treatment: Pulmonary hypertension is a type of high blood pressure that affects the arteries in the lungs and the right side of the heart.  In one form of pulmonary hypertension, called pulmonary arterial hypertension (PAH), blood vessels in the lungs are narrowed, blocked or destroyed. The damage slows blood flow through the lungs. Blood pressure in the lung arteries goes up. The heart must work harder to pump blood through the lungs. The extra effort eventually causes the heart muscle to become weak and fail.  In some people, pulmonary hypertension slowly gets worse and can be life-threatening. There's no cure for pulmonary hypertension. But treatments are available to help you feel better, live longer and improve your quality of life.  Please follow up with Dr. Alex ramsey pulmonolgist within 1-2 weeks after being discharge    Diagnosis: Gram-negative pneumonia  Assessment and Plan of Treatment: You were diagnosed with pneumonia, or an infection of the lungs during your admission to [facility]. This was noted based on your symptom profile and findings on chest X-ray. You were treated with antibiotics throughout your hospital course. Please continue to take your antibiotics as prescribed (also listed below). Please follow-up with your pumonologist and primary care physician when you leave the hospital. Should you experience symptoms such as but not limited to: worsening shortness of breath, wheezing, severe cough, coughing bloody sputum, unrelenting/high fever (>103 F or lasting >3 days), and chest pain, please return to the emergency department for interval evaluation.   INSTRUCTIONS:  1. Please take your IV ertapenem for 4 days on discharge 3/11 - 3/15, followed by two days of bactrim 3/16 - 3/17       PRINCIPAL DISCHARGE DIAGNOSIS  Diagnosis: COPD exacerbation  Assessment and Plan of Treatment: Chronic obstructive pulmonary disease (COPD) is a chronic inflammatory lung disease that causes obstructed airflow from the lungs. Symptoms include breathing difficulty, cough, mucus (sputum) production and wheezing. It's typically caused by long-term exposure to irritating gases or particulate matter, most often from cigarette smoke. People with COPD are at increased risk of developing heart disease, lung cancer and a variety of other conditions.  Although COPD is a progressive disease that gets worse over time, COPD is treatable. With proper management, most people with COPD can achieve good symptom control and quality of life, as well as reduced risk of other associated conditions.  Please finish your antibiotics and take your medications as prescribed by your physicians.      SECONDARY DISCHARGE DIAGNOSES  Diagnosis: Pulmonary hypertension  Assessment and Plan of Treatment: Pulmonary hypertension is a type of high blood pressure that affects the arteries in the lungs and the right side of the heart.  In one form of pulmonary hypertension, called pulmonary arterial hypertension (PAH), blood vessels in the lungs are narrowed, blocked or destroyed. The damage slows blood flow through the lungs. Blood pressure in the lung arteries goes up. The heart must work harder to pump blood through the lungs. The extra effort eventually causes the heart muscle to become weak and fail.  In some people, pulmonary hypertension slowly gets worse and can be life-threatening. There's no cure for pulmonary hypertension. But treatments are available to help you feel better, live longer and improve your quality of life.  Please follow up with Dr. Alex ramsey pulmonolgist within 1-2 weeks after being discharge    Diagnosis: Gram-negative pneumonia  Assessment and Plan of Treatment: You were diagnosed with pneumonia, or an infection of the lungs during your admission to [facility]. This was noted based on your symptom profile and findings on chest X-ray. You were treated with antibiotics throughout your hospital course. Please continue to take your antibiotics as prescribed (also listed below). Please follow-up with your pumonologist and primary care physician when you leave the hospital. Should you experience symptoms such as but not limited to: worsening shortness of breath, wheezing, severe cough, coughing bloody sputum, unrelenting/high fever (>103 F or lasting >3 days), and chest pain, please return to the emergency department for interval evaluation.   INSTRUCTIONS:  1. Please take your IV ertapenem for 4 days on discharge 3/13 - 3/17

## 2024-03-04 NOTE — PHYSICAL THERAPY INITIAL EVALUATION ADULT - ADDITIONAL COMMENTS
Pt lives with his wife in 1st floor apartment with ramp access. Previously independent with all mobility, ADLS/IADLs using SC mostly for outdoor ambulation. Pt p/w recent history of falls, most recent occurred from rolling off bed in the middle of the night.

## 2024-03-04 NOTE — DISCHARGE NOTE PROVIDER - ATTENDING DISCHARGE PHYSICAL EXAMINATION:
Patient was seen and examined at bedside on 3/12/2024 at 330 pm with PT present. Patient reports that he feels improved, includes his cough and NIELSON. Denies CP. ROS is otherwise negative. Vitals, labwork and pertinent imaging reviewed. Exam - NAD, AAO x 4, PERRLA, EOMI, MMM, supple neck, chest - bibasilar crackles, CV - rrr, s1s2, no m/r/g, abd - soft, NTND, + BS, ext - wwp, psych - normal affect, pt appears cachectic and malnourished    Plan:  -Pt s/p bronchoscopy now with decreasing supplemental oxygen requirements  -Culture from bronchoscopy - ESBL E. Coli, c/w Ertapenem  -Patient is medically ready for d/c, with close outpatient follow up

## 2024-03-04 NOTE — PROGRESS NOTE ADULT - PROBLEM SELECTOR PLAN 8
#CAD  Follows with Dr Ventura, last seen 1/2024. Prior EF 45%, now recovered. b/l crackles on exam, pending iHD. no LE edema. Home med: lipitor 40    PLAN:  - c/w home med Hgb 10, at baseline. Of note, pt recently admitted 12/2023 for LGIB, s/p c-scope, found to have large polyps on inpatient colonoscopy. Pending outpatient repeat colonoscopy w/ polypectomy on Thursday 3/7/24 @[facility].    PLAN:  - maintain active T&S  - outpatient GI followup

## 2024-03-04 NOTE — PROGRESS NOTE ADULT - PROBLEM SELECTOR PLAN 6
Home med: Aspirin 81mg qd, amio 200mg qd, metoprolol XL 50mg qd. [id number] 2. s/p Watchman device, not on Eliquis. NSR this admission.    PLAN:  - c/w home meds Home med: amlodipine 5mg, not on HD days  - normotensive, holding this admission

## 2024-03-04 NOTE — PROGRESS NOTE ADULT - PROBLEM SELECTOR PLAN 9
#ERIK  hx of severe ERIK, non-compliant with CPAP  - outpatient followup #CAD  Follows with Dr Ventura, last seen 1/2024. Prior EF 45%, now recovered. b/l crackles on exam, pending iHD. no LE edema. Home med: lipitor 40    PLAN:  - c/w home med

## 2024-03-04 NOTE — PHYSICAL THERAPY INITIAL EVALUATION ADULT - GENERAL OBSERVATIONS, REHAB EVAL
PT IE Completed. Pt received semi-supine in bed, NAD, +NC 2L, +hep-lock, +CB, +alarm. Cleared to be seen by ALEE Blank. Pt amb 150' CGA with SC on 2L, saturating >92%. OT Corrine present.

## 2024-03-04 NOTE — OCCUPATIONAL THERAPY INITIAL EVALUATION ADULT - PERTINENT HX OF CURRENT PROBLEM, REHAB EVAL
68M PMH AFib s/p watchman (on ASA), HTN, COPD, ESRD (MWF), severe ERIK, pulm HTN, T cell lymphoma (s/p chemo), HFpEF, s/p VATS procedure in 2021, recent [facility] admission 12/2023 for LGIB p/w productive cough, congestion, SOB x5 days. Pt requiring more oxygen at home, uses 1 L as needed at baseline but has been using 3L over the past few days. ROS +headache, productive cough with dark green sputum, chills, wheezing. Was never hypoxic, but feeling subjectively better with 3L NC and could "feel the air." Denies fevers, CP, lightheadedness. Pt missed HD session yesterday due to feeling weak, fatigued, and SOB, HD session initially rescheduled for today however pt reports feeling worse this morning and decided to present to ED. At baseline, pt able to walk less than half a block. Able to perform ADLs as normal, however with increased NIELSON. Makes minimal urine. Denies sick contacts, however wife and pt routinely  6 year old grandson from school. Of note, pt w/ recent [facility] ED visit 2/26 after fall at home, rolled out of bed w/ head strike.

## 2024-03-04 NOTE — OCCUPATIONAL THERAPY INITIAL EVALUATION ADULT - PLANNED THERAPY INTERVENTIONS, OT EVAL
05-Mar-2023 10:10
ADL retraining/IADL retraining/balance training/bed mobility training/neuromuscular re-education/ROM/strengthening/transfer training

## 2024-03-04 NOTE — OCCUPATIONAL THERAPY INITIAL EVALUATION ADULT - GENERAL OBSERVATIONS, REHAB EVAL
Pt received semi-supine in bed, +heplock, +2L O2 via NC, NAD, and agreeable to OT. PT Vivian present for evaluation. Cleared by ALEE Blank to see.

## 2024-03-04 NOTE — PROGRESS NOTE ADULT - PROBLEM SELECTOR PLAN 3
Home med: sildenafil citrate 20mg TID. Follows w/ Dr Johnson.    PLAN:  - c/w home med  - Pulm consulted, apprec recs  - f/u TTE CT Chest outpatient 2/12/2024 w/ new consolidative opacities within the lingula and multifocally throughout the right lower lobe compared to 1/26/2023.   - CTA PE this admission showed persistent b/l pulmonary consolidation.   - Has hx of b/l pleural effusion s/p VATS w/ CT surg in 2021 for L chest wall loculated effusion.   - Ur legionella neg, Ur strep neg.   - Finished Azithromycin 500mg for atypical cov    PLAN:  - f/u BCx NGTD   - f/u Sputum Cx.   - c/w CTX 1g q24

## 2024-03-04 NOTE — DISCHARGE NOTE PROVIDER - HOSPITAL COURSE
#Discharge: do not delete    68M PMH AFib s/p watchman (on ASA), HTN, COPD, ESRD (MWF), severe ERIK, pulm HTN, T cell lymphoma (s/p chemo), HFpEF, s/p VATS procedure in 2021, recent [facility] admission 12/2023 for LGIB p/w productive cough, congestion, SOB for weeks now worsening in the last 5days. Admitted to [facility] for AHRF 2/2 CAP vs worsening pulmonary HTN. Pulm consulted this admission. Patient was medically optimized, stable and ready for discharge. Plan of care and return precautions were discussed with the patient who verbally stated understanding    #COPD exacerbation  CTA PE this admission: Emphysematous changes. Persistent predominantly subpleural consolidations in both lungs mostly in the right lower lobe and left upper lobe. No PE.  Treated w/ CTX 1g q24 (3/3 - 3/10).   Completed Azithromycin 500mg q24 x3days for atypical coverage. UrStep and UrLegionella negative  Completed a course of prednisone 40mg q24 x5days    New medications/therapies:   New lines/hardware:  Labs to be followed outpatient:   Exam to be followed outpatient:     Discharge plan: discharge to ______    Physical Exam Upon Discharge:     68M PMH AFib s/p watchman (on ASA), HTN, COPD, ESRD (MWF), severe ERIK, pulm HTN, T cell lymphoma (s/p chemo), HFpEF, s/p VATS procedure in 2021, recent [facility] admission 12/2023 for LGIB p/w productive cough, congestion, SOB for weeks now worsening in the last 5days. Admitted to [facility] for AHRF 2/2 CAP vs worsening pulmonary HTN. Pulm consulted this admission. Patient was medically optimized, stable and ready for discharge. Plan of care and return precautions were discussed with the patient who verbally stated understanding    #COPD exacerbation  CTA PE this admission: Emphysematous changes. Persistent predominantly subpleural consolidations in both lungs mostly in the right lower lobe and left upper lobe. No PE.  Treated w/ CTX 1g q24 (3/3 - 3/10).   Completed Azithromycin 500mg q24 x3days for atypical coverage. UrStep and UrLegionella negative  Completed a course of prednisone 40mg q24 x5days  - s/p BAL w/ pulmonary 3/6/24  FINDS::  PLAN:  - Finish Abx treatment w/ Cefpodoxime 200mg BID    #Pulmonary HTN  History of pulmonary HTN. Takes home Sildenafil citrates 20mg TID.  TTE shows worsening PASP of 58mmHg.  PLAN:  - c/w home medication  - f/u Dr. Johnson     #ESRD  Patient dialyzed this admission. Takes Sevelamer 800mg TID  PLAN:  - Follows w/ Dr. Guadalupe outpatient  - c/w home med     #HTN  On amlodipine 5mg  - c/w home med    #Chronic afib  - Home med Amio 200mg qd, Toprol 50mg qd.  Chadvasc 2. s/p Watchman device, not on eliquis.  PLAN:  - c/w home meds    #Chronic HFpEF  Follows w/ Dr. Ventura. Prior EF 45%.  - f/u outpatient cardiologist    #ERIK  Hx of ERIK, prescribed a CPAP but noncompliants  continued here  PLAN:  - c/w CPAP at night.     New medications/therapies: Cefpodoxime 200mg BID   New lines/hardware: None   Labs to be followed outpatient: None   Exam to be followed outpatient: PCP, Pulmonologist, Cardiologist     Discharge plan: discharge to home    Physical Exam Upon Discharge:     68M PMH AFib s/p watchman (on ASA), HTN, COPD, ESRD (MWF), severe ERIK, pulm HTN, T cell lymphoma (s/p chemo), HFpEF, s/p VATS procedure in 2021, recent [facility] admission 12/2023 for LGIB p/w productive cough, congestion, SOB for weeks now worsening in the last 5days. Admitted to [facility] for AHRF 2/2 COPD exacerbation 2/2 to CAP. Course c/b low volume hemoptysis on 3/5 to 3/6. Now s/p bronch 3/7, after which patient desaturated to 88% on 5L NC (prior was on 2L NC saturating in 90s). ICU consulted, and patient transferred to [facility] for closer monitoring. S/p stepdown to [facility]. Cultures growing ESBL resistant to Zosyn, now on ertapenem 3/10 - 3/15, followed by bactrim 3/16 - 3/17.     #Acute hypoxic respiratory failure.   #Acute hypoxic respiratory failure.   - CXR w/ increased infiltrates. RVP negative.   - Home meds: prophylactic azithromycin 250 MWF, stiolto, ventolin HFA  - CTA PE this admission: Emphysematous changes. Persistent predominantly subpleural consolidations in both lungs mostly in the right lower lobe and left upper lobe. No PE.  - c/w duonebs q6, home stiolto qd, home azithromycin  - c/w ertapenem 3/10 - 3/15, followed by bactrim 3/16 - 3/17  - Pulm following; s/p bronch 3/7.    #Severe Sepsis  #Gram-negative pneumonia.   - C/w Ertapenem, with peripheral infusion for 3-4 days followed by bactril to complete course of 7 days  - f/u BCx NGTD   - f/u Sputum Cx 3/7 from bronchial aspirate: moderate e coli - ESBL E. Coli - c/w tx as stated above    #Pulmonary hypertension.   TTE 3/4 showing pulmonary artery systolic pressure is 58 mmHg, worse than previous  - C/w home med: sildenafil citrate 20mg TID. Follows w/ Dr Johnson.    #ESRD on dialysis.   Follows with Dr Guadalupe outpatient. Last HD session 2/28. Missed HD 3/1 due to sx.  - c/w iHD  - c/w sevelamer 800mg TID.    #HTN (hypertension).   - C/w home med: amlodipine 5mg, not on HD days    #Chronic atrial fibrillation.   - C/w home med: Aspirin 81mg qd, amio 200mg qd, metoprolol XL 50mg qd.   [id number] 2. s/p Watchman device, not on Eliquis.     #Anemia of chronic disease.   Hgb 10, at baseline. Of note, pt recently admitted 12/2023 for LGIB, s/p c-scope, found to have large polyps on inpatient colonoscopy. Pending outpatient repeat colonoscopy w/ polypectomy on Thursday 3/7/24 @[facility].  - outpatient GI followup.    #Chronic heart failure with preserved ejection fraction (HFpEF).   #CAD  - C/w home lipitor 40    #Type 2 myocardial infarction.   Elevated troponin in setting of infection/Sepsis.    #ERIK (obstructive sleep apnea).   hx of severe ERIK, non-compliant with CPAP    New medications/therapies: ertapenem and bactrim  New lines/hardware: None   Labs to be followed outpatient: None   Exam to be followed outpatient:   1. Please take your IV ertapenem for 4 days on discharge 3/11 - 3/15, followed by two days of bactrim 3/16 - 3/17  2. Follow up with Dr. Johnson within 1-2 weeks of discharge  3. Follow up with your PCP    Discharge plan: discharge to home    Physical Exam Upon Discharge:    General: in no acute distress  Lungs: CTA b/l, no rhonchi noted on exam   Cardiovascular: RRR. No murmurs, rubs, or gallops  Abdomen: Soft, non-tender non-distended; No rebound or guarding  Extremities: WWP, No clubbing, cyanosis or edema  Neurological: Alert and oriented x3  Skin: Warm and dry. No obvious rash     68M PMH AFib s/p watchman (on ASA), HTN, COPD, ESRD (MWF), severe ERIK, pulm HTN, T cell lymphoma (s/p chemo), HFpEF, s/p VATS procedure in 2021, recent [facility] admission 12/2023 for LGIB p/w productive cough, congestion, SOB for weeks now worsening in the last 5days. Admitted to [facility] for AHRF 2/2 COPD exacerbation 2/2 to CAP. Course c/b low volume hemoptysis on 3/5 to 3/6. Now s/p bronch 3/7, after which patient desaturated to 88% on 5L NC (prior was on 2L NC saturating in 90s). ICU consulted, and patient transferred [facility] for closer monitoring. S/p stepdown to [facility]. Cultures growing ESBL resistant to Zosyn, now on ertapenem 3/10 - 3/15, followed by bactrim 3/16 - 3/17.     #Acute hypoxic respiratory failure.   #Acute hypoxic respiratory failure.   - CXR w/ increased infiltrates. RVP negative.   - Home meds: prophylactic azithromycin 250 MWF, stiolto, ventolin HFA  - CTA PE this admission: Emphysematous changes. Persistent predominantly subpleural consolidations in both lungs mostly in the right lower lobe and left upper lobe. No PE.  - c/w duonebs q6, home stiolto qd, home azithromycin  - c/w ertapenem 3/10 - 3/17  - Pulm following; s/p bronch 3/7.    #Severe Sepsis  #Gram-negative pneumonia.   - C/w Ertapenem, with peripheral infusion for 4 days on discharge  - f/u BCx NGTD   - f/u Sputum Cx 3/7 from bronchial aspirate: moderate e coli - ESBL E. Coli - c/w tx as stated above    #Pulmonary hypertension.   TTE 3/4 showing pulmonary artery systolic pressure is 58 mmHg, worse than previous  - C/w home med: sildenafil citrate 20mg TID. Follows w/ Dr Johnson.    #ESRD on dialysis.   Follows with Dr Guadalupe outpatient. Last HD session 2/28. Missed HD 3/1 due to sx.  - c/w iHD  - c/w sevelamer 800mg TID.    #HTN (hypertension).   - C/w home med: amlodipine 5mg, not on HD days    #Chronic atrial fibrillation.   - C/w home med: Aspirin 81mg qd, amio 200mg qd, metoprolol XL 50mg qd.   [id number] 2. s/p Watchman device, not on Eliquis.     #Anemia of chronic disease.   Hgb 10, at baseline. Of note, pt recently admitted 12/2023 for LGIB, s/p c-scope, found to have large polyps on inpatient colonoscopy. Pending outpatient repeat colonoscopy w/ polypectomy on Thursday 3/7/24 @[facility].  - outpatient GI followup.    #Chronic heart failure with preserved ejection fraction (HFpEF).   #CAD  - C/w home lipitor 40    #Type 2 myocardial infarction.   Elevated troponin in setting of infection/Sepsis.    #ERIK (obstructive sleep apnea).   hx of severe ERIK, non-compliant with CPAP    New medications/therapies: ertapenem and bactrim  New lines/hardware: None   Labs to be followed outpatient: None   Exam to be followed outpatient:   1. Please take your IV ertapenem for 4 days on discharge 3/13 - 3/17   2. Follow up with Dr. Johnson within 1-2 weeks of discharge  3. Follow up with your PCP    Discharge plan: discharge to home    Physical Exam Upon Discharge:    General: in no acute distress  Lungs: CTA b/l, no rhonchi noted on exam   Cardiovascular: RRR. No murmurs, rubs, or gallops  Abdomen: Soft, non-tender non-distended; No rebound or guarding  Extremities: WWP, No clubbing, cyanosis or edema  Neurological: Alert and oriented x3  Skin: Warm and dry. No obvious rash

## 2024-03-04 NOTE — PROGRESS NOTE ADULT - SUBJECTIVE AND OBJECTIVE BOX
Nephrology progress note    Seen on HD. Tolerating treatment, HDS. Complains of cough, otherwise feeling well. Continue HD treatment as ordered.    Allergies:  No Known Allergies    Hospital Medications:   MEDICATIONS  (STANDING):  albuterol/ipratropium for Nebulization 3 milliLiter(s) Nebulizer every 6 hours  allopurinol 100 milliGRAM(s) Oral every 12 hours  aMIOdarone    Tablet 200 milliGRAM(s) Oral every 24 hours  aspirin  chewable 81 milliGRAM(s) Oral every 24 hours  atorvastatin 40 milliGRAM(s) Oral at bedtime  cefTRIAXone   IVPB 1000 milliGRAM(s) IV Intermittent every 24 hours  heparin   Injectable 5000 Unit(s) SubCutaneous every 8 hours  metoprolol succinate ER 50 milliGRAM(s) Oral every 24 hours  predniSONE   Tablet 40 milliGRAM(s) Oral every 24 hours  sevelamer carbonate 800 milliGRAM(s) Oral every 8 hours  sildenafil (REVATIO) 20 milliGRAM(s) Oral every 8 hours  tiotropium 2.5 MICROgram(s)/olodaterol 2.5 MICROgram(s) (STIOLTO) Inhaler 2 Puff(s) Inhalation daily    REVIEW OF SYSTEMS:  All other review of systems is negative unless indicated above.    VITALS:  T(F): 97.9 (03-04-24 @ 09:15), Max: 98.3 (03-04-24 @ 05:52)  HR: 76 (03-04-24 @ 09:15)  BP: 113/56 (03-04-24 @ 09:15)  RR: 18 (03-04-24 @ 09:15)  SpO2: 95% (03-04-24 @ 09:15)  Wt(kg): --    03-02 @ 07:01  -  03-03 @ 07:00  --------------------------------------------------------  IN: 0 mL / OUT: 1000 mL / NET: -1000 mL        PHYSICAL EXAM:  Constitutional: NAD, lying in bed on HD  HEENT: Normocephalic, ecchymosis over R orbit, EOMI, NC in place.  Respiratory: No respiratory distress  Cardiovascular: Regular rate  Gastrointestinal: soft, NT/ND  Extremities: No peripheral edema  Neurological: Awake, alert, moving all extremities  Skin: No rash on exposed skin  Vascular Access: BAMMILA KAYE, accessed for HD    LABS:  03-04    139  |  96  |  46<H>  ----------------------------<  130<H>  4.6   |  27  |  7.70<H>    Ca    9.8      04 Mar 2024 05:30  Phos  4.4     03-04  Mg     2.6     03-04    TPro  7.1  /  Alb  3.6  /  TBili  0.3  /  DBili      /  AST  13  /  ALT  5<L>  /  AlkPhos  158<H>  03-04                          8.7    13.06 )-----------( 153      ( 04 Mar 2024 05:30 )             28.0       Urine Studies:  Creatinine Trend: 7.70<--, 5.97<--, 8.68<--  Urinalysis Basic - ( 04 Mar 2024 05:30 )    Color:  / Appearance:  / SG:  / pH:   Gluc: 130 mg/dL / Ketone:   / Bili:  / Urobili:    Blood:  / Protein:  / Nitrite:    Leuk Esterase:  / RBC:  / WBC    Sq Epi:  / Non Sq Epi:  / Bacteria:         RADIOLOGY & ADDITIONAL STUDIES:  Reviewed

## 2024-03-04 NOTE — PROGRESS NOTE ADULT - ATTENDING COMMENTS
Seen and evaluated during dialysis. No adverse events reported, tolerating well procedure  Dialysis indicated for metabolic clearance and volume management  Further recs as above  Discussed with primary team

## 2024-03-04 NOTE — PROGRESS NOTE ADULT - ASSESSMENT
68M PMH AFib s/p watchman (on ASA), HTN, COPD, ESRD (MWF), severe ERIK, pulm HTN, T cell lymphoma (s/p chemo), HFpEF, s/p VATS procedure in 2021, recent [facility] admission 12/2023 for LGIB p/w productive cough, congestion, SOB x5 days. Admitted to [facility] for management of CAP and COPD exacerbation.

## 2024-03-04 NOTE — PHYSICAL THERAPY INITIAL EVALUATION ADULT - PERTINENT HX OF CURRENT PROBLEM, REHAB EVAL
68M PMH AFib s/p watchman (on ASA), HTN, COPD, ESRD (MWF), severe ERIK, pulm HTN, T cell lymphoma (s/p chemo), HFpEF, s/p VATS procedure in 2021, recent [facility] admission 12/2023 for LGIB p/w productive cough, congestion, SOB x5 days. Admitted to [facility] for management of CAP and COPD exacerbation.

## 2024-03-04 NOTE — CONSULT NOTE ADULT - ASSESSMENT
{\rtf1\hzoqco06700\ansi\hjglroj7124\ftnbj\uc1\deff0  {\fonttbl{\f0 \fnil Segoe UI;}{\f1 \fnil \fcharset0 Segoe UI;}{\f2 \fnil Times New Romel;}}  {\colortbl ;\hwk633\agdsn392\rikb980 ;\red0\green0\blue0 ;\red0\green0\qktp246 ;\red0\green0\blue0 ;}  {\stylesheet{\f0\fs20 Normal;}{\cs1 Default Paragraph Font;}{\cs2\f0\fs16 Line Number;}{\cs3\f2\fs24\ul\cf3 Hyperlink;}}  {\*\revtbl{Unknown;}}  \imprch80296\mltnpp25482\lgkuu9708\fqpfk0601\hdvqs2378\ssbft5280\cgdhuds360\rrhokox527\nogrowautofit\ghzgpt931\formshade\nofeaturethrottle1\dntblnsbdb\fet4\aendnotes\aftnnrlc\pgbrdrhead\pgbrdrfoot  \sectd\edivwt69498\nieeip53586\guttersxn0\tfwqxjmq9075\sphvmsgj9249\dbnrbmni4236\yjzhkyfv7837\htydoba303\rekzjmy387\sbkpage\pgncont\pgndec  \plain\plain\f0\fs24\ql\plain\f0\fs24\plain\f0\fs20\lsnb2364\hich\f0\dbch\f0\loch\f0\fs20\par  I M\par  \par  68 y o M PMH AFib s/p watchman (on ASA), HTN, COPD, ESRD (MWF), severe ERIK, pulm HTN, T cell lymphoma (s/p chemo), HFpEF, s/p VATS procedure in 2021, recent [facility] admission 12/2023 for LGIB p/w productive cough, congestion, SOB x5 days. Admitted to [facility]   for management of CAP and COPD exacerbation.\par  \par  \plain\f1\fs20\zpyx6844\hich\f1\dbch\f1\loch\f1\cf2\fs20\ul{\field{\*\fldinst HYPERLINK [id number] }{\fldrslt Problem/Plan - 1:}}\plain\f0\fs20\sihd7883\hich\f0\dbch\f0\loch\f0\fs20\ql\par  \'b7  {\*\bkmkstart xq55376419617}{\*\bkmkend pm83476081646}Problem: {\*\bkmkstart qe46711945212}{\*\bkmkend sy05062617234}COPD exacerbation. \par  \'b7  {\*\bkmkstart co06123772718}{\*\bkmkend mw07754815982}Plan: {\*\bkmkstart nb92349634222}{\*\bkmkend ls08405955050}Presenting with SOB, productive cough w/ dark green sputum, wheezing at home, requiring more oxygen than baseline 1L NC. Follows with   Dr Johnson outpatient. Prior smoker, quit 19 years ago. On exam, diffuse b/l crackles and minimal wheezing. CXR w/ increased infiltrates. Per HIE, CT Chest 2/2024 w/ new consolidative opacities within the lingula and multifocally throughout the right lower   lobe compared to 1/26/2023, pending outpatient PET per Dr Johnson. Home meds: prophylactic azithromycin 250 MWF, stiolto, ventolin HFA. s/p CTX 1g, azithromycin, solumedrol in ED. RVP negative.\par  - on 3L NC, wean as tolerated\par  - f/u ambulatory sats\par  - duonebs q6\par  - c/w home stiolto qd\par  - c/w CTX 1g q24\par  - c/w azithromycin 500mg q24 x2 days, then resume azithromycin ppx\par  - c/w prednisone 40mg qd \par  - consider pulm consult if sx w/o improvement\par  - pending HD.\par  \par  \plain\f1\fs20\rome5902\hich\f1\dbch\f1\loch\f1\cf2\fs20\ul{\field{\*\fldinst HYPERLINK [id number] }{\fldrslt Problem/Plan - 2:}}\plain\f0\fs20\zefu7376\hich\f0\dbch\f0\loch\f0\fs20\ql\par  \'b7  {\*\bkmkstart bx70132061707}{\*\bkmkend hw27815503431}Problem: {\*\bkmkstart mi65888822620}{\*\bkmkend tq98179683015}CAP (community acquired pneumonia). \par  \'b7  {\*\bkmkstart te06955373489}{\*\bkmkend ce23183144327}Plan: {\*\bkmkstart xo61282899130}{\*\bkmkend lw65165568056}CT Chest 2/12/2024 w/ new consolidative opacities within the lingula and multifocally throughout the right lower lobe compared to 1/26/2023.   Has b/l pleural effusions at baseline, s/p VATS w/ CT surgery in 2021 for L chest wall loculated effusion. Denies sick contacts, however w/ 6 year old grandson at home. RVP negative. s/p CTX 1g and azithromycin 500mg in ED. Takes ppx azithromycin at home. \par  - f/u BCx\par  - f/u sputum culture\par  - c/w CTX 1g q24\par  - c/w azithromycin 500mg q24\par  - treat empirically for CAP, unable to obtain urine studies as pt makes minimal urine.\par  \par  \plain\f1\fs20\kdtb9183\hich\f1\dbch\f1\loch\f1\cf2\fs20\ul{\field{\*\fldinst HYPERLINK [id number] }{\fldrslt Problem/Plan - 3:}}\plain\f0\fs20\dtlh9170\hich\f0\dbch\f0\loch\f0\fs20\ql\par  \'b7  {\*\bkmkstart us69454620376}{\*\bkmkend zf43437372158}Problem: {\*\bkmkstart ic99382150593}{\*\bkmkend vi93149072140}ESRD on dialysis. \par  \'b7  {\*\bkmkstart iz27085792328}{\*\bkmkend bk02866417414}Plan: {\*\bkmkstart mt73434753245}{\*\bkmkend em09566073192}Follows with Dr Anayeli kahn. Last HD session 2/28. Missed HD 3/1 due to sx. Renal consulted in ED\par  - pending iHD\par  - c/w sevelamer 800mg TID.\plain\f1\fs20\xbhp4296\hich\f1\dbch\f1\loch\f1\cf2\fs20\strike\plain\f0\fs20\faqb1521\hich\f0\dbch\f0\loch\f0\fs20\par  \par  \plain\f1\fs20\txya2854\hich\f1\dbch\f1\loch\f1\cf2\fs20\ul{\field{\*\fldinst HYPERLINK [id number] }{\fldrslt Problem/Plan - 4:}}\plain\f0\fs20\fwpy7961\hich\f0\dbch\f0\loch\f0\fs20\ql\par  \'b7  {\*\bkmkstart al42268962259}{\*\bkmkend qk57268936267}Problem: {\*\bkmkstart nn52988037277}{\*\bkmkend lp63612622928}HTN (hypertension). \par  \'b7  {\*\bkmkstart po07921520619}{\*\bkmkend gt39591141349}Plan: {\*\bkmkstart ea12311131534}{\*\bkmkend lx02720559599}Home med: amlodipine 5mg, not on HD days\par  - normotensive, holding this admission.\par  \par  \plain\f1\fs20\didr9303\hich\f1\dbch\f1\loch\f1\cf2\fs20\ul{\field{\*\fldinst HYPERLINK [id number] }{\fldrslt Problem/Plan - 5:}}\plain\f0\fs20\mksj0932\hich\f0\dbch\f0\loch\f0\fs20\ql\par  \'b7  {\*\bkmkstart qx54314610209}{\*\bkmkend kq32270985468}Problem: {\*\bkmkstart ji93119603360}{\*\bkmkend cl60862633740}Atrial fibrillation. \par  \'b7  {\*\bkmkstart jv09298539877}{\*\bkmkend sa69662071802}Plan: {\*\bkmkstart yj81664195911}{\*\bkmkend yy50069960254}Home med: Aspirin 81mg qd, amio 200mg qd, metoprolol XL 50mg qd. UHBCN2BKFv 2. s/p Watchman device, not on Eliquis. NSR this admission.\par  - c/w home meds.\par  \par  \plain\f1\fs20\gewy6993\hich\f1\dbch\f1\loch\f1\cf2\fs20\ul{\field{\*\fldinst HYPERLINK [id number] }{\fldrslt Problem/Plan - 6:}}\plain\f0\fs20\bvci6146\hich\f0\dbch\f0\loch\f0\fs20\ql\par  \'b7  {\*\bkmkstart wi44869877742}{\*\bkmkend hq00279345454}Problem: {\*\bkmkstart in59460396175}{\*\bkmkend bp56859870851}Anemia of chronic disease. \par  \'b7  {\*\bkmkstart lw82235194648}{\*\bkmkend lo75973808238}Plan: {\*\bkmkstart wt59470698327}{\*\bkmkend aw24910821611}Hgb 10, at baseline. Of note, pt recently admitted 12/2023 for LGIB, s/p c-scope, found to have large polyps on inpatient colonoscopy.   Pending outpatient repeat colonoscopy w/ polypectomy on Thursday 3/7/24 @[facility].\par  - maintain active T&S\par  - outpatient GI followup.\par  \par  \plain\f1\fs20\sprs7285\hich\f1\dbch\f1\loch\f1\cf2\fs20\ul{\field{\*\fldinst HYPERLINK [id number] }{\fldrslt Problem/Plan - 7:}}\plain\f0\fs20\ldmu0625\hich\f0\dbch\f0\loch\f0\fs20\ql\par  \'b7  {\*\bkmkstart ho92239586244}{\*\bkmkend kh44791378315}Problem: {\*\bkmkstart aa03830131805}{\*\bkmkend ii02538971566}Chronic heart failure with preserved ejection fraction (HFpEF). \par  \'b7  {\*\bkmkstart yu27274571781}{\*\bkmkend gn33754879625}Plan: {\*\bkmkstart ib84347142391}{\*\bkmkend ho83110502808}#CAD\par  Follows with Dr Ventura, last seen 1/2024. Prior EF 45%, now recovered. b/l crackles on exam, pending iHD. no LE edema. Home med: lipitor 40\par  - c/w home med.\par  \par  \plain\f1\fs20\copd4905\hich\f1\dbch\f1\loch\f1\cf2\fs20\ul{\field{\*\fldinst HYPERLINK [id number] }{\fldrslt Problem/Plan - 8:}}\plain\f0\fs20\xfnu8841\hich\f0\dbch\f0\loch\f0\fs20\ql\par  \'b7  {\*\bkmkstart bj99945984631}{\*\bkmkend rg23474972063}Problem: {\*\bkmkstart qq00077596682}{\*\bkmkend la25525415948}Pulmonary hypertension. \par  \'b7  {\*\bkmkstart dd41835533390}{\*\bkmkend cz23961445538}Plan: {\*\bkmkstart hc19352692077}{\*\bkmkend iv11596740593}Home med: sildenafil citrate 20mg TID. Follows w/ Dr Johnson.\par  - c/w home med\par  \par  #ERIK\par  hx of severe ERIK, non-compliant with CPAP\par  - outpatient followup.\par  \par  \plain\f1\fs20\kurv6923\hich\f1\dbch\f1\loch\f1\cf2\fs20\ul{\field{\*\fldinst HYPERLINK [id number] }{\fldrslt Problem/Plan - 9:}}\plain\f0\fs20\mbqs0398\hich\f0\dbch\f0\loch\f0\fs20\ql\par  \'b7  {\*\bkmkstart pv12730406543}{\*\bkmkend jn96370226228}Problem: {\*\bkmkstart mw36241013338}{\*\bkmkend ht52988541721}Prophylactic measure. \par  \'b7  {\*\bkmkstart us91196549087}{\*\bkmkend xj79144066173}Plan: {\*\bkmkstart zj25985930386}{\*\bkmkend fp93712316518}F: None\par  E: replete cautiously in ESRD\par  N: Renal diet\par  GI: None\par  DVT: hep subq\par  Dispo: RMF.\par  \plain\f1\fs16\wted9188\hich\f1\dbch\f1\loch\f1\cf2\fs16\par  \plain\f0\fs20\xqyt5242\hich\f0\dbch\f0\loch\f0\fs20\par  }

## 2024-03-04 NOTE — PROGRESS NOTE ADULT - ATTENDING COMMENTS
Patient was seen and examined at bedside on 3/4/2024 at 1030 am. Patient reports minimal improvement. Reports NIELSON. Denies CP. ROS is otherwise negative. Vitals, labwork and pertinent imaging reviewed. Exam - NAD, AAO x 4, PERRLA, EOMI, MMM, supple neck, chest - CTA b/l, CV - rrr, s1s2, no m/r/g, abd - soft, NTND, + BS, ext - wwp, L shoulder with very limited ROM, psych - normal affect, pt appears cachectic and malnourished    Plan:  -C/w abx  -Pulm consult  -Echo  -PT/OT

## 2024-03-04 NOTE — PROGRESS NOTE ADULT - PROBLEM SELECTOR PLAN 4
Follows with Dr Guadalupe outpatient. Last HD session 2/28. Missed HD 3/1 due to sx. Renal consulted in ED  - c/w iHD  - c/w sevelamer 800mg TID Home med: sildenafil citrate 20mg TID. Follows w/ Dr Johnson.    PLAN:  - c/w home med  - Pulm consulted, apprec recs  - f/u TTE

## 2024-03-04 NOTE — PROGRESS NOTE ADULT - PROBLEM SELECTOR PLAN 5
Home med: amlodipine 5mg, not on HD days  - normotensive, holding this admission Follows with Dr Guadalupe outpatient. Last HD session 2/28. Missed HD 3/1 due to sx. Renal consulted in ED  - c/w iHD  - c/w sevelamer 800mg TID

## 2024-03-04 NOTE — PROGRESS NOTE ADULT - PROBLEM SELECTOR PLAN 7
Hgb 10, at baseline. Of note, pt recently admitted 12/2023 for LGIB, s/p c-scope, found to have large polyps on inpatient colonoscopy. Pending outpatient repeat colonoscopy w/ polypectomy on Thursday 3/7/24 @[facility].    PLAN:  - maintain active T&S  - outpatient GI followup Home med: Aspirin 81mg qd, amio 200mg qd, metoprolol XL 50mg qd. [id number] 2. s/p Watchman device, not on Eliquis. NSR this admission.    PLAN:  - c/w home meds

## 2024-03-04 NOTE — PROGRESS NOTE ADULT - PROBLEM SELECTOR PLAN 2
CT Chest outpatient 2/12/2024 w/ new consolidative opacities within the lingula and multifocally throughout the right lower lobe compared to 1/26/2023.   - CTA PE this admission showed persistent b/l pulmonary consolidation.   - Has hx of b/l pleural effusion s/p VATS w/ CT surg in 2021 for L chest wall loculated effusion.   - Ur legionella neg, Ur strep neg.   - Finished Azithromycin 500mg for atypical cov    PLAN:  - f/u BCx NGTD   - f/u Sputum Cx.   - c/w CTX 1g q24 Presenting with SOB, productive cough w/ dark green sputum, wheezing at home, requiring more oxygen than baseline 1L NC. Likely 2/2 CAP. Follows with Dr Johnson outpatient. Prior smoker, quit 19 years ago.   - CXR w/ increased infiltrates.   - Outpatient CT chest 02/2024 showing signs of PNA.   - pending outpatient PET per Dr Johnson.   - Home meds: prophylactic azithromycin 250 MWF, stiolto, ventolin HFA  - RVP negative.  - CTA PE: No pulmonary embolism. Persistent bilateral pulmonary consolidations.    PLAN:  - on 2L O2, continue to wean.  - f/u ambulatory sats  - duonebs q6  - c/w home stiolto qd  - c/w CTX 1g q24  - finished azithromycin 500mg q24 x2 days, resume home azithromycin ppx  - c/w prednisone 40mg qd   - Pulm consulted.

## 2024-03-05 DIAGNOSIS — J15.69 PNEUMONIA DUE TO OTHER GRAM-NEGATIVE BACTERIA: ICD-10-CM

## 2024-03-05 DIAGNOSIS — J96.01 ACUTE RESPIRATORY FAILURE WITH HYPOXIA: ICD-10-CM

## 2024-03-05 DIAGNOSIS — I48.20 CHRONIC ATRIAL FIBRILLATION, UNSPECIFIED: ICD-10-CM

## 2024-03-05 LAB
ANION GAP SERPL CALC-SCNC: 13 MMOL/L — SIGNIFICANT CHANGE UP (ref 5–17)
BASOPHILS # BLD AUTO: 0.01 K/UL — SIGNIFICANT CHANGE UP (ref 0–0.2)
BASOPHILS NFR BLD AUTO: 0.1 % — SIGNIFICANT CHANGE UP (ref 0–2)
BLD GP AB SCN SERPL QL: NEGATIVE — SIGNIFICANT CHANGE UP
BUN SERPL-MCNC: 30 MG/DL — HIGH (ref 7–23)
CALCIUM SERPL-MCNC: 9.5 MG/DL — SIGNIFICANT CHANGE UP (ref 8.4–10.5)
CHLORIDE SERPL-SCNC: 97 MMOL/L — SIGNIFICANT CHANGE UP (ref 96–108)
CO2 SERPL-SCNC: 28 MMOL/L — SIGNIFICANT CHANGE UP (ref 22–31)
CREAT SERPL-MCNC: 4.85 MG/DL — HIGH (ref 0.5–1.3)
EGFR: 12 ML/MIN/1.73M2 — LOW
EOSINOPHIL # BLD AUTO: 0 K/UL — SIGNIFICANT CHANGE UP (ref 0–0.5)
EOSINOPHIL NFR BLD AUTO: 0 % — SIGNIFICANT CHANGE UP (ref 0–6)
FERRITIN SERPL-MCNC: 969 NG/ML — HIGH (ref 30–400)
GLUCOSE SERPL-MCNC: 113 MG/DL — HIGH (ref 70–99)
HCT VFR BLD CALC: 29.4 % — LOW (ref 39–50)
HCT VFR BLD CALC: 31.2 % — LOW (ref 39–50)
HGB BLD-MCNC: 9.1 G/DL — LOW (ref 13–17)
HGB BLD-MCNC: 9.6 G/DL — LOW (ref 13–17)
IMM GRANULOCYTES NFR BLD AUTO: 0.4 % — SIGNIFICANT CHANGE UP (ref 0–0.9)
IRON SATN MFR SERPL: 10 % — LOW (ref 16–55)
IRON SATN MFR SERPL: 22 UG/DL — LOW (ref 45–165)
LYMPHOCYTES # BLD AUTO: 0.62 K/UL — LOW (ref 1–3.3)
LYMPHOCYTES # BLD AUTO: 5.6 % — LOW (ref 13–44)
MAGNESIUM SERPL-MCNC: 2.2 MG/DL — SIGNIFICANT CHANGE UP (ref 1.6–2.6)
MCHC RBC-ENTMCNC: 30.3 PG — SIGNIFICANT CHANGE UP (ref 27–34)
MCHC RBC-ENTMCNC: 30.6 PG — SIGNIFICANT CHANGE UP (ref 27–34)
MCHC RBC-ENTMCNC: 30.8 GM/DL — LOW (ref 32–36)
MCHC RBC-ENTMCNC: 31 GM/DL — LOW (ref 32–36)
MCV RBC AUTO: 98 FL — SIGNIFICANT CHANGE UP (ref 80–100)
MCV RBC AUTO: 99.4 FL — SIGNIFICANT CHANGE UP (ref 80–100)
MONOCYTES # BLD AUTO: 0.72 K/UL — SIGNIFICANT CHANGE UP (ref 0–0.9)
MONOCYTES NFR BLD AUTO: 6.5 % — SIGNIFICANT CHANGE UP (ref 2–14)
NEUTROPHILS # BLD AUTO: 9.64 K/UL — HIGH (ref 1.8–7.4)
NEUTROPHILS NFR BLD AUTO: 87.4 % — HIGH (ref 43–77)
NRBC # BLD: 0 /100 WBCS — SIGNIFICANT CHANGE UP (ref 0–0)
NRBC # BLD: 0 /100 WBCS — SIGNIFICANT CHANGE UP (ref 0–0)
PHOSPHATE SERPL-MCNC: 3.5 MG/DL — SIGNIFICANT CHANGE UP (ref 2.5–4.5)
PLATELET # BLD AUTO: 154 K/UL — SIGNIFICANT CHANGE UP (ref 150–400)
PLATELET # BLD AUTO: 155 K/UL — SIGNIFICANT CHANGE UP (ref 150–400)
POTASSIUM SERPL-MCNC: 5.1 MMOL/L — SIGNIFICANT CHANGE UP (ref 3.5–5.3)
POTASSIUM SERPL-SCNC: 5.1 MMOL/L — SIGNIFICANT CHANGE UP (ref 3.5–5.3)
PROCALCITONIN SERPL-MCNC: 1.23 NG/ML — HIGH (ref 0.02–0.1)
RBC # BLD: 3 M/UL — LOW (ref 4.2–5.8)
RBC # BLD: 3.14 M/UL — LOW (ref 4.2–5.8)
RBC # FLD: 16.3 % — HIGH (ref 10.3–14.5)
RBC # FLD: 16.4 % — HIGH (ref 10.3–14.5)
RH IG SCN BLD-IMP: POSITIVE — SIGNIFICANT CHANGE UP
SODIUM SERPL-SCNC: 138 MMOL/L — SIGNIFICANT CHANGE UP (ref 135–145)
TIBC SERPL-MCNC: 212 UG/DL — LOW (ref 220–430)
UIBC SERPL-MCNC: 190 UG/DL — SIGNIFICANT CHANGE UP (ref 110–370)
WBC # BLD: 11.03 K/UL — HIGH (ref 3.8–10.5)
WBC # BLD: 12.25 K/UL — HIGH (ref 3.8–10.5)
WBC # FLD AUTO: 11.03 K/UL — HIGH (ref 3.8–10.5)
WBC # FLD AUTO: 12.25 K/UL — HIGH (ref 3.8–10.5)

## 2024-03-05 PROCEDURE — 71045 X-RAY EXAM CHEST 1 VIEW: CPT | Mod: 26

## 2024-03-05 PROCEDURE — 99233 SBSQ HOSP IP/OBS HIGH 50: CPT

## 2024-03-05 PROCEDURE — 76604 US EXAM CHEST: CPT | Mod: 26,59

## 2024-03-05 PROCEDURE — 99233 SBSQ HOSP IP/OBS HIGH 50: CPT | Mod: GC

## 2024-03-05 RX ORDER — AZITHROMYCIN 500 MG/1
250 TABLET, FILM COATED ORAL
Refills: 0 | Status: DISCONTINUED | OUTPATIENT
Start: 2024-03-05 | End: 2024-03-12

## 2024-03-05 RX ORDER — SEVELAMER CARBONATE 2400 MG/1
1 POWDER, FOR SUSPENSION ORAL
Qty: 0 | Refills: 0 | DISCHARGE
Start: 2024-03-05

## 2024-03-05 RX ORDER — AZITHROMYCIN 500 MG/1
1 TABLET, FILM COATED ORAL
Qty: 13 | Refills: 0
Start: 2024-03-05 | End: 2024-04-03

## 2024-03-05 RX ORDER — CEFPODOXIME PROXETIL 100 MG
1 TABLET ORAL
Qty: 8 | Refills: 0
Start: 2024-03-05 | End: 2024-03-08

## 2024-03-05 RX ORDER — METOPROLOL TARTRATE 50 MG
1 TABLET ORAL
Qty: 0 | Refills: 0 | DISCHARGE

## 2024-03-05 RX ADMIN — Medication 40 MILLIGRAM(S): at 06:20

## 2024-03-05 RX ADMIN — Medication 3 MILLILITER(S): at 09:52

## 2024-03-05 RX ADMIN — Medication 650 MILLIGRAM(S): at 09:52

## 2024-03-05 RX ADMIN — SEVELAMER CARBONATE 800 MILLIGRAM(S): 2400 POWDER, FOR SUSPENSION ORAL at 16:53

## 2024-03-05 RX ADMIN — Medication 100 MILLIGRAM(S): at 06:20

## 2024-03-05 RX ADMIN — CEFTRIAXONE 100 MILLIGRAM(S): 500 INJECTION, POWDER, FOR SOLUTION INTRAMUSCULAR; INTRAVENOUS at 06:19

## 2024-03-05 RX ADMIN — Medication 20 MILLIGRAM(S): at 00:06

## 2024-03-05 RX ADMIN — Medication 650 MILLIGRAM(S): at 10:52

## 2024-03-05 RX ADMIN — AMIODARONE HYDROCHLORIDE 200 MILLIGRAM(S): 400 TABLET ORAL at 06:20

## 2024-03-05 RX ADMIN — Medication 3 MILLILITER(S): at 22:17

## 2024-03-05 RX ADMIN — HEPARIN SODIUM 5000 UNIT(S): 5000 INJECTION INTRAVENOUS; SUBCUTANEOUS at 06:19

## 2024-03-05 RX ADMIN — TIOTROPIUM BROMIDE AND OLODATEROL 2 PUFF(S): 3.124; 2.736 SPRAY, METERED RESPIRATORY (INHALATION) at 09:53

## 2024-03-05 RX ADMIN — ATORVASTATIN CALCIUM 40 MILLIGRAM(S): 80 TABLET, FILM COATED ORAL at 22:17

## 2024-03-05 RX ADMIN — HEPARIN SODIUM 5000 UNIT(S): 5000 INJECTION INTRAVENOUS; SUBCUTANEOUS at 14:51

## 2024-03-05 RX ADMIN — Medication 3 MILLIGRAM(S): at 22:17

## 2024-03-05 RX ADMIN — SEVELAMER CARBONATE 800 MILLIGRAM(S): 2400 POWDER, FOR SUSPENSION ORAL at 06:20

## 2024-03-05 RX ADMIN — Medication 20 MILLIGRAM(S): at 23:21

## 2024-03-05 RX ADMIN — Medication 20 MILLIGRAM(S): at 16:54

## 2024-03-05 RX ADMIN — Medication 3 MILLILITER(S): at 16:54

## 2024-03-05 RX ADMIN — Medication 81 MILLIGRAM(S): at 06:20

## 2024-03-05 RX ADMIN — SEVELAMER CARBONATE 800 MILLIGRAM(S): 2400 POWDER, FOR SUSPENSION ORAL at 23:21

## 2024-03-05 RX ADMIN — Medication 100 MILLIGRAM(S): at 18:25

## 2024-03-05 RX ADMIN — SEVELAMER CARBONATE 800 MILLIGRAM(S): 2400 POWDER, FOR SUSPENSION ORAL at 00:06

## 2024-03-05 NOTE — DIETITIAN INITIAL EVALUATION ADULT - ADD RECOMMEND
1. Continue with renal diet. Consider liberalizing diet to regular if PO intake does not improve and monitor K and phos. Recommend to add [employer] Renal 1x/day. If patient does not like [employer], recommend to switch to Ensure Plus High Protein BID. Provided nutrition education. Recommend to add Nephrovite.  2. Encourage pt to meet nutritional needs as able   3. Monitor labs: electrolytes, cmp, renal indicators  4. Monitor weights   5. Pain and bowel regimen per team   6. Will continue to assess/honor food preferences as able  7. Monitor adherence to diet education

## 2024-03-05 NOTE — PROGRESS NOTE ADULT - SUBJECTIVE AND OBJECTIVE BOX
Physical Medicine and Rehabilitation Progress Note :       Patient is a 68y old  Male who presents with a chief complaint of SOB (05 Mar 2024 09:14)      HPI:  68M PMH AFib s/p watchman (on ASA), HTN, COPD, ESRD (MWF), severe ERIK, pulm HTN, T cell lymphoma (s/p chemo), HFpEF, s/p VATS procedure in 2021, recent [facility] admission 12/2023 for LGIB p/w productive cough, congestion, SOB x5 days. Pt requiring more oxygen at home, uses 1 L as needed at baseline but has been using 3L over the past few days. ROS +headache, productive cough with dark green sputum, chills, wheezing. Was never hypoxic, but feeling subjectively better with 3L NC and could "feel the air." Denies fevers, CP, lightheadedness. Pt missed HD session yesterday due to feeling weak, fatigued, and SOB, HD session initially rescheduled for today however pt reports feeling worse this morning and decided to present to ED. At baseline, pt able to walk less than half a block. Able to perform ADLs as normal, however with increased NIELSON. Makes minimal urine. Denies sick contacts, however wife and pt routinely  6 year old grandson from school. Of note, pt w/ recent [facility] ED visit 2/26 after fall at home, rolled out of bed w/ head strike.    ED Course  VS T 97.6 HR 56 /60 RR 25 SpO2 97% on 3L NC  Labs WBC 6.89 Hgb 10.0 Plt 127 Lactate 0.9 BMP Lytes WNL BUN/Cr 42/8.68  Micro RVP negative  EKG   Imaging CXR w/ b/l infiltrates  Interventions azithromycin 500mg x1, CTX 1000mg x1, soludmedrol 125mcg x1, duoneb x1  Consults Renal (02 Mar 2024 10:32)                            9.6    12.25 )-----------( 154      ( 05 Mar 2024 12:00 )             31.2       03-05    138  |  97  |  30<H>  ----------------------------<  113<H>  5.1   |  28  |  4.85<H>    Ca    9.5      05 Mar 2024 05:30  Phos  3.5     03-05  Mg     2.2     03-05    TPro  7.1  /  Alb  3.6  /  TBili  0.3  /  DBili  x   /  AST  13  /  ALT  5<L>  /  AlkPhos  158<H>  03-04    Vital Signs Last 24 Hrs  T(C): 36.6 (05 Mar 2024 09:14), Max: 37.1 (05 Mar 2024 06:02)  T(F): 97.8 (05 Mar 2024 09:14), Max: 98.8 (05 Mar 2024 06:02)  HR: 56 (05 Mar 2024 09:14) (56 - 86)  BP: 115/55 (05 Mar 2024 09:14) (105/63 - 142/68)  BP(mean): --  RR: 18 (05 Mar 2024 09:14) (17 - 18)  SpO2: 96% (05 Mar 2024 09:14) (95% - 97%)    Parameters below as of 05 Mar 2024 09:14  Patient On (Oxygen Delivery Method): nasal cannula  O2 Flow (L/min): 2      MEDICATIONS  (STANDING):  albuterol/ipratropium for Nebulization 3 milliLiter(s) Nebulizer every 6 hours  allopurinol 100 milliGRAM(s) Oral every 12 hours  aMIOdarone    Tablet 200 milliGRAM(s) Oral every 24 hours  atorvastatin 40 milliGRAM(s) Oral at bedtime  cefTRIAXone   IVPB 1000 milliGRAM(s) IV Intermittent every 24 hours  heparin   Injectable 5000 Unit(s) SubCutaneous every 8 hours  metoprolol succinate ER 50 milliGRAM(s) Oral every 24 hours  predniSONE   Tablet 40 milliGRAM(s) Oral every 24 hours  sevelamer carbonate 800 milliGRAM(s) Oral every 8 hours  sildenafil (REVATIO) 20 milliGRAM(s) Oral every 8 hours  tiotropium 2.5 MICROgram(s)/olodaterol 2.5 MICROgram(s) (STIOLTO) Inhaler 2 Puff(s) Inhalation daily    MEDICATIONS  (PRN):  acetaminophen     Tablet .. 650 milliGRAM(s) Oral every 6 hours PRN Temp greater or equal to 38C (100.4F), Mild Pain (1 - 3)  aluminum hydroxide/magnesium hydroxide/simethicone Suspension 30 milliLiter(s) Oral every 4 hours PRN Dyspepsia  melatonin 3 milliGRAM(s) Oral at bedtime PRN Insomnia  ondansetron Injectable 4 milliGRAM(s) IV Push every 8 hours PRN Nausea and/or Vomiting          Initial Functional Status Assessment :       Previous Level of Function:     · Ambulation Skills	needs device  · Transfer Skills	needs device  · ADL Skills	needs device  · Work/Leisure Activity	needs device  · Additional Comments	Pt lives with his wife in 1st floor apartment with ramp access. Previously independent with all mobility, ADLS/IADLs using SC mostly for outdoor ambulation. Pt p/w recent history of falls, most recent occurred from rolling off bed in the middle of the night.    Cognitive Status Examination:   · Orientation	oriented to person, place, time and situation  · Level of Consciousness	alert  · Follows Commands and Answers Questions	100% of the time; able to follow multistep instructions  · Personal Safety and Judgment	intact  · Short Term Memory	intact  · Long Term Memory	intact    Range of Motion Exam:   · Range of Motion Examination	bilateral upper extremity ROM was WFL (within functional limits); bilateral lower extremity ROM was WFL (within functional limits)    Manual Muscle Testing:   · Manual Muscle Testing Results	BUE grossly >3+/5; BLE grossly 4/5    Bed Mobility: Rolling/Turning:     · Level of [place]	independent    Bed Mobility: Scooting/Bridging:     · Level of [place]	independent    Bed Mobility: Sit to Supine:     · Level of [place]	supervision  · Physical Assist/Nonphysical Assist	1 person assist    Bed Mobility: Supine to Sit:     · Level of [place]	supervision  · Physical Assist/Nonphysical Assist	1 person assist    Bed Mobility Analysis:     · Impairments Contributing to Impaired Bed Mobility	impaired balance; decreased strength    Transfer: Sit to Stand:     · Level of [place]	contact guard  · Physical Assist/Nonphysical Assist	1 person assist; verbal cues  · Weight-Bearing Restrictions	full weight-bearing  · Assistive Device	straight cane    Transfer: Stand to Sit:     · Level of [place]	contact guard  · Physical Assist/Nonphysical Assist	1 person assist  · Weight-Bearing Restrictions	full weight-bearing  · Assistive Device	straight cane    Sit/Stand Transfer Safety Analysis:     · Transfer Safety Concerns Noted	decreased weight-shifting ability  · Impairments Contributing to Impaired Transfers	impaired balance; decreased strength; narrow base of support    Gait Skills:     · Level of [place]	contact guard; minimum assist (75% patients effort)  · Physical Assist/Nonphysical Assist	1 person assist; verbal cues  · Weight-Bearing Restrictions	full weight-bearing  · Assistive Device	straight cane  · Gait Distance	150 feet    Gait Analysis:     · Gait Deviations Noted	Pt demo good chanel, mild postural sway, 1 episode loss of balance required min A to recover, amb SpO2 >92% on 2L O2.  · Impairments Contributing to Gait Deviations	impaired balance; decreased strength; narrow base of support; impaired postural control    Stair Negotiation:     · Level of [place]	unable to perform; unsafe, fatigue    Balance Skills Assessment:     · Sitting Balance: Static	good balance  · Sitting Balance: Dynamic	good balance  · Sit-to-Stand Balance	fair plus  · Standing Balance: Static	fair plus  · Standing Balance: Dynamic	fair balance  · Systems Impairment Contributing to Balance Disturbance	musculoskeletal; neuromuscular  · Identified Impairments Contributing to Balance Disturbance	decreased strength; impaired postural control    Sensory Examination:   Sensory Examination:    Grossly Intact:   · Gross Sensory Examination	Grossly Intact      Clinical Impressions:   · Criteria for Skilled Therapeutic Interventions	impairments found; functional limitations in following categories; risk reduction/prevention  · Impairments Found (describe specific impairments)	aerobic capacity/endurance; ergonomics and body mechanics; gait, locomotion, and balance; muscle strength  · Functional Limitations in Following Categories (describe specific limitations)	home management; community/leisure      Upper Body Dressing Training:     · Level of [place]	supervision; don hospital gown  · Physical Assist/Nonphysical Assist	supervision; seated at EOB    Lower Body Dressing Training:     · Level of [place]	supervision; don b/l socks  · Physical Assist/Nonphysical Assist	supervision; seated at EOB        PM&R Impression : as above    Current disposition plan recommendation :    d/c home with home physical and occupational therapy . family assist/supervision

## 2024-03-05 NOTE — PROGRESS NOTE ADULT - PROBLEM SELECTOR PLAN 2
Presenting with SOB, productive cough w/ dark green sputum, wheezing at home, requiring more oxygen than baseline 1L NC. Likely 2/2 CAP. Follows with Dr Johnson outpatient. Prior smoker, quit 19 years ago.   - CXR w/ increased infiltrates.   - Outpatient CT chest 02/2024 showing signs of PNA.   - Home meds: prophylactic azithromycin 250 MWF, stiolto, ventolin HFA  - RVP negative.  - CTA PE this admission: Emphysematous changes. Persistent predominantly subpleural consolidations in both lungs mostly in the right lower lobe and left upper lobe. No PE.    PLAN:  - c/w duonebs q6  - c/w home stiolto qd  - c/w CTX 1g q24  - Patient finished Azithromycin 500mg and home Azithromycin regimen resumed   - c/w prednisone 40mg qd   - Pulm consulted.  - Start Hycodan 5mg q6 prn for cough suppression  - Possible Bronch on 3/6/24

## 2024-03-05 NOTE — DIETITIAN INITIAL EVALUATION ADULT - NUTRITON FOCUSED PHYSICAL EXAM
Post-Care Instructions: I reviewed with the patient in detail post-care instructions. Patient is to wear sunprotection, and avoid picking at any of the treated lesions. Pt may apply Vaseline to crusted or scabbing areas. Duration Of Freeze Thaw-Cycle (Seconds): 0 Detail Level: Simple Render Post-Care Instructions In Note?: yes Number Of Freeze-Thaw Cycles: 3 freeze-thaw cycles Render Note In Bullet Format When Appropriate: No Consent: The patient's consent was obtained including but not limited to risks of crusting, scabbing, blistering, scarring, darker or lighter pigmentary change, recurrence, incomplete removal and infection. yes...

## 2024-03-05 NOTE — PROGRESS NOTE ADULT - PROBLEM SELECTOR PLAN 4
Home med: sildenafil citrate 20mg TID. Follows w/ Dr Johnson.  TTE 3/4 showing pulmonary artery systolic pressure is 58 mmHg, worse than previous     PLAN:  - c/w home med  - Pulm consulted, apprec recs

## 2024-03-05 NOTE — DIETITIAN INITIAL EVALUATION ADULT - PROBLEM SELECTOR PLAN 5
Home med: Aspirin 81mg qd, amio 200mg qd, metoprolol XL 50mg qd. [id number] 2. s/p Watchman device, not on Eliquis. NSR this admission.  - c/w home meds

## 2024-03-05 NOTE — PROGRESS NOTE ADULT - ASSESSMENT
68M PMH AFib s/p watchman (on ASA), HTN, COPD, ESRD (MWF), severe ERIK, pulm HTN, T cell lymphoma (s/p chemo), HFpEF, s/p VATS procedure in 2021, recent [facility] admission 12/2023 for LGIB p/w productive cough, congestion, SOB for weeks now worsening in the last 5days. Admitted to [facility] for AHRF 2/2 CAP vs worsening pulmonary HTN.

## 2024-03-05 NOTE — PROGRESS NOTE ADULT - CONVERSATION DETAILS
GOC discussed with patient. He would like to remain FULL CODE however states that if prognosis is poor after intubation, he would like to be extubated and pass naturally.

## 2024-03-05 NOTE — DIETITIAN NUTRITION RISK NOTIFICATION - TREATMENT: THE FOLLOWING DIET HAS BEEN RECOMMENDED
Diet, Renal Restrictions:   For patients receiving Renal Replacement - No Protein Restr, No Conc K, No Conc Phos, Low Sodium (03-02-24 @ 11:06) [Active]

## 2024-03-05 NOTE — PROGRESS NOTE ADULT - PROBLEM SELECTOR PLAN 5
Follows with Dr Guadalupe outpatient. Last HD session 2/28. Missed HD 3/1 due to sx. Renal consulted in ED  - c/w iHD  - c/w sevelamer 800mg TID

## 2024-03-05 NOTE — DIETITIAN NUTRITION RISK NOTIFICATION - ADDITIONAL COMMENTS/DIETITIAN RECOMMENDATIONS
Continue with renal diet. Consider liberalizing diet to regular if PO intake does not improve and monitor K and phos. Recommend to add [employer] Renal 1x/day. If patient does not like [employer], recommend to switch to Ensure Plus High Protein BID. Provided nutrition education. Recommend to add Nephrovite.

## 2024-03-05 NOTE — PROGRESS NOTE ADULT - ASSESSMENT
{\rtf1\isikzs19310\ansi\jmztbqa7038\ftnbj\uc1\deff0  {\fonttbl{\f0 \fnil Segoe UI;}{\f1 \fnil \fcharset0 Segoe UI;}{\f2 \fnil Times New Romel;}}  {\colortbl ;\rfy586\domiq902\hvsq544 ;\red0\green0\blue0 ;\red0\green0\hycu382 ;\red0\green0\blue0 ;}  {\stylesheet{\f0\fs20 Normal;}{\cs1 Default Paragraph Font;}{\cs2\f0\fs16 Line Number;}{\cs3\f2\fs24\ul\cf3 Hyperlink;}}  {\*\revtbl{Unknown;}}  \ixmsdq73808\kjmwlh95652\rqojk7385\sfguz4219\lldkk7456\zevsn3155\ttryfmj749\fuuqakp151\nogrowautofit\wvdble810\formshade\nofeaturethrottle1\dntblnsbdb\fet4\aendnotes\aftnnrlc\pgbrdrhead\pgbrdrfoot  \sectd\ybzknu77818\sjqbsa51009\guttersxn0\veijrfht9417\liphmski2234\ckxylvtn5171\ojqtwnsb5456\kzdydit315\hbusqkm844\sbkpage\pgncont\pgndec  \plain\plain\f0\fs24\ql\plain\f0\fs24\plain\f0\fs20\ogbv7774\hich\f0\dbch\f0\loch\f0\fs20\par  I M\par  \par  68 y o M PMH AFib s/p watchman (on ASA), HTN, COPD, ESRD (MWF), severe ERIK, pulm HTN, T cell lymphoma (s/p chemo), HFpEF, s/p VATS procedure in 2021, recent [facility] admission 12/2023 for LGIB p/w productive cough, congestion, SOB x5 days. Admitted to [facility]   for management of CAP and COPD exacerbation.\par  \par  \plain\f1\fs20\kejx4116\hich\f1\dbch\f1\loch\f1\cf2\fs20\ul{\field{\*\fldinst HYPERLINK [id number] }{\fldrslt Problem/Plan - 1:}}\plain\f0\fs20\jjuv5722\hich\f0\dbch\f0\loch\f0\fs20\ql\par  \'b7  {\*\bkmkstart dx28659436840}{\*\bkmkend jt91836640030}Problem: {\*\bkmkstart sg82334428291}{\*\bkmkend fr30372944472}Acute hypoxic respiratory failure.\plain\f1\fs20\suud1273\hich\f1\dbch\f1\loch\f1\cf2\fs20\strike\plain\f0\fs20\sibp4459\hich\f0\dbch\f0\loch\f0\fs20\par  \'b7  {\*\bkmkstart zx95502040228}{\*\bkmkend mn01034946558}Plan: {\*\bkmkstart py52780190799}{\*\bkmkend bp05076797875}Increased oxygen requirement.\plain\f1\fs20\ixqd8566\hich\f1\dbch\f1\loch\f1\cf2\fs20\strike\plain\f0\fs20\ihns0952\hich\f0\dbch\f0\loch\f0\fs20\par  \plain\f1\fs20\ppmf6078\hich\f1\dbch\f1\loch\f1\cf2\fs20\ul{\field{\*\fldinst HYPERLINK [id number] }{\fldrslt Problem/Plan - 2:}}\plain\f0\fs20\nvcx1794\hich\f0\dbch\f0\loch\f0\fs20\ql\par  \'b7  {\*\bkmkstart qg66332036168}{\*\bkmkend ro25527466135}Problem: {\*\bkmkstart re09290198788}{\*\bkmkend fz47756631187}COPD exacerbation.\plain\f1\fs20\ewfj6032\hich\f1\dbch\f1\loch\f1\cf2\fs20\strike\plain\f0\fs20\qkzy8595\hich\f0\dbch\f0\loch\f0\fs20\par  \'b7  {\*\bkmkstart au20197523731}{\*\bkmkend ev22712511531}Plan: {\*\bkmkstart hl95647956839}{\*\bkmkend jv08741690169}Presenting with SOB, productive cough w/ dark green sputum, wheezing at home, requiring more oxygen than baseline 1L NC. Likely 2/2   CAP. Follows with Dr Johnson outpatient. Prior smoker, quit 19 years ago. \par  - CXR w/ increased infiltrates. \par  - Outpatient CT chest 02/2024 showing signs of PNA. \par  - pending outpatient PET per Dr Johnson. \par  - Home meds: prophylactic azithromycin 250 MWF, stiolto, ventolin HFA\par  - RVP negative.\par  - CTA PE: No pulmonary embolism. Persistent bilateral pulmonary consolidations.\par  \par  PLAN:\par  - on 2L O2, continue to wean.\par  - f/u ambulatory sats\par  - duonebs q6\par  - c/w home stiolto qd\par  - c/w CTX 1g q24\par  - finished azithromycin 500mg q24 x2 days, resume home azithromycin ppx\par  - c/w prednisone 40mg qd \par  - Pulm consulted.\plain\f1\fs20\eojj1857\hich\f1\dbch\f1\loch\f1\cf2\fs20\strike\plain\f0\fs20\vpnb4637\hich\f0\dbch\f0\loch\f0\fs20\par  \par  \plain\f1\fs20\lefy3576\hich\f1\dbch\f1\loch\f1\cf2\fs20\ul{\field{\*\fldinst HYPERLINK [id number] }{\fldrslt Problem/Plan - 3:}}\plain\f0\fs20\qrru2623\hich\f0\dbch\f0\loch\f0\fs20\ql\par  \'b7  {\*\bkmkstart mg53984438427}{\*\bkmkend kv56881077450}Problem: {\*\bkmkstart pq37930594018}{\*\bkmkend pl53235880825}Gram-negative pneumonia.\plain\f1\fs20\gbyu7051\hich\f1\dbch\f1\loch\f1\cf2\fs20\strike\plain\f0\fs20\ncyb1860\hich\f0\dbch\f0\loch\f0\fs20\par  \'b7  {\*\bkmkstart en79609568221}{\*\bkmkend qa42854669377}Plan: {\*\bkmkstart zr91215511236}{\*\bkmkend pg46943463916}CT Chest outpatient 2/12/2024 w/ new consolidative opacities within the lingula and multifocally throughout the right lower lobe compared   to 1/26/2023. \par  - CTA PE this admission showed persistent b/l pulmonary consolidation. \par  - Has hx of b/l pleural effusion s/p VATS w/ CT surg in 2021 for L chest wall loculated effusion. \par  - Ur legionella neg, Ur strep neg. \par  - Finished Azithromycin 500mg for atypical cov\par  \par  PLAN:\par  - f/u BCx NGTD \par  - f/u Sputum Cx. \par  - c/w CTX 1g q24.\plain\f1\fs20\pxnv3597\hich\f1\dbch\f1\loch\f1\cf2\fs20\strike\plain\f0\fs20\oqtm7726\hich\f0\dbch\f0\loch\f0\fs20\par  \par  \plain\f1\fs20\tulk7895\hich\f1\dbch\f1\loch\f1\cf2\fs20\ul{\field{\*\fldinst HYPERLINK [id number] }{\fldrslt Problem/Plan - 4:}}\plain\f0\fs20\vywa3123\hich\f0\dbch\f0\loch\f0\fs20\ql\par  \'b7  {\*\bkmkstart xh24286227294}{\*\bkmkend gw69595640830}Problem: {\*\bkmkstart iw95143210856}{\*\bkmkend dc03106974791}Pulmonary hypertension.\plain\f1\fs20\uvqm5240\hich\f1\dbch\f1\loch\f1\cf2\fs20\strike\plain\f0\fs20\myol9220\hich\f0\dbch\f0\loch\f0\fs20\par  \'b7  {\*\bkmkstart rc38896333930}{\*\bkmkend we31114284684}Plan: {\*\bkmkstart hd17066596345}{\*\bkmkend vu39197037709}Home med: sildenafil citrate 20mg TID. Follows w/ Dr Alex.\par  \par  PLAN:\par  - c/w home med\par  - Pulm consulted, apprec recs\par  - f/u TTE.\plain\f1\fs20\ouru6568\hich\f1\dbch\f1\loch\f1\cf2\fs20\strike\plain\f0\fs20\qvnp3221\hich\f0\dbch\f0\loch\f0\fs20\par  \par  \plain\f1\fs20\btub1495\hich\f1\dbch\f1\loch\f1\cf2\fs20\ul{\field{\*\fldinst HYPERLINK [id number] }{\fldrslt Problem/Plan - 5:}}\plain\f0\fs20\pdzi2890\hich\f0\dbch\f0\loch\f0\fs20\ql\par  \'b7  {\*\bkmkstart du72304177414}{\*\bkmkend sw83066082728}Problem: {\*\bkmkstart nl05906498129}{\*\bkmkend ys11231927092}ESRD on dialysis.\plain\f1\fs20\qvnc4900\hich\f1\dbch\f1\loch\f1\cf2\fs20\strike\plain\f0\fs20\nwhz4415\hich\f0\dbch\f0\loch\f0\fs20\par  \'b7  {\*\bkmkstart ix52346774409}{\*\bkmkend jv05961127451}Plan: {\*\bkmkstart oq18699301728}{\*\bkmkend cn12360185799}Follows with Dr Anayeli kahn. Last HD session 2/28. Missed HD 3/1 due to sx. Renal consulted in ED\par  - c/w iHD\par  - c/w sevelamer 800mg TID.\plain\f1\fs20\ptgl1292\hich\f1\dbch\f1\loch\f1\cf2\fs20\strike\plain\f0\fs20\gfzz2702\hich\f0\dbch\f0\loch\f0\fs20\par  \par  \plain\f1\fs20\cdwl1404\hich\f1\dbch\f1\loch\f1\cf2\fs20\ul{\field{\*\fldinst HYPERLINK [id number] }{\fldrslt Problem/Plan - 6:}}\plain\f0\fs20\ztlw4617\hich\f0\dbch\f0\loch\f0\fs20\ql\par  \'b7  {\*\bkmkstart ls44966205029}{\*\bkmkend da31665069737}Problem: {\*\bkmkstart lp26309373384}{\*\bkmkend ma96834331531}HTN (hypertension).\plain\f1\fs20\atqm8148\hich\f1\dbch\f1\loch\f1\cf2\fs20\strike\plain\f0\fs20\nljs8861\hich\f0\dbch\f0\loch\f0\fs20\par  \'b7  {\*\bkmkstart is35399145564}{\*\bkmkend rr19042614937}Plan: {\*\bkmkstart qg18048465278}{\*\bkmkend ba92694474045}Home med: amlodipine 5mg, not on HD days\par  - normotensive, holding this admission.\plain\f1\fs20\mbov2657\hich\f1\dbch\f1\loch\f1\cf2\fs20\strike\plain\f0\fs20\rzbc9255\hich\f0\dbch\f0\loch\f0\fs20\par  \par  \plain\f1\fs20\crdy7126\hich\f1\dbch\f1\loch\f1\cf2\fs20\ul{\field{\*\fldinst HYPERLINK [id number] }{\fldrslt Problem/Plan - 7:}}\plain\f0\fs20\ttrb4585\hich\f0\dbch\f0\loch\f0\fs20\ql\par  \'b7  {\*\bkmkstart mq89385199654}{\*\bkmkend ph31070208661}Problem: {\*\bkmkstart gf58844730758}{\*\bkmkend yv37690221908}Chronic atrial fibrillation.\plain\f1\fs20\nbrm2445\hich\f1\dbch\f1\loch\f1\cf2\fs20\strike\plain\f0\fs20\mzyz2972\hich\f0\dbch\f0\loch\f0\fs20\par  \'b7  {\*\bkmkstart wn08067119153}{\*\bkmkend ls40014913908}Plan: {\*\bkmkstart xy41380290530}{\*\bkmkend vf71961053790}Home med: Aspirin 81mg qd, amio 200mg qd, metoprolol XL 50mg qd. DHWWH0XLGh 2. s/p Watchman device, not on Eliquis. NSR this admission.\par  \par  PLAN:\par  - c/w home meds.\plain\f1\fs20\rlrz7673\hich\f1\dbch\f1\loch\f1\cf2\fs20\strike\plain\f0\fs20\sphu7614\hich\f0\dbch\f0\loch\f0\fs20\par  \par  \plain\f1\fs20\uwxx9680\hich\f1\dbch\f1\loch\f1\cf2\fs20\ul{\field{\*\fldinst HYPERLINK [id number] }{\fldrslt Problem/Plan - 8:}}\plain\f0\fs20\adyq1238\hich\f0\dbch\f0\loch\f0\fs20\ql\par  \'b7  {\*\bkmkstart xr29152695744}{\*\bkmkend fx30811140405}Problem: {\*\bkmkstart is30978179027}{\*\bkmkend ds32582981531}Anemia of chronic disease.\plain\f1\fs20\uysm3779\hich\f1\dbch\f1\loch\f1\cf2\fs20\strike\plain\f0\fs20\lxps8808\hich\f0\dbch\f0\loch\f0\fs20\par  \'b7  {\*\bkmkstart gs62252739294}{\*\bkmkend uy81887052659}Plan: {\*\bkmkstart yf08166901179}{\*\bkmkend ys88367657998}Hgb 10, at baseline. Of note, pt recently admitted 12/2023 for LGIB, s/p c-scope, found to have large polyps on inpatient colonoscopy.   Pending outpatient repeat colonoscopy w/ polypectomy on Thursday 3/7/24 @[facility].\par  \par  PLAN:\par  - maintain active T&S\par  - outpatient GI followup.\plain\f1\fs20\sokx2926\hich\f1\dbch\f1\loch\f1\cf2\fs20\strike\plain\f0\fs20\nvvz7320\hich\f0\dbch\f0\loch\f0\fs20\par  \par  \plain\f1\fs20\fnes8817\hich\f1\dbch\f1\loch\f1\cf2\fs20\ul{\field{\*\fldinst HYPERLINK [id number] }{\fldrslt Problem/Plan - 9:}}\plain\f0\fs20\hiol9915\hich\f0\dbch\f0\loch\f0\fs20\ql\par  \'b7  {\*\bkmkstart ye31921379512}{\*\bkmkend fj59825450430}Problem: {\*\bkmkstart kx30199345370}{\*\bkmkend yg65977609840}Chronic heart failure with preserved ejection fraction (HFpEF).\plain\f1\fs20\xmib1095\hich\f1\dbch\f1\loch\f1\cf2\fs20\strike\plain\f0\fs20\xkhi5234\hich\f0\dbch\f0\loch\f0\fs20\par  \'b7  {\*\bkmkstart pb00299864109}{\*\bkmkend uz30965084787}Plan: {\*\bkmkstart tr06375125836}{\*\bkmkend aa85250759145}#CAD\par  Follows with Dr Ventura, last seen 1/2024. Prior EF 45%, now recovered. b/l crackles on exam, pending iHD. no LE edema. Home med: lipitor 40\par  \par  PLAN:\par  - c/w home med.\plain\f1\fs20\tnjo2866\hich\f1\dbch\f1\loch\f1\cf2\fs20\strike\plain\f0\fs20\kbbo4925\hich\f0\dbch\f0\loch\f0\fs20\par  \par  \plain\f1\fs20\dsyk8380\hich\f1\dbch\f1\loch\f1\cf2\fs20\ul{\field{\*\fldinst HYPERLINK [id number] }{\fldrslt Problem/Plan - 10:}}\plain\f0\fs20\lqig8433\hich\f0\dbch\f0\loch\f0\fs20\ql\par  \'b7  {\*\bkmkstart yt24818194496}{\*\bkmkend cd40905838545}Problem: {\*\bkmkstart ng74855190274}{\*\bkmkend xk93350359747}ERIK (obstructive sleep apnea).\plain\f1\fs20\ljon1122\hich\f1\dbch\f1\loch\f1\cf2\fs20\strike\plain\f0\fs20\snbd4816\hich\f0\dbch\f0\loch\f0\fs20\par  \'b7  {\*\bkmkstart uy21120955901}{\*\bkmkend sz57791215148}Plan; {\*\bkmkstart ok35366245503}{\*\bkmkend yx51116852992}#ERIK\par  hx of severe ERIK, non-compliant with CPAP\par  - outpatient followup.\plain\f1\fs20\pazr8684\hich\f1\dbch\f1\loch\f1\cf2\fs20\strike\plain\f0\fs20\xsvk8821\hich\f0\dbch\f0\loch\f0\fs20\par  \par  \plain\f1\fs20\fhqw9924\hich\f1\dbch\f1\loch\f1\cf2\fs20\ul{\field{\*\fldinst HYPERLINK [id number] }{\fldrslt Problem/Plan - 11:}}\plain\f0\fs20\ijsa9948\hich\f0\dbch\f0\loch\f0\fs20\ql\par  \'b7  {\*\bkmkstart gg028382271721}{\*\bkmkend gs590882559453}Problem: {\*\bkmkstart ec749817862954}{\*\bkmkend mo021237312093}Prophylactic measure. \par  \'b7  {\*\bkmkstart ip249328043677}{\*\bkmkend nc646608098495}Plan: {\*\bkmkstart wk567532273882}{\*\bkmkend pm815429183921}F: None\par  E: replete cautiously in ESRD\par  N: Renal diet\par  GI: None\par  DVT: hep subq\par  Dispo: RMF{\*\bkmkstart bkcommentCR}{\*\bkmkend bkcommentCR}.\par  \par  \plain\f1\fs16\qdlk2387\hich\f1\dbch\f1\loch\f1\cf2\fs16\par  \plain\f0\fs20\jquy0844\hich\f0\dbch\f0\loch\f0\fs20\par  }

## 2024-03-05 NOTE — PROGRESS NOTE ADULT - SUBJECTIVE AND OBJECTIVE BOX
*****INCOMPLETE NOTE*****    INTERVAL HPI/OVERNIGHT EVENTS:    SUBJECTIVE: Pt seen and examined at bedside. KALA.   Denies f/c/n/v/d, abd pain, SOB, CP,  sxs,     ANTIBIOTICS/RELEVANT:    MEDICATIONS  (STANDING):  albuterol/ipratropium for Nebulization 3 milliLiter(s) Nebulizer every 6 hours  allopurinol 100 milliGRAM(s) Oral every 12 hours  aMIOdarone    Tablet 200 milliGRAM(s) Oral every 24 hours  aspirin  chewable 81 milliGRAM(s) Oral every 24 hours  atorvastatin 40 milliGRAM(s) Oral at bedtime  cefTRIAXone   IVPB 1000 milliGRAM(s) IV Intermittent every 24 hours  heparin   Injectable 5000 Unit(s) SubCutaneous every 8 hours  metoprolol succinate ER 50 milliGRAM(s) Oral every 24 hours  predniSONE   Tablet 40 milliGRAM(s) Oral every 24 hours  sevelamer carbonate 800 milliGRAM(s) Oral every 8 hours  sildenafil (REVATIO) 20 milliGRAM(s) Oral every 8 hours  tiotropium 2.5 MICROgram(s)/olodaterol 2.5 MICROgram(s) (STIOLTO) Inhaler 2 Puff(s) Inhalation daily    MEDICATIONS  (PRN):  acetaminophen     Tablet .. 650 milliGRAM(s) Oral every 6 hours PRN Temp greater or equal to 38C (100.4F), Mild Pain (1 - 3)  aluminum hydroxide/magnesium hydroxide/simethicone Suspension 30 milliLiter(s) Oral every 4 hours PRN Dyspepsia  melatonin 3 milliGRAM(s) Oral at bedtime PRN Insomnia  ondansetron Injectable 4 milliGRAM(s) IV Push every 8 hours PRN Nausea and/or Vomiting      Vital Signs Last 24 Hrs  T(C): 37.1 (05 Mar 2024 06:02), Max: 37.1 (05 Mar 2024 06:02)  T(F): 98.8 (05 Mar 2024 06:02), Max: 98.8 (05 Mar 2024 06:02)  HR: 56 (05 Mar 2024 06:02) (56 - 86)  BP: 105/63 (05 Mar 2024 06:02) (105/63 - 142/68)  BP(mean): --  RR: 18 (05 Mar 2024 06:02) (17 - 18)  SpO2: 95% (05 Mar 2024 06:02) (95% - 97%)    Parameters below as of 04 Mar 2024 20:38  Patient On (Oxygen Delivery Method): nasal cannula  O2 Flow (L/min): 2      PHYSICAL EXAM:  General: in no acute distress, non toxic appearing, speaking in full sentences  Eyes: EOMI intact bilaterally. Anicteric sclerae, moist conjunctivae  HENT: Moist mucous membranes  Neck: Trachea midline, supple  Lungs: CTA B/L. No wheezes, rales, or rhonchi  Cardiovascular: RRR. No murmurs, rubs, or gallops  Abdomen: +BS Soft, non-tender non-distended; No rebound or guarding  Vasc: Rad and DP intact and equal b/l   Extremities: WWP, No clubbing, cyanosis or edema  Neurological: Alert and oriented  Skin: Warm and dry. No obvious rash     LABS:                        9.1    11.03 )-----------( 155      ( 05 Mar 2024 05:30 )             29.4     03-05    138  |  97  |  30<H>  ----------------------------<  113<H>  5.1   |  28  |  4.85<H>    Ca    9.5      05 Mar 2024 05:30  Phos  3.5     03-05  Mg     2.2     03-05    TPro  7.1  /  Alb  3.6  /  TBili  0.3  /  DBili  x   /  AST  13  /  ALT  5<L>  /  AlkPhos  158<H>  03-04      Urinalysis Basic - ( 05 Mar 2024 05:30 )    Color: x / Appearance: x / SG: x / pH: x  Gluc: 113 mg/dL / Ketone: x  / Bili: x / Urobili: x   Blood: x / Protein: x / Nitrite: x   Leuk Esterase: x / RBC: x / WBC x   Sq Epi: x / Non Sq Epi: x / Bacteria: x        MICROBIOLOGY:    RADIOLOGY & ADDITIONAL STUDIES: INTERVAL HPI/OVERNIGHT EVENTS: KALA     SUBJECTIVE: Pt seen and examined at bedside. Reporting large BM ovn. Tried walking to bathroom off oxygen and felt SOB. Reports new hemoptysis. Attempted to wean patient off O2 however initially was 85% on 3L so uptirated patient to 5L.     Revisited patient 2hrs later and was able to wean back to baseline of 2L satting at 90%-92%    Denies f/c/n/v/d, abd pain, CP,  sxs     MEDICATIONS  (STANDING):  albuterol/ipratropium for Nebulization 3 milliLiter(s) Nebulizer every 6 hours  allopurinol 100 milliGRAM(s) Oral every 12 hours  aMIOdarone    Tablet 200 milliGRAM(s) Oral every 24 hours  aspirin  chewable 81 milliGRAM(s) Oral every 24 hours  atorvastatin 40 milliGRAM(s) Oral at bedtime  cefTRIAXone   IVPB 1000 milliGRAM(s) IV Intermittent every 24 hours  heparin   Injectable 5000 Unit(s) SubCutaneous every 8 hours  metoprolol succinate ER 50 milliGRAM(s) Oral every 24 hours  predniSONE   Tablet 40 milliGRAM(s) Oral every 24 hours  sevelamer carbonate 800 milliGRAM(s) Oral every 8 hours  sildenafil (REVATIO) 20 milliGRAM(s) Oral every 8 hours  tiotropium 2.5 MICROgram(s)/olodaterol 2.5 MICROgram(s) (STIOLTO) Inhaler 2 Puff(s) Inhalation daily    MEDICATIONS  (PRN):  acetaminophen     Tablet .. 650 milliGRAM(s) Oral every 6 hours PRN Temp greater or equal to 38C (100.4F), Mild Pain (1 - 3)  aluminum hydroxide/magnesium hydroxide/simethicone Suspension 30 milliLiter(s) Oral every 4 hours PRN Dyspepsia  melatonin 3 milliGRAM(s) Oral at bedtime PRN Insomnia  ondansetron Injectable 4 milliGRAM(s) IV Push every 8 hours PRN Nausea and/or Vomiting      Vital Signs Last 24 Hrs  T(C): 37.1 (05 Mar 2024 06:02), Max: 37.1 (05 Mar 2024 06:02)  T(F): 98.8 (05 Mar 2024 06:02), Max: 98.8 (05 Mar 2024 06:02)  HR: 56 (05 Mar 2024 06:02) (56 - 86)  BP: 105/63 (05 Mar 2024 06:02) (105/63 - 142/68)  BP(mean): --  RR: 18 (05 Mar 2024 06:02) (17 - 18)  SpO2: 95% (05 Mar 2024 06:02) (95% - 97%)    Parameters below as of 04 Mar 2024 20:38  Patient On (Oxygen Delivery Method): nasal cannula  O2 Flow (L/min): 2      PHYSICAL EXAM:  General: in no acute distress, non toxic appearing, speaking in full sentences  Eyes: Rt sided eye ecchymosis. EOMI intact bilaterally. Anicteric sclerae, moist conjunctivae  HENT: Moist mucous membranes  Neck: Trachea midline, supple  Lungs: B/L Rhonchi, no wheezes/rales.   Cardiovascular: RRR. No murmurs, rubs, or gallops  Abdomen: +BS Soft, non-tender non-distended; No rebound or guarding  Vasc: Rad and DP intact and equal b/l   Extremities: WWP, No clubbing, cyanosis or edema  Neurological: Alert and oriented x3   Skin: B/L tennis ball size ecchymosis on chest wall. Warm and dry. No obvious rash     LABS:                        9.1    11.03 )-----------( 155      ( 05 Mar 2024 05:30 )             29.4     03-05    138  |  97  |  30<H>  ----------------------------<  113<H>  5.1   |  28  |  4.85<H>    Ca    9.5      05 Mar 2024 05:30  Phos  3.5     03-05  Mg     2.2     03-05    TPro  7.1  /  Alb  3.6  /  TBili  0.3  /  DBili  x   /  AST  13  /  ALT  5<L>  /  AlkPhos  158<H>  03-04      Urinalysis Basic - ( 05 Mar 2024 05:30 )    Color: x / Appearance: x / SG: x / pH: x  Gluc: 113 mg/dL / Ketone: x  / Bili: x / Urobili: x   Blood: x / Protein: x / Nitrite: x   Leuk Esterase: x / RBC: x / WBC x   Sq Epi: x / Non Sq Epi: x / Bacteria: x        MICROBIOLOGY:    RADIOLOGY & ADDITIONAL STUDIES: INTERVAL HPI/OVERNIGHT EVENTS: KALA     SUBJECTIVE: Pt seen and examined at bedside. Reporting large BM ovn. Tried walking to bathroom off oxygen and felt SOB. Reports new hemoptysis. Attempted to wean patient off O2 however initially was 85% on 3L so uptirated patient to 5L.     Revisited patient 2hrs later and was able to wean back to baseline of 2L satting at 90%-92%    Denies f/c/n/v/d, abd pain, CP,  sxs     MEDICATIONS  (STANDING):  albuterol/ipratropium for Nebulization 3 milliLiter(s) Nebulizer every 6 hours  allopurinol 100 milliGRAM(s) Oral every 12 hours  aMIOdarone    Tablet 200 milliGRAM(s) Oral every 24 hours  aspirin  chewable 81 milliGRAM(s) Oral every 24 hours  atorvastatin 40 milliGRAM(s) Oral at bedtime  cefTRIAXone   IVPB 1000 milliGRAM(s) IV Intermittent every 24 hours  heparin   Injectable 5000 Unit(s) SubCutaneous every 8 hours  metoprolol succinate ER 50 milliGRAM(s) Oral every 24 hours  predniSONE   Tablet 40 milliGRAM(s) Oral every 24 hours  sevelamer carbonate 800 milliGRAM(s) Oral every 8 hours  sildenafil (REVATIO) 20 milliGRAM(s) Oral every 8 hours  tiotropium 2.5 MICROgram(s)/olodaterol 2.5 MICROgram(s) (STIOLTO) Inhaler 2 Puff(s) Inhalation daily    MEDICATIONS  (PRN):  acetaminophen     Tablet .. 650 milliGRAM(s) Oral every 6 hours PRN Temp greater or equal to 38C (100.4F), Mild Pain (1 - 3)  aluminum hydroxide/magnesium hydroxide/simethicone Suspension 30 milliLiter(s) Oral every 4 hours PRN Dyspepsia  melatonin 3 milliGRAM(s) Oral at bedtime PRN Insomnia  ondansetron Injectable 4 milliGRAM(s) IV Push every 8 hours PRN Nausea and/or Vomiting      Vital Signs Last 24 Hrs  T(C): 37.1 (05 Mar 2024 06:02), Max: 37.1 (05 Mar 2024 06:02)  T(F): 98.8 (05 Mar 2024 06:02), Max: 98.8 (05 Mar 2024 06:02)  HR: 56 (05 Mar 2024 06:02) (56 - 86)  BP: 105/63 (05 Mar 2024 06:02) (105/63 - 142/68)  BP(mean): --  RR: 18 (05 Mar 2024 06:02) (17 - 18)  SpO2: 95% (05 Mar 2024 06:02) (95% - 97%)    Parameters below as of 04 Mar 2024 20:38  Patient On (Oxygen Delivery Method): nasal cannula  O2 Flow (L/min): 2      PHYSICAL EXAM:  General: in no acute distress, non toxic appearing, speaking in full sentences  Eyes: Rt sided eye ecchymosis. EOMI intact bilaterally. Anicteric sclerae, moist conjunctivae  HENT: patent oropharynx w/o blood pooling in the posterior pharynx.   Neck: Trachea midline, supple  Lungs: B/L Rhonchi, no wheezes/rales.   Cardiovascular: RRR. No murmurs, rubs, or gallops  Abdomen: +BS Soft, non-tender non-distended; No rebound or guarding  Vasc: Rad and DP intact and equal b/l   Extremities: WWP, No clubbing, cyanosis or edema  Neurological: Alert and oriented x3   Skin: B/L tennis ball size ecchymosis on chest wall. Warm and dry. No obvious rash     LABS:                        9.1    11.03 )-----------( 155      ( 05 Mar 2024 05:30 )             29.4     03-05    138  |  97  |  30<H>  ----------------------------<  113<H>  5.1   |  28  |  4.85<H>    Ca    9.5      05 Mar 2024 05:30  Phos  3.5     03-05  Mg     2.2     03-05    TPro  7.1  /  Alb  3.6  /  TBili  0.3  /  DBili  x   /  AST  13  /  ALT  5<L>  /  AlkPhos  158<H>  03-04      Urinalysis Basic - ( 05 Mar 2024 05:30 )    Color: x / Appearance: x / SG: x / pH: x  Gluc: 113 mg/dL / Ketone: x  / Bili: x / Urobili: x   Blood: x / Protein: x / Nitrite: x   Leuk Esterase: x / RBC: x / WBC x   Sq Epi: x / Non Sq Epi: x / Bacteria: x        MICROBIOLOGY:    RADIOLOGY & ADDITIONAL STUDIES:

## 2024-03-05 NOTE — DIETITIAN INITIAL EVALUATION ADULT - OTHER INFO
68M PMH AFib s/p watchman (on ASA), HTN, COPD, ESRD (MWF), severe ERIK, pulm HTN, T cell lymphoma (s/p chemo), HFpEF, s/p VATS procedure in 2021, recent [facility] admission 12/2023 for LGIB p/w productive cough, congestion, SOB x5 days. Pt requiring more oxygen at home, uses 1 L as needed at baseline but has been using 3L over the past few days. ROS +headache, productive cough with dark green sputum, chills, wheezing. Was never hypoxic, but feeling subjectively better with 3L NC and could "feel the air."     Patient seen at bedside for nutrition assessment. Current diet order: renal. No known food allergies. RD interview conducted with patient and wife at bedside. No difficulty chewing/swallowing reported. Reports poor appetite x1 month. Consumed muffin at breakfast, reports he usually eats a small breakfast. Wife reports patient was having Ensure supplements PTA, says he did not like nepro. Willing to try [facility] Renal Support 1x/day. Provided nutrition education discussing importance of adequate protein, food sources of protein, food first with supplement in between meals. Dosing weight: 150 pounds, BMI 21.5, reports UBW of 165 pounds x1 month ago. 15 pound/9% weight loss x 1 month - clinically significant. No pressure injuries documented. No edema documented. Denies nausea/vomiting/diarrhea/constipation. Labs: BUN high (30), Cr high (4.85), GFR 12. Meds: antibiotic, prednisone, zofran, phos binder. Observed with severe wasting to temples, buccals, and clavicles. Based on ASPEN criteria, patient meets criteria for severe malnutrition. No cultural, ethnic, Caodaism food preferences reported. See nutrition recommendations below.

## 2024-03-05 NOTE — PROGRESS NOTE ADULT - PROBLEM SELECTOR PLAN 11
F: None  E: replete cautiously in ESRD  N: NPO at MN for possible Bronch.   GI: None  DVT: hep subq  Dispo: F

## 2024-03-05 NOTE — PROGRESS NOTE ADULT - PROBLEM SELECTOR PLAN 1
Patient on 2L NC and becomes hypoxic w/ ambulation. On baseline 1L O2 at home.   Likely 2/2 COPD exacerbation iso CAP vs worsening pulmonary HTN.    PLAN:  - See COPD Tx  - See pulmonary HTN Tx.  - Wean O2 as toleration Patient on 2L NC and becomes hypoxic w/ ambulation. On baseline 1L O2 at home.   Likely 2/2 COPD exacerbation iso CAP vs worsening pulmonary HTN.  - Now new hemoptysis (3/5)    PLAN:  - See COPD Tx  - See pulmonary HTN Tx.  - Wean O2 as toleration  - ENT consulted to r/o bleed from upper airway.

## 2024-03-05 NOTE — DIETITIAN INITIAL EVALUATION ADULT - OTHER CALCULATIONS
Based on Standards of Care pt within % IBW (90%) thus actual body weight used for all calculations. Needs adjusted for advanced age, CHF, ESRD on HD, COPD, and malnutrition. Fluid recs per team.

## 2024-03-05 NOTE — DIETITIAN INITIAL EVALUATION ADULT - PROBLEM SELECTOR PLAN 6
Hgb 10, at baseline. Of note, pt recently admitted 12/2023 for LGIB, s/p c-scope, found to have large polyps on inpatient colonoscopy. Pending outpatient repeat colonoscopy w/ polypectomy on Thursday 3/7/24 @[facility].  - maintain active T&S  - outpatient GI followup

## 2024-03-05 NOTE — PROGRESS NOTE ADULT - SUBJECTIVE AND OBJECTIVE BOX
PULMONARY CONSULT SERVICE FOLLOW-UP NOTE    INTERVAL HPI:  Reviewed chart and overnight events; patient seen and examined. he has anterior chest bruising, sob on exertion up to 5L of o2 weaned down to 4L while I was there and an episode of hemoptysis x1 this AM. reportedly having these episodes 1x a day for 1 week. Denies further episodes through the day. SOb stable, cough persistent from prior day. Reports fatigue but overall more energy then when he first came in.    MEDICATIONS:  Pulmonary:  albuterol/ipratropium for Nebulization 3 milliLiter(s) Nebulizer every 6 hours  tiotropium 2.5 MICROgram(s)/olodaterol 2.5 MICROgram(s) (STIOLTO) Inhaler 2 Puff(s) Inhalation daily    Antimicrobials:  cefTRIAXone   IVPB 1000 milliGRAM(s) IV Intermittent every 24 hours    Anticoagulants:  aspirin  chewable 81 milliGRAM(s) Oral every 24 hours  heparin   Injectable 5000 Unit(s) SubCutaneous every 8 hours    Cardiac:  aMIOdarone    Tablet 200 milliGRAM(s) Oral every 24 hours  metoprolol succinate ER 50 milliGRAM(s) Oral every 24 hours  sildenafil (REVATIO) 20 milliGRAM(s) Oral every 8 hours      Allergies    No Known Allergies    Intolerances        Vital Signs Last 24 Hrs  T(C): 37.1 (05 Mar 2024 06:02), Max: 37.1 (05 Mar 2024 06:02)  T(F): 98.8 (05 Mar 2024 06:02), Max: 98.8 (05 Mar 2024 06:02)  HR: 56 (05 Mar 2024 06:02) (56 - 86)  BP: 105/63 (05 Mar 2024 06:02) (105/63 - 142/68)  BP(mean): --  RR: 18 (05 Mar 2024 06:02) (17 - 18)  SpO2: 95% (05 Mar 2024 06:02) (95% - 97%)    Parameters below as of 04 Mar 2024 20:38  Patient On (Oxygen Delivery Method): nasal cannula  O2 Flow (L/min): 2      03-04 @ 07:01  -  03-05 @ 07:00  --------------------------------------------------------  IN: 0 mL / OUT: 1000 mL / NET: -1000 mL          PHYSICAL EXAM:  Constitutional: well-appearing, in no apparent respiratory distress  HEENT: NC/AT; PERRL, anicteric sclera; moist mucous membranes  Neck: supple, no JVD or LAD appreciated  Cardiovascular: +S1/S2, Regular rate and rhythm, no murmurs appreciated   Respiratory: distant breath sounds in the upper lobe, rhonchi in the middle and lower lobes. wet cough. No wheezes  Gastrointestinal: soft, non-tender, non-distended. Normoactive bowel sounds.   Extremities: no edema, clubbing or cyanosis  Vascular: 2+ radial pulses B/L  Neurological: awake and alert; oriented x3    LABS:      CBC Full  -  ( 05 Mar 2024 05:30 )  WBC Count : 11.03 K/uL  RBC Count : 3.00 M/uL  Hemoglobin : 9.1 g/dL  Hematocrit : 29.4 %  Platelet Count - Automated : 155 K/uL  Mean Cell Volume : 98.0 fl  Mean Cell Hemoglobin : 30.3 pg  Mean Cell Hemoglobin Concentration : 31.0 gm/dL  Auto Neutrophil # : 9.64 K/uL  Auto Lymphocyte # : 0.62 K/uL  Auto Monocyte # : 0.72 K/uL  Auto Eosinophil # : 0.00 K/uL  Auto Basophil # : 0.01 K/uL  Auto Neutrophil % : 87.4 %  Auto Lymphocyte % : 5.6 %  Auto Monocyte % : 6.5 %  Auto Eosinophil % : 0.0 %  Auto Basophil % : 0.1 %    03-05    138  |  97  |  30<H>  ----------------------------<  113<H>  5.1   |  28  |  4.85<H>    Ca    9.5      05 Mar 2024 05:30  Phos  3.5     03-05  Mg     2.2     03-05    TPro  7.1  /  Alb  3.6  /  TBili  0.3  /  DBili  x   /  AST  13  /  ALT  5<L>  /  AlkPhos  158<H>  03-04          RADIOLOGY & ADDITIONAL STUDIES:    CT thorax 3/3  LUNGS AND AIRWAYS: Patent central airways. Emphysematous changes. Persistent predominantly subpleural consolidations in both lungs mostly in the right lower lobe and left upper lobe.  PLEURA: No significant change of trace loculated right pleural effusion.  MEDIASTINUM AND HILDA: Enlarged precarinal lymph nodes again noted measuring 1.6 cm in short axis.

## 2024-03-05 NOTE — PROCEDURE NOTE - NSUS ED ADDITIONAL DETAIL1 FT
a line pattern upper lobes, ab pattern in middle and b pattern at the bases. trace right sided effusion with right lower lobe consolidation. Could not assess left costophrenic angle due to rib space. Some pleural thickening on the left middle and lower fields

## 2024-03-05 NOTE — PROGRESS NOTE ADULT - ATTENDING COMMENTS
Patient was seen and examined at bedside on 3/5/2024 at 130 pm with wife at bedside. Patient reports some improvement but still has NIELSON and now has new hemoptysis. Denies CP. ROS is otherwise negative. Vitals, labwork and pertinent imaging reviewed. Exam - NAD, AAO x 4, PERRLA, EOMI, MMM, supple neck, chest - bibasilar crackles, CV - rrr, s1s2, no m/r/g, abd - soft, NTND, + BS, ext - wwp, psych - normal affect, pt appears cachectic and malnourished    Plan:  -C/w abx, supportive care - cough suppressant  -Pulm consulted - plan for bronchoscopy on 3/6 PM  -Echo - worsening pulmonary hypertension  -PT/OT - rec HPT  -Will need to coordinate with renal for early HD tomorrow prior to bronchoscopy

## 2024-03-05 NOTE — PROGRESS NOTE ADULT - PROBLEM SELECTOR PLAN 9
#CAD  Follows with Dr Ventura, last seen 1/2024. Prior EF 45%, now recovered. b/l crackles on exam, pending iHD. no LE edema. Home med: lipitor 40    PLAN:  - c/w home med

## 2024-03-05 NOTE — DIETITIAN INITIAL EVALUATION ADULT - PERTINENT LABORATORY DATA
03-05    138  |  97  |  30<H>  ----------------------------<  113<H>  5.1   |  28  |  4.85<H>    Ca    9.5      05 Mar 2024 05:30  Phos  3.5     03-05  Mg     2.2     03-05    TPro  7.1  /  Alb  3.6  /  TBili  0.3  /  DBili  x   /  AST  13  /  ALT  5<L>  /  AlkPhos  158<H>  03-04  A1C with Estimated Average Glucose Result: 4.9 % (03-03-24 @ 09:01)  A1C with Estimated Average Glucose Result: 4.9 % (12-03-23 @ 06:15)

## 2024-03-05 NOTE — DIETITIAN INITIAL EVALUATION ADULT - NSPROEDALEARNPREF_GEN_A_NUR
importance of adequate protein, food sources of protein, food first with supplement in between meals/verbal instruction

## 2024-03-05 NOTE — PROGRESS NOTE ADULT - PROBLEM SELECTOR PLAN 3
CT Chest outpatient 2/12/2024 w/ new consolidative opacities within the lingula and multifocally throughout the right lower lobe compared to 1/26/2023.   - CTA PE this admission showed persistent b/l pulmonary consolidation.   - Has hx of b/l pleural effusion s/p VATS w/ CT surg in 2021 for L chest wall loculated effusion.   - Ur legionella neg, Ur strep neg.   - Finished Azithromycin 500mg for atypical cov    PLAN:  - f/u BCx NGTD   - f/u Sputum Cx - initial spu Cx contaminated.   - c/w CTX 1g q24

## 2024-03-05 NOTE — PROGRESS NOTE ADULT - PROBLEM SELECTOR PLAN 8
Hgb 10, at baseline. Of note, pt recently admitted 12/2023 for LGIB, s/p c-scope, found to have large polyps on inpatient colonoscopy. Pending outpatient repeat colonoscopy w/ polypectomy on Thursday 3/7/24 @[facility].    PLAN:  - maintain active T&S  - outpatient GI followup

## 2024-03-05 NOTE — DIETITIAN INITIAL EVALUATION ADULT - PERTINENT MEDS FT
MEDICATIONS  (STANDING):  albuterol/ipratropium for Nebulization 3 milliLiter(s) Nebulizer every 6 hours  allopurinol 100 milliGRAM(s) Oral every 12 hours  aMIOdarone    Tablet 200 milliGRAM(s) Oral every 24 hours  atorvastatin 40 milliGRAM(s) Oral at bedtime  cefTRIAXone   IVPB 1000 milliGRAM(s) IV Intermittent every 24 hours  heparin   Injectable 5000 Unit(s) SubCutaneous every 8 hours  metoprolol succinate ER 50 milliGRAM(s) Oral every 24 hours  predniSONE   Tablet 40 milliGRAM(s) Oral every 24 hours  sevelamer carbonate 800 milliGRAM(s) Oral every 8 hours  sildenafil (REVATIO) 20 milliGRAM(s) Oral every 8 hours  tiotropium 2.5 MICROgram(s)/olodaterol 2.5 MICROgram(s) (STIOLTO) Inhaler 2 Puff(s) Inhalation daily    MEDICATIONS  (PRN):  acetaminophen     Tablet .. 650 milliGRAM(s) Oral every 6 hours PRN Temp greater or equal to 38C (100.4F), Mild Pain (1 - 3)  aluminum hydroxide/magnesium hydroxide/simethicone Suspension 30 milliLiter(s) Oral every 4 hours PRN Dyspepsia  melatonin 3 milliGRAM(s) Oral at bedtime PRN Insomnia  ondansetron Injectable 4 milliGRAM(s) IV Push every 8 hours PRN Nausea and/or Vomiting

## 2024-03-05 NOTE — PROGRESS NOTE ADULT - PROBLEM SELECTOR PLAN 7
Home med: Aspirin 81mg qd, amio 200mg qd, metoprolol XL 50mg qd. [id number] 2. s/p Watchman device, not on Eliquis. NSR this admission.    PLAN:  - ASA hold iso hemoptysis  - c/w other home meds.

## 2024-03-05 NOTE — PROGRESS NOTE ADULT - ASSESSMENT
Mr. Gamez is a 69 yo M with aFib s/p watchman (on ASA), HTN, COPD, ESRD (MWF), severe ERIK noncompliant to CPAP, pulm HTN (group 2 and3) on sildenafil T cell lymphoma (s/p chemo), HFpEF who presented with productive cough brown and SOB x5 days requiring more oxygen from baseline of 1 L via NC concerning for community acquired pneumonia and COPD exacerbation     Work up:   RVP negative  urine strep and legionella negative  CXR showed b/l infiltrates at the bases R >L  CTPE done and negative for PE but showed RLL infiltrate, Left pleural based scar, right pleural thickening  echo elevated PASP 58 up from 34    Pulmonary is consulted for COPD, ERIK, and PH management/optimization. Follows with Dr. Johnson. Course now complicated by hemoptysis 2 episodes small volume on 3/5.    # Hemoptysis  - unclear etiology, could be related to pneumonia irritation and cough  - recommend hycodan for cough suppression  - will do bronchoscopy tomorrow for evaluation of airways and cause of bleeding along w/ BAL of RLL for culture data  - NPO at MN, coags, hold lovenox. scheduled for 3pm (after HD if possible)    # Pleural based scars  - follows w/. Dr Johnson. PET scan outpatient planned.  - Christine pleural scar, thickened right pleural with reported asbestos exposure  - c/f mesothelioma. Will consider inpatient PET scan    # Community acquired pneumonia  - urine legionella and strep negative  - productive sputum, brown  - b/l consolidations R >L  - agree w/ ceftriaxone 5-7 day course   - sputum culture if possible, can guide abx choice    # COPD, in acute exacerbation  - on stiolto at home, intermittently complaint w/ inhalers  - agree w/. duonebs and prednisone 5 day course for exacerbation    # ERIK  - AHI 36 in nov 2022  - noncompliant to CPAP  - encouraged patient to use it      # PH  - combination of Group 2 and 3  - likely large factor of noncompliant to CPAP in the setting of severe ERIK  - encourage optimization w/ CPAP  - c/w home med sildanefil   - repeat echo here w/ elevated PASP 58 up from 34 on previous.    S/D/E with Dr. burleson     Mr. Gamez is a 69 yo M with aFib s/p watchman (on ASA), HTN, COPD, ESRD (MWF), severe ERIK noncompliant to CPAP, pulm HTN (group 2 and3) on sildenafil T cell lymphoma (s/p chemo), HFpEF who presented with productive cough brown and SOB x5 days requiring more oxygen from baseline of 1 L via NC concerning for community acquired pneumonia and COPD exacerbation     Work up:   RVP negative  urine strep and legionella negative  CXR showed b/l infiltrates at the bases R >L  CTPE done and negative for PE but showed RLL infiltrate, Left pleural based scar, right pleural thickening  echo elevated PASP 58 up from 34    Pulmonary is consulted for COPD, ERIK, and PH management/optimization. Follows with Dr. Johnson. Course now complicated by hemoptysis 2 episodes small volume on 3/5.    # Hemoptysis  - unclear etiology, could be related to pneumonia irritation and cough  - recommend hycodan for cough suppression  - will do bronchoscopy tomorrow for evaluation of airways and cause of bleeding along w/ BAL of RLL for culture data  - NPO at MN, coags, hold lovenox. scheduled for 3pm (after HD if possible)    # Pleural based scars  - follows w/. Dr Johnson. PET scan outpatient planned.  - Christine pleural scar, thickened right pleural with reported asbestos exposure  - c/f mesothelioma. Will consider inpatient PET scan  - of note, patient had left sided VATS in 2021 for pleural thickening and recurrent left effusions (no malignancy on path or cyto on left)    # Community acquired pneumonia  - urine legionella and strep negative  - productive sputum, brown  - b/l consolidations R >L  - agree w/ ceftriaxone 5-7 day course   - sputum culture if possible, can guide abx choice    # COPD, in acute exacerbation  - on stiolto at home, intermittently complaint w/ inhalers  - agree w/. duonebs and prednisone 5 day course for exacerbation    # ERIK  - AHI 36 in nov 2022  - noncompliant to CPAP  - encouraged patient to use it      # PH  - combination of Group 2 and 3  - likely large factor of noncompliant to CPAP in the setting of severe ERIK  - encourage optimization w/ CPAP  - c/w home med sildanefil   - repeat echo here w/ elevated PASP 58 up from 34 on previous.    S/D/E with Dr. burleson

## 2024-03-06 LAB
ALBUMIN SERPL ELPH-MCNC: 3.3 G/DL — SIGNIFICANT CHANGE UP (ref 3.3–5)
ALP SERPL-CCNC: 136 U/L — HIGH (ref 40–120)
ALT FLD-CCNC: 6 U/L — LOW (ref 10–45)
ANION GAP SERPL CALC-SCNC: 14 MMOL/L — SIGNIFICANT CHANGE UP (ref 5–17)
APTT BLD: 35.8 SEC — HIGH (ref 24.5–35.6)
AST SERPL-CCNC: 10 U/L — SIGNIFICANT CHANGE UP (ref 10–40)
BASOPHILS # BLD AUTO: 0.01 K/UL — SIGNIFICANT CHANGE UP (ref 0–0.2)
BASOPHILS NFR BLD AUTO: 0.1 % — SIGNIFICANT CHANGE UP (ref 0–2)
BILIRUB SERPL-MCNC: 0.3 MG/DL — SIGNIFICANT CHANGE UP (ref 0.2–1.2)
BLD GP AB SCN SERPL QL: NEGATIVE — SIGNIFICANT CHANGE UP
BUN SERPL-MCNC: 51 MG/DL — HIGH (ref 7–23)
CALCIUM SERPL-MCNC: 9.5 MG/DL — SIGNIFICANT CHANGE UP (ref 8.4–10.5)
CHLORIDE SERPL-SCNC: 98 MMOL/L — SIGNIFICANT CHANGE UP (ref 96–108)
CO2 SERPL-SCNC: 28 MMOL/L — SIGNIFICANT CHANGE UP (ref 22–31)
CREAT SERPL-MCNC: 6.5 MG/DL — HIGH (ref 0.5–1.3)
EGFR: 9 ML/MIN/1.73M2 — LOW
EOSINOPHIL # BLD AUTO: 0 K/UL — SIGNIFICANT CHANGE UP (ref 0–0.5)
EOSINOPHIL NFR BLD AUTO: 0 % — SIGNIFICANT CHANGE UP (ref 0–6)
GLUCOSE SERPL-MCNC: 106 MG/DL — HIGH (ref 70–99)
HCT VFR BLD CALC: 28.5 % — LOW (ref 39–50)
HGB BLD-MCNC: 9 G/DL — LOW (ref 13–17)
IMM GRANULOCYTES NFR BLD AUTO: 0.4 % — SIGNIFICANT CHANGE UP (ref 0–0.9)
INR BLD: 1.15 — SIGNIFICANT CHANGE UP (ref 0.85–1.18)
LYMPHOCYTES # BLD AUTO: 0.73 K/UL — LOW (ref 1–3.3)
LYMPHOCYTES # BLD AUTO: 6.6 % — LOW (ref 13–44)
MAGNESIUM SERPL-MCNC: 2.5 MG/DL — SIGNIFICANT CHANGE UP (ref 1.6–2.6)
MCHC RBC-ENTMCNC: 30.4 PG — SIGNIFICANT CHANGE UP (ref 27–34)
MCHC RBC-ENTMCNC: 31.6 GM/DL — LOW (ref 32–36)
MCV RBC AUTO: 96.3 FL — SIGNIFICANT CHANGE UP (ref 80–100)
MONOCYTES # BLD AUTO: 0.77 K/UL — SIGNIFICANT CHANGE UP (ref 0–0.9)
MONOCYTES NFR BLD AUTO: 7 % — SIGNIFICANT CHANGE UP (ref 2–14)
NEUTROPHILS # BLD AUTO: 9.49 K/UL — HIGH (ref 1.8–7.4)
NEUTROPHILS NFR BLD AUTO: 85.9 % — HIGH (ref 43–77)
NRBC # BLD: 0 /100 WBCS — SIGNIFICANT CHANGE UP (ref 0–0)
PHOSPHATE SERPL-MCNC: 3.6 MG/DL — SIGNIFICANT CHANGE UP (ref 2.5–4.5)
PLATELET # BLD AUTO: 139 K/UL — LOW (ref 150–400)
POTASSIUM SERPL-MCNC: 4.7 MMOL/L — SIGNIFICANT CHANGE UP (ref 3.5–5.3)
POTASSIUM SERPL-SCNC: 4.7 MMOL/L — SIGNIFICANT CHANGE UP (ref 3.5–5.3)
PROCALCITONIN SERPL-MCNC: 2.34 NG/ML — HIGH (ref 0.02–0.1)
PROT SERPL-MCNC: 6.5 G/DL — SIGNIFICANT CHANGE UP (ref 6–8.3)
PROTHROM AB SERPL-ACNC: 13.1 SEC — HIGH (ref 9.5–13)
RBC # BLD: 2.96 M/UL — LOW (ref 4.2–5.8)
RBC # FLD: 16.2 % — HIGH (ref 10.3–14.5)
RH IG SCN BLD-IMP: POSITIVE — SIGNIFICANT CHANGE UP
SODIUM SERPL-SCNC: 140 MMOL/L — SIGNIFICANT CHANGE UP (ref 135–145)
WBC # BLD: 11.04 K/UL — HIGH (ref 3.8–10.5)
WBC # FLD AUTO: 11.04 K/UL — HIGH (ref 3.8–10.5)

## 2024-03-06 PROCEDURE — 99233 SBSQ HOSP IP/OBS HIGH 50: CPT | Mod: GC

## 2024-03-06 PROCEDURE — 74018 RADEX ABDOMEN 1 VIEW: CPT | Mod: 26

## 2024-03-06 PROCEDURE — 90937 HEMODIALYSIS REPEATED EVAL: CPT

## 2024-03-06 RX ORDER — PIPERACILLIN AND TAZOBACTAM 4; .5 G/20ML; G/20ML
4.5 INJECTION, POWDER, LYOPHILIZED, FOR SOLUTION INTRAVENOUS EVERY 12 HOURS
Refills: 0 | Status: DISCONTINUED | OUTPATIENT
Start: 2024-03-07 | End: 2024-03-10

## 2024-03-06 RX ORDER — SENNA PLUS 8.6 MG/1
2 TABLET ORAL AT BEDTIME
Refills: 0 | Status: DISCONTINUED | OUTPATIENT
Start: 2024-03-06 | End: 2024-03-12

## 2024-03-06 RX ORDER — PIPERACILLIN AND TAZOBACTAM 4; .5 G/20ML; G/20ML
4.5 INJECTION, POWDER, LYOPHILIZED, FOR SOLUTION INTRAVENOUS ONCE
Refills: 0 | Status: COMPLETED | OUTPATIENT
Start: 2024-03-06 | End: 2024-03-06

## 2024-03-06 RX ORDER — ERYTHROPOIETIN 10000 [IU]/ML
6000 INJECTION, SOLUTION INTRAVENOUS; SUBCUTANEOUS ONCE
Refills: 0 | Status: COMPLETED | OUTPATIENT
Start: 2024-03-06 | End: 2024-03-06

## 2024-03-06 RX ORDER — PIPERACILLIN AND TAZOBACTAM 4; .5 G/20ML; G/20ML
4.5 INJECTION, POWDER, LYOPHILIZED, FOR SOLUTION INTRAVENOUS ONCE
Refills: 0 | Status: COMPLETED | OUTPATIENT
Start: 2024-03-07 | End: 2024-03-07

## 2024-03-06 RX ORDER — POLYETHYLENE GLYCOL 3350 17 G/17G
17 POWDER, FOR SOLUTION ORAL
Refills: 0 | Status: DISCONTINUED | OUTPATIENT
Start: 2024-03-06 | End: 2024-03-12

## 2024-03-06 RX ADMIN — AZITHROMYCIN 250 MILLIGRAM(S): 500 TABLET, FILM COATED ORAL at 09:07

## 2024-03-06 RX ADMIN — Medication 3 MILLILITER(S): at 09:07

## 2024-03-06 RX ADMIN — POLYETHYLENE GLYCOL 3350 17 GRAM(S): 17 POWDER, FOR SOLUTION ORAL at 19:14

## 2024-03-06 RX ADMIN — ATORVASTATIN CALCIUM 40 MILLIGRAM(S): 80 TABLET, FILM COATED ORAL at 22:38

## 2024-03-06 RX ADMIN — Medication 50 MILLIGRAM(S): at 06:51

## 2024-03-06 RX ADMIN — ERYTHROPOIETIN 6000 UNIT(S): 10000 INJECTION, SOLUTION INTRAVENOUS; SUBCUTANEOUS at 12:12

## 2024-03-06 RX ADMIN — Medication 20 MILLIGRAM(S): at 23:32

## 2024-03-06 RX ADMIN — Medication 20 MILLIGRAM(S): at 16:57

## 2024-03-06 RX ADMIN — Medication 3 MILLILITER(S): at 16:30

## 2024-03-06 RX ADMIN — CEFTRIAXONE 100 MILLIGRAM(S): 500 INJECTION, POWDER, FOR SOLUTION INTRAMUSCULAR; INTRAVENOUS at 06:51

## 2024-03-06 RX ADMIN — Medication 3 MILLIGRAM(S): at 22:39

## 2024-03-06 RX ADMIN — Medication 40 MILLIGRAM(S): at 06:51

## 2024-03-06 RX ADMIN — Medication 100 MILLIGRAM(S): at 06:52

## 2024-03-06 RX ADMIN — SEVELAMER CARBONATE 800 MILLIGRAM(S): 2400 POWDER, FOR SUSPENSION ORAL at 16:30

## 2024-03-06 RX ADMIN — Medication 3 MILLILITER(S): at 22:38

## 2024-03-06 RX ADMIN — PIPERACILLIN AND TAZOBACTAM 200 GRAM(S): 4; .5 INJECTION, POWDER, LYOPHILIZED, FOR SOLUTION INTRAVENOUS at 22:38

## 2024-03-06 RX ADMIN — AMIODARONE HYDROCHLORIDE 200 MILLIGRAM(S): 400 TABLET ORAL at 06:51

## 2024-03-06 RX ADMIN — SEVELAMER CARBONATE 800 MILLIGRAM(S): 2400 POWDER, FOR SUSPENSION ORAL at 23:32

## 2024-03-06 RX ADMIN — Medication 20 MILLIGRAM(S): at 06:52

## 2024-03-06 RX ADMIN — TIOTROPIUM BROMIDE AND OLODATEROL 2 PUFF(S): 3.124; 2.736 SPRAY, METERED RESPIRATORY (INHALATION) at 09:09

## 2024-03-06 RX ADMIN — SEVELAMER CARBONATE 800 MILLIGRAM(S): 2400 POWDER, FOR SUSPENSION ORAL at 06:52

## 2024-03-06 NOTE — PROGRESS NOTE ADULT - PROBLEM SELECTOR PLAN 4
Home med: sildenafil citrate 20mg TID. Follows w/ Dr Johnson.  TTE 3/4 showing pulmonary artery systolic pressure is 58 mmHg, worse than previous     PLAN:  - c/w home med

## 2024-03-06 NOTE — PROGRESS NOTE ADULT - ASSESSMENT
68M PMH AFib s/p watchman (on ASA), HTN, COPD, ESRD (MWF), severe ERIK, pulm HTN, T cell lymphoma (s/p chemo), HFpEF, s/p VATS procedure in 2021, recent [facility] admission 12/2023 for LGIB p/w productive cough, congestion, SOB for weeks now worsening in the last 5days. Admitted to [facility] for AHRF 2/2 CAP vs worsening pulmonary HTN, pending bronchoscopy by pulmonology for further evaluation.

## 2024-03-06 NOTE — CHART NOTE - NSCHARTNOTEFT_GEN_A_CORE
CPAP ordered, respiratory therapy contacted to set it up for the patient overnight. As respiratory therapist is setting up the equipment, patient adamantly refusing despite being counseled on importance of its use.

## 2024-03-06 NOTE — PROGRESS NOTE ADULT - ASSESSMENT
69 yo M with hx of ESRD on HD MWF via LUE AVF, AFib s/p watchman (on ASA), HTN, COPD, severe ERIK, pulm HTN, T cell lymphoma (s/p chemo), HFpEF, s/p VATS procedure in 2021, recent [facility] admission 12/2023 for LGIB, admitted for CAP and  exacerbation, missed HD on 3/1, consulted for in patient HD management     ESRD:  Outpatient HD at [address] [place] [address], follows Dr. Guadalupe   Rx: 3hrs   EDW: 68kg    Plan:  Cont HD today for clearance and volume management   Encourage PO intake   Fluid restriction <1.2L/day   Abx dosed appropriately   Renal diet    Access:  LUE AVF functional     Anemia:  Hgb 9.0  EPO with HD   tsat 10%, ferritin 800. May benefit from IV iron but hold in setting of active infection.    HTN:  BP at goal   UF with HD as tolerated  Hold Amlodipine for now given low BP     MBD:  Calcium: 9.5  Phos: 3.6  C/w phos binders   Please adjust allopurinol dose to 50mg daily or 100mg every other day while inpatient 67 yo M with hx of ESRD on HD MWF via LUE AVF, AFib s/p watchman (on ASA), HTN, COPD, severe ERIK, pulm HTN, T cell lymphoma (s/p chemo), HFpEF, s/p VATS procedure in 2021, recent [facility] admission 12/2023 for LGIB, admitted for CAP and  exacerbation, missed HD on 3/1. Nephrology consulted for in patient HD management     ESRD:  Outpatient HD at [address] [place] [address], follows Dr. Guadalupe   Rx: 3hrs   EDW: 68kg    Plan:  Cont HD today for clearance and volume management   HD rx as follows: 3.5hrs, F180, 400/500, 2 K bath, UF as tolerated by BP, via L AVF  c/w Fluid restriction <1.2L/day and renal diet as tolerated  Adjust and dose all meds to ESRD   Please adjust allopurinol dose to 50mg daily or 100mg every other day while inpatient    Access:  LUE AVF functional     Anemia: h/h stable.   C/w MARCE during HD   tsat 10%, ferritin 800. May benefit from IV iron but hold in setting of active infection.    HTN:  BP at goal   UF with HD as tolerated  Hold Amlodipine for now given low BP     MBD:  Calcium: 9.5  Phos: 3.6  C/w phos binders     Hep B serologies non reactive     Discussed with primary team

## 2024-03-06 NOTE — PROGRESS NOTE ADULT - ATTENDING COMMENTS
Seen and evaluated during dialysis. No adverse events reported during dialysis session  Dialysis indicated for metabolic clearance and volume management  Further recs as above  Discussed with primary team

## 2024-03-06 NOTE — PROGRESS NOTE ADULT - ASSESSMENT
Mr. Gamez is a 67 yo M with aFib s/p watchman (on ASA), HTN, COPD, ESRD (MWF), severe ERIK noncompliant to CPAP, pulm HTN (group 2 and3) on sildenafil T cell lymphoma (s/p chemo), HFpEF who presented with productive cough brown and SOB x5 days requiring more oxygen from baseline of 1 L via NC concerning for community acquired pneumonia and COPD exacerbation     Work up:   RVP negative  urine strep and legionella negative  CXR showed b/l infiltrates at the bases R >L  CTPE done and negative for PE but showed RLL infiltrate, Left pleural based scar, right pleural thickening  echo elevated PASP 58 up from 34    Pulmonary is consulted for COPD, ERIK, and PH management/optimization. Follows with Dr. Johnson. Course now complicated by hemoptysis 2 episodes small volume on 3/5.    # Hemoptysis  - unclear etiology, could be related to pneumonia irritation and cough  - recommend hycodan for cough suppression  - will do bronchoscopy tomorrow 3/6/2024 for evaluation of airways and cause of bleeding along w/ BAL of RLL for culture data  - NPO at MN, hold lovenox. scheduled for 7:30 AM    # Pleural based scars  - follows w/. Dr Johnson. PET scan outpatient planned.  - MELISSA pleural scar, thickened right pleural with reported asbestos exposure  - c/f mesothelioma. Will consider inpatient PET scan  - of note, patient had left sided VATS in 2021 for pleural thickening and recurrent left effusions (no malignancy on path or cyto on left)    # Community acquired pneumonia  - urine legionella and strep negative  - productive sputum, brown  - b/l consolidations R >L  - agree w/ ceftriaxone 5-7 day course   - sputum culture if possible, can guide abx choice    # COPD, in acute exacerbation  - on stiolto at home, intermittently complaint w/ inhalers  - agree w/. duonebs s/p prednisone 5 day course for exacerbation    # ERIK  - AHI 36 in nov 2022  - noncompliant to CPAP  - encouraged patient to use it      # PH  - combination of Group 2 (diastolic HF and afib) and 3 (COPD, ERIK) as well group 5 (HD)  - likely large factor of noncompliant to CPAP in the setting of severe ERIK  - encourage optimization w/ CPAP  - c/w home med sildanefil   - repeat echo here w/ elevated PASP 58 up from 34 on previous     S/D/E with Dr. Patel

## 2024-03-06 NOTE — PROGRESS NOTE ADULT - PROBLEM SELECTOR PLAN 8
Hgb 10, at baseline. Of note, pt recently admitted 12/2023 for LGIB, s/p c-scope, found to have large polyps on inpatient colonoscopy. Pending outpatient repeat colonoscopy w/ polypectomy on Thursday 3/7/24 @[facility].    PLAN:  - maintain active T&S  - outpatient GI followup

## 2024-03-06 NOTE — PROGRESS NOTE ADULT - SUBJECTIVE AND OBJECTIVE BOX
Internal Medicine Progress Note  Raiza Bailon, PGY-1  Pager: [phone]    ******INCOMPLETE******    Patient is a 68y old  Male who presents with a chief complaint of COUGH     (05 Mar 2024 13:53)    OVERNIGHT EVENTS/INTERVAL HPI:    REVIEW OF SYSTEMS:  All other review of systems is negative unless indicated above.    OBJECTIVE:  T(C): 36.4 (24 @ 05:22), Max: 36.7 (24 @ 20:53)  HR: 61 (24 @ 06:11) (56 - 67)  BP: 110/65 (24 @ 05:22) (105/51 - 115/55)  RR: 17 (24 @ 06:11) (16 - 18)  SpO2: 96% (24 @ 06:11) (94% - 98%)  Daily     Daily Weight in k.8 (05 Mar 2024 17:54)    Physical Exam:  General: in no acute distress  Eyes: EOMI intact bilaterally. Anicteric sclerae, moist conjunctivae  HENT: Moist mucous membranes  Neck: Trachea midline, supple  Lungs: CTA B/L. No wheezes, rales, or rhonchi  Cardiovascular: RRR. No murmurs, rubs, or gallops  Abdomen: Soft, non-tender non-distended; No rebound or guarding  Extremities: WWP, No clubbing, cyanosis or edema  MSK: No midline bony tenderness. No CVA tenderness bilaterally  Neurological: Alert and oriented x3  Skin: Warm and dry. No obvious rash     Medications:  MEDICATIONS  (STANDING):  albuterol/ipratropium for Nebulization 3 milliLiter(s) Nebulizer every 6 hours  allopurinol 100 milliGRAM(s) Oral every 12 hours  aMIOdarone    Tablet 200 milliGRAM(s) Oral every 24 hours  atorvastatin 40 milliGRAM(s) Oral at bedtime  azithromycin   Tablet 250 milliGRAM(s) Oral <User Schedule>  cefTRIAXone   IVPB 1000 milliGRAM(s) IV Intermittent every 24 hours  hydrocodone/homatropine Syrup 5 milliLiter(s) Oral every 6 hours  metoprolol succinate ER 50 milliGRAM(s) Oral every 24 hours  sevelamer carbonate 800 milliGRAM(s) Oral every 8 hours  sildenafil (REVATIO) 20 milliGRAM(s) Oral every 8 hours  tiotropium 2.5 MICROgram(s)/olodaterol 2.5 MICROgram(s) (STIOLTO) Inhaler 2 Puff(s) Inhalation daily    MEDICATIONS  (PRN):  acetaminophen     Tablet .. 650 milliGRAM(s) Oral every 6 hours PRN Temp greater or equal to 38C (100.4F), Mild Pain (1 - 3)  aluminum hydroxide/magnesium hydroxide/simethicone Suspension 30 milliLiter(s) Oral every 4 hours PRN Dyspepsia  melatonin 3 milliGRAM(s) Oral at bedtime PRN Insomnia  ondansetron Injectable 4 milliGRAM(s) IV Push every 8 hours PRN Nausea and/or Vomiting      Labs:                        9.6    12.25 )-----------( 154      ( 05 Mar 2024 12:00 )             31.2     03-05    138  |  97  |  30<H>  ----------------------------<  113<H>  5.1   |  28  |  4.85<H>    Ca    9.5      05 Mar 2024 05:30  Phos  3.5     03-05  Mg     2.2     03-05        Urinalysis Basic - ( 05 Mar 2024 05:30 )    Color: x / Appearance: x / SG: x / pH: x  Gluc: 113 mg/dL / Ketone: x  / Bili: x / Urobili: x   Blood: x / Protein: x / Nitrite: x   Leuk Esterase: x / RBC: x / WBC x   Sq Epi: x / Non Sq Epi: x / Bacteria: x          Radiology: Reviewed Patient is a 68y old  Male who presents with a chief complaint of COUGH (05 Mar 2024 13:53)    OVERNIGHT EVENTS/INTERVAL HPI: No acute events overnight. Patient seen and examined at bedside. Denies hemoptysis or sputum production. Reports ongoing cough. On 3L NC, without symptoms of SOB and wheezing.     REVIEW OF SYSTEMS:  All other review of systems is negative unless indicated above.    OBJECTIVE:  T(C): 36.4 (24 @ 05:22), Max: 36.7 (24 @ 20:53)  HR: 61 (24 @ 06:11) (56 - 67)  BP: 110/65 (24 @ 05:22) (105/51 - 115/55)  RR: 17 (24 @ 06:11) (16 - 18)  SpO2: 96% (24 @ 06:11) (94% - 98%)  Daily     Daily Weight in k.8 (05 Mar 2024 17:54)    Physical Exam:  General: in no acute distress, non toxic appearing, speaking in full sentences  Eyes: Rt sided eye ecchymosis. EOMI intact bilaterally. Anicteric sclerae, moist conjunctivae  Lungs: Diminished lung sounds b/l without wheezes  Cardiovascular: RRR. No murmurs, rubs, or gallops  Abdomen: +BS Soft, non-tender non-distended; No rebound or guarding  Vasc: Rad and DP intact and equal b/l   Extremities: WWP, No clubbing, cyanosis or edema  Neurological: Alert and oriented x3   Skin: B/L tennis ball size ecchymosis on chest wall. Warm and dry. No obvious rash     Medications:  MEDICATIONS  (STANDING):  albuterol/ipratropium for Nebulization 3 milliLiter(s) Nebulizer every 6 hours  allopurinol 100 milliGRAM(s) Oral every 12 hours  aMIOdarone    Tablet 200 milliGRAM(s) Oral every 24 hours  atorvastatin 40 milliGRAM(s) Oral at bedtime  azithromycin   Tablet 250 milliGRAM(s) Oral <User Schedule>  cefTRIAXone   IVPB 1000 milliGRAM(s) IV Intermittent every 24 hours  hydrocodone/homatropine Syrup 5 milliLiter(s) Oral every 6 hours  metoprolol succinate ER 50 milliGRAM(s) Oral every 24 hours  sevelamer carbonate 800 milliGRAM(s) Oral every 8 hours  sildenafil (REVATIO) 20 milliGRAM(s) Oral every 8 hours  tiotropium 2.5 MICROgram(s)/olodaterol 2.5 MICROgram(s) (STIOLTO) Inhaler 2 Puff(s) Inhalation daily    MEDICATIONS  (PRN):  acetaminophen     Tablet .. 650 milliGRAM(s) Oral every 6 hours PRN Temp greater or equal to 38C (100.4F), Mild Pain (1 - 3)  aluminum hydroxide/magnesium hydroxide/simethicone Suspension 30 milliLiter(s) Oral every 4 hours PRN Dyspepsia  melatonin 3 milliGRAM(s) Oral at bedtime PRN Insomnia  ondansetron Injectable 4 milliGRAM(s) IV Push every 8 hours PRN Nausea and/or Vomiting      Labs:                        9.6    12.25 )-----------( 154      ( 05 Mar 2024 12:00 )             31.2     03-05    138  |  97  |  30<H>  ----------------------------<  113<H>  5.1   |  28  |  4.85<H>    Ca    9.5      05 Mar 2024 05:30  Phos  3.5     03-05  Mg     2.2     03-05        Urinalysis Basic - ( 05 Mar 2024 05:30 )    Color: x / Appearance: x / SG: x / pH: x  Gluc: 113 mg/dL / Ketone: x  / Bili: x / Urobili: x   Blood: x / Protein: x / Nitrite: x   Leuk Esterase: x / RBC: x / WBC x   Sq Epi: x / Non Sq Epi: x / Bacteria: x          Radiology: Reviewed

## 2024-03-06 NOTE — PROGRESS NOTE ADULT - SUBJECTIVE AND OBJECTIVE BOX
PULMONARY CONSULT SERVICE FOLLOW-UP NOTE    INTERVAL HPI:  Reviewed chart and overnight events; patient seen and examined. No acute complaints, no more episodes of hemoptysis.     MEDICATIONS:  Pulmonary:  albuterol/ipratropium for Nebulization 3 milliLiter(s) Nebulizer every 6 hours  hydrocodone/homatropine Syrup 5 milliLiter(s) Oral every 6 hours  tiotropium 2.5 MICROgram(s)/olodaterol 2.5 MICROgram(s) (STIOLTO) Inhaler 2 Puff(s) Inhalation daily    Antimicrobials:  azithromycin   Tablet 250 milliGRAM(s) Oral <User Schedule>  cefTRIAXone   IVPB 1000 milliGRAM(s) IV Intermittent every 24 hours    Anticoagulants:    Cardiac:  aMIOdarone    Tablet 200 milliGRAM(s) Oral every 24 hours  metoprolol succinate ER 50 milliGRAM(s) Oral every 24 hours  sildenafil (REVATIO) 20 milliGRAM(s) Oral every 8 hours      Allergies    No Known Allergies    Intolerances        Vital Signs Last 24 Hrs  T(C): 36.7 (06 Mar 2024 14:25), Max: 36.7 (05 Mar 2024 20:53)  T(F): 98.1 (06 Mar 2024 14:25), Max: 98.1 (05 Mar 2024 20:53)  HR: 91 (06 Mar 2024 14:25) (60 - 91)  BP: 149/58 (06 Mar 2024 14:25) (105/51 - 169/70)  BP(mean): --  RR: 18 (06 Mar 2024 14:25) (16 - 20)  SpO2: 97% (06 Mar 2024 14:25) (94% - 98%)    Parameters below as of 06 Mar 2024 14:25  Patient On (Oxygen Delivery Method): nasal cannula  O2 Flow (L/min): 2      03-06 @ 07:01  -  03-06 @ 15:11  --------------------------------------------------------  IN: 0 mL / OUT: 1000 mL / NET: -1000 mL          PHYSICAL EXAM:  Constitutional: well-appearing, in no apparent respiratory distress  HEENT: NC/AT; PERRL, anicteric sclera; moist mucous membranes  Neck: supple, no JVD or LAD appreciated  Cardiovascular: +S1/S2, Regular rate and rhythm, no murmurs appreciated   Respiratory: rhonchi in the Right posterior and middle lobe.. No wheezes, or rales   Gastrointestinal: soft, non-tender, non-distended. Normoactive bowel sounds.   Extremities: no edema, clubbing or cyanosis  Vascular: 2+ radial pulses B/L  Neurological: awake and alert; oriented x3    LABS:      CBC Full  -  ( 06 Mar 2024 07:30 )  WBC Count : 11.04 K/uL  RBC Count : 2.96 M/uL  Hemoglobin : 9.0 g/dL  Hematocrit : 28.5 %  Platelet Count - Automated : 139 K/uL  Mean Cell Volume : 96.3 fl  Mean Cell Hemoglobin : 30.4 pg  Mean Cell Hemoglobin Concentration : 31.6 gm/dL  Auto Neutrophil # : 9.49 K/uL  Auto Lymphocyte # : 0.73 K/uL  Auto Monocyte # : 0.77 K/uL  Auto Eosinophil # : 0.00 K/uL  Auto Basophil # : 0.01 K/uL  Auto Neutrophil % : 85.9 %  Auto Lymphocyte % : 6.6 %  Auto Monocyte % : 7.0 %  Auto Eosinophil % : 0.0 %  Auto Basophil % : 0.1 %    03-06    140  |  98  |  51<H>  ----------------------------<  106<H>  4.7   |  28  |  6.50<H>    Ca    9.5      06 Mar 2024 07:30  Phos  3.6     03-06  Mg     2.5     03-06    TPro  6.5  /  Alb  3.3  /  TBili  0.3  /  DBili  x   /  AST  10  /  ALT  6<L>  /  AlkPhos  136<H>  03-06    PT/INR - ( 06 Mar 2024 07:30 )   PT: 13.1 sec;   INR: 1.15          PTT - ( 06 Mar 2024 07:30 )  PTT:35.8 sec        RADIOLOGY & ADDITIONAL STUDIES:    CT =- right sided pleural thickening, bang pleural scar, RLL infiltrate

## 2024-03-06 NOTE — PROGRESS NOTE ADULT - SUBJECTIVE AND OBJECTIVE BOX
Nephrology progress note    Seen at HD. Tolerating treatment, HDS. No complaints offered. Cont HD as ordered.    Allergies:  No Known Allergies    Hospital Medications:   MEDICATIONS  (STANDING):  albuterol/ipratropium for Nebulization 3 milliLiter(s) Nebulizer every 6 hours  allopurinol 100 milliGRAM(s) Oral every 12 hours  aMIOdarone    Tablet 200 milliGRAM(s) Oral every 24 hours  atorvastatin 40 milliGRAM(s) Oral at bedtime  azithromycin   Tablet 250 milliGRAM(s) Oral <User Schedule>  cefTRIAXone   IVPB 1000 milliGRAM(s) IV Intermittent every 24 hours  epoetin donny-epbx (RETACRIT) Injectable 6000 Unit(s) IV Push once  hydrocodone/homatropine Syrup 5 milliLiter(s) Oral every 6 hours  metoprolol succinate ER 50 milliGRAM(s) Oral every 24 hours  sevelamer carbonate 800 milliGRAM(s) Oral every 8 hours  sildenafil (REVATIO) 20 milliGRAM(s) Oral every 8 hours  tiotropium 2.5 MICROgram(s)/olodaterol 2.5 MICROgram(s) (STIOLTO) Inhaler 2 Puff(s) Inhalation daily    REVIEW OF SYSTEMS:  All other review of systems is negative unless indicated above.    VITALS:  T(F): 97.9 (03-06-24 @ 08:35), Max: 98.1 (03-05-24 @ 20:53)  HR: 62 (03-06-24 @ 09:28)  BP: 155/61 (03-06-24 @ 08:35)  RR: 18 (03-06-24 @ 08:35)  SpO2: 95% (03-06-24 @ 09:28)  Wt(kg): --    03-04 @ 07:01  -  03-05 @ 07:00  --------------------------------------------------------  IN: 0 mL / OUT: 1000 mL / NET: -1000 mL        PHYSICAL EXAM:  Constitutional: NAD, lying in bed on HD  HEENT: Normocephalic, ecchymosis over R orbit, EOMI, NC in place.  Respiratory: No respiratory distress  Cardiovascular: Regular rate  Gastrointestinal: soft, NT/ND  Extremities: No peripheral edema  Neurological: Awake, alert, moving all extremities  Skin: No rash on exposed skin  Vascular Access: BAMMILA GARCIAF, accessed for HD    LABS:  03-06    140  |  98  |  51<H>  ----------------------------<  106<H>  4.7   |  28  |  6.50<H>    Ca    9.5      06 Mar 2024 07:30  Phos  3.6     03-06  Mg     2.5     03-06    TPro  6.5  /  Alb  3.3  /  TBili  0.3  /  DBili      /  AST  10  /  ALT  6<L>  /  AlkPhos  136<H>  03-06                          9.0    11.04 )-----------( 139      ( 06 Mar 2024 07:30 )             28.5       Urine Studies:  Creatinine Trend: 6.50<--, 4.85<--, 7.70<--, 5.97<--, 8.68<--  Urinalysis Basic - ( 06 Mar 2024 07:30 )    Color:  / Appearance:  / SG:  / pH:   Gluc: 106 mg/dL / Ketone:   / Bili:  / Urobili:    Blood:  / Protein:  / Nitrite:    Leuk Esterase:  / RBC:  / WBC    Sq Epi:  / Non Sq Epi:  / Bacteria:         RADIOLOGY & ADDITIONAL STUDIES:  Reviewed

## 2024-03-06 NOTE — PROGRESS NOTE ADULT - PROBLEM SELECTOR PLAN 2
Presenting with SOB, productive cough w/ dark green sputum, wheezing at home, requiring more oxygen than baseline 1L NC. Likely 2/2 CAP. Follows with Dr Johnson outpatient. Prior smoker, quit 19 years ago.   - CXR w/ increased infiltrates. RVP negative.   - Home meds: prophylactic azithromycin 250 MWF, stiolto, ventolin HFA  - CTA PE this admission: Emphysematous changes. Persistent predominantly subpleural consolidations in both lungs mostly in the right lower lobe and left upper lobe. No PE.    PLAN:  - c/w duonebs q6  - c/w home stiolto qd  - c/w CTX 1g q24  - S/p course of Azithromycin 500mg; C/w home regimen   - c/w prednisone 40mg qd (complete 3/6)  - Pulm following; Bronch 3/6  - C/w Hycodan 5mg q6 prn for cough suppression

## 2024-03-06 NOTE — PROGRESS NOTE ADULT - PROBLEM SELECTOR PLAN 11
F: None  E: replete cautiously in ESRD  N: NPO for Bronch 3/6  GI: None  DVT: hep subq; holding for bronch 3/6  Dispo: [facility]

## 2024-03-06 NOTE — PROGRESS NOTE ADULT - ATTENDING COMMENTS
Patient was seen and examined at bedside on 3/6/2024 at 130 pm with wife at bedside. Patient reports continued cough and NIELSON. Denies CP. ROS is otherwise negative. Vitals, labwork and pertinent imaging reviewed. Exam - NAD, AAO x 4, PERRLA, EOMI, MMM, supple neck, chest - bibasilar crackles, CV - rrr, s1s2, no m/r/g, abd - soft, NTND, + BS, ext - wwp, psych - normal affect, pt appears cachectic and malnourished    Plan:  -Change abx to Zosyn, c/w supportive care - cough suppressant  -Sputum culture  -Pulm consulted - plan for bronchoscopy on 3/7 AM   -Echo - worsening pulmonary hypertension  -PT/OT - rec HPT

## 2024-03-06 NOTE — PROGRESS NOTE ADULT - PROBLEM SELECTOR PLAN 7
Home med: Aspirin 81mg qd, amio 200mg qd, metoprolol XL 50mg qd.   [id number] 2. s/p Watchman device, not on Eliquis. NSR this admission.    PLAN:  - ASA hold iso hemoptysis  - c/w other home meds.

## 2024-03-06 NOTE — PROGRESS NOTE ADULT - PROBLEM SELECTOR PLAN 1
Patient on 2L NC and becomes hypoxic w/ ambulation. On baseline 1L O2 at home.   Likely 2/2 COPD exacerbation iso CAP vs worsening pulmonary HTN.  - New hemoptysis (3/5)    PLAN:  - COPD Tx as stated below   - Pulmonary HTN Tx as stated below   - Pulm following; pending bronch 3/6  - Wean O2 as toleration  - ENT consulted to r/o bleed from upper airway.

## 2024-03-06 NOTE — PROGRESS NOTE ADULT - ATTENDING COMMENTS
67 yo M w/ hx of T cell lymphoma, HFpEF, ESRD on HD, COPD, HTN, being worked up as out pt for pleural thickening and possible mesothelioma pending PET scan, admitted now with AHRF and worsening cough, increased sputum, likely COPD exacerbation 2/2 CAP; however, minimal improvement on steroids and CAP coverage, now with small volume (<1 tsp) hemoptysis episodes usually provoked by eating. Bronchoscopy today for airway evaluation revealed extensive thick purulent secretions throughout all airways (see bronch note), therapeutic aspiration performed, after clearance of airways there was no evidence of active bleeding. Likely all related to severe pneumonia. Agree w/ escalation of abx to zosyn given increased procalcitonin and appearance of secretions on bronch, sputum samples sent for cell count, cytology, and culture, F/U results. D/C hycodan, do not want to suppress cough as pt must bring up secretions. Will need aggressive airway clearance w/ duonebs, chest PT, and aerobika. Complete prednisone course. Would not pursue in patient PET at this time given higher likelihood of active infection, but can follow up with Dr. Johnson for further work up as previously planned. Continue to attempt to wean O2. Also with known ERIK, benefits from NIPPV at night but pt nonadherent. 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 Hold parameters for sildenafil. Rest as above.     Reviewed imaging, medications, lab results. Decision making of high complexity. 69 yo M w/ hx of T cell lymphoma, HFpEF, ESRD on HD, COPD, HTN, being worked up as out pt for pleural thickening and possible mesothelioma pending PET scan, admitted now with AHRF and worsening cough, increased sputum, likely COPD exacerbation 2/2 CAP; however, minimal improvement on steroids and CAP coverage, now with small volume (<1 tsp) hemoptysis episodes usually provoked by eating. Chest CT with bibasilar infiltrates, R>L. Continues to have SOB, rhonchi on exam, does not cough up blood every time, but sputum sample at bedside demonstrates dark red blood, small amount (<1 tbsp). Pending possible bronchoscopy today for airway evaluation. Started on cough suppression until bronch can be performed, c/w abx for now. If there is no evidence of airway bleed or infection may consider in patient PET for work up of pleural thickening and ? mesothelioma.  Continue to attempt to wean O2. Also with known ERIK, benefits from NIPPV at night but pt nonadherent. 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 Hold parameters for sildenafil. Rest as above.     Reviewed imaging, medications, lab results. Decision making of high complexity.

## 2024-03-07 ENCOUNTER — APPOINTMENT (OUTPATIENT)
Dept: GASTROENTEROLOGY | Facility: HOSPITAL | Age: 69
End: 2024-03-07

## 2024-03-07 ENCOUNTER — RESULT REVIEW (OUTPATIENT)
Age: 69
End: 2024-03-07

## 2024-03-07 LAB
ADD ON TEST-SPECIMEN IN LAB: SIGNIFICANT CHANGE UP
B PERT IGG+IGM PNL SER: SIGNIFICANT CHANGE UP
COLOR FLD: SIGNIFICANT CHANGE UP
COMMENT - FLUIDS: SIGNIFICANT CHANGE UP
CULTURE RESULTS: SIGNIFICANT CHANGE UP
CULTURE RESULTS: SIGNIFICANT CHANGE UP
FLUID INTAKE SUBSTANCE CLASS: SIGNIFICANT CHANGE UP
GRAM STN FLD: ABNORMAL
MONOS+MACROS # FLD: 2 % — SIGNIFICANT CHANGE UP
NEUTROPHILS-BODY FLUID: 98 % — SIGNIFICANT CHANGE UP
NIGHT BLUE STAIN TISS: SIGNIFICANT CHANGE UP
RCV VOL RI: 5450 /UL — HIGH (ref 0–0)
SPECIMEN SOURCE FLD: SIGNIFICANT CHANGE UP
SPECIMEN SOURCE: SIGNIFICANT CHANGE UP
TOTAL NUCLEATED CELL COUNT, BODY FLUID: 3250 /UL — SIGNIFICANT CHANGE UP
TUBE TYPE: SIGNIFICANT CHANGE UP

## 2024-03-07 PROCEDURE — 31624 DX BRONCHOSCOPE/LAVAGE: CPT | Mod: GC

## 2024-03-07 PROCEDURE — 88305 TISSUE EXAM BY PATHOLOGIST: CPT | Mod: 26

## 2024-03-07 PROCEDURE — 74018 RADEX ABDOMEN 1 VIEW: CPT | Mod: 26

## 2024-03-07 PROCEDURE — 88112 CYTOPATH CELL ENHANCE TECH: CPT | Mod: 26

## 2024-03-07 PROCEDURE — 99233 SBSQ HOSP IP/OBS HIGH 50: CPT | Mod: GC,25

## 2024-03-07 PROCEDURE — 99233 SBSQ HOSP IP/OBS HIGH 50: CPT | Mod: GC

## 2024-03-07 RX ORDER — ASPIRIN/CALCIUM CARB/MAGNESIUM 324 MG
81 TABLET ORAL EVERY 24 HOURS
Refills: 0 | Status: DISCONTINUED | OUTPATIENT
Start: 2024-03-08 | End: 2024-03-12

## 2024-03-07 RX ORDER — HEPARIN SODIUM 5000 [USP'U]/ML
5000 INJECTION INTRAVENOUS; SUBCUTANEOUS EVERY 8 HOURS
Refills: 0 | Status: DISCONTINUED | OUTPATIENT
Start: 2024-03-07 | End: 2024-03-12

## 2024-03-07 RX ORDER — ACETYLCYSTEINE 200 MG/ML
4 VIAL (ML) MISCELLANEOUS EVERY 6 HOURS
Refills: 0 | Status: DISCONTINUED | OUTPATIENT
Start: 2024-03-07 | End: 2024-03-12

## 2024-03-07 RX ADMIN — Medication 20 MILLIGRAM(S): at 16:06

## 2024-03-07 RX ADMIN — Medication 50 MILLIGRAM(S): at 06:20

## 2024-03-07 RX ADMIN — SEVELAMER CARBONATE 800 MILLIGRAM(S): 2400 POWDER, FOR SUSPENSION ORAL at 16:06

## 2024-03-07 RX ADMIN — Medication 20 MILLIGRAM(S): at 23:05

## 2024-03-07 RX ADMIN — PIPERACILLIN AND TAZOBACTAM 25 GRAM(S): 4; .5 INJECTION, POWDER, LYOPHILIZED, FOR SOLUTION INTRAVENOUS at 18:09

## 2024-03-07 RX ADMIN — HEPARIN SODIUM 5000 UNIT(S): 5000 INJECTION INTRAVENOUS; SUBCUTANEOUS at 21:18

## 2024-03-07 RX ADMIN — ATORVASTATIN CALCIUM 40 MILLIGRAM(S): 80 TABLET, FILM COATED ORAL at 21:18

## 2024-03-07 RX ADMIN — POLYETHYLENE GLYCOL 3350 17 GRAM(S): 17 POWDER, FOR SOLUTION ORAL at 06:20

## 2024-03-07 RX ADMIN — SENNA PLUS 2 TABLET(S): 8.6 TABLET ORAL at 21:18

## 2024-03-07 RX ADMIN — TIOTROPIUM BROMIDE AND OLODATEROL 2 PUFF(S): 3.124; 2.736 SPRAY, METERED RESPIRATORY (INHALATION) at 10:56

## 2024-03-07 RX ADMIN — Medication 20 MILLIGRAM(S): at 06:21

## 2024-03-07 RX ADMIN — Medication 4 MILLILITER(S): at 17:02

## 2024-03-07 RX ADMIN — Medication 3 MILLILITER(S): at 17:02

## 2024-03-07 RX ADMIN — POLYETHYLENE GLYCOL 3350 17 GRAM(S): 17 POWDER, FOR SOLUTION ORAL at 18:09

## 2024-03-07 RX ADMIN — SEVELAMER CARBONATE 800 MILLIGRAM(S): 2400 POWDER, FOR SUSPENSION ORAL at 23:05

## 2024-03-07 RX ADMIN — PIPERACILLIN AND TAZOBACTAM 25 GRAM(S): 4; .5 INJECTION, POWDER, LYOPHILIZED, FOR SOLUTION INTRAVENOUS at 06:20

## 2024-03-07 RX ADMIN — Medication 3 MILLILITER(S): at 10:56

## 2024-03-07 RX ADMIN — AMIODARONE HYDROCHLORIDE 200 MILLIGRAM(S): 400 TABLET ORAL at 06:20

## 2024-03-07 RX ADMIN — Medication 3 MILLILITER(S): at 22:02

## 2024-03-07 RX ADMIN — Medication 4 MILLILITER(S): at 21:38

## 2024-03-07 RX ADMIN — Medication 3 MILLIGRAM(S): at 22:02

## 2024-03-07 NOTE — CONSULT NOTE ADULT - ATTENDING COMMENTS
Events noted. Pt with acute hypoxic resp failure post bronch likely related to lavage and some atelectasis. continue aggressive pulm toilet and admit for continuous pulse oximetry. On 5 L NC with O2 sats 88-91. Add hypertonic saline and mucomyst.

## 2024-03-07 NOTE — PRE-ANESTHESIA EVALUATION ADULT - NSRADCARDRESULTSFT_GEN_ALL_CORE
Reviewed    CXR 3/5/24:  Findings/  impression: Bilateral opacities/pleural effusions. Stable cardiomegaly,   thoracic aortic calcification. Stable bony structures, dextro scoliosis.   Right upper quadrant surgical clips.    TTE 3/7/24:  CONCLUSIONS:     1. Normal left and right ventricular size and systolic function.   2. Dilated right atrium.   3. Mild-to-moderate aortic stenosis.   4. Mild aortic regurgitation.   5. Mild mitral regurgitation.   6. Moderate tricuspid regurgitation.   7. Pulmonary hypertension present, pulmonary artery systolic pressure is   58 mmHg.   8. No pericardial effusion.   9. Compared to the previous TTE performed on 10/4/2022, estimated PASP   is higher. Aortic pathology appears stable.

## 2024-03-07 NOTE — PROGRESS NOTE ADULT - PROBLEM SELECTOR PLAN 8
Hgb 10, at baseline. Of note, pt recently admitted 12/2023 for LGIB, s/p c-scope, found to have large polyps on inpatient colonoscopy. Pending outpatient repeat colonoscopy w/ polypectomy on Thursday 3/7/24 [facility].    PLAN:  - maintain active T&S  - outpatient GI followup

## 2024-03-07 NOTE — PROGRESS NOTE ADULT - SUBJECTIVE AND OBJECTIVE BOX
****************** TRANSFER ACCEPTANCE NOTE [facility] FROM [facility] ******************    HOSPITAL COURSE:          OVERNIGHT EVENTS:    SUBJECTIVE:  Patient seen and examined at bedside.    Vital Signs Last 12 Hrs  T(F): 97.5 (03-07-24 @ 10:57), Max: 97.5 (03-07-24 @ 10:57)  HR: 64 (03-07-24 @ 12:48) (59 - 64)  BP: 123/66 (03-07-24 @ 12:48) (111/71 - 123/66)  BP(mean): 79 (03-07-24 @ 07:17) (79 - 79)  RR: 18 (03-07-24 @ 12:48) (17 - 18)  SpO2: 92% (03-07-24 @ 12:48) (92% - 94%)  I&O's Summary    06 Mar 2024 07:01  -  07 Mar 2024 07:00  --------------------------------------------------------  IN: 0 mL / OUT: 1000 mL / NET: -1000 mL        PHYSICAL EXAM:  Constitutional: NAD, comfortable in bed.  HEENT: NC/AT, PERRLA, EOMI, no conjunctival pallor or scleral icterus, MMM  Neck: Supple, no JVD  Respiratory: CTA B/L. No w/r/r.   Cardiovascular: RRR, normal S1 and S2, no m/r/g.   Gastrointestinal: +BS, soft NTND, no guarding or rebound tenderness, no palpable masses   Extremities: wwp; no cyanosis, clubbing or edema.   Vascular: Pulses equal and strong throughout.   Neurological: AAOx3, no CN deficits, strength and sensation intact throughout.   Skin: No gross skin abnormalities or rashes        LABS:                        9.0    11.04 )-----------( 139      ( 06 Mar 2024 07:30 )             28.5     03-06    140  |  98  |  51<H>  ----------------------------<  106<H>  4.7   |  28  |  6.50<H>    Ca    9.5      06 Mar 2024 07:30  Phos  3.6     03-06  Mg     2.5     03-06    TPro  6.5  /  Alb  3.3  /  TBili  0.3  /  DBili  x   /  AST  10  /  ALT  6<L>  /  AlkPhos  136<H>  03-06    PT/INR - ( 06 Mar 2024 07:30 )   PT: 13.1 sec;   INR: 1.15          PTT - ( 06 Mar 2024 07:30 )  PTT:35.8 sec  Urinalysis Basic - ( 06 Mar 2024 07:30 )    Color: x / Appearance: x / SG: x / pH: x  Gluc: 106 mg/dL / Ketone: x  / Bili: x / Urobili: x   Blood: x / Protein: x / Nitrite: x   Leuk Esterase: x / RBC: x / WBC x   Sq Epi: x / Non Sq Epi: x / Bacteria: x          RADIOLOGY & ADDITIONAL TESTS:    MEDICATIONS  (STANDING):  albuterol/ipratropium for Nebulization 3 milliLiter(s) Nebulizer every 6 hours  aMIOdarone    Tablet 200 milliGRAM(s) Oral every 24 hours  atorvastatin 40 milliGRAM(s) Oral at bedtime  azithromycin   Tablet 250 milliGRAM(s) Oral <User Schedule>  metoprolol succinate ER 50 milliGRAM(s) Oral every 24 hours  piperacillin/tazobactam IVPB.. 4.5 Gram(s) IV Intermittent every 12 hours  polyethylene glycol 3350 17 Gram(s) Oral two times a day  senna 2 Tablet(s) Oral at bedtime  sevelamer carbonate 800 milliGRAM(s) Oral every 8 hours  sildenafil (REVATIO) 20 milliGRAM(s) Oral every 8 hours  tiotropium 2.5 MICROgram(s)/olodaterol 2.5 MICROgram(s) (STIOLTO) Inhaler 2 Puff(s) Inhalation daily    MEDICATIONS  (PRN):  acetaminophen     Tablet .. 650 milliGRAM(s) Oral every 6 hours PRN Temp greater or equal to 38C (100.4F), Mild Pain (1 - 3)  aluminum hydroxide/magnesium hydroxide/simethicone Suspension 30 milliLiter(s) Oral every 4 hours PRN Dyspepsia  melatonin 3 milliGRAM(s) Oral at bedtime PRN Insomnia  ondansetron Injectable 4 milliGRAM(s) IV Push every 8 hours PRN Nausea and/or Vomiting   ****************** TRANSFER ACCEPTANCE NOTE [facility] FROM [facility] ******************    HOSPITAL COURSE:  68M PMH AFib s/p watchman (on ASA), HTN, COPD (baseline 1L NC at home), ESRD (MWF), severe ERIK, pulm HTN, T cell lymphoma (s/p chemo), HFpEF, s/p VATS procedure in 2021, recent [facility] admission 12/2023 for LGIB p/w progressively worsening productive cough, congestion, and SOB. On admission CTA PE significant for emphysematous changes, subpleural consolidations b/l, without PE. Initially treated for COPD exacerbation iso of CAP with prednisone (completed), duonebs, azithromycin (completed) and CTX (3/3 - 3/6). Due to persistent hypoxia and shortness of breath, on 3/6 patient was broadened to Zosyn, pseudomonal coverage. Course was complicated by low volume hemoptysis on 3/5 and 3/6. Pt underwent bronch on 3/7 which showed no evidence of localized hemoptysis or endobronchial lesions, +copious brown secretions. Prior to bronch, patient was saturating in 90s on 2L NC and post bronch was >85% on 5L NC. Patient denies complaints of SOB, cough or wheezing. Alert and oriented. Given extensive pulm history, patient transferred to [facility] for further monitoring.   -------------------------------------------------    SUBJECTIVE:  Patient seen and examined at bedside.    Vital Signs Last 12 Hrs  T(F): 97.5 (03-07-24 @ 10:57), Max: 97.5 (03-07-24 @ 10:57)  HR: 64 (03-07-24 @ 12:48) (59 - 64)  BP: 123/66 (03-07-24 @ 12:48) (111/71 - 123/66)  BP(mean): 79 (03-07-24 @ 07:17) (79 - 79)  RR: 18 (03-07-24 @ 12:48) (17 - 18)  SpO2: 92% (03-07-24 @ 12:48) (92% - 94%)  I&O's Summary    06 Mar 2024 07:01  -  07 Mar 2024 07:00  --------------------------------------------------------  IN: 0 mL / OUT: 1000 mL / NET: -1000 mL        PHYSICAL EXAM:  Constitutional: NAD, comfortable in bed.  HEENT: NC/AT, PERRLA, EOMI, no conjunctival pallor or scleral icterus, MMM  Neck: Supple, no JVD  Respiratory: CTA B/L. No w/r/r.   Cardiovascular: RRR, normal S1 and S2, no m/r/g.   Gastrointestinal: +BS, soft NTND, no guarding or rebound tenderness, no palpable masses   Extremities: wwp; no cyanosis, clubbing or edema.   Vascular: Pulses equal and strong throughout.   Neurological: AAOx3, no CN deficits, strength and sensation intact throughout.   Skin: No gross skin abnormalities or rashes        LABS:                        9.0    11.04 )-----------( 139      ( 06 Mar 2024 07:30 )             28.5     03-06    140  |  98  |  51<H>  ----------------------------<  106<H>  4.7   |  28  |  6.50<H>    Ca    9.5      06 Mar 2024 07:30  Phos  3.6     03-06  Mg     2.5     03-06    TPro  6.5  /  Alb  3.3  /  TBili  0.3  /  DBili  x   /  AST  10  /  ALT  6<L>  /  AlkPhos  136<H>  03-06    PT/INR - ( 06 Mar 2024 07:30 )   PT: 13.1 sec;   INR: 1.15          PTT - ( 06 Mar 2024 07:30 )  PTT:35.8 sec  Urinalysis Basic - ( 06 Mar 2024 07:30 )    Color: x / Appearance: x / SG: x / pH: x  Gluc: 106 mg/dL / Ketone: x  / Bili: x / Urobili: x   Blood: x / Protein: x / Nitrite: x   Leuk Esterase: x / RBC: x / WBC x   Sq Epi: x / Non Sq Epi: x / Bacteria: x          RADIOLOGY & ADDITIONAL TESTS:    MEDICATIONS  (STANDING):  albuterol/ipratropium for Nebulization 3 milliLiter(s) Nebulizer every 6 hours  aMIOdarone    Tablet 200 milliGRAM(s) Oral every 24 hours  atorvastatin 40 milliGRAM(s) Oral at bedtime  azithromycin   Tablet 250 milliGRAM(s) Oral <User Schedule>  metoprolol succinate ER 50 milliGRAM(s) Oral every 24 hours  piperacillin/tazobactam IVPB.. 4.5 Gram(s) IV Intermittent every 12 hours  polyethylene glycol 3350 17 Gram(s) Oral two times a day  senna 2 Tablet(s) Oral at bedtime  sevelamer carbonate 800 milliGRAM(s) Oral every 8 hours  sildenafil (REVATIO) 20 milliGRAM(s) Oral every 8 hours  tiotropium 2.5 MICROgram(s)/olodaterol 2.5 MICROgram(s) (STIOLTO) Inhaler 2 Puff(s) Inhalation daily    MEDICATIONS  (PRN):  acetaminophen     Tablet .. 650 milliGRAM(s) Oral every 6 hours PRN Temp greater or equal to 38C (100.4F), Mild Pain (1 - 3)  aluminum hydroxide/magnesium hydroxide/simethicone Suspension 30 milliLiter(s) Oral every 4 hours PRN Dyspepsia  melatonin 3 milliGRAM(s) Oral at bedtime PRN Insomnia  ondansetron Injectable 4 milliGRAM(s) IV Push every 8 hours PRN Nausea and/or Vomiting   ****************** TRANSFER ACCEPTANCE NOTE [facility] FROM [facility] ******************    HOSPITAL COURSE:  68M PMH AFib s/p watchman (on ASA), HTN, COPD (baseline 1L NC at home), ESRD (MWF), severe ERIK, pulm HTN, T cell lymphoma (s/p chemo), HFpEF, s/p VATS procedure in 2021, recent [facility] admission 12/2023 for LGIB p/w progressively worsening productive cough, congestion, and SOB. On admission CTA PE significant for emphysematous changes, subpleural consolidations b/l, without PE. Initially treated for COPD exacerbation iso of CAP with prednisone (completed), duonebs, azithromycin (completed) and CTX (3/3 - 3/6). Due to persistent hypoxia and shortness of breath, on 3/6 patient was broadened to Zosyn, pseudomonal coverage. Course was complicated by low volume hemoptysis on 3/5 and 3/6. Pt underwent bronch on 3/7 which showed no evidence of localized hemoptysis or endobronchial lesions, +copious brown secretions. Prior to bronch, patient was saturating in 90s on 2L NC and post bronch was >85% on 5L NC. Patient denies complaints of SOB, cough or wheezing. Alert and oriented. Given extensive pulm history, patient transferred to [facility] for further monitoring.   -------------------------------------------------    SUBJECTIVE:  Patient seen and examined at bedside.    Vital Signs Last 12 Hrs  T(F): 97.5 (03-07-24 @ 10:57), Max: 97.5 (03-07-24 @ 10:57)  HR: 64 (03-07-24 @ 12:48) (59 - 64)  BP: 123/66 (03-07-24 @ 12:48) (111/71 - 123/66)  BP(mean): 79 (03-07-24 @ 07:17) (79 - 79)  RR: 18 (03-07-24 @ 12:48) (17 - 18)  SpO2: 92% (03-07-24 @ 12:48) (92% - 94%)  I&O's Summary    06 Mar 2024 07:01  -  07 Mar 2024 07:00  --------------------------------------------------------  IN: 0 mL / OUT: 1000 mL / NET: -1000 mL        PHYSICAL EXAM:  General: in no acute distress, non toxic appearing, speaking in full sentences  Eyes: Rt sided eye ecchymosis. EOMI intact bilaterally. Anicteric sclerae, moist conjunctivae  Lungs: Diminished lung sounds b/l without wheezes  Cardiovascular: RRR. No murmurs, rubs, or gallops  Abdomen: +BS Soft, non-tender non-distended; No rebound or guarding  Vasc: Rad and DP intact and equal b/l   Extremities: WWP, No clubbing, cyanosis or edema  Neurological: Alert and oriented x3   Skin: B/L tennis ball size ecchymosis on chest wall. Warm and dry. No obvious rash           LABS:                        9.0    11.04 )-----------( 139      ( 06 Mar 2024 07:30 )             28.5     03-06    140  |  98  |  51<H>  ----------------------------<  106<H>  4.7   |  28  |  6.50<H>    Ca    9.5      06 Mar 2024 07:30  Phos  3.6     03-06  Mg     2.5     03-06    TPro  6.5  /  Alb  3.3  /  TBili  0.3  /  DBili  x   /  AST  10  /  ALT  6<L>  /  AlkPhos  136<H>  03-06    PT/INR - ( 06 Mar 2024 07:30 )   PT: 13.1 sec;   INR: 1.15          PTT - ( 06 Mar 2024 07:30 )  PTT:35.8 sec  Urinalysis Basic - ( 06 Mar 2024 07:30 )    Color: x / Appearance: x / SG: x / pH: x  Gluc: 106 mg/dL / Ketone: x  / Bili: x / Urobili: x   Blood: x / Protein: x / Nitrite: x   Leuk Esterase: x / RBC: x / WBC x   Sq Epi: x / Non Sq Epi: x / Bacteria: x          RADIOLOGY & ADDITIONAL TESTS:    MEDICATIONS  (STANDING):  albuterol/ipratropium for Nebulization 3 milliLiter(s) Nebulizer every 6 hours  aMIOdarone    Tablet 200 milliGRAM(s) Oral every 24 hours  atorvastatin 40 milliGRAM(s) Oral at bedtime  azithromycin   Tablet 250 milliGRAM(s) Oral <User Schedule>  metoprolol succinate ER 50 milliGRAM(s) Oral every 24 hours  piperacillin/tazobactam IVPB.. 4.5 Gram(s) IV Intermittent every 12 hours  polyethylene glycol 3350 17 Gram(s) Oral two times a day  senna 2 Tablet(s) Oral at bedtime  sevelamer carbonate 800 milliGRAM(s) Oral every 8 hours  sildenafil (REVATIO) 20 milliGRAM(s) Oral every 8 hours  tiotropium 2.5 MICROgram(s)/olodaterol 2.5 MICROgram(s) (STIOLTO) Inhaler 2 Puff(s) Inhalation daily    MEDICATIONS  (PRN):  acetaminophen     Tablet .. 650 milliGRAM(s) Oral every 6 hours PRN Temp greater or equal to 38C (100.4F), Mild Pain (1 - 3)  aluminum hydroxide/magnesium hydroxide/simethicone Suspension 30 milliLiter(s) Oral every 4 hours PRN Dyspepsia  melatonin 3 milliGRAM(s) Oral at bedtime PRN Insomnia  ondansetron Injectable 4 milliGRAM(s) IV Push every 8 hours PRN Nausea and/or Vomiting

## 2024-03-07 NOTE — PROGRESS NOTE ADULT - SUBJECTIVE AND OBJECTIVE BOX
PULMONARY CONSULT SERVICE FOLLOW-UP NOTE    INTERVAL HPI:  Reviewed chart and overnight events; patient seen and examined. Doing well this AM, plan for bronchoscopy today.    MEDICATIONS:  Pulmonary:  albuterol/ipratropium for Nebulization 3 milliLiter(s) Nebulizer every 6 hours  hydrocodone/homatropine Syrup 5 milliLiter(s) Oral every 6 hours  tiotropium 2.5 MICROgram(s)/olodaterol 2.5 MICROgram(s) (STIOLTO) Inhaler 2 Puff(s) Inhalation daily    Antimicrobials:  azithromycin   Tablet 250 milliGRAM(s) Oral <User Schedule>  piperacillin/tazobactam IVPB.. 4.5 Gram(s) IV Intermittent every 12 hours    Anticoagulants:    Cardiac:  aMIOdarone    Tablet 200 milliGRAM(s) Oral every 24 hours  metoprolol succinate ER 50 milliGRAM(s) Oral every 24 hours  sildenafil (REVATIO) 20 milliGRAM(s) Oral every 8 hours      Allergies    No Known Allergies    Intolerances        Vital Signs Last 24 Hrs  T(C): 36.2 (07 Mar 2024 07:17), Max: 36.7 (06 Mar 2024 10:55)  T(F): 97.2 (07 Mar 2024 06:02), Max: 98.1 (06 Mar 2024 14:25)  HR: 60 (07 Mar 2024 07:17) (60 - 98)  BP: 111/71 (07 Mar 2024 07:17) (111/71 - 169/70)  BP(mean): 79 (07 Mar 2024 07:17) (79 - 79)  RR: 17 (07 Mar 2024 07:17) (17 - 20)  SpO2: 93% (07 Mar 2024 07:17) (91% - 97%)    Parameters below as of 07 Mar 2024 07:17    O2 Flow (L/min): 2      03-06 @ 07:01  -  03-07 @ 07:00  --------------------------------------------------------  IN: 0 mL / OUT: 1000 mL / NET: -1000 mL          PHYSICAL EXAM:  Constitutional: well-appearing, in no apparent respiratory distress  HEENT: NC/AT; PERRL, anicteric sclera; moist mucous membranes  Neck: supple, no JVD or LAD appreciated  Cardiovascular: +S1/S2, Regular rate and rhythm, no murmurs appreciated   Respiratory: rhonchourous breath sounds bilaterally. No wheezes, or rales   Gastrointestinal: soft, non-tender, non-distended. Normoactive bowel sounds.   Extremities: no edema, clubbing or cyanosis  Vascular: 2+ radial pulses B/L  Neurological: awake and alert; oriented x3    LABS:      CBC Full  -  ( 06 Mar 2024 07:30 )  WBC Count : 11.04 K/uL  RBC Count : 2.96 M/uL  Hemoglobin : 9.0 g/dL  Hematocrit : 28.5 %  Platelet Count - Automated : 139 K/uL  Mean Cell Volume : 96.3 fl  Mean Cell Hemoglobin : 30.4 pg  Mean Cell Hemoglobin Concentration : 31.6 gm/dL  Auto Neutrophil # : 9.49 K/uL  Auto Lymphocyte # : 0.73 K/uL  Auto Monocyte # : 0.77 K/uL  Auto Eosinophil # : 0.00 K/uL  Auto Basophil # : 0.01 K/uL  Auto Neutrophil % : 85.9 %  Auto Lymphocyte % : 6.6 %  Auto Monocyte % : 7.0 %  Auto Eosinophil % : 0.0 %  Auto Basophil % : 0.1 %    03-06    140  |  98  |  51<H>  ----------------------------<  106<H>  4.7   |  28  |  6.50<H>    Ca    9.5      06 Mar 2024 07:30  Phos  3.6     03-06  Mg     2.5     03-06    TPro  6.5  /  Alb  3.3  /  TBili  0.3  /  DBili  x   /  AST  10  /  ALT  6<L>  /  AlkPhos  136<H>  03-06    PT/INR - ( 06 Mar 2024 07:30 )   PT: 13.1 sec;   INR: 1.15          PTT - ( 06 Mar 2024 07:30 )  PTT:35.8 sec        RADIOLOGY & ADDITIONAL STUDIES:    none.

## 2024-03-07 NOTE — PROGRESS NOTE ADULT - PROBLEM SELECTOR PLAN 11
F: None  E: replete cautiously in ESRD  N: NPO for Bronch 3/7  GI: None  DVT: hep subq; holding for bronch 3/7  Dispo: [facility]

## 2024-03-07 NOTE — PROGRESS NOTE ADULT - ASSESSMENT
68M PMH AFib s/p watchman (on ASA), HTN, COPD, ESRD (MWF), severe ERIK, pulm HTN, T cell lymphoma (s/p chemo), HFpEF, s/p VATS procedure in 2021, recent [facility] admission 12/2023 for LGIB p/w productive cough, congestion, SOB for weeks now worsening in the last 5days. Admitted to [facility] for AHRF 2/2 CAP vs worsening pulmonary HTN, pending bronchoscopy by pulmonology for further evaluation.  68M PMH AFib s/p watchman (on ASA), HTN, COPD, ESRD (MWF), severe ERIK, pulm HTN, T cell lymphoma (s/p chemo), HFpEF, s/p VATS procedure in 2021, recent [facility] admission 12/2023 for LGIB p/w productive cough, congestion, SOB for weeks now worsening in the last 5days. Admitted to [facility] for AHRF 2/2 COPD exacerbation 2/2 to CAP. Course c/b low volume hemoptysis on 3/5 to 3/6. Now s/p bronch 3/7, after which patient desaturated to 88% on 5L NC (prior was on 2L NC saturating in 90s). ICU consulted, and patient transferred to [facility] for closer monitoring.

## 2024-03-07 NOTE — PROGRESS NOTE ADULT - ASSESSMENT
Mr. Gamez is a 67 yo M with aFib s/p watchman (on ASA), HTN, COPD, ESRD (MWF), severe ERIK noncompliant to CPAP, pulm HTN (group 2 and3) on sildenafil T cell lymphoma (s/p chemo), HFpEF who presented with productive cough brown and SOB x5 days requiring more oxygen from baseline of 1 L via NC concerning for community acquired pneumonia and COPD exacerbation     Work up:   RVP negative  urine strep and legionella negative  CXR showed b/l infiltrates at the bases R >L  CTPE done and negative for PE but showed RLL infiltrate, Left pleural based scar, right pleural thickening  echo elevated PASP 58 up from 34  Bronchoscopy 3/7: copious purulent thick secretions brown in color. NO endobronchial lesions or localized hemoptysis. BAL performed in RLL (pending)    Pulmonary is consulted for CAP, COPD, ERIK, and PH management/optimization. Follows with Dr. Johnson. Course now complicated by hemoptysis 3 episodes small volume on 3/5 and 3/6    # Hemoptysis  - underwent bronchoscopy on 3/7, copious brown secretions, no evidence of localized heomptysis or endobronchial lesions  - episodes of hemoptysis likely related to pneumoina, irritation  - continue hycodan for cough suppression until hemoptysis resolves  - BAL done, follow up Cytology     # Pleural based scars  - follows w/. Dr Johnson. PET scan outpatient planned.  - MELISSA pleural scar, thickened right pleural with reported asbestos exposure  - c/f mesothelioma. Will consider inpatient PET scan  - of note, patient had left sided VATS in 2021 for pleural thickening and recurrent left effusions (no malignancy on path or cyto on left)    # Community acquired pneumonia  - urine legionella and strep negative  - productive sputum, brown  - RLL dense consolidation  - changed to zosyn on 3/6 given elevated procalcitonin, follow up bronchial washing cultures to guide therapy    # COPD, in acute exacerbation  - on stiolto at home, intermittently complaint w/ inhalers  - agree w/. duonebs s/p prednisone 5 day course for exacerbation    # ERIK  - AHI 36 in nov 2022  - noncompliant to CPAP  - encouraged patient to use it      # PH  - combination of Group 2 (diastolic HF and afib) and 3 (COPD, ERIK) as well group 5 (HD)  - likely large factor of noncompliant to CPAP in the setting of severe ERIK  - encourage optimization w/ CPAP  - c/w home med sildanefil   - repeat echo here w/ elevated PASP 58 up from 34 on previous     S/D/E with Dr. Patel   Mr. Gamez is a 67 yo M with aFib s/p watchman (on ASA), HTN, COPD, ESRD (MWF), severe ERIK noncompliant to CPAP, pulm HTN (group 2 and3) on sildenafil T cell lymphoma (s/p chemo), HFpEF who presented with productive cough brown and SOB x5 days requiring more oxygen from baseline of 1 L via NC concerning for community acquired pneumonia and COPD exacerbation     Work up:   RVP negative  urine strep and legionella negative  CXR showed b/l infiltrates at the bases R >L  CTPE done and negative for PE but showed RLL infiltrate, Left pleural based scar, right pleural thickening  echo elevated PASP 58 up from 34  Bronchoscopy 3/7: copious purulent thick secretions brown in color. NO endobronchial lesions or localized hemoptysis. BAL performed in RLL (pending)    Pulmonary is consulted for CAP, COPD, ERIK, and PH management/optimization. Follows with Dr. Johnson. Course now complicated by hemoptysis 3 episodes small volume on 3/5 and 3/6    # Hemoptysis  - underwent bronchoscopy on 3/7, copious brown secretions, no evidence of localized heomptysis or endobronchial lesions  - episodes of hemoptysis likely related to pneumonia inflammation  - can stop hycodan   - BAL done, follow up Cytology     # Pleural based scars  - follows w/. Dr Johnson. PET scan outpatient planned.  - MELISSA pleural scar, thickened right pleural with reported asbestos exposure  - c/f mesothelioma. Will recommend abx treatment and outpatient PET scan w/ Dr. Johnson as planned.  - of note, patient had left sided VATS in 2021 for pleural thickening and recurrent left effusions (no malignancy on path or cyto on left)    # Community acquired pneumonia  - urine legionella and strep negative  - productive sputum, brown  - RLL dense consolidation  - changed to zosyn on 3/6 given elevated procalcitonin, follow up bronchial washing cultures to guide therapy  - provide airway clearance with aerobika after duonebs and chest PT    # COPD, in acute exacerbation  - on stiolto at home, intermittently complaint w/ inhalers  - agree w/. duonebs s/p prednisone 5 day course for exacerbation    # ERIK  - AHI 36 in nov 2022  - noncompliant to CPAP  - encouraged patient to use it    # PH  - combination of Group 2 (diastolic HF and afib) and 3 (COPD, ERIK) as well group 5 (HD)  - likely large factor of noncompliant to CPAP in the setting of severe ERIK  - encourage optimization w/ CPAP  - c/w home med sildanefil   - repeat echo here w/ elevated PASP 58 up from 34 on previous     S/D/E with Dr. Patel

## 2024-03-07 NOTE — PROGRESS NOTE ADULT - PROBLEM SELECTOR PLAN 7
Home med: Aspirin 81mg qd, amio 200mg qd, metoprolol XL 50mg qd.   [id number] 2. s/p Watchman device, not on Eliquis. NSR this admission.    PLAN:  - ASA hold iso hemoptysis  - c/w other home meds.

## 2024-03-07 NOTE — PRE-ANESTHESIA EVALUATION ADULT - NSANTHPEFT_GEN_ALL_CORE
General: Appearance is consistent with chronological age. No abnormal facies.  Airway:  See Mallampati score  EENT: Anicteric sclera; oropharynx clear, moist mucus membranes  Cardiovascular:  Regular rate and rhythm  Respiratory: Unlabored breathing  Neurological: Awake and alert, moves all extremities  Constitutional: MET<4

## 2024-03-07 NOTE — PROGRESS NOTE ADULT - SUBJECTIVE AND OBJECTIVE BOX
Physical Medicine and Rehabilitation Progress Note :       Patient is a 68y old  Male who presents with a chief complaint of SOB (07 Mar 2024 11:00)      HPI:  68M PMH AFib s/p watchman (on ASA), HTN, COPD, ESRD (MWF), severe ERIK, pulm HTN, T cell lymphoma (s/p chemo), HFpEF, s/p VATS procedure in 2021, recent [facility] admission 12/2023 for LGIB p/w productive cough, congestion, SOB x5 days. Pt requiring more oxygen at home, uses 1 L as needed at baseline but has been using 3L over the past few days. ROS +headache, productive cough with dark green sputum, chills, wheezing. Was never hypoxic, but feeling subjectively better with 3L NC and could "feel the air." Denies fevers, CP, lightheadedness. Pt missed HD session yesterday due to feeling weak, fatigued, and SOB, HD session initially rescheduled for today however pt reports feeling worse this morning and decided to present to ED. At baseline, pt able to walk less than half a block. Able to perform ADLs as normal, however with increased NIELSON. Makes minimal urine. Denies sick contacts, however wife and pt routinely  6 year old grandson from school. Of note, pt w/ recent [facility] ED visit 2/26 after fall at home, rolled out of bed w/ head strike.    ED Course  VS T 97.6 HR 56 /60 RR 25 SpO2 97% on 3L NC  Labs WBC 6.89 Hgb 10.0 Plt 127 Lactate 0.9 BMP Lytes WNL BUN/Cr 42/8.68  Micro RVP negative  EKG   Imaging CXR w/ b/l infiltrates  Interventions azithromycin 500mg x1, CTX 1000mg x1, soludmedrol 125mcg x1, duoneb x1  Consults Renal (02 Mar 2024 10:32)                            9.0    11.04 )-----------( 139      ( 06 Mar 2024 07:30 )             28.5       03-06    140  |  98  |  51<H>  ----------------------------<  106<H>  4.7   |  28  |  6.50<H>    Ca    9.5      06 Mar 2024 07:30  Phos  3.6     03-06  Mg     2.5     03-06    TPro  6.5  /  Alb  3.3  /  TBili  0.3  /  DBili  x   /  AST  10  /  ALT  6<L>  /  AlkPhos  136<H>  03-06    Vital Signs Last 24 Hrs  T(C): 36.4 (07 Mar 2024 10:57), Max: 36.7 (06 Mar 2024 14:25)  T(F): 97.5 (07 Mar 2024 10:57), Max: 98.1 (06 Mar 2024 14:25)  HR: 59 (07 Mar 2024 10:57) (59 - 98)  BP: 117/62 (07 Mar 2024 10:57) (111/71 - 149/58)  BP(mean): 79 (07 Mar 2024 07:17) (79 - 79)  RR: 18 (07 Mar 2024 10:57) (17 - 18)  SpO2: 94% (07 Mar 2024 10:57) (91% - 97%)    Parameters below as of 07 Mar 2024 10:57  Patient On (Oxygen Delivery Method): nasal cannula  O2 Flow (L/min): 3      MEDICATIONS  (STANDING):  albuterol/ipratropium for Nebulization 3 milliLiter(s) Nebulizer every 6 hours  aMIOdarone    Tablet 200 milliGRAM(s) Oral every 24 hours  atorvastatin 40 milliGRAM(s) Oral at bedtime  azithromycin   Tablet 250 milliGRAM(s) Oral <User Schedule>  metoprolol succinate ER 50 milliGRAM(s) Oral every 24 hours  piperacillin/tazobactam IVPB.. 4.5 Gram(s) IV Intermittent every 12 hours  polyethylene glycol 3350 17 Gram(s) Oral two times a day  senna 2 Tablet(s) Oral at bedtime  sevelamer carbonate 800 milliGRAM(s) Oral every 8 hours  sildenafil (REVATIO) 20 milliGRAM(s) Oral every 8 hours  tiotropium 2.5 MICROgram(s)/olodaterol 2.5 MICROgram(s) (STIOLTO) Inhaler 2 Puff(s) Inhalation daily    MEDICATIONS  (PRN):  acetaminophen     Tablet .. 650 milliGRAM(s) Oral every 6 hours PRN Temp greater or equal to 38C (100.4F), Mild Pain (1 - 3)  aluminum hydroxide/magnesium hydroxide/simethicone Suspension 30 milliLiter(s) Oral every 4 hours PRN Dyspepsia  melatonin 3 milliGRAM(s) Oral at bedtime PRN Insomnia  ondansetron Injectable 4 milliGRAM(s) IV Push every 8 hours PRN Nausea and/or Vomiting      3/6/2024 Functional Status Assessment :         Pain Assessment/Number Scale (0-10) Adult  Presence of Pain: denies pain/discomfort (Rating = 0)  Pain Rating (0-10): Rest: 0 (no pain/absence of nonverbal indicators of pain)  Pain Rating (0-10): Activity: 0 (no pain/absence of nonverbal indicators of pain)    Safety      AM-PAC Functional Assessment: Daily Activity  Type of Assessment: Daily assessment  Putting on and taking off regular lower body clothing?: 3 = A little assistance  Bathing (including washing, rinsing, drying)?: 3 = A little assistance  Toileting, which includes using toilet, bedpan or urinal?: 3 = A little assistance  Putting on and taking off regular upper body clothing?: 4 = No assist / stand by assistance  Take care of personal grooming such as brushing teeth?: 4 = No assist / stand by assistance  Eating meals?: 4 = No assist / stand by assistance  Score: 21   Row Comment: Ask the patient "How much help from another person do you currently need? (If the patient hasn't done an activity recently, how much help from another person do you think he/she needs if he/she tried?)    Cognitive/Neuro      Cognitive/Neuro/Behavioral  Cognitive/Neuro/Behavioral [WDL Definition: Alert; opens eyes spontaneously; arouses to voice or touch; oriented x 4; follows commands; speech spontaneous, logical; purposeful motor response; behavior appropriate to situation]: WDL    Language Assistance  Preferred Language to Address Healthcare Preferred Language to Address Healthcare: English    Therapeutic Interventions      Bed Mobility  Bed Mobility Training Rolling/Turning: supervsion  Bed Mobility Training Scooting: supervsion  Bed Mobility Training Sit-to-Supine: supervsion  Bed Mobility Training Supine-to-Sit: supervsion  Bed Mobility Training Limitations: decreased strength    Bed-Chair Transfer Training  Transfer Training Bed-to-Chair Transfer: contact guard;  1 person assist;  nonverbal cues (demo/gestures);  verbal cues;  no AD  Transfer Training Chair-to-Bed Transfer: contact guard;  1 person assist;  verbal cues;  nonverbal cues (demo/gestures);  no AD  Bed-to-Chair Transfer Training Transfer Safety Analysis: decreased balance;  decreased step length    Sit-Stand Transfer Training  Transfer Training Sit-to-Stand Transfer: contact guard;  1 person assist;  nonverbal cues (demo/gestures);  verbal cues;  no AD  Transfer Training Stand-to-Sit Transfer: contact guard;  1 person assist;  nonverbal cues (demo/gestures);  verbal cues;  no AD  Sit-to-Stand Transfer Training Transfer Safety Analysis: decreased balance;  decreased sequencing ability;  decreased step length    Eating/Self-Feeding Training  Eating/Self-Feeding Training Assistance: independent;  lunch;  seated in chair          PM&R Impression : as above    Current disposition plan recommendation :       - d/c home with home physical and occupational therapy

## 2024-03-07 NOTE — PROGRESS NOTE ADULT - PROBLEM SELECTOR PLAN 1
Patient on 2L NC and becomes hypoxic w/ ambulation. On baseline 1L O2 at home.   Likely 2/2 COPD exacerbation iso CAP vs worsening pulmonary HTN.  - New hemoptysis (3/5)    PLAN:  - COPD Tx as stated below   - Pulmonary HTN Tx as stated below   - Pulm following; s/p bronch 3/7  - Wean O2 as toleration

## 2024-03-07 NOTE — PROGRESS NOTE ADULT - ASSESSMENT
I M    68 y o M PMH AFib s/p watchman (on ASA), HTN, COPD, ESRD (MWF), severe ERIK, pulm HTN, T cell lymphoma (s/p chemo), HFpEF, s/p VATS procedure in 2021, recent [facility] admission 12/2023 for LGIB p/w productive cough, congestion, SOB for weeks now worsening in the last 5days. Admitted to [facility] for AHRF 2/2 CAP vs worsening pulmonary HTN, pending bronchoscopy by pulmonology for further evaluation.     Nutritional Assessment:  · Nutritional Assessment	This patient has been assessed with a concern for Malnutrition and has been determined to have a diagnosis/diagnoses of Severe protein-calorie malnutrition.    This patient is being managed with:   Diet NPO after Midnight-     NPO Start Date: 06-Mar-2024   NPO Start Time: 23:59  Entered: Mar  6 2024  2:41PM    Diet Renal Restrictions-  For patients receiving Renal Replacement - No Protein Restr No Conc K No Conc Phos Low Sodium  Entered: Mar  2 2024 11:06AM    Problem/Plan - 1:  ·  Problem: Acute hypoxic respiratory failure.   ·  Plan: Patient on 2L NC and becomes hypoxic w/ ambulation. On baseline 1L O2 at home.   Likely 2/2 COPD exacerbation iso CAP vs worsening pulmonary HTN.  - New hemoptysis (3/5)    PLAN:  - COPD Tx as stated below   - Pulmonary HTN Tx as stated below   - Pulm following; s/p bronch 3/7  - Wean O2 as toleration.    Problem/Plan - 2:  ·  Problem: COPD exacerbation.   ·  Plan: Presenting with SOB, productive cough w/ dark green sputum, wheezing at home, requiring more oxygen than baseline 1L NC. Likely 2/2 CAP. Follows with Dr Johnson outpatient. Prior smoker, quit 19 years ago.   - CXR w/ increased infiltrates. RVP negative.   - Home meds: prophylactic azithromycin 250 MWF, stiolto, ventolin HFA  - CTA PE this admission: Emphysematous changes. Persistent predominantly subpleural consolidations in both lungs mostly in the right lower lobe and left upper lobe. No PE.    PLAN:  - c/w duonebs q6  - c/w home stiolto qd  - c/w CTX 1g q24  - S/p course of Azithromycin 500mg; C/w home regimen   - c/w prednisone 40mg qd (complete 3/6)  - Pulm following; Bronch 3/7  - C/w Hycodan 5mg q6 prn for cough suppression.    Problem/Plan - 3:  ·  Problem: Gram-negative pneumonia.   ·  Plan: CT Chest outpatient 2/12/2024 w/ new consolidative opacities within the lingula and multifocally throughout the right lower lobe compared to 1/26/2023.   - CTA PE this admission showed persistent b/l pulmonary consolidation.   - Has hx of b/l pleural effusion s/p VATS w/ CT surg in 2021 for L chest wall loculated effusion.   - Ur legionella neg, Ur strep neg.   - Finished Azithromycin 500mg for atypical cov    PLAN:  - f/u BCx NGTD   - f/u Sputum Cx - initial spu Cx contaminated.   - c/w CTX 1g q24.    Problem/Plan - 4:  ·  Problem: Pulmonary hypertension.   ·  Plan: Home med: sildenafil citrate 20mg TID. Follows w/ Dr Johnson.  TTE 3/4 showing pulmonary artery systolic pressure is 58 mmHg, worse than previous     PLAN:  - c/w home med.    Problem/Plan - 5:  ·  Problem: ESRD on dialysis.   ·  Plan: Follows with Dr Guadalupe outpatient. Last HD session 2/28. Missed HD 3/1 due to sx. Renal consulted in ED  - c/w iHD  - c/w sevelamer 800mg TID.    Problem/Plan - 6:  ·  Problem: HTN (hypertension).   ·  Plan: Home med: amlodipine 5mg, not on HD days  - normotensive, holding this admission.    Problem/Plan - 7:  ·  Problem: Chronic atrial fibrillation.   ·  Plan: Home med: Aspirin 81mg qd, amio 200mg qd, metoprolol XL 50mg qd.   [id number] 2. s/p Watchman device, not on Eliquis. NSR this admission.    PLAN:  - ASA hold iso hemoptysis  - c/w other home meds.    Problem/Plan - 8:  ·  Problem: Anemia of chronic disease.   ·  Plan: Hgb 10, at baseline. Of note, pt recently admitted 12/2023 for LGIB, s/p c-scope, found to have large polyps on inpatient colonoscopy. Pending outpatient repeat colonoscopy w/ polypectomy on Thursday 3/7/24 @[facility].    PLAN:  - maintain active T&S  - outpatient GI followup.    Problem/Plan - 9:  ·  Problem: Chronic heart failure with preserved ejection fraction (HFpEF).   ·  Plan: #CAD  Follows with Dr Ventura, last seen 1/2024. Prior EF 45%, now recovered. b/l crackles on exam, pending iHD. no LE edema.   Home med: lipitor 40    PLAN:  - c/w home med.    Problem/Plan - 10:  ·  Problem: ERIK (obstructive sleep apnea).   ·  Plan; #ERIK  hx of severe ERIK, non-compliant with CPAP    PLAN:  - OVN CPAP ordered.    Problem/Plan - 11:  ·  Problem: Prophylactic measure.   ·  Plan: F: None  E: replete cautiously in ESRD  N: NPO for Bronch 3/7  GI: None  DVT: hep subq; holding for bronch 3/7  Dispo: F.

## 2024-03-07 NOTE — CONSULT NOTE ADULT - ASSESSMENT
68M PMH AFib s/p watchman (on ASA), HTN, COPD, ESRD (MWF), severe ERIK, pulm HTN, T cell lymphoma (s/p chemo), HFpEF, s/p VATS procedure in 2021, recent [facility] admission 12/2023 for LGIB p/w productive cough, congestion, SOB x5 days, admitted for management of COPD exacerbation in setting of CAP. Course complicated by small volume hemoptysis, s/p bronch which showed no clear source of bleeding but with copious secretions. ICU consulted for management of AHRF s/p bronchoscopy.    #AHRF  #CAP  #COPD  At home, pt baseline uses 1-2L NC. Pt initially admitted for COPD exacerbation in setting of CAP. Completed Prednisone 5/5 days and 5 days of CTX  course complicated by small volume hemoptysis. pt is s/p bronch on 3/7, which shows copious secretions and no source of bleeding  suspect hypoxia to be in setting of significant secretions seen on bronch and pt would benefit from airway clearance  pulm following, appreciate recommendations  d/c hycodan given no source of bleeding  c/w duonebs; add aerobika and chest PT  f/u culture data from bronch - c/w Zosyn at this time    Dispo: 7[facility]  Case discussed with Dr. Nevarez 68M PMH AFib s/p watchman (on ASA), HTN, COPD, ESRD (MWF), severe ERIK, pulm HTN, T cell lymphoma (s/p chemo), HFpEF, s/p VATS procedure in 2021, recent [facility] admission 12/2023 for LGIB p/w productive cough, congestion, SOB x5 days, admitted for management of COPD exacerbation in setting of CAP. Course complicated by small volume hemoptysis, s/p bronch which showed no clear source of bleeding but with copious secretions. ICU consulted for management of AHRF s/p bronchoscopy.    #AHRF  #CAP  #COPD  At home, pt baseline uses 1-2L NC. Pt initially admitted for COPD exacerbation in setting of CAP. Completed Prednisone 5/5 days and 5 days of CTX  course complicated by small volume hemoptysis. pt is s/p bronch on 3/7, which shows copious secretions and no source of bleeding  suspect hypoxia to be in setting of significant secretions seen on bronch as well as known RLL consolidation  pt would benefit from airway clearance and continuous pulse ox monitoring  pulm following, appreciate recommendations  d/c hycodan given no source of bleeding  c/w duonebs; add aerobika and chest PT  f/u culture data from bronch - c/w Zosyn at this time    Dispo: [facility]  Case discussed with Dr. Nevarez

## 2024-03-07 NOTE — PROGRESS NOTE ADULT - ASSESSMENT
68M PMH AFib s/p watchman (on ASA), HTN, COPD, ESRD (MWF), severe ERIK, pulm HTN, T cell lymphoma (s/p chemo), HFpEF, s/p VATS procedure in 2021, recent [facility] admission 12/2023 for LGIB p/w productive cough, congestion, SOB for weeks now worsening in the last 5days. Admitted to [facility] for AHRF 2/2 COPD exacerbation 2/2 to CAP. Course c/b low volume hemoptysis on 3/5 to 3/6. Now s/p bronch 3/7, after which patient desaturated to 88% on 5L NC (prior was on 2L NC saturating in 90s). ICU consulted, and patient transferred to [facility] for closer monitoring.

## 2024-03-07 NOTE — PROGRESS NOTE ADULT - PROBLEM SELECTOR PLAN 8
Female Hgb 10, at baseline. Of note, pt recently admitted 12/2023 for LGIB, s/p c-scope, found to have large polyps on inpatient colonoscopy. Pending outpatient repeat colonoscopy w/ polypectomy on Thursday 3/7/24 @[facility].    PLAN:  - maintain active T&S  - outpatient GI followup

## 2024-03-07 NOTE — PRE-ANESTHESIA EVALUATION ADULT - NSANTHPMHFT_GEN_ALL_CORE
Per primary team, this is a 68 year old M with a PMH of afib s/p Watchman (on ASA), HTN, COPD, ESRD (MWF) (last dialized 3/6/24), severe ERIK, pulm HTN, T cell lymphoma (s/p chemo), HFpEF, s/p VATS procedure in 2021, and recent [facility] admission 12/2023 for LGIB p/w productive cough, congestion, SOB x 5 days. He was admitted to [facility] for management of CAP and COPD exacerbation and now presents for Bronch/BAL. Upon meeting patient, O2 saturation on 2L N/C is 95%. He endorses SOB. No N/V/GERD. No numbness or weakness. METS < 4 prior to admission. All chronic conditions stable.

## 2024-03-07 NOTE — PROGRESS NOTE ADULT - ATTENDING COMMENTS
Patient was seen and examined at bedside on 3/7/2024 at 1130 am s/p bronchoscopy. Patient reports that he feels "okay" although still has continued cough and NIELSON. Denies CP. ROS is otherwise negative. Vitals, labwork and pertinent imaging reviewed. Exam - NAD, AAO x 4, PERRLA, EOMI, MMM, supple neck, chest - bibasilar crackles, CV - rrr, s1s2, no m/r/g, abd - soft, NTND, + BS, ext - wwp, psych - normal affect, pt appears cachectic and malnourished    Plan:  -Pt s/p bronchoscopy with increased supplemental oxygen requirements - given increased oxygen requirements will consult ICU  -C/w Zosyn, c/w supportive care - cough suppressant  -Sputum culture  -Pulm consulted  -Echo - worsening pulmonary hypertension  -PT/OT - rec HPT

## 2024-03-07 NOTE — PROGRESS NOTE ADULT - PROBLEM SELECTOR PLAN 7
Home med: Aspirin 81mg qd, amio 200mg qd, metoprolol XL 50mg qd.   [id number] 2. s/p Watchman device, not on Eliquis. NSR this admission.    PLAN:  - ASA hold iso hemoptysis  - c/w other home meds.

## 2024-03-07 NOTE — PRE-ANESTHESIA EVALUATION ADULT - NSANTHADDINFOFT_GEN_ALL_CORE
ICU bed on standby. Patient understands may have to transport to ICU if prolonged intubation is necessary (h/o ESRD, ERIK, hemoptysis, bronchitis, COPD with O2 requirement).

## 2024-03-07 NOTE — PROGRESS NOTE ADULT - ATTENDING COMMENTS
69 yo M w/ hx of T cell lymphoma, HFpEF, ESRD on HD, COPD, HTN, being worked up as out pt for pleural thickening and possible mesothelioma pending PET scan, admitted now with AHRF and worsening cough, increased sputum, likely COPD exacerbation 2/2 CAP; however, minimal improvement on steroids and CAP coverage, now with small volume (<1 tsp) hemoptysis episodes usually provoked by eating. Bronchoscopy today for airway evaluation revealed extensive thick purulent secretions throughout all airways (see bronch note), therapeutic aspiration performed, after clearance of airways there was no evidence of active bleeding. Likely all related to severe pneumonia. Agree w/ escalation of abx to zosyn given increased procalcitonin and appearance of secretions on bronch, sputum samples sent for cell count, cytology, and culture, F/U results. D/C hycodan, do not want to suppress cough as pt must bring up secretions. Will need aggressive airway clearance w/ duonebs, chest PT, and aerobika. Complete prednisone course. Would not pursue in patient PET at this time given higher likelihood of active infection, but can follow up with Dr. Johnson for further work up as previously planned. Continue to attempt to wean O2. Also with known ERIK, benefits from NIPPV at night but pt nonadherent. 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 Hold parameters for sildenafil. Rest as above.     Reviewed imaging, medications, lab results. Decision making of high complexity.

## 2024-03-07 NOTE — CONSULT NOTE ADULT - SUBJECTIVE AND OBJECTIVE BOX
Patient is a 68y old  Male who presents with a chief complaint of SOB (04 Mar 2024 08:09)      HPI:  68M PMH AFib s/p watchman (on ASA), HTN, COPD, ESRD (MWF), severe ERIK, pulm HTN, T cell lymphoma (s/p chemo), HFpEF, s/p VATS procedure in 2021, recent [facility] admission 12/2023 for LGIB p/w productive cough, congestion, SOB x5 days. Pt requiring more oxygen at home, uses 1 L as needed at baseline but has been using 3L over the past few days. ROS +headache, productive cough with dark green sputum, chills, wheezing. Was never hypoxic, but feeling subjectively better with 3L NC and could "feel the air." Denies fevers, CP, lightheadedness. Pt missed HD session yesterday due to feeling weak, fatigued, and SOB, HD session initially rescheduled for today however pt reports feeling worse this morning and decided to present to ED. At baseline, pt able to walk less than half a block. Able to perform ADLs as normal, however with increased NIELSON. Makes minimal urine. Denies sick contacts, however wife and pt routinely  6 year old grandson from school. Of note, pt w/ recent [facility] ED visit 2/26 after fall at home, rolled out of bed w/ head strike.    ED Course  VS T 97.6 HR 56 /60 RR 25 SpO2 97% on 3L NC  Labs WBC 6.89 Hgb 10.0 Plt 127 Lactate 0.9 BMP Lytes WNL BUN/Cr 42/8.68  Micro RVP negative  EKG   Imaging CXR w/ b/l infiltrates  Interventions azithromycin 500mg x1, CTX 1000mg x1, soludmedrol 125mcg x1, duoneb x1  Consults Renal (02 Mar 2024 10:32)    PAST MEDICAL & SURGICAL HISTORY:  HTN (hypertension)      Atrial fibrillation      CKD (chronic kidney disease)      Lymphoma  t cell; remission since 11/2020      CHF, chronic  LVEF 65 PASP 59 midl MR, AR on 12/2/21      H/O pulmonary hypertension      Gout      BPH (benign prostatic hyperplasia)      COPD, mild      Mitral regurgitation      ERIK (obstructive sleep apnea)      ESRD (end stage renal disease)  dialysis M W F      History of cholecystectomy      Elective surgery  rectal surgery      History of surgery  watchman left- dialysis port      AV fistula  left arm      H/O inguinal hernia repair  bilateral        MEDICATIONS  (STANDING):  albuterol/ipratropium for Nebulization 3 milliLiter(s) Nebulizer every 6 hours  allopurinol 100 milliGRAM(s) Oral every 12 hours  aMIOdarone    Tablet 200 milliGRAM(s) Oral every 24 hours  aspirin  chewable 81 milliGRAM(s) Oral every 24 hours  atorvastatin 40 milliGRAM(s) Oral at bedtime  cefTRIAXone   IVPB 1000 milliGRAM(s) IV Intermittent every 24 hours  heparin   Injectable 5000 Unit(s) SubCutaneous every 8 hours  metoprolol succinate ER 50 milliGRAM(s) Oral every 24 hours  predniSONE   Tablet 40 milliGRAM(s) Oral every 24 hours  sevelamer carbonate 800 milliGRAM(s) Oral every 8 hours  sildenafil (REVATIO) 20 milliGRAM(s) Oral every 8 hours  tiotropium 2.5 MICROgram(s)/olodaterol 2.5 MICROgram(s) (STIOLTO) Inhaler 2 Puff(s) Inhalation daily    MEDICATIONS  (PRN):  acetaminophen     Tablet .. 650 milliGRAM(s) Oral every 6 hours PRN Temp greater or equal to 38C (100.4F), Mild Pain (1 - 3)  aluminum hydroxide/magnesium hydroxide/simethicone Suspension 30 milliLiter(s) Oral every 4 hours PRN Dyspepsia  melatonin 3 milliGRAM(s) Oral at bedtime PRN Insomnia  ondansetron Injectable 4 milliGRAM(s) IV Push every 8 hours PRN Nausea and/or Vomiting        FAMILY HISTORY:  FH: type 2 diabetes (Mother)        CBC Full  -  ( 03 Mar 2024 09:01 )  WBC Count : 10.24 K/uL  RBC Count : 3.04 M/uL  Hemoglobin : 9.2 g/dL  Hematocrit : 29.2 %  Platelet Count - Automated : 146 K/uL  Mean Cell Volume : 96.1 fl  Mean Cell Hemoglobin : 30.3 pg  Mean Cell Hemoglobin Concentration : 31.5 gm/dL  Auto Neutrophil # : 9.10 K/uL  Auto Lymphocyte # : 0.74 K/uL  Auto Monocyte # : 0.34 K/uL  Auto Eosinophil # : 0.00 K/uL  Auto Basophil # : 0.02 K/uL  Auto Neutrophil % : 88.9 %  Auto Lymphocyte % : 7.2 %  Auto Monocyte % : 3.3 %  Auto Eosinophil % : 0.0 %  Auto Basophil % : 0.2 %      03-03    139  |  94<L>  |  32<H>  ----------------------------<  161<H>  4.4   |  30  |  5.97<H>    Ca    9.9      03 Mar 2024 09:01  Phos  4.8     03-03  Mg     2.3     03-03    TPro  7.4  /  Alb  3.8  /  TBili  0.3  /  DBili  x   /  AST  9<L>  /  ALT  <5<L>  /  AlkPhos  167<H>  03-03      Urinalysis Basic - ( 03 Mar 2024 09:01 )    Color: x / Appearance: x / SG: x / pH: x  Gluc: 161 mg/dL / Ketone: x  / Bili: x / Urobili: x   Blood: x / Protein: x / Nitrite: x   Leuk Esterase: x / RBC: x / WBC x   Sq Epi: x / Non Sq Epi: x / Bacteria: x        Radiology :     < from: CT Angio Chest PE Protocol w/ IV Cont (03.03.24 @ 18:45) >    ACC: [id number] EXAM:  CT ANGIO CHEST PULM ART [facility]   ORDERED BY: KESHA CORDERO     PROCEDURE DATE:  03/03/2024          INTERPRETATION:  CLINICAL INFORMATION: Cough. Evaluate for PE.    COMPARISON: CT chest 2/6/2024.    CONTRAST/COMPLICATIONS:  IV Contrast: Isovue 370  70 cc administered   30 cc discarded  Oral Contrast: NONE  Complications: None reported at time of study completion    PROCEDURE:  CT Angiography of the Chest.  Sagittal and coronal reformats were performed as well as 3D (MIP)   reconstructions.    FINDINGS:    LUNGS AND AIRWAYS: Patent central airways. Emphysematous changes.   Persistent predominantly subpleural consolidations in both lungs mostly   in the right lower lobe and left upper lobe.  PLEURA: No significant change of trace loculated right pleural effusion.  MEDIASTINUM AND HILDA: Enlarged precarinal lymph nodes again noted   measuring 1.6 cm in short axis.  VESSELS: No filling defects. Aortic valve calcifications. Ascending   aortic aneurysm 4.1 cm. Ectatic branches of the aortic arch with the   innominate artery measuring 1.6 cm.  HEART: Persistent cardiomegaly. No pericardial effusion. Left atrial   appendage closure device.  CHEST WALL AND LOWER NECK: Mild gynecomastia.  VISUALIZED UPPER ABDOMEN: Cholecystectomy. Atrophic partially visualized   right kidney. Partially visualized splenic mass.  BONES: Degenerative changes.    IMPRESSION:  1.  No pulmonary embolism.  2.  Persistent bilateral pulmonary consolidations.         Review of Systems : per HPI         Vital Signs Last 24 Hrs  T(C): 36.8 (04 Mar 2024 05:52), Max: 36.8 (04 Mar 2024 05:52)  T(F): 98.3 (04 Mar 2024 05:52), Max: 98.3 (04 Mar 2024 05:52)  HR: 76 (04 Mar 2024 07:05) (62 - 76)  BP: 112/62 (04 Mar 2024 07:05) (104/54 - 117/64)  BP(mean): 79 (04 Mar 2024 07:05) (70 - 79)  RR: 18 (04 Mar 2024 05:52) (16 - 18)  SpO2: 99% (04 Mar 2024 05:52) (94% - 99%)    Parameters below as of 03 Mar 2024 20:49  Patient On (Oxygen Delivery Method): nasal cannula  O2 Flow (L/min): 2          Physical Exam:   68 y o man lying comfortably in semi Howard's position , awake , alert , no new complaints , feels tired     Head: normocephalic , atraumatic    Eyes: PERRLA , EOMI , no nystagmus , sclera anicteric    ENT / FACE: neg nasal discharge , uvula midline , no oropharyngeal erythema / exudate    Neck: supple , negative JVD , negative carotid bruits , no thyromegaly    Chest: tressa crackles    Cardiovascular: regular rate and rhythm , neg murmurs / rubs / gallops    Abdomen: soft , non distended , non tender to palpation in all 4 quadrants ,  normal bowel sounds     Extremities: WWP , neg cyanosis /clubbing / edema     Neurologic Exam:     Alert and oriented  x 3        Motor Exam:        > 3+/5 x 4 extremities        Sensation:         intact to light touch x 4 extremities       DTR:           biceps/brachioradialis: equal                            patella/ankle: equal            Gait:  not tested          PM&R Impression: admitted for CAP and COPD exacerbation         Recommendations / Plan:       1) Physical / Occupational therapy focusing on therapeutic exercises , equipment evaluation , bed mobility/transfer out of bed evaluation , progressive ambulation with assistive devices prn .    2) Current disposition plan recommendation:    pending functional progress       
PULMONARY SERVICE INITIAL CONSULT NOTE    HPI:  68M PMH AFib s/p watchman (on ASA), HTN, COPD, ESRD (MWF), severe ERIK noncompliant to CPAP, pulm HTN (group 2 and3) on sildenafil T cell lymphoma (s/p chemo), HFpEF, s/p VATS procedure in 2021, recent [facility] admission 12/2023 for LGIB who presented with productive cough brown and SOB x5 days requiring more oxygen from baseline of 1 L via NC. He had one missed HD session due to feeling weak. He was admitted to medicine for mangement, on 3 L nc, afeibrle, blood work unremarkable aside from Cr elevation from ESRD. RVP negative, urine strep and legionella negative. CXR showed b/l infiltrats at the bases R >L, CTPE done and negative for PE but showed RLL infiltrate, Left pleural based scar. Started on ceftriaxone and azithromycin, prednisone and admitted to medicine.    Pulmonary is consulted for COPD, ERIK, and PH    Follows with Dr. Johnson outpatient for Pulmonary. last seen in Feb 2024.  COPD - stiolto (DLCO 23% on last PFT, Ratio >70, FEV1 53  PH - sildenafil, PASP 58 - March 2023 (34 on one in 2022)  ERIK - not compliant to CPAP, severe w/ high AHI 36 Nov 2022    Last seen by Dr. Johnson feb 2024, CT showed increased pleural based scar. plan was to get PET scan (not done yet)    REVIEW OF SYSTEMS:  10 point ROS negative aside from what is mentioned in HPI    PAST MEDICAL & SURGICAL HISTORY:  HTN (hypertension)  Atrial fibrillation  CKD (chronic kidney disease)  Lymphoma  t cell; remission since 11/2020  CHF, chronic  LVEF 65 PASP 59 midl MR, AR on 12/2/21  H/O pulmonary hypertension  Gout  BPH (benign prostatic hyperplasia)  COPD, mild  Mitral regurgitation  ERIK (obstructive sleep apnea)  ESRD (end stage renal disease)  dialysis M W F      History of cholecystectomy      Elective surgery  rectal surgery      History of surgery  watchman left- dialysis port      AV fistula  left arm      H/O inguinal hernia repair  bilateral          FAMILY HISTORY:  FH: type 2 diabetes (Mother)        SOCIAL HISTORY:  Smoking Status: [ ] Current, [x ] Former, [ ] Never  Pack Years: unknown    MEDICATIONS:  Pulmonary:  albuterol/ipratropium for Nebulization 3 milliLiter(s) Nebulizer every 6 hours  tiotropium 2.5 MICROgram(s)/olodaterol 2.5 MICROgram(s) (STIOLTO) Inhaler 2 Puff(s) Inhalation daily    Antimicrobials:  cefTRIAXone   IVPB 1000 milliGRAM(s) IV Intermittent every 24 hours    Anticoagulants:  aspirin  chewable 81 milliGRAM(s) Oral every 24 hours  heparin   Injectable 5000 Unit(s) SubCutaneous every 8 hours    Onc:    GI/:  aluminum hydroxide/magnesium hydroxide/simethicone Suspension 30 milliLiter(s) Oral every 4 hours PRN    Endocrine:  allopurinol 100 milliGRAM(s) Oral every 12 hours  atorvastatin 40 milliGRAM(s) Oral at bedtime  predniSONE   Tablet 40 milliGRAM(s) Oral every 24 hours    Cardiac:  aMIOdarone    Tablet 200 milliGRAM(s) Oral every 24 hours  metoprolol succinate ER 50 milliGRAM(s) Oral every 24 hours  sildenafil (REVATIO) 20 milliGRAM(s) Oral every 8 hours    Other Medications:  acetaminophen     Tablet .. 650 milliGRAM(s) Oral every 6 hours PRN  melatonin 3 milliGRAM(s) Oral at bedtime PRN  ondansetron Injectable 4 milliGRAM(s) IV Push every 8 hours PRN  sevelamer carbonate 800 milliGRAM(s) Oral every 8 hours      Allergies    No Known Allergies    Intolerances        Vital Signs Last 24 Hrs  T(C): 36.3 (04 Mar 2024 16:25), Max: 36.8 (04 Mar 2024 05:52)  T(F): 97.4 (04 Mar 2024 16:25), Max: 98.3 (04 Mar 2024 05:52)  HR: 60 (04 Mar 2024 16:25) (60 - 86)  BP: 142/68 (04 Mar 2024 16:25) (104/54 - 142/68)  BP(mean): 79 (04 Mar 2024 07:05) (70 - 79)  RR: 18 (04 Mar 2024 16:25) (17 - 18)  SpO2: 97% (04 Mar 2024 16:25) (95% - 99%)    Parameters below as of 04 Mar 2024 12:45  Patient On (Oxygen Delivery Method): nasal cannula  O2 Flow (L/min): 2      03-04 @ 07:01  -  03-04 @ 18:16  --------------------------------------------------------  IN: 0 mL / OUT: 1000 mL / NET: -1000 mL            PHYSICAL EXAM:  Constitutional: well-appearing, in no apparent distress  Head: NC/AT  EENT: PERRL, anicteric sclera; oropharynx clear with moist mucous membranes  Neck: supple, ROM intact, no appreciable JVD or LAD  Respiratory: Lungs clear to auscultation bilaterally with distant breath sounds in the upper areas, rhonchourous brath sounds at bases, no wheezes  Cardiovascular: +S1/S2 intact, regular rhythm and rate. No murmurs appreciated  Gastrointestinal: soft, non-tender, non distended, bowel sounds normoactive   Extremities: no edema, clubbing or cyanosis  Vascular: 2+ radial pulses B/L  Neurological: awake and alert; oriented with no appreciable focal neuro defecits    LABS:      CBC Full  -  ( 04 Mar 2024 05:30 )  WBC Count : 13.06 K/uL  RBC Count : 2.86 M/uL  Hemoglobin : 8.7 g/dL  Hematocrit : 28.0 %  Platelet Count - Automated : 153 K/uL  Mean Cell Volume : 97.9 fl  Mean Cell Hemoglobin : 30.4 pg  Mean Cell Hemoglobin Concentration : 31.1 gm/dL  Auto Neutrophil # : 11.62 K/uL  Auto Lymphocyte # : 0.81 K/uL  Auto Monocyte # : 0.54 K/uL  Auto Eosinophil # : 0.00 K/uL  Auto Basophil # : 0.02 K/uL  Auto Neutrophil % : 89.0 %  Auto Lymphocyte % : 6.2 %  Auto Monocyte % : 4.1 %  Auto Eosinophil % : 0.0 %  Auto Basophil % : 0.2 %    03-04    139  |  96  |  46<H>  ----------------------------<  130<H>  4.6   |  27  |  7.70<H>    Ca    9.8      04 Mar 2024 05:30  Phos  4.4     03-04  Mg     2.6     03-04    TPro  7.1  /  Alb  3.6  /  TBili  0.3  /  DBili  x   /  AST  13  /  ALT  5<L>  /  AlkPhos  158<H>  03-04            RADIOLOGY & ADDITIONAL STUDIES:    reviewed    CT pe 3/2024  LUNGS AND AIRWAYS: Patent central airways. Emphysematous changes. Persistent predominantly subpleural consolidations in both lungs mostly in the right lower lobe and left upper lobe.  PLEURA: No significant change of trace loculated right pleural effusion.  MEDIASTINUM AND HILDA: Enlarged precarinal lymph nodes again noted measuring 1.6 cm in short axis.
    HPI:  68M PMH AFib s/p watchman (on ASA), HTN, COPD, ESRD (MWF), severe ERIK, pulm HTN, T cell lymphoma (s/p chemo), HFpEF, s/p VATS procedure in 2021, recent [facility] admission 12/2023 for LGIB p/w productive cough, congestion, SOB x5 days. Pt requiring more oxygen at home, uses 1 L as needed at baseline but has been using 3L over the past few days. ROS +headache, productive cough with dark green sputum, chills, wheezing. Was never hypoxic, but feeling subjectively better with 3L NC and could "feel the air." Denies fevers, CP, lightheadedness. Pt missed HD session yesterday due to feeling weak, fatigued, and SOB, HD session initially rescheduled for today however pt reports feeling worse this morning and decided to present to ED. At baseline, pt able to walk less than half a block. Able to perform ADLs as normal, however with increased NIELSON. Makes minimal urine. Denies sick contacts, however wife and pt routinely  6 year old grandson from school. Of note, pt w/ recent [facility] ED visit 2/26 after fall at home, rolled out of bed w/ head strike. Nephrology consulted for in patient HD management       PAST MEDICAL & SURGICAL HISTORY:  HTN (hypertension)      Atrial fibrillation      CKD (chronic kidney disease)      Lymphoma  t cell; remission since 11/2020      CHF, chronic  LVEF 65 PASP 59 midl MR, AR on 12/2/21      H/O pulmonary hypertension      Gout      BPH (benign prostatic hyperplasia)      COPD, mild      Mitral regurgitation      ERIK (obstructive sleep apnea)      ESRD (end stage renal disease)  dialysis M W F      History of cholecystectomy      Elective surgery  rectal surgery      History of surgery  watchman left- dialysis port      AV fistula  left arm      H/O inguinal hernia repair  bilateral            Allergies:  No Known Allergies      Home Medications:   acetaminophen 325 mg oral tablet: 2 tab(s) orally every 6 hours, As needed, Moderate Pain (4 - 6)  allopurinol 100 mg oral tablet: 1 tab(s) orally 2 times a day  amiodarone 200 mg oral tablet: 1 tab(s) orally once a day  amLODIPine 5 mg oral tablet: 1 tab(s) orally once a day  aspirin 81 mg oral tablet: orally  atorvastatin 40 mg oral tablet: 1 tab(s) orally once a day  azithromycin 250 mg oral tablet: 1 tab(s) orally Monday, Wednesday, and Friday   metoprolol succinate 50 mg oral capsule, extended release: 1 cap(s) orally  sevelamer carbonate 800 mg oral tablet: 1 tab(s) orally every 8 hours  tiotropium-olodaterol 2.5 mcg-2.5 mcg/inh inhalation aerosol: 2 puff(s) inhaled once a day       Hospital Medications:   MEDICATIONS  (STANDING):  albuterol/ipratropium for Nebulization 3 milliLiter(s) Nebulizer every 6 hours  allopurinol 100 milliGRAM(s) Oral every 12 hours  atorvastatin 40 milliGRAM(s) Oral at bedtime  heparin   Injectable 5000 Unit(s) SubCutaneous every 8 hours  sevelamer carbonate 800 milliGRAM(s) Oral every 8 hours  sildenafil (REVATIO) 20 milliGRAM(s) Oral every 8 hours  tiotropium 2.5 MICROgram(s)/olodaterol 2.5 MICROgram(s) (STIOLTO) Inhaler 2 Puff(s) Inhalation daily      SOCIAL HISTORY:  Denies ETOh, Smoking    Family History:  FAMILY HISTORY:  FH: type 2 diabetes (Mother)          VITALS:  T(F): 98.1 (03-02-24 @ 11:40), Max: 98.1 (03-02-24 @ 11:40)  HR: 58 (03-02-24 @ 11:40)  BP: 118/66 (03-02-24 @ 11:40)  RR: 19 (03-02-24 @ 11:40)  SpO2: 93% (03-02-24 @ 11:40)  Wt(kg): --    Height (cm): 177.8 (03-02 @ 07:16)  Weight (kg): 68 (03-02 @ 07:16)  BMI (kg/m2): 21.5 (03-02 @ 07:16)  BSA (m2): 1.85 (03-02 @ 07:16)  CAPILLARY BLOOD GLUCOSE          Review of Systems:  CONSTITUTIONAL: No fever or chills.  RESPIRATORY:  shortness of breath, cough  CARDIOVASCULAR: No Chest pain, shortness of breath, palpitations  GASTROINTESTINAL: No abdominal pain, nausea, vomiting, diarrhea  GENITOURINARY: No urinary frequency, gross hematuria, dysuria  NEUROLOGICAL: weakness  SKIN: No rash or skin lesion  MUSCULOSKELETAL: No swelling  Psych: Denies suicidal or homicidal ideation    PHYSICAL EXAM:  GENERAL: Alert, awake, oriented x3   HEENT: SARAI, EOMI, neck supple, no JVP  CHEST/LUNG: b/l crackles  HEART: Regular rate and rhythm, no murmur, no gallops, no rub   ABDOMEN: Soft, nontender, non distended  EXTREMITIES: no pedal edema  Neurology: AAOx3, no focal neurological deficit  SKIN: No rash or skin lesion  Access: LUE AVF with good thrill and bruit     LABS:  03-02    143  |  97  |  42<H>  ----------------------------<  79  4.7   |  30  |  8.68<H>    Ca    10.2      02 Mar 2024 07:43      Creatinine Trend: 8.68 <--                        10.0   6.89  )-----------( 127      ( 02 Mar 2024 07:43 )             33.0     Urine Studies:  Urinalysis Basic - ( 02 Mar 2024 07:43 )    Color:  / Appearance:  / SG:  / pH:   Gluc: 79 mg/dL / Ketone:   / Bili:  / Urobili:    Blood:  / Protein:  / Nitrite:    Leuk Esterase:  / RBC:  / WBC    Sq Epi:  / Non Sq Epi:  / Bacteria:           
HPI:  68M PMH AFib s/p watchman (on ASA), HTN, COPD, ESRD (MWF), severe ERIK, pulm HTN, T cell lymphoma (s/p chemo), HFpEF, s/p VATS procedure in 2021, recent [facility] admission 12/2023 for LGIB p/w productive cough, congestion, SOB x5 days. CTA chest on admission shows RLL pneumonia. Pt initially admitted to [facility] for management of COPD exacerbation in setting of CAP. Pt received 5 day course of prednisone and duonebs as well as CTX x5 days. Due to persistent hypoxia and shortness of breath, on 3/6 patient was broadened to Zosyn, pseudomonal coverage. Course was complicated by low volume hemoptysis on 3/5 and 3/6 Pt underwent bronch on 3/7 which showed no evidence of localized hemoptysis or endobronchial lesions, +copious brown secretions.     ICU was consulted due to AHRF s/p bronch. Pt initially was mid 90s on 2L prior to bronch and post bronch was 87-88% on 5L NC. Pt has no complaints, states he feels well. No shortness of breath, cough, chest pain.     Allergies    No Known Allergies    Intolerances      Home Medications:  acetaminophen 325 mg oral tablet: 2 tab(s) orally every 6 hours, As needed, Moderate Pain (4 - 6) (02 Dec 2023 20:30)  allopurinol 100 mg oral tablet: 1 tab(s) orally 2 times a day (02 Dec 2023 20:30)  amLODIPine 5 mg oral tablet: 1 tab(s) orally once a day (02 Dec 2023 20:30)  aspirin 81 mg oral tablet: orally (02 Mar 2024 11:58)  atorvastatin 40 mg oral tablet: 1 tab(s) orally once a day (02 Dec 2023 20:30)  metoprolol succinate 50 mg oral capsule, extended release: 1 cap(s) orally once a day (05 Mar 2024 15:22)  sevelamer carbonate 800 mg oral tablet: 1 tab(s) orally every 8 hours (05 Mar 2024 15:25)  sildenafil 20 mg oral tablet: 1 tab(s) orally every 8 hours (05 Mar 2024 15:22)      PAST MEDICAL & SURGICAL HISTORY:  HTN (hypertension)      Atrial fibrillation      CKD (chronic kidney disease)      Lymphoma  t cell; remission since 11/2020      CHF, chronic  LVEF 65 PASP 59 midl MR, AR on 12/2/21      H/O pulmonary hypertension      Gout      BPH (benign prostatic hyperplasia)      COPD, mild      Mitral regurgitation      ERIK (obstructive sleep apnea)      ESRD (end stage renal disease)  dialysis M W F      History of cholecystectomy      Elective surgery  rectal surgery      History of surgery  watchman left- dialysis port      AV fistula  left arm      H/O inguinal hernia repair  bilateral    FAMILY HISTORY:  FH: type 2 diabetes (Mother)    :    No known cardiovascular or pulmonary family history     ROS:  See HPI     PHYSICAL EXAM    ICU Vital Signs Last 24 Hrs  T(C): 36.4 (07 Mar 2024 10:57), Max: 36.7 (06 Mar 2024 14:25)  T(F): 97.5 (07 Mar 2024 10:57), Max: 98.1 (06 Mar 2024 14:25)  HR: 59 (07 Mar 2024 10:57) (59 - 98)  BP: 117/62 (07 Mar 2024 10:57) (111/71 - 149/58)  BP(mean): 79 (07 Mar 2024 07:17) (79 - 79)  ABP: --  ABP(mean): --  RR: 18 (07 Mar 2024 10:57) (17 - 18)  SpO2: 94% (07 Mar 2024 10:57) (91% - 97%)    O2 Parameters below as of 07 Mar 2024 10:57  Patient On (Oxygen Delivery Method): nasal cannula  O2 Flow (L/min): 3          General: sitting up in bed in no acute distress, speaking in full sentences on 5L NC  HEENT:  SARAI              Lungs: decreased breath sounds at b/l bases with rhonchi at RLL  Cardiovascular: RRR no murmurs  Gastrointestinal: Soft, Positive BS in all quadrants, nondistended  Musculoskeletal: moving all extremities, b/l LE wwp  Skin: Warm.  Intact  Neurological: No motor or sensory deficit, AOx3      03-06-24 @ 07:01  -  03-07-24 @ 07:00  --------------------------------------------------------  IN:  Total IN: 0 mL    OUT:    Other (mL): 1000 mL  Total OUT: 1000 mL    Total NET: -1000 mL          LABS:                          9.0    11.04 )-----------( 139      ( 06 Mar 2024 07:30 )             28.5                                               03-06    140  |  98  |  51<H>  ----------------------------<  106<H>  4.7   |  28  |  6.50<H>    Ca    9.5      06 Mar 2024 07:30  Phos  3.6     03-06  Mg     2.5     03-06    TPro  6.5  /  Alb  3.3  /  TBili  0.3  /  DBili  x   /  AST  10  /  ALT  6<L>  /  AlkPhos  136<H>  03-06      PT/INR - ( 06 Mar 2024 07:30 )   PT: 13.1 sec;   INR: 1.15          PTT - ( 06 Mar 2024 07:30 )  PTT:35.8 sec                                       Urinalysis Basic - ( 06 Mar 2024 07:30 )    Color: x / Appearance: x / SG: x / pH: x  Gluc: 106 mg/dL / Ketone: x  / Bili: x / Urobili: x   Blood: x / Protein: x / Nitrite: x   Leuk Esterase: x / RBC: x / WBC x   Sq Epi: x / Non Sq Epi: x / Bacteria: x                                                  LIVER FUNCTIONS - ( 06 Mar 2024 07:30 )  Alb: 3.3 g/dL / Pro: 6.5 g/dL / ALK PHOS: 136 U/L / ALT: 6 U/L / AST: 10 U/L / GGT: x                                                  MEDICATIONS  (STANDING):  albuterol/ipratropium for Nebulization 3 milliLiter(s) Nebulizer every 6 hours  aMIOdarone    Tablet 200 milliGRAM(s) Oral every 24 hours  atorvastatin 40 milliGRAM(s) Oral at bedtime  azithromycin   Tablet 250 milliGRAM(s) Oral <User Schedule>  hydrocodone/homatropine Syrup 5 milliLiter(s) Oral every 6 hours  metoprolol succinate ER 50 milliGRAM(s) Oral every 24 hours  piperacillin/tazobactam IVPB.. 4.5 Gram(s) IV Intermittent every 12 hours  polyethylene glycol 3350 17 Gram(s) Oral two times a day  senna 2 Tablet(s) Oral at bedtime  sevelamer carbonate 800 milliGRAM(s) Oral every 8 hours  sildenafil (REVATIO) 20 milliGRAM(s) Oral every 8 hours  tiotropium 2.5 MICROgram(s)/olodaterol 2.5 MICROgram(s) (STIOLTO) Inhaler 2 Puff(s) Inhalation daily    MEDICATIONS  (PRN):  acetaminophen     Tablet .. 650 milliGRAM(s) Oral every 6 hours PRN Temp greater or equal to 38C (100.4F), Mild Pain (1 - 3)  aluminum hydroxide/magnesium hydroxide/simethicone Suspension 30 milliLiter(s) Oral every 4 hours PRN Dyspepsia  melatonin 3 milliGRAM(s) Oral at bedtime PRN Insomnia  ondansetron Injectable 4 milliGRAM(s) IV Push every 8 hours PRN Nausea and/or Vomiting

## 2024-03-07 NOTE — PROGRESS NOTE ADULT - PROBLEM SELECTOR PLAN 2
Presenting with SOB, productive cough w/ dark green sputum, wheezing at home, requiring more oxygen than baseline 1L NC. Likely 2/2 CAP. Follows with Dr Johnson outpatient. Prior smoker, quit 19 years ago.   - CXR w/ increased infiltrates. RVP negative.   - Home meds: prophylactic azithromycin 250 MWF, stiolto, ventolin HFA  - CTA PE this admission: Emphysematous changes. Persistent predominantly subpleural consolidations in both lungs mostly in the right lower lobe and left upper lobe. No PE.    PLAN:  - c/w duonebs q6  - c/w home stiolto qd  - c/w CTX 1g q24  - S/p course of Azithromycin 500mg; C/w home regimen   - c/w prednisone 40mg qd (complete 3/6)  - Pulm following; Bronch 3/7  - C/w Hycodan 5mg q6 prn for cough suppression Presenting with SOB, productive cough w/ dark green sputum, wheezing at home, requiring more oxygen than baseline 1L NC. Likely 2/2 CAP. Follows with Dr Johnson outpatient. Prior smoker, quit 19 years ago.   - CXR w/ increased infiltrates. RVP negative.   - Home meds: prophylactic azithromycin 250 MWF, stiolto, ventolin HFA  - CTA PE this admission: Emphysematous changes. Persistent predominantly subpleural consolidations in both lungs mostly in the right lower lobe and left upper lobe. No PE.    PLAN:  - c/w duonebs q6  - c/w home stiolto qd  - c/w CTX 1g q24  - S/p course of Azithromycin 500mg; C/w home regimen   - c/w prednisone 40mg qd (complete 3/6)  - Pulm following; s/p bronch 3/7  - dc'd hydrocan

## 2024-03-07 NOTE — PROGRESS NOTE ADULT - PROBLEM SELECTOR PLAN 11
F: None  E: replete cautiously in ESRD  N: NPO for Bronch 3/7  GI: None  DVT: hep subq; holding for bronch 3/7  Dispo: [facility]

## 2024-03-07 NOTE — PROGRESS NOTE ADULT - PROBLEM SELECTOR PLAN 2
Presenting with SOB, productive cough w/ dark green sputum, wheezing at home, requiring more oxygen than baseline 1L NC. Likely 2/2 CAP. Follows with Dr Johnosn outpatient. Prior smoker, quit 19 years ago.   - CXR w/ increased infiltrates. RVP negative.   - Home meds: prophylactic azithromycin 250 MWF, stiolto, ventolin HFA  - CTA PE this admission: Emphysematous changes. Persistent predominantly subpleural consolidations in both lungs mostly in the right lower lobe and left upper lobe. No PE.    PLAN:  - c/w duonebs q6  - c/w home stiolto qd  - c/w CTX 1g q24  - S/p course of Azithromycin 500mg; C/w home regimen   - c/w prednisone 40mg qd (complete 3/6)  - Pulm following; Bronch 3/7  - C/w Hycodan 5mg q6 prn for cough suppression

## 2024-03-07 NOTE — PROGRESS NOTE ADULT - SUBJECTIVE AND OBJECTIVE BOX
Internal Medicine Progress Note  Raiza Bailon, PGY-1  Pager: [phone]    ******INCOMPLETE******    Patient is a 68y old  Male who presents with a chief complaint of SOB (06 Mar 2024 15:10)    OVERNIGHT EVENTS/INTERVAL HPI:    REVIEW OF SYSTEMS:  All other review of systems is negative unless indicated above.    OBJECTIVE:  T(C): 36.2 (24 @ 06:02), Max: 36.7 (24 @ 10:55)  HR: 60 (24 @ 06:02) (60 - 98)  BP: 111/71 (24 @ 06:02) (111/71 - 169/70)  RR: 17 (24 @ 06:02) (17 - 20)  SpO2: 93% (24 @ 06:02) (91% - 97%)  Daily     Daily Weight in k.2 (06 Mar 2024 14:25)    Physical Exam:  General: in no acute distress  Eyes: EOMI intact bilaterally. Anicteric sclerae, moist conjunctivae  HENT: Moist mucous membranes  Neck: Trachea midline, supple  Lungs: CTA B/L. No wheezes, rales, or rhonchi  Cardiovascular: RRR. No murmurs, rubs, or gallops  Abdomen: Soft, non-tender non-distended; No rebound or guarding  Extremities: WWP, No clubbing, cyanosis or edema  MSK: No midline bony tenderness. No CVA tenderness bilaterally  Neurological: Alert and oriented x3  Skin: Warm and dry. No obvious rash     Medications:  MEDICATIONS  (STANDING):  albuterol/ipratropium for Nebulization 3 milliLiter(s) Nebulizer every 6 hours  aMIOdarone    Tablet 200 milliGRAM(s) Oral every 24 hours  atorvastatin 40 milliGRAM(s) Oral at bedtime  azithromycin   Tablet 250 milliGRAM(s) Oral <User Schedule>  hydrocodone/homatropine Syrup 5 milliLiter(s) Oral every 6 hours  metoprolol succinate ER 50 milliGRAM(s) Oral every 24 hours  piperacillin/tazobactam IVPB.. 4.5 Gram(s) IV Intermittent every 12 hours  polyethylene glycol 3350 17 Gram(s) Oral two times a day  senna 2 Tablet(s) Oral at bedtime  sevelamer carbonate 800 milliGRAM(s) Oral every 8 hours  sildenafil (REVATIO) 20 milliGRAM(s) Oral every 8 hours  tiotropium 2.5 MICROgram(s)/olodaterol 2.5 MICROgram(s) (STIOLTO) Inhaler 2 Puff(s) Inhalation daily    MEDICATIONS  (PRN):  acetaminophen     Tablet .. 650 milliGRAM(s) Oral every 6 hours PRN Temp greater or equal to 38C (100.4F), Mild Pain (1 - 3)  aluminum hydroxide/magnesium hydroxide/simethicone Suspension 30 milliLiter(s) Oral every 4 hours PRN Dyspepsia  melatonin 3 milliGRAM(s) Oral at bedtime PRN Insomnia  ondansetron Injectable 4 milliGRAM(s) IV Push every 8 hours PRN Nausea and/or Vomiting      Labs:                        9.0    11.04 )-----------( 139      ( 06 Mar 2024 07:30 )             28.5     03-06    140  |  98  |  51<H>  ----------------------------<  106<H>  4.7   |  28  |  6.50<H>    Ca    9.5      06 Mar 2024 07:30  Phos  3.6     03-06  Mg     2.5     03-06    TPro  6.5  /  Alb  3.3  /  TBili  0.3  /  DBili  x   /  AST  10  /  ALT  6<L>  /  AlkPhos  136<H>  03-06    PT/INR - ( 06 Mar 2024 07:30 )   PT: 13.1 sec;   INR: 1.15          PTT - ( 06 Mar 2024 07:30 )  PTT:35.8 sec  Urinalysis Basic - ( 06 Mar 2024 07:30 )    Color: x / Appearance: x / SG: x / pH: x  Gluc: 106 mg/dL / Ketone: x  / Bili: x / Urobili: x   Blood: x / Protein: x / Nitrite: x   Leuk Esterase: x / RBC: x / WBC x   Sq Epi: x / Non Sq Epi: x / Bacteria: x          Radiology: Reviewed Hospital Course: 68M PMH AFib s/p watchman (on ASA), HTN, COPD (baseline 1L NC at home), ESRD (MWF), severe ERIK, pulm HTN, T cell lymphoma (s/p chemo), HFpEF, s/p VATS procedure in , recent [facility] admission 2023 for LGIB p/w progressively worsening productive cough, congestion, and SOB. On admission CTA PE significant for emphysematous changes, subpleural consolidations b/l, without PE. Initially treated for COPD exacerbation and  course of azithromycin (completed) and CTX (3/3 - 3/6) for CAP.  Course complicated by hemoptysis on 3/5, with patient desaturating to 80s on 5L NC, ultimately weaned to 2-3L NC. Hgb at baseline. Pulmonology following, recommended bronchoscopy. On 3/6 patient reported worsening cough without sputum or hematemesis so abx broadened to Zosyn. S/p bronch 3/7, pt desaturated to 80s on 5L NC. Patient alert and oriented, but given extensive pulm history will be transferred to [facility] for closer monitoring.     REVIEW OF SYSTEMS:  All other review of systems is negative unless indicated above.    OBJECTIVE:  T(C): 36.2 (24 @ 06:02), Max: 36.7 (24 @ 10:55)  HR: 60 (24 @ 06:02) (60 - 98)  BP: 111/71 (24 @ 06:02) (111/71 - 169/70)  RR: 17 (24 @ 06:02) (17 - 20)  SpO2: 93% (24 @ 06:02) (91% - 97%)  Daily     Daily Weight in k.2 (06 Mar 2024 14:25)    Physical Exam:  General: in no acute distress, non toxic appearing, speaking in full sentences  Eyes: Rt sided eye ecchymosis. EOMI intact bilaterally. Anicteric sclerae, moist conjunctivae  Lungs: Diminished lung sounds b/l without wheezes  Cardiovascular: RRR. No murmurs, rubs, or gallops  Abdomen: +BS Soft, non-tender non-distended; No rebound or guarding  Vasc: Rad and DP intact and equal b/l   Extremities: WWP, No clubbing, cyanosis or edema  Neurological: Alert and oriented x3   Skin: B/L tennis ball size ecchymosis on chest wall. Warm and dry. No obvious rash     Medications:  MEDICATIONS  (STANDING):  albuterol/ipratropium for Nebulization 3 milliLiter(s) Nebulizer every 6 hours  aMIOdarone    Tablet 200 milliGRAM(s) Oral every 24 hours  atorvastatin 40 milliGRAM(s) Oral at bedtime  azithromycin   Tablet 250 milliGRAM(s) Oral <User Schedule>  hydrocodone/homatropine Syrup 5 milliLiter(s) Oral every 6 hours  metoprolol succinate ER 50 milliGRAM(s) Oral every 24 hours  piperacillin/tazobactam IVPB.. 4.5 Gram(s) IV Intermittent every 12 hours  polyethylene glycol 3350 17 Gram(s) Oral two times a day  senna 2 Tablet(s) Oral at bedtime  sevelamer carbonate 800 milliGRAM(s) Oral every 8 hours  sildenafil (REVATIO) 20 milliGRAM(s) Oral every 8 hours  tiotropium 2.5 MICROgram(s)/olodaterol 2.5 MICROgram(s) (STIOLTO) Inhaler 2 Puff(s) Inhalation daily    MEDICATIONS  (PRN):  acetaminophen     Tablet .. 650 milliGRAM(s) Oral every 6 hours PRN Temp greater or equal to 38C (100.4F), Mild Pain (1 - 3)  aluminum hydroxide/magnesium hydroxide/simethicone Suspension 30 milliLiter(s) Oral every 4 hours PRN Dyspepsia  melatonin 3 milliGRAM(s) Oral at bedtime PRN Insomnia  ondansetron Injectable 4 milliGRAM(s) IV Push every 8 hours PRN Nausea and/or Vomiting      Labs:                        9.0    11.04 )-----------( 139      ( 06 Mar 2024 07:30 )             28.5     03-06    140  |  98  |  51<H>  ----------------------------<  106<H>  4.7   |  28  |  6.50<H>    Ca    9.5      06 Mar 2024 07:30  Phos  3.6     03-06  Mg     2.5     03-06    TPro  6.5  /  Alb  3.3  /  TBili  0.3  /  DBili  x   /  AST  10  /  ALT  6<L>  /  AlkPhos  136<H>  03-06    PT/INR - ( 06 Mar 2024 07:30 )   PT: 13.1 sec;   INR: 1.15          PTT - ( 06 Mar 2024 07:30 )  PTT:35.8 sec  Urinalysis Basic - ( 06 Mar 2024 07:30 )    Color: x / Appearance: x / SG: x / pH: x  Gluc: 106 mg/dL / Ketone: x  / Bili: x / Urobili: x   Blood: x / Protein: x / Nitrite: x   Leuk Esterase: x / RBC: x / WBC x   Sq Epi: x / Non Sq Epi: x / Bacteria: x          Radiology: Reviewed **** Transer from [facility] to [facility] ****  Hospital Course: 68M PMH AFib s/p watchman (on ASA), HTN, COPD (baseline 1L NC at home), ESRD (MWF), severe ERIK, pulm HTN, T cell lymphoma (s/p chemo), HFpEF, s/p VATS procedure in , recent [facility] admission 2023 for LGIB p/w progressively worsening productive cough, congestion, and SOB. On admission CTA PE significant for emphysematous changes, subpleural consolidations b/l, without PE. Initially treated for COPD exacerbation and  course of azithromycin (completed) and CTX (3/3 - 3/6) for CAP.  Course complicated by hemoptysis on 3/5, with patient desaturating to 80s on 5L NC, ultimately weaned to 2-3L NC. Hgb at baseline. Pulmonology following, recommended bronchoscopy. On 3/6 patient reported worsening cough without sputum or hematemesis so abx broadened to Zosyn. S/p bronch 3/7, pt desaturated to 80s on 5L NC. Patient alert and oriented, but given extensive pulm history will be transferred to [facility] for closer monitoring.     REVIEW OF SYSTEMS:  All other review of systems is negative unless indicated above.    OBJECTIVE:  T(C): 36.2 (24 @ 06:02), Max: 36.7 (24 @ 10:55)  HR: 60 (24 @ 06:02) (60 - 98)  BP: 111/71 (24 @ 06:02) (111/71 - 169/70)  RR: 17 (24 @ 06:02) (17 - 20)  SpO2: 93% (24 @ 06:02) (91% - 97%)  Daily     Daily Weight in k.2 (06 Mar 2024 14:25)    Physical Exam:  General: in no acute distress, non toxic appearing, speaking in full sentences  Eyes: Rt sided eye ecchymosis. EOMI intact bilaterally. Anicteric sclerae, moist conjunctivae  Lungs: Diminished lung sounds b/l without wheezes  Cardiovascular: RRR. No murmurs, rubs, or gallops  Abdomen: +BS Soft, non-tender non-distended; No rebound or guarding  Vasc: Rad and DP intact and equal b/l   Extremities: WWP, No clubbing, cyanosis or edema  Neurological: Alert and oriented x3   Skin: B/L tennis ball size ecchymosis on chest wall. Warm and dry. No obvious rash     Medications:  MEDICATIONS  (STANDING):  albuterol/ipratropium for Nebulization 3 milliLiter(s) Nebulizer every 6 hours  aMIOdarone    Tablet 200 milliGRAM(s) Oral every 24 hours  atorvastatin 40 milliGRAM(s) Oral at bedtime  azithromycin   Tablet 250 milliGRAM(s) Oral <User Schedule>  hydrocodone/homatropine Syrup 5 milliLiter(s) Oral every 6 hours  metoprolol succinate ER 50 milliGRAM(s) Oral every 24 hours  piperacillin/tazobactam IVPB.. 4.5 Gram(s) IV Intermittent every 12 hours  polyethylene glycol 3350 17 Gram(s) Oral two times a day  senna 2 Tablet(s) Oral at bedtime  sevelamer carbonate 800 milliGRAM(s) Oral every 8 hours  sildenafil (REVATIO) 20 milliGRAM(s) Oral every 8 hours  tiotropium 2.5 MICROgram(s)/olodaterol 2.5 MICROgram(s) (STIOLTO) Inhaler 2 Puff(s) Inhalation daily    MEDICATIONS  (PRN):  acetaminophen     Tablet .. 650 milliGRAM(s) Oral every 6 hours PRN Temp greater or equal to 38C (100.4F), Mild Pain (1 - 3)  aluminum hydroxide/magnesium hydroxide/simethicone Suspension 30 milliLiter(s) Oral every 4 hours PRN Dyspepsia  melatonin 3 milliGRAM(s) Oral at bedtime PRN Insomnia  ondansetron Injectable 4 milliGRAM(s) IV Push every 8 hours PRN Nausea and/or Vomiting      Labs:                        9.0    11.04 )-----------( 139      ( 06 Mar 2024 07:30 )             28.5     03-06    140  |  98  |  51<H>  ----------------------------<  106<H>  4.7   |  28  |  6.50<H>    Ca    9.5      06 Mar 2024 07:30  Phos  3.6     03-06  Mg     2.5     03-06    TPro  6.5  /  Alb  3.3  /  TBili  0.3  /  DBili  x   /  AST  10  /  ALT  6<L>  /  AlkPhos  136<H>  -    PT/INR - ( 06 Mar 2024 07:30 )   PT: 13.1 sec;   INR: 1.15          PTT - ( 06 Mar 2024 07:30 )  PTT:35.8 sec  Urinalysis Basic - ( 06 Mar 2024 07:30 )    Color: x / Appearance: x / SG: x / pH: x  Gluc: 106 mg/dL / Ketone: x  / Bili: x / Urobili: x   Blood: x / Protein: x / Nitrite: x   Leuk Esterase: x / RBC: x / WBC x   Sq Epi: x / Non Sq Epi: x / Bacteria: x          Radiology: Reviewed **** Transer from [facility] to [facility] ****  Hospital Course: 68M PMH AFib s/p watchman (on ASA), HTN, COPD (baseline 1L NC at home), ESRD (MWF), severe ERIK, pulm HTN, T cell lymphoma (s/p chemo), HFpEF, s/p VATS procedure in , recent [facility] admission 2023 for LGIB p/w progressively worsening productive cough, congestion, and SOB. On admission CTA PE significant for emphysematous changes, subpleural consolidations b/l, without PE. Initially treated for COPD exacerbation iso of CAP with prednisone (completed), duonebs, azithromycin (completed) and CTX (3/3 - 3/6). Due to persistent hypoxia and shortness of breath, on 3/6 patient was broadened to Zosyn, pseudomonal coverage. Course was complicated by low volume hemoptysis on 3/5 and 3/6. Pt underwent bronch on 3/7 which showed no evidence of localized hemoptysis or endobronchial lesions, +copious brown secretions. Prior to bronch, patient was saturating in 90s on 2L NC and post bronch was >85% on 5L NC. Patient denies complaints of SOB, cough or wheezing. Alert and oriented. Given extensive pulm history, patient transferred to [facility] for further monitoring.     REVIEW OF SYSTEMS:  All other review of systems is negative unless indicated above.    OBJECTIVE:  T(C): 36.2 (24 @ 06:02), Max: 36.7 (24 @ 10:55)  HR: 60 (24 @ 06:02) (60 - 98)  BP: 111/71 (24 @ 06:02) (111/71 - 169/70)  RR: 17 (24 @ 06:02) (17 - 20)  SpO2: 93% (24 @ 06:02) (91% - 97%)  Daily     Daily Weight in k.2 (06 Mar 2024 14:25)    Physical Exam:  General: in no acute distress, non toxic appearing, speaking in full sentences  Eyes: Rt sided eye ecchymosis. EOMI intact bilaterally. Anicteric sclerae, moist conjunctivae  Lungs: Diminished lung sounds b/l without wheezes  Cardiovascular: RRR. No murmurs, rubs, or gallops  Abdomen: +BS Soft, non-tender non-distended; No rebound or guarding  Vasc: Rad and DP intact and equal b/l   Extremities: WWP, No clubbing, cyanosis or edema  Neurological: Alert and oriented x3   Skin: B/L tennis ball size ecchymosis on chest wall. Warm and dry. No obvious rash     Medications:  MEDICATIONS  (STANDING):  albuterol/ipratropium for Nebulization 3 milliLiter(s) Nebulizer every 6 hours  aMIOdarone    Tablet 200 milliGRAM(s) Oral every 24 hours  atorvastatin 40 milliGRAM(s) Oral at bedtime  azithromycin   Tablet 250 milliGRAM(s) Oral <User Schedule>  hydrocodone/homatropine Syrup 5 milliLiter(s) Oral every 6 hours  metoprolol succinate ER 50 milliGRAM(s) Oral every 24 hours  piperacillin/tazobactam IVPB.. 4.5 Gram(s) IV Intermittent every 12 hours  polyethylene glycol 3350 17 Gram(s) Oral two times a day  senna 2 Tablet(s) Oral at bedtime  sevelamer carbonate 800 milliGRAM(s) Oral every 8 hours  sildenafil (REVATIO) 20 milliGRAM(s) Oral every 8 hours  tiotropium 2.5 MICROgram(s)/olodaterol 2.5 MICROgram(s) (STIOLTO) Inhaler 2 Puff(s) Inhalation daily    MEDICATIONS  (PRN):  acetaminophen     Tablet .. 650 milliGRAM(s) Oral every 6 hours PRN Temp greater or equal to 38C (100.4F), Mild Pain (1 - 3)  aluminum hydroxide/magnesium hydroxide/simethicone Suspension 30 milliLiter(s) Oral every 4 hours PRN Dyspepsia  melatonin 3 milliGRAM(s) Oral at bedtime PRN Insomnia  ondansetron Injectable 4 milliGRAM(s) IV Push every 8 hours PRN Nausea and/or Vomiting      Labs:                        9.0    11.04 )-----------( 139      ( 06 Mar 2024 07:30 )             28.5     03-06    140  |  98  |  51<H>  ----------------------------<  106<H>  4.7   |  28  |  6.50<H>    Ca    9.5      06 Mar 2024 07:30  Phos  3.6     03-06  Mg     2.5     03-06    TPro  6.5  /  Alb  3.3  /  TBili  0.3  /  DBili  x   /  AST  10  /  ALT  6<L>  /  AlkPhos  136<H>  03-06    PT/INR - ( 06 Mar 2024 07:30 )   PT: 13.1 sec;   INR: 1.15          PTT - ( 06 Mar 2024 07:30 )  PTT:35.8 sec  Urinalysis Basic - ( 06 Mar 2024 07:30 )    Color: x / Appearance: x / SG: x / pH: x  Gluc: 106 mg/dL / Ketone: x  / Bili: x / Urobili: x   Blood: x / Protein: x / Nitrite: x   Leuk Esterase: x / RBC: x / WBC x   Sq Epi: x / Non Sq Epi: x / Bacteria: x          Radiology: Reviewed

## 2024-03-08 LAB
ALBUMIN SERPL ELPH-MCNC: 3.2 G/DL — LOW (ref 3.3–5)
ALP SERPL-CCNC: 132 U/L — HIGH (ref 40–120)
ALT FLD-CCNC: 9 U/L — LOW (ref 10–45)
ANION GAP SERPL CALC-SCNC: 13 MMOL/L — SIGNIFICANT CHANGE UP (ref 5–17)
AST SERPL-CCNC: 13 U/L — SIGNIFICANT CHANGE UP (ref 10–40)
BASOPHILS # BLD AUTO: 0.01 K/UL — SIGNIFICANT CHANGE UP (ref 0–0.2)
BASOPHILS NFR BLD AUTO: 0.1 % — SIGNIFICANT CHANGE UP (ref 0–2)
BILIRUB SERPL-MCNC: 0.5 MG/DL — SIGNIFICANT CHANGE UP (ref 0.2–1.2)
BUN SERPL-MCNC: 46 MG/DL — HIGH (ref 7–23)
CALCIUM SERPL-MCNC: 10.1 MG/DL — SIGNIFICANT CHANGE UP (ref 8.4–10.5)
CHLORIDE SERPL-SCNC: 97 MMOL/L — SIGNIFICANT CHANGE UP (ref 96–108)
CO2 SERPL-SCNC: 31 MMOL/L — SIGNIFICANT CHANGE UP (ref 22–31)
CREAT SERPL-MCNC: 5.65 MG/DL — HIGH (ref 0.5–1.3)
EGFR: 10 ML/MIN/1.73M2 — LOW
EOSINOPHIL # BLD AUTO: 0.43 K/UL — SIGNIFICANT CHANGE UP (ref 0–0.5)
EOSINOPHIL NFR BLD AUTO: 3.5 % — SIGNIFICANT CHANGE UP (ref 0–6)
GLUCOSE SERPL-MCNC: 95 MG/DL — SIGNIFICANT CHANGE UP (ref 70–99)
HCT VFR BLD CALC: 31.8 % — LOW (ref 39–50)
HGB BLD-MCNC: 9.6 G/DL — LOW (ref 13–17)
IMM GRANULOCYTES NFR BLD AUTO: 0.7 % — SIGNIFICANT CHANGE UP (ref 0–0.9)
LYMPHOCYTES # BLD AUTO: 1.16 K/UL — SIGNIFICANT CHANGE UP (ref 1–3.3)
LYMPHOCYTES # BLD AUTO: 9.3 % — LOW (ref 13–44)
MAGNESIUM SERPL-MCNC: 2.3 MG/DL — SIGNIFICANT CHANGE UP (ref 1.6–2.6)
MCHC RBC-ENTMCNC: 30.2 GM/DL — LOW (ref 32–36)
MCHC RBC-ENTMCNC: 30.4 PG — SIGNIFICANT CHANGE UP (ref 27–34)
MCV RBC AUTO: 100.6 FL — HIGH (ref 80–100)
MONOCYTES # BLD AUTO: 1.03 K/UL — HIGH (ref 0–0.9)
MONOCYTES NFR BLD AUTO: 8.3 % — SIGNIFICANT CHANGE UP (ref 2–14)
NEUTROPHILS # BLD AUTO: 9.69 K/UL — HIGH (ref 1.8–7.4)
NEUTROPHILS NFR BLD AUTO: 78.1 % — HIGH (ref 43–77)
NON-GYNECOLOGICAL CYTOLOGY STUDY: SIGNIFICANT CHANGE UP
NRBC # BLD: 0 /100 WBCS — SIGNIFICANT CHANGE UP (ref 0–0)
PHOSPHATE SERPL-MCNC: 2.9 MG/DL — SIGNIFICANT CHANGE UP (ref 2.5–4.5)
PLATELET # BLD AUTO: 173 K/UL — SIGNIFICANT CHANGE UP (ref 150–400)
POTASSIUM SERPL-MCNC: 4.8 MMOL/L — SIGNIFICANT CHANGE UP (ref 3.5–5.3)
POTASSIUM SERPL-SCNC: 4.8 MMOL/L — SIGNIFICANT CHANGE UP (ref 3.5–5.3)
PROT SERPL-MCNC: 6.5 G/DL — SIGNIFICANT CHANGE UP (ref 6–8.3)
RBC # BLD: 3.16 M/UL — LOW (ref 4.2–5.8)
RBC # FLD: 16.4 % — HIGH (ref 10.3–14.5)
SODIUM SERPL-SCNC: 141 MMOL/L — SIGNIFICANT CHANGE UP (ref 135–145)
WBC # BLD: 12.41 K/UL — HIGH (ref 3.8–10.5)
WBC # FLD AUTO: 12.41 K/UL — HIGH (ref 3.8–10.5)

## 2024-03-08 PROCEDURE — 99232 SBSQ HOSP IP/OBS MODERATE 35: CPT | Mod: GC

## 2024-03-08 PROCEDURE — 90937 HEMODIALYSIS REPEATED EVAL: CPT

## 2024-03-08 PROCEDURE — 99233 SBSQ HOSP IP/OBS HIGH 50: CPT | Mod: GC

## 2024-03-08 PROCEDURE — 99233 SBSQ HOSP IP/OBS HIGH 50: CPT

## 2024-03-08 RX ORDER — SOD SULF/SODIUM/NAHCO3/KCL/PEG
1000 SOLUTION, RECONSTITUTED, ORAL ORAL ONCE
Refills: 0 | Status: DISCONTINUED | OUTPATIENT
Start: 2024-03-08 | End: 2024-03-08

## 2024-03-08 RX ORDER — ERYTHROPOIETIN 10000 [IU]/ML
6000 INJECTION, SOLUTION INTRAVENOUS; SUBCUTANEOUS ONCE
Refills: 0 | Status: COMPLETED | OUTPATIENT
Start: 2024-03-08 | End: 2024-03-08

## 2024-03-08 RX ADMIN — Medication 3 MILLILITER(S): at 05:39

## 2024-03-08 RX ADMIN — POLYETHYLENE GLYCOL 3350 17 GRAM(S): 17 POWDER, FOR SOLUTION ORAL at 18:37

## 2024-03-08 RX ADMIN — Medication 4 MILLILITER(S): at 16:06

## 2024-03-08 RX ADMIN — SENNA PLUS 2 TABLET(S): 8.6 TABLET ORAL at 21:46

## 2024-03-08 RX ADMIN — ATORVASTATIN CALCIUM 40 MILLIGRAM(S): 80 TABLET, FILM COATED ORAL at 21:46

## 2024-03-08 RX ADMIN — Medication 81 MILLIGRAM(S): at 05:42

## 2024-03-08 RX ADMIN — Medication 3 MILLILITER(S): at 21:46

## 2024-03-08 RX ADMIN — ERYTHROPOIETIN 6000 UNIT(S): 10000 INJECTION, SOLUTION INTRAVENOUS; SUBCUTANEOUS at 14:17

## 2024-03-08 RX ADMIN — Medication 4 MILLILITER(S): at 09:46

## 2024-03-08 RX ADMIN — Medication 650 MILLIGRAM(S): at 16:06

## 2024-03-08 RX ADMIN — SEVELAMER CARBONATE 800 MILLIGRAM(S): 2400 POWDER, FOR SUSPENSION ORAL at 06:00

## 2024-03-08 RX ADMIN — Medication 50 MILLIGRAM(S): at 18:37

## 2024-03-08 RX ADMIN — PIPERACILLIN AND TAZOBACTAM 25 GRAM(S): 4; .5 INJECTION, POWDER, LYOPHILIZED, FOR SOLUTION INTRAVENOUS at 05:39

## 2024-03-08 RX ADMIN — HEPARIN SODIUM 5000 UNIT(S): 5000 INJECTION INTRAVENOUS; SUBCUTANEOUS at 05:40

## 2024-03-08 RX ADMIN — Medication 20 MILLIGRAM(S): at 16:05

## 2024-03-08 RX ADMIN — POLYETHYLENE GLYCOL 3350 17 GRAM(S): 17 POWDER, FOR SOLUTION ORAL at 05:40

## 2024-03-08 RX ADMIN — TIOTROPIUM BROMIDE AND OLODATEROL 2 PUFF(S): 3.124; 2.736 SPRAY, METERED RESPIRATORY (INHALATION) at 09:45

## 2024-03-08 RX ADMIN — Medication 3 MILLILITER(S): at 16:06

## 2024-03-08 RX ADMIN — Medication 20 MILLIGRAM(S): at 06:00

## 2024-03-08 RX ADMIN — Medication 650 MILLIGRAM(S): at 09:45

## 2024-03-08 RX ADMIN — Medication 4 MILLILITER(S): at 05:39

## 2024-03-08 RX ADMIN — Medication 3 MILLILITER(S): at 09:46

## 2024-03-08 RX ADMIN — AMIODARONE HYDROCHLORIDE 200 MILLIGRAM(S): 400 TABLET ORAL at 05:42

## 2024-03-08 RX ADMIN — AZITHROMYCIN 250 MILLIGRAM(S): 500 TABLET, FILM COATED ORAL at 09:45

## 2024-03-08 RX ADMIN — SEVELAMER CARBONATE 800 MILLIGRAM(S): 2400 POWDER, FOR SUSPENSION ORAL at 16:05

## 2024-03-08 RX ADMIN — PIPERACILLIN AND TAZOBACTAM 25 GRAM(S): 4; .5 INJECTION, POWDER, LYOPHILIZED, FOR SOLUTION INTRAVENOUS at 18:37

## 2024-03-08 RX ADMIN — Medication 4 MILLILITER(S): at 21:36

## 2024-03-08 RX ADMIN — Medication 650 MILLIGRAM(S): at 10:45

## 2024-03-08 RX ADMIN — HEPARIN SODIUM 5000 UNIT(S): 5000 INJECTION INTRAVENOUS; SUBCUTANEOUS at 21:46

## 2024-03-08 NOTE — PROGRESS NOTE ADULT - PROBLEM SELECTOR PLAN 3
CT Chest outpatient 2/12/2024 w/ new consolidative opacities within the lingula and multifocally throughout the right lower lobe compared to 1/26/2023.   - CTA PE this admission showed persistent b/l pulmonary consolidation.   - Has hx of b/l pleural effusion s/p VATS w/ CT surg in 2021 for L chest wall loculated effusion.   - Ur legionella neg, Ur strep neg.   - Finished Azithromycin 500mg for atypical cov    PLAN:  - f/u BCx NGTD   - f/u Sputum Cx - initial spu Cx contaminated.   - c/w CTX 1g q24 CT Chest outpatient 2/12/2024 w/ new consolidative opacities within the lingula and multifocally throughout the right lower lobe compared to 1/26/2023.   - CTA PE this admission showed persistent b/l pulmonary consolidation.   - Has hx of b/l pleural effusion s/p VATS w/ CT surg in 2021 for L chest wall loculated effusion.   - Ur legionella neg, Ur strep neg.   - Finished Azithromycin 500mg for atypical cov    PLAN:  - f/u BCx NGTD   - f/u Sputum Cx - initial spu Cx contaminated     - sputum cx 3/7 from bronchial aspirate: moderate e coli   - c/w CTX 1g q24

## 2024-03-08 NOTE — PROGRESS NOTE ADULT - PROBLEM SELECTOR PLAN 10
#ERIK  hx of severe ERIK, non-compliant with CPAP    PLAN:  - OVN CPAP ordered, but patient noncompliant.

## 2024-03-08 NOTE — PROGRESS NOTE ADULT - PROBLEM SELECTOR PLAN 3
CT Chest outpatient 2/12/2024 w/ new consolidative opacities within the lingula and multifocally throughout the right lower lobe compared to 1/26/2023.   - CTA PE this admission showed persistent b/l pulmonary consolidation.   - Has hx of b/l pleural effusion s/p VATS w/ CT surg in 2021 for L chest wall loculated effusion.   - Ur legionella neg, Ur strep neg.   - Finished Azithromycin 500mg for atypical cov    PLAN:  - C/w zosyn (3/6-)  - f/u BCx NGTD   - f/u Sputum Cx - initial spu Cx contaminated     - sputum cx 3/7 from bronchial aspirate: moderate e coli

## 2024-03-08 NOTE — PROGRESS NOTE ADULT - PROBLEM SELECTOR PLAN 7
Home med: Aspirin 81mg qd, amio 200mg qd, metoprolol XL 50mg qd.   [id number] 2. s/p Watchman device, not on Eliquis. NSR this admission.    PLAN:  - ASA hold iso hemoptysis  - c/w other home meds.

## 2024-03-08 NOTE — PROGRESS NOTE ADULT - ATTENDING COMMENTS
Patient was seen and examined at bedside on 3/8/2024 at 430 pm. Patient reports that he feels "okay" although still has continued cough and NIELSON. Denies CP. ROS is otherwise negative. Vitals, labwork and pertinent imaging reviewed. Exam - NAD, AAO x 4, PERRLA, EOMI, MMM, supple neck, chest - bibasilar crackles, CV - rrr, s1s2, no m/r/g, abd - soft, NTND, + BS, ext - wwp, psych - normal affect, pt appears cachectic and malnourished    Plan:  -Pt s/p bronchoscopy with increased supplemental oxygen requirements - given increased oxygen requirements will consult ICU  -C/w Zosyn, c/w supportive care - cough suppressant  -Sputum culture  -Culture from bronchoscopy - E. Coli, pending sensitivities  -Pulm consulted  -Echo - worsening pulmonary hypertension  -PT/OT - rec HPT

## 2024-03-08 NOTE — PROGRESS NOTE ADULT - PROBLEM SELECTOR PLAN 11
F: None  E: replete cautiously in ESRD  N: renal restriction diet  GI: None  DVT: hep subq  Dispo: F

## 2024-03-08 NOTE — PROGRESS NOTE ADULT - PROBLEM SELECTOR PLAN 7
Home med: Aspirin 81mg qd, amio 200mg qd, metoprolol XL 50mg qd.   [id number] 2. s/p Watchman device, not on Eliquis. NSR this admission.    PLAN:  - Restart ASA  - c/w other home meds.

## 2024-03-08 NOTE — PROGRESS NOTE ADULT - SUBJECTIVE AND OBJECTIVE BOX
**INCOMPLETE NOTE    OVERNIGHT EVENTS:    SUBJECTIVE:  Patient seen and examined at bedside.    Vital Signs Last 12 Hrs  T(F): 98.3 (03-08-24 @ 05:31), Max: 98.3 (03-08-24 @ 05:31)  HR: 55 (03-08-24 @ 05:15) (55 - 59)  BP: 111/52 (03-08-24 @ 05:15) (111/52 - 120/59)  BP(mean): 75 (03-08-24 @ 05:15) (75 - 85)  RR: 20 (03-08-24 @ 05:15) (20 - 20)  SpO2: 95% (03-08-24 @ 05:15) (91% - 95%)  I&O's Summary    07 Mar 2024 07:01  -  08 Mar 2024 07:00  --------------------------------------------------------  IN: 400 mL / OUT: 0 mL / NET: 400 mL        PHYSICAL EXAM:  Constitutional: NAD, comfortable in bed.  HEENT: NC/AT, PERRLA, EOMI, no conjunctival pallor or scleral icterus, MMM  Neck: Supple, no JVD  Respiratory: CTA B/L. No w/r/r.   Cardiovascular: RRR, normal S1 and S2, no m/r/g.   Gastrointestinal: +BS, soft NTND, no guarding or rebound tenderness, no palpable masses   Extremities: wwp; no cyanosis, clubbing or edema.   Vascular: Pulses equal and strong throughout.   Neurological: AAOx3, no CN deficits, strength and sensation intact throughout.   Skin: No gross skin abnormalities or rashes        LABS:                        9.6    12.41 )-----------( 173      ( 08 Mar 2024 06:44 )             31.8     03-08    141  |  97  |  x   ----------------------------<  95  4.8   |  31  |  x     Ca    10.1      08 Mar 2024 06:44  Phos  2.9     03-08  Mg     2.3     03-08    TPro  6.5  /  Alb  3.3  /  TBili  0.3  /  DBili  x   /  AST  10  /  ALT  6<L>  /  AlkPhos  136<H>  03-06    PT/INR - ( 06 Mar 2024 07:30 )   PT: 13.1 sec;   INR: 1.15          PTT - ( 06 Mar 2024 07:30 )  PTT:35.8 sec  Urinalysis Basic - ( 08 Mar 2024 06:44 )    Color: x / Appearance: x / SG: x / pH: x  Gluc: 95 mg/dL / Ketone: x  / Bili: x / Urobili: x   Blood: x / Protein: x / Nitrite: x   Leuk Esterase: x / RBC: x / WBC x   Sq Epi: x / Non Sq Epi: x / Bacteria: x          RADIOLOGY & ADDITIONAL TESTS:    MEDICATIONS  (STANDING):  acetylcysteine 10%  Inhalation 4 milliLiter(s) Inhalation every 6 hours  albuterol/ipratropium for Nebulization 3 milliLiter(s) Nebulizer every 6 hours  aMIOdarone    Tablet 200 milliGRAM(s) Oral every 24 hours  aspirin  chewable 81 milliGRAM(s) Oral every 24 hours  atorvastatin 40 milliGRAM(s) Oral at bedtime  azithromycin   Tablet 250 milliGRAM(s) Oral <User Schedule>  heparin   Injectable 5000 Unit(s) SubCutaneous every 8 hours  metoprolol succinate ER 50 milliGRAM(s) Oral every 24 hours  piperacillin/tazobactam IVPB.. 4.5 Gram(s) IV Intermittent every 12 hours  polyethylene glycol 3350 17 Gram(s) Oral two times a day  senna 2 Tablet(s) Oral at bedtime  sevelamer carbonate 800 milliGRAM(s) Oral every 8 hours  sildenafil (REVATIO) 20 milliGRAM(s) Oral every 8 hours  tiotropium 2.5 MICROgram(s)/olodaterol 2.5 MICROgram(s) (STIOLTO) Inhaler 2 Puff(s) Inhalation daily    MEDICATIONS  (PRN):  acetaminophen     Tablet .. 650 milliGRAM(s) Oral every 6 hours PRN Temp greater or equal to 38C (100.4F), Mild Pain (1 - 3)  aluminum hydroxide/magnesium hydroxide/simethicone Suspension 30 milliLiter(s) Oral every 4 hours PRN Dyspepsia  melatonin 3 milliGRAM(s) Oral at bedtime PRN Insomnia  ondansetron Injectable 4 milliGRAM(s) IV Push every 8 hours PRN Nausea and/or Vomiting     ****************** TRANSFER NOTE [facility] TO [facility] ******************    HOSPITAL COURSE:  68M PMH AFib s/p watchman (on ASA), HTN, COPD, ESRD (MWF), severe ERIK, pulm HTN, T cell lymphoma (s/p chemo), HFpEF, s/p VATS procedure in 2021, recent [facility] admission 12/2023 for LGIB p/w productive cough, congestion, SOB for weeks now worsening in the last 5days. Admitted to [facility] for AHRF 2/2 COPD exacerbation 2/2 to CAP. Course c/b low volume hemoptysis on 3/5 to 3/6. Now s/p bronch 3/7, after which patient desaturated to 88% on 5L NC (prior was on 2L NC saturating in 90s). ICU consulted, and patient transferred to [facility] for closer monitoring. Patient has been doing his pulmonary hygiene and saturating between 91-94% on 4L NC (goal 88-92% iso COPD). Patient is stable for transfer to [facility].   ------------------------------------    OVERNIGHT EVENTS: aerobika by bedside    SUBJECTIVE: Patient seen and examined at bedside.    Vital Signs Last 12 Hrs  T(F): 98.3 (03-08-24 @ 05:31), Max: 98.3 (03-08-24 @ 05:31)  HR: 55 (03-08-24 @ 05:15) (55 - 59)  BP: 111/52 (03-08-24 @ 05:15) (111/52 - 120/59)  BP(mean): 75 (03-08-24 @ 05:15) (75 - 85)  RR: 20 (03-08-24 @ 05:15) (20 - 20)  SpO2: 95% (03-08-24 @ 05:15) (91% - 95%)  I&O's Summary    07 Mar 2024 07:01  -  08 Mar 2024 07:00  --------------------------------------------------------  IN: 400 mL / OUT: 0 mL / NET: 400 mL        PHYSICAL EXAM:  General: in no acute distress, non toxic appearing, speaking in full sentences  Eyes: Rt sided eye ecchymosis. EOMI intact bilaterally. Anicteric sclerae, moist conjunctivae  Lungs: Diminished lung sounds b/l without wheezes  Cardiovascular: RRR. No murmurs, rubs, or gallops  Abdomen: +BS Soft, non-tender non-distended; No rebound or guarding  Vasc: Rad and DP intact and equal b/l   Extremities: WWP, No clubbing, cyanosis or edema  Neurological: Alert and oriented x3   Skin: B/L tennis ball size ecchymosis on chest wall. Warm and dry. No obvious rash         LABS:                        9.6    12.41 )-----------( 173      ( 08 Mar 2024 06:44 )             31.8     03-08    141  |  97  |  x   ----------------------------<  95  4.8   |  31  |  x     Ca    10.1      08 Mar 2024 06:44  Phos  2.9     03-08  Mg     2.3     03-08    TPro  6.5  /  Alb  3.3  /  TBili  0.3  /  DBili  x   /  AST  10  /  ALT  6<L>  /  AlkPhos  136<H>  03-06    PT/INR - ( 06 Mar 2024 07:30 )   PT: 13.1 sec;   INR: 1.15          PTT - ( 06 Mar 2024 07:30 )  PTT:35.8 sec  Urinalysis Basic - ( 08 Mar 2024 06:44 )    Color: x / Appearance: x / SG: x / pH: x  Gluc: 95 mg/dL / Ketone: x  / Bili: x / Urobili: x   Blood: x / Protein: x / Nitrite: x   Leuk Esterase: x / RBC: x / WBC x   Sq Epi: x / Non Sq Epi: x / Bacteria: x          RADIOLOGY & ADDITIONAL TESTS:    MEDICATIONS  (STANDING):  acetylcysteine 10%  Inhalation 4 milliLiter(s) Inhalation every 6 hours  albuterol/ipratropium for Nebulization 3 milliLiter(s) Nebulizer every 6 hours  aMIOdarone    Tablet 200 milliGRAM(s) Oral every 24 hours  aspirin  chewable 81 milliGRAM(s) Oral every 24 hours  atorvastatin 40 milliGRAM(s) Oral at bedtime  azithromycin   Tablet 250 milliGRAM(s) Oral <User Schedule>  heparin   Injectable 5000 Unit(s) SubCutaneous every 8 hours  metoprolol succinate ER 50 milliGRAM(s) Oral every 24 hours  piperacillin/tazobactam IVPB.. 4.5 Gram(s) IV Intermittent every 12 hours  polyethylene glycol 3350 17 Gram(s) Oral two times a day  senna 2 Tablet(s) Oral at bedtime  sevelamer carbonate 800 milliGRAM(s) Oral every 8 hours  sildenafil (REVATIO) 20 milliGRAM(s) Oral every 8 hours  tiotropium 2.5 MICROgram(s)/olodaterol 2.5 MICROgram(s) (STIOLTO) Inhaler 2 Puff(s) Inhalation daily    MEDICATIONS  (PRN):  acetaminophen     Tablet .. 650 milliGRAM(s) Oral every 6 hours PRN Temp greater or equal to 38C (100.4F), Mild Pain (1 - 3)  aluminum hydroxide/magnesium hydroxide/simethicone Suspension 30 milliLiter(s) Oral every 4 hours PRN Dyspepsia  melatonin 3 milliGRAM(s) Oral at bedtime PRN Insomnia  ondansetron Injectable 4 milliGRAM(s) IV Push every 8 hours PRN Nausea and/or Vomiting

## 2024-03-08 NOTE — PROGRESS NOTE ADULT - PROBLEM SELECTOR PLAN 2
Presenting with SOB, productive cough w/ dark green sputum, wheezing at home, requiring more oxygen than baseline 1L NC. Likely 2/2 CAP. Follows with Dr Johnson outpatient. Prior smoker, quit 19 years ago.   - CXR w/ increased infiltrates. RVP negative.   - Home meds: prophylactic azithromycin 250 MWF, stiolto, ventolin HFA  - CTA PE this admission: Emphysematous changes. Persistent predominantly subpleural consolidations in both lungs mostly in the right lower lobe and left upper lobe. No PE.    PLAN:  - c/w duonebs q6  - c/w home stiolto qd  - c/w CTX 1g q24  - S/p course of Azithromycin 500mg; C/w home regimen   - c/w prednisone 40mg qd (complete 3/6)  - Pulm following; s/p bronch 3/7  - dc'd hydrocan

## 2024-03-08 NOTE — PROGRESS NOTE ADULT - PROBLEM SELECTOR PLAN 11
F: None  E: replete cautiously in ESRD  N: NPO for Bronch 3/7  GI: None  DVT: hep subq; holding for bronch 3/7  Dispo: [facility] F: None  E: replete cautiously in ESRD  N: renal restriction diet  GI: None  DVT: hep subq; holding for bronch 3/7  Dispo: [facility]

## 2024-03-08 NOTE — PROGRESS NOTE ADULT - SUBJECTIVE AND OBJECTIVE BOX
PULMONARY CONSULT SERVICE FOLLOW-UP NOTE    INTERVAL HPI:  Reviewed chart and overnight events; patient seen and examined. no changes, oxygen bein gweawned. using his aerobika after nebz to produce sputum. no more hemotptysis episodes. no fevers or complaints.    MEDICATIONS:  Pulmonary:  acetylcysteine 10%  Inhalation 4 milliLiter(s) Inhalation every 6 hours  albuterol/ipratropium for Nebulization 3 milliLiter(s) Nebulizer every 6 hours  tiotropium 2.5 MICROgram(s)/olodaterol 2.5 MICROgram(s) (STIOLTO) Inhaler 2 Puff(s) Inhalation daily    Antimicrobials:  azithromycin   Tablet 250 milliGRAM(s) Oral <User Schedule>  piperacillin/tazobactam IVPB.. 4.5 Gram(s) IV Intermittent every 12 hours    Anticoagulants:  aspirin  chewable 81 milliGRAM(s) Oral every 24 hours  heparin   Injectable 5000 Unit(s) SubCutaneous every 8 hours    Cardiac:  aMIOdarone    Tablet 200 milliGRAM(s) Oral every 24 hours  metoprolol succinate ER 50 milliGRAM(s) Oral every 24 hours  sildenafil (REVATIO) 20 milliGRAM(s) Oral every 8 hours      Allergies    No Known Allergies    Intolerances        Vital Signs Last 24 Hrs  T(C): 36.4 (08 Mar 2024 09:32), Max: 37.1 (07 Mar 2024 13:43)  T(F): 97.6 (08 Mar 2024 09:32), Max: 98.8 (07 Mar 2024 13:43)  HR: 73 (08 Mar 2024 09:53) (55 - 73)  BP: 99/55 (08 Mar 2024 09:53) (98/56 - 123/66)  BP(mean): 73 (08 Mar 2024 09:53) (73 - 85)  RR: 30 (08 Mar 2024 09:53) (18 - 30)  SpO2: 92% (08 Mar 2024 09:53) (90% - 96%)    Parameters below as of 08 Mar 2024 09:53  Patient On (Oxygen Delivery Method): nasal cannula    O2 Concentration (%): 4    03-07 @ 07:01  -  03-08 @ 07:00  --------------------------------------------------------  IN: 400 mL / OUT: 0 mL / NET: 400 mL          PHYSICAL EXAM:  Constitutional: well-appearing, in no apparent respiratory distress  HEENT: NC/AT; PERRL, anicteric sclera; moist mucous membranes  Neck: supple, no JVD or LAD appreciated  Cardiovascular: +S1/S2, Regular rate and rhythm, no murmurs appreciated   Respiratory: crackles breath sounds bilaterally midl lung zone. No wheezes, rhonchi or rales   Gastrointestinal: soft, non-tender, non-distended. Normoactive bowel sounds.   Extremities: no edema, clubbing or cyanosis  Vascular: 2+ radial pulses B/L  Neurological: awake and alert; oriented x3    LABS:      CBC Full  -  ( 08 Mar 2024 06:44 )  WBC Count : 12.41 K/uL  RBC Count : 3.16 M/uL  Hemoglobin : 9.6 g/dL  Hematocrit : 31.8 %  Platelet Count - Automated : 173 K/uL  Mean Cell Volume : 100.6 fl  Mean Cell Hemoglobin : 30.4 pg  Mean Cell Hemoglobin Concentration : 30.2 gm/dL  Auto Neutrophil # : 9.69 K/uL  Auto Lymphocyte # : 1.16 K/uL  Auto Monocyte # : 1.03 K/uL  Auto Eosinophil # : 0.43 K/uL  Auto Basophil # : 0.01 K/uL  Auto Neutrophil % : 78.1 %  Auto Lymphocyte % : 9.3 %  Auto Monocyte % : 8.3 %  Auto Eosinophil % : 3.5 %  Auto Basophil % : 0.1 %    03-08    141  |  97  |  46<H>  ----------------------------<  95  4.8   |  31  |  5.65<H>    Ca    10.1      08 Mar 2024 06:44  Phos  2.9     03-08  Mg     2.3     03-08    TPro  6.5  /  Alb  3.2<L>  /  TBili  0.5  /  DBili  x   /  AST  13  /  ALT  9<L>  /  AlkPhos  132<H>  03-08            RADIOLOGY & ADDITIONAL STUDIES:    no new studies

## 2024-03-08 NOTE — PROGRESS NOTE ADULT - SUBJECTIVE AND OBJECTIVE BOX
OVERNIGHT EVENTS:    SUBJECTIVE:  Patient seen and examined at bedside.    Vital Signs Last 12 Hrs  T(F): 98 (03-08-24 @ 14:14), Max: 98.3 (03-08-24 @ 05:31)  HR: 76 (03-08-24 @ 12:20) (55 - 76)  BP: 94/55 (03-08-24 @ 12:20) (94/55 - 111/52)  BP(mean): 73 (03-08-24 @ 09:53) (73 - 75)  RR: 22 (03-08-24 @ 12:20) (20 - 30)  SpO2: 97% (03-08-24 @ 12:20) (90% - 97%)  I&O's Summary    07 Mar 2024 07:01  -  08 Mar 2024 07:00  --------------------------------------------------------  IN: 400 mL / OUT: 0 mL / NET: 400 mL        PHYSICAL EXAM:  Constitutional: NAD, comfortable in bed.  HEENT: NC/AT, PERRLA, EOMI, no conjunctival pallor or scleral icterus, MMM  Neck: Supple, no JVD  Respiratory: CTA B/L. No w/r/r.   Cardiovascular: RRR, normal S1 and S2, no m/r/g.   Gastrointestinal: +BS, soft NTND, no guarding or rebound tenderness, no palpable masses   Extremities: wwp; no cyanosis, clubbing or edema.   Vascular: Pulses equal and strong throughout.   Neurological: AAOx3, no CN deficits, strength and sensation intact throughout.   Skin: No gross skin abnormalities or rashes        LABS:                        9.6    12.41 )-----------( 173      ( 08 Mar 2024 06:44 )             31.8     03-08    141  |  97  |  46<H>  ----------------------------<  95  4.8   |  31  |  5.65<H>    Ca    10.1      08 Mar 2024 06:44  Phos  2.9     03-08  Mg     2.3     03-08    TPro  6.5  /  Alb  3.2<L>  /  TBili  0.5  /  DBili  x   /  AST  13  /  ALT  9<L>  /  AlkPhos  132<H>  03-08      Urinalysis Basic - ( 08 Mar 2024 06:44 )    Color: x / Appearance: x / SG: x / pH: x  Gluc: 95 mg/dL / Ketone: x  / Bili: x / Urobili: x   Blood: x / Protein: x / Nitrite: x   Leuk Esterase: x / RBC: x / WBC x   Sq Epi: x / Non Sq Epi: x / Bacteria: x          RADIOLOGY & ADDITIONAL TESTS:    MEDICATIONS  (STANDING):  acetylcysteine 10%  Inhalation 4 milliLiter(s) Inhalation every 6 hours  albuterol/ipratropium for Nebulization 3 milliLiter(s) Nebulizer every 6 hours  aMIOdarone    Tablet 200 milliGRAM(s) Oral every 24 hours  aspirin  chewable 81 milliGRAM(s) Oral every 24 hours  atorvastatin 40 milliGRAM(s) Oral at bedtime  azithromycin   Tablet 250 milliGRAM(s) Oral <User Schedule>  heparin   Injectable 5000 Unit(s) SubCutaneous every 8 hours  metoprolol succinate ER 50 milliGRAM(s) Oral every 24 hours  piperacillin/tazobactam IVPB.. 4.5 Gram(s) IV Intermittent every 12 hours  polyethylene glycol 3350 17 Gram(s) Oral two times a day  senna 2 Tablet(s) Oral at bedtime  sevelamer carbonate 800 milliGRAM(s) Oral every 8 hours  sildenafil (REVATIO) 20 milliGRAM(s) Oral every 8 hours  tiotropium 2.5 MICROgram(s)/olodaterol 2.5 MICROgram(s) (STIOLTO) Inhaler 2 Puff(s) Inhalation daily    MEDICATIONS  (PRN):  acetaminophen     Tablet .. 650 milliGRAM(s) Oral every 6 hours PRN Temp greater or equal to 38C (100.4F), Mild Pain (1 - 3)  aluminum hydroxide/magnesium hydroxide/simethicone Suspension 30 milliLiter(s) Oral every 4 hours PRN Dyspepsia  melatonin 3 milliGRAM(s) Oral at bedtime PRN Insomnia  ondansetron Injectable 4 milliGRAM(s) IV Push every 8 hours PRN Nausea and/or Vomiting   ** Acceptance from [facility] to [facility] **  HOSPITAL COURSE:  68M PMH AFib s/p watchman (on ASA), HTN, COPD, ESRD (MWF), severe ERIK, pulm HTN, T cell lymphoma (s/p chemo), HFpEF, s/p VATS procedure in 2021, recent [facility] admission 12/2023 for LGIB p/w productive cough, congestion, SOB for weeks now worsening in the last 5days. Admitted to [facility] for AHRF 2/2 COPD exacerbation 2/2 to CAP. Course c/b low volume hemoptysis on 3/5 to 3/6. Now s/p bronch 3/7, after which patient desaturated to 88% on 5L NC (prior was on 2L NC saturating in 90s). ICU consulted, and patient transferred to [facility] for closer monitoring. Patient has been doing his pulmonary hygiene and saturating between 91-94% on 4L NC (goal 88-92% iso COPD). Patient is stable for transfer to [facility].     Vital Signs Last 12 Hrs  T(F): 98 (03-08-24 @ 14:14), Max: 98.3 (03-08-24 @ 05:31)  HR: 76 (03-08-24 @ 12:20) (55 - 76)  BP: 94/55 (03-08-24 @ 12:20) (94/55 - 111/52)  BP(mean): 73 (03-08-24 @ 09:53) (73 - 75)  RR: 22 (03-08-24 @ 12:20) (20 - 30)  SpO2: 97% (03-08-24 @ 12:20) (90% - 97%)  I&O's Summary    07 Mar 2024 07:01  -  08 Mar 2024 07:00  --------------------------------------------------------  IN: 400 mL / OUT: 0 mL / NET: 400 mL        PHYSICAL EXAM:  Constitutional: NAD, comfortable in bed.  HEENT: NC/AT, PERRLA, EOMI, no conjunctival pallor or scleral icterus, MMM  Neck: Supple, no JVD  Respiratory: CTA B/L. No w/r/r.   Cardiovascular: RRR, normal S1 and S2, no m/r/g.   Gastrointestinal: +BS, soft NTND, no guarding or rebound tenderness, no palpable masses   Extremities: wwp; no cyanosis, clubbing or edema.   Vascular: Pulses equal and strong throughout.   Neurological: AAOx3, no CN deficits, strength and sensation intact throughout.   Skin: No gross skin abnormalities or rashes        LABS:                        9.6    12.41 )-----------( 173      ( 08 Mar 2024 06:44 )             31.8     03-08    141  |  97  |  46<H>  ----------------------------<  95  4.8   |  31  |  5.65<H>    Ca    10.1      08 Mar 2024 06:44  Phos  2.9     03-08  Mg     2.3     03-08    TPro  6.5  /  Alb  3.2<L>  /  TBili  0.5  /  DBili  x   /  AST  13  /  ALT  9<L>  /  AlkPhos  132<H>  03-08      Urinalysis Basic - ( 08 Mar 2024 06:44 )    Color: x / Appearance: x / SG: x / pH: x  Gluc: 95 mg/dL / Ketone: x  / Bili: x / Urobili: x   Blood: x / Protein: x / Nitrite: x   Leuk Esterase: x / RBC: x / WBC x   Sq Epi: x / Non Sq Epi: x / Bacteria: x          RADIOLOGY & ADDITIONAL TESTS:    MEDICATIONS  (STANDING):  acetylcysteine 10%  Inhalation 4 milliLiter(s) Inhalation every 6 hours  albuterol/ipratropium for Nebulization 3 milliLiter(s) Nebulizer every 6 hours  aMIOdarone    Tablet 200 milliGRAM(s) Oral every 24 hours  aspirin  chewable 81 milliGRAM(s) Oral every 24 hours  atorvastatin 40 milliGRAM(s) Oral at bedtime  azithromycin   Tablet 250 milliGRAM(s) Oral <User Schedule>  heparin   Injectable 5000 Unit(s) SubCutaneous every 8 hours  metoprolol succinate ER 50 milliGRAM(s) Oral every 24 hours  piperacillin/tazobactam IVPB.. 4.5 Gram(s) IV Intermittent every 12 hours  polyethylene glycol 3350 17 Gram(s) Oral two times a day  senna 2 Tablet(s) Oral at bedtime  sevelamer carbonate 800 milliGRAM(s) Oral every 8 hours  sildenafil (REVATIO) 20 milliGRAM(s) Oral every 8 hours  tiotropium 2.5 MICROgram(s)/olodaterol 2.5 MICROgram(s) (STIOLTO) Inhaler 2 Puff(s) Inhalation daily    MEDICATIONS  (PRN):  acetaminophen     Tablet .. 650 milliGRAM(s) Oral every 6 hours PRN Temp greater or equal to 38C (100.4F), Mild Pain (1 - 3)  aluminum hydroxide/magnesium hydroxide/simethicone Suspension 30 milliLiter(s) Oral every 4 hours PRN Dyspepsia  melatonin 3 milliGRAM(s) Oral at bedtime PRN Insomnia  ondansetron Injectable 4 milliGRAM(s) IV Push every 8 hours PRN Nausea and/or Vomiting   ** Acceptance from [facility] to [facility] **  HOSPITAL COURSE:  68M PMH AFib s/p watchman (on ASA), HTN, COPD, ESRD (MWF), severe ERIK, pulm HTN, T cell lymphoma (s/p chemo), HFpEF, s/p VATS procedure in 2021, recent [facility] admission 12/2023 for LGIB p/w productive cough, congestion, SOB for weeks now worsening in the last 5days. Admitted to [facility] for AHRF 2/2 COPD exacerbation 2/2 to CAP. Course c/b low volume hemoptysis on 3/5 to 3/6. Now s/p bronch 3/7, after which patient desaturated to 88% on 5L NC (prior was on 2L NC saturating in 90s). ICU consulted, and patient transferred to [facility] for closer monitoring. Patient has been doing his pulmonary hygiene and saturating between 91-94% on 4L NC (goal 88-92% iso COPD). Patient is stable for transfer to [facility].     Vital Signs Last 12 Hrs  T(F): 98 (03-08-24 @ 14:14), Max: 98.3 (03-08-24 @ 05:31)  HR: 76 (03-08-24 @ 12:20) (55 - 76)  BP: 94/55 (03-08-24 @ 12:20) (94/55 - 111/52)  BP(mean): 73 (03-08-24 @ 09:53) (73 - 75)  RR: 22 (03-08-24 @ 12:20) (20 - 30)  SpO2: 97% (03-08-24 @ 12:20) (90% - 97%)  I&O's Summary    07 Mar 2024 07:01  -  08 Mar 2024 07:00  --------------------------------------------------------  IN: 400 mL / OUT: 0 mL / NET: 400 mL        PHYSICAL EXAM:  Constitutional: NAD, comfortable in bed.  Respiratory: improved air movement, +rhonchi   Cardiovascular: RRR, normal S1 and S2, no m/r/g.   Gastrointestinal: +BS, soft NTND, no guarding or rebound tenderness, no palpable masses   Extremities: wwp; no cyanosis, clubbing or edema.   Vascular: Pulses equal and strong throughout.   Neurological: AAOx3, no CN deficits, strength and sensation intact throughout.   Skin: No gross skin abnormalities or rashes        LABS:                        9.6    12.41 )-----------( 173      ( 08 Mar 2024 06:44 )             31.8     03-08    141  |  97  |  46<H>  ----------------------------<  95  4.8   |  31  |  5.65<H>    Ca    10.1      08 Mar 2024 06:44  Phos  2.9     03-08  Mg     2.3     03-08    TPro  6.5  /  Alb  3.2<L>  /  TBili  0.5  /  DBili  x   /  AST  13  /  ALT  9<L>  /  AlkPhos  132<H>  03-08      Urinalysis Basic - ( 08 Mar 2024 06:44 )    Color: x / Appearance: x / SG: x / pH: x  Gluc: 95 mg/dL / Ketone: x  / Bili: x / Urobili: x   Blood: x / Protein: x / Nitrite: x   Leuk Esterase: x / RBC: x / WBC x   Sq Epi: x / Non Sq Epi: x / Bacteria: x          RADIOLOGY & ADDITIONAL TESTS:    MEDICATIONS  (STANDING):  acetylcysteine 10%  Inhalation 4 milliLiter(s) Inhalation every 6 hours  albuterol/ipratropium for Nebulization 3 milliLiter(s) Nebulizer every 6 hours  aMIOdarone    Tablet 200 milliGRAM(s) Oral every 24 hours  aspirin  chewable 81 milliGRAM(s) Oral every 24 hours  atorvastatin 40 milliGRAM(s) Oral at bedtime  azithromycin   Tablet 250 milliGRAM(s) Oral <User Schedule>  heparin   Injectable 5000 Unit(s) SubCutaneous every 8 hours  metoprolol succinate ER 50 milliGRAM(s) Oral every 24 hours  piperacillin/tazobactam IVPB.. 4.5 Gram(s) IV Intermittent every 12 hours  polyethylene glycol 3350 17 Gram(s) Oral two times a day  senna 2 Tablet(s) Oral at bedtime  sevelamer carbonate 800 milliGRAM(s) Oral every 8 hours  sildenafil (REVATIO) 20 milliGRAM(s) Oral every 8 hours  tiotropium 2.5 MICROgram(s)/olodaterol 2.5 MICROgram(s) (STIOLTO) Inhaler 2 Puff(s) Inhalation daily    MEDICATIONS  (PRN):  acetaminophen     Tablet .. 650 milliGRAM(s) Oral every 6 hours PRN Temp greater or equal to 38C (100.4F), Mild Pain (1 - 3)  aluminum hydroxide/magnesium hydroxide/simethicone Suspension 30 milliLiter(s) Oral every 4 hours PRN Dyspepsia  melatonin 3 milliGRAM(s) Oral at bedtime PRN Insomnia  ondansetron Injectable 4 milliGRAM(s) IV Push every 8 hours PRN Nausea and/or Vomiting

## 2024-03-08 NOTE — PROGRESS NOTE ADULT - PROBLEM SELECTOR PLAN 1
Patient on 4L NC. On baseline 1-2L O2 at home.   Likely 2/2 COPD exacerbation iso CAP vs worsening pulmonary HTN.  - New hemoptysis (3/5), no episodes since 3/5    PLAN:  - COPD Tx as stated below   - Pulmonary HTN Tx as stated below   - Pulm following; s/p bronch 3/7 --> moderate E.coli, susceptibilities to follow   - f/u repeat ambulatory sats  - Wean O2 as toleration

## 2024-03-08 NOTE — PROGRESS NOTE ADULT - TIME BILLING
Evaluation of patient, review of chart
Evaluation of patient, review of charts
Evaluation of patient, review of charts
Evaluation of patient, review of charts, discussion with consultants

## 2024-03-08 NOTE — PROGRESS NOTE ADULT - PROBLEM SELECTOR PLAN 1
Patient on 2L NC and becomes hypoxic w/ ambulation. On baseline 1L O2 at home.   Likely 2/2 COPD exacerbation iso CAP vs worsening pulmonary HTN.  - New hemoptysis (3/5)    PLAN:  - COPD Tx as stated below   - Pulmonary HTN Tx as stated below   - Pulm following; s/p bronch 3/7  - Wean O2 as toleration Patient on 4L NC. On baseline 1-2L O2 at home.   Likely 2/2 COPD exacerbation iso CAP vs worsening pulmonary HTN.  - New hemoptysis (3/5)    PLAN:  - COPD Tx as stated below   - Pulmonary HTN Tx as stated below   - Pulm following; s/p bronch 3/7  -f/u repeat ambulatory sats  - Wean O2 as toleration Patient on 4L NC. On baseline 1-2L O2 at home.   Likely 2/2 COPD exacerbation iso CAP vs worsening pulmonary HTN.  - New hemoptysis (3/5), no episodes since 3/5    PLAN:  - COPD Tx as stated below   - Pulmonary HTN Tx as stated below   - Pulm following; s/p bronch 3/7  - f/u repeat ambulatory sats  - Wean O2 as toleration Patient on 4L NC. On baseline 1-2L O2 at home.   Likely 2/2 COPD exacerbation iso CAP vs worsening pulmonary HTN.  - New hemoptysis (3/5), no episodes since 3/5    PLAN:  - COPD Tx as stated below   - Pulmonary HTN Tx as stated below   - Pulm following; s/p bronch 3/7 --> moderate E.coli, susceptibilities to follow   - f/u repeat ambulatory sats  - Wean O2 as toleration

## 2024-03-08 NOTE — PROGRESS NOTE ADULT - PROBLEM SELECTOR PLAN 10
#ERIK  hx of severe ERIK, non-compliant with CPAP    PLAN:  - OVN CPAP ordered. #ERIK  hx of severe ERIK, non-compliant with CPAP    PLAN:  - OVN CPAP ordered, but patient noncompliant.

## 2024-03-08 NOTE — PROGRESS NOTE ADULT - ASSESSMENT
Mr. Gamez is a 67 yo M with aFib s/p watchman (on ASA), HTN, COPD, ESRD (MWF), severe ERIK noncompliant to CPAP, pulm HTN (group 2 and3) on sildenafil T cell lymphoma (s/p chemo), HFpEF who presented with productive cough brown and SOB x5 days requiring more oxygen from baseline of 1 L via NC concerning for community acquired pneumonia and COPD exacerbation     Work up:   RVP negative  urine strep and legionella negative  CXR showed b/l infiltrates at the bases R >L  CTPE done and negative for PE but showed RLL infiltrate, Left pleural based scar, right pleural thickening  echo elevated PASP 58 up from 34  Bronchoscopy 3/7: copious purulent thick secretions brown in color. NO endobronchial lesions or localized hemoptysis.   BAL with culture/cyto sent, bronchial washing culture sent both pending but gram negative rods on staining.    Pulmonary is consulted for CAP, COPD, ERIK, and PH management/optimization. Follows with Dr. Johnson. Course now complicated by hemoptysis 3 episodes small volume on 3/5 and 3/6 now resolved likely due to bronchial irritation from pneumoina.    # Hemoptysis - resolved  - underwent bronchoscopy on 3/7, copious brown secretions, no evidence of localized heomptysis or endobronchial lesions  - episodes of hemoptysis likely related to pneumonia inflammation  - can stop hycodan   - BAL done, follow up Cytology     # Pleural based scars  - follows w/. Dr Johnson. PET scan outpatient planned.  - MELISSA pleural scar, thickened right pleural with reported asbestos exposure  - c/f mesothelioma. Will recommend abx treatment and outpatient PET scan w/ Dr. Johnson as planned.  - of note, patient had left sided VATS in 2021 for pleural thickening and recurrent left effusions (no malignancy on path or cyto on left)    # Community acquired pneumonia  - urine legionella and strep negative  - productive sputum, brown  - RLL dense consolidation  - changed to zosyn on 3/6 given elevated procalcitonin, follow up bronchial washing cultures to guide therapy  - provide airway clearance with aerobika after duonebs and chest PT    # COPD, in acute exacerbation  - on stiolto at home, intermittently complaint w/ inhalers  - agree w/. duonebs s/p prednisone 5 day course for exacerbation    # ERIK  - AHI 36 in nov 2022  - noncompliant to CPAP  - encouraged patient to use it    # PH  - combination of Group 2 (diastolic HF and afib) and 3 (COPD, ERIK) as well group 5 (HD)  - likely large factor of noncompliant to CPAP in the setting of severe ERIK  - encourage optimization w/ CPAP  - c/w home med sildanefil     Pulmonary will sign off at this time, please call us back for any questions. Final recommendations bolded    S/D/E with Dr. Miguel

## 2024-03-08 NOTE — PROGRESS NOTE ADULT - PROBLEM SELECTOR PLAN 5
MICCU Progress Note      Admission Diagnosis  Sepsis (CMS/[facility]) [A41.9]    PCP:Lisa Thrasher MD    History of Present Illness     Ms. Roper is a 78 year old female with PMHx of HTN, quadriplegia 2/2 multiple sclerosis; bed-bound with contracture, long-standing decubitus ulcer s/p diverting colostomy and urinary diversion, complex urogenital fistula and chronic mata; at least since 01/2021, h/o several complicated UTIs requiring hospitalization, staghorn calculus, & umbilical hernia repair who  presented on 7/26/2022 as a transfer from [facility] for concern for retroperitoneal abscess rupture and loculated pleural effusion (noted on CXR).      She was originally admitted to [facility] from home on 07/22/22 for UTI after home health nurse noted turbid urine in mata. She was also diagnosed with pneumonia. She was initially empirically treated with Zosyn and Vancomycin. Urine cultures came back positive for e coli and proteus and blood cultures showed gram-positive cocci in chains. On 07/26/22 she was admitted to [facility] ICU for hypotension and altered mental status. She underwent CT C/A/P which were significant for large right pleural effusion, small left pleural effusion edema in the pancreatic head, multiple large kidney stones, right-sided hydronephrosis due to an 8.7 mm calculus, large irregular fluid collection in the upper pole, dilated right colon leading to colostomy site.  ID was consulted and switched her to meropenem on 6/27.      Patient was transferred to [facility] for further intensive management and specialist evaluation and care.     Review of Systems:  Review of Systems   Reason unable to perform ROS: Full ROS difficult to obtain due to mental status, speech.   Cardiovascular: Negative for chest pain.   Gastrointestinal: Negative for abdominal pain.   Musculoskeletal: Negative for arthralgias.       MICCU Course      Day 1 (07/27/22)  - Pt arrived from [facility] on Levophed, weaned but  subsequently required restart  - Pt appearing more lethargic with increased work of breathing. Abg concerning for secondary respiratory acidosis. Patient intubated and started on Fentanyl  - Hgb down to 5.9 from 7.0 at [facility]  - Colostomy output noted dark, shayna bloody  - Emergently transfused 2 units   - Consent obtained from daughter ROBB Frazier  - Per daughter Karin, patient is A&Ox3 at baseline    - AC/VC 26/350/5/100   - Repeat H/H s/p transfusions Hgb 9.6 Hct 28.5    - 1 L bolus LR given     Day 2 (07/28/22)  - Since yesterday, general surgery placed R femoral trialysis catheter. Urology saw patient and were unsuccessful in placing a R ureteral stent due to tortuous ureters. Recommended IR PCN, but IR does not think pt is stable enough for this.   - Pt lines, most notably R femoral trialysis catheter, clotting   - Undergoing CRRT, difficulty due to trialysis clotting, flushed lines with alteplase  - 3 pressors: Norepinephrine, phenylephrine, vasopressin   - Concern for DIC due to clotting, elevated PTT, fibrinogen, ddimer   - IR placed R-sided chest tube   - Patient is DNR, in communication with family to discuss goals of care and management, palliative care consulted   - Patient not responding to treatment, MAPs still low despite 3 pressors    Day 3 (07/29/22)  -No acute events overnight  -Patient's daughter and POAKarin, met with palliative care 7/28  -Nephro keeping patient on CRRT since she is oliguric/anuric   -3 pressors: Norepinephrine, phenylephrine, vasopressin   -Patient opening her eyes, especially to stimuli. Per nurse, patient intermittently able to weakly squeeze hand on command.     Day 4 (7/30/22)  -7/29 Hgb 6.8. S/p 1u PRBC, Hgb 9 post transfusion  -Weaned off all pressors overnight. Back on Levo this morning  -Continues vented and sedated on Fentanyl. -No acute overnight events    Day 5 (7/31/2022)  - Currently repeating labs every 48 hrs.  - Requiring norepinephrine to maintain BP.  -  Currently sedated and intubated.  - She lost her mata's yesterday contacted urology for replacement.  - Urine output yesterday is around 225 ml.  - Chest tube output is 175 ml, management per surgery.    Day 6 (8/1/2022)  - Overnight, patient became bradycardic to 53 BPM and fentanyl gtt was reduced  - On norepinephrine drip to maintain BP/MAP - Levo 12  - Mata output @ 1000: 3 mL total   - Chest tube since 7/31 0700: 20 mL   - Potential A-fib or PACs seen on tele, EKG ordered   - Repeat EKG 8/1: Sinus rhythm, RBBB, lateral infarct age undetermined. Compared to earlier EKG 8/1, lateral infarct is now present   - Since patient is more stable on 1 pressor, reconsulted IR regarding potential PCN and retroperitoneal fluid collection   - Based on IR plan, consider restarting DVT ppx post-procedure  - Ordered UA, retroperitoneal fluid labs    Day 7 (8/2/2022)  - Overnight, attempted to wean down levophed but patient still requiring 12  - Hgb 7.5 today from 8.3, recheck H/H q12 to monitor   - IR tentatively plans to drain retroperitoneal fluid today   - Post-IR procedure restart DVT ppx   - Chest tube since 8/1: 1110->1230= 120 mL   - Mata minimal urine output   - Vent Settings changed per ABG: ACVC/16/350/5/40   - Failed SBT, apneic, continue to repeat SBTs  - Plan to wean off sedation and consult Neuro to assess mental status   - Attempted to call granddaughter Betsey 2x to obtain MS hx and neurology info, did not answer   - Recommending family meeting regarding goals of care. PEG, trach?  - Consult neurology: to obtain baseline of current mentation in setting of MS    Day 8 (8/3/2022)  -Yesterday, had family meeting in person gio Moses with Betsey, discussed goals of care   -Per Betsey, pt has not taken meds for MS or seen neurology in >10 years  -Has not seen PCP in years. She has home NP (Evelyn Nino from [facility]) to manage her medically  -Discontinued vancomycin per ID recs   -IR unable to  perform paracentesis as patient contracted, ventilated, on pressor, ID unconcerned about source control   -Neurology consulted  -300 mL urine overnight, currently draining opaque white fluid   -UA large leuk esterase/bacteria   -11:30 meeting with palliative care, family members  -Nephro deciding today whether to remove trialysis   -Start chemical DVT  -Levo 12  -Daily SBTs  -Albumin 25% x2 followed by Lasix 40 chaser   -Begin DVT ppx with heparin   -Conversation with Karin today, plan to have full family meeting Friday or this weekend     Day 9 (8/4/2022)  -Yesterday, trialysis cath removed  -Hgb fell to 6.7, 1 unit PRBC transfused   -Failed SBT   -Per nurse, estimates normal urine output, chest tube output 100 mL overnight   -Levo 15    Day 10 (8/5/2022)  -Overnight, passed SBT but remaining intubated    -Off levophed gtt overnight, on 2 this morning   -Palafox leaking profusely, urology replaced  -Chest tube 20 mL overnight  -Liquid stool colostomy, holding docusate and miralax   -LLE erythema, warmth, doppler pulses present b/l   -Awake and alert this morning, motioning that she is thirsty   -K 3.2, replete    Day 11 (8/6/2022)  -Low grade fever overnight  -On 5 mcg of levo  -Continuing meropenem  -Extubated today successfully, now on ventimask  -Palafox functioning well   -Repeat blood cultures taken due to recent fever and started on clindamycin for cellulitis   -Chest tube removed     Day 12 (8/7/2022)  -NAEO  -Off levo since 3 pm yesterday  -On meropenem and clinda  -To be transferred to telemetry    Day 13 (8/8/2022)  -Yesterday, plans to transfer to tele until patient became alkalotic, hypoxic, tachypneic. ABG 8/7: 7.64/33/62/36  -HFNC 40/40, acetazolamide, remained alkalotic until today. ABG 8/8: 7.43/50/117/33  -Pt removed HFNC, so on 3L NC today, satting 100%  -LLE cellulitis looks improved on clinda   -Transfer to telemetry     Inpatient Meds:  Current Facility-Administered Medications   Medication   •  acetaZOLAMIDE (DIAMOX) tablet 250 mg   • morphine injection 2 mg   • sodium chloride 0.9% infusion   • clindamycin (CLEOCIN) in dextrose 5% premix IVPB 600 mg   • ipratropium-albuterol (DUONEB) 0.5-2.5 (3) MG/3ML nebulizer solution 3 mL   • sodium chloride 0.9 % flush bag 25 mL   • Potassium Standard Replacement Protocol (Levels 3.5 and lower)   • Magnesium Standard Replacement Protocol   • [Held by provider] furosemide (LASIX INJECT) injection 40 mg   • acetaminophen (TYLENOL) 160 MG/5ML solution 325 mg   • heparin (porcine) injection 5,000 Units   • sodium chloride 0.9% infusion   • acetaminophen (TYLENOL) tablet 650 mg   • diphenhydrAMINE (BENADRYL) capsule 25 mg   • meropenem (MERREM) 1 g in sodium chloride 0.9 % 100 mL IVPB   • [Held by provider] docusate sodium (COLACE) 50 MG/5ML liquid 100 mg   • [Held by provider] polyethylene glycol (MIRALAX) packet 17 g   • sodium chloride 0.9% infusion   • sodium chloride 0.9% infusion   • alteplase (CATHFLO ACTIVASE) injection 2 mg   • sodium chloride (NORMAL SALINE) 0.9 % bolus 500 mL   • pantoprazole (PROTONIX INJECT) injection 40 mg   • fentaNYL (SUBLIMAZE) 2,500 mcg/250 mL in sodium chloride 0.9 % infusion   • [Held by provider] aspirin (ECOTRIN) enteric coated tablet 81 mg   • baclofen (LIORESAL) tablet 10 mg   • [Held by provider] metoPROLOL tartrate (LOPRESSOR) tablet 50 mg   • CARBOXYMethylcellulose (REFRESH TEARS) 0.5 % ophthalmic solution 1 drop   • NORepinephrine (LEVOPHED) 32 mg/250 mL in D5W infusion   • potassium phosphate 20 mmol in sodium chloride 0.9 % 250 mL IVPB    Or   • sodium PHOSPHATE 20 mmol in sodium chloride 0.9 % 250 mL IVPB   • magnesium sulfate 2 g in 50 mL premix IVPB   • potassium CHLORIDE 20 MEQ/50ML IVPB premix 20 mEq   • potassium CHLORIDE 20 MEQ/50ML IVPB premix 20 mEq   • sodium bicarbonate 8.4 % injection 50 mEq   • sodium chloride (PF) 0.9 % injection 10 mL   • sodium chloride (NORMAL SALINE) 0.9 % bolus 1,000 mL       Prior to  Admission Medications:  Medications Prior to Admission   Medication Sig Dispense Refill   • aspirin 81 MG EC tablet Take 1 tablet by mouth daily. 90 tablet 3   • baclofen (LIORESAL) 10 MG tablet Take 1 tablet by mouth 3 times daily as needed (muscle spasm). 90 tablet 0   • gabapentin (NEURONTIN) 300 MG capsule Take 1 capsule by mouth 3 times daily as needed (NERVE PAIN). 90 capsule 1   • Lidocaine 3 % Cream Apply 1 application topically 3 times daily. 85 g 3   • metoPROLOL tartrate (LOPRESSOR) 50 MG tablet Take 1 tablet by mouth 2 times daily. 60 tablet 3   • Cholecalciferol (Vitamin D3) 50 mcg (2,000 units) capsule Take 50 mcg by mouth daily.     • acetaminophen (TYLENOL) 325 MG tablet Take 325 mg by mouth every 4 hours as needed for Pain (mild to moderate). take 2 tabs          Objective     VITAL SIGNS:   Vital Last Value 24 Hour Range   Temperature 97.8 °F (36.6 °C) (08/08/22 0400) Temp  Min: 97.7 °F (36.5 °C)  Max: 98.1 °F (36.7 °C)   Pulse 81 (08/08/22 0600) Pulse  Min: 80  Max: 108   Respiratory 14 (08/08/22 0600) Resp  Min: 14  Max: 41   Non-Invasive  Blood Pressure 108/48 (08/08/22 0600) BP  Min: 81/68  Max: 141/55   Pulse Oximetry 100 % (08/08/22 0600) SpO2  Min: 95 %  Max: 100 %     IINTAKE/OUTPUT:  I/O last 3 completed shifts:  In: 2022.2 [NG/GT:1590; IV Piggyback:432.2]  Out: 735 [Urine:485; Stool:250]  No intake/output data recorded.    Intake/Output Summary (Last 24 hours) at 8/8/2022 0707  Last data filed at 8/8/2022 0600  Gross per 24 hour   Intake 1977.18 ml   Output 695 ml   Net 1282.18 ml       PHYSICAL EXAM  Physical Exam  Vitals reviewed.   Constitutional:       General: She is not in acute distress.     Appearance: She is obese. She is ill-appearing.   HENT:      Nose:      Comments: NG tube in place  Eyes:      Extraocular Movements: Extraocular movements intact.   Cardiovascular:      Rate and Rhythm: Normal rate.      Pulses: Normal pulses.      Heart sounds: Normal heart sounds.       Comments: PVCs  Pulmonary:      Effort: Pulmonary effort is normal.      Breath sounds: Normal breath sounds.   Abdominal:      General: Bowel sounds are normal.      Palpations: Abdomen is soft.      Comments: Colostomy bag   Genitourinary:     Comments: Palafox catheter in place  Skin:     General: Skin is warm and dry.      Comments: No LLE erythema    Neurological:      Mental Status: She is alert.         LABS  Recent Labs   Lab 08/08/22 0318 08/07/22 0336 08/06/22  0330 08/06/22  0045 08/05/22 1815 08/05/22  0419 08/04/22  0340   SODIUM 145 145 147*  --   --  147* 144   POTASSIUM 3.5 3.6 3.7 3.9 3.4 3.2* 3.7   CHLORIDE 108 108 108  --   --  108 108   CO2 29 30 30  --   --  30 26   ANIONGAP 12 11 13  --   --  12 14   BUN 20 23* 28*  --   --  26* 26*   CREATININE 0.51 0.50* 0.53  --   --  0.48* 0.53   CALCIUM 8.3* 8.4 8.3*  --   --  8.0* 8.2*   GLUCOSE 107* 122* 125*  --   --  119* 160*     Recent Labs   Lab 08/08/22 0318 08/07/22 1817 08/07/22 0336 08/06/22 1814 08/06/22  1001 08/06/22  0330 08/05/22 1815 08/05/22 0419 08/04/22  1820 08/04/22  0340   HGB 8.2* 8.2* 8.0* 8.5* 7.8* 6.6*   < > 7.4*   < > 8.0*   HCT 24.9* 24.1* 24.3* 24.7* 23.8* 20.0*   < > 22.1*   < > 23.5*     --  236  --   --  227  --  230  --  264   WBC 11.3*  --  15.6*  --   --  12.0*  --  11.9*  --  17.1*    < > = values in this interval not displayed.     Recent Labs   Lab 08/06/22 0330 08/05/22 1815 08/05/22 0419 08/04/22  0340 08/03/22  0356   MG 2.3 2.5* 1.4* 1.6* 1.7     No results found  Recent Labs   Lab 08/02/22  0445   ALBUMIN 1.1*       UA  Lab Results   Component Value Date    UWBC Large (A) 08/03/2022    UWBC TRACE (A) 05/02/2015    URBC Moderate (A) 08/03/2022    URBC SMALL (A) 05/02/2015        IMAGING  CT CHEST ABDOMEN PELVIS W CONTRAST    Result Date: 7/27/2022  * * * * * * * * * * * * * * PRELIMINARY REPORT * * * * * * * * * * * * * * EXAM: CT CHEST ABDOMEN PELVIS W CONTRAST CLINICAL INDICATION: fluid  collection COMPARISON: TECHNIQUE: Helical CT was performed of the abdomen and pelvis, with axial, coronal, and sagittal reconstructions performed and provided for review. A total of ml Omnipaque 350 was administered intravenously during the study. Automated exposure control was utilized. FINDINGS: CHEST: ABDOMEN:     CHEST: Moderate volume right and small volume left pleural effusions with adjacent compressive atelectasis. Difficult to exclude superimposed airspace disease. Coronary artery calcifications. Dilated main pulmonary artery may be seen in setting of pulmonary artery hypertension. 2 cm hypodense lesion in the left thyroid. Recommend nonemergent follow-up with thyroid ultrasound. ABDOMEN: 11 x 4.2 x 12 cm rim-enhancing collection along the right retroperitoneum and extends superiorly along the liver. Central hyperdensity within this collection which appears to communicate with the adjacent right kidney (307/45) suggest an acute active hemorrhagic component. Hemorrhage may be secondary to underlying angiomyolipomas as there are 7 mm and smaller fat attenuating lesions in the right kidney. 3.0 x 4.7 cm low-density lesion along the superior right kidney which conforms to the renal contour may represent subcapsular collection causing extrinsic compression of the right kidney parenchyma concerning for page kidney. 8 mm obstructing urinary calculus in the proximal right ureter with moderate right hydronephrosis. Additional 2.8 cm and smaller intrarenal calculi in bilateral kidneys. The right adrenal gland is not well visualized on this examination, which may be secondary to mass effect from the aforementioned retroperitoneal collection. Mild induration of the paraduodenal groove may suggest duodenitis versus groove pancreatitis. Multiple fluid-filled loops of small bowel and colon is concerning for adynamic ileus. Postsurgical changes status post proximal small bowel resection. Erosive changes of the right sacrum  with overlying decubitus ulcer is concerning for acute osteomyelitis. Sclerotic changes in the left sacrum may suggest chronic osteomyelitis. Dr. Tellez discussed findings and impression with Dr. Ayo Carrillo in person on 7/27/2022 5:50 AM.  Dictated by: MARIA DE JESUS TELLEZ DO Dictated on: 7/27/2022 3:35 AM * * * * * * * * * * * * * * PRELIMINARY REPORT * * * * * * * * * * * * * *       EKG    Encounter Date: 07/26/22   Electrocardiogram 12-Lead   Result Value    Ventricular Rate EKG/Min (BPM) 79    Atrial Rate (BPM) 79    MI-Interval (MSEC) 114    QRS-Interval (MSEC) 148    QT-Interval (MSEC) 406    QTc 465    P Axis (Degrees) 3    R Axis (Degrees) 99    T Axis (Degrees) 19    REPORT TEXT      Normal sinus rhythm  Right bundle branch block  Lateral infarct  , age undetermined  Abnormal ECG  When compared with ECG of  01-AUG-2022 04:18,  Lateral infarct  is now  present  Confirmed by RUTHANN PRATER, [facility] ([id number]) on 8/1/2022 11:03:05 PM         ECHO  No results found for this or any previous visit.      Assessment and Plan     Ms. Roper is a 78 year old female with PMHx of HTN, multiple sclerosis; bed-bound with contracture, long-standing decubitus ulcer s/p diverting colostomy and urinary diversion, complex urogenital fistula and chronic mata; at least since 01/2021, h/o several complicated UTIs requiring hospitalization, staghorn calculus, & umbilical hernia repair who  presented on 7/26/2022 as a transfer from [facility] for concern for retroperitoneal abscess rupture and loculated pleural effusion (noted on CXR).    #Septic shock 2/2 bacteremia  #Urosepsis  #Obstructive UTI   - Presented Hypotensive requiring vasopressors  - Levophed gtt titrated as needed, MAP goal >65  - Urine cx POS for e coli and proteus (OSH)  - Procal 15.25  - MRSA negative  - Blood Cxs: NTD  - Urine Cx consistent with contamination  - Fungal cx: NTD  - HIV -, Hepatitis panel -   - 8/2 Albumin 1.1   - 8/3 mata draining opaque white fluid  - 8/3  Albumin transfusion, 3 day course   - 8/3 UA demonstrated large bacteria, leuk esterase, moderate blood   Plan:  - Continue supportive care  - Continue Abx as per ID - meropenem  - 8/6 new blood cx NTD   - F/u beta D glucan     #LLE Erythema  -Concern for cellulitis, noticed 8/5  -Pt has been on meropenem   -Cellulitis appears to be improving   Plan:  -8/6 started clindamycin per ID     #Acute GI Bleed  #Anemia  #Duodenitis  - Prelim CT C/A/P findings with mild induration of the paraduodenal groove may suggest duodenitis versus groove pancreatitis  - Oral, dark bloody output noted from colostomy  - Hgb 5.8 on arrival, down from 7.0 at [facility] earlier same day  - Hemoccult positive 7/28   - S/p 2u PRBC on 7/27  - S/p 1u PRBC 7/29  - S/p 1u PRBC 8/3  Plan:   - Continue monitoring H/H q12h  - Transfuse for Hgb <7  - Protonix 40mg BID  - Restart chemical DVT prophylaxis SQH  - GI consulted, continue supportive care  - No plan for scope    #Acute Hypoxic Respiratory Failure requiring mechanical ventilation  #Bilateral Pleural Effusions  - Presented with tachypneic with increased work of breathing and lethargy requiring urgent intubation  - S/p R Thoracentesis 7/28  - R chest tube 7/28  - Serum , pleural , pleural glucose 135, pleural protein 2.6; Exudative  Plan:   - Chest tube management as per Surgery  - Passed SBT on 8/5, still intubated  - Repeat SBT 8/6   - Extubated on 8/6  - Wean off sedation   - 8/7 started acetazolamide, HFNC 40/40    #Hypernatremia  -Na 147 on 8/5  Plan:   -Increase free water flushes, 100 mL q4h    #Sacral Decubitus ulcer   #Chronic osteomyelitis 2/2 above  #Diverting colostomy  - Documented wound in Epic since at least 2015   - CT 7/27. Evidence of osteomyelitis to sacrum. Dilated R colon leading to colostomy, partial obstruction at ostomy site not excluded.   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  - Urine in mata bag turbid  - Urine culture at [facility] positive for e coli and proteus  - PVR 20ml  - Per IR pt too unstable for PCN   - Mata leaking, bladder scan 8/5 no bladder visualized, urology replaced mata   Plan:  - Notify Urology if with >450cc  - Urine cx contaminated  - Continue meropenem     #Right-sided Hydronephrosis  #Right UPJ calculus  #Retroperitoneal fluid collection, concern for rupture of calyx 2/2 hydronephrosis  - Multiple renal calculi and 8.7mm stone noted on outside imaging report  - Pt with h/o staghorn calculi (7.9x4.2x5.9 cm)   - Urology 7/27 unable to place ureteral stent  - IR consulted; too unstable for PCN or paracentesis     #Hypokalemia (resolved)    -K 3.2 on 8/5, replete     #EZEQUIEL likely 2/2 renal calculi (resolved)  #Oliguric ATN  - 2/27 Cystatin C 2.43  - Hold CRRT as per nephrology  - No acute need for hemodialysis  - CTM     #Concern for Heart Failure (resolved)  - Unclear history  - EKG 7/27: Sinus rhythm with premature atrial complexes. Right bundle branch block   - EKG 8/1: Sinus rhythm with RBBB. No PACs.   - BNP 10,169   - Echo 7/27: Grade 1 Diastolic Dysfunction. EF 70%.  - EKG 8/1/2022: NSR, possible lat ischemia noted  Plan:  - Troponin 8/1 wnl      #H/o Multiple sclerosis  #Functional quadriplegia with contracture  #Protein calorie malnutrition  - Albumin 1.3  - Bedbound  Plan:  - Nutrition consult once medically optimized   - TF  - Baclofen PRN     Checklist  VTE Prophylaxis: Heparin 5,000 SQ  GI prophylaxis: Protonix  F: PRN  E: Replete PRN  N: NPO    CODE STATUS:   Code Status: Selective Treatment/DNR   POA: daphne Frazier    Disposition: Telemetry     Consults: GI, ID, Urology, IR, Wound Care, Surgery     Discussed with Attending: Dr. Braxton     Please  see attending physician addendum/note for final recommendations.    Xander Garza, M4  [facility]     8/8/20227:07 AM   Follows with Dr Guadalupe outpatient. Last HD session 2/28. Missed HD 3/1 due to sx. Renal consulted in ED  - c/w iHD  - c/w sevelamer 800mg TID

## 2024-03-08 NOTE — PROGRESS NOTE ADULT - SUBJECTIVE AND OBJECTIVE BOX
Nephrology progress note    Seen at bedside. Reports some abdominal soreness this morning, worse with coughing. Last bowel movement reportedly Wednesday. Receiving inhaled acetylcysteine this morning. Endorses occasional hemoptysis. Planned for HD today.    Allergies:  No Known Allergies    Hospital Medications:   MEDICATIONS  (STANDING):  acetylcysteine 10%  Inhalation 4 milliLiter(s) Inhalation every 6 hours  albuterol/ipratropium for Nebulization 3 milliLiter(s) Nebulizer every 6 hours  aMIOdarone    Tablet 200 milliGRAM(s) Oral every 24 hours  aspirin  chewable 81 milliGRAM(s) Oral every 24 hours  atorvastatin 40 milliGRAM(s) Oral at bedtime  azithromycin   Tablet 250 milliGRAM(s) Oral <User Schedule>  epoetin donny-epbx (RETACRIT) Injectable 6000 Unit(s) IV Push once  heparin   Injectable 5000 Unit(s) SubCutaneous every 8 hours  metoprolol succinate ER 50 milliGRAM(s) Oral every 24 hours  piperacillin/tazobactam IVPB.. 4.5 Gram(s) IV Intermittent every 12 hours  polyethylene glycol 3350 17 Gram(s) Oral two times a day  senna 2 Tablet(s) Oral at bedtime  sevelamer carbonate 800 milliGRAM(s) Oral every 8 hours  sildenafil (REVATIO) 20 milliGRAM(s) Oral every 8 hours  tiotropium 2.5 MICROgram(s)/olodaterol 2.5 MICROgram(s) (STIOLTO) Inhaler 2 Puff(s) Inhalation daily    REVIEW OF SYSTEMS:   All other review of systems is negative unless indicated above.    VITALS:  T(F): 97.6 (03-08-24 @ 09:32), Max: 98.8 (03-07-24 @ 13:43)  HR: 73 (03-08-24 @ 09:53)  BP: 99/55 (03-08-24 @ 09:53)  RR: 30 (03-08-24 @ 09:53)  SpO2: 92% (03-08-24 @ 09:53)  Wt(kg): --    03-06 @ 07:01  -  03-07 @ 07:00  --------------------------------------------------------  IN: 0 mL / OUT: 1000 mL / NET: -1000 mL    03-07 @ 07:01  -  03-08 @ 07:00  --------------------------------------------------------  IN: 400 mL / OUT: 0 mL / NET: 400 mL        PHYSICAL EXAM:  Constitutional: NAD, lying in bed  HEENT: Normocephalic, ecchymosis over R orbit, EOMI, ventimask in place  Respiratory: Coarse breath sounds, no respiratory distress  Cardiovascular: Regular rate  Gastrointestinal: soft, mildly TTP on R, distended  Extremities: Trace peripheral edema  Neurological: Awake, alert, moving all extremities  Skin: No rash on exposed skin  Vascular Access: LUE AVF w/ bruit and thrill    LABS:  03-08    141  |  97  |  46<H>  ----------------------------<  95  4.8   |  31  |  5.65<H>    Ca    10.1      08 Mar 2024 06:44  Phos  2.9     03-08  Mg     2.3     03-08    TPro  6.5  /  Alb  3.2<L>  /  TBili  0.5  /  DBili      /  AST  13  /  ALT  9<L>  /  AlkPhos  132<H>  03-08                          9.6    12.41 )-----------( 173      ( 08 Mar 2024 06:44 )             31.8       Urine Studies:  Creatinine Trend: 5.65<--, 6.50<--, 4.85<--, 7.70<--, 5.97<--, 8.68<--  Urinalysis Basic - ( 08 Mar 2024 06:44 )    Color:  / Appearance:  / SG:  / pH:   Gluc: 95 mg/dL / Ketone:   / Bili:  / Urobili:    Blood:  / Protein:  / Nitrite:    Leuk Esterase:  / RBC:  / WBC    Sq Epi:  / Non Sq Epi:  / Bacteria:         RADIOLOGY & ADDITIONAL STUDIES:  Reviewed

## 2024-03-08 NOTE — PROGRESS NOTE ADULT - ASSESSMENT
69 yo M with hx of ESRD on HD MWF via LUE AVF, AFib s/p watchman (on ASA), HTN, COPD, severe ERIK, pulm HTN, T cell lymphoma (s/p chemo), HFpEF, s/p VATS procedure in , recent [facility] admission 2023 for LGIB, admitted for CAP and  exacerbation, missed HD on 3/1. Nephrology consulted for in patient HD management     ESRD:  Outpatient HD at [address] [place] [address], follows Dr. Guadalupe   Rx: 3hrs   EDW: 68kg    Plan:  HD today per schedule for clearance and volume management with parameters below  c/w Fluid restriction <1.2L/day and renal diet as tolerated  Adjust and dose all meds to ESRD   If allopurinol is restarted, dose to 50mg daily or 100mg every other day while inpatient    Hemodialysis Treatment.:     Schedule: Once, Modality: Hemodialysis, Access: Arteriovenous Fistula    Dialyzer: Optiflux [id number], Time: 210 Min    Blood Flow: 400 mL/Min , Dialysate Flow: 500 mL/Min, Dialysate Temp: 36.5, Tubinmm (Adult)    Target Dry Weight: 68 kG    Dialysate Electrolytes (mEq/L): Potassium 2, Calcium 2.5, Sodium 138, Bicarbonate 35    Access:  LUE AVF functional     Anemia: h/h stable.   C/w MARCE during HD   tsat 10%, ferritin 800. May benefit from IV iron but hold in setting of active infection.    HTN:  BP at goal   UF with HD as tolerated  Hold Amlodipine for now given low BP     MBD:  Calcium: 10.1  Phos: 2.9  Hold sevelamer for now, check phos daily    Hep B serologies non reactive  69 yo M with hx of ESRD on HD MWF via LUE AVF, AFib s/p watchman (on ASA), HTN, COPD, severe ERIK, pulm HTN, T cell lymphoma (s/p chemo), HFpEF, s/p VATS procedure in , recent [facility] admission 2023 for LGIB, admitted for CAP and  exacerbation, missed HD on 3/1. Nephrology consulted for in patient HD management     ESRD:  Outpatient HD at [address] [place] [address], follows Dr. Guadalupe   Rx: 3hrs   EDW: 68kg    Plan:  HD today per schedule for clearance and volume management with parameters below  c/w Fluid restriction <1.2L/day and renal diet as tolerated  Adjust and dose all meds to ESRD   If allopurinol is restarted, dose to 50mg daily or 100mg every other day while inpatient    Hemodialysis Treatment.:     Schedule: Once, Modality: Hemodialysis, Access: Arteriovenous Fistula    Dialyzer: Optiflux [id number], Time: 210 Min    Blood Flow: 400 mL/Min , Dialysate Flow: 500 mL/Min, Dialysate Temp: 36.5, Tubinmm (Adult)    Target Dry Weight: 68 kG    Dialysate Electrolytes (mEq/L): Potassium 2, Calcium 2.5, Sodium 138, Bicarbonate 35    Access:  LUE AVF functional     Anemia: h/h stable.   C/w MARCE during HD   tsat 10%, ferritin 800. May benefit from IV iron but hold in setting of active infection.    HTN:  BP at goal   UF with HD as tolerated  No need for Amlodipine for now given low BP     MBD:  Calcium: 10.1  Phos: 2.9  Hold sevelamer for now, check phos daily    Hep B serologies non reactive

## 2024-03-08 NOTE — PROGRESS NOTE ADULT - PROBLEM SELECTOR PLAN 8
Hgb 10, at baseline. Of note, pt recently admitted 12/2023 for LGIB, s/p c-scope, found to have large polyps on inpatient colonoscopy. Pending outpatient repeat colonoscopy w/ polypectomy on Thursday 3/7/24 @[facility].    PLAN:  - maintain active T&S  - outpatient GI followup

## 2024-03-08 NOTE — PROGRESS NOTE ADULT - PROBLEM SELECTOR PLAN 8
Hgb 10, at baseline. Of note, pt recently admitted 12/2023 for LGIB, s/p c-scope, found to have large polyps on inpatient colonoscopy. Pending outpatient repeat colonoscopy w/ polypectomy on Thursday 3/7/24 @[facility].    PLAN:  - maintain active T&S  - outpatient GI followup

## 2024-03-08 NOTE — PROGRESS NOTE ADULT - ATTENDING COMMENTS
Seen and evaluated at bedside during dialysis. No complaints reported by the pt.  Borderline hemodynamics during treatment noted limiting partially UF goal for today likely related to sildenafil (Not dialyzable) for pulm HTN  Recommend to hold AM dose before dialysis on dialysis days   Further recs as above  Discussed with primary team

## 2024-03-08 NOTE — PROGRESS NOTE ADULT - ASSESSMENT
68M PMH AFib s/p watchman (on ASA), HTN, COPD, ESRD (MWF), severe ERIK, pulm HTN, T cell lymphoma (s/p chemo), HFpEF, s/p VATS procedure in 2021, recent [facility] admission 12/2023 for LGIB p/w productive cough, congestion, SOB for weeks now worsening in the last 5days. Admitted to [facility] for AHRF 2/2 COPD exacerbation 2/2 to CAP. Course c/b low volume hemoptysis on 3/5 to 3/6. Now s/p bronch 3/7, after which patient desaturated to 88% on 5L NC (prior was on 2L NC saturating in 90s). ICU consulted, and patient transferred to [facility] for closer monitoring. Patient has been doing his pulmonary hygiene and saturating between 91-94% on 4L NC (goal 88-92% iso COPD). Patient is stable for transfer to [facility].

## 2024-03-08 NOTE — CHART NOTE - NSCHARTNOTEFT_GEN_A_CORE
Admitting Diagnosis:   Patient is a 68y old  Male who presents with a chief complaint of SOB (08 Mar 2024 14:20)      PAST MEDICAL & SURGICAL HISTORY:  HTN (hypertension)      Atrial fibrillation      CKD (chronic kidney disease)      Lymphoma  t cell; remission since 2020      CHF, chronic  LVEF 65 PASP 59 midl MR, AR on 21      H/O pulmonary hypertension      Gout      BPH (benign prostatic hyperplasia)      COPD, mild      Mitral regurgitation      ERIK (obstructive sleep apnea)      ESRD (end stage renal disease)  dialysis M [facility] F      History of cholecystectomy      Elective surgery  rectal surgery      History of surgery  watchman left- dialysis port      AV fistula  left arm      H/O inguinal hernia repair  bilateral          Current Nutrition Order: Renal restricted diet + [employer] Renal 250ml daily [provides 450 kcals, 20g protein]    PO Intake: Good (%) [   ]  Fair (50-75%) [   ] Poor (<25%) [   ]    GI Issues: +abd pain, last BM noted 3/5    Pain: 5/10 pain reported to abdomen    Skin Integrity: no pressure injuries noted; Dandre score 18    I&Os:   24 @ 07:01  -  24 @ 07:00  --------------------------------------------------------  IN: 400 mL / OUT: 0 mL / NET: 400 mL        Labs:       141  |  97  |  46<H>  ----------------------------<  95  4.8   |  31  |  5.65<H>    Ca    10.1      08 Mar 2024 06:44  Phos  2.9       Mg     2.3         TPro  6.5  /  Alb  3.2<L>  /  TBili  0.5  /  DBili  x   /  AST  13  /  ALT  9<L>  /  AlkPhos  132<H>      CAPILLARY BLOOD GLUCOSE          Medications:  MEDICATIONS  (STANDING):  acetylcysteine 10%  Inhalation 4 milliLiter(s) Inhalation every 6 hours  albuterol/ipratropium for Nebulization 3 milliLiter(s) Nebulizer every 6 hours  aMIOdarone    Tablet 200 milliGRAM(s) Oral every 24 hours  aspirin  chewable 81 milliGRAM(s) Oral every 24 hours  atorvastatin 40 milliGRAM(s) Oral at bedtime  azithromycin   Tablet 250 milliGRAM(s) Oral <User Schedule>  heparin   Injectable 5000 Unit(s) SubCutaneous every 8 hours  metoprolol succinate ER 50 milliGRAM(s) Oral every 24 hours  piperacillin/tazobactam IVPB.. 4.5 Gram(s) IV Intermittent every 12 hours  polyethylene glycol 3350 17 Gram(s) Oral two times a day  senna 2 Tablet(s) Oral at bedtime  sevelamer carbonate 800 milliGRAM(s) Oral every 8 hours  sildenafil (REVATIO) 20 milliGRAM(s) Oral every 8 hours  tiotropium 2.5 MICROgram(s)/olodaterol 2.5 MICROgram(s) (STIOLTO) Inhaler 2 Puff(s) Inhalation daily    MEDICATIONS  (PRN):  acetaminophen     Tablet .. 650 milliGRAM(s) Oral every 6 hours PRN Temp greater or equal to 38C (100.4F), Mild Pain (1 - 3)  aluminum hydroxide/magnesium hydroxide/simethicone Suspension 30 milliLiter(s) Oral every 4 hours PRN Dyspepsia  melatonin 3 milliGRAM(s) Oral at bedtime PRN Insomnia  ondansetron Injectable 4 milliGRAM(s) IV Push every 8 hours PRN Nausea and/or Vomiting      Daily Height in cm: 177.8 (08 Mar 2024 10:47)      Daily Weight in k.4 [08 Mar 2024]  Daily 66.2kg [06 Mar 2024]  Daily 67.8kg [05 Mar 2024]  Daily 66.7kg [04 Mar 2024]  Daily 67.9kg [02 Mar 2024]    Weight Change: overall appears weight stable, minor fluctuations anticipated in setting of fluid shifts/HD. Continue daily weights for trending.    Estimated energy needs:   Current body wt [68.4kg] used for energy calculations as pt falls within % IBW. Needs estimated for age and adjusted for current clinical status, renal function/HD, malnutrition. Fluid needs per team.    Calories: 9465-7846 kcals based on 30-35 kcals/kg  Protein: 82.1-116.3g based on 1.2-1.7g protein/kg  Fluid needs per team*    Subjective:   68M PMH AFib s/p watchman (on ASA), HTN, COPD, ESRD (MWF), severe ERIK, pulm HTN, T cell lymphoma (s/p chemo), HFpEF, s/p VATS procedure in , recent [facility] admission 2023 for LGIB p/w productive cough, congestion, SOB x5 days. Pt requiring more oxygen at home, uses 1 L as needed at baseline but has been using 3L over the past few days. ROS +headache, productive cough with dark green sputum, chills, wheezing. Was never hypoxic, but feeling subjectively better with 3L NC and could "feel the air."     Chart reviewed. Labs: WBC 12.41 <H>, RBC 3.16 <L>, Hgb 9.6/Hct 31.8 <L>, .6 <H>, BUN 46 <H>, Creat 5.65 <H>.   Meds- on miralax, senna, aluminum hydroxide/magnesium hydroxide/simethicone, zofran prn, sevelamer.     Previous Nutrition Diagnosis: Malnutrition... [Severe protein calorie malnutrition in context of chronic illness] related to decreased PO intake as evidenced by <75% of nutrition needs x1 month, 9% weight loss x1 month, severe muscle and fat wasting    Active [ X ]  Resolved [   ]    If resolved, new PES:     Goal:  Pt will meet at least 75% of protein & energy needs via most appropriate route for nutrition     Recommendations:    Education:     Risk Level: High [   ] Moderate [   ] Low [   ] Admitting Diagnosis:   Patient is a 68y old  Male who presents with a chief complaint of SOB (08 Mar 2024 14:20)      PAST MEDICAL & SURGICAL HISTORY:  HTN (hypertension)      Atrial fibrillation      CKD (chronic kidney disease)      Lymphoma  t cell; remission since 2020      CHF, chronic  LVEF 65 PASP 59 midl MR, AR on 21      H/O pulmonary hypertension      Gout      BPH (benign prostatic hyperplasia)      COPD, mild      Mitral regurgitation      ERIK (obstructive sleep apnea)      ESRD (end stage renal disease)  dialysis M W F      History of cholecystectomy      Elective surgery  rectal surgery      History of surgery  watchman left- dialysis port      AV fistula  left arm      H/O inguinal hernia repair  bilateral          Current Nutrition Order: Renal restricted diet + [employer] Renal 250ml daily [provides 450 kcals, 20g protein]    PO Intake: Good (%) [   ]  Fair (50-75%) [   ] Poor (<25%) [ X  ]    GI Issues: +abd pain, last BM noted 3/5    Pain: 5/10 pain reported to abdomen    Skin Integrity: no pressure injuries noted; Dandre score 18    I&Os:   24 @ 07:01  -  24 @ 07:00  --------------------------------------------------------  IN: 400 mL / OUT: 0 mL / NET: 400 mL        Labs:       141  |  97  |  46<H>  ----------------------------<  95  4.8   |  31  |  5.65<H>    Ca    10.1      08 Mar 2024 06:44  Phos  2.9       Mg     2.3         TPro  6.5  /  Alb  3.2<L>  /  TBili  0.5  /  DBili  x   /  AST  13  /  ALT  9<L>  /  AlkPhos  132<H>      CAPILLARY BLOOD GLUCOSE          Medications:  MEDICATIONS  (STANDING):  acetylcysteine 10%  Inhalation 4 milliLiter(s) Inhalation every 6 hours  albuterol/ipratropium for Nebulization 3 milliLiter(s) Nebulizer every 6 hours  aMIOdarone    Tablet 200 milliGRAM(s) Oral every 24 hours  aspirin  chewable 81 milliGRAM(s) Oral every 24 hours  atorvastatin 40 milliGRAM(s) Oral at bedtime  azithromycin   Tablet 250 milliGRAM(s) Oral <User Schedule>  heparin   Injectable 5000 Unit(s) SubCutaneous every 8 hours  metoprolol succinate ER 50 milliGRAM(s) Oral every 24 hours  piperacillin/tazobactam IVPB.. 4.5 Gram(s) IV Intermittent every 12 hours  polyethylene glycol 3350 17 Gram(s) Oral two times a day  senna 2 Tablet(s) Oral at bedtime  sevelamer carbonate 800 milliGRAM(s) Oral every 8 hours  sildenafil (REVATIO) 20 milliGRAM(s) Oral every 8 hours  tiotropium 2.5 MICROgram(s)/olodaterol 2.5 MICROgram(s) (STIOLTO) Inhaler 2 Puff(s) Inhalation daily    MEDICATIONS  (PRN):  acetaminophen     Tablet .. 650 milliGRAM(s) Oral every 6 hours PRN Temp greater or equal to 38C (100.4F), Mild Pain (1 - 3)  aluminum hydroxide/magnesium hydroxide/simethicone Suspension 30 milliLiter(s) Oral every 4 hours PRN Dyspepsia  melatonin 3 milliGRAM(s) Oral at bedtime PRN Insomnia  ondansetron Injectable 4 milliGRAM(s) IV Push every 8 hours PRN Nausea and/or Vomiting      Daily Height in cm: 177.8 (08 Mar 2024 10:47)      Daily Weight in k.4 [08 Mar 2024]  Daily 66.2kg [06 Mar 2024]  Daily 67.8kg [05 Mar 2024]  Daily 66.7kg [04 Mar 2024]  Daily 67.9kg [02 Mar 2024]    Weight Change: overall appears weight stable, minor fluctuations anticipated in setting of fluid shifts/HD. Continue daily weights for trending.    Estimated energy needs:   Current body wt [68.4kg] used for energy calculations as pt falls within % IBW. Needs estimated for age and adjusted for current clinical status, renal function/HD, malnutrition. Fluid needs per team.    Calories: 6506-4538 kcals based on 30-35 kcals/kg  Protein: 82.1-116.3g based on 1.2-1.7g protein/kg  Fluid needs per team*    Subjective:   68M PMH AFib s/p watchman (on ASA), HTN, COPD, ESRD (MWF), severe ERIK, pulm HTN, T cell lymphoma (s/p chemo), HFpEF, s/p VATS procedure in , recent [facility] admission 2023 for LGIB p/w productive cough, congestion, SOB x5 days. Pt requiring more oxygen at home, uses 1 L as needed at baseline but has been using 3L over the past few days. ROS +headache, productive cough with dark green sputum, chills, wheezing. Was never hypoxic, but feeling subjectively better with 3L NC and could "feel the air."     Chart reviewed. Pt seen with wife at bedside on 5 LA, on NC. Undergoing HD at time of assessment. Currently ordered for PO renal restricted diet with 1 [employer] renal supplement daily. Pt & wife endorse very minimal appetite & PO intake however pt does drink & enjoy [employer] supplement. Endorses constipation & subsequent abd pain [on bowel regimen]. Pt amenable to increase [employer] to assist in meeting needs. Labs reviewed: WBC 12.41 <H>, RBC 3.16 <L>, Hgb 9.6/Hct 31.8 <L>, .6 <H>, BUN 46 <H>, Creat 5.65 <H>, Phos 2.9. Meds- on miralax, senna, aluminum hydroxide/magnesium hydroxide/simethicone, zofran prn, sevelamer.     Previous Nutrition Diagnosis: Malnutrition... [Severe protein calorie malnutrition in context of chronic illness] related to decreased PO intake as evidenced by <75% of nutrition needs x1 month, 9% weight loss x1 month, severe muscle and fat wasting    Active [ X ]  Resolved [   ]    If resolved, new PES:     Goal:  Pt will meet at least 75% of protein & energy needs via most appropriate route for nutrition     Recommendations:  1. Consider diet liberalization to improve PO intake  - liberalizing diet will allow for more menu options  - pt likely not meeting upper end of nutrient recommendations regardless  - encourage adequate PO intake at meals, honor food preferences  2. Recommend [employer] Renal TID; provides 450 kcals, 20g protein each  3. GI  - increase bowel regimen as able  4. Hydration per team  5. Monitor chemistry, GI function, skin integrity  6. Pain regimen per team    Education: obtaining adequate PO intake    Risk Level: High [ X  ] Moderate [   ] Low [   ]

## 2024-03-09 DIAGNOSIS — I21.A1 MYOCARDIAL INFARCTION TYPE 2: ICD-10-CM

## 2024-03-09 LAB
ALBUMIN SERPL ELPH-MCNC: 3 G/DL — LOW (ref 3.3–5)
ALP SERPL-CCNC: 132 U/L — HIGH (ref 40–120)
ALT FLD-CCNC: 7 U/L — LOW (ref 10–45)
ANION GAP SERPL CALC-SCNC: 13 MMOL/L — SIGNIFICANT CHANGE UP (ref 5–17)
AST SERPL-CCNC: 12 U/L — SIGNIFICANT CHANGE UP (ref 10–40)
BASOPHILS # BLD AUTO: 0.02 K/UL — SIGNIFICANT CHANGE UP (ref 0–0.2)
BASOPHILS NFR BLD AUTO: 0.2 % — SIGNIFICANT CHANGE UP (ref 0–2)
BILIRUB SERPL-MCNC: 0.6 MG/DL — SIGNIFICANT CHANGE UP (ref 0.2–1.2)
BUN SERPL-MCNC: 29 MG/DL — HIGH (ref 7–23)
CALCIUM SERPL-MCNC: 9.6 MG/DL — SIGNIFICANT CHANGE UP (ref 8.4–10.5)
CHLORIDE SERPL-SCNC: 99 MMOL/L — SIGNIFICANT CHANGE UP (ref 96–108)
CO2 SERPL-SCNC: 31 MMOL/L — SIGNIFICANT CHANGE UP (ref 22–31)
CREAT SERPL-MCNC: 4.13 MG/DL — HIGH (ref 0.5–1.3)
EGFR: 15 ML/MIN/1.73M2 — LOW
EOSINOPHIL # BLD AUTO: 0.27 K/UL — SIGNIFICANT CHANGE UP (ref 0–0.5)
EOSINOPHIL NFR BLD AUTO: 2.2 % — SIGNIFICANT CHANGE UP (ref 0–6)
FUNGITELL B-D-GLUCAN,  BRONCHIAL WASH: SIGNIFICANT CHANGE UP
GLUCOSE SERPL-MCNC: 103 MG/DL — HIGH (ref 70–99)
HCT VFR BLD CALC: 28 % — LOW (ref 39–50)
HGB BLD-MCNC: 8.9 G/DL — LOW (ref 13–17)
IMM GRANULOCYTES NFR BLD AUTO: 1 % — HIGH (ref 0–0.9)
LYMPHOCYTES # BLD AUTO: 0.88 K/UL — LOW (ref 1–3.3)
LYMPHOCYTES # BLD AUTO: 7 % — LOW (ref 13–44)
MAGNESIUM SERPL-MCNC: 2.1 MG/DL — SIGNIFICANT CHANGE UP (ref 1.6–2.6)
MCHC RBC-ENTMCNC: 30.6 PG — SIGNIFICANT CHANGE UP (ref 27–34)
MCHC RBC-ENTMCNC: 31.8 GM/DL — LOW (ref 32–36)
MCV RBC AUTO: 96.2 FL — SIGNIFICANT CHANGE UP (ref 80–100)
MONOCYTES # BLD AUTO: 1.47 K/UL — HIGH (ref 0–0.9)
MONOCYTES NFR BLD AUTO: 11.8 % — SIGNIFICANT CHANGE UP (ref 2–14)
MRSA PCR RESULT.: NEGATIVE — SIGNIFICANT CHANGE UP
NEUTROPHILS # BLD AUTO: 9.73 K/UL — HIGH (ref 1.8–7.4)
NEUTROPHILS NFR BLD AUTO: 77.8 % — HIGH (ref 43–77)
NRBC # BLD: 0 /100 WBCS — SIGNIFICANT CHANGE UP (ref 0–0)
PHOSPHATE SERPL-MCNC: 2.6 MG/DL — SIGNIFICANT CHANGE UP (ref 2.5–4.5)
PLATELET # BLD AUTO: 181 K/UL — SIGNIFICANT CHANGE UP (ref 150–400)
POTASSIUM SERPL-MCNC: 3.9 MMOL/L — SIGNIFICANT CHANGE UP (ref 3.5–5.3)
POTASSIUM SERPL-SCNC: 3.9 MMOL/L — SIGNIFICANT CHANGE UP (ref 3.5–5.3)
PROCALCITONIN SERPL-MCNC: 16.15 NG/ML — HIGH (ref 0.02–0.1)
PROT SERPL-MCNC: 6.2 G/DL — SIGNIFICANT CHANGE UP (ref 6–8.3)
RBC # BLD: 2.91 M/UL — LOW (ref 4.2–5.8)
RBC # FLD: 16.7 % — HIGH (ref 10.3–14.5)
S AUREUS DNA NOSE QL NAA+PROBE: NEGATIVE — SIGNIFICANT CHANGE UP
SODIUM SERPL-SCNC: 143 MMOL/L — SIGNIFICANT CHANGE UP (ref 135–145)
WBC # BLD: 12.49 K/UL — HIGH (ref 3.8–10.5)
WBC # FLD AUTO: 12.49 K/UL — HIGH (ref 3.8–10.5)

## 2024-03-09 PROCEDURE — 99232 SBSQ HOSP IP/OBS MODERATE 35: CPT | Mod: GC

## 2024-03-09 RX ADMIN — Medication 4 MILLILITER(S): at 09:15

## 2024-03-09 RX ADMIN — Medication 4 MILLILITER(S): at 22:56

## 2024-03-09 RX ADMIN — Medication 81 MILLIGRAM(S): at 05:43

## 2024-03-09 RX ADMIN — Medication 650 MILLIGRAM(S): at 23:09

## 2024-03-09 RX ADMIN — Medication 3 MILLILITER(S): at 15:29

## 2024-03-09 RX ADMIN — Medication 650 MILLIGRAM(S): at 06:58

## 2024-03-09 RX ADMIN — AMIODARONE HYDROCHLORIDE 200 MILLIGRAM(S): 400 TABLET ORAL at 07:30

## 2024-03-09 RX ADMIN — Medication 3 MILLIGRAM(S): at 23:10

## 2024-03-09 RX ADMIN — Medication 20 MILLIGRAM(S): at 16:49

## 2024-03-09 RX ADMIN — Medication 20 MILLIGRAM(S): at 07:54

## 2024-03-09 RX ADMIN — Medication 20 MILLIGRAM(S): at 01:21

## 2024-03-09 RX ADMIN — Medication 4 MILLILITER(S): at 04:18

## 2024-03-09 RX ADMIN — HEPARIN SODIUM 5000 UNIT(S): 5000 INJECTION INTRAVENOUS; SUBCUTANEOUS at 22:56

## 2024-03-09 RX ADMIN — SEVELAMER CARBONATE 800 MILLIGRAM(S): 2400 POWDER, FOR SUSPENSION ORAL at 23:04

## 2024-03-09 RX ADMIN — Medication 20 MILLIGRAM(S): at 23:04

## 2024-03-09 RX ADMIN — TIOTROPIUM BROMIDE AND OLODATEROL 2 PUFF(S): 3.124; 2.736 SPRAY, METERED RESPIRATORY (INHALATION) at 09:14

## 2024-03-09 RX ADMIN — Medication 3 MILLILITER(S): at 22:55

## 2024-03-09 RX ADMIN — HEPARIN SODIUM 5000 UNIT(S): 5000 INJECTION INTRAVENOUS; SUBCUTANEOUS at 14:14

## 2024-03-09 RX ADMIN — SEVELAMER CARBONATE 800 MILLIGRAM(S): 2400 POWDER, FOR SUSPENSION ORAL at 07:54

## 2024-03-09 RX ADMIN — Medication 650 MILLIGRAM(S): at 05:58

## 2024-03-09 RX ADMIN — Medication 650 MILLIGRAM(S): at 15:35

## 2024-03-09 RX ADMIN — HEPARIN SODIUM 5000 UNIT(S): 5000 INJECTION INTRAVENOUS; SUBCUTANEOUS at 05:43

## 2024-03-09 RX ADMIN — PIPERACILLIN AND TAZOBACTAM 25 GRAM(S): 4; .5 INJECTION, POWDER, LYOPHILIZED, FOR SOLUTION INTRAVENOUS at 17:14

## 2024-03-09 RX ADMIN — Medication 4 MILLILITER(S): at 15:29

## 2024-03-09 RX ADMIN — SEVELAMER CARBONATE 800 MILLIGRAM(S): 2400 POWDER, FOR SUSPENSION ORAL at 15:30

## 2024-03-09 RX ADMIN — Medication 3 MILLILITER(S): at 09:14

## 2024-03-09 RX ADMIN — Medication 3 MILLILITER(S): at 04:18

## 2024-03-09 RX ADMIN — Medication 50 MILLIGRAM(S): at 09:15

## 2024-03-09 RX ADMIN — SENNA PLUS 2 TABLET(S): 8.6 TABLET ORAL at 22:57

## 2024-03-09 RX ADMIN — ATORVASTATIN CALCIUM 40 MILLIGRAM(S): 80 TABLET, FILM COATED ORAL at 22:57

## 2024-03-09 RX ADMIN — SEVELAMER CARBONATE 800 MILLIGRAM(S): 2400 POWDER, FOR SUSPENSION ORAL at 00:08

## 2024-03-09 RX ADMIN — Medication 650 MILLIGRAM(S): at 16:16

## 2024-03-09 RX ADMIN — PIPERACILLIN AND TAZOBACTAM 25 GRAM(S): 4; .5 INJECTION, POWDER, LYOPHILIZED, FOR SOLUTION INTRAVENOUS at 05:42

## 2024-03-09 NOTE — PROGRESS NOTE ADULT - PROBLEM SELECTOR PLAN 2
Presenting with SOB, productive cough w/ dark green sputum, wheezing at home, requiring more oxygen than baseline 1L NC. Likely 2/2 CAP. Follows with Dr Johnson outpatient. Prior smoker, quit 19 years ago.   - CXR w/ increased infiltrates. RVP negative.   - Home meds: prophylactic azithromycin 250 MWF, stiolto, ventolin HFA  - CTA PE this admission: Emphysematous changes. Persistent predominantly subpleural consolidations in both lungs mostly in the right lower lobe and left upper lobe. No PE.    PLAN:  - c/w duonebs q6  - c/w home stiolto qd  - c/w Zosyn (3/6 - )  - S/p course of Azithromycin 500mg; C/w home regimen   - S/p prednisone 40mg qd (complete 3/6)  - Pulm following; s/p bronch 3/7

## 2024-03-09 NOTE — PROGRESS NOTE ADULT - PROBLEM SELECTOR PLAN 11
F: None  E: replete cautiously in ESRD  N: renal restriction diet  GI: None  DVT: hep subq  Dispo: F #ERIK  hx of severe ERIK, non-compliant with CPAP    PLAN:  - OVN CPAP ordered, but patient noncompliant.

## 2024-03-09 NOTE — PROGRESS NOTE ADULT - PROBLEM SELECTOR PLAN 7
Home med: Aspirin 81mg qd, amio 200mg qd, metoprolol XL 50mg qd.   [id number] 2. s/p Watchman device, not on Eliquis. NSR this admission.    PLAN:  - Restart ASA  - c/w other home meds.

## 2024-03-09 NOTE — PROGRESS NOTE ADULT - SUBJECTIVE AND OBJECTIVE BOX
Internal Medicine Progress Note  Raiza Bailon, PGY-1  Pager: [phone]    ******INCOMPLETE******    Patient is a 68y old  Male who presents with a chief complaint of SOB (08 Mar 2024 14:20)    OVERNIGHT EVENTS/INTERVAL HPI:    REVIEW OF SYSTEMS:  All other review of systems is negative unless indicated above.    OBJECTIVE:  T(C): 37.7 (24 @ 06:03), Max: 37.7 (24 @ 06:03)  HR: 75 (24 @ 06:03) (60 - 78)  BP: 105/63 (24 @ 06:03) (94/52 - 151/75)  RR: 20 (24 @ 06:03) (17 - 30)  SpO2: 91% (24 @ 06:03) (90% - 98%)  Daily Height in cm: 177.8 (08 Mar 2024 10:47)    Daily Weight in k.4 (08 Mar 2024 15:20)    Physical Exam:  General: in no acute distress  Eyes: EOMI intact bilaterally. Anicteric sclerae, moist conjunctivae  HENT: Moist mucous membranes  Neck: Trachea midline, supple  Lungs: CTA B/L. No wheezes, rales, or rhonchi  Cardiovascular: RRR. No murmurs, rubs, or gallops  Abdomen: Soft, non-tender non-distended; No rebound or guarding  Extremities: WWP, No clubbing, cyanosis or edema  MSK: No midline bony tenderness. No CVA tenderness bilaterally  Neurological: Alert and oriented x3  Skin: Warm and dry. No obvious rash     Medications:  MEDICATIONS  (STANDING):  acetylcysteine 10%  Inhalation 4 milliLiter(s) Inhalation every 6 hours  albuterol/ipratropium for Nebulization 3 milliLiter(s) Nebulizer every 6 hours  aMIOdarone    Tablet 200 milliGRAM(s) Oral every 24 hours  aspirin  chewable 81 milliGRAM(s) Oral every 24 hours  atorvastatin 40 milliGRAM(s) Oral at bedtime  azithromycin   Tablet 250 milliGRAM(s) Oral <User Schedule>  heparin   Injectable 5000 Unit(s) SubCutaneous every 8 hours  metoprolol succinate ER 50 milliGRAM(s) Oral every 24 hours  piperacillin/tazobactam IVPB.. 4.5 Gram(s) IV Intermittent every 12 hours  polyethylene glycol 3350 17 Gram(s) Oral two times a day  senna 2 Tablet(s) Oral at bedtime  sevelamer carbonate 800 milliGRAM(s) Oral every 8 hours  sildenafil (REVATIO) 20 milliGRAM(s) Oral every 8 hours  tiotropium 2.5 MICROgram(s)/olodaterol 2.5 MICROgram(s) (STIOLTO) Inhaler 2 Puff(s) Inhalation daily    MEDICATIONS  (PRN):  acetaminophen     Tablet .. 650 milliGRAM(s) Oral every 6 hours PRN Temp greater or equal to 38C (100.4F), Mild Pain (1 - 3)  aluminum hydroxide/magnesium hydroxide/simethicone Suspension 30 milliLiter(s) Oral every 4 hours PRN Dyspepsia  melatonin 3 milliGRAM(s) Oral at bedtime PRN Insomnia  ondansetron Injectable 4 milliGRAM(s) IV Push every 8 hours PRN Nausea and/or Vomiting      Labs:                        9.6    12.41 )-----------( 173      ( 08 Mar 2024 06:44 )             31.8     03-08    141  |  97  |  46<H>  ----------------------------<  95  4.8   |  31  |  5.65<H>    Ca    10.1      08 Mar 2024 06:44  Phos  2.9     03-08  Mg     2.3     03-08    TPro  6.5  /  Alb  3.2<L>  /  TBili  0.5  /  DBili  x   /  AST  13  /  ALT  9<L>  /  AlkPhos  132<H>  03-08      Urinalysis Basic - ( 08 Mar 2024 06:44 )    Color: x / Appearance: x / SG: x / pH: x  Gluc: 95 mg/dL / Ketone: x  / Bili: x / Urobili: x   Blood: x / Protein: x / Nitrite: x   Leuk Esterase: x / RBC: x / WBC x   Sq Epi: x / Non Sq Epi: x / Bacteria: x          Radiology: Reviewed Patient is a 68y old  Male who presents with a chief complaint of SOB (08 Mar 2024 14:20)    OVERNIGHT EVENTS/INTERVAL HPI: Overnight patient alternated between CPAP and NC. Was saturating in 90s on 5L NC. Reports productive cough. Seen and examined at bedside this AM. Saturating at 95% on 5L NC. Reports a BM and improvement of abdominal pain.     REVIEW OF SYSTEMS:  All other review of systems is negative unless indicated above.    OBJECTIVE:  T(C): 37.7 (24 @ 06:03), Max: 37.7 (24 @ 06:03)  HR: 75 (24 @ 06:03) (60 - 78)  BP: 105/63 (24 @ 06:03) (94/52 - 151/75)  RR: 20 (24 @ 06:03) (17 - 30)  SpO2: 91% (24 @ 06:03) (90% - 98%)  Daily Height in cm: 177.8 (08 Mar 2024 10:47)    Daily Weight in k.4 (08 Mar 2024 15:20)    Physical Exam:  General: in no acute distress  Lungs: +rhonchi b/l, improved aeration   Cardiovascular: RRR. No murmurs, rubs, or gallops  Abdomen: Soft, non-tender non-distended; No rebound or guarding  Extremities: WWP, No clubbing, cyanosis or edema  Neurological: Alert and oriented x3  Skin: Warm and dry. No obvious rash     Medications:  MEDICATIONS  (STANDING):  acetylcysteine 10%  Inhalation 4 milliLiter(s) Inhalation every 6 hours  albuterol/ipratropium for Nebulization 3 milliLiter(s) Nebulizer every 6 hours  aMIOdarone    Tablet 200 milliGRAM(s) Oral every 24 hours  aspirin  chewable 81 milliGRAM(s) Oral every 24 hours  atorvastatin 40 milliGRAM(s) Oral at bedtime  azithromycin   Tablet 250 milliGRAM(s) Oral <User Schedule>  heparin   Injectable 5000 Unit(s) SubCutaneous every 8 hours  metoprolol succinate ER 50 milliGRAM(s) Oral every 24 hours  piperacillin/tazobactam IVPB.. 4.5 Gram(s) IV Intermittent every 12 hours  polyethylene glycol 3350 17 Gram(s) Oral two times a day  senna 2 Tablet(s) Oral at bedtime  sevelamer carbonate 800 milliGRAM(s) Oral every 8 hours  sildenafil (REVATIO) 20 milliGRAM(s) Oral every 8 hours  tiotropium 2.5 MICROgram(s)/olodaterol 2.5 MICROgram(s) (STIOLTO) Inhaler 2 Puff(s) Inhalation daily    MEDICATIONS  (PRN):  acetaminophen     Tablet .. 650 milliGRAM(s) Oral every 6 hours PRN Temp greater or equal to 38C (100.4F), Mild Pain (1 - 3)  aluminum hydroxide/magnesium hydroxide/simethicone Suspension 30 milliLiter(s) Oral every 4 hours PRN Dyspepsia  melatonin 3 milliGRAM(s) Oral at bedtime PRN Insomnia  ondansetron Injectable 4 milliGRAM(s) IV Push every 8 hours PRN Nausea and/or Vomiting      Labs:                        9.6    12.41 )-----------( 173      ( 08 Mar 2024 06:44 )             31.8     03-08    141  |  97  |  46<H>  ----------------------------<  95  4.8   |  31  |  5.65<H>    Ca    10.1      08 Mar 2024 06:44  Phos  2.9     03-08  Mg     2.3     03-08    TPro  6.5  /  Alb  3.2<L>  /  TBili  0.5  /  DBili  x   /  AST  13  /  ALT  9<L>  /  AlkPhos  132<H>  03-08      Urinalysis Basic - ( 08 Mar 2024 06:44 )    Color: x / Appearance: x / SG: x / pH: x  Gluc: 95 mg/dL / Ketone: x  / Bili: x / Urobili: x   Blood: x / Protein: x / Nitrite: x   Leuk Esterase: x / RBC: x / WBC x   Sq Epi: x / Non Sq Epi: x / Bacteria: x          Radiology: Reviewed

## 2024-03-09 NOTE — PROGRESS NOTE ADULT - ASSESSMENT
68M PMH AFib s/p watchman (on ASA), HTN, COPD, ESRD (MWF), severe ERIK, pulm HTN, T cell lymphoma (s/p chemo), HFpEF, s/p VATS procedure in 2021, recent [facility] admission 12/2023 for LGIB p/w productive cough, congestion, SOB for weeks now worsening in the last 5days. Admitted to [facility] for AHRF 2/2 COPD exacerbation 2/2 to CAP. Course c/b low volume hemoptysis on 3/5 to 3/6. Now s/p bronch 3/7, after which patient desaturated to 88% on 5L NC (prior was on 2L NC saturating in 90s). ICU consulted, and patient transferred to [facility] for closer monitoring. S/p stepdown to [facility]. Cultures growing E. Coli, pending sensitivities.

## 2024-03-09 NOTE — PROGRESS NOTE ADULT - ATTENDING COMMENTS
Patient was seen and examined at bedside on 3/9/2024 at 1230 pm with uncle present. Patient reports that he feels improved although still has continued cough and NIELSON. Denies CP. ROS is otherwise negative. Vitals, labwork and pertinent imaging reviewed. Exam - NAD, AAO x 4, PERRLA, EOMI, MMM, supple neck, chest - bibasilar crackles, CV - rrr, s1s2, no m/r/g, abd - soft, NTND, + BS, ext - wwp, psych - normal affect, pt appears cachectic and malnourished    Plan:  -Pt s/p bronchoscopy with increased supplemental oxygen requirements - given increased oxygen requirements will consult ICU  -C/w Zosyn, c/w supportive care - cough suppressant  -Sputum culture  -Culture from bronchoscopy - E. Coli, pending sensitivities  -Pulm consulted  -Echo - worsening pulmonary hypertension  -PT/OT - rec HPT  -Repeat MRSA swab  -Procal has continued to uptrend patient's oxygen requirement has remained high ~ 5L NC

## 2024-03-09 NOTE — PROGRESS NOTE ADULT - PROBLEM SELECTOR PLAN 8
Hgb 10, at baseline. Of note, pt recently admitted 12/2023 for LGIB, s/p c-scope, found to have large polyps on inpatient colonoscopy. Pending outpatient repeat colonoscopy w/ polypectomy on Thursday 3/7/24 @[facility].    PLAN:  - maintain active T&S  - outpatient GI followup No

## 2024-03-10 DIAGNOSIS — A41.9 SEPSIS, UNSPECIFIED ORGANISM: ICD-10-CM

## 2024-03-10 LAB
-  AMPICILLIN/SULBACTAM: SIGNIFICANT CHANGE UP
-  AMPICILLIN: SIGNIFICANT CHANGE UP
-  CEFAZOLIN: SIGNIFICANT CHANGE UP
-  CEFTRIAXONE: SIGNIFICANT CHANGE UP
-  CIPROFLOXACIN: SIGNIFICANT CHANGE UP
-  ERTAPENEM: SIGNIFICANT CHANGE UP
-  GENTAMICIN: SIGNIFICANT CHANGE UP
-  MEROPENEM: SIGNIFICANT CHANGE UP
-  PIPERACILLIN/TAZOBACTAM: SIGNIFICANT CHANGE UP
-  TOBRAMYCIN: SIGNIFICANT CHANGE UP
-  TRIMETHOPRIM/SULFAMETHOXAZOLE: SIGNIFICANT CHANGE UP
ALBUMIN SERPL ELPH-MCNC: 3 G/DL — LOW (ref 3.3–5)
ALP SERPL-CCNC: 117 U/L — SIGNIFICANT CHANGE UP (ref 40–120)
ALT FLD-CCNC: <5 U/L — LOW (ref 10–45)
ANION GAP SERPL CALC-SCNC: 15 MMOL/L — SIGNIFICANT CHANGE UP (ref 5–17)
AST SERPL-CCNC: 10 U/L — SIGNIFICANT CHANGE UP (ref 10–40)
BASOPHILS # BLD AUTO: 0.02 K/UL — SIGNIFICANT CHANGE UP (ref 0–0.2)
BASOPHILS NFR BLD AUTO: 0.2 % — SIGNIFICANT CHANGE UP (ref 0–2)
BILIRUB SERPL-MCNC: 0.6 MG/DL — SIGNIFICANT CHANGE UP (ref 0.2–1.2)
BUN SERPL-MCNC: 37 MG/DL — HIGH (ref 7–23)
CALCIUM SERPL-MCNC: 9.3 MG/DL — SIGNIFICANT CHANGE UP (ref 8.4–10.5)
CHLORIDE SERPL-SCNC: 99 MMOL/L — SIGNIFICANT CHANGE UP (ref 96–108)
CO2 SERPL-SCNC: 28 MMOL/L — SIGNIFICANT CHANGE UP (ref 22–31)
CREAT SERPL-MCNC: 5.68 MG/DL — HIGH (ref 0.5–1.3)
CULTURE RESULTS: ABNORMAL
EGFR: 10 ML/MIN/1.73M2 — LOW
EOSINOPHIL # BLD AUTO: 0.38 K/UL — SIGNIFICANT CHANGE UP (ref 0–0.5)
EOSINOPHIL NFR BLD AUTO: 3.5 % — SIGNIFICANT CHANGE UP (ref 0–6)
GLUCOSE SERPL-MCNC: 92 MG/DL — SIGNIFICANT CHANGE UP (ref 70–99)
HCT VFR BLD CALC: 29 % — LOW (ref 39–50)
HGB BLD-MCNC: 9 G/DL — LOW (ref 13–17)
IMM GRANULOCYTES NFR BLD AUTO: 1.1 % — HIGH (ref 0–0.9)
LYMPHOCYTES # BLD AUTO: 0.86 K/UL — LOW (ref 1–3.3)
LYMPHOCYTES # BLD AUTO: 7.9 % — LOW (ref 13–44)
MAGNESIUM SERPL-MCNC: 2.2 MG/DL — SIGNIFICANT CHANGE UP (ref 1.6–2.6)
MCHC RBC-ENTMCNC: 30.2 PG — SIGNIFICANT CHANGE UP (ref 27–34)
MCHC RBC-ENTMCNC: 31 GM/DL — LOW (ref 32–36)
MCV RBC AUTO: 97.3 FL — SIGNIFICANT CHANGE UP (ref 80–100)
METHOD TYPE: SIGNIFICANT CHANGE UP
MONOCYTES # BLD AUTO: 1.22 K/UL — HIGH (ref 0–0.9)
MONOCYTES NFR BLD AUTO: 11.2 % — SIGNIFICANT CHANGE UP (ref 2–14)
NEUTROPHILS # BLD AUTO: 8.29 K/UL — HIGH (ref 1.8–7.4)
NEUTROPHILS NFR BLD AUTO: 76.1 % — SIGNIFICANT CHANGE UP (ref 43–77)
NRBC # BLD: 0 /100 WBCS — SIGNIFICANT CHANGE UP (ref 0–0)
ORGANISM # SPEC MICROSCOPIC CNT: ABNORMAL
ORGANISM # SPEC MICROSCOPIC CNT: SIGNIFICANT CHANGE UP
P JIROVECII DNA L RESP QL NAA+NON-PROBE: NEGATIVE — SIGNIFICANT CHANGE UP
PHOSPHATE SERPL-MCNC: 3.1 MG/DL — SIGNIFICANT CHANGE UP (ref 2.5–4.5)
PLATELET # BLD AUTO: 167 K/UL — SIGNIFICANT CHANGE UP (ref 150–400)
POTASSIUM SERPL-MCNC: 3.8 MMOL/L — SIGNIFICANT CHANGE UP (ref 3.5–5.3)
POTASSIUM SERPL-SCNC: 3.8 MMOL/L — SIGNIFICANT CHANGE UP (ref 3.5–5.3)
PROCALCITONIN SERPL-MCNC: 16.09 NG/ML — HIGH (ref 0.02–0.1)
PROT SERPL-MCNC: 6.2 G/DL — SIGNIFICANT CHANGE UP (ref 6–8.3)
RBC # BLD: 2.98 M/UL — LOW (ref 4.2–5.8)
RBC # FLD: 16.9 % — HIGH (ref 10.3–14.5)
SODIUM SERPL-SCNC: 142 MMOL/L — SIGNIFICANT CHANGE UP (ref 135–145)
SPECIMEN SOURCE: SIGNIFICANT CHANGE UP
SPECIMEN SOURCE: SIGNIFICANT CHANGE UP
WBC # BLD: 10.89 K/UL — HIGH (ref 3.8–10.5)
WBC # FLD AUTO: 10.89 K/UL — HIGH (ref 3.8–10.5)

## 2024-03-10 PROCEDURE — 99232 SBSQ HOSP IP/OBS MODERATE 35: CPT | Mod: GC

## 2024-03-10 PROCEDURE — 99233 SBSQ HOSP IP/OBS HIGH 50: CPT | Mod: GC

## 2024-03-10 RX ORDER — ERTAPENEM SODIUM 1 G/1
500 INJECTION, POWDER, LYOPHILIZED, FOR SOLUTION INTRAMUSCULAR; INTRAVENOUS
Qty: 3 | Refills: 0
Start: 2024-03-10 | End: 2024-03-15

## 2024-03-10 RX ORDER — ERTAPENEM SODIUM 1 G/1
500 INJECTION, POWDER, LYOPHILIZED, FOR SOLUTION INTRAMUSCULAR; INTRAVENOUS EVERY 24 HOURS
Refills: 0 | Status: DISCONTINUED | OUTPATIENT
Start: 2024-03-11 | End: 2024-03-12

## 2024-03-10 RX ORDER — ERTAPENEM SODIUM 1 G/1
1000 INJECTION, POWDER, LYOPHILIZED, FOR SOLUTION INTRAMUSCULAR; INTRAVENOUS EVERY 24 HOURS
Refills: 0 | Status: DISCONTINUED | OUTPATIENT
Start: 2024-03-10 | End: 2024-03-10

## 2024-03-10 RX ORDER — ERTAPENEM SODIUM 1 G/1
500 INJECTION, POWDER, LYOPHILIZED, FOR SOLUTION INTRAMUSCULAR; INTRAVENOUS ONCE
Refills: 0 | Status: COMPLETED | OUTPATIENT
Start: 2024-03-10 | End: 2024-03-10

## 2024-03-10 RX ORDER — ERTAPENEM SODIUM 1 G/1
INJECTION, POWDER, LYOPHILIZED, FOR SOLUTION INTRAMUSCULAR; INTRAVENOUS
Refills: 0 | Status: DISCONTINUED | OUTPATIENT
Start: 2024-03-10 | End: 2024-03-12

## 2024-03-10 RX ADMIN — Medication 20 MILLIGRAM(S): at 15:59

## 2024-03-10 RX ADMIN — Medication 4 MILLILITER(S): at 06:34

## 2024-03-10 RX ADMIN — Medication 650 MILLIGRAM(S): at 15:00

## 2024-03-10 RX ADMIN — AMIODARONE HYDROCHLORIDE 200 MILLIGRAM(S): 400 TABLET ORAL at 06:34

## 2024-03-10 RX ADMIN — Medication 81 MILLIGRAM(S): at 06:34

## 2024-03-10 RX ADMIN — PIPERACILLIN AND TAZOBACTAM 25 GRAM(S): 4; .5 INJECTION, POWDER, LYOPHILIZED, FOR SOLUTION INTRAVENOUS at 06:35

## 2024-03-10 RX ADMIN — HEPARIN SODIUM 5000 UNIT(S): 5000 INJECTION INTRAVENOUS; SUBCUTANEOUS at 06:34

## 2024-03-10 RX ADMIN — Medication 4 MILLILITER(S): at 10:47

## 2024-03-10 RX ADMIN — Medication 3 MILLILITER(S): at 21:56

## 2024-03-10 RX ADMIN — TIOTROPIUM BROMIDE AND OLODATEROL 2 PUFF(S): 3.124; 2.736 SPRAY, METERED RESPIRATORY (INHALATION) at 10:46

## 2024-03-10 RX ADMIN — HEPARIN SODIUM 5000 UNIT(S): 5000 INJECTION INTRAVENOUS; SUBCUTANEOUS at 14:45

## 2024-03-10 RX ADMIN — ATORVASTATIN CALCIUM 40 MILLIGRAM(S): 80 TABLET, FILM COATED ORAL at 21:57

## 2024-03-10 RX ADMIN — Medication 650 MILLIGRAM(S): at 14:46

## 2024-03-10 RX ADMIN — Medication 3 MILLILITER(S): at 06:33

## 2024-03-10 RX ADMIN — ERTAPENEM SODIUM 100 MILLIGRAM(S): 1 INJECTION, POWDER, LYOPHILIZED, FOR SOLUTION INTRAMUSCULAR; INTRAVENOUS at 14:46

## 2024-03-10 RX ADMIN — Medication 3 MILLILITER(S): at 10:47

## 2024-03-10 RX ADMIN — Medication 4 MILLILITER(S): at 21:57

## 2024-03-10 RX ADMIN — SEVELAMER CARBONATE 800 MILLIGRAM(S): 2400 POWDER, FOR SUSPENSION ORAL at 23:09

## 2024-03-10 RX ADMIN — Medication 50 MILLIGRAM(S): at 09:00

## 2024-03-10 RX ADMIN — Medication 650 MILLIGRAM(S): at 00:08

## 2024-03-10 RX ADMIN — Medication 3 MILLIGRAM(S): at 23:08

## 2024-03-10 RX ADMIN — HEPARIN SODIUM 5000 UNIT(S): 5000 INJECTION INTRAVENOUS; SUBCUTANEOUS at 21:56

## 2024-03-10 RX ADMIN — SEVELAMER CARBONATE 800 MILLIGRAM(S): 2400 POWDER, FOR SUSPENSION ORAL at 15:59

## 2024-03-10 RX ADMIN — Medication 3 MILLILITER(S): at 15:59

## 2024-03-10 RX ADMIN — Medication 20 MILLIGRAM(S): at 07:31

## 2024-03-10 RX ADMIN — Medication 20 MILLIGRAM(S): at 23:08

## 2024-03-10 RX ADMIN — SEVELAMER CARBONATE 800 MILLIGRAM(S): 2400 POWDER, FOR SUSPENSION ORAL at 07:31

## 2024-03-10 RX ADMIN — Medication 4 MILLILITER(S): at 15:59

## 2024-03-10 NOTE — PROGRESS NOTE ADULT - PROBLEM SELECTOR PLAN 4
CT Chest outpatient 2/12/2024 w/ new consolidative opacities within the lingula and multifocally throughout the right lower lobe compared to 1/26/2023.   - CTA PE this admission showed persistent b/l pulmonary consolidation.   - Has hx of b/l pleural effusion s/p VATS w/ CT surg in 2021 for L chest wall loculated effusion.   - Ur legionella neg, Ur strep neg.   - Finished Azithromycin 500mg for atypical cov    PLAN:  - Start Ertapenem  - f/u BCx NGTD   - f/u Sputum Cx - initial spu Cx contaminated     - sputum cx 3/7 from bronchial aspirate: moderate e coli - ESBL E. Coli - start Ertapenem

## 2024-03-10 NOTE — PROGRESS NOTE ADULT - PROBLEM SELECTOR PLAN 9
Hgb 10, at baseline. Of note, pt recently admitted 12/2023 for LGIB, s/p c-scope, found to have large polyps on inpatient colonoscopy. Pending outpatient repeat colonoscopy w/ polypectomy on Thursday 3/7/24 @[facility].    PLAN:  - maintain active T&S  - outpatient GI followup

## 2024-03-10 NOTE — PROGRESS NOTE ADULT - SUBJECTIVE AND OBJECTIVE BOX
PULMONARY CONSULT SERVICE FOLLOW-UP NOTE    INTERVAL HPI:  Reviewed chart and overnight events; patient seen and examined at bedside. Patient feels well. Follows with Dr. Johnson. He says his breathing feels similar to baseline, and that he usually uses 2L at home and SpO2 is around 88%. No new complaints. Has some cough, using aerobika.     MEDICATIONS:  Pulmonary:  acetylcysteine 10%  Inhalation 4 milliLiter(s) Inhalation every 6 hours  albuterol/ipratropium for Nebulization 3 milliLiter(s) Nebulizer every 6 hours  tiotropium 2.5 MICROgram(s)/olodaterol 2.5 MICROgram(s) (STIOLTO) Inhaler 2 Puff(s) Inhalation daily    Antimicrobials:  azithromycin   Tablet 250 milliGRAM(s) Oral <User Schedule>  piperacillin/tazobactam IVPB.. 4.5 Gram(s) IV Intermittent every 12 hours    Anticoagulants:  aspirin  chewable 81 milliGRAM(s) Oral every 24 hours  heparin   Injectable 5000 Unit(s) SubCutaneous every 8 hours    Cardiac:  aMIOdarone    Tablet 200 milliGRAM(s) Oral every 24 hours  metoprolol succinate ER 50 milliGRAM(s) Oral every 24 hours  sildenafil (REVATIO) 20 milliGRAM(s) Oral every 8 hours      Allergies    No Known Allergies    Intolerances        Vital Signs Last 24 Hrs  T(C): 36.9 (09 Mar 2024 21:30), Max: 37.7 (09 Mar 2024 06:03)  T(F): 98.4 (09 Mar 2024 21:30), Max: 99.8 (09 Mar 2024 06:03)  HR: 70 (09 Mar 2024 23:08) (67 - 75)  BP: 132/62 (09 Mar 2024 21:30) (105/63 - 132/62)  BP(mean): 85 (09 Mar 2024 21:30) (85 - 85)  RR: 20 (09 Mar 2024 23:08) (18 - 20)  SpO2: 93% (09 Mar 2024 23:08) (91% - 99%)    Parameters below as of 09 Mar 2024 23:08  Patient On (Oxygen Delivery Method): nasal cannula  O2 Flow (L/min): 3      03-08 @ 07:01  -  03-09 @ 07:00  --------------------------------------------------------  IN: 50 mL / OUT: 0 mL / NET: 50 mL          PHYSICAL EXAM:  Constitutional: NAD  HEENT: NC/AT; PERRL, anicteric sclera; MMM  Neck: supple  Cardiovascular: +S1/S2, RRR  Respiratory: No wheezing, good air entry. Occasional crackles at bases.   Gastrointestinal: soft, NT/ND  Extremities: WWP; no edema, clubbing or cyanosis  Vascular: 2+ radial pulses B/L  Neurological: awake and alert; MATIAS    LABS:      CBC Full  -  ( 09 Mar 2024 05:30 )  WBC Count : 12.49 K/uL  RBC Count : 2.91 M/uL  Hemoglobin : 8.9 g/dL  Hematocrit : 28.0 %  Platelet Count - Automated : 181 K/uL  Mean Cell Volume : 96.2 fl  Mean Cell Hemoglobin : 30.6 pg  Mean Cell Hemoglobin Concentration : 31.8 gm/dL  Auto Neutrophil # : 9.73 K/uL  Auto Lymphocyte # : 0.88 K/uL  Auto Monocyte # : 1.47 K/uL  Auto Eosinophil # : 0.27 K/uL  Auto Basophil # : 0.02 K/uL  Auto Neutrophil % : 77.8 %  Auto Lymphocyte % : 7.0 %  Auto Monocyte % : 11.8 %  Auto Eosinophil % : 2.2 %  Auto Basophil % : 0.2 %    03-09    143  |  99  |  29<H>  ----------------------------<  103<H>  3.9   |  31  |  4.13<H>    Ca    9.6      09 Mar 2024 05:30  Phos  2.6     03-09  Mg     2.1     03-09    TPro  6.2  /  Alb  3.0<L>  /  TBili  0.6  /  DBili  x   /  AST  12  /  ALT  7<L>  /  AlkPhos  132<H>  03-09          Urinalysis Basic - ( 09 Mar 2024 05:30 )    Color: x / Appearance: x / SG: x / pH: x  Gluc: 103 mg/dL / Ketone: x  / Bili: x / Urobili: x   Blood: x / Protein: x / Nitrite: x   Leuk Esterase: x / RBC: x / WBC x   Sq Epi: x / Non Sq Epi: x / Bacteria: x                RADIOLOGY & ADDITIONAL STUDIES:

## 2024-03-10 NOTE — PROGRESS NOTE ADULT - ATTENDING COMMENTS
Seen and examined by me, comfortable sitting up on O2 by NC w family at bedside, reports breathing approx baseline. COPD w improving exacerbation, pneumonia w ESBL E Coli on bronch. Would cont Stiolto, azithro tiwk, supplemental O2 adjusted as above. Mgmt of E coli per primary team, could consider ID consult particularly if he does not improve on ertapenem.

## 2024-03-10 NOTE — PROGRESS NOTE ADULT - SUBJECTIVE AND OBJECTIVE BOX
Patient is a 68y old  Male who presents with a chief complaint of SOB (08 Mar 2024 14:20)    OVERNIGHT EVENTS: KALA    Subjective:  Patient continues to report productive cough and NIELSON. Denies N/V, abdominal pain, CP. ROS is otherwise negative.          Vital Signs Last 24 Hrs  T(C): 36.6 (10 Mar 2024 08:59), Max: 36.9 (09 Mar 2024 21:30)  T(F): 97.9 (10 Mar 2024 08:59), Max: 98.4 (09 Mar 2024 21:30)  HR: 66 (10 Mar 2024 08:59) (62 - 72)  BP: 117/68 (10 Mar 2024 08:59) (100/62 - 132/62)  BP(mean): 85 (09 Mar 2024 21:30) (85 - 85)  RR: 18 (10 Mar 2024 08:59) (17 - 20)  SpO2: 91% (10 Mar 2024 08:59) (91% - 96%)    Parameters below as of 10 Mar 2024 08:59  Patient On (Oxygen Delivery Method): nasal cannula  O2 Flow (L/min): 3          Physical Exam:  General: in no acute distress  Lungs: +rhonchi b/l, improved aeration   Cardiovascular: RRR. No murmurs, rubs, or gallops  Abdomen: Soft, non-tender non-distended; No rebound or guarding  Extremities: WWP, No clubbing, cyanosis or edema  Neurological: Alert and oriented x3  Skin: Warm and dry. No obvious rash   Psych: normal affect        MEDICATIONS  (STANDING):  acetylcysteine 10%  Inhalation 4 milliLiter(s) Inhalation every 6 hours  albuterol/ipratropium for Nebulization 3 milliLiter(s) Nebulizer every 6 hours  aMIOdarone    Tablet 200 milliGRAM(s) Oral every 24 hours  aspirin  chewable 81 milliGRAM(s) Oral every 24 hours  atorvastatin 40 milliGRAM(s) Oral at bedtime  azithromycin   Tablet 250 milliGRAM(s) Oral <User Schedule>  ertapenem  IVPB 500 milliGRAM(s) IV Intermittent once  ertapenem  IVPB      heparin   Injectable 5000 Unit(s) SubCutaneous every 8 hours  metoprolol succinate ER 50 milliGRAM(s) Oral every 24 hours  polyethylene glycol 3350 17 Gram(s) Oral two times a day  senna 2 Tablet(s) Oral at bedtime  sevelamer carbonate 800 milliGRAM(s) Oral every 8 hours  sildenafil (REVATIO) 20 milliGRAM(s) Oral every 8 hours  tiotropium 2.5 MICROgram(s)/olodaterol 2.5 MICROgram(s) (STIOLTO) Inhaler 2 Puff(s) Inhalation daily    MEDICATIONS  (PRN):  acetaminophen     Tablet .. 650 milliGRAM(s) Oral every 6 hours PRN Temp greater or equal to 38C (100.4F), Mild Pain (1 - 3)  aluminum hydroxide/magnesium hydroxide/simethicone Suspension 30 milliLiter(s) Oral every 4 hours PRN Dyspepsia  melatonin 3 milliGRAM(s) Oral at bedtime PRN Insomnia  ondansetron Injectable 4 milliGRAM(s) IV Push every 8 hours PRN Nausea and/or Vomiting          Labs:                                   9.0    10.89 )-----------( 167      ( 10 Mar 2024 05:30 )             29.0   03-10    142  |  99  |  37<H>  ----------------------------<  92  3.8   |  28  |  5.68<H>    Ca    9.3      10 Mar 2024 05:30  Phos  3.1     03-10  Mg     2.2     03-10    TPro  6.2  /  Alb  3.0<L>  /  TBili  0.6  /  DBili  x   /  AST  10  /  ALT  <5<L>  /  AlkPhos  117  03-10          Urinalysis Basic - ( 08 Mar 2024 06:44 )    Color: x / Appearance: x / SG: x / pH: x  Gluc: 95 mg/dL / Ketone: x  / Bili: x / Urobili: x   Blood: x / Protein: x / Nitrite: x   Leuk Esterase: x / RBC: x / WBC x   Sq Epi: x / Non Sq Epi: x / Bacteria: x          Radiology: Reviewed

## 2024-03-10 NOTE — PROGRESS NOTE ADULT - ASSESSMENT
68M PMH AFib s/p watchman (on ASA), HTN, COPD, ESRD (MWF), severe ERIK, pulm HTN, T cell lymphoma (s/p chemo), HFpEF, s/p VATS procedure in 2021, recent [facility] admission 12/2023 for LGIB p/w productive cough, congestion, SOB for weeks now worsening in the last 5days. Admitted to [facility] for AHRF 2/2 COPD exacerbation 2/2 to CAP. Course c/b low volume hemoptysis on 3/5 to 3/6. Now s/p bronch 3/7, after which patient desaturated to 88% on 5L NC (prior was on 2L NC saturating in 90s). ICU consulted, and patient transferred to [facility] for closer monitoring. S/p stepdown to [facility]. Cultures growing E. Coli, pending sensitivities.

## 2024-03-10 NOTE — PROGRESS NOTE ADULT - PROBLEM SELECTOR PLAN 1
Suspected on admission   but on 3/8 with VS of RR > 20, WBC > 12, w/ SBP drop > 40  -Infectious source - ESBL E. Coli PNA

## 2024-03-10 NOTE — CHART NOTE - NSCHARTNOTEFT_GEN_A_CORE
Infectious Diseases Anti-infective Approval Note    Medication: ertapenem  Dose: 1g  Route: IV  Frequency: q24h   Duration**: 3 days  Purpose:  (check one)       Empiric pending cultures:        Empiric, no culture data:       Final duration: X (as inpatient)    Dose may be adjusted as needed for alterations in renal function.    *THIS IS NOT AN INFECTIOUS DISEASES CONSULTATION*    **Indicates duration of approval, not necessarily duration of treatment

## 2024-03-10 NOTE — PROGRESS NOTE ADULT - ASSESSMENT
Mr. Gamez is a 67 yo M with aFib s/p watchman (on ASA), HTN, COPD, ESRD (MWF), severe ERIK noncompliant to CPAP, pulm HTN (group 2 and3) on sildenafil T cell lymphoma (s/p chemo), HFpEF who presented with productive cough brown and SOB x5 days requiring more oxygen from baseline of 1 L via NC concerning for community acquired pneumonia and COPD exacerbation. Pulmonary recalled for further optimization, still not at baseline O2 supplement.    At bedside, O2 reduced to 1.5L, SPO2 was maintaining at 91%, goal is 88% and above. Using inhalers and clinically feels back to baseline, although notably he was seen right after a nebulizer treatment.     POCUS 03/9/24:   B line pattern bilaterally, small consolidations at bases bilaterally.   Echo: Normal RV, LV contractility. VTI normal.     Work up:   CTPE done and negative for PE but showed RLL infiltrate, Left pleural based scar, right pleural thickening  echo elevated PASP 58 up from 34  Bronchoscopy 3/7: copious purulent thick secretions brown in color. NO endobronchial lesions or localized hemoptysis.   BAL with culture/cyto sent, bronchial washing culture sent both pending but gram negative rods on staining.      # Pleural based scars  - follows w/. Dr Johnson. PET scan outpatient planned.  - MELISSA pleural scar, thickened right pleural with reported asbestos exposure  - c/f mesothelioma.   - Outpatient PET scan w/ Dr. Johnson as planned.    # Community acquired pneumonia  - urine legionella and strep negative  - productive sputum, brown  - RLL dense consolidation  - changed to zosyn on 3/6 given elevated procalcitonin, follow up bronchial washing cultures to guide therapy  - provide airway clearance with aerobika after duonebs and chest PT    # COPD, in acute exacerbation, improved  - on stiolto at home, intermittently complaint w/ inhalers  - Continue with LAMA/LABA  - Reduce O2 to 1.5L supplement, goal SpO2 88%  - continue with airway clearance as above  - Resume azithromycin 3 times weekly per home dose    # ERIK  - AHI 36 in nov 2022  - noncompliant to CPAP  - encouraged patient to use it    # PH  - combination of Group 2 (diastolic HF and afib) and 3 (COPD, ERIK) as well group 5 (HD)  - likely large factor of noncompliant to CPAP in the setting of severe ERIK  - encourage optimization w/ CPAP  - c/w home med sildanefil     Patient to be seen with Dr. Crockett Mr. Gamez is a 67 yo M with aFib s/p watchman (on ASA), HTN, COPD, ESRD (MWF), severe ERIK noncompliant to CPAP, pulm HTN (group 2 and3) on sildenafil T cell lymphoma (s/p chemo), HFpEF who presented with productive cough brown and SOB x5 days requiring more oxygen from baseline of 1 L via NC concerning for community acquired pneumonia and COPD exacerbation. Pulmonary recalled for further optimization, still not at baseline O2 supplement.    At bedside, O2 reduced to 1.5L, SPO2 was maintaining at 91%, goal is 88% and above. Using inhalers and clinically feels back to baseline, although notably he was seen right after a nebulizer treatment.     POCUS 03/9/24:   B line pattern bilaterally, small consolidations at bases bilaterally.   Echo: Normal RV, LV contractility. VTI normal.     Work up:   CTPE done and negative for PE but showed RLL infiltrate, Left pleural based scar, right pleural thickening  echo elevated PASP 58 up from 34  Bronchoscopy 3/7: copious purulent thick secretions brown in color. NO endobronchial lesions or localized hemoptysis.   BAL with culture/cyto sent, bronchial washing culture sent both pending but gram negative rods on staining.      # Pleural based scars  - follows w/. Dr Johnson. PET scan outpatient planned.  - MELISSA pleural scar, thickened right pleural with reported asbestos exposure  - c/f mesothelioma.   - Cytology studies showed acute inflammatory cells, may need surgical biopsy in future  - Outpatient PET scan w/ Dr. Johnson as planned.    # Community acquired pneumonia  - urine legionella and strep negative  - productive sputum, brown  - RLL dense consolidation  - BAL resulted in ESBL E Coli, sensitive to Erta, Tomi, bactrim, started on ertapenam   - provide airway clearance with aerobika after duonebs and chest PT    # COPD, in acute exacerbation, improved  - on stiolto at home, intermittently complaint w/ inhalers  - Continue with LAMA/LABA  - Reduce O2 to 1.5L supplement, goal SpO2 88%  - continue with airway clearance as above  - Resume azithromycin 3 times weekly per home dose    # ERIK  - AHI 36 in nov 2022  - noncompliant to CPAP  - encouraged patient to use it    # PH  - combination of Group 2 (diastolic HF and afib) and 3 (COPD, ERIK) as well group 5 (HD)  - likely large factor of noncompliant to CPAP in the setting of severe ERIK  - encourage optimization w/ CPAP  - c/w home med sildanefil     Patient to be seen with Dr. Crockett Mr. Gamez is a 69 yo M with aFib s/p watchman (on ASA), HTN, COPD, ESRD (MWF), severe ERIK noncompliant to CPAP, pulm HTN (group 2 and3) on sildenafil T cell lymphoma (s/p chemo), HFpEF who presented with productive cough brown and SOB x5 days requiring more oxygen from baseline of 1 L via NC concerning for community acquired pneumonia and COPD exacerbation. Pulmonary recalled for further optimization, still not at baseline O2 supplement.    At bedside, O2 reduced to 1.5L, SPO2 was maintaining at 91%, goal is 88% and above. Using inhalers and clinically feels back to baseline, although notably he was seen right after a nebulizer treatment.     POCUS 03/9/24:   B line pattern bilaterally, small consolidations at bases bilaterally.   Echo: Normal RV, LV contractility. VTI normal.     Work up:   CTPE done and negative for PE but showed RLL infiltrate, Left pleural based scar, right pleural thickening  echo elevated PASP 58 up from 34  Bronchoscopy 3/7: copious purulent thick secretions brown in color. NO endobronchial lesions or localized hemoptysis.   BAL with culture/cyto sent, bronchial washing culture sent both pending but gram negative rods on staining.      # Pleural based scars  - follows w/. Dr Johnson. PET scan outpatient planned.  - MELISSA pleural scar, thickened right pleural with reported asbestos exposure  - c/f mesothelioma.   - Cytology studies showed acute inflammatory cells, may need surgical biopsy in future  - Outpatient PET scan w/ Dr. Johnson as planned.    # Community acquired pneumonia  - urine legionella and strep negative  - productive sputum, brown  - RLL dense consolidation  - BAL resulted in ESBL E Coli, sensitive to Erta, Tomi, bactrim, started on ertapenam   - provide airway clearance with aerobika after duonebs and chest PT    # COPD, in acute exacerbation, improved  - on stiolto at home, intermittently complaint w/ inhalers  - Continue with LAMA/LABA  - Reduce O2 to 1.5L supplement, goal SpO2 88%  - continue with airway clearance as above  - Resume azithromycin 3 times weekly per home dose    # ERIK  - AHI 36 in nov 2022  - noncompliant to CPAP  - encouraged patient to use it    # PH  - combination of Group 2 (diastolic HF and afib) and 3 (COPD, ERIK) as well group 5 (HD)  - likely large factor of noncompliant to CPAP in the setting of severe ERIK  - encourage optimization w/ CPAP  - c/w home med sildanefil     Patient seen with Dr. Crockett  Pulmonary will sign off. Please page/reconsult with any new questions.

## 2024-03-11 LAB
ADD ON TEST-SPECIMEN IN LAB: SIGNIFICANT CHANGE UP
ANION GAP SERPL CALC-SCNC: 15 MMOL/L — SIGNIFICANT CHANGE UP (ref 5–17)
BASOPHILS # BLD AUTO: 0.02 K/UL — SIGNIFICANT CHANGE UP (ref 0–0.2)
BASOPHILS NFR BLD AUTO: 0.2 % — SIGNIFICANT CHANGE UP (ref 0–2)
BUN SERPL-MCNC: 48 MG/DL — HIGH (ref 7–23)
CALCIUM SERPL-MCNC: 9.6 MG/DL — SIGNIFICANT CHANGE UP (ref 8.4–10.5)
CHLORIDE SERPL-SCNC: 97 MMOL/L — SIGNIFICANT CHANGE UP (ref 96–108)
CO2 SERPL-SCNC: 28 MMOL/L — SIGNIFICANT CHANGE UP (ref 22–31)
CREAT SERPL-MCNC: 7.08 MG/DL — HIGH (ref 0.5–1.3)
EGFR: 8 ML/MIN/1.73M2 — LOW
EOSINOPHIL # BLD AUTO: 0.36 K/UL — SIGNIFICANT CHANGE UP (ref 0–0.5)
EOSINOPHIL NFR BLD AUTO: 4.1 % — SIGNIFICANT CHANGE UP (ref 0–6)
GLUCOSE SERPL-MCNC: 87 MG/DL — SIGNIFICANT CHANGE UP (ref 70–99)
HCT VFR BLD CALC: 27.7 % — LOW (ref 39–50)
HGB BLD-MCNC: 8.7 G/DL — LOW (ref 13–17)
IMM GRANULOCYTES NFR BLD AUTO: 1 % — HIGH (ref 0–0.9)
LYMPHOCYTES # BLD AUTO: 1.11 K/UL — SIGNIFICANT CHANGE UP (ref 1–3.3)
LYMPHOCYTES # BLD AUTO: 12.6 % — LOW (ref 13–44)
MAGNESIUM SERPL-MCNC: 2.5 MG/DL — SIGNIFICANT CHANGE UP (ref 1.6–2.6)
MCHC RBC-ENTMCNC: 30.2 PG — SIGNIFICANT CHANGE UP (ref 27–34)
MCHC RBC-ENTMCNC: 31.4 GM/DL — LOW (ref 32–36)
MCV RBC AUTO: 96.2 FL — SIGNIFICANT CHANGE UP (ref 80–100)
MONOCYTES # BLD AUTO: 0.97 K/UL — HIGH (ref 0–0.9)
MONOCYTES NFR BLD AUTO: 11 % — SIGNIFICANT CHANGE UP (ref 2–14)
NEUTROPHILS # BLD AUTO: 6.23 K/UL — SIGNIFICANT CHANGE UP (ref 1.8–7.4)
NEUTROPHILS NFR BLD AUTO: 71.1 % — SIGNIFICANT CHANGE UP (ref 43–77)
NRBC # BLD: 0 /100 WBCS — SIGNIFICANT CHANGE UP (ref 0–0)
PHOSPHATE SERPL-MCNC: 3.6 MG/DL — SIGNIFICANT CHANGE UP (ref 2.5–4.5)
PLATELET # BLD AUTO: 172 K/UL — SIGNIFICANT CHANGE UP (ref 150–400)
POTASSIUM SERPL-MCNC: 3.7 MMOL/L — SIGNIFICANT CHANGE UP (ref 3.5–5.3)
POTASSIUM SERPL-SCNC: 3.7 MMOL/L — SIGNIFICANT CHANGE UP (ref 3.5–5.3)
PROCALCITONIN SERPL-MCNC: 11.93 NG/ML — HIGH (ref 0.02–0.1)
RBC # BLD: 2.88 M/UL — LOW (ref 4.2–5.8)
RBC # FLD: 16.9 % — HIGH (ref 10.3–14.5)
SODIUM SERPL-SCNC: 140 MMOL/L — SIGNIFICANT CHANGE UP (ref 135–145)
WBC # BLD: 8.78 K/UL — SIGNIFICANT CHANGE UP (ref 3.8–10.5)
WBC # FLD AUTO: 8.78 K/UL — SIGNIFICANT CHANGE UP (ref 3.8–10.5)

## 2024-03-11 PROCEDURE — 90937 HEMODIALYSIS REPEATED EVAL: CPT

## 2024-03-11 PROCEDURE — 99232 SBSQ HOSP IP/OBS MODERATE 35: CPT | Mod: GC

## 2024-03-11 RX ORDER — ERTAPENEM SODIUM 1 G/1
500 INJECTION, POWDER, LYOPHILIZED, FOR SOLUTION INTRAMUSCULAR; INTRAVENOUS
Qty: 0 | Refills: 0 | DISCHARGE
Start: 2024-03-11 | End: 2024-03-15

## 2024-03-11 RX ORDER — ERYTHROPOIETIN 10000 [IU]/ML
6000 INJECTION, SOLUTION INTRAVENOUS; SUBCUTANEOUS ONCE
Refills: 0 | Status: COMPLETED | OUTPATIENT
Start: 2024-03-11 | End: 2024-03-11

## 2024-03-11 RX ORDER — CHLORHEXIDINE GLUCONATE 213 G/1000ML
1 SOLUTION TOPICAL
Refills: 0 | Status: DISCONTINUED | OUTPATIENT
Start: 2024-03-11 | End: 2024-03-12

## 2024-03-11 RX ADMIN — Medication 81 MILLIGRAM(S): at 06:05

## 2024-03-11 RX ADMIN — Medication 20 MILLIGRAM(S): at 17:57

## 2024-03-11 RX ADMIN — HEPARIN SODIUM 5000 UNIT(S): 5000 INJECTION INTRAVENOUS; SUBCUTANEOUS at 22:28

## 2024-03-11 RX ADMIN — Medication 4 MILLILITER(S): at 12:34

## 2024-03-11 RX ADMIN — Medication 3 MILLILITER(S): at 12:34

## 2024-03-11 RX ADMIN — AMIODARONE HYDROCHLORIDE 200 MILLIGRAM(S): 400 TABLET ORAL at 06:05

## 2024-03-11 RX ADMIN — Medication 650 MILLIGRAM(S): at 05:56

## 2024-03-11 RX ADMIN — Medication 3 MILLILITER(S): at 22:28

## 2024-03-11 RX ADMIN — Medication 4 MILLILITER(S): at 18:35

## 2024-03-11 RX ADMIN — HEPARIN SODIUM 5000 UNIT(S): 5000 INJECTION INTRAVENOUS; SUBCUTANEOUS at 13:36

## 2024-03-11 RX ADMIN — Medication 4 MILLILITER(S): at 04:36

## 2024-03-11 RX ADMIN — AZITHROMYCIN 250 MILLIGRAM(S): 500 TABLET, FILM COATED ORAL at 12:34

## 2024-03-11 RX ADMIN — SEVELAMER CARBONATE 800 MILLIGRAM(S): 2400 POWDER, FOR SUSPENSION ORAL at 07:02

## 2024-03-11 RX ADMIN — ATORVASTATIN CALCIUM 40 MILLIGRAM(S): 80 TABLET, FILM COATED ORAL at 22:28

## 2024-03-11 RX ADMIN — Medication 4 MILLILITER(S): at 22:29

## 2024-03-11 RX ADMIN — Medication 3 MILLILITER(S): at 04:36

## 2024-03-11 RX ADMIN — Medication 3 MILLILITER(S): at 18:35

## 2024-03-11 RX ADMIN — SEVELAMER CARBONATE 800 MILLIGRAM(S): 2400 POWDER, FOR SUSPENSION ORAL at 18:35

## 2024-03-11 RX ADMIN — Medication 650 MILLIGRAM(S): at 04:56

## 2024-03-11 RX ADMIN — TIOTROPIUM BROMIDE AND OLODATEROL 2 PUFF(S): 3.124; 2.736 SPRAY, METERED RESPIRATORY (INHALATION) at 12:34

## 2024-03-11 RX ADMIN — ERYTHROPOIETIN 6000 UNIT(S): 10000 INJECTION, SOLUTION INTRAVENOUS; SUBCUTANEOUS at 11:44

## 2024-03-11 RX ADMIN — HEPARIN SODIUM 5000 UNIT(S): 5000 INJECTION INTRAVENOUS; SUBCUTANEOUS at 06:05

## 2024-03-11 RX ADMIN — ERTAPENEM SODIUM 100 MILLIGRAM(S): 1 INJECTION, POWDER, LYOPHILIZED, FOR SOLUTION INTRAMUSCULAR; INTRAVENOUS at 12:33

## 2024-03-11 NOTE — PROGRESS NOTE ADULT - ASSESSMENT
68M PMH AFib s/p watchman (on ASA), HTN, COPD, ESRD (MWF), severe ERIK, pulm HTN, T cell lymphoma (s/p chemo), HFpEF, s/p VATS procedure in 2021, recent [facility] admission 12/2023 for LGIB p/w productive cough, congestion, SOB for weeks now worsening in the last 5days. Admitted to [facility] for AHRF 2/2 COPD exacerbation 2/2 to CAP. Course c/b low volume hemoptysis on 3/5 to 3/6. Now s/p bronch 3/7, after which patient desaturated to 88% on 5L NC (prior was on 2L NC saturating in 90s). ICU consulted, and patient transferred to [facility] for closer monitoring. S/p stepdown to [facility]. Cultures growing ESBL resistant to Zosyn, now on ertapenem.

## 2024-03-11 NOTE — PROGRESS NOTE ADULT - PROBLEM SELECTOR PLAN 4
CT Chest outpatient 2/12/2024 w/ new consolidative opacities within the lingula and multifocally throughout the right lower lobe compared to 1/26/2023.   - CTA PE this admission showed persistent b/l pulmonary consolidation.   - Has hx of b/l pleural effusion s/p VATS w/ CT surg in 2021 for L chest wall loculated effusion.   - Ur legionella neg, Ur strep neg.   - Finished Azithromycin 500mg for atypical cov    PLAN:  - C/w Ertapenem, with peripheral infusion for 3-4 days followed by bactril to complete course of 7 days  - f/u BCx NGTD   - f/u Sputum Cx - initial spu Cx contaminated     - sputum cx 3/7 from bronchial aspirate: moderate e coli - ESBL E. Coli - start Ertapenem

## 2024-03-11 NOTE — PROGRESS NOTE ADULT - ASSESSMENT
69 yo M with hx of ESRD on HD MWF via LUE AVF, AFib s/p watchman (on ASA), HTN, COPD, severe ERIK, pulm HTN, T cell lymphoma (s/p chemo), HFpEF, s/p VATS procedure in , recent [facility] admission 2023 for LGIB, admitted for CAP and  exacerbation, missed HD on 3/1. Nephrology consulted for in patient HD management     ESRD:  Outpatient HD at [address] [place] [address], follows Dr. Guadalupe   Rx: 3hrs   EDW: 68kg    Plan:  HD today per schedule for clearance and volume management with parameters below  c/w fluid restriction <1.2L/day and renal diet as tolerated  Adjust and dose all meds to ESRD  If allopurinol is restarted, dose to 50mg daily or 100mg every other day while inpatient    Hemodialysis Treatment.:     Schedule: Once, Modality: Hemodialysis, Access: Arteriovenous Fistula    Dialyzer: Optiflux [id number], Time: 180 Min    Blood Flow: 400 mL/Min , Dialysate Flow: 500 mL/Min, Dialysate Temp: 36.5, Tubinmm (Adult)    Target Fluid Removal: 1.5 Liters    Dialysate Electrolytes (mEq/L): Potassium 4, Calcium 2.5, Sodium 138, Bicarbonate 35    Access:  LUE AVF functional     Anemia: h/h stable.   C/w MARCE during HD   tsat 10%, ferritin 800. May benefit from IV iron but hold in setting of active infection.    HTN:  BP at goal   UF with HD as tolerated  Can hold amlodipine for now given low-normal BP     MBD:  Calcium: 9.6  Phos: 3.6  Cont sevelamer 800mg with meals    Hep B serologies non reactive 67 yo M with hx of ESRD on HD MWF via LUE AVF, AFib s/p watchman (on ASA), HTN, COPD, severe ERIK, pulm HTN, T cell lymphoma (s/p chemo), HFpEF, s/p VATS procedure in , recent [facility] admission 2023 for LGIB, admitted for CAP and  exacerbation, missed HD on 3/1. Remains admitted due to ESBL E. Coli growing in bronchial aspirate    ESRD:  Outpatient HD at [address] [place] [address], follows Dr. Guadalupe   Rx: 3hrs   EDW: 68kg    Plan:  HD today per schedule for clearance and volume management with parameters below  c/w fluid restriction <1.2L/day and renal diet as tolerated  Adjust and dose all meds to ESRD  If allopurinol is restarted, dose to 50mg daily or 100mg every other day while inpatient    Hemodialysis Treatment.:     Schedule: Once, Modality: Hemodialysis, Access: Arteriovenous Fistula    Dialyzer: Optiflux [id number], Time: 180 Min    Blood Flow: 400 mL/Min , Dialysate Flow: 500 mL/Min, Dialysate Temp: 36.5, Tubinmm (Adult)    Target Fluid Removal: 1.5 Liters    Dialysate Electrolytes (mEq/L): Potassium 4, Calcium 2.5, Sodium 138, Bicarbonate 35    Access:  LUE AVF functional     Anemia: h/h stable.   C/w MARCE during HD   tsat 10%, ferritin 800. May benefit from IV iron but hold in setting of active infection.    HTN:  BP at goal   UF with HD as tolerated  Can hold amlodipine for now given low-normal BP     MBD:  Calcium: 9.6  Phos: 3.6  Cont sevelamer 800mg with meals    Hep B serologies non reactive    ESBL E. Coli PNA - Started on ertapenem 500mg IV daily on 3/10.   When scheduled dose falls on dialysis days please give ertapenem after hemodialysis.

## 2024-03-11 NOTE — PROGRESS NOTE ADULT - PROBLEM SELECTOR PLAN 8
Home med: Aspirin 81mg qd, amio 200mg qd, metoprolol XL 50mg qd.   [id number] 2. s/p Watchman device, not on Eliquis. NSR this admission.    PLAN:  - Restart ASA  - c/w other home meds.

## 2024-03-11 NOTE — PROGRESS NOTE ADULT - ATTENDING SUPERVISION STATEMENT
Fellow
Resident

## 2024-03-11 NOTE — PROGRESS NOTE ADULT - SUBJECTIVE AND OBJECTIVE BOX
Nephrology progress note    Seen at HD. Feeling well this morning, reports cough is much better. Tolerating treatment, HDS. Continue HD as ordered.    Allergies:  No Known Allergies    Hospital Medications:   MEDICATIONS  (STANDING):  acetylcysteine 10%  Inhalation 4 milliLiter(s) Inhalation every 6 hours  albuterol/ipratropium for Nebulization 3 milliLiter(s) Nebulizer every 6 hours  aMIOdarone    Tablet 200 milliGRAM(s) Oral every 24 hours  aspirin  chewable 81 milliGRAM(s) Oral every 24 hours  atorvastatin 40 milliGRAM(s) Oral at bedtime  azithromycin   Tablet 250 milliGRAM(s) Oral <User Schedule>  chlorhexidine 2% Cloths 1 Application(s) Topical <User Schedule>  epoetin donny-epbx (RETACRIT) Injectable 6000 Unit(s) IV Push once  ertapenem  IVPB 500 milliGRAM(s) IV Intermittent every 24 hours  ertapenem  IVPB      heparin   Injectable 5000 Unit(s) SubCutaneous every 8 hours  metoprolol succinate ER 50 milliGRAM(s) Oral every 24 hours  polyethylene glycol 3350 17 Gram(s) Oral two times a day  senna 2 Tablet(s) Oral at bedtime  sevelamer carbonate 800 milliGRAM(s) Oral every 8 hours  sildenafil (REVATIO) 20 milliGRAM(s) Oral every 8 hours  tiotropium 2.5 MICROgram(s)/olodaterol 2.5 MICROgram(s) (STIOLTO) Inhaler 2 Puff(s) Inhalation daily    REVIEW OF SYSTEMS:  All other review of systems is negative unless indicated above.    VITALS:  T(F): 98.4 (03-11-24 @ 08:50), Max: 98.4 (03-11-24 @ 08:50)  HR: 65 (03-11-24 @ 08:50)  BP: 138/66 (03-11-24 @ 08:50)  RR: 18 (03-11-24 @ 08:50)  SpO2: 92% (03-11-24 @ 08:50)  Wt(kg): --      PHYSICAL EXAM:  Constitutional: NAD, lying in bed on HD  HEENT: Normocephalic, ecchymosis over R orbit, EOMI  Respiratory: Coarse breath sounds, no respiratory distress  Cardiovascular: Regular rate  Gastrointestinal: soft, mildly distended  Extremities: Trace peripheral edema  Neurological: Awake, alert, moving all extremities  Skin: No rash on exposed skin  Vascular Access: SUSAN AVF accessed for HD    LABS:  03-11    140  |  97  |  48<H>  ----------------------------<  87  3.7   |  28  |  7.08<H>    Ca    9.6      11 Mar 2024 08:11  Phos  3.6     03-11  Mg     2.5     03-11    TPro  6.2  /  Alb  3.0<L>  /  TBili  0.6  /  DBili      /  AST  10  /  ALT  <5<L>  /  AlkPhos  117  03-10                          8.7    8.78  )-----------( 172      ( 11 Mar 2024 08:11 )             27.7       Urine Studies:  Creatinine Trend: 7.08<--, 5.68<--, 4.13<--, 5.65<--, 6.50<--, 4.85<--  Urinalysis Basic - ( 11 Mar 2024 08:11 )    Color:  / Appearance:  / SG:  / pH:   Gluc: 87 mg/dL / Ketone:   / Bili:  / Urobili:    Blood:  / Protein:  / Nitrite:    Leuk Esterase:  / RBC:  / WBC    Sq Epi:  / Non Sq Epi:  / Bacteria:         RADIOLOGY & ADDITIONAL STUDIES:  Reviewed

## 2024-03-11 NOTE — PROGRESS NOTE ADULT - PROBLEM SELECTOR PLAN 9
Hgb 10, at baseline. Of note, pt recently admitted 12/2023 for LGIB, s/p c-scope, found to have large polyps on inpatient colonoscopy. Pending outpatient repeat colonoscopy w/ polypectomy on [facility].    PLAN:  - maintain active T&S  - outpatient GI followup

## 2024-03-11 NOTE — PROGRESS NOTE ADULT - ATTENDING COMMENTS
Seen and evaluated at bedside during dialysis today. Uneventful dialysis session.   Dialysis indicated for metabolic clearance and volume management  Further recs as above    Discussed with primary team

## 2024-03-11 NOTE — PROGRESS NOTE ADULT - SUBJECTIVE AND OBJECTIVE BOX
Internal Medicine Progress Note  Raiza Bailon, PGY-1  Pager: [phone]    ******INCOMPLETE******    Patient is a 68y old  Male who presents with a chief complaint of SOB (10 Mar 2024 14:12)    OVERNIGHT EVENTS/INTERVAL HPI:    REVIEW OF SYSTEMS:  All other review of systems is negative unless indicated above.    OBJECTIVE:  T(C): 36.5 (03-11-24 @ 05:43), Max: 36.6 (03-10-24 @ 08:59)  HR: 71 (03-11-24 @ 05:43) (62 - 73)  BP: 128/73 (03-11-24 @ 05:43) (114/64 - 130/68)  RR: 18 (03-11-24 @ 05:43) (18 - 20)  SpO2: 92% (03-11-24 @ 05:43) (91% - 100%)  Daily     Daily     Physical Exam:  General: in no acute distress  Eyes: EOMI intact bilaterally. Anicteric sclerae, moist conjunctivae  HENT: Moist mucous membranes  Neck: Trachea midline, supple  Lungs: CTA B/L. No wheezes, rales, or rhonchi  Cardiovascular: RRR. No murmurs, rubs, or gallops  Abdomen: Soft, non-tender non-distended; No rebound or guarding  Extremities: WWP, No clubbing, cyanosis or edema  MSK: No midline bony tenderness. No CVA tenderness bilaterally  Neurological: Alert and oriented x3  Skin: Warm and dry. No obvious rash     Medications:  MEDICATIONS  (STANDING):  acetylcysteine 10%  Inhalation 4 milliLiter(s) Inhalation every 6 hours  albuterol/ipratropium for Nebulization 3 milliLiter(s) Nebulizer every 6 hours  aMIOdarone    Tablet 200 milliGRAM(s) Oral every 24 hours  aspirin  chewable 81 milliGRAM(s) Oral every 24 hours  atorvastatin 40 milliGRAM(s) Oral at bedtime  azithromycin   Tablet 250 milliGRAM(s) Oral <User Schedule>  ertapenem  IVPB      ertapenem  IVPB 500 milliGRAM(s) IV Intermittent every 24 hours  heparin   Injectable 5000 Unit(s) SubCutaneous every 8 hours  metoprolol succinate ER 50 milliGRAM(s) Oral every 24 hours  polyethylene glycol 3350 17 Gram(s) Oral two times a day  senna 2 Tablet(s) Oral at bedtime  sevelamer carbonate 800 milliGRAM(s) Oral every 8 hours  sildenafil (REVATIO) 20 milliGRAM(s) Oral every 8 hours  tiotropium 2.5 MICROgram(s)/olodaterol 2.5 MICROgram(s) (STIOLTO) Inhaler 2 Puff(s) Inhalation daily    MEDICATIONS  (PRN):  acetaminophen     Tablet .. 650 milliGRAM(s) Oral every 6 hours PRN Temp greater or equal to 38C (100.4F), Mild Pain (1 - 3)  aluminum hydroxide/magnesium hydroxide/simethicone Suspension 30 milliLiter(s) Oral every 4 hours PRN Dyspepsia  melatonin 3 milliGRAM(s) Oral at bedtime PRN Insomnia  ondansetron Injectable 4 milliGRAM(s) IV Push every 8 hours PRN Nausea and/or Vomiting      Labs:                        9.0    10.89 )-----------( 167      ( 10 Mar 2024 05:30 )             29.0     03-10    142  |  99  |  37<H>  ----------------------------<  92  3.8   |  28  |  5.68<H>    Ca    9.3      10 Mar 2024 05:30  Phos  3.1     03-10  Mg     2.2     03-10    TPro  6.2  /  Alb  3.0<L>  /  TBili  0.6  /  DBili  x   /  AST  10  /  ALT  <5<L>  /  AlkPhos  117  03-10      Urinalysis Basic - ( 10 Mar 2024 05:30 )    Color: x / Appearance: x / SG: x / pH: x  Gluc: 92 mg/dL / Ketone: x  / Bili: x / Urobili: x   Blood: x / Protein: x / Nitrite: x   Leuk Esterase: x / RBC: x / WBC x   Sq Epi: x / Non Sq Epi: x / Bacteria: x          Radiology: Reviewed Patient is a 68y old  Male who presents with a chief complaint of SOB (10 Mar 2024 14:12)    OVERNIGHT EVENTS/INTERVAL HPI:      REVIEW OF SYSTEMS:  All other review of systems is negative unless indicated above.    OBJECTIVE:  T(C): 36.5 (03-11-24 @ 05:43), Max: 36.6 (03-10-24 @ 08:59)  HR: 71 (03-11-24 @ 05:43) (62 - 73)  BP: 128/73 (03-11-24 @ 05:43) (114/64 - 130/68)  RR: 18 (03-11-24 @ 05:43) (18 - 20)  SpO2: 92% (03-11-24 @ 05:43) (91% - 100%)  Daily     Daily     Physical Exam:  General: in no acute distress  Eyes: EOMI intact bilaterally. Anicteric sclerae, moist conjunctivae  HENT: Moist mucous membranes  Neck: Trachea midline, supple  Lungs: CTA B/L. No wheezes, rales, or rhonchi  Cardiovascular: RRR. No murmurs, rubs, or gallops  Abdomen: Soft, non-tender non-distended; No rebound or guarding  Extremities: WWP, No clubbing, cyanosis or edema  MSK: No midline bony tenderness. No CVA tenderness bilaterally  Neurological: Alert and oriented x3  Skin: Warm and dry. No obvious rash     Medications:  MEDICATIONS  (STANDING):  acetylcysteine 10%  Inhalation 4 milliLiter(s) Inhalation every 6 hours  albuterol/ipratropium for Nebulization 3 milliLiter(s) Nebulizer every 6 hours  aMIOdarone    Tablet 200 milliGRAM(s) Oral every 24 hours  aspirin  chewable 81 milliGRAM(s) Oral every 24 hours  atorvastatin 40 milliGRAM(s) Oral at bedtime  azithromycin   Tablet 250 milliGRAM(s) Oral <User Schedule>  ertapenem  IVPB      ertapenem  IVPB 500 milliGRAM(s) IV Intermittent every 24 hours  heparin   Injectable 5000 Unit(s) SubCutaneous every 8 hours  metoprolol succinate ER 50 milliGRAM(s) Oral every 24 hours  polyethylene glycol 3350 17 Gram(s) Oral two times a day  senna 2 Tablet(s) Oral at bedtime  sevelamer carbonate 800 milliGRAM(s) Oral every 8 hours  sildenafil (REVATIO) 20 milliGRAM(s) Oral every 8 hours  tiotropium 2.5 MICROgram(s)/olodaterol 2.5 MICROgram(s) (STIOLTO) Inhaler 2 Puff(s) Inhalation daily    MEDICATIONS  (PRN):  acetaminophen     Tablet .. 650 milliGRAM(s) Oral every 6 hours PRN Temp greater or equal to 38C (100.4F), Mild Pain (1 - 3)  aluminum hydroxide/magnesium hydroxide/simethicone Suspension 30 milliLiter(s) Oral every 4 hours PRN Dyspepsia  melatonin 3 milliGRAM(s) Oral at bedtime PRN Insomnia  ondansetron Injectable 4 milliGRAM(s) IV Push every 8 hours PRN Nausea and/or Vomiting      Labs:                        9.0    10.89 )-----------( 167      ( 10 Mar 2024 05:30 )             29.0     03-10    142  |  99  |  37<H>  ----------------------------<  92  3.8   |  28  |  5.68<H>    Ca    9.3      10 Mar 2024 05:30  Phos  3.1     03-10  Mg     2.2     03-10    TPro  6.2  /  Alb  3.0<L>  /  TBili  0.6  /  DBili  x   /  AST  10  /  ALT  <5<L>  /  AlkPhos  117  03-10      Urinalysis Basic - ( 10 Mar 2024 05:30 )    Color: x / Appearance: x / SG: x / pH: x  Gluc: 92 mg/dL / Ketone: x  / Bili: x / Urobili: x   Blood: x / Protein: x / Nitrite: x   Leuk Esterase: x / RBC: x / WBC x   Sq Epi: x / Non Sq Epi: x / Bacteria: x          Radiology: Reviewed Patient is a 68y old  Male who presents with a chief complaint of SOB (10 Mar 2024 14:12)    OVERNIGHT EVENTS/INTERVAL HPI: No acute events overnight. Patient seen and examined at bedside this AM. States he is feeling well. Reports BM. Saturating in 90s on 4L NC.     REVIEW OF SYSTEMS:  All other review of systems is negative unless indicated above.    OBJECTIVE:  T(C): 36.5 (03-11-24 @ 05:43), Max: 36.6 (03-10-24 @ 08:59)  HR: 71 (03-11-24 @ 05:43) (62 - 73)  BP: 128/73 (03-11-24 @ 05:43) (114/64 - 130/68)  RR: 18 (03-11-24 @ 05:43) (18 - 20)  SpO2: 92% (03-11-24 @ 05:43) (91% - 100%)  Daily     Daily     Physical Exam:  General: in no acute distress  Lungs: CTA b/l, no rhonchi noted on exam   Cardiovascular: RRR. No murmurs, rubs, or gallops  Abdomen: Soft, non-tender non-distended; No rebound or guarding  Extremities: WWP, No clubbing, cyanosis or edema  Neurological: Alert and oriented x3  Skin: Warm and dry. No obvious rash     Medications:  MEDICATIONS  (STANDING):  acetylcysteine 10%  Inhalation 4 milliLiter(s) Inhalation every 6 hours  albuterol/ipratropium for Nebulization 3 milliLiter(s) Nebulizer every 6 hours  aMIOdarone    Tablet 200 milliGRAM(s) Oral every 24 hours  aspirin  chewable 81 milliGRAM(s) Oral every 24 hours  atorvastatin 40 milliGRAM(s) Oral at bedtime  azithromycin   Tablet 250 milliGRAM(s) Oral <User Schedule>  ertapenem  IVPB      ertapenem  IVPB 500 milliGRAM(s) IV Intermittent every 24 hours  heparin   Injectable 5000 Unit(s) SubCutaneous every 8 hours  metoprolol succinate ER 50 milliGRAM(s) Oral every 24 hours  polyethylene glycol 3350 17 Gram(s) Oral two times a day  senna 2 Tablet(s) Oral at bedtime  sevelamer carbonate 800 milliGRAM(s) Oral every 8 hours  sildenafil (REVATIO) 20 milliGRAM(s) Oral every 8 hours  tiotropium 2.5 MICROgram(s)/olodaterol 2.5 MICROgram(s) (STIOLTO) Inhaler 2 Puff(s) Inhalation daily    MEDICATIONS  (PRN):  acetaminophen     Tablet .. 650 milliGRAM(s) Oral every 6 hours PRN Temp greater or equal to 38C (100.4F), Mild Pain (1 - 3)  aluminum hydroxide/magnesium hydroxide/simethicone Suspension 30 milliLiter(s) Oral every 4 hours PRN Dyspepsia  melatonin 3 milliGRAM(s) Oral at bedtime PRN Insomnia  ondansetron Injectable 4 milliGRAM(s) IV Push every 8 hours PRN Nausea and/or Vomiting      Labs:                        9.0    10.89 )-----------( 167      ( 10 Mar 2024 05:30 )             29.0     03-10    142  |  99  |  37<H>  ----------------------------<  92  3.8   |  28  |  5.68<H>    Ca    9.3      10 Mar 2024 05:30  Phos  3.1     03-10  Mg     2.2     03-10    TPro  6.2  /  Alb  3.0<L>  /  TBili  0.6  /  DBili  x   /  AST  10  /  ALT  <5<L>  /  AlkPhos  117  03-10      Urinalysis Basic - ( 10 Mar 2024 05:30 )    Color: x / Appearance: x / SG: x / pH: x  Gluc: 92 mg/dL / Ketone: x  / Bili: x / Urobili: x   Blood: x / Protein: x / Nitrite: x   Leuk Esterase: x / RBC: x / WBC x   Sq Epi: x / Non Sq Epi: x / Bacteria: x          Radiology: Reviewed

## 2024-03-11 NOTE — PROGRESS NOTE ADULT - PROBLEM SELECTOR PLAN 12
#ERIK  hx of severe ERIK, non-compliant with CPAP    PLAN:  - OVN CPAP ordered, but patient noncompliant.
#ERIK  hx of severe ERIK, non-compliant with CPAP    PLAN:  - OVN CPAP ordered, but patient noncompliant.
[id number]
F: None  E: replete cautiously in ESRD  N: renal restriction diet  GI: None  DVT: hep subq  Dispo: F

## 2024-03-11 NOTE — PROGRESS NOTE ADULT - PROBLEM/PLAN-5
DISPLAY PLAN FREE TEXT
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  
DISPLAY PLAN FREE TEXT

## 2024-03-11 NOTE — PROGRESS NOTE ADULT - ATTENDING COMMENTS
Patient was seen and examined at bedside on 3/10/2024 at 330 pm with PT present. Patient reports that he feels improved, includes his cough and NIELSON. Denies CP. ROS is otherwise negative. Vitals, labwork and pertinent imaging reviewed. Exam - NAD, AAO x 4, PERRLA, EOMI, MMM, supple neck, chest - bibasilar crackles, CV - rrr, s1s2, no m/r/g, abd - soft, NTND, + BS, ext - wwp, psych - normal affect, pt appears cachectic and malnourished    Plan:  -Pt s/p bronchoscopy now with decreasing supplemental oxygen requirements  -Culture from bronchoscopy - ESBL E. Coli, c/w Ertapenem

## 2024-03-12 ENCOUNTER — TRANSCRIPTION ENCOUNTER (OUTPATIENT)
Age: 69
End: 2024-03-12

## 2024-03-12 VITALS — DIASTOLIC BLOOD PRESSURE: 60 MMHG | SYSTOLIC BLOOD PRESSURE: 125 MMHG | HEART RATE: 75 BPM

## 2024-03-12 LAB
ALBUMIN SERPL ELPH-MCNC: 3.1 G/DL — LOW (ref 3.3–5)
ALP SERPL-CCNC: 135 U/L — HIGH (ref 40–120)
ALT FLD-CCNC: 6 U/L — LOW (ref 10–45)
ANION GAP SERPL CALC-SCNC: 13 MMOL/L — SIGNIFICANT CHANGE UP (ref 5–17)
ANISOCYTOSIS BLD QL: SLIGHT — SIGNIFICANT CHANGE UP
AST SERPL-CCNC: 11 U/L — SIGNIFICANT CHANGE UP (ref 10–40)
BASOPHILS # BLD AUTO: 0 K/UL — SIGNIFICANT CHANGE UP (ref 0–0.2)
BASOPHILS NFR BLD AUTO: 0 % — SIGNIFICANT CHANGE UP (ref 0–2)
BILIRUB SERPL-MCNC: 0.6 MG/DL — SIGNIFICANT CHANGE UP (ref 0.2–1.2)
BUN SERPL-MCNC: 25 MG/DL — HIGH (ref 7–23)
CALCIUM SERPL-MCNC: 9.6 MG/DL — SIGNIFICANT CHANGE UP (ref 8.4–10.5)
CHLORIDE SERPL-SCNC: 97 MMOL/L — SIGNIFICANT CHANGE UP (ref 96–108)
CO2 SERPL-SCNC: 29 MMOL/L — SIGNIFICANT CHANGE UP (ref 22–31)
CREAT SERPL-MCNC: 5 MG/DL — HIGH (ref 0.5–1.3)
DACRYOCYTES BLD QL SMEAR: SLIGHT — SIGNIFICANT CHANGE UP
EGFR: 12 ML/MIN/1.73M2 — LOW
EOSINOPHIL # BLD AUTO: 0.12 K/UL — SIGNIFICANT CHANGE UP (ref 0–0.5)
EOSINOPHIL NFR BLD AUTO: 1.8 % — SIGNIFICANT CHANGE UP (ref 0–6)
GLUCOSE SERPL-MCNC: 62 MG/DL — LOW (ref 70–99)
HCT VFR BLD CALC: 29.8 % — LOW (ref 39–50)
HGB BLD-MCNC: 9.3 G/DL — LOW (ref 13–17)
HYPOCHROMIA BLD QL: SLIGHT — SIGNIFICANT CHANGE UP
LYMPHOCYTES # BLD AUTO: 1.3 K/UL — SIGNIFICANT CHANGE UP (ref 1–3.3)
LYMPHOCYTES # BLD AUTO: 19.1 % — SIGNIFICANT CHANGE UP (ref 13–44)
MACROCYTES BLD QL: SLIGHT — SIGNIFICANT CHANGE UP
MAGNESIUM SERPL-MCNC: 2.2 MG/DL — SIGNIFICANT CHANGE UP (ref 1.6–2.6)
MANUAL SMEAR VERIFICATION: SIGNIFICANT CHANGE UP
MCHC RBC-ENTMCNC: 30 PG — SIGNIFICANT CHANGE UP (ref 27–34)
MCHC RBC-ENTMCNC: 31.2 GM/DL — LOW (ref 32–36)
MCV RBC AUTO: 96.1 FL — SIGNIFICANT CHANGE UP (ref 80–100)
MONOCYTES # BLD AUTO: 0.53 K/UL — SIGNIFICANT CHANGE UP (ref 0–0.9)
MONOCYTES NFR BLD AUTO: 7.8 % — SIGNIFICANT CHANGE UP (ref 2–14)
NEUTROPHILS # BLD AUTO: 4.86 K/UL — SIGNIFICANT CHANGE UP (ref 1.8–7.4)
NEUTROPHILS NFR BLD AUTO: 71.3 % — SIGNIFICANT CHANGE UP (ref 43–77)
OVALOCYTES BLD QL SMEAR: SIGNIFICANT CHANGE UP
PHOSPHATE SERPL-MCNC: 3.4 MG/DL — SIGNIFICANT CHANGE UP (ref 2.5–4.5)
PLAT MORPH BLD: NORMAL — SIGNIFICANT CHANGE UP
PLATELET # BLD AUTO: 169 K/UL — SIGNIFICANT CHANGE UP (ref 150–400)
POIKILOCYTOSIS BLD QL AUTO: SIGNIFICANT CHANGE UP
POLYCHROMASIA BLD QL SMEAR: SLIGHT — SIGNIFICANT CHANGE UP
POTASSIUM SERPL-MCNC: 4 MMOL/L — SIGNIFICANT CHANGE UP (ref 3.5–5.3)
POTASSIUM SERPL-SCNC: 4 MMOL/L — SIGNIFICANT CHANGE UP (ref 3.5–5.3)
PROT SERPL-MCNC: 6.6 G/DL — SIGNIFICANT CHANGE UP (ref 6–8.3)
RBC # BLD: 3.1 M/UL — LOW (ref 4.2–5.8)
RBC # FLD: 17.2 % — HIGH (ref 10.3–14.5)
RBC BLD AUTO: ABNORMAL
SODIUM SERPL-SCNC: 139 MMOL/L — SIGNIFICANT CHANGE UP (ref 135–145)
SPHEROCYTES BLD QL SMEAR: SLIGHT — SIGNIFICANT CHANGE UP
TARGETS BLD QL SMEAR: SLIGHT — SIGNIFICANT CHANGE UP
WBC # BLD: 6.81 K/UL — SIGNIFICANT CHANGE UP (ref 3.8–10.5)
WBC # FLD AUTO: 6.81 K/UL — SIGNIFICANT CHANGE UP (ref 3.8–10.5)

## 2024-03-12 PROCEDURE — 83605 ASSAY OF LACTIC ACID: CPT

## 2024-03-12 PROCEDURE — 87640 STAPH A DNA AMP PROBE: CPT

## 2024-03-12 PROCEDURE — 87641 MR-STAPH DNA AMP PROBE: CPT

## 2024-03-12 PROCEDURE — 80053 COMPREHEN METABOLIC PANEL: CPT

## 2024-03-12 PROCEDURE — 93005 ELECTROCARDIOGRAM TRACING: CPT

## 2024-03-12 PROCEDURE — 87340 HEPATITIS B SURFACE AG IA: CPT

## 2024-03-12 PROCEDURE — 71045 X-RAY EXAM CHEST 1 VIEW: CPT

## 2024-03-12 PROCEDURE — 86704 HEP B CORE ANTIBODY TOTAL: CPT

## 2024-03-12 PROCEDURE — 85027 COMPLETE CBC AUTOMATED: CPT

## 2024-03-12 PROCEDURE — 74018 RADEX ABDOMEN 1 VIEW: CPT

## 2024-03-12 PROCEDURE — 83550 IRON BINDING TEST: CPT

## 2024-03-12 PROCEDURE — 87252 VIRUS INOCULATION TISSUE: CPT

## 2024-03-12 PROCEDURE — 84100 ASSAY OF PHOSPHORUS: CPT

## 2024-03-12 PROCEDURE — 84145 PROCALCITONIN (PCT): CPT

## 2024-03-12 PROCEDURE — 97535 SELF CARE MNGMENT TRAINING: CPT

## 2024-03-12 PROCEDURE — 85025 COMPLETE CBC W/AUTO DIFF WBC: CPT

## 2024-03-12 PROCEDURE — 94640 AIRWAY INHALATION TREATMENT: CPT

## 2024-03-12 PROCEDURE — 87305 ASPERGILLUS AG IA: CPT

## 2024-03-12 PROCEDURE — 86803 HEPATITIS C AB TEST: CPT

## 2024-03-12 PROCEDURE — 84484 ASSAY OF TROPONIN QUANT: CPT

## 2024-03-12 PROCEDURE — 87449 NOS EACH ORGANISM AG IA: CPT

## 2024-03-12 PROCEDURE — 96375 TX/PRO/DX INJ NEW DRUG ADDON: CPT

## 2024-03-12 PROCEDURE — 93307 TTE W/O DOPPLER COMPLETE: CPT

## 2024-03-12 PROCEDURE — 99285 EMERGENCY DEPT VISIT HI MDM: CPT

## 2024-03-12 PROCEDURE — 71275 CT ANGIOGRAPHY CHEST: CPT | Mod: MC

## 2024-03-12 PROCEDURE — 97165 OT EVAL LOW COMPLEX 30 MIN: CPT

## 2024-03-12 PROCEDURE — 97110 THERAPEUTIC EXERCISES: CPT

## 2024-03-12 PROCEDURE — 88305 TISSUE EXAM BY PATHOLOGIST: CPT

## 2024-03-12 PROCEDURE — 97161 PT EVAL LOW COMPLEX 20 MIN: CPT

## 2024-03-12 PROCEDURE — 83540 ASSAY OF IRON: CPT

## 2024-03-12 PROCEDURE — 87116 MYCOBACTERIA CULTURE: CPT

## 2024-03-12 PROCEDURE — 87186 SC STD MICRODIL/AGAR DIL: CPT

## 2024-03-12 PROCEDURE — 99239 HOSP IP/OBS DSCHRG MGMT >30: CPT | Mod: GC

## 2024-03-12 PROCEDURE — 86850 RBC ANTIBODY SCREEN: CPT

## 2024-03-12 PROCEDURE — 36415 COLL VENOUS BLD VENIPUNCTURE: CPT

## 2024-03-12 PROCEDURE — 86706 HEP B SURFACE ANTIBODY: CPT

## 2024-03-12 PROCEDURE — C9399: CPT

## 2024-03-12 PROCEDURE — 85730 THROMBOPLASTIN TIME PARTIAL: CPT

## 2024-03-12 PROCEDURE — 87637 SARSCOV2&INF A&B&RSV AMP PRB: CPT

## 2024-03-12 PROCEDURE — 96374 THER/PROPH/DIAG INJ IV PUSH: CPT

## 2024-03-12 PROCEDURE — 86901 BLOOD TYPING SEROLOGIC RH(D): CPT

## 2024-03-12 PROCEDURE — 88112 CYTOPATH CELL ENHANCE TECH: CPT

## 2024-03-12 PROCEDURE — 87594 PNEUMCYSTS JIROVECII AMP PRB: CPT

## 2024-03-12 PROCEDURE — 83880 ASSAY OF NATRIURETIC PEPTIDE: CPT

## 2024-03-12 PROCEDURE — 80048 BASIC METABOLIC PNL TOTAL CA: CPT

## 2024-03-12 PROCEDURE — 87040 BLOOD CULTURE FOR BACTERIA: CPT

## 2024-03-12 PROCEDURE — 86900 BLOOD TYPING SEROLOGIC ABO: CPT

## 2024-03-12 PROCEDURE — 83735 ASSAY OF MAGNESIUM: CPT

## 2024-03-12 PROCEDURE — 97116 GAIT TRAINING THERAPY: CPT

## 2024-03-12 PROCEDURE — 94660 CPAP INITIATION&MGMT: CPT

## 2024-03-12 PROCEDURE — 87102 FUNGUS ISOLATION CULTURE: CPT

## 2024-03-12 PROCEDURE — 82728 ASSAY OF FERRITIN: CPT

## 2024-03-12 PROCEDURE — 90935 HEMODIALYSIS ONE EVALUATION: CPT

## 2024-03-12 PROCEDURE — 87206 SMEAR FLUORESCENT/ACID STAI: CPT

## 2024-03-12 PROCEDURE — 85610 PROTHROMBIN TIME: CPT

## 2024-03-12 PROCEDURE — 87899 AGENT NOS ASSAY W/OPTIC: CPT

## 2024-03-12 PROCEDURE — 87070 CULTURE OTHR SPECIMN AEROBIC: CPT

## 2024-03-12 PROCEDURE — 89051 BODY FLUID CELL COUNT: CPT

## 2024-03-12 PROCEDURE — 83036 HEMOGLOBIN GLYCOSYLATED A1C: CPT

## 2024-03-12 PROCEDURE — 87015 SPECIMEN INFECT AGNT CONCNTJ: CPT

## 2024-03-12 RX ADMIN — SEVELAMER CARBONATE 800 MILLIGRAM(S): 2400 POWDER, FOR SUSPENSION ORAL at 08:39

## 2024-03-12 RX ADMIN — Medication 20 MILLIGRAM(S): at 00:21

## 2024-03-12 RX ADMIN — Medication 3 MILLILITER(S): at 12:39

## 2024-03-12 RX ADMIN — CHLORHEXIDINE GLUCONATE 1 APPLICATION(S): 213 SOLUTION TOPICAL at 06:44

## 2024-03-12 RX ADMIN — Medication 4 MILLILITER(S): at 12:39

## 2024-03-12 RX ADMIN — Medication 3 MILLIGRAM(S): at 00:59

## 2024-03-12 RX ADMIN — Medication 81 MILLIGRAM(S): at 06:44

## 2024-03-12 RX ADMIN — AMIODARONE HYDROCHLORIDE 200 MILLIGRAM(S): 400 TABLET ORAL at 06:44

## 2024-03-12 RX ADMIN — TIOTROPIUM BROMIDE AND OLODATEROL 2 PUFF(S): 3.124; 2.736 SPRAY, METERED RESPIRATORY (INHALATION) at 13:00

## 2024-03-12 RX ADMIN — HEPARIN SODIUM 5000 UNIT(S): 5000 INJECTION INTRAVENOUS; SUBCUTANEOUS at 06:44

## 2024-03-12 RX ADMIN — Medication 20 MILLIGRAM(S): at 08:39

## 2024-03-12 RX ADMIN — Medication 650 MILLIGRAM(S): at 00:59

## 2024-03-12 RX ADMIN — SEVELAMER CARBONATE 800 MILLIGRAM(S): 2400 POWDER, FOR SUSPENSION ORAL at 00:21

## 2024-03-12 RX ADMIN — ERTAPENEM SODIUM 100 MILLIGRAM(S): 1 INJECTION, POWDER, LYOPHILIZED, FOR SOLUTION INTRAMUSCULAR; INTRAVENOUS at 13:34

## 2024-03-12 RX ADMIN — Medication 50 MILLIGRAM(S): at 10:21

## 2024-03-12 NOTE — PROGRESS NOTE ADULT - SUBJECTIVE AND OBJECTIVE BOX
Internal Medicine Progress Note  Raiza Bailon, PGY-1  Pager: [phone]    ******INCOMPLETE******    Patient is a 68y old  Male who presents with a chief complaint of SOB (11 Mar 2024 10:57)    OVERNIGHT EVENTS/INTERVAL HPI:    REVIEW OF SYSTEMS:  All other review of systems is negative unless indicated above.    OBJECTIVE:  T(C): 36.5 (24 @ 06:06), Max: 36.9 (24 @ 08:50)  HR: 68 (24 @ 06:06) (62 - 70)  BP: 134/64 (24 @ 06:06) (114/64 - 159/68)  RR: 18 (24 @ 06:06) (18 - 18)  SpO2: 96% (24 @ 06:06) (92% - 98%)  Daily     Daily Weight in k.7 (11 Mar 2024 11:50)    Physical Exam:  General: in no acute distress  Eyes: EOMI intact bilaterally. Anicteric sclerae, moist conjunctivae  HENT: Moist mucous membranes  Neck: Trachea midline, supple  Lungs: CTA B/L. No wheezes, rales, or rhonchi  Cardiovascular: RRR. No murmurs, rubs, or gallops  Abdomen: Soft, non-tender non-distended; No rebound or guarding  Extremities: WWP, No clubbing, cyanosis or edema  MSK: No midline bony tenderness. No CVA tenderness bilaterally  Neurological: Alert and oriented x3  Skin: Warm and dry. No obvious rash     Medications:  MEDICATIONS  (STANDING):  acetylcysteine 10%  Inhalation 4 milliLiter(s) Inhalation every 6 hours  albuterol/ipratropium for Nebulization 3 milliLiter(s) Nebulizer every 6 hours  aMIOdarone    Tablet 200 milliGRAM(s) Oral every 24 hours  aspirin  chewable 81 milliGRAM(s) Oral every 24 hours  atorvastatin 40 milliGRAM(s) Oral at bedtime  azithromycin   Tablet 250 milliGRAM(s) Oral <User Schedule>  chlorhexidine 2% Cloths 1 Application(s) Topical <User Schedule>  ertapenem  IVPB      ertapenem  IVPB 500 milliGRAM(s) IV Intermittent every 24 hours  heparin   Injectable 5000 Unit(s) SubCutaneous every 8 hours  metoprolol succinate ER 50 milliGRAM(s) Oral every 24 hours  polyethylene glycol 3350 17 Gram(s) Oral two times a day  senna 2 Tablet(s) Oral at bedtime  sevelamer carbonate 800 milliGRAM(s) Oral every 8 hours  sildenafil (REVATIO) 20 milliGRAM(s) Oral every 8 hours  tiotropium 2.5 MICROgram(s)/olodaterol 2.5 MICROgram(s) (STIOLTO) Inhaler 2 Puff(s) Inhalation daily    MEDICATIONS  (PRN):  acetaminophen     Tablet .. 650 milliGRAM(s) Oral every 6 hours PRN Temp greater or equal to 38C (100.4F), Mild Pain (1 - 3)  aluminum hydroxide/magnesium hydroxide/simethicone Suspension 30 milliLiter(s) Oral every 4 hours PRN Dyspepsia  melatonin 3 milliGRAM(s) Oral at bedtime PRN Insomnia  ondansetron Injectable 4 milliGRAM(s) IV Push every 8 hours PRN Nausea and/or Vomiting      Labs:                        8.7    8.78  )-----------( 172      ( 11 Mar 2024 08:11 )             27.7     03-11    140  |  97  |  48<H>  ----------------------------<  87  3.7   |  28  |  7.08<H>    Ca    9.6      11 Mar 2024 08:11  Phos  3.6     03-11  Mg     2.5     03-11        Urinalysis Basic - ( 11 Mar 2024 08:11 )    Color: x / Appearance: x / SG: x / pH: x  Gluc: 87 mg/dL / Ketone: x  / Bili: x / Urobili: x   Blood: x / Protein: x / Nitrite: x   Leuk Esterase: x / RBC: x / WBC x   Sq Epi: x / Non Sq Epi: x / Bacteria: x          Radiology: Reviewed Patient is a 68y old  Male who presents with a chief complaint of SOB (11 Mar 2024 10:57)    OVERNIGHT EVENTS/INTERVAL HPI:    REVIEW OF SYSTEMS:  All other review of systems is negative unless indicated above.    OBJECTIVE:  T(C): 36.5 (24 @ 06:06), Max: 36.9 (24 @ 08:50)  HR: 68 (24 @ 06:06) (62 - 70)  BP: 134/64 (24 @ 06:06) (114/64 - 159/68)  RR: 18 (24 @ 06:06) (18 - 18)  SpO2: 96% (24 @ 06:06) (92% - 98%)  Daily     Daily Weight in k.7 (11 Mar 2024 11:50)    Physical Exam:  General: in no acute distress  Eyes: EOMI intact bilaterally. Anicteric sclerae, moist conjunctivae  HENT: Moist mucous membranes  Neck: Trachea midline, supple  Lungs: CTA B/L. No wheezes, rales, or rhonchi  Cardiovascular: RRR. No murmurs, rubs, or gallops  Abdomen: Soft, non-tender non-distended; No rebound or guarding  Extremities: WWP, No clubbing, cyanosis or edema  MSK: No midline bony tenderness. No CVA tenderness bilaterally  Neurological: Alert and oriented x3  Skin: Warm and dry. No obvious rash     Medications:  MEDICATIONS  (STANDING):  acetylcysteine 10%  Inhalation 4 milliLiter(s) Inhalation every 6 hours  albuterol/ipratropium for Nebulization 3 milliLiter(s) Nebulizer every 6 hours  aMIOdarone    Tablet 200 milliGRAM(s) Oral every 24 hours  aspirin  chewable 81 milliGRAM(s) Oral every 24 hours  atorvastatin 40 milliGRAM(s) Oral at bedtime  azithromycin   Tablet 250 milliGRAM(s) Oral <User Schedule>  chlorhexidine 2% Cloths 1 Application(s) Topical <User Schedule>  ertapenem  IVPB      ertapenem  IVPB 500 milliGRAM(s) IV Intermittent every 24 hours  heparin   Injectable 5000 Unit(s) SubCutaneous every 8 hours  metoprolol succinate ER 50 milliGRAM(s) Oral every 24 hours  polyethylene glycol 3350 17 Gram(s) Oral two times a day  senna 2 Tablet(s) Oral at bedtime  sevelamer carbonate 800 milliGRAM(s) Oral every 8 hours  sildenafil (REVATIO) 20 milliGRAM(s) Oral every 8 hours  tiotropium 2.5 MICROgram(s)/olodaterol 2.5 MICROgram(s) (STIOLTO) Inhaler 2 Puff(s) Inhalation daily    MEDICATIONS  (PRN):  acetaminophen     Tablet .. 650 milliGRAM(s) Oral every 6 hours PRN Temp greater or equal to 38C (100.4F), Mild Pain (1 - 3)  aluminum hydroxide/magnesium hydroxide/simethicone Suspension 30 milliLiter(s) Oral every 4 hours PRN Dyspepsia  melatonin 3 milliGRAM(s) Oral at bedtime PRN Insomnia  ondansetron Injectable 4 milliGRAM(s) IV Push every 8 hours PRN Nausea and/or Vomiting      Labs:                        8.7    8.78  )-----------( 172      ( 11 Mar 2024 08:11 )             27.7     03-11    140  |  97  |  48<H>  ----------------------------<  87  3.7   |  28  |  7.08<H>    Ca    9.6      11 Mar 2024 08:11  Phos  3.6     03-11  Mg     2.5     03-11        Urinalysis Basic - ( 11 Mar 2024 08:11 )    Color: x / Appearance: x / SG: x / pH: x  Gluc: 87 mg/dL / Ketone: x  / Bili: x / Urobili: x   Blood: x / Protein: x / Nitrite: x   Leuk Esterase: x / RBC: x / WBC x   Sq Epi: x / Non Sq Epi: x / Bacteria: x          Radiology: Reviewed

## 2024-03-12 NOTE — PROGRESS NOTE ADULT - NUTRITIONAL ASSESSMENT
This patient has been assessed with a concern for Malnutrition and has been determined to have a diagnosis/diagnoses of Severe protein-calorie malnutrition.    This patient is being managed with:   Diet Renal Restrictions-  For patients receiving Renal Replacement - No Protein Restr No Conc K No Conc Phos Low Sodium  Entered: Mar  2 2024 11:06AM  
This patient has been assessed with a concern for Malnutrition and has been determined to have a diagnosis/diagnoses of Severe protein-calorie malnutrition.    This patient is being managed with:   Diet Renal Restrictions-  For patients receiving Renal Replacement - No Protein Restr No Conc K No Conc Phos Low Sodium  Entered: Mar  2 2024 11:06AM  
This patient has been assessed with a concern for Malnutrition and has been determined to have a diagnosis/diagnoses of Severe protein-calorie malnutrition.    This patient is being managed with:   Diet NPO after Midnight-     NPO Start Date: 06-Mar-2024   NPO Start Time: 23:59  Entered: Mar  6 2024  2:41PM    Diet Renal Restrictions-  For patients receiving Renal Replacement - No Protein Restr No Conc K No Conc Phos Low Sodium  Entered: Mar  2 2024 11:06AM  
This patient has been assessed with a concern for Malnutrition and has been determined to have a diagnosis/diagnoses of Severe protein-calorie malnutrition.    This patient is being managed with:   Diet Renal Restrictions-  For patients receiving Renal Replacement - No Protein Restr No Conc K No Conc Phos Low Sodium  Entered: Mar  2 2024 11:06AM  
This patient has been assessed with a concern for Malnutrition and has been determined to have a diagnosis/diagnoses of Severe protein-calorie malnutrition.    This patient is being managed with:   Diet NPO after Midnight-     NPO Start Date: 05-Mar-2024   NPO Start Time: 23:59  Entered: Mar  5 2024  2:27PM    Diet Renal Restrictions-  For patients receiving Renal Replacement - No Protein Restr No Conc K No Conc Phos Low Sodium  Entered: Mar  2 2024 11:06AM  
This patient has been assessed with a concern for Malnutrition and has been determined to have a diagnosis/diagnoses of Severe protein-calorie malnutrition.    This patient is being managed with:   Diet Renal Restrictions-  For patients receiving Renal Replacement - No Protein Restr No Conc K No Conc Phos Low Sodium  Entered: Mar  2 2024 11:06AM  
This patient has been assessed with a concern for Malnutrition and has been determined to have a diagnosis/diagnoses of Severe protein-calorie malnutrition.    This patient is being managed with:   Diet Renal Restrictions-  For patients receiving Renal Replacement - No Protein Restr No Conc K No Conc Phos Low Sodium  Entered: Mar  2 2024 11:06AM  
This patient has been assessed with a concern for Malnutrition and has been determined to have a diagnosis/diagnoses of Severe protein-calorie malnutrition.    This patient is being managed with:   Diet NPO after Midnight-     NPO Start Date: 06-Mar-2024   NPO Start Time: 23:59  Entered: Mar  6 2024  2:41PM    Diet Renal Restrictions-  For patients receiving Renal Replacement - No Protein Restr No Conc K No Conc Phos Low Sodium  Entered: Mar  2 2024 11:06AM  
This patient has been assessed with a concern for Malnutrition and has been determined to have a diagnosis/diagnoses of Severe protein-calorie malnutrition.    This patient is being managed with:   Diet Renal Restrictions-  For patients receiving Renal Replacement - No Protein Restr No Conc K No Conc Phos Low Sodium  Entered: Mar  2 2024 11:06AM

## 2024-03-12 NOTE — DISCHARGE NOTE NURSING/CASE MANAGEMENT/SOCIAL WORK - NSDCPEFALRISK_GEN_ALL_CORE
For information on Fall & Injury Prevention, visit: [email or web address] OR  [email or web address] OR  https://www.cdc.gov/steadi/patient.html

## 2024-03-12 NOTE — PROGRESS NOTE ADULT - PROVIDER SPECIALTY LIST ADULT
Internal Medicine
Internal Medicine
Nephrology
Rehab Medicine
Rehab Medicine
Internal Medicine
Internal Medicine
Nephrology
Pulmonology
Pulmonology
Internal Medicine
Nephrology
Pulmonology
Rehab Medicine
Pulmonology
Pulmonology
Hospitalist
Hospitalist
Internal Medicine
Internal Medicine

## 2024-03-12 NOTE — DISCHARGE NOTE NURSING/CASE MANAGEMENT/SOCIAL WORK - PATIENT PORTAL LINK FT
You can access the FollowMyHealth Patient Portal offered by [facility] by registering at the following website: [email or web address]. By joining [facility]’s FollowMyHealth portal, you will also be able to view your health information using other applications (apps) compatible with our system.

## 2024-03-12 NOTE — PROGRESS NOTE ADULT - SUBJECTIVE AND OBJECTIVE BOX
Physical Medicine and Rehabilitation Progress Note :       Patient is a 68y old  Male who presents with a chief complaint of SOB (11 Mar 2024 10:57)      HPI:  68M PMH AFib s/p watchman (on ASA), HTN, COPD, ESRD (MWF), severe ERIK, pulm HTN, T cell lymphoma (s/p chemo), HFpEF, s/p VATS procedure in 2021, recent [facility] admission 12/2023 for LGIB p/w productive cough, congestion, SOB x5 days. Pt requiring more oxygen at home, uses 1 L as needed at baseline but has been using 3L over the past few days. ROS +headache, productive cough with dark green sputum, chills, wheezing. Was never hypoxic, but feeling subjectively better with 3L NC and could "feel the air." Denies fevers, CP, lightheadedness. Pt missed HD session yesterday due to feeling weak, fatigued, and SOB, HD session initially rescheduled for today however pt reports feeling worse this morning and decided to present to ED. At baseline, pt able to walk less than half a block. Able to perform ADLs as normal, however with increased NIELSON. Makes minimal urine. Denies sick contacts, however wife and pt routinely  6 year old grandson from school. Of note, pt w/ recent [facility] ED visit 2/26 after fall at home, rolled out of bed w/ head strike.    ED Course  VS T 97.6 HR 56 /60 RR 25 SpO2 97% on 3L NC  Labs WBC 6.89 Hgb 10.0 Plt 127 Lactate 0.9 BMP Lytes WNL BUN/Cr 42/8.68  Micro RVP negative  EKG   Imaging CXR w/ b/l infiltrates  Interventions azithromycin 500mg x1, CTX 1000mg x1, soludmedrol 125mcg x1, duoneb x1  Consults Renal (02 Mar 2024 10:32)                            9.3    6.81  )-----------( 169      ( 12 Mar 2024 05:30 )             29.8       03-12    139  |  97  |  25<H>  ----------------------------<  62<L>  4.0   |  29  |  5.00<H>    Ca    9.6      12 Mar 2024 05:30  Phos  3.4     03-12  Mg     2.2     03-12    TPro  6.6  /  Alb  3.1<L>  /  TBili  0.6  /  DBili  x   /  AST  11  /  ALT  6<L>  /  AlkPhos  135<H>  03-12    Vital Signs Last 24 Hrs  T(C): 36.5 (12 Mar 2024 06:06), Max: 36.7 (11 Mar 2024 13:19)  T(F): 97.7 (12 Mar 2024 06:06), Max: 98 (11 Mar 2024 13:19)  HR: 75 (12 Mar 2024 09:49) (64 - 75)  BP: 125/60 (12 Mar 2024 09:49) (114/64 - 134/64)  BP(mean): --  RR: 18 (12 Mar 2024 06:06) (18 - 18)  SpO2: 96% (12 Mar 2024 06:06) (93% - 98%)    Parameters below as of 11 Mar 2024 22:03  Patient On (Oxygen Delivery Method): nasal cannula  O2 Flow (L/min): 4      MEDICATIONS  (STANDING):  acetylcysteine 10%  Inhalation 4 milliLiter(s) Inhalation every 6 hours  albuterol/ipratropium for Nebulization 3 milliLiter(s) Nebulizer every 6 hours  aMIOdarone    Tablet 200 milliGRAM(s) Oral every 24 hours  aspirin  chewable 81 milliGRAM(s) Oral every 24 hours  atorvastatin 40 milliGRAM(s) Oral at bedtime  azithromycin   Tablet 250 milliGRAM(s) Oral <User Schedule>  chlorhexidine 2% Cloths 1 Application(s) Topical <User Schedule>  ertapenem  IVPB      ertapenem  IVPB 500 milliGRAM(s) IV Intermittent every 24 hours  heparin   Injectable 5000 Unit(s) SubCutaneous every 8 hours  metoprolol succinate ER 50 milliGRAM(s) Oral every 24 hours  polyethylene glycol 3350 17 Gram(s) Oral two times a day  senna 2 Tablet(s) Oral at bedtime  sevelamer carbonate 800 milliGRAM(s) Oral every 8 hours  sildenafil (REVATIO) 20 milliGRAM(s) Oral every 8 hours  tiotropium 2.5 MICROgram(s)/olodaterol 2.5 MICROgram(s) (STIOLTO) Inhaler 2 Puff(s) Inhalation daily    MEDICATIONS  (PRN):  acetaminophen     Tablet .. 650 milliGRAM(s) Oral every 6 hours PRN Temp greater or equal to 38C (100.4F), Mild Pain (1 - 3)  aluminum hydroxide/magnesium hydroxide/simethicone Suspension 30 milliLiter(s) Oral every 4 hours PRN Dyspepsia  melatonin 3 milliGRAM(s) Oral at bedtime PRN Insomnia  ondansetron Injectable 4 milliGRAM(s) IV Push every 8 hours PRN Nausea and/or Vomiting      T(C): 36.5 (03-12-24 @ 06:06), Max: 36.7 (03-11-24 @ 13:19)  HR: 75 (03-12-24 @ 09:49) (64 - 75)  BP: 125/60 (03-12-24 @ 09:49) (114/64 - 134/64)  RR: 18 (03-12-24 @ 06:06) (18 - 18)  SpO2: 96% (03-12-24 @ 06:06) (93% - 98%)        Physical Exam:    Constitutional: NAD, lying in bed on HD    HEENT: Normocephalic, ecchymosis over R orbit, EOMI    Respiratory: Coarse breath sounds, no respiratory distress    Cardiovascular: Regular rate    Gastrointestinal: soft, mildly distended    Extremities: Trace peripheral edema    Neurological: Awake, alert, moving all extremities    Skin: No rash on exposed skin    Vascular Access: LUE AVF accessed for HD        3/11/2024 Functional Status Assessment :         Pain Assessment/Number Scale (0-10) Adult  Presence of Pain: denies pain/discomfort (Rating = 0)  Pain Rating (0-10): Rest: 0 (no pain/absence of nonverbal indicators of pain)  Pain Rating (0-10): Activity: 0 (no pain/absence of nonverbal indicators of pain)    Safety      AM-PAC Functional Assessment: Daily Activity  Type of Assessment: Daily assessment  Putting on and taking off regular lower body clothing?: 4 = No assist / stand by assistance  Bathing (including washing, rinsing, drying)?: 3 = A little assistance  Toileting, which includes using toilet, bedpan or urinal?: 3 = A little assistance  Putting on and taking off regular upper body clothing?: 4 = No assist / stand by assistance  Take care of personal grooming such as brushing teeth?: 4 = No assist / stand by assistance  Eating meals?: 4 = No assist / stand by assistance  Score: 22   Row Comment: Ask the patient "How much help from another person do you currently need? (If the patient hasn't done an activity recently, how much help from another person do you think he/she needs if he/she tried?)    Cognitive/Neuro      Cognitive/Neuro/Behavioral  Cognitive/Neuro/Behavioral [WDL Definition: Alert; opens eyes spontaneously; arouses to voice or touch; oriented x 4; follows commands; speech spontaneous, logical; purposeful motor response; behavior appropriate to situation]: WDL  Level of Consciousness: alert    Language Assistance  Preferred Language to Address Healthcare Preferred Language to Address Healthcare: English    Therapeutic Interventions      Bed Mobility  Bed Mobility Training Rolling/Turning: supervsion  Bed Mobility Training Scooting: supervsion  Bed Mobility Training Sit-to-Supine: supervsion  Bed Mobility Training Supine-to-Sit: supervsion  Bed Mobility Training Limitations: decreased ability to use arms for pushing/pulling    Sit-Stand Transfer Training  Transfer Training Sit-to-Stand Transfer: contact guard;  1 person assist;  nonverbal cues (demo/gestures);  verbal cues;  straight cane  Transfer Training Stand-to-Sit Transfer: contact guard;  1 person assist;  nonverbal cues (demo/gestures);  verbal cues;  straight cane  Sit-to-Stand Transfer Training Transfer Safety Analysis: decreased balance    Toilet Transfer Training  Transfer Training Toilet Transfer: contact guard;  1 person assist;  nonverbal cues (demo/gestures);  verbal cues;  straight cane  Toilet Transfer Training Transfer Safety Analysis: decreased balance    Therapeutic Exercise  Therapeutic Exercise Detail: functional moblility training of ambulating 150 feet in hallways with CGA using SC, demonstrating mild postural sway, good step length and chanel     Lower Body Dressing Training  Lower Body Dressing Training Assistance: supervsion;  don b/l socks ;  seated at EOB    Upper Body Dressing Training  Upper Body Dressing Training Assistance: supervsion;  don hospital gown ;  seated at EOB    Toilet Training  Toilet Training Assistance: supervsion;  perineal care           PM&R Impression : as above    Current disposition plan recommendation :   d/c home with home physical and occupational therapy

## 2024-03-12 NOTE — PROGRESS NOTE ADULT - ASSESSMENT
I M    68 y o M PMH AFib s/p watchman (on ASA), HTN, COPD, ESRD (MWF), severe ERIK, pulm HTN, T cell lymphoma (s/p chemo), HFpEF, s/p VATS procedure in 2021, recent [facility] admission 12/2023 for LGIB p/w productive cough, congestion, SOB for weeks now worsening in the last 5days. Admitted to [facility] for AHRF 2/2 COPD exacerbation 2/2 to CAP. Course c/b low volume hemoptysis on 3/5 to 3/6. Now s/p bronch 3/7, after which patient desaturated to 88% on 5L NC (prior was on 2L NC saturating in 90s). ICU consulted, and patient transferred to [facility] for closer monitoring. S/p stepdown to [facility]. Cultures growing ESBL resistant to Zosyn, now on ertapenem.     Nutritional Assessment:  · Nutritional Assessment	This patient has been assessed with a concern for Malnutrition and has been determined to have a diagnosis/diagnoses of Severe protein-calorie malnutrition.    This patient is being managed with:   Diet Renal Restrictions-  For patients receiving Renal Replacement - No Protein Restr No Conc K No Conc Phos Low Sodium  Entered: Mar  2 2024 11:06AM    Problem/Plan - 1:  ·  Problem: Severe sepsis.   ·  Plan: Suspected on admission   but on 3/8 with VS of RR > 20, WBC > 12, w/ SBP drop > 40  -Infectious source - ESBL E. Coli PNA.    Problem/Plan - 2:  ·  Problem: Acute hypoxic respiratory failure.   ·  Plan: Patient on 4L NC. On baseline 1-2L O2 at home.   Likely 2/2 COPD exacerbation iso CAP vs worsening pulmonary HTN.  - New hemoptysis (3/5), no episodes since 3/5    PLAN:  - COPD Tx as stated below   - Pulmonary HTN Tx as stated below   - Pulm following; s/p bronch 3/7 --> moderate E.coli, susceptibilities to follow   - f/u repeat ambulatory sats  - Wean O2 as toleration.    Problem/Plan - 3:  ·  Problem: COPD exacerbation.   ·  Plan: Presenting with SOB, productive cough w/ dark green sputum, wheezing at home, requiring more oxygen than baseline 1L NC. Likely 2/2 CAP. Follows with Dr Johnson outpatient. Prior smoker, quit 19 years ago.   - CXR w/ increased infiltrates. RVP negative.   - Home meds: prophylactic azithromycin 250 MWF, stiolto, ventolin HFA  - CTA PE this admission: Emphysematous changes. Persistent predominantly subpleural consolidations in both lungs mostly in the right lower lobe and left upper lobe. No PE.    PLAN:  - c/w duonebs q6  - c/w home stiolto qd  - c/w Zosyn (3/6 - )  - S/p course of Azithromycin 500mg; C/w home regimen   - S/p prednisone 40mg qd (complete 3/6)  - Pulm following; s/p bronch 3/7.    Problem/Plan - 4:  ·  Problem: Gram-negative pneumonia.   ·  Plan: CT Chest outpatient 2/12/2024 w/ new consolidative opacities within the lingula and multifocally throughout the right lower lobe compared to 1/26/2023.   - CTA PE this admission showed persistent b/l pulmonary consolidation.   - Has hx of b/l pleural effusion s/p VATS w/ CT surg in 2021 for L chest wall loculated effusion.   - Ur legionella neg, Ur strep neg.   - Finished Azithromycin 500mg for atypical cov    PLAN:  - C/w Ertapenem, with peripheral infusion for 3-4 days followed by bactril to complete course of 7 days  - f/u BCx NGTD   - f/u Sputum Cx - initial spu Cx contaminated     - sputum cx 3/7 from bronchial aspirate: moderate e coli - ESBL E. Coli - start Ertapenem.    Problem/Plan - 5:  ·  Problem: Pulmonary hypertension.   ·  Plan: Home med: sildenafil citrate 20mg TID. Follows w/ Dr Johnson.  TTE 3/4 showing pulmonary artery systolic pressure is 58 mmHg, worse than previous     PLAN:  - c/w home med.    Problem/Plan - 6:  ·  Problem: ESRD on dialysis.   ·  Plan: Follows with Dr Guadalupe outpatient. Last HD session 2/28. Missed HD 3/1 due to sx. Renal consulted in ED  - c/w iHD  - c/w sevelamer 800mg TID.    Problem/Plan - 7:  ·  Problem: HTN (hypertension).   ·  Plan: Home med: amlodipine 5mg, not on HD days  - normotensive, holding this admission.    Problem/Plan - 8:  ·  Problem: Chronic atrial fibrillation.   ·  Plan: Home med: Aspirin 81mg qd, amio 200mg qd, metoprolol XL 50mg qd.   [id number] 2. s/p Watchman device, not on Eliquis. NSR this admission.    PLAN:  - Restart ASA  - c/w other home meds.    Problem/Plan - 9:  ·  Problem: Anemia of chronic disease.   ·  Plan: Hgb 10, at baseline. Of note, pt recently admitted 12/2023 for LGIB, s/p c-scope, found to have large polyps on inpatient colonoscopy. Pending outpatient repeat colonoscopy w/ polypectomy on Thursday 3/7/24 @[facility].    PLAN:  - maintain active T&S  - outpatient GI followup.    Problem/Plan - 10:  ·  Problem: Chronic heart failure with preserved ejection fraction (HFpEF).   ·  Plan; #CAD  Follows with Dr Ventura, last seen 1/2024. Prior EF 45%, now recovered. b/l crackles on exam, pending iHD. no LE edema.   Home med: lipitor 40    PLAN:  - c/w home med.    Problem/Plan - 11:  ·  Problem: Type 2 myocardial infarction.   ·  Plan: Elevated troponin in setting of infection/Sepsis.    Problem/Plan - 12:  ·  Problem: ERIK (obstructive sleep apnea).   ·  Plan: #ERIK  hx of severe ERIK, non-compliant with CPAP    PLAN:  - OVN CPAP ordered, but patient noncompliant.

## 2024-03-13 LAB — GALACTOMANNAN AG SERPL-ACNC: 0.11 INDEX — SIGNIFICANT CHANGE UP (ref 0–0.49)

## 2024-03-17 LAB — VIRUS SPEC CULT: SIGNIFICANT CHANGE UP

## 2024-03-17 NOTE — ED ADULT NURSE NOTE - NSFALLRSKOUTCOME_ED_ALL_ED
Please wash your hands before and after touching your eye and instilling the eyedrops.  Use only unscented soaps and unscented lotions on your face.  Recommend close follow-up with the ophthalmologist if symptoms persist.   Fall Risk

## 2024-03-18 ENCOUNTER — RX RENEWAL (OUTPATIENT)
Age: 69
End: 2024-03-18

## 2024-03-19 DIAGNOSIS — D63.8 ANEMIA IN OTHER CHRONIC DISEASES CLASSIFIED ELSEWHERE: ICD-10-CM

## 2024-03-19 DIAGNOSIS — J44.0 CHRONIC OBSTRUCTIVE PULMONARY DISEASE WITH (ACUTE) LOWER RESPIRATORY INFECTION: ICD-10-CM

## 2024-03-19 DIAGNOSIS — I50.32 CHRONIC DIASTOLIC (CONGESTIVE) HEART FAILURE: ICD-10-CM

## 2024-03-19 DIAGNOSIS — Z16.12 EXTENDED SPECTRUM BETA LACTAMASE (ESBL) RESISTANCE: ICD-10-CM

## 2024-03-19 DIAGNOSIS — C85.90 NON-HODGKIN LYMPHOMA, UNSPECIFIED, UNSPECIFIED SITE: ICD-10-CM

## 2024-03-19 DIAGNOSIS — J15.69 PNEUMONIA DUE TO OTHER GRAM-NEGATIVE BACTERIA: ICD-10-CM

## 2024-03-19 DIAGNOSIS — R04.2 HEMOPTYSIS: ICD-10-CM

## 2024-03-19 DIAGNOSIS — G47.33 OBSTRUCTIVE SLEEP APNEA (ADULT) (PEDIATRIC): ICD-10-CM

## 2024-03-19 DIAGNOSIS — I21.A1 MYOCARDIAL INFARCTION TYPE 2: ICD-10-CM

## 2024-03-19 DIAGNOSIS — I48.20 CHRONIC ATRIAL FIBRILLATION, UNSPECIFIED: ICD-10-CM

## 2024-03-19 DIAGNOSIS — Z99.2 DEPENDENCE ON RENAL DIALYSIS: ICD-10-CM

## 2024-03-19 DIAGNOSIS — Z92.21 PERSONAL HISTORY OF ANTINEOPLASTIC CHEMOTHERAPY: ICD-10-CM

## 2024-03-19 DIAGNOSIS — E43 UNSPECIFIED SEVERE PROTEIN-CALORIE MALNUTRITION: ICD-10-CM

## 2024-03-19 DIAGNOSIS — J44.1 CHRONIC OBSTRUCTIVE PULMONARY DISEASE WITH (ACUTE) EXACERBATION: ICD-10-CM

## 2024-03-19 DIAGNOSIS — R06.02 SHORTNESS OF BREATH: ICD-10-CM

## 2024-03-19 DIAGNOSIS — Z90.49 ACQUIRED ABSENCE OF OTHER SPECIFIED PARTS OF DIGESTIVE TRACT: ICD-10-CM

## 2024-03-19 DIAGNOSIS — A41.51 SEPSIS DUE TO ESCHERICHIA COLI [E. COLI]: ICD-10-CM

## 2024-03-19 DIAGNOSIS — R65.20 SEVERE SEPSIS WITHOUT SEPTIC SHOCK: ICD-10-CM

## 2024-03-19 DIAGNOSIS — I13.2 HYPERTENSIVE HEART AND CHRONIC KIDNEY DISEASE WITH HEART FAILURE AND WITH STAGE 5 CHRONIC KIDNEY DISEASE, OR END STAGE RENAL DISEASE: ICD-10-CM

## 2024-03-19 DIAGNOSIS — J96.01 ACUTE RESPIRATORY FAILURE WITH HYPOXIA: ICD-10-CM

## 2024-03-19 DIAGNOSIS — Z87.891 PERSONAL HISTORY OF NICOTINE DEPENDENCE: ICD-10-CM

## 2024-03-19 DIAGNOSIS — N40.0 BENIGN PROSTATIC HYPERPLASIA WITHOUT LOWER URINARY TRACT SYMPTOMS: ICD-10-CM

## 2024-03-19 DIAGNOSIS — N18.6 END STAGE RENAL DISEASE: ICD-10-CM

## 2024-03-19 DIAGNOSIS — I27.20 PULMONARY HYPERTENSION, UNSPECIFIED: ICD-10-CM

## 2024-03-19 DIAGNOSIS — I34.0 NONRHEUMATIC MITRAL (VALVE) INSUFFICIENCY: ICD-10-CM

## 2024-03-19 DIAGNOSIS — J15.5 PNEUMONIA DUE TO ESCHERICHIA COLI: ICD-10-CM

## 2024-03-19 DIAGNOSIS — I25.10 ATHEROSCLEROTIC HEART DISEASE OF NATIVE CORONARY ARTERY WITHOUT ANGINA PECTORIS: ICD-10-CM

## 2024-03-19 DIAGNOSIS — M10.9 GOUT, UNSPECIFIED: ICD-10-CM

## 2024-03-20 NOTE — ED PROVIDER NOTE - WR ORDER DATE AND TIME 1
12-Jul-2022 10:02 What would be a preferred day of the week that would work best for your infusion appointment? Any day but Monday    Do you prefer mornings or afternoons for your appointments? AM    Are there any days or dates that do not work for your schedule, including any upcoming vacations? N/a    We are going to try our best to schedule you at the infusion center closest to your home.  In the event that we are unable to what would be your next preferred infusion site or sites? AN     1.   AN  2.   BE     Do you have transportation to take you to all of your appointments? yes

## 2024-03-26 ENCOUNTER — APPOINTMENT (OUTPATIENT)
Dept: FAMILY MEDICINE | Facility: CLINIC | Age: 69
End: 2024-03-26
Payer: MEDICARE

## 2024-03-26 ENCOUNTER — APPOINTMENT (OUTPATIENT)
Dept: PULMONOLOGY | Facility: CLINIC | Age: 69
End: 2024-03-26
Payer: MEDICARE

## 2024-03-26 VITALS
HEIGHT: 71 IN | BODY MASS INDEX: 19.74 KG/M2 | SYSTOLIC BLOOD PRESSURE: 103 MMHG | DIASTOLIC BLOOD PRESSURE: 55 MMHG | OXYGEN SATURATION: 84 % | WEIGHT: 141 LBS | TEMPERATURE: 97.6 F | HEART RATE: 58 BPM

## 2024-03-26 VITALS — OXYGEN SATURATION: 94 %

## 2024-03-26 VITALS
HEIGHT: 71 IN | SYSTOLIC BLOOD PRESSURE: 97 MMHG | HEART RATE: 55 BPM | DIASTOLIC BLOOD PRESSURE: 49 MMHG | TEMPERATURE: 98.1 F | WEIGHT: 141 LBS | OXYGEN SATURATION: 93 % | BODY MASS INDEX: 19.74 KG/M2 | RESPIRATION RATE: 16 BRPM

## 2024-03-26 DIAGNOSIS — N18.9 CHRONIC KIDNEY DISEASE, UNSPECIFIED: ICD-10-CM

## 2024-03-26 DIAGNOSIS — J15.9 UNSPECIFIED BACTERIAL PNEUMONIA: ICD-10-CM

## 2024-03-26 DIAGNOSIS — D63.1 CHRONIC KIDNEY DISEASE, UNSPECIFIED: ICD-10-CM

## 2024-03-26 DIAGNOSIS — J44.1 CHRONIC OBSTRUCTIVE PULMONARY DISEASE WITH (ACUTE) EXACERBATION: ICD-10-CM

## 2024-03-26 DIAGNOSIS — Z87.01 PERSONAL HISTORY OF PNEUMONIA (RECURRENT): ICD-10-CM

## 2024-03-26 PROCEDURE — G2211 COMPLEX E/M VISIT ADD ON: CPT | Mod: NC,1L

## 2024-03-26 PROCEDURE — G2211 COMPLEX E/M VISIT ADD ON: CPT

## 2024-03-26 PROCEDURE — 99214 OFFICE O/P EST MOD 30 MIN: CPT

## 2024-03-26 PROCEDURE — 99495 TRANSJ CARE MGMT MOD F2F 14D: CPT

## 2024-03-26 RX ORDER — INHALER, ASSIST DEVICES
SPACER (EA) MISCELLANEOUS
Qty: 1 | Refills: 0 | Status: ACTIVE | COMMUNITY
Start: 2024-03-26 | End: 1900-01-01

## 2024-03-26 RX ORDER — SEVELAMER CARBONATE 800 MG/1
800 TABLET, FILM COATED ORAL 3 TIMES DAILY
Qty: 270 | Refills: 3 | Status: ACTIVE | COMMUNITY
Start: 2021-03-01

## 2024-03-26 RX ORDER — ATORVASTATIN CALCIUM 40 MG/1
40 TABLET, FILM COATED ORAL
Qty: 90 | Refills: 3 | Status: ACTIVE | COMMUNITY
Start: 2022-09-29

## 2024-03-26 RX ORDER — ALBUTEROL SULFATE 90 UG/1
108 (90 BASE) INHALANT RESPIRATORY (INHALATION)
Qty: 1 | Refills: 11 | Status: ACTIVE | COMMUNITY
Start: 2023-05-09 | End: 1900-01-01

## 2024-03-26 RX ORDER — ALLOPURINOL 100 MG/1
100 TABLET ORAL
Qty: 60 | Refills: 3 | Status: ACTIVE | COMMUNITY
Start: 2017-05-08

## 2024-03-26 RX ORDER — AZITHROMYCIN 250 MG/1
250 TABLET, FILM COATED ORAL
Qty: 12 | Refills: 11 | Status: ACTIVE | COMMUNITY
Start: 2022-12-07

## 2024-03-26 RX ORDER — TIOTROPIUM BROMIDE AND OLODATEROL 3.124; 2.736 UG/1; UG/1
2.5-2.5 SPRAY, METERED RESPIRATORY (INHALATION) DAILY
Qty: 3 | Refills: 3 | Status: ACTIVE | COMMUNITY
Start: 2022-12-07

## 2024-03-26 RX ORDER — AMIODARONE HYDROCHLORIDE 200 MG/1
200 TABLET ORAL DAILY
Qty: 90 | Refills: 3 | Status: ACTIVE | COMMUNITY
Start: 2021-04-15

## 2024-03-26 RX ORDER — ASPIRIN 81 MG/1
81 TABLET, DELAYED RELEASE ORAL
Qty: 90 | Refills: 3 | Status: ACTIVE | COMMUNITY
Start: 2021-04-15

## 2024-03-26 NOTE — HEALTH RISK ASSESSMENT
[0] : 2) Feeling down, depressed, or hopeless: Not at all (0) [PHQ-2 Negative - No further assessment needed] : PHQ-2 Negative - No further assessment needed [QHO4Gnexa] : 0 [Former] : Former [20 or more] : 20 or more [< 15 Years] : < 15 Years

## 2024-03-26 NOTE — HISTORY OF PRESENT ILLNESS
[Former] : former [Never] : never [TextBox_4] : Was admitted to the hospital.  He had a bronchoscopy.  He finished a course of antibiotic.  His cough dramatically improved.  He has no coughing blood.  His appetite is still not the same.  He is still on dialysis.  He is now tired after dialysis and does not move much [ESS] : 0

## 2024-03-26 NOTE — PHYSICAL EXAM
[No Acute Distress] : no acute distress [Normal Oropharynx] : normal oropharynx [Normal Appearance] : normal appearance [Normal Rate/Rhythm] : normal rate/rhythm [No Neck Mass] : no neck mass [Normal S1, S2] : normal s1, s2 [No Murmurs] : no murmurs [No Resp Distress] : no resp distress [Rhonchi] : rhonchi [No Abnormalities] : no abnormalities [Benign] : benign [Normal Gait] : normal gait [No Cyanosis] : no cyanosis [No Clubbing] : no clubbing [No Edema] : no edema [Normal Color/ Pigmentation] : normal color/ pigmentation [FROM] : FROM [No Focal Deficits] : no focal deficits [Oriented x3] : oriented x3 [Normal Affect] : normal affect

## 2024-03-26 NOTE — REVIEW OF SYSTEMS
[Cough] : cough [Sputum] : sputum [SOB on Exertion] : sob on exertion [Negative] : Endocrine [Chest Tightness] : no chest tightness [Hemoptysis] : no hemoptysis [Frequent URIs] : no frequent URIs [Dyspnea] : no dyspnea [Pleuritic Pain] : no pleuritic pain [Wheezing] : no wheezing [A.M. Dry Mouth] : no a.m. dry mouth

## 2024-03-26 NOTE — PLAN
[FreeTextEntry1] : s/p pna- improved  reviewed hosp course with patient  reviewed labs from dialysis-- cbc, comp will order additional labs today, to be drawn at dialysis   patient had lost weight in hospital, encouraged proper weight gain and nutrients med rec reviewed  pulm visit pending today

## 2024-03-26 NOTE — ASSESSMENT
[FreeTextEntry1] : Bacterial pneumonia  The patient was admitted to [facility].  The patient underwent bronchoscopy with of these purulent secretion both lungs.  Sputum bronchial lavage culture was positive for E. coli ESBL.  The patient was on independent and finished.  The duration of the antibiotic after extended 1 week.  The PICC line was removed.  The patient improved clinically with decrease in the cough and resolution of the hemoptysis.  The AFB and fungal culture are negative.  At this time we will observe the patient.  The pulmonary nodules  The I reviewed the CT scan and area was most likely consolidation and will hold on the PET scan at this point that the picture was most likely related to infectious process.  I will repeat the CT scan in 2 months and further recommendation will follow  COPD  The patient is clinically stable.  The patient is on oxygen.  The patient is compliant with Stiolto.  Patient did not have any nebulizer treatment and no rescue doses I started the patient on albuterol with a spacer.  I informed the patient that he would require pulmonary rehab as his deconditioning.  Patient was referred to pulmonary rehab and I stressed the importance of rehab regarding improving his endurance patient is not on systemic steroids  Chronic hypoxic respiratory failure  At baseline oxygen saturation 95% on room air.  I ambulated the patient and desatted to 88% and 6 I stopped ambulating the patient.  The patient is to use oxygen with ambulation and will follow as such.

## 2024-03-26 NOTE — HISTORY OF PRESENT ILLNESS
[FreeTextEntry1] : hosp dc  [de-identified] : 67 yo m presents to follow up post hosp dc  was dc 14 days ago  had sob, was diagnosed with pna and copd exacerbation  pulm follow up pending  feels much better  has been back to dialysis  was dc with 7 days antibiotics, was hosp for 14 days

## 2024-04-01 ENCOUNTER — RX RENEWAL (OUTPATIENT)
Age: 69
End: 2024-04-01

## 2024-04-04 LAB
CULTURE RESULTS: ABNORMAL
SPECIMEN SOURCE: SIGNIFICANT CHANGE UP

## 2024-04-15 ENCOUNTER — RX RENEWAL (OUTPATIENT)
Age: 69
End: 2024-04-15

## 2024-04-15 RX ORDER — METOPROLOL SUCCINATE 50 MG/1
50 TABLET, EXTENDED RELEASE ORAL
Qty: 180 | Refills: 3 | Status: ACTIVE | COMMUNITY
Start: 2024-04-15 | End: 1900-01-01

## 2024-04-22 NOTE — ED ADULT TRIAGE NOTE - NS ED TRIAGE AVPU SCALE
Alert-The patient is alert, awake and responds to voice. The patient is oriented to time, place, and person. The triage nurse is able to obtain subjective information.
0 (no pain/absence of nonverbal indicators of pain)

## 2024-04-23 ENCOUNTER — APPOINTMENT (OUTPATIENT)
Dept: HEART AND VASCULAR | Facility: CLINIC | Age: 69
End: 2024-04-23
Payer: MEDICARE

## 2024-04-23 VITALS
BODY MASS INDEX: 20.72 KG/M2 | SYSTOLIC BLOOD PRESSURE: 96 MMHG | DIASTOLIC BLOOD PRESSURE: 57 MMHG | TEMPERATURE: 98.3 F | WEIGHT: 148 LBS | HEIGHT: 71 IN | HEART RATE: 62 BPM | OXYGEN SATURATION: 96 %

## 2024-04-23 DIAGNOSIS — I10 ESSENTIAL (PRIMARY) HYPERTENSION: ICD-10-CM

## 2024-04-23 DIAGNOSIS — I48.0 PAROXYSMAL ATRIAL FIBRILLATION: ICD-10-CM

## 2024-04-23 DIAGNOSIS — I50.22 CHRONIC SYSTOLIC (CONGESTIVE) HEART FAILURE: ICD-10-CM

## 2024-04-23 DIAGNOSIS — I34.0 NONRHEUMATIC MITRAL (VALVE) INSUFFICIENCY: ICD-10-CM

## 2024-04-23 DIAGNOSIS — I35.0 NONRHEUMATIC AORTIC (VALVE) STENOSIS: ICD-10-CM

## 2024-04-23 DIAGNOSIS — Z86.39 PERSONAL HISTORY OF OTHER ENDOCRINE, NUTRITIONAL AND METABOLIC DISEASE: ICD-10-CM

## 2024-04-23 PROCEDURE — 99214 OFFICE O/P EST MOD 30 MIN: CPT

## 2024-04-23 RX ORDER — AMLODIPINE BESYLATE 2.5 MG/1
2.5 TABLET ORAL DAILY
Qty: 90 | Refills: 3 | Status: ACTIVE | COMMUNITY
Start: 2017-03-09 | End: 1900-01-01

## 2024-04-23 NOTE — ASSESSMENT
[FreeTextEntry1] : Systolic CHF - EF recovered, normal on June 2019 echo. Secondary to chemo? Then 40-45% on admission in Dec 2020, then improved to 60-65 on last admission in January 2021, NL EF Oct 2022..  Drop in 02 Sats x 2. Dec and Jan.  CXR ordered.  No overt failure. ? Amio related.  PreOp- Needs colonoscopy.  No overt CHF.  Pt is cleared to proceed.  OK to stop ASA 5-7 days before.  He does not take any supplements.  Repeat colonoscopy to be done March 2024. Pt is cleared to proceed with colonoscopy, seen 4.23.24.  MR- Was severe, now mild to moderate and EF improved. Followed by Dr Cui. MR is mild to moderate on echo Oct 2022.  Echo in [facility] March 2024, mild MR, PAP 58 mm Hg and mild to moderate AS, SANJUANA 1.47 cm2 and mean grad 15 mm.  Afib - in NSR today, no longer on Eliquis due to 2 bleeds and Amio. ZIO patch pending. I favor ASA and resuming Amio. Referred to Pulm to be monitored for Amio Pulm toxicity. The patient's [id number] score = 2. He is S/P a Watchman device. Off Eliquis. Still on Amio, TSH WNL Sept 2022. 2023.  IN NSR 1/23/24 by EKG  HTN- BP too low, will reduce Norvasc from 10 to 5 mg on 1/4/22. Will reduce to 2.5 mg on dialysis days. Reduce to 2.5 daily.  HLD- not on statin. I favor a statin due to aortic atherosclerosis. On Atorvastatin 40 mg.  T Cell Lymphoma - Finished with chemo, Dr Dimas. Now in remission.  CKD- Dr Denton, now on HD since June 2021.  Pt is on the transplant list.

## 2024-04-23 NOTE — PHYSICAL EXAM
[Well Developed] : well developed [Well Nourished] : well nourished [No Acute Distress] : no acute distress [Normal Conjunctiva] : normal conjunctiva [Normal Venous Pressure] : normal venous pressure [No Carotid Bruit] : no carotid bruit [Normal S1, S2] : normal S1, S2 [No Murmur] : no murmur [No Rub] : no rub [No Gallop] : no gallop [Good Air Entry] : good air entry [No Respiratory Distress] : no respiratory distress  [Soft] : abdomen soft [Non Tender] : non-tender [No Masses/organomegaly] : no masses/organomegaly [Normal Bowel Sounds] : normal bowel sounds [Normal Gait] : normal gait [No Edema] : no edema [No Cyanosis] : no cyanosis [No Clubbing] : no clubbing [No Varicosities] : no varicosities [No Rash] : no rash [No Skin Lesions] : no skin lesions [Moves all extremities] : moves all extremities [No Focal Deficits] : no focal deficits [Normal Speech] : normal speech [Alert and Oriented] : alert and oriented [Normal memory] : normal memory [de-identified] : Upper dentures [de-identified] : OLVIN, A2 present, apical systolic murmur [de-identified] : fine bibas crackles

## 2024-04-23 NOTE — REASON FOR VISIT
[Follow-Up - Clinic] : a clinic follow-up of [Cardiomyopathy] : cardiomyopathy [FreeTextEntry1] : Onc- Dr Vel Dimas  [phone] Renal- Dr Bertin Felton Hrt- Dr See CLEARY- [place] EP- Dr Yanick Francois- Dr Penny Nj- Dr Alex DAS- Mikhail HALE- Celestina Pendleton

## 2024-04-23 NOTE — HISTORY OF PRESENT ILLNESS
[FreeTextEntry1] : 69 y/o M with HFpEF initially met but HF team on recent admission at [facility] and now presents for follow up. Other PMH is notable for HTN, COPD, CKD (stage 5 CKD, has LUE AVF, not on HD yet), T-cell lymphoma (diagnosed 19 years ago, on chemo romidepsin every Wednesday (not since Nov)), AF (on Eliquis), systolic CHF (EF 40-45%), EF was 60-65 in 2019,severe pHTN, severe MR, who recently underwent a left chest wall hematoma evacuation on 12/18/2020 secondary to loculated effusion. Hospital course at that time was complicated by AF with RVR and thoracic surgery consulted at that time for surgical intervention of trapped lung. He followed up as an outpatient after discharge and deemed a good surgical candidate for lung decortication. On 1/18/21 patient presented to [facility] for pre-operative optimization with heparin gtt prior to OR. Upon admission pt noted to have erythema and swelling to his L knee, CT scan demonstrated cellulitis, operation was deferred. Ortho, Gen Surg and ID were consulted, pt with no drainable collection and was medically managed with resolution of infection. Nephrology was consulted for assistance in managing CKD stage 4. On 1/22 he underwent an uncomplicated L VATS, RA decortication with blow hole placement for subcutaneous emphysema. Intraop findings significant for loculated hemothorax. He received 3UPRBC in the OR and post operatively was brought to the CTICU stable and intubated with 3 chest tubes in place. He was extubated POD1 and required primacor for pulmonary HTN and renal perfusion. On POD 2 a right sided pigtail was placed for pleural effusion which drained 800cc and ultimately removed on POD 4. Cardiology was consulted for management of pulmonary hypertension and CHF, recommended IV diuresis. He required alternating BiPAP and HFNC which was weaned to NC. Primacor was weaned off on POD 4 and he was transferred to the stepdown unit. Heparin gtt was started for AC but was held for bleeding around the CT site. A CT chest noncon was preformed showing post surgical changes, no concern for hemothorax and heparin gtt resumed. On POD 5 first CT was removed. Bronch with no abnormal findings, Second chest tube subsequently removed and added nifedipine Xl 30mg for BP control on POD 6. POD 7 third chest tube removed, post chest tube removal xray showed no pneumothorax.    12/28/20  In [facility] 12/17- 12/23 with a chest wall hematoma, Rapid AF.  EF good but severe MR and TR.  In NSR by exam and EKG .  I was later informed he might need a decortification of his pleura(left).  3/1/21 Hospitalized for VATS 1/2021 as above. BNP 15,500, Cr 5.5,  Echo was done 1/2021, EF back to NL and MR mild to moderate, mod to severe TR and PAP 67.   Got the Covid Vaccine.  Here he is in NSR and generally doing well considering how complex he is. 4/15/21  Off Eliquis 3/17/21 due to a 2nd bleed on the buttocks.  Amio was stopped March 8, 2021 8/31/21 On ASA 81 and Plavix 75, to consider a Watchman, on HD.  Edema resolved. 11/2/21  S/P WATCHMAN DEVICE Oct 20th, 2021.  hE HAD SEEN OTHER MDs TODAY AND WALKED OUT AT 5 PM 1/4/22 echo done Dec 2021, EF NL, MR mild, PAP 59, aortic plaque noted. 5/10/22 Doing well, recovering from PNA. JORGE 5/2021 EF MVP posterior leaflet mod MR and mod TR  3/7/23 echo 10/2022 EF normal Mild AS mild to mod AI mild to mod MR PASP 34 mmHg.  Pt denies CHF.  CT scan chest done Jan 2023  shows improvement in aeration. 10/31/23 No cardiac c/o,needs colonoscopy TBD 1/23/24  NIELSON, 02 sats are in the 80s last 2 determinations. No edema, remains on Amio 4/23/24 In [facility] March x 10 days for resp failure, grew out E coli.   EKG: NSR with 1st degree AVB. ST-Tw abnormalities. 12/28/20 EKG: NSR, IVCD, PRWP, STTs 3/1/21, 1/4/22

## 2024-04-24 LAB
CULTURE RESULTS: SIGNIFICANT CHANGE UP
SPECIMEN SOURCE: SIGNIFICANT CHANGE UP

## 2024-04-29 ENCOUNTER — TRANSCRIPTION ENCOUNTER (OUTPATIENT)
Age: 69
End: 2024-04-29

## 2024-04-30 NOTE — DISCHARGE NOTE PROVIDER - NSDCHHHOMEBOUNDOTHER_GEN_ALL_CORE_FT
[FreeTextEntry1] : Ms. Lunsford is a pleasant 73F h/o HLD, sinus surgery, hypothyroid, HTN who presents to [facility] care. She has unexpectedly lost around 30lbs over the past several months. She has lost her appetite and has not been eating much, however has not significantly changed her lifestyle. Has been having a normal brown BM every several days. No blood, no black stool. She underwent a CT A/P within the past several weeks, no abnormalities as per her report. She underwent a colonoscopy within the past 2 years or so, poor prep, did have polyps. She had a colonoscopy prior to that where she had polyps as well. Had a gastric ulcer in the distant past, no recent EGD. No heartburn, regurgitation. Vapes nicotine now, used to smoke "a couple" cigarettes daily. No chest imaging. Underwent labs recently, no abnormalities. 
post op thoracic surgery

## 2024-05-01 ENCOUNTER — TRANSCRIPTION ENCOUNTER (OUTPATIENT)
Age: 69
End: 2024-05-01

## 2024-05-01 RX ORDER — SILDENAFIL 20 MG/1
20 TABLET ORAL
Qty: 90 | Refills: 5 | Status: ACTIVE | COMMUNITY
Start: 2022-04-05 | End: 1900-01-01

## 2024-05-03 NOTE — PROGRESS NOTE ADULT - ASSESSMENT
Called and spoke to pt in regards to note from home health nurse. Pt scheduled to come in to see Dr. Hardik mccormick, Informed pt she can head this way now. Called Joselyn-Home health nurse and M informing her that pt is scheduled to see Dr. Dye today.     ----- Message from Chey Granados sent at 5/3/2024 10:50 AM CDT -----  Contact: [facility]  Joselyn is calling in regards to pt blood pressure 150/70 heart rate 112, respiration 22, oxygen 100% from last night to 05/03 feeling worse, coughing a lot more , more tired. They are trying to come earlier.no oxygen at home and BNP was 687.please call back at .[phone] and nurse [phone]            Thanks  AVE   Assessment: 65y Male POD 2 s/p Open Chest Hematoma Evacuation    Pain/nausea control PRN  Home med, holding Eliquis  Monitor MILAN output and chest site for signs of rebleeding  Neurovascular checks on RUE  IR/CTS/Pulm consult for Pleural Effusion  Incentive spirometer/OOB/Ambulate  IVF  NPO starting now   AM labs  Pulmonary will do bedside thoracentesis and send for cytology.   possible IR drainage in AM.

## 2024-05-06 ENCOUNTER — NON-APPOINTMENT (OUTPATIENT)
Age: 69
End: 2024-05-06

## 2024-05-10 ENCOUNTER — APPOINTMENT (OUTPATIENT)
Dept: GASTROENTEROLOGY | Facility: CLINIC | Age: 69
End: 2024-05-10
Payer: MEDICARE

## 2024-05-10 VITALS
DIASTOLIC BLOOD PRESSURE: 43 MMHG | RESPIRATION RATE: 12 BRPM | HEART RATE: 58 BPM | WEIGHT: 151 LBS | HEIGHT: 71 IN | OXYGEN SATURATION: 93 % | TEMPERATURE: 95.9 F | SYSTOLIC BLOOD PRESSURE: 75 MMHG | BODY MASS INDEX: 21.14 KG/M2

## 2024-05-10 DIAGNOSIS — K63.5 POLYP OF COLON: ICD-10-CM

## 2024-05-10 PROCEDURE — 99214 OFFICE O/P EST MOD 30 MIN: CPT

## 2024-05-10 NOTE — ASSESSMENT
[FreeTextEntry1] : 66M with PMH of HTN, COPD, ESRD on HD (MWF), T-cell lymphoma (diagnosed 19 years ago, s/p chemo), AF (now s/p Watchman no longer on Eliquis and on ASA per wife), systolic CHF (EF 40-45%), severe pulmonary HTN, severe MR and TR, s/p VATS procedure in 2021, and ERIK, underwent colonoscopy inpatient at [facility] for rectal bleeding, found to have multiple large polyps, including two, >2cm sigmoid colon (path showing TA with focal high-grade dysplasia), so referred to Dr. Piper for polypectomy.   Colonoscopy (12/04/2023):  - polyps 5-8 mm in ascending colon  - polyps 1.1 to 1.8 cm in the descending colon (biopsied) - path showing TA - polyps 2 cm to 2.5 cm) in the sigmoid colon (biopsied) - path showing TA with focal high grade dysplasia  - perianal scarred tract suggesting possible fistula  - mild diverticulosis of the whole colon   Plan:  - discussed risks/benefits of colonsocopy for polypectomy (infection, bleeding, perforation, or a missed lesion)  - plan for colonsocopy at [facility] on a Thursday given co-morbidties and patient's MWF HD schedule  - plan to do a 2-day prep with 4L of Golytely given prior BBPS on colonoscopy was 6  - discussed need for CLD x 2 days prior, NPO after midnight, and need for escort home from procedure  - Has obtained cardiac and pulmonology clearance - currently on aspirin 81 mg PO daily  Radha Suarez MD GI Fellow

## 2024-05-10 NOTE — END OF VISIT
[] : Fellow [FreeTextEntry3] : Pt seen and d/w fellow.  Will plan for a colonoscopy with polypectomies at [facility].

## 2024-05-10 NOTE — HISTORY OF PRESENT ILLNESS
[FreeTextEntry1] : 66M with PMH of HTN, COPD, ESRD on HD (MWF), T-cell lymphoma (diagnosed 19 years ago, s/p chemo), AF (now s/p Watchman no longer on Eliquis and on ASA per wife), systolic CHF (EF 40-45%), severe pulmonary HTN, severe MR and TR, s/p VATS procedure in 2021, and ERIK, presenting for large polyps noted on inpatient colonoscopy in the sigmoid colon (path showing TA with focal high-grade dysplasia), so referred to Dr. Piper for polypectomy.   He initially saw Dr. Piper Dec 2023 with plan for colonoscopy in March. However, he was subsequently admitted to H3/2- 3/12 for COPD exacerbation secondary to PNA. Colonoscopy was deferred and he now returns feeling in better health. He is currently doing well and notes improvement in physical health. He is participating in pulmonary rehab and now is able to walk further distances without shortness of breath. From a respiratory standpoint he is satting well on RA and is off supplemental O2.  He is no longer takes Eliquis after his Watchman and is now just on aspirin daily.  He recently saw his cardiologist (Dr. Ventura) who cleared him for colonoscopy  He denies any GI complaints. Denies nausea. vomiting, diarrhea, fevers, chills, abdominal pain, melena, hematemesis, hematochezia, or dysphagia.

## 2024-05-10 NOTE — PHYSICAL EXAM
[Normal] : alert, normal voice/communication, healthy appearing, no acute distress [Abdomen Tenderness] : non-tender [No Masses] : no abdominal mass palpated [Abdomen Soft] : soft [de-identified] : Poor historian

## 2024-05-10 NOTE — PROGRESS NOTE ADULT - ASSESSMENT
68M PMH AFib s/p watchman (on ASA), HTN, COPD, ESRD (MWF), severe ERIK, pulm HTN, T cell lymphoma (s/p chemo), HFpEF, s/p VATS procedure in 2021, recent [facility] admission 12/2023 for LGIB p/w productive cough, congestion, SOB for weeks now worsening in the last 5days. Admitted to [facility] for AHRF 2/2 COPD exacerbation 2/2 to CAP. Course c/b low volume hemoptysis on 3/5 to 3/6. Now s/p bronch 3/7, after which patient desaturated to 88% on 5L NC (prior was on 2L NC saturating in 90s). ICU consulted, and patient transferred to [facility] for closer monitoring.  68M PMH AFib s/p watchman (on ASA), HTN, COPD, ESRD (MWF), severe ERIK, pulm HTN, T cell lymphoma (s/p chemo), HFpEF, s/p VATS procedure in 2021, recent [facility] admission 12/2023 for LGIB p/w productive cough, congestion, SOB for weeks now worsening in the last 5days. Admitted to [facility] for AHRF 2/2 COPD exacerbation 2/2 to CAP. Course c/b low volume hemoptysis on 3/5 to 3/6. Now s/p bronch 3/7, after which patient desaturated to 88% on 5L NC (prior was on 2L NC saturating in 90s). ICU consulted, and patient transferred to [facility] for closer monitoring. Patient has been doing his pulmonary hygiene and saturating between 91-94% on 4L NC (goal 88-92% iso COPD). Patient is stable for transfer to [facility].  77

## 2024-05-19 NOTE — H&P ADULT - PROBLEM SELECTOR PLAN 3
Patient ID: Gracy Espino is a 75 y.o. female.  The patient presents to clinic today for her history of metastatic ER+/Her2- breast cancer.    Diagnostic/Therapeutic History:  Diagnosis: Recurrent/metastatic breast cancer.  ER >95% SC 10% Her2-negative (0 IHC).  Sites of Disease: LN's, pleura, bone.  NGS: PIK3CA     -2007: Right T1c N1a ER/SC+ Her2- breast cancer.  -TAC x6 cycles.  -RT completed 3/2008.  -Completed 5 years of anastrozole 4/2013. Two more years, stopped 6/2015.  -1/7/2023: Presented to ER with dyspnea and right lymphedema. CTA showed large right-sided pleural effusion, small left pleural effusion, nodular opacities in MARITZA and lingual, right axillary soft tissue lesion (3.7 x 4.1 cm) with nodular opacities in  subcutaneous tissues of right chest wall.  -1/13/23: PET/CT showed hypermetabolic disease in multiple supraclavicular, axillary, mediastinal, hilar, periaortic LN’s. Osseous lesions in axial and appendicular skeleton, pleural nodularities in right hemithorax, focus of activity in right  posterior triceps muscle.  -1/13/23: RUE MRI confirmed right masslike axillary LAD with mass effect on the neurovascular bundle without occlusion.  -1/20/23: Right axillary biopsy confirmed metastatic IDC, ER >95% SC 10% Her2-negative (0 by IHC). 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  -1/2023: Letrozole initiated.  -2/7/23: Palbociclib initiated.  -5/11/23: Zoledronic acid initiated (q12 weeks).     History of Present Illness (HPI)/Interval History:  Ms. Espino presents today for routine FUV and discussion of her recent CT results.  She is accompanied by her . Due for Zometa today.  Overall, she is doing well since her last appointment.  Lymphedema is overall stable.  No dyspnea, cough, nausea, abdominal pain, diarrhea.  She does note some right arm discomfort and hip pain, especially when walking/standing that is new since her last visit. No trauma to the hip.    Review of Systems:  14-point ROS otherwise  negative, as per HPI.    Past Medical History:   Diagnosis Date    Breast cancer (Multi)     Disease of thyroid gland     Hypertension     Lymphedema     Neuropathy      Social History     Socioeconomic History    Marital status:      Spouse name: Not on file    Number of children: Not on file    Years of education: Not on file    Highest education level: Not on file   Occupational History    Not on file   Tobacco Use    Smoking status: Never    Smokeless tobacco: Never   Vaping Use    Vaping status: Never Used   Substance and Sexual Activity    Alcohol use: Yes     Alcohol/week: 2.0 standard drinks of alcohol     Types: 1 Glasses of wine, 1 Cans of beer per week    Drug use: Never    Sexual activity: Not on file   Other Topics Concern    Not on file   Social History Narrative    Not on file     Social Determinants of Health     Financial Resource Strain: Not on file   Food Insecurity: Not on file   Transportation Needs: Not on file   Physical Activity: Not on file   Stress: Not on file   Social Connections: Not on file   Intimate Partner Violence: Not on file   Housing Stability: Not on file       Allergies   Allergen Reactions    Penicillins Unknown         Current Outpatient Medications:     acetaminophen (Tylenol) 500 mg tablet, Take by mouth every 6 hours if needed for mild pain (1 - 3)., Disp: , Rfl:     aspirin 81 mg EC tablet, Take 1 tablet (81 mg) by mouth once daily., Disp: , Rfl:     cetirizine (ZyrTEC) 10 mg chewable tablet, Chew once daily., Disp: , Rfl:     cholecalciferol (Vitamin D3) 50 MCG (2000 UT) tablet, Take 1 tablet (50 mcg) by mouth once daily., Disp: , Rfl:     clindamycin (Cleocin) 300 mg capsule, Take by mouth., Disp: , Rfl:     docusate sodium (Colace) 100 mg capsule, Take 2 capsules (200 mg) by mouth once daily at bedtime., Disp: , Rfl:     DULoxetine (Cymbalta) 30 mg DR capsule, Take 1 capsule (30 mg) by mouth once daily., Disp: , Rfl:     DULoxetine (Cymbalta) 60 mg DR capsule,  "Take 1 capsule (60 mg) by mouth once daily., Disp: , Rfl:     guaiFENesin (Mucinex) 600 mg 12 hr tablet, Take 2 tablets (1,200 mg) by mouth 2 times a day. Do not crush, chew, or split., Disp: , Rfl:     levothyroxine (Synthroid, Levoxyl) 50 mcg tablet, Take by mouth., Disp: , Rfl:     metoprolol succinate XL (Toprol-XL) 100 mg 24 hr tablet, Take 1 tablet (100 mg) by mouth once daily., Disp: , Rfl:     omega 3-dha-epa-fish oil (Fish OiL) 1,000 mg (120 mg-180 mg) capsule, Take by mouth., Disp: , Rfl:     palbociclib (Ibrance) 125 mg capsule, Take 1 capsule (125 mg total) by mouth once daily with breakfast.  Swallow whole., Disp: , Rfl:     pregabalin (Lyrica) 50 mg capsule, Take 1 capsule (50 mg) by mouth 3 times a day., Disp: 270 capsule, Rfl: 0    spironolacton-hydrochlorothiaz (Aldactazide) 25-25 mg tablet, , Disp: , Rfl:     traMADol (Ultram) 50 mg tablet, , Disp: , Rfl:     amLODIPine (Norvasc) 2.5 mg tablet, Take by mouth., Disp: , Rfl:     furosemide (Lasix) 20 mg tablet, Take 1 tablet (20 mg) by mouth once daily., Disp: , Rfl:     letrozole (Femara) 2.5 mg tablet, Take 1 tablet (2.5 mg total) by mouth once daily., Disp: 90 tablet, Rfl: 3    neomycin-polymyxin-dexAMETHasone (Maxitrol) 3.5mg/mL-10,000 unit/mL-0.1 % ophthalmic suspension, , Disp: , Rfl:     ondansetron (Zofran) 8 mg tablet, , Disp: , Rfl:     potassium chloride CR 20 mEq ER tablet, Take 1 tablet (20 mEq) by mouth once daily., Disp: , Rfl:      Objective    BSA: 1.99 meters squared  /69   Pulse 67   Temp 36.4 °C (97.5 °F) (Temporal)   Resp 20   Ht 1.767 m (5' 9.57\")   Wt 80.4 kg (177 lb 4 oz)   SpO2 93%   BMI 25.75 kg/m²   Wt Readings from Last 3 Encounters:   24 80.4 kg (177 lb 4 oz)   05/15/24 80.7 kg (177 lb 14.6 oz)   24 77 kg (169 lb 12.1 oz)     Performance Status:  The ECOG performance scale today is ECO- Restricted in physically strenuous activity.  Carries out light duty.    Physical Exam  Vitals reviewed. "   Constitutional:       General: She is awake. She is not in acute distress.     Appearance: Normal appearance. She is not ill-appearing.   HENT:      Head: Normocephalic and atraumatic.   Eyes:      General: No scleral icterus.  Neck:      Trachea: Trachea and phonation normal. No tracheal tenderness.   Cardiovascular:      Rate and Rhythm: Normal rate and regular rhythm.      Heart sounds: Normal heart sounds. No murmur heard.  Pulmonary:      Effort: Pulmonary effort is normal. No respiratory distress.      Breath sounds: Normal breath sounds. No stridor. No wheezing, rhonchi or rales.   Musculoskeletal:         General: Swelling (Right arm; compression sleeve in place. Forearm edema seems improved from last visit.) present.      Right shoulder: Decreased range of motion.      Cervical back: No tenderness.      Thoracic back: No tenderness.      Lumbar back: No tenderness.   Lymphadenopathy:      Cervical: No cervical adenopathy.      Upper Body:      Right upper body: No supraclavicular adenopathy.      Left upper body: No supraclavicular adenopathy.   Skin:     General: Skin is warm and dry.      Coloration: Skin is not jaundiced.      Findings: No lesion or rash.   Neurological:      General: No focal deficit present.      Mental Status: She is alert and oriented to person, place, and time.      Motor: No weakness.      Gait: Gait normal.   Psychiatric:         Mood and Affect: Mood normal.         Thought Content: Thought content normal.         Judgment: Judgment normal.         Laboratory Data:  Lab Results   Component Value Date    WBC 2.8 (L) 05/13/2024    HGB 12.9 05/13/2024    HCT 39.6 05/13/2024     (H) 05/13/2024     05/13/2024       Chemistry    Lab Results   Component Value Date/Time     05/13/2024 1018    K 4.3 05/13/2024 1018     05/13/2024 1018    CO2 29 05/13/2024 1018    BUN 22 05/13/2024 1018    CREATININE 0.96 05/13/2024 1018    Lab Results   Component Value  Date/Time    CALCIUM 9.4 05/13/2024 1018    ALKPHOS 78 05/13/2024 1018    AST 15 05/13/2024 1018    ALT 13 05/13/2024 1018    BILITOT 0.5 05/13/2024 1018             Radiology:  CT chest w IV contrast  Narrative: Interpreted By:  Josefina Resendiz,   STUDY:  CT CHEST W IV CONTRAST;  5/15/2024 11:21 am      INDICATION:  Signs/Symptoms:Metastatic breast cancer, follow-up of new lung  nodule..      COMPARISON:  02/06/2024      ACCESSION NUMBER(S):  [id number]      ORDERING CLINICIAN:  ABIMAEL HORVATH      TECHNIQUE:  Helical data acquisition of the chest was obtained  without IV  contrast material.  Images were reformatted in axial, coronal, and  sagittal planes.      FINDINGS:  LUNGS AND AIRWAYS:  The trachea and central airways are patent. No endobronchial lesion.  Small amount mucus and secretion in the distal trachea      Again seen moderate right pleural effusion, stable to minimally  increased compared to prior study.      Right apical sub solid nodule is again identified measuring 1.5 cm  seen in image 54/343, stable.      Postradiation changes are identified in the anterior right upper lobe  and right middle lobe.      Stable appearance of pleural based nodules and nodular densities with  component of traction bronchiectasis located inferior/anterior right  lower lobe and right middle lobe, which might represent areas of  round atelectasis.      Scattered areas of tree-in-bud nodularity and mucous plugging  predominantly in the lingula, unchanged. Findings are in keeping with  component of airway disease.      MEDIASTINUM AND GILBERT, LOWER NECK AND AXILLA:  The visualized thyroid gland is within normal limits.      No evidence of thoracic lymphadenopathy by CT criteria.      Esophagus appears within normal limits as seen.      HEART AND VESSELS:  The thoracic aorta is of normal course and caliber with moderate  vascular calcifications.      Main pulmonary artery and its branches are normal in caliber.       Moderate coronary artery calcification. The study is not optimized  for evaluation of coronary arteries.      The cardiac chambers are not enlarged.      No evidence of pericardial effusion.      UPPER ABDOMEN:  Few small hypodense lesions in the liver, too small to be definitely  characterized but stable compared to prior study. Partially imaged  solid-appearing exophytic lesion in the interpolar region of the left  kidney measuring to 1.4 cm.      CHEST WALL AND OSSEOUS STRUCTURES:      Sclerotic lesion in the right 7th rib is stable. There is also stable  appearance of sclerotic lesions in the left 3rd and 7th ribs.      Postsurgical changes in the right breast with bilateral breast  implants.      Again seen right axillary mass measuring 4.6 x 2.7 cm, encasing  multiple right axillary vessels and not significantly different from  prior study and encasing.      Impression: 1.  Stable appearance of right apical sub solid nodule measuring 1.5  cm. Recommend continued attention on follow-up studies.  2. Moderate right pleural effusion stable to slightly increased  compared to prior study. Areas of nodular pleural based opacity in  the inferior/anterior right lower lobe and right middle lobe,  unchanged and might be areas of round atelectasis given the  appearance. Recommend attention on follow-up.  3. Unchanged right axilla ill-defined mass encasing multiple vessels  in this region.  4. Sclerotic lesions in bilateral ribs, stable and concerning for  metastatic disease.  5. Partially imaged solid-appearing exophytic lesion in the left  kidney as described above. This is indeterminate and given the  appearance solid tumor including renal cell carcinoma is not  excluded. Recommend attention on follow-up.  6. Additional findings as described above.      MACRO:  None      Signed by: Josefina Fuentes 5/15/2024 12:03 PM  Dictation workstation:   [id number]       Assessment/Plan:  Gracy Espino is a 75 y.o.  female with a history of metastatic ER+/Her2- breast cancer, who presents today follow-up evaluation.    Breast cancer.  - Reviewed CT Chest in detail today that showed stable disease. Lung nodule (as previously noted in RLL) was also stable.  - Due to new bone pain, will obtain bone scan. Ordered.  - Clinically, she continues to do quite well. CA27-29 continues to decline.  - Continue letrozole 2.5 mg daily.  - Continue Ibrance 125 mg daily (on days 1-21 of a 28-day cycle). Labs okay for ongoing treatment.    Osseous metastases.  - Continue Zometa q3 months. Given today.  - Continue Ca/VitD supplementation.    Lymphedema. Compression of neurovascular structures without overt brachial plexopathy.  - Systemic therapy, as above. Markedly improved.  - Previously followed with PT/OT.  - Wears compression sleeve and massage device at home.    Problem List Items Addressed This Visit             ICD-10-CM    Malignant neoplasm of unspecified site of unspecified female breast (Multi) C50.919    Relevant Medications    letrozole (Femara) 2.5 mg tablet    Other Relevant Orders    Clinic Appointment Request Follow Up (Extended FUV (emmanuel COLE)); BETO NEVES S    Infusion Appointment Request [facility] INFUSION (Zometa)    CT chest abdomen pelvis w IV contrast    NM bone whole body    CBC and Auto Differential    Comprehensive Metabolic Panel    NM bone whole body     Disposition.  - RTC 3 months CT CAP, labs, Zometa.  - Care will transition to Dr. Neves.  - She has our contact information and was instructed to call with concerns/questions in the interim.    Sloan Garber MD  Hematology and Medical Oncology  [facility]    Currently in NSR, rate controlled on Toprol   continue to hold Coumadin, INR 1.4 today. Heparin drip started, follow-up PTT in 6 h.

## 2024-06-12 ENCOUNTER — RESULT REVIEW (OUTPATIENT)
Age: 69
End: 2024-06-12

## 2024-06-13 ENCOUNTER — APPOINTMENT (OUTPATIENT)
Dept: PULMONOLOGY | Facility: CLINIC | Age: 69
End: 2024-06-13
Payer: MEDICARE

## 2024-06-13 VITALS
HEART RATE: 57 BPM | OXYGEN SATURATION: 96 % | BODY MASS INDEX: 21 KG/M2 | WEIGHT: 150 LBS | TEMPERATURE: 97.7 F | SYSTOLIC BLOOD PRESSURE: 82 MMHG | RESPIRATION RATE: 24 BRPM | HEIGHT: 71 IN | DIASTOLIC BLOOD PRESSURE: 49 MMHG

## 2024-06-13 DIAGNOSIS — G47.33 OBSTRUCTIVE SLEEP APNEA (ADULT) (PEDIATRIC): ICD-10-CM

## 2024-06-13 DIAGNOSIS — J43.2 CENTRILOBULAR EMPHYSEMA: ICD-10-CM

## 2024-06-13 DIAGNOSIS — R91.8 OTHER NONSPECIFIC ABNORMAL FINDING OF LUNG FIELD: ICD-10-CM

## 2024-06-13 DIAGNOSIS — I27.20 PULMONARY HYPERTENSION, UNSPECIFIED: ICD-10-CM

## 2024-06-13 DIAGNOSIS — J96.11 CHRONIC RESPIRATORY FAILURE WITH HYPOXIA: ICD-10-CM

## 2024-06-13 PROCEDURE — 99214 OFFICE O/P EST MOD 30 MIN: CPT

## 2024-06-13 PROCEDURE — G2211 COMPLEX E/M VISIT ADD ON: CPT

## 2024-06-13 NOTE — ASSESSMENT
[FreeTextEntry1] : COPD  The patient is clinically stable.  The patient is participating in pulmonary rehab.  Cough dramatically improved.  Their quality of life improved.  Patient is compliant with the Stiolto and rarely uses the short acting beta agonist.  Will schedule PFT in September.  The baseline oxygen saturation is stable.  The patient did not require any urgent care, systemic steroids, nor ER since the last time I saw him.  Obstructive sleep apnea  The patient noncompliant with the CPAP mask.  Pulmonary hypertension  Is multifactorial related to his COPD renal status and the last echocardiogram was done and March of the this year reviewed pulmonary pressures 58%.  No clinical evidence of cor pulmonale  Pulmonary nodule  I reviewed the CT scan of the chest and there is dramatic improvement compared to the previous 1.  There is increase in the nodular subpleural density in the left upper lobe and in the right lower lobe findings with pleural reaction.  Will repeat the CT scan 1 year  I discussed with the patient the need to increase activity and do sitting exercises which she is doing and have leg ergometer.

## 2024-06-13 NOTE — HISTORY OF PRESENT ILLNESS
[Former] : former [Never] : never [TextBox_4] : He is doing much better since the last time I saw him.  He had at the CT scan today.  He is getting dialysis 3 days a week.  He is participating in pulmonary rehab once a week.  He goes with walks for with the wife.  He does not use his CPAP.  Cough dramatically improved.  He does not require the albuterol pump [ESS] : 0

## 2024-06-17 ENCOUNTER — TRANSCRIPTION ENCOUNTER (OUTPATIENT)
Age: 69
End: 2024-06-17

## 2024-06-19 ENCOUNTER — NON-APPOINTMENT (OUTPATIENT)
Age: 69
End: 2024-06-19

## 2024-06-19 ENCOUNTER — APPOINTMENT (OUTPATIENT)
Dept: UROLOGY | Facility: CLINIC | Age: 69
End: 2024-06-19
Payer: MEDICARE

## 2024-06-19 VITALS
DIASTOLIC BLOOD PRESSURE: 59 MMHG | TEMPERATURE: 98.2 F | SYSTOLIC BLOOD PRESSURE: 85 MMHG | OXYGEN SATURATION: 98 % | BODY MASS INDEX: 21 KG/M2 | HEIGHT: 71 IN | HEART RATE: 74 BPM | WEIGHT: 150 LBS

## 2024-06-19 DIAGNOSIS — R31.0 GROSS HEMATURIA: ICD-10-CM

## 2024-06-19 PROCEDURE — 99204 OFFICE O/P NEW MOD 45 MIN: CPT

## 2024-06-19 NOTE — PHYSICAL EXAM
[Normal Appearance] : normal appearance [General Appearance - In No Acute Distress] : no acute distress [Heart Rate And Rhythm] : heart rate and rhythm were normal [] : no respiratory distress [Abdomen Soft] : soft [Abdomen Tenderness] : non-tender [Costovertebral Angle Tenderness] : no ~M costovertebral angle tenderness [Urethral Meatus] : meatus normal [Penis Abnormality] : normal circumcised penis [Epididymis] : the epididymides were normal [Testes Tenderness] : no tenderness of the testes [Testes Mass (___cm)] : there were no testicular masses [Prostate Tenderness] : the prostate was not tender [No Prostate Nodules] : no prostate nodules [Prostate Size ___ (0-4)] : prostate size [unfilled] (scale: 0-4) [Normal Station and Gait] : the gait and station were normal for the patient's age [Skin Color & Pigmentation] : normal skin color and pigmentation [No Focal Deficits] : no focal deficits [Oriented To Time, Place, And Person] : oriented to person, place, and time

## 2024-06-19 NOTE — HISTORY OF PRESENT ILLNESS
[FreeTextEntry1] : 70 yo male on dialysis presents with episode of hematuria on 2 occasions over this past weekend.  He voids once every 1-2 days.  He denies any dysuria.  He denies any fevers or chills.  He has never had hematuria before.  He has a 22 pack/yr hx of smoking.  He denies any family hx of  malignancies.

## 2024-06-19 NOTE — ASSESSMENT
[FreeTextEntry1] : 68 yo male with gross hematuria.  A discussion was held with the patient regarding hematuria. The anatomy of the urinary tract was reviewed. Possible etiologies of hematuria were reviewed, including but not limited to, kidney, ureteral, bladder, and urethral sources. Urologic versus non-urologic causes of hematuria were reviewed including benign and malignant causes. The possibility of a missed lesion was reviewed as well as the chance of developing a lesion despite a negative evaluation. The work-up for hematuria was discussed including examination of the upper and lower urinary tract. The AUA guidelines on hematuria were reviewed. Follow up evaluation was reviewed. The possible need to repeat the evaluation was also discussed.   The patient wishes to proceed with a complete evaluation.  Plan: 1. CTU, will have day after dialysis as per anesthesia 2. Ucx 3. RTC after # 1 and 2 for cysto

## 2024-06-21 ENCOUNTER — NON-APPOINTMENT (OUTPATIENT)
Age: 69
End: 2024-06-21

## 2024-06-27 ENCOUNTER — APPOINTMENT (OUTPATIENT)
Dept: GASTROENTEROLOGY | Facility: HOSPITAL | Age: 69
End: 2024-06-27

## 2024-06-27 ENCOUNTER — OUTPATIENT (OUTPATIENT)
Dept: OUTPATIENT SERVICES | Facility: HOSPITAL | Age: 69
LOS: 1 days | Discharge: HOME | End: 2024-06-27
Payer: MEDICARE

## 2024-06-27 ENCOUNTER — RESULT REVIEW (OUTPATIENT)
Age: 69
End: 2024-06-27

## 2024-06-27 VITALS
HEART RATE: 91 BPM | OXYGEN SATURATION: 94 % | TEMPERATURE: 99 F | SYSTOLIC BLOOD PRESSURE: 104 MMHG | HEIGHT: 71 IN | WEIGHT: 149.91 LBS | DIASTOLIC BLOOD PRESSURE: 69 MMHG | RESPIRATION RATE: 21 BRPM

## 2024-06-27 DIAGNOSIS — Z98.890 OTHER SPECIFIED POSTPROCEDURAL STATES: Chronic | ICD-10-CM

## 2024-06-27 DIAGNOSIS — Z90.49 ACQUIRED ABSENCE OF OTHER SPECIFIED PARTS OF DIGESTIVE TRACT: Chronic | ICD-10-CM

## 2024-06-27 DIAGNOSIS — I77.0 ARTERIOVENOUS FISTULA, ACQUIRED: Chronic | ICD-10-CM

## 2024-06-27 DIAGNOSIS — Z41.9 ENCOUNTER FOR PROCEDURE FOR PURPOSES OTHER THAN REMEDYING HEALTH STATE, UNSPECIFIED: Chronic | ICD-10-CM

## 2024-06-27 PROCEDURE — 88305 TISSUE EXAM BY PATHOLOGIST: CPT

## 2024-06-27 PROCEDURE — 88305 TISSUE EXAM BY PATHOLOGIST: CPT | Mod: 26

## 2024-06-27 PROCEDURE — C1889: CPT

## 2024-06-27 PROCEDURE — 45385 COLONOSCOPY W/LESION REMOVAL: CPT

## 2024-06-27 DEVICE — CLIP HEMO INSTINCT PLUS ENDOSCOPIC: Type: IMPLANTABLE DEVICE | Status: FUNCTIONAL

## 2024-06-27 DEVICE — CLIP RESOLUTION 360 ULTRA 235CM 20/BX: Type: IMPLANTABLE DEVICE | Status: FUNCTIONAL

## 2024-07-01 ENCOUNTER — NON-APPOINTMENT (OUTPATIENT)
Age: 69
End: 2024-07-01

## 2024-07-01 LAB — SURGICAL PATHOLOGY STUDY: SIGNIFICANT CHANGE UP

## 2024-07-19 ENCOUNTER — TRANSCRIPTION ENCOUNTER (OUTPATIENT)
Age: 69
End: 2024-07-19

## 2024-08-21 NOTE — DIETITIAN INITIAL EVALUATION ADULT. - FACTORS AFF FOOD INTAKE
Hypertension is stable and controlled  Continue current treatment regimen.  Dietary sodium restriction.  Weight loss.  Regular aerobic exercise.  Blood pressure will be reassessed in 6 months.   none

## 2024-09-05 ENCOUNTER — APPOINTMENT (OUTPATIENT)
Dept: INTERNAL MEDICINE | Facility: CLINIC | Age: 69
End: 2024-09-05
Payer: MEDICARE

## 2024-09-05 VITALS
WEIGHT: 150.2 LBS | BODY MASS INDEX: 21.03 KG/M2 | HEIGHT: 71 IN | SYSTOLIC BLOOD PRESSURE: 84 MMHG | HEART RATE: 53 BPM | TEMPERATURE: 97.8 F | OXYGEN SATURATION: 92 % | DIASTOLIC BLOOD PRESSURE: 44 MMHG

## 2024-09-05 DIAGNOSIS — I10 ESSENTIAL (PRIMARY) HYPERTENSION: ICD-10-CM

## 2024-09-05 DIAGNOSIS — I50.30 UNSPECIFIED DIASTOLIC (CONGESTIVE) HEART FAILURE: ICD-10-CM

## 2024-09-05 PROCEDURE — 99214 OFFICE O/P EST MOD 30 MIN: CPT

## 2024-09-05 NOTE — PLAN
[FreeTextEntry1] : Follow up apt with cardio next month - advised to discuss medications with them  Follow up with office, if he feels faint, weak, or dizzy  Reviewed medications with them   Recommended flu vaccine   Labs done in dialysis - yesterday

## 2024-09-05 NOTE — PHYSICAL EXAM
[Normal Sclera/Conjunctiva] : normal sclera/conjunctiva [EOMI] : extraocular movements intact [Normal Outer Ear/Nose] : the outer ears and nose were normal in appearance [Supple] : supple [Normal Rate] : normal rate  [Regular Rhythm] : with a regular rhythm [Normal S1, S2] : normal S1 and S2 [No Edema] : there was no peripheral edema [Soft] : abdomen soft [Non Tender] : non-tender [Non-distended] : non-distended [Grossly Normal Strength/Tone] : grossly normal strength/tone [Coordination Grossly Intact] : coordination grossly intact [Normal Gait] : normal gait [Normal] : affect was normal and insight and judgment were intact

## 2024-09-05 NOTE — HISTORY OF PRESENT ILLNESS
[Spouse] : spouse [FreeTextEntry1] : follow up on bp  [de-identified] : Wife is concerned with low bp - takes bp daily & its always low  Patient with long term history of low bp  He denies any fainting, dizziness, weakness, or falls.  States he does not feel anything from the low bp  Reviewed medication list - Cardiology decreasing Amlodipine to 2.5 mg   Attending pulmonary rehab every Tuesday, followed by Dr. 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

## 2024-09-17 NOTE — ED ADULT NURSE NOTE - NS ED NOTE ABUSE RESPONSE YN
Patient presents to ER by self.  Today, this morning after eating breakfast starting to experience upper mid abdominal pain and vomited after eating.  Also feels fatigued.           Yes

## 2024-09-19 ENCOUNTER — APPOINTMENT (OUTPATIENT)
Dept: PULMONOLOGY | Facility: CLINIC | Age: 69
End: 2024-09-19
Payer: MEDICARE

## 2024-09-19 VITALS
SYSTOLIC BLOOD PRESSURE: 85 MMHG | TEMPERATURE: 98.7 F | BODY MASS INDEX: 21.56 KG/M2 | WEIGHT: 154 LBS | DIASTOLIC BLOOD PRESSURE: 50 MMHG | HEIGHT: 71 IN

## 2024-09-19 DIAGNOSIS — I27.20 PULMONARY HYPERTENSION, UNSPECIFIED: ICD-10-CM

## 2024-09-19 DIAGNOSIS — J43.2 CENTRILOBULAR EMPHYSEMA: ICD-10-CM

## 2024-09-19 DIAGNOSIS — J96.11 CHRONIC RESPIRATORY FAILURE WITH HYPOXIA: ICD-10-CM

## 2024-09-19 DIAGNOSIS — R91.8 OTHER NONSPECIFIC ABNORMAL FINDING OF LUNG FIELD: ICD-10-CM

## 2024-09-19 DIAGNOSIS — G47.33 OBSTRUCTIVE SLEEP APNEA (ADULT) (PEDIATRIC): ICD-10-CM

## 2024-09-19 PROCEDURE — 99214 OFFICE O/P EST MOD 30 MIN: CPT

## 2024-09-19 NOTE — HISTORY OF PRESENT ILLNESS
[Former] : former [Never] : never [TextBox_4] : Patient is doing better.  He rarely uses the albuterol.  He go for walks and he noticed that he is walking more.  He has not been in urgent care since the last time I saw him.  He does not tolerate the CPAP [ESS] : 0

## 2024-09-19 NOTE — ASSESSMENT
[FreeTextEntry1] : COPD  The patient is optimized with the current regimen.  The patient on Stiolto and as needed albuterol.  The patient is still on anti-inflammatory Monday Wednesday Friday with azithromycin as he is group E.  Patient is stabilized on the current regimen and did not require neither urgent care no systemic steroids since the last time I saw him.  Patient is to continue on the current regimen as his condition is stabilized.  Chronic hypoxic respiratory failure  Baseline oxygen saturation at rest is normal.  Pulmonary hypertension  No evidence of of cor pulmonale.  The echocardiogram done in May 2024 the revealed pulmonary pressure of 58 with moderate tricuspid regurgitation and is combination of group 2 3.  On hemodialysis and that also can increase the pulmonary pressure.  Pulmonary nodule  The patient had a PET scan at [facility] and the wife will provide the report.  He is scheduled to have a CT scan in June.  In my opinion the patient has combination of restrictive obstructive lung disease.  The COPD component is severe but he is medical therapy is optimized.

## 2024-09-19 NOTE — PROCEDURE
[FreeTextEntry1] : PFT good effort and good cooperation.  There is decrease in the FVC and FEV1 with ratio of 82%, no response of bronchodilator moderate decrease in the total lung capacity, and severe decrease in the diffusion capacity.  The FEV1 and FVC decreased compared to the previous 1 and the total lung capacity is stable.  Impression combined obstructive restrictive lung disease with severe decrease in the diffusion capacity

## 2024-09-23 ENCOUNTER — TRANSCRIPTION ENCOUNTER (OUTPATIENT)
Age: 69
End: 2024-09-23

## 2024-09-26 NOTE — ED PROVIDER NOTE - CROS ED ROS STATEMENT
[Change in Activity] : change in activity [Joint Pains] : arthralgias [Appropriate Age Development] : development appropriate for age [Fever Above 102] : no fever [Itching] : no itching [Redness] : no redness [Murmur] : no murmur [Wheezing] : no wheezing [Asthma] : no asthma [Joint Swelling] : no joint swelling all other ROS negative except as per HPI

## 2024-10-03 ENCOUNTER — NON-APPOINTMENT (OUTPATIENT)
Age: 69
End: 2024-10-03

## 2024-10-03 ENCOUNTER — APPOINTMENT (OUTPATIENT)
Dept: HEART AND VASCULAR | Facility: CLINIC | Age: 69
End: 2024-10-03
Payer: MEDICARE

## 2024-10-03 VITALS
DIASTOLIC BLOOD PRESSURE: 47 MMHG | BODY MASS INDEX: 21 KG/M2 | OXYGEN SATURATION: 92 % | HEART RATE: 52 BPM | SYSTOLIC BLOOD PRESSURE: 86 MMHG | HEIGHT: 71 IN | WEIGHT: 150 LBS | TEMPERATURE: 98 F

## 2024-10-03 DIAGNOSIS — Z86.39 PERSONAL HISTORY OF OTHER ENDOCRINE, NUTRITIONAL AND METABOLIC DISEASE: ICD-10-CM

## 2024-10-03 DIAGNOSIS — I10 ESSENTIAL (PRIMARY) HYPERTENSION: ICD-10-CM

## 2024-10-03 DIAGNOSIS — I48.0 PAROXYSMAL ATRIAL FIBRILLATION: ICD-10-CM

## 2024-10-03 DIAGNOSIS — R06.09 OTHER FORMS OF DYSPNEA: ICD-10-CM

## 2024-10-03 DIAGNOSIS — I50.30 UNSPECIFIED DIASTOLIC (CONGESTIVE) HEART FAILURE: ICD-10-CM

## 2024-10-03 DIAGNOSIS — I34.0 NONRHEUMATIC MITRAL (VALVE) INSUFFICIENCY: ICD-10-CM

## 2024-10-03 DIAGNOSIS — I35.0 NONRHEUMATIC AORTIC (VALVE) STENOSIS: ICD-10-CM

## 2024-10-03 DIAGNOSIS — I50.22 CHRONIC SYSTOLIC (CONGESTIVE) HEART FAILURE: ICD-10-CM

## 2024-10-03 PROCEDURE — 93000 ELECTROCARDIOGRAM COMPLETE: CPT

## 2024-10-03 PROCEDURE — 99214 OFFICE O/P EST MOD 30 MIN: CPT | Mod: 25

## 2024-10-03 NOTE — REASON FOR VISIT
[Follow-Up - Clinic] : a clinic follow-up of [Cardiomyopathy] : cardiomyopathy [FreeTextEntry1] : Onc- Dr Vel Dimas  [phone] Renal- Dr Bertin Felton Hrt- Dr See CLEARY- [place] EP- Dr Yanick Francois- Dr Penny Nj- Dr Alex DAS- Mikhail HALE- Celestina Pendleton

## 2024-10-03 NOTE — HISTORY OF PRESENT ILLNESS
[FreeTextEntry1] : 70 y/o M with HFpEF initially met but HF team on recent admission at [facility] and now presents for follow up. Other PMH is notable for HTN, COPD, CKD (stage 5 CKD, has LUE AVF, not on HD yet), T-cell lymphoma (diagnosed 19 years ago, on chemo romidepsin every Wednesday (not since Nov)), AF (on Eliquis), systolic CHF (EF 40-45%), EF was 60-65 in 2019,severe pHTN, severe MR, who recently underwent a left chest wall hematoma evacuation on 12/18/2020 secondary to loculated effusion. Hospital course at that time was complicated by AF with RVR and thoracic surgery consulted at that time for surgical intervention of trapped lung. He followed up as an outpatient after discharge and deemed a good surgical candidate for lung decortication. On 1/18/21 patient presented to [facility] for pre-operative optimization with heparin gtt prior to OR. Upon admission pt noted to have erythema and swelling to his L knee, CT scan demonstrated cellulitis, operation was deferred. Ortho, Gen Surg and ID were consulted, pt with no drainable collection and was medically managed with resolution of infection. Nephrology was consulted for assistance in managing CKD stage 4. On 1/22 he underwent an uncomplicated L VATS, RA decortication with blow hole placement for subcutaneous emphysema. Intraop findings significant for loculated hemothorax. He received 3UPRBC in the OR and post operatively was brought to the CTICU stable and intubated with 3 chest tubes in place. He was extubated POD1 and required primacor for pulmonary HTN and renal perfusion. On POD 2 a right sided pigtail was placed for pleural effusion which drained 800cc and ultimately removed on POD 4. Cardiology was consulted for management of pulmonary hypertension and CHF, recommended IV diuresis. He required alternating BiPAP and HFNC which was weaned to NC. Primacor was weaned off on POD 4 and he was transferred to the stepdown unit. Heparin gtt was started for AC but was held for bleeding around the CT site. A CT chest noncon was preformed showing post surgical changes, no concern for hemothorax and heparin gtt resumed. On POD 5 first CT was removed. Bronch with no abnormal findings, Second chest tube subsequently removed and added nifedipine Xl 30mg for BP control on POD 6. POD 7 third chest tube removed, post chest tube removal xray showed no pneumothorax.    12/28/20  In [facility] 12/17- 12/23 with a chest wall hematoma, Rapid AF.  EF good but severe MR and TR.  In NSR by exam and EKG .  I was later informed he might need a decortification of his pleura(left).  3/1/21 Hospitalized for VATS 1/2021 as above. BNP 15,500, Cr 5.5,  Echo was done 1/2021, EF back to NL and MR mild to moderate, mod to severe TR and PAP 67.   Got the Covid Vaccine.  Here he is in NSR and generally doing well considering how complex he is. 4/15/21  Off Eliquis 3/17/21 due to a 2nd bleed on the buttocks.  Amio was stopped March 8, 2021 8/31/21 On ASA 81 and Plavix 75, to consider a Watchman, on HD.  Edema resolved. 11/2/21  S/P WATCHMAN DEVICE Oct 20th, 2021.  hE HAD SEEN OTHER MDs TODAY AND WALKED OUT AT 5 PM 1/4/22 echo done Dec 2021, EF NL, MR mild, PAP 59, aortic plaque noted. 5/10/22 Doing well, recovering from PNA. JORGE 5/2021 EF MVP posterior leaflet mod MR and mod TR  3/7/23 echo 10/2022 EF normal Mild AS mild to mod AI mild to mod MR PASP 34 mmHg.  Pt denies CHF.  CT scan chest done Jan 2023  shows improvement in aeration. 10/31/23 No cardiac c/o,needs colonoscopy TBD 1/23/24  NIELSON, 02 sats are in the 80s last 2 determinations. No edema, remains on Amio 4/23/24 In [facility] March x 10 days for resp failure, grew out E coli. 10/3/24 low BP, sees Dr Johnson   EKG: NSR with 1st degree AVB. ST-Tw abnormalities. 12/28/20 EKG: NSR, IVCD, PRWP, STTs 3/1/21, 1/4/22

## 2024-10-03 NOTE — ASSESSMENT
[FreeTextEntry1] : Systolic CHF - EF recovered, normal on June 2019 echo. Secondary to chemo? Then 40-45% on admission in Dec 2020, then improved to 60-65 on last admission in January 2021, NL EF Oct 2022..  Drop in 02 Sats x 2. Dec and Jan.  CXR ordered.  No overt failure. ? Amio related.  PreOp- Needs colonoscopy.  No overt CHF.  Pt is cleared to proceed.  OK to stop ASA 5-7 days before.  He does not take any supplements.  Repeat colonoscopy to be done March 2024. Pt is cleared to proceed with colonoscopy, seen 4.23.24.  MR/AS- MR Was severe, now mild to moderate and EF improved. Followed by Dr Cui. MR is mild to moderate on echo Oct 2022.  Echo in [facility] March 2024, mild MR, PAP 58 mm Hg and mild to moderate AS, SANJUANA 1.47 cm2 and mean grad 15 mm.  Afib - in NSR today, no longer on Eliquis due to 2 bleeds and Amio. ZIO patch pending. I favor ASA and resuming Amio. Referred to Pulm to be monitored for Amio Pulm toxicity. The patient's [id number] score = 2. He is S/P a Watchman device. Off Eliquis. Still on Amio, TSH WNL Sept 2022. 2023.  IN NSR 1/23/24 by EKG  HTN- BP too low, will reduce Norvasc from 10 to 5 mg on 1/4/22. Will reduce to 2.5 mg on dialysis days. Reduce to 2.5 daily.  DC Amlodipine.  HLD- not on statin. I favor a statin due to aortic atherosclerosis. On Atorvastatin 40 mg.  T Cell Lymphoma - Finished with chemo, Dr Dimas. Now in remission.  CKD- Dr Denton, now on HD since June 2021.  Pt is on the transplant list.

## 2024-10-03 NOTE — PHYSICAL EXAM
[Well Developed] : well developed [Well Nourished] : well nourished [No Acute Distress] : no acute distress [Normal Conjunctiva] : normal conjunctiva [Normal Venous Pressure] : normal venous pressure [No Carotid Bruit] : no carotid bruit [Normal S1, S2] : normal S1, S2 [No Murmur] : no murmur [No Rub] : no rub [No Gallop] : no gallop [Good Air Entry] : good air entry [No Respiratory Distress] : no respiratory distress  [Soft] : abdomen soft [Non Tender] : non-tender [No Masses/organomegaly] : no masses/organomegaly [Normal Bowel Sounds] : normal bowel sounds [Normal Gait] : normal gait [No Edema] : no edema [No Cyanosis] : no cyanosis [No Clubbing] : no clubbing [No Varicosities] : no varicosities [No Rash] : no rash [No Skin Lesions] : no skin lesions [Moves all extremities] : moves all extremities [No Focal Deficits] : no focal deficits [Normal Speech] : normal speech [Alert and Oriented] : alert and oriented [Normal memory] : normal memory [de-identified] : Upper dentures [de-identified] : OLVIN, A2 present, apical systolic murmur [de-identified] : fine bibas crackles

## 2024-10-22 ENCOUNTER — EMERGENCY (EMERGENCY)
Facility: HOSPITAL | Age: 69
LOS: 1 days | Discharge: HOME | End: 2024-10-22
Attending: STUDENT IN AN ORGANIZED HEALTH CARE EDUCATION/TRAINING PROGRAM | Admitting: STUDENT IN AN ORGANIZED HEALTH CARE EDUCATION/TRAINING PROGRAM
Payer: MEDICARE

## 2024-10-22 VITALS
SYSTOLIC BLOOD PRESSURE: 100 MMHG | RESPIRATION RATE: 18 BRPM | DIASTOLIC BLOOD PRESSURE: 65 MMHG | HEART RATE: 68 BPM | TEMPERATURE: 98 F | OXYGEN SATURATION: 98 %

## 2024-10-22 VITALS
DIASTOLIC BLOOD PRESSURE: 60 MMHG | WEIGHT: 149.91 LBS | HEART RATE: 69 BPM | RESPIRATION RATE: 17 BRPM | SYSTOLIC BLOOD PRESSURE: 96 MMHG | HEIGHT: 71 IN | OXYGEN SATURATION: 98 % | TEMPERATURE: 98 F

## 2024-10-22 DIAGNOSIS — Z90.49 ACQUIRED ABSENCE OF OTHER SPECIFIED PARTS OF DIGESTIVE TRACT: Chronic | ICD-10-CM

## 2024-10-22 DIAGNOSIS — Z98.890 OTHER SPECIFIED POSTPROCEDURAL STATES: Chronic | ICD-10-CM

## 2024-10-22 DIAGNOSIS — Z41.9 ENCOUNTER FOR PROCEDURE FOR PURPOSES OTHER THAN REMEDYING HEALTH STATE, UNSPECIFIED: Chronic | ICD-10-CM

## 2024-10-22 DIAGNOSIS — I77.0 ARTERIOVENOUS FISTULA, ACQUIRED: Chronic | ICD-10-CM

## 2024-10-22 LAB
ALBUMIN SERPL ELPH-MCNC: 3.7 G/DL — SIGNIFICANT CHANGE UP (ref 3.3–5)
ALP SERPL-CCNC: 190 U/L — HIGH (ref 40–120)
ALT FLD-CCNC: 8 U/L — LOW (ref 10–45)
ANION GAP SERPL CALC-SCNC: 14 MMOL/L — SIGNIFICANT CHANGE UP (ref 5–17)
AST SERPL-CCNC: 17 U/L — SIGNIFICANT CHANGE UP (ref 10–40)
BASOPHILS # BLD AUTO: 0.05 K/UL — SIGNIFICANT CHANGE UP (ref 0–0.2)
BASOPHILS NFR BLD AUTO: 1.3 % — SIGNIFICANT CHANGE UP (ref 0–2)
BILIRUB SERPL-MCNC: 0.3 MG/DL — SIGNIFICANT CHANGE UP (ref 0.2–1.2)
BUN SERPL-MCNC: 20 MG/DL — SIGNIFICANT CHANGE UP (ref 7–23)
CALCIUM SERPL-MCNC: 9.8 MG/DL — SIGNIFICANT CHANGE UP (ref 8.4–10.5)
CHLORIDE SERPL-SCNC: 95 MMOL/L — LOW (ref 96–108)
CO2 SERPL-SCNC: 31 MMOL/L — SIGNIFICANT CHANGE UP (ref 22–31)
CREAT SERPL-MCNC: 7.37 MG/DL — HIGH (ref 0.5–1.3)
EGFR: 7 ML/MIN/1.73M2 — LOW
EOSINOPHIL # BLD AUTO: 0.21 K/UL — SIGNIFICANT CHANGE UP (ref 0–0.5)
EOSINOPHIL NFR BLD AUTO: 5.3 % — SIGNIFICANT CHANGE UP (ref 0–6)
GLUCOSE SERPL-MCNC: 93 MG/DL — SIGNIFICANT CHANGE UP (ref 70–99)
HCT VFR BLD CALC: 36.6 % — LOW (ref 39–50)
HGB BLD-MCNC: 11.1 G/DL — LOW (ref 13–17)
IMM GRANULOCYTES NFR BLD AUTO: 0.3 % — SIGNIFICANT CHANGE UP (ref 0–0.9)
LYMPHOCYTES # BLD AUTO: 1.35 K/UL — SIGNIFICANT CHANGE UP (ref 1–3.3)
LYMPHOCYTES # BLD AUTO: 34 % — SIGNIFICANT CHANGE UP (ref 13–44)
MCHC RBC-ENTMCNC: 29 PG — SIGNIFICANT CHANGE UP (ref 27–34)
MCHC RBC-ENTMCNC: 30.3 GM/DL — LOW (ref 32–36)
MCV RBC AUTO: 95.6 FL — SIGNIFICANT CHANGE UP (ref 80–100)
MONOCYTES # BLD AUTO: 0.56 K/UL — SIGNIFICANT CHANGE UP (ref 0–0.9)
MONOCYTES NFR BLD AUTO: 14.1 % — HIGH (ref 2–14)
NEUTROPHILS # BLD AUTO: 1.79 K/UL — LOW (ref 1.8–7.4)
NEUTROPHILS NFR BLD AUTO: 45 % — SIGNIFICANT CHANGE UP (ref 43–77)
NRBC # BLD: 0 /100 WBCS — SIGNIFICANT CHANGE UP (ref 0–0)
PLATELET # BLD AUTO: 119 K/UL — LOW (ref 150–400)
POTASSIUM SERPL-MCNC: 4.3 MMOL/L — SIGNIFICANT CHANGE UP (ref 3.5–5.3)
POTASSIUM SERPL-SCNC: 4.3 MMOL/L — SIGNIFICANT CHANGE UP (ref 3.5–5.3)
PROT SERPL-MCNC: 8 G/DL — SIGNIFICANT CHANGE UP (ref 6–8.3)
RBC # BLD: 3.83 M/UL — LOW (ref 4.2–5.8)
RBC # FLD: 15.7 % — HIGH (ref 10.3–14.5)
SODIUM SERPL-SCNC: 140 MMOL/L — SIGNIFICANT CHANGE UP (ref 135–145)
WBC # BLD: 3.97 K/UL — SIGNIFICANT CHANGE UP (ref 3.8–10.5)
WBC # FLD AUTO: 3.97 K/UL — SIGNIFICANT CHANGE UP (ref 3.8–10.5)

## 2024-10-22 PROCEDURE — 99284 EMERGENCY DEPT VISIT MOD MDM: CPT

## 2024-10-22 PROCEDURE — 85025 COMPLETE CBC W/AUTO DIFF WBC: CPT

## 2024-10-22 PROCEDURE — 36415 COLL VENOUS BLD VENIPUNCTURE: CPT

## 2024-10-22 PROCEDURE — 80053 COMPREHEN METABOLIC PANEL: CPT

## 2024-10-22 PROCEDURE — 99283 EMERGENCY DEPT VISIT LOW MDM: CPT

## 2024-10-22 RX ORDER — CEFDINIR 250 MG/5ML
1 POWDER, FOR SUSPENSION ORAL
Qty: 14 | Refills: 0
Start: 2024-10-22 | End: 2024-10-28

## 2024-10-22 NOTE — ED PROVIDER NOTE - CARE PROVIDER_API CALL
Alvin Rios  Urology  [address] [place] [address]  Phone: [phone]  Fax: [phone]  Follow Up Time: 4-6 Days

## 2024-10-22 NOTE — ED PROVIDER NOTE - OBJECTIVE STATEMENT
69 M PMH ESRD on HD via LUE fistula M/W/F (completed yesterday without problems) c/o hematuria x3 days. Notes baseline low blood pressure. Also with dysuria. Denies cp, sob, abd pain, flank pain, fevers, chills, nvd.

## 2024-10-22 NOTE — ED ADULT NURSE NOTE - NSFALLUNIVINTERV_ED_ALL_ED
Attending Attestation (For Attendings USE Only)... Bed/Stretcher in lowest position, wheels locked, appropriate side rails in place/Call bell, personal items and telephone in reach/Instruct patient to call for assistance before getting out of bed/chair/stretcher/Non-slip footwear applied when patient is off stretcher/[place] to call system/Physically safe environment - no spills, clutter or unnecessary equipment/Purposeful proactive rounding/Room/bathroom lighting operational, light cord in reach

## 2024-10-22 NOTE — ED ADULT TRIAGE NOTE - CHIEF COMPLAINT QUOTE
pmhx ESRD on HD via LUE fistula M/W/F (completed yesterday without problems) c/o hematuria x3 days. Notes baseline low blood pressure.

## 2024-10-22 NOTE — ED PROVIDER NOTE - PATIENT PORTAL LINK FT
You can access the FollowMyHealth Patient Portal offered by [facility] by registering at the following website: [email or web address]. By joining [facility]’s FollowMyHealth portal, you will also be able to view your health information using other applications (apps) compatible with our system.

## 2024-10-22 NOTE — ED ADULT NURSE NOTE - NSHOSCREENINGQ1_ED_ALL_ED
What Type Of Note Output Would You Prefer (Optional)?: Bullet Format Hpi Title: Evaluation of Skin Lesions How Severe Are Your Spot(S)?: mild Have Your Spot(S) Been Treated In The Past?: has not been treated Family Member: Grandfather No

## 2024-10-22 NOTE — ED PROVIDER NOTE - CLINICAL SUMMARY MEDICAL DECISION MAKING FREE TEXT BOX
69 M PMH ESRD on HD via LUE fistula M/W/F (completed yesterday without problems) c/o hematuria x3 days. Notes baseline low blood pressure. Also with dysuria. Denies cp, sob, abd pain, flank pain, fevers, chills, nvd.    hgb stable (improved). Likely UTI. Plan for labs, UA. 69 M PMH ESRD on HD via LUE fistula M/W/F (completed yesterday without problems) c/o hematuria x3 days. Notes baseline low blood pressure. Also with dysuria. Denies cp, sob, abd pain, flank pain, fevers, chills, nvd.    hgb stable (improved). Likely UTI. Plan for labs, UA.    Bladder scan 18, no urine on straight cath. pt says he only urinates every other day given ESRD. Some clots on straight cath but hgb stable. plan to treat for UTI and urology f/u

## 2024-10-22 NOTE — ED ADULT NURSE NOTE - OBJECTIVE STATEMENT
pt received aaox3 c/o brb w/ clots in the urine for 3x days. pt also endorses mild lower abd pain. pt receives HD though left arm fistula (MWF). pt denies any SOB, CP, N/V/D, fever/chills.

## 2024-10-25 DIAGNOSIS — Z99.2 DEPENDENCE ON RENAL DIALYSIS: ICD-10-CM

## 2024-10-25 DIAGNOSIS — R31.9 HEMATURIA, UNSPECIFIED: ICD-10-CM

## 2024-10-25 DIAGNOSIS — N18.6 END STAGE RENAL DISEASE: ICD-10-CM

## 2024-10-25 DIAGNOSIS — R30.0 DYSURIA: ICD-10-CM

## 2024-10-26 ENCOUNTER — RX RENEWAL (OUTPATIENT)
Age: 69
End: 2024-10-26

## 2024-10-28 ENCOUNTER — TRANSCRIPTION ENCOUNTER (OUTPATIENT)
Age: 69
End: 2024-10-28

## 2024-10-29 ENCOUNTER — TRANSCRIPTION ENCOUNTER (OUTPATIENT)
Age: 69
End: 2024-10-29

## 2024-11-05 ENCOUNTER — APPOINTMENT (OUTPATIENT)
Dept: PULMONOLOGY | Facility: CLINIC | Age: 69
End: 2024-11-05
Payer: MEDICARE

## 2024-11-05 DIAGNOSIS — J43.2 CENTRILOBULAR EMPHYSEMA: ICD-10-CM

## 2024-11-05 PROCEDURE — 99443: CPT

## 2024-11-18 ENCOUNTER — APPOINTMENT (OUTPATIENT)
Dept: UROLOGY | Facility: CLINIC | Age: 69
End: 2024-11-18

## 2024-11-27 NOTE — PHYSICAL THERAPY INITIAL EVALUATION ADULT - NEUROVASCULAR ASSESSMENT LLE
warm/no numbness/no tingling/no discoloration
Spoke to pt at bedside, pt is noted with hard of hearing. Pt reported no new food allergies or intolerances. Pt does not take any vitamins or supplements at home. Pt's intake was poor x 1 day PTA. Reported UBW: 146 lbs. Pt stated his weight has been stable however HIE indicating weight loss. HIE: 167 lbs - 1/29/24, 142 lbs - 11/26/24.   Nutrition interview: No recent episodes of nausea, vomiting, or diarrhea per pt. Last BM noted on 3 days ago per pt. Denies any chewing/swallowing difficulties. Intake is % per pt. Food preferences explored and forwarded to dietary. POCT 284-320 between 11/26 -11/27, Hba1c -9.1 - 10/6. LDL - 128 (elevated).

## 2024-12-05 NOTE — ED PROVIDER NOTE - HIV OFFER
Gastroenterology H & P        Chief Complaint iron deficiency anemia    History Of Present Illness  Olivier is a 71 year old male presenting with severe iron deficiency anemia with hemoglobin down to 6.8.    Past Medical History  Past Medical History:   Diagnosis Date    Anemia     Coronary artery disease     Essential (primary) hypertension     High cholesterol     Myocardial infarction  (CMD)         Surgical History  Past Surgical History:   Procedure Laterality Date    Back surgery      Cardiac catherization      Joint replacement Left     foot        Social History  Social History     Tobacco Use    Smoking status: Former     Current packs/day: 0.50     Average packs/day: 0.5 packs/day for 53.3 years (26.7 ttl pk-yrs)     Types: Cigarettes     Start date: 08/1971    Smokeless tobacco: Never   Vaping Use    Vaping status: never used   Substance Use Topics    Alcohol use: Not Currently    Drug use: Not Currently     Types: Marijuana       Family History  History reviewed. No pertinent family history.     Allergies  ALLERGIES:  Patient has no known allergies.    Medications  (Not in a hospital admission)      Physical Exam  HEENT: EOMI PERRLA  ABD: soft NT ND + BS  Ext: No C C E  CVS: S1 S2        Last Recorded Vitals  Visit Vitals  BP (!) 141/61 (BP Location: LUE - Left upper extremity, Patient Position: Semi-Ackerman's)   Pulse (!) 59   Temp 98.5 °F (36.9 °C) (Oral)   Resp 17   Ht 5' 10\" (1.778 m)   Wt 70.3 kg (155 lb)   SpO2 100%   BMI 22.24 kg/m²       Labs  No visits with results within 1 Day(s) from this visit.   Latest known visit with results is:   No results found for any previous visit.       Imaging  Esophagogastroduodenoscopy (EGD)    (Results Pending)   Colonoscopy    (Results Pending)       Assessment  Iron deficiency anemia    PLAN  EGD colonoscopy    Gray Mccabe MD  12/5/2024           Previously Declined (within the last year)

## 2024-12-25 PROBLEM — F10.90 ALCOHOL USE: Status: ACTIVE | Noted: 2017-05-08

## 2025-01-01 ENCOUNTER — OUTPATIENT (OUTPATIENT)
Dept: OUTPATIENT SERVICES | Facility: HOSPITAL | Age: 70
LOS: 1 days | End: 2025-01-01
Payer: MEDICARE

## 2025-01-01 DIAGNOSIS — Z90.49 ACQUIRED ABSENCE OF OTHER SPECIFIED PARTS OF DIGESTIVE TRACT: Chronic | ICD-10-CM

## 2025-01-01 DIAGNOSIS — I77.0 ARTERIOVENOUS FISTULA, ACQUIRED: Chronic | ICD-10-CM

## 2025-01-01 DIAGNOSIS — Z98.890 OTHER SPECIFIED POSTPROCEDURAL STATES: Chronic | ICD-10-CM

## 2025-01-01 DIAGNOSIS — Z41.9 ENCOUNTER FOR PROCEDURE FOR PURPOSES OTHER THAN REMEDYING HEALTH STATE, UNSPECIFIED: Chronic | ICD-10-CM

## 2025-01-01 PROCEDURE — 74176 CT ABD & PELVIS W/O CONTRAST: CPT

## 2025-01-01 PROCEDURE — 74176 CT ABD & PELVIS W/O CONTRAST: CPT | Mod: 26

## 2025-01-02 ENCOUNTER — RX RENEWAL (OUTPATIENT)
Age: 70
End: 2025-01-02

## 2025-02-04 ENCOUNTER — NON-APPOINTMENT (OUTPATIENT)
Age: 70
End: 2025-02-04

## 2025-02-04 ENCOUNTER — APPOINTMENT (OUTPATIENT)
Dept: HEART AND VASCULAR | Facility: CLINIC | Age: 70
End: 2025-02-04
Payer: MEDICARE

## 2025-02-04 VITALS — OXYGEN SATURATION: 95 %

## 2025-02-04 VITALS
BODY MASS INDEX: 19.32 KG/M2 | SYSTOLIC BLOOD PRESSURE: 80 MMHG | HEIGHT: 71 IN | HEART RATE: 53 BPM | OXYGEN SATURATION: 83 % | WEIGHT: 138.04 LBS | TEMPERATURE: 97.4 F | DIASTOLIC BLOOD PRESSURE: 52 MMHG

## 2025-02-04 DIAGNOSIS — I35.0 NONRHEUMATIC AORTIC (VALVE) STENOSIS: ICD-10-CM

## 2025-02-04 DIAGNOSIS — I34.0 NONRHEUMATIC MITRAL (VALVE) INSUFFICIENCY: ICD-10-CM

## 2025-02-04 PROCEDURE — 99214 OFFICE O/P EST MOD 30 MIN: CPT | Mod: 25

## 2025-02-04 PROCEDURE — 93000 ELECTROCARDIOGRAM COMPLETE: CPT

## 2025-02-04 NOTE — DISCHARGE NOTE NURSING/CASE MANAGEMENT/SOCIAL WORK - NSTOBACCOREFERRAL_GEN_A_NCS
Telephone call placed to [facility] ED Department and spoke with the provider in the ER today.  He stated that he would not be willing to write a letter for the patient for an appointment at was on 11/8/24 and it is unlikely that anyone else in the department would be willing to write a letter due to it being so long ago.    Secure chat message sent to the provider that saw the patient on 11/8/24- they are not available and not sure of this providers schedule.   Patient declined information

## 2025-02-11 ENCOUNTER — APPOINTMENT (OUTPATIENT)
Dept: FAMILY MEDICINE | Facility: CLINIC | Age: 70
End: 2025-02-11
Payer: MEDICARE

## 2025-02-11 VITALS
BODY MASS INDEX: 19.32 KG/M2 | WEIGHT: 138 LBS | SYSTOLIC BLOOD PRESSURE: 100 MMHG | DIASTOLIC BLOOD PRESSURE: 56 MMHG | HEIGHT: 71 IN | TEMPERATURE: 97.9 F | HEART RATE: 57 BPM | OXYGEN SATURATION: 79 %

## 2025-02-11 DIAGNOSIS — N18.6 END STAGE RENAL DISEASE: ICD-10-CM

## 2025-02-11 DIAGNOSIS — I48.0 PAROXYSMAL ATRIAL FIBRILLATION: ICD-10-CM

## 2025-02-11 DIAGNOSIS — I10 ESSENTIAL (PRIMARY) HYPERTENSION: ICD-10-CM

## 2025-02-11 DIAGNOSIS — R41.3 OTHER AMNESIA: ICD-10-CM

## 2025-02-11 DIAGNOSIS — R14.0 ABDOMINAL DISTENSION (GASEOUS): ICD-10-CM

## 2025-02-11 DIAGNOSIS — J43.2 CENTRILOBULAR EMPHYSEMA: ICD-10-CM

## 2025-02-11 DIAGNOSIS — Z86.39 PERSONAL HISTORY OF OTHER ENDOCRINE, NUTRITIONAL AND METABOLIC DISEASE: ICD-10-CM

## 2025-02-11 DIAGNOSIS — C85.90 NON-HODGKIN LYMPHOMA, UNSPECIFIED, UNSPECIFIED SITE: ICD-10-CM

## 2025-02-11 DIAGNOSIS — I51.7 CARDIOMEGALY: ICD-10-CM

## 2025-02-11 DIAGNOSIS — Z99.2 END STAGE RENAL DISEASE: ICD-10-CM

## 2025-02-11 DIAGNOSIS — D63.1 CHRONIC KIDNEY DISEASE, UNSPECIFIED: ICD-10-CM

## 2025-02-11 DIAGNOSIS — I50.22 CHRONIC SYSTOLIC (CONGESTIVE) HEART FAILURE: ICD-10-CM

## 2025-02-11 DIAGNOSIS — R91.8 OTHER NONSPECIFIC ABNORMAL FINDING OF LUNG FIELD: ICD-10-CM

## 2025-02-11 DIAGNOSIS — Z00.00 ENCOUNTER FOR GENERAL ADULT MEDICAL EXAMINATION W/OUT ABNORMAL FINDINGS: ICD-10-CM

## 2025-02-11 DIAGNOSIS — I50.30 UNSPECIFIED DIASTOLIC (CONGESTIVE) HEART FAILURE: ICD-10-CM

## 2025-02-11 DIAGNOSIS — N40.0 BENIGN PROSTATIC HYPERPLASIA WITHOUT LOWER URINARY TRACT SYMPMS: ICD-10-CM

## 2025-02-11 DIAGNOSIS — N18.9 CHRONIC KIDNEY DISEASE, UNSPECIFIED: ICD-10-CM

## 2025-02-11 PROCEDURE — G0439: CPT

## 2025-02-11 PROCEDURE — 99214 OFFICE O/P EST MOD 30 MIN: CPT | Mod: 25

## 2025-02-11 PROCEDURE — 36415 COLL VENOUS BLD VENIPUNCTURE: CPT

## 2025-02-12 LAB
ALBUMIN SERPL ELPH-MCNC: 3.5 G/DL
ALP BLD-CCNC: 295 U/L
ALT SERPL-CCNC: 10 U/L
ANION GAP SERPL CALC-SCNC: 15 MMOL/L
AST SERPL-CCNC: 32 U/L
BILIRUB SERPL-MCNC: 0.8 MG/DL
BUN SERPL-MCNC: 23 MG/DL
CALCIUM SERPL-MCNC: 9.2 MG/DL
CHLORIDE SERPL-SCNC: 97 MMOL/L
CHOLEST SERPL-MCNC: 74 MG/DL
CO2 SERPL-SCNC: 30 MMOL/L
CREAT SERPL-MCNC: 5.54 MG/DL
EGFR: 11 ML/MIN/1.73M2
ESTIMATED AVERAGE GLUCOSE: 94 MG/DL
GLUCOSE SERPL-MCNC: 87 MG/DL
HBA1C MFR BLD HPLC: 4.9 %
HDLC SERPL-MCNC: 35 MG/DL
LDLC SERPL CALC-MCNC: 26 MG/DL
NONHDLC SERPL-MCNC: 38 MG/DL
POTASSIUM SERPL-SCNC: 3.6 MMOL/L
PROT SERPL-MCNC: 7.1 G/DL
PSA SERPL-MCNC: 1.13 NG/ML
SODIUM SERPL-SCNC: 142 MMOL/L
TRIGL SERPL-MCNC: 46 MG/DL
TSH SERPL-ACNC: 2.37 UIU/ML
URATE SERPL-MCNC: 2.7 MG/DL

## 2025-02-12 NOTE — H&P ADULT - ATTENDING COMMENTS
Stable
66 year old male with PMH significant for HTN, ESRD (has LUE AVF and on HD MWF), T-cell lymphoma (diagnosed about 19 years ago and now in remission), a-fib (on ASA/plavix, not compatible with Eliquis due to bleedings), HFpEF (followed by CHF team), mitral regurgitation, severe pHTN and history of LVATS robotic assisted drainage of pleural effusion, decortication and pleurodesis and chest wall hematoma evacuation is brought to this admission today for Watchman device placement. Pt euvolemic on exam, and renal consulted (ESRD for HD). MR is functional, and mild when pt is euvolemic. BP controlled. Afib- will do ASA/Plavix post-watchman

## 2025-02-13 ENCOUNTER — TRANSCRIPTION ENCOUNTER (OUTPATIENT)
Age: 70
End: 2025-02-13

## 2025-02-13 ENCOUNTER — NON-APPOINTMENT (OUTPATIENT)
Age: 70
End: 2025-02-13

## 2025-02-13 ENCOUNTER — RX RENEWAL (OUTPATIENT)
Age: 70
End: 2025-02-13

## 2025-02-14 ENCOUNTER — TRANSCRIPTION ENCOUNTER (OUTPATIENT)
Age: 70
End: 2025-02-14

## 2025-02-20 ENCOUNTER — APPOINTMENT (OUTPATIENT)
Dept: GASTROENTEROLOGY | Facility: CLINIC | Age: 70
End: 2025-02-20
Payer: MEDICARE

## 2025-02-20 VITALS
BODY MASS INDEX: 19.2 KG/M2 | TEMPERATURE: 97.3 F | HEART RATE: 54 BPM | WEIGHT: 137.13 LBS | HEIGHT: 71 IN | DIASTOLIC BLOOD PRESSURE: 56 MMHG | SYSTOLIC BLOOD PRESSURE: 92 MMHG

## 2025-02-20 DIAGNOSIS — J96.11 CHRONIC RESPIRATORY FAILURE WITH HYPOXIA: ICD-10-CM

## 2025-02-20 DIAGNOSIS — Z86.0100 PERSONAL HISTORY OF COLON POLYPS, UNSPECIFIED: ICD-10-CM

## 2025-02-20 PROCEDURE — 99214 OFFICE O/P EST MOD 30 MIN: CPT

## 2025-02-20 RX ORDER — POLYETHYLENE GLYCOL 3350 AND ELECTROLYTES WITH LEMON FLAVOR 236; 22.74; 6.74; 5.86; 2.97 G/4L; G/4L; G/4L; G/4L; G/4L
236 POWDER, FOR SOLUTION ORAL
Qty: 1 | Refills: 0 | Status: ACTIVE | COMMUNITY
Start: 2025-02-20 | End: 1900-01-01

## 2025-02-22 NOTE — DISCHARGE NOTE NURSING/CASE MANAGEMENT/SOCIAL WORK - NSTOBACCOREFERRAL_GEN_A_NCS
Patient declined information PATTIADRIENNE HOPKINS  77y  Female    Complaints:  Subjective:     No acute o/n events. Patient this morning is resting comfortably. Denies any chest pain, trouble breathing, abdominal pain, vomiting.     FAMILY HISTORY:    77yVital Signs Last 24 Hrs  T(C): 36.3 (22 Feb 2025 05:09), Max: 36.8 (21 Feb 2025 21:16)  T(F): 97.4 (22 Feb 2025 05:09), Max: 98.3 (21 Feb 2025 21:16)  HR: 113 (22 Feb 2025 05:09) (103 - 113)  BP: 135/69 (22 Feb 2025 05:09) (121/90 - 135/69)  BP(mean): --  RR: 18 (22 Feb 2025 05:09) (16 - 18)  SpO2: 97% (22 Feb 2025 05:09) (97% - 99%)    Parameters below as of 22 Feb 2025 05:09  Patient On (Oxygen Delivery Method): room air    PHYSICAL EXAM:  GENERAL: NAD  HEAD:  Atraumatic  EYES: EOMI  ENMT: Moist mucous membranes, Good dentition  NERVOUS SYSTEM:  Alert & Oriented X3, Good concentration  CHEST/LUNG: Clear to percussion bilaterally; No rales, rhonchi, wheezing  HEART: Irregularly irregular   ABDOMEN: Soft, Nontender, Nondistended  EXTREMITIES:  +1 pitting edema bilaterally    Consultant(s) Notes Reviewed:  [x ] YES  [ ] NO  Care Discussed with Consultants/Other Providers [ x] YES  [ ] NO    LABS:                        9.8    7.31  )-----------( 165      ( 22 Feb 2025 06:16 )             33.2       02-22    144  |  100  |  32[H]  ----------------------------<  84  5.1   |  23  |  3.33[H]    Ca    9.5      22 Feb 2025 06:16  Phos  5.1     02-22  Mg     2.4     02-22                Urinalysis Basic - ( 22 Feb 2025 06:16 )    Color: x / Appearance: x / SG: x / pH: x  Gluc: 84 mg/dL / Ketone: x  / Bili: x / Urobili: x   Blood: x / Protein: x / Nitrite: x   Leuk Esterase: x / RBC: x / WBC x   Sq Epi: x / Non Sq Epi: x / Bacteria: x    CAPILLARY BLOOD GLUCOSE    Female  RADIOLOGY & ADDITIONAL TESTS:  < from: Xray Chest 1 View- PORTABLE-Urgent (Xray Chest 1 View- PORTABLE-Urgent .) (02.19.25 @ 15:47) >    FINDINGS:  Right IJ dialysis catheter terminating within the SVC.  The heart is magnified by technique.  Bilateral perihilar opacities.  There is no pneumothorax or pleural effusion.    IMPRESSION:  Right IJ dialysis catheter terminating in the SVC  Pulmonary vascular congestion    --- End of Report ---    < end of copied text >    MedsMEDICATIONS  (STANDING):  apixaban 5 milliGRAM(s) Oral every 12 hours  chlorhexidine 2% Cloths 1 Application(s) Topical <User Schedule>  desmopressin IVPB 20 MICROGram(s) IV Intermittent once  influenza  Vaccine (HIGH DOSE) 0.5 milliLiter(s) IntraMuscular once  iron sucrose Injectable 200 milliGRAM(s) IV Push every 24 hours  metoprolol succinate  milliGRAM(s) Oral <User Schedule>  sevelamer carbonate 800 milliGRAM(s) Oral three times a day with meals  sodium bicarbonate 650 milliGRAM(s) Oral four times a day  torsemide 60 milliGRAM(s) Oral daily    MEDICATIONS  (PRN):  acetaminophen     Tablet .. 650 milliGRAM(s) Oral every 6 hours PRN Temp greater or equal to 38C (100.4F), Mild Pain (1 - 3)  melatonin 3 milliGRAM(s) Oral at bedtime PRN Insomnia      HEALTH ISSUES - PROBLEM Dx:  Acute kidney injury superimposed on CKD    Paroxysmal atrial fibrillation    Need for prophylactic measure    Metabolic acidosis    Hypertension    Severe mitral regurgitation    Chronic HFrEF (heart failure with reduced ejection fraction)

## 2025-03-01 ENCOUNTER — APPOINTMENT (OUTPATIENT)
Dept: CT IMAGING | Facility: HOSPITAL | Age: 70
End: 2025-03-01

## 2025-03-03 NOTE — ED ADULT TRIAGE NOTE - NS ED NURSE AMBULANCES
Metformin at bedside, educated pt to not take medication and to always inform the RN./bedside [facility]

## 2025-03-12 NOTE — CONSULT NOTE ADULT - PROBLEM SELECTOR RECOMMENDATION 7
"FOLLOW UP: please f/u with pt symptoms    REASON FOR CONVERSATION: Medication Problem    SYMPTOMS: as discussed with office earlier- hordeolum triggering headaches-  stopped by office earlier regarding symptoms. Dr ruiz sent in tobradex ointment but pharmacy out- they do have tobradex solution.    OTHER: c/w with on call who advised evaluation if symptoms severe to r/o preseptal cellulitis, otherwise warm compress and f/u tomorrow. Relayed to patient who reported she does have ophtho apt tomorrow and no s/sx cellulitis (pt is retired pediatrician). Relayed to on call who sent in bacitracin ointment for her. Pt notified of new rx and call back precautions given for worsening, s/sx cellulitis. Pt verbalized understanding, will likely use erythromycin ophthalmic ointment she already has at home.    DISPOSITION: No disposition on file.    Reason for Disposition   [1] Caller has URGENT medicine question about med that PCP or specialist prescribed AND [2] triager unable to answer question    Answer Assessment - Initial Assessment Questions  1. NAME of MEDICINE: \"What medicine(s) are you calling about?\"      Tobradex ophthalmic ointment    2. QUESTION: \"What is your question?\" (e.g., double dose of medicine, side effect)      Pharmacy out    3. PRESCRIBER: \"Who prescribed the medicine?\" Reason: if prescribed by specialist, call should be referred to that group.      Dr Ruiz    4. SYMPTOMS: \"Do you have any symptoms?\" If Yes, ask: \"What symptoms are you having?\"  \"How bad are the symptoms (e.g., mild, moderate, severe)      Hordoleum- has visit with ophtho tomorrow- painful triggering headaches    Protocols used: Medication Question Call-Adult-AH    "
-Platelets 450 today, likely reactive in the setting of anemia or lymphoma, continue to trend CBC.

## 2025-03-18 ENCOUNTER — APPOINTMENT (OUTPATIENT)
Dept: PULMONOLOGY | Facility: CLINIC | Age: 70
End: 2025-03-18

## 2025-03-20 ENCOUNTER — APPOINTMENT (OUTPATIENT)
Dept: NEUROLOGY | Age: 70
End: 2025-03-20

## 2025-04-15 ENCOUNTER — APPOINTMENT (OUTPATIENT)
Dept: FAMILY MEDICINE | Facility: CLINIC | Age: 70
End: 2025-04-15

## 2025-04-30 NOTE — PATIENT PROFILE ADULT - NSPROPTRIGHTREPPHONE_GEN_A_NUR
Spoke to pt to reschedule appt to May 15, 2025 2pm at [facility]. Provided pt with office address. Pt verbalized understanding.   [phone]

## 2025-06-12 NOTE — PROGRESS NOTE ADULT - ASSESSMENT
"S/w Pt, Pt requesting Rt hip steroid injection.  Pt is a previous ortho referral Pt of  - had Lt hip injections performed on 02/07/2024 and 04/05/2023.  Pt saw Maren Ramesh MD of [facility] on 06/05 and Rt hip steroid injection was recommended at that time, OV note states \"To help with your pain, I would recommend that you get a cortisone injection into your hip joint. This would be done in Interventional Radioloy using ultrasound\".     Pt states he contacted his Insurance and  is in network. Are we able to accept Pt as direct ortho referral for Rt hip - do we need formal referral?  RN did advise Pt that [facility] and [facility] are available in [place], Pt is fine with traveling to .   " 67M with pmhx of ESRD (MWF Via LUE AVF), Lymphoma on weekly chemo, Afibb, HTn who presented to the ER in the setting of difficulty breathing found to have loculated effusion vs new PNA    Assessment/Plan:   #ESRD on HD MWF @[address] [place] [address] [facility]   usual Rx 3h EDW 63.5kg  -HD today as per schedule  -electrolytes noted K of 4.4  -UF w/ HD    #HTN   BP at goal   Continue  w/ home meds  Amlodipine 5mg qday - hold on HD days  Toprol XL 50mg Qday    #access   LUE AVF functional     #anemia  Hb near goal  Epo w/ HD  -transfusion for Hgb < 7     #renal bone disease   Ca ~9.2  Phos 2.9  -Trend Phos daily  -C/w sevelamer 800mg TID w/ meals    Thank you for the opportunity to participate in the care of your patient. The nephrology service remains available to assist with any questions or concerns. Please feel free to reach us by paging the on-call nephrology fellow for urgent issues or as below.       Hemoglobin: 9.7 g/dL (10-07-22 @ 05:30)  Ferritin, Serum: 1321 ng/mL (10-07-22 @ 05:30)  Iron Total, Serum: 40 ug/dL (10-07-22 @ 05:30)  Iron - Total Binding Capacity.: 168 ug/dL (10-07-22 @ 05:30)    Albumin, Serum: 2.7 g/dL (10-07-22 @ 05:30)  Ferritin, Serum: 1321 ng/mL (10-07-22 @ 05:30)    T(C): 36.7 (10-07-22 @ 05:08), Max: 36.7 (10-07-22 @ 05:08)  HR: 64 (10-07-22 @ 05:08) (60 - 64)  BP: 116/67 (10-07-22 @ 05:08) (116/67 - 128/80)  RR: 18 (10-07-22 @ 05:08) (17 - 18)  SpO2: 96% (10-07-22 @ 05:08) (96% - 100%)  epoetin donny-epbx (RETACRIT) Injectable 6000 Unit(s) IV Push once, 10-05-22 @ 07:39, Routine, Stop order after: 1 Doses  epoetin donny-epbx (RETACRIT) Injectable 6000 Unit(s) IV Push once, 10-07-22 @ 07:29, Routine, Stop order after: 1 Doses  epoetin donny-epbx (RETACRIT) Injectable 6000 Unit(s) IV Push once, 10-03-22 @ 12:55, Routine, Stop order after: 1 Doses  sevelamer carbonate 800 milliGRAM(s) Oral every 8 hours, 10-03-22 @ 16:55, Routine      Hemodialysis Treatment.:     Schedule: Once, Modality: Hemodialysis, Access: Arteriovenous Fistula    Dialyzer: Optiflux [id number], Time: 180 Min    Blood Flow: 400 mL/Min , Dialysate Flow: 500 mL/Min, Dialysate Temp: 36.5, Tubinmm (Adult)    Target Dry Weight: 63.5 kG    Dialysate Electrolytes (mEq/L): Potassium 2, Calcium 2.5, Sodium 138, Bicarbonate 35 (10-07-22 @ 07:29) [Active]      Janna Deleon D.O.  PGY-5, Nephrology Fellow    P: [phone]  67M with pmhx of ESRD (MWF Via LUE AVF), Lymphoma on weekly chemo, Afibb, HTn who presented to the ER in the setting of difficulty breathing found to have loculated effusion vs new PNA    Assessment/Plan:   #ESRD on HD MWF @[address] [place] [address] [facility] Center   usual Rx 3h EDW 63.5kg  -HD today as per schedule  -electrolytes noted K of 4.4  -UF w/ HD    #HTN   BP at goal   Continue  w/ home meds  Amlodipine 5mg qday - hold on HD days  Toprol XL 50mg Qday    #access   LUE AVF functional     #anemia  Hb near goal  Epo w/ HD  -transfusion for Hgb < 7     #renal bone disease   Ca ~9.2  Phos 2.9  -Trend Phos daily  -C/w sevelamer 800mg TID w/ meals    Thank you for the opportunity to participate in the care of your patient. The nephrology service remains available to assist with any questions or concerns. Please feel free to reach us by paging the on-call nephrology fellow for urgent issues or as below.       Hemoglobin: 9.7 g/dL (10-07-22 @ 05:30)  Ferritin, Serum: 1321 ng/mL (10-07-22 @ 05:30)  Iron Total, Serum: 40 ug/dL (10-07-22 @ 05:30)  Iron - Total Binding Capacity.: 168 ug/dL (10-07-22 @ 05:30)    Albumin, Serum: 2.7 g/dL (10-07-22 @ 05:30)  Ferritin, Serum: 1321 ng/mL (10-07-22 @ 05:30)    T(C): 36.7 (10-07-22 @ 05:08), Max: 36.7 (10-07-22 @ 05:08)  HR: 64 (10-07-22 @ 05:08) (60 - 64)  BP: 116/67 (10-07-22 @ 05:08) (116/67 - 128/80)  RR: 18 (10-07-22 @ 05:08) (17 - 18)  SpO2: 96% (10-07-22 @ 05:08) (96% - 100%)  epoetin donny-epbx (RETACRIT) Injectable 6000 Unit(s) IV Push once, 10-05-22 @ 07:39, Routine, Stop order after: 1 Doses  epoetin donny-epbx (RETACRIT) Injectable 6000 Unit(s) IV Push once, 10-07-22 @ 07:29, Routine, Stop order after: 1 Doses  epoetin donny-epbx (RETACRIT) Injectable 6000 Unit(s) IV Push once, 10-03-22 @ 12:55, Routine, Stop order after: 1 Doses  sevelamer carbonate 800 milliGRAM(s) Oral every 8 hours, 10-03-22 @ 16:55, Routine      Hemodialysis Treatment.:     Schedule: Once, Modality: Hemodialysis, Access: Arteriovenous Fistula    Dialyzer: Optiflux [id number], Time: 180 Min    Blood Flow: 400 mL/Min , Dialysate Flow: 500 mL/Min, Dialysate Temp: 36.5, Tubinmm (Adult)    Target Dry Weight: 63.5 kG    Dialysate Electrolytes (mEq/L): Potassium 2, Calcium 2.5, Sodium 138, Bicarbonate 35 (10-07-22 @ 07:29) [Active]    Seen on HD, Target dry weight reduced to 62.5Kg. Will see how patient tolerates.       Janna Deleon D.O.  PGY-5, Nephrology Fellow    P: [phone]

## 2025-06-24 ENCOUNTER — APPOINTMENT (OUTPATIENT)
Dept: HEART AND VASCULAR | Facility: CLINIC | Age: 70
End: 2025-06-24

## 2025-07-08 NOTE — PATIENT PROFILE ADULT - NSPROPTRIGHTCAREGIVER_GEN_A_NUR
F: as needed  E: replete as needed  N: CC diet   DVT ppx: lovenox   GI ppx: pantoprazole   Dispo --> [facility] declines

## 2025-08-04 NOTE — PATIENT PROFILE ADULT - NSPROMEDSADMININFO_GEN_A_NUR
Really bad this year; Using oral medications but not nasal steroids - encouraged. Discussed environmental measures, especially in the bedroom. Discussed role of allergy immunotherapy.     no concerns

## (undated) DEVICE — SLV COMPRESSION KNEE MED

## (undated) DEVICE — DRAPE TOWEL WHITE 18" X 26"

## (undated) DEVICE — XI DRAPE COLUMN

## (undated) DEVICE — Device

## (undated) DEVICE — XI CORD MONOPOLAR CAUTERY (GREEN)

## (undated) DEVICE — INSUFFLATION NDL ETHICON ENDOPATH PNEUMOPARITONEUM 120MM

## (undated) DEVICE — DRSG TEGADERM 4X10"

## (undated) DEVICE — BAG DECANTER IV STERILE

## (undated) DEVICE — ELCTR GROUNDING PAD ADULT COVIDIEN

## (undated) DEVICE — XI ARM FORCEP FENESTRATED BIPOLAR 8MM

## (undated) DEVICE — PLUMEPORT LAPAROSCOPIC SMOKE FILTRATION DEVICE

## (undated) DEVICE — SUT SILK 2-0 18" SH (POP-OFF)

## (undated) DEVICE — DRAPE PROBE COVER LATEX FREE 3X96"

## (undated) DEVICE — MARKING PEN W RULER / LABELS

## (undated) DEVICE — FORCEP RADIAL JAW 4 W NDL 2.2MM 2.8MM 240CM ORANGE DISP

## (undated) DEVICE — SOL IRR POUR H2O 250ML

## (undated) DEVICE — WARMING BLANKET UPPER ADULT

## (undated) DEVICE — XI ARM SCISSOR MONO CURVED

## (undated) DEVICE — POLY TRAP ETRAP

## (undated) DEVICE — DRSG TEGADERM 1.75X1.75"

## (undated) DEVICE — POSITIONER PATIENT SAFETY STRAP 3X60"

## (undated) DEVICE — NDL HYPO SAFE 25G X 1.5" (ORANGE)

## (undated) DEVICE — SUT SILK 4-0 18" TIES

## (undated) DEVICE — PACK VASCULAR MINOR

## (undated) DEVICE — BOWL SOL 32OZ LG STRL

## (undated) DEVICE — SPECIMEN CONTAINER 4OZ

## (undated) DEVICE — BLADE SURGICAL #11 CARBON

## (undated) DEVICE — WARMING BLANKET LOWER ADULT

## (undated) DEVICE — SUT SILK 2-0 12-18"

## (undated) DEVICE — GLV 7.5 PROTEXIS (WHITE)

## (undated) DEVICE — XI TIP COVER

## (undated) DEVICE — DRAPE SPLIT SHEET 77" X 120"

## (undated) DEVICE — SUT MONOCRYL PLUS 4-0 18" PS-2 UNDYED

## (undated) DEVICE — SUT VICRYL PLUS 0 27" UR-6

## (undated) DEVICE — SUT MONOCRYL 4-0 27" PS-2 UNDYED

## (undated) DEVICE — XI DRAPE ARM

## (undated) DEVICE — SUT PLEDGET SOFT MEDIUM 1/4" X 1/8" X 1/16" X6

## (undated) DEVICE — SUT PROLENE 7-0 18" BV

## (undated) DEVICE — SNARE LESIONHUNTER ROTAT NITNL COLD 10MM

## (undated) DEVICE — BLADE SURGICAL #15 CARBON

## (undated) DEVICE — PREP CHLORAPREP HI-LITE ORANGE 26ML

## (undated) DEVICE — SUT VICRYL PLUS 3-0 27" SH UNDYED

## (undated) DEVICE — SUT VICRYL 2-0 27" CT-1 UNDYED

## (undated) DEVICE — XI ARM NEEDLE DRIVER SUTURECUT MEGA 8MM

## (undated) DEVICE — ELCTR BOVIE PENCIL HANDPIECE

## (undated) DEVICE — XI SEAL UNIV 5- 8 MM

## (undated) DEVICE — NET RETRIEVAL RESCUENET 30MM

## (undated) DEVICE — LUBRICANT INST ELECTROLUBE Z SOLUTION

## (undated) DEVICE — DRAPE 3/4 SHEET 52X76"

## (undated) DEVICE — SUT PROLENE 6-0 30" RB-2

## (undated) DEVICE — TROCAR APPLIED MEDICAL KII BALLOON BLUNT TIP 12MM X 100MM

## (undated) DEVICE — SUT VICRYL PLUS 2-0 27" SH UNDYED

## (undated) DEVICE — CATH IV OPTIVA 16G X 2"

## (undated) DEVICE — XI ENDOWRIST 12 - 8 MM CANNULA REDUCER

## (undated) DEVICE — SUT STRATAFIX SPIRAL MONOCRYL PLUS 2-0 20CM SH UNDYED

## (undated) DEVICE — MARKING PEN W RULER

## (undated) DEVICE — SNARE ENDO POLY CAPTIVATOR II 33MM

## (undated) DEVICE — DRAIN PENROSE .5" X 18" LATEX

## (undated) DEVICE — D HELP - CLEARVIEW CLEARIFY SYSTEM

## (undated) DEVICE — XI 12MM AND STAPLER CANNULA SEAL

## (undated) DEVICE — CANISTER SPECIMEN CONVERTOR PLASTIC

## (undated) DEVICE — SUT PROLENE 5-0 36" RB-1

## (undated) DEVICE — XI OBTURATOR OPTICAL BLADELESS 8MM

## (undated) DEVICE — GOWN XL LEVEL 3

## (undated) DEVICE — DRSG STERISTRIPS 0.5 X 4"

## (undated) DEVICE — SUT PASSER SYMMETRY OR PRO PUNCTURE CLOSURE DEVICE

## (undated) DEVICE — GLV 6.5 PROTEXIS (WHITE)

## (undated) DEVICE — GLV 7 PROTEXIS (WHITE)